# Patient Record
Sex: MALE | Race: WHITE | NOT HISPANIC OR LATINO
[De-identification: names, ages, dates, MRNs, and addresses within clinical notes are randomized per-mention and may not be internally consistent; named-entity substitution may affect disease eponyms.]

---

## 2017-05-08 ENCOUNTER — OTHER (OUTPATIENT)
Age: 67
End: 2017-05-08

## 2017-05-15 ENCOUNTER — APPOINTMENT (OUTPATIENT)
Dept: ORTHOPEDIC SURGERY | Facility: CLINIC | Age: 67
End: 2017-05-15

## 2017-05-15 VITALS
BODY MASS INDEX: 25.91 KG/M2 | SYSTOLIC BLOOD PRESSURE: 119 MMHG | HEIGHT: 68 IN | HEART RATE: 73 BPM | DIASTOLIC BLOOD PRESSURE: 71 MMHG | WEIGHT: 171 LBS

## 2017-06-08 ENCOUNTER — APPOINTMENT (OUTPATIENT)
Dept: PULMONOLOGY | Facility: CLINIC | Age: 67
End: 2017-06-08

## 2017-06-08 VITALS — WEIGHT: 168 LBS | BODY MASS INDEX: 25.54 KG/M2

## 2017-06-08 VITALS — DIASTOLIC BLOOD PRESSURE: 58 MMHG | OXYGEN SATURATION: 98 % | SYSTOLIC BLOOD PRESSURE: 110 MMHG | HEART RATE: 76 BPM

## 2017-06-08 RX ORDER — LOTEPREDNOL ETABONATE 5 MG/G
0.5 GEL OPHTHALMIC
Qty: 5 | Refills: 0 | Status: DISCONTINUED | COMMUNITY
Start: 2017-01-26

## 2017-06-08 RX ORDER — DOXYCYCLINE 100 MG/1
100 CAPSULE ORAL
Qty: 14 | Refills: 0 | Status: DISCONTINUED | COMMUNITY
Start: 2017-04-10

## 2017-06-08 RX ORDER — BESIFLOXACIN 6 MG/ML
0.6 SUSPENSION OPHTHALMIC
Qty: 5 | Refills: 0 | Status: DISCONTINUED | COMMUNITY
Start: 2017-01-26

## 2017-06-16 ENCOUNTER — OTHER (OUTPATIENT)
Age: 67
End: 2017-06-16

## 2017-12-15 ENCOUNTER — APPOINTMENT (OUTPATIENT)
Dept: PULMONOLOGY | Facility: CLINIC | Age: 67
End: 2017-12-15
Payer: MEDICARE

## 2017-12-15 VITALS
SYSTOLIC BLOOD PRESSURE: 126 MMHG | HEIGHT: 66 IN | OXYGEN SATURATION: 98 % | DIASTOLIC BLOOD PRESSURE: 66 MMHG | HEART RATE: 97 BPM

## 2017-12-15 PROCEDURE — 99215 OFFICE O/P EST HI 40 MIN: CPT | Mod: 25

## 2017-12-15 PROCEDURE — 94729 DIFFUSING CAPACITY: CPT

## 2017-12-15 PROCEDURE — 85018 HEMOGLOBIN: CPT | Mod: QW

## 2017-12-15 PROCEDURE — 94727 GAS DIL/WSHOT DETER LNG VOL: CPT

## 2017-12-15 PROCEDURE — 94010 BREATHING CAPACITY TEST: CPT

## 2017-12-15 RX ORDER — CLOBETASOL PROPIONATE 0.5 MG/G
0.05 OINTMENT TOPICAL
Qty: 45 | Refills: 0 | Status: DISCONTINUED | COMMUNITY
Start: 2017-11-09

## 2017-12-15 RX ORDER — SULFAMETHOXAZOLE AND TRIMETHOPRIM 400; 80 MG/1; MG/1
400-80 TABLET ORAL
Qty: 12 | Refills: 0 | Status: DISCONTINUED | COMMUNITY
Start: 2017-10-06

## 2018-06-29 ENCOUNTER — APPOINTMENT (OUTPATIENT)
Dept: PULMONOLOGY | Facility: CLINIC | Age: 68
End: 2018-06-29
Payer: MEDICARE

## 2018-06-29 VITALS — SYSTOLIC BLOOD PRESSURE: 126 MMHG | DIASTOLIC BLOOD PRESSURE: 64 MMHG

## 2018-06-29 VITALS — HEART RATE: 74 BPM | OXYGEN SATURATION: 99 %

## 2018-06-29 PROCEDURE — 94729 DIFFUSING CAPACITY: CPT

## 2018-06-29 PROCEDURE — 94727 GAS DIL/WSHOT DETER LNG VOL: CPT

## 2018-06-29 PROCEDURE — 85018 HEMOGLOBIN: CPT | Mod: QW

## 2018-06-29 PROCEDURE — 99214 OFFICE O/P EST MOD 30 MIN: CPT | Mod: 25

## 2018-06-29 PROCEDURE — 94010 BREATHING CAPACITY TEST: CPT

## 2018-11-30 ENCOUNTER — APPOINTMENT (OUTPATIENT)
Dept: PULMONOLOGY | Facility: CLINIC | Age: 68
End: 2018-11-30
Payer: MEDICARE

## 2018-11-30 VITALS — SYSTOLIC BLOOD PRESSURE: 126 MMHG | HEART RATE: 84 BPM | DIASTOLIC BLOOD PRESSURE: 60 MMHG | OXYGEN SATURATION: 99 %

## 2018-11-30 PROCEDURE — 99214 OFFICE O/P EST MOD 30 MIN: CPT | Mod: 25

## 2018-11-30 PROCEDURE — 94010 BREATHING CAPACITY TEST: CPT

## 2019-06-07 ENCOUNTER — APPOINTMENT (OUTPATIENT)
Dept: PULMONOLOGY | Facility: CLINIC | Age: 69
End: 2019-06-07
Payer: MEDICARE

## 2019-06-07 VITALS
DIASTOLIC BLOOD PRESSURE: 80 MMHG | HEART RATE: 88 BPM | OXYGEN SATURATION: 98 % | RESPIRATION RATE: 16 BRPM | SYSTOLIC BLOOD PRESSURE: 132 MMHG

## 2019-06-07 VITALS — HEIGHT: 66 IN | WEIGHT: 156 LBS | BODY MASS INDEX: 25.07 KG/M2

## 2019-06-07 PROCEDURE — 94010 BREATHING CAPACITY TEST: CPT

## 2019-06-07 PROCEDURE — 85018 HEMOGLOBIN: CPT | Mod: QW

## 2019-06-07 PROCEDURE — 94727 GAS DIL/WSHOT DETER LNG VOL: CPT

## 2019-06-07 PROCEDURE — 94729 DIFFUSING CAPACITY: CPT

## 2019-06-07 NOTE — PHYSICAL EXAM
[General Appearance - Well Developed] : well developed [Normal Appearance] : normal appearance [Well Groomed] : well groomed [General Appearance - Well Nourished] : well nourished [No Deformities] : no deformities [General Appearance - In No Acute Distress] : no acute distress [Normal Conjunctiva] : the conjunctiva exhibited no abnormalities [Eyelids - No Xanthelasma] : the eyelids demonstrated no xanthelasmas [Normal Oropharynx] : normal oropharynx [Neck Appearance] : the appearance of the neck was normal [Neck Cervical Mass (___cm)] : no neck mass was observed [Jugular Venous Distention Increased] : there was no jugular-venous distention [Thyroid Diffuse Enlargement] : the thyroid was not enlarged [Heart Sounds] : normal S1 and S2 [Thyroid Nodule] : there were no palpable thyroid nodules [Heart Rate And Rhythm] : heart rate and rhythm were normal [Murmurs] : no murmurs present [Abdomen Soft] : soft [Abdomen Tenderness] : non-tender [Abdomen Mass (___ Cm)] : no abdominal mass palpated [Abnormal Walk] : normal gait [Gait - Sufficient For Exercise Testing] : the gait was sufficient for exercise testing [Nail Clubbing] : no clubbing of the fingernails [Cyanosis, Localized] : no localized cyanosis [Petechial Hemorrhages (___cm)] : no petechial hemorrhages [Skin Color & Pigmentation] : normal skin color and pigmentation [Skin Lesions] : no skin lesions [] : no rash [No Venous Stasis] : no venous stasis [No Xanthoma] : no  xanthoma was observed [No Skin Ulcers] : no skin ulcer [Deep Tendon Reflexes (DTR)] : deep tendon reflexes were 2+ and symmetric [Sensation] : the sensory exam was normal to light touch and pinprick [No Focal Deficits] : no focal deficits [FreeTextEntry1] : clam shell type surgical scar along the lower rib cage line..

## 2019-06-07 NOTE — HISTORY OF PRESENT ILLNESS
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  No C/o cough, wheeze or sputum\par Pt had episode of entero viral in March-May. Completely resolved. Prograf reduced to 2mg per day 2months ago.\par \par

## 2019-06-07 NOTE — DISCUSSION/SUMMARY
[FreeTextEntry1] : 68-year-old white male seen today status post lung transplant Jan 2015. Doing well without of evidence rejection.

## 2019-06-07 NOTE — CONSULT LETTER
[Dear  ___] : Dear  [unfilled], [Consult Letter:] : I had the pleasure of evaluating your patient, [unfilled]. [Please see my note below.] : Please see my note below. [Sincerely,] : Sincerely, [J Carlos Cordova MD] : J Carlos Cordova MD [FreeTextEntry3] : J Carlos Cordova MD FCCP\par Pulmonary/Critical Care/Sleep Medicine\par Department of Internal Medicine\par \par [facility] [employer]\par

## 2019-06-18 ENCOUNTER — OTHER (OUTPATIENT)
Age: 69
End: 2019-06-18

## 2019-06-27 ENCOUNTER — APPOINTMENT (OUTPATIENT)
Dept: ORTHOPEDIC SURGERY | Facility: CLINIC | Age: 69
End: 2019-06-27
Payer: MEDICARE

## 2019-06-27 VITALS
DIASTOLIC BLOOD PRESSURE: 86 MMHG | SYSTOLIC BLOOD PRESSURE: 130 MMHG | HEIGHT: 66 IN | WEIGHT: 156 LBS | HEART RATE: 84 BPM | BODY MASS INDEX: 25.07 KG/M2

## 2019-06-27 PROCEDURE — 99213 OFFICE O/P EST LOW 20 MIN: CPT

## 2019-06-27 PROCEDURE — 73502 X-RAY EXAM HIP UNI 2-3 VIEWS: CPT

## 2019-06-27 NOTE — DISCUSSION/SUMMARY
[de-identified] : The patient is a 69 year old male 3 years s/p right anterior THR. He will continue home strengthening exercises. Overall, he is very happy with the results of the surgery. Dental prophylaxis was advised. Follow up in 5 years for radiographic surveillance.

## 2019-06-27 NOTE — HISTORY OF PRESENT ILLNESS
[de-identified] : This 69-year-old male presents for followup of his right THR. He is now 3 years and one month postop. He is noting no complaints in regard to the right hip. He does report some left hip pain after trimming shrubs several weeks ago. Overall this is improving with his home exercise program. He does walk for exercise. He tries to walk one to 2 miles throughout his day.

## 2019-06-27 NOTE — ADDENDUM
[FreeTextEntry1] : This note was authored by Yifan Avendano working as a medical scribe for Dr. Adama Kaba. The note was reviewed, edited, and revised by Dr. Adama Kaba whom is in agreement with the exam findings, imaging findings, and treatment plan. 06/27/2019.

## 2019-09-24 ENCOUNTER — APPOINTMENT (OUTPATIENT)
Dept: ORTHOPEDIC SURGERY | Facility: CLINIC | Age: 69
End: 2019-09-24
Payer: MEDICARE

## 2019-09-24 VITALS
HEIGHT: 66 IN | HEART RATE: 85 BPM | DIASTOLIC BLOOD PRESSURE: 68 MMHG | WEIGHT: 156 LBS | SYSTOLIC BLOOD PRESSURE: 147 MMHG | BODY MASS INDEX: 25.07 KG/M2

## 2019-09-24 DIAGNOSIS — M47.814 SPONDYLOSIS W/OUT MYELOPATHY OR RADICULOPATHY, THORACIC REGION: ICD-10-CM

## 2019-09-24 PROCEDURE — 72100 X-RAY EXAM L-S SPINE 2/3 VWS: CPT

## 2019-09-24 PROCEDURE — 99214 OFFICE O/P EST MOD 30 MIN: CPT

## 2019-09-24 NOTE — HISTORY OF PRESENT ILLNESS
[de-identified] : 69 year old male presents for lumbar spine pain and chronic compression fxs. Pt states he was remodeling his bathroom in August, when he aggravated his back after he dropped something and went to reach for it on the stairway. He reports his pain is now constant in the lower back. Sitting to standing and vice versa exacerbates his pain. Pt denies numbness and tingling. PMHx of osteopetrosis and osteopenia. He states he is a lung transplant pt. Pt takes calcium, Vitamin D and magnesium. He has taken Tramadol for his back pain but states it did not provide relief. Pt is currently taking hydrocortisone and is a chronic steroid patient which makes him a high risk of osteoporosis and compression fractures. [Ataxia] : no ataxia [Incontinence] : no incontinence [Loss of Dexterity] : good dexterity [Urinary Ret.] : no urinary retention

## 2019-09-24 NOTE — PHYSICAL EXAM
[de-identified] : CONSTITUTIONAL: The patient is a very pleasant individual who is well-nourished and who appears stated age.\par PSYCHIATRIC: The patient is alert and oriented X 3 and in no apparent distress, and participates with orthopedic evaluation well.\par HEAD: Atraumatic and is nonsyndromic in appearance.\par EENT: No visible thyromegaly, EOMI.\par RESPIRATORY: Respiratory rate is regular, not dyspneic on examination.\par LYMPHATICS: There is no inguinal lymphadenopathy\par INTEGUMENTARY: Skin is clean, dry, and intact about the bilateral lower extremities and lumbar spine.\par VASCULAR: There is brisk capillary refill about the bilateral lower extremities.\par NEUROLOGIC: There are no pathologic reflexes. There is no decrease in sensation of the bilateral lower extremities on Wartenberg pinwheel examination. Deep tendon reflexes are well maintained at 2+/4 of the bilateral lower extremities and are symmetric.\par MUSCULOSKELETAL: There is no visible muscular atrophy. Manual motor strength is well maintained in the bilateral lower extremities. Range of motion of lumbar spine is well maintained. The patient ambulates in a non-myelopathic manner. Negative tension sign and straight leg raise bilaterally. Quad extension, ankle dorsiflexion, EHL, plantar flexion, and ankle eversion are well preserved. Normal secondary orthopaedic exam of bilateral hips, greater trochanteric area, knees and ankles. \par Mechanically oriented low back pain.\par TTP at thoracolumbar junction. [de-identified] : Xray of the lumbar spine taken today shows lower thoracic and upper lumbar compression fxs.

## 2019-09-24 NOTE — DISCUSSION/SUMMARY
[de-identified] : Based upon his pain profile I believe OTC pain medications are reasonable for pain medication at this time. Based upon his high risk of compression fxs a thoracic and lumbar MRI has been ordered secondary to persistent pain and is medically necessary to determine a further treatment plan. The pt will follow up once MRI has been obtained.\par

## 2019-09-25 ENCOUNTER — FORM ENCOUNTER (OUTPATIENT)
Age: 69
End: 2019-09-25

## 2019-09-26 ENCOUNTER — APPOINTMENT (OUTPATIENT)
Dept: MRI IMAGING | Facility: CLINIC | Age: 69
End: 2019-09-26

## 2019-09-26 ENCOUNTER — APPOINTMENT (OUTPATIENT)
Dept: MRI IMAGING | Facility: CLINIC | Age: 69
End: 2019-09-26
Payer: MEDICARE

## 2019-09-26 ENCOUNTER — OUTPATIENT (OUTPATIENT)
Dept: OUTPATIENT SERVICES | Facility: HOSPITAL | Age: 69
LOS: 1 days | End: 2019-09-26

## 2019-09-26 DIAGNOSIS — Z94.2 LUNG TRANSPLANT STATUS: ICD-10-CM

## 2019-09-26 DIAGNOSIS — M47.814 SPONDYLOSIS WITHOUT MYELOPATHY OR RADICULOPATHY, THORACIC REGION: ICD-10-CM

## 2019-09-26 DIAGNOSIS — Z94.2 LUNG TRANSPLANT STATUS: Chronic | ICD-10-CM

## 2019-09-26 PROCEDURE — 72146 MRI CHEST SPINE W/O DYE: CPT | Mod: 26

## 2019-09-26 PROCEDURE — 72148 MRI LUMBAR SPINE W/O DYE: CPT | Mod: 26

## 2019-10-17 ENCOUNTER — APPOINTMENT (OUTPATIENT)
Dept: ORTHOPEDIC SURGERY | Facility: CLINIC | Age: 69
End: 2019-10-17
Payer: MEDICARE

## 2019-10-17 VITALS
BODY MASS INDEX: 25.07 KG/M2 | HEART RATE: 85 BPM | SYSTOLIC BLOOD PRESSURE: 160 MMHG | DIASTOLIC BLOOD PRESSURE: 86 MMHG | HEIGHT: 66 IN | WEIGHT: 156 LBS

## 2019-10-17 DIAGNOSIS — S22.000A WEDGE COMPRESSION FRACTURE OF UNSPECIFIED THORACIC VERTEBRA, INITIAL ENCOUNTER FOR CLOSED FRACTURE: ICD-10-CM

## 2019-10-17 DIAGNOSIS — M84.48XA PATHOLOGICAL FRACTURE, OTHER SITE, INITIAL ENCOUNTER FOR FRACTURE: ICD-10-CM

## 2019-10-17 DIAGNOSIS — Z80.7 FAMILY HISTORY OF OTHER MALIGNANT NEOPLASMS OF LYMPHOID, HEMATOPOIETIC AND RELATED TISSUES: ICD-10-CM

## 2019-10-17 DIAGNOSIS — Z96.649 PRESENCE OF UNSPECIFIED ARTIFICIAL HIP JOINT: ICD-10-CM

## 2019-10-17 DIAGNOSIS — Z87.09 PERSONAL HISTORY OF OTHER DISEASES OF THE RESPIRATORY SYSTEM: ICD-10-CM

## 2019-10-17 PROCEDURE — 22310 CLOSED TX VERT FX W/O MANJ: CPT

## 2019-10-17 PROCEDURE — 99215 OFFICE O/P EST HI 40 MIN: CPT | Mod: 57

## 2019-10-17 NOTE — ADDENDUM
[FreeTextEntry1] : Documented by Alyson Zayas acting as a scribe for Sudarshan Rodriguez NP on 10/17/2019.\par All medical record entries made by the Scribe were at my, Sudarshan Rodriguez NP, direction and personally dictated by me on 10/17/2019. I have reviewed the chart and agree that the record accurately reflects my personal performance of the history, physical exam, assessment and plan. I have also personally directed, reviewed, and agreed with the chart.

## 2019-10-17 NOTE — HISTORY OF PRESENT ILLNESS
[de-identified] : 69 year old male presents for MRI review lumbar spine pain and chronic compression fxs. Pt states he was remodeling his bathroom in August, when he aggravated his back after he dropped something and went to reach for it on the stairway. He reports his pain is now constant in the lower back. Sitting to standing and vice versa exacerbates his pain. Pt denies numbness and tingling. PMHx of osteopetrosis and osteopenia. He states he is a lung transplant pt. Pt takes calcium, Vitamin D and magnesium. Pt states he walks on the treadmill daily without pain.He has taken Tramadol for his back pain but states it did not provide relief. Pt is currently taking hydrocortisone and is a chronic steroid patient which makes him a high risk of osteoporosis and compression fractures. [Ataxia] : no ataxia [Incontinence] : no incontinence [Loss of Dexterity] : good dexterity [Urinary Ret.] : no urinary retention

## 2019-10-17 NOTE — PHYSICAL EXAM
[de-identified] : CONSTITUTIONAL: The patient is a very pleasant individual who is well-nourished and who appears stated age.\par PSYCHIATRIC: The patient is alert and oriented X 3 and in no apparent distress, and participates with orthopedic evaluation well.\par HEAD: Atraumatic and is nonsyndromic in appearance.\par EENT: No visible thyromegaly, EOMI.\par RESPIRATORY: Respiratory rate is regular, not dyspneic on examination.\par LYMPHATICS: There is no inguinal lymphadenopathy\par INTEGUMENTARY: Skin is clean, dry, and intact about the bilateral lower extremities and lumbar spine.\par VASCULAR: There is brisk capillary refill about the bilateral lower extremities.\par NEUROLOGIC: There are no pathologic reflexes. There is no decrease in sensation of the bilateral lower extremities on Wartenberg pinwheel examination. Deep tendon reflexes are well maintained at 2+/4 of the bilateral lower extremities and are symmetric.\par MUSCULOSKELETAL: There is no visible muscular atrophy. Manual motor strength is well maintained in the bilateral lower extremities. Range of motion of lumbar spine is well maintained. The patient ambulates in a non-myelopathic manner. Negative tension sign and straight leg raise bilaterally. Quad extension, ankle dorsiflexion, EHL, plantar flexion, and ankle eversion are well preserved. Normal secondary orthopaedic exam of bilateral hips, greater trochanteric area, knees and ankles. \par Mechanically oriented low back pain.\par TTP at thoracolumbar junction. [de-identified] : MRI of the thoracic and lumbar spine taken at 09/26/19 at [facility] shows multilevel acute or subacute benign compression fractures at the thoracic spine. Chronic benign compression fractures of the lumbar spine with vertebral body height loss most advanced and severe at L1 and L2. Superimposed degenerative disc disease at the thoracic spine results in severe bilateral foraminal narrowing at T10-T11. At the lumbar spine and degenerative disc disease results in severe foraminal narrowing on the right at L4-5 and bilaterally at L5-S1. Findings also suggestive of acute or subacute bilateral sacral insufficiency fractures.

## 2019-10-17 NOTE — DISCUSSION/SUMMARY
[de-identified] : 69 year old male with osteopetrosis, sacral insufficiency fx, thoracic and lumbar compression fxs. A pelvic MRI has been ordered secondary to persistent pain and is medically necessary to determine a further treatment plan and to evaluate for sacral insufficiency fxs. DEXA scans were recommended, which he is scheduled for. We also discussed bone health management. Calcitonin was offered to the pt as well as a LSO brace which he declined. The pt has been referred to pain management.

## 2019-12-05 ENCOUNTER — APPOINTMENT (OUTPATIENT)
Dept: ORTHOPEDIC SURGERY | Facility: CLINIC | Age: 69
End: 2019-12-05

## 2019-12-20 ENCOUNTER — APPOINTMENT (OUTPATIENT)
Dept: PULMONOLOGY | Facility: CLINIC | Age: 69
End: 2019-12-20
Payer: MEDICARE

## 2019-12-20 VITALS — OXYGEN SATURATION: 97 % | SYSTOLIC BLOOD PRESSURE: 110 MMHG | DIASTOLIC BLOOD PRESSURE: 70 MMHG | HEART RATE: 71 BPM

## 2019-12-20 VITALS — HEIGHT: 65 IN | BODY MASS INDEX: 22.82 KG/M2 | WEIGHT: 137 LBS

## 2019-12-20 PROCEDURE — 99214 OFFICE O/P EST MOD 30 MIN: CPT | Mod: 25

## 2019-12-20 PROCEDURE — 94010 BREATHING CAPACITY TEST: CPT

## 2019-12-20 RX ORDER — ROSUVASTATIN CALCIUM 40 MG/1
40 TABLET, FILM COATED ORAL
Refills: 0 | Status: ACTIVE | COMMUNITY

## 2019-12-20 NOTE — DISCUSSION/SUMMARY
[FreeTextEntry1] : 69-year-old male, seen today for his lung transplant in 2015. As for status is stable and the patient is status post drug eluding stent x3 for newly diagnosed coronary artery disease. No evidence of rejection

## 2019-12-20 NOTE — HISTORY OF PRESENT ILLNESS
[FreeTextEntry1] : 69-year-old male, status post double lung transplant in 2015 seen today in followup. His course of the recently complicated by discovery of coronary artery disease, and status post CAITLIN x3 [facility]. Patient continues to complain of some pain in and around his sternotomy site and has noted a slight decrease in lung function secondary to pain. He was reevaluated by pulmonary transplant in [place], without significant findings. This occurred on 11/26/19. History is otherwise negative for leg edema, paroxysmal nocturnal dyspnea, orthopnea, shortness of breath\par \par

## 2019-12-20 NOTE — CONSULT LETTER
[Dear  ___] : Dear  [unfilled], [Consult Letter:] : I had the pleasure of evaluating your patient, [unfilled]. [Please see my note below.] : Please see my note below. [Sincerely,] : Sincerely, [J Carlos Cordova MD] : J Carlos Cordova MD [FreeTextEntry3] : J Carlos Cordova MD FCCP\par Pulmonary/Critical Care/Sleep Medicine\par Department of Internal Medicine\par \par [facility] [employer]\par

## 2019-12-20 NOTE — PROCEDURE
[FreeTextEntry1] : \par Spirometry demonstrates a 15-20% decrease from baseline, which the patient attributes predominantly to skeletal discomfort\par \par

## 2019-12-20 NOTE — PHYSICAL EXAM
[General Appearance - Well Developed] : well developed [Normal Appearance] : normal appearance [Well Groomed] : well groomed [General Appearance - Well Nourished] : well nourished [No Deformities] : no deformities [General Appearance - In No Acute Distress] : no acute distress [Normal Conjunctiva] : the conjunctiva exhibited no abnormalities [Eyelids - No Xanthelasma] : the eyelids demonstrated no xanthelasmas [Normal Oropharynx] : normal oropharynx [Neck Appearance] : the appearance of the neck was normal [Neck Cervical Mass (___cm)] : no neck mass was observed [Jugular Venous Distention Increased] : there was no jugular-venous distention [Thyroid Diffuse Enlargement] : the thyroid was not enlarged [Thyroid Nodule] : there were no palpable thyroid nodules [Heart Rate And Rhythm] : heart rate and rhythm were normal [Heart Sounds] : normal S1 and S2 [Murmurs] : no murmurs present [Abdomen Tenderness] : non-tender [Abdomen Soft] : soft [Abdomen Mass (___ Cm)] : no abdominal mass palpated [Abnormal Walk] : normal gait [Gait - Sufficient For Exercise Testing] : the gait was sufficient for exercise testing [Nail Clubbing] : no clubbing of the fingernails [Cyanosis, Localized] : no localized cyanosis [Petechial Hemorrhages (___cm)] : no petechial hemorrhages [] : no rash [Skin Color & Pigmentation] : normal skin color and pigmentation [No Venous Stasis] : no venous stasis [Skin Lesions] : no skin lesions [No Skin Ulcers] : no skin ulcer [No Xanthoma] : no  xanthoma was observed [Sensation] : the sensory exam was normal to light touch and pinprick [No Focal Deficits] : no focal deficits [Deep Tendon Reflexes (DTR)] : deep tendon reflexes were 2+ and symmetric [FreeTextEntry1] : Sternotomy scar with some crepitations, which are chronic at the lower incision

## 2020-02-13 ENCOUNTER — APPOINTMENT (OUTPATIENT)
Dept: PULMONOLOGY | Facility: CLINIC | Age: 70
End: 2020-02-13
Payer: MEDICARE

## 2020-02-13 VITALS
SYSTOLIC BLOOD PRESSURE: 110 MMHG | RESPIRATION RATE: 16 BRPM | HEIGHT: 64 IN | HEART RATE: 82 BPM | DIASTOLIC BLOOD PRESSURE: 72 MMHG | WEIGHT: 136 LBS | BODY MASS INDEX: 23.22 KG/M2 | OXYGEN SATURATION: 100 %

## 2020-02-13 PROCEDURE — 94060 EVALUATION OF WHEEZING: CPT

## 2020-02-13 PROCEDURE — 99213 OFFICE O/P EST LOW 20 MIN: CPT | Mod: 25

## 2020-02-13 NOTE — HISTORY OF PRESENT ILLNESS
[FreeTextEntry1] : 69-year-old male, status post double lung transplant in 2015 seen today in followup. His course of the recently complicated by discovery of coronary artery disease, and status post CAITLIN x3 [facility]. Pt's renal function worsening with creatinine up to 7. Has noted generalized weakness. Noted diarrhea with azithromycin which was started by [facility]. No increased SOB, cough wheeze or sputum\par \par GRF reported at 14\par \par

## 2020-02-13 NOTE — PHYSICAL EXAM
[General Appearance - Well Developed] : well developed [Normal Appearance] : normal appearance [Well Groomed] : well groomed [General Appearance - Well Nourished] : well nourished [No Deformities] : no deformities [General Appearance - In No Acute Distress] : no acute distress [Normal Conjunctiva] : the conjunctiva exhibited no abnormalities [Eyelids - No Xanthelasma] : the eyelids demonstrated no xanthelasmas [Normal Oropharynx] : normal oropharynx [Neck Appearance] : the appearance of the neck was normal [Neck Cervical Mass (___cm)] : no neck mass was observed [Jugular Venous Distention Increased] : there was no jugular-venous distention [Thyroid Diffuse Enlargement] : the thyroid was not enlarged [Thyroid Nodule] : there were no palpable thyroid nodules [Heart Sounds] : normal S1 and S2 [Heart Rate And Rhythm] : heart rate and rhythm were normal [Murmurs] : no murmurs present [Abdomen Tenderness] : non-tender [Abdomen Soft] : soft [Abdomen Mass (___ Cm)] : no abdominal mass palpated [Abnormal Walk] : normal gait [Nail Clubbing] : no clubbing of the fingernails [Cyanosis, Localized] : no localized cyanosis [Gait - Sufficient For Exercise Testing] : the gait was sufficient for exercise testing [Petechial Hemorrhages (___cm)] : no petechial hemorrhages [] : no rash [No Venous Stasis] : no venous stasis [Skin Color & Pigmentation] : normal skin color and pigmentation [Skin Lesions] : no skin lesions [No Skin Ulcers] : no skin ulcer [No Xanthoma] : no  xanthoma was observed [Deep Tendon Reflexes (DTR)] : deep tendon reflexes were 2+ and symmetric [No Focal Deficits] : no focal deficits [Sensation] : the sensory exam was normal to light touch and pinprick [FreeTextEntry1] : Sternotomy scar with some crepitations, which are chronic at the lower incision

## 2020-02-13 NOTE — DISCUSSION/SUMMARY
[FreeTextEntry1] : 69-year-old male with a history of lung transplant in 2015 seen today for review of his medications and worsening renal failure. Worsening function most likely basis of antirejection drugs. Patient's meds were reviewed and he may take a trial off of Lirica. Patient will be undergoing monthly. Spirometry without physician visit for better surveillance of his lung function\par

## 2020-02-13 NOTE — CONSULT LETTER
[Consult Letter:] : I had the pleasure of evaluating your patient, [unfilled]. [Dear  ___] : Dear  [unfilled], [Please see my note below.] : Please see my note below. [J Carlos Cordova MD] : J Carlos Cordova MD [Sincerely,] : Sincerely, [FreeTextEntry3] : J Carlos Cordova MD FCCP\par Pulmonary/Critical Care/Sleep Medicine\par Department of Internal Medicine\par \par [facility] [employer]\par  [DrNelli  ___] : Dr. BETTS

## 2020-03-09 ENCOUNTER — APPOINTMENT (OUTPATIENT)
Dept: PULMONOLOGY | Facility: CLINIC | Age: 70
End: 2020-03-09
Payer: MEDICARE

## 2020-03-09 VITALS — HEIGHT: 64 IN | WEIGHT: 128 LBS | BODY MASS INDEX: 21.85 KG/M2

## 2020-03-09 PROCEDURE — 94010 BREATHING CAPACITY TEST: CPT

## 2020-03-25 ENCOUNTER — INPATIENT (INPATIENT)
Facility: HOSPITAL | Age: 70
LOS: 7 days | Discharge: HOME | DRG: 480 | End: 2020-04-02
Attending: INTERNAL MEDICINE | Admitting: INTERNAL MEDICINE
Payer: MEDICARE

## 2020-03-25 VITALS
TEMPERATURE: 98 F | OXYGEN SATURATION: 99 % | RESPIRATION RATE: 16 BRPM | DIASTOLIC BLOOD PRESSURE: 86 MMHG | HEART RATE: 82 BPM | HEIGHT: 64 IN | SYSTOLIC BLOOD PRESSURE: 126 MMHG | WEIGHT: 130.07 LBS

## 2020-03-25 DIAGNOSIS — S72.009A FRACTURE OF UNSPECIFIED PART OF NECK OF UNSPECIFIED FEMUR, INITIAL ENCOUNTER FOR CLOSED FRACTURE: ICD-10-CM

## 2020-03-25 DIAGNOSIS — Z94.2 LUNG TRANSPLANT STATUS: Chronic | ICD-10-CM

## 2020-03-25 LAB
ALBUMIN SERPL ELPH-MCNC: 3 G/DL — LOW (ref 3.3–5.2)
ALP SERPL-CCNC: 104 U/L — SIGNIFICANT CHANGE UP (ref 40–120)
ALT FLD-CCNC: 18 U/L — SIGNIFICANT CHANGE UP
ANION GAP SERPL CALC-SCNC: 20 MMOL/L — HIGH (ref 5–17)
ANISOCYTOSIS BLD QL: SLIGHT — SIGNIFICANT CHANGE UP
APTT BLD: 32.1 SEC — SIGNIFICANT CHANGE UP (ref 27.5–36.3)
AST SERPL-CCNC: 33 U/L — SIGNIFICANT CHANGE UP
BASOPHILS # BLD AUTO: 0.04 K/UL — SIGNIFICANT CHANGE UP (ref 0–0.2)
BASOPHILS NFR BLD AUTO: 0.9 % — SIGNIFICANT CHANGE UP (ref 0–2)
BILIRUB SERPL-MCNC: 0.4 MG/DL — SIGNIFICANT CHANGE UP (ref 0.4–2)
BLD GP AB SCN SERPL QL: SIGNIFICANT CHANGE UP
BUN SERPL-MCNC: 48 MG/DL — HIGH (ref 8–20)
CALCIUM SERPL-MCNC: 6.7 MG/DL — LOW (ref 8.6–10.2)
CHLORIDE SERPL-SCNC: 97 MMOL/L — LOW (ref 98–107)
CO2 SERPL-SCNC: 24 MMOL/L — SIGNIFICANT CHANGE UP (ref 22–29)
CREAT SERPL-MCNC: 8.04 MG/DL — HIGH (ref 0.5–1.3)
ELLIPTOCYTES BLD QL SMEAR: SLIGHT — SIGNIFICANT CHANGE UP
EOSINOPHIL # BLD AUTO: 0 K/UL — SIGNIFICANT CHANGE UP (ref 0–0.5)
EOSINOPHIL NFR BLD AUTO: 0 % — SIGNIFICANT CHANGE UP (ref 0–6)
FERRITIN SERPL-MCNC: 1316 NG/ML — HIGH (ref 30–400)
GLUCOSE SERPL-MCNC: 108 MG/DL — HIGH (ref 70–99)
HCT VFR BLD CALC: 25.3 % — LOW (ref 39–50)
HGB BLD-MCNC: 7.8 G/DL — LOW (ref 13–17)
HYPOCHROMIA BLD QL: SLIGHT — SIGNIFICANT CHANGE UP
INR BLD: 1.35 RATIO — HIGH (ref 0.88–1.16)
IRON SATN MFR SERPL: 20 % — SIGNIFICANT CHANGE UP (ref 16–55)
IRON SATN MFR SERPL: 37 UG/DL — LOW (ref 59–158)
LYMPHOCYTES # BLD AUTO: 0.13 K/UL — LOW (ref 1–3.3)
LYMPHOCYTES # BLD AUTO: 2.6 % — LOW (ref 13–44)
MACROCYTES BLD QL: SLIGHT — SIGNIFICANT CHANGE UP
MAGNESIUM SERPL-MCNC: 1.5 MG/DL — LOW (ref 1.6–2.6)
MANUAL SMEAR VERIFICATION: SIGNIFICANT CHANGE UP
MCHC RBC-ENTMCNC: 28.9 PG — SIGNIFICANT CHANGE UP (ref 27–34)
MCHC RBC-ENTMCNC: 30.8 GM/DL — LOW (ref 32–36)
MCV RBC AUTO: 93.7 FL — SIGNIFICANT CHANGE UP (ref 80–100)
MICROCYTES BLD QL: SLIGHT — SIGNIFICANT CHANGE UP
MONOCYTES # BLD AUTO: 0.42 K/UL — SIGNIFICANT CHANGE UP (ref 0–0.9)
MONOCYTES NFR BLD AUTO: 8.7 % — SIGNIFICANT CHANGE UP (ref 2–14)
NEUTROPHILS # BLD AUTO: 4.24 K/UL — SIGNIFICANT CHANGE UP (ref 1.8–7.4)
NEUTROPHILS NFR BLD AUTO: 87.8 % — HIGH (ref 43–77)
OVALOCYTES BLD QL SMEAR: SLIGHT — SIGNIFICANT CHANGE UP
PHOSPHATE SERPL-MCNC: 3 MG/DL — SIGNIFICANT CHANGE UP (ref 2.4–4.7)
PLAT MORPH BLD: NORMAL — SIGNIFICANT CHANGE UP
PLATELET # BLD AUTO: SIGNIFICANT CHANGE UP K/UL (ref 150–400)
POIKILOCYTOSIS BLD QL AUTO: SLIGHT — SIGNIFICANT CHANGE UP
POLYCHROMASIA BLD QL SMEAR: SLIGHT — SIGNIFICANT CHANGE UP
POTASSIUM SERPL-MCNC: 2.4 MMOL/L — CRITICAL LOW (ref 3.5–5.3)
POTASSIUM SERPL-SCNC: 2.4 MMOL/L — CRITICAL LOW (ref 3.5–5.3)
PROT SERPL-MCNC: 4.9 G/DL — LOW (ref 6.6–8.7)
PROTHROM AB SERPL-ACNC: 15.4 SEC — HIGH (ref 10–12.9)
RBC # BLD: 2.7 M/UL — LOW (ref 4.2–5.8)
RBC # FLD: 19.9 % — HIGH (ref 10.3–14.5)
RBC BLD AUTO: ABNORMAL
SCHISTOCYTES BLD QL AUTO: SLIGHT — SIGNIFICANT CHANGE UP
SODIUM SERPL-SCNC: 141 MMOL/L — SIGNIFICANT CHANGE UP (ref 135–145)
TARGETS BLD QL SMEAR: SLIGHT — SIGNIFICANT CHANGE UP
TIBC SERPL-MCNC: 182 UG/DL — LOW (ref 220–430)
TRANSFERRIN SERPL-MCNC: 127 MG/DL — LOW (ref 180–329)
WBC # BLD: 4.83 K/UL — SIGNIFICANT CHANGE UP (ref 3.8–10.5)
WBC # FLD AUTO: 4.83 K/UL — SIGNIFICANT CHANGE UP (ref 3.8–10.5)

## 2020-03-25 PROCEDURE — 99285 EMERGENCY DEPT VISIT HI MDM: CPT

## 2020-03-25 PROCEDURE — 73502 X-RAY EXAM HIP UNI 2-3 VIEWS: CPT | Mod: 26,LT

## 2020-03-25 PROCEDURE — 99223 1ST HOSP IP/OBS HIGH 75: CPT

## 2020-03-25 PROCEDURE — 93010 ELECTROCARDIOGRAM REPORT: CPT

## 2020-03-25 PROCEDURE — 99222 1ST HOSP IP/OBS MODERATE 55: CPT | Mod: 57

## 2020-03-25 PROCEDURE — 71045 X-RAY EXAM CHEST 1 VIEW: CPT | Mod: 26

## 2020-03-25 PROCEDURE — 99222 1ST HOSP IP/OBS MODERATE 55: CPT

## 2020-03-25 RX ORDER — CEFAZOLIN SODIUM 1 G
2000 VIAL (EA) INJECTION ONCE
Refills: 0 | Status: DISCONTINUED | OUTPATIENT
Start: 2020-03-27 | End: 2020-03-29

## 2020-03-25 RX ORDER — ONDANSETRON 8 MG/1
4 TABLET, FILM COATED ORAL EVERY 6 HOURS
Refills: 0 | Status: DISCONTINUED | OUTPATIENT
Start: 2020-03-25 | End: 2020-04-02

## 2020-03-25 RX ORDER — FENTANYL CITRATE 50 UG/ML
100 INJECTION INTRAVENOUS ONCE
Refills: 0 | Status: DISCONTINUED | OUTPATIENT
Start: 2020-03-25 | End: 2020-03-25

## 2020-03-25 RX ORDER — POTASSIUM CHLORIDE 20 MEQ
40 PACKET (EA) ORAL ONCE
Refills: 0 | Status: COMPLETED | OUTPATIENT
Start: 2020-03-25 | End: 2020-03-25

## 2020-03-25 RX ORDER — ATORVASTATIN CALCIUM 80 MG/1
40 TABLET, FILM COATED ORAL AT BEDTIME
Refills: 0 | Status: DISCONTINUED | OUTPATIENT
Start: 2020-03-25 | End: 2020-04-02

## 2020-03-25 RX ORDER — OXYCODONE HYDROCHLORIDE 5 MG/1
5 TABLET ORAL EVERY 6 HOURS
Refills: 0 | Status: DISCONTINUED | OUTPATIENT
Start: 2020-03-25 | End: 2020-03-27

## 2020-03-25 RX ORDER — HYDROCORTISONE 20 MG
20 TABLET ORAL DAILY
Refills: 0 | Status: DISCONTINUED | OUTPATIENT
Start: 2020-03-25 | End: 2020-04-02

## 2020-03-25 RX ORDER — MORPHINE SULFATE 50 MG/1
4 CAPSULE, EXTENDED RELEASE ORAL ONCE
Refills: 0 | Status: DISCONTINUED | OUTPATIENT
Start: 2020-03-25 | End: 2020-03-25

## 2020-03-25 RX ORDER — HYDROCORTISONE 20 MG
10 TABLET ORAL AT BEDTIME
Refills: 0 | Status: DISCONTINUED | OUTPATIENT
Start: 2020-03-25 | End: 2020-04-02

## 2020-03-25 RX ORDER — PANTOPRAZOLE SODIUM 20 MG/1
40 TABLET, DELAYED RELEASE ORAL
Refills: 0 | Status: DISCONTINUED | OUTPATIENT
Start: 2020-03-25 | End: 2020-04-02

## 2020-03-25 RX ORDER — METOPROLOL TARTRATE 50 MG
25 TABLET ORAL DAILY
Refills: 0 | Status: DISCONTINUED | OUTPATIENT
Start: 2020-03-25 | End: 2020-03-27

## 2020-03-25 RX ORDER — MAGNESIUM SULFATE 500 MG/ML
1 VIAL (ML) INJECTION ONCE
Refills: 0 | Status: COMPLETED | OUTPATIENT
Start: 2020-03-25 | End: 2020-03-25

## 2020-03-25 RX ORDER — MYCOPHENOLATE MOFETIL 250 MG/1
1000 CAPSULE ORAL
Refills: 0 | Status: DISCONTINUED | OUTPATIENT
Start: 2020-03-25 | End: 2020-04-02

## 2020-03-25 RX ORDER — SENNA PLUS 8.6 MG/1
2 TABLET ORAL AT BEDTIME
Refills: 0 | Status: DISCONTINUED | OUTPATIENT
Start: 2020-03-25 | End: 2020-03-27

## 2020-03-25 RX ORDER — MORPHINE SULFATE 50 MG/1
2 CAPSULE, EXTENDED RELEASE ORAL EVERY 4 HOURS
Refills: 0 | Status: DISCONTINUED | OUTPATIENT
Start: 2020-03-25 | End: 2020-03-27

## 2020-03-25 RX ORDER — DARBEPOETIN ALFA IN POLYSORBAT 200MCG/0.4
50 PEN INJECTOR (ML) SUBCUTANEOUS ONCE
Refills: 0 | Status: COMPLETED | OUTPATIENT
Start: 2020-03-25 | End: 2020-03-25

## 2020-03-25 RX ORDER — MAGNESIUM SULFATE 500 MG/ML
1 VIAL (ML) INJECTION ONCE
Refills: 0 | Status: COMPLETED | OUTPATIENT
Start: 2020-03-25 | End: 2020-03-26

## 2020-03-25 RX ORDER — ENOXAPARIN SODIUM 100 MG/ML
40 INJECTION SUBCUTANEOUS DAILY
Refills: 0 | Status: DISCONTINUED | OUTPATIENT
Start: 2020-03-25 | End: 2020-03-25

## 2020-03-25 RX ORDER — TACROLIMUS 5 MG/1
1 CAPSULE ORAL
Refills: 0 | Status: DISCONTINUED | OUTPATIENT
Start: 2020-03-25 | End: 2020-04-02

## 2020-03-25 RX ORDER — TAMSULOSIN HYDROCHLORIDE 0.4 MG/1
0.4 CAPSULE ORAL AT BEDTIME
Refills: 0 | Status: DISCONTINUED | OUTPATIENT
Start: 2020-03-25 | End: 2020-04-02

## 2020-03-25 RX ADMIN — FENTANYL CITRATE 100 MICROGRAM(S): 50 INJECTION INTRAVENOUS at 17:40

## 2020-03-25 RX ADMIN — Medication 40 MILLIEQUIVALENT(S): at 17:38

## 2020-03-25 RX ADMIN — Medication 100 GRAM(S): at 17:39

## 2020-03-25 RX ADMIN — Medication 1 GRAM(S): at 19:12

## 2020-03-25 RX ADMIN — Medication 50 MICROGRAM(S): at 23:22

## 2020-03-25 RX ADMIN — MORPHINE SULFATE 2 MILLIGRAM(S): 50 CAPSULE, EXTENDED RELEASE ORAL at 21:10

## 2020-03-25 RX ADMIN — FENTANYL CITRATE 100 MICROGRAM(S): 50 INJECTION INTRAVENOUS at 17:38

## 2020-03-25 RX ADMIN — MORPHINE SULFATE 2 MILLIGRAM(S): 50 CAPSULE, EXTENDED RELEASE ORAL at 21:40

## 2020-03-25 RX ADMIN — MORPHINE SULFATE 4 MILLIGRAM(S): 50 CAPSULE, EXTENDED RELEASE ORAL at 15:00

## 2020-03-25 RX ADMIN — MORPHINE SULFATE 4 MILLIGRAM(S): 50 CAPSULE, EXTENDED RELEASE ORAL at 14:57

## 2020-03-25 NOTE — PROGRESS NOTE ADULT - SUBJECTIVE AND OBJECTIVE BOX
Called for pre-operative cardiovascular risk assessment for planned ORIF of hip. The patient's cardiologist is Dr. Fam Lugo of [facility]. He was notified and will see the patient for consult.

## 2020-03-25 NOTE — ED PROVIDER NOTE - PHYSICAL EXAMINATION
Gen: Alert, NAD  Head: NC, AT, PERRL, EOMI, normal lids/conjunctiva  ENT: normal hearing, patent oropharynx without erythema/exudate, uvula midline  Neck: +supple, no tenderness/meningismus/JVD, +Trachea midline  Pulm: Bilateral BS, normal resp effort, no wheeze/stridor/retractions  CV: RRR, no M/R/G, +dist pulses  Abd: soft, NT/ND, +BS, no hepatosplenomegaly  Mskel: LLE with pain limited ROM and TTP left hip, no edema/erythema/cyanosis  Skin: no rash  Neuro: AAOx3, no gross sensory/motor deficits,

## 2020-03-25 NOTE — CONSULT NOTE ADULT - SUBJECTIVE AND OBJECTIVE BOX
Patient is a 69y old  Male who presents with a chief complaint of fall and found with L hip # (25 Mar 2020 18:22)    HPI:  70 y/o M ex smoker with PMH of 2015- Lung Transplant for COPD on anti rejection meds, s/p rt total hip arthroplasty, multi level compression #, BPH, HLD, CAD, BPH, OP, recently started HD for CHRISTINA- ESRD ? for past 1 month   Now presents to ED s/p fall. He states he slipped and fell down 3 steps, did not hit head/ no loc. Reports left hip pain. Patient able to move leg but difficulty bearing weight on left leg. Denies fever, CP, palpitations cough, URI, sick contact, travel, dizziness, LOC.    SH- Former smoker quit long time ago; denies other habits  FH- Denies lung dz, renal dz in family members (25 Mar 2020 17:40)      PAST MEDICAL & SURGICAL HISTORY:  Emphysema of lung  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 -  [facility]      FAMILY HISTORY:  Family history of emphysema      Social History:   -smoke   - Alcohol   -    Drugs        REVIEW OF SYSTEMS:  CONSTITUTIONAL: No fever, weight loss, or fatigue  EYES: No eye pain, No visual disturbances,   RESPIRATORY: No cough, hemoptysis; - SOB  CARDIOVASCULAR: No chest pain, No palpitations,   -leg swelling  GASTROINTESTINAL: No abdominal , NVD or GIB  GENITOURINARY: No UTI sx/hematuria  NEUROLOGICAL: No HA/wk/numbness  MUSCULOSKELETAL: + L hip joint pain, No swelling      Vital Signs Last 24 Hrs  T(C): 37 (25 Mar 2020 17:42), Max: 37 (25 Mar 2020 17:42)  T(F): 98.6 (25 Mar 2020 17:42), Max: 98.6 (25 Mar 2020 17:42)  HR: 77 (25 Mar 2020 17:42) (77 - 82)  BP: 124/70 (25 Mar 2020 17:42) (124/70 - 126/86)  BP(mean): --  RR: 18 (25 Mar 2020 17:42) (16 - 18)  SpO2: 98% (25 Mar 2020 17:42) (98% - 99%)    PHYSICAL EXAM:    GENERAL: NAD,   EYES:  conjunctiva and sclera clear  NECK: Supple, No JVD/Carotid Bruit -ve   NERVOUS SYSTEM:  A/O x3,   Lungs : No rales, No rhonchi,   HEART:  No murmur,  No rub, No gallops  ABDOMEN: Soft, NT/ND BS+  EXTREMITIES:  + Peripheral Pulses, - edema L hip movements painful  SKIN: No rashes or lesions      LABS:                        7.8    4.83  )-----------( CLUMPED    ( 25 Mar 2020 15:43 )             25.3     03-25    141  |  97<L>  |  48.0<H>  ----------------------------<  108<H>  2.4<LL>   |  24.0  |  8.04<H>    Ca    6.7<L>      25 Mar 2020 15:43  Phos  3.0     03-25  Mg     1.5     03-25    TPro  4.9<L>  /  Alb  3.0<L>  /  TBili  0.4  /  DBili  x   /  AST  33  /  ALT  18  /  AlkPhos  104  03-25    PT/INR - ( 25 Mar 2020 16:42 )   PT: 15.4 sec;   INR: 1.35 ratio         PTT - ( 25 Mar 2020 16:42 )  PTT:32.1 sec    Magnesium, Serum: 1.5 mg/dL (03-25 @ 15:43)  Phosphorus Level, Serum: 3.0 mg/dL (03-25 @ 15:43)      RADIOLOGY & ADDITIONAL TESTS:    MEDICATIONS  (STANDING):  atorvastatin  bisacodyl Suppository PRN  hydrocortisone  hydrocortisone  metoprolol succinate ER  morphine  - Injectable PRN  multivitamin  mycophenolate mofetil  ondansetron Injectable  oxyCODONE    IR PRN  pantoprazole    Tablet  pregabalin  senna PRN  tacrolimus  tamsulosin  trimethoprim  160 mG/sulfamethoxazole 800 mG

## 2020-03-25 NOTE — CONSULT NOTE ADULT - ATTENDING COMMENTS
Admitted with displaced left intertrochanteric hip fracture.  Left hip held externally rotated and ROM deferred secondary to fracture.  LLE NVID with intact DP/PT pulses.  Multiple medical comorbidities.  Received dialysis yesterday.  Recommend intramedullary operative fixation of the left hip once medically optimized.  Will need 2 units PRBC transfused preop in coordination with dialysis today.  Plan for surgery tomorrow morning.  Hold plavix until OR. Bedrest until OR.

## 2020-03-25 NOTE — ED ADULT NURSE NOTE - NSIMPLEMENTINTERV_GEN_ALL_ED
Implemented All Fall Risk Interventions:  [place] to call system. Call bell, personal items and telephone within reach. Instruct patient to call for assistance. Room bathroom lighting operational. Non-slip footwear when patient is off stretcher. Physically safe environment: no spills, clutter or unnecessary equipment. Stretcher in lowest position, wheels locked, appropriate side rails in place. Provide visual cue, wrist band, yellow gown, etc. Monitor gait and stability. Monitor for mental status changes and reorient to person, place, and time. Review medications for side effects contributing to fall risk. Reinforce activity limits and safety measures with patient and family.

## 2020-03-25 NOTE — ED ADULT NURSE NOTE - OBJECTIVE STATEMENT
What Type Of Note Output Would You Prefer (Optional)?: Standard Output How Severe Is Your Skin Lesion?: mild Has Your Skin Lesion Been Treated?: not been treated Is This A New Presentation, Or A Follow-Up?: Skin Lesion Assumed care at 1450 pt co slipping and falling down on his house pt on Plavix, denies hitting his head, positive LOC.

## 2020-03-25 NOTE — H&P ADULT - NSHPPHYSICALEXAM_GEN_ALL_CORE
Vital Signs Last 24 Hrs  T(C): 37 (03-25-20 @ 17:42), Max: 37 (03-25-20 @ 17:42)  T(F): 98.6 (03-25-20 @ 17:42), Max: 98.6 (03-25-20 @ 17:42)  HR: 77 (03-25-20 @ 17:42) (77 - 82)  BP: 124/70 (03-25-20 @ 17:42) (124/70 - 126/86)  BP(mean): --  RR: 18 (03-25-20 @ 17:42) (16 - 18)  SpO2: 98% (03-25-20 @ 17:42) (98% - 99%)  Gen: Alert, No distress, pain controlled  Head: NC, AT, PERRL, EOMI, normal lids/conjunctiva  ENT: No discharge, mucous membrane moist  Neck: +supple, no tenderness/meningismus/JVD, +Trachea midline, Rt HD access  Pulm: Bilateral BS, normal resp effort, no wheeze/stridor/retractions  CV: RRR, no M/R/G, +dist pulses  Abd: soft, NT/ ND, +BS, no hepatosplenomegaly  Mskl: LLE with pain limited ROM and TTP left hip, no edema/erythema/cyanosis  Skin: no rash  Neuro: AAOx3, no gross sensory/motor deficits

## 2020-03-25 NOTE — H&P ADULT - NSICDXFAMILYHX_GEN_ALL_CORE_FT
FAMILY HISTORY:  Father  Still living? No  Family history of emphysema, Age at diagnosis: Age Unknown

## 2020-03-25 NOTE — H&P ADULT - ASSESSMENT
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 for past 1 month presents to ED s/p fall    # Left Intertrochanteric # s/p Mechanical fall  seen by Ortho  planned surgery  Pain control  DVT- P  Pt on ASA/ Plavix at home  limb precautions    # s/p lung Tx for COPD on anti rejection meds  Pulm consult  cont current meds  Oxygen PRN  stable    # CAD, BPH, HLD, OP  Cont home meds  Stable    # HD dependent ESRD/ ? CHRISTINA  pt due for HD today but could go sec to fall  Will consult renal here to schedule HD    # Hypokalemia and Hypomagnesemia  Repleted gingerly by ED    # Normocytic anemia likely sec to chr dz  Can transfuse at HD PRN    Lovenox 70 y/o M ex smoker with PMH of 2015- Lung Transplant for COPD on anti rejection meds, s/p rt total hip arthroplasty, multi level compression #, BPH, HLD, CAD, BPH, OP, recently started HD for CHRISTINA- ESRD ? for past 1 month presents to ED s/p fall    # Left Intertrochanteric # s/p Mechanical fall  seen by Ortho  planned surgery  Pain control  DVT- P  Pt on ASA/ Plavix at home  limb precautions    # s/p lung Tx for COPD on anti rejection meds  Pulm consult  cont current meds  Oxygen PRN  stable    # CAD, BPH, HLD, OP  Cont home meds  Stable    # HD dependent ESRD/ ? CHRISTINA  pt due for HD today but could go sec to fall  Will consult renal here to schedule HD    # Hypokalemia and Hypomagnesemia  Repleted gingerly by ED    # Normocytic anemia likely sec to chr dz  Can transfuse at HD PRN    # clumped platelets  Will contact lab to run blue top for platelets    Lovenox

## 2020-03-25 NOTE — H&P ADULT - HISTORY OF PRESENT ILLNESS
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 for past 1 month presents to ED s/p fall. He states he slipped and fell down 3 steps, did not hit head/ no loc. Reports left hip pain. Patient able to move leg but difficulty bearing weight on left leg. Denies fever, CP, palpitations cough, URI, sick contact, travel, dizziness, LOC.    SH- Former smoker quit long time ago; denies other habits  FH- Denies lung dz, renal dz in family members

## 2020-03-25 NOTE — ED ADULT TRIAGE NOTE - CHIEF COMPLAINT QUOTE
slip and fall down 3 steps did not hit head no loc. reports left hip pain. patient able to move leg; difficulty bearing weight on left leg. + plavix.

## 2020-03-25 NOTE — ED PROVIDER NOTE - OBJECTIVE STATEMENT
Pertinent PMH/PSH/FHx/SHx and Review of Systems contained within:  Patient presents to the ED for left hip pain after fall.  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  Patient slipped down 3 steps.  landed on his rear end.  no back/head injury.  no LOC.  Patient now with left hip pain and slight edema.  Prior ortho on right hip was Send[facility].  Non toxic.  Well appearing. No aggravating or relieving factors. No other pertinent PMH.  No other pertinent PSH.  No other pertinent FHx.  Patient denies EtOH/tobacco/illicit substance use. No fever/chills, No photophobia/eye pain/changes in vision, No ear pain/sore throat/dysphagia, No chest pain/palpitations, no SOB/cough/wheeze/stridor, No abdominal pain, No N/V/D, no dysuria/frequency/discharge, No neck/back pain, no rash, no changes in neurological status/function.

## 2020-03-25 NOTE — ED ADULT NURSE REASSESSMENT NOTE - NS ED NURSE REASSESS COMMENT FT1
Received report from offgoing RN. Charting as noted. Patient A&Ox3 in no apparent distress. Patient states tripped over his stairs at home and fell onto left hip. Patient states pain with movement of left leg. Patient received diaylsis Monday, Wednesday, Friday. Access to right chest wall. Patient missed Wednesday diaylsis treatment. Patient awaiting transport to bed. Plan of care explained. Verbalized understanding.

## 2020-03-25 NOTE — CONSULT NOTE ADULT - ASSESSMENT
-COPD hx; s/p lung transplant in 2015; latest angélica was normal  -Respiratory status optimized  -hip fx s/p fall  -Renal failure on HD  -Hx CAD; s/p stents  -Anemia  -PLTs clumped on CBC    RECC:  No pulmonary contraindications to hip surgery. Albuterol can be used prn. Pain control. Follow O2 sat. PLT to be re-run. Anemia per PMT. HD per renal. -COPD hx; s/p lung transplant in 2015; latest angélica was normal  -Respiratory status optimized  -hip fx s/p fall  -Renal failure on HD; hypokalemia  -Hx CAD; s/p stents  -Anemia  -PLTs clumped on CBC    RECC:  No pulmonary contraindications to hip surgery. Albuterol can be used prn. Pain control. Follow O2 sat. PLT to be re-run. Anemia per PMT. HD and K level per renal.

## 2020-03-25 NOTE — CONSULT NOTE ADULT - ASSESSMENT
ESRD -Recently started on HD  1 m ago; May have an element of MATTHIEU  Runs low K and also has low Mg  Due for HD today, shall dialyze against 4 K bath  Supplement Mg  Add Aranesp  May need PRBC    s/p Lung Tx - on immujnosuppressives  s/p Coronary stents  Anemia  Now with Fx- plans per ortho

## 2020-03-25 NOTE — H&P ADULT - NSICDXPASTSURGICALHX_GEN_ALL_CORE_FT
PAST SURGICAL HISTORY:  H/O lung transplant bilateral - transplant - 1-2-2015 -  [facility]

## 2020-03-25 NOTE — CONSULT NOTE ADULT - SUBJECTIVE AND OBJECTIVE BOX
Pt Name: JU FERGUSON    MRN: [id number]    Patient is a 69y Male presenting to the emergency department with a chief complaint of left hip pain and inability to walk after fall today  Patient states he slipped an fell down 3 steps while at home  Denies any other injury    REVIEW OF SYSTEMS    General: Alert, responsive, in NAD    Skin/Breast: No rashes, no pruritis   	  Ophthalmologic: No visual changes. No redness.   	  ENMT:	No discharge. No swelling.    Respiratory and Thorax: No difficulty breathing. No cough.  	   Cardiovascular:	No chest pain. No palpitations.    Gastrointestinal:	 No abdominal pain. No diarrhea.     Genitourinary: No dysuria. No bleeding.    Musculoskeletal: SEE HPI.    Neurological: No sensory or motor changes.     Psychiatric: No anxiety or depression.    Hematology/Lymphatics: No swelling.    Endocrine: No Hx of diabetes.    ROS is otherwise negative.    PAST MEDICAL & SURGICAL HISTORY:  Emphysema of lung  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 -  [facility]    Allergies: No Known Allergies    Medications: fentaNYL    Injectable 100 MICROGram(s) IV Push Once  magnesium sulfate  IVPB 1 Gram(s) IV Intermittent Once  potassium chloride    Tablet ER 40 milliEquivalent(s) Oral once    FAMILY HISTORY:  Family history of emphysema (Father)  : non-contributory    Social History: Patient denies EtOH/tobacco/illicit substance use    Ambulation: Walking independently prior to fall                        7.8    4.83  )-----------( CLUMPED    ( 25 Mar 2020 15:43 )             25.3     03-25    141  |  97<L>  |  48.0<H>  ----------------------------<  108<H>  2.4<LL>   |  24.0  |  8.04<H>    Ca    6.7<L>      25 Mar 2020 15:43  Phos  3.0     03-25  Mg     1.5     03-25    TPro  4.9<L>  /  Alb  3.0<L>  /  TBili  0.4  /  DBili  x   /  AST  33  /  ALT  18  /  AlkPhos  104  03-25    Vital Signs Last 24 Hrs  T(C): 36.6 (25 Mar 2020 13:45), Max: 36.6 (25 Mar 2020 13:45)  T(F): 97.8 (25 Mar 2020 13:45), Max: 97.8 (25 Mar 2020 13:45)  HR: 82 (25 Mar 2020 13:45) (82 - 82)  BP: 126/86 (25 Mar 2020 13:45) (126/86 - 126/86)  BP(mean): --  RR: 16 (25 Mar 2020 13:45) (16 - 16)  SpO2: 99% (25 Mar 2020 13:45) (99% - 99%)    Daily Height in cm: 162.56 (25 Mar 2020 13:45)    Daily     PHYSICAL EXAM:    Appearance: Alert, responsive, in no acute distress.    Neurological: Sensation is grossly intact to light touch. 5/5 motor function of all extremities distally. No focal deficits or weaknesses found.    Skin: no rash on visible skin. Skin is clean, dry and intact. No bleeding. No abrasions. No ulcerations.    Vascular: 2+ distal pulses. Cap refill < 2 sec. No signs of venous insufficiency or stasis. No extremity ulcerations. No cyanosis.    Musculoskeletal:         Left Upper Extremity: NROM without pain or deformity       Right Upper Extremity: NROM without pain or deformity       Left Lower Extremity: left leg shortened and externally rotated, Calf soft/ compressible, + active plantar/ dorsiflexion intact, EHL/ FHL intact, sensation to light touch intact, 1+ DP pulse       Right Lower Extremity: NROM without pain or deformity    Imaging Studies:     EXAM:  XR HIP WITH PELV 2-3V LT                          PROCEDURE DATE:  03/25/2020      INTERPRETATION:  Radiographs of the LEFT hip         CLINICAL INFORMATION:    Pain.    TECHNIQUE:   AP and oblique views of the hip were obtained.    FINDINGS:   No prior exams are  available for comparison.    There is a comminuted intertrochanteric fracture of the LEFT hip with varus deformity.  RIGHT hip total prosthetic components intact. No other fracture seen.  No other fracture deformity seen.    No soft tissue abnormality is recognized.    IMPRESSION: LEFT Intertrochanteric fracture.       AGUSTIN NARAYANAN M.D., ATTENDING RADIOLOGIST    This document has been electronically signed. Mar 25 2020  4:00PM    A/P:  Pt is a 69y Male with found to have left intertrochanteric femur fracture s/p fall    PLAN:   Discussed with Dr. Kaba  * NPO after midnight for OR tomorrow  * IV fluids to start once NPO  * Pre-operative ABX ordered  * Routine daily anticoagulation held for OR  * Medical clearance requested for procedure  * Bed rest Pt Name: JU FERGUSON    MRN: [id number]    Patient is a 69y Male presenting to the emergency department with a chief complaint of left hip pain and inability to walk after fall today  Patient states he slipped an fell down 3 steps while at home. Pain is worse with movement and improved with rest.  Pain is sharp and stabbing at times.  Pain medication is helpful.    Denies any other injury    REVIEW OF SYSTEMS    General: Alert, responsive, in NAD    Skin/Breast: No rashes, no pruritis   	  Ophthalmologic: No visual changes. No redness.   	  ENMT:	No discharge. No swelling.    Respiratory and Thorax: No difficulty breathing. No cough.  	   Cardiovascular:	No chest pain. No palpitations.    Gastrointestinal:	 No abdominal pain. No diarrhea.     Genitourinary: No dysuria. No bleeding.    Musculoskeletal: SEE HPI.    Neurological: No sensory or motor changes.     Psychiatric: No anxiety or depression.    Hematology/Lymphatics: No swelling.    Endocrine: No Hx of diabetes.    ROS is otherwise negative.    PAST MEDICAL & SURGICAL HISTORY:  Emphysema of lung  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 -  [facility]    Allergies: No Known Allergies    Medications: fentaNYL    Injectable 100 MICROGram(s) IV Push Once  magnesium sulfate  IVPB 1 Gram(s) IV Intermittent Once  potassium chloride    Tablet ER 40 milliEquivalent(s) Oral once    FAMILY HISTORY:  Family history of emphysema (Father)  : non-contributory    Social History: Patient denies EtOH/tobacco/illicit substance use    Ambulation: Walking independently prior to fall                        7.8    4.83  )-----------( CLUMPED    ( 25 Mar 2020 15:43 )             25.3     03-25    141  |  97<L>  |  48.0<H>  ----------------------------<  108<H>  2.4<LL>   |  24.0  |  8.04<H>    Ca    6.7<L>      25 Mar 2020 15:43  Phos  3.0     03-25  Mg     1.5     03-25    TPro  4.9<L>  /  Alb  3.0<L>  /  TBili  0.4  /  DBili  x   /  AST  33  /  ALT  18  /  AlkPhos  104  03-25    Vital Signs Last 24 Hrs  T(C): 36.6 (25 Mar 2020 13:45), Max: 36.6 (25 Mar 2020 13:45)  T(F): 97.8 (25 Mar 2020 13:45), Max: 97.8 (25 Mar 2020 13:45)  HR: 82 (25 Mar 2020 13:45) (82 - 82)  BP: 126/86 (25 Mar 2020 13:45) (126/86 - 126/86)  BP(mean): --  RR: 16 (25 Mar 2020 13:45) (16 - 16)  SpO2: 99% (25 Mar 2020 13:45) (99% - 99%)    Daily Height in cm: 162.56 (25 Mar 2020 13:45)    Daily     PHYSICAL EXAM:    Appearance: Alert, responsive, in no acute distress.    Neurological: Sensation is grossly intact to light touch. 5/5 motor function of all extremities distally. No focal deficits or weaknesses found.    Skin: no rash on visible skin. Skin is clean, dry and intact. No bleeding. No abrasions. No ulcerations.    Vascular: 2+ distal pulses. Cap refill < 2 sec. No signs of venous insufficiency or stasis. No extremity ulcerations. No cyanosis.    Musculoskeletal:         Left Upper Extremity: NROM without pain or deformity       Right Upper Extremity: NROM without pain or deformity       Left Lower Extremity: left leg shortened and externally rotated, Calf soft/ compressible, + active plantar/ dorsiflexion intact, EHL/ FHL intact, sensation to light touch intact, 1+ DP/PT pulses       Right Lower Extremity: NROM without pain or deformity    Imaging Studies:     EXAM:  XR HIP WITH PELV 2-3V LT                          PROCEDURE DATE:  03/25/2020      INTERPRETATION:  Radiographs of the LEFT hip         CLINICAL INFORMATION:    Pain.    TECHNIQUE:   AP and oblique views of the hip were obtained.    FINDINGS:   No prior exams are  available for comparison.    There is a comminuted intertrochanteric fracture of the LEFT hip with varus deformity.  RIGHT hip total prosthetic components intact. No other fracture seen.  No other fracture deformity seen.    No soft tissue abnormality is recognized.    IMPRESSION: LEFT Intertrochanteric fracture.       AGUSTIN NARAYANAN M.D., ATTENDING RADIOLOGIST    This document has been electronically signed. Mar 25 2020  4:00PM    A/P:  Pt is a 69y Male with found to have left intertrochanteric femur fracture s/p fall    PLAN:   Discussed with Dr. Kaba  * NPO after midnight for OR tomorrow  * IV fluids to start once NPO  * Pre-operative ABX ordered  * Routine daily anticoagulation held for OR  * Medical clearance requested for procedure  * Bed rest

## 2020-03-25 NOTE — ED ADULT NURSE REASSESSMENT NOTE - NS ED NURSE REASSESS COMMENT FT1
Report given to Dialysis RN. Patient awaiting transport. Plan of care explained. Verbalized understanding.

## 2020-03-25 NOTE — ED ADULT NURSE REASSESSMENT NOTE - NS ED NURSE REASSESS COMMENT FT1
Report given to Eb RN on [facility]. Patient currently at diaylsis. Diaylsis RN, Transport, and Eb RN [facility]. Verbalized understanding. Chart given to transport.

## 2020-03-25 NOTE — CONSULT NOTE ADULT - SUBJECTIVE AND OBJECTIVE BOX
PULMONARY CONSULT NOTE      JU FERGUSONCULLEN-[id number]    Patient is a 69y old  Male who presents with a chief complaint of fall and hip # (25 Mar 2020 17:40)      HISTORY OF PRESENT ILLNESS: Hx double lung transplant in 2015 at [place] on antirejection meds, CAD s/p stent in Nov, renal failure felt to be possibly CHRISTINA now on ESRD for the past 4 weeks via port. Hx rt total hip arthroplasty. Presents to ED s/p fall. He states he slipped and fell down 3 steps, did not hit head/ no loc.  Reports left hip pain. Patient able to move leg but difficulty bearing weight on left leg. Denies SOB, fever, CP, palpitations cough, URI, sick contact, travel, dizziness, LOC. Not on home O2. On no inhalers. Latest spirometry this month in the office was normal.     MEDICATIONS  (STANDING):      MEDICATIONS  (PRN):      Allergies    budesonide (Unknown)  cefpodoxime (Unknown)  Pollen (Unknown)    Intolerances        PAST MEDICAL & SURGICAL HISTORY:  Emphysema of lung  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 -  [facility]      FAMILY HISTORY:  Family history of emphysema      SOCIAL HISTORY  Smoking History: former smoker    REVIEW OF SYSTEMS:    CONSTITUTIONAL:  No fevers, chills, sweats    HEENT:  Eyes:  No diplopia or blurred vision. ENT:  No earache, sore throat or runny nose.    CARDIOVASCULAR:  No pressure, squeezing, tightness, or heaviness about the chest; no palpitations.    RESPIRATORY: per HPI      GASTROINTESTINAL:  No abdominal pain, nausea, vomiting or diarrhea.    GENITOURINARY:  No dysuria, frequency or urgency.    NEUROLOGIC:  No paresthesias, fasciculations, seizures or weakness.    PSYCHIATRIC:  No disorder of thought or mood.    Vital Signs Last 24 Hrs  T(C): 37 (25 Mar 2020 17:42), Max: 37 (25 Mar 2020 17:42)  T(F): 98.6 (25 Mar 2020 17:42), Max: 98.6 (25 Mar 2020 17:42)  HR: 77 (25 Mar 2020 17:42) (77 - 82)  BP: 124/70 (25 Mar 2020 17:42) (124/70 - 126/86)  BP(mean): --  RR: 18 (25 Mar 2020 17:42) (16 - 18)  SpO2: 98% (25 Mar 2020 17:42) (98% - 99%)    PHYSICAL EXAMINATION:    GENERAL: The patient is  in no apparent distress.     HEENT: Head is normocephalic and atraumatic.  Mucous membranes are moist.     NECK: Supple.     LUNGS: Clear to auscultation without wheezing, rales, or rhonchi. Respirations unlabored; port on R side of chest    HEART: Regular rate and rhythm      ABDOMEN: Soft, nontender, and nondistended.       EXTREMITIES: Without any cyanosis, clubbing, rash, lesions or edema. difficulty moving L leg    NEUROLOGIC: Grossly intact.      LABS:                        7.8    4.83  )-----------( CLUMPED    ( 25 Mar 2020 15:43 )             25.3     03-25    141  |  97<L>  |  48.0<H>  ----------------------------<  108<H>  2.4<LL>   |  24.0  |  8.04<H>    Ca    6.7<L>      25 Mar 2020 15:43  Phos  3.0     03-25  Mg     1.5     03-25    TPro  4.9<L>  /  Alb  3.0<L>  /  TBili  0.4  /  DBili  x   /  AST  33  /  ALT  18  /  AlkPhos  104  03-25    PT/INR - ( 25 Mar 2020 16:42 )   PT: 15.4 sec;   INR: 1.35 ratio         PTT - ( 25 Mar 2020 16:42 )  PTT:32.1 sec           RADIOLOGY:  < from: Xray Chest 1 View- PORTABLE-Urgent (03.25.20 @ 15:58) >   EXAM:  XR CHEST PORTABLE URGENT 1V                          PROCEDURE DATE:  03/25/2020          INTERPRETATION:  TECHNIQUE: Single portable view of the chest.    COMPARISON: 5/19/2016    CLINICAL HISTORY: fall, left hip pain    FINDINGS:    Single frontal view of the chest demonstrates the lungs to be clear. The cardiomediastinal silhouette is normal. No acute osseous abnormalities. Right-sided dialysis catheter. Mediastinal metallic wires redemonstrated. Consider CT as clinically warranted.    IMPRESSION: No acute cardiopulmonary disease process.                ADÁN ARCINIEGA M.D., ATTENDING RADIOLOGIST    < end of copied text >       < from: Xray Hip w/ Pelvis 2 or 3 Views, Left (03.25.20 @ 15:59) >   EXAM:  XR HIP WITH PELV 2-3V LT                          PROCEDURE DATE:  03/25/2020          INTERPRETATION:  Radiographs of the LEFT hip         CLINICAL INFORMATION:    Pain.    TECHNIQUE:   AP and oblique views of the hip were obtained.    FINDINGS:   No prior exams are  available for comparison.    There is a comminuted intertrochanteric fracture of the LEFT hip with varus deformity.  RIGHT hip total prosthetic components intact. No other fracture seen.  No other fracture deformity seen.    No soft tissue abnormality is recognized.    IMPRESSION: LEFT Intertrochanteric fracture.                       AGUSTIN NARAYANAN M.D., ATTENDING RADIOLOGIST    < end of copied text >

## 2020-03-26 ENCOUNTER — TRANSCRIPTION ENCOUNTER (OUTPATIENT)
Age: 70
End: 2020-03-26

## 2020-03-26 LAB
ALBUMIN SERPL ELPH-MCNC: 3 G/DL — LOW (ref 3.3–5.2)
ALP SERPL-CCNC: 93 U/L — SIGNIFICANT CHANGE UP (ref 40–120)
ALT FLD-CCNC: 17 U/L — SIGNIFICANT CHANGE UP
ANION GAP SERPL CALC-SCNC: 14 MMOL/L — SIGNIFICANT CHANGE UP (ref 5–17)
AST SERPL-CCNC: 33 U/L — SIGNIFICANT CHANGE UP
BILIRUB SERPL-MCNC: 0.6 MG/DL — SIGNIFICANT CHANGE UP (ref 0.4–2)
BUN SERPL-MCNC: 27 MG/DL — HIGH (ref 8–20)
CALCIUM SERPL-MCNC: 7.4 MG/DL — LOW (ref 8.6–10.2)
CHLORIDE SERPL-SCNC: 97 MMOL/L — LOW (ref 98–107)
CLOSURE TME COLL+EPINEP BLD: 149 K/UL — LOW (ref 150–400)
CO2 SERPL-SCNC: 28 MMOL/L — SIGNIFICANT CHANGE UP (ref 22–29)
CREAT SERPL-MCNC: 5.1 MG/DL — HIGH (ref 0.5–1.3)
GLUCOSE SERPL-MCNC: 110 MG/DL — HIGH (ref 70–99)
HCT VFR BLD CALC: 24.2 % — LOW (ref 39–50)
HCV AB S/CO SERPL IA: 0.14 S/CO — SIGNIFICANT CHANGE UP (ref 0–0.99)
HCV AB SERPL-IMP: SIGNIFICANT CHANGE UP
HGB BLD-MCNC: 7.4 G/DL — LOW (ref 13–17)
MAGNESIUM SERPL-MCNC: 1.9 MG/DL — SIGNIFICANT CHANGE UP (ref 1.6–2.6)
MCHC RBC-ENTMCNC: 28.6 PG — SIGNIFICANT CHANGE UP (ref 27–34)
MCHC RBC-ENTMCNC: 30.6 GM/DL — LOW (ref 32–36)
MCV RBC AUTO: 93.4 FL — SIGNIFICANT CHANGE UP (ref 80–100)
PHOSPHATE SERPL-MCNC: 2.9 MG/DL — SIGNIFICANT CHANGE UP (ref 2.4–4.7)
PLATELET # BLD AUTO: 149 K/UL — LOW (ref 150–400)
POTASSIUM SERPL-MCNC: 3.6 MMOL/L — SIGNIFICANT CHANGE UP (ref 3.5–5.3)
POTASSIUM SERPL-SCNC: 3.6 MMOL/L — SIGNIFICANT CHANGE UP (ref 3.5–5.3)
PROT SERPL-MCNC: 4.9 G/DL — LOW (ref 6.6–8.7)
RBC # BLD: 2.59 M/UL — LOW (ref 4.2–5.8)
RBC # FLD: 20.1 % — HIGH (ref 10.3–14.5)
SODIUM SERPL-SCNC: 139 MMOL/L — SIGNIFICANT CHANGE UP (ref 135–145)
WBC # BLD: 4.54 K/UL — SIGNIFICANT CHANGE UP (ref 3.8–10.5)
WBC # FLD AUTO: 4.54 K/UL — SIGNIFICANT CHANGE UP (ref 3.8–10.5)

## 2020-03-26 PROCEDURE — 73552 X-RAY EXAM OF FEMUR 2/>: CPT | Mod: 26,LT

## 2020-03-26 PROCEDURE — 99233 SBSQ HOSP IP/OBS HIGH 50: CPT

## 2020-03-26 RX ORDER — ASPIRIN/CALCIUM CARB/MAGNESIUM 324 MG
81 TABLET ORAL DAILY
Refills: 0 | Status: DISCONTINUED | OUTPATIENT
Start: 2020-03-26 | End: 2020-03-26

## 2020-03-26 RX ORDER — LANOLIN ALCOHOL/MO/W.PET/CERES
3 CREAM (GRAM) TOPICAL ONCE
Refills: 0 | Status: COMPLETED | OUTPATIENT
Start: 2020-03-26 | End: 2020-03-26

## 2020-03-26 RX ORDER — LANOLIN ALCOHOL/MO/W.PET/CERES
3 CREAM (GRAM) TOPICAL AT BEDTIME
Refills: 0 | Status: DISCONTINUED | OUTPATIENT
Start: 2020-03-26 | End: 2020-04-02

## 2020-03-26 RX ORDER — ERYTHROPOIETIN 10000 [IU]/ML
10000 INJECTION, SOLUTION INTRAVENOUS; SUBCUTANEOUS ONCE
Refills: 0 | Status: COMPLETED | OUTPATIENT
Start: 2020-03-26 | End: 2020-03-26

## 2020-03-26 RX ADMIN — Medication 10 MILLIGRAM(S): at 00:42

## 2020-03-26 RX ADMIN — MORPHINE SULFATE 2 MILLIGRAM(S): 50 CAPSULE, EXTENDED RELEASE ORAL at 15:34

## 2020-03-26 RX ADMIN — MORPHINE SULFATE 2 MILLIGRAM(S): 50 CAPSULE, EXTENDED RELEASE ORAL at 04:17

## 2020-03-26 RX ADMIN — ONDANSETRON 4 MILLIGRAM(S): 8 TABLET, FILM COATED ORAL at 05:58

## 2020-03-26 RX ADMIN — Medication 25 MILLIGRAM(S): at 05:58

## 2020-03-26 RX ADMIN — TACROLIMUS 1 MILLIGRAM(S): 5 CAPSULE ORAL at 05:58

## 2020-03-26 RX ADMIN — MORPHINE SULFATE 2 MILLIGRAM(S): 50 CAPSULE, EXTENDED RELEASE ORAL at 00:48

## 2020-03-26 RX ADMIN — ATORVASTATIN CALCIUM 40 MILLIGRAM(S): 80 TABLET, FILM COATED ORAL at 21:37

## 2020-03-26 RX ADMIN — MORPHINE SULFATE 2 MILLIGRAM(S): 50 CAPSULE, EXTENDED RELEASE ORAL at 00:43

## 2020-03-26 RX ADMIN — MYCOPHENOLATE MOFETIL 1000 MILLIGRAM(S): 250 CAPSULE ORAL at 05:58

## 2020-03-26 RX ADMIN — Medication 10 MILLIGRAM(S): at 21:37

## 2020-03-26 RX ADMIN — Medication 50 MILLIGRAM(S): at 05:58

## 2020-03-26 RX ADMIN — Medication 100 GRAM(S): at 00:43

## 2020-03-26 RX ADMIN — ATORVASTATIN CALCIUM 40 MILLIGRAM(S): 80 TABLET, FILM COATED ORAL at 00:43

## 2020-03-26 RX ADMIN — TACROLIMUS 1 MILLIGRAM(S): 5 CAPSULE ORAL at 18:10

## 2020-03-26 RX ADMIN — ERYTHROPOIETIN 10000 UNIT(S): 10000 INJECTION, SOLUTION INTRAVENOUS; SUBCUTANEOUS at 14:53

## 2020-03-26 RX ADMIN — MYCOPHENOLATE MOFETIL 1000 MILLIGRAM(S): 250 CAPSULE ORAL at 18:10

## 2020-03-26 RX ADMIN — Medication 20 MILLIGRAM(S): at 05:58

## 2020-03-26 RX ADMIN — MORPHINE SULFATE 2 MILLIGRAM(S): 50 CAPSULE, EXTENDED RELEASE ORAL at 15:13

## 2020-03-26 RX ADMIN — Medication 81 MILLIGRAM(S): at 16:08

## 2020-03-26 RX ADMIN — Medication 1 TABLET(S): at 16:08

## 2020-03-26 RX ADMIN — Medication 3 MILLIGRAM(S): at 01:07

## 2020-03-26 RX ADMIN — TAMSULOSIN HYDROCHLORIDE 0.4 MILLIGRAM(S): 0.4 CAPSULE ORAL at 00:43

## 2020-03-26 RX ADMIN — OXYCODONE HYDROCHLORIDE 5 MILLIGRAM(S): 5 TABLET ORAL at 19:14

## 2020-03-26 RX ADMIN — OXYCODONE HYDROCHLORIDE 5 MILLIGRAM(S): 5 TABLET ORAL at 18:13

## 2020-03-26 RX ADMIN — TAMSULOSIN HYDROCHLORIDE 0.4 MILLIGRAM(S): 0.4 CAPSULE ORAL at 21:37

## 2020-03-26 RX ADMIN — ONDANSETRON 4 MILLIGRAM(S): 8 TABLET, FILM COATED ORAL at 18:10

## 2020-03-26 RX ADMIN — ONDANSETRON 4 MILLIGRAM(S): 8 TABLET, FILM COATED ORAL at 00:43

## 2020-03-26 NOTE — PROGRESS NOTE ADULT - SUBJECTIVE AND OBJECTIVE BOX
Patient seen and examined on HD unit , c/o L Hip pain , no other complaints .    CC : L hip pain       MEDICATIONS  (STANDING):  aspirin enteric coated 81 milliGRAM(s) Oral daily  atorvastatin 40 milliGRAM(s) Oral at bedtime  hydrocortisone 10 milliGRAM(s) Oral at bedtime  hydrocortisone 20 milliGRAM(s) Oral daily  metoprolol succinate ER 25 milliGRAM(s) Oral daily  multivitamin 1 Tablet(s) Oral daily  mycophenolate mofetil 1000 milliGRAM(s) Oral two times a day  ondansetron Injectable 4 milliGRAM(s) IV Push every 6 hours  pantoprazole    Tablet 40 milliGRAM(s) Oral before breakfast  pregabalin 50 milliGRAM(s) Oral two times a day  tacrolimus 1 milliGRAM(s) Oral two times a day  tamsulosin 0.4 milliGRAM(s) Oral at bedtime  trimethoprim  160 mG/sulfamethoxazole 800 mG 1 Tablet(s) Oral <User Schedule>    MEDICATIONS  (PRN):  bisacodyl Suppository 10 milliGRAM(s) Rectal daily PRN Constipation  morphine  - Injectable 2 milliGRAM(s) IV Push every 4 hours PRN Severe Pain (7 - 10)  oxyCODONE    IR 5 milliGRAM(s) Oral every 6 hours PRN Moderate Pain (4 - 6)  senna 2 Tablet(s) Oral at bedtime PRN Constipation      LABS:                          7.4    4.54  )-----------( 149      ( 26 Mar 2020 07:31 )             24.2     03-26    139  |  97<L>  |  27.0<H>  ----------------------------<  110<H>  3.6   |  28.0  |  5.10<H>    Ca    7.4<L>      26 Mar 2020 07:31  Phos  2.9     03-26  Mg     1.9     03-26    TPro  4.9<L>  /  Alb  3.0<L>  /  TBili  0.6  /  DBili  x   /  AST  33  /  ALT  17  /  AlkPhos  93  03-26    PT/INR - ( 25 Mar 2020 16:42 )   PT: 15.4 sec;   INR: 1.35 ratio         PTT - ( 25 Mar 2020 16:42 )  PTT:32.1 sec      RADIOLOGY & ADDITIONAL TESTS:  < from: Xray Femur 2 Views, Left (03.26.20 @ 01:08) >     EXAM:  XR FEMUR 2 VIEWS LT                          PROCEDURE DATE:  03/26/2020          INTERPRETATION:  Radiographs of the LEFT femur    CLINICAL INFORMATION: LEFT hip fracture.    TECHNIQUE:   AP and oblique views of the LEFT femur were obtained.    FINDINGS: 3/25/2020 LEFT hip radiograph available for comparison.    There is a comminuted intertrochanteric fracture of the hip with varus deformity.    No other fracture deformity seen. Distal LEFT femur intact.    No soft tissue abnormality is recognized.    IMPRESSION: LEFT hip Intertrochanteric fracture. Distal LEFT femur intact.      AGUSTIN NARAYANAN M.D., ATTENDING RADIOLOGIST    < end of copied text >    REVIEW OF SYSTEMS:  L hip pain , all other systems reviewed and are negative .     < from: 12 Lead ECG (03.25.20 @ 19:29) >    Ventricular Rate 81 BPM    Atrial Rate 81 BPM    P-R Interval 134 ms    QRS Duration 90 ms    Q-T Interval 408 ms    QTC Calculation(Bezet) 473 ms    P Axis -13 degrees    R Axis 36 degrees    T Axis 40 degrees    Diagnosis Line Sinus rhythm with Premature atrial complexes  Otherwise normal ECG    Confirmed by DENTON RIVAS ([id number]) on 3/26/2020 1:24:07 AM    < end of copied text >  < from: Xray Chest 1 View- PORTABLE-Urgent (03.25.20 @ 15:58) >     EXAM:  XR CHEST PORTABLE URGENT 1V                          PROCEDURE DATE:  03/25/2020          INTERPRETATION:  TECHNIQUE: Single portable view of the chest.    COMPARISON: 5/19/2016    CLINICAL HISTORY: fall, left hip pain    FINDINGS:    Single frontal view of the chest demonstrates the lungs to be clear. The cardiomediastinal silhouette is normal. No acute osseous abnormalities. Right-sided dialysis catheter. Mediastinal metallic wires redemonstrated. Consider CT as clinically warranted.    IMPRESSION: No acute cardiopulmonary disease process.        ADÁN ARCINIEGA M.D., ATTENDING RADIOLOGIST  This document has been electronically signed. Mar 25 2020  3:56PM        < end of copied text >          Vital Signs Last 24 Hrs  T(C): 36.7 (26 Mar 2020 15:20), Max: 37.4 (25 Mar 2020 20:35)  T(F): 98.1 (26 Mar 2020 15:20), Max: 99.3 (25 Mar 2020 20:35)  HR: 81 (26 Mar 2020 15:20) (75 - 103)  BP: 131/68 (26 Mar 2020 15:20) (96/51 - 144/80)  BP(mean): --  RR: 18 (26 Mar 2020 15:20) (18 - 18)  SpO2: 100% (26 Mar 2020 15:20) (95% - 100%)    PHYSICAL EXAM:    GENERAL: NAD, well-groomed, well-developed  HEAD:  Atraumatic, Normocephalic  EYES: EOMI, PERRLA, conjunctiva and sclera clear  NECK: Supple, No JVD, Normal thyroid  NERVOUS SYSTEM:  Alert & Oriented X3, no focal deficit  CHEST/LUNG: CTA b/l ,  no  rales, rhonchi, wheezing, or rubs  HEART: Regular rate and rhythm; No murmurs, rubs, or gallops  ABDOMEN: Soft, Nontender, Nondistended; Bowel sounds present  EXTREMITIES:  2+ Peripheral Pulses, No clubbing, cyanosis, or edema , LLE shortened , Lhip tenderness +   LYMPH: No lymphadenopathy noted  SKIN: No rashes or lesions

## 2020-03-26 NOTE — CONSULT NOTE ADULT - SUBJECTIVE AND OBJECTIVE BOX
[facility]                                                    [address] , [address], [place] [address]                                                         PHONE: [phone]    FAX: [phone] [address] [facility], [address], [place] [address]                                                 PHONE: [phone]    FAX: [phone]  *******************************************************************************  cc: pre operative evaluation    HPI: M with left intertrochanteric fracture for OR. Hx of COPD, s/p bilateral lung transplant on immunosuppressive therapy at [facility]. Pt is s/p fall without LOC.       Overnight events/Subjective Assessment:    INTERPRETATION OF TELEMETRY (personally reviewed):    PAST MEDICAL & SURGICAL HISTORY:  Emphysema of lung  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 -  [facility]      budesonide (Unknown)  cefpodoxime (Unknown)  Pollen (Unknown)      MEDICATIONS  (STANDING):  atorvastatin 40 milliGRAM(s) Oral at bedtime  hydrocortisone 10 milliGRAM(s) Oral at bedtime  hydrocortisone 20 milliGRAM(s) Oral daily  metoprolol succinate ER 25 milliGRAM(s) Oral daily  multivitamin 1 Tablet(s) Oral daily  mycophenolate mofetil 1000 milliGRAM(s) Oral two times a day  ondansetron Injectable 4 milliGRAM(s) IV Push every 6 hours  pantoprazole    Tablet 40 milliGRAM(s) Oral before breakfast  pregabalin 50 milliGRAM(s) Oral two times a day  tacrolimus 1 milliGRAM(s) Oral two times a day  tamsulosin 0.4 milliGRAM(s) Oral at bedtime  trimethoprim  160 mG/sulfamethoxazole 800 mG 1 Tablet(s) Oral <User Schedule>    MEDICATIONS  (PRN):  bisacodyl Suppository 10 milliGRAM(s) Rectal daily PRN Constipation  morphine  - Injectable 2 milliGRAM(s) IV Push every 4 hours PRN Severe Pain (7 - 10)  oxyCODONE    IR 5 milliGRAM(s) Oral every 6 hours PRN Moderate Pain (4 - 6)  senna 2 Tablet(s) Oral at bedtime PRN Constipation      Vital Signs Last 24 Hrs  T(C): 37.3 (26 Mar 2020 00:29), Max: 37.4 (25 Mar 2020 20:35)  T(F): 99.1 (26 Mar 2020 00:29), Max: 99.3 (25 Mar 2020 20:35)  HR: 84 (26 Mar 2020 05:57) (77 - 103)  BP: 104/60 (26 Mar 2020 05:57) (104/60 - 144/80)  BP(mean): --  RR: 18 (26 Mar 2020 00:29) (16 - 18)  SpO2: 100% (26 Mar 2020 00:29) (98% - 100%)    I&O's Detail    25 Mar 2020 07:01  -  26 Mar 2020 06:52  --------------------------------------------------------  IN:  Total IN: 0 mL    OUT:    Other: 1000 mL  Total OUT: 1000 mL    Total NET: -1000 mL        I&O's Summary    25 Mar 2020 07:01  -  26 Mar 2020 06:52  --------------------------------------------------------  IN: 0 mL / OUT: 1000 mL / NET: -1000 mL            PHYSICAL EXAM:  General: Appears well developed, well nourished, no acute distress. not in acute pain  HEAD: normal cephalic. Atraumatic  PUPILS: equal and reactive to light  EARS: normal hearing  NECK: supple. no JVD or HJR. no carotid bruits. no visible lymphadenopathy  NOSE: no gross abnormalities  CHEST: symmetric chest wall expansion  CARDIOVASCULAR: Normal rate. Regular rhythm. Normal S1 and S2, no S3/S4,  no murmur, rub, or gallop  LUNGS: Normal effort. Normal respiratory rate. Breath sounds are clear to auscultation bilaterally. No respiratory distress. No stridor.  no rales, rhonchi or wheeze. no decreased Breath sounds  ABDOMEN: Soft, nontender, non-distended, positive bowel sounds, no mass or bruit. no abdominal tenderness. No rebound. no ascites  EXTREMITIES: No clubbing, cyanosis or edema. normal range of motion  PULSES:  distal pulses WNL  SKIN: Warm and dry with normal turgor. no visible rash or cyanosis   NEURO: Alert & oriented x 3, grossly intact with no focal weakness  PSYCH: normal mood and affect. Grossly normal insight and judgement exhibited    FAMILY HISTORY:  Family history of emphysema      SOCIAL HISTORY:   active smoking. No ETOH/No IVDA    REVIEW OF SYSTEMS:  Constitutional: no fever, chills or malaise. No weight loss  Head: no trauma  Eyes: no visual deficit. No double vision  Ears: no hearing deficit or ringing in the ears  Nose: no nose bleeds or smell changes or congestion  Throat: no difficult swallowing or painful swallowing  Neck: supple. No lymphadenopathy or swelling  Respiratory: no SOB, wheeze, asthma, COPD. No cough. No blood in the sputum  Cardiovascular: no CP, palpitations, irregular heart beats. No edema. No PND. No orthopnea. No skin/temperature or color changes  Gastrointestinal: no abdominal pain. No constipation. No diarrhea. No melena. No nausea. No vomiting. No bloating  Genitourinary: no frequency or urgency. No hematuria  Lymphatics: no grossly swollen lymph nodes  Musculoskeletal: no limitation of range of motion. Normal strength. No pain  Integumentary: no visible rash. No itching  Neurologic: no HA. No TIA or stroke symptoms. No seizure. No hx of epilepsy. No tingling or numbness. No weakness. No dizziness  Psychiatric: denied. Reports appropriate mood.        LABS:                        7.8    4.83  )-----------( CLUMPED    ( 25 Mar 2020 15:43 )             25.3     03-25    141  |  97<L>  |  48.0<H>  ----------------------------<  108<H>  2.4<LL>   |  24.0  |  8.04<H>    Ca    6.7<L>      25 Mar 2020 15:43  Phos  3.0     03-25  Mg     1.5     03-25    TPro  4.9<L>  /  Alb  3.0<L>  /  TBili  0.4  /  DBili  x   /  AST  33  /  ALT  18  /  AlkPhos  104  03-25        PT/INR - ( 25 Mar 2020 16:42 )   PT: 15.4 sec;   INR: 1.35 ratio         PTT - ( 25 Mar 2020 16:42 )  PTT:32.1 sec  serum  Lipids:         RADIOLOGY & ADDITIONAL STUDIES:    ECG:    < from: Xray Hip w/ Pelvis 2 or 3 Views, Left (03.25.20 @ 15:59) >  IMPRESSION: LEFT Intertrochanteric fracture.      < end of copied text >    < from: Xray Chest 1 View- PORTABLE-Urgent (03.25.20 @ 15:58) >  IMPRESSION: No acute cardiopulmonary disease process.    < end of copied text >        ASSESSMENT AND PLAN:  In summary, JU FERGUSON is a 69y Male with past medical history significant for       Alma Patrick MD [facility]                                                    [address] , [address], [place] [address]                                                         PHONE: [phone]    FAX: [phone] [address] [facility], [address], [place] [address]                                                 PHONE: [phone]    FAX: [phone]  *******************************************************************************  cc: pre operative evaluation    HPI: M with left intertrochanteric fracture for OR. Hx of COPD, s/p bilateral lung transplant on immunosuppressive therapy at [facility]. Pt is s/p fall without LOC.       Overnight events/Subjective Assessment:    INTERPRETATION OF TELEMETRY (personally reviewed):    PAST MEDICAL & SURGICAL HISTORY:  Emphysema of lung  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 -  [facility]      budesonide (Unknown)  cefpodoxime (Unknown)  Pollen (Unknown)      MEDICATIONS  (STANDING):  atorvastatin 40 milliGRAM(s) Oral at bedtime  hydrocortisone 10 milliGRAM(s) Oral at bedtime  hydrocortisone 20 milliGRAM(s) Oral daily  metoprolol succinate ER 25 milliGRAM(s) Oral daily  multivitamin 1 Tablet(s) Oral daily  mycophenolate mofetil 1000 milliGRAM(s) Oral two times a day  ondansetron Injectable 4 milliGRAM(s) IV Push every 6 hours  pantoprazole    Tablet 40 milliGRAM(s) Oral before breakfast  pregabalin 50 milliGRAM(s) Oral two times a day  tacrolimus 1 milliGRAM(s) Oral two times a day  tamsulosin 0.4 milliGRAM(s) Oral at bedtime  trimethoprim  160 mG/sulfamethoxazole 800 mG 1 Tablet(s) Oral <User Schedule>    MEDICATIONS  (PRN):  bisacodyl Suppository 10 milliGRAM(s) Rectal daily PRN Constipation  morphine  - Injectable 2 milliGRAM(s) IV Push every 4 hours PRN Severe Pain (7 - 10)  oxyCODONE    IR 5 milliGRAM(s) Oral every 6 hours PRN Moderate Pain (4 - 6)  senna 2 Tablet(s) Oral at bedtime PRN Constipation      Vital Signs Last 24 Hrs  T(C): 37.3 (26 Mar 2020 00:29), Max: 37.4 (25 Mar 2020 20:35)  T(F): 99.1 (26 Mar 2020 00:29), Max: 99.3 (25 Mar 2020 20:35)  HR: 84 (26 Mar 2020 05:57) (77 - 103)  BP: 104/60 (26 Mar 2020 05:57) (104/60 - 144/80)  BP(mean): --  RR: 18 (26 Mar 2020 00:29) (16 - 18)  SpO2: 100% (26 Mar 2020 00:29) (98% - 100%)    I&O's Detail    25 Mar 2020 07:01  -  26 Mar 2020 06:52  --------------------------------------------------------  IN:  Total IN: 0 mL    OUT:    Other: 1000 mL  Total OUT: 1000 mL    Total NET: -1000 mL        I&O's Summary    25 Mar 2020 07:01  -  26 Mar 2020 06:52  --------------------------------------------------------  IN: 0 mL / OUT: 1000 mL / NET: -1000 mL            PHYSICAL EXAM:  General: Appears well developed, well nourished, no acute distress. not in acute pain  HEAD: normal cephalic. Atraumatic  PUPILS: equal and reactive to light  EARS: normal hearing  NECK: supple. no JVD or HJR. no carotid bruits. no visible lymphadenopathy  NOSE: no gross abnormalities  CHEST: symmetric chest wall expansion  CARDIOVASCULAR: Normal rate. Regular rhythm. Normal S1 and S2, no S3/S4,  no murmur, rub, or gallop  LUNGS: Normal effort. Normal respiratory rate. Breath sounds are clear to auscultation bilaterally. No respiratory distress. No stridor.  no rales, rhonchi or wheeze. no decreased Breath sounds  ABDOMEN: Soft, nontender, non-distended, positive bowel sounds, no mass or bruit. no abdominal tenderness. No rebound. no ascites  EXTREMITIES: No clubbing, cyanosis or edema. normal range of motion  PULSES:  distal pulses WNL  SKIN: Warm and dry with normal turgor. no visible rash or cyanosis   NEURO: Alert & oriented x 3, grossly intact with no focal weakness  PSYCH: normal mood and affect. Grossly normal insight and judgement exhibited    FAMILY HISTORY:  Family history of emphysema      SOCIAL HISTORY:   active smoking. No ETOH/No IVDA    REVIEW OF SYSTEMS:  Constitutional: no fever, chills or malaise. No weight loss  Head: no trauma  Eyes: no visual deficit. No double vision  Ears: no hearing deficit or ringing in the ears  Nose: no nose bleeds or smell changes or congestion  Throat: no difficult swallowing or painful swallowing  Neck: supple. No lymphadenopathy or swelling  Respiratory: no SOB, wheeze, asthma, COPD. No cough. No blood in the sputum  Cardiovascular: no CP, palpitations, irregular heart beats. No edema. No PND. No orthopnea. No skin/temperature or color changes  Gastrointestinal: no abdominal pain. No constipation. No diarrhea. No melena. No nausea. No vomiting. No bloating  Genitourinary: no frequency or urgency. No hematuria  Lymphatics: no grossly swollen lymph nodes  Musculoskeletal: no limitation of range of motion. Normal strength. No pain  Integumentary: no visible rash. No itching  Neurologic: no HA. No TIA or stroke symptoms. No seizure. No hx of epilepsy. No tingling or numbness. No weakness. No dizziness  Psychiatric: denied. Reports appropriate mood.        LABS:                        7.8    4.83  )-----------( CLUMPED    ( 25 Mar 2020 15:43 )             25.3     03-25    141  |  97<L>  |  48.0<H>  ----------------------------<  108<H>  2.4<LL>   |  24.0  |  8.04<H>    Ca    6.7<L>      25 Mar 2020 15:43  Phos  3.0     03-25  Mg     1.5     03-25    TPro  4.9<L>  /  Alb  3.0<L>  /  TBili  0.4  /  DBili  x   /  AST  33  /  ALT  18  /  AlkPhos  104  03-25        PT/INR - ( 25 Mar 2020 16:42 )   PT: 15.4 sec;   INR: 1.35 ratio         PTT - ( 25 Mar 2020 16:42 )  PTT:32.1 sec  serum  Lipids:         RADIOLOGY & ADDITIONAL STUDIES:    ECG: SR 80. normal axis and intervals. APC    < from: Xray Hip w/ Pelvis 2 or 3 Views, Left (03.25.20 @ 15:59) >  IMPRESSION: LEFT Intertrochanteric fracture.      < end of copied text >    < from: Xray Chest 1 View- PORTABLE-Urgent (03.25.20 @ 15:58) >  IMPRESSION: No acute cardiopulmonary disease process.    < end of copied text >        ASSESSMENT AND PLAN:  In summary, JU FERGUSON is a 69y Male with past medical history significant for       Alma Patrick MD [facility], [facility]                                                    [address], [address], [place] [address]                                                         PHONE: [phone]    FAX: [phone] [address] [facility], [address], [place] [address]                                                 PHONE: [phone]    FAX: [phone]  *******************************************************************************  cc: pre operative evaluation    HPI: 69 M with left intertrochanteric fracture for OR. Hx of COPD, s/p bilateral lung transplant on immunosuppressive therapy at [facility] 2015. Pt is s/p fall without LOC. Pt  reports missing a step and falling down 3 stairs. Denies head trauma or other bodily injury. Pt with coronary stenting x 3 2019 by Dr Gordon in [place], maintained on ASA and plavix.   Documented 3V CAD s/p PCI to the LAD and OM1 (november 26, 2019). CKD.  Former smoker, quit 15 years ago. Normotensive and non diabetic. Pt denies chest pain or SOB. No palpitations, PND or orthopnea. No dizziness or syncope.  Echo 10/14/19 EF 65-70%, diastolic dysfunction, LAE, trace MR  Carotid  4/18 16-49% right bulb and prox BALDEMAR, 66-79% left bulb and LICA   Carotid 10/19 unchanged from the previous study    Overnight events/Subjective Assessment: no new cardiac complaints    PAST MEDICAL & SURGICAL HISTORY:  Emphysema of lung  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 -  [facility]    Allergies:  budesonide (Unknown)  cefpodoxime (Unknown)  Pollen (Unknown)      MEDICATIONS  (STANDING):  atorvastatin 40 milliGRAM(s) Oral at bedtime  hydrocortisone 10 milliGRAM(s) Oral at bedtime  hydrocortisone 20 milliGRAM(s) Oral daily  metoprolol succinate ER 25 milliGRAM(s) Oral daily  multivitamin 1 Tablet(s) Oral daily  mycophenolate mofetil 1000 milliGRAM(s) Oral two times a day  ondansetron Injectable 4 milliGRAM(s) IV Push every 6 hours  pantoprazole    Tablet 40 milliGRAM(s) Oral before breakfast  pregabalin 50 milliGRAM(s) Oral two times a day  tacrolimus 1 milliGRAM(s) Oral two times a day  tamsulosin 0.4 milliGRAM(s) Oral at bedtime  trimethoprim  160 mG/sulfamethoxazole 800 mG 1 Tablet(s) Oral <User Schedule>    MEDICATIONS  (PRN):  bisacodyl Suppository 10 milliGRAM(s) Rectal daily PRN Constipation  morphine  - Injectable 2 milliGRAM(s) IV Push every 4 hours PRN Severe Pain (7 - 10)  oxyCODONE    IR 5 milliGRAM(s) Oral every 6 hours PRN Moderate Pain (4 - 6)  senna 2 Tablet(s) Oral at bedtime PRN Constipation      Vital Signs Last 24 Hrs  T(C): 37.3 (26 Mar 2020 00:29), Max: 37.4 (25 Mar 2020 20:35)  T(F): 99.1 (26 Mar 2020 00:29), Max: 99.3 (25 Mar 2020 20:35)  HR: 84 (26 Mar 2020 05:57) (77 - 103)  BP: 104/60 (26 Mar 2020 05:57) (104/60 - 144/80)  BP(mean): --  RR: 18 (26 Mar 2020 00:29) (16 - 18)  SpO2: 100% (26 Mar 2020 00:29) (98% - 100%)    I&O's Detail    25 Mar 2020 07:01  -  26 Mar 2020 06:52  --------------------------------------------------------  IN:  Total IN: 0 mL    OUT:    Other: 1000 mL  Total OUT: 1000 mL    Total NET: -1000 mL        I&O's Summary    25 Mar 2020 07:01  -  26 Mar 2020 06:52  --------------------------------------------------------  IN: 0 mL / OUT: 1000 mL / NET: -1000 mL            PHYSICAL EXAM:  General: Appears well developed, well nourished, no acute distress. not in acute pain  HEAD: normal cephalic. Atraumatic  PUPILS: equal and reactive to light  EARS: normal hearing  NECK: supple. no JVD or HJR.   + bilateral carotid bruits. no visible lymphadenopathy  NOSE: no gross abnormalities  CHEST: symmetric chest wall expansion  CARDIOVASCULAR: Normal rate. Regular rhythm. Normal S1 and S2, no S3/S4,  no murmur, rub, or gallop  LUNGS: Normal effort. Normal respiratory rate. Breath sounds are clear to auscultation bilaterally. No respiratory distress. No stridor.  no rales, rhonchi or wheeze. no decreased Breath sounds  ABDOMEN: Soft, nontender, non-distended, positive bowel sounds, no mass or bruit. no abdominal tenderness. No rebound. no ascites  EXTREMITIES: No clubbing, cyanosis or edema. normal range of motion  PULSES:  distal pulses WNL  SKIN: Warm and dry with normal turgor. no visible rash or cyanosis   NEURO: Alert & oriented x 3, grossly intact with no focal weakness  PSYCH: normal mood and affect. Grossly normal insight and judgement exhibited    FAMILY HISTORY:  Family history of emphysema  Father with 3V CABG in his 50's      SOCIAL HISTORY:  former smoking, quit 15 years ago. No ETOH/No IVDA    REVIEW OF SYSTEMS:  Constitutional: no fever, chills or malaise. No weight loss  Head: no trauma  Eyes: no visual deficit. No double vision  Ears: no hearing deficit or ringing in the ears  Nose: no nose bleeds or smell changes or congestion  Throat: no difficult swallowing or painful swallowing  Neck: supple. No lymphadenopathy or swelling  Respiratory: no SOB, wheeze, asthma, COPD. No cough. No blood in the sputum  Cardiovascular: no CP, palpitations, irregular heart beats. No edema. No PND. No orthopnea. No skin/temperature or color changes  Gastrointestinal: no abdominal pain. No constipation. No diarrhea. No melena. No nausea. No vomiting. No bloating  Genitourinary: no frequency or urgency. No hematuria  Lymphatics: no grossly swollen lymph nodes  Musculoskeletal: no limitation of range of motion. Normal strength. No pain  Integumentary: no visible rash. No itching  Neurologic: no HA. No TIA or stroke symptoms. No seizure. No hx of epilepsy. No tingling or numbness. No weakness. No dizziness  Psychiatric: denied. Reports appropriate mood.        LABS:                        7.8    4.83  )-----------( CLUMPED    ( 25 Mar 2020 15:43 )             25.3     03-25    141  |  97<L>  |  48.0<H>  ----------------------------<  108<H>  2.4<LL>   |  24.0  |  8.04<H>    Ca    6.7<L>      25 Mar 2020 15:43  Phos  3.0     03-25  Mg     1.5     03-25    TPro  4.9<L>  /  Alb  3.0<L>  /  TBili  0.4  /  DBili  x   /  AST  33  /  ALT  18  /  AlkPhos  104  03-25        PT/INR - ( 25 Mar 2020 16:42 )   PT: 15.4 sec;   INR: 1.35 ratio         PTT - ( 25 Mar 2020 16:42 )  PTT:32.1 sec  serum  Lipids:         RADIOLOGY & ADDITIONAL STUDIES:    ECG: SR 80. normal axis and intervals. APC    < from: Xray Hip w/ Pelvis 2 or 3 Views, Left (03.25.20 @ 15:59) >  IMPRESSION: LEFT Intertrochanteric fracture.      < end of copied text >    < from: Xray Chest 1 View- PORTABLE-Urgent (03.25.20 @ 15:58) >  IMPRESSION: No acute cardiopulmonary disease process.    < end of copied text >        ASSESSMENT AND PLAN:  In summary, JU FERGUSON is a 69y Male with past medical history significant for  left intertrochanteric fracture for OR. Hx of COPD, s/p bilateral lung transplant on immunosuppressive therapy at [facility] 2015. Pt is s/p fall without LOC. Pt  reports missing a step and falling down 3 stairs. Denies head trauma or other bodily injury. Pt with coronary stenting x 3 2019 by Dr Gordon in [place], maintained on ASA and plavix.   Documented 3V CAD s/p PCI to the LAD and OM1 (november 26, 2019). CKD.  Former smoker, quit 15 years ago. Normotensive and non diabetic. Pt denies chest pain or SOB. No palpitations, PND or orthopnea. No dizziness or syncope.  Echo 10/14/19 EF 65-70%, diastolic dysfunction, LAE, trace MR  Carotid  4/18 16-49% right bulb and prox BALDEMAR, 66-79% left bulb and LICA   Carotid 10/19 unchanged from the previous study    - CAD. Recent CAITLIN to LAD and OM1 November 2019. Will be high risk for holding DAPT due to recent CAITLIN. Pt understands he is at increased risk and the risks and benefits have been discussed with the patient. If able, DAPT should be continued with surgery performed on DAPT.  If ortho feels need to hold plavix due to bleeding risk, this should be resumed post procedure ASAP.    - ASA should be maintained without discontinuation.    - HTN. BP controlled on metoprolol 25mg daily    - HL. atorvastatin 40mg daily    - Carotid disease. Pt with bilateral carotid disease with bilateral carotid bruits. Pt is non focal without neurologic symptoms. Avoid hypotension as pt with 66-79% LICA stenosis and mild to moderate disease on the right    - CKD. management as per renal    - Lung transplant. Immunosuppressives as per pulmonary    - hip fx. Management as per ortho. DVT prophylaxis as per ortho    - ECG personally reviewed by me    - radiologic imaging reviewed    - Laboratory data reviewed.    - I spoke with Dr Jordan of interventional cardiology    - I have personally reviewed all obtainable prior records and data    - Pt cleared to proceed from the cardiac standpoint with the above recommendations.  Please reconsult PRN if any new CV issues should arise        Alma Patrick MD [facility], [facility]                                                    [address], [address], [place] [address]                                                         PHONE: [phone]    FAX: [phone] [address] [facility], [address], [place] [address]                                                 PHONE: [phone]    FAX: [phone]  *******************************************************************************  cc: pre operative evaluation    HPI: 69 M with left intertrochanteric fracture for OR. Hx of COPD, s/p bilateral lung transplant on immunosuppressive therapy at [facility] 2015. Pt is s/p fall without LOC. Pt  reports missing a step and falling down 3 stairs. Denies head trauma or other bodily injury. Pt with coronary stenting x 3 2019 by Dr Gordon in [place], maintained on ASA and plavix.   Documented 3V CAD s/p PCI to the LAD and OM1 (november 26, 2019). CKD.  Former smoker, quit 15 years ago. Normotensive and non diabetic. Pt denies chest pain or SOB. No palpitations, PND or orthopnea. No dizziness or syncope.  Echo 10/14/19 EF 65-70%, diastolic dysfunction, LAE, trace MR  Carotid  4/18 16-49% right bulb and prox BALDEMAR, 66-79% left bulb and LICA   Carotid 10/19 unchanged from the previous study    Overnight events/Subjective Assessment: no new cardiac complaints.    PAST MEDICAL & SURGICAL HISTORY:  Emphysema of lung  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 -  [facility]    Allergies:  budesonide (Unknown)  cefpodoxime (Unknown)  Pollen (Unknown)      MEDICATIONS  (STANDING):  atorvastatin 40 milliGRAM(s) Oral at bedtime  hydrocortisone 10 milliGRAM(s) Oral at bedtime  hydrocortisone 20 milliGRAM(s) Oral daily  metoprolol succinate ER 25 milliGRAM(s) Oral daily  multivitamin 1 Tablet(s) Oral daily  mycophenolate mofetil 1000 milliGRAM(s) Oral two times a day  ondansetron Injectable 4 milliGRAM(s) IV Push every 6 hours  pantoprazole    Tablet 40 milliGRAM(s) Oral before breakfast  pregabalin 50 milliGRAM(s) Oral two times a day  tacrolimus 1 milliGRAM(s) Oral two times a day  tamsulosin 0.4 milliGRAM(s) Oral at bedtime  trimethoprim  160 mG/sulfamethoxazole 800 mG 1 Tablet(s) Oral <User Schedule>    MEDICATIONS  (PRN):  bisacodyl Suppository 10 milliGRAM(s) Rectal daily PRN Constipation  morphine  - Injectable 2 milliGRAM(s) IV Push every 4 hours PRN Severe Pain (7 - 10)  oxyCODONE    IR 5 milliGRAM(s) Oral every 6 hours PRN Moderate Pain (4 - 6)  senna 2 Tablet(s) Oral at bedtime PRN Constipation      Vital Signs Last 24 Hrs  T(C): 37.3 (26 Mar 2020 00:29), Max: 37.4 (25 Mar 2020 20:35)  T(F): 99.1 (26 Mar 2020 00:29), Max: 99.3 (25 Mar 2020 20:35)  HR: 84 (26 Mar 2020 05:57) (77 - 103)  BP: 104/60 (26 Mar 2020 05:57) (104/60 - 144/80)  BP(mean): --  RR: 18 (26 Mar 2020 00:29) (16 - 18)  SpO2: 100% (26 Mar 2020 00:29) (98% - 100%)    I&O's Detail    25 Mar 2020 07:01  -  26 Mar 2020 06:52  --------------------------------------------------------  IN:  Total IN: 0 mL    OUT:    Other: 1000 mL  Total OUT: 1000 mL    Total NET: -1000 mL        I&O's Summary    25 Mar 2020 07:01  -  26 Mar 2020 06:52  --------------------------------------------------------  IN: 0 mL / OUT: 1000 mL / NET: -1000 mL            PHYSICAL EXAM:  General: Appears well developed, well nourished, no acute distress. not in acute pain  HEAD: normal cephalic. Atraumatic  PUPILS: equal and reactive to light  EARS: normal hearing  NECK: supple. no JVD or HJR.   + bilateral carotid bruits. no visible lymphadenopathy  NOSE: no gross abnormalities  CHEST: symmetric chest wall expansion  CARDIOVASCULAR: Normal rate. Regular rhythm. Normal S1 and S2, no S3/S4,  no murmur, rub, or gallop  LUNGS: Normal effort. Normal respiratory rate. Breath sounds are clear to auscultation bilaterally. No respiratory distress. No stridor.  no rales, rhonchi or wheeze. no decreased Breath sounds  ABDOMEN: Soft, nontender, non-distended, positive bowel sounds, no mass or bruit. no abdominal tenderness. No rebound. no ascites  EXTREMITIES: No clubbing, cyanosis or edema. normal range of motion  PULSES:  distal pulses WNL  SKIN: Warm and dry with normal turgor. no visible rash or cyanosis   NEURO: Alert & oriented x 3, grossly intact with no focal weakness  PSYCH: normal mood and affect. Grossly normal insight and judgement exhibited    FAMILY HISTORY:  Family history of emphysema  Father with 3V CABG in his 50's      SOCIAL HISTORY:  former smoking, quit 15 years ago. No ETOH/No IVDA    REVIEW OF SYSTEMS:  Constitutional: no fever, chills or malaise. No weight loss  Head: no trauma  Eyes: no visual deficit. No double vision  Ears: no hearing deficit or ringing in the ears  Nose: no nose bleeds or smell changes or congestion  Throat: no difficult swallowing or painful swallowing  Neck: supple. No lymphadenopathy or swelling  Respiratory: no SOB, wheeze, asthma, COPD. No cough. No blood in the sputum  Cardiovascular: no CP, palpitations, irregular heart beats. No edema. No PND. No orthopnea. No skin/temperature or color changes  Gastrointestinal: no abdominal pain. No constipation. No diarrhea. No melena. No nausea. No vomiting. No bloating  Genitourinary: no frequency or urgency. No hematuria  Lymphatics: no grossly swollen lymph nodes  Musculoskeletal: no limitation of range of motion. Normal strength. No pain  Integumentary: no visible rash. No itching  Neurologic: no HA. No TIA or stroke symptoms. No seizure. No hx of epilepsy. No tingling or numbness. No weakness. No dizziness  Psychiatric: denied. Reports appropriate mood.        LABS:                        7.8    4.83  )-----------( CLUMPED    ( 25 Mar 2020 15:43 )             25.3     03-25    141  |  97<L>  |  48.0<H>  ----------------------------<  108<H>  2.4<LL>   |  24.0  |  8.04<H>    Ca    6.7<L>      25 Mar 2020 15:43  Phos  3.0     03-25  Mg     1.5     03-25    TPro  4.9<L>  /  Alb  3.0<L>  /  TBili  0.4  /  DBili  x   /  AST  33  /  ALT  18  /  AlkPhos  104  03-25        PT/INR - ( 25 Mar 2020 16:42 )   PT: 15.4 sec;   INR: 1.35 ratio         PTT - ( 25 Mar 2020 16:42 )  PTT:32.1 sec  serum  Lipids:         RADIOLOGY & ADDITIONAL STUDIES:    ECG: SR 80. normal axis and intervals. APC    < from: Xray Hip w/ Pelvis 2 or 3 Views, Left (03.25.20 @ 15:59) >  IMPRESSION: LEFT Intertrochanteric fracture.      < end of copied text >    < from: Xray Chest 1 View- PORTABLE-Urgent (03.25.20 @ 15:58) >  IMPRESSION: No acute cardiopulmonary disease process.    < end of copied text >        ASSESSMENT AND PLAN:  In summary, JU FERGUSON is a 69y Male with past medical history significant for  left intertrochanteric fracture for OR. Hx of COPD, s/p bilateral lung transplant on immunosuppressive therapy at [facility] 2015. Pt is s/p fall without LOC. Pt  reports missing a step and falling down 3 stairs. Denies head trauma or other bodily injury. Pt with coronary stenting x 3 2019 by Dr Gordon in [place], maintained on ASA and plavix.   Documented 3V CAD s/p PCI to the LAD and OM1 (november 26, 2019). CKD.  Former smoker, quit 15 years ago. Normotensive and non diabetic. Pt denies chest pain or SOB. No palpitations, PND or orthopnea. No dizziness or syncope.  Echo 10/14/19 EF 65-70%, diastolic dysfunction, LAE, trace MR  Carotid  4/18 16-49% right bulb and prox BALDEMAR, 66-79% left bulb and LICA   Carotid 10/19 unchanged from the previous study    - CAD. Recent CAITLIN to LAD and OM1 November 2019. Will be high risk for holding DAPT due to recent CAITLIN. Pt understands he is at increased risk and the risks and benefits have been discussed with the patient. If able, DAPT should be continued with surgery performed on DAPT.  If ortho feels need to hold plavix due to bleeding risk, this should be resumed post procedure ASAP.    - ASA should be maintained without discontinuation.    - HTN. BP controlled on metoprolol 25mg daily    - HL. atorvastatin 40mg daily    - Carotid disease. Pt with bilateral carotid disease with bilateral carotid bruits. Pt is non focal without neurologic symptoms. Avoid hypotension as pt with 66-79% LICA stenosis and mild to moderate disease on the right    - CKD. management as per renal    - Lung transplant. Immunosuppressives as per pulmonary    - hip fx. Management as per ortho. DVT prophylaxis as per ortho    - ECG personally reviewed by me    - radiologic imaging reviewed    - Laboratory data reviewed.    - I spoke with Dr Jordan of interventional cardiology    - I have personally reviewed all obtainable prior records and data    - Pt cleared to proceed from the cardiac standpoint with the above recommendations.  Please reconsult PRN if any new CV issues should arise        Alma Patrick MD [facility], [facility]                                                    [address], [address], [place] [address]                                                         PHONE: [phone]    FAX: [phone] [address], [address], [place] [address]                                                 PHONE: [phone]    FAX: [phone]  *******************************************************************************  cc: pre operative evaluation    HPI: 69 M with left intertrochanteric fracture for OR. Hx of COPD, s/p bilateral lung transplant on immunosuppressive therapy at [facility] 2015. Pt is s/p fall without LOC. Pt  reports missing a step and falling down 3 stairs. Denies head trauma or other bodily injury. Pt with coronary stenting x 3 2019 by Dr Gordon in [place], maintained on ASA and plavix.   Documented 3V CAD s/p PCI to the LAD and OM1 (november 26, 2019). CKD.  Former smoker, quit 15 years ago. Normotensive and non diabetic. Pt denies chest pain or SOB. No palpitations, PND or orthopnea. No dizziness or syncope.  Echo 10/14/19 EF 65-70%, diastolic dysfunction, LAE, trace MR  Carotid  4/18 16-49% right bulb and prox BALDEMAR, 66-79% left bulb and LICA   Carotid 10/19 unchanged from the previous study    Overnight events/Subjective Assessment: no new cardiac complaints.    PAST MEDICAL & SURGICAL HISTORY:  Emphysema of lung  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 -  [facility]    Allergies:  budesonide (Unknown)  cefpodoxime (Unknown)  Pollen (Unknown)      MEDICATIONS  (STANDING):  atorvastatin 40 milliGRAM(s) Oral at bedtime  hydrocortisone 10 milliGRAM(s) Oral at bedtime  hydrocortisone 20 milliGRAM(s) Oral daily  metoprolol succinate ER 25 milliGRAM(s) Oral daily  multivitamin 1 Tablet(s) Oral daily  mycophenolate mofetil 1000 milliGRAM(s) Oral two times a day  ondansetron Injectable 4 milliGRAM(s) IV Push every 6 hours  pantoprazole    Tablet 40 milliGRAM(s) Oral before breakfast  pregabalin 50 milliGRAM(s) Oral two times a day  tacrolimus 1 milliGRAM(s) Oral two times a day  tamsulosin 0.4 milliGRAM(s) Oral at bedtime  trimethoprim  160 mG/sulfamethoxazole 800 mG 1 Tablet(s) Oral <User Schedule>    MEDICATIONS  (PRN):  bisacodyl Suppository 10 milliGRAM(s) Rectal daily PRN Constipation  morphine  - Injectable 2 milliGRAM(s) IV Push every 4 hours PRN Severe Pain (7 - 10)  oxyCODONE    IR 5 milliGRAM(s) Oral every 6 hours PRN Moderate Pain (4 - 6)  senna 2 Tablet(s) Oral at bedtime PRN Constipation      Vital Signs Last 24 Hrs  T(C): 37.3 (26 Mar 2020 00:29), Max: 37.4 (25 Mar 2020 20:35)  T(F): 99.1 (26 Mar 2020 00:29), Max: 99.3 (25 Mar 2020 20:35)  HR: 84 (26 Mar 2020 05:57) (77 - 103)  BP: 104/60 (26 Mar 2020 05:57) (104/60 - 144/80)  BP(mean): --  RR: 18 (26 Mar 2020 00:29) (16 - 18)  SpO2: 100% (26 Mar 2020 00:29) (98% - 100%)    I&O's Detail    25 Mar 2020 07:01  -  26 Mar 2020 06:52  --------------------------------------------------------  IN:  Total IN: 0 mL    OUT:    Other: 1000 mL  Total OUT: 1000 mL    Total NET: -1000 mL        I&O's Summary    25 Mar 2020 07:01  -  26 Mar 2020 06:52  --------------------------------------------------------  IN: 0 mL / OUT: 1000 mL / NET: -1000 mL            PHYSICAL EXAM:  General: Appears well developed, well nourished, no acute distress. not in acute pain  HEAD: normal cephalic. Atraumatic  PUPILS: equal and reactive to light  EARS: normal hearing  NECK: supple. no JVD or HJR.   + bilateral carotid bruits. no visible lymphadenopathy  NOSE: no gross abnormalities  CHEST: symmetric chest wall expansion  CARDIOVASCULAR: Normal rate. Regular rhythm. Normal S1 and S2, no S3/S4,  no murmur, rub, or gallop  LUNGS: Normal effort. Normal respiratory rate. Breath sounds are clear to auscultation bilaterally. No respiratory distress. No stridor.  no rales, rhonchi or wheeze. no decreased Breath sounds  ABDOMEN: Soft, nontender, non-distended, positive bowel sounds, no mass or bruit. no abdominal tenderness. No rebound. no ascites  EXTREMITIES: No clubbing, cyanosis or edema. normal range of motion  PULSES:  distal pulses WNL  SKIN: Warm and dry with normal turgor. no visible rash or cyanosis   NEURO: Alert & oriented x 3, grossly intact with no focal weakness  PSYCH: normal mood and affect. Grossly normal insight and judgement exhibited    FAMILY HISTORY:  Family history of emphysema  Father with 3V CABG in his 50's      SOCIAL HISTORY:  former smoking, quit 15 years ago. No ETOH/No IVDA    REVIEW OF SYSTEMS:  Constitutional: no fever, chills or malaise. No weight loss  Head: no trauma  Eyes: no visual deficit. No double vision  Ears: no hearing deficit or ringing in the ears  Nose: no nose bleeds or smell changes or congestion  Throat: no difficult swallowing or painful swallowing  Neck: supple. No lymphadenopathy or swelling  Respiratory: no SOB, wheeze, asthma, COPD. No cough. No blood in the sputum  Cardiovascular: no CP, palpitations, irregular heart beats. No edema. No PND. No orthopnea. No skin/temperature or color changes  Gastrointestinal: no abdominal pain. No constipation. No diarrhea. No melena. No nausea. No vomiting. No bloating  Genitourinary: no frequency or urgency. No hematuria  Lymphatics: no grossly swollen lymph nodes  Musculoskeletal: no limitation of range of motion. Normal strength. No pain  Integumentary: no visible rash. No itching  Neurologic: no HA. No TIA or stroke symptoms. No seizure. No hx of epilepsy. No tingling or numbness. No weakness. No dizziness  Psychiatric: denied. Reports appropriate mood.        LABS:                        7.8    4.83  )-----------( CLUMPED    ( 25 Mar 2020 15:43 )             25.3     03-25    141  |  97<L>  |  48.0<H>  ----------------------------<  108<H>  2.4<LL>   |  24.0  |  8.04<H>    Ca    6.7<L>      25 Mar 2020 15:43  Phos  3.0     03-25  Mg     1.5     03-25    TPro  4.9<L>  /  Alb  3.0<L>  /  TBili  0.4  /  DBili  x   /  AST  33  /  ALT  18  /  AlkPhos  104  03-25        PT/INR - ( 25 Mar 2020 16:42 )   PT: 15.4 sec;   INR: 1.35 ratio         PTT - ( 25 Mar 2020 16:42 )  PTT:32.1 sec  serum  Lipids:         RADIOLOGY & ADDITIONAL STUDIES:    ECG: SR 80. normal axis and intervals. APC    < from: Xray Hip w/ Pelvis 2 or 3 Views, Left (03.25.20 @ 15:59) >  IMPRESSION: LEFT Intertrochanteric fracture.      < end of copied text >    < from: Xray Chest 1 View- PORTABLE-Urgent (03.25.20 @ 15:58) >  IMPRESSION: No acute cardiopulmonary disease process.    < end of copied text >        ASSESSMENT AND PLAN:  In summary, JU FERGUSON is a 69y Male with past medical history significant for  left intertrochanteric fracture for OR. Hx of COPD, s/p bilateral lung transplant on immunosuppressive therapy at [facility] 2015. Pt is s/p fall without LOC. Pt  reports missing a step and falling down 3 stairs. Denies head trauma or other bodily injury. Pt with coronary stenting x 3 2019 by Dr Gordon in [place], maintained on ASA and plavix.   Documented 3V CAD s/p PCI to the LAD and OM1 (november 26, 2019). CKD.  Former smoker, quit 15 years ago. Normotensive and non diabetic. Pt denies chest pain or SOB. No palpitations, PND or orthopnea. No dizziness or syncope.  Echo 10/14/19 EF 65-70%, diastolic dysfunction, LAE, trace MR  Carotid  4/18 16-49% right bulb and prox BALDEMAR, 66-79% left bulb and LICA   Carotid 10/19 unchanged from the previous study    - CAD. Recent CAITLIN to LAD and OM1 November 2019. Will be high risk for holding DAPT due to recent CAITLIN. Pt understands he is at increased risk and the risks and benefits have been discussed with the patient. If able, DAPT should be continued with surgery performed on DAPT.  If ortho feels need to hold plavix due to bleeding risk, this should be resumed post procedure ASAP.    - ASA should be maintained without discontinuation.    - HTN. BP controlled on metoprolol 25mg daily    - HL. atorvastatin 40mg daily    - Carotid disease. Pt with bilateral carotid disease with bilateral carotid bruits. Pt is non focal without neurologic symptoms. Avoid hypotension as pt with 66-79% LICA stenosis and mild to moderate disease on the right    - CKD. management as per renal. Electrolyte management as per renal    - Anemia. Likely of chronic disease. Management as per medical    - Lung transplant. Immunosuppressives as per pulmonary    - hip fx. Management as per ortho. DVT prophylaxis as per ortho    - ECG personally reviewed by me    - radiologic imaging reviewed    - Laboratory data reviewed.    - I spoke with Dr Jordan of interventional cardiology    - I have personally reviewed all obtainable prior records and data    - Pt cleared to proceed from the cardiac standpoint with the above recommendations.  Please reconsult PRN if any new CV issues should arise        Alma Patrick MD

## 2020-03-26 NOTE — PROGRESS NOTE ADULT - ASSESSMENT
ESRD: L hip fracture  - will arrange for further HD today so that patient can be brought to OR early tomorrow AM    Anemia: +CKD  - will add MENDY at HD  - PRBCs today in preparation for surgery  - trend H/H    RO: serum phos ok  - monitor off binders for now

## 2020-03-26 NOTE — PROGRESS NOTE ADULT - SUBJECTIVE AND OBJECTIVE BOX
NEPHROLOGY INTERVAL HPI/OVERNIGHT EVENTS:  pt clinically stable  no acute distress noted  pain is manageable  no acute cp, sob, n/v/d    MEDICATIONS  (STANDING):  aspirin enteric coated 81 milliGRAM(s) Oral daily  atorvastatin 40 milliGRAM(s) Oral at bedtime  hydrocortisone 10 milliGRAM(s) Oral at bedtime  hydrocortisone 20 milliGRAM(s) Oral daily  metoprolol succinate ER 25 milliGRAM(s) Oral daily  multivitamin 1 Tablet(s) Oral daily  mycophenolate mofetil 1000 milliGRAM(s) Oral two times a day  ondansetron Injectable 4 milliGRAM(s) IV Push every 6 hours  pantoprazole    Tablet 40 milliGRAM(s) Oral before breakfast  pregabalin 50 milliGRAM(s) Oral two times a day  tacrolimus 1 milliGRAM(s) Oral two times a day  tamsulosin 0.4 milliGRAM(s) Oral at bedtime  trimethoprim  160 mG/sulfamethoxazole 800 mG 1 Tablet(s) Oral <User Schedule>    MEDICATIONS  (PRN):  bisacodyl Suppository 10 milliGRAM(s) Rectal daily PRN Constipation  morphine  - Injectable 2 milliGRAM(s) IV Push every 4 hours PRN Severe Pain (7 - 10)  oxyCODONE    IR 5 milliGRAM(s) Oral every 6 hours PRN Moderate Pain (4 - 6)  senna 2 Tablet(s) Oral at bedtime PRN Constipation      Allergies    budesonide (Unknown)  cefpodoxime (Unknown)  Pollen (Unknown)          Vital Signs Last 24 Hrs  T(C): 36.8 (26 Mar 2020 08:10), Max: 37.4 (25 Mar 2020 20:35)  T(F): 98.2 (26 Mar 2020 08:10), Max: 99.3 (25 Mar 2020 20:35)  HR: 82 (26 Mar 2020 08:10) (77 - 103)  BP: 96/58 (26 Mar 2020 08:10) (96/58 - 144/80)  BP(mean): --  RR: 18 (26 Mar 2020 08:10) (16 - 18)  SpO2: 95% (26 Mar 2020 08:10) (95% - 100%)    PHYSICAL EXAM:  GENERAL: Elderly, frail  HEENT:   NECK: Supple, No JVD  NERVOUS SYSTEM:  A/O x3, non focal  Lungs: Diminished BS at bases  HEART:  No rub  ABDOMEN: Soft, NT/ND BS+  EXTREMITIES:  tr LE edema; L hip pain to palpation  SKIN: No rashes nor lesions    LABS:                        7.4    4.54  )-----------( 149      ( 26 Mar 2020 07:31 )             24.2     03-26    139  |  97<L>  |  27.0<H>  ----------------------------<  110<H>  3.6   |  28.0  |  5.10<H>    Ca    7.4<L>      26 Mar 2020 07:31  Phos  2.9     03-26  Mg     1.9     03-26    TPro  4.9<L>  /  Alb  3.0<L>  /  TBili  0.6  /  DBili  x   /  AST  33  /  ALT  17  /  AlkPhos  93  03-26    PT/INR - ( 25 Mar 2020 16:42 )   PT: 15.4 sec;   INR: 1.35 ratio         PTT - ( 25 Mar 2020 16:42 )  PTT:32.1 sec    Magnesium, Serum: 1.9 mg/dL (03-26 @ 07:31)  Phosphorus Level, Serum: 2.9 mg/dL (03-26 @ 07:31)  Magnesium, Serum: 1.5 mg/dL (03-25 @ 15:43)  Phosphorus Level, Serum: 3.0 mg/dL (03-25 @ 15:43)      RADIOLOGY & ADDITIONAL TESTS:  < from: Xray Chest 1 View- PORTABLE-Urgent (03.25.20 @ 15:58) >   EXAM:  XR CHEST PORTABLE URGENT 1V                          PROCEDURE DATE:  03/25/2020          INTERPRETATION:  TECHNIQUE: Single portable view of the chest.    COMPARISON: 5/19/2016    CLINICAL HISTORY: fall, left hip pain    FINDINGS:    Single frontal view of the chest demonstrates the lungs to be clear. The cardiomediastinal silhouette is normal. No acute osseous abnormalities. Right-sided dialysis catheter. Mediastinal metallic wires redemonstrated. Consider CT as clinically warranted.    IMPRESSION: No acute cardiopulmonary disease process.    < end of copied text >

## 2020-03-26 NOTE — PROGRESS NOTE ADULT - SUBJECTIVE AND OBJECTIVE BOX
PULMONARY PROGRESS NOTE      [id number]    Patient is a 69y old  Male who presents with a chief complaint of fall and hip # (26 Mar 2020 09:30)      INTERVAL HPI/OVERNIGHT EVENTS: Seen in HD. No sob, wheeze, cough. Hip pain main issue.     MEDICATIONS  (STANDING):  aspirin enteric coated 81 milliGRAM(s) Oral daily  atorvastatin 40 milliGRAM(s) Oral at bedtime  epoetin-ben-epbx (RETACRIT) Injectable 90759 Unit(s) IV Push once  hydrocortisone 10 milliGRAM(s) Oral at bedtime  hydrocortisone 20 milliGRAM(s) Oral daily  metoprolol succinate ER 25 milliGRAM(s) Oral daily  multivitamin 1 Tablet(s) Oral daily  mycophenolate mofetil 1000 milliGRAM(s) Oral two times a day  ondansetron Injectable 4 milliGRAM(s) IV Push every 6 hours  pantoprazole    Tablet 40 milliGRAM(s) Oral before breakfast  pregabalin 50 milliGRAM(s) Oral two times a day  tacrolimus 1 milliGRAM(s) Oral two times a day  tamsulosin 0.4 milliGRAM(s) Oral at bedtime  trimethoprim  160 mG/sulfamethoxazole 800 mG 1 Tablet(s) Oral <User Schedule>      MEDICATIONS  (PRN):  bisacodyl Suppository 10 milliGRAM(s) Rectal daily PRN Constipation  morphine  - Injectable 2 milliGRAM(s) IV Push every 4 hours PRN Severe Pain (7 - 10)  oxyCODONE    IR 5 milliGRAM(s) Oral every 6 hours PRN Moderate Pain (4 - 6)  senna 2 Tablet(s) Oral at bedtime PRN Constipation      Allergies    budesonide (Unknown)  cefpodoxime (Unknown)  Pollen (Unknown)    Intolerances        PAST MEDICAL & SURGICAL HISTORY:  Emphysema of lung  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 -  [facility]      SOCIAL HISTORY  Smoking History: former      REVIEW OF SYSTEMS:    CONSTITUTIONAL:  No distress    HEENT:  Eyes:  No diplopia or blurred vision. ENT:  No earache, sore throat or runny nose.    CARDIOVASCULAR:  No pressure, squeezing, tightness, heaviness or aching about the chest; no palpitations.    RESPIRATORY:  per HPI     GASTROINTESTINAL:  No nausea, vomiting or diarrhea.    GENITOURINARY:  No dysuria, frequency or urgency.    NEUROLOGIC:  No paresthesias, fasciculations, seizures or weakness.    PSYCHIATRIC:  No disorder of thought or mood.    Vital Signs Last 24 Hrs  T(C): 37 (26 Mar 2020 12:11), Max: 37.4 (25 Mar 2020 20:35)  T(F): 98.6 (26 Mar 2020 12:11), Max: 99.3 (25 Mar 2020 20:35)  HR: 75 (26 Mar 2020 12:11) (75 - 103)  BP: 96/51 (26 Mar 2020 12:11) (96/51 - 144/80)  BP(mean): --  RR: 18 (26 Mar 2020 12:11) (16 - 18)  SpO2: 100% (26 Mar 2020 12:11) (95% - 100%)    PHYSICAL EXAMINATION:    GENERAL: The patient is awake and alert in no apparent distress.     HEENT: Head is normocephalic and atraumatic. Mucous membranes are moist.    NECK: Supple.    LUNGS: Clear to auscultation without wheezing, rales or rhonchi; respirations unlabored    HEART: Regular rate and rhythm     ABDOMEN: Soft, nontender, and nondistended.      EXTREMITIES: Without any cyanosis, clubbing, rash, lesions or edema.    NEUROLOGIC: Grossly intact.    LABS:                        7.4    4.54  )-----------( 149      ( 26 Mar 2020 07:31 )             24.2     03-26    139  |  97<L>  |  27.0<H>  ----------------------------<  110<H>  3.6   |  28.0  |  5.10<H>    Ca    7.4<L>      26 Mar 2020 07:31  Phos  2.9     03-26  Mg     1.9     03-26    TPro  4.9<L>  /  Alb  3.0<L>  /  TBili  0.6  /  DBili  x   /  AST  33  /  ALT  17  /  AlkPhos  93  03-26    PT/INR - ( 25 Mar 2020 16:42 )   PT: 15.4 sec;   INR: 1.35 ratio         PTT - ( 25 Mar 2020 16:42 )  PTT:32.1 sec

## 2020-03-26 NOTE — PROGRESS NOTE ADULT - ASSESSMENT
-COPD hx; s/p lung transplant in 2015; latest angélica was normal  -Respiratory status optimized  -hip fx s/p fall  -Renal failure on HD; hypokalemia  -Hx CAD; s/p stents  -Anemia       RECC:  No pulmonary contraindications to hip surgery. Albuterol can be used prn. Pain control. Follow O2 sat.  Anemia per PMT and renal. HD and K level per renal.

## 2020-03-26 NOTE — PROGRESS NOTE ADULT - ASSESSMENT
70 y/o M ex smoker with PMH of 2015- Lung Transplant for COPD on anti rejection meds, s/p rt total hip arthroplasty, multi level compression #, BPH, HLD, CAD, BPH, OP, recently started HD for CHRISTINA- ESRD ? for past 1 month presents to ED s/p fall    # Left Intertrochanteric # s/p Mechanical fall  seen by Ortho  planned surgery for am   Pain control  DVT- P  Pt on ASA/ Plavix at home  limb precautions    # s/p lung Tx for COPD on anti rejection meds  Pulm consult  cont current meds  Oxygen PRN  stable    # CAD, BPH, HLD, OP  Cont home meds  Stable    # HD dependent ESRD/ ? CHRISTINA ( likely contrast induced )   as per renal     # Hypokalemia and Hypomagnesemia  Repleted gingerly by ED    # Normocytic anemia likely sec to chr dz  Transfused during HD , follow CBC in am ,   2 PRBC ordered   transfuse PRN , keep HG > 8 due to CAD     # clumped platelets blue top for platelets - 149    # Long term steroids use - , On Protonix for GI prophylaxis will give stress dose 125 mg 1 hr prior procedure   # BPH - continue Flomax  , observe for post op urinary retention   # Hx of COPD , s/p b/l lung transplant - [id number] .   Labs noted , XR's , Pulmonology / Cardio cleared ,   2 units PRBC ordered during HD ,   there is no absolute contraindication to procede with procedure , pending H/H . 70 y/o M ex smoker with PMH of 2015- Lung Transplant for COPD on anti rejection meds, s/p rt total hip arthroplasty, multi level compression #, BPH, HLD, CAD, BPH, OP, recently started HD for CHRISTINA- ESRD ? for past 1 month presents to ED s/p fall    # Left Intertrochanteric # s/p Mechanical fall  seen by Ortho  planned surgery for am   Pain control  DVT- P  Pt on ASA/ Plavix at home  limb precautions    # s/p lung Tx for COPD on anti rejection meds  Pulm consult  cont current meds  Oxygen PRN  stable    # CAD, BPH, HLD, OP  Cont home meds  Stable    # HD dependent ESRD/ ? CHRSITINA ( likely contrast induced )   as per renal     # Hypokalemia and Hypomagnesemia  Repleted gingerly by ED    # Normocytic anemia likely sec to chr dz  Transfused during HD , follow CBC in am ,   2 PRBC ordered   transfuse PRN , keep HG > 8 due to CAD     # clumped platelets blue top for platelets - 149    # Long term steroids use - , On Protonix for GI prophylaxis , may continue Home dose   # BPH - continue Flomax  , observe for post op urinary retention   # Hx of COPD , s/p b/l lung transplant - [id number] .   Labs noted , XR's , Pulmonology / Cardio cleared ,   2 units PRBC ordered during HD ,   there is no absolute contraindication to proceed with procedure , pending H/H .

## 2020-03-26 NOTE — PROGRESS NOTE ADULT - SUBJECTIVE AND OBJECTIVE BOX
Pt being followed for left IT Fracture.  Had dialysis yesterday.  Awaiting clearances for surgery.  Pt has pain to the left hip with movement     Vital Signs Last 24 Hrs  T(C): 36.8 (26 Mar 2020 08:10), Max: 37.4 (25 Mar 2020 20:35)  T(F): 98.2 (26 Mar 2020 08:10), Max: 99.3 (25 Mar 2020 20:35)  HR: 82 (26 Mar 2020 08:10) (77 - 103)  BP: 96/58 (26 Mar 2020 08:10) (96/58 - 144/80)  BP(mean): --  RR: 18 (26 Mar 2020 08:10) (16 - 18)  SpO2: 95% (26 Mar 2020 08:10) (95% - 100%)    Pt lying in bed NAD  Left LE +shortened  ROM LLE not tested secondary to pain  +dorsiflexion, +sensation  pedal pulses palp                          7.4    4.54  )-----------( 149      ( 26 Mar 2020 07:31 )             24.2     03-26    139  |  97<L>  |  27.0<H>  ----------------------------<  110<H>  3.6   |  28.0  |  5.10<H>    Ca    7.4<L>      26 Mar 2020 07:31  Phos  2.9     03-26  Mg     1.9     03-26    TPro  4.9<L>  /  Alb  3.0<L>  /  TBili  0.6  /  DBili  x   /  AST  33  /  ALT  17  /  AlkPhos  93  03-26      Discussed with Nephrology:  Pt will be dialyzed later this afternoon and receive blood as well.  Plan for surgery tomorrow am with Dr. Kaba  A/P:  Plan for OR tomorrow   npo after MN  NWB  pain control  Cardiac/pulmonary cleared  medicine aware

## 2020-03-27 ENCOUNTER — TRANSCRIPTION ENCOUNTER (OUTPATIENT)
Age: 70
End: 2020-03-27

## 2020-03-27 LAB
HCT VFR BLD CALC: 31.1 % — LOW (ref 39–50)
HGB BLD-MCNC: 9.9 G/DL — LOW (ref 13–17)
MCHC RBC-ENTMCNC: 28.5 PG — SIGNIFICANT CHANGE UP (ref 27–34)
MCHC RBC-ENTMCNC: 31.8 GM/DL — LOW (ref 32–36)
MCV RBC AUTO: 89.6 FL — SIGNIFICANT CHANGE UP (ref 80–100)
PLATELET # BLD AUTO: SIGNIFICANT CHANGE UP K/UL (ref 150–400)
POTASSIUM SERPL-MCNC: 4.5 MMOL/L — SIGNIFICANT CHANGE UP (ref 3.5–5.3)
POTASSIUM SERPL-SCNC: 4.5 MMOL/L — SIGNIFICANT CHANGE UP (ref 3.5–5.3)
RBC # BLD: 3.47 M/UL — LOW (ref 4.2–5.8)
RBC # FLD: 20 % — HIGH (ref 10.3–14.5)
WBC # BLD: 5.32 K/UL — SIGNIFICANT CHANGE UP (ref 3.8–10.5)
WBC # FLD AUTO: 5.32 K/UL — SIGNIFICANT CHANGE UP (ref 3.8–10.5)

## 2020-03-27 PROCEDURE — 99233 SBSQ HOSP IP/OBS HIGH 50: CPT

## 2020-03-27 PROCEDURE — 73501 X-RAY EXAM HIP UNI 1 VIEW: CPT | Mod: 26,LT

## 2020-03-27 PROCEDURE — 27245 TREAT THIGH FRACTURE: CPT | Mod: LT

## 2020-03-27 RX ORDER — HYDROMORPHONE HYDROCHLORIDE 2 MG/ML
0.5 INJECTION INTRAMUSCULAR; INTRAVENOUS; SUBCUTANEOUS
Refills: 0 | Status: DISCONTINUED | OUTPATIENT
Start: 2020-03-27 | End: 2020-04-02

## 2020-03-27 RX ORDER — HYDROCORTISONE 20 MG
1 TABLET ORAL
Qty: 0 | Refills: 0 | DISCHARGE
Start: 2020-03-27

## 2020-03-27 RX ORDER — ERYTHROPOIETIN 10000 [IU]/ML
10000 INJECTION, SOLUTION INTRAVENOUS; SUBCUTANEOUS
Refills: 0 | Status: DISCONTINUED | OUTPATIENT
Start: 2020-03-27 | End: 2020-03-28

## 2020-03-27 RX ORDER — CEFAZOLIN SODIUM 1 G
2000 VIAL (EA) INJECTION
Refills: 0 | Status: COMPLETED | OUTPATIENT
Start: 2020-03-27 | End: 2020-03-28

## 2020-03-27 RX ORDER — ACETAMINOPHEN 500 MG
975 TABLET ORAL EVERY 8 HOURS
Refills: 0 | Status: DISCONTINUED | OUTPATIENT
Start: 2020-03-27 | End: 2020-04-02

## 2020-03-27 RX ORDER — METOPROLOL TARTRATE 50 MG
1 TABLET ORAL
Qty: 0 | Refills: 0 | DISCHARGE
Start: 2020-03-27

## 2020-03-27 RX ORDER — OXYCODONE HYDROCHLORIDE 5 MG/1
10 TABLET ORAL
Refills: 0 | Status: DISCONTINUED | OUTPATIENT
Start: 2020-03-27 | End: 2020-04-02

## 2020-03-27 RX ORDER — ASPIRIN/CALCIUM CARB/MAGNESIUM 324 MG
81 TABLET ORAL ONCE
Refills: 0 | Status: COMPLETED | OUTPATIENT
Start: 2020-03-27 | End: 2020-03-27

## 2020-03-27 RX ORDER — CLOPIDOGREL BISULFATE 75 MG/1
1 TABLET, FILM COATED ORAL
Qty: 0 | Refills: 0 | DISCHARGE
Start: 2020-03-27

## 2020-03-27 RX ORDER — ASPIRIN/CALCIUM CARB/MAGNESIUM 324 MG
81 TABLET ORAL DAILY
Refills: 0 | Status: DISCONTINUED | OUTPATIENT
Start: 2020-03-28 | End: 2020-03-28

## 2020-03-27 RX ORDER — OXYCODONE HYDROCHLORIDE 5 MG/1
5 TABLET ORAL
Refills: 0 | Status: DISCONTINUED | OUTPATIENT
Start: 2020-03-27 | End: 2020-04-02

## 2020-03-27 RX ORDER — MAGNESIUM HYDROXIDE 400 MG/1
30 TABLET, CHEWABLE ORAL AT BEDTIME
Refills: 0 | Status: DISCONTINUED | OUTPATIENT
Start: 2020-03-27 | End: 2020-04-02

## 2020-03-27 RX ORDER — CLOPIDOGREL BISULFATE 75 MG/1
75 TABLET, FILM COATED ORAL DAILY
Refills: 0 | Status: DISCONTINUED | OUTPATIENT
Start: 2020-03-28 | End: 2020-04-02

## 2020-03-27 RX ORDER — SENNA PLUS 8.6 MG/1
2 TABLET ORAL AT BEDTIME
Refills: 0 | Status: DISCONTINUED | OUTPATIENT
Start: 2020-03-27 | End: 2020-04-02

## 2020-03-27 RX ORDER — SODIUM CHLORIDE 9 MG/ML
1000 INJECTION, SOLUTION INTRAVENOUS
Refills: 0 | Status: DISCONTINUED | OUTPATIENT
Start: 2020-03-27 | End: 2020-03-28

## 2020-03-27 RX ORDER — ONDANSETRON 8 MG/1
4 TABLET, FILM COATED ORAL EVERY 6 HOURS
Refills: 0 | Status: DISCONTINUED | OUTPATIENT
Start: 2020-03-27 | End: 2020-04-02

## 2020-03-27 RX ORDER — METOPROLOL TARTRATE 50 MG
25 TABLET ORAL DAILY
Refills: 0 | Status: DISCONTINUED | OUTPATIENT
Start: 2020-03-28 | End: 2020-04-02

## 2020-03-27 RX ORDER — POLYETHYLENE GLYCOL 3350 17 G/17G
17 POWDER, FOR SOLUTION ORAL DAILY
Refills: 0 | Status: DISCONTINUED | OUTPATIENT
Start: 2020-03-27 | End: 2020-04-02

## 2020-03-27 RX ADMIN — Medication 975 MILLIGRAM(S): at 22:25

## 2020-03-27 RX ADMIN — ATORVASTATIN CALCIUM 40 MILLIGRAM(S): 80 TABLET, FILM COATED ORAL at 22:25

## 2020-03-27 RX ADMIN — Medication 3 MILLIGRAM(S): at 01:13

## 2020-03-27 RX ADMIN — MORPHINE SULFATE 2 MILLIGRAM(S): 50 CAPSULE, EXTENDED RELEASE ORAL at 09:00

## 2020-03-27 RX ADMIN — Medication 3 MILLIGRAM(S): at 22:26

## 2020-03-27 RX ADMIN — MYCOPHENOLATE MOFETIL 1000 MILLIGRAM(S): 250 CAPSULE ORAL at 19:10

## 2020-03-27 RX ADMIN — MYCOPHENOLATE MOFETIL 1000 MILLIGRAM(S): 250 CAPSULE ORAL at 06:04

## 2020-03-27 RX ADMIN — MORPHINE SULFATE 2 MILLIGRAM(S): 50 CAPSULE, EXTENDED RELEASE ORAL at 07:51

## 2020-03-27 RX ADMIN — ONDANSETRON 4 MILLIGRAM(S): 8 TABLET, FILM COATED ORAL at 12:00

## 2020-03-27 RX ADMIN — OXYCODONE HYDROCHLORIDE 10 MILLIGRAM(S): 5 TABLET ORAL at 22:10

## 2020-03-27 RX ADMIN — Medication 81 MILLIGRAM(S): at 07:56

## 2020-03-27 RX ADMIN — TAMSULOSIN HYDROCHLORIDE 0.4 MILLIGRAM(S): 0.4 CAPSULE ORAL at 22:26

## 2020-03-27 RX ADMIN — Medication 20 MILLIGRAM(S): at 06:04

## 2020-03-27 RX ADMIN — TACROLIMUS 1 MILLIGRAM(S): 5 CAPSULE ORAL at 06:03

## 2020-03-27 RX ADMIN — OXYCODONE HYDROCHLORIDE 5 MILLIGRAM(S): 5 TABLET ORAL at 02:00

## 2020-03-27 RX ADMIN — OXYCODONE HYDROCHLORIDE 10 MILLIGRAM(S): 5 TABLET ORAL at 20:51

## 2020-03-27 RX ADMIN — TACROLIMUS 1 MILLIGRAM(S): 5 CAPSULE ORAL at 19:09

## 2020-03-27 RX ADMIN — Medication 975 MILLIGRAM(S): at 22:19

## 2020-03-27 RX ADMIN — Medication 1 TABLET(S): at 22:24

## 2020-03-27 RX ADMIN — Medication 100 MILLIGRAM(S): at 19:10

## 2020-03-27 RX ADMIN — ONDANSETRON 4 MILLIGRAM(S): 8 TABLET, FILM COATED ORAL at 06:04

## 2020-03-27 RX ADMIN — Medication 50 MILLIGRAM(S): at 06:03

## 2020-03-27 RX ADMIN — OXYCODONE HYDROCHLORIDE 5 MILLIGRAM(S): 5 TABLET ORAL at 01:13

## 2020-03-27 RX ADMIN — Medication 10 MILLIGRAM(S): at 22:26

## 2020-03-27 NOTE — DISCHARGE NOTE PROVIDER - NSDCMRMEDTOKEN_GEN_ALL_CORE_FT
acyclovir 400 mg oral tablet: 1 tab(s) orally 2 times a day  aspirin 81 mg oral tablet: 1 tab(s) orally once a day  atorvastatin 10 mg oral tablet: 1 tab(s) orally once a day (at bedtime)  bisacodyl 10 mg rectal suppository: 1 suppository(ies) rectal once a day, As needed, If no bowel movement by POD#2  CellCept 500 mg oral tablet: 2 tab(s) orally 2 times a day  Flomax 0.4 mg oral capsule: 1 cap(s) orally once a day  hydrocortisone 10 mg oral tablet: 1 tab(s) orally once a day  hydrocortisone 20 mg oral tablet: 1 tab(s) orally   HYDROmorphone 2 mg oral tablet: 1 tab(s) orally every 3 hours, As needed, Severe Pain  Lyrica 50 mg oral capsule: 1 cap(s) orally 2 times a day  Multiple Vitamins oral tablet: 1 tab(s) orally once a day  oxyCODONE 10 mg oral tablet: 1 tab(s) orally every 3 hours, As needed, Moderate Pain  oxyCODONE 10 mg oral tablet, extended release: 1 tab(s) orally every 12 hours  oxyCODONE 5 mg oral tablet: 1 tab(s) orally every 3 hours, As needed, Mild Pain  Protonix 40 mg oral delayed release tablet: 1 tab(s) orally once a day  senna oral tablet: 2 tab(s) orally once a day (at bedtime), As needed, Constipation  sulfamethoxazole-trimethoprim 800 mg-160 mg oral tablet: 1 tab(s) orally   tacrolimus 0.5 mg oral capsule: 5 cap(s) orally once a day  tacrolimus 1 mg oral capsule: 3 cap(s) orally once a day aspirin 81 mg oral tablet: 1 tab(s) orally once a day  atorvastatin 10 mg oral tablet: 1 tab(s) orally once a day (at bedtime)  Bactrim  mg-160 mg oral tablet: 1 tab(s) orally once a dayevery Mpmday/ Wednesday / Friday  bisacodyl 10 mg rectal suppository: 1 suppository(ies) rectal once a day, As needed, If no bowel movement by POD#2  CellCept 500 mg oral tablet: 2 tab(s) orally 2 times a day  clopidogrel 75 mg oral tablet: 1 tab(s) orally once a day  Flomax 0.4 mg oral capsule: 1 cap(s) orally once a day  hydrocortisone 10 mg oral tablet: 1 tab(s) orally once a day (at bedtime)  hydrocortisone 20 mg oral tablet: 1 tab(s) orally once a day  HYDROmorphone 2 mg oral tablet: 1 tab(s) orally every 3 hours, As needed, Severe Pain  Lyrica 50 mg oral capsule: 1 cap(s) orally 2 times a day  metoprolol succinate 25 mg oral tablet, extended release: 1 tab(s) orally once a day  Multiple Vitamins oral tablet: 1 tab(s) orally once a day  oxyCODONE 10 mg oral tablet: 1 tab(s) orally every 3 hours, As needed, Moderate Pain  oxyCODONE 10 mg oral tablet, extended release: 1 tab(s) orally every 12 hours  oxyCODONE 5 mg oral tablet: 1 tab(s) orally every 3 hours, As needed, Mild Pain  Protonix 40 mg oral delayed release tablet: 1 tab(s) orally once a day  senna oral tablet: 2 tab(s) orally once a day (at bedtime), As needed, Constipation  tacrolimus 0.5 mg oral capsule: 5 cap(s) orally once a day  tacrolimus 1 mg oral capsule: 3 cap(s) orally once a day aspirin 325 mg oral delayed release tablet: 1 tab(s) orally 2 times a day end date May 9, 2020  aspirin 81 mg oral tablet: 1 tab(s) orally once a day starting May 10, 2020  atorvastatin 10 mg oral tablet: 1 tab(s) orally once a day (at bedtime)  Bactrim  mg-160 mg oral tablet: 1 tab(s) orally once a dayevery Mpmday/ Wednesday / Friday  bisacodyl 10 mg rectal suppository: 1 suppository(ies) rectal once a day, As needed, If no bowel movement by POD#2  CellCept 500 mg oral tablet: 2 tab(s) orally 2 times a day  clopidogrel 75 mg oral tablet: 1 tab(s) orally once a day  ferrous sulfate 325 mg (65 mg elemental iron) oral tablet: 1 tab(s) orally once a day   Flomax 0.4 mg oral capsule: 1 cap(s) orally once a day  hydrocortisone 10 mg oral tablet: 1 tab(s) orally once a day (at bedtime)  hydrocortisone 20 mg oral tablet: 1 tab(s) orally once a day  Lyrica 50 mg oral capsule: 1 cap(s) orally 2 times a day  metoprolol succinate 25 mg oral tablet, extended release: 1 tab(s) orally once a day  Multiple Vitamins oral tablet: 1 tab(s) orally once a day  oxyCODONE 10 mg oral tablet: 1 tab(s) orally every 3 hours, As needed, Moderate Pain  oxyCODONE 10 mg oral tablet, extended release: 1 tab(s) orally every 12 hours  oxyCODONE 5 mg oral tablet: 1 tab(s) orally every 3 hours, As needed, Mild Pain  Protonix 40 mg oral delayed release tablet: 1 tab(s) orally once a day  senna oral tablet: 2 tab(s) orally once a day (at bedtime), As needed, Constipation  tacrolimus 1 mg oral capsule: 1 cap(s) orally 2 times a day aspirin 325 mg oral delayed release tablet: 1 tab(s) orally 2 times a day end date May 9, 2020  aspirin 81 mg oral tablet: 1 tab(s) orally once a day starting May 10, 2020  atorvastatin 10 mg oral tablet: 1 tab(s) orally once a day (at bedtime)  Bactrim  mg-160 mg oral tablet: 1 tab(s) orally once a dayevery Mpmday/ Wednesday / Friday  bisacodyl 10 mg rectal suppository: 1 suppository(ies) rectal once a day, As needed, If no bowel movement by POD#2  CellCept 500 mg oral tablet: 2 tab(s) orally 2 times a day  clopidogrel 75 mg oral tablet: 1 tab(s) orally once a day  ferrous sulfate 325 mg (65 mg elemental iron) oral tablet: 1 tab(s) orally once a day   Flomax 0.4 mg oral capsule: 1 cap(s) orally once a day  hydrocortisone 10 mg oral tablet: 1 tab(s) orally once a day (at bedtime)  hydrocortisone 20 mg oral tablet: 1 tab(s) orally once a day  Lyrica 50 mg oral capsule: 1 cap(s) orally 2 times a day  metoprolol succinate 25 mg oral tablet, extended release: 1 tab(s) orally once a day  Multiple Vitamins oral tablet: 1 tab(s) orally once a day  oxyCODONE 10 mg oral tablet: 1 tab(s) orally every 3 hours, As needed, Moderate Pain  oxyCODONE 10 mg oral tablet, extended release: 1 tab(s) orally every 12 hours  oxyCODONE 5 mg oral tablet: 1 tab(s) orally every 3 hours, As needed, Mild Pain  Protonix 40 mg oral delayed release tablet: 1 tab(s) orally once a day  Rolling Walker : Rolling walker to assist with ambulation   senna oral tablet: 2 tab(s) orally once a day (at bedtime), As needed, Constipation  tacrolimus 1 mg oral capsule: 1 cap(s) orally 2 times a day

## 2020-03-27 NOTE — PROGRESS NOTE ADULT - SUBJECTIVE AND OBJECTIVE BOX
[facility]                                                    [address], [address], [place] [address]                                                         PHONE: [phone]    FAX: [phone] [address] [facility], [address], [place] [address]                                                 PHONE: [phone]    FAX: [phone]  *******************************************************************************  cc: hip fx    HPI: JU FERGUSON is a 69y Male with past medical history significant for  left intertrochanteric fracture for OR. Hx of COPD, s/p bilateral lung transplant on immunosuppressive therapy at [facility] 2015. Pt is s/p fall without LOC. Pt  reports missing a step and falling down 3 stairs. Denies head trauma or other bodily injury. Pt with coronary stenting x 3 2019 by Dr Gordon in [place], maintained on ASA and plavix.   Documented 3V CAD s/p PCI to the LAD and OM1 (november 26, 2019). CKD.  Former smoker, quit 15 years ago. Normotensive and non diabetic. Pt denies chest pain or SOB. No palpitations, PND or orthopnea. No dizziness or syncope.  Echo 10/14/19 EF 65-70%, diastolic dysfunction, LAE, trace MR  Carotid  4/18 16-49% right bulb and prox BALDEMAR, 66-79% left bulb and LICA   Carotid 10/19 unchanged from the previous study        Overnight events/Subjective Assessment: ASA had been discontinued      PAST MEDICAL & SURGICAL HISTORY:  Emphysema of lung  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 -  [facility]      budesonide (Unknown)  cefpodoxime (Unknown)  Pollen (Unknown)      MEDICATIONS  (STANDING):  atorvastatin 40 milliGRAM(s) Oral at bedtime  hydrocortisone 10 milliGRAM(s) Oral at bedtime  hydrocortisone 20 milliGRAM(s) Oral daily  melatonin 3 milliGRAM(s) Oral at bedtime  metoprolol succinate ER 25 milliGRAM(s) Oral daily  multivitamin 1 Tablet(s) Oral daily  mycophenolate mofetil 1000 milliGRAM(s) Oral two times a day  ondansetron Injectable 4 milliGRAM(s) IV Push every 6 hours  pantoprazole    Tablet 40 milliGRAM(s) Oral before breakfast  pregabalin 50 milliGRAM(s) Oral two times a day  tacrolimus 1 milliGRAM(s) Oral two times a day  tamsulosin 0.4 milliGRAM(s) Oral at bedtime  trimethoprim  160 mG/sulfamethoxazole 800 mG 1 Tablet(s) Oral <User Schedule>    MEDICATIONS  (PRN):  bisacodyl Suppository 10 milliGRAM(s) Rectal daily PRN Constipation  morphine  - Injectable 2 milliGRAM(s) IV Push every 4 hours PRN Severe Pain (7 - 10)  oxyCODONE    IR 5 milliGRAM(s) Oral every 6 hours PRN Moderate Pain (4 - 6)  senna 2 Tablet(s) Oral at bedtime PRN Constipation      Vital Signs Last 24 Hrs  T(C): 37.4 (26 Mar 2020 23:19), Max: 37.4 (26 Mar 2020 23:19)  T(F): 99.4 (26 Mar 2020 23:19), Max: 99.4 (26 Mar 2020 23:19)  HR: 80 (27 Mar 2020 05:59) (75 - 82)  BP: 110/68 (27 Mar 2020 05:59) (96/51 - 131/68)  BP(mean): --  RR: 18 (26 Mar 2020 23:19) (18 - 18)  SpO2: 98% (26 Mar 2020 23:19) (95% - 100%)    I&O's Detail    26 Mar 2020 07:01  -  27 Mar 2020 07:00  --------------------------------------------------------  IN:  Total IN: 0 mL    OUT:    Other: 900 mL  Total OUT: 900 mL    Total NET: -900 mL        I&O's Summary    26 Mar 2020 07:01  -  27 Mar 2020 07:00  --------------------------------------------------------  IN: 0 mL / OUT: 900 mL / NET: -900 mL                FAMILY HISTORY:  Family history of emphysema  Father with 3V CABG in his 50's      SOCIAL HISTORY:  former smoking, quit 15 years ago. No ETOH/No IVDA    REVIEW OF SYSTEMS:  all pertinent positive and negative ROS. All other ROS is negative        LABS:                        7.4    4.54  )-----------( 149      ( 26 Mar 2020 07:31 )             24.2     03-26    139  |  97<L>  |  27.0<H>  ----------------------------<  110<H>  3.6   |  28.0  |  5.10<H>    Ca    7.4<L>      26 Mar 2020 07:31  Phos  2.9     03-26  Mg     1.9     03-26    TPro  4.9<L>  /  Alb  3.0<L>  /  TBili  0.6  /  DBili  x   /  AST  33  /  ALT  17  /  AlkPhos  93  03-26        PT/INR - ( 25 Mar 2020 16:42 )   PT: 15.4 sec;   INR: 1.35 ratio         PTT - ( 25 Mar 2020 16:42 )  PTT:32.1 sec  serum  Lipids:         RADIOLOGY & ADDITIONAL STUDIES:        ASSESSMENT AND PLAN:  In summary, JU FERGUSON is a 69y Male with past medical history significant for  left intertrochanteric fracture for OR. Hx of COPD, s/p bilateral lung transplant on immunosuppressive therapy at [facility] 2015. Pt is s/p fall without LOC. Pt  reports missing a step and falling down 3 stairs. Denies head trauma or other bodily injury. Pt with coronary stenting x 3 2019 by Dr Gordon in [place], maintained on ASA and plavix.   Documented 3V CAD s/p PCI to the LAD and OM1 (november 26, 2019). CKD.  Former smoker, quit 15 years ago. Normotensive and non diabetic. Pt denies chest pain or SOB. No palpitations, PND or orthopnea. No dizziness or syncope.  Echo 10/14/19 EF 65-70%, diastolic dysfunction, LAE, trace MR  Carotid  4/18 16-49% right bulb and prox BALDEMAR, 66-79% left bulb and LICA   Carotid 10/19 unchanged from the previous study    - CAD. Recent CAITLIN to LAD and OM1 November 2019. Will be high risk for holding DAPT due to recent CAITLIN. Pt understands he is at increased risk and the risks and benefits have been discussed with the patient. If able, DAPT should be continued with surgery performed on DAPT.  If ortho feels need to hold plavix due to bleeding risk, this should be resumed post procedure ASAP. Pt with extensive CAD requiring transfer to [place] for intervention due to anatomy and medical co morbidities    - ASA should be maintained without discontinuation. ASA was discontinued yesterday.    - I personally dw Dr Martinez and discussed the case. She will resume the ASA. Plavix should be resumed post procedure    - I personally dw nursing Linda. Ortho PA not on the floor as of yet. She understands the need to resume ASA    - HTN. BP controlled on metoprolol 25mg daily    - HL. atorvastatin 40mg daily    - Carotid disease. Pt with bilateral carotid disease with bilateral carotid bruits. Pt is non focal without neurologic symptoms. Avoid hypotension as pt with 66-79% LICA stenosis and mild to moderate disease on the right    - CKD. management as per renal. Electrolyte management as per renal. Pt requires HD    - Anemia. Likely of chronic disease. Management as per medical    - Lung transplant. Immunosuppressives as per pulmonary    - hip fx. Management as per ortho. DVT prophylaxis as per ortho    - ECG personally reviewed by me    - radiologic imaging reviewed    - Laboratory data reviewed.    - I have personally reviewed all obtainable prior records and data    - Pt cleared to proceed from the cardiac standpoint with the above recommendations on ASA. Please resume plavix post procedure.  Please reconsult PRN if any new CV issues should arise. Will follow on a PRN bases over the weekend      Alma Patrick MD [facility]                                                    [address], [address], [place] [address]                                                         PHONE: [phone]    FAX: [phone] [address] [facility], [address], [place] [address]                                                 PHONE: [phone]    FAX: [phone]  *******************************************************************************  cc: hip fx    HPI: JU FERGUSON is a 69y Male with past medical history significant for  left intertrochanteric fracture for OR. Hx of COPD, s/p bilateral lung transplant on immunosuppressive therapy at [facility] 2015. Pt is s/p fall without LOC. Pt  reports missing a step and falling down 3 stairs. Denies head trauma or other bodily injury. Pt with coronary stenting x 3 2019 by Dr Gordon in [place], maintained on ASA and plavix.   Documented 3V CAD s/p PCI to the LAD and OM1 (november 26, 2019). CKD.  Former smoker, quit 15 years ago. Normotensive and non diabetic. Pt denies chest pain or SOB. No palpitations, PND or orthopnea. No dizziness or syncope.  Echo 10/14/19 EF 65-70%, diastolic dysfunction, LAE, trace MR  Carotid  4/18 16-49% right bulb and prox BALDEMAR, 66-79% left bulb and LICA   Carotid 10/19 unchanged from the previous study        Overnight events/Subjective Assessment: ASA had been discontinued      PAST MEDICAL & SURGICAL HISTORY:  Emphysema of lung  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 -  [facility]      budesonide (Unknown)  cefpodoxime (Unknown)  Pollen (Unknown)      MEDICATIONS  (STANDING):  atorvastatin 40 milliGRAM(s) Oral at bedtime  hydrocortisone 10 milliGRAM(s) Oral at bedtime  hydrocortisone 20 milliGRAM(s) Oral daily  melatonin 3 milliGRAM(s) Oral at bedtime  metoprolol succinate ER 25 milliGRAM(s) Oral daily  multivitamin 1 Tablet(s) Oral daily  mycophenolate mofetil 1000 milliGRAM(s) Oral two times a day  ondansetron Injectable 4 milliGRAM(s) IV Push every 6 hours  pantoprazole    Tablet 40 milliGRAM(s) Oral before breakfast  pregabalin 50 milliGRAM(s) Oral two times a day  tacrolimus 1 milliGRAM(s) Oral two times a day  tamsulosin 0.4 milliGRAM(s) Oral at bedtime  trimethoprim  160 mG/sulfamethoxazole 800 mG 1 Tablet(s) Oral <User Schedule>    MEDICATIONS  (PRN):  bisacodyl Suppository 10 milliGRAM(s) Rectal daily PRN Constipation  morphine  - Injectable 2 milliGRAM(s) IV Push every 4 hours PRN Severe Pain (7 - 10)  oxyCODONE    IR 5 milliGRAM(s) Oral every 6 hours PRN Moderate Pain (4 - 6)  senna 2 Tablet(s) Oral at bedtime PRN Constipation      Vital Signs Last 24 Hrs  T(C): 37.4 (26 Mar 2020 23:19), Max: 37.4 (26 Mar 2020 23:19)  T(F): 99.4 (26 Mar 2020 23:19), Max: 99.4 (26 Mar 2020 23:19)  HR: 80 (27 Mar 2020 05:59) (75 - 82)  BP: 110/68 (27 Mar 2020 05:59) (96/51 - 131/68)  BP(mean): --  RR: 18 (26 Mar 2020 23:19) (18 - 18)  SpO2: 98% (26 Mar 2020 23:19) (95% - 100%)    I&O's Detail    26 Mar 2020 07:01  -  27 Mar 2020 07:00  --------------------------------------------------------  IN:  Total IN: 0 mL    OUT:    Other: 900 mL  Total OUT: 900 mL    Total NET: -900 mL        I&O's Summary    26 Mar 2020 07:01  -  27 Mar 2020 07:00  --------------------------------------------------------  IN: 0 mL / OUT: 900 mL / NET: -900 mL                FAMILY HISTORY:  Family history of emphysema  Father with 3V CABG in his 50's      SOCIAL HISTORY:  former smoking, quit 15 years ago. No ETOH/No IVDA    REVIEW OF SYSTEMS:  all pertinent positive and negative ROS. All other ROS is negative        LABS:                        7.4    4.54  )-----------( 149      ( 26 Mar 2020 07:31 )             24.2     03-26    139  |  97<L>  |  27.0<H>  ----------------------------<  110<H>  3.6   |  28.0  |  5.10<H>    Ca    7.4<L>      26 Mar 2020 07:31  Phos  2.9     03-26  Mg     1.9     03-26    TPro  4.9<L>  /  Alb  3.0<L>  /  TBili  0.6  /  DBili  x   /  AST  33  /  ALT  17  /  AlkPhos  93  03-26        PT/INR - ( 25 Mar 2020 16:42 )   PT: 15.4 sec;   INR: 1.35 ratio         PTT - ( 25 Mar 2020 16:42 )  PTT:32.1 sec  serum  Lipids:         RADIOLOGY & ADDITIONAL STUDIES:        ASSESSMENT AND PLAN:  In summary, JU FERGUSON is a 69y Male with past medical history significant for  left intertrochanteric fracture for OR. Hx of COPD, s/p bilateral lung transplant on immunosuppressive therapy at [facility] 2015. Pt is s/p fall without LOC. Pt  reports missing a step and falling down 3 stairs. Denies head trauma or other bodily injury. Pt with coronary stenting x 3 2019 by Dr Gordon in [place], maintained on ASA and plavix.   Documented 3V CAD s/p PCI to the LAD and OM1 (november 26, 2019). CKD.  Former smoker, quit 15 years ago. Normotensive and non diabetic. Pt denies chest pain or SOB. No palpitations, PND or orthopnea. No dizziness or syncope.  Echo 10/14/19 EF 65-70%, diastolic dysfunction, LAE, trace MR  Carotid  4/18 16-49% right bulb and prox BALDEMAR, 66-79% left bulb and LICA   Carotid 10/19 unchanged from the previous study    - CAD. Recent CAITLIN to LAD and OM1 November 2019. Will be high risk for holding DAPT due to recent CAITLIN. Pt understands he is at increased risk and the risks and benefits have been discussed with the patient. If able, DAPT should be continued with surgery performed on DAPT.  If ortho feels need to hold plavix due to bleeding risk, this should be resumed post procedure ASAP. Pt with extensive CAD requiring transfer to [place] for intervention due to anatomy and medical co morbidities    - ASA should be maintained without discontinuation. ASA was discontinued yesterday.    - I personally dw Dr Jones and discussed the case. She will resume the ASA. Plavix should be resumed post procedure    - I personally dw nursing Linda. Ortho PA not on the floor as of yet. She understands the need to resume ASA    - HTN. BP controlled on metoprolol 25mg daily    - HL. atorvastatin 40mg daily    - Carotid disease. Pt with bilateral carotid disease with bilateral carotid bruits. Pt is non focal without neurologic symptoms. Avoid hypotension as pt with 66-79% LICA stenosis and mild to moderate disease on the right    - CKD. management as per renal. Electrolyte management as per renal. Pt requires HD    - Anemia. Likely of chronic disease. Management as per medical    - Lung transplant. Immunosuppressives as per pulmonary    - hip fx. Management as per ortho. DVT prophylaxis as per ortho    - ECG personally reviewed by me    - radiologic imaging reviewed    - Laboratory data reviewed.    - I have personally reviewed all obtainable prior records and data    - Pt cleared to proceed from the cardiac standpoint with the above recommendations on ASA. Please resume plavix post procedure.  Please reconsult PRN if any new CV issues should arise. Will follow on a PRN bases over the weekend      Alma Patrick MD

## 2020-03-27 NOTE — PROGRESS NOTE ADULT - SUBJECTIVE AND OBJECTIVE BOX
Patient seen and examined earlier today  . C/O L hIp pain , no other complaints . Planned for OR today     CC : L hip pain           MEDICATIONS  (STANDING):  atorvastatin 40 milliGRAM(s) Oral at bedtime  epoetin-ben-epbx (RETACRIT) Injectable 42787 Unit(s) IV Push <User Schedule>  hydrocortisone 10 milliGRAM(s) Oral at bedtime  hydrocortisone 20 milliGRAM(s) Oral daily  melatonin 3 milliGRAM(s) Oral at bedtime  multivitamin 1 Tablet(s) Oral daily  mycophenolate mofetil 1000 milliGRAM(s) Oral two times a day  ondansetron Injectable 4 milliGRAM(s) IV Push every 6 hours  pantoprazole    Tablet 40 milliGRAM(s) Oral before breakfast  pregabalin 50 milliGRAM(s) Oral two times a day  tacrolimus 1 milliGRAM(s) Oral two times a day  tamsulosin 0.4 milliGRAM(s) Oral at bedtime  trimethoprim  160 mG/sulfamethoxazole 800 mG 1 Tablet(s) Oral <User Schedule>    MEDICATIONS  (PRN):  bisacodyl Suppository 10 milliGRAM(s) Rectal daily PRN Constipation      LABS:                          9.9    5.32  )-----------( CLUMPED    ( 27 Mar 2020 07:59 )             31.1     03-27    x   |  x   |  x   ----------------------------<  x   4.5   |  x   |  x     Ca    7.4<L>      26 Mar 2020 07:31  Phos  2.9     03-26  Mg     1.9     03-26    TPro  4.9<L>  /  Alb  3.0<L>  /  TBili  0.6  /  DBili  x   /  AST  33  /  ALT  17  /  AlkPhos  93  03-26    PT/INR - ( 25 Mar 2020 16:42 )   PT: 15.4 sec;   INR: 1.35 ratio         PTT - ( 25 Mar 2020 16:42 )  PTT:32.1 sec      RADIOLOGY & ADDITIONAL TESTS:  < from: Xray Femur 2 Views, Left (03.26.20 @ 01:08) >   EXAM:  XR FEMUR 2 VIEWS LT                          PROCEDURE DATE:  03/26/2020          INTERPRETATION:  Radiographs of the LEFT femur    CLINICAL INFORMATION: LEFT hip fracture.    TECHNIQUE:   AP and oblique views of the LEFT femur were obtained.    FINDINGS: 3/25/2020 LEFT hip radiograph available for comparison.    There is a comminuted intertrochanteric fracture of the hip with varus deformity.    No other fracture deformity seen. Distal LEFT femur intact.    No soft tissue abnormality is recognized.    IMPRESSION: LEFT hip Intertrochanteric fracture. Distal LEFT femur intact.          AGUSTIN NARAYANAN M.D., ATTENDING RADIOLOGIST  This document has been electronically signed. Mar 26 2020  8:12AM       < end of copied text >    < from: Xray Chest 1 View- PORTABLE-Urgent (03.25.20 @ 15:58) >     EXAM:  XR CHEST PORTABLE URGENT 1V                          PROCEDURE DATE:  03/25/2020          INTERPRETATION:  TECHNIQUE: Single portable view of the chest.    COMPARISON: 5/19/2016    CLINICAL HISTORY: fall, left hip pain    FINDINGS:    Single frontal view of the chest demonstrates the lungs to be clear. The cardiomediastinal silhouette is normal. No acute osseous abnormalities. Right-sided dialysis catheter. Mediastinal metallic wires redemonstrated. Consider CT as clinically warranted.    IMPRESSION: No acute cardiopulmonary disease process.      ADÁN ARCINIEGA M.D., ATTENDING RADIOLOGIST  This document has been electronically signed. Mar 25 2020  3:56PM              < end of copied text >          REVIEW OF SYSTEMS:    L Hip pain , all other systems reviewed and are negative .     Vital Signs Last 24 Hrs  T(C): 37 (27 Mar 2020 09:23), Max: 37.4 (26 Mar 2020 23:19)  T(F): 98.6 (27 Mar 2020 09:23), Max: 99.4 (26 Mar 2020 23:19)  HR: 80 (27 Mar 2020 13:45) (73 - 84)  BP: 139/67 (27 Mar 2020 13:45) (106/61 - 139  BP(mean): --  RR: 22 (27 Mar 2020 13:45) (16 - 25)  SpO2: 100% (27 Mar 2020 13:45) (98% - 100%)    PHYSICAL EXAM:    GENERAL: NAD, well-groomed, well-developed  HEAD:  Atraumatic, Normocephalic  EYES: EOMI, PERRLA, conjunctiva and sclera clear  NECK: Supple, No JVD, Normal thyroid  NERVOUS SYSTEM:  Alert & Oriented X3, no focal deficit  CHEST/LUNG: CTA b/l ,  no  rales, rhonchi, wheezing, or rubs  HEART: Regular rate and rhythm; No murmurs, rubs, or gallops  ABDOMEN: Soft, Nontender, Nondistended; Bowel sounds present  EXTREMITIES:  2+ Peripheral Pulses, No clubbing, cyanosis, or edema , L LE shortened   LYMPH: No lymphadenopathy noted  SKIN: No rashes or lesions

## 2020-03-27 NOTE — PROGRESS NOTE ADULT - ASSESSMENT
-COPD hx; s/p lung transplant in 2015; latest angélica was normal  -Respiratory status optimized  -hip fx s/p fall; s/p surgery  -Renal failure on HD; hypokalemia  -Hx CAD; s/p stents  -Anemia       RECC:   Albuterol can be used prn. Pain control. Follow O2 sat.  Anemia per PMT and renal. HD and K level per renal.

## 2020-03-27 NOTE — DISCHARGE NOTE PROVIDER - PROVIDER TOKENS
PROVIDER:[TOKEN:[[id number]]] PROVIDER:[TOKEN:[[id number]],FOLLOWUP:[1 week]] PROVIDER:[TOKEN:[[id number]],FOLLOWUP:[1 week]],FREE:[LAST:[Dr. Kaufman, Heme/onc],PHONE:[(   )    -],FAX:[(   )    -],FOLLOWUP:[1-3 days]],PROVIDER:[TOKEN:[[id number]],FOLLOWUP:[1-3 days]]

## 2020-03-27 NOTE — DISCHARGE NOTE PROVIDER - NSDCFUADDINST_GEN_ALL_CORE_FT
ORTHOPEDIC DISCHARGE INSTRUCTIONS: The patient will be seen in the office between 2-3 weeks for wound check.  Patient may shower after post-op day #5 (4/1/20). The dressing is to be removed on postop day #7 (4/3/20) . IF THE DRESSING BECOMES SOILED BEFORE THE REMOVAL DATE, CHANGE WITH A SIMILAR DRESSING. IF THE DRESSING BECOMES STAINED WITH DISCHARGE, CONTACT THE OFFICE FOR FURTHER DIRECTIONS.  The patient will contact the office if the wound becomes red, has increasing pain, develops bleeding or discharge, an injury occurs, or has other concerns. The patient will continue PT for gait training. The patient will continue ASPIRIN/PLAVIX  for blood clot prevention. The patient will take OXYCODONE AND TYLENOL for pain control and titrate according to prescription and patient needs. The patient will take Colace while taking oxycodone to prevent narcotic associated constipation.  Additionally, increase water intake (drink at least 8 glasses of water daily) and try adding fiber to the diet by eating fruits, vegetables and foods that are rich in grains. If constipation is experienced, contact the medical/primary care provider to discuss further treatment options. The patient is FULL weight bearing. ORTHOPEDIC DISCHARGE INSTRUCTIONS: The patient will be seen in the office between 2-3 weeks for wound check.  Patient may shower after post-op day #5 (4/1/20). The dressing is to be removed on postop day #7 (4/3/20) . IF THE DRESSING BECOMES SOILED BEFORE THE REMOVAL DATE, CHANGE WITH A SIMILAR DRESSING. IF THE DRESSING BECOMES STAINED WITH DISCHARGE, CONTACT THE OFFICE FOR FURTHER DIRECTIONS.  The patient will contact the office if the wound becomes red, has increasing pain, develops bleeding or discharge, an injury occurs, or has other concerns. The patient will continue PT for gait training. The patient will continue ASPIRIN 325mg twice daily x 6 weeks/PLAVIX  for blood clot prevention, then resume home dose aspirin. The patient will take OXYCODONE AND TYLENOL for pain control and titrate according to prescription and patient needs. The patient will take Colace while taking oxycodone to prevent narcotic associated constipation.  Additionally, increase water intake (drink at least 8 glasses of water daily) and try adding fiber to the diet by eating fruits, vegetables and foods that are rich in grains. If constipation is experienced, contact the medical/primary care provider to discuss further treatment options. The patient is FULL weight bearing.

## 2020-03-27 NOTE — PROGRESS NOTE ADULT - SUBJECTIVE AND OBJECTIVE BOX
PULMONARY PROGRESS NOTE      [id number]    Patient is a 69y old  Male who presents with a chief complaint of fall and hip # (27 Mar 2020 14:27)      INTERVAL HPI/OVERNIGHT EVENTS: S/P hip surgery. On NCO2 but sat 100%. No sob, wheeze, cough.     MEDICATIONS  (STANDING):  atorvastatin 40 milliGRAM(s) Oral at bedtime  epoetin-ben-epbx (RETACRIT) Injectable 14636 Unit(s) IV Push <User Schedule>  hydrocortisone 10 milliGRAM(s) Oral at bedtime  hydrocortisone 20 milliGRAM(s) Oral daily  melatonin 3 milliGRAM(s) Oral at bedtime  multivitamin 1 Tablet(s) Oral daily  mycophenolate mofetil 1000 milliGRAM(s) Oral two times a day  ondansetron Injectable 4 milliGRAM(s) IV Push every 6 hours  pantoprazole    Tablet 40 milliGRAM(s) Oral before breakfast  pregabalin 50 milliGRAM(s) Oral two times a day  tacrolimus 1 milliGRAM(s) Oral two times a day  tamsulosin 0.4 milliGRAM(s) Oral at bedtime  trimethoprim  160 mG/sulfamethoxazole 800 mG 1 Tablet(s) Oral <User Schedule>      MEDICATIONS  (PRN):  bisacodyl Suppository 10 milliGRAM(s) Rectal daily PRN Constipation      Allergies    budesonide (Unknown)  cefpodoxime (Unknown)  Pollen (Unknown)    Intolerances        PAST MEDICAL & SURGICAL HISTORY:  Emphysema of lung  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 -  [facility]      SOCIAL HISTORY  Smoking History: former smoker      REVIEW OF SYSTEMS:    CONSTITUTIONAL:  No distress    HEENT:  Eyes:  No diplopia or blurred vision. ENT:  No earache, sore throat or runny nose.    CARDIOVASCULAR:  No pressure, squeezing, tightness, heaviness or aching about the chest; no palpitations.    RESPIRATORY:  per HPI     GASTROINTESTINAL:  No nausea, vomiting or diarrhea.    GENITOURINARY:  No dysuria, frequency or urgency.    NEUROLOGIC:  No paresthesias, fasciculations, seizures or weakness.    PSYCHIATRIC:  No disorder of thought or mood.    Vital Signs Last 24 Hrs  T(C): 37.1 (27 Mar 2020 14:47), Max: 37.4 (26 Mar 2020 23:19)  T(F): 98.7 (27 Mar 2020 14:47), Max: 99.4 (26 Mar 2020 23:19)  HR: 84 (27 Mar 2020 14:47) (73 - 86)  BP: 126/71 (27 Mar 2020 14:47) (106/61 - 139/67)  BP(mean): --  RR: 16 (27 Mar 2020 14:47) (16 - 25)  SpO2: 100% (27 Mar 2020 14:47) (98% - 100%)    PHYSICAL EXAMINATION:    GENERAL: The patient is awake and alert in no apparent distress.     HEENT: Head is normocephalic and atraumatic.  Mucous membranes are moist.    NECK: Supple.    LUNGS: Clear to auscultation without wheezing, rales or rhonchi; respirations unlabored    HEART: Regular rate and rhythm      ABDOMEN: Soft, nontender, and nondistended.      EXTREMITIES: Without any cyanosis, clubbing, rash, lesions or edema.    NEUROLOGIC: Grossly intact.    LABS:                        9.9    5.32  )-----------( CLUMPED    ( 27 Mar 2020 07:59 )             31.1     03-27    x   |  x   |  x   ----------------------------<  x   4.5   |  x   |  x     Ca    7.4<L>      26 Mar 2020 07:31  Phos  2.9     03-26  Mg     1.9     03-26    TPro  4.9<L>  /  Alb  3.0<L>  /  TBili  0.6  /  DBili  x   /  AST  33  /  ALT  17  /  AlkPhos  93  03-26    PT/INR - ( 25 Mar 2020 16:42 )   PT: 15.4 sec;   INR: 1.35 ratio         PTT - ( 25 Mar 2020 16:42 )  PTT:32.1 sec

## 2020-03-27 NOTE — DISCHARGE NOTE PROVIDER - CARE PROVIDER_API CALL
Adama Kaba)  Orthopaedic Surgery  [address]  [place] [address]  Phone: [phone]  Fax: [phone]  Follow Up Time: Adama Kaba (MD)  Orthopaedic Surgery  [address]  [place] [address]  Phone: [phone]  Fax: [phone]  Follow Up Time: 1 week Adama Kaba)  Orthopaedic Surgery  [address]  [place] [address]  Phone: [phone]  Fax: [phone]  Follow Up Time: 1 week    Dr. Kaufman, Heme/onc,   Phone: (   )    -  Fax: (   )    -  Follow Up Time: 1-3 days    Gerald Harmon)  [facility] Medicine  [address] [place] [address]  Phone: [phone]  Fax: [phone]  Follow Up Time: 1-3 days

## 2020-03-27 NOTE — DISCHARGE NOTE PROVIDER - HOSPITAL COURSE
68 y/o M ex smoker with PMH of 2015- Lung Transplant for COPD on anti rejection meds, s/p rt total hip arthroplasty, multi level compression #, BPH, HLD, CAD, s/p stents x 3 ( Nov 2019) BPH, OP, recently started HD for CHRISTINA- ESRD ? for past 1 month presents to ED s/p fall, found with L hip fracture         # Left Intertrochanteric Fracture  s/p Mechanical fall 70 y/o M ex smoker with PMH of 2015- Lung Transplant for COPD on anti rejection meds, s/p rt total hip arthroplasty, multi level compression #, BPH, HLD, CAD, s/p stents x 3 ( Nov 2019) BPH, OP, recently started HD for CHRISTINA- ESRD ? for past 1 month presents to ED s/p fall, found with L hip fracture. Seen by ortho, s/p IM nailing, now stable for DC home w/ outpatient ortho follow up. 69 year old male status post Left hip IM nail, POD#3. Ex smoker with PMH of Lung Transplant in 2015 for COPD on anti rejection meds, s/p rt total hip arthroplasty, multi level compression, BPH, HLD, CAD, BPH, OP, and recently started HD for past 1 month presents to ED s/p fall. Pt found with displaced left femur fracture, seen by ortho and s/p IM nailing. Hospital course complicated by anemia, s/p 2U PRB 69 year old male w/ PMH BPH, HLD, CAD s/p 3 PCI in 12/2019 and started on plavix/asa, Lung Transplant (Dr. Kaufman at [facility]) in 2015 for COPD on anti rejection meds, ESRD on HD, Hx of Right total hip arthroplasty who presented [facility] ED s/p fall. Pt found with displaced left femur fracture, seen by ortho and s/p IM nailing. Hospital course complicated by anemia, s/p 2U PRBC and thrombocytopenia.         1. Closed displaced intertrochanteric fracture of left femur with routine healing.    - Imaging reviewed    - S/p IM nailing with ortho    - Orthopedics is following     -  BID for 6 weeks as per ortho    - Pain controlled     - PT recommends home w/ RW when medically stable        2. End stage renal disease on dialysis.       - Stable    - C/w HD. Will go for short session today then on DC resume MWF schedule         3.  Coronary artery disease with other form of angina pectoris    - S/p recent PCI x 3 12/2019, started on ASA/plavix     - Continue asa, plavix and statin         4. Anemia due to chronic kidney disease, on chronic dialysis.    - S/p 2U PRBC yesterday for Hbg now 10.9    - Stable, monitor     - Continue with iron supplement          5. Hypocalcemia    - Calcium of 6.1. S/p IV repletion with improvement    - Will start PO repletion         6. Hx of lung transplant for COPD     - On anti rejection meds, steroids and prophylactic bactrim, to be continued    - Follows with Dr. Kaufman at [facility] - [phone]        7. Thrombocytopenia    - Plts of 33K today    - Will reach out to Dr. Kaufman who manages anti-rejection meds for baseline Plt count    - On  BID for recent hip surgery per ortho. Also on plavix 69 year old male w/ PMH BPH, HLD, CAD s/p 3 PCI in 12/2019 and started on plavix/asa, Lung Transplant (Dr. Kaufman at [facility]) in 2015 for COPD on anti rejection meds, ESRD on HD, Hx of Right total hip arthroplasty who presented to [facility] ED s/p fall. Pt found with displaced left femur fracture, seen by ortho and s/p IM nailing. Hospital course complicated by anemia, s/p 2U PRBC and thrombocytopenia.         1. Closed displaced intertrochanteric fracture of left femur with routine healing.    - Imaging reviewed    - S/p IM nailing with ortho    - Orthopedics is following     -  BID for 6 weeks as per ortho    - Pain controlled     - PT recommends home w/ RW when medically stable        2. End stage renal disease on dialysis.       - Stable    - C/w HD. Will go for short session today then on DC resume MWF schedule         3.  Coronary artery disease with other form of angina pectoris    - S/p recent PCI x 3 12/2019, started on ASA/plavix     - Continue asa, plavix and statin         4. Anemia due to chronic kidney disease, on chronic dialysis.    - S/p 2U PRBC yesterday for Hbg now 10.9    - Stable, monitor     - Continue with iron supplement          5. Hypocalcemia    - Calcium of 6.1. S/p IV repletion with improvement    - Will start PO repletion         6. Hx of lung transplant for COPD     - On anti rejection meds, steroids and prophylactic bactrim, to be continued    - Follows with Dr. Kaufman at [facility] - [phone]        7. Thrombocytopenia    - Plts of 33K today--repeat with 187 with different collections    - Will reach out to Dr. Kaufman who manages anti-rejection meds for baseline Plt count    - On  BID for recent hip surgery per ortho. Also on plavix 69 year old male w/ PMH BPH, HLD, CAD s/p 3 PCI in 12/2019 and started on plavix/asa, Lung Transplant (Dr. Kaufman at [facility]) in 2015 for COPD on anti rejection meds, ESRD on HD, Hx of Right total hip arthroplasty who presented to [facility] ED s/p fall. Pt found with displaced left femur fracture, seen by ortho and s/p IM nailing 3/27. Hospital course complicated by anemia, s/p 4U PRBC and thrombocytopenia. Has a walker and komode at home and is refusing home care or home PT.        1. Closed displaced intertrochanteric fracture of left femur with routine healing.    - Imaging reviewed    - S/p IM nailing with ortho    - Orthopedics is following     -  BID for 6 weeks as per ortho    - Pain controlled     - PT recommends home w/ RW when medically stable        2. End stage renal disease on dialysis.       - Stable    - C/w HD. To resume MWF schedule         3.  Coronary artery disease with other form of angina pectoris    - S/p recent PCI x 3 12/2019, started on ASA/plavix     - Continue asa, plavix and statin         4. Anemia due to chronic kidney disease, on chronic dialysis.    - S/p 4U PRBC total, Hbg now 10.8, stable     - Stable, monitor     - Continue with iron supplement          5. Hypocalcemia    - Calcium of 6.1. S/p IV repletion with improvement    - continue PO repletion         6. Hx of lung transplant for COPD     - On anti rejection meds, steroids and prophylactic bactrim, to be continued    - Follows with Dr. Kaufman at [facility] - [phone]        7. Thrombocytopenia    - Plts of 33K today--repeat with 187 with different collections    - F/u w Dr. Kaufman who manages anti-rejection meds for baseline Plt count    - On  BID for recent hip surgery per ortho. Also on plavix         Disposition: Stable for discharge. Outpatient followup discussed.    Discharge planning time 35 minutes.

## 2020-03-27 NOTE — BRIEF OPERATIVE NOTE - NSICDXBRIEFPOSTOP_GEN_ALL_CORE_FT
POST-OP DIAGNOSIS:  Closed intertrochanteric fracture of left femur 27-Mar-2020 12:05:53  Adama Kaba

## 2020-03-27 NOTE — DISCHARGE NOTE PROVIDER - NSDCACTIVITY_GEN_ALL_CORE
Do not make important decisions/Walking - Outdoors allowed/Walking - Indoors allowed/No heavy lifting/straining/Do not drive or operate machinery

## 2020-03-27 NOTE — PROGRESS NOTE ADULT - SUBJECTIVE AND OBJECTIVE BOX
ORTHO-POST-OP PROGRESS NOTE:      [id number]    JU FERGUSON      PROCEDURE: Left hip IMN  Surgeon: Dr. Kaba  DOS: 3/27/20       Patient seen and examined. Patient's pain is well controlled with prescribed pain medications. Patient denies acute motor or sensory changes. Patient has not ambulated postoperatively, PT evaluation pending. +bill in place.                           9.9    5.32  )-----------( CLUMPED    ( 27 Mar 2020 07:59 )             31.1               I&O's Detail    26 Mar 2020 07:01  -  27 Mar 2020 07:00  --------------------------------------------------------  IN:  Total IN: 0 mL    OUT:    Other: 900 mL  Total OUT: 900 mL    Total NET: -900 mL            acetaminophen   Tablet .. 975 milliGRAM(s) Oral every 8 hours  aluminum hydroxide/magnesium hydroxide/simethicone Suspension 30 milliLiter(s) Oral four times a day PRN  atorvastatin 40 milliGRAM(s) Oral at bedtime  bisacodyl Suppository 10 milliGRAM(s) Rectal daily PRN  ceFAZolin   IVPB 2000 milliGRAM(s) IV Intermittent <User Schedule>  ceFAZolin   IVPB 2000 milliGRAM(s) IV Intermittent once  epoetin-ben-epbx (RETACRIT) Injectable 63865 Unit(s) IV Push <User Schedule>  hydrocortisone 10 milliGRAM(s) Oral at bedtime  hydrocortisone 20 milliGRAM(s) Oral daily  HYDROmorphone  Injectable 0.5 milliGRAM(s) IV Push every 3 hours PRN  lactated ringers. 1000 milliLiter(s) IV Continuous <Continuous>  magnesium hydroxide Suspension 30 milliLiter(s) Oral at bedtime PRN  melatonin 3 milliGRAM(s) Oral at bedtime  multivitamin 1 Tablet(s) Oral daily  mycophenolate mofetil 1000 milliGRAM(s) Oral two times a day  ondansetron Injectable 4 milliGRAM(s) IV Push every 6 hours  ondansetron Injectable 4 milliGRAM(s) IV Push every 6 hours PRN  oxyCODONE    IR 5 milliGRAM(s) Oral every 3 hours PRN  oxyCODONE    IR 10 milliGRAM(s) Oral every 3 hours PRN  pantoprazole    Tablet 40 milliGRAM(s) Oral before breakfast  polyethylene glycol 3350 17 Gram(s) Oral daily  pregabalin 50 milliGRAM(s) Oral two times a day  senna 2 Tablet(s) Oral at bedtime PRN  tacrolimus 1 milliGRAM(s) Oral two times a day  tamsulosin 0.4 milliGRAM(s) Oral at bedtime  trimethoprim  160 mG/sulfamethoxazole 800 mG 1 Tablet(s) Oral <User Schedule>        T(C): 36.9 (03-27-20 @ 16:00), Max: 37.4 (03-26-20 @ 23:19)  HR: 81 (03-27-20 @ 16:00) (73 - 86)  BP: 110/66 (03-27-20 @ 16:00) (106/61 - 139/67)  RR: 18 (03-27-20 @ 16:00) (16 - 25)  SpO2: 94% (03-27-20 @ 16:00) (94% - 100%)  Wt(kg): --      PHYSICAL EXAM:     Constitutional: Alert, responsive, in no acute distress.     Left lower extremity: dressings clean, dry and intact. No bleeding or discharge noted. Sensation to light touch is grossly intact without deficit. Calf soft nontender and compressible. +GSC/TA/EHL/FHL. Dorsalis pedis pulse 2+. Capillary refill is less than 3 seconds.                                                     < from: Xray Hip w/ Pelvis 1 View, Left (03.27.20 @ 13:12) >     EXAM:  XR HIP WITH PELV 1V LT                          PROCEDURE DATE:  03/27/2020          INTERPRETATION:  Pelvis and left hip    HISTORY: Postop    Comparison: 3/25/2020    Frontal view of the pelvis shows no evidence of fracture nor destructive change of the intrinsic bones of the pelvis. New left femoral intramedullary krista with femoral neck nail are present with reduction of left intertrochanteric fracture.    Right total hip replacement is again noted.    IMPRESSION: Postoperative changes.    Thank you for this referral.    < end of copied text >    A/P :  69y Male S/P Left hip IMN   POD# 0    -  Pain control  -  DVT ppx: ASA/Plavix-to resume today  -  PT and out of bed today  -  Postop abx: Ancef/Bactrim  -  Weight bearing status: WBAT of LLE with assistance of a rolling walker  -  Dispo: discharge planning as per primary team

## 2020-03-27 NOTE — PROGRESS NOTE ADULT - ASSESSMENT
70 y/o M ex smoker with PMH of 2015- Lung Transplant for COPD on anti rejection meds, s/p rt total hip arthroplasty, multi level compression #, BPH, HLD, CAD, s/p stents x 3 ( Nov 2019) BPH, OP, recently started HD for CHRISTINA- ESRD ? for past 1 month presents to ED s/p fall, found with L hip fracture , planned for OR today .    # Left Intertrochanteric # s/p Mechanical fall  seen by Ortho  planned surgery today , already left   Pain control  DVT- P  Pt on ASA/ Plavix at home  limb precautions    # s/p lung Tx for COPD on anti rejection meds  Pulm consult  cont current meds  Oxygen PRN  stable    # CAD, stents x 3 - continue ASA/ Plavix , BB , statins . Spoke to Dr Patrick - ASA to be continued , Plavix once OK with ortho    #BPH - on Flomax , observe for post op urinary retention   #HLD - continue statins       # HD dependent ESRD/ ? CHRISTINA ( likely contrast induced )   as per renal     # Hypokalemia and Hypomagnesemia - supplemented     # Normocytic anemia likely sec to chr dz  Transfused during HD 2 units  , follow CBC in am ,   transfuse PRN , keep HG > 8 due to CAD     # clumped platelets blue top for platelets - 149    # Long term steroids use - , On Protonix for GI prophylaxis , may continue Home dose

## 2020-03-27 NOTE — DISCHARGE NOTE PROVIDER - NSDCFUSCHEDAPPT_GEN_ALL_CORE_FT
JU FERGUSON ; 04/06/2020 ; NPP [facility] [address] ; 05/18/2020 ; NPP [facility] [address] ; 06/18/2020 ; NPANDRES [facility] [address] JU FERGUSON ; 04/06/2020 ; N[facility] [address] ; 05/18/2020 ; N[facility] [address] ; 06/18/2020 ; NPANDRES [facility] [address] JU FERGUSON ; 04/06/2020 ; NPP [facility] [address] ; 05/18/2020 ; NPP [facility] [address] ; 06/18/2020 ; NPANDRES [facility] [address] JU FERGUSON ; 04/06/2020 ; N[facility] [address] ; 05/18/2020 ; NPP [facility] [address] ; 06/18/2020 ; NPANDRES [facility] [address] JU FERGUSON ; 04/06/2020 ; N[facility] [address] [place] [address] ; 05/18/2020 ; N[facility] [address] ; 06/18/2020 ; NPANDRES [facility] [address] JU FERGUSON ; 04/06/2020 ; NPP [facility] [address] [place] [address] ; 05/18/2020 ; NPP [facility] [address] ; 06/18/2020 ; NPANDRES [facility] [address] JU FERGUSON ; 04/06/2020 ; N[facility] [address] ; 05/18/2020 ; NPP [facility] [address] ; 06/18/2020 ; NPANDRES [facility] [address] JU FERGUSON ; 04/06/2020 ; N[facility] [address] ; 05/18/2020 ; NPP [facility] [address] ; 06/18/2020 ; NPANDRES [facility] [address] JU FERGUSON ; 04/06/2020 ; NPP [facility] [address] [place] [address] ; 05/18/2020 ; N[facility] [address] ; 06/18/2020 ; NPANDRES [facility] [address]

## 2020-03-27 NOTE — DISCHARGE NOTE PROVIDER - NSDCCPCAREPLAN_GEN_ALL_CORE_FT
PRINCIPAL DISCHARGE DIAGNOSIS  Diagnosis: Closed intertrochanteric fracture of left hip  Assessment and Plan of Treatment: PRINCIPAL DISCHARGE DIAGNOSIS  Diagnosis: Closed intertrochanteric fracture of left hip  Assessment and Plan of Treatment: S/p IM nailing with ortho. Weight bearing as tolerated. Follow up [facility] ortho on discharge. Aspirin 325mg two times a day until May 9.      SECONDARY DISCHARGE DIAGNOSES  Diagnosis: Transplanted, lung  Assessment and Plan of Treatment: Continue antirejection meds    Diagnosis: Anemia  Assessment and Plan of Treatment: Continue with iron supplementation    Diagnosis: CAD (coronary artery disease)  Assessment and Plan of Treatment: Contine with home meds, aspirin dose has been changed. Aspirin 325mg oral two times a day until May 9. After May 9 to resume baby aspirin 81mg oral once a day.    Diagnosis: ESRD on dialysis  Assessment and Plan of Treatment: Resume outpatient HD PRINCIPAL DISCHARGE DIAGNOSIS  Diagnosis: Closed intertrochanteric fracture of left hip  Assessment and Plan of Treatment: S/p IM nailing with ortho. Weight bearing as tolerated. Follow up [facility] ortho on discharge. Aspirin 325mg two times a day until May 9. After May 9th, to resume aspirin 81mg oral once a day.      SECONDARY DISCHARGE DIAGNOSES  Diagnosis: Transplanted, lung  Assessment and Plan of Treatment: Continue antirejection meds    Diagnosis: Anemia  Assessment and Plan of Treatment: Continue with iron supplementation    Diagnosis: CAD (coronary artery disease)  Assessment and Plan of Treatment: Contine with home meds, aspirin dose has been changed. Aspirin 325mg oral two times a day until May 9. After May 9 to resume baby aspirin 81mg oral once a day.    Diagnosis: ESRD on dialysis  Assessment and Plan of Treatment: Resume outpatient HD PRINCIPAL DISCHARGE DIAGNOSIS  Diagnosis: Closed intertrochanteric fracture of left hip  Assessment and Plan of Treatment: S/p IM nailing with ortho. Weight bearing as tolerated. Follow up [facility] ortho on discharge. Aspirin 325mg two times a day until May 9. After May 9th, to resume aspirin 81mg oral once a day.      SECONDARY DISCHARGE DIAGNOSES  Diagnosis: BPH (benign prostatic hyperplasia)  Assessment and Plan of Treatment: C/w flomax    Diagnosis: Transplanted, lung  Assessment and Plan of Treatment: Continue antirejection meds, steroids and bactrim    Diagnosis: Anemia  Assessment and Plan of Treatment: Continue with iron supplementation    Diagnosis: CAD (coronary artery disease)  Assessment and Plan of Treatment: Contine with home meds, aspirin dose has been changed. Aspirin 325mg oral two times a day until May 9. After May 9 to resume baby aspirin 81mg oral once a day.    Diagnosis: ESRD on dialysis  Assessment and Plan of Treatment: Resume outpatient HD on friday 4/3/2020. PRINCIPAL DISCHARGE DIAGNOSIS  Diagnosis: Closed intertrochanteric fracture of left hip  Assessment and Plan of Treatment: S/p IM nailing with ortho. Weight bearing as tolerated. Follow up [facility] ortho on discharge. Aspirin 325mg two times a day until May 9. After May 9th, to resume aspirin 81mg oral once a day.      SECONDARY DISCHARGE DIAGNOSES  Diagnosis: BPH (benign prostatic hyperplasia)  Assessment and Plan of Treatment: C/w flomax    Diagnosis: Transplanted, lung  Assessment and Plan of Treatment: Continue antirejection meds, steroids and bactrim. Follow up with outpatient physician Dr. Kaufman at [facility].    Diagnosis: Anemia  Assessment and Plan of Treatment: Continue with iron supplementation    Diagnosis: CAD (coronary artery disease)  Assessment and Plan of Treatment: s/p 3 PCI 12/2019. Contine with home meds, aspirin dose has been changed. Aspirin 325mg oral two times a day until May 9. After May 9 to resume baby aspirin 81mg oral once a day.    Diagnosis: ESRD on dialysis  Assessment and Plan of Treatment: Resume outpatient HD on friday 4/3/2020. PRINCIPAL DISCHARGE DIAGNOSIS  Diagnosis: Closed intertrochanteric fracture of left hip  Assessment and Plan of Treatment: S/p IM nailing with ortho. Weight bearing as tolerated. Follow up [facility] ortho on discharge. Aspirin 325mg two times a day until May 9. After May 9th, to resume aspirin 81mg oral once a day.      SECONDARY DISCHARGE DIAGNOSES  Diagnosis: Thrombocytopenia  Assessment and Plan of Treatment: resolved  please recheck levels in 2-3 days with Dr Kaufman    Diagnosis: BPH (benign prostatic hyperplasia)  Assessment and Plan of Treatment: C/w flomax    Diagnosis: Transplanted, lung  Assessment and Plan of Treatment: Continue antirejection meds, steroids and bactrim. Follow up with outpatient physician Dr. Kaufman at [facility].    Diagnosis: Anemia  Assessment and Plan of Treatment: Continue with iron supplementation    Diagnosis: CAD (coronary artery disease)  Assessment and Plan of Treatment: s/p 3 PCI 12/2019. Contine with home meds, aspirin dose has been changed. Aspirin 325mg oral two times a day until May 9. After May 9 to resume baby aspirin 81mg oral once a day.    Diagnosis: ESRD on dialysis  Assessment and Plan of Treatment: Resume outpatient HD on friday 4/3/2020.

## 2020-03-27 NOTE — DISCHARGE NOTE PROVIDER - NSDCCPTREATMENT_GEN_ALL_CORE_FT
PRINCIPAL PROCEDURE  Procedure: Fixation of intertrochanteric fracture with intramedullary nail  Findings and Treatment:

## 2020-03-28 DIAGNOSIS — S72.142D DISPLACED INTERTROCHANTERIC FRACTURE OF LEFT FEMUR, SUBSEQUENT ENCOUNTER FOR CLOSED FRACTURE WITH ROUTINE HEALING: ICD-10-CM

## 2020-03-28 DIAGNOSIS — N18.6 END STAGE RENAL DISEASE: ICD-10-CM

## 2020-03-28 DIAGNOSIS — I25.118 ATHEROSCLEROTIC HEART DISEASE OF NATIVE CORONARY ARTERY WITH OTHER FORMS OF ANGINA PECTORIS: ICD-10-CM

## 2020-03-28 LAB
ANION GAP SERPL CALC-SCNC: 20 MMOL/L — HIGH (ref 5–17)
BUN SERPL-MCNC: 68 MG/DL — HIGH (ref 8–20)
CALCIUM SERPL-MCNC: 6.8 MG/DL — LOW (ref 8.6–10.2)
CHLORIDE SERPL-SCNC: 96 MMOL/L — LOW (ref 98–107)
CO2 SERPL-SCNC: 20 MMOL/L — LOW (ref 22–29)
CREAT SERPL-MCNC: 6.18 MG/DL — HIGH (ref 0.5–1.3)
GLUCOSE SERPL-MCNC: 133 MG/DL — HIGH (ref 70–99)
HCT VFR BLD CALC: 24.6 % — LOW (ref 39–50)
HGB BLD-MCNC: 8 G/DL — LOW (ref 13–17)
MCHC RBC-ENTMCNC: 28.9 PG — SIGNIFICANT CHANGE UP (ref 27–34)
MCHC RBC-ENTMCNC: 32.5 GM/DL — SIGNIFICANT CHANGE UP (ref 32–36)
MCV RBC AUTO: 88.8 FL — SIGNIFICANT CHANGE UP (ref 80–100)
PLATELET # BLD AUTO: 48 K/UL — LOW (ref 150–400)
POTASSIUM SERPL-MCNC: 3.8 MMOL/L — SIGNIFICANT CHANGE UP (ref 3.5–5.3)
POTASSIUM SERPL-SCNC: 3.8 MMOL/L — SIGNIFICANT CHANGE UP (ref 3.5–5.3)
RBC # BLD: 2.77 M/UL — LOW (ref 4.2–5.8)
RBC # FLD: 19.2 % — HIGH (ref 10.3–14.5)
SODIUM SERPL-SCNC: 136 MMOL/L — SIGNIFICANT CHANGE UP (ref 135–145)
WBC # BLD: 5.26 K/UL — SIGNIFICANT CHANGE UP (ref 3.8–10.5)
WBC # FLD AUTO: 5.26 K/UL — SIGNIFICANT CHANGE UP (ref 3.8–10.5)

## 2020-03-28 PROCEDURE — 99233 SBSQ HOSP IP/OBS HIGH 50: CPT

## 2020-03-28 PROCEDURE — 99232 SBSQ HOSP IP/OBS MODERATE 35: CPT

## 2020-03-28 RX ORDER — ERYTHROPOIETIN 10000 [IU]/ML
10000 INJECTION, SOLUTION INTRAVENOUS; SUBCUTANEOUS
Refills: 0 | Status: DISCONTINUED | OUTPATIENT
Start: 2020-03-28 | End: 2020-04-02

## 2020-03-28 RX ORDER — ASPIRIN/CALCIUM CARB/MAGNESIUM 324 MG
325 TABLET ORAL
Refills: 0 | Status: DISCONTINUED | OUTPATIENT
Start: 2020-03-28 | End: 2020-04-02

## 2020-03-28 RX ORDER — MIDODRINE HYDROCHLORIDE 2.5 MG/1
5 TABLET ORAL THREE TIMES A DAY
Refills: 0 | Status: DISCONTINUED | OUTPATIENT
Start: 2020-03-28 | End: 2020-04-02

## 2020-03-28 RX ADMIN — OXYCODONE HYDROCHLORIDE 10 MILLIGRAM(S): 5 TABLET ORAL at 14:53

## 2020-03-28 RX ADMIN — ONDANSETRON 4 MILLIGRAM(S): 8 TABLET, FILM COATED ORAL at 01:27

## 2020-03-28 RX ADMIN — Medication 20 MILLIGRAM(S): at 04:32

## 2020-03-28 RX ADMIN — CLOPIDOGREL BISULFATE 75 MILLIGRAM(S): 75 TABLET, FILM COATED ORAL at 14:22

## 2020-03-28 RX ADMIN — Medication 975 MILLIGRAM(S): at 21:13

## 2020-03-28 RX ADMIN — Medication 975 MILLIGRAM(S): at 05:44

## 2020-03-28 RX ADMIN — MIDODRINE HYDROCHLORIDE 5 MILLIGRAM(S): 2.5 TABLET ORAL at 18:16

## 2020-03-28 RX ADMIN — Medication 1 TABLET(S): at 14:23

## 2020-03-28 RX ADMIN — Medication 975 MILLIGRAM(S): at 04:31

## 2020-03-28 RX ADMIN — Medication 975 MILLIGRAM(S): at 14:53

## 2020-03-28 RX ADMIN — ERYTHROPOIETIN 10000 UNIT(S): 10000 INJECTION, SOLUTION INTRAVENOUS; SUBCUTANEOUS at 12:37

## 2020-03-28 RX ADMIN — Medication 100 MILLIGRAM(S): at 01:27

## 2020-03-28 RX ADMIN — Medication 975 MILLIGRAM(S): at 14:23

## 2020-03-28 RX ADMIN — ATORVASTATIN CALCIUM 40 MILLIGRAM(S): 80 TABLET, FILM COATED ORAL at 21:13

## 2020-03-28 RX ADMIN — Medication 3 MILLIGRAM(S): at 21:13

## 2020-03-28 RX ADMIN — OXYCODONE HYDROCHLORIDE 10 MILLIGRAM(S): 5 TABLET ORAL at 14:23

## 2020-03-28 RX ADMIN — Medication 10 MILLIGRAM(S): at 21:12

## 2020-03-28 RX ADMIN — OXYCODONE HYDROCHLORIDE 10 MILLIGRAM(S): 5 TABLET ORAL at 05:44

## 2020-03-28 RX ADMIN — MIDODRINE HYDROCHLORIDE 5 MILLIGRAM(S): 2.5 TABLET ORAL at 14:23

## 2020-03-28 RX ADMIN — OXYCODONE HYDROCHLORIDE 10 MILLIGRAM(S): 5 TABLET ORAL at 21:35

## 2020-03-28 RX ADMIN — ONDANSETRON 4 MILLIGRAM(S): 8 TABLET, FILM COATED ORAL at 04:31

## 2020-03-28 RX ADMIN — TACROLIMUS 1 MILLIGRAM(S): 5 CAPSULE ORAL at 18:16

## 2020-03-28 RX ADMIN — OXYCODONE HYDROCHLORIDE 10 MILLIGRAM(S): 5 TABLET ORAL at 21:12

## 2020-03-28 RX ADMIN — MYCOPHENOLATE MOFETIL 1000 MILLIGRAM(S): 250 CAPSULE ORAL at 04:31

## 2020-03-28 RX ADMIN — TACROLIMUS 1 MILLIGRAM(S): 5 CAPSULE ORAL at 04:30

## 2020-03-28 RX ADMIN — TAMSULOSIN HYDROCHLORIDE 0.4 MILLIGRAM(S): 0.4 CAPSULE ORAL at 21:13

## 2020-03-28 RX ADMIN — OXYCODONE HYDROCHLORIDE 10 MILLIGRAM(S): 5 TABLET ORAL at 04:30

## 2020-03-28 RX ADMIN — Medication 975 MILLIGRAM(S): at 23:31

## 2020-03-28 RX ADMIN — MYCOPHENOLATE MOFETIL 1000 MILLIGRAM(S): 250 CAPSULE ORAL at 18:16

## 2020-03-28 NOTE — PROGRESS NOTE ADULT - SUBJECTIVE AND OBJECTIVE BOX
Patient repair left hip fracture.  H/o esrd on hd, ashd, s/p bilateral lung transplant for copd.  Pod day number 1.  He offers no new complaints and has moderate pain today Patient repair left hip fracture.  H/o esrd on hd, ashd, s/p bilateral lung transplant for copd.  Pod day number 1.  He offers no new complaints and has moderate pain today    budesonide (Unknown)  cefpodoxime (Unknown)  Pollen (Unknown)  Hemoglobin: 9.9 g/dL (03.27.20 @ 07:59)    Blood Urea Nitrogen, Serum: 25.0 mg/dL (05.23.16 @ 06:52)        acetaminophen   Tablet .. 975 milliGRAM(s) Oral every 8 hours  aluminum hydroxide/magnesium hydroxide/simethicone Suspension 30 milliLiter(s) Oral four times a day PRN  aspirin enteric coated 81 milliGRAM(s) Oral daily  atorvastatin 40 milliGRAM(s) Oral at bedtime  bisacodyl Suppository 10 milliGRAM(s) Rectal daily PRN  ceFAZolin   IVPB 2000 milliGRAM(s) IV Intermittent once  clopidogrel Tablet 75 milliGRAM(s) Oral daily  epoetin-ben-epbx (RETACRIT) Injectable 66342 Unit(s) IV Push <User Schedule>  hydrocortisone 10 milliGRAM(s) Oral at bedtime  hydrocortisone 20 milliGRAM(s) Oral daily  HYDROmorphone  Injectable 0.5 milliGRAM(s) IV Push every 3 hours PRN  lactated ringers. 1000 milliLiter(s) IV Continuous <Continuous>  magnesium hydroxide Suspension 30 milliLiter(s) Oral at bedtime PRN  melatonin 3 milliGRAM(s) Oral at bedtime  metoprolol succinate ER 25 milliGRAM(s) Oral daily  multivitamin 1 Tablet(s) Oral daily  mycophenolate mofetil 1000 milliGRAM(s) Oral two times a day  ondansetron Injectable 4 milliGRAM(s) IV Push every 6 hours  ondansetron Injectable 4 milliGRAM(s) IV Push every 6 hours PRN  oxyCODONE    IR 5 milliGRAM(s) Oral every 3 hours PRN  oxyCODONE    IR 10 milliGRAM(s) Oral every 3 hours PRN  pantoprazole    Tablet 40 milliGRAM(s) Oral before breakfast  polyethylene glycol 3350 17 Gram(s) Oral daily  pregabalin 50 milliGRAM(s) Oral two times a day  senna 2 Tablet(s) Oral at bedtime PRN  tacrolimus 1 milliGRAM(s) Oral two times a day  tamsulosin 0.4 milliGRAM(s) Oral at bedtime  trimethoprim  160 mG/sulfamethoxazole 800 mG 1 Tablet(s) Oral <User Schedule>    T(C): 36.4 (03-28-20 @ 08:23), Max: 37.1 (03-27-20 @ 14:47)  HR: 78 (03-28-20 @ 08:23) (73 - 86)  BP: 92/49 (03-28-20 @ 08:23) (92/49 - 139/67)  RR: 18 (03-28-20 @ 08:23) (16 - 25)  SpO2: 99% (03-28-20 @ 08:23) (93% - 100%)

## 2020-03-28 NOTE — PROGRESS NOTE ADULT - SUBJECTIVE AND OBJECTIVE BOX
JU FERGUSON    [id number]    History:  The patient is status post left intertrochanteric femur fracture TFN POD # 1. Patient is doing well. The patient's pain is controlled using the prescribed pain medications. The patient is participating in physical therapy. Denies nausea, vomiting, chest pain, shortness of breath, abdominal pain or fever. No new complaints. No acute motor or sensory changes are reported. Seen by renal - for HD today.  Seen by cards and signed off.    Vital Signs Last 24 Hrs  T(C): 36.4 (28 Mar 2020 08:23), Max: 37.1 (27 Mar 2020 14:47)  T(F): 97.6 (28 Mar 2020 08:23), Max: 98.7 (27 Mar 2020 14:47)  HR: 78 (28 Mar 2020 08:23) (73 - 86)  BP: 92/49 (28 Mar 2020 08:23) (92/49 - 139/67)  BP(mean): --  RR: 18 (28 Mar 2020 08:23) (16 - 25)  SpO2: 99% (28 Mar 2020 08:23) (93% - 100%)  I&O's Summary                            9.9    5.32  )-----------( CLUMPED    ( 27 Mar 2020 07:59 )             31.1     03-27    MEDICATIONS  (STANDING):  acetaminophen   Tablet .. 975 milliGRAM(s) Oral every 8 hours  aspirin enteric coated 81 milliGRAM(s) Oral daily  atorvastatin 40 milliGRAM(s) Oral at bedtime  ceFAZolin   IVPB 2000 milliGRAM(s) IV Intermittent once  clopidogrel Tablet 75 milliGRAM(s) Oral daily  epoetin-ben-epbx (RETACRIT) Injectable 30735 Unit(s) IV Push <User Schedule>  hydrocortisone 10 milliGRAM(s) Oral at bedtime  hydrocortisone 20 milliGRAM(s) Oral daily  melatonin 3 milliGRAM(s) Oral at bedtime  metoprolol succinate ER 25 milliGRAM(s) Oral daily  midodrine. 5 milliGRAM(s) Oral three times a day  multivitamin 1 Tablet(s) Oral daily  mycophenolate mofetil 1000 milliGRAM(s) Oral two times a day  ondansetron Injectable 4 milliGRAM(s) IV Push every 6 hours  pantoprazole    Tablet 40 milliGRAM(s) Oral before breakfast  polyethylene glycol 3350 17 Gram(s) Oral daily  pregabalin 50 milliGRAM(s) Oral two times a day  tacrolimus 1 milliGRAM(s) Oral two times a day  tamsulosin 0.4 milliGRAM(s) Oral at bedtime  trimethoprim  160 mG/sulfamethoxazole 800 mG 1 Tablet(s) Oral <User Schedule>    MEDICATIONS  (PRN):  aluminum hydroxide/magnesium hydroxide/simethicone Suspension 30 milliLiter(s) Oral four times a day PRN Indigestion  bisacodyl Suppository 10 milliGRAM(s) Rectal daily PRN Constipation  HYDROmorphone  Injectable 0.5 milliGRAM(s) IV Push every 3 hours PRN breakthrough  magnesium hydroxide Suspension 30 milliLiter(s) Oral at bedtime PRN Constipation  ondansetron Injectable 4 milliGRAM(s) IV Push every 6 hours PRN Nausea and/or Vomiting  oxyCODONE    IR 5 milliGRAM(s) Oral every 3 hours PRN Moderate Pain (4 - 6)  oxyCODONE    IR 10 milliGRAM(s) Oral every 3 hours PRN Severe Pain (7 - 10)  senna 2 Tablet(s) Oral at bedtime PRN Constipation      Physical exam: Left hip/thigh dressings are clean, dry and intact. No wound erythema, discharge, drainage is noted. Sensation to light touch is grossly intact without focal deficit and is symmetric bilaterally in the LE. No calf tenderness. Sensation to light touch is grossly intact distally. Motor function distally is 5/5. No foot drop. 2+ dorsalis pedis pulse. Capillary refill is less than 2 seconds. No cyanosis.    Primary Orthopedic Assessment:  •s/p left intertrochanteric femur fracture TFN POD#1    Plan:   • DVT prophylaxis as prescribed - ASA 325mg BID x 6 weeks and plavix home dose resumed.  Compression devices and ankle pumps  • Continue physical therapy - WBAT LLE.  • Elevation of the Left lower extremity  • Pain control as clinically indicated  • Incentive spirometry encouraged  • Discharge planning – anticipated discharge is Home when cleared by medicine and PT.  FU CBC today.

## 2020-03-28 NOTE — OCCUPATIONAL THERAPY INITIAL EVALUATION ADULT - GENERAL OBSERVATIONS, REHAB EVAL
chart reviewed and contents noted, pt received lying in bed and left in bedside chair with ortho PA present, spoke with HARLAN Nolan pre and post tx, c/o pain 10/10 left hip, tolerated session well, OT will continue to follow

## 2020-03-28 NOTE — PROGRESS NOTE ADULT - SUBJECTIVE AND OBJECTIVE BOX
PULMONARY PROGRESS NOTE      JU FERGUSONCULLEN-[id number]    Patient is a 69y old  Male who presents with a chief complaint of fall and hip # (28 Mar 2020 10:08)      INTERVAL HPI/OVERNIGHT EVENTS: Seen in HD. On RA. No sob, wheeze, cough. No chest pressure. Pain in hip controlled with meds.    MEDICATIONS  (STANDING):  acetaminophen   Tablet .. 975 milliGRAM(s) Oral every 8 hours  aspirin enteric coated 81 milliGRAM(s) Oral daily  atorvastatin 40 milliGRAM(s) Oral at bedtime  ceFAZolin   IVPB 2000 milliGRAM(s) IV Intermittent once  clopidogrel Tablet 75 milliGRAM(s) Oral daily  epoetin-ben-epbx (RETACRIT) Injectable 87417 Unit(s) IV Push <User Schedule>  hydrocortisone 10 milliGRAM(s) Oral at bedtime  hydrocortisone 20 milliGRAM(s) Oral daily  melatonin 3 milliGRAM(s) Oral at bedtime  metoprolol succinate ER 25 milliGRAM(s) Oral daily  midodrine. 5 milliGRAM(s) Oral three times a day  multivitamin 1 Tablet(s) Oral daily  mycophenolate mofetil 1000 milliGRAM(s) Oral two times a day  ondansetron Injectable 4 milliGRAM(s) IV Push every 6 hours  pantoprazole    Tablet 40 milliGRAM(s) Oral before breakfast  polyethylene glycol 3350 17 Gram(s) Oral daily  pregabalin 50 milliGRAM(s) Oral two times a day  tacrolimus 1 milliGRAM(s) Oral two times a day  tamsulosin 0.4 milliGRAM(s) Oral at bedtime  trimethoprim  160 mG/sulfamethoxazole 800 mG 1 Tablet(s) Oral <User Schedule>      MEDICATIONS  (PRN):  aluminum hydroxide/magnesium hydroxide/simethicone Suspension 30 milliLiter(s) Oral four times a day PRN Indigestion  bisacodyl Suppository 10 milliGRAM(s) Rectal daily PRN Constipation  HYDROmorphone  Injectable 0.5 milliGRAM(s) IV Push every 3 hours PRN breakthrough  magnesium hydroxide Suspension 30 milliLiter(s) Oral at bedtime PRN Constipation  ondansetron Injectable 4 milliGRAM(s) IV Push every 6 hours PRN Nausea and/or Vomiting  oxyCODONE    IR 5 milliGRAM(s) Oral every 3 hours PRN Moderate Pain (4 - 6)  oxyCODONE    IR 10 milliGRAM(s) Oral every 3 hours PRN Severe Pain (7 - 10)  senna 2 Tablet(s) Oral at bedtime PRN Constipation      Allergies    budesonide (Unknown)  cefpodoxime (Unknown)  Pollen (Unknown)    Intolerances        PAST MEDICAL & SURGICAL HISTORY:  Emphysema of lung  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 -  [facility]      SOCIAL HISTORY  Smoking History: former      REVIEW OF SYSTEMS:    CONSTITUTIONAL:  No distress    HEENT:  Eyes:  No diplopia or blurred vision. ENT:  No earache, sore throat or runny nose.    CARDIOVASCULAR:  No pressure, squeezing, tightness, heaviness or aching about the chest; no palpitations.    RESPIRATORY:  per HPI     GASTROINTESTINAL:  No nausea, vomiting or diarrhea.    GENITOURINARY:  No dysuria, frequency or urgency.    NEUROLOGIC:  No paresthesias, fasciculations, seizures or weakness.    PSYCHIATRIC:  No disorder of thought or mood.    Vital Signs Last 24 Hrs  T(C): 36.4 (28 Mar 2020 08:23), Max: 37.1 (27 Mar 2020 14:47)  T(F): 97.6 (28 Mar 2020 08:23), Max: 98.7 (27 Mar 2020 14:47)  HR: 78 (28 Mar 2020 08:23) (73 - 86)  BP: 92/49 (28 Mar 2020 08:23) (92/49 - 139/67)  BP(mean): --  RR: 18 (28 Mar 2020 08:23) (16 - 25)  SpO2: 99% (28 Mar 2020 08:23) (93% - 100%)    PHYSICAL EXAMINATION:    GENERAL: The patient is awake and alert in no apparent distress.     HEENT: Head is normocephalic and atraumatic.   Mucous membranes are moist.    NECK: Supple.    LUNGS: Clear to auscultation without wheezing, rales or rhonchi; respirations unlabored    HEART: Regular rate and rhythm     ABDOMEN: Soft, nontender, and nondistended.      EXTREMITIES: Without any cyanosis, clubbing, rash, lesions or edema.    NEUROLOGIC: Grossly intact.

## 2020-03-28 NOTE — OCCUPATIONAL THERAPY INITIAL EVALUATION ADULT - LEVEL OF INDEPENDENCE: DRESS LOWER BODY, OT EVAL
[facility] HOSP - [facility]  Procedure Note    Anabell Durtracy Patient Status:  Outpatient in a Bed    3/7/1959 MRN [id number]   Location [facility] Attending Medhat Méndez MD   Hosp Day # 0 PCP Elkin Prieto MD
maximum assist (25% patients effort)

## 2020-03-28 NOTE — PHYSICAL THERAPY INITIAL EVALUATION ADULT - GENERAL OBSERVATIONS, REHAB EVAL
Patient received lying in bed, NAD, breathing RA, +IV, +tele, +bill. Pt agreeable to Physical Therapy evaluation.

## 2020-03-28 NOTE — PHYSICAL THERAPY INITIAL EVALUATION ADULT - ADDITIONAL COMMENTS
Patient lives in a private house with 2 DRU (+) railing. He lives with his wife and daughter who are available to assist as needed. He was independent prior to admission without use of AD for ambulation. He has a cane at home.

## 2020-03-28 NOTE — PROGRESS NOTE ADULT - SUBJECTIVE AND OBJECTIVE BOX
NEPHROLOGY INTERVAL HPI/OVERNIGHT EVENTS:  No new events.    MEDICATIONS  (STANDING):  aspirin enteric coated 81 milliGRAM(s) Oral daily  atorvastatin 40 milliGRAM(s) Oral at bedtime  hydrocortisone 10 milliGRAM(s) Oral at bedtime  hydrocortisone 20 milliGRAM(s) Oral daily  metoprolol succinate ER 25 milliGRAM(s) Oral daily  multivitamin 1 Tablet(s) Oral daily  mycophenolate mofetil 1000 milliGRAM(s) Oral two times a day  ondansetron Injectable 4 milliGRAM(s) IV Push every 6 hours  pantoprazole    Tablet 40 milliGRAM(s) Oral before breakfast  pregabalin 50 milliGRAM(s) Oral two times a day  tacrolimus 1 milliGRAM(s) Oral two times a day  tamsulosin 0.4 milliGRAM(s) Oral at bedtime  trimethoprim  160 mG/sulfamethoxazole 800 mG 1 Tablet(s) Oral <User Schedule>    MEDICATIONS  (PRN):  bisacodyl Suppository 10 milliGRAM(s) Rectal daily PRN Constipation  morphine  - Injectable 2 milliGRAM(s) IV Push every 4 hours PRN Severe Pain (7 - 10)  oxyCODONE    IR 5 milliGRAM(s) Oral every 6 hours PRN Moderate Pain (4 - 6)  senna 2 Tablet(s) Oral at bedtime PRN Constipation      Allergies    budesonide (Unknown)  cefpodoxime (Unknown)  Pollen (Unknown)          Vital Signs Last 24 Hrs  T(C): 36.4 (28 Mar 2020 08:23), Max: 37.1 (27 Mar 2020 14:47)  T(F): 97.6 (28 Mar 2020 08:23), Max: 98.7 (27 Mar 2020 14:47)  HR: 78 (28 Mar 2020 08:23) (73 - 86)  BP: 92/49 (28 Mar 2020 08:23) (92/49 - 139/67)  BP(mean): --  RR: 18 (28 Mar 2020 08:23) (16 - 25)  SpO2: 99% (28 Mar 2020 08:23) (93% - 100%)  T(C): 36.8 (26 Mar 2020 08:10), Max: 37.4 (25 Mar 2020 20:35)  T(F): 98.2 (26 Mar 2020 08:10), Max: 99.3 (25 Mar 2020 20:35)  HR: 82 (26 Mar 2020 08:10) (77 - 103)  BP: 96/58 (26 Mar 2020 08:10) (96/58 - 144/80)  BP(mean): --  RR: 18 (26 Mar 2020 08:10) (16 - 18)  SpO2: 95% (26 Mar 2020 08:10) (95% - 100%)    PHYSICAL EXAM:  GENERAL: Comfortable in bed  HEENT:   NECK: Supple, No JVD  NERVOUS SYSTEM:  Alert, oriented  Lungs: Diminished BS at bases  HEART:  No rub  ABDOMEN: Soft, NT/ND BS+  EXTREMITIES:  tr LE edema; L hip with dressing  SKIN: No rashes nor lesions    LABS:    03-27    x   |  x   |  x   ----------------------------<  x   4.5   |  x   |  x                               7.4    4.54  )-----------( 149      ( 26 Mar 2020 07:31 )             24.2     03-26    139  |  97<L>  |  27.0<H>  ----------------------------<  110<H>  3.6   |  28.0  |  5.10<H>    Ca    7.4<L>      26 Mar 2020 07:31  Phos  2.9     03-26  Mg     1.9     03-26    TPro  4.9<L>  /  Alb  3.0<L>  /  TBili  0.6  /  DBili  x   /  AST  33  /  ALT  17  /  AlkPhos  93  03-26    PT/INR - ( 25 Mar 2020 16:42 )   PT: 15.4 sec;   INR: 1.35 ratio         PTT - ( 25 Mar 2020 16:42 )  PTT:32.1 sec    Magnesium, Serum: 1.9 mg/dL (03-26 @ 07:31)  Phosphorus Level, Serum: 2.9 mg/dL (03-26 @ 07:31)  Magnesium, Serum: 1.5 mg/dL (03-25 @ 15:43)  Phosphorus Level, Serum: 3.0 mg/dL (03-25 @ 15:43)      RADIOLOGY & ADDITIONAL TESTS:  < from: Xray Chest 1 View- PORTABLE-Urgent (03.25.20 @ 15:58) >   EXAM:  XR CHEST PORTABLE URGENT 1V                          PROCEDURE DATE:  03/25/2020          INTERPRETATION:  TECHNIQUE: Single portable view of the chest.    COMPARISON: 5/19/2016    CLINICAL HISTORY: fall, left hip pain    FINDINGS:    Single frontal view of the chest demonstrates the lungs to be clear. The cardiomediastinal silhouette is normal. No acute osseous abnormalities. Right-sided dialysis catheter. Mediastinal metallic wires redemonstrated. Consider CT as clinically warranted.    IMPRESSION: No acute cardiopulmonary disease process.    < end of copied text >

## 2020-03-28 NOTE — OCCUPATIONAL THERAPY INITIAL EVALUATION ADULT - ADDITIONAL COMMENTS
as per pt, was I and very active prior, lives with wife and daughter in private home, pt has upstairs bathroom with walk in stall and reports will stay on first floor and sponge bathe upon dc for a while, pt owns a cane only

## 2020-03-28 NOTE — PROGRESS NOTE ADULT - ASSESSMENT
-COPD hx; s/p lung transplant in 2015; latest angélica was normal  -Respiratory status optimized  -hip fx s/p fall; s/p surgery  -Renal failure on HD; hypokalemia  -Hx CAD; s/p stents  -Anemia       RECC:   Albuterol can be used prn. Pain control. Follow O2 sat.  Anemia per PMT and renal. HD and K level per renal.     Call prn in the hospital.

## 2020-03-28 NOTE — PROGRESS NOTE ADULT - SUBJECTIVE AND OBJECTIVE BOX
JU MARTY    [id number]    History:  The patient is status post left hip IMN, POD # 1. Patient is doing well. The patient's pain is controlled using the prescribed pain medications. The patient is participating in physical therapy. He ambulated to the door and is in the chair. Denies nausea, vomiting, chest pain, shortness of breath, abdominal pain or fever. No new complaints. No acute motor or sensory changes are reported.    Vital Signs Last 24 Hrs  T(C): 36.5 (28 Mar 2020 12:35), Max: 37.1 (27 Mar 2020 14:47)  T(F): 97.7 (28 Mar 2020 12:35), Max: 98.7 (27 Mar 2020 14:47)  HR: 83 (28 Mar 2020 12:35) (77 - 86)  BP: 111/64 (28 Mar 2020 12:35) (92/49 - 126/71)  BP(mean): --  RR: 18 (28 Mar 2020 12:35) (16 - 21)  SpO2: 100% (28 Mar 2020 12:35) (93% - 100%)  I&O's Summary    28 Mar 2020 07:01  -  28 Mar 2020 14:14  --------------------------------------------------------  IN: 0 mL / OUT: 1200 mL / NET: -1200 mL                              8.0    5.26  )-----------( Clumped    ( 28 Mar 2020 09:55 )             24.6     03-28    136  |  96<L>  |  68.0<H>  ----------------------------<  133<H>  3.8   |  20.0<L>  |  6.18<H>    Ca    6.8<L>      28 Mar 2020 09:55        MEDICATIONS  (STANDING):  acetaminophen   Tablet .. 975 milliGRAM(s) Oral every 8 hours  aspirin enteric coated 81 milliGRAM(s) Oral daily  atorvastatin 40 milliGRAM(s) Oral at bedtime  ceFAZolin   IVPB 2000 milliGRAM(s) IV Intermittent once  clopidogrel Tablet 75 milliGRAM(s) Oral daily  epoetin-ben-epbx (RETACRIT) Injectable 14532 Unit(s) IV Push <User Schedule>  hydrocortisone 10 milliGRAM(s) Oral at bedtime  hydrocortisone 20 milliGRAM(s) Oral daily  melatonin 3 milliGRAM(s) Oral at bedtime  metoprolol succinate ER 25 milliGRAM(s) Oral daily  midodrine. 5 milliGRAM(s) Oral three times a day  multivitamin 1 Tablet(s) Oral daily  mycophenolate mofetil 1000 milliGRAM(s) Oral two times a day  ondansetron Injectable 4 milliGRAM(s) IV Push every 6 hours  pantoprazole    Tablet 40 milliGRAM(s) Oral before breakfast  polyethylene glycol 3350 17 Gram(s) Oral daily  pregabalin 50 milliGRAM(s) Oral two times a day  tacrolimus 1 milliGRAM(s) Oral two times a day  tamsulosin 0.4 milliGRAM(s) Oral at bedtime  trimethoprim  160 mG/sulfamethoxazole 800 mG 1 Tablet(s) Oral <User Schedule>    MEDICATIONS  (PRN):  aluminum hydroxide/magnesium hydroxide/simethicone Suspension 30 milliLiter(s) Oral four times a day PRN Indigestion  bisacodyl Suppository 10 milliGRAM(s) Rectal daily PRN Constipation  HYDROmorphone  Injectable 0.5 milliGRAM(s) IV Push every 3 hours PRN breakthrough  magnesium hydroxide Suspension 30 milliLiter(s) Oral at bedtime PRN Constipation  ondansetron Injectable 4 milliGRAM(s) IV Push every 6 hours PRN Nausea and/or Vomiting  oxyCODONE    IR 5 milliGRAM(s) Oral every 3 hours PRN Moderate Pain (4 - 6)  oxyCODONE    IR 10 milliGRAM(s) Oral every 3 hours PRN Severe Pain (7 - 10)  senna 2 Tablet(s) Oral at bedtime PRN Constipation      Physical exam: Sitting in chair in NAD, awake and alert  Left lower extremity- The dressing is clean, dry and intact. No wound erythema, discharge, drainage is noted. Calf soft. No calf tenderness. Sensation to light touch is grossly intact distally. Motor function distally is 5/5. No foot drop. +EHL/FHL. +DF/PF. 2+ dorsalis pedis pulse. Capillary refill is less than 2 seconds. No cyanosis.    Primary Orthopedic Assessment:  • S/P IMN left hip, POD#1    Plan:   • DVT prophylaxis as prescribed- Plavix/ASA 325mg bid, including use of compression devices and ankle pumps  • Continue physical therapy  • WBAT  • Pain control as clinically indicated  • Incentive spirometry encouraged  • Discharge planning- home after cleared by PT and medicine

## 2020-03-28 NOTE — PROGRESS NOTE ADULT - SUBJECTIVE AND OBJECTIVE BOX
[facility], [facility]                                                    [address] , [address], [place] [address]                                                         PHONE: [phone]    FAX: [phone] [address], [address], [place] [address]                                                 PHONE: [phone]    FAX: [phone]  *******************************************************************************    Overnight events/Subjective Assessment:  No chest pain or palpitations.  Chronic SOB stable.  + Hip pain improving.    budesonide (Unknown)  cefpodoxime (Unknown)  Pollen (Unknown)    MEDICATIONS  (STANDING):  acetaminophen   Tablet .. 975 milliGRAM(s) Oral every 8 hours  aspirin enteric coated 81 milliGRAM(s) Oral daily  atorvastatin 40 milliGRAM(s) Oral at bedtime  ceFAZolin   IVPB 2000 milliGRAM(s) IV Intermittent once  clopidogrel Tablet 75 milliGRAM(s) Oral daily  epoetin-ben-epbx (RETACRIT) Injectable 16614 Unit(s) IV Push <User Schedule>  hydrocortisone 10 milliGRAM(s) Oral at bedtime  hydrocortisone 20 milliGRAM(s) Oral daily  lactated ringers. 1000 milliLiter(s) (85 mL/Hr) IV Continuous <Continuous>  melatonin 3 milliGRAM(s) Oral at bedtime  metoprolol succinate ER 25 milliGRAM(s) Oral daily  multivitamin 1 Tablet(s) Oral daily  mycophenolate mofetil 1000 milliGRAM(s) Oral two times a day  ondansetron Injectable 4 milliGRAM(s) IV Push every 6 hours  pantoprazole    Tablet 40 milliGRAM(s) Oral before breakfast  polyethylene glycol 3350 17 Gram(s) Oral daily  pregabalin 50 milliGRAM(s) Oral two times a day  tacrolimus 1 milliGRAM(s) Oral two times a day  tamsulosin 0.4 milliGRAM(s) Oral at bedtime  trimethoprim  160 mG/sulfamethoxazole 800 mG 1 Tablet(s) Oral <User Schedule>    MEDICATIONS  (PRN):  aluminum hydroxide/magnesium hydroxide/simethicone Suspension 30 milliLiter(s) Oral four times a day PRN Indigestion  bisacodyl Suppository 10 milliGRAM(s) Rectal daily PRN Constipation  HYDROmorphone  Injectable 0.5 milliGRAM(s) IV Push every 3 hours PRN breakthrough  magnesium hydroxide Suspension 30 milliLiter(s) Oral at bedtime PRN Constipation  ondansetron Injectable 4 milliGRAM(s) IV Push every 6 hours PRN Nausea and/or Vomiting  oxyCODONE    IR 5 milliGRAM(s) Oral every 3 hours PRN Moderate Pain (4 - 6)  oxyCODONE    IR 10 milliGRAM(s) Oral every 3 hours PRN Severe Pain (7 - 10)  senna 2 Tablet(s) Oral at bedtime PRN Constipation      Vital Signs Last 24 Hrs  T(C): 36.3 (28 Mar 2020 07:50), Max: 37.1 (27 Mar 2020 14:47)  T(F): 97.4 (28 Mar 2020 07:50), Max: 98.7 (27 Mar 2020 14:47)  HR: 83 (28 Mar 2020 07:50) (73 - 86)  BP: 95/51 (28 Mar 2020 07:50) (95/51 - 139/67)  BP(mean): --  RR: 18 (28 Mar 2020 07:50) (16 - 25)  SpO2: 93% (28 Mar 2020 07:50) (93% - 100%)    I&O's Detail    I&O's Summary          PHYSICAL EXAM:  General: Appears well developed, well nourished, no acute distress  HEENT: Head: normocephalic, atraumatic  Eyes: Pupils equal and reactive  Neck: Supple, no carotid bruit, no JVD, no HJR  CARDIOVASCULAR: Normal S1 and S2, no murmur, rub, or gallop  LUNGS: Decreased breath sounds otherwise clear to auscultation bilaterally, no rales, rhonchi or wheeze  ABDOMEN: Soft, nontender, non-distended, positive bowel sounds, no mass or bruit  EXTREMITIES: No edema, distal pulses WNL  SKIN: Warm and dry with normal turgor  NEURO: Alert & oriented x 3, grossly intact  PSYCH: normal mood and affect        LABS:                        9.9    5.32  )-----------( CLUMPED    ( 27 Mar 2020 07:59 )             31.1     03-27    x   |  x   |  x   ----------------------------<  x   4.5   |  x   |  x               serum  Lipids:         RADIOLOGY & ADDITIONAL STUDIES:    TELEMETRY: NSR 60-70s, no events        ASSESSMENT AND PLAN:  In summary, JU FERGUSON is a 69M a/w L femoral neck fracture s/p IMN 3/27/20, h/o CAD s/p PCI/CAITLIN LAD & OM1 11/26/19, normal LV fxn, ESRD/HD, carotid stenosis, HTN, HL, COPD, 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  Continue medical management of known severe CAD.  - Echocardiogram 10/14/19 EF 65-70%, diastolic dysfunction, LAE, trace MR  - Carotid  4/18 16-49% right bulb and prox BALDEMAR, 66-79% left bulb and LICA  - Carotid 10/19 unchanged from the previous study  - Rhythm/hemodynamics stable = continue current dose of Toprol XL 25 daily for now and titrate PRN  - ASA 81 daily in place.  Plavix 75 daily resumed.  Continue dual antiplatelet therapy given h/o known severe CAD/CAITLIN.  - Resume Lipitor 40 daily if okay with medicine  - Monitor Hb closely  - Orthopedic surgery follow-up  - Will sign off for now and follow PRN.  Outpatient cardiac follow-up at [facility].  Please call with any cardiovascular questions/issues.    Mike Beal MD

## 2020-03-28 NOTE — OCCUPATIONAL THERAPY INITIAL EVALUATION ADULT - BATHING TRAINING, ASSISTIVE DEVICE, OT EVAL
pt reports will sponge bathe upon dc, will need shower chair and long handled sponge for when bathing

## 2020-03-29 LAB
ANION GAP SERPL CALC-SCNC: 18 MMOL/L — HIGH (ref 5–17)
ANISOCYTOSIS BLD QL: SLIGHT — SIGNIFICANT CHANGE UP
BUN SERPL-MCNC: 44 MG/DL — HIGH (ref 8–20)
CALCIUM SERPL-MCNC: 7.2 MG/DL — LOW (ref 8.6–10.2)
CHLORIDE SERPL-SCNC: 97 MMOL/L — LOW (ref 98–107)
CO2 SERPL-SCNC: 23 MMOL/L — SIGNIFICANT CHANGE UP (ref 22–29)
CREAT SERPL-MCNC: 4.75 MG/DL — HIGH (ref 0.5–1.3)
ELLIPTOCYTES BLD QL SMEAR: SLIGHT — SIGNIFICANT CHANGE UP
GLUCOSE SERPL-MCNC: 115 MG/DL — HIGH (ref 70–99)
HCT VFR BLD CALC: 25.7 % — LOW (ref 39–50)
HGB BLD-MCNC: 8.2 G/DL — LOW (ref 13–17)
HYPOCHROMIA BLD QL: SLIGHT — SIGNIFICANT CHANGE UP
MACROCYTES BLD QL: SLIGHT — SIGNIFICANT CHANGE UP
MANUAL SMEAR VERIFICATION: SIGNIFICANT CHANGE UP
MCHC RBC-ENTMCNC: 28.6 PG — SIGNIFICANT CHANGE UP (ref 27–34)
MCHC RBC-ENTMCNC: 31.9 GM/DL — LOW (ref 32–36)
MCV RBC AUTO: 89.5 FL — SIGNIFICANT CHANGE UP (ref 80–100)
MICROCYTES BLD QL: SLIGHT — SIGNIFICANT CHANGE UP
OVALOCYTES BLD QL SMEAR: SLIGHT — SIGNIFICANT CHANGE UP
PHOSPHATE SERPL-MCNC: 4 MG/DL — SIGNIFICANT CHANGE UP (ref 2.4–4.7)
PLAT MORPH BLD: NORMAL — SIGNIFICANT CHANGE UP
PLATELET # BLD AUTO: 56 K/UL — LOW (ref 150–400)
PLATELET COUNT - ESTIMATE: ABNORMAL
POIKILOCYTOSIS BLD QL AUTO: SLIGHT — SIGNIFICANT CHANGE UP
POTASSIUM SERPL-MCNC: 3.7 MMOL/L — SIGNIFICANT CHANGE UP (ref 3.5–5.3)
POTASSIUM SERPL-SCNC: 3.7 MMOL/L — SIGNIFICANT CHANGE UP (ref 3.5–5.3)
RBC # BLD: 2.87 M/UL — LOW (ref 4.2–5.8)
RBC # FLD: 19.5 % — HIGH (ref 10.3–14.5)
RBC BLD AUTO: NORMAL — SIGNIFICANT CHANGE UP
SODIUM SERPL-SCNC: 138 MMOL/L — SIGNIFICANT CHANGE UP (ref 135–145)
TARGETS BLD QL SMEAR: SLIGHT — SIGNIFICANT CHANGE UP
WBC # BLD: 5.47 K/UL — SIGNIFICANT CHANGE UP (ref 3.8–10.5)
WBC # FLD AUTO: 5.47 K/UL — SIGNIFICANT CHANGE UP (ref 3.8–10.5)

## 2020-03-29 PROCEDURE — 99233 SBSQ HOSP IP/OBS HIGH 50: CPT

## 2020-03-29 RX ADMIN — Medication 3 MILLIGRAM(S): at 23:01

## 2020-03-29 RX ADMIN — MYCOPHENOLATE MOFETIL 1000 MILLIGRAM(S): 250 CAPSULE ORAL at 17:09

## 2020-03-29 RX ADMIN — MIDODRINE HYDROCHLORIDE 5 MILLIGRAM(S): 2.5 TABLET ORAL at 05:25

## 2020-03-29 RX ADMIN — Medication 20 MILLIGRAM(S): at 05:26

## 2020-03-29 RX ADMIN — Medication 975 MILLIGRAM(S): at 05:26

## 2020-03-29 RX ADMIN — Medication 975 MILLIGRAM(S): at 23:01

## 2020-03-29 RX ADMIN — Medication 325 MILLIGRAM(S): at 17:09

## 2020-03-29 RX ADMIN — OXYCODONE HYDROCHLORIDE 10 MILLIGRAM(S): 5 TABLET ORAL at 05:26

## 2020-03-29 RX ADMIN — OXYCODONE HYDROCHLORIDE 10 MILLIGRAM(S): 5 TABLET ORAL at 23:11

## 2020-03-29 RX ADMIN — ONDANSETRON 4 MILLIGRAM(S): 8 TABLET, FILM COATED ORAL at 13:08

## 2020-03-29 RX ADMIN — SENNA PLUS 2 TABLET(S): 8.6 TABLET ORAL at 23:01

## 2020-03-29 RX ADMIN — ATORVASTATIN CALCIUM 40 MILLIGRAM(S): 80 TABLET, FILM COATED ORAL at 23:01

## 2020-03-29 RX ADMIN — ONDANSETRON 4 MILLIGRAM(S): 8 TABLET, FILM COATED ORAL at 17:12

## 2020-03-29 RX ADMIN — POLYETHYLENE GLYCOL 3350 17 GRAM(S): 17 POWDER, FOR SOLUTION ORAL at 17:10

## 2020-03-29 RX ADMIN — Medication 325 MILLIGRAM(S): at 05:26

## 2020-03-29 RX ADMIN — Medication 10 MILLIGRAM(S): at 23:01

## 2020-03-29 RX ADMIN — TACROLIMUS 1 MILLIGRAM(S): 5 CAPSULE ORAL at 05:27

## 2020-03-29 RX ADMIN — MIDODRINE HYDROCHLORIDE 5 MILLIGRAM(S): 2.5 TABLET ORAL at 17:09

## 2020-03-29 RX ADMIN — MYCOPHENOLATE MOFETIL 1000 MILLIGRAM(S): 250 CAPSULE ORAL at 05:26

## 2020-03-29 RX ADMIN — OXYCODONE HYDROCHLORIDE 10 MILLIGRAM(S): 5 TABLET ORAL at 13:09

## 2020-03-29 RX ADMIN — CLOPIDOGREL BISULFATE 75 MILLIGRAM(S): 75 TABLET, FILM COATED ORAL at 13:08

## 2020-03-29 RX ADMIN — Medication 975 MILLIGRAM(S): at 14:37

## 2020-03-29 RX ADMIN — TAMSULOSIN HYDROCHLORIDE 0.4 MILLIGRAM(S): 0.4 CAPSULE ORAL at 23:01

## 2020-03-29 RX ADMIN — OXYCODONE HYDROCHLORIDE 10 MILLIGRAM(S): 5 TABLET ORAL at 14:05

## 2020-03-29 RX ADMIN — TACROLIMUS 1 MILLIGRAM(S): 5 CAPSULE ORAL at 17:09

## 2020-03-29 RX ADMIN — Medication 50 MILLIGRAM(S): at 17:09

## 2020-03-29 RX ADMIN — ONDANSETRON 4 MILLIGRAM(S): 8 TABLET, FILM COATED ORAL at 05:27

## 2020-03-29 RX ADMIN — Medication 1 TABLET(S): at 13:08

## 2020-03-29 NOTE — PROGRESS NOTE ADULT - SUBJECTIVE AND OBJECTIVE BOX
Patient seen and examined.  Was able to stand up yesterday.  Patient has no new complaints.  No fever no sob nvd was able to take some steps yesterday    budesonide (Unknown)  cefpodoxime (Unknown)  Pollen (Unknown)    03-28-20 @ 07:01  -  03-29-20 @ 07:00  --------------------------------------------------------  IN: 0 mL / OUT: 1200 mL / NET: -1200 mL        03-28-20 @ 07:01  -  03-29-20 @ 07:00  --------------------------------------------------------  IN: 0 mL / OUT: 1200 mL / NET: -1200 mL      acetaminophen   Tablet .. 975 milliGRAM(s) Oral every 8 hours  aluminum hydroxide/magnesium hydroxide/simethicone Suspension 30 milliLiter(s) Oral four times a day PRN  aspirin enteric coated 325 milliGRAM(s) Oral two times a day  atorvastatin 40 milliGRAM(s) Oral at bedtime  bisacodyl Suppository 10 milliGRAM(s) Rectal daily PRN  bisacodyl Suppository 10 milliGRAM(s) Rectal daily PRN  ceFAZolin   IVPB 2000 milliGRAM(s) IV Intermittent once  clopidogrel Tablet 75 milliGRAM(s) Oral daily  epoetin-ben-epbx (RETACRIT) Injectable 83749 Unit(s) IV Push <User Schedule>  hydrocortisone 10 milliGRAM(s) Oral at bedtime  hydrocortisone 20 milliGRAM(s) Oral daily  HYDROmorphone  Injectable 0.5 milliGRAM(s) IV Push every 3 hours PRN  magnesium hydroxide Suspension 30 milliLiter(s) Oral at bedtime PRN  melatonin 3 milliGRAM(s) Oral at bedtime  metoprolol succinate ER 25 milliGRAM(s) Oral daily  midodrine. 5 milliGRAM(s) Oral three times a day  multivitamin 1 Tablet(s) Oral daily  mycophenolate mofetil 1000 milliGRAM(s) Oral two times a day                          8.2    5.47  )-----------( x        ( 29 Mar 2020 09:37 )             25.7   03-29    138  |  97<L>  |  44.0<H>  ----------------------------<  115<H>  3.7   |  23.0  |  4.75<H>    Ca    7.2<L>      29 Mar 2020 09:37  Phos  4.0     03-29      ondansetron Injectable 4 milliGRAM(s) IV Push every 6 hours  ondansetron Injectable 4 milliGRAM(s) IV Push every 6 hours PRN  oxyCODONE    IR 5 milliGRAM(s) Oral every 3 hours PRN  oxyCODONE    IR 10 milliGRAM(s) Oral every 3 hours PRN  pantoprazole    Tablet 40 milliGRAM(s) Oral before breakfast  polyethylene glycol 3350 17 Gram(s) Oral daily  pregabalin 50 milliGRAM(s) Oral two times a day  senna 2 Tablet(s) Oral at bedtime PRN  tacrolimus 1 milliGRAM(s) Oral two times a day  tamsulosin 0.4 milliGRAM(s) Oral at bedtime  trimethoprim  160 mG/sulfamethoxazole 800 mG 1 Tablet(s) Oral <User Schedule>    T(C): 36.5 (03-29-20 @ 10:25), Max: 37.1 (03-28-20 @ 16:07)  HR: 90 (03-29-20 @ 10:25) (69 - 90)  BP: 122/55 (03-29-20 @ 10:25) (89/47 - 122/55)  RR: 17 (03-29-20 @ 10:25) (16 - 18)  SpO2: 95% (03-29-20 @ 10:25) (94% - 100%)

## 2020-03-29 NOTE — PROGRESS NOTE ADULT - PROBLEM SELECTOR PROBLEM 4
Adequate: hears normal conversation without difficulty
Anemia due to chronic kidney disease, on chronic dialysis
Anemia due to chronic kidney disease, on chronic dialysis

## 2020-03-29 NOTE — PROGRESS NOTE ADULT - PROBLEM SELECTOR PROBLEM 1
Closed displaced intertrochanteric fracture of left femur with routine healing, subsequent encounter
Closed displaced intertrochanteric fracture of left femur with routine healing, subsequent encounter

## 2020-03-29 NOTE — PROGRESS NOTE ADULT - SUBJECTIVE AND OBJECTIVE BOX
Patient seen and eval at bedside. Patient has no complaints, participating with PT, pain well controlled. Denies CP, SOB, dizziness, numbness/tingling.    Vital Signs Last 24 Hrs  T(C): 36.8 (29 Mar 2020 05:36), Max: 37.1 (28 Mar 2020 16:07)  T(F): 98.3 (29 Mar 2020 05:36), Max: 98.7 (28 Mar 2020 16:07)  HR: 74 (29 Mar 2020 05:36) (74 - 87)  BP: 102/53 (29 Mar 2020 05:36) (92/49 - 111/64)  BP(mean): --  RR: 18 (29 Mar 2020 05:36) (18 - 18)  SpO2: 94% (29 Mar 2020 05:36) (94% - 100%)    PE: NAD, alert awake  Left LE: Dressings C/D/I, no bleeding or drainage. New gauze dressings placed.  EHL/TA/GS/FHL intact, Gross SILT s/s/DP/SP/tib distrib  DP pulse 2+, calf soft, NT B/L    labs pending    A/P: s/p left hip IMN POD#2  ·	Pain control  ·	DVT propx: asa/plavix  ·	PT/OT - WBAT  ·	F/u labs  ·	Cont care as per primary team

## 2020-03-30 PROCEDURE — 99233 SBSQ HOSP IP/OBS HIGH 50: CPT

## 2020-03-30 RX ORDER — CALCIUM CARBONATE 500(1250)
1 TABLET ORAL THREE TIMES A DAY
Refills: 0 | Status: DISCONTINUED | OUTPATIENT
Start: 2020-03-30 | End: 2020-04-02

## 2020-03-30 RX ORDER — FERROUS SULFATE 325(65) MG
325 TABLET ORAL DAILY
Refills: 0 | Status: DISCONTINUED | OUTPATIENT
Start: 2020-03-30 | End: 2020-04-02

## 2020-03-30 RX ADMIN — Medication 1 TABLET(S): at 22:06

## 2020-03-30 RX ADMIN — OXYCODONE HYDROCHLORIDE 10 MILLIGRAM(S): 5 TABLET ORAL at 17:39

## 2020-03-30 RX ADMIN — Medication 3 MILLIGRAM(S): at 22:07

## 2020-03-30 RX ADMIN — Medication 1 TABLET(S): at 09:05

## 2020-03-30 RX ADMIN — TACROLIMUS 1 MILLIGRAM(S): 5 CAPSULE ORAL at 06:09

## 2020-03-30 RX ADMIN — MYCOPHENOLATE MOFETIL 1000 MILLIGRAM(S): 250 CAPSULE ORAL at 06:09

## 2020-03-30 RX ADMIN — Medication 325 MILLIGRAM(S): at 17:29

## 2020-03-30 RX ADMIN — Medication 20 MILLIGRAM(S): at 06:09

## 2020-03-30 RX ADMIN — Medication 10 MILLIGRAM(S): at 22:05

## 2020-03-30 RX ADMIN — TAMSULOSIN HYDROCHLORIDE 0.4 MILLIGRAM(S): 0.4 CAPSULE ORAL at 22:06

## 2020-03-30 RX ADMIN — TACROLIMUS 1 MILLIGRAM(S): 5 CAPSULE ORAL at 19:59

## 2020-03-30 RX ADMIN — Medication 50 MILLIGRAM(S): at 17:28

## 2020-03-30 RX ADMIN — OXYCODONE HYDROCHLORIDE 10 MILLIGRAM(S): 5 TABLET ORAL at 22:06

## 2020-03-30 RX ADMIN — CLOPIDOGREL BISULFATE 75 MILLIGRAM(S): 75 TABLET, FILM COATED ORAL at 11:20

## 2020-03-30 RX ADMIN — Medication 1 TABLET(S): at 11:20

## 2020-03-30 RX ADMIN — MYCOPHENOLATE MOFETIL 1000 MILLIGRAM(S): 250 CAPSULE ORAL at 19:59

## 2020-03-30 RX ADMIN — Medication 975 MILLIGRAM(S): at 01:30

## 2020-03-30 RX ADMIN — ATORVASTATIN CALCIUM 40 MILLIGRAM(S): 80 TABLET, FILM COATED ORAL at 22:06

## 2020-03-30 RX ADMIN — OXYCODONE HYDROCHLORIDE 10 MILLIGRAM(S): 5 TABLET ORAL at 09:05

## 2020-03-30 RX ADMIN — MIDODRINE HYDROCHLORIDE 5 MILLIGRAM(S): 2.5 TABLET ORAL at 06:09

## 2020-03-30 RX ADMIN — Medication 325 MILLIGRAM(S): at 06:09

## 2020-03-30 RX ADMIN — OXYCODONE HYDROCHLORIDE 10 MILLIGRAM(S): 5 TABLET ORAL at 01:29

## 2020-03-30 RX ADMIN — MIDODRINE HYDROCHLORIDE 5 MILLIGRAM(S): 2.5 TABLET ORAL at 11:20

## 2020-03-30 NOTE — PROGRESS NOTE ADULT - SUBJECTIVE AND OBJECTIVE BOX
CC: Left femoral neck fracture, status post IM nail fixation. ESRD on HD . COPD, CAD  HPI:  70 y/o M ex smoker with PMH of 2015- Lung Transplant for COPD on anti rejection meds, s/p rt total hip arthroplasty, multi level compression #, BPH, HLD, CAD, BPH, OP, recently started HD for CHRISTINA- ESRD ? for past 1 month presents to ED s/p fall. He states he slipped and fell down 3 steps, did not hit head/ no loc. Reports left hip pain. Patient able to move leg but difficulty bearing weight on left leg. Denies fever, CP, palpitations cough, URI, sick contact, travel, dizziness, LOC.    SH- Former smoker quit long time ago; denies other habits  FH- Denies lung dz, renal dz in family members (25 Mar 2020 17:40)    REVIEW OF SYSTEMS:    Patient denied fever, chills, abdominal pain, nausea, vomiting, cough, shortness of breath, chest pain or palpitations    Vital Signs Last 24 Hrs  T(C): 36.7 (30 Mar 2020 09:23), Max: 36.7 (29 Mar 2020 22:56)  T(F): 98 (30 Mar 2020 09:23), Max: 98 (29 Mar 2020 22:56)  HR: 74 (30 Mar 2020 09:23) (74 - 86)  BP: 105/51 (30 Mar 2020 09:23) (90/56 - 112/64)  BP(mean): --  RR: 18 (30 Mar 2020 09:23) (16 - 18)  SpO2: 98% (30 Mar 2020 09:23) (96% - 100%)I&O's Summary    29 Mar 2020 07:01  -  30 Mar 2020 07:00  --------------------------------------------------------  IN: 600 mL / OUT: 350 mL / NET: 250 mL    30 Mar 2020 07:01  -  30 Mar 2020 16:22  --------------------------------------------------------  IN: 600 mL / OUT: 300 mL / NET: 300 mL      PHYSICAL EXAM:  GENERAL: NAD,  HEENT: PERRL, +EOMI, anicteric, no [place]  NECK: Supple, No JVD   CHEST/LUNG: CTA bilaterally; Normal effort  HEART: S1S2 Normal intensity, no murmurs, gallops or rubs noted  ABDOMEN: Soft, BS Normoactive, NT, ND, no HSM noted  EXTREMITIES:  2+ radial and DP pulses noted, no clubbing, cyanosis, or edema noted, Limited mobility  SKIN: No rashes or lesions noted  NEURO: A&Ox3, no focal deficits noted, CN II-XII intact  PSYCH: normal mood and affect; insight/judgement appropriate  LABS:                        8.2    5.47  )-----------( 56       ( 29 Mar 2020 09:37 )             25.7     03-29    138  |  97<L>  |  44.0<H>  ----------------------------<  115<H>  3.7   |  23.0  |  4.75<H>    Ca    7.2<L>      29 Mar 2020 09:37  Phos  4.0     03-29          RADIOLOGY & ADDITIONAL TESTS:    MEDICATIONS:  MEDICATIONS  (STANDING):  acetaminophen   Tablet .. 975 milliGRAM(s) Oral every 8 hours  aspirin enteric coated 325 milliGRAM(s) Oral two times a day  atorvastatin 40 milliGRAM(s) Oral at bedtime  calcium carbonate    500 mG (Tums) Chewable 1 Tablet(s) Chew three times a day  clopidogrel Tablet 75 milliGRAM(s) Oral daily  epoetin-ben-epbx (RETACRIT) Injectable 77169 Unit(s) IV Push <User Schedule>  hydrocortisone 10 milliGRAM(s) Oral at bedtime  hydrocortisone 20 milliGRAM(s) Oral daily  melatonin 3 milliGRAM(s) Oral at bedtime  metoprolol succinate ER 25 milliGRAM(s) Oral daily  midodrine. 5 milliGRAM(s) Oral three times a day  multivitamin 1 Tablet(s) Oral daily  mycophenolate mofetil 1000 milliGRAM(s) Oral two times a day  ondansetron Injectable 4 milliGRAM(s) IV Push every 6 hours  pantoprazole    Tablet 40 milliGRAM(s) Oral before breakfast  polyethylene glycol 3350 17 Gram(s) Oral daily  pregabalin 50 milliGRAM(s) Oral two times a day  tacrolimus 1 milliGRAM(s) Oral two times a day  tamsulosin 0.4 milliGRAM(s) Oral at bedtime  trimethoprim  160 mG/sulfamethoxazole 800 mG 1 Tablet(s) Oral <User Schedule>    MEDICATIONS  (PRN):  aluminum hydroxide/magnesium hydroxide/simethicone Suspension 30 milliLiter(s) Oral four times a day PRN Indigestion  bisacodyl Suppository 10 milliGRAM(s) Rectal daily PRN Constipation  bisacodyl Suppository 10 milliGRAM(s) Rectal daily PRN If no bowel movement by POD#2  HYDROmorphone  Injectable 0.5 milliGRAM(s) IV Push every 3 hours PRN breakthrough  magnesium hydroxide Suspension 30 milliLiter(s) Oral at bedtime PRN Constipation  ondansetron Injectable 4 milliGRAM(s) IV Push every 6 hours PRN Nausea and/or Vomiting  oxyCODONE    IR 5 milliGRAM(s) Oral every 3 hours PRN Moderate Pain (4 - 6)  oxyCODONE    IR 10 milliGRAM(s) Oral every 3 hours PRN Severe Pain (7 - 10)  senna 2 Tablet(s) Oral at bedtime PRN Constipation

## 2020-03-30 NOTE — PROGRESS NOTE ADULT - SUBJECTIVE AND OBJECTIVE BOX
JU MARTY    [id number]    History:  The patient is status post Left hip IM nail, POD#3.   Patient is doing well. The patient's pain is controlled using the prescribed pain medications. The patient is participating in physical therapy, WBAT and stair training. . Denies nausea, vomiting, chest pain, shortness of breath, abdominal pain or fever. No new complaints. No acute motor or sensory changes are reported.    Vital Signs Last 24 Hrs  T(C): 36.4 (30 Mar 2020 06:02), Max: 36.8 (29 Mar 2020 15:33)  T(F): 97.6 (30 Mar 2020 06:02), Max: 98.2 (29 Mar 2020 15:33)  HR: 77 (30 Mar 2020 06:02) (77 - 94)  BP: 90/56 (30 Mar 2020 06:02) (90/56 - 122/55)  BP(mean): --  RR: 16 (30 Mar 2020 06:02) (16 - 17)  SpO2: 96% (30 Mar 2020 06:02) (95% - 100%)  I&O's Summary    29 Mar 2020 07:01  -  30 Mar 2020 07:00  --------------------------------------------------------  IN: 600 mL / OUT: 350 mL / NET: 250 mL                              8.2    5.47  )-----------( 56       ( 29 Mar 2020 09:37 )             25.7     03-29    138  |  97<L>  |  44.0<H>  ----------------------------<  115<H>  3.7   |  23.0  |  4.75<H>    Ca    7.2<L>      29 Mar 2020 09:37  Phos  4.0     03-29        MEDICATIONS  (STANDING):  acetaminophen   Tablet .. 975 milliGRAM(s) Oral every 8 hours  aspirin enteric coated 325 milliGRAM(s) Oral two times a day  atorvastatin 40 milliGRAM(s) Oral at bedtime  clopidogrel Tablet 75 milliGRAM(s) Oral daily  epoetin-ben-epbx (RETACRIT) Injectable 36104 Unit(s) IV Push <User Schedule>  hydrocortisone 10 milliGRAM(s) Oral at bedtime  hydrocortisone 20 milliGRAM(s) Oral daily  melatonin 3 milliGRAM(s) Oral at bedtime  metoprolol succinate ER 25 milliGRAM(s) Oral daily  midodrine. 5 milliGRAM(s) Oral three times a day  multivitamin 1 Tablet(s) Oral daily  mycophenolate mofetil 1000 milliGRAM(s) Oral two times a day  ondansetron Injectable 4 milliGRAM(s) IV Push every 6 hours  pantoprazole    Tablet 40 milliGRAM(s) Oral before breakfast  polyethylene glycol 3350 17 Gram(s) Oral daily  pregabalin 50 milliGRAM(s) Oral two times a day  tacrolimus 1 milliGRAM(s) Oral two times a day  tamsulosin 0.4 milliGRAM(s) Oral at bedtime  trimethoprim  160 mG/sulfamethoxazole 800 mG 1 Tablet(s) Oral <User Schedule>    MEDICATIONS  (PRN):  aluminum hydroxide/magnesium hydroxide/simethicone Suspension 30 milliLiter(s) Oral four times a day PRN Indigestion  bisacodyl Suppository 10 milliGRAM(s) Rectal daily PRN Constipation  bisacodyl Suppository 10 milliGRAM(s) Rectal daily PRN If no bowel movement by POD#2  HYDROmorphone  Injectable 0.5 milliGRAM(s) IV Push every 3 hours PRN breakthrough  magnesium hydroxide Suspension 30 milliLiter(s) Oral at bedtime PRN Constipation  ondansetron Injectable 4 milliGRAM(s) IV Push every 6 hours PRN Nausea and/or Vomiting  oxyCODONE    IR 5 milliGRAM(s) Oral every 3 hours PRN Moderate Pain (4 - 6)  oxyCODONE    IR 10 milliGRAM(s) Oral every 3 hours PRN Severe Pain (7 - 10)  senna 2 Tablet(s) Oral at bedtime PRN Constipation      Physical exam: The Left hip dressings remains clean dry and intact.  No wound erythema, discharge, drainage is noted. Sensation to light touch is grossly intact without focal deficit and is symmetric bilaterally. No calf tenderness. Sensation to light touch is grossly intact distally. Motor function distally is 5/5. No foot drop. 2+ dorsalis pedis pulse. Capillary refill is less than 2 seconds. No cyanosis. SCD's in place bilaterally.     Primary Orthopedic Assessment:  •   Secondary  Orthopedic Assessment(s):   •   Secondary  Medical Assessment(s):   •   Plan:   • DVT prophylaxis as prescribed, including use of compression devices and ankle pumps, Aspirin 325 BID for a duration of 6 weeks post operatively.   • Continue physical therapy, WBAT LLE and stair training.   • • Pain control as clinically indicated  • Incentive spirometry encouraged  • Discharge planning – anticipated discharge is Home once medically optimized.    Ortho follow up in the office after discharge.

## 2020-03-30 NOTE — PROGRESS NOTE ADULT - SUBJECTIVE AND OBJECTIVE BOX
NEPHROLOGY INTERVAL HPI/OVERNIGHT EVENTS:  No new events.    MEDICATIONS  (STANDING):  aspirin enteric coated 81 milliGRAM(s) Oral daily  atorvastatin 40 milliGRAM(s) Oral at bedtime  hydrocortisone 10 milliGRAM(s) Oral at bedtime  hydrocortisone 20 milliGRAM(s) Oral daily  metoprolol succinate ER 25 milliGRAM(s) Oral daily  multivitamin 1 Tablet(s) Oral daily  mycophenolate mofetil 1000 milliGRAM(s) Oral two times a day  ondansetron Injectable 4 milliGRAM(s) IV Push every 6 hours  pantoprazole    Tablet 40 milliGRAM(s) Oral before breakfast  pregabalin 50 milliGRAM(s) Oral two times a day  tacrolimus 1 milliGRAM(s) Oral two times a day  tamsulosin 0.4 milliGRAM(s) Oral at bedtime  trimethoprim  160 mG/sulfamethoxazole 800 mG 1 Tablet(s) Oral <User Schedule>    MEDICATIONS  (PRN):  bisacodyl Suppository 10 milliGRAM(s) Rectal daily PRN Constipation  morphine  - Injectable 2 milliGRAM(s) IV Push every 4 hours PRN Severe Pain (7 - 10)  oxyCODONE    IR 5 milliGRAM(s) Oral every 6 hours PRN Moderate Pain (4 - 6)  senna 2 Tablet(s) Oral at bedtime PRN Constipation      Allergies    budesonide (Unknown)  cefpodoxime (Unknown)  Pollen (Unknown)          Vital Signs Last 24 Hrs  T(C): 36.7 (30 Mar 2020 09:23), Max: 36.8 (29 Mar 2020 15:33)  T(F): 98 (30 Mar 2020 09:23), Max: 98.2 (29 Mar 2020 15:33)  HR: 74 (30 Mar 2020 09:23) (74 - 94)  BP: 105/51 (30 Mar 2020 09:23) (90/56 - 118/62)  BP(mean): --  RR: 18 (30 Mar 2020 09:23) (16 - 18)  SpO2: 98% (30 Mar 2020 09:23) (96% - 100%)  T(C): 36.4 (28 Mar 2020 08:23), Max: 37.1 (27 Mar 2020 14:47)  T(F): 97.6 (28 Mar 2020 08:23), Max: 98.7 (27 Mar 2020 14:47)  HR: 78 (28 Mar 2020 08:23) (73 - 86)  BP: 92/49 (28 Mar 2020 08:23) (92/49 - 139/67)    PHYSICAL EXAM:  GENERAL: Comfortable oob  HEENT:   NECK: Supple, No JVD  NERVOUS SYSTEM:  Alert, oriented  Lungs: Diminished BS at bases  HEART:  No rub  ABDOMEN: Soft, NT/ND BS+  EXTREMITIES: No edema; L hip same  SKIN: No rashes nor lesions    LABS:  03-29    138  |  97<L>  |  44.0<H>  ----------------------------<  115<H>  3.7   |  23.0  |  4.75<H>    Ca    7.2<L>      29 Mar 2020 09:37  Phos  4.0     03-29 03-27    x   |  x   |  x   ----------------------------<  x   4.5   |  x   |  x                               7.4    4.54  )-----------( 149      ( 26 Mar 2020 07:31 )             24.2     03-26    139  |  97<L>  |  27.0<H>  ----------------------------<  110<H>  3.6   |  28.0  |  5.10<H>    Ca    7.4<L>      26 Mar 2020 07:31  Phos  2.9     03-26  Mg     1.9     03-26    TPro  4.9<L>  /  Alb  3.0<L>  /  TBili  0.6  /  DBili  x   /  AST  33  /  ALT  17  /  AlkPhos  93  03-26    PT/INR - ( 25 Mar 2020 16:42 )   PT: 15.4 sec;   INR: 1.35 ratio         PTT - ( 25 Mar 2020 16:42 )  PTT:32.1 sec    Magnesium, Serum: 1.9 mg/dL (03-26 @ 07:31)  Phosphorus Level, Serum: 2.9 mg/dL (03-26 @ 07:31)  Magnesium, Serum: 1.5 mg/dL (03-25 @ 15:43)  Phosphorus Level, Serum: 3.0 mg/dL (03-25 @ 15:43)      RADIOLOGY & ADDITIONAL TESTS:  < from: Xray Chest 1 View- PORTABLE-Urgent (03.25.20 @ 15:58) >   EXAM:  XR CHEST PORTABLE URGENT 1V                          PROCEDURE DATE:  03/25/2020          INTERPRETATION:  TECHNIQUE: Single portable view of the chest.    COMPARISON: 5/19/2016    CLINICAL HISTORY: fall, left hip pain    FINDINGS:    Single frontal view of the chest demonstrates the lungs to be clear. The cardiomediastinal silhouette is normal. No acute osseous abnormalities. Right-sided dialysis catheter. Mediastinal metallic wires redemonstrated. Consider CT as clinically warranted.    IMPRESSION: No acute cardiopulmonary disease process.    < end of copied text >

## 2020-03-30 NOTE — PROGRESS NOTE ADULT - ASSESSMENT
Patient with fracture left femoral neck. POD #2.  Disposition is home with OT.         Problem/Plan - 1:  ·  Problem: Closed displaced intertrochanteric fracture of left femur with routine healing, subsequent encounter.  Plan: physical therapy  dvt prophylaxis as per ortho.      Problem/Plan - 2:  ·  Problem: End stage renal disease on dialysis.  Plan: HD  Renal following.      Problem/Plan - 3:  ·  Problem: Coronary artery disease with other form of angina pectoris.  Plan: currently stable.      Problem/Plan - 4:  ·  Problem: Anemia due to chronic kidney disease, on chronic dialysis.  Plan: s/p transfusion 2PRBC  cbc stable. Iron tablet    Disp: Patient is medically stable for Dc. Patient prefers to go home with PT. Patient is on HD for ESRD and will require extra service for HD status post hip arthroplasty.

## 2020-03-31 LAB
ALBUMIN SERPL ELPH-MCNC: 2.5 G/DL — LOW (ref 3.3–5.2)
ALP SERPL-CCNC: 149 U/L — HIGH (ref 40–120)
ALT FLD-CCNC: 6 U/L — SIGNIFICANT CHANGE UP
ANION GAP SERPL CALC-SCNC: 19 MMOL/L — HIGH (ref 5–17)
AST SERPL-CCNC: 27 U/L — SIGNIFICANT CHANGE UP
BILIRUB SERPL-MCNC: 0.4 MG/DL — SIGNIFICANT CHANGE UP (ref 0.4–2)
BLD GP AB SCN SERPL QL: SIGNIFICANT CHANGE UP
BUN SERPL-MCNC: 82 MG/DL — HIGH (ref 8–20)
CALCIUM SERPL-MCNC: 6.1 MG/DL — CRITICAL LOW (ref 8.6–10.2)
CHLORIDE SERPL-SCNC: 97 MMOL/L — LOW (ref 98–107)
CO2 SERPL-SCNC: 20 MMOL/L — LOW (ref 22–29)
CREAT SERPL-MCNC: 7.88 MG/DL — HIGH (ref 0.5–1.3)
GLUCOSE SERPL-MCNC: 94 MG/DL — SIGNIFICANT CHANGE UP (ref 70–99)
HCT VFR BLD CALC: 22.4 % — LOW (ref 39–50)
HGB BLD-MCNC: 7.1 G/DL — LOW (ref 13–17)
MCHC RBC-ENTMCNC: 28.5 PG — SIGNIFICANT CHANGE UP (ref 27–34)
MCHC RBC-ENTMCNC: 31.7 GM/DL — LOW (ref 32–36)
MCV RBC AUTO: 90 FL — SIGNIFICANT CHANGE UP (ref 80–100)
PLATELET # BLD AUTO: 70 K/UL — LOW (ref 150–400)
POTASSIUM SERPL-MCNC: 4 MMOL/L — SIGNIFICANT CHANGE UP (ref 3.5–5.3)
POTASSIUM SERPL-SCNC: 4 MMOL/L — SIGNIFICANT CHANGE UP (ref 3.5–5.3)
PROT SERPL-MCNC: 4.2 G/DL — LOW (ref 6.6–8.7)
RBC # BLD: 2.49 M/UL — LOW (ref 4.2–5.8)
RBC # FLD: 19.1 % — HIGH (ref 10.3–14.5)
SODIUM SERPL-SCNC: 136 MMOL/L — SIGNIFICANT CHANGE UP (ref 135–145)
WBC # BLD: 4.15 K/UL — SIGNIFICANT CHANGE UP (ref 3.8–10.5)
WBC # FLD AUTO: 4.15 K/UL — SIGNIFICANT CHANGE UP (ref 3.8–10.5)

## 2020-03-31 PROCEDURE — 99233 SBSQ HOSP IP/OBS HIGH 50: CPT

## 2020-03-31 RX ORDER — FERROUS SULFATE 325(65) MG
1 TABLET ORAL
Qty: 30 | Refills: 0
Start: 2020-03-31 | End: 2020-04-29

## 2020-03-31 RX ORDER — TACROLIMUS 5 MG/1
1 CAPSULE ORAL
Qty: 0 | Refills: 0 | DISCHARGE
Start: 2020-03-31

## 2020-03-31 RX ORDER — CALCIUM GLUCONATE 100 MG/ML
2 VIAL (ML) INTRAVENOUS EVERY 6 HOURS
Refills: 0 | Status: COMPLETED | OUTPATIENT
Start: 2020-03-31 | End: 2020-04-01

## 2020-03-31 RX ORDER — ASPIRIN/CALCIUM CARB/MAGNESIUM 324 MG
1 TABLET ORAL
Qty: 0 | Refills: 0 | DISCHARGE
Start: 2020-03-31

## 2020-03-31 RX ADMIN — Medication 325 MILLIGRAM(S): at 05:28

## 2020-03-31 RX ADMIN — Medication 10 MILLIGRAM(S): at 22:51

## 2020-03-31 RX ADMIN — Medication 3 MILLIGRAM(S): at 22:51

## 2020-03-31 RX ADMIN — Medication 1 TABLET(S): at 13:23

## 2020-03-31 RX ADMIN — TACROLIMUS 1 MILLIGRAM(S): 5 CAPSULE ORAL at 05:28

## 2020-03-31 RX ADMIN — Medication 975 MILLIGRAM(S): at 13:22

## 2020-03-31 RX ADMIN — MYCOPHENOLATE MOFETIL 1000 MILLIGRAM(S): 250 CAPSULE ORAL at 17:04

## 2020-03-31 RX ADMIN — Medication 325 MILLIGRAM(S): at 17:05

## 2020-03-31 RX ADMIN — Medication 1 TABLET(S): at 13:21

## 2020-03-31 RX ADMIN — Medication 975 MILLIGRAM(S): at 05:30

## 2020-03-31 RX ADMIN — PANTOPRAZOLE SODIUM 40 MILLIGRAM(S): 20 TABLET, DELAYED RELEASE ORAL at 05:29

## 2020-03-31 RX ADMIN — TAMSULOSIN HYDROCHLORIDE 0.4 MILLIGRAM(S): 0.4 CAPSULE ORAL at 22:51

## 2020-03-31 RX ADMIN — Medication 1 TABLET(S): at 22:50

## 2020-03-31 RX ADMIN — MIDODRINE HYDROCHLORIDE 5 MILLIGRAM(S): 2.5 TABLET ORAL at 17:04

## 2020-03-31 RX ADMIN — MIDODRINE HYDROCHLORIDE 5 MILLIGRAM(S): 2.5 TABLET ORAL at 05:29

## 2020-03-31 RX ADMIN — TACROLIMUS 1 MILLIGRAM(S): 5 CAPSULE ORAL at 17:04

## 2020-03-31 RX ADMIN — MYCOPHENOLATE MOFETIL 1000 MILLIGRAM(S): 250 CAPSULE ORAL at 05:28

## 2020-03-31 RX ADMIN — Medication 20 MILLIGRAM(S): at 05:28

## 2020-03-31 RX ADMIN — ERYTHROPOIETIN 10000 UNIT(S): 10000 INJECTION, SOLUTION INTRAVENOUS; SUBCUTANEOUS at 09:11

## 2020-03-31 RX ADMIN — Medication 975 MILLIGRAM(S): at 22:50

## 2020-03-31 RX ADMIN — Medication 325 MILLIGRAM(S): at 17:04

## 2020-03-31 RX ADMIN — POLYETHYLENE GLYCOL 3350 17 GRAM(S): 17 POWDER, FOR SOLUTION ORAL at 17:05

## 2020-03-31 RX ADMIN — ATORVASTATIN CALCIUM 40 MILLIGRAM(S): 80 TABLET, FILM COATED ORAL at 22:51

## 2020-03-31 RX ADMIN — OXYCODONE HYDROCHLORIDE 10 MILLIGRAM(S): 5 TABLET ORAL at 22:55

## 2020-03-31 RX ADMIN — Medication 50 MILLIGRAM(S): at 05:28

## 2020-03-31 RX ADMIN — Medication 200 GRAM(S): at 18:45

## 2020-03-31 RX ADMIN — OXYCODONE HYDROCHLORIDE 10 MILLIGRAM(S): 5 TABLET ORAL at 13:27

## 2020-03-31 RX ADMIN — Medication 1 TABLET(S): at 05:28

## 2020-03-31 RX ADMIN — CLOPIDOGREL BISULFATE 75 MILLIGRAM(S): 75 TABLET, FILM COATED ORAL at 13:21

## 2020-03-31 NOTE — PROGRESS NOTE ADULT - SUBJECTIVE AND OBJECTIVE BOX
NEPHROLOGY INTERVAL HPI/OVERNIGHT EVENTS:  Noted pale this am.    MEDICATIONS  (STANDING):  aspirin enteric coated 81 milliGRAM(s) Oral daily  atorvastatin 40 milliGRAM(s) Oral at bedtime  hydrocortisone 10 milliGRAM(s) Oral at bedtime  hydrocortisone 20 milliGRAM(s) Oral daily  metoprolol succinate ER 25 milliGRAM(s) Oral daily  multivitamin 1 Tablet(s) Oral daily  mycophenolate mofetil 1000 milliGRAM(s) Oral two times a day  ondansetron Injectable 4 milliGRAM(s) IV Push every 6 hours  pantoprazole    Tablet 40 milliGRAM(s) Oral before breakfast  pregabalin 50 milliGRAM(s) Oral two times a day  tacrolimus 1 milliGRAM(s) Oral two times a day  tamsulosin 0.4 milliGRAM(s) Oral at bedtime  trimethoprim  160 mG/sulfamethoxazole 800 mG 1 Tablet(s) Oral <User Schedule>    MEDICATIONS  (PRN):  bisacodyl Suppository 10 milliGRAM(s) Rectal daily PRN Constipation  morphine  - Injectable 2 milliGRAM(s) IV Push every 4 hours PRN Severe Pain (7 - 10)  oxyCODONE    IR 5 milliGRAM(s) Oral every 6 hours PRN Moderate Pain (4 - 6)  senna 2 Tablet(s) Oral at bedtime PRN Constipation      Allergies    budesonide (Unknown)  cefpodoxime (Unknown)  Pollen (Unknown)          Vital Signs Last 24 Hrs  T(C): 36.4 (31 Mar 2020 07:30), Max: 37 (30 Mar 2020 23:57)  T(F): 97.5 (31 Mar 2020 07:30), Max: 98.6 (30 Mar 2020 23:57)  HR: 86 (31 Mar 2020 07:30) (86 - 92)  BP: 99/56 (31 Mar 2020 07:30) (93/55 - 123/78)  BP(mean): --  RR: 18 (31 Mar 2020 07:30) (18 - 18)  SpO2: 100% (31 Mar 2020 07:30) (96% - 100%)  T(C): 36.7 (30 Mar 2020 09:23), Max: 36.8 (29 Mar 2020 15:33)  T(F): 98 (30 Mar 2020 09:23), Max: 98.2 (29 Mar 2020 15:33)  HR: 74 (30 Mar 2020 09:23) (74 - 94)  BP: 105/51 (30 Mar 2020 09:23) (90/56 - 118/62)      PHYSICAL EXAM:  GENERAL: Comfortable in bed in dialysis.  HEENT: same  NECK: Supple, No JVD  NERVOUS SYSTEM:  Alert, oriented  Lungs: Diminished BS at bases  HEART:  No rub  ABDOMEN: Soft, NT/ND BS+  EXTREMITIES: No edema; L hip same  SKIN: No rashes nor lesions    LABS:    03-31    136  |  97<L>  |  82.0<H>  ----------------------------<  94  4.0   |  20.0<L>  |  7.88<H>    Ca    6.1<LL>      31 Mar 2020 06:24    TPro  4.2<L>  /  Alb  2.5<L>  /  TBili  0.4  /  DBili  x   /  AST  27  /  ALT  6   /  AlkPhos  149<H>  03-31 03-29    138  |  97<L>  |  44.0<H>  ----------------------------<  115<H>  3.7   |  23.0  |  4.75<H>    Ca    7.2<L>      29 Mar 2020 09:37  Phos  4.0     03-29 03-27    x   |  x   |  x   ----------------------------<  x   4.5   |  x   |  x                               7.4    4.54  )-----------( 149      ( 26 Mar 2020 07:31 )             24.2     03-26    139  |  97<L>  |  27.0<H>  ----------------------------<  110<H>  3.6   |  28.0  |  5.10<H>    Ca    7.4<L>      26 Mar 2020 07:31  Phos  2.9     03-26  Mg     1.9     03-26    TPro  4.9<L>  /  Alb  3.0<L>  /  TBili  0.6  /  DBili  x   /  AST  33  /  ALT  17  /  AlkPhos  93  03-26    PT/INR - ( 25 Mar 2020 16:42 )   PT: 15.4 sec;   INR: 1.35 ratio         PTT - ( 25 Mar 2020 16:42 )  PTT:32.1 sec    Magnesium, Serum: 1.9 mg/dL (03-26 @ 07:31)  Phosphorus Level, Serum: 2.9 mg/dL (03-26 @ 07:31)  Magnesium, Serum: 1.5 mg/dL (03-25 @ 15:43)  Phosphorus Level, Serum: 3.0 mg/dL (03-25 @ 15:43)

## 2020-03-31 NOTE — PROGRESS NOTE ADULT - ASSESSMENT
69 year old male status post Left hip IM nail, POD#3. Ex smoker with PMH of Lung Transplant in 2015 for COPD on anti rejection meds, s/p rt total hip arthroplasty, multi level compression, BPH, HLD, CAD, BPH, OP, and recently started HD for past 1 month presents to ED s/p fall. Pt found with displaced left femur fracture, seen by ortho and s/p IM nailing. Hospital course complicated by anemia, s/p 1U PRBC.       1. Closed displaced intertrochanteric fracture of left femur with routine healing.  - Imaging reviewed  - S/p IM nailing with ortho  - Orthopedics is following   -  BID for 6 weeks as per ortho  - Pain currently managed, continue current pain meds  - PT recommends home w/ RW when medically stable    2. End stage renal disease on dialysis.     - Stable  - C/w HD    3.  Coronary artery disease with other form of angina pectoris  - Stable   - Continue asa and statin    4. Anemia due to chronic kidney disease, on chronic dialysis.  - Hb7.1. Will transfuse 1U PRBC  - Continue with iron supplement    - Follow up AM CBC     5. Hypocalcemia  - Calcium of 6.1   - IV calcium given  - Follow up AM Calcium level     Disp: Patient is pending discharge if H&H improves after transfusion and calcium improves after IV calcium. Final PT recs are home w/ RW.   Patient is on HD for ESRD and will require extra service for HD status post hip arthroplasty.  CCM aware and working on DC needs

## 2020-03-31 NOTE — PROGRESS NOTE ADULT - SUBJECTIVE AND OBJECTIVE BOX
Interval History: 69 year old male with CC of fall s/p IM nail fixation. POD #3.    Subjective: Patient seen and evaluated at bedside, patient was seen eating his lunch. Patient has no complaints. He states his pain is well managed. ROS negative. VSS.    PHYSICAL EXAM:    Vital Signs Last 24 Hrs  T(C): 36.7 (31 Mar 2020 13:18), Max: 37 (30 Mar 2020 23:57)  T(F): 98 (31 Mar 2020 13:18), Max: 98.6 (30 Mar 2020 23:57)  HR: 84 (31 Mar 2020 13:18) (78 - 92)  BP: 122/65 (31 Mar 2020 13:18) (93/55 - 136/77)  BP(mean): --  RR: 18 (31 Mar 2020 13:18) (18 - 18)  SpO2: 98% (31 Mar 2020 13:18) (96% - 100%)    GENERAL: Pt sitting up in bed comfortably, NAD.  ENMT: PERRL, +EOMI.  NECK: soft, Supple, No JVD,   CHEST/LUNG: Clear to auscultation bilaterally; No wheezing.  HEART: S1S2+, Regular rate and rhythm; No murmurs.  ABDOMEN: Soft, Nontender, Nondistended; Bowel sounds present.  MUSCULOSKELETAL: Normal range of motion in right extremities and left arm. ROM limited in Left leg.    SKIN: Warm and dry.   NEURO: AAOX3, no focal deficits.  PSYCH: pleasant, normal mood.    MEDICATIONS  (STANDING):  acetaminophen   Tablet .. 975 milliGRAM(s) Oral every 8 hours  aspirin enteric coated 325 milliGRAM(s) Oral two times a day  atorvastatin 40 milliGRAM(s) Oral at bedtime  calcium carbonate    500 mG (Tums) Chewable 1 Tablet(s) Chew three times a day  calcium gluconate IVPB 2 Gram(s) IV Intermittent every 6 hours  clopidogrel Tablet 75 milliGRAM(s) Oral daily  epoetin-ben-epbx (RETACRIT) Injectable 92353 Unit(s) IV Push <User Schedule>  ferrous    sulfate 325 milliGRAM(s) Oral daily  hydrocortisone 10 milliGRAM(s) Oral at bedtime  hydrocortisone 20 milliGRAM(s) Oral daily  melatonin 3 milliGRAM(s) Oral at bedtime  metoprolol succinate ER 25 milliGRAM(s) Oral daily  midodrine. 5 milliGRAM(s) Oral three times a day  multivitamin 1 Tablet(s) Oral daily  mycophenolate mofetil 1000 milliGRAM(s) Oral two times a day  ondansetron Injectable 4 milliGRAM(s) IV Push every 6 hours  pantoprazole    Tablet 40 milliGRAM(s) Oral before breakfast  polyethylene glycol 3350 17 Gram(s) Oral daily  pregabalin 50 milliGRAM(s) Oral two times a day  tacrolimus 1 milliGRAM(s) Oral two times a day  tamsulosin 0.4 milliGRAM(s) Oral at bedtime  trimethoprim  160 mG/sulfamethoxazole 800 mG 1 Tablet(s) Oral <User Schedule>    MEDICATIONS  (PRN):  aluminum hydroxide/magnesium hydroxide/simethicone Suspension 30 milliLiter(s) Oral four times a day PRN Indigestion  bisacodyl Suppository 10 milliGRAM(s) Rectal daily PRN Constipation  bisacodyl Suppository 10 milliGRAM(s) Rectal daily PRN If no bowel movement by POD#2  HYDROmorphone  Injectable 0.5 milliGRAM(s) IV Push every 3 hours PRN breakthrough  magnesium hydroxide Suspension 30 milliLiter(s) Oral at bedtime PRN Constipation  ondansetron Injectable 4 milliGRAM(s) IV Push every 6 hours PRN Nausea and/or Vomiting  oxyCODONE    IR 5 milliGRAM(s) Oral every 3 hours PRN Moderate Pain (4 - 6)  oxyCODONE    IR 10 milliGRAM(s) Oral every 3 hours PRN Severe Pain (7 - 10)  senna 2 Tablet(s) Oral at bedtime PRN Constipation    LABS:                        7.1    4.15  )-----------( 70       ( 31 Mar 2020 06:24 )             22.4     03-31    136  |  97<L>  |  82.0<H>  ----------------------------<  94  4.0   |  20.0<L>  |  7.88<H>    Ca    6.1<LL>      31 Mar 2020 06:24    TPro  4.2<L>  /  Alb  2.5<L>  /  TBili  0.4  /  DBili  x   /  AST  27  /  ALT  6   /  AlkPhos  149<H>  03-31        LIVER FUNCTIONS - ( 31 Mar 2020 06:24 )  Alb: 2.5 g/dL / Pro: 4.2 g/dL / ALK PHOS: 149 U/L / ALT: 6 U/L / AST: 27 U/L / GGT: x

## 2020-04-01 ENCOUNTER — TRANSCRIPTION ENCOUNTER (OUTPATIENT)
Age: 70
End: 2020-04-01

## 2020-04-01 DIAGNOSIS — D69.6 THROMBOCYTOPENIA, UNSPECIFIED: ICD-10-CM

## 2020-04-01 DIAGNOSIS — D64.9 ANEMIA, UNSPECIFIED: ICD-10-CM

## 2020-04-01 LAB
ALBUMIN SERPL ELPH-MCNC: 2.7 G/DL — LOW (ref 3.3–5.2)
ALBUMIN SERPL ELPH-MCNC: 2.7 G/DL — LOW (ref 3.3–5.2)
ALP SERPL-CCNC: 191 U/L — HIGH (ref 40–120)
ALP SERPL-CCNC: 205 U/L — HIGH (ref 40–120)
ALT FLD-CCNC: 8 U/L — SIGNIFICANT CHANGE UP
ALT FLD-CCNC: 9 U/L — SIGNIFICANT CHANGE UP
ANION GAP SERPL CALC-SCNC: 17 MMOL/L — SIGNIFICANT CHANGE UP (ref 5–17)
ANION GAP SERPL CALC-SCNC: 18 MMOL/L — HIGH (ref 5–17)
AST SERPL-CCNC: 32 U/L — SIGNIFICANT CHANGE UP
AST SERPL-CCNC: 34 U/L — SIGNIFICANT CHANGE UP
BILIRUB SERPL-MCNC: 0.6 MG/DL — SIGNIFICANT CHANGE UP (ref 0.4–2)
BILIRUB SERPL-MCNC: 0.6 MG/DL — SIGNIFICANT CHANGE UP (ref 0.4–2)
BUN SERPL-MCNC: 49 MG/DL — HIGH (ref 8–20)
BUN SERPL-MCNC: 54 MG/DL — HIGH (ref 8–20)
CALCIUM SERPL-MCNC: 7.4 MG/DL — LOW (ref 8.6–10.2)
CALCIUM SERPL-MCNC: 7.7 MG/DL — LOW (ref 8.6–10.2)
CALCIUM SERPL-MCNC: 7.7 MG/DL — LOW (ref 8.6–10.2)
CHLORIDE SERPL-SCNC: 92 MMOL/L — LOW (ref 98–107)
CHLORIDE SERPL-SCNC: 92 MMOL/L — LOW (ref 98–107)
CO2 SERPL-SCNC: 22 MMOL/L — SIGNIFICANT CHANGE UP (ref 22–29)
CO2 SERPL-SCNC: 24 MMOL/L — SIGNIFICANT CHANGE UP (ref 22–29)
CREAT SERPL-MCNC: 4.85 MG/DL — HIGH (ref 0.5–1.3)
CREAT SERPL-MCNC: 5.15 MG/DL — HIGH (ref 0.5–1.3)
GLUCOSE SERPL-MCNC: 105 MG/DL — HIGH (ref 70–99)
GLUCOSE SERPL-MCNC: 122 MG/DL — HIGH (ref 70–99)
HCT VFR BLD CALC: 33.6 % — LOW (ref 39–50)
HCT VFR BLD CALC: 33.7 % — LOW (ref 39–50)
HCT VFR BLD CALC: 34.1 % — LOW (ref 39–50)
HGB BLD-MCNC: 10.8 G/DL — LOW (ref 13–17)
HGB BLD-MCNC: 10.9 G/DL — LOW (ref 13–17)
HGB BLD-MCNC: 11.2 G/DL — LOW (ref 13–17)
MCHC RBC-ENTMCNC: 28.8 PG — SIGNIFICANT CHANGE UP (ref 27–34)
MCHC RBC-ENTMCNC: 29.2 PG — SIGNIFICANT CHANGE UP (ref 27–34)
MCHC RBC-ENTMCNC: 29.3 PG — SIGNIFICANT CHANGE UP (ref 27–34)
MCHC RBC-ENTMCNC: 32 GM/DL — SIGNIFICANT CHANGE UP (ref 32–36)
MCHC RBC-ENTMCNC: 32.4 GM/DL — SIGNIFICANT CHANGE UP (ref 32–36)
MCHC RBC-ENTMCNC: 32.8 GM/DL — SIGNIFICANT CHANGE UP (ref 32–36)
MCV RBC AUTO: 89.3 FL — SIGNIFICANT CHANGE UP (ref 80–100)
MCV RBC AUTO: 89.9 FL — SIGNIFICANT CHANGE UP (ref 80–100)
MCV RBC AUTO: 90.1 FL — SIGNIFICANT CHANGE UP (ref 80–100)
PLATELET # BLD AUTO: 33 K/UL — LOW (ref 150–400)
PLATELET # BLD AUTO: SIGNIFICANT CHANGE UP K/UL (ref 150–400)
PLATELET # BLD AUTO: SIGNIFICANT CHANGE UP K/UL (ref 150–400)
POTASSIUM SERPL-MCNC: 4.1 MMOL/L — SIGNIFICANT CHANGE UP (ref 3.5–5.3)
POTASSIUM SERPL-MCNC: 4.5 MMOL/L — SIGNIFICANT CHANGE UP (ref 3.5–5.3)
POTASSIUM SERPL-SCNC: 4.1 MMOL/L — SIGNIFICANT CHANGE UP (ref 3.5–5.3)
POTASSIUM SERPL-SCNC: 4.5 MMOL/L — SIGNIFICANT CHANGE UP (ref 3.5–5.3)
PROT SERPL-MCNC: 4.6 G/DL — LOW (ref 6.6–8.7)
PROT SERPL-MCNC: 4.8 G/DL — LOW (ref 6.6–8.7)
RBC # BLD: 3.73 M/UL — LOW (ref 4.2–5.8)
RBC # BLD: 3.75 M/UL — LOW (ref 4.2–5.8)
RBC # BLD: 3.82 M/UL — LOW (ref 4.2–5.8)
RBC # FLD: 18.6 % — HIGH (ref 10.3–14.5)
RBC # FLD: 18.7 % — HIGH (ref 10.3–14.5)
RBC # FLD: 18.7 % — HIGH (ref 10.3–14.5)
SODIUM SERPL-SCNC: 132 MMOL/L — LOW (ref 135–145)
SODIUM SERPL-SCNC: 133 MMOL/L — LOW (ref 135–145)
WBC # BLD: 5.5 K/UL — SIGNIFICANT CHANGE UP (ref 3.8–10.5)
WBC # BLD: 5.63 K/UL — SIGNIFICANT CHANGE UP (ref 3.8–10.5)
WBC # BLD: 5.67 K/UL — SIGNIFICANT CHANGE UP (ref 3.8–10.5)
WBC # FLD AUTO: 5.5 K/UL — SIGNIFICANT CHANGE UP (ref 3.8–10.5)
WBC # FLD AUTO: 5.63 K/UL — SIGNIFICANT CHANGE UP (ref 3.8–10.5)
WBC # FLD AUTO: 5.67 K/UL — SIGNIFICANT CHANGE UP (ref 3.8–10.5)

## 2020-04-01 PROCEDURE — 99223 1ST HOSP IP/OBS HIGH 75: CPT

## 2020-04-01 PROCEDURE — 99233 SBSQ HOSP IP/OBS HIGH 50: CPT

## 2020-04-01 RX ADMIN — Medication 975 MILLIGRAM(S): at 22:40

## 2020-04-01 RX ADMIN — TAMSULOSIN HYDROCHLORIDE 0.4 MILLIGRAM(S): 0.4 CAPSULE ORAL at 22:40

## 2020-04-01 RX ADMIN — MIDODRINE HYDROCHLORIDE 5 MILLIGRAM(S): 2.5 TABLET ORAL at 05:42

## 2020-04-01 RX ADMIN — Medication 10 MILLIGRAM(S): at 22:40

## 2020-04-01 RX ADMIN — Medication 1 TABLET(S): at 22:40

## 2020-04-01 RX ADMIN — Medication 1 TABLET(S): at 15:42

## 2020-04-01 RX ADMIN — OXYCODONE HYDROCHLORIDE 10 MILLIGRAM(S): 5 TABLET ORAL at 22:46

## 2020-04-01 RX ADMIN — Medication 325 MILLIGRAM(S): at 15:42

## 2020-04-01 RX ADMIN — Medication 20 MILLIGRAM(S): at 05:42

## 2020-04-01 RX ADMIN — Medication 1 TABLET(S): at 15:41

## 2020-04-01 RX ADMIN — CLOPIDOGREL BISULFATE 75 MILLIGRAM(S): 75 TABLET, FILM COATED ORAL at 15:41

## 2020-04-01 RX ADMIN — Medication 1 TABLET(S): at 15:45

## 2020-04-01 RX ADMIN — PANTOPRAZOLE SODIUM 40 MILLIGRAM(S): 20 TABLET, DELAYED RELEASE ORAL at 05:41

## 2020-04-01 RX ADMIN — Medication 325 MILLIGRAM(S): at 17:18

## 2020-04-01 RX ADMIN — MIDODRINE HYDROCHLORIDE 5 MILLIGRAM(S): 2.5 TABLET ORAL at 22:39

## 2020-04-01 RX ADMIN — Medication 325 MILLIGRAM(S): at 05:41

## 2020-04-01 RX ADMIN — MYCOPHENOLATE MOFETIL 1000 MILLIGRAM(S): 250 CAPSULE ORAL at 07:24

## 2020-04-01 RX ADMIN — Medication 50 MILLIGRAM(S): at 05:41

## 2020-04-01 RX ADMIN — Medication 975 MILLIGRAM(S): at 05:40

## 2020-04-01 RX ADMIN — ONDANSETRON 4 MILLIGRAM(S): 8 TABLET, FILM COATED ORAL at 22:40

## 2020-04-01 RX ADMIN — MYCOPHENOLATE MOFETIL 1000 MILLIGRAM(S): 250 CAPSULE ORAL at 17:18

## 2020-04-01 RX ADMIN — ATORVASTATIN CALCIUM 40 MILLIGRAM(S): 80 TABLET, FILM COATED ORAL at 22:40

## 2020-04-01 RX ADMIN — TACROLIMUS 1 MILLIGRAM(S): 5 CAPSULE ORAL at 05:43

## 2020-04-01 RX ADMIN — Medication 1 TABLET(S): at 05:43

## 2020-04-01 RX ADMIN — TACROLIMUS 1 MILLIGRAM(S): 5 CAPSULE ORAL at 17:18

## 2020-04-01 RX ADMIN — Medication 200 GRAM(S): at 00:32

## 2020-04-01 RX ADMIN — Medication 3 MILLIGRAM(S): at 22:40

## 2020-04-01 RX ADMIN — ONDANSETRON 4 MILLIGRAM(S): 8 TABLET, FILM COATED ORAL at 22:47

## 2020-04-01 RX ADMIN — OXYCODONE HYDROCHLORIDE 10 MILLIGRAM(S): 5 TABLET ORAL at 15:42

## 2020-04-01 RX ADMIN — Medication 975 MILLIGRAM(S): at 15:44

## 2020-04-01 RX ADMIN — ONDANSETRON 4 MILLIGRAM(S): 8 TABLET, FILM COATED ORAL at 05:45

## 2020-04-01 RX ADMIN — MIDODRINE HYDROCHLORIDE 5 MILLIGRAM(S): 2.5 TABLET ORAL at 15:41

## 2020-04-01 RX ADMIN — Medication 25 MILLIGRAM(S): at 05:40

## 2020-04-01 RX ADMIN — Medication 50 MILLIGRAM(S): at 17:18

## 2020-04-01 NOTE — CONSULT NOTE ADULT - SUBJECTIVE AND OBJECTIVE BOX
HPI:  68 y/o M ex smoker with PMH of 2015- Lung Transplant for COPD on anti rejection meds, s/p rt total hip arthroplasty, multi level compression #, BPH, HLD, CAD, BPH, OP, recently started HD for [facility]RISTINA- ESRD ? for past 1 month presents to ED s/p fall. He states he slipped and fell down 3 steps, did not hit head/ no loc. Reports left hip pain. Patient able to move leg but difficulty bearing weight on left leg. Denies fever, CP, palpitations cough, URI, sick contact, travel, dizziness, LOC.    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 975 milliGRAM(s) Oral every 8 hours  aspirin enteric coated 325 milliGRAM(s) Oral two times a day  atorvastatin 40 milliGRAM(s) Oral at bedtime  calcium carbonate    500 mG (Tums) Chewable 1 Tablet(s) Chew three times a day  clopidogrel Tablet 75 milliGRAM(s) Oral daily  epoetin-ben-epbx (RETACRIT) Injectable 68823 Unit(s) IV Push <User Schedule>  ferrous    sulfate 325 milliGRAM(s) Oral daily  hydrocortisone 10 milliGRAM(s) Oral at bedtime  hydrocortisone 20 milliGRAM(s) Oral daily  melatonin 3 milliGRAM(s) Oral at bedtime  metoprolol succinate ER 25 milliGRAM(s) Oral daily  midodrine. 5 milliGRAM(s) Oral three times a day  multivitamin 1 Tablet(s) Oral daily  mycophenolate mofetil 1000 milliGRAM(s) Oral two times a day  ondansetron Injectable 4 milliGRAM(s) IV Push every 6 hours  pantoprazole    Tablet 40 milliGRAM(s) Oral before breakfast  polyethylene glycol 3350 17 Gram(s) Oral daily  tacrolimus 1 milliGRAM(s) Oral two times a day  tamsulosin 0.4 milliGRAM(s) Oral at bedtime  trimethoprim  160 mG/sulfamethoxazole 800 mG 1 Tablet(s) Oral <User Schedule>    MEDICATIONS  (PRN):  aluminum hydroxide/magnesium hydroxide/simethicone Suspension 30 milliLiter(s) Oral four times a day PRN Indigestion  bisacodyl Suppository 10 milliGRAM(s) Rectal daily PRN Constipation  bisacodyl Suppository 10 milliGRAM(s) Rectal daily PRN If no bowel movement by POD#2  HYDROmorphone  Injectable 0.5 milliGRAM(s) IV Push every 3 hours PRN breakthrough  magnesium hydroxide Suspension 30 milliLiter(s) Oral at bedtime PRN Constipation  ondansetron Injectable 4 milliGRAM(s) IV Push every 6 hours PRN Nausea and/or Vomiting  oxyCODONE    IR 5 milliGRAM(s) Oral every 3 hours PRN Moderate Pain (4 - 6)  oxyCODONE    IR 10 milliGRAM(s) Oral every 3 hours PRN Severe Pain (7 - 10)  senna 2 Tablet(s) Oral at bedtime PRN Constipation      Allergies    budesonide (Unknown)  cefpodoxime (Unknown)  Pollen (Unknown)    Intolerances        FAMILY HISTORY:  Family history of emphysema      Review of Systems    Constitutional, Eyes, ENT, Cardiovascular, Respiratory, Gastrointestinal, Genitourinary, Musculoskeletal, Integumentary, Neurological, Psychiatric, Endocrine, Heme/Lymph and Allergic/Immunologic review of systems are otherwise negative except as noted in HPI.     Vital Signs Last 24 Hrs  T(C): 36.7 (01 Apr 2020 14:54), Max: 37.2 (31 Mar 2020 23:23)  T(F): 98 (01 Apr 2020 14:54), Max: 98.9 (31 Mar 2020 23:23)  HR: 76 (01 Apr 2020 14:54) (70 - 87)  BP: 132/71 (01 Apr 2020 14:54) (96/64 - 132/71)  BP(mean): --  RR: 18 (01 Apr 2020 14:54) (18 - 19)  SpO2: 100% (01 Apr 2020 14:54) (94% - 100%)    Physical Exam  Constitutional: well developed, well nourished, in no acute distress and thin.   Eyes: PERRL, EOMI, no conjunctival infection, anicteric.   ENT: pharynx is unremarkable, moist mucus membrane, no oral lesions.   Neck: supple without JVD, no thyromegaly or masses appreciated.   Pulmonary: clear to auscultation bilaterally, no dullness, no wheezing.   Cardiac: RRR, normal S1S2, no murmurs, rubs, gallops.   Vascular: no JVD, no calf tenderness, venous stasis changes, varices.   Abdomen: normoactive bowel sounds, soft and nontender, no hepatosplenomegaly or masses appreciated.   Lymphatic: no peripheral adenopathy appreciated.   Musculoskeletal: full range of motion and no deformities appreciated.   Skin: normal appearance, no rash, nodules, vesicles, ulcers, erythema.   Neurology: grossly intact.   Psychiatric: affect appropriate.       LABS:  CBC Full  -  ( 01 Apr 2020 15:55 )  WBC Count : 5.63 K/uL  RBC Count : 3.82 M/uL  Hemoglobin : 11.2 g/dL  Hematocrit : 34.1 %  Platelet Count - Automated : CLUMPED K/uL  Mean Cell Volume : 89.3 fl  Mean Cell Hemoglobin : 29.3 pg  Mean Cell Hemoglobin Concentration : 32.8 gm/dL  Auto Neutrophil # : x  Auto Lymphocyte # : x  Auto Monocyte # : x  Auto Eosinophil # : x  Auto Basophil # : x  Auto Neutrophil % : x  Auto Lymphocyte % : x  Auto Monocyte % : x  Auto Eosinophil % : x  Auto Basophil % : x    04-01    132<L>  |  92<L>  |  54.0<H>  ----------------------------<  122<H>  4.1   |  22.0  |  5.15<H>    Ca    7.4<L>      01 Apr 2020 12:51    TPro  4.6<L>  /  Alb  2.7<L>  /  TBili  0.6  /  DBili  x   /  AST  32  /  ALT  9   /  AlkPhos  191<H>  04-01          RADIOLOGY & ADDITIONAL STUDIES: HPI:  70 y/o M ex smoker with PMH of 2015- Lung Transplant for COPD on anti rejection meds, s/p rt total hip arthroplasty, multi level compression #, BPH, HLD, CAD, BPH, OP, recently started HD for [facility]RISTINA- ESRD ? for past 1 month presents to ED s/p fall. He states he slipped and fell down 3 steps, did not hit head/ no loc. Reports left hip pain. Patient able to move leg but difficulty bearing weight on left leg. Denies fever, CP, palpitations cough, URI, sick contact, travel, dizziness, LOC.    SH- Former smoker quit long time ago; denies other habits  FH- Denies lung dz, renal dz in family members (25 Mar 2020 17:40)      PAST MEDICAL & SURGICAL HISTORY:  Emphysema of lung  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 -  [facility]      MEDICATIONS  (STANDING):  acetaminophen   Tablet .. 975 milliGRAM(s) Oral every 8 hours  aspirin enteric coated 325 milliGRAM(s) Oral two times a day  atorvastatin 40 milliGRAM(s) Oral at bedtime  calcium carbonate    500 mG (Tums) Chewable 1 Tablet(s) Chew three times a day  clopidogrel Tablet 75 milliGRAM(s) Oral daily  epoetin-ben-epbx (RETACRIT) Injectable 89475 Unit(s) IV Push <User Schedule>  ferrous    sulfate 325 milliGRAM(s) Oral daily  hydrocortisone 10 milliGRAM(s) Oral at bedtime  hydrocortisone 20 milliGRAM(s) Oral daily  melatonin 3 milliGRAM(s) Oral at bedtime  metoprolol succinate ER 25 milliGRAM(s) Oral daily  midodrine. 5 milliGRAM(s) Oral three times a day  multivitamin 1 Tablet(s) Oral daily  mycophenolate mofetil 1000 milliGRAM(s) Oral two times a day  ondansetron Injectable 4 milliGRAM(s) IV Push every 6 hours  pantoprazole    Tablet 40 milliGRAM(s) Oral before breakfast  polyethylene glycol 3350 17 Gram(s) Oral daily  tacrolimus 1 milliGRAM(s) Oral two times a day  tamsulosin 0.4 milliGRAM(s) Oral at bedtime  trimethoprim  160 mG/sulfamethoxazole 800 mG 1 Tablet(s) Oral <User Schedule>    MEDICATIONS  (PRN):  aluminum hydroxide/magnesium hydroxide/simethicone Suspension 30 milliLiter(s) Oral four times a day PRN Indigestion  bisacodyl Suppository 10 milliGRAM(s) Rectal daily PRN Constipation  bisacodyl Suppository 10 milliGRAM(s) Rectal daily PRN If no bowel movement by POD#2  HYDROmorphone  Injectable 0.5 milliGRAM(s) IV Push every 3 hours PRN breakthrough  magnesium hydroxide Suspension 30 milliLiter(s) Oral at bedtime PRN Constipation  ondansetron Injectable 4 milliGRAM(s) IV Push every 6 hours PRN Nausea and/or Vomiting  oxyCODONE    IR 5 milliGRAM(s) Oral every 3 hours PRN Moderate Pain (4 - 6)  oxyCODONE    IR 10 milliGRAM(s) Oral every 3 hours PRN Severe Pain (7 - 10)  senna 2 Tablet(s) Oral at bedtime PRN Constipation      Allergies    budesonide (Unknown)  cefpodoxime (Unknown)  Pollen (Unknown)    Intolerances        FAMILY HISTORY:  Family history of emphysema      Review of Systems    Constitutional, Eyes, ENT, Cardiovascular, Respiratory, Gastrointestinal, Genitourinary, Musculoskeletal, Integumentary, Neurological, Psychiatric, Endocrine, Heme/Lymph and Allergic/Immunologic review of systems are otherwise negative except as noted in HPI.     Vital Signs Last 24 Hrs  T(C): 36.7 (01 Apr 2020 14:54), Max: 37.2 (31 Mar 2020 23:23)  T(F): 98 (01 Apr 2020 14:54), Max: 98.9 (31 Mar 2020 23:23)  HR: 76 (01 Apr 2020 14:54) (70 - 87)  BP: 132/71 (01 Apr 2020 14:54) (96/64 - 132/71)  BP(mean): --  RR: 18 (01 Apr 2020 14:54) (18 - 19)  SpO2: 100% (01 Apr 2020 14:54) (94% - 100%)      LABS:  CBC Full  -  ( 01 Apr 2020 15:55 )  WBC Count : 5.63 K/uL  RBC Count : 3.82 M/uL  Hemoglobin : 11.2 g/dL  Hematocrit : 34.1 %  Platelet Count - Automated : CLUMPED K/uL  Mean Cell Volume : 89.3 fl  Mean Cell Hemoglobin : 29.3 pg  Mean Cell Hemoglobin Concentration : 32.8 gm/dL  Auto Neutrophil # : x  Auto Lymphocyte # : x  Auto Monocyte # : x  Auto Eosinophil # : x  Auto Basophil # : x  Auto Neutrophil % : x  Auto Lymphocyte % : x  Auto Monocyte % : x  Auto Eosinophil % : x  Auto Basophil % : x    04-01    132<L>  |  92<L>  |  54.0<H>  ----------------------------<  122<H>  4.1   |  22.0  |  5.15<H>    Ca    7.4<L>      01 Apr 2020 12:51    TPro  4.6<L>  /  Alb  2.7<L>  /  TBili  0.6  /  DBili  x   /  AST  32  /  ALT  9   /  AlkPhos  191<H>  04-01          RADIOLOGY & ADDITIONAL STUDIES:

## 2020-04-01 NOTE — PROGRESS NOTE ADULT - ASSESSMENT
ESRD - Will do additional HD today to get back on MWF schedule   See orders   possible D/C post HD today     Anemia   Continue Epogen   Transfused   repeat CBC pending

## 2020-04-01 NOTE — PROGRESS NOTE ADULT - SUBJECTIVE AND OBJECTIVE BOX
NEPHROLOGY INTERVAL HPI/OVERNIGHT EVENTS:    feels better   Am labs pending   Normally HD MWF  S/P transfusion     MEDICATIONS  (STANDING):  acetaminophen   Tablet .. 975 milliGRAM(s) Oral every 8 hours  aspirin enteric coated 325 milliGRAM(s) Oral two times a day  atorvastatin 40 milliGRAM(s) Oral at bedtime  calcium carbonate    500 mG (Tums) Chewable 1 Tablet(s) Chew three times a day  clopidogrel Tablet 75 milliGRAM(s) Oral daily  epoetin-ben-epbx (RETACRIT) Injectable 06937 Unit(s) IV Push <User Schedule>  ferrous    sulfate 325 milliGRAM(s) Oral daily  hydrocortisone 10 milliGRAM(s) Oral at bedtime  hydrocortisone 20 milliGRAM(s) Oral daily  melatonin 3 milliGRAM(s) Oral at bedtime  metoprolol succinate ER 25 milliGRAM(s) Oral daily  midodrine. 5 milliGRAM(s) Oral three times a day  multivitamin 1 Tablet(s) Oral daily  mycophenolate mofetil 1000 milliGRAM(s) Oral two times a day  ondansetron Injectable 4 milliGRAM(s) IV Push every 6 hours  pantoprazole    Tablet 40 milliGRAM(s) Oral before breakfast  polyethylene glycol 3350 17 Gram(s) Oral daily  pregabalin 50 milliGRAM(s) Oral two times a day  tacrolimus 1 milliGRAM(s) Oral two times a day  tamsulosin 0.4 milliGRAM(s) Oral at bedtime  trimethoprim  160 mG/sulfamethoxazole 800 mG 1 Tablet(s) Oral <User Schedule>    MEDICATIONS  (PRN):  aluminum hydroxide/magnesium hydroxide/simethicone Suspension 30 milliLiter(s) Oral four times a day PRN Indigestion  bisacodyl Suppository 10 milliGRAM(s) Rectal daily PRN Constipation  bisacodyl Suppository 10 milliGRAM(s) Rectal daily PRN If no bowel movement by POD#2  HYDROmorphone  Injectable 0.5 milliGRAM(s) IV Push every 3 hours PRN breakthrough  magnesium hydroxide Suspension 30 milliLiter(s) Oral at bedtime PRN Constipation  ondansetron Injectable 4 milliGRAM(s) IV Push every 6 hours PRN Nausea and/or Vomiting  oxyCODONE    IR 5 milliGRAM(s) Oral every 3 hours PRN Moderate Pain (4 - 6)  oxyCODONE    IR 10 milliGRAM(s) Oral every 3 hours PRN Severe Pain (7 - 10)  senna 2 Tablet(s) Oral at bedtime PRN Constipation      Allergies    budesonide (Unknown)  cefpodoxime (Unknown)  Pollen (Unknown)    Intolerances          Vital Signs Last 24 Hrs  T(C): 36.6 (01 Apr 2020 07:52), Max: 37.2 (31 Mar 2020 23:23)  T(F): 97.9 (01 Apr 2020 07:52), Max: 98.9 (31 Mar 2020 23:23)  HR: 86 (01 Apr 2020 07:52) (78 - 96)  BP: 130/67 (01 Apr 2020 07:52) (90/50 - 136/77)  BP(mean): --  RR: 19 (01 Apr 2020 07:52) (18 - 19)  SpO2: 97% (01 Apr 2020 07:52) (97% - 98%)  Daily     Daily   I&O's Detail    31 Mar 2020 07:01  -  01 Apr 2020 07:00  --------------------------------------------------------  IN:    Packed Red Blood Cells: 341 mL  Total IN: 341 mL    OUT:    Other: 1400 mL  Total OUT: 1400 mL    Total NET: -1059 mL        I&O's Summary    31 Mar 2020 07:01  -  01 Apr 2020 07:00  --------------------------------------------------------  IN: 341 mL / OUT: 1400 mL / NET: -1059 mL        PHYSICAL EXAM:  GENERAL: Comfortable in bed  HEENT: same  NECK: Supple, No JVD  NERVOUS SYSTEM:  Alert, oriented  Lungs: improves aeration   HEART:  No rub  ABDOMEN: Soft, NT/ND BS+  EXTREMITIES: No edema; L hip same    LABS:                        7.1    4.15  )-----------( 70       ( 31 Mar 2020 06:24 )             22.4     03-31    136  |  97<L>  |  82.0<H>  ----------------------------<  94  4.0   |  20.0<L>  |  7.88<H>    Ca    6.1<LL>      31 Mar 2020 06:24    TPro  4.2<L>  /  Alb  2.5<L>  /  TBili  0.4  /  DBili  x   /  AST  27  /  ALT  6   /  AlkPhos  149<H>  03-31                RADIOLOGY & ADDITIONAL TESTS:

## 2020-04-01 NOTE — DIETITIAN INITIAL EVALUATION ADULT. - PERTINENT LABORATORY DATA
04-01 Na132 mmol/L<L> Glu 122 mg/dL<H> K+ 4.1 mmol/L Cr  5.15 mg/dL<H> BUN 54.0 mg/dL<H> Phos n/a   Alb 2.7 g/dL<L> PAB n/a

## 2020-04-01 NOTE — PROGRESS NOTE ADULT - SUBJECTIVE AND OBJECTIVE BOX
Interval History: 69 year old male with CC of fall s/p IM nail fixation. POD #3.    No overnight events  Patient seen and evaluated at bedside  Some hip pain, controlled with meds per pt  Wants to go home  Eating breakfast  ROS neg  VSS    Vital Signs Last 24 Hrs  T(C): 37.2 (31 Mar 2020 23:23), Max: 37.2 (31 Mar 2020 23:23)  T(F): 98.9 (31 Mar 2020 23:23), Max: 98.9 (31 Mar 2020 23:23)  HR: 87 (31 Mar 2020 23:23) (78 - 96)  BP: 113/69 (31 Mar 2020 23:23) (90/50 - 136/77)  BP(mean): --  RR: 18 (31 Mar 2020 23:23) (18 - 18)  SpO2: 97% (31 Mar 2020 23:23) (97% - 98%) on RA    PHYSICAL EXAM:  GENERAL: Pt sitting up in bed comfortably, NAD.  ENMT: PERRL, +EOMI.  NECK: soft, Supple, No JVD,   CHEST/LUNG: Clear to auscultation bilaterally; No wheezing.  HEART: S1S2+, Regular rate and rhythm; No murmurs.  ABDOMEN: Soft, Nontender, Nondistended; Bowel sounds present.  MUSCULOSKELETAL: Normal range of motion in right extremities and left arm. ROM limited in Left leg.    SKIN: Warm and dry.   NEURO: AAOX3, no focal deficits.  PSYCH: pleasant, normal mood.      LABS: Reviewed     Imaging: Reviewed

## 2020-04-01 NOTE — CONSULT NOTE ADULT - ASSESSMENT
69 year old male w/ PMH BPH, HLD, CAD s/p 3 PCI in 11/2019 and started on plavix/asa, Lung Transplant (Dr. Kaufman at [facility]) in 2015 for COPD on anti rejection meds, ESRD on HD, Hx of Right total hip arthroplasty who presented to [facility] ED s/p fall. Pt found with displaced left femur fracture, seen by ortho and s/p IM nailing. Hospital course complicated by anemia, s/p 4U PRBC during hospital stay and thrombocytopenia.

## 2020-04-01 NOTE — PROGRESS NOTE ADULT - ASSESSMENT
69 year old male status post Left hip IM nail, POD#3. Ex smoker with PMH of Lung Transplant in 2015 for COPD on anti rejection meds, s/p rt total hip arthroplasty, multi level compression, BPH, HLD, CAD, BPH, OP, and recently started HD for past 1 month presents to ED s/p fall. Pt found with displaced left femur fracture, seen by ortho and s/p IM nailing. Hospital course complicated by anemia, s/p 2U PRBC.       1. Closed displaced intertrochanteric fracture of left femur with routine healing.  - Imaging reviewed  - S/p IM nailing with ortho  - Orthopedics is following   -  BID for 6 weeks as per ortho  - Pain controlled   - PT recommends home w/ RW when medically stable    2. End stage renal disease on dialysis.     - Stable  - C/w HD    3.  Coronary artery disease with other form of angina pectoris  - Stable   - Continue asa and statin    4. Anemia due to chronic kidney disease, on chronic dialysis.  - S/p 2U PRBC yesterday for Hbg 7.1. Repeat labs pending   - Continue with iron supplement      5. Hypocalcemia  - Calcium of 6.1. S/p IV repletion   - am labs pending     Disp: DC today pending repeat H/H and Ca. Will be DCed home with RW. CCM following. 69 year old male status post Left hip IM nail, POD#3. Ex smoker with PMH of Lung Transplant in 2015 for COPD on anti rejection meds, s/p rt total hip arthroplasty, multi level compression, BPH, HLD, CAD, BPH, OP, and recently started HD for past 1 month presents to ED s/p fall. Pt found with displaced left femur fracture, seen by ortho and s/p IM nailing. Hospital course complicated by anemia, s/p 2U PRBC.       1. Closed displaced intertrochanteric fracture of left femur with routine healing.  - Imaging reviewed  - S/p IM nailing with ortho  - Orthopedics is following   -  BID for 6 weeks as per ortho  - Pain controlled   - PT recommends home w/ RW when medically stable    2. End stage renal disease on dialysis.     - Stable  - C/w HD    3.  Coronary artery disease with other form of angina pectoris  - Stable   - Continue asa and statin    4. Anemia due to chronic kidney disease, on chronic dialysis.  - S/p 2U PRBC yesterday for Hbg 7.1. Repeat labs pending   - Continue with iron supplement      5. Hypocalcemia  - Calcium of 6.1. S/p IV repletion   - am labs pending     6. Hx of lung transplant for COPD   - On anti rejection meds, steroids and prophylactic bactrim, to be continued    7. Thrombocytopenia  - Plts of 33K today  - Will reach out to pts primary MD to find out baseline Plts  - On  BID for recent hip surgery per ortho. Also on plavix     Disp: DC today pending repeat H/H and Ca. Will be DCed home with RW. CCM following. 69 year old male status post Left hip IM nail, POD#3. Ex smoker with PMH of Lung Transplant in 2015 for COPD on anti rejection meds, s/p rt total hip arthroplasty, multi level compression, BPH, HLD, CAD, BPH, OP, and recently started HD for past 1 month presents to ED s/p fall. Pt found with displaced left femur fracture, seen by ortho and s/p IM nailing. Hospital course complicated by anemia, s/p 2U PRBC.       1. Closed displaced intertrochanteric fracture of left femur with routine healing.  - Imaging reviewed  - S/p IM nailing with ortho  - Orthopedics is following   -  BID for 6 weeks as per ortho  - Pain controlled   - PT recommends home w/ RW when medically stable    2. End stage renal disease on dialysis.     - Stable  - C/w HD. Will go for short session today then on DC resume MWF schedule     3.  Coronary artery disease with other form of angina pectoris  - S/p recent PCI x 3 12/2019, started on ASA/plavix   - Continue asa and statin    4. Anemia due to chronic kidney disease, on chronic dialysis.  - S/p 2U PRBC yesterday for Hbg now 10.9  - Stable, monitor   - Continue with iron supplement      5. Hypocalcemia  - Calcium of 6.1. S/p IV repletion   - am labs pending     6. Hx of lung transplant for COPD   - On anti rejection meds, steroids and prophylactic bactrim, to be continued    7. Thrombocytopenia  - Plts of 33K today  - Will reach out to pts lung doc who manages anti-rejection meds for baseline Plt count  - On  BID for recent hip surgery per ortho. Also on plavix     Disp: DC today pending repeat H/H and Ca. Will be DCed home with RW. CCM following. 69 year old male status post Left hip IM nail, POD#3. Ex smoker with PMH of Lung Transplant in 2015 for COPD on anti rejection meds, s/p rt total hip arthroplasty, multi level compression, BPH, HLD, CAD, BPH, OP, and recently started HD for past 1 month presents to ED s/p fall. Pt found with displaced left femur fracture, seen by ortho and s/p IM nailing. Hospital course complicated by anemia, s/p 2U PRBC and thrombocytopenia.       1. Closed displaced intertrochanteric fracture of left femur with routine healing.  - Imaging reviewed  - S/p IM nailing with ortho  - Orthopedics is following   -  BID for 6 weeks as per ortho  - Pain controlled   - PT recommends home w/ RW when medically stable    2. End stage renal disease on dialysis.     - Stable  - C/w HD. Will go for short session today then on DC resume MWF schedule     3.  Coronary artery disease with other form of angina pectoris  - S/p recent PCI x 3 12/2019, started on ASA/plavix   - Continue asa and statin    4. Anemia due to chronic kidney disease, on chronic dialysis.  - S/p 2U PRBC yesterday for Hbg now 10.9  - Stable, monitor   - Continue with iron supplement      5. Hypocalcemia  - Calcium of 6.1. S/p IV repletion   - am labs pending     6. Hx of lung transplant for COPD   - On anti rejection meds, steroids and prophylactic bactrim, to be continued  - Follows with Dr. Kaufman at [facility] - [phone]    7. Thrombocytopenia  - Plts of 33K today  - Will reach out to pts lung doc who manages anti-rejection meds for baseline Plt count  - On  BID for recent hip surgery per ortho. Also on plavix     Disp: DC today pending repeat H/H and Ca. Will be DCed home with RW. CCM following. 69 year old male status post Left hip IM nail, POD#3. Ex smoker with PMH of Lung Transplant in 2015 for COPD on anti rejection meds, s/p rt total hip arthroplasty, multi level compression, BPH, HLD, CAD, BPH, OP, and recently started HD for past 1 month presents to ED s/p fall. Pt found with displaced left femur fracture, seen by ortho and s/p IM nailing. Hospital course complicated by anemia, s/p 2U PRBC and thrombocytopenia.       1. Closed displaced intertrochanteric fracture of left femur with routine healing.  - Imaging reviewed  - S/p IM nailing with ortho  - Orthopedics is following   -  BID for 6 weeks as per ortho  - Pain controlled   - PT recommends home w/ RW when medically stable    2. End stage renal disease on dialysis.     - Stable  - C/w HD. Will go for short session today then on DC resume MWF schedule     3.  Coronary artery disease with other form of angina pectoris  - S/p recent PCI x 3 12/2019, started on ASA/plavix   - Continue asa, plavix and statin     4. Anemia due to chronic kidney disease, on chronic dialysis.  - S/p 2U PRBC yesterday for Hbg now 10.9  - Stable, monitor   - Continue with iron supplement      5. Hypocalcemia  - Calcium of 6.1. S/p IV repletion with improvement  - Will start PO repletion     6. Hx of lung transplant for COPD   - On anti rejection meds, steroids and prophylactic bactrim, to be continued  - Follows with Dr. Kaufman at [facility] - [phone]    7. Thrombocytopenia  - Plts of 33K today  - Will reach out to pts lung doc who manages anti-rejection meds for baseline Plt count  - On  BID for recent hip surgery per ortho. Also on plavix     Disp: DC today pending repeat H/H and Ca. Will be DCed home with RW. CCM following. 69 year old male status post Left hip IM nail, POD#3. Ex smoker with PMH of Lung Transplant in 2015 for COPD on anti rejection meds, s/p rt total hip arthroplasty, multi level compression, BPH, HLD, CAD, BPH, OP, and recently started HD for past 1 month presents to ED s/p fall. Pt found with displaced left femur fracture, seen by ortho and s/p IM nailing. Hospital course complicated by anemia, s/p 2U PRBC and thrombocytopenia.       1. Closed displaced intertrochanteric fracture of left femur with routine healing.  - Imaging reviewed  - S/p IM nailing with ortho  - Orthopedics is following   -  BID for 6 weeks as per ortho  - Pain controlled   - PT recommends home w/ RW when medically stable    2. End stage renal disease on dialysis.     - Stable  - C/w HD. Will go for short session today then on DC resume MWF schedule     3.  Coronary artery disease with other form of angina pectoris  - S/p recent PCI x 3 12/2019, started on ASA/plavix   - Continue asa, plavix and statin     4. Anemia due to chronic kidney disease, on chronic dialysis.  - S/p 2U PRBC yesterday for Hbg now 10.9  - Stable, monitor   - Continue with iron supplement      5. Hypocalcemia  - Calcium of 6.1. S/p IV repletion with improvement  - Will start PO repletion     6. Hx of lung transplant for COPD   - On anti rejection meds, steroids and prophylactic bactrim, to be continued  - Follows with Dr. Kaufman at [facility] - [phone]    7. Thrombocytopenia  - Plts of 33K today  - Will reach out to pts lung doc who manages anti-rejection meds for baseline Plt count  - On  BID for recent hip surgery per ortho. Also on plavix     Disp: DC pending Plt work up, HD.  CCM following. 69 year old male status post Left hip IM nail, POD#3. Ex smoker with PMH of Lung Transplant in 2015 for COPD on anti rejection meds, s/p rt total hip arthroplasty, multi level compression, BPH, HLD, CAD, BPH, OP, and recently started HD for past 1 month presents to ED s/p fall. Pt found with displaced left femur fracture, seen by ortho and s/p IM nailing. Hospital course complicated by anemia, s/p 2U PRBC and thrombocytopenia.       1. Closed displaced intertrochanteric fracture of left femur with routine healing.  - Imaging reviewed  - S/p IM nailing with ortho  - Orthopedics is following   -  BID for 6 weeks as per ortho  - Pain controlled   - PT recommends home w/ RW when medically stable    2. End stage renal disease on dialysis.     - Stable  - C/w HD. Will go for short session today then on DC resume MWF schedule     3.  Coronary artery disease with other form of angina pectoris  - S/p recent PCI x 3 12/2019, started on ASA/plavix   - Continue asa, plavix and statin     4. Anemia due to chronic kidney disease, on chronic dialysis.  - S/p 2U PRBC yesterday for Hbg now 10.9  - Stable, monitor   - Continue with iron supplement      5. Hypocalcemia  - Calcium of 6.1. S/p IV repletion with improvement  - Will start PO repletion     6. Hx of lung transplant for COPD   - On anti rejection meds, steroids and prophylactic bactrim, to be continued  - Follows with Dr. Kaufman at [facility] - [phone]    7. Thrombocytopenia  - Plts of 33K today  - Will reach out to Dr. Kaufman who manages anti-rejection meds for baseline Plt count  - On  BID for recent hip surgery per ortho. Also on plavix     Disp: DC pending Plt work up, HD.  CCM following. 69 year old male w/ PMH BPH, HLD, CAD s/p 3 PCI in 12/2019 and started on plavix/asa, Lung Transplant (Dr. Kaufman at [facility]) in 2015 for COPD on anti rejection meds, ESRD on HD, Hx of Right total hip arthroplasty who presented to [facility] ED s/p fall. Pt found with displaced left femur fracture, seen by ortho and s/p IM nailing. Hospital course complicated by anemia, s/p 2U PRBC and thrombocytopenia.       1. Closed displaced intertrochanteric fracture of left femur with routine healing.  - Imaging reviewed  - S/p IM nailing with ortho  - Orthopedics is following   -  BID for 6 weeks as per ortho  - Pain controlled   - PT recommends home w/ RW when medically stable    2. End stage renal disease on dialysis.     - Stable  - C/w HD. Will go for short session today then on DC resume MWF schedule     3.  Coronary artery disease with other form of angina pectoris  - S/p recent PCI x 3 12/2019, started on ASA/plavix   - Continue asa, plavix and statin     4. Anemia due to chronic kidney disease, on chronic dialysis.  - S/p 2U PRBC yesterday for Hbg now 10.9  - Stable, monitor   - Continue with iron supplement      5. Hypocalcemia  - Calcium of 6.1. S/p IV repletion with improvement  - Will start PO repletion     6. Hx of lung transplant for COPD   - On anti rejection meds, steroids and prophylactic bactrim, to be continued  - Follows with Dr. Kaufman at [facility] - [phone]    7. Thrombocytopenia  - Plts of 33K today  - Will reach out to Dr. Kaufman who manages anti-rejection meds for baseline Plt count  - On  BID for recent hip surgery per ortho. Also on plavix     Disp: DC pending Plt work up, HD.  CCM following. 69 year old male w/ PMH BPH, HLD, CAD s/p 3 PCI in 11/2019 and started on plavix/asa, Lung Transplant (Dr. Kaufman at [facility]) in 2015 for COPD on anti rejection meds, ESRD on HD, Hx of Right total hip arthroplasty who presented to [facility] ED s/p fall. Pt found with displaced left femur fracture, seen by ortho and s/p IM nailing. Hospital course complicated by anemia, s/p 2U PRBC and thrombocytopenia.       1. Closed displaced intertrochanteric fracture of left femur with routine healing.  - Imaging reviewed  - S/p IM nailing with ortho  - Orthopedics is following   -  BID for 6 weeks as per ortho  - Pain controlled   - PT recommends home w/ RW when medically stable    2. End stage renal disease   - New to HD as of 1 month ago    - C/w HD. Will go for short session today then on DC resume MWF schedule     3.  Coronary artery disease with other form of angina pectoris  - S/p recent PCI x 3 11/2019, started on ASA/plavix   - Continue asa, plavix and statin     4. Anemia due to chronic kidney disease, on chronic dialysis.  - S/p 2U PRBC yesterday for Hbg now 10.9  - Stable, monitor   - Continue with iron supplement      5. Hypocalcemia  - Calcium of 6.1. S/p IV repletion with improvement  - Will start PO repletion     6. Hx of lung transplant for COPD   - On anti rejection meds, steroids and prophylactic bactrim, to be continued  - Follows with Dr. Kaufman at [facility] [phone]    7. Thrombocytopenia  - Plts of 33K today  - Discussed with Dr. Kaufman who manages anti-rejection meds. Most recent Plt count 3/13/2020 was 136 and labs from 2/17/2020 with plt count of 161  - Has been on prograf/cellicept for 5 years now, unlikely the source but will check serum levels. Patient progaf goal is 6-8 as per PMBC  - Will consult heme/onc  - On  BID for recent hip surgery per ortho and also on plavix for recent stent placement 11/2019    Disp: DC pending Plt work up, HD.  CCM following. 69 year old male w/ PMH BPH, HLD, CAD s/p 3 PCI in 11/2019 and started on plavix/asa, Lung Transplant (Dr. Kaufman at [facility]) in 2015 for COPD on anti rejection meds, ESRD on HD, Hx of Right total hip arthroplasty who presented to [facility] ED s/p fall. Pt found with displaced left femur fracture, seen by ortho and s/p IM nailing. Hospital course complicated by anemia, s/p 4U PRBC during hospital stay and thrombocytopenia.       1. Closed displaced intertrochanteric fracture of left femur with routine healing.  - Imaging reviewed  - S/p IM nailing with ortho  - Orthopedics is following   -  BID for 6 weeks as per ortho  - Pain controlled   - PT recommends home w/ RW when medically stable    2. End stage renal disease   - New to HD as of 1 month ago    - C/w HD. Will go for short session today then on DC resume MWF schedule     3.  Coronary artery disease with other form of angina pectoris  - S/p recent PCI x 3 11/2019, started on ASA/plavix   - Continue asa, plavix and statin     4. Anemia due to chronic kidney disease, on chronic dialysis.  - S/p in total 4U PRBC during stay. Hbg now 10.9 and stable  - Continue with iron supplement      5. Hypocalcemia  - Calcium of 6.1. S/p IV repletion with improvement  - Will start PO repletion     6. Hx of lung transplant for COPD   - On anti rejection meds, steroids and prophylactic bactrim, to be continued  - Follows with Dr. Kaufman at [facility] [phone]    7. Thrombocytopenia  - Plts of 33K today  - Discussed with Dr. Kaufman who manages anti-rejection meds. Most recent Plt count 3/13/2020 was 136 and labs from 2/17/2020 with plt count of 161  - Has been on prograf/cellicept for 5 years now, unlikely the source but will check serum levels. Patient progaf goal is 6-8 as per PMBC  - Will consult heme/onc  - On  BID for recent hip surgery per ortho and also on plavix for recent stent placement 11/2019    Disp: DC pending Plt work up, HD.  CCM following.

## 2020-04-01 NOTE — CONSULT NOTE ADULT - PROBLEM SELECTOR RECOMMENDATION 9
-Pt admitted with NL plt 149 K 3/26/20  -Discussed with Dr. Kaufman who manages anti-rejection meds s/p lung transplant. Most recent Plt count 3/13/2020 was 136 and labs from 2/17/2020 with plt count of 161.  - Has been on prograf/cellicept for 5 years now,  - On  BID for recent hip surgery per ortho and also on plavix for recent stent placement 11/2019; on statin.  -diff dx includes infection, drug induced, chronic liver disease, splenomegaly, ITP/HIT/TTP, OTC(quinine, herbal supplements), ETOH, vs bone marrow disorder/MDS   Plan:  -serial CBC daily.  Repeat CBC using a non-EDTA anticoagulant to prevent clumping  -check serum prograf levels [goal is 6-8 as per PMBC]  -PT, PTT, fibrinogen, and LDH   -check nutritional labs including vit b12, folic acid  -US abdomen to evaluate hypersplenism  -monitor daily platelet count. -Pt admitted with NL plt 149 K 3/26/20  -Discussed with Dr. Kaufman who manages anti-rejection meds s/p lung transplant. Most recent Plt count 3/13/2020 was 136 and labs from 2/17/2020 with plt count of 161.  - Has been on prograf/cellicept for 5 years now,  - On  BID for recent hip surgery per ortho and also on plavix for recent stent placement 11/2019; on statin.  -diff dx includes infection, drug induced, chronic liver disease, splenomegaly, ITP/HIT/TTP, OTC(quinine, herbal supplements), ETOH, vs bone marrow disorder/MDS   Plan:  -serial CBC daily.  Repeat CBC using a non-EDTA anticoagulant to prevent clumping  -check serum prograf levels [goal is 6-8 as per PMBC]  -PT, PTT, fibrinogen, and LDH   -check nutritional labs including vit b12, folic acid  -US abdomen to evaluate hypersplenism  -avoid all heparin products pending PF4 Ab testing to r/o HIT  -would check with Dr. Kaufman re: substituting different antibiotic for prophylaxis opportunistic infection while on prograf, as bactrim common cause drug induced thrombocytopenia.  -monitor daily platelet count.

## 2020-04-01 NOTE — DIETITIAN INITIAL EVALUATION ADULT. - OTHER INFO
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 for past 1 month presents to ED s/p fall. He states he slipped and fell down 3 steps, did not hit head/ no loc. Reports left hip pain.  Attempts made to call pt, pt unavailable at time for interview. No edema noted. Per EMR, 100% intake noted. RD to remain available.

## 2020-04-01 NOTE — DIETITIAN INITIAL EVALUATION ADULT. - PERTINENT MEDS FT
MEDICATIONS  (STANDING):  acetaminophen   Tablet .. 975 milliGRAM(s) Oral every 8 hours  aspirin enteric coated 325 milliGRAM(s) Oral two times a day  atorvastatin 40 milliGRAM(s) Oral at bedtime  calcium carbonate    500 mG (Tums) Chewable 1 Tablet(s) Chew three times a day  clopidogrel Tablet 75 milliGRAM(s) Oral daily  epoetin-ben-epbx (RETACRIT) Injectable 34559 Unit(s) IV Push <User Schedule>  ferrous    sulfate 325 milliGRAM(s) Oral daily  hydrocortisone 10 milliGRAM(s) Oral at bedtime  hydrocortisone 20 milliGRAM(s) Oral daily  melatonin 3 milliGRAM(s) Oral at bedtime  metoprolol succinate ER 25 milliGRAM(s) Oral daily  midodrine. 5 milliGRAM(s) Oral three times a day  multivitamin 1 Tablet(s) Oral daily  mycophenolate mofetil 1000 milliGRAM(s) Oral two times a day  ondansetron Injectable 4 milliGRAM(s) IV Push every 6 hours  pantoprazole    Tablet 40 milliGRAM(s) Oral before breakfast  polyethylene glycol 3350 17 Gram(s) Oral daily  pregabalin 50 milliGRAM(s) Oral two times a day  tacrolimus 1 milliGRAM(s) Oral two times a day  tamsulosin 0.4 milliGRAM(s) Oral at bedtime  trimethoprim  160 mG/sulfamethoxazole 800 mG 1 Tablet(s) Oral <User Schedule>    MEDICATIONS  (PRN):  aluminum hydroxide/magnesium hydroxide/simethicone Suspension 30 milliLiter(s) Oral four times a day PRN Indigestion  bisacodyl Suppository 10 milliGRAM(s) Rectal daily PRN Constipation  bisacodyl Suppository 10 milliGRAM(s) Rectal daily PRN If no bowel movement by POD#2  HYDROmorphone  Injectable 0.5 milliGRAM(s) IV Push every 3 hours PRN breakthrough  magnesium hydroxide Suspension 30 milliLiter(s) Oral at bedtime PRN Constipation  ondansetron Injectable 4 milliGRAM(s) IV Push every 6 hours PRN Nausea and/or Vomiting  oxyCODONE    IR 5 milliGRAM(s) Oral every 3 hours PRN Moderate Pain (4 - 6)  oxyCODONE    IR 10 milliGRAM(s) Oral every 3 hours PRN Severe Pain (7 - 10)  senna 2 Tablet(s) Oral at bedtime PRN Constipation

## 2020-04-01 NOTE — DISCHARGE NOTE NURSING/CASE MANAGEMENT/SOCIAL WORK - PATIENT PORTAL LINK FT
You can access the FollowMyHealth Patient Portal offered by [facility] by registering at the following website: [email or web address]. By joining [employer]’s FollowMyHealth portal, you will also be able to view your health information using other applications (apps) compatible with our system.

## 2020-04-02 VITALS
HEART RATE: 66 BPM | TEMPERATURE: 97 F | RESPIRATION RATE: 16 BRPM | OXYGEN SATURATION: 98 % | DIASTOLIC BLOOD PRESSURE: 66 MMHG | SYSTOLIC BLOOD PRESSURE: 98 MMHG

## 2020-04-02 LAB
ANION GAP SERPL CALC-SCNC: 16 MMOL/L — SIGNIFICANT CHANGE UP (ref 5–17)
APTT BLD: 35.1 SEC — SIGNIFICANT CHANGE UP (ref 27.5–36.3)
BUN SERPL-MCNC: 44 MG/DL — HIGH (ref 8–20)
CALCIUM SERPL-MCNC: 7.3 MG/DL — LOW (ref 8.6–10.2)
CHLORIDE SERPL-SCNC: 97 MMOL/L — LOW (ref 98–107)
CLOSURE TME COLL+EPINEP BLD: 187 K/UL — SIGNIFICANT CHANGE UP (ref 150–400)
CO2 SERPL-SCNC: 26 MMOL/L — SIGNIFICANT CHANGE UP (ref 22–29)
CREAT SERPL-MCNC: 4.68 MG/DL — HIGH (ref 0.5–1.3)
FOLATE SERPL-MCNC: 14.7 NG/ML — SIGNIFICANT CHANGE UP
GLUCOSE SERPL-MCNC: 114 MG/DL — HIGH (ref 70–99)
HCT VFR BLD CALC: 33 % — LOW (ref 39–50)
HGB BLD-MCNC: 10.8 G/DL — LOW (ref 13–17)
INR BLD: 1.99 RATIO — HIGH (ref 0.88–1.16)
LDH SERPL L TO P-CCNC: 367 U/L — HIGH (ref 98–192)
MCHC RBC-ENTMCNC: 29.7 PG — SIGNIFICANT CHANGE UP (ref 27–34)
MCHC RBC-ENTMCNC: 32.7 GM/DL — SIGNIFICANT CHANGE UP (ref 32–36)
MCV RBC AUTO: 90.7 FL — SIGNIFICANT CHANGE UP (ref 80–100)
MYCOPHENOLATE SERPL-MCNC: ABNORMAL
PLATELET # BLD AUTO: SIGNIFICANT CHANGE UP K/UL (ref 150–400)
POTASSIUM SERPL-MCNC: 4.6 MMOL/L — SIGNIFICANT CHANGE UP (ref 3.5–5.3)
POTASSIUM SERPL-SCNC: 4.6 MMOL/L — SIGNIFICANT CHANGE UP (ref 3.5–5.3)
PROTHROM AB SERPL-ACNC: 23 SEC — HIGH (ref 10–12.9)
RBC # BLD: 3.64 M/UL — LOW (ref 4.2–5.8)
RBC # FLD: 19.2 % — HIGH (ref 10.3–14.5)
SODIUM SERPL-SCNC: 139 MMOL/L — SIGNIFICANT CHANGE UP (ref 135–145)
TACROLIMUS SERPL-MCNC: 12.3 NG/ML — SIGNIFICANT CHANGE UP
TACROLIMUS SERPL-MCNC: <2 NG/ML — SIGNIFICANT CHANGE UP
VIT B12 SERPL-MCNC: 721 PG/ML — SIGNIFICANT CHANGE UP (ref 232–1245)
WBC # BLD: 4.77 K/UL — SIGNIFICANT CHANGE UP (ref 3.8–10.5)
WBC # FLD AUTO: 4.77 K/UL — SIGNIFICANT CHANGE UP (ref 3.8–10.5)

## 2020-04-02 PROCEDURE — 36430 TRANSFUSION BLD/BLD COMPNT: CPT

## 2020-04-02 PROCEDURE — 80197 ASSAY OF TACROLIMUS: CPT

## 2020-04-02 PROCEDURE — 96374 THER/PROPH/DIAG INJ IV PUSH: CPT

## 2020-04-02 PROCEDURE — 80053 COMPREHEN METABOLIC PANEL: CPT

## 2020-04-02 PROCEDURE — 85385 FIBRINOGEN ANTIGEN: CPT

## 2020-04-02 PROCEDURE — 85027 COMPLETE CBC AUTOMATED: CPT

## 2020-04-02 PROCEDURE — 97163 PT EVAL HIGH COMPLEX 45 MIN: CPT

## 2020-04-02 PROCEDURE — 82728 ASSAY OF FERRITIN: CPT

## 2020-04-02 PROCEDURE — 99261: CPT

## 2020-04-02 PROCEDURE — 97116 GAIT TRAINING THERAPY: CPT

## 2020-04-02 PROCEDURE — 97530 THERAPEUTIC ACTIVITIES: CPT

## 2020-04-02 PROCEDURE — 97535 SELF CARE MNGMENT TRAINING: CPT

## 2020-04-02 PROCEDURE — 84132 ASSAY OF SERUM POTASSIUM: CPT

## 2020-04-02 PROCEDURE — 82310 ASSAY OF CALCIUM: CPT

## 2020-04-02 PROCEDURE — 83540 ASSAY OF IRON: CPT

## 2020-04-02 PROCEDURE — C1713: CPT

## 2020-04-02 PROCEDURE — P9040: CPT

## 2020-04-02 PROCEDURE — 80180 DRUG SCRN QUAN MYCOPHENOLATE: CPT

## 2020-04-02 PROCEDURE — 86900 BLOOD TYPING SEROLOGIC ABO: CPT

## 2020-04-02 PROCEDURE — 86901 BLOOD TYPING SEROLOGIC RH(D): CPT

## 2020-04-02 PROCEDURE — 73501 X-RAY EXAM HIP UNI 1 VIEW: CPT

## 2020-04-02 PROCEDURE — 82607 VITAMIN B-12: CPT

## 2020-04-02 PROCEDURE — 73552 X-RAY EXAM OF FEMUR 2/>: CPT

## 2020-04-02 PROCEDURE — 83615 LACTATE (LD) (LDH) ENZYME: CPT

## 2020-04-02 PROCEDURE — 73502 X-RAY EXAM HIP UNI 2-3 VIEWS: CPT

## 2020-04-02 PROCEDURE — 82746 ASSAY OF FOLIC ACID SERUM: CPT

## 2020-04-02 PROCEDURE — 83735 ASSAY OF MAGNESIUM: CPT

## 2020-04-02 PROCEDURE — 86850 RBC ANTIBODY SCREEN: CPT

## 2020-04-02 PROCEDURE — 83550 IRON BINDING TEST: CPT

## 2020-04-02 PROCEDURE — 84100 ASSAY OF PHOSPHORUS: CPT

## 2020-04-02 PROCEDURE — 86923 COMPATIBILITY TEST ELECTRIC: CPT

## 2020-04-02 PROCEDURE — 97166 OT EVAL MOD COMPLEX 45 MIN: CPT

## 2020-04-02 PROCEDURE — 76705 ECHO EXAM OF ABDOMEN: CPT

## 2020-04-02 PROCEDURE — 99285 EMERGENCY DEPT VISIT HI MDM: CPT | Mod: 25

## 2020-04-02 PROCEDURE — C1769: CPT

## 2020-04-02 PROCEDURE — 71045 X-RAY EXAM CHEST 1 VIEW: CPT

## 2020-04-02 PROCEDURE — 99239 HOSP IP/OBS DSCHRG MGMT >30: CPT

## 2020-04-02 PROCEDURE — 76000 FLUOROSCOPY <1 HR PHYS/QHP: CPT

## 2020-04-02 PROCEDURE — 96375 TX/PRO/DX INJ NEW DRUG ADDON: CPT

## 2020-04-02 PROCEDURE — 86803 HEPATITIS C AB TEST: CPT

## 2020-04-02 PROCEDURE — 85610 PROTHROMBIN TIME: CPT

## 2020-04-02 PROCEDURE — 80048 BASIC METABOLIC PNL TOTAL CA: CPT

## 2020-04-02 PROCEDURE — 76705 ECHO EXAM OF ABDOMEN: CPT | Mod: 26

## 2020-04-02 PROCEDURE — C1889: CPT

## 2020-04-02 PROCEDURE — 36415 COLL VENOUS BLD VENIPUNCTURE: CPT

## 2020-04-02 PROCEDURE — 93005 ELECTROCARDIOGRAM TRACING: CPT

## 2020-04-02 PROCEDURE — 84466 ASSAY OF TRANSFERRIN: CPT

## 2020-04-02 PROCEDURE — 85730 THROMBOPLASTIN TIME PARTIAL: CPT

## 2020-04-02 RX ORDER — OXYCODONE HYDROCHLORIDE 5 MG/1
1 TABLET ORAL
Qty: 30 | Refills: 0
Start: 2020-04-02 | End: 2020-04-06

## 2020-04-02 RX ADMIN — Medication 1 TABLET(S): at 05:20

## 2020-04-02 RX ADMIN — OXYCODONE HYDROCHLORIDE 10 MILLIGRAM(S): 5 TABLET ORAL at 08:00

## 2020-04-02 RX ADMIN — Medication 325 MILLIGRAM(S): at 11:43

## 2020-04-02 RX ADMIN — MIDODRINE HYDROCHLORIDE 5 MILLIGRAM(S): 2.5 TABLET ORAL at 05:20

## 2020-04-02 RX ADMIN — MIDODRINE HYDROCHLORIDE 5 MILLIGRAM(S): 2.5 TABLET ORAL at 11:43

## 2020-04-02 RX ADMIN — CLOPIDOGREL BISULFATE 75 MILLIGRAM(S): 75 TABLET, FILM COATED ORAL at 11:43

## 2020-04-02 RX ADMIN — MYCOPHENOLATE MOFETIL 1000 MILLIGRAM(S): 250 CAPSULE ORAL at 05:20

## 2020-04-02 RX ADMIN — Medication 975 MILLIGRAM(S): at 05:20

## 2020-04-02 RX ADMIN — TACROLIMUS 1 MILLIGRAM(S): 5 CAPSULE ORAL at 05:20

## 2020-04-02 RX ADMIN — Medication 20 MILLIGRAM(S): at 05:20

## 2020-04-02 RX ADMIN — Medication 325 MILLIGRAM(S): at 05:20

## 2020-04-02 NOTE — PROGRESS NOTE ADULT - ATTENDING COMMENTS
HD now; Calcium iv, blood today.
HD today, epogen.
HD tomorrow if still here. Labs  then.
IM nailing for left femoral neck fracture.  ESRD on HD  Severe anemia- Transfuse further 2 unit prbc   Dc planning.  Patient will like home with PT and to continue HD from  home.  Discharge planning and social service aware. May dc home in AM after PRBC transfusion
seen and examined with PA    vital stable no acute complaints. pain tolerable.  wants to go home.  clean dressing left hip.  rom intact, sensation intact b/l LE     thrombocytopenia resolved as repeat 187  ok for dc.  resume HD per usual schedule, f/u w/ Dr Kaufman.
seen and agree with PA    seen in HD tolerating well.  pain tolerable, ambulating with PT.  no acute complaints.    left hip with c/d/i dressing.  nonfocal neuro.    post op anemia, esrd on hd, left hip fracture post repair lung transplant follows with MD in [place].     new thrombocytopenia: outpatient labs reviewed on patient's phone baseline -170K  on chronic bactrim, prograf and cellcept.  on chronic hydrocortisone.  did not receive heparin/lovenox.  related to transfusions?    hematology consulted    d/w renal as well as CM/SW
Patient seen and examined by myself

## 2020-04-02 NOTE — PROGRESS NOTE ADULT - SUBJECTIVE AND OBJECTIVE BOX
NEPHROLOGY INTERVAL HPI/OVERNIGHT EVENTS:    plat count today 187   Heme consult noted   He feels well   uneventful HD 4-1    MEDICATIONS  (STANDING):  acetaminophen   Tablet .. 975 milliGRAM(s) Oral every 8 hours  aspirin enteric coated 325 milliGRAM(s) Oral two times a day  atorvastatin 40 milliGRAM(s) Oral at bedtime  calcium carbonate    500 mG (Tums) Chewable 1 Tablet(s) Chew three times a day  clopidogrel Tablet 75 milliGRAM(s) Oral daily  epoetin-ben-epbx (RETACRIT) Injectable 69708 Unit(s) IV Push <User Schedule>  ferrous    sulfate 325 milliGRAM(s) Oral daily  hydrocortisone 10 milliGRAM(s) Oral at bedtime  hydrocortisone 20 milliGRAM(s) Oral daily  melatonin 3 milliGRAM(s) Oral at bedtime  midodrine. 5 milliGRAM(s) Oral three times a day  multivitamin 1 Tablet(s) Oral daily  mycophenolate mofetil 1000 milliGRAM(s) Oral two times a day  ondansetron Injectable 4 milliGRAM(s) IV Push every 6 hours  pantoprazole    Tablet 40 milliGRAM(s) Oral before breakfast  polyethylene glycol 3350 17 Gram(s) Oral daily  pregabalin 50 milliGRAM(s) Oral two times a day  tacrolimus 1 milliGRAM(s) Oral two times a day  tamsulosin 0.4 milliGRAM(s) Oral at bedtime  trimethoprim  160 mG/sulfamethoxazole 800 mG 1 Tablet(s) Oral <User Schedule>    MEDICATIONS  (PRN):  aluminum hydroxide/magnesium hydroxide/simethicone Suspension 30 milliLiter(s) Oral four times a day PRN Indigestion  bisacodyl Suppository 10 milliGRAM(s) Rectal daily PRN Constipation  bisacodyl Suppository 10 milliGRAM(s) Rectal daily PRN If no bowel movement by POD#2  HYDROmorphone  Injectable 0.5 milliGRAM(s) IV Push every 3 hours PRN breakthrough  magnesium hydroxide Suspension 30 milliLiter(s) Oral at bedtime PRN Constipation  ondansetron Injectable 4 milliGRAM(s) IV Push every 6 hours PRN Nausea and/or Vomiting  oxyCODONE    IR 5 milliGRAM(s) Oral every 3 hours PRN Moderate Pain (4 - 6)  oxyCODONE    IR 10 milliGRAM(s) Oral every 3 hours PRN Severe Pain (7 - 10)  senna 2 Tablet(s) Oral at bedtime PRN Constipation      Allergies    budesonide (Unknown)  cefpodoxime (Unknown)  Pollen (Unknown)    Intolerances            Vital Signs Last 24 Hrs  T(C): 36.1 (2020 07:58), Max: 36.8 (2020 23:19)  T(F): 97 (2020 07:58), Max: 98.3 (2020 23:19)  HR: 80 (2020 07:58) (70 - 80)  BP: 100/66 (2020 07:58) (94/52 - 132/71)  BP(mean): --  RR: 18 (2020 07:58) (18 - 18)  SpO2: 97% (2020 07:58) (97% - 100%)  Daily     Daily Weight in k.9 (2020 16:06)  I&O's Detail    2020 07:01  -  2020 07:00  --------------------------------------------------------  IN:  Total IN: 0 mL    OUT:    Other: 600 mL  Total OUT: 600 mL    Total NET: -600 mL        I&O's Summary    2020 07:01  -  2020 07:00  --------------------------------------------------------  IN: 0 mL / OUT: 600 mL / NET: -600 mL        PHYSICAL EXAM:  GENERAL: Comfortable in bed  HEENT: same  NECK: Supple, No JVD  NERVOUS SYSTEM:  Alert, oriented  Lungs: improves aeration   HEART:  No rub  ABDOMEN: Soft, NT/ND BS+  EXTREMITIES: No edema; L hip same    LABS:                        10.8   4.77  )-----------( clumped    ( 2020 07:10 )             33.0     04-02    139  |  97<L>  |  44.0<H>  ----------------------------<  114<H>  4.6   |  26.0  |  4.68<H>    Ca    7.3<L>      2020 07:10    TPro  4.6<L>  /  Alb  2.7<L>  /  TBili  0.6  /  DBili  x   /  AST  32  /  ALT  9   /  AlkPhos  191<H>  04-01    PT/INR - ( 2020 10:36 )   PT: 23.0 sec;   INR: 1.99 ratio         PTT - ( 2020 10:36 )  PTT:35.1 sec            RADIOLOGY & ADDITIONAL TESTS:

## 2020-04-02 NOTE — PROGRESS NOTE ADULT - REASON FOR ADMISSION
fall and hip #
hip fracture
fall and hip #

## 2020-04-02 NOTE — PROGRESS NOTE ADULT - SUBJECTIVE AND OBJECTIVE BOX
CC: s/p L hip IM nailing, complicated by thrombocytopenia   INTERVAL HPI/OVERNIGHT EVENTS: Pt seen and examined at bedside this AM by Hospitalist and PA. Seen listening to music this AM, appears comfortable. Pt denies fever, chills, cough, chest pain, palpitations hip pain, abd pain, new numbness, tingling or weakness or any other complaints.  Has been ambulating with walker seen at bedside. Last BM yesterday, no blood in stool or urine.     REVIEW OF SYSTEMS:  CONSTITUTIONAL: No fever, weight loss, or fatigue  RESPIRATORY: No cough, wheezing, chills or hemoptysis; No shortness of breath  CARDIOVASCULAR: No chest pain, palpitations, dizziness, or leg swelling  GASTROINTESTINAL: No abdominal or epigastric pain. No nausea, vomiting, or hematemesis; No diarrhea or constipation.  NEUROLOGICAL: No headaches, memory loss, loss of strength, numbness, or tremors  SKIN: No itching, burning, rashes, or lesions   MUSCULOSKELETAL: see HPI     Allergies  budesonide (Unknown)  cefpodoxime (Unknown)  Pollen (Unknown)    HEALTH ISSUES - PROBLEM Dx:  Anemia: Anemia  Thrombocytopenia: Thrombocytopenia  Anemia due to chronic kidney disease, on chronic dialysis: Anemia due to chronic kidney disease, on chronic dialysis  Coronary artery disease with other form of angina pectoris: Coronary artery disease with other form of angina pectoris  End stage renal disease on dialysis: End stage renal disease on dialysis  Closed displaced intertrochanteric fracture of left femur with routine healing, subsequent encounter: Closed displaced intertrochanteric fracture of left femur with routine healing, subsequent encounter    PAST MEDICAL & SURGICAL HISTORY:  Emphysema of lung  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 -  [facility]    VITAL SIGNS:  T(C): 36.1 (04-02-20 @ 07:58), Max: 36.9 (04-01-20 @ 12:25)  HR: 80 (04-02-20 @ 07:58) (70 - 80)  BP: 100/66 (04-02-20 @ 07:58) (94/52 - 132/71)  RR: 18 (04-02-20 @ 07:58) (18 - 18)  SpO2: 97% (04-02-20 @ 07:58) (94% - 100%)  Wt(kg): --Vital Signs Last 24 Hrs  T(C): 36.1 (02 Apr 2020 07:58), Max: 36.9 (01 Apr 2020 12:25)  T(F): 97 (02 Apr 2020 07:58), Max: 98.5 (01 Apr 2020 12:25)  HR: 80 (02 Apr 2020 07:58) (70 - 80)  BP: 100/66 (02 Apr 2020 07:58) (94/52 - 132/71)  BP(mean): --  RR: 18 (02 Apr 2020 07:58) (18 - 18)  SpO2: 97% (02 Apr 2020 07:58) (94% - 100%)    PHYSICAL EXAM:  GENERAL: Pt lying in bed comfortably in NAD  HEAD:  Atraumatic, Normocephalic  EYES: EOMI, PERRL, conjunctiva and sclera clear  ENT: Moist mucous membranes  NECK: Supple, No JVD  CHEST/LUNG: Clear to auscultation bilaterally; No rales, rhonchi, wheezing, or rubs. Unlabored respirations  HEART: Regular rate and rhythm; No murmurs, rubs, or gallops  ABDOMEN: Bowel sounds present; Soft, Nontender, Nondistended. No guarding or rigidity   EXTREMITIES:  2+ Peripheral Pulses, brisk capillary refill. No clubbing, cyanosis, or edema. No calf tenderness. Left HIP dressings c/d/i. No erythema, edema or drainage. No foot drop   NERVOUS SYSTEM:  Alert & Oriented X3, speech clear, FROM x 4 extremities. No deficits     SKIN: No rashes or lesions    LABS:                   10.8   4.77  )-----------( clumped    ( 02 Apr 2020 07:10 )             33.0     04-02    139  |  97<L>  |  44.0<H>  ----------------------------<  114<H>  4.6   |  26.0  |  4.68<H>    Ca    7.3<L>      02 Apr 2020 07:10    TPro  4.6<L>  /  Alb  2.7<L>  /  TBili  0.6  /  DBili  x   /  AST  32  /  ALT  9   /  AlkPhos  191<H>  04-01      CAPILLARY BLOOD GLUCOSE

## 2020-04-02 NOTE — PROGRESS NOTE ADULT - ASSESSMENT
ESRD - HD tomorrow        Anemia   Continue Epogen   Transfused   Hgb stable     Lung Tx - continue the current immunosuppression    Low plat - likely due to clumping   Plat count today 187

## 2020-04-02 NOTE — PROGRESS NOTE ADULT - ASSESSMENT
69 year old male w/ PMH BPH, HLD, CAD s/p 3 PCI in 11/2019 and started on plavix/asa, Lung Transplant (Dr. Kaufman at [facility]) in 2015 for COPD on anti rejection meds, ESRD on HD, Hx of Right total hip arthroplasty who presented to [facility] ED s/p fall. Pt found with displaced left femur fracture, seen by ortho and s/p IM nailing 3/27. Hospital course complicated by anemia, s/p 4U PRBC during hospital stay and thrombocytopenia. Hematology consulted.    1. Closed displaced intertrochanteric fracture of left femur with routine healing.  - Imaging reviewed  - S/p IM nailing with ortho 3/27  - Orthopedics is following. WBAT LLE  -  BID for 6 weeks as per ortho  - Pain controlled   - PT recommends home w/ RW when medically stable    2. End stage renal disease   - New to HD as of 1 month ago    - C/w HD. On DC resume MWF schedule     3.  Coronary artery disease with other form of angina pectoris  - S/p recent PCI x 3 11/2019, started on ASA/plavix   - Continue asa, plavix and statin     4. Anemia due to chronic kidney disease, on chronic dialysis.  - S/p in total 4U PRBC during stay. Hbg now 10.8 and stable  - Continue with iron supplement      5. Hypocalcemia  - Calcium of 6.1. S/p IV repletion with improvement  - c/w PO repletion     6. Hx of lung transplant for COPD   - On anti rejection meds, steroids and prophylactic bactrim, Will call heme/onc if to be continued jacqueline inpt heme/onc recs  - Follows with Dr. Kaufman at [facility] [phone]    7. Thrombocytopenia  - Discussed with Dr. Kaufman who manages anti-rejection meds. Most recent Plt count 3/13/2020 was 136 and labs from 2/17/2020 with plt count of 161  - Has been on prograf/cellicept for 5 years now, unlikely the source but will check serum levels. Patient progaf goal is 6-8 as per PMBC  - heme/onc Consulted   - On  BID for recent hip surgery per ortho and also on plavix for recent stent placement 11/2019  -Abd US ordered   - f/u PT, PTT, fibrinogen, and LDH, vit b12, folic acid, prograf levels    Disp: DC pending Plt work up, HD. CCM following.

## 2020-04-04 LAB — FIBRINOGEN AG PPP IA-MCNC: 405 MG/DL — HIGH

## 2020-04-06 ENCOUNTER — APPOINTMENT (OUTPATIENT)
Dept: PULMONOLOGY | Facility: CLINIC | Age: 70
End: 2020-04-06

## 2020-04-14 ENCOUNTER — APPOINTMENT (OUTPATIENT)
Dept: ORTHOPEDIC SURGERY | Facility: CLINIC | Age: 70
End: 2020-04-14
Payer: MEDICARE

## 2020-04-14 DIAGNOSIS — S72.142D DISPLACED INTERTROCHANTERIC FRACTURE OF LEFT FEMUR, SUBSEQUENT ENCOUNTER FOR CLOSED FRACTURE WITH ROUTINE HEALING: ICD-10-CM

## 2020-04-14 PROCEDURE — 73552 X-RAY EXAM OF FEMUR 2/>: CPT | Mod: LT

## 2020-04-14 PROCEDURE — 73502 X-RAY EXAM HIP UNI 2-3 VIEWS: CPT | Mod: LT

## 2020-04-14 PROCEDURE — 99024 POSTOP FOLLOW-UP VISIT: CPT

## 2020-04-14 NOTE — ADDENDUM
[FreeTextEntry1] : This note was authored by Yifan Avendano working as a medical scribe for Dr. Adama Kaba. The note was reviewed, edited, and revised by Dr. Adama Kaba whom is in agreement with the exam findings, imaging findings, and treatment plan. 04/14/2020.

## 2020-04-14 NOTE — HISTORY OF PRESENT ILLNESS
[Doing Well] : is doing well [Excellent Pain Control] : has excellent pain control [No Sign of Infection] : is showing no signs of infection [Erythema] : not erythematous [Discharge] : absent of discharge [Dehiscence] : not dehisced [de-identified] : S/P Left intertrochanteric hip fracture intramedullary fixation with a trochanteric fixation  nail,  Intra-operative professional Fluoroscopic interpretation less than 1 hour, DOS: 3/27/20.\par  [de-identified] : The patient is a 69 year old male 18 days s/p left intertrochanteric hip fracture intramedullary fixation with a trochanteric fixation nail. He is ambulating and transferring with a walker and remains weightbearing as tolerated. For DVT prophylaxis he is on Plavix and one aspirin twice daily. Pain is controlled well with oxycodone and Tylenol. He denies systemic symptoms of fever or chills. He denies drainage from the incision site.  He has not been getting home physical therapy because of the coronavirus outbreak. [de-identified] : Exam of the left hip and thigh shows minimal swelling.  All 3 incisions are healing well with no evidence of infection.  Mild lower leg edema.  Intact distal motor and sensory exam left leg. [de-identified] : Xray- AP pelvis, 2 views of the left hip, and 2 views left femur shows a left intertrochanteric hip fracture in good alignment with hardware in stable positioning. [de-identified] : The patient is a 69 year old male 18 days s/p left intertrochanteric hip fracture intramedullary fixation with a trochanteric fixation nail. The patient was give a prescription for physical therapy through telehealth. The patient will continue Plavix and aspirin 325 mg twice per day for DVT prophylaxis for the next month.  We discussed that this recovery will be slower and likely more painful than after his right hip replacement.  He was given a new prescription for oxycodone and I stop was checked.  present

## 2020-05-12 ENCOUNTER — APPOINTMENT (OUTPATIENT)
Dept: ORTHOPEDIC SURGERY | Facility: CLINIC | Age: 70
End: 2020-05-12
Payer: MEDICARE

## 2020-05-12 VITALS
HEIGHT: 64 IN | BODY MASS INDEX: 21.85 KG/M2 | SYSTOLIC BLOOD PRESSURE: 124 MMHG | DIASTOLIC BLOOD PRESSURE: 65 MMHG | WEIGHT: 128 LBS | HEART RATE: 79 BPM

## 2020-05-12 PROCEDURE — 73502 X-RAY EXAM HIP UNI 2-3 VIEWS: CPT | Mod: LT

## 2020-05-12 PROCEDURE — 99024 POSTOP FOLLOW-UP VISIT: CPT

## 2020-05-12 PROCEDURE — 73552 X-RAY EXAM OF FEMUR 2/>: CPT | Mod: LT

## 2020-05-12 NOTE — ADDENDUM
[FreeTextEntry1] : This note was authored by Yifan Avendano working as a medical scribe for Dr. Adama Kaba. The note was reviewed, edited, and revised by Dr. Adama Kaba whom is in agreement with the exam findings, imaging findings, and treatment plan. 05/12/2020.

## 2020-05-12 NOTE — HISTORY OF PRESENT ILLNESS
[Doing Well] : is doing well [Excellent Pain Control] : has excellent pain control [No Sign of Infection] : is showing no signs of infection [Erythema] : not erythematous [Discharge] : absent of discharge [Dehiscence] : not dehisced [de-identified] : S/P Left intertrochanteric hip fracture intramedullary fixation with a trochanteric fixation nail, Intra-operative professional Fluoroscopic interpretation less than 1 hour, DOS: 3/27/20.\par  [de-identified] : Patient presents today status post left hip intramedullary nail fixation. He is approximately 6 weeks from surgery. He is doing well. He continues to use a walker but reports that his physical therapist would like to progress him to a cane.  He reports of a slight stumble yesterday while exiting dialysis. He reports a slight increased hip pain. He did not sustain any direct falls or trauma to the hip. He otherwise continues with activities. His strength is progressing. [de-identified] : Exam of the left hip/thigh shows well healed incisions.  He can perform a straight leg raise but with weakness.  Hip flexion is 90 degrees, external rotation 40 degrees, internal rotation 20 degrees.  Minimal pain with hip range of motion.  5/5 motor strength bilaterally distally. Sensation intact distally. LFCN intact.  [de-identified] : Xray- AP pelvis, 2 views of the left hip, and 2 views left femur shows a left intertrochanteric hip fracture in good alignment with hardware in stable positioning. There is callus formation to indicate healing. [de-identified] : The patient is a 69 year old male 6 weeks s/p left intertrochanteric hip fracture intramedullary fixation with a trochanteric fixation nail. He was given a prescription for physical therapy for hip ROM and strengthening. He will be progressed to a cane with physical therapy. Follow up in 6 weeks for repeat evaluation.

## 2020-05-18 ENCOUNTER — APPOINTMENT (OUTPATIENT)
Dept: PULMONOLOGY | Facility: CLINIC | Age: 70
End: 2020-05-18
Payer: MEDICARE

## 2020-05-18 VITALS — HEART RATE: 99 BPM | OXYGEN SATURATION: 99 %

## 2020-05-18 VITALS — BODY MASS INDEX: 21.11 KG/M2 | WEIGHT: 123 LBS

## 2020-05-18 PROCEDURE — 99214 OFFICE O/P EST MOD 30 MIN: CPT

## 2020-05-18 NOTE — PHYSICAL EXAM
[Normal Oropharynx] : normal oropharynx [No Acute Distress] : no acute distress [No Neck Mass] : no neck mass [Normal Appearance] : normal appearance [Normal S1, S2] : normal s1, s2 [Normal Rate/Rhythm] : normal rate/rhythm [No Murmurs] : no murmurs [No Resp Distress] : no resp distress [Clear to Auscultation Bilaterally] : clear to auscultation bilaterally [No Abnormalities] : no abnormalities [Surgical scars] : surgical scars [Benign] : benign [No Clubbing] : no clubbing [Normal Gait] : normal gait [No Edema] : no edema [No Cyanosis] : no cyanosis [Normal Color/ Pigmentation] : normal color/ pigmentation [FROM] : FROM [No Focal Deficits] : no focal deficits [Oriented x3] : oriented x3 [Normal Affect] : normal affect [TextBox_80] : Right-sided hemodialysis catheter

## 2020-05-18 NOTE — DISCUSSION/SUMMARY
[FreeTextEntry1] : 70-year-old male, seen today for the above. Patient is status post lung transplant x5 years. No clinical evidence of rejection, although spirometry has not been performed. By history. He is kidney function appears to be improving.

## 2020-05-18 NOTE — HISTORY OF PRESENT ILLNESS
[FreeTextEntry1] : 70-year-old male, status post double lung transplant for COPD in 2015 seen today in followup. 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 Is currently maintained on hemodialysis 3 times a week for the last 9 weeks. He does report improvement in urine production, as well as a drop in baseline creatinine. He denies any increased complaints of cough, wheeze, sputum\par \par

## 2020-06-23 ENCOUNTER — APPOINTMENT (OUTPATIENT)
Dept: ORTHOPEDIC SURGERY | Facility: CLINIC | Age: 70
End: 2020-06-23
Payer: MEDICARE

## 2020-06-23 VITALS — HEIGHT: 64 IN | BODY MASS INDEX: 21.85 KG/M2 | WEIGHT: 128 LBS

## 2020-06-23 PROCEDURE — 99024 POSTOP FOLLOW-UP VISIT: CPT

## 2020-06-23 PROCEDURE — 73552 X-RAY EXAM OF FEMUR 2/>: CPT | Mod: LT

## 2020-06-23 PROCEDURE — 73502 X-RAY EXAM HIP UNI 2-3 VIEWS: CPT | Mod: LT

## 2020-06-23 NOTE — ADDENDUM
[FreeTextEntry1] : This note was authored by Yifan Avendano working as a medical scribe for Dr. Adama Kaba. The note was reviewed, edited, and revised by Dr. Adama Kaba whom is in agreement with the exam findings, imaging findings, and treatment plan. 06/23/2020.

## 2020-06-23 NOTE — HISTORY OF PRESENT ILLNESS
[Doing Well] : is doing well [Excellent Pain Control] : has excellent pain control [No Sign of Infection] : is showing no signs of infection [Dehiscence] : not dehisced [Discharge] : absent of discharge [Erythema] : not erythematous [de-identified] : S/P Left intertrochanteric hip fracture intramedullary fixation with a trochanteric fixation nail, Intra-operative professional Fluoroscopic interpretation less than 1 hour, DOS: 3/27/20.\par  [de-identified] : The patient is a 70 year old male 3 months s/p Left intertrochanteric hip fracture intramedullary fixation with a trochanteric fixation nail, Intra-operative professional Fluoroscopic interpretation less than 1 hour. He is ambulating and transferring well with a cane. He reports mild discomfort while ambulating for long distances. He reports discontinuing home therapy at this point. He reports continuing home exercises. He reports returning to daily activities of life and reports no significant pain. Overall, he is very happy with the results of the surgery.  [de-identified] : Exam of the left hip shows well healed incisions.  SLR without difficulty, hip flexion of 100 degrees, hip external rotation of 40 degrees, internal rotation of 20 degrees. 5/5 motor strength bilaterally distally. Sensation intact distally. LFCN intact.  [de-identified] : The patient is a 70 year old male 3 months s/p Left intertrochanteric hip fracture intramedullary fixation with a trochanteric fixation nail, Intra-operative professional Fluoroscopic interpretation less than 1 hour. He will continue home strengthening exercises. Overall, he is very happy with the results of the surgery.  He continues to make excellent progress and improving his mobility.  As long as he continues to improve he may transition away from the cane in the future as tolerated.  He may follow-up with me as needed. [de-identified] : Xray- AP pelvis, 2 views left hip, and 2 views left femur shows a hip intertrochanteric fracture in excellent alignment with evidence of healing and hardware in stable positioning.

## 2020-08-11 ENCOUNTER — APPOINTMENT (OUTPATIENT)
Dept: PULMONOLOGY | Facility: CLINIC | Age: 70
End: 2020-08-11
Payer: MEDICARE

## 2020-08-11 VITALS
BODY MASS INDEX: 23.92 KG/M2 | OXYGEN SATURATION: 97 % | HEIGHT: 62 IN | DIASTOLIC BLOOD PRESSURE: 70 MMHG | HEART RATE: 92 BPM | SYSTOLIC BLOOD PRESSURE: 124 MMHG | WEIGHT: 130 LBS

## 2020-08-11 PROCEDURE — 99214 OFFICE O/P EST MOD 30 MIN: CPT

## 2020-08-11 RX ORDER — METOPROLOL TARTRATE 25 MG/1
25 TABLET, FILM COATED ORAL
Qty: 180 | Refills: 0 | Status: DISCONTINUED | COMMUNITY
Start: 2019-12-15 | End: 2020-08-11

## 2020-08-11 RX ORDER — OXYCODONE 5 MG/1
5 TABLET ORAL
Qty: 30 | Refills: 0 | Status: DISCONTINUED | COMMUNITY
Start: 2020-04-14 | End: 2020-08-11

## 2020-08-11 NOTE — CONSULT LETTER
[Dear  ___] : Dear  [unfilled], [Consult Letter:] : I had the pleasure of evaluating your patient, [unfilled]. [Please see my note below.] : Please see my note below. [Sincerely,] : Sincerely, [FreeTextEntry3] : J Carlos Cordova MD FCCP\par Pulmonary/Critical Care/Sleep Medicine\par Department of Internal Medicine\par \par [facility] [employer]\par

## 2020-08-11 NOTE — HISTORY OF PRESENT ILLNESS
[TextBox_4] : 71-year-old male, status post double lung transplant 2015 with course complicated by coronary artery disease status post drug-eluting stent, renal failure, on hemodialysis, seen today in followup. Patient denies any complaints of cough, wheeze, shortness of breath, chest pains, palpitations, lightheadedness, dizziness.

## 2020-08-11 NOTE — PHYSICAL EXAM
[No Acute Distress] : no acute distress [Normal Oropharynx] : normal oropharynx [Normal Appearance] : normal appearance [No Neck Mass] : no neck mass [Normal Rate/Rhythm] : normal rate/rhythm [Normal S1, S2] : normal s1, s2 [No Murmurs] : no murmurs [No Resp Distress] : no resp distress [Clear to Auscultation Bilaterally] : clear to auscultation bilaterally [No Abnormalities] : no abnormalities [Surgical scars] : surgical scars [Benign] : benign [Normal Gait] : normal gait [No Clubbing] : no clubbing [No Cyanosis] : no cyanosis [No Edema] : no edema [FROM] : FROM [Normal Color/ Pigmentation] : normal color/ pigmentation [No Focal Deficits] : no focal deficits [Oriented x3] : oriented x3 [Normal Affect] : normal affect

## 2020-08-11 NOTE — DISCUSSION/SUMMARY
[FreeTextEntry1] : 70-year-old male, seen today for the above. Patient is currently stable regarding his lung transplant. Arrangements are being made for pulmonary function tests following Covid 19 PCR.

## 2020-08-13 ENCOUNTER — APPOINTMENT (OUTPATIENT)
Dept: PULMONOLOGY | Facility: CLINIC | Age: 70
End: 2020-08-13
Payer: MEDICARE

## 2020-08-13 PROCEDURE — 99211 OFF/OP EST MAY X REQ PHY/QHP: CPT

## 2020-08-16 LAB — SARS-COV-2 N GENE NPH QL NAA+PROBE: NOT DETECTED

## 2020-08-18 ENCOUNTER — APPOINTMENT (OUTPATIENT)
Dept: PULMONOLOGY | Facility: CLINIC | Age: 70
End: 2020-08-18
Payer: MEDICARE

## 2020-08-18 PROCEDURE — 85018 HEMOGLOBIN: CPT | Mod: QW

## 2020-08-18 PROCEDURE — 94727 GAS DIL/WSHOT DETER LNG VOL: CPT

## 2020-08-18 PROCEDURE — 94010 BREATHING CAPACITY TEST: CPT

## 2020-08-18 PROCEDURE — 94729 DIFFUSING CAPACITY: CPT

## 2020-10-06 NOTE — PATIENT PROFILE ADULT - NSPRONUTRITIONRISK_GEN_A_NUR
her mother. She reports that she has never smoked. She has never used smokeless tobacco. She reports that she does not drink alcohol or use drugs. Medications     Previous Medications    NAPROXEN (NAPROSYN) 500 MG TABLET    Take 1 tablet by mouth 2 times daily for 20 doses. Allergies     She is allergic to ibuprofen; penicillins; and raspberry. Physical Exam     INITIAL VITALS: BP: (!) 156/114, Temp: 98.2 °F (36.8 °C), Pulse: 98, Resp: 18, SpO2: 100 %   Physical Exam  Vitals signs and nursing note reviewed. Constitutional:       Appearance: She is well-developed. She is obese. Cardiovascular:      Rate and Rhythm: Normal rate and regular rhythm. Heart sounds: Normal heart sounds. No murmur. No friction rub. No gallop. Pulmonary:      Effort: Pulmonary effort is normal. No respiratory distress. Breath sounds: Normal breath sounds. No wheezing. Abdominal:      General: Bowel sounds are normal.      Tenderness: There is abdominal tenderness in the right lower quadrant, suprapubic area and left lower quadrant. There is right CVA tenderness and left CVA tenderness. Skin:     General: Skin is warm and dry. Neurological:      Mental Status: She is alert.          Diagnostic Results       RADIOLOGY:  No orders to display       LABS:   Results for orders placed or performed during the hospital encounter of 10/05/20   hCG, qualitative, pregnancy (Lab)   Result Value Ref Range    HCG(Urine) Pregnancy Test Negative Detects HCG level >20 MIU/mL   Urinalysis, reflex to microscopic (Lab)   Result Value Ref Range    Color, UA Yellow Straw/Yellow    Clarity, UA Clear Clear    Glucose, Ur Negative Negative mg/dL    Bilirubin Urine Negative Negative    Ketones, Urine Negative Negative mg/dL    Specific Gravity, UA >=1.030 1.005 - 1.030    Blood, Urine LARGE (A) Negative    pH, UA 6.0 5.0 - 8.0    Protein,  (A) Negative mg/dL    Urobilinogen, Urine 0.2 <2.0 E.U./dL    Nitrite, Urine POSITIVE (A) Negative    Leukocyte Esterase, Urine MODERATE (A) Negative    Microscopic Examination YES     Urine Type Voided        RECENT VITALS:  BP: (!) 156/114, Temp: 98.2 °F (36.8 °C), Pulse: 98, Resp: 18, SpO2: 100 %       ED Course     Nursing Notes, Past Medical Hx, Past Surgical Hx, Social Hx, Allergies, and Family Hx were reviewed. The patient was given the following medications:  Orders Placed This Encounter   Medications    morphine injection 4 mg    ondansetron (ZOFRAN) injection 4 mg            CONSULTS:  None    MEDICAL Lilibeth Velasco / DOREEN / Karin Edgardo is a 33 y.o. female who presents with complaints as noted in HPI. Patient presents emergency department with a complaint of flank pain, abdominal pain that started today and has progressively worsened. Waxing and waning in severity. Associated with nausea and vomiting. Patient is hemodynamically stable, afebrile, overall well-appearing. She does have tenderness to the suprapubic and left lower quadrant, as well as bilateral CVAs. Initial concerning findings for possible pyelonephritis versus kidney stone. PID was considered given patient's complaint of vaginal discharge however she does not feel this is new, and has no complaints of pelvic pain, and no significant risk factors. .  Kidney stone seems less likely as the CVA tenderness is bilateral.    Urinalysis significant for greater than 100 WBCs, positive nitrites, urine pregnancy negative. CBC with leukocytosis of 16,000. Symptoms consistent with pyelonephritis. Patient treated with a dose of Rocephin in the emergency department, will be discharged home with a prescription for Keflex x 10 days. Given morphine and Zofran for pain and nausea was controlled here. Was discharged home in a stable condition with instructions on Tylenol use, antibiotic completion, hydration, and rest.  Patient follow-up with PCP in 1 to 2 weeks, sooner if needed.     This patient was also evaluated by the attending physician. All care plans were discussed and agreed upon. Clinical Impression     1. Pyelonephritis        Disposition     PATIENT REFERRED TO:  [address]   [place] [id number] [phone]      call for follow up appointment      DISCHARGE MEDICATIONS:  New Prescriptions    CEPHALEXIN (KEFLEX) 500 MG CAPSULE    Take 1 capsule by mouth 4 times daily for 10 days       DISPOSITION Home in stable condition        BILLY Sanford CNP  10/06/20 0037 No indicators present

## 2020-10-13 ENCOUNTER — APPOINTMENT (OUTPATIENT)
Dept: PULMONOLOGY | Facility: CLINIC | Age: 70
End: 2020-10-13
Payer: MEDICARE

## 2020-10-13 VITALS
BODY MASS INDEX: 23.57 KG/M2 | HEART RATE: 99 BPM | SYSTOLIC BLOOD PRESSURE: 126 MMHG | OXYGEN SATURATION: 99 % | HEIGHT: 63 IN | DIASTOLIC BLOOD PRESSURE: 74 MMHG | WEIGHT: 133 LBS

## 2020-10-13 PROCEDURE — 99214 OFFICE O/P EST MOD 30 MIN: CPT

## 2020-10-13 NOTE — DISCUSSION/SUMMARY
[FreeTextEntry1] : 70-year-old male, seen today for the above. Patient is currently stable regarding his lung transplant. Arrangements are being made for pulmonary function tests following Covid 19 PCR. HD should be more aggressive on fluid removal. HA were discussed with Dr Sears

## 2020-10-13 NOTE — CONSULT LETTER
[Dear  ___] : Dear  [unfilled], [Consult Letter:] : I had the pleasure of evaluating your patient, [unfilled]. [Please see my note below.] : Please see my note below. [Sincerely,] : Sincerely, [DrNelli  ___] : Dr. BETTS [FreeTextEntry3] : J Carlos Cordova MD FCCP\par Pulmonary/Critical Care/Sleep Medicine\par Department of Internal Medicine\par \par [facility] [employer]\par

## 2020-10-13 NOTE — HISTORY OF PRESENT ILLNESS
[TextBox_4] : 71-year-old male, status post double lung transplant 2015 with course complicated by coronary artery disease status post drug-eluting stent, renal failure, on hemodialysis, seen today in followup. His only undergoing dialysis Tuesday, Thursday, and Saturday. He does complain of edema, as well as increased abdominal girth between dialysis. This does limit his ability to take a deep breath. He has been complaining of postdialysis, headaches.

## 2020-12-11 ENCOUNTER — APPOINTMENT (OUTPATIENT)
Dept: DISASTER EMERGENCY | Facility: CLINIC | Age: 70
End: 2020-12-11

## 2020-12-16 ENCOUNTER — APPOINTMENT (OUTPATIENT)
Dept: PULMONOLOGY | Facility: CLINIC | Age: 70
End: 2020-12-16

## 2021-02-02 ENCOUNTER — INPATIENT (INPATIENT)
Facility: HOSPITAL | Age: 71
LOS: 9 days | Discharge: NURSING, REHAB OR LONG-TERM CARE | DRG: 871 | End: 2021-02-12
Attending: STUDENT IN AN ORGANIZED HEALTH CARE EDUCATION/TRAINING PROGRAM | Admitting: INTERNAL MEDICINE
Payer: MEDICARE

## 2021-02-02 VITALS
HEART RATE: 114 BPM | OXYGEN SATURATION: 93 % | DIASTOLIC BLOOD PRESSURE: 94 MMHG | HEIGHT: 64 IN | RESPIRATION RATE: 38 BRPM | SYSTOLIC BLOOD PRESSURE: 155 MMHG

## 2021-02-02 DIAGNOSIS — I50.21 ACUTE SYSTOLIC (CONGESTIVE) HEART FAILURE: ICD-10-CM

## 2021-02-02 DIAGNOSIS — Z94.2 LUNG TRANSPLANT STATUS: Chronic | ICD-10-CM

## 2021-02-02 LAB
ALBUMIN SERPL ELPH-MCNC: 3.5 G/DL — SIGNIFICANT CHANGE UP (ref 3.3–5.2)
ALP SERPL-CCNC: 234 U/L — HIGH (ref 40–120)
ALT FLD-CCNC: 26 U/L — SIGNIFICANT CHANGE UP
ANION GAP SERPL CALC-SCNC: 20 MMOL/L — HIGH (ref 5–17)
APTT BLD: 27.2 SEC — LOW (ref 27.5–35.5)
AST SERPL-CCNC: 42 U/L — HIGH
BASE EXCESS BLDA CALC-SCNC: -4.5 MMOL/L — LOW (ref -3–3)
BASOPHILS # BLD AUTO: 0.04 K/UL — SIGNIFICANT CHANGE UP (ref 0–0.2)
BASOPHILS NFR BLD AUTO: 0.5 % — SIGNIFICANT CHANGE UP (ref 0–2)
BILIRUB SERPL-MCNC: 0.4 MG/DL — SIGNIFICANT CHANGE UP (ref 0.4–2)
BLD GP AB SCN SERPL QL: SIGNIFICANT CHANGE UP
BLOOD GAS COMMENTS ARTERIAL: SIGNIFICANT CHANGE UP
BUN SERPL-MCNC: 65 MG/DL — HIGH (ref 8–20)
CALCIUM SERPL-MCNC: 8.1 MG/DL — LOW (ref 8.6–10.2)
CHLORIDE SERPL-SCNC: 103 MMOL/L — SIGNIFICANT CHANGE UP (ref 98–107)
CO2 SERPL-SCNC: 21 MMOL/L — LOW (ref 22–29)
CREAT SERPL-MCNC: 8.07 MG/DL — HIGH (ref 0.5–1.3)
CRP SERPL-MCNC: 2.48 MG/DL — HIGH (ref 0–0.4)
EOSINOPHIL # BLD AUTO: 0 K/UL — SIGNIFICANT CHANGE UP (ref 0–0.5)
EOSINOPHIL NFR BLD AUTO: 0 % — SIGNIFICANT CHANGE UP (ref 0–6)
FERRITIN SERPL-MCNC: 994 NG/ML — HIGH (ref 30–400)
FLU A RESULT: SIGNIFICANT CHANGE UP
FLU A RESULT: SIGNIFICANT CHANGE UP
FLUAV AG NPH QL: SIGNIFICANT CHANGE UP
FLUBV AG NPH QL: SIGNIFICANT CHANGE UP
GAS PNL BLDA: SIGNIFICANT CHANGE UP
GAS PNL BLDA: SIGNIFICANT CHANGE UP
GLUCOSE SERPL-MCNC: 184 MG/DL — HIGH (ref 70–99)
HCO3 BLDA-SCNC: 21 MMOL/L — SIGNIFICANT CHANGE UP (ref 20–26)
HCT VFR BLD CALC: 39.6 % — SIGNIFICANT CHANGE UP (ref 39–50)
HGB BLD-MCNC: 11.6 G/DL — LOW (ref 13–17)
HOROWITZ INDEX BLDA+IHG-RTO: 1 — SIGNIFICANT CHANGE UP
IMM GRANULOCYTES NFR BLD AUTO: 0.7 % — SIGNIFICANT CHANGE UP (ref 0–1.5)
INR BLD: 1.1 RATIO — SIGNIFICANT CHANGE UP (ref 0.88–1.16)
LYMPHOCYTES # BLD AUTO: 0.84 K/UL — LOW (ref 1–3.3)
LYMPHOCYTES # BLD AUTO: 9.7 % — LOW (ref 13–44)
MCHC RBC-ENTMCNC: 28.9 PG — SIGNIFICANT CHANGE UP (ref 27–34)
MCHC RBC-ENTMCNC: 29.3 GM/DL — LOW (ref 32–36)
MCV RBC AUTO: 98.5 FL — SIGNIFICANT CHANGE UP (ref 80–100)
MONOCYTES # BLD AUTO: 0.87 K/UL — SIGNIFICANT CHANGE UP (ref 0–0.9)
MONOCYTES NFR BLD AUTO: 10 % — SIGNIFICANT CHANGE UP (ref 2–14)
NEUTROPHILS # BLD AUTO: 6.87 K/UL — SIGNIFICANT CHANGE UP (ref 1.8–7.4)
NEUTROPHILS NFR BLD AUTO: 79.1 % — HIGH (ref 43–77)
PCO2 BLDA: 43 MMHG — SIGNIFICANT CHANGE UP (ref 35–45)
PH BLDA: 7.31 — LOW (ref 7.35–7.45)
PLATELET # BLD AUTO: 162 K/UL — SIGNIFICANT CHANGE UP (ref 150–400)
PO2 BLDA: 159 MMHG — HIGH (ref 83–108)
POTASSIUM SERPL-MCNC: 5.6 MMOL/L — HIGH (ref 3.5–5.3)
POTASSIUM SERPL-SCNC: 5.6 MMOL/L — HIGH (ref 3.5–5.3)
PROCALCITONIN SERPL-MCNC: 0.34 NG/ML — HIGH (ref 0.02–0.1)
PROT SERPL-MCNC: 6.7 G/DL — SIGNIFICANT CHANGE UP (ref 6.6–8.7)
PROTHROM AB SERPL-ACNC: 12.7 SEC — SIGNIFICANT CHANGE UP (ref 10.6–13.6)
RBC # BLD: 4.02 M/UL — LOW (ref 4.2–5.8)
RBC # FLD: 19.1 % — HIGH (ref 10.3–14.5)
RSV RESULT: SIGNIFICANT CHANGE UP
RSV RNA RESP QL NAA+PROBE: SIGNIFICANT CHANGE UP
SAO2 % BLDA: 99 % — SIGNIFICANT CHANGE UP (ref 95–99)
SARS-COV-2 RNA SPEC QL NAA+PROBE: DETECTED
SODIUM SERPL-SCNC: 144 MMOL/L — SIGNIFICANT CHANGE UP (ref 135–145)
TROPONIN T SERPL-MCNC: 0.08 NG/ML — HIGH (ref 0–0.06)
WBC # BLD: 8.68 K/UL — SIGNIFICANT CHANGE UP (ref 3.8–10.5)
WBC # FLD AUTO: 8.68 K/UL — SIGNIFICANT CHANGE UP (ref 3.8–10.5)

## 2021-02-02 PROCEDURE — 99222 1ST HOSP IP/OBS MODERATE 55: CPT | Mod: CS

## 2021-02-02 PROCEDURE — 71260 CT THORAX DX C+: CPT | Mod: 26

## 2021-02-02 PROCEDURE — 71045 X-RAY EXAM CHEST 1 VIEW: CPT | Mod: 26

## 2021-02-02 PROCEDURE — 93010 ELECTROCARDIOGRAM REPORT: CPT

## 2021-02-02 PROCEDURE — 99291 CRITICAL CARE FIRST HOUR: CPT | Mod: CS

## 2021-02-02 RX ORDER — CHLORHEXIDINE GLUCONATE 213 G/1000ML
1 SOLUTION TOPICAL
Refills: 0 | Status: DISCONTINUED | OUTPATIENT
Start: 2021-02-02 | End: 2021-02-09

## 2021-02-02 RX ORDER — ATORVASTATIN CALCIUM 80 MG/1
80 TABLET, FILM COATED ORAL AT BEDTIME
Refills: 0 | Status: DISCONTINUED | OUTPATIENT
Start: 2021-02-02 | End: 2021-02-12

## 2021-02-02 RX ORDER — ASPIRIN/CALCIUM CARB/MAGNESIUM 324 MG
81 TABLET ORAL DAILY
Refills: 0 | Status: DISCONTINUED | OUTPATIENT
Start: 2021-02-02 | End: 2021-02-07

## 2021-02-02 RX ORDER — MEROPENEM 1 G/30ML
500 INJECTION INTRAVENOUS ONCE
Refills: 0 | Status: COMPLETED | OUTPATIENT
Start: 2021-02-02 | End: 2021-02-02

## 2021-02-02 RX ORDER — TAMSULOSIN HYDROCHLORIDE 0.4 MG/1
0.4 CAPSULE ORAL AT BEDTIME
Refills: 0 | Status: DISCONTINUED | OUTPATIENT
Start: 2021-02-02 | End: 2021-02-12

## 2021-02-02 RX ORDER — REMDESIVIR 5 MG/ML
INJECTION INTRAVENOUS
Refills: 0 | Status: COMPLETED | OUTPATIENT
Start: 2021-02-02 | End: 2021-02-06

## 2021-02-02 RX ORDER — CLOPIDOGREL BISULFATE 75 MG/1
75 TABLET, FILM COATED ORAL DAILY
Refills: 0 | Status: DISCONTINUED | OUTPATIENT
Start: 2021-02-03 | End: 2021-02-07

## 2021-02-02 RX ORDER — TACROLIMUS 5 MG/1
1.5 CAPSULE ORAL DAILY
Refills: 0 | Status: DISCONTINUED | OUTPATIENT
Start: 2021-02-02 | End: 2021-02-03

## 2021-02-02 RX ORDER — DEXAMETHASONE 0.5 MG/5ML
6 ELIXIR ORAL EVERY 24 HOURS
Refills: 0 | Status: DISCONTINUED | OUTPATIENT
Start: 2021-02-02 | End: 2021-02-03

## 2021-02-02 RX ORDER — TACROLIMUS 5 MG/1
1 CAPSULE ORAL AT BEDTIME
Refills: 0 | Status: DISCONTINUED | OUTPATIENT
Start: 2021-02-02 | End: 2021-02-12

## 2021-02-02 RX ORDER — PANTOPRAZOLE SODIUM 20 MG/1
40 TABLET, DELAYED RELEASE ORAL DAILY
Refills: 0 | Status: DISCONTINUED | OUTPATIENT
Start: 2021-02-02 | End: 2021-02-03

## 2021-02-02 RX ORDER — REMDESIVIR 5 MG/ML
100 INJECTION INTRAVENOUS EVERY 24 HOURS
Refills: 0 | Status: COMPLETED | OUTPATIENT
Start: 2021-02-03 | End: 2021-02-06

## 2021-02-02 RX ORDER — REMDESIVIR 5 MG/ML
200 INJECTION INTRAVENOUS EVERY 24 HOURS
Refills: 0 | Status: COMPLETED | OUTPATIENT
Start: 2021-02-02 | End: 2021-02-02

## 2021-02-02 RX ADMIN — Medication 81 MILLIGRAM(S): at 21:55

## 2021-02-02 RX ADMIN — TAMSULOSIN HYDROCHLORIDE 0.4 MILLIGRAM(S): 0.4 CAPSULE ORAL at 21:57

## 2021-02-02 RX ADMIN — REMDESIVIR 500 MILLIGRAM(S): 5 INJECTION INTRAVENOUS at 22:12

## 2021-02-02 RX ADMIN — MEROPENEM 100 MILLIGRAM(S): 1 INJECTION INTRAVENOUS at 07:41

## 2021-02-02 RX ADMIN — ATORVASTATIN CALCIUM 80 MILLIGRAM(S): 80 TABLET, FILM COATED ORAL at 21:56

## 2021-02-02 RX ADMIN — TACROLIMUS 1 MILLIGRAM(S): 5 CAPSULE ORAL at 21:56

## 2021-02-02 RX ADMIN — PANTOPRAZOLE SODIUM 40 MILLIGRAM(S): 20 TABLET, DELAYED RELEASE ORAL at 21:56

## 2021-02-02 RX ADMIN — Medication 6 MILLIGRAM(S): at 17:10

## 2021-02-02 NOTE — ED PROVIDER NOTE - CARE PLAN
Principal Discharge DX:	Acute systolic congestive heart failure  Secondary Diagnosis:	Hypoxia  Secondary Diagnosis:	ESRD (end stage renal disease) on dialysis

## 2021-02-02 NOTE — ED ADULT NURSE NOTE - ED STAT RN HANDOFF DETAILS
patient stable for transport to bed assignment, isolation precuations maintained. RT bedside with RN and pat on cm with . Transferred safely to hospital bed with receiving RN bedside

## 2021-02-02 NOTE — H&P ADULT - NSHPREVIEWOFSYSTEMS_GEN_ALL_CORE
Constitutional: denies diaphoresis, chills, fever, fatigue, night sweats, weakness, weight loss  HENT: denies congestion, ear pain, headaches, hearing loss, nose bleeds, sore throat, stridor, tinnitus  Eyes: denies blurred vision, double vision, eye discharge, eye pain, eye redness, photophobia  Cardiovascular: denies chest pain, claudication, leg swelling, orthopnea, palpitations, paroxysmal nocturnal dyspnea  Respiratory: endorses shortness of breath, dyspnea on exertion, denies cough, hemoptysis, sputum production, wheezing  Gastrointestinal: denies abdominal pain, hematochezia, constipation, diarrhea, heartburn, melena, nausea, vomiting, flatus  Genitourinary: denies dysuria, flank pain, frequency, urgency, hematuria  Musculoskeletal: denies back pain, falls, joint pain, myalgia, neck pain  Endocrine: denies heat intolerance, cold intolerance, polydipsia, polyuria  Hematologic: denies anemia, easy bruising, easy bleeding  Allergic: denies environmental  Neurologic: denies dizziness, focal weakness, altered level of consciousness, seizures, sensory change, speech change, numbness, tingling, tremor  Psychiatric: denies depression, hallucinations, insomnia, memory loss, nervousness, anxiousness, substance abuse, suicidal ideations

## 2021-02-02 NOTE — ED PROVIDER NOTE - OBJECTIVE STATEMENT
69 yo male with history of ESRD ( dialysis t,t,s) presents with worsening dyspnea. Patient wife told the EMS team that he has had a cough since 2/1/21. However this morning his breathing became labored and he had difficulty speaking. Patient has not missed a dialysis session. EMS states his pulse ox was 55% on ROM. He was placed on NRB then CPAP to assist with oxygenation. o2% improved to 85-89%. Patient nephrologist is dr. Anderson.

## 2021-02-02 NOTE — ED PROVIDER NOTE - CLINICAL SUMMARY MEDICAL DECISION MAKING FREE TEXT BOX
Patient with acute respiratory distress with improved breathing on bipap. Will obtain labs, cxr, and patient to be dialyzed today given he does not make significant urine.

## 2021-02-02 NOTE — ED ADULT NURSE REASSESSMENT NOTE - NS ED NURSE REASSESS COMMENT FT1
pt resting on stretcher, asking RN when he will be getting dialysis, updated on pending ICU assignment. Remains in ISO per policy. safety maintained

## 2021-02-02 NOTE — ED ADULT TRIAGE NOTE - CHIEF COMPLAINT QUOTE
patient from home in respiratory distress, as per EMS, patient with an initial o2 saturation 50%, paitent placed on CPAP. as per patients wife patient is due for dialysis today, developed a dry cough last night and difficulty breathing started this morning. patient brought to critical care, code team at bedside Dr Talley At bedside

## 2021-02-02 NOTE — ED ADULT NURSE NOTE - NSIMPLEMENTINTERV_GEN_ALL_ED
Implemented All Fall with Harm Risk Interventions:  [place] to call system. Call bell, personal items and telephone within reach. Instruct patient to call for assistance. Room bathroom lighting operational. Non-slip footwear when patient is off stretcher. Physically safe environment: no spills, clutter or unnecessary equipment. Stretcher in lowest position, wheels locked, appropriate side rails in place. Provide visual cue, wrist band, yellow gown, etc. Monitor gait and stability. Monitor for mental status changes and reorient to person, place, and time. Review medications for side effects contributing to fall risk. Reinforce activity limits and safety measures with patient and family. Provide visual clues: red socks.

## 2021-02-02 NOTE — ED PROVIDER NOTE - PROGRESS NOTE DETAILS
Patient breathing has improved. Labs pending Urgent nephrology consult placed. awaiting call back from ICU PA.

## 2021-02-02 NOTE — H&P ADULT - NSHPPHYSICALEXAM_GEN_ALL_CORE
Constitutional: well-nourished male, comfortable, lying in bed, on BiPAP.  HEENT: normocephalic, atraumatic, conjunctiva pink without scleral icterus, EOMI.  Neck supple, no masses, thyroid not palpable, trachea midline, no LAD  Cardiovascular: RRR, S1/S2 present, no MRG.  No JVD, peripheral pulses 2+, no edema, cap refill <2 sec  Respiratory: coarse breath sounds bilaterally  Abdomen: Bowel sounds present, soft, NT/ ND.  No rebound, guarding.  No hepatosplenomegaly appreciated.  No CVA tenderness  MSK: no edema, cyanosis, clubbing.  Skin: warm, dry, no lesions noted  Neuro: CN 2-12 grossly intact, no focal deficits.  Psych: Alert and oriented to self, place, time

## 2021-02-02 NOTE — ED ADULT NURSE REASSESSMENT NOTE - NS ED NURSE REASSESS COMMENT FT1
Per CT patient needs to be on dialysis list or CT changed to nonCon d/t HD status. MD Prasad made aware, pending change in order for CT. pt is stable and resting comfortably on stretcher. tolerating bipap well, saO2 99%. Safety maintained

## 2021-02-02 NOTE — ED ADULT NURSE NOTE - OBJECTIVE STATEMENT
PT presents to ED on CPAP for respiratory distress. PT dialysis pt.  Per EMS on RA at home 50% on CPAP 80's. PT placed on BiPAP here and sats improved to 96%. IV's placed labs drawn and sent to lab. Pending results. CTM

## 2021-02-02 NOTE — H&P ADULT - ASSESSMENT
Pt is a 69 y/o M ex smoker with PMH of 2015- Lung Transplant for COPD on anti rejection meds, s/p rt total hip arthroplasty, multi level compression #, BPH, HLD, CAD, BPH, OP, started HD for ESRD (2/2020) presents to ED c/o worsening dyspnea.    Admit to MICU    Acute respiratory distress  -s/p lung Tx for COPD on anti rejection meds  -Pulm consult  -cont current meds  -BiPap 12/6 100%, O2 sat 97%  -f/u CT chest    # CAD, BPH, HLD, OP  Cont home meds  Stable    # HD dependent ESRD/ ? [facility]  -pt due for HD today  -s/p ct chest w/IV, consult renal to schedule HD

## 2021-02-02 NOTE — H&P ADULT - HISTORY OF PRESENT ILLNESS
Pt is a 71 y/o M ex smoker with PMH of 2015- Lung Transplant for COPD on anti rejection meds, s/p rt total hip arthroplasty, multi level compression #, BPH, HLD, CAD, BPH, OP, started HD for ESRD (2/2020) presents to ED c/o worsening dyspnea. Per EMS pt has had a cough since 2/1/21. However this morning his breathing became labored and he had difficulty speaking. Patient has not missed a dialysis session, last session Saturday, due today. For EMS pulse ox was 55% on RA, placed on NRB then CPAP, improved to 85-89%. Patient nephrologist is Dr. Anderson, pulmonologist Dr. Cordova.    In ED chest x-ray suggestive of pneumonia, given 500 mg Meropenem. COVID+

## 2021-02-03 LAB
ALBUMIN SERPL ELPH-MCNC: 3.2 G/DL — LOW (ref 3.3–5.2)
ALP SERPL-CCNC: 241 U/L — HIGH (ref 40–120)
ALT FLD-CCNC: 21 U/L — SIGNIFICANT CHANGE UP
ANION GAP SERPL CALC-SCNC: 18 MMOL/L — HIGH (ref 5–17)
AST SERPL-CCNC: 47 U/L — HIGH
BASE EXCESS BLDA CALC-SCNC: 4.3 MMOL/L — HIGH (ref -2–2)
BASOPHILS # BLD AUTO: 0 K/UL — SIGNIFICANT CHANGE UP (ref 0–0.2)
BASOPHILS NFR BLD AUTO: 0 % — SIGNIFICANT CHANGE UP (ref 0–2)
BILIRUB SERPL-MCNC: 0.9 MG/DL — SIGNIFICANT CHANGE UP (ref 0.4–2)
BLOOD GAS COMMENTS ARTERIAL: SIGNIFICANT CHANGE UP
BUN SERPL-MCNC: 49 MG/DL — HIGH (ref 8–20)
CALCIUM SERPL-MCNC: 8.3 MG/DL — LOW (ref 8.6–10.2)
CHLORIDE SERPL-SCNC: 99 MMOL/L — SIGNIFICANT CHANGE UP (ref 98–107)
CO2 SERPL-SCNC: 24 MMOL/L — SIGNIFICANT CHANGE UP (ref 22–29)
CREAT SERPL-MCNC: 5.88 MG/DL — HIGH (ref 0.5–1.3)
EOSINOPHIL # BLD AUTO: 0 K/UL — SIGNIFICANT CHANGE UP (ref 0–0.5)
EOSINOPHIL NFR BLD AUTO: 0 % — SIGNIFICANT CHANGE UP (ref 0–6)
GAS PNL BLDA: SIGNIFICANT CHANGE UP
GLUCOSE BLDC GLUCOMTR-MCNC: 44 MG/DL — CRITICAL LOW (ref 70–99)
GLUCOSE BLDC GLUCOMTR-MCNC: 97 MG/DL — SIGNIFICANT CHANGE UP (ref 70–99)
GLUCOSE SERPL-MCNC: 31 MG/DL — CRITICAL LOW (ref 70–99)
HCO3 BLDA-SCNC: 28 MMOL/L — SIGNIFICANT CHANGE UP (ref 21–29)
HCT VFR BLD CALC: 38.9 % — LOW (ref 39–50)
HGB BLD-MCNC: 11.8 G/DL — LOW (ref 13–17)
HOROWITZ INDEX BLDA+IHG-RTO: 55 — SIGNIFICANT CHANGE UP
LYMPHOCYTES # BLD AUTO: 0.41 K/UL — LOW (ref 1–3.3)
LYMPHOCYTES # BLD AUTO: 6 % — LOW (ref 13–44)
MAGNESIUM SERPL-MCNC: 2.2 MG/DL — SIGNIFICANT CHANGE UP (ref 1.6–2.6)
MCHC RBC-ENTMCNC: 28.5 PG — SIGNIFICANT CHANGE UP (ref 27–34)
MCHC RBC-ENTMCNC: 30.3 GM/DL — LOW (ref 32–36)
MCV RBC AUTO: 94 FL — SIGNIFICANT CHANGE UP (ref 80–100)
MONOCYTES # BLD AUTO: 0.06 K/UL — SIGNIFICANT CHANGE UP (ref 0–0.9)
MONOCYTES NFR BLD AUTO: 0.9 % — LOW (ref 2–14)
NEUTROPHILS # BLD AUTO: 6.3 K/UL — SIGNIFICANT CHANGE UP (ref 1.8–7.4)
NEUTROPHILS NFR BLD AUTO: 80.2 % — HIGH (ref 43–77)
PCO2 BLDA: 42 MMHG — SIGNIFICANT CHANGE UP (ref 35–45)
PH BLDA: 7.44 — SIGNIFICANT CHANGE UP (ref 7.35–7.45)
PHOSPHATE SERPL-MCNC: 5.8 MG/DL — HIGH (ref 2.4–4.7)
PLATELET # BLD AUTO: SIGNIFICANT CHANGE UP K/UL (ref 150–400)
PO2 BLDA: 90 MMHG — SIGNIFICANT CHANGE UP (ref 83–108)
POTASSIUM SERPL-MCNC: 4.6 MMOL/L — SIGNIFICANT CHANGE UP (ref 3.5–5.3)
POTASSIUM SERPL-SCNC: 4.6 MMOL/L — SIGNIFICANT CHANGE UP (ref 3.5–5.3)
PROCALCITONIN SERPL-MCNC: 11.82 NG/ML — HIGH (ref 0.02–0.1)
PROT SERPL-MCNC: 6.4 G/DL — LOW (ref 6.6–8.7)
RBC # BLD: 4.14 M/UL — LOW (ref 4.2–5.8)
RBC # FLD: 18.8 % — HIGH (ref 10.3–14.5)
SAO2 % BLDA: 98 % — HIGH (ref 92–96)
SODIUM SERPL-SCNC: 141 MMOL/L — SIGNIFICANT CHANGE UP (ref 135–145)
WBC # BLD: 6.89 K/UL — SIGNIFICANT CHANGE UP (ref 3.8–10.5)
WBC # FLD AUTO: 6.89 K/UL — SIGNIFICANT CHANGE UP (ref 3.8–10.5)

## 2021-02-03 PROCEDURE — 99233 SBSQ HOSP IP/OBS HIGH 50: CPT | Mod: CS

## 2021-02-03 PROCEDURE — 93971 EXTREMITY STUDY: CPT | Mod: 26,RT

## 2021-02-03 PROCEDURE — 99232 SBSQ HOSP IP/OBS MODERATE 35: CPT | Mod: CS

## 2021-02-03 RX ORDER — DEXTROSE 50 % IN WATER 50 %
15 SYRINGE (ML) INTRAVENOUS ONCE
Refills: 0 | Status: DISCONTINUED | OUTPATIENT
Start: 2021-02-03 | End: 2021-02-12

## 2021-02-03 RX ORDER — DEXTROSE 50 % IN WATER 50 %
25 SYRINGE (ML) INTRAVENOUS ONCE
Refills: 0 | Status: DISCONTINUED | OUTPATIENT
Start: 2021-02-03 | End: 2021-02-12

## 2021-02-03 RX ORDER — MIDODRINE HYDROCHLORIDE 2.5 MG/1
10 TABLET ORAL EVERY 8 HOURS
Refills: 0 | Status: DISCONTINUED | OUTPATIENT
Start: 2021-02-03 | End: 2021-02-05

## 2021-02-03 RX ORDER — MEROPENEM 1 G/30ML
500 INJECTION INTRAVENOUS EVERY 24 HOURS
Refills: 0 | Status: COMPLETED | OUTPATIENT
Start: 2021-02-04 | End: 2021-02-10

## 2021-02-03 RX ORDER — FLUCONAZOLE 150 MG/1
400 TABLET ORAL
Refills: 0 | Status: DISCONTINUED | OUTPATIENT
Start: 2021-02-03 | End: 2021-02-12

## 2021-02-03 RX ORDER — LACTOBACILLUS ACIDOPHILUS 100MM CELL
1 CAPSULE ORAL
Refills: 0 | Status: DISCONTINUED | OUTPATIENT
Start: 2021-02-03 | End: 2021-02-12

## 2021-02-03 RX ORDER — ACETAMINOPHEN 500 MG
650 TABLET ORAL EVERY 6 HOURS
Refills: 0 | Status: DISCONTINUED | OUTPATIENT
Start: 2021-02-03 | End: 2021-02-12

## 2021-02-03 RX ORDER — DEXTROSE 50 % IN WATER 50 %
12.5 SYRINGE (ML) INTRAVENOUS ONCE
Refills: 0 | Status: DISCONTINUED | OUTPATIENT
Start: 2021-02-03 | End: 2021-02-12

## 2021-02-03 RX ORDER — NOREPINEPHRINE BITARTRATE/D5W 8 MG/250ML
0.05 PLASTIC BAG, INJECTION (ML) INTRAVENOUS
Qty: 8 | Refills: 0 | Status: DISCONTINUED | OUTPATIENT
Start: 2021-02-03 | End: 2021-02-04

## 2021-02-03 RX ORDER — TACROLIMUS 5 MG/1
1 CAPSULE ORAL DAILY
Refills: 0 | Status: DISCONTINUED | OUTPATIENT
Start: 2021-02-03 | End: 2021-02-12

## 2021-02-03 RX ORDER — MEROPENEM 1 G/30ML
INJECTION INTRAVENOUS
Refills: 0 | Status: COMPLETED | OUTPATIENT
Start: 2021-02-03 | End: 2021-02-11

## 2021-02-03 RX ORDER — PANTOPRAZOLE SODIUM 20 MG/1
40 TABLET, DELAYED RELEASE ORAL
Refills: 0 | Status: DISCONTINUED | OUTPATIENT
Start: 2021-02-03 | End: 2021-02-06

## 2021-02-03 RX ORDER — HEPARIN SODIUM 5000 [USP'U]/ML
5000 INJECTION INTRAVENOUS; SUBCUTANEOUS EVERY 8 HOURS
Refills: 0 | Status: DISCONTINUED | OUTPATIENT
Start: 2021-02-03 | End: 2021-02-06

## 2021-02-03 RX ORDER — MEROPENEM 1 G/30ML
500 INJECTION INTRAVENOUS ONCE
Refills: 0 | Status: COMPLETED | OUTPATIENT
Start: 2021-02-03 | End: 2021-02-03

## 2021-02-03 RX ORDER — HYDROCORTISONE 20 MG
100 TABLET ORAL EVERY 8 HOURS
Refills: 0 | Status: COMPLETED | OUTPATIENT
Start: 2021-02-03 | End: 2021-02-06

## 2021-02-03 RX ORDER — GLUCAGON INJECTION, SOLUTION 0.5 MG/.1ML
1 INJECTION, SOLUTION SUBCUTANEOUS ONCE
Refills: 0 | Status: DISCONTINUED | OUTPATIENT
Start: 2021-02-03 | End: 2021-02-12

## 2021-02-03 RX ADMIN — Medication 650 MILLIGRAM(S): at 08:23

## 2021-02-03 RX ADMIN — REMDESIVIR 500 MILLIGRAM(S): 5 INJECTION INTRAVENOUS at 22:05

## 2021-02-03 RX ADMIN — Medication 81 MILLIGRAM(S): at 12:17

## 2021-02-03 RX ADMIN — TACROLIMUS 1.5 MILLIGRAM(S): 5 CAPSULE ORAL at 12:16

## 2021-02-03 RX ADMIN — HEPARIN SODIUM 5000 UNIT(S): 5000 INJECTION INTRAVENOUS; SUBCUTANEOUS at 12:18

## 2021-02-03 RX ADMIN — MEROPENEM 100 MILLIGRAM(S): 1 INJECTION INTRAVENOUS at 17:07

## 2021-02-03 RX ADMIN — MIDODRINE HYDROCHLORIDE 10 MILLIGRAM(S): 2.5 TABLET ORAL at 22:07

## 2021-02-03 RX ADMIN — Medication 650 MILLIGRAM(S): at 22:07

## 2021-02-03 RX ADMIN — Medication 7.23 MICROGRAM(S)/KG/MIN: at 18:25

## 2021-02-03 RX ADMIN — MIDODRINE HYDROCHLORIDE 10 MILLIGRAM(S): 2.5 TABLET ORAL at 17:07

## 2021-02-03 RX ADMIN — TAMSULOSIN HYDROCHLORIDE 0.4 MILLIGRAM(S): 0.4 CAPSULE ORAL at 22:06

## 2021-02-03 RX ADMIN — Medication 1 TABLET(S): at 05:04

## 2021-02-03 RX ADMIN — Medication 100 MILLIGRAM(S): at 17:07

## 2021-02-03 RX ADMIN — PANTOPRAZOLE SODIUM 40 MILLIGRAM(S): 20 TABLET, DELAYED RELEASE ORAL at 12:16

## 2021-02-03 RX ADMIN — ATORVASTATIN CALCIUM 80 MILLIGRAM(S): 80 TABLET, FILM COATED ORAL at 22:04

## 2021-02-03 RX ADMIN — Medication 7.23 MICROGRAM(S)/KG/MIN: at 09:29

## 2021-02-03 RX ADMIN — Medication 100 MILLIGRAM(S): at 22:04

## 2021-02-03 RX ADMIN — CHLORHEXIDINE GLUCONATE 1 APPLICATION(S): 213 SOLUTION TOPICAL at 05:04

## 2021-02-03 RX ADMIN — CLOPIDOGREL BISULFATE 75 MILLIGRAM(S): 75 TABLET, FILM COATED ORAL at 12:17

## 2021-02-03 RX ADMIN — TACROLIMUS 1 MILLIGRAM(S): 5 CAPSULE ORAL at 22:06

## 2021-02-03 RX ADMIN — Medication 1 TABLET(S): at 18:13

## 2021-02-03 NOTE — PROGRESS NOTE ADULT - SUBJECTIVE AND OBJECTIVE BOX
NEPHROLOGY INTERVAL HPI/OVERNIGHT EVENTS:  pt still with sob  requiring supplemental O2    MEDICATIONS  (STANDING):  aspirin  chewable 81 milliGRAM(s) Oral daily  atorvastatin 80 milliGRAM(s) Oral at bedtime  chlorhexidine 4% Liquid 1 Application(s) Topical <User Schedule>  clopidogrel Tablet 75 milliGRAM(s) Oral daily  dexAMETHasone  Injectable 6 milliGRAM(s) IV Push every 24 hours  heparin   Injectable 5000 Unit(s) SubCutaneous every 8 hours  norepinephrine Infusion 0.05 MICROgram(s)/kG/Min (7.23 mL/Hr) IV Continuous <Continuous>  pantoprazole  Injectable 40 milliGRAM(s) IV Push daily  remdesivir  IVPB   IV Intermittent   remdesivir  IVPB 100 milliGRAM(s) IV Intermittent every 24 hours  tacrolimus 1.5 milliGRAM(s) Oral daily  tacrolimus 1 milliGRAM(s) Oral at bedtime  tamsulosin 0.4 milliGRAM(s) Oral at bedtime  trimethoprim  160 mG/sulfamethoxazole 800 mG 1 Tablet(s) Oral <User Schedule>    MEDICATIONS  (PRN):  acetaminophen   Tablet .. 650 milliGRAM(s) Oral every 6 hours PRN Temp greater or equal to 38C (100.4F), Mild Pain (1 - 3)      Allergies    budesonide (Unknown)  cefpodoxime (Unknown)  Pollen (Unknown)        Vital Signs Last 24 Hrs  T(C): 37.3 (03 Feb 2021 11:01), Max: 39.7 (03 Feb 2021 08:01)  T(F): 99.2 (03 Feb 2021 11:01), Max: 103.4 (03 Feb 2021 08:01)  HR: 130 (03 Feb 2021 10:00) (82 - 141)  BP: 93/53 (03 Feb 2021 10:00) (74/50 - 141/85)  BP(mean): 64 (03 Feb 2021 10:00) (53 - 85)  RR: 28 (03 Feb 2021 10:00) (16 - 40)  SpO2: 98% (03 Feb 2021 10:00) (95% - 100%)    PHYSICAL EXAM:  Appears acutely ill  HEENT: O2 mask  NERVOUS SYSTEM:  A/O x3, non focal  Lungs: Diminished BS at bases  EXTREMITIES:  tr LE edema  SKIN: No rashes nor lesions  Further exam deferred to limit COVID exposure      LABS:                        11.6   8.68  )-----------( 162      ( 02 Feb 2021 06:12 )             39.6     02-02    144  |  103  |  65.0<H>  ----------------------------<  184<H>  5.6<H>   |  21.0<L>  |  8.07<H>    Ca    8.1<L>      02 Feb 2021 06:12    TPro  6.7  /  Alb  3.5  /  TBili  0.4  /  DBili  x   /  AST  42<H>  /  ALT  26  /  AlkPhos  234<H>  02-02    PT/INR - ( 02 Feb 2021 06:12 )   PT: 12.7 sec;   INR: 1.10 ratio         PTT - ( 02 Feb 2021 06:12 )  PTT:27.2 sec        RADIOLOGY & ADDITIONAL TESTS:  < from: CT Chest w/ IV Cont (02.02.21 @ 11:59) >     EXAM:  CT CHEST IC                          PROCEDURE DATE:  02/02/2021          INTERPRETATION:  CLINICAL INFORMATION: Widened mediastinum on chest x-ray. COVID.    COMPARISON: Chest x-ray of the same day    PROCEDURE:  CT of the Chest was performed with intravenous contrast.  88 ml of Omnipaque 300 was injected intravenously. 12 ml were discarded.  Sagittal and coronal reformats were performed.    FINDINGS:    LUNGS AND AIRWAYS: Patent central airways.  Bilateral lung opacities including areas of dense consolidation, largest in the lower lobes with additional patchy groundglass opacities and areas of nodularity, greatest in the right upper lobe. There is also compressive atelectasis in both lower lobes secondary to the effusions. Interlobular septal thickening, likely pulmonary edema.  PLEURA: Small to moderate left and small right partially loculated pleural effusions. Fluid in the right and left paramediastinal region including superior to the azygos vein on the right and to the left of the aortic arch and descending thoracic aorta, likely in the pleural space. The fluid in the superior paramediastinal region likely accounts for the appearance of the mediastinum on the chest x-ray of the same day.  MEDIASTINUM AND JANNY: No pathologically enlarged lymph nodes. Metallic densities in the subcarinal region; correlation is recommended with the surgical/procedural history.  VESSELS: Thoracic aorta normal in caliber with atherosclerotic changes. Coronary artery calcifications.  HEART: Enlarged. No pericardial effusion.  CHEST WALL AND LOWER NECK: Right IJ central line with the tip at the cavoatrial junction. Small amount of pericatheter thrombus (series 3 image 43). Bilateral gynecomastia. Anasarca.  VISUALIZED UPPER ABDOMEN: Bilateral nonobstructing renal calculi. Atrophy of the medial segment of the left hepatic lobe with a subtle nodular surface contour of the liver which can be seen in the setting of cirrhosis. Small amount of perihepatic ascites. Peripheral region of low attenuation in the spleen measuring 1.9 cm (series 2 image 143), likely an infarct. Partially imaged cystic lesion in the region of the pancreatic body and junction measuring 1.4 cm (series 3 image 149). 1.2 cm cyst in the upper pole the right kidney.  BONES: Multilevel compression fractures in the thoracic spine, many which are new including T5-T8 and T10. Chronic compression fractures at T4, T9, T11, T12, L1, and L2. Old sternal fracture with sternal wires in the mid sternum. Old bilateral rib fractures.    IMPRESSION:  Small to moderate left and small right loculated pleural effusions with fluid in the right and left paramediastinal regions; paramediastinal fluid likely accounts for the appearance of the mediastinum on the chest x-ray of the same day.    Bilateral lung opacities as described above; differential includes multifocal pneumonia and/or pulmonary edema.    Interlobular septal thickening, likely pulmonary edema.    Right IJ central line with a small amount of pericatheter thrombus.    Multilevel compression fractures in the thoracic and lumbar spine, some which are new since 9/26/2019; a thoracic spine MRI could be obtained to assess for acuity.    Peripheral region of low attenuation in the spleen, likely an infarct.    Partially imaged 1.4 cm cystic lesion in the pancreatic body tail junction; a nonemergent MRI of the abdomen is recommended for further evaluation.    The findings were discussed with Dr. Prasad at 1:54 PM on 2/2/2021.    < end of copied text >

## 2021-02-03 NOTE — PROGRESS NOTE ADULT - ASSESSMENT
This 71 y/o M ex-smoker with PMH of 2015- Lung Transplant for COPD on anti rejection meds, s/p rt total hip arthroplasty, multi level compression #, BPH, HLD, CAD, BPH, OP, started HD for ESRD (2/2020) presents to ED c/o worsening dyspnea. Per EMS pt has had a cough since 2/1/21. However this morning his breathing became labored and he had difficulty speaking. Patient has not missed a dialysis session, last session Saturday, due today. For EMS pulse ox was 55% on RA, placed on NRB then CPAP, improved to 85-89%. Patient nephrologist is Dr. Maher;  pulmonologist Dr. Cordova.  Transplant team - [facility]    In ED chest x-ray suggestive of pneumonia, given 500 mg Meropenem. COVID+ (02 Feb 2021 12:28)    patient still has SOB. Reports that he was doing well about 1 week ago.   COVID testing is confirmed positive here.   CT of the lungs show: Small to moderate left and small right loculated pleural effusions with fluid in the right and left paramediastinal regions; paramediastinal fluid likely accounts for the appearance of the mediastinum on the chest x-ray of the same day.  Bilateral lung opacities as described above; differential includes multifocal pneumonia and/or pulmonary edema.    No clear sick contacts.       Impression:  COVID-19 infection   Viral pneumonia  shortness of breath  lung infiltrates  dependence on oxygen  Lung transplant  Immunosuppression for transplant    Plan:  - Continue Remdesivir IV daily.  will plan for a 5 day course.   - MAY stop earlier than 5 days, and send home, if patient is back on Ambient oxygen/ room air.   - continue dexamethasone 6mg daily, While on remdesivir  Inflammatory markers and LFTs WILL BE ORDERED with the REMDESIVIR order SET.  DO NOT ORDER this additionally.   - trend CBC with diff, CMP  daily    - Continue supportive care measures  - continue Oxygenation as needed;  CONTINUE to titrate down as tolerated  - self- proning  as tolerated  - ENCOURAGED OOB to chair  - encouraged incentive spirometry     HIGH risk for decompensation    - patient CONSENTED to Convalescent Plasma as part of EUA  - s/p 1 unit of CP. 2/2/2021       Will follow with you.

## 2021-02-03 NOTE — PROGRESS NOTE ADULT - SUBJECTIVE AND OBJECTIVE BOX
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

## 2021-02-03 NOTE — PROGRESS NOTE ADULT - SUBJECTIVE AND OBJECTIVE BOX
PULMONARY PROGRESS NOTE      [id number]    Patient is a 70y old  Male who presents with a chief complaint of Hypoxia (03 Feb 2021 11:59)      INTERVAL HPI/OVERNIGHT EVENTS: On BPAP. NAD. Says his breathing is improved.     MEDICATIONS  (STANDING):  aspirin  chewable 81 milliGRAM(s) Oral daily  atorvastatin 80 milliGRAM(s) Oral at bedtime  chlorhexidine 4% Liquid 1 Application(s) Topical <User Schedule>  clopidogrel Tablet 75 milliGRAM(s) Oral daily  dexAMETHasone  Injectable 6 milliGRAM(s) IV Push every 24 hours  heparin   Injectable 5000 Unit(s) SubCutaneous every 8 hours  norepinephrine Infusion 0.05 MICROgram(s)/kG/Min (7.23 mL/Hr) IV Continuous <Continuous>  pantoprazole  Injectable 40 milliGRAM(s) IV Push daily  remdesivir  IVPB   IV Intermittent   remdesivir  IVPB 100 milliGRAM(s) IV Intermittent every 24 hours  tacrolimus 1.5 milliGRAM(s) Oral daily  tacrolimus 1 milliGRAM(s) Oral at bedtime  tamsulosin 0.4 milliGRAM(s) Oral at bedtime  trimethoprim  160 mG/sulfamethoxazole 800 mG 1 Tablet(s) Oral <User Schedule>      MEDICATIONS  (PRN):  acetaminophen   Tablet .. 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 ENT:  No earache, sore throat or runny nose.    CARDIOVASCULAR:  No pressure, squeezing, tightness, heaviness or aching about the chest; no palpitations.    RESPIRATORY:  per HPI     GASTROINTESTINAL:  No nausea, vomiting or diarrhea.    GENITOURINARY:  No dysuria, frequency or urgency.    NEUROLOGIC:  No paresthesias, fasciculations, seizures or weakness.    PSYCHIATRIC:  No disorder of thought or mood.    Vital Signs Last 24 Hrs  T(C): 37.3 (03 Feb 2021 11:01), Max: 39.7 (03 Feb 2021 08:01)  T(F): 99.2 (03 Feb 2021 11:01), Max: 103.4 (03 Feb 2021 08:01)  HR: 130 (03 Feb 2021 10:00) (82 - 141)  BP: 93/53 (03 Feb 2021 10:00) (74/50 - 141/85)  BP(mean): 64 (03 Feb 2021 10:00) (53 - 85)  RR: 28 (03 Feb 2021 10:00) (16 - 40)  SpO2: 98% (03 Feb 2021 10:00) (95% - 100%)    PHYSICAL EXAMINATION:    GENERAL: The patient is awake and alert in no apparent distress. On BPAP.    HEENT: Head is normocephalic and atraumatic.  Mucous membranes are moist.    NECK: Supple.    LUNGS: scattered wheeze     HEART: Regular rate and rhythm      ABDOMEN: Soft, nontender, and nondistended.      EXTREMITIES: Without any cyanosis, clubbing, rash, lesions or edema.    NEUROLOGIC: Grossly intact.    LABS:                        11.6   8.68  )-----------( 162      ( 02 Feb 2021 06:12 )             39.6     02-02    144  |  103  |  65.0<H>  ----------------------------<  184<H>  5.6<H>   |  21.0<L>  |  8.07<H>    Ca    8.1<L>      02 Feb 2021 06:12    TPro  6.7  /  Alb  3.5  /  TBili  0.4  /  DBili  x   /  AST  42<H>  /  ALT  26  /  AlkPhos  234<H>  02-02    PT/INR - ( 02 Feb 2021 06:12 )   PT: 12.7 sec;   INR: 1.10 ratio         PTT - ( 02 Feb 2021 06:12 )  PTT:27.2 sec    ABG - ( 03 Feb 2021 04:49 )  pH, Arterial: 7.44  pH, Blood: x     /  pCO2: 42    /  pO2: 90    / HCO3: 28    / Base Excess: 4.3   /  SaO2: 98             CARDIAC MARKERS ( 02 Feb 2021 06:12 )  x     / 0.08 ng/mL / x     / x     / x               Procalcitonin, Serum: 0.34 ng/mL (02-02-21 @ 06:12)           RADIOLOGY & ADDITIONAL STUDIES:  < from: CT Chest w/ IV Cont (02.02.21 @ 11:59) >   EXAM:  CT CHEST IC                          PROCEDURE DATE:  02/02/2021          INTERPRETATION:  CLINICAL INFORMATION: Widened mediastinum on chest x-ray. COVID.    COMPARISON: Chest x-ray of the same day    PROCEDURE:  CT of the Chest was performed with intravenous contrast.  88 ml of Omnipaque 300 was injected intravenously. 12 ml were discarded.  Sagittal and coronal reformats were performed.    FINDINGS:    LUNGS AND AIRWAYS: Patent central airways.  Bilateral lung opacities including areas of dense consolidation, largest in the lower lobes with additional patchy groundglass opacities and areas of nodularity, greatest in the right upper lobe. There is also compressive atelectasis in both lower lobes secondary to the effusions. Interlobular septal thickening, likely pulmonary edema.  PLEURA: Small to moderate left and small right partially loculated pleural effusions. Fluid in the right and left paramediastinal region including superior to the azygos vein on the right and to the left of the aortic arch and descending thoracic aorta, likely in the pleural space. The fluid in the superior paramediastinal region likely accounts for the appearance of the mediastinum on the chest x-ray of the same day.  MEDIASTINUM AND JANNY: No pathologically enlarged lymph nodes. Metallic densities in the subcarinal region; correlation is recommended with the surgical/procedural history.  VESSELS: Thoracic aorta normal in caliber with atherosclerotic changes. Coronary artery calcifications.  HEART: Enlarged. No pericardial effusion.  CHEST WALL AND LOWER NECK: Right IJ central line with the tip at the cavoatrial junction. Small amount of pericatheter thrombus (series 3 image 43). Bilateral gynecomastia. Anasarca.  VISUALIZED UPPER ABDOMEN: Bilateral nonobstructing renal calculi. Atrophy of the medial segment of the left hepatic lobe with a subtle nodular surface contour of the liver which can be seen in the setting of cirrhosis. Small amount of perihepatic ascites. Peripheral region of low attenuation in the spleen measuring 1.9 cm (series 2 image 143), likely an infarct. Partially imaged cystic lesion in the region of the pancreatic body and junction measuring 1.4 cm (series 3 image 149). 1.2 cm cyst in the upper pole the right kidney.  BONES: Multilevel compression fractures in the thoracic spine, many which are new including T5-T8 and T10. Chronic compression fractures at T4, T9, T11, T12, L1, and L2. Old sternal fracture with sternal wires in the mid sternum. Old bilateral rib fractures.    IMPRESSION:  Small to moderate left and small right loculated pleural effusions with fluid in the right and left paramediastinal regions; paramediastinal fluid likely accounts for the appearance of the mediastinum on the chest x-ray of the same day.    Bilateral lung opacities as described above; differential includes multifocal pneumonia and/or pulmonary edema.    Interlobular septal thickening, likely pulmonary edema.    Right IJ central line with a small amount of pericatheter thrombus.    Multilevel compression fractures in the thoracic and lumbar spine, some which are new since 9/26/2019; a thoracic spine MRI could be obtained to assess for acuity.    Peripheral region of low attenuation in the spleen, likely an infarct.    Partially imaged 1.4 cm cystic lesion in the pancreatic body tail junction; a nonemergent MRI of the abdomen is recommended for further evaluation.    The findings were discussed with Dr. Prasad at 1:54 PM on 2/2/2021.            SHAHRZAD HARGROVE MD; Attending Radiologist    < end of copied text >

## 2021-02-03 NOTE — PROGRESS NOTE ADULT - ASSESSMENT
ESRD: lung transplant patient on chronic immunosuppression   +COVID PNA  - supportive care  - HD tomorrow    Anemia: +CKD  - no need for MENDY as yet  - follow H/H

## 2021-02-03 NOTE — PROGRESS NOTE ADULT - SUBJECTIVE AND OBJECTIVE BOX
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 Patient was also due for hemodialysis with hyperkalemia K 5.6.  He was found to be hypoxic with SpO2 55% on RA, started on BiPAP and admitted to MICU for acute hypoxemic respiratory failure with need for emergent dialysis. Patient underwent HD with 2L UF.     Patient nephrologist is Dr. Anderson, pulmonologist Dr. Cordova.         EVENTS:  Spiked new fever 103'F Tmax this morning  Developed acute hypotension SBP 60-70's, hypoglycemic to 30's, procalcitonin >11  Started on Norepinephrine. Has had escalating pressor requirements, given 1L LR bolus and changed to stress steroids   Started on broad-spectrum antibiotics  Weaned off BiPAP to HFNC and weaned down to 60% FiO2 50 LPM           T(C): 37.3 (02-03-21 @ 11:01), Max: 39.7 (02-03-21 @ 08:01)  HR: 95 (02-03-21 @ 13:00) (82 - 141)  BP: 102/85 (02-03-21 @ 13:00) (74/50 - 141/85)  RR: 25 (02-03-21 @ 13:00) (16 - 40)  SpO2: 98% (02-03-21 @ 13:00) (96% - 100%)          02-02-21 @ 07:01  -  02-03-21 @ 07:00  --------------------------------------------------------  IN: 0 mL / OUT: 2000 mL / NET: -2000 mL    02-03-21 @ 07:01  -  02-03-21 @ 16:22  --------------------------------------------------------  IN: 60 mL / OUT: 0 mL / NET: 60 mL                Vital signs reviewed and physical exam performed where pertinent and urgently required.    Lab/radiology studies/ABG/meds reviewed and interpreted into the assessment and treatment plan.        Assessment/Plan/Therapeutic interventions:    1. Acute Hypoxemic Respiratory Failure  2. Septic Shock  3. COVID-19 viral pneumonia  4. R/O Superimposed bacterial pneumonia in immunocompromised patient  5. ESRD   6. Lung Transplant      BiPAP weaned off to HFNC, titrating to keep SpO2 >90%  Started Norepinephrine, titrating to keep MAP >65. Added midodrine to offload IV vasopressor burden.  Had 2L UF yesterday, appears hypovolemic. Given 1L IVF bolus, if tolerated by respiratory standpoint and continues to have escalating pressor requirements, may give additional fluid resuscitation  On chronic steroids, had been started on dexamethasone 6mg IV daily, however given new shock state change to hydrocortisone 100mg TID  BCx from initial presentation pending, repeat BCx sent today and are pending. Per RN patient does not make urine. No sputum to culture at this time  Spoke with ID in regards to change in clinical status, started on Meropenem, renally adjusted. Not a candidate for Remdesivir Continue chronic Bactrim, resume home chronic Fluconazole.  Tacrolimus decreased and new goal level 4-6 per patient's transplant surgeon Dr. Hung Toney [phone] / c: [phone]  Diet advanced, hypoglycemia resolved. Start monitoring BG q4h         COVID 19 specific considerations and therapeutic options based on the available and rapidly changing literature.    60 minutes of critical care time spent in the management of this critically ill COVID-19 patient suffering from multisystem organ failure with continuous assessments and interventions based on the interpretation of multiple databases. Critical care time not inclusive of independent time spent on procedures performed.

## 2021-02-03 NOTE — PROGRESS NOTE ADULT - ASSESSMENT
- Hypercapneic and hypoxic respiratory failure; see below  - COVID-19 PNA  - Lung transplant (2015) for COPD - on Tacrolimus and MMF  - ESRD - on HD TuThSa  - Acute pulmonary edema  -Acute on chronic hypoxic resp failure    RECC:  Decadron. Remdesevir started. NIPPV qhs and prn; will try on HFO2 now. HD per nephrology. C/w maintenance immunosuppression; PCT was negative, no fevers. Monitor serum levels of immunosuppressants. ID f/u. Anticoagulation per [facility] COVID 19 protocol.     Overall prognosis poor.

## 2021-02-04 LAB
ALBUMIN SERPL ELPH-MCNC: 2.6 G/DL — LOW (ref 3.3–5.2)
ALBUMIN SERPL ELPH-MCNC: 2.6 G/DL — LOW (ref 3.3–5.2)
ALP SERPL-CCNC: 256 U/L — HIGH (ref 40–120)
ALP SERPL-CCNC: 256 U/L — HIGH (ref 40–120)
ALT FLD-CCNC: 18 U/L — SIGNIFICANT CHANGE UP
ALT FLD-CCNC: 18 U/L — SIGNIFICANT CHANGE UP
ANION GAP SERPL CALC-SCNC: 21 MMOL/L — HIGH (ref 5–17)
AST SERPL-CCNC: 48 U/L — HIGH
AST SERPL-CCNC: 48 U/L — HIGH
BASOPHILS # BLD AUTO: 0.01 K/UL — SIGNIFICANT CHANGE UP (ref 0–0.2)
BASOPHILS NFR BLD AUTO: 0.2 % — SIGNIFICANT CHANGE UP (ref 0–2)
BILIRUB DIRECT SERPL-MCNC: 0.3 MG/DL — SIGNIFICANT CHANGE UP (ref 0–0.3)
BILIRUB INDIRECT FLD-MCNC: 0.2 MG/DL — SIGNIFICANT CHANGE UP (ref 0.2–1)
BILIRUB SERPL-MCNC: 0.5 MG/DL — SIGNIFICANT CHANGE UP (ref 0.4–2)
BILIRUB SERPL-MCNC: 0.5 MG/DL — SIGNIFICANT CHANGE UP (ref 0.4–2)
BUN SERPL-MCNC: 61 MG/DL — HIGH (ref 8–20)
CALCIUM SERPL-MCNC: 7.4 MG/DL — LOW (ref 8.6–10.2)
CHLORIDE SERPL-SCNC: 100 MMOL/L — SIGNIFICANT CHANGE UP (ref 98–107)
CO2 SERPL-SCNC: 19 MMOL/L — LOW (ref 22–29)
CREAT SERPL-MCNC: 6.69 MG/DL — HIGH (ref 0.5–1.3)
EOSINOPHIL # BLD AUTO: 0 K/UL — SIGNIFICANT CHANGE UP (ref 0–0.5)
EOSINOPHIL NFR BLD AUTO: 0 % — SIGNIFICANT CHANGE UP (ref 0–6)
GLUCOSE BLDC GLUCOMTR-MCNC: 117 MG/DL — HIGH (ref 70–99)
GLUCOSE BLDC GLUCOMTR-MCNC: 129 MG/DL — HIGH (ref 70–99)
GLUCOSE BLDC GLUCOMTR-MCNC: 136 MG/DL — HIGH (ref 70–99)
GLUCOSE SERPL-MCNC: 125 MG/DL — HIGH (ref 70–99)
HCT VFR BLD CALC: 35.2 % — LOW (ref 39–50)
HGB BLD-MCNC: 10.9 G/DL — LOW (ref 13–17)
IMM GRANULOCYTES NFR BLD AUTO: 1 % — SIGNIFICANT CHANGE UP (ref 0–1.5)
LYMPHOCYTES # BLD AUTO: 0.14 K/UL — LOW (ref 1–3.3)
LYMPHOCYTES # BLD AUTO: 2.8 % — LOW (ref 13–44)
MAGNESIUM SERPL-MCNC: 2.3 MG/DL — SIGNIFICANT CHANGE UP (ref 1.6–2.6)
MCHC RBC-ENTMCNC: 28.7 PG — SIGNIFICANT CHANGE UP (ref 27–34)
MCHC RBC-ENTMCNC: 31 GM/DL — LOW (ref 32–36)
MCV RBC AUTO: 92.6 FL — SIGNIFICANT CHANGE UP (ref 80–100)
MONOCYTES # BLD AUTO: 0.25 K/UL — SIGNIFICANT CHANGE UP (ref 0–0.9)
MONOCYTES NFR BLD AUTO: 5 % — SIGNIFICANT CHANGE UP (ref 2–14)
NEUTROPHILS # BLD AUTO: 4.57 K/UL — SIGNIFICANT CHANGE UP (ref 1.8–7.4)
NEUTROPHILS NFR BLD AUTO: 91 % — HIGH (ref 43–77)
PHOSPHATE SERPL-MCNC: 8.9 MG/DL — HIGH (ref 2.4–4.7)
PLATELET # BLD AUTO: 74 K/UL — LOW (ref 150–400)
POTASSIUM SERPL-MCNC: 5.8 MMOL/L — HIGH (ref 3.5–5.3)
POTASSIUM SERPL-SCNC: 5.8 MMOL/L — HIGH (ref 3.5–5.3)
PROT SERPL-MCNC: 5.6 G/DL — LOW (ref 6.6–8.7)
PROT SERPL-MCNC: 5.6 G/DL — LOW (ref 6.6–8.7)
RBC # BLD: 3.8 M/UL — LOW (ref 4.2–5.8)
RBC # FLD: 18.8 % — HIGH (ref 10.3–14.5)
SODIUM SERPL-SCNC: 139 MMOL/L — SIGNIFICANT CHANGE UP (ref 135–145)
WBC # BLD: 5.02 K/UL — SIGNIFICANT CHANGE UP (ref 3.8–10.5)
WBC # FLD AUTO: 5.02 K/UL — SIGNIFICANT CHANGE UP (ref 3.8–10.5)

## 2021-02-04 PROCEDURE — 99233 SBSQ HOSP IP/OBS HIGH 50: CPT | Mod: CS

## 2021-02-04 PROCEDURE — 99232 SBSQ HOSP IP/OBS MODERATE 35: CPT | Mod: CS

## 2021-02-04 RX ORDER — SEVELAMER CARBONATE 2400 MG/1
800 POWDER, FOR SUSPENSION ORAL
Refills: 0 | Status: DISCONTINUED | OUTPATIENT
Start: 2021-02-04 | End: 2021-02-12

## 2021-02-04 RX ADMIN — Medication 81 MILLIGRAM(S): at 12:19

## 2021-02-04 RX ADMIN — PANTOPRAZOLE SODIUM 40 MILLIGRAM(S): 20 TABLET, DELAYED RELEASE ORAL at 09:08

## 2021-02-04 RX ADMIN — Medication 1 TABLET(S): at 22:40

## 2021-02-04 RX ADMIN — MIDODRINE HYDROCHLORIDE 10 MILLIGRAM(S): 2.5 TABLET ORAL at 09:07

## 2021-02-04 RX ADMIN — MEROPENEM 100 MILLIGRAM(S): 1 INJECTION INTRAVENOUS at 15:16

## 2021-02-04 RX ADMIN — Medication 100 MILLIGRAM(S): at 12:18

## 2021-02-04 RX ADMIN — MIDODRINE HYDROCHLORIDE 10 MILLIGRAM(S): 2.5 TABLET ORAL at 12:19

## 2021-02-04 RX ADMIN — REMDESIVIR 500 MILLIGRAM(S): 5 INJECTION INTRAVENOUS at 23:00

## 2021-02-04 RX ADMIN — TACROLIMUS 1 MILLIGRAM(S): 5 CAPSULE ORAL at 12:20

## 2021-02-04 RX ADMIN — ATORVASTATIN CALCIUM 80 MILLIGRAM(S): 80 TABLET, FILM COATED ORAL at 22:41

## 2021-02-04 RX ADMIN — MIDODRINE HYDROCHLORIDE 10 MILLIGRAM(S): 2.5 TABLET ORAL at 22:39

## 2021-02-04 RX ADMIN — Medication 1 TABLET(S): at 09:07

## 2021-02-04 RX ADMIN — TAMSULOSIN HYDROCHLORIDE 0.4 MILLIGRAM(S): 0.4 CAPSULE ORAL at 22:39

## 2021-02-04 RX ADMIN — HEPARIN SODIUM 5000 UNIT(S): 5000 INJECTION INTRAVENOUS; SUBCUTANEOUS at 22:39

## 2021-02-04 RX ADMIN — HEPARIN SODIUM 5000 UNIT(S): 5000 INJECTION INTRAVENOUS; SUBCUTANEOUS at 12:20

## 2021-02-04 RX ADMIN — SEVELAMER CARBONATE 800 MILLIGRAM(S): 2400 POWDER, FOR SUSPENSION ORAL at 17:38

## 2021-02-04 RX ADMIN — CHLORHEXIDINE GLUCONATE 1 APPLICATION(S): 213 SOLUTION TOPICAL at 15:15

## 2021-02-04 RX ADMIN — CLOPIDOGREL BISULFATE 75 MILLIGRAM(S): 75 TABLET, FILM COATED ORAL at 12:19

## 2021-02-04 RX ADMIN — FLUCONAZOLE 400 MILLIGRAM(S): 150 TABLET ORAL at 09:07

## 2021-02-04 RX ADMIN — TACROLIMUS 1 MILLIGRAM(S): 5 CAPSULE ORAL at 22:40

## 2021-02-04 RX ADMIN — Medication 100 MILLIGRAM(S): at 22:30

## 2021-02-04 RX ADMIN — HEPARIN SODIUM 5000 UNIT(S): 5000 INJECTION INTRAVENOUS; SUBCUTANEOUS at 09:28

## 2021-02-04 RX ADMIN — Medication 100 MILLIGRAM(S): at 09:06

## 2021-02-04 RX ADMIN — Medication 1 TABLET(S): at 17:38

## 2021-02-04 NOTE — PROGRESS NOTE ADULT - SUBJECTIVE AND OBJECTIVE BOX
[facility]  INFECTIOUS DISEASES AND INTERNAL MEDICINE at [place]  =======================================================  Scar Solares MD  Diplomates American Board of Internal Medicine and Infectious Diseases  Tel  [phone]  Fax [phone]  =======================================================    [id number]  JU FERGUSON   follow up: COVID-19  s/p Convalescent plasma 2/2/21      remains on bipap  lower requirements.     =======================================================    REVIEW OF SYSTEMS:  CONSTITUTIONAL:  as per HPI  HEENT:  No diplopia or blurred vision.  No earache, sore throat or runny nose.  CARDIOVASCULAR:  No pressure, squeezing, strangling, tightness, heaviness or aching about the chest, neck, axilla or epigastrium.  RESPIRATORY:  as per HPI  GASTROINTESTINAL:  No nausea, vomiting or diarrhea.  GENITOURINARY:  No dysuria, frequency or urgency. No Blood in urine  MUSCULOSKELETAL:  no joint aches, no muscle pain  SKIN:  No change in skin, hair or nails.  NEUROLOGIC:  No Headaches, seizures or weakness.  PSYCHIATRIC:  No disorder of thought or mood.  ENDOCRINE:  No heat or cold intolerance  HEMATOLOGICAL:  No easy bruising or bleeding.    =======================================================  Allergies  budesonide (Unknown)  cefpodoxime (Unknown)  Pollen (Unknown)       ======================================================  Physical Exam:  ============     General:  moderate distress; ON BIPAP  Eye: Pupils are equal, round and reactive to light, Extraocular movements are intact, Normal conjunctiva.  HENT: Normocephalic, Oral mucosa is moist, No pharyngeal erythema, No sinus tenderness.  ON SUPPLEMENTAL OXYGEN  Neck: Supple, No lymphadenopathy.  Respiratory: Lungs  with COARSE breath sounds. DIFFUSELY  Cardiovascular: Normal rate, Regular rhythm,   RIGHT neck tunneled catheter.   Gastrointestinal: Soft, Non-tender, Non-distended, Normal bowel sounds.  Genitourinary: No costovertebral angle tenderness.  Lymphatics: No lymphadenopathy neck,   Musculoskeletal: Normal range of motion, Normal strength.  Integumentary: No rash.  Neurologic: Alert, Oriented, No focal deficits, Cranial Nerves II-XII are grossly intact.  Psychiatric: Appropriate mood & affect.    =======================================================  Vitals:  ============  T(F): 97.6 (04 Feb 2021 07:42), Max: 99.2 (03 Feb 2021 11:01)  HR: 83 (04 Feb 2021 09:00)  BP: 134/90 (04 Feb 2021 09:00)  RR: 14 (04 Feb 2021 09:00)  SpO2: 95% (04 Feb 2021 09:00) (91% - 99%)  temp max in last 48H T(F): , Max: 103.4 (02-03-21 @ 08:01)    =======================================================  Current Antibiotics:  fluconAZOLE   Tablet 400 milliGRAM(s) Oral <User Schedule>  meropenem  IVPB      meropenem  IVPB 500 milliGRAM(s) IV Intermittent every 24 hours  remdesivir  IVPB   IV Intermittent   remdesivir  IVPB 100 milliGRAM(s) IV Intermittent every 24 hours  trimethoprim   80 mG/sulfamethoxazole 400 mG 1 Tablet(s) Oral <User Schedule>    Other medications:  aspirin  chewable 81 milliGRAM(s) Oral daily  atorvastatin 80 milliGRAM(s) Oral at bedtime  chlorhexidine 4% Liquid 1 Application(s) Topical <User Schedule>  clopidogrel Tablet 75 milliGRAM(s) Oral daily  dextrose 40% Gel 15 Gram(s) Oral once  dextrose 50% Injectable 25 Gram(s) IV Push once  dextrose 50% Injectable 12.5 Gram(s) IV Push once  dextrose 50% Injectable 25 Gram(s) IV Push once  glucagon  Injectable 1 milliGRAM(s) IntraMuscular once  heparin   Injectable 5000 Unit(s) SubCutaneous every 8 hours  hydrocortisone sodium succinate Injectable 100 milliGRAM(s) IV Push every 8 hours  lactobacillus acidophilus 1 Tablet(s) Oral two times a day  midodrine 10 milliGRAM(s) Oral every 8 hours  norepinephrine Infusion 0.05 MICROgram(s)/kG/Min IV Continuous <Continuous>  pantoprazole    Tablet 40 milliGRAM(s) Oral before breakfast  tacrolimus 1 milliGRAM(s) Oral at bedtime  tacrolimus 1 milliGRAM(s) Oral daily  tamsulosin 0.4 milliGRAM(s) Oral at bedtime      =======================================================  Labs:                        10.9   5.02  )-----------( 74       ( 04 Feb 2021 05:36 )             35.2     02-04    139  |  100  |  61.0<H>  ----------------------------<  125<H>  5.8<H>   |  19.0<L>  |  6.69<H>    Ca    7.4<L>      04 Feb 2021 05:36  Phos  8.9     02-04  Mg     2.3     02-04    TPro  5.6<L>  /  Alb  2.6<L>  /  TBili  0.5  /  DBili  0.3  /  AST  48<H>  /  ALT  18  /  AlkPhos  256<H>  02-04      Culture - Blood (collected 02-02-21 @ 06:16)  Source: .Blood Blood-Peripheral    Culture - Blood (collected 02-02-21 @ 06:15)  Source: .Blood Blood-Peripheral      Creatinine, Serum: 6.69 mg/dL (02-04-21 @ 05:36)  Creatinine, Serum: 5.88 mg/dL (02-03-21 @ 12:00)  Creatinine, Serum: 8.07 mg/dL (02-02-21 @ 06:12)    Procalcitonin, Serum: 11.82 ng/mL (02-03-21 @ 12:00)  Procalcitonin, Serum: 0.34 ng/mL (02-02-21 @ 06:12)      Ferritin, Serum: 994 ng/mL (02-02-21 @ 06:12)    C-Reactive Protein, Serum: 2.48 mg/dL (02-02-21 @ 06:12)    WBC Count: 5.02 K/uL (02-04-21 @ 05:36)  WBC Count: 6.89 K/uL (02-03-21 @ 12:00)  WBC Count: 8.68 K/uL (02-02-21 @ 06:12)      COVID-19 PCR: Detected (02-02-21 @ 06:10)      Alkaline Phosphatase, Serum: 256 U/L (02-04-21 @ 05:36)  Alkaline Phosphatase, Serum: 256 U/L (02-04-21 @ 05:36)  Alkaline Phosphatase, Serum: 241 U/L (02-03-21 @ 12:00)  Alkaline Phosphatase, Serum: 234 U/L (02-02-21 @ 06:12)  Alanine Aminotransferase (ALT/SGPT): 18 U/L (02-04-21 @ 05:36)  Alanine Aminotransferase (ALT/SGPT): 18 U/L (02-04-21 @ 05:36)  Alanine Aminotransferase (ALT/SGPT): 21 U/L (02-03-21 @ 12:00)  Alanine Aminotransferase (ALT/SGPT): 26 U/L (02-02-21 @ 06:12)  Aspartate Aminotransferase (AST/SGOT): 48 U/L (02-04-21 @ 05:36)  Aspartate Aminotransferase (AST/SGOT): 48 U/L (02-04-21 @ 05:36)  Aspartate Aminotransferase (AST/SGOT): 47 U/L (02-03-21 @ 12:00)  Aspartate Aminotransferase (AST/SGOT): 42 U/L (02-02-21 @ 06:12)  Bilirubin Total, Serum: 0.5 mg/dL (02-04-21 @ 05:36)  Bilirubin Total, Serum: 0.5 mg/dL (02-04-21 @ 05:36)  Bilirubin Total, Serum: 0.9 mg/dL (02-03-21 @ 12:00)  Bilirubin Total, Serum: 0.4 mg/dL (02-02-21 @ 06:12)  Bilirubin Direct, Serum: 0.3 mg/dL (02-04-21 @ 05:36)

## 2021-02-04 NOTE — DIETITIAN INITIAL EVALUATION ADULT. - PERTINENT MEDS FT
MEDICATIONS  (STANDING):  aspirin  chewable 81 milliGRAM(s) Oral daily  atorvastatin 80 milliGRAM(s) Oral at bedtime  chlorhexidine 4% Liquid 1 Application(s) Topical <User Schedule>  clopidogrel Tablet 75 milliGRAM(s) Oral daily  dextrose 40% Gel 15 Gram(s) Oral once  dextrose 50% Injectable 25 Gram(s) IV Push once  dextrose 50% Injectable 12.5 Gram(s) IV Push once  dextrose 50% Injectable 25 Gram(s) IV Push once  fluconAZOLE   Tablet 400 milliGRAM(s) Oral <User Schedule>  glucagon  Injectable 1 milliGRAM(s) IntraMuscular once  heparin   Injectable 5000 Unit(s) SubCutaneous every 8 hours  hydrocortisone sodium succinate Injectable 100 milliGRAM(s) IV Push every 8 hours  lactobacillus acidophilus 1 Tablet(s) Oral two times a day  meropenem  IVPB      meropenem  IVPB 500 milliGRAM(s) IV Intermittent every 24 hours  midodrine 10 milliGRAM(s) Oral every 8 hours  pantoprazole    Tablet 40 milliGRAM(s) Oral before breakfast  remdesivir  IVPB   IV Intermittent   remdesivir  IVPB 100 milliGRAM(s) IV Intermittent every 24 hours  sevelamer carbonate 800 milliGRAM(s) Oral three times a day with meals  tacrolimus 1 milliGRAM(s) Oral daily  tacrolimus 1 milliGRAM(s) Oral at bedtime  tamsulosin 0.4 milliGRAM(s) Oral at bedtime  trimethoprim   80 mG/sulfamethoxazole 400 mG 1 Tablet(s) Oral <User Schedule>    MEDICATIONS  (PRN):  acetaminophen   Tablet .. 650 milliGRAM(s) Oral every 6 hours PRN Temp greater or equal to 38C (100.4F), Mild Pain (1 - 3)

## 2021-02-04 NOTE — CHART NOTE - NSCHARTNOTEFT_GEN_A_CORE
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 Patient was also due for hemodialysis with hyperkalemia K 5.6.  He was found to be hypoxic with SpO2 55% on RA, started on BiPAP and admitted to MICU for acute hypoxemic respiratory failure with need for emergent dialysis. Patient underwent HD with 2L UF on 2/2. He was found to be COVID-19 positive, and was started on Remdesivir and IV Decadron by ID. He was continued on his home Bactrim and Fluconazole.     On 2/3, patient spiked fever 103'F with associated hypotension, meeting criteria for septic shock requiring Norepinephrine. He was given 1L IV fluid bolus, and started on Midodrine to offload IV vasopressor requirements. ID was consulted, and patient was started on empiric antibiotics with Meropenem given his immunocompromised state for possible superimposed bacterial pneumonia. Blood cultures were sent. Patient does not make urine and no sputum available to culture.    He was weaned off IV vasopressors and continues on Midodrine, with stable blood pressure. He is due for hemodialysis this afternoon. After HD, wean down Midodrine as hemodynamically tolerated. Continue stress steroids for a total of 3 days, and then resume home hydrocortisone dose (home med recc and phone numbers in paper chart as well). He remains on HFNC, 40% 35 LPM, with plans to wean to NC as tolerated. ID is following to guide antibiotic management. PT consult placed.     Case endorsed to Dr. JENNIFER Briggs, patient transferred to [facility]. Please reconsult as needed.

## 2021-02-04 NOTE — PROGRESS NOTE ADULT - ASSESSMENT
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 Patient was also due for hemodialysis with hyperkalemia K 5.6.  He was found to be hypoxic with SpO2 55% on RA, started on BiPAP and admitted to MICU for acute hypoxemic respiratory failure with need for emergent dialysis. Patient underwent HD with 2L UF on 2/2. He was found to be COVID-19 positive, and was started on Remdesivir and IV Decadron by ID. He was continued on his home Bactrim and Fluconazole. On 2/3, patient spiked fever 103'F with associated hypotension, meeting criteria for septic shock requiring Norepinephrine. He was given 1L IV fluid bolus, and started on Midodrine to offload IV vasopressor requirements. ID was consulted, and patient was started on empiric antibiotics with Meropenem given his immunocompromised state for possible superimposed bacterial pneumonia. Blood cultures were sent. Patient does not make urine and no sputum available to culture. He was weaned off IV vasopressors and continues on Midodrine, with stable blood pressure. He is due for hemodialysis this afternoon. After HD, wean down Midodrine as hemodynamically tolerated. Continue stress steroids for a total of 3 days, and then resume home hydrocortisone dose (home med recc and phone numbers in paper chart as well). He remains on HFNC, 40% 35 LPM, with plans to wean to NC as tolerated. ID is following to guide antibiotic management. PT consult placed. patient now stable and being downgraded to step down from icu.    #acute hypoxic respiratory failure  - 2/2 covid 19  - w/ possible gram negative bacteria superimposed pna   - s/p septic shock and pressors - now on midodrine  - stress steroids x 3 days then resume home dose (in paper chart)  - remdesivir  - decadron   - supportive care  - isolation precautions  - on high flow  - ID consult appreciated- abx and antifungal per ID  - cultures pending    #HLD  - statin     #CAD s/p pci 2019  - aspirin, statin, plavix    #ESRD on HD s/p transplant  - nephrology consult appreciated- HD per Nephro  - sevelamer  - tacrolimus    #BPH  - tamsulosin    #DVT prophylaxis  - heparin SC

## 2021-02-04 NOTE — PROGRESS NOTE ADULT - SUBJECTIVE AND OBJECTIVE BOX
NEPHROLOGY INTERVAL HPI/OVERNIGHT EVENTS:  pt still with dyspnea; on BiPAP    MEDICATIONS  (STANDING):  aspirin  chewable 81 milliGRAM(s) Oral daily  atorvastatin 80 milliGRAM(s) Oral at bedtime  chlorhexidine 4% Liquid 1 Application(s) Topical <User Schedule>  clopidogrel Tablet 75 milliGRAM(s) Oral daily  dextrose 40% Gel 15 Gram(s) Oral once  dextrose 50% Injectable 25 Gram(s) IV Push once  dextrose 50% Injectable 12.5 Gram(s) IV Push once  dextrose 50% Injectable 25 Gram(s) IV Push once  fluconAZOLE   Tablet 400 milliGRAM(s) Oral <User Schedule>  glucagon  Injectable 1 milliGRAM(s) IntraMuscular once  heparin   Injectable 5000 Unit(s) SubCutaneous every 8 hours  hydrocortisone sodium succinate Injectable 100 milliGRAM(s) IV Push every 8 hours  lactobacillus acidophilus 1 Tablet(s) Oral two times a day  meropenem  IVPB      meropenem  IVPB 500 milliGRAM(s) IV Intermittent every 24 hours  midodrine 10 milliGRAM(s) Oral every 8 hours  norepinephrine Infusion 0.05 MICROgram(s)/kG/Min (7.23 mL/Hr) IV Continuous <Continuous>  pantoprazole    Tablet 40 milliGRAM(s) Oral before breakfast  remdesivir  IVPB   IV Intermittent   remdesivir  IVPB 100 milliGRAM(s) IV Intermittent every 24 hours  tacrolimus 1 milliGRAM(s) Oral at bedtime  tacrolimus 1 milliGRAM(s) Oral daily  tamsulosin 0.4 milliGRAM(s) Oral at bedtime  trimethoprim   80 mG/sulfamethoxazole 400 mG 1 Tablet(s) Oral <User Schedule>    MEDICATIONS  (PRN):  acetaminophen   Tablet .. 650 milliGRAM(s) Oral every 6 hours PRN Temp greater or equal to 38C (100.4F), Mild Pain (1 - 3)      Allergies    budesonide (Unknown)  cefpodoxime (Unknown)  Pollen (Unknown)        Vital Signs Last 24 Hrs  T(C): 36.4 (04 Feb 2021 07:42), Max: 37.3 (03 Feb 2021 11:01)  T(F): 97.6 (04 Feb 2021 07:42), Max: 99.2 (03 Feb 2021 11:01)  HR: 83 (04 Feb 2021 09:00) (72 - 101)  BP: 134/90 (04 Feb 2021 09:00) (69/45 - 140/72)  BP(mean): 100 (04 Feb 2021 09:00) (48 - 100)  RR: 14 (04 Feb 2021 09:00) (14 - 28)  SpO2: 95% (04 Feb 2021 09:00) (91% - 99%)    PHYSICAL EXAM:  Frail, weak  HEENT: Dry mucous membranes  NERVOUS SYSTEM:  A/O x3, non focal  Lungs: Diminished BS at bases  EXTREMITIES:  tr LE edema  SKIN: No rashes nor lesions  Further exam deferred to limit COVID exposure    LABS:                        10.9   5.02  )-----------( 74       ( 04 Feb 2021 05:36 )             35.2     02-04    139  |  100  |  61.0<H>  ----------------------------<  125<H>  5.8<H>   |  19.0<L>  |  6.69<H>    Ca    7.4<L>      04 Feb 2021 05:36  Phos  8.9     02-04  Mg     2.3     02-04    TPro  5.6<L>  /  Alb  2.6<L>  /  TBili  0.5  /  DBili  0.3  /  AST  48<H>  /  ALT  18  /  AlkPhos  256<H>  02-04        Magnesium, Serum: 2.3 mg/dL (02-04 @ 05:36)  Phosphorus Level, Serum: 8.9 mg/dL (02-04 @ 05:36)  Magnesium, Serum: 2.2 mg/dL (02-03 @ 12:00)  Phosphorus Level, Serum: 5.8 mg/dL (02-03 @ 12:00)      RADIOLOGY & ADDITIONAL TESTS:  < from: CT Chest w/ IV Cont (02.02.21 @ 11:59) >     EXAM:  CT CHEST IC                          PROCEDURE DATE:  02/02/2021          INTERPRETATION:  CLINICAL INFORMATION: Widened mediastinum on chest x-ray. COVID.    COMPARISON: Chest x-ray of the same day    PROCEDURE:  CT of the Chest was performed with intravenous contrast.  88 ml of Omnipaque 300 was injected intravenously. 12 ml were discarded.  Sagittal and coronal reformats were performed.    FINDINGS:    LUNGS AND AIRWAYS: Patent central airways.  Bilateral lung opacities including areas of dense consolidation, largest in the lower lobes with additional patchy groundglass opacities and areas of nodularity, greatest in the right upper lobe. There is also compressive atelectasis in both lower lobes secondary to the effusions. Interlobular septal thickening, likely pulmonary edema.  PLEURA: Small to moderate left and small right partially loculated pleural effusions. Fluid in the right and left paramediastinal region including superior to the azygos vein on the right and to the left of the aortic arch and descending thoracic aorta, likely in the pleural space. The fluid in the superior paramediastinal region likely accounts for the appearance of the mediastinum on the chest x-ray of the same day.  MEDIASTINUM AND JANNY: No pathologically enlarged lymph nodes. Metallic densities in the subcarinal region; correlation is recommended with the surgical/procedural history.  VESSELS: Thoracic aorta normal in caliber with atherosclerotic changes. Coronary artery calcifications.  HEART: Enlarged. No pericardial effusion.  CHEST WALL AND LOWER NECK: Right IJ central line with the tip at the cavoatrial junction. Small amount of pericatheter thrombus (series 3 image 43). Bilateral gynecomastia. Anasarca.  VISUALIZED UPPER ABDOMEN: Bilateral nonobstructing renal calculi. Atrophy of the medial segment of the left hepatic lobe with a subtle nodular surface contour of the liver which can be seen in the setting of cirrhosis. Small amount of perihepatic ascites. Peripheral region of low attenuation in the spleen measuring 1.9 cm (series 2 image 143), likely an infarct. Partially imaged cystic lesion in the region of the pancreatic body and junction measuring 1.4 cm (series 3 image 149). 1.2 cm cyst in the upper pole the right kidney.  BONES: Multilevel compression fractures in the thoracic spine, many which are new including T5-T8 and T10. Chronic compression fractures at T4, T9, T11, T12, L1, and L2. Old sternal fracture with sternal wires in the mid sternum. Old bilateral rib fractures.    IMPRESSION:  Small to moderate left and small right loculated pleural effusions with fluid in the right and left paramediastinal regions; paramediastinal fluid likely accounts for the appearance of the mediastinum on the chest x-ray of the same day.    Bilateral lung opacities as described above; differential includes multifocal pneumonia and/or pulmonary edema.    Interlobular septal thickening, likely pulmonary edema.    Right IJ central line with a small amount of pericatheter thrombus.    Multilevel compression fractures in the thoracic and lumbar spine, some which are new since 9/26/2019; a thoracic spine MRI could be obtained to assess for acuity.    Peripheral region of low attenuation in the spleen, likely an infarct.    Partially imaged 1.4 cm cystic lesion in the pancreatic body tail junction; a nonemergent MRI of the abdomen is recommended for further evaluation.    < end of copied text >

## 2021-02-04 NOTE — PROGRESS NOTE ADULT - SUBJECTIVE AND OBJECTIVE BOX
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 Patient was also due for hemodialysis with hyperkalemia K 5.6.  He was found to be hypoxic with SpO2 55% on RA, started on BiPAP and admitted to MICU for acute hypoxemic respiratory failure with need for emergent dialysis. Patient underwent HD with 2L UF.     Patient nephrologist is Dr. Anderson, pulmonologist Dr. Cordova.        EVENTS:   Feeling well this morning  Weaned off vasopressors, hemodynamically stable  On HFNC weaned down to 40% 35 LPM        T(C): 36.4 (02-04-21 @ 07:42), Max: 37.1 (02-03-21 @ 16:18)  HR: 78 (02-04-21 @ 10:00) (72 - 95)  BP: 118/60 (02-04-21 @ 10:00) (69/45 - 140/72)  RR: 18 (02-04-21 @ 10:00) (14 - 25)  SpO2: 99% (02-04-21 @ 10:00) (91% - 99%)          02-03-21 @ 07:01  -  02-04-21 @ 07:00  --------------------------------------------------------  IN: 360 mL / OUT: 0 mL / NET: 360 mL                Vital signs reviewed and physical exam performed where pertinent and urgently required.    Lab/radiology studies/ABG/meds reviewed and interpreted into the assessment and treatment plan.        Assessment/Plan/Therapeutic interventions:    1. Acute Hypoxemic Respiratory Failure  2. Septic Shock, suspect component of Adrenal insufficiency given chronic hydrocortisone use  3. COVID-19 viral pneumonia  4. R/O Superimposed bacterial pneumonia in immunocompromised patient  5. Hypoglycemia  6. ESRD   7. Lung Transplant      On HFNC, titrating to keep SpO2 >90%. Will continue to wean down, with hopes of transitioning to nasal cannula.  Off IV vasopressors, on Midodrine. Wean off as able after hemodialysis today, keep goal MAP >65  Hydrocortisone 100mg TID for 3 days for adrenal insufficiency in the setting of septic shock secondary to COVID-19 viral pneumonia with possible superimposed bacterial pneumonia. Resume home dose of hydrocortisone after stress steroids have completed.  Continue broad-spectrum antibiotics with Meropenem, culture pending. Does not make urine and no sputum available. Continue chronic Bactrim and chronic Fluconazole.  Tacrolimus decreased and new goal level 4-6 per patient's transplant surgeon Dr. Hung Toney [phone] / c: [phone]  Due for hemodialysis today, no UF, spoke with Dr. Maher  Diet advanced, tolerating well, hypoglycemia resolved.   PT consult, goal OOB to chair today    Patient has been optimized for transfer out of ICU to Step Down.       COVID 19 specific considerations and therapeutic options based on the available and rapidly changing literature.    45 minutes of time spent in the management of this critically ill COVID-19 patient suffering from multisystem organ failure with continuous assessments and interventions based on the interpretation of multiple databases. Critical care time not inclusive of independent time spent on procedures performed.

## 2021-02-04 NOTE — DIETITIAN INITIAL EVALUATION ADULT. - OTHER INFO
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 Patient was also due for hemodialysis with hyperkalemia K 5.6.  He was found to be hypoxic with SpO2 55% on RA, started on BiPAP and admitted to MICU for acute hypoxemic respiratory failure with need for emergent dialysis. Patient underwent HD with 2L UF.   1. Acute Hypoxemic Respiratory Failure  2. Septic Shock, suspect component of Adrenal insufficiency given chronic hydrocortisone use  3. COVID-19 viral pneumonia  4. R/O Superimposed bacterial pneumonia in immunocompromised patient  5. Hypoglycemia  6. ESRD     current weight 128 lbs, pt was 130 lbs per previous admission weight in 4/2020  attempted to reach pt via telephone -  unsuccessful x 2      7. Lung Transplant    Diet, Consistent Carbohydrate Renal w/Evening Snack (02-03-21 @ 13:20)

## 2021-02-04 NOTE — PROGRESS NOTE ADULT - SUBJECTIVE AND OBJECTIVE BOX
PULMONARY PROGRESS NOTE      JU FERGUSONCULLEN-[id number]    Patient is a 70y old  Male who presents with a chief complaint of Hypoxia (04 Feb 2021 12:06)      INTERVAL HPI/OVERNIGHT EVENTS: O2 requirements improving; on HFO2 and titrating down. Was on levo but now off. Awake and alert.     MEDICATIONS  (STANDING):  aspirin  chewable 81 milliGRAM(s) Oral daily  atorvastatin 80 milliGRAM(s) Oral at bedtime  chlorhexidine 4% Liquid 1 Application(s) Topical <User Schedule>  clopidogrel Tablet 75 milliGRAM(s) Oral daily  dextrose 40% Gel 15 Gram(s) Oral once  dextrose 50% Injectable 25 Gram(s) IV Push once  dextrose 50% Injectable 12.5 Gram(s) IV Push once  dextrose 50% Injectable 25 Gram(s) IV Push once  fluconAZOLE   Tablet 400 milliGRAM(s) Oral <User Schedule>  glucagon  Injectable 1 milliGRAM(s) IntraMuscular once  heparin   Injectable 5000 Unit(s) SubCutaneous every 8 hours  hydrocortisone sodium succinate Injectable 100 milliGRAM(s) IV Push every 8 hours  lactobacillus acidophilus 1 Tablet(s) Oral two times a day  meropenem  IVPB      meropenem  IVPB 500 milliGRAM(s) IV Intermittent every 24 hours  midodrine 10 milliGRAM(s) Oral every 8 hours  norepinephrine Infusion 0.05 MICROgram(s)/kG/Min (7.23 mL/Hr) IV Continuous <Continuous>  pantoprazole    Tablet 40 milliGRAM(s) Oral before breakfast  remdesivir  IVPB   IV Intermittent   remdesivir  IVPB 100 milliGRAM(s) IV Intermittent every 24 hours  sevelamer carbonate 800 milliGRAM(s) Oral three times a day with meals  tacrolimus 1 milliGRAM(s) Oral daily  tacrolimus 1 milliGRAM(s) Oral at bedtime  tamsulosin 0.4 milliGRAM(s) Oral at bedtime  trimethoprim   80 mG/sulfamethoxazole 400 mG 1 Tablet(s) Oral <User Schedule>      MEDICATIONS  (PRN):  acetaminophen   Tablet .. 650 milliGRAM(s) Oral every 6 hours PRN Temp greater or equal to 38C (100.4F), Mild Pain (1 - 3)      Allergies    budesonide (Unknown)  cefpodoxime (Unknown)  Pollen (Unknown)    Intolerances        PAST MEDICAL & SURGICAL HISTORY:  ESRD (end stage renal disease) on dialysis    Emphysema of lung    H/O lung transplant  bilateral - transplant - 1-2-2015 -  [facility]           REVIEW OF SYSTEMS:    CONSTITUTIONAL:  No distress    HEENT:  Eyes:  No diplopia or blurred vision. ENT:  No earache, sore throat or runny nose.    CARDIOVASCULAR:  No pressure, squeezing, tightness, heaviness or aching about the chest; no palpitations.    RESPIRATORY:  per HPI     GASTROINTESTINAL:  No nausea, vomiting or diarrhea.    GENITOURINARY:  No dysuria, frequency or urgency.    NEUROLOGIC:  No paresthesias, fasciculations, seizures or weakness.    PSYCHIATRIC:  No disorder of thought or mood.    Vital Signs Last 24 Hrs  T(C): 36.4 (04 Feb 2021 07:42), Max: 37.1 (03 Feb 2021 16:18)  T(F): 97.6 (04 Feb 2021 07:42), Max: 98.8 (03 Feb 2021 16:18)  HR: 7 (04 Feb 2021 12:00) (7 - 95)  BP: 123/75 (04 Feb 2021 12:00) (69/45 - 140/72)  BP(mean): 86 (04 Feb 2021 12:00) (48 - 100)  RR: 16 (04 Feb 2021 12:00) (14 - 25)  SpO2: 100% (04 Feb 2021 12:00) (91% - 100%)    PHYSICAL EXAMINATION:    GENERAL: The patient is awake and alert in no apparent distress.     HEENT: Head is normocephalic and atraumatic. Mucous membranes are moist.    NECK: Supple.    LUNGS: rhonchi, no wheeze now,  respirations unlabored    HEART: Regular rate and rhythm      ABDOMEN: Soft, nontender, and nondistended.      EXTREMITIES: Without any cyanosis, clubbing, rash, lesions or edema.    NEUROLOGIC: Grossly intact.    LABS:                        10.9   5.02  )-----------( 74       ( 04 Feb 2021 05:36 )             35.2     02-04    139  |  100  |  61.0<H>  ----------------------------<  125<H>  5.8<H>   |  19.0<L>  |  6.69<H>    Ca    7.4<L>      04 Feb 2021 05:36  Phos  8.9     02-04  Mg     2.3     02-04    TPro  5.6<L>  /  Alb  2.6<L>  /  TBili  0.5  /  DBili  0.3  /  AST  48<H>  /  ALT  18  /  AlkPhos  256<H>  02-04        ABG - ( 03 Feb 2021 04:49 )  pH, Arterial: 7.44  pH, Blood: x     /  pCO2: 42    /  pO2: 90    / HCO3: 28    / Base Excess: 4.3   /  SaO2: 98                 Procalcitonin, Serum: 11.82 ng/mL (02-03-21 @ 12:00)  Procalcitonin, Serum: 0.34 ng/mL (02-02-21 @ 06:12)      MICROBIOLOGY:    RADIOLOGY & ADDITIONAL STUDIES:  < from: US Duplex Venous Upper Ext Ltd, Right (02.03.21 @ 18:08) >     EXAM:  US DPLX UPR EXT VEINS LTD RT                          PROCEDURE DATE:  02/03/2021          INTERPRETATION:  CLINICAL INFORMATION: COVID positive with hypoxia and shortness of breath    COMPARISON: None available.    TECHNIQUE: Duplex sonography of the RIGHT UPPER extremity veins with color and spectral Doppler, with and without compression.    FINDINGS:    The right internal jugular, subclavian, axillary and brachial veins are patent and compressible where applicable.  The basilic and cephalic veins (superficial veins) are patent and without thrombus.    Doppler examination shows normal spontaneous and phasic flow.    IMPRESSION:  No evidence of right upper extremity deep venous thrombosis.                    MARTINEZ SALAZAR MD; Attending Radiologist  This document has been electronically signed. Feb  3 2021  6:33PM    < end of copied text >

## 2021-02-04 NOTE — DIETITIAN INITIAL EVALUATION ADULT. - PERTINENT LABORATORY DATA
02-04 Na139 mmol/L Glu 125 mg/dL<H> K+ 5.8 mmol/L<H> Cr  6.69 mg/dL<H> BUN 61.0 mg/dL<H> Phos 8.9 mg/dL<H> Alb 2.6 g/dL<L> PAB n/a

## 2021-02-04 NOTE — PROGRESS NOTE ADULT - SUBJECTIVE AND OBJECTIVE BOX
Patient is a 70y old  Male who presents with a chief complaint of Hypoxia (04 Feb 2021 15:32)    Patient seen and examined at bedside.     ALLERGIES:  budesonide (Unknown)  cefpodoxime (Unknown)  Pollen (Unknown)    MEDICATIONS  (STANDING):  aspirin  chewable 81 milliGRAM(s) Oral daily  atorvastatin 80 milliGRAM(s) Oral at bedtime  chlorhexidine 4% Liquid 1 Application(s) Topical <User Schedule>  clopidogrel Tablet 75 milliGRAM(s) Oral daily  dextrose 40% Gel 15 Gram(s) Oral once  dextrose 50% Injectable 25 Gram(s) IV Push once  dextrose 50% Injectable 12.5 Gram(s) IV Push once  dextrose 50% Injectable 25 Gram(s) IV Push once  fluconAZOLE   Tablet 400 milliGRAM(s) Oral <User Schedule>  glucagon  Injectable 1 milliGRAM(s) IntraMuscular once  heparin   Injectable 5000 Unit(s) SubCutaneous every 8 hours  hydrocortisone sodium succinate Injectable 100 milliGRAM(s) IV Push every 8 hours  lactobacillus acidophilus 1 Tablet(s) Oral two times a day  meropenem  IVPB      meropenem  IVPB 500 milliGRAM(s) IV Intermittent every 24 hours  midodrine 10 milliGRAM(s) Oral every 8 hours  pantoprazole    Tablet 40 milliGRAM(s) Oral before breakfast  remdesivir  IVPB   IV Intermittent   remdesivir  IVPB 100 milliGRAM(s) IV Intermittent every 24 hours  sevelamer carbonate 800 milliGRAM(s) Oral three times a day with meals  tacrolimus 1 milliGRAM(s) Oral daily  tacrolimus 1 milliGRAM(s) Oral at bedtime  tamsulosin 0.4 milliGRAM(s) Oral at bedtime  trimethoprim   80 mG/sulfamethoxazole 400 mG 1 Tablet(s) Oral <User Schedule>    MEDICATIONS  (PRN):  acetaminophen   Tablet .. 650 milliGRAM(s) Oral every 6 hours PRN Temp greater or equal to 38C (100.4F), Mild Pain (1 - 3)    Vital Signs Last 24 Hrs  T(F): 97.6 (04 Feb 2021 07:42), Max: 98.6 (03 Feb 2021 21:20)  HR: 73 (04 Feb 2021 13:21) (70 - 88)  BP: 130/74 (04 Feb 2021 13:21) (112/68 - 140/72)  RR: 16 (04 Feb 2021 12:00) (14 - 25)  SpO2: 100% (04 Feb 2021 12:00) (91% - 100%)  I&O's Summary    03 Feb 2021 07:01  -  04 Feb 2021 07:00  --------------------------------------------------------  IN: 360 mL / OUT: 0 mL / NET: 360 mL    PHYSICAL EXAM:  General: NAD, Alert on high flow  ENT: MMM, no thrush  Neck: Supple, No JVD  Lungs: decreased breath sounds b/l bases  Cardio: +s1/s2  Abdomen: Soft, Nontender, Nondistended; Bowel sounds present  Extremities: No calf tendernes    LABS:                        10.9   5.02  )-----------( 74       ( 04 Feb 2021 05:36 )             35.2     02-04    139  |  100  |  61.0  ----------------------------<  125  5.8   |  19.0  |  6.69    Ca    7.4      04 Feb 2021 05:36  Phos  8.9     02-04  Mg     2.3     02-04    TPro  5.6  /  Alb  2.6  /  TBili  0.5  /  DBili  0.3  /  AST  48  /  ALT  18  /  AlkPhos  256  02-04    eGFR if Non African American: 8 mL/min/1.73M2 (02-04-21 @ 05:36)  eGFR if : 9 mL/min/1.73M2 (02-04-21 @ 05:36)    PT/INR - ( 02 Feb 2021 06:12 )   PT: 12.7 sec;   INR: 1.10 ratio    PTT - ( 02 Feb 2021 06:12 )  PTT:27.2 sec    Procalcitonin, Serum: 11.82 ng/mL (02-03-21 @ 12:00)  Procalcitonin, Serum: 0.34 ng/mL (02-02-21 @ 06:12)    CARDIAC MARKERS ( 02 Feb 2021 06:12 )  x     / 0.08 ng/mL / x     / x     / x        06:28 - VBG - pH: 7.14  | pCO2: 64    | pO2: 54    | Lactate: 3.9      ABG - ( 03 Feb 2021 04:49 )  pH, Arterial: 7.44  pH, Blood: x     /  pCO2: 42    /  pO2: 90    / HCO3: 28    / Base Excess: 4.3   /  SaO2: 98        Glucose  POCT Blood Glucose.: 136 mg/dL (04 Feb 2021 12:35)  POCT Blood Glucose.: 117 mg/dL (04 Feb 2021 06:10)  POCT Blood Glucose.: 129 mg/dL (04 Feb 2021 00:32)  POCT Blood Glucose.: 97 mg/dL (03 Feb 2021 17:05)    Cultures  Culture - Blood (collected 02 Feb 2021 06:16)  Source: .Blood Blood-Peripheral  Preliminary Report (04 Feb 2021 07:00):    No growth at 48 hours    Culture - Blood (collected 02 Feb 2021 06:15)  Source: .Blood Blood-Peripheral  Preliminary Report (04 Feb 2021 07:00):    No growth at 48 hours    RADIOLOGY & ADDITIONAL TESTS:  < from: US Duplex Venous Upper Ext Ltd, Right (02.03.21 @ 18:08) >  IMPRESSION:  No evidence of right upper extremity deep venous thrombosis.  < end of copied text >    < from: CT Chest w/ IV Cont (02.02.21 @ 11:59) >  IMPRESSION:  Small to moderate left and small right loculated pleural effusions with fluid in the right and left paramediastinal regions; paramediastinal fluid likely accounts for the appearance of the mediastinum on the chest x-ray of the same day.  Bilateral lung opacities as described above; differential includes multifocal pneumonia and/or pulmonary edema.  Interlobular septal thickening, likely pulmonary edema.  Right IJ central line with a small amount of pericatheter thrombus.  Multilevel compression fractures in the thoracic and lumbar spine, some which are new since 9/26/2019; a thoracic spine MRI could be obtained to assess for acuity.  Peripheral region of low attenuation in the spleen, likely an infarct.  Partially imaged 1.4 cm cystic lesion in the pancreatic body tail junction; a nonemergent MRI of the abdomen is recommended for further evaluation.  < end of copied text >    < from: Xray Chest 1 View-PORTABLE IMMEDIATE (02.02.21 @ 06:22) >  Impression:  Diffuse bilateral lung opacities; the differential includes multifocal infection or pulmonary edema.  Widening of the mediastinum;  < end of copied text >    Care Discussed with Consultants/Other Providers:   MICU

## 2021-02-04 NOTE — PROGRESS NOTE ADULT - ASSESSMENT
This 71 y/o M ex-smoker with PMH of 2015- Lung Transplant for COPD on anti rejection meds, s/p rt total hip arthroplasty, multi level compression #, BPH, HLD, CAD, BPH, OP, started HD for ESRD (2/2020) presents to ED c/o worsening dyspnea. Per EMS pt has had a cough since 2/1/21. However this morning his breathing became labored and he had difficulty speaking. Patient has not missed a dialysis session, last session Saturday, due today. For EMS pulse ox was 55% on RA, placed on NRB then CPAP, improved to 85-89%. Patient nephrologist is Dr. Maher;  pulmonologist Dr. Cordova.  Transplant team - [facility]    In ED chest x-ray suggestive of pneumonia, given 500 mg Meropenem. COVID+ (02 Feb 2021 12:28)    patient still has SOB. Reports that he was doing well about 1 week ago.   COVID testing is confirmed positive here.   CT of the lungs show: Small to moderate left and small right loculated pleural effusions with fluid in the right and left paramediastinal regions; paramediastinal fluid likely accounts for the appearance of the mediastinum on the chest x-ray of the same day.  Bilateral lung opacities as described above; differential includes multifocal pneumonia and/or pulmonary edema.    No clear sick contacts.       Impression:  COVID-19 infection   Viral pneumonia  shortness of breath  lung infiltrates  dependence on oxygen  Lung transplant  Immunosuppression for transplant    Plan:  - Continue Remdesivir IV daily.  will plan for a 5 day course.   - MAY stop earlier than 5 days, and send home, if patient is back on Ambient oxygen/ room air.     - continue dexamethasone 6mg daily, While on remdesivir  Inflammatory markers and LFTs WILL BE ORDERED with the REMDESIVIR order SET.  DO NOT ORDER this additionally.   - trend CBC with diff, CMP  daily    - Continue supportive care measures  - continue Oxygenation as needed;  CONTINUE to titrate down as tolerated  - self- proning  as tolerated  - ENCOURAGED OOB to chair  - encouraged incentive spirometry     HIGH risk for decompensation    - patient CONSENTED to Convalescent Plasma as part of EUA  - s/p 1 unit of CP. 2/2/2021       Will follow with you.

## 2021-02-04 NOTE — PROGRESS NOTE ADULT - ASSESSMENT
- Hypercapneic and hypoxic respiratory failure; see below  - COVID-19 PNA  - Lung transplant (2015) for COPD - on Tacrolimus and MMF  - ESRD - on HD TuThSa  - Acute pulmonary edema  -Acute on chronic hypoxic resp failure  -Shock; improved    RECC:  Decadron. Remdesevir started. NIPPV qhs and prn; HFO2 and titrate during the day. HD per nephrology. C/w maintenance immunosuppression; PCT was negative, no fevers. Monitor serum levels of immunosuppressants. ID f/u. Anticoagulation per [facility] COVID 19 protocol.

## 2021-02-05 LAB
ALBUMIN SERPL ELPH-MCNC: 2.7 G/DL — LOW (ref 3.3–5.2)
ALBUMIN SERPL ELPH-MCNC: 2.7 G/DL — LOW (ref 3.3–5.2)
ALP SERPL-CCNC: 216 U/L — HIGH (ref 40–120)
ALP SERPL-CCNC: 218 U/L — HIGH (ref 40–120)
ALT FLD-CCNC: 16 U/L — SIGNIFICANT CHANGE UP
ALT FLD-CCNC: 16 U/L — SIGNIFICANT CHANGE UP
ANION GAP SERPL CALC-SCNC: 17 MMOL/L — SIGNIFICANT CHANGE UP (ref 5–17)
AST SERPL-CCNC: 43 U/L — HIGH
AST SERPL-CCNC: 45 U/L — HIGH
BASE EXCESS BLDA CALC-SCNC: 4.9 MMOL/L — HIGH (ref -2–2)
BASOPHILS # BLD AUTO: 0.01 K/UL — SIGNIFICANT CHANGE UP (ref 0–0.2)
BASOPHILS NFR BLD AUTO: 0.2 % — SIGNIFICANT CHANGE UP (ref 0–2)
BILIRUB DIRECT SERPL-MCNC: 0.2 MG/DL — SIGNIFICANT CHANGE UP (ref 0–0.3)
BILIRUB INDIRECT FLD-MCNC: 0.3 MG/DL — SIGNIFICANT CHANGE UP (ref 0.2–1)
BILIRUB SERPL-MCNC: 0.5 MG/DL — SIGNIFICANT CHANGE UP (ref 0.4–2)
BILIRUB SERPL-MCNC: 0.5 MG/DL — SIGNIFICANT CHANGE UP (ref 0.4–2)
BLOOD GAS COMMENTS ARTERIAL: SIGNIFICANT CHANGE UP
BUN SERPL-MCNC: 48 MG/DL — HIGH (ref 8–20)
CALCIUM SERPL-MCNC: 7.8 MG/DL — LOW (ref 8.6–10.2)
CHLORIDE SERPL-SCNC: 98 MMOL/L — SIGNIFICANT CHANGE UP (ref 98–107)
CO2 SERPL-SCNC: 26 MMOL/L — SIGNIFICANT CHANGE UP (ref 22–29)
CREAT SERPL-MCNC: 4.63 MG/DL — HIGH (ref 0.5–1.3)
EOSINOPHIL # BLD AUTO: 0 K/UL — SIGNIFICANT CHANGE UP (ref 0–0.5)
EOSINOPHIL NFR BLD AUTO: 0 % — SIGNIFICANT CHANGE UP (ref 0–6)
GAS PNL BLDA: SIGNIFICANT CHANGE UP
GLUCOSE BLDC GLUCOMTR-MCNC: 131 MG/DL — HIGH (ref 70–99)
GLUCOSE BLDC GLUCOMTR-MCNC: 193 MG/DL — HIGH (ref 70–99)
GLUCOSE SERPL-MCNC: 107 MG/DL — HIGH (ref 70–99)
HCO3 BLDA-SCNC: 29 MMOL/L — SIGNIFICANT CHANGE UP (ref 21–29)
HCT VFR BLD CALC: 31.8 % — LOW (ref 39–50)
HGB BLD-MCNC: 10.2 G/DL — LOW (ref 13–17)
HOROWITZ INDEX BLDA+IHG-RTO: 40 — SIGNIFICANT CHANGE UP
IMM GRANULOCYTES NFR BLD AUTO: 0.5 % — SIGNIFICANT CHANGE UP (ref 0–1.5)
LYMPHOCYTES # BLD AUTO: 0.16 K/UL — LOW (ref 1–3.3)
LYMPHOCYTES # BLD AUTO: 2.5 % — LOW (ref 13–44)
MAGNESIUM SERPL-MCNC: 2.2 MG/DL — SIGNIFICANT CHANGE UP (ref 1.6–2.6)
MCHC RBC-ENTMCNC: 28.3 PG — SIGNIFICANT CHANGE UP (ref 27–34)
MCHC RBC-ENTMCNC: 32.1 GM/DL — SIGNIFICANT CHANGE UP (ref 32–36)
MCV RBC AUTO: 88.1 FL — SIGNIFICANT CHANGE UP (ref 80–100)
MONOCYTES # BLD AUTO: 0.3 K/UL — SIGNIFICANT CHANGE UP (ref 0–0.9)
MONOCYTES NFR BLD AUTO: 4.7 % — SIGNIFICANT CHANGE UP (ref 2–14)
NEUTROPHILS # BLD AUTO: 5.84 K/UL — SIGNIFICANT CHANGE UP (ref 1.8–7.4)
NEUTROPHILS NFR BLD AUTO: 92.1 % — HIGH (ref 43–77)
PCO2 BLDA: 39 MMHG — SIGNIFICANT CHANGE UP (ref 35–45)
PH BLDA: 7.48 — HIGH (ref 7.35–7.45)
PHOSPHATE SERPL-MCNC: 7.6 MG/DL — HIGH (ref 2.4–4.7)
PLATELET # BLD AUTO: 79 K/UL — LOW (ref 150–400)
PO2 BLDA: 90 MMHG — SIGNIFICANT CHANGE UP (ref 83–108)
POTASSIUM SERPL-MCNC: 4.8 MMOL/L — SIGNIFICANT CHANGE UP (ref 3.5–5.3)
POTASSIUM SERPL-SCNC: 4.8 MMOL/L — SIGNIFICANT CHANGE UP (ref 3.5–5.3)
PROT SERPL-MCNC: 5.4 G/DL — LOW (ref 6.6–8.7)
PROT SERPL-MCNC: 5.5 G/DL — LOW (ref 6.6–8.7)
RBC # BLD: 3.61 M/UL — LOW (ref 4.2–5.8)
RBC # FLD: 18.8 % — HIGH (ref 10.3–14.5)
SAO2 % BLDA: 96 % — SIGNIFICANT CHANGE UP (ref 92–96)
SODIUM SERPL-SCNC: 141 MMOL/L — SIGNIFICANT CHANGE UP (ref 135–145)
WBC # BLD: 6.34 K/UL — SIGNIFICANT CHANGE UP (ref 3.8–10.5)
WBC # FLD AUTO: 6.34 K/UL — SIGNIFICANT CHANGE UP (ref 3.8–10.5)

## 2021-02-05 PROCEDURE — 99232 SBSQ HOSP IP/OBS MODERATE 35: CPT | Mod: CS

## 2021-02-05 PROCEDURE — 99233 SBSQ HOSP IP/OBS HIGH 50: CPT | Mod: CS

## 2021-02-05 RX ORDER — REMDESIVIR 5 MG/ML
100 INJECTION INTRAVENOUS EVERY 24 HOURS
Refills: 0 | Status: COMPLETED | OUTPATIENT
Start: 2021-02-07 | End: 2021-02-11

## 2021-02-05 RX ORDER — MIDODRINE HYDROCHLORIDE 2.5 MG/1
10 TABLET ORAL
Refills: 0 | Status: DISCONTINUED | OUTPATIENT
Start: 2021-02-05 | End: 2021-02-12

## 2021-02-05 RX ADMIN — Medication 100 MILLIGRAM(S): at 21:10

## 2021-02-05 RX ADMIN — Medication 100 MILLIGRAM(S): at 14:39

## 2021-02-05 RX ADMIN — CLOPIDOGREL BISULFATE 75 MILLIGRAM(S): 75 TABLET, FILM COATED ORAL at 12:07

## 2021-02-05 RX ADMIN — MIDODRINE HYDROCHLORIDE 10 MILLIGRAM(S): 2.5 TABLET ORAL at 17:10

## 2021-02-05 RX ADMIN — Medication 1 TABLET(S): at 17:10

## 2021-02-05 RX ADMIN — HEPARIN SODIUM 5000 UNIT(S): 5000 INJECTION INTRAVENOUS; SUBCUTANEOUS at 21:11

## 2021-02-05 RX ADMIN — CHLORHEXIDINE GLUCONATE 1 APPLICATION(S): 213 SOLUTION TOPICAL at 06:07

## 2021-02-05 RX ADMIN — Medication 100 MILLIGRAM(S): at 06:06

## 2021-02-05 RX ADMIN — TAMSULOSIN HYDROCHLORIDE 0.4 MILLIGRAM(S): 0.4 CAPSULE ORAL at 21:11

## 2021-02-05 RX ADMIN — SEVELAMER CARBONATE 800 MILLIGRAM(S): 2400 POWDER, FOR SUSPENSION ORAL at 12:06

## 2021-02-05 RX ADMIN — Medication 1 TABLET(S): at 06:07

## 2021-02-05 RX ADMIN — SEVELAMER CARBONATE 800 MILLIGRAM(S): 2400 POWDER, FOR SUSPENSION ORAL at 17:10

## 2021-02-05 RX ADMIN — HEPARIN SODIUM 5000 UNIT(S): 5000 INJECTION INTRAVENOUS; SUBCUTANEOUS at 14:39

## 2021-02-05 RX ADMIN — REMDESIVIR 500 MILLIGRAM(S): 5 INJECTION INTRAVENOUS at 21:12

## 2021-02-05 RX ADMIN — TACROLIMUS 1 MILLIGRAM(S): 5 CAPSULE ORAL at 12:07

## 2021-02-05 RX ADMIN — PANTOPRAZOLE SODIUM 40 MILLIGRAM(S): 20 TABLET, DELAYED RELEASE ORAL at 06:07

## 2021-02-05 RX ADMIN — MIDODRINE HYDROCHLORIDE 10 MILLIGRAM(S): 2.5 TABLET ORAL at 06:07

## 2021-02-05 RX ADMIN — ATORVASTATIN CALCIUM 80 MILLIGRAM(S): 80 TABLET, FILM COATED ORAL at 21:11

## 2021-02-05 RX ADMIN — SEVELAMER CARBONATE 800 MILLIGRAM(S): 2400 POWDER, FOR SUSPENSION ORAL at 09:33

## 2021-02-05 RX ADMIN — HEPARIN SODIUM 5000 UNIT(S): 5000 INJECTION INTRAVENOUS; SUBCUTANEOUS at 06:06

## 2021-02-05 RX ADMIN — MEROPENEM 100 MILLIGRAM(S): 1 INJECTION INTRAVENOUS at 14:41

## 2021-02-05 RX ADMIN — TACROLIMUS 1 MILLIGRAM(S): 5 CAPSULE ORAL at 21:11

## 2021-02-05 RX ADMIN — Medication 81 MILLIGRAM(S): at 12:07

## 2021-02-05 NOTE — PROGRESS NOTE ADULT - SUBJECTIVE AND OBJECTIVE BOX
PULMONARY PROGRESS NOTE      JU FERGUSON  MRN-[id number]    Patient is a 70y old  Male who presents with a chief complaint of Hypoxia (05 Feb 2021 14:54)        INTERVAL HPI/OVERNIGHT EVENTS:  Pt seen and examined at the bedside.    MEDICATIONS  (STANDING):  aspirin  chewable 81 milliGRAM(s) Oral daily  atorvastatin 80 milliGRAM(s) Oral at bedtime  chlorhexidine 4% Liquid 1 Application(s) Topical <User Schedule>  clopidogrel Tablet 75 milliGRAM(s) Oral daily  dextrose 40% Gel 15 Gram(s) Oral once  dextrose 50% Injectable 25 Gram(s) IV Push once  dextrose 50% Injectable 12.5 Gram(s) IV Push once  dextrose 50% Injectable 25 Gram(s) IV Push once  fluconAZOLE   Tablet 400 milliGRAM(s) Oral <User Schedule>  glucagon  Injectable 1 milliGRAM(s) IntraMuscular once  heparin   Injectable 5000 Unit(s) SubCutaneous every 8 hours  hydrocortisone sodium succinate Injectable 100 milliGRAM(s) IV Push every 8 hours  lactobacillus acidophilus 1 Tablet(s) Oral two times a day  meropenem  IVPB      meropenem  IVPB 500 milliGRAM(s) IV Intermittent every 24 hours  midodrine 10 milliGRAM(s) Oral <User Schedule>  pantoprazole    Tablet 40 milliGRAM(s) Oral before breakfast  remdesivir  IVPB 100 milliGRAM(s) IV Intermittent every 24 hours  remdesivir  IVPB   IV Intermittent   sevelamer carbonate 800 milliGRAM(s) Oral three times a day with meals  tacrolimus 1 milliGRAM(s) Oral daily  tacrolimus 1 milliGRAM(s) Oral at bedtime  tamsulosin 0.4 milliGRAM(s) Oral at bedtime  trimethoprim   80 mG/sulfamethoxazole 400 mG 1 Tablet(s) Oral <User Schedule>    MEDICATIONS  (PRN):  acetaminophen   Tablet .. 650 milliGRAM(s) Oral every 6 hours PRN Temp greater or equal to 38C (100.4F), Mild Pain (1 - 3)    Allergies    budesonide (Unknown)  cefpodoxime (Unknown)  Pollen (Unknown)    Intolerances      PAST MEDICAL & SURGICAL HISTORY:  ESRD (end stage renal disease) on dialysis    Emphysema of lung    H/O lung transplant  bilateral - transplant - 1-2-2015 -  [facility]          REVIEW OF SYSTEMS:  Negative except as noted in HPI      Vital Signs Last 24 Hrs  T(C): 36.7 (05 Feb 2021 16:28), Max: 36.9 (05 Feb 2021 08:16)  T(F): 98.1 (05 Feb 2021 16:28), Max: 98.5 (05 Feb 2021 08:16)  HR: 81 (05 Feb 2021 16:28) (72 - 81)  BP: 117/74 (05 Feb 2021 16:28) (117/74 - 160/83)  BP(mean): --  RR: 20 (05 Feb 2021 16:28) (15 - 20)  SpO2: 98% (05 Feb 2021 16:28) (91% - 100%)      PHYSICAL EXAMINATION:  GEN: In no apparent distress  HEENT: NC/AT  NECK: Supple  CV: +S1, S2  RESPIRATORY: Diffuse rhonchi/crackles, good inspiratory effort, non-labored breathing  ABDOMEN: Soft, NT/ND, +BS  EXTREMITIES: No rashes, lesions, or pitting edema noted  NEURO: Grossly non-focal  PSYCH: Normal affect      LABS:                        10.2   6.34  )-----------( 79       ( 05 Feb 2021 08:56 )             31.8     02-05    141  |  98  |  48.0<H>  ----------------------------<  107<H>  4.8   |  26.0  |  4.63<H>    Ca    7.8<L>      05 Feb 2021 08:56  Phos  7.6     02-05  Mg     2.2     02-05    TPro  5.5<L>  /  Alb  2.7<L>  /  TBili  0.5  /  DBili  0.2  /  AST  45<H>  /  ALT  16  /  AlkPhos  216<H>  02-05        ABG - ( 05 Feb 2021 05:31 )  pH, Arterial: 7.48  pH, Blood: x     /  pCO2: 39    /  pO2: 90    / HCO3: 29    / Base Excess: 4.9   /  SaO2: 96                          Procalcitonin, Serum: 11.82 ng/mL (02-03-21 @ 12:00)      MICROBIOLOGY:  FLU A B RSV Detection by PCR (02.02.21 @ 06:26)    Flu A Result: NotDetec: The Flu A B RSV assay is a Real-Time PCR test for the qualitative  detection and differentiation of Influenza A, Influenza B, and  Respiratory Syncytial Virus on nasopharyngeal swabs. The results should  be interpreted in the context of all clinical and laboratory findings.    Flu B Result: [facility]    RSV Result: [facility]        Culture - Blood (02.03.21 @ 11:59)    Specimen Source: .Blood Blood    Culture Results:   No growth at 48 hours    RADIOLOGY & ADDITIONAL STUDIES:  < from: CT Chest w/ IV Cont (02.02.21 @ 11:59) >  EXAM:  CT CHEST IC                          PROCEDURE DATE:  02/02/2021          INTERPRETATION:  CLINICAL INFORMATION: Widened mediastinum on chest x-ray. COVID.    COMPARISON: Chest x-ray of the same day    PROCEDURE:  CT of the Chest was performed with intravenous contrast.  88 ml of Omnipaque 300 was injected intravenously. 12 ml were discarded.  Sagittal and coronal reformats were performed.    FINDINGS:    LUNGS AND AIRWAYS: Patent central airways.  Bilateral lung opacities including areas of dense consolidation, largest in the lower lobes with additional patchy groundglass opacities and areas of nodularity, greatest in the right upper lobe. There is also compressive atelectasis in both lower lobes secondary to the effusions. Interlobular septal thickening, likely pulmonary edema.  PLEURA: Small to moderate left and small right partially loculated pleural effusions. Fluid in the right and left paramediastinal region including superior to the azygos vein on the right and to the left of the aortic arch and descending thoracic aorta, likely in the pleural space. The fluid in the superior paramediastinal region likely accounts for the appearance of the mediastinum on the chest x-ray of the same day.  MEDIASTINUM AND JANNY: No pathologically enlarged lymph nodes. Metallic densities in the subcarinal region; correlation is recommended with the surgical/procedural history.  VESSELS: Thoracic aorta normal in caliber with atherosclerotic changes. Coronary artery calcifications.  HEART: Enlarged. No pericardial effusion.  CHEST WALL AND LOWER NECK: Right IJ central line with the tip at the cavoatrial junction. Small amount of pericatheter thrombus (series 3 image 43). Bilateral gynecomastia. Anasarca.  VISUALIZED UPPER ABDOMEN: Bilateral nonobstructing renal calculi. Atrophy of the medial segment of the left hepatic lobe with a subtle nodular surface contour of the liver which can be seen in the setting of cirrhosis. Small amount of perihepatic ascites. Peripheral region of low attenuation in the spleen measuring 1.9 cm (series 2 image 143), likely an infarct. Partially imaged cystic lesion in the region of the pancreatic body and junction measuring 1.4 cm (series 3 image 149). 1.2 cm cyst in the upper pole the right kidney.  BONES: Multilevel compression fractures in the thoracic spine, many which are new including T5-T8 and T10. Chronic compression fractures at T4, T9, T11, T12, L1, and L2. Old sternal fracture with sternal wires in the mid sternum. Old bilateral rib fractures.    IMPRESSION:  Small to moderate left and small right loculated pleural effusions with fluid in the right and left paramediastinal regions; paramediastinal fluid likely accounts for the appearance of the mediastinum on the chest x-ray of the same day.    Bilateral lung opacities as described above; differential includes multifocal pneumonia and/or pulmonary edema.    Interlobular septal thickening, likely pulmonary edema.    Right IJ central line with a small amount of pericatheter thrombus.    Multilevel compression fractures in the thoracic and lumbar spine, some which are new since 9/26/2019; a thoracic spine MRI could be obtained to assess for acuity.    Peripheral region of low attenuation in the spleen, likely an infarct.    Partially imaged 1.4 cm cystic lesion in the pancreatic body tail junction; a nonemergent MRI of the abdomen is recommended for further evaluation.    < end of copied text >      ECHO:

## 2021-02-05 NOTE — PROGRESS NOTE ADULT - ASSESSMENT
- Hypercapneic and hypoxic respiratory failure; see below  - COVID-19 PNA  - Lung transplant (2015) for COPD - on Tacrolimus and MMF  - ESRD - on HD TuThSa  - Acute pulmonary edema  - Acute on chronic hypoxic resp failure    RECC:  Remdesevir. titrated from HFNC to 3L NC. S/p convalescent plasma on 2/2/21. Meropenem, Bactrim, Fluconazole as per ID. Stress dose steroids. HD per nephrology. C/w maintenance immunosuppression. Monitor serum levels of immunosuppressants. ID f/u. DVT PPx.    Will see PRN and re-evaluate on Monday    Negrito Cardona M.D.  Pulmonary & Critical Care Medicine  [facility]  Pulmonary Medicine at [place]  [address]  [place] [address]  T: [phone]  F: [phone]

## 2021-02-05 NOTE — PROGRESS NOTE ADULT - ASSESSMENT
This 69 y/o M ex-smoker with PMH of 2015- Lung Transplant for COPD on anti rejection meds, s/p rt total hip arthroplasty, multi level compression #, BPH, HLD, CAD, BPH, OP, started HD for ESRD (2/2020) presents to ED c/o worsening dyspnea. Per EMS pt has had a cough since 2/1/21. However this morning his breathing became labored and he had difficulty speaking. Patient has not missed a dialysis session, last session Saturday, due today. For EMS pulse ox was 55% on RA, placed on NRB then CPAP, improved to 85-89%. Patient nephrologist is Dr. Maher;  pulmonologist Dr. Cordova.  Transplant team - [facility]    In ED chest x-ray suggestive of pneumonia, given 500 mg Meropenem. COVID+ (02 Feb 2021 12:28)    patient still has SOB. Reports that he was doing well about 1 week ago.   COVID testing is confirmed positive here.   CT of the lungs show: Small to moderate left and small right loculated pleural effusions with fluid in the right and left paramediastinal regions; paramediastinal fluid likely accounts for the appearance of the mediastinum on the chest x-ray of the same day.  Bilateral lung opacities as described above; differential includes multifocal pneumonia and/or pulmonary edema.    No clear sick contacts.       Impression:  COVID-19 infection   Viral pneumonia  shortness of breath  lung infiltrates  dependence on oxygen  Lung transplant  Immunosuppression for transplant    Plan:  - Continue Remdesivir IV daily.  will EXTEND to a 10 day course, through 2/11/2021     - continue dexamethasone 6mg daily, While on remdesivir  Inflammatory markers and LFTs WILL BE ORDERED with the REMDESIVIR order SET.  DO NOT ORDER this additionally.   - trend CBC with diff, CMP  daily    - Continue supportive care measures  - continue Oxygenation as needed;  CONTINUE to titrate down as tolerated  - self- proning  as tolerated  - ENCOURAGED OOB to chair  - encouraged incentive spirometry     HIGH risk for decompensation    - patient CONSENTED to Convalescent Plasma as part of EUA  - s/p 1 unit of CP. 2/2/2021       Will follow with you.

## 2021-02-05 NOTE — PROGRESS NOTE ADULT - ASSESSMENT
ESRD: lung transplant patient on chronic immunosuppression   +COVID PNA  - cont supportive care  - HD tomorrow on TTS schedule    Anemia: +CKD  - still no need for MENDY  - follow H/H     RO: hyperphosphatemia  - low phos diet   - add binders    Will follow

## 2021-02-05 NOTE — PROGRESS NOTE ADULT - ASSESSMENT
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 Patient was also due for hemodialysis with hyperkalemia K 5.6.  He was found to be hypoxic with SpO2 55% on RA, started on BiPAP and admitted to MICU for acute hypoxemic respiratory failure with need for emergent dialysis. Patient underwent HD with 2L UF on 2/2. He was found to be COVID-19 positive, and was started on Remdesivir and IV Decadron by ID. He was continued on his home Bactrim and Fluconazole. On 2/3, patient spiked fever 103'F with associated hypotension, meeting criteria for septic shock requiring Norepinephrine. He was given 1L IV fluid bolus, and started on Midodrine to offload IV vasopressor requirements. ID was consulted, and patient was started on empiric antibiotics with Meropenem given his immunocompromised state for possible superimposed bacterial pneumonia. Blood cultures were negative. Patient does not make urine and no sputum available to culture. He was weaned off IV vasopressors and continues on Midodrine, with stable blood pressure.    Assessment/Plan:    1. Acute hypoxic respiratory failure secondary to COVID pneumonia: Wean Hiflo NC as tolerated  On Iv Remdesivir     2. Septic Shock secondary to possible gram negative pneumonia: On Iv meropenem  Blood cultures negative x 2  Wean midodrine as tolerated  On Stress dose of IV steroids; change to PO in AM     3. Status post transplant: On tacrolimus  Bactrim and fluconazole prophylaxis    4. ESRD on HD     5. Hyperlipidemia on statin therapy    6.  CAD status post PCI in 2019 on aspirin, statin and plavix     7. BPH on flomax    8. Thrombocytopenia: Monitor platelet count on heparin subcut  Secondary to sepsis?    VTE- On Heparin subcut

## 2021-02-05 NOTE — PROGRESS NOTE ADULT - SUBJECTIVE AND OBJECTIVE BOX
[facility]  INFECTIOUS DISEASES AND INTERNAL MEDICINE at [place]  =======================================================  Scar Solares MD  Diplomates American Board of Internal Medicine and Infectious Diseases  Tel  [phone]  Fax [phone]  =======================================================    [id number]  JU FERGUSON   follow up: COVID-19  s/p Convalescent plasma 2/2/21             =======================================================    REVIEW OF SYSTEMS:  CONSTITUTIONAL:  as per HPI  HEENT:  No diplopia or blurred vision.  No earache, sore throat or runny nose.  CARDIOVASCULAR:  No pressure, squeezing, strangling, tightness, heaviness or aching about the chest, neck, axilla or epigastrium.  RESPIRATORY:  as per HPI  GASTROINTESTINAL:  No nausea, vomiting or diarrhea.  GENITOURINARY:  No dysuria, frequency or urgency. No Blood in urine  MUSCULOSKELETAL:  no joint aches, no muscle pain  SKIN:  No change in skin, hair or nails.  NEUROLOGIC:  No Headaches, seizures or weakness.  PSYCHIATRIC:  No disorder of thought or mood.  ENDOCRINE:  No heat or cold intolerance  HEMATOLOGICAL:  No easy bruising or bleeding.    =======================================================  Allergies  budesonide (Unknown)  cefpodoxime (Unknown)  Pollen (Unknown)       ======================================================  Physical Exam:  ============     General:  moderate distress; ON BIPAP  Eye: Pupils are equal, round and reactive to light, Extraocular movements are intact, Normal conjunctiva.  HENT: Normocephalic, Oral mucosa is moist, No pharyngeal erythema, No sinus tenderness.  ON SUPPLEMENTAL OXYGEN  Neck: Supple, No lymphadenopathy.  Respiratory: Lungs  with COARSE breath sounds. DIFFUSELY  Cardiovascular: Normal rate, Regular rhythm,   RIGHT neck tunneled catheter.   Gastrointestinal: Soft, Non-tender, Non-distended, Normal bowel sounds.  Genitourinary: No costovertebral angle tenderness.  Lymphatics: No lymphadenopathy neck,   Musculoskeletal: Normal range of motion, Normal strength.  Integumentary: No rash.  Neurologic: Alert, Oriented, No focal deficits, Cranial Nerves II-XII are grossly intact.  Psychiatric: Appropriate mood & affect.    =======================================================  Vitals:  ============  T(F): 98.5 (05 Feb 2021 08:16), Max: 98.5 (05 Feb 2021 08:16)  HR: 80 (05 Feb 2021 08:16)  BP: 132/75 (05 Feb 2021 08:16)  RR: 18 (05 Feb 2021 08:16)  SpO2: 100% (05 Feb 2021 08:16) (91% - 100%)  temp max in last 48H T(F): , Max: 98.8 (02-03-21 @ 16:18)    =======================================================  Current Antibiotics:  fluconAZOLE   Tablet 400 milliGRAM(s) Oral <User Schedule>  meropenem  IVPB      meropenem  IVPB 500 milliGRAM(s) IV Intermittent every 24 hours  remdesivir  IVPB 100 milliGRAM(s) IV Intermittent every 24 hours  remdesivir  IVPB   IV Intermittent   trimethoprim   80 mG/sulfamethoxazole 400 mG 1 Tablet(s) Oral <User Schedule>    Other medications:  aspirin  chewable 81 milliGRAM(s) Oral daily  atorvastatin 80 milliGRAM(s) Oral at bedtime  chlorhexidine 4% Liquid 1 Application(s) Topical <User Schedule>  clopidogrel Tablet 75 milliGRAM(s) Oral daily  dextrose 40% Gel 15 Gram(s) Oral once  dextrose 50% Injectable 25 Gram(s) IV Push once  dextrose 50% Injectable 12.5 Gram(s) IV Push once  dextrose 50% Injectable 25 Gram(s) IV Push once  glucagon  Injectable 1 milliGRAM(s) IntraMuscular once  heparin   Injectable 5000 Unit(s) SubCutaneous every 8 hours  hydrocortisone sodium succinate Injectable 100 milliGRAM(s) IV Push every 8 hours  lactobacillus acidophilus 1 Tablet(s) Oral two times a day  midodrine 10 milliGRAM(s) Oral every 8 hours  pantoprazole    Tablet 40 milliGRAM(s) Oral before breakfast  sevelamer carbonate 800 milliGRAM(s) Oral three times a day with meals  tacrolimus 1 milliGRAM(s) Oral daily  tacrolimus 1 milliGRAM(s) Oral at bedtime  tamsulosin 0.4 milliGRAM(s) Oral at bedtime      =======================================================  Labs:                        10.2   6.34  )-----------( 79       ( 05 Feb 2021 08:56 )             31.8     02-05    141  |  98  |  48.0<H>  ----------------------------<  107<H>  4.8   |  26.0  |  4.63<H>    Ca    7.8<L>      05 Feb 2021 08:56  Phos  7.6     02-05  Mg     2.2     02-05    TPro  5.5<L>  /  Alb  2.7<L>  /  TBili  0.5  /  DBili  0.2  /  AST  45<H>  /  ALT  16  /  AlkPhos  216<H>  02-05      Culture - Blood (collected 02-02-21 @ 06:16)  Source: .Blood Blood-Peripheral    Culture - Blood (collected 02-02-21 @ 06:15)  Source: .Blood Blood-Peripheral      Creatinine, Serum: 4.63 mg/dL (02-05-21 @ 08:56)  Creatinine, Serum: 6.69 mg/dL (02-04-21 @ 05:36)  Creatinine, Serum: 5.88 mg/dL (02-03-21 @ 12:00)  Creatinine, Serum: 8.07 mg/dL (02-02-21 @ 06:12)    Procalcitonin, Serum: 11.82 ng/mL (02-03-21 @ 12:00)  Procalcitonin, Serum: 0.34 ng/mL (02-02-21 @ 06:12)      Ferritin, Serum: 994 ng/mL (02-02-21 @ 06:12)    C-Reactive Protein, Serum: 2.48 mg/dL (02-02-21 @ 06:12)    WBC Count: 6.34 K/uL (02-05-21 @ 08:56)  WBC Count: 5.02 K/uL (02-04-21 @ 05:36)  WBC Count: 6.89 K/uL (02-03-21 @ 12:00)  WBC Count: 8.68 K/uL (02-02-21 @ 06:12)      COVID-19 PCR: Detected (02-02-21 @ 06:10)      Alkaline Phosphatase, Serum: 216 U/L (02-05-21 @ 08:56)  Alkaline Phosphatase, Serum: 218 U/L (02-05-21 @ 08:56)  Alkaline Phosphatase, Serum: 256 U/L (02-04-21 @ 05:36)  Alkaline Phosphatase, Serum: 256 U/L (02-04-21 @ 05:36)  Alkaline Phosphatase, Serum: 241 U/L (02-03-21 @ 12:00)  Alkaline Phosphatase, Serum: 234 U/L (02-02-21 @ 06:12)  Alanine Aminotransferase (ALT/SGPT): 16 U/L (02-05-21 @ 08:56)  Alanine Aminotransferase (ALT/SGPT): 16 U/L (02-05-21 @ 08:56)  Alanine Aminotransferase (ALT/SGPT): 18 U/L (02-04-21 @ 05:36)  Alanine Aminotransferase (ALT/SGPT): 18 U/L (02-04-21 @ 05:36)  Alanine Aminotransferase (ALT/SGPT): 21 U/L (02-03-21 @ 12:00)  Alanine Aminotransferase (ALT/SGPT): 26 U/L (02-02-21 @ 06:12)  Aspartate Aminotransferase (AST/SGOT): 45 U/L (02-05-21 @ 08:56)  Aspartate Aminotransferase (AST/SGOT): 43 U/L (02-05-21 @ 08:56)  Aspartate Aminotransferase (AST/SGOT): 48 U/L (02-04-21 @ 05:36)  Aspartate Aminotransferase (AST/SGOT): 48 U/L (02-04-21 @ 05:36)  Aspartate Aminotransferase (AST/SGOT): 47 U/L (02-03-21 @ 12:00)  Aspartate Aminotransferase (AST/SGOT): 42 U/L (02-02-21 @ 06:12)  Bilirubin Total, Serum: 0.5 mg/dL (02-05-21 @ 08:56)  Bilirubin Total, Serum: 0.5 mg/dL (02-05-21 @ 08:56)  Bilirubin Total, Serum: 0.5 mg/dL (02-04-21 @ 05:36)  Bilirubin Total, Serum: 0.5 mg/dL (02-04-21 @ 05:36)  Bilirubin Total, Serum: 0.9 mg/dL (02-03-21 @ 12:00)  Bilirubin Total, Serum: 0.4 mg/dL (02-02-21 @ 06:12)  Bilirubin Direct, Serum: 0.2 mg/dL (02-05-21 @ 08:56)  Bilirubin Direct, Serum: 0.3 mg/dL (02-04-21 @ 05:36)

## 2021-02-05 NOTE — PROGRESS NOTE ADULT - SUBJECTIVE AND OBJECTIVE BOX
CC: Follow up    INTERVAL HPI/OVERNIGHT EVENTS: Patient seen and examined, on 35% hiflo NC. Sitting comfortably in a chair. Denies any complaints.       Vital Signs Last 24 Hrs  T(C): 36.9 (05 Feb 2021 08:16), Max: 36.9 (05 Feb 2021 08:16)  T(F): 98.5 (05 Feb 2021 08:16), Max: 98.5 (05 Feb 2021 08:16)  HR: 80 (05 Feb 2021 08:16) (72 - 81)  BP: 132/75 (05 Feb 2021 08:16) (128/79 - 160/83)  BP(mean): --  RR: 18 (05 Feb 2021 08:16) (15 - 18)  SpO2: 100% (05 Feb 2021 08:16) (91% - 100%)    PHYSICAL EXAM:    GENERAL: NAD, well-groomed, well-developed  HEAD:  Atraumatic, Normocephalic  EYES: EOMI, PERRLA, conjunctiva and sclera clear  ENMT: Moist mucous membranes  NECK: Supple, No JVD  NERVOUS SYSTEM:  Alert & Oriented X3, Motor Strength 5/5 B/L upper and lower extremities; DTRs 2+ intact and symmetric  CHEST/LUNG: Clear to auscultation bilaterally; No rales, rhonchi, wheezing, or rubs  HEART: Regular rate and rhythm; No murmurs, rubs, or gallops  ABDOMEN: Soft, Nontender, Nondistended; Bowel sounds present  EXTREMITIES:  2+ Peripheral Pulses, No clubbing, cyanosis, or edema        MEDICATIONS  (STANDING):  aspirin  chewable 81 milliGRAM(s) Oral daily  atorvastatin 80 milliGRAM(s) Oral at bedtime  chlorhexidine 4% Liquid 1 Application(s) Topical <User Schedule>  clopidogrel Tablet 75 milliGRAM(s) Oral daily  dextrose 40% Gel 15 Gram(s) Oral once  dextrose 50% Injectable 25 Gram(s) IV Push once  dextrose 50% Injectable 12.5 Gram(s) IV Push once  dextrose 50% Injectable 25 Gram(s) IV Push once  fluconAZOLE   Tablet 400 milliGRAM(s) Oral <User Schedule>  glucagon  Injectable 1 milliGRAM(s) IntraMuscular once  heparin   Injectable 5000 Unit(s) SubCutaneous every 8 hours  hydrocortisone sodium succinate Injectable 100 milliGRAM(s) IV Push every 8 hours  lactobacillus acidophilus 1 Tablet(s) Oral two times a day  meropenem  IVPB      meropenem  IVPB 500 milliGRAM(s) IV Intermittent every 24 hours  midodrine 10 milliGRAM(s) Oral every 8 hours  pantoprazole    Tablet 40 milliGRAM(s) Oral before breakfast  remdesivir  IVPB 100 milliGRAM(s) IV Intermittent every 24 hours  remdesivir  IVPB   IV Intermittent   sevelamer carbonate 800 milliGRAM(s) Oral three times a day with meals  tacrolimus 1 milliGRAM(s) Oral daily  tacrolimus 1 milliGRAM(s) Oral at bedtime  tamsulosin 0.4 milliGRAM(s) Oral at bedtime  trimethoprim   80 mG/sulfamethoxazole 400 mG 1 Tablet(s) Oral <User Schedule>    MEDICATIONS  (PRN):  acetaminophen   Tablet .. 650 milliGRAM(s) Oral every 6 hours PRN Temp greater or equal to 38C (100.4F), Mild Pain (1 - 3)      Allergies    budesonide (Unknown)  cefpodoxime (Unknown)  Pollen (Unknown)    Intolerances          LABS:                          10.2   6.34  )-----------( 79       ( 05 Feb 2021 08:56 )             31.8     02-05    141  |  98  |  48.0<H>  ----------------------------<  107<H>  4.8   |  26.0  |  4.63<H>    Ca    7.8<L>      05 Feb 2021 08:56  Phos  7.6     02-05  Mg     2.2     02-05    TPro  5.5<L>  /  Alb  2.7<L>  /  TBili  0.5  /  DBili  0.2  /  AST  45<H>  /  ALT  16  /  AlkPhos  216<H>  02-05          RADIOLOGY & ADDITIONAL TESTS:

## 2021-02-06 LAB
ALBUMIN SERPL ELPH-MCNC: 2.7 G/DL — LOW (ref 3.3–5.2)
ALBUMIN SERPL ELPH-MCNC: 2.7 G/DL — LOW (ref 3.3–5.2)
ALP SERPL-CCNC: 207 U/L — HIGH (ref 40–120)
ALP SERPL-CCNC: 214 U/L — HIGH (ref 40–120)
ALT FLD-CCNC: 16 U/L — SIGNIFICANT CHANGE UP
ALT FLD-CCNC: 17 U/L — SIGNIFICANT CHANGE UP
ANION GAP SERPL CALC-SCNC: 19 MMOL/L — HIGH (ref 5–17)
ANION GAP SERPL CALC-SCNC: 20 MMOL/L — HIGH (ref 5–17)
AST SERPL-CCNC: 47 U/L — HIGH
AST SERPL-CCNC: 48 U/L — HIGH
BILIRUB DIRECT SERPL-MCNC: 0.2 MG/DL — SIGNIFICANT CHANGE UP (ref 0–0.3)
BILIRUB INDIRECT FLD-MCNC: 0.2 MG/DL — SIGNIFICANT CHANGE UP (ref 0.2–1)
BILIRUB SERPL-MCNC: 0.4 MG/DL — SIGNIFICANT CHANGE UP (ref 0.4–2)
BILIRUB SERPL-MCNC: 0.4 MG/DL — SIGNIFICANT CHANGE UP (ref 0.4–2)
BUN SERPL-MCNC: 76 MG/DL — HIGH (ref 8–20)
BUN SERPL-MCNC: 86 MG/DL — HIGH (ref 8–20)
CALCIUM SERPL-MCNC: 6.9 MG/DL — LOW (ref 8.6–10.2)
CALCIUM SERPL-MCNC: 7.1 MG/DL — LOW (ref 8.6–10.2)
CHLORIDE SERPL-SCNC: 97 MMOL/L — LOW (ref 98–107)
CHLORIDE SERPL-SCNC: 99 MMOL/L — SIGNIFICANT CHANGE UP (ref 98–107)
CO2 SERPL-SCNC: 21 MMOL/L — LOW (ref 22–29)
CO2 SERPL-SCNC: 21 MMOL/L — LOW (ref 22–29)
CREAT SERPL-MCNC: 6.57 MG/DL — HIGH (ref 0.5–1.3)
CREAT SERPL-MCNC: 7.07 MG/DL — HIGH (ref 0.5–1.3)
GLUCOSE BLDC GLUCOMTR-MCNC: 116 MG/DL — HIGH (ref 70–99)
GLUCOSE BLDC GLUCOMTR-MCNC: 143 MG/DL — HIGH (ref 70–99)
GLUCOSE BLDC GLUCOMTR-MCNC: 233 MG/DL — HIGH (ref 70–99)
GLUCOSE SERPL-MCNC: 125 MG/DL — HIGH (ref 70–99)
GLUCOSE SERPL-MCNC: 172 MG/DL — HIGH (ref 70–99)
HCT VFR BLD CALC: 30.1 % — LOW (ref 39–50)
HGB BLD-MCNC: 9.9 G/DL — LOW (ref 13–17)
LACTATE SERPL-SCNC: 0.7 MMOL/L — SIGNIFICANT CHANGE UP (ref 0.5–2)
MCHC RBC-ENTMCNC: 28.2 PG — SIGNIFICANT CHANGE UP (ref 27–34)
MCHC RBC-ENTMCNC: 32.9 GM/DL — SIGNIFICANT CHANGE UP (ref 32–36)
MCV RBC AUTO: 85.8 FL — SIGNIFICANT CHANGE UP (ref 80–100)
OB PNL STL: POSITIVE
PLATELET # BLD AUTO: 99 K/UL — LOW (ref 150–400)
POTASSIUM SERPL-MCNC: 4.9 MMOL/L — SIGNIFICANT CHANGE UP (ref 3.5–5.3)
POTASSIUM SERPL-MCNC: 4.9 MMOL/L — SIGNIFICANT CHANGE UP (ref 3.5–5.3)
POTASSIUM SERPL-SCNC: 4.9 MMOL/L — SIGNIFICANT CHANGE UP (ref 3.5–5.3)
POTASSIUM SERPL-SCNC: 4.9 MMOL/L — SIGNIFICANT CHANGE UP (ref 3.5–5.3)
PROT SERPL-MCNC: 5.5 G/DL — LOW (ref 6.6–8.7)
PROT SERPL-MCNC: 5.7 G/DL — LOW (ref 6.6–8.7)
RBC # BLD: 3.51 M/UL — LOW (ref 4.2–5.8)
RBC # FLD: 18.9 % — HIGH (ref 10.3–14.5)
SODIUM SERPL-SCNC: 138 MMOL/L — SIGNIFICANT CHANGE UP (ref 135–145)
SODIUM SERPL-SCNC: 139 MMOL/L — SIGNIFICANT CHANGE UP (ref 135–145)
WBC # BLD: 5.73 K/UL — SIGNIFICANT CHANGE UP (ref 3.8–10.5)
WBC # FLD AUTO: 5.73 K/UL — SIGNIFICANT CHANGE UP (ref 3.8–10.5)

## 2021-02-06 PROCEDURE — 99233 SBSQ HOSP IP/OBS HIGH 50: CPT

## 2021-02-06 RX ORDER — PANTOPRAZOLE SODIUM 20 MG/1
40 TABLET, DELAYED RELEASE ORAL EVERY 12 HOURS
Refills: 0 | Status: DISCONTINUED | OUTPATIENT
Start: 2021-02-06 | End: 2021-02-12

## 2021-02-06 RX ADMIN — PANTOPRAZOLE SODIUM 40 MILLIGRAM(S): 20 TABLET, DELAYED RELEASE ORAL at 05:59

## 2021-02-06 RX ADMIN — TACROLIMUS 1 MILLIGRAM(S): 5 CAPSULE ORAL at 12:41

## 2021-02-06 RX ADMIN — Medication 81 MILLIGRAM(S): at 12:40

## 2021-02-06 RX ADMIN — MIDODRINE HYDROCHLORIDE 10 MILLIGRAM(S): 2.5 TABLET ORAL at 20:35

## 2021-02-06 RX ADMIN — SEVELAMER CARBONATE 800 MILLIGRAM(S): 2400 POWDER, FOR SUSPENSION ORAL at 19:56

## 2021-02-06 RX ADMIN — REMDESIVIR 500 MILLIGRAM(S): 5 INJECTION INTRAVENOUS at 21:32

## 2021-02-06 RX ADMIN — Medication 1 TABLET(S): at 05:49

## 2021-02-06 RX ADMIN — Medication 100 MILLIGRAM(S): at 15:30

## 2021-02-06 RX ADMIN — SEVELAMER CARBONATE 800 MILLIGRAM(S): 2400 POWDER, FOR SUSPENSION ORAL at 09:45

## 2021-02-06 RX ADMIN — Medication 1 TABLET(S): at 20:35

## 2021-02-06 RX ADMIN — CLOPIDOGREL BISULFATE 75 MILLIGRAM(S): 75 TABLET, FILM COATED ORAL at 12:40

## 2021-02-06 RX ADMIN — FLUCONAZOLE 400 MILLIGRAM(S): 150 TABLET ORAL at 10:03

## 2021-02-06 RX ADMIN — TAMSULOSIN HYDROCHLORIDE 0.4 MILLIGRAM(S): 0.4 CAPSULE ORAL at 20:35

## 2021-02-06 RX ADMIN — Medication 100 MILLIGRAM(S): at 05:54

## 2021-02-06 RX ADMIN — MEROPENEM 100 MILLIGRAM(S): 1 INJECTION INTRAVENOUS at 15:30

## 2021-02-06 RX ADMIN — HEPARIN SODIUM 5000 UNIT(S): 5000 INJECTION INTRAVENOUS; SUBCUTANEOUS at 05:49

## 2021-02-06 RX ADMIN — ATORVASTATIN CALCIUM 80 MILLIGRAM(S): 80 TABLET, FILM COATED ORAL at 20:35

## 2021-02-06 RX ADMIN — CHLORHEXIDINE GLUCONATE 1 APPLICATION(S): 213 SOLUTION TOPICAL at 05:54

## 2021-02-06 RX ADMIN — MIDODRINE HYDROCHLORIDE 10 MILLIGRAM(S): 2.5 TABLET ORAL at 05:49

## 2021-02-06 RX ADMIN — HEPARIN SODIUM 5000 UNIT(S): 5000 INJECTION INTRAVENOUS; SUBCUTANEOUS at 20:35

## 2021-02-06 RX ADMIN — TACROLIMUS 1 MILLIGRAM(S): 5 CAPSULE ORAL at 20:35

## 2021-02-06 RX ADMIN — SEVELAMER CARBONATE 800 MILLIGRAM(S): 2400 POWDER, FOR SUSPENSION ORAL at 12:41

## 2021-02-06 RX ADMIN — HEPARIN SODIUM 5000 UNIT(S): 5000 INJECTION INTRAVENOUS; SUBCUTANEOUS at 15:30

## 2021-02-06 RX ADMIN — PANTOPRAZOLE SODIUM 40 MILLIGRAM(S): 20 TABLET, DELAYED RELEASE ORAL at 22:31

## 2021-02-06 NOTE — PROGRESS NOTE ADULT - ASSESSMENT
ESRD: HD today on TTS schedule  Lung transplant patient on chronic immunosuppression   +COVID PNA cont supportive care    Anemia: +CKD  - still no need for MENDY  - follow H/H     RO: hyperphosphatemia  - low phos diet   - add binders    Will follow

## 2021-02-06 NOTE — CHART NOTE - NSCHARTNOTEFT_GEN_A_CORE
Notified by RN of lab result: +stool FOBT. Per RN fecal specimen was sent down earlier this evening Notified by RN of lab result: +stool FOBT. Per RN fecal specimen was sent down earlier this evening for presence of "mucus" in stool, no melena, no BRBPR. CBC was performed H/H 9.9/30.1, PLT 99 (slowly improving). 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 Patient is awake, alert and oriented x3, talking on the phone with his spouse and daughter. Patient reports he is able to urinate "sometimes", he denies hematuria, denies melena, BRBPR, N/V, CP, SOB. Patient declining rectal exam at this time.     PHYSICAL EXAM-  Vital Signs:  T(F): 97.9 oral  HR: 80   BP: 134/74   RR: 19  SpO2: 99% on 1L via NC  GENERAL: calm ,NAD  CHEST/LUNG: CTA bilaterally. No wheezing, rhonchi, rales  HEART: Regular rate and rhythm; No murmurs, rubs, or gallops  ABDOMEN: Soft, Nontender, Nondistended. Bowel sounds present x4 quads  EXTREMITIES:  2+ Peripheral Pulses, No clubbing, cyanosis, or edema.  NERVOUS SYSTEM:  Alert & Oriented X3    A/P: Notified by RN of lab result: +stool FOBT. Per RN fecal specimen was sent down earlier this afternoon for presence of "mucus" in stool, no melena, no BRBPR. CBC was performed H/H 9.9/30.1, PLT 99 (slowly improving). 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 Patient is awake, alert and oriented x3, talking on the phone with his spouse and daughter. Patient reports he is able to urinate "sometimes", he denies hematuria, denies melena, BRBPR, N/V, CP, SOB. Patient declining rectal exam at this time.     PHYSICAL EXAM  Vital Signs:  T(F): 97.9 oral  HR: 80   BP: 134/74   RR: 19  SpO2: 99% on 1L via NC  GENERAL: calm ,NAD  CHEST/LUNG: CTA bilaterally. No wheezing, rhonchi, rales  HEART: Regular rate and rhythm; No murmurs, rubs, or gallops  ABDOMEN: Soft, Nontender, Nondistended. Bowel sounds present x4 quads  EXTREMITIES:  2+ Peripheral Pulses, No clubbing, cyanosis, or edema.  NERVOUS SYSTEM:  Alert & Oriented X3    A/P:  +FOBT  - D/C'd prophylactic heparin 500units SQ Q8H. Ordered Sequential compression devices B/L LE for DVT prophylaxis  - Pt on protonix 40mg PO daily, changed to protonix 40mg IVP Q12H.  -Called in GI consult for the AM.  -Trend CBC. AM labs  -Discussed with Dr. Ziegler, in agreement with plan. Recommends to hold afternoon dose plavix and ASA until re-eval for necessity by PMD.   -Pt hemodynamically stable and without s/s active bleeding   - PMD will be notified in AM  -RN instructed to continue to monitor pt and notify provider of any changes in pt status.

## 2021-02-06 NOTE — PROGRESS NOTE ADULT - ASSESSMENT
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 Patient was also due for hemodialysis with hyperkalemia K 5.6.  He was found to be hypoxic with SpO2 55% on RA, started on BiPAP and admitted to MICU for acute hypoxemic respiratory failure with need for emergent dialysis. Patient underwent HD with 2L UF on 2/2. He was found to be COVID-19 positive, and was started on Remdesivir and IV Decadron by ID. He was continued on his home Bactrim and Fluconazole. On 2/3, patient spiked fever 103'F with associated hypotension, meeting criteria for septic shock requiring Norepinephrine. He was given 1L IV fluid bolus, and started on Midodrine to offload IV vasopressor requirements. ID was consulted, and patient was started on empiric antibiotics with Meropenem given his immunocompromised state for possible superimposed bacterial pneumonia. Blood cultures were negative. Patient does not make urine and no sputum available to culture. He was weaned off IV vasopressors and continues on Midodrine, with stable blood pressure.      Acute hypoxic respiratory failure 2/2 COVID pneumonia  - Pt now weaned off high flow onto NC and saturating in low 90's  - continue supplemental O2 as needed   - Encourage self- proning  as tolerated and OOB to chair  - Encouraged incentive spirometry   - Pulmonology following      Septic Shock 2/2 possible gram negative pneumonia  - c/w IV meropenem  - Blood cultures x2 NTD  - IV Remdesivir extended to a 10 day course, through 2/11/21     - c/w dexamethasone 6mg daily   - Monitor Inflammatory markers q48h   - Monitor LFTs while on Remdesivir  - Trend CBC with diff and CMP daily       AGMA possible 2/2 sepsis   - AG 19 (22 corrected) and serum HCO3 21  - Will order lactic acid, UA and ABG to better assess acid base disturbance   - Monitor BMP closely and consider short course of HCO3 if <20      Asymptomatic cholestatic pattern on LFTs  - No abdominal pain reported and benign abd exam  - ALP elevated but improved since admission  - At risk for acalculous cholecystitis however no suspicion at this time  - Will monitor LFTs daily          Status post lung transplant  - On tacrolimus  - c/w Bactrim and fluconazole for ppx       ESRD on HD TTS  - c/w HD per nephrology  - Monitor lytes   - Renally dose medications       Normocytic anemia   - Likely of chronic dx in the setting of ESRD on HD  - H/H low but stable  - Monitor CBC daily        HLD  - c/w statin therapy      CAD status post PCI in 2019  - c/w ASA and plavix  - c/w statin therapy      BPH  - On flomax      Thrombocytopenia   - Likely 2/2 sepsis.  AM labs pending   - No signs of active bleeding   - On heparin SQ for DVT ppx given ESRD  - Monitor platelet count       VTE ppx:  On Heparin subcut    Dispo: Pt remains acute.  Possible d/c in 3-4 days; will monitor closely.

## 2021-02-06 NOTE — PROGRESS NOTE ADULT - SUBJECTIVE AND OBJECTIVE BOX
Chief Complaint:  hypoxia    SUBJECTIVE / OVERNIGHT EVENTS: No acute events overnight.  Pt tolerated overnight w/out supplemental O2 but this morning felt sob and O2 90 so placed on 1-2L NC with resolution of symptoms.  Pt is on home O2 of 1-2 L.   Patient denies chest pain, abd pain, N/V, fever, chills, dysuria or any other complaints. All remainder ROS negative.       I&O's Summary        PHYSICAL EXAM:  Vital Signs Last 24 Hrs  T(C): 36.4 (06 Feb 2021 08:47), Max: 36.8 (05 Feb 2021 21:00)  T(F): 97.5 (06 Feb 2021 08:47), Max: 98.2 (05 Feb 2021 21:00)  HR: 87 (06 Feb 2021 08:47) (81 - 117)  BP: 158/74 (06 Feb 2021 08:47) (117/74 - 158/74)  BP(mean): --  RR: 18 (06 Feb 2021 08:47) (18 - 20)  SpO2: 98% (06 Feb 2021 08:47) (94% - 98%)      GENERAL: frail elderly male, examined bedside, laying comfortably in bed in NAD  HEENT: NC/AT, moist oral mucosa, clear conjunctiva, sclera nonicteric  RESPIRATORY: scattered rales, no wheezing or rhonchi  CARDIOVASCULAR: RRR, normal S1 and S2, no murmur/rub/gallop  ABDOMEN: soft, NT/ND, normoactive bowel sounds, no rebound/guarding  EXTREMITIES: No cynaosis, no clubbing, no lower extremity edema; Peripheral pulses are 2+ bilaterally  PSYCH: affect appropriate and cooperative  NEUROLOGY: A+O to person, place, and time, no focal neurologic deficits appreciated   SKIN: No rashes or no palpable lesions        LABS:                        10.2   6.34  )-----------( 79       ( 05 Feb 2021 08:56 )             31.8     02-06    139  |  99  |  76.0<H>  ----------------------------<  125<H>  4.9   |  21.0<L>  |  6.57<H>    Ca    7.1<L>      06 Feb 2021 06:12  Phos  7.6     02-05  Mg     2.2     02-05    TPro  5.5<L>  /  Alb  2.7<L>  /  TBili  0.4  /  DBili  0.2  /  AST  47<H>  /  ALT  16  /  AlkPhos  214<H>  02-06              CAPILLARY BLOOD GLUCOSE      POCT Blood Glucose.: 233 mg/dL (06 Feb 2021 12:43)  POCT Blood Glucose.: 116 mg/dL (06 Feb 2021 08:37)  POCT Blood Glucose.: 193 mg/dL (05 Feb 2021 17:19)        RADIOLOGY & ADDITIONAL TESTS:          MEDICATIONS  (STANDING):  aspirin  chewable 81 milliGRAM(s) Oral daily  atorvastatin 80 milliGRAM(s) Oral at bedtime  chlorhexidine 4% Liquid 1 Application(s) Topical <User Schedule>  clopidogrel Tablet 75 milliGRAM(s) Oral daily  dextrose 40% Gel 15 Gram(s) Oral once  dextrose 50% Injectable 25 Gram(s) IV Push once  dextrose 50% Injectable 12.5 Gram(s) IV Push once  dextrose 50% Injectable 25 Gram(s) IV Push once  fluconAZOLE   Tablet 400 milliGRAM(s) Oral <User Schedule>  glucagon  Injectable 1 milliGRAM(s) IntraMuscular once  heparin   Injectable 5000 Unit(s) SubCutaneous every 8 hours  hydrocortisone sodium succinate Injectable 100 milliGRAM(s) IV Push every 8 hours  lactobacillus acidophilus 1 Tablet(s) Oral two times a day  meropenem  IVPB      meropenem  IVPB 500 milliGRAM(s) IV Intermittent every 24 hours  midodrine 10 milliGRAM(s) Oral <User Schedule>  pantoprazole    Tablet 40 milliGRAM(s) Oral before breakfast  remdesivir  IVPB   IV Intermittent   remdesivir  IVPB 100 milliGRAM(s) IV Intermittent every 24 hours  sevelamer carbonate 800 milliGRAM(s) Oral three times a day with meals  tacrolimus 1 milliGRAM(s) Oral daily  tacrolimus 1 milliGRAM(s) Oral at bedtime  tamsulosin 0.4 milliGRAM(s) Oral at bedtime  trimethoprim   80 mG/sulfamethoxazole 400 mG 1 Tablet(s) Oral <User Schedule>    MEDICATIONS  (PRN):  acetaminophen   Tablet .. 650 milliGRAM(s) Oral every 6 hours PRN Temp greater or equal to 38C (100.4F), Mild Pain (1 - 3)

## 2021-02-06 NOTE — PROGRESS NOTE ADULT - SUBJECTIVE AND OBJECTIVE BOX
NEPHROLOGY INTERVAL HPI/OVERNIGHT EVENTS:    Examined  No distress  Afebrile    MEDICATIONS  (STANDING):  aspirin  chewable 81 milliGRAM(s) Oral daily  atorvastatin 80 milliGRAM(s) Oral at bedtime  chlorhexidine 4% Liquid 1 Application(s) Topical <User Schedule>  clopidogrel Tablet 75 milliGRAM(s) Oral daily  dextrose 40% Gel 15 Gram(s) Oral once  dextrose 50% Injectable 25 Gram(s) IV Push once  dextrose 50% Injectable 12.5 Gram(s) IV Push once  dextrose 50% Injectable 25 Gram(s) IV Push once  fluconAZOLE   Tablet 400 milliGRAM(s) Oral <User Schedule>  glucagon  Injectable 1 milliGRAM(s) IntraMuscular once  heparin   Injectable 5000 Unit(s) SubCutaneous every 8 hours  hydrocortisone sodium succinate Injectable 100 milliGRAM(s) IV Push every 8 hours  lactobacillus acidophilus 1 Tablet(s) Oral two times a day  meropenem  IVPB      meropenem  IVPB 500 milliGRAM(s) IV Intermittent every 24 hours  midodrine 10 milliGRAM(s) Oral <User Schedule>  pantoprazole    Tablet 40 milliGRAM(s) Oral before breakfast  remdesivir  IVPB   IV Intermittent   remdesivir  IVPB 100 milliGRAM(s) IV Intermittent every 24 hours  sevelamer carbonate 800 milliGRAM(s) Oral three times a day with meals  tacrolimus 1 milliGRAM(s) Oral daily  tacrolimus 1 milliGRAM(s) Oral at bedtime  tamsulosin 0.4 milliGRAM(s) Oral at bedtime  trimethoprim   80 mG/sulfamethoxazole 400 mG 1 Tablet(s) Oral <User Schedule>    MEDICATIONS  (PRN):  acetaminophen   Tablet .. 650 milliGRAM(s) Oral every 6 hours PRN Temp greater or equal to 38C (100.4F), Mild Pain (1 - 3)      Allergies    budesonide (Unknown)  cefpodoxime (Unknown)  Pollen (Unknown)    Intolerances        Vital Signs Last 24 Hrs  T(C): 36.5 (06 Feb 2021 05:47), Max: 36.9 (05 Feb 2021 08:16)  T(F): 97.7 (06 Feb 2021 05:47), Max: 98.5 (05 Feb 2021 08:16)  HR: 86 (06 Feb 2021 05:47) (75 - 117)  BP: 158/72 (06 Feb 2021 05:47) (117/74 - 158/72)  BP(mean): --  RR: 18 (06 Feb 2021 05:47) (18 - 20)  SpO2: 95% (06 Feb 2021 05:47) (91% - 100%)  Daily     Daily     PHYSICAL EXAM:  Gen: NAD  HEENT: NCAT  NERVOUS SYSTEM:  A/O x3  EXTREMITIES:  tr LE edema  Further exam deferred to limit COVID exposure    LABS:                        10.2   6.34  )-----------( 79       ( 05 Feb 2021 08:56 )             31.8     02-06    139  |  99  |  76.0<H>  ----------------------------<  125<H>  4.9   |  21.0<L>  |  6.57<H>    Ca    7.1<L>      06 Feb 2021 06:12  Phos  7.6     02-05  Mg     2.2     02-05    TPro  5.5<L>  /  Alb  2.7<L>  /  TBili  0.4  /  DBili  x   /  AST  47<H>  /  ALT  16  /  AlkPhos  214<H>  02-06        Magnesium, Serum: 2.2 mg/dL (02-05 @ 08:56)  Phosphorus Level, Serum: 7.6 mg/dL (02-05 @ 08:56)    ABG - ( 05 Feb 2021 05:31 )  pH, Arterial: 7.48  pH, Blood: x     /  pCO2: 39    /  pO2: 90    / HCO3: 29    / Base Excess: 4.9   /  SaO2: 96                    RADIOLOGY & ADDITIONAL TESTS:

## 2021-02-07 LAB
ALBUMIN SERPL ELPH-MCNC: 2.6 G/DL — LOW (ref 3.3–5.2)
ALBUMIN SERPL ELPH-MCNC: 2.7 G/DL — LOW (ref 3.3–5.2)
ALP SERPL-CCNC: 188 U/L — HIGH (ref 40–120)
ALP SERPL-CCNC: 189 U/L — HIGH (ref 40–120)
ALT FLD-CCNC: 15 U/L — SIGNIFICANT CHANGE UP
ALT FLD-CCNC: 16 U/L — SIGNIFICANT CHANGE UP
ANION GAP SERPL CALC-SCNC: 17 MMOL/L — SIGNIFICANT CHANGE UP (ref 5–17)
AST SERPL-CCNC: 49 U/L — HIGH
AST SERPL-CCNC: 50 U/L — HIGH
BASOPHILS # BLD AUTO: 0 K/UL — SIGNIFICANT CHANGE UP (ref 0–0.2)
BASOPHILS NFR BLD AUTO: 0 % — SIGNIFICANT CHANGE UP (ref 0–2)
BILIRUB DIRECT SERPL-MCNC: 0.2 MG/DL — SIGNIFICANT CHANGE UP (ref 0–0.3)
BILIRUB INDIRECT FLD-MCNC: 0.2 MG/DL — SIGNIFICANT CHANGE UP (ref 0.2–1)
BILIRUB SERPL-MCNC: 0.4 MG/DL — SIGNIFICANT CHANGE UP (ref 0.4–2)
BILIRUB SERPL-MCNC: 0.4 MG/DL — SIGNIFICANT CHANGE UP (ref 0.4–2)
BUN SERPL-MCNC: 50 MG/DL — HIGH (ref 8–20)
CALCIUM SERPL-MCNC: 7.4 MG/DL — LOW (ref 8.6–10.2)
CHLORIDE SERPL-SCNC: 97 MMOL/L — LOW (ref 98–107)
CO2 SERPL-SCNC: 27 MMOL/L — SIGNIFICANT CHANGE UP (ref 22–29)
CREAT SERPL-MCNC: 4.28 MG/DL — HIGH (ref 0.5–1.3)
CULTURE RESULTS: SIGNIFICANT CHANGE UP
CULTURE RESULTS: SIGNIFICANT CHANGE UP
EOSINOPHIL # BLD AUTO: 0 K/UL — SIGNIFICANT CHANGE UP (ref 0–0.5)
EOSINOPHIL NFR BLD AUTO: 0 % — SIGNIFICANT CHANGE UP (ref 0–6)
GLUCOSE BLDC GLUCOMTR-MCNC: 111 MG/DL — HIGH (ref 70–99)
GLUCOSE SERPL-MCNC: 145 MG/DL — HIGH (ref 70–99)
HCT VFR BLD CALC: 28.6 % — LOW (ref 39–50)
HGB BLD-MCNC: 9.6 G/DL — LOW (ref 13–17)
IMM GRANULOCYTES NFR BLD AUTO: 1 % — SIGNIFICANT CHANGE UP (ref 0–1.5)
LYMPHOCYTES # BLD AUTO: 0.1 K/UL — LOW (ref 1–3.3)
LYMPHOCYTES # BLD AUTO: 2.4 % — LOW (ref 13–44)
MCHC RBC-ENTMCNC: 28.5 PG — SIGNIFICANT CHANGE UP (ref 27–34)
MCHC RBC-ENTMCNC: 33.6 GM/DL — SIGNIFICANT CHANGE UP (ref 32–36)
MCV RBC AUTO: 84.9 FL — SIGNIFICANT CHANGE UP (ref 80–100)
MONOCYTES # BLD AUTO: 0.29 K/UL — SIGNIFICANT CHANGE UP (ref 0–0.9)
MONOCYTES NFR BLD AUTO: 7.1 % — SIGNIFICANT CHANGE UP (ref 2–14)
NEUTROPHILS # BLD AUTO: 3.66 K/UL — SIGNIFICANT CHANGE UP (ref 1.8–7.4)
NEUTROPHILS NFR BLD AUTO: 89.5 % — HIGH (ref 43–77)
PLATELET # BLD AUTO: 91 K/UL — LOW (ref 150–400)
POTASSIUM SERPL-MCNC: 4.8 MMOL/L — SIGNIFICANT CHANGE UP (ref 3.5–5.3)
POTASSIUM SERPL-SCNC: 4.8 MMOL/L — SIGNIFICANT CHANGE UP (ref 3.5–5.3)
PROT SERPL-MCNC: 5.3 G/DL — LOW (ref 6.6–8.7)
PROT SERPL-MCNC: 5.4 G/DL — LOW (ref 6.6–8.7)
RBC # BLD: 3.37 M/UL — LOW (ref 4.2–5.8)
RBC # FLD: 18.9 % — HIGH (ref 10.3–14.5)
SODIUM SERPL-SCNC: 141 MMOL/L — SIGNIFICANT CHANGE UP (ref 135–145)
SPECIMEN SOURCE: SIGNIFICANT CHANGE UP
SPECIMEN SOURCE: SIGNIFICANT CHANGE UP
WBC # BLD: 4.09 K/UL — SIGNIFICANT CHANGE UP (ref 3.8–10.5)
WBC # FLD AUTO: 4.09 K/UL — SIGNIFICANT CHANGE UP (ref 3.8–10.5)

## 2021-02-07 PROCEDURE — 99233 SBSQ HOSP IP/OBS HIGH 50: CPT

## 2021-02-07 PROCEDURE — 99223 1ST HOSP IP/OBS HIGH 75: CPT

## 2021-02-07 RX ADMIN — MIDODRINE HYDROCHLORIDE 10 MILLIGRAM(S): 2.5 TABLET ORAL at 04:35

## 2021-02-07 RX ADMIN — TACROLIMUS 1 MILLIGRAM(S): 5 CAPSULE ORAL at 20:54

## 2021-02-07 RX ADMIN — Medication 1 TABLET(S): at 04:35

## 2021-02-07 RX ADMIN — SEVELAMER CARBONATE 800 MILLIGRAM(S): 2400 POWDER, FOR SUSPENSION ORAL at 13:26

## 2021-02-07 RX ADMIN — MEROPENEM 100 MILLIGRAM(S): 1 INJECTION INTRAVENOUS at 13:26

## 2021-02-07 RX ADMIN — SEVELAMER CARBONATE 800 MILLIGRAM(S): 2400 POWDER, FOR SUSPENSION ORAL at 17:23

## 2021-02-07 RX ADMIN — ATORVASTATIN CALCIUM 80 MILLIGRAM(S): 80 TABLET, FILM COATED ORAL at 20:54

## 2021-02-07 RX ADMIN — CHLORHEXIDINE GLUCONATE 1 APPLICATION(S): 213 SOLUTION TOPICAL at 06:40

## 2021-02-07 RX ADMIN — PANTOPRAZOLE SODIUM 40 MILLIGRAM(S): 20 TABLET, DELAYED RELEASE ORAL at 04:36

## 2021-02-07 RX ADMIN — SEVELAMER CARBONATE 800 MILLIGRAM(S): 2400 POWDER, FOR SUSPENSION ORAL at 09:23

## 2021-02-07 RX ADMIN — PANTOPRAZOLE SODIUM 40 MILLIGRAM(S): 20 TABLET, DELAYED RELEASE ORAL at 17:23

## 2021-02-07 RX ADMIN — TAMSULOSIN HYDROCHLORIDE 0.4 MILLIGRAM(S): 0.4 CAPSULE ORAL at 20:54

## 2021-02-07 RX ADMIN — Medication 1 TABLET(S): at 17:23

## 2021-02-07 RX ADMIN — TACROLIMUS 1 MILLIGRAM(S): 5 CAPSULE ORAL at 13:26

## 2021-02-07 RX ADMIN — REMDESIVIR 500 MILLIGRAM(S): 5 INJECTION INTRAVENOUS at 20:54

## 2021-02-07 RX ADMIN — MIDODRINE HYDROCHLORIDE 10 MILLIGRAM(S): 2.5 TABLET ORAL at 17:23

## 2021-02-07 NOTE — CONSULT NOTE ADULT - SUBJECTIVE AND OBJECTIVE BOX
[facility]  INFECTIOUS DISEASES AND INTERNAL MEDICINE at [place]  =======================================================  Scar Solares MD  Diplomates American Board of Internal Medicine and Infectious Diseases  Tel  [phone]  Fax [phone]  =======================================================    [id number]  JU FERGUSON   HPI:  This 71 y/o M ex-smoker with PMH of 2015- Lung Transplant for COPD on anti rejection meds, s/p rt total hip arthroplasty, multi level compression #, BPH, HLD, CAD, BPH, OP, started HD for ESRD (2/2020) presents to ED c/o worsening dyspnea. Per EMS pt has had a cough since 2/1/21. However this morning his breathing became labored and he had difficulty speaking. Patient has not missed a dialysis session, last session Saturday, due today. For EMS pulse ox was 55% on RA, placed on NRB then CPAP, improved to 85-89%. Patient nephrologist is Dr. Maher;  pulmonologist Dr. Cordova.  Transplant team - [facility]    In ED chest x-ray suggestive of pneumonia, given 500 mg Meropenem. COVID+ (02 Feb 2021 12:28)    patient still has SOB. Reports that he was doing well about 1 week ago.   COVID testing is confirmed positive here.   CT of the lungs show: Small to moderate left and small right loculated pleural effusions with fluid in the right and left paramediastinal regions; paramediastinal fluid likely accounts for the appearance of the mediastinum on the chest x-ray of the same day.  Bilateral lung opacities as described above; differential includes multifocal pneumonia and/or pulmonary edema.    No clear sick contacts.     I have personally reviewed the labs and data; pertinent labs and data are listed in this note; please see below.   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  No earache, sore throat or runny nose.  CARDIOVASCULAR:  No pressure, squeezing, strangling, tightness, heaviness or aching about the chest, neck, axilla or epigastrium.  RESPIRATORY:  as per HPI  GASTROINTESTINAL:  No nausea, vomiting or diarrhea.  GENITOURINARY:  No dysuria, frequency or urgency. No Blood in urine  MUSCULOSKELETAL:  no joint aches, no muscle pain  SKIN:  No change in skin, hair or nails.  NEUROLOGIC:  No Headaches, seizures or weakness.  PSYCHIATRIC:  No disorder of thought or mood.  ENDOCRINE:  No heat or cold intolerance  HEMATOLOGICAL:  No easy bruising or bleeding.    =======================================================  Allergies  budesonide (Unknown)  cefpodoxime (Unknown)  Pollen (Unknown)      Antibiotics:  remdesivir  IVPB   IV Intermittent     Other medications:  chlorhexidine 4% Liquid 1 Application(s) Topical <User Schedule>     meropenem  IVPB   100 mL/Hr IV Intermittent (02-02-21 @ 07:41)      ======================================================  Physical Exam:  ============  T(F): 98.5 (02 Feb 2021 14:25), Max: 98.5 (02 Feb 2021 14:25)  HR: 91 (02 Feb 2021 14:25)  BP: 125/73 (02 Feb 2021 14:25)  RR: 20 (02 Feb 2021 14:25)  SpO2: 95% (02 Feb 2021 14:25) (90% - 99%)  temp max in last 48H T(F): , Max: 98.5 (02-02-21 @ 14:25)Height (cm): 162.6 (02-02-21 @ 05:52)  Weight (kg): 77.111 (02-02-21 @ 06:32)  BMI (kg/m2): 29.2 (02-02-21 @ 06:32)  BSA (m2): 1.83 (02-02-21 @ 06:32)    General:  moderate distress; ON BIPAP  Eye: Pupils are equal, round and reactive to light, Extraocular movements are intact, Normal conjunctiva.  HENT: Normocephalic, Oral mucosa is moist, No pharyngeal erythema, No sinus tenderness.  ON SUPPLEMENTAL OXYGEN  Neck: Supple, No lymphadenopathy.  Respiratory: Lungs  with COARSE breath sounds. DIFFUSELY  Cardiovascular: Normal rate, Regular rhythm,   RIGHT neck tunneled catheter.   Gastrointestinal: Soft, Non-tender, Non-distended, Normal bowel sounds.  Genitourinary: No costovertebral angle tenderness.  Lymphatics: No lymphadenopathy neck,   Musculoskeletal: Normal range of motion, Normal strength.  Integumentary: No rash.  Neurologic: Alert, Oriented, No focal deficits, Cranial Nerves II-XII are grossly intact.  Psychiatric: Appropriate mood & affect.    =======================================================  Labs:                        11.6   8.68  )-----------( 162      ( 02 Feb 2021 06:12 )             39.6     02-02    144  |  103  |  65.0<H>  ----------------------------<  184<H>  5.6<H>   |  21.0<L>  |  8.07<H>    Ca    8.1<L>      02 Feb 2021 06:12    TPro  6.7  /  Alb  3.5  /  TBili  0.4  /  DBili  x   /  AST  42<H>  /  ALT  26  /  AlkPhos  234<H>  02-02      Creatinine, Serum: 8.07 mg/dL (02-02-21 @ 06:12)    C-Reactive Protein, Serum: 2.48 mg/dL (02-02-21 @ 06:12)      Procalcitonin, Serum: 0.34 ng/mL (02-02-21 @ 06:12)      COVID-19 PCR: Detected (02-02-21 @ 06:10)       < from: CT Chest w/ IV Cont (02.02.21 @ 11:59) >     EXAM:  CT CHEST IC                          PROCEDURE DATE:  02/02/2021          INTERPRETATION:  CLINICAL INFORMATION: Widened mediastinum on chest x-ray. COVID.    COMPARISON: Chest x-ray of the same day    PROCEDURE:  CT of the Chest was performed with intravenous contrast.  88 ml of Omnipaque 300 was injected intravenously. 12 ml were discarded.  Sagittal and coronal reformats were performed.    FINDINGS:    LUNGS AND AIRWAYS: Patent central airways.  Bilateral lung opacities including areas of dense consolidation, largest in the lower lobes with additional patchy groundglass opacities and areas of nodularity, greatest in the right upper lobe. There is also compressive atelectasis in both lower lobes secondary to the effusions. Interlobular septal thickening, likely pulmonary edema.  PLEURA: Small to moderate left and small right partially loculated pleural effusions. Fluid in the right and left paramediastinal region including superior to the azygos vein on the right and to the left of the aortic arch and descending thoracic aorta, likely in the pleural space. The fluid in the superior paramediastinal region likely accounts for the appearance of the mediastinum on the chest x-ray of the same day.  MEDIASTINUM AND JANNY: No pathologically enlarged lymph nodes. Metallic densities in the subcarinal region; correlation is recommended with the surgical/procedural history.  VESSELS: Thoracic aorta normal in caliber with atherosclerotic changes. Coronary artery calcifications.  HEART: Enlarged. No pericardial effusion.  CHEST WALL AND LOWER NECK: Right IJ central line with the tip at the cavoatrial junction. Small amount of pericatheter thrombus (series 3 image 43). Bilateral gynecomastia. Anasarca.  VISUALIZED UPPER ABDOMEN: Bilateral nonobstructing renal calculi. Atrophy of the medial segment of the left hepatic lobe with a subtle nodular surface contour of the liver which can be seen in the setting of cirrhosis. Small amount of perihepatic ascites. Peripheral region of low attenuation in the spleen measuring 1.9 cm (series 2 image 143), likely an infarct. Partially imaged cystic lesion in the region of the pancreatic body and junction measuring 1.4 cm (series 3 image 149). 1.2 cm cyst in the upper pole the right kidney.  BONES: Multilevel compression fractures in the thoracic spine, many which are new including T5-T8 and T10. Chronic compression fractures at T4, T9, T11, T12, L1, and L2. Old sternal fracture with sternal wires in the mid sternum. Old bilateral rib fractures.    IMPRESSION:  Small to moderate left and small right loculated pleural effusions with fluid in the right and left paramediastinal regions; paramediastinal fluid likely accounts for the appearance of the mediastinum on the chest x-ray of the same day.    Bilateral lung opacities as described above; differential includes multifocal pneumonia and/or pulmonary edema.    Interlobular septal thickening, likely pulmonary edema.    Right IJ central line with a small amount of pericatheter thrombus.    Multilevel compression fractures in the thoracic and lumbar spine, some which are new since 9/26/2019; a thoracic spine MRI could be obtained to assess for acuity.    Peripheral region of low attenuation in the spleen, likely an infarct.    Partially imaged 1.4 cm cystic lesion in the pancreatic body tail junction; a nonemergent MRI of the abdomen is recommended for further evaluation.    The findings were discussed with Dr. Prasad at 1:54 PM on 2/2/2021.       SHAHRZAD HARGROVE MD; Attending Radiologist  This document has been electronically signed. Feb 2 2021  2:09PM    < end of copied text >  
Patient is a 70y old  Male who presents with a chief complaint of     HPI:  · Chief Complaint: The patient is a 70y Male complaining of difficulty breathing.  · Unable to Obtain: Urgent need for Intervention  · Details: Respiratory distress.  · HPI Objective Statement: 69 yo male with history of ESRD ( dialysis t,t,s) presents with worsening dyspnea. Patient wife told the EMS team that he has had a cough since 2/1/21. However this morning his breathing became labored and he had difficulty speaking. Patient has not missed a dialysis session. EMS states his pulse ox was 55% on ROM. He was placed on NRB then CPAP to assist with oxygenation. o2% improved to 85-89%. Patient nephrologist is dr. Anderson.  Last HD was Saturday in [facility]   Sats up to 99% with biPAP  He is Afebrile   CXR with diffuse infitrates --> CHF vs infection   He does not report sig fluid gains since HD Sat  + Lung transplant , he is on Cellcept , oral hydrocortisone , and Prograf ( U [place] 2015 ) , + Bactrim also   Wide superior mediastinum on ER CXR , he is going to get CT with IV Contrast   Covid testing pending     PAST MEDICAL & SURGICAL HISTORY:  ESRD (end stage renal disease) on dialysis    Emphysema of lung    H/O lung transplant  bilateral - transplant - 1-2-2015 -  [facility]        FAMILY HISTORY:  Family history of emphysema        Social History:    MEDICATIONS  (STANDING):  chlorhexidine 4% Liquid 1 Application(s) Topical <User Schedule>    MEDICATIONS  (PRN):    Home Medications:   * Patient Currently Takes Medications as of 02-Apr-2020 12:43 documented in Structured Notes  · 	oxyCODONE 5 mg oral tablet: 1 tab(s) orally every 4 hours, As Needed MDD:6 tabs   · 	Rolling Walker : Rolling walker to assist with ambulation   · 	ferrous sulfate 325 mg (65 mg elemental iron) oral tablet: 1 tab(s) orally once a day   · 	aspirin 325 mg oral delayed release tablet: 1 tab(s) orally 2 times a day end date May 9, 2020  · 	tacrolimus 1 mg oral capsule: 1 cap(s) orally 2 times a day  · 	metoprolol succinate 25 mg oral tablet, extended release: 1 tab(s) orally once a day  · 	clopidogrel 75 mg oral tablet: 1 tab(s) orally once a day  · 	hydrocortisone 20 mg oral tablet: 1 tab(s) orally once a day  · 	hydrocortisone 10 mg oral tablet: 1 tab(s) orally once a day (at bedtime)  · 	atorvastatin 10 mg oral tablet: 1 tab(s) orally once a day (at bedtime)  · 	bisacodyl 10 mg rectal suppository: 1 suppository(ies) rectal once a day, As needed, If no bowel movement by POD#2  · 	senna oral tablet: 2 tab(s) orally once a day (at bedtime), As needed, Constipation  · 	Multiple Vitamins oral tablet: 1 tab(s) orally once a day  · 	oxyCODONE 5 mg oral tablet: 1 tab(s) orally every 3 hours, As needed, Mild Pain  · 	oxyCODONE 10 mg oral tablet: 1 tab(s) orally every 3 hours, As needed, Moderate Pain  · 	oxyCODONE 10 mg oral tablet, extended release: 1 tab(s) orally every 12 hours  · 	Bactrim  mg-160 mg oral tablet: 1 tab(s) orally once a dayevery Mpmday/ Wednesday / Friday  · 	aspirin 81 mg oral tablet: 1 tab(s) orally once a day starting May 10, 2020  · 	CellCept 500 mg oral tablet: 2 tab(s) orally 2 times a day  · 	Protonix 40 mg oral delayed release tablet: 1 tab(s) orally once a day  · 	Flomax 0.4 mg oral capsule: 1 cap(s) orally once a day  · 	Lyrica 50 mg oral capsule: 1 cap(s) orally 2 times a day    Allergies    budesonide (Unknown)  cefpodoxime (Unknown)  Pollen (Unknown)    Intolerances    Vital Signs Last 24 Hrs  T(C): 36.7 (02 Feb 2021 06:22), Max: 36.7 (02 Feb 2021 06:22)  T(F): 98 (02 Feb 2021 06:22), Max: 98 (02 Feb 2021 06:22)  HR: 94 (02 Feb 2021 10:52) (94 - 118)  BP: 109/74 (02 Feb 2021 09:00) (109/74 - 161/88)  BP(mean): --  RR: 22 (02 Feb 2021 10:52) (22 - 38)  SpO2: 99% (02 Feb 2021 10:52) (90% - 99%)  Daily Height in cm: 162.56 (02 Feb 2021 05:52)    Daily   I&O's Detail    I&O's Summary      PHYSICAL EXAM:    GENERAL: + Bipap , alert , interactive   HEAD:  Atraumatic, + Bipap  NECK: + JVP , + R permacath   CHEST/LUNG: EAE , B/L Rhonchi   HEART: Regular rate and rhythm;No rub   ABDOMEN: Soft, Nontender  EXTREMITIES:  No edema legs       LABS:                        11.6   8.68  )-----------( 162      ( 02 Feb 2021 06:12 )             39.6     02-02    144  |  103  |  65.0<H>  ----------------------------<  184<H>  5.6<H>   |  21.0<L>  |  8.07<H>    Ca    8.1<L>      02 Feb 2021 06:12    TPro  6.7  /  Alb  3.5  /  TBili  0.4  /  DBili  x   /  AST  42<H>  /  ALT  26  /  AlkPhos  234<H>  02-02    PT/INR - ( 02 Feb 2021 06:12 )   PT: 12.7 sec;   INR: 1.10 ratio         PTT - ( 02 Feb 2021 06:12 )  PTT:27.2 sec      ABG - ( 02 Feb 2021 07:35 )  pH, Arterial: 7.31  pH, Blood: x     /  pCO2: 43    /  pO2: 159   / HCO3: 21    / Base Excess: -4.5  /  SaO2: 99                  < from: Xray Chest 1 View-PORTABLE IMMEDIATE (02.02.21 @ 06:22) >     EXAM:  XR CHEST PORTABLE IMMED 1V                          PROCEDURE DATE:  02/02/2021          INTERPRETATION:  XR CHEST IMMEDIATE    History: Sepsis    Technique: Single AP view of the chest.    Comparison: Chest x-ray 5/19/2016    Findings:    Right-sided central line with the tip overlying the SVC.    Metallic wires again seen overlying the mediastinum.    Diffuse bilateral lung opacities, greater on the right.    Widening of the superior aspect of the mediastinum. Heart prominent in size.    Partially imaged distended stomach.    Impression:    Diffuse bilateral lung opacities; the differential includes multifocal infection or pulmonary edema.    Widening of the mediastinum; a noncontrast chest CT is recommended for further evaluation; differential includes a dilated aorta or lymphadenopathy.    The findings were discussed with Dr. Prasad on 2/2/2021 8:45 AM            SHAHRZAD HARGROVE MD; Attending Radiologist  This document has been electronically signed. Feb 2 2021  8:46AM    < end of copied text >    RADIOLOGY & ADDITIONAL TESTS:  
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 Patient was also due for hemodialysis with hyperkalemia K 5.6.  He was found to be hypoxic with SpO2 55% on RA, started on BiPAP and admitted to MICU for acute hypoxemic respiratory failure with need for emergent dialysis. Patient underwent HD with 2L UF on 2/2. He was found to be COVID-19 positive, and was started on Remdesivir and IV Decadron by ID. He was continued on his home Bactrim and Fluconazole. On 2/3, patient spiked fever 103'F with associated hypotension, meeting criteria for septic shock requiring Norepinephrine. He was given 1L IV fluid bolus, and started on Midodrine to offload IV vasopressor requirements. ID was consulted, and patient was started on empiric antibiotics with Meropenem given his immunocompromised state for possible superimposed bacterial pneumonia. Blood cultures were negative. Patient does not make urine and no sputum available to culture. He was weaned off IV vasopressors and continues on Midodrine, with stable blood pressure.    GI evaluation called for anemia and occult blood positive. As per patient and his RN, no evidence of any active bleeding. He is aspirin and plavix.           PAST MEDICAL & SURGICAL HISTORY:  ESRD (end stage renal disease) on dialysis    Emphysema of lung    H/O lung transplant  bilateral - transplant - 1-2-2015 -  [facility]        ROS:  No Heartburn, regurgitation, dysphagia, odynophagia.  No dyspepsia  No abdominal pain.    No Nausea, vomiting.  No Bleeding.  No hematemesis.   No diarrhea.    No hematochesia.  No weight loss, anorexia.  No edema.      MEDICATIONS  (STANDING):  atorvastatin 80 milliGRAM(s) Oral at bedtime  chlorhexidine 4% Liquid 1 Application(s) Topical <User Schedule>  dextrose 40% Gel 15 Gram(s) Oral once  dextrose 50% Injectable 25 Gram(s) IV Push once  dextrose 50% Injectable 12.5 Gram(s) IV Push once  dextrose 50% Injectable 25 Gram(s) IV Push once  fluconAZOLE   Tablet 400 milliGRAM(s) Oral <User Schedule>  glucagon  Injectable 1 milliGRAM(s) IntraMuscular once  lactobacillus acidophilus 1 Tablet(s) Oral two times a day  meropenem  IVPB      meropenem  IVPB 500 milliGRAM(s) IV Intermittent every 24 hours  midodrine 10 milliGRAM(s) Oral <User Schedule>  pantoprazole  Injectable 40 milliGRAM(s) IV Push every 12 hours  remdesivir  IVPB 100 milliGRAM(s) IV Intermittent every 24 hours  sevelamer carbonate 800 milliGRAM(s) Oral three times a day with meals  tacrolimus 1 milliGRAM(s) Oral daily  tacrolimus 1 milliGRAM(s) Oral at bedtime  tamsulosin 0.4 milliGRAM(s) Oral at bedtime  trimethoprim   80 mG/sulfamethoxazole 400 mG 1 Tablet(s) Oral <User Schedule>    MEDICATIONS  (PRN):  acetaminophen   Tablet .. 650 milliGRAM(s) Oral every 6 hours PRN Temp greater or equal to 38C (100.4F), Mild Pain (1 - 3)      Allergies    budesonide (Unknown)  cefpodoxime (Unknown)  Pollen (Unknown)    Intolerances        SOCIAL HISTORY: NC    ENDOSCOPIC/GI HISTORY:NC. Colonoscopy remotely.    FAMILY HISTORY:  Family history of emphysema        Vital Signs Last 24 Hrs  T(C): 37 (07 Feb 2021 12:18), Max: 37 (07 Feb 2021 12:18)  T(F): 98.6 (07 Feb 2021 12:18), Max: 98.6 (07 Feb 2021 12:18)  HR: 112 (07 Feb 2021 12:18) (77 - 114)  BP: 144/96 (07 Feb 2021 12:18) (132/78 - 161/96)  BP(mean): --  RR: 18 (07 Feb 2021 12:18) (18 - 20)  SpO2: 100% (07 Feb 2021 12:18) (99% - 100%)    PHYSICAL EXAM:    GENERAL: NAD, well-groomed, well-developed  HEAD:  Atraumatic, Normocephalic  EYES: EOMI, PERRLA, conjunctiva and sclera clear  ENMT: No tonsillar erythema, exudates, or enlargement; Moist mucous membranes, Good dentition, No lesions  NECK: Supple, No JVD, Normal thyroid  CHEST/LUNG: Coarse breath sounds  HEART: Regular rate and rhythm; No murmurs, rubs, or gallops  ABDOMEN: Soft, Nontender, Nondistended; Bowel sounds present  RECTAL: No mass. No blood  EXTREMITIES:  2+ Peripheral Pulses, No clubbing, cyanosis, or edema  LYMPH: No lymphadenopathy noted  SKIN: No rashes or lesions      LABS:                        9.6    4.09  )-----------( 91       ( 07 Feb 2021 06:09 )             28.6     02-07    141  |  97<L>  |  50.0<H>  ----------------------------<  145<H>  4.8   |  27.0  |  4.28<H>    Ca    7.4<L>      07 Feb 2021 06:09    TPro  5.3<L>  /  Alb  2.6<L>  /  TBili  0.4  /  DBili  0.2  /  AST  49<H>  /  ALT  15  /  AlkPhos  188<H>  02-07           LIVER FUNCTIONS - ( 07 Feb 2021 06:10 )  Alb: 2.6 g/dL / Pro: 5.3 g/dL / ALK PHOS: 188 U/L / ALT: 15 U/L / AST: 49 U/L / GGT: x             Ferritin, Serum (02.02.21 @ 06:12)    Ferritin, Serum: 994 ng/mL      RADIOLOGY & ADDITIONAL STUDIES:
PULMONARY CONSULT NOTE      JU FERGUSON  MRN-[id number]    Patient is a 70y old  Male who presents with a chief complaint of Hypoxia (02 Feb 2021 12:28)      HISTORY OF PRESENT ILLNESS:  70M PMH B/L lung transplant (2015) for COPD on Tacrolimus and MMF, ESRD, CAD, HLD who presents with worsening SOB associated with a cough, ZAPATA, difficulty speaking. Last HD session was 3 days PTA and he was due for HD the day of admission. In the ED noted with hypoxic respiratory failure, saturating 55% on room air, placed on 100% NRB and then to BPAP. Found with hyperkalemia, acute volume overload warranting emergent dialysis, as well as COVID-19 PNA. He reports a dry cough, no hemoptysis. Denies chest pain. Denies N/V/D. Denies body aches or recent travel.     MEDICATIONS  (STANDING):  chlorhexidine 4% Liquid 1 Application(s) Topical <User Schedule>    MEDICATIONS  (PRN):    Allergies    budesonide (Unknown)  cefpodoxime (Unknown)  Pollen (Unknown)    Intolerances      PAST MEDICAL & SURGICAL HISTORY:  ESRD (end stage renal disease) on dialysis    Emphysema of lung    H/O lung transplant  bilateral - transplant - 1-2-2015 -  [facility]      FAMILY HISTORY:  Family history of emphysema          SOCIAL HISTORY  Smoking History:   Former smoker      REVIEW OF SYSTEMS:  CONSTITUTIONAL:  No fevers, chills  HEENT:  No headache  CARDIOVASCULAR:  No chest pain  RESPIRATORY:  As per HPI  GASTROINTESTINAL:  No abdominal pain  GENITOURINARY:  No dysuria  NEUROLOGIC:  No seizures  PSYCHIATRIC:  No disorder of thought or mood      Vital Signs Last 24 Hrs  T(C): 36.7 (02 Feb 2021 06:22), Max: 36.7 (02 Feb 2021 06:22)  T(F): 98 (02 Feb 2021 06:22), Max: 98 (02 Feb 2021 06:22)  HR: 94 (02 Feb 2021 12:03) (94 - 118)  BP: 109/74 (02 Feb 2021 09:00) (109/74 - 161/88)  BP(mean): --  RR: 22 (02 Feb 2021 10:52) (22 - 38)  SpO2: 97% (02 Feb 2021 12:03) (90% - 99%)      PHYSICAL EXAMINATION:  GENERAL: In no apparent distress  HEENT: NC/AT  NECK: Supple, non-tender   LUNGS: CTA B/L, good inspiratory effort, non-labored breathing  CV: +S1, S2, RRR  ABDOMEN: Soft, NT/ND  EXTREMITIES: No rashes, lesions, edema  NEUROLOGIC: Grossly non-focal  PSYCH: Normal affect      LABS:                        11.6   8.68  )-----------( 162      ( 02 Feb 2021 06:12 )             39.6     02-02    144  |  103  |  65.0<H>  ----------------------------<  184<H>  5.6<H>   |  21.0<L>  |  8.07<H>    Ca    8.1<L>      02 Feb 2021 06:12    TPro  6.7  /  Alb  3.5  /  TBili  0.4  /  DBili  x   /  AST  42<H>  /  ALT  26  /  AlkPhos  234<H>  02-02    PT/INR - ( 02 Feb 2021 06:12 )   PT: 12.7 sec;   INR: 1.10 ratio         PTT - ( 02 Feb 2021 06:12 )  PTT:27.2 sec    ABG - ( 02 Feb 2021 07:35 )  pH, Arterial: 7.31  pH, Blood: x     /  pCO2: 43    /  pO2: 159   / HCO3: 21    / Base Excess: -4.5  /  SaO2: 99                CARDIAC MARKERS ( 02 Feb 2021 06:12 )  x     / 0.08 ng/mL / x     / x     / x                Procalcitonin, Serum: 0.34 ng/mL (02-02-21 @ 06:12)      MICROBIOLOGY:  COVID-19 PCR . (02.02.21 @ 06:10)    COVID-19 PCR: Detected: TYPE:(C=Critical, N=Notification, A=Abnormal) C  TESTS: _covid  DATE/TIME CALLED: _02/02/2021 11:38:06 EST  CALLED TO: _ed charge nurse richa de luna  READ BACK (2 Patient Identifiers)(Y/N): _y  READ BACK VALUES (Y/N): _y  CALLED BY: _kb  You can help in the fight against COVID-19. [facility] may contact  you to see if you are interested in voluntarily participating in one of  our clinical trials.  Testing is performed using polymerase chain reaction (PCR) or  transcription mediated amplification (TMA). This COVID-19 (SARS-CoV-2)  nucleic acid amplification test was validated by [employer] and is  in use under the FDA Emergency Use Authorization (EUA) for clinical labs  CLIA-certified to perform high complexity testing. Test results should be  correlated with clinical presentation, patient history, and epidemiology.          RADIOLOGY & ADDITIONAL STUDIES:  < from: Xray Chest 1 View-PORTABLE IMMEDIATE (02.02.21 @ 06:22) >     EXAM:  XR CHEST PORTABLE IMMED 1V                          PROCEDURE DATE:  02/02/2021          INTERPRETATION:  XR CHEST IMMEDIATE    History: Sepsis    Technique: Single AP view of the chest.    Comparison: Chest x-ray 5/19/2016    Findings:    Right-sided central line with the tip overlying the SVC.    Metallic wires again seen overlying the mediastinum.    Diffuse bilateral lung opacities, greater on the right.    Widening of the superior aspect of the mediastinum. Heart prominent in size.    Partially imaged distended stomach.    Impression:    Diffuse bilateral lung opacities; the differential includes multifocal infection or pulmonary edema.    Widening of the mediastinum; a noncontrast chest CT is recommended for further evaluation; differential includes a dilated aorta or lymphadenopathy.    The findings were discussed with Dr. Prasad on 2/2/2021 8:45 AM    < end of copied text >      ECHO:

## 2021-02-07 NOTE — PROGRESS NOTE ADULT - SUBJECTIVE AND OBJECTIVE BOX
Chief Complaint:  hypoxia    SUBJECTIVE / OVERNIGHT EVENTS: No acute events overnight.  Pt on 2L NC and saturating >90%.   Patient denies chest pain, abd pain, N/V, fever, chills, dysuria or any other complaints. All remainder ROS negative.       I&O's Summary        PHYSICAL EXAM:  Vital Signs Last 24 Hrs  T(C): 36.4 (06 Feb 2021 08:47), Max: 36.8 (05 Feb 2021 21:00)  T(F): 97.5 (06 Feb 2021 08:47), Max: 98.2 (05 Feb 2021 21:00)  HR: 87 (06 Feb 2021 08:47) (81 - 117)  BP: 158/74 (06 Feb 2021 08:47) (117/74 - 158/74)  BP(mean): --  RR: 18 (06 Feb 2021 08:47) (18 - 20)  SpO2: 98% (06 Feb 2021 08:47) (94% - 98%)      GENERAL: frail elderly male, examined bedside, laying comfortably in bed in NAD  HEENT: NC/AT, moist oral mucosa, clear conjunctiva, sclera nonicteric  RESPIRATORY: scattered rales, no wheezing or rhonchi  CARDIOVASCULAR: RRR, normal S1 and S2, no murmur/rub/gallop  ABDOMEN: soft, NT/ND, normoactive bowel sounds, no rebound/guarding  EXTREMITIES: No cynaosis, no clubbing, no lower extremity edema; Peripheral pulses are 2+ bilaterally  PSYCH: affect appropriate and cooperative  NEUROLOGY: A+O to person, place, and time, no focal neurologic deficits appreciated   SKIN: No rashes or no palpable lesions        LABS:                                          9.6    4.09  )-----------( 91       ( 07 Feb 2021 06:09 )             28.6       02-07    141  |  97<L>  |  50.0<H>  ----------------------------<  145<H>  4.8   |  27.0  |  4.28<H>    Ca    7.4<L>      07 Feb 2021 06:09    TPro  5.3<L>  /  Alb  2.6<L>  /  TBili  0.4  /  DBili  0.2  /  AST  49<H>  /  ALT  15  /  AlkPhos  188<H>  02-07            CAPILLARY BLOOD GLUCOSE      POCT Blood Glucose.: 233 mg/dL (06 Feb 2021 12:43)  POCT Blood Glucose.: 116 mg/dL (06 Feb 2021 08:37)  POCT Blood Glucose.: 193 mg/dL (05 Feb 2021 17:19)        RADIOLOGY & ADDITIONAL TESTS:          MEDICATIONS  (STANDING):  aspirin  chewable 81 milliGRAM(s) Oral daily  atorvastatin 80 milliGRAM(s) Oral at bedtime  chlorhexidine 4% Liquid 1 Application(s) Topical <User Schedule>  clopidogrel Tablet 75 milliGRAM(s) Oral daily  dextrose 40% Gel 15 Gram(s) Oral once  dextrose 50% Injectable 25 Gram(s) IV Push once  dextrose 50% Injectable 12.5 Gram(s) IV Push once  dextrose 50% Injectable 25 Gram(s) IV Push once  fluconAZOLE   Tablet 400 milliGRAM(s) Oral <User Schedule>  glucagon  Injectable 1 milliGRAM(s) IntraMuscular once  heparin   Injectable 5000 Unit(s) SubCutaneous every 8 hours  hydrocortisone sodium succinate Injectable 100 milliGRAM(s) IV Push every 8 hours  lactobacillus acidophilus 1 Tablet(s) Oral two times a day  meropenem  IVPB      meropenem  IVPB 500 milliGRAM(s) IV Intermittent every 24 hours  midodrine 10 milliGRAM(s) Oral <User Schedule>  pantoprazole    Tablet 40 milliGRAM(s) Oral before breakfast  remdesivir  IVPB   IV Intermittent   remdesivir  IVPB 100 milliGRAM(s) IV Intermittent every 24 hours  sevelamer carbonate 800 milliGRAM(s) Oral three times a day with meals  tacrolimus 1 milliGRAM(s) Oral daily  tacrolimus 1 milliGRAM(s) Oral at bedtime  tamsulosin 0.4 milliGRAM(s) Oral at bedtime  trimethoprim   80 mG/sulfamethoxazole 400 mG 1 Tablet(s) Oral <User Schedule>    MEDICATIONS  (PRN):  acetaminophen   Tablet .. 650 milliGRAM(s) Oral every 6 hours PRN Temp greater or equal to 38C (100.4F), Mild Pain (1 - 3)

## 2021-02-07 NOTE — PROGRESS NOTE ADULT - ASSESSMENT
ESRD: HD on TTS schedule  Lung transplant patient on chronic immunosuppression   +COVID PNA cont supportive care    Anemia: +CKD  - follow H/H     RO: hyperphosphatemia  - low phos diet   - cont binder    Will follow

## 2021-02-07 NOTE — PROGRESS NOTE ADULT - SUBJECTIVE AND OBJECTIVE BOX
NEPHROLOGY INTERVAL HPI/OVERNIGHT EVENTS:    Examined  No distress  Afebrile    MEDICATIONS  (STANDING):  aspirin  chewable 81 milliGRAM(s) Oral daily  atorvastatin 80 milliGRAM(s) Oral at bedtime  chlorhexidine 4% Liquid 1 Application(s) Topical <User Schedule>  clopidogrel Tablet 75 milliGRAM(s) Oral daily  dextrose 40% Gel 15 Gram(s) Oral once  dextrose 50% Injectable 25 Gram(s) IV Push once  dextrose 50% Injectable 12.5 Gram(s) IV Push once  dextrose 50% Injectable 25 Gram(s) IV Push once  fluconAZOLE   Tablet 400 milliGRAM(s) Oral <User Schedule>  glucagon  Injectable 1 milliGRAM(s) IntraMuscular once  lactobacillus acidophilus 1 Tablet(s) Oral two times a day  meropenem  IVPB      meropenem  IVPB 500 milliGRAM(s) IV Intermittent every 24 hours  midodrine 10 milliGRAM(s) Oral <User Schedule>  pantoprazole  Injectable 40 milliGRAM(s) IV Push every 12 hours  remdesivir  IVPB 100 milliGRAM(s) IV Intermittent every 24 hours  sevelamer carbonate 800 milliGRAM(s) Oral three times a day with meals  tacrolimus 1 milliGRAM(s) Oral daily  tacrolimus 1 milliGRAM(s) Oral at bedtime  tamsulosin 0.4 milliGRAM(s) Oral at bedtime  trimethoprim   80 mG/sulfamethoxazole 400 mG 1 Tablet(s) Oral <User Schedule>    MEDICATIONS  (PRN):  acetaminophen   Tablet .. 650 milliGRAM(s) Oral every 6 hours PRN Temp greater or equal to 38C (100.4F), Mild Pain (1 - 3)      Allergies    budesonide (Unknown)  cefpodoxime (Unknown)  Pollen (Unknown)    Intolerances        Vital Signs Last 24 Hrs  T(C): 36.6 (2021 04:30), Max: 36.6 (2021 20:15)  T(F): 97.9 (2021 04:30), Max: 97.9 (2021 20:15)  HR: 85 (2021 04:30) (77 - 114)  BP: 145/84 (2021 04:30) (134/74 - 161/96)  BP(mean): --  RR: 18 (2021 04:30) (18 - 20)  SpO2: 99% (2021 04:30) (98% - 100%)  Daily     Daily Weight in k.1 (2021 20:15)    PHYSICAL EXAM:  Gen: NAD  HEENT: NCAT  NERVOUS SYSTEM:  Awakw lethargic  EXTREMITIES:  tr LE edema  Further exam deferred to limit COVID exposure    LABS:                        9.6    4.09  )-----------( 91       ( 2021 06:09 )             28.6     02-07    141  |  97<L>  |  50.0<H>  ----------------------------<  145<H>  4.8   |  27.0  |  4.28<H>    Ca    7.4<L>      2021 06:09  Phos  7.6     02-05  Mg     2.2     02-05    TPro  5.3<L>  /  Alb  2.6<L>  /  TBili  0.4  /  DBili  0.2  /  AST  49<H>  /  ALT  15  /  AlkPhos  188<H>  02-07                RADIOLOGY & ADDITIONAL TESTS:

## 2021-02-07 NOTE — PROGRESS NOTE ADULT - ASSESSMENT
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 Patient was also due for hemodialysis with hyperkalemia K 5.6.  He was found to be hypoxic with SpO2 55% on RA, started on BiPAP and admitted to MICU for acute hypoxemic respiratory failure with need for emergent dialysis. Patient underwent HD with 2L UF on 2/2. He was found to be COVID-19 positive, and was started on Remdesivir and IV Decadron by ID. He was continued on his home Bactrim and Fluconazole. On 2/3, patient spiked fever 103'F with associated hypotension, meeting criteria for septic shock requiring Norepinephrine. He was given 1L IV fluid bolus, and started on Midodrine to offload IV vasopressor requirements. ID was consulted, and patient was started on empiric antibiotics with Meropenem given his immunocompromised state for possible superimposed bacterial pneumonia. Blood cultures were negative. Patient does not make urine and no sputum available to culture. He was weaned off IV vasopressors and continues on Midodrine, with stable blood pressure.      Acute hypoxic respiratory failure 2/2 COVID pneumonia  - Pt now weaned off high flow onto NC and saturating in low 90's  - continue supplemental O2 as needed   - Encourage self- proning  as tolerated and OOB to chair  - Encouraged incentive spirometry   - Pulmonology following      Septic Shock 2/2 possible gram negative pneumonia  - c/w IV meropenem  - Blood cultures x 2 NTD  - IV Remdesivir extended to a 10 day course, through 2/11/21     - c/w dexamethasone 6mg daily   - Monitor Inflammatory markers q48h   - Monitor LFTs while on Remdesivir  - Trend CBC with diff and CMP daily       AGMA possible 2/2 sepsis   - AG improving and serum HCO3 WNL   - Monitor CMP closely      Hypocalcemia likely pseudo given low albumin  - Corrected calcium 8.4      Asymptomatic cholestatic pattern on LFTs  - No abdominal pain reported and benign abd exam  - ALP elevated but improved since admission  - At risk for acalculous cholecystitis however no suspicion at this time  - Will monitor LFTs daily          Status post lung transplant  - On tacrolimus  - c/w Bactrim and fluconazole for ppx       ESRD on HD TTS  - c/w HD per nephrology  - Monitor lytes   - Renally dose medications       Normocytic anemia   - Likely of chronic dx in the setting of ESRD on HD  - H/H low but stable  - Blood in BM reported yesterday evening.  FOB(+).  ASA & plavix on hold and GI consulted.  - Pt hemodynamically stable with no signs of active bleeding at this time   - c/w IV PPI BID      - Place on clear liquid diet and advanced per GI    - Monitor CBC daily        HLD  - c/w statin therapy      CAD status post PCI in 2019  - Resume ASA and plavix once cleared by GI   - c/w statin therapy      BPH  - On flomax      Thrombocytopenia   - Likely 2/2 sepsis.     - No signs of active bleeding   - Monitor platelet count daily       VTE ppx:  SCDs given (+)FOB  GI ppx: IV protonix BID    Dispo: Pt remains acute.  Possible d/c in 3-4 days if improves and remains medically stable.

## 2021-02-07 NOTE — CONSULT NOTE ADULT - ASSESSMENT
Anemia: Possible multifactorial. Fecal occult blood is meaningless at this time, when there is no signs of any overt GI bleeding.  Will recommend to check iron level. Ferritin may be elevated due to COVID 19 infection. Check vitamin B12, folate. Can continue at least aspirin. Assess the need for plavix from cardiology perspective. No need for GI endoscopic evaluation. Call GI prn. 
70M PMH B/L lung transplant (2015) for COPD on Tacrolimus and MMF, ESRD, CAD, HLD who presents with worsening SOB, cough, found with COVID-19 PNA, acute pulmonary edema in setting of ESRD, hyperkalemia    Impression:  - Hypercapneic and hypoxic respiratory failure  - COVID-19 PNA  - Lung transplant (2015) for CODP - on Tacrolimus and MMF  - ESRD - on HD TuThSa  - Acute pulmonary edema    Recommendations:  - Recommend starting Decadron 6mg x 10d  - Given ESRD he is poor candidate for Remdesivir  - C/w NIPPV - monitor PIPs  - HD per nephrology  - C/w maintenance immunosuppression; PCT was negative, no fevers  - Monitor serum levels of immunosuppressants  - Agree with consulting ID  - Given his renal dysfunction, high O2 requirements, lung transplant patient, his overall prognosis is poor  - Consider early palliative input  - DVT PPx as per [facility] COVID-19 protocol    Negrito Cardona M.D.  Pulmonary & Critical Care Medicine  [facility]  Pulmonary Medicine at [place]  [address]  [place] [address]  T: [phone]  F: [phone]
ESRD - Diffuse infiltrates in a lung transplant patient on chronic immunosuppression   Covid test pend  Will arrange HD immediately after CT chest with IV contrast   feels better on Bipap  Recommend ID evaluation , given his + lung transplant status   Given the pattern of his CXR appearance , would favor more of infectious process , but formal CT being done     Anemia - Hgb stable     Lung transplant - Continue maintenance immuno-suppression   I have called ID consult   
This 69 y/o M ex-smoker with PMH of 2015- Lung Transplant for COPD on anti rejection meds, s/p rt total hip arthroplasty, multi level compression #, BPH, HLD, CAD, BPH, OP, started HD for ESRD (2/2020) presents to ED c/o worsening dyspnea. Per EMS pt has had a cough since 2/1/21. However this morning his breathing became labored and he had difficulty speaking. Patient has not missed a dialysis session, last session Saturday, due today. For EMS pulse ox was 55% on RA, placed on NRB then CPAP, improved to 85-89%. Patient nephrologist is Dr. Maher;  pulmonologist Dr. oCrdova.  Transplant team - [facility]    In ED chest x-ray suggestive of pneumonia, given 500 mg Meropenem. COVID+ (02 Feb 2021 12:28)    patient still has SOB. Reports that he was doing well about 1 week ago.   COVID testing is confirmed positive here.   CT of the lungs show: Small to moderate left and small right loculated pleural effusions with fluid in the right and left paramediastinal regions; paramediastinal fluid likely accounts for the appearance of the mediastinum on the chest x-ray of the same day.  Bilateral lung opacities as described above; differential includes multifocal pneumonia and/or pulmonary edema.    No clear sick contacts.       Impression:  COVID-19 infection   Viral pneumonia  shortness of breath  lung infiltrates  dependence on oxygen  Lung transplant  Immunosuppression for transplant    Plan:  - start Remdesivir IV daily.  will plan for a 5 day course.   - MAY stop earlier than 5 days, and send home, if patient is back on Ambient oxygen/ room air.     - continue dexamethasone 6mg daily, While on remdesivir  Inflammatory markers and LFTs WILL BE ORDERED with the REMDESIVIR order SET.  DO NOT ORDER this additionally.   - trend CBC with diff, CMP  daily    - Continue supportive care measures  - continue Oxygenation as needed;  CONTINUE to titrate down as tolerated  - self- proning  as tolerated  - ENCOURAGED OOB to chair  - encouraged incentive spirometry     HIGH risk for decompensation    - OFFERED, and patient CONSENTED to Convalescent Plasma as part of EUA  - will give 1 unit of CP.  type and screen and confirmation ordered.   - will monitor patient during and after transfusion of CP      Will follow with you.

## 2021-02-08 LAB
ALBUMIN SERPL ELPH-MCNC: 2.5 G/DL — LOW (ref 3.3–5.2)
ALBUMIN SERPL ELPH-MCNC: 2.6 G/DL — LOW (ref 3.3–5.2)
ALP SERPL-CCNC: 174 U/L — HIGH (ref 40–120)
ALP SERPL-CCNC: 175 U/L — HIGH (ref 40–120)
ALT FLD-CCNC: 15 U/L — SIGNIFICANT CHANGE UP
ALT FLD-CCNC: 15 U/L — SIGNIFICANT CHANGE UP
ANION GAP SERPL CALC-SCNC: 18 MMOL/L — HIGH (ref 5–17)
ANISOCYTOSIS BLD QL: SIGNIFICANT CHANGE UP
AST SERPL-CCNC: 47 U/L — HIGH
AST SERPL-CCNC: 47 U/L — HIGH
BASOPHILS # BLD AUTO: 0 K/UL — SIGNIFICANT CHANGE UP (ref 0–0.2)
BASOPHILS NFR BLD AUTO: 0 % — SIGNIFICANT CHANGE UP (ref 0–2)
BILIRUB DIRECT SERPL-MCNC: 0.2 MG/DL — SIGNIFICANT CHANGE UP (ref 0–0.3)
BILIRUB INDIRECT FLD-MCNC: 0.2 MG/DL — SIGNIFICANT CHANGE UP (ref 0.2–1)
BILIRUB SERPL-MCNC: 0.4 MG/DL — SIGNIFICANT CHANGE UP (ref 0.4–2)
BILIRUB SERPL-MCNC: 0.5 MG/DL — SIGNIFICANT CHANGE UP (ref 0.4–2)
BUN SERPL-MCNC: 70 MG/DL — HIGH (ref 8–20)
CALCIUM SERPL-MCNC: 6.9 MG/DL — LOW (ref 8.6–10.2)
CHLORIDE SERPL-SCNC: 96 MMOL/L — LOW (ref 98–107)
CO2 SERPL-SCNC: 25 MMOL/L — SIGNIFICANT CHANGE UP (ref 22–29)
CREAT SERPL-MCNC: 5.83 MG/DL — HIGH (ref 0.5–1.3)
CULTURE RESULTS: SIGNIFICANT CHANGE UP
ELLIPTOCYTES BLD QL SMEAR: SLIGHT — SIGNIFICANT CHANGE UP
EOSINOPHIL # BLD AUTO: 0.06 K/UL — SIGNIFICANT CHANGE UP (ref 0–0.5)
EOSINOPHIL NFR BLD AUTO: 2 % — SIGNIFICANT CHANGE UP (ref 0–6)
GLUCOSE SERPL-MCNC: 74 MG/DL — SIGNIFICANT CHANGE UP (ref 70–99)
HCT VFR BLD CALC: 31 % — LOW (ref 39–50)
HGB BLD-MCNC: 10.2 G/DL — LOW (ref 13–17)
LYMPHOCYTES # BLD AUTO: 0.16 K/UL — LOW (ref 1–3.3)
LYMPHOCYTES # BLD AUTO: 5.1 % — LOW (ref 13–44)
MACROCYTES BLD QL: SLIGHT — SIGNIFICANT CHANGE UP
MAGNESIUM SERPL-MCNC: 2.2 MG/DL — SIGNIFICANT CHANGE UP (ref 1.6–2.6)
MANUAL SMEAR VERIFICATION: SIGNIFICANT CHANGE UP
MCHC RBC-ENTMCNC: 28.3 PG — SIGNIFICANT CHANGE UP (ref 27–34)
MCHC RBC-ENTMCNC: 32.9 GM/DL — SIGNIFICANT CHANGE UP (ref 32–36)
MCV RBC AUTO: 85.9 FL — SIGNIFICANT CHANGE UP (ref 80–100)
MONOCYTES # BLD AUTO: 0.13 K/UL — SIGNIFICANT CHANGE UP (ref 0–0.9)
MONOCYTES NFR BLD AUTO: 4 % — SIGNIFICANT CHANGE UP (ref 2–14)
MYELOCYTES NFR BLD: 1 % — HIGH (ref 0–0)
NEUTROPHILS # BLD AUTO: 2.76 K/UL — SIGNIFICANT CHANGE UP (ref 1.8–7.4)
NEUTROPHILS NFR BLD AUTO: 87.9 % — HIGH (ref 43–77)
OVALOCYTES BLD QL SMEAR: SLIGHT — SIGNIFICANT CHANGE UP
PLAT MORPH BLD: NORMAL — SIGNIFICANT CHANGE UP
PLATELET # BLD AUTO: 95 K/UL — LOW (ref 150–400)
POIKILOCYTOSIS BLD QL AUTO: SIGNIFICANT CHANGE UP
POLYCHROMASIA BLD QL SMEAR: SLIGHT — SIGNIFICANT CHANGE UP
POTASSIUM SERPL-MCNC: 4.2 MMOL/L — SIGNIFICANT CHANGE UP (ref 3.5–5.3)
POTASSIUM SERPL-SCNC: 4.2 MMOL/L — SIGNIFICANT CHANGE UP (ref 3.5–5.3)
PROT SERPL-MCNC: 5.1 G/DL — LOW (ref 6.6–8.7)
PROT SERPL-MCNC: 5.2 G/DL — LOW (ref 6.6–8.7)
RBC # BLD: 3.61 M/UL — LOW (ref 4.2–5.8)
RBC # FLD: 19.4 % — HIGH (ref 10.3–14.5)
RBC BLD AUTO: ABNORMAL
SODIUM SERPL-SCNC: 139 MMOL/L — SIGNIFICANT CHANGE UP (ref 135–145)
SPECIMEN SOURCE: SIGNIFICANT CHANGE UP
TARGETS BLD QL SMEAR: SLIGHT — SIGNIFICANT CHANGE UP
WBC # BLD: 3.14 K/UL — LOW (ref 3.8–10.5)
WBC # FLD AUTO: 3.14 K/UL — LOW (ref 3.8–10.5)

## 2021-02-08 PROCEDURE — 99233 SBSQ HOSP IP/OBS HIGH 50: CPT

## 2021-02-08 PROCEDURE — 99232 SBSQ HOSP IP/OBS MODERATE 35: CPT | Mod: CS

## 2021-02-08 RX ORDER — ASPIRIN/CALCIUM CARB/MAGNESIUM 324 MG
81 TABLET ORAL DAILY
Refills: 0 | Status: DISCONTINUED | OUTPATIENT
Start: 2021-02-08 | End: 2021-02-12

## 2021-02-08 RX ORDER — CLOPIDOGREL BISULFATE 75 MG/1
75 TABLET, FILM COATED ORAL DAILY
Refills: 0 | Status: DISCONTINUED | OUTPATIENT
Start: 2021-02-08 | End: 2021-02-12

## 2021-02-08 RX ADMIN — TACROLIMUS 1 MILLIGRAM(S): 5 CAPSULE ORAL at 20:20

## 2021-02-08 RX ADMIN — CHLORHEXIDINE GLUCONATE 1 APPLICATION(S): 213 SOLUTION TOPICAL at 08:02

## 2021-02-08 RX ADMIN — TAMSULOSIN HYDROCHLORIDE 0.4 MILLIGRAM(S): 0.4 CAPSULE ORAL at 20:20

## 2021-02-08 RX ADMIN — SEVELAMER CARBONATE 800 MILLIGRAM(S): 2400 POWDER, FOR SUSPENSION ORAL at 08:59

## 2021-02-08 RX ADMIN — MEROPENEM 100 MILLIGRAM(S): 1 INJECTION INTRAVENOUS at 13:29

## 2021-02-08 RX ADMIN — Medication 600 MILLIGRAM(S): at 17:06

## 2021-02-08 RX ADMIN — REMDESIVIR 500 MILLIGRAM(S): 5 INJECTION INTRAVENOUS at 20:20

## 2021-02-08 RX ADMIN — Medication 81 MILLIGRAM(S): at 12:15

## 2021-02-08 RX ADMIN — TACROLIMUS 1 MILLIGRAM(S): 5 CAPSULE ORAL at 12:15

## 2021-02-08 RX ADMIN — Medication 1 TABLET(S): at 17:06

## 2021-02-08 RX ADMIN — SEVELAMER CARBONATE 800 MILLIGRAM(S): 2400 POWDER, FOR SUSPENSION ORAL at 17:05

## 2021-02-08 RX ADMIN — Medication 1 TABLET(S): at 04:59

## 2021-02-08 RX ADMIN — MIDODRINE HYDROCHLORIDE 10 MILLIGRAM(S): 2.5 TABLET ORAL at 04:59

## 2021-02-08 RX ADMIN — PANTOPRAZOLE SODIUM 40 MILLIGRAM(S): 20 TABLET, DELAYED RELEASE ORAL at 04:59

## 2021-02-08 RX ADMIN — PANTOPRAZOLE SODIUM 40 MILLIGRAM(S): 20 TABLET, DELAYED RELEASE ORAL at 17:06

## 2021-02-08 RX ADMIN — ATORVASTATIN CALCIUM 80 MILLIGRAM(S): 80 TABLET, FILM COATED ORAL at 20:20

## 2021-02-08 RX ADMIN — CLOPIDOGREL BISULFATE 75 MILLIGRAM(S): 75 TABLET, FILM COATED ORAL at 12:15

## 2021-02-08 RX ADMIN — MIDODRINE HYDROCHLORIDE 10 MILLIGRAM(S): 2.5 TABLET ORAL at 17:06

## 2021-02-08 RX ADMIN — SEVELAMER CARBONATE 800 MILLIGRAM(S): 2400 POWDER, FOR SUSPENSION ORAL at 12:15

## 2021-02-08 NOTE — PROGRESS NOTE ADULT - ASSESSMENT
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 Patient was also due for hemodialysis with hyperkalemia K 5.6.  He was found to be hypoxic with SpO2 55% on RA, started on BiPAP and admitted to MICU for acute hypoxemic respiratory failure with need for emergent dialysis. Patient underwent HD with 2L UF on 2/2. He was found to be COVID-19 positive, and was started on Remdesivir and IV Decadron by ID. He was continued on his home Bactrim and Fluconazole. On 2/3, patient spiked fever 103'F with associated hypotension, meeting criteria for septic shock requiring Norepinephrine. He was given 1L IV fluid bolus, and started on Midodrine to offload IV vasopressor requirements. ID was consulted, and patient was started on empiric antibiotics with Meropenem given his immunocompromised state for possible superimposed bacterial pneumonia. Blood cultures were negative. Patient does not make urine and no sputum available to culture. He was weaned off IV vasopressors and continues on Midodrine, with stable blood pressure.      Acute hypoxic respiratory failure 2/2 COVID pneumonia  - Pt now weaned off high flow onto NC and saturating in low 90's  - continue supplemental O2 as needed   - Encourage self- proning  as tolerated and OOB to chair  - Encouraged incentive spirometry   - Pulmonology following      Septic Shock 2/2 possible gram negative pneumonia  - c/w IV meropenem  - Blood cultures x 2 NTD  - IV Remdesivir extended to a 10 day course, through 2/11/21     - c/w dexamethasone 6mg daily   - Monitor Inflammatory markers q48h   - Monitor LFTs while on Remdesivir  - Trend CBC with diff and CMP daily       AGMA possible 2/2 sepsis   - AG improving and serum HCO3 WNL   - Monitor CMP closely      Hypocalcemia likely pseudo given low albumin  - Corrected calcium 8.4      Asymptomatic cholestatic pattern on LFTs  - No abdominal pain reported and benign abd exam  - ALP elevated but improved since admission  - At risk for acalculous cholecystitis however no suspicion at this time  - Will monitor LFTs daily          Status post lung transplant  - On tacrolimus  - c/w Bactrim and fluconazole for ppx       ESRD on HD TTS  - c/w HD per nephrology  - Monitor lytes   - Renally dose medications       Normocytic anemia   - Likely of chronic dx in the setting of ESRD on HD  - H/H low but stable  - Blood in BM reported yesterday evening.  FOB(+).  ASA & plavix on hold and GI consulted.  - Pt hemodynamically stable with no signs of active bleeding at this time   - c/w IV PPI BID      - Place on clear liquid diet and advanced per GI    - Monitor CBC daily        HLD  - c/w statin therapy      CAD status post PCI in 2019  - Resume ASA and plavix once cleared by GI   - c/w statin therapy      BPH  - On flomax      Thrombocytopenia   - Likely 2/2 sepsis.     - No signs of active bleeding   - Monitor platelet count daily       VTE ppx:  SCDs given (+)FOB  GI ppx: IV protonix BID    Dispo: Pt remains acute.  Possible d/c in 3-4 days if improves and remains medically stable.     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 Patient was also due for hemodialysis with hyperkalemia K 5.6.  He was found to be hypoxic with SpO2 55% on RA, started on BiPAP and admitted to MICU for acute hypoxemic respiratory failure with need for emergent dialysis. Patient underwent HD with 2L UF on 2/2. He was found to be COVID-19 positive, and was started on Remdesivir and IV Decadron by ID. He was continued on his home Bactrim and Fluconazole. On 2/3, patient spiked fever 103'F with associated hypotension, meeting criteria for septic shock requiring Norepinephrine. He was given 1L IV fluid bolus, and started on Midodrine to offload IV vasopressor requirements. ID was consulted, and patient was started on empiric antibiotics with Meropenem given his immunocompromised state for possible superimposed bacterial pneumonia. Blood cultures were negative. Patient does not make urine and no sputum available to culture. He was weaned off IV vasopressors and continues on Midodrine, with stable blood pressure.      Acute hypoxic respiratory failure 2/2 COVID pneumonia  - Pt weaned off high flow onto NC and saturating in low 90's  - continue supplemental O2 as needed   - Encourage self- proning  as tolerated and OOB to chair  - Encouraged incentive spirometry   - Pulmonology following      Septic Shock 2/2 possible gram negative pneumonia  - c/w IV meropenem  - Blood cultures x 2 NTD  - IV Remdesivir extended to a 10 day course, through 2/11/21     - c/w dexamethasone 6mg daily   - Monitor Inflammatory markers q48h   - Monitor LFTs while on Remdesivir  - Trend CBC with diff and CMP daily       AGMA possible 2/2 sepsis vs uremia in the setting of ESRD  - AG elevated but improved since admission, serum HCO3 now WNL   - Will order lactic acid   - If serum HCO3 trends down consider ABG  - Monitor CMP closely      Hypocalcemia in the setting of ESRD  - Pt asymptomatic   - Corrected calcium 7.9  - Will discuss supplementing w/ nephrology  - Monitor lytes closely       Asymptomatic cholestatic pattern on LFTs  - No abdominal pain reported and benign abd exam  - ALP elevated but improved since admission  - Unlikely medication induced  - At risk for acalculous cholecystitis however no suspicion at this time  - Will monitor LFTs daily          Status post lung transplant  - On tacrolimus  - c/w Bactrim and fluconazole for ppx       ESRD on HD TTS  - c/w HD per nephrology  - Monitor lytes   - Renally dose medications       Normocytic anemia   - Likely of chronic dx in the setting of ESRD on HD  - H/H low but stable and FOB(+).    - GI has low suspicion for GIB.  Will resume ASA & plavix but c/w SCDs for VTE ppx.  - Pt hemodynamically stable with no signs of active bleeding at this time   - c/w IV PPI BID      - Place on clear liquid diet and advanced per GI    - Monitor CBC daily        HLD  - c/w statin therapy      CAD status post PCI in 2019  - Resumed ASA and plavix  - c/w statin therapy      BPH  - On flomax      Thrombocytopenia   - Likely 2/2 sepsis vs less likely protonix   - Will c/w protonix given pt at high risk for stress ulcers   - Plts low but stable and no signs of active bleeding   - Monitor platelet count daily      VTE ppx:  SCDs given (+)FOB  GI ppx: IV protonix BID    Dispo: Pt remains acute.  Possible d/c in 3-4 days if improves and remains medically stable.

## 2021-02-08 NOTE — PROGRESS NOTE ADULT - ASSESSMENT
ESRD: HD on TTS schedule  Lung transplant patient on chronic immunosuppression   +COVID PNA all supportive care noted    Anemia: +CKD  - follow H/H     RO: hyperphosphatemia  - low phos diet   - cont binder    Will follow

## 2021-02-08 NOTE — PROGRESS NOTE ADULT - SUBJECTIVE AND OBJECTIVE BOX
Karla kc at bedside direct observation of patient.   [facility]  INFECTIOUS DISEASES AND INTERNAL MEDICINE at [place]  =======================================================  Scar Solares MD  Diplomates American Board of Internal Medicine and Infectious Diseases  Tel  [phone]  Fax [phone]  =======================================================    [id number]  JU FERGUSON   follow up: COVID-19  s/p Convalescent plasma 2/2/21    doing well   on nasal cannula       =======================================================    REVIEW OF SYSTEMS:  CONSTITUTIONAL:  as per HPI  HEENT:  No diplopia or blurred vision.  No earache, sore throat or runny nose.  CARDIOVASCULAR:  No pressure, squeezing, strangling, tightness, heaviness or aching about the chest, neck, axilla or epigastrium.  RESPIRATORY:  as per HPI  GASTROINTESTINAL:  No nausea, vomiting or diarrhea.  GENITOURINARY:  No dysuria, frequency or urgency. No Blood in urine  MUSCULOSKELETAL:  no joint aches, no muscle pain  SKIN:  No change in skin, hair or nails.  NEUROLOGIC:  No Headaches, seizures or weakness.  PSYCHIATRIC:  No disorder of thought or mood.  ENDOCRINE:  No heat or cold intolerance  HEMATOLOGICAL:  No easy bruising or bleeding.    =======================================================  Allergies  budesonide (Unknown)  cefpodoxime (Unknown)  Pollen (Unknown)       ======================================================  Physical Exam:  ============     General:  moderate distress; ON nasal cannula  Eye: Pupils are equal, round and reactive to light, Extraocular movements are intact, Normal conjunctiva.  HENT: Normocephalic, Oral mucosa is moist, No pharyngeal erythema, No sinus tenderness.  ON SUPPLEMENTAL OXYGEN  Neck: Supple, No lymphadenopathy.  Respiratory: Lungs  with COARSE breath sounds.    Cardiovascular: Normal rate, Regular rhythm,   RIGHT neck tunneled catheter.   Gastrointestinal: Soft, Non-tender, Non-distended, Normal bowel sounds.  Genitourinary: No costovertebral angle tenderness.  Lymphatics: No lymphadenopathy neck,   Musculoskeletal: Normal range of motion, Normal strength.  Integumentary: No rash.  Neurologic: Alert, Oriented, No focal deficits, Cranial Nerves II-XII are grossly intact.  Psychiatric: Appropriate mood & affect.    =======================================================   Vitals:  ============  T(F): 98.3 (08 Feb 2021 07:59), Max: 98.6 (07 Feb 2021 12:18)  HR: 102 (08 Feb 2021 08:11)  BP: 142/80 (08 Feb 2021 07:59)  RR: 18 (08 Feb 2021 07:59)  SpO2: 96% (08 Feb 2021 08:11) (96% - 100%)  temp max in last 48H T(F): , Max: 98.6 (02-07-21 @ 12:18)    =======================================================  Current Antibiotics:  fluconAZOLE   Tablet 400 milliGRAM(s) Oral <User Schedule>  meropenem  IVPB      meropenem  IVPB 500 milliGRAM(s) IV Intermittent every 24 hours  remdesivir  IVPB 100 milliGRAM(s) IV Intermittent every 24 hours  trimethoprim   80 mG/sulfamethoxazole 400 mG 1 Tablet(s) Oral <User Schedule>    Other medications:  aspirin  chewable 81 milliGRAM(s) Oral daily  atorvastatin 80 milliGRAM(s) Oral at bedtime  chlorhexidine 4% Liquid 1 Application(s) Topical <User Schedule>  clopidogrel Tablet 75 milliGRAM(s) Oral daily  dextrose 40% Gel 15 Gram(s) Oral once  dextrose 50% Injectable 25 Gram(s) IV Push once  dextrose 50% Injectable 12.5 Gram(s) IV Push once  dextrose 50% Injectable 25 Gram(s) IV Push once  glucagon  Injectable 1 milliGRAM(s) IntraMuscular once  lactobacillus acidophilus 1 Tablet(s) Oral two times a day  midodrine 10 milliGRAM(s) Oral <User Schedule>  pantoprazole  Injectable 40 milliGRAM(s) IV Push every 12 hours  sevelamer carbonate 800 milliGRAM(s) Oral three times a day with meals  tacrolimus 1 milliGRAM(s) Oral daily  tacrolimus 1 milliGRAM(s) Oral at bedtime  tamsulosin 0.4 milliGRAM(s) Oral at bedtime      =======================================================  Labs:                        10.2   3.14  )-----------( 95       ( 08 Feb 2021 05:53 )             31.0     02-08    139  |  96<L>  |  70.0<H>  ----------------------------<  74  4.2   |  25.0  |  5.83<H>    Ca    6.9<L>      08 Feb 2021 05:53  Mg     2.2     02-08    TPro  5.1<L>  /  Alb  2.5<L>  /  TBili  0.4  /  DBili  x   /  AST  47<H>  /  ALT  15  /  AlkPhos  174<H>  02-08      Culture - Blood (collected 02-03-21 @ 11:59)  Source: .Blood Blood    Culture - Blood (collected 02-02-21 @ 06:16)  Source: .Blood Blood-Peripheral  Final Report (02-07-21 @ 07:00):    No growth at 5 days.    Culture - Blood (collected 02-02-21 @ 06:15)  Source: .Blood Blood-Peripheral  Final Report (02-07-21 @ 07:00):    No growth at 5 days.      Creatinine, Serum: 5.83 mg/dL (02-08-21 @ 05:53)  Creatinine, Serum: 4.28 mg/dL (02-07-21 @ 06:09)  Creatinine, Serum: 7.07 mg/dL (02-06-21 @ 17:30)  Creatinine, Serum: 6.57 mg/dL (02-06-21 @ 06:12)  Creatinine, Serum: 4.63 mg/dL (02-05-21 @ 08:56)  Creatinine, Serum: 6.69 mg/dL (02-04-21 @ 05:36)  Creatinine, Serum: 5.88 mg/dL (02-03-21 @ 12:00)    Procalcitonin, Serum: 11.82 ng/mL (02-03-21 @ 12:00)  Procalcitonin, Serum: 0.34 ng/mL (02-02-21 @ 06:12)      Ferritin, Serum: 994 ng/mL (02-02-21 @ 06:12)    C-Reactive Protein, Serum: 2.48 mg/dL (02-02-21 @ 06:12)    WBC Count: 3.14 K/uL (02-08-21 @ 05:53)  WBC Count: 4.09 K/uL (02-07-21 @ 06:09)  WBC Count: 5.73 K/uL (02-06-21 @ 17:31)  WBC Count: 6.34 K/uL (02-05-21 @ 08:56)  WBC Count: 5.02 K/uL (02-04-21 @ 05:36)  WBC Count: 6.89 K/uL (02-03-21 @ 12:00)      COVID-19 PCR: Detected (02-02-21 @ 06:10)      Alkaline Phosphatase, Serum: 174 U/L (02-08-21 @ 05:53)  Alkaline Phosphatase, Serum: 175 U/L (02-08-21 @ 05:52)  Alkaline Phosphatase, Serum: 188 U/L (02-07-21 @ 06:10)  Alkaline Phosphatase, Serum: 189 U/L (02-07-21 @ 06:09)  Alkaline Phosphatase, Serum: 214 U/L (02-06-21 @ 06:12)  Alkaline Phosphatase, Serum: 207 U/L (02-06-21 @ 06:12)  Alkaline Phosphatase, Serum: 216 U/L (02-05-21 @ 08:56)  Alkaline Phosphatase, Serum: 218 U/L (02-05-21 @ 08:56)  Alanine Aminotransferase (ALT/SGPT): 15 U/L (02-08-21 @ 05:53)  Alanine Aminotransferase (ALT/SGPT): 15 U/L (02-08-21 @ 05:52)  Alanine Aminotransferase (ALT/SGPT): 15 U/L (02-07-21 @ 06:10)  Alanine Aminotransferase (ALT/SGPT): 16 U/L (02-07-21 @ 06:09)  Alanine Aminotransferase (ALT/SGPT): 16 U/L (02-06-21 @ 06:12)  Alanine Aminotransferase (ALT/SGPT): 17 U/L (02-06-21 @ 06:12)  Alanine Aminotransferase (ALT/SGPT): 16 U/L (02-05-21 @ 08:56)  Alanine Aminotransferase (ALT/SGPT): 16 U/L (02-05-21 @ 08:56)  Aspartate Aminotransferase (AST/SGOT): 47 U/L (02-08-21 @ 05:53)  Aspartate Aminotransferase (AST/SGOT): 47 U/L (02-08-21 @ 05:52)  Aspartate Aminotransferase (AST/SGOT): 49 U/L (02-07-21 @ 06:10)  Aspartate Aminotransferase (AST/SGOT): 50 U/L (02-07-21 @ 06:09)  Aspartate Aminotransferase (AST/SGOT): 47 U/L (02-06-21 @ 06:12)  Aspartate Aminotransferase (AST/SGOT): 48 U/L (02-06-21 @ 06:12)  Aspartate Aminotransferase (AST/SGOT): 45 U/L (02-05-21 @ 08:56)  Aspartate Aminotransferase (AST/SGOT): 43 U/L (02-05-21 @ 08:56)  Bilirubin Total, Serum: 0.4 mg/dL (02-08-21 @ 05:53)  Bilirubin Total, Serum: 0.5 mg/dL (02-08-21 @ 05:52)  Bilirubin Total, Serum: 0.4 mg/dL (02-07-21 @ 06:10)  Bilirubin Total, Serum: 0.4 mg/dL (02-07-21 @ 06:09)  Bilirubin Total, Serum: 0.4 mg/dL (02-06-21 @ 06:12)  Bilirubin Total, Serum: 0.4 mg/dL (02-06-21 @ 06:12)  Bilirubin Total, Serum: 0.5 mg/dL (02-05-21 @ 08:56)  Bilirubin Total, Serum: 0.5 mg/dL (02-05-21 @ 08:56)  Bilirubin Direct, Serum: 0.2 mg/dL (02-08-21 @ 05:52)  Bilirubin Direct, Serum: 0.2 mg/dL (02-07-21 @ 06:10)  Bilirubin Direct, Serum: 0.2 mg/dL (02-06-21 @ 06:12)  Bilirubin Direct, Serum: 0.2 mg/dL (02-05-21 @ 08:56)

## 2021-02-08 NOTE — PROGRESS NOTE ADULT - SUBJECTIVE AND OBJECTIVE BOX
NEPHROLOGY INTERVAL HPI/OVERNIGHT EVENTS:    Examined  Decreased O2 requirements    MEDICATIONS  (STANDING):  aspirin  chewable 81 milliGRAM(s) Oral daily  atorvastatin 80 milliGRAM(s) Oral at bedtime  chlorhexidine 4% Liquid 1 Application(s) Topical <User Schedule>  clopidogrel Tablet 75 milliGRAM(s) Oral daily  dextrose 40% Gel 15 Gram(s) Oral once  dextrose 50% Injectable 25 Gram(s) IV Push once  dextrose 50% Injectable 12.5 Gram(s) IV Push once  dextrose 50% Injectable 25 Gram(s) IV Push once  fluconAZOLE   Tablet 400 milliGRAM(s) Oral <User Schedule>  glucagon  Injectable 1 milliGRAM(s) IntraMuscular once  guaiFENesin  milliGRAM(s) Oral every 12 hours  lactobacillus acidophilus 1 Tablet(s) Oral two times a day  meropenem  IVPB      meropenem  IVPB 500 milliGRAM(s) IV Intermittent every 24 hours  midodrine 10 milliGRAM(s) Oral <User Schedule>  pantoprazole  Injectable 40 milliGRAM(s) IV Push every 12 hours  remdesivir  IVPB 100 milliGRAM(s) IV Intermittent every 24 hours  sevelamer carbonate 800 milliGRAM(s) Oral three times a day with meals  tacrolimus 1 milliGRAM(s) Oral daily  tacrolimus 1 milliGRAM(s) Oral at bedtime  tamsulosin 0.4 milliGRAM(s) Oral at bedtime  trimethoprim   80 mG/sulfamethoxazole 400 mG 1 Tablet(s) Oral <User Schedule>    MEDICATIONS  (PRN):  acetaminophen   Tablet .. 650 milliGRAM(s) Oral every 6 hours PRN Temp greater or equal to 38C (100.4F), Mild Pain (1 - 3)      Allergies    budesonide (Unknown)  cefpodoxime (Unknown)  Pollen (Unknown)    Intolerances        Vital Signs Last 24 Hrs  T(C): 36.8 (08 Feb 2021 07:59), Max: 37 (07 Feb 2021 12:18)  T(F): 98.3 (08 Feb 2021 07:59), Max: 98.6 (07 Feb 2021 12:18)  HR: 102 (08 Feb 2021 08:11) (88 - 112)  BP: 142/80 (08 Feb 2021 07:59) (135/80 - 144/96)  BP(mean): --  RR: 18 (08 Feb 2021 07:59) (18 - 18)  SpO2: 96% (08 Feb 2021 08:11) (96% - 100%)  Daily     Daily     PHYSICAL EXAM:  Gen: NAD  HEENT: NCAT  NERVOUS SYSTEM:  Awakw lethargic  EXTREMITIES:  tr LE edema  Further exam deferred to limit COVID exposure    LABS:                        10.2   3.14  )-----------( 95       ( 08 Feb 2021 05:53 )             31.0     02-08    139  |  96<L>  |  70.0<H>  ----------------------------<  74  4.2   |  25.0  |  5.83<H>    Ca    6.9<L>      08 Feb 2021 05:53  Mg     2.2     02-08    TPro  5.1<L>  /  Alb  2.5<L>  /  TBili  0.4  /  DBili  x   /  AST  47<H>  /  ALT  15  /  AlkPhos  174<H>  02-08        Magnesium, Serum: 2.2 mg/dL (02-08 @ 05:53)          RADIOLOGY & ADDITIONAL TESTS:

## 2021-02-08 NOTE — PROGRESS NOTE ADULT - SUBJECTIVE AND OBJECTIVE BOX
Chief Complaint:  hypoxia    SUBJECTIVE / OVERNIGHT EVENTS: No acute events overnight.  Pt on 2L NC and saturating >90%.   Patient denies chest pain, abd pain, N/V, fever, chills, dysuria or any other complaints. All remainder ROS negative.       I&O's Summary        PHYSICAL EXAM:  Vital Signs Last 24 Hrs  T(C): 37.1 (08 Feb 2021 12:11), Max: 37.1 (08 Feb 2021 12:11)  T(F): 98.8 (08 Feb 2021 12:11), Max: 98.8 (08 Feb 2021 12:11)  HR: 82 (08 Feb 2021 12:11) (82 - 112)  BP: 139/82 (08 Feb 2021 12:11) (135/80 - 142/80)  BP(mean): --  RR: 18 (08 Feb 2021 12:11) (18 - 18)  SpO2: 95% (08 Feb 2021 12:11) (95% - 100%)      GENERAL: frail elderly male, examined bedside, laying comfortably in bed in NAD  HEENT: NC/AT, moist oral mucosa, clear conjunctiva, sclera nonicteric  RESPIRATORY: scattered rales, no wheezing or rhonchi  CARDIOVASCULAR: RRR, normal S1 and S2, no murmur/rub/gallop  ABDOMEN: soft, NT/ND, normoactive bowel sounds, no rebound/guarding  EXTREMITIES: No cynaosis, no clubbing, no lower extremity edema; Peripheral pulses are 2+ bilaterally  PSYCH: affect appropriate and cooperative  NEUROLOGY: A+O to person, place, and time, no focal neurologic deficits appreciated   SKIN: No rashes or no palpable lesions        LABS:                                              10.2   3.14  )-----------( 95       ( 08 Feb 2021 05:53 )             31.0       02-08    139  |  96<L>  |  70.0<H>  ----------------------------<  74  4.2   |  25.0  |  5.83<H>    Ca    6.9<L>      08 Feb 2021 05:53  Mg     2.2     02-08    TPro  5.1<L>  /  Alb  2.5<L>  /  TBili  0.4  /  DBili  x   /  AST  47<H>  /  ALT  15  /  AlkPhos  174<H>  02-08          CAPILLARY BLOOD GLUCOSE      POCT Blood Glucose.: 233 mg/dL (06 Feb 2021 12:43)  POCT Blood Glucose.: 116 mg/dL (06 Feb 2021 08:37)  POCT Blood Glucose.: 193 mg/dL (05 Feb 2021 17:19)        RADIOLOGY & ADDITIONAL TESTS:          MEDICATIONS  (STANDING):  aspirin  chewable 81 milliGRAM(s) Oral daily  atorvastatin 80 milliGRAM(s) Oral at bedtime  chlorhexidine 4% Liquid 1 Application(s) Topical <User Schedule>  clopidogrel Tablet 75 milliGRAM(s) Oral daily  dextrose 40% Gel 15 Gram(s) Oral once  dextrose 50% Injectable 25 Gram(s) IV Push once  dextrose 50% Injectable 12.5 Gram(s) IV Push once  dextrose 50% Injectable 25 Gram(s) IV Push once  fluconAZOLE   Tablet 400 milliGRAM(s) Oral <User Schedule>  glucagon  Injectable 1 milliGRAM(s) IntraMuscular once  heparin   Injectable 5000 Unit(s) SubCutaneous every 8 hours  hydrocortisone sodium succinate Injectable 100 milliGRAM(s) IV Push every 8 hours  lactobacillus acidophilus 1 Tablet(s) Oral two times a day  meropenem  IVPB      meropenem  IVPB 500 milliGRAM(s) IV Intermittent every 24 hours  midodrine 10 milliGRAM(s) Oral <User Schedule>  pantoprazole    Tablet 40 milliGRAM(s) Oral before breakfast  remdesivir  IVPB   IV Intermittent   remdesivir  IVPB 100 milliGRAM(s) IV Intermittent every 24 hours  sevelamer carbonate 800 milliGRAM(s) Oral three times a day with meals  tacrolimus 1 milliGRAM(s) Oral daily  tacrolimus 1 milliGRAM(s) Oral at bedtime  tamsulosin 0.4 milliGRAM(s) Oral at bedtime  trimethoprim   80 mG/sulfamethoxazole 400 mG 1 Tablet(s) Oral <User Schedule>    MEDICATIONS  (PRN):  acetaminophen   Tablet .. 650 milliGRAM(s) Oral every 6 hours PRN Temp greater or equal to 38C (100.4F), Mild Pain (1 - 3)

## 2021-02-08 NOTE — PROGRESS NOTE ADULT - ASSESSMENT
This 71 y/o M ex-smoker with PMH of 2015- Lung Transplant for COPD on anti rejection meds, s/p rt total hip arthroplasty, multi level compression #, BPH, HLD, CAD, BPH, OP, started HD for ESRD (2/2020) presents to ED c/o worsening dyspnea. Per EMS pt has had a cough since 2/1/21. However this morning his breathing became labored and he had difficulty speaking. Patient has not missed a dialysis session, last session Saturday, due today. For EMS pulse ox was 55% on RA, placed on NRB then CPAP, improved to 85-89%. Patient nephrologist is Dr. Maher;  pulmonologist Dr. Cordova.  Transplant team - [facility]    In ED chest x-ray suggestive of pneumonia, given 500 mg Meropenem. COVID+ (02 Feb 2021 12:28)    patient still has SOB. Reports that he was doing well about 1 week ago.   COVID testing is confirmed positive here.   CT of the lungs show: Small to moderate left and small right loculated pleural effusions with fluid in the right and left paramediastinal regions; paramediastinal fluid likely accounts for the appearance of the mediastinum on the chest x-ray of the same day.  Bilateral lung opacities as described above; differential includes multifocal pneumonia and/or pulmonary edema.    No clear sick contacts.       Impression:  COVID-19 infection   Viral pneumonia  shortness of breath  lung infiltrates  dependence on oxygen  Lung transplant  Immunosuppression for transplant    Plan:  - Continue Remdesivir IV daily.  will EXTEND to a 10 day course, through 2/11/2021  if IMPROVES, can consider stopping remdesivir earlier       - continue dexamethasone 6mg daily, While on remdesivir  Inflammatory markers and LFTs WILL BE ORDERED with the REMDESIVIR order SET.  DO NOT ORDER this additionally.   - trend CBC with diff, CMP  daily    - Continue supportive care measures  - continue Oxygenation as needed;  CONTINUE to titrate down as tolerated  - self- proning  as tolerated  - ENCOURAGED OOB to chair  - encouraged incentive spirometry     HIGH risk for decompensation    - patient CONSENTED to Convalescent Plasma as part of EUA  - s/p 1 unit of CP. 2/2/2021       Will follow with you.

## 2021-02-08 NOTE — PROGRESS NOTE ADULT - SUBJECTIVE AND OBJECTIVE BOX
PULMONARY PROGRESS NOTE      [id number]    Patient is a 70y old  Male who presents with a chief complaint of Hypoxia (07 Feb 2021 14:36)      INTERVAL HPI/OVERNIGHT EVENTS:NAD.  On minimal O2--mostly for comfort reasons.  RA sat largely good.  Remdesivir extended due to immunosuppression.    MEDICATIONS  (STANDING):  aspirin  chewable 81 milliGRAM(s) Oral daily  atorvastatin 80 milliGRAM(s) Oral at bedtime  chlorhexidine 4% Liquid 1 Application(s) Topical <User Schedule>  clopidogrel Tablet 75 milliGRAM(s) Oral daily  dextrose 40% Gel 15 Gram(s) Oral once  dextrose 50% Injectable 25 Gram(s) IV Push once  dextrose 50% Injectable 12.5 Gram(s) IV Push once  dextrose 50% Injectable 25 Gram(s) IV Push once  fluconAZOLE   Tablet 400 milliGRAM(s) Oral <User Schedule>  glucagon  Injectable 1 milliGRAM(s) IntraMuscular once  guaiFENesin  milliGRAM(s) Oral every 12 hours  lactobacillus acidophilus 1 Tablet(s) Oral two times a day  meropenem  IVPB      meropenem  IVPB 500 milliGRAM(s) IV Intermittent every 24 hours  midodrine 10 milliGRAM(s) Oral <User Schedule>  pantoprazole  Injectable 40 milliGRAM(s) IV Push every 12 hours  remdesivir  IVPB 100 milliGRAM(s) IV Intermittent every 24 hours  sevelamer carbonate 800 milliGRAM(s) Oral three times a day with meals  tacrolimus 1 milliGRAM(s) Oral daily  tacrolimus 1 milliGRAM(s) Oral at bedtime  tamsulosin 0.4 milliGRAM(s) Oral at bedtime  trimethoprim   80 mG/sulfamethoxazole 400 mG 1 Tablet(s) Oral <User Schedule>      MEDICATIONS  (PRN):  acetaminophen   Tablet .. 650 milliGRAM(s) Oral every 6 hours PRN Temp greater or equal to 38C (100.4F), Mild Pain (1 - 3)      Allergies    budesonide (Unknown)  cefpodoxime (Unknown)  Pollen (Unknown)    Intolerances        PAST MEDICAL & SURGICAL HISTORY:  ESRD (end stage renal disease) on dialysis    Emphysema of lung    H/O lung transplant  bilateral - transplant - 1-2-2015 -  [facility] [place]        SOCIAL HISTORY  Smoking History:       REVIEW OF SYSTEMS:    CONSTITUTIONAL:  No distress    HEENT:  Eyes:  No diplopia or blurred vision. ENT:  No earache, sore throat or runny nose.    CARDIOVASCULAR:  No pressure, squeezing, tightness, heaviness or aching about the chest; no palpitations.    RESPIRATORY:  per hpi    GASTROINTESTINAL:  No nausea, vomiting or diarrhea.    GENITOURINARY:  No dysuria, frequency or urgency.    MUSCULOSKELETAL:  No joint pain    SKIN:  No new lesions.    NEUROLOGIC:  No paresthesias, fasciculations, seizures or weakness.    PSYCHIATRIC:  No disorder of thought or mood.    ENDOCRINE:  No heat or cold intolerance, polyuria or polydipsia.    HEMATOLOGICAL:  No easy bruising or bleeding.     Vital Signs Last 24 Hrs  T(C): 36.8 (08 Feb 2021 07:59), Max: 37 (07 Feb 2021 12:18)  T(F): 98.3 (08 Feb 2021 07:59), Max: 98.6 (07 Feb 2021 12:18)  HR: 102 (08 Feb 2021 08:11) (88 - 112)  BP: 142/80 (08 Feb 2021 07:59) (135/80 - 144/96)  BP(mean): --  RR: 18 (08 Feb 2021 07:59) (18 - 18)  SpO2: 96% (08 Feb 2021 08:11) (96% - 100%)    PHYSICAL EXAMINATION:    LABS:                        10.2   3.14  )-----------( 95       ( 08 Feb 2021 05:53 )             31.0     02-08    139  |  96<L>  |  70.0<H>  ----------------------------<  74  4.2   |  25.0  |  5.83<H>    Ca    6.9<L>      08 Feb 2021 05:53  Mg     2.2     02-08    TPro  5.1<L>  /  Alb  2.5<L>  /  TBili  0.4  /  DBili  x   /  AST  47<H>  /  ALT  15  /  AlkPhos  174<H>  02-08                        MICROBIOLOGY:    RADIOLOGY & ADDITIONAL STUDIES:

## 2021-02-09 LAB
ALBUMIN SERPL ELPH-MCNC: 2.5 G/DL — LOW (ref 3.3–5.2)
ALBUMIN SERPL ELPH-MCNC: 2.6 G/DL — LOW (ref 3.3–5.2)
ALP SERPL-CCNC: 169 U/L — HIGH (ref 40–120)
ALP SERPL-CCNC: 169 U/L — HIGH (ref 40–120)
ALT FLD-CCNC: 14 U/L — SIGNIFICANT CHANGE UP
ALT FLD-CCNC: 15 U/L — SIGNIFICANT CHANGE UP
ANION GAP SERPL CALC-SCNC: 20 MMOL/L — HIGH (ref 5–17)
ANISOCYTOSIS BLD QL: SIGNIFICANT CHANGE UP
AST SERPL-CCNC: 48 U/L — HIGH
AST SERPL-CCNC: 49 U/L — HIGH
BASOPHILS # BLD AUTO: 0 K/UL — SIGNIFICANT CHANGE UP (ref 0–0.2)
BASOPHILS NFR BLD AUTO: 0 % — SIGNIFICANT CHANGE UP (ref 0–2)
BILIRUB DIRECT SERPL-MCNC: 0.3 MG/DL — SIGNIFICANT CHANGE UP (ref 0–0.3)
BILIRUB INDIRECT FLD-MCNC: 0.3 MG/DL — SIGNIFICANT CHANGE UP (ref 0.2–1)
BILIRUB SERPL-MCNC: 0.6 MG/DL — SIGNIFICANT CHANGE UP (ref 0.4–2)
BILIRUB SERPL-MCNC: 0.6 MG/DL — SIGNIFICANT CHANGE UP (ref 0.4–2)
BUN SERPL-MCNC: 86 MG/DL — HIGH (ref 8–20)
BURR CELLS BLD QL SMEAR: PRESENT — SIGNIFICANT CHANGE UP
CALCIUM SERPL-MCNC: 7.2 MG/DL — LOW (ref 8.6–10.2)
CHLORIDE SERPL-SCNC: 95 MMOL/L — LOW (ref 98–107)
CO2 SERPL-SCNC: 23 MMOL/L — SIGNIFICANT CHANGE UP (ref 22–29)
CREAT SERPL-MCNC: 7.66 MG/DL — HIGH (ref 0.5–1.3)
CRP SERPL-MCNC: 8.31 MG/DL — HIGH (ref 0–0.4)
D DIMER BLD IA.RAPID-MCNC: 3282 NG/ML DDU — HIGH
ELLIPTOCYTES BLD QL SMEAR: SLIGHT — SIGNIFICANT CHANGE UP
EOSINOPHIL # BLD AUTO: 0 K/UL — SIGNIFICANT CHANGE UP (ref 0–0.5)
EOSINOPHIL NFR BLD AUTO: 0 % — SIGNIFICANT CHANGE UP (ref 0–6)
FERRITIN SERPL-MCNC: 3632 NG/ML — HIGH (ref 30–400)
FOLATE SERPL-MCNC: >20 NG/ML — SIGNIFICANT CHANGE UP
GIANT PLATELETS BLD QL SMEAR: PRESENT — SIGNIFICANT CHANGE UP
GLUCOSE SERPL-MCNC: 59 MG/DL — LOW (ref 70–99)
HCT VFR BLD CALC: 32.6 % — LOW (ref 39–50)
HGB BLD-MCNC: 11 G/DL — LOW (ref 13–17)
IRON SATN MFR SERPL: 17 % — SIGNIFICANT CHANGE UP (ref 16–55)
IRON SATN MFR SERPL: 27 UG/DL — LOW (ref 59–158)
LDH SERPL L TO P-CCNC: 303 U/L — HIGH (ref 98–192)
LYMPHOCYTES # BLD AUTO: 0.25 K/UL — LOW (ref 1–3.3)
LYMPHOCYTES # BLD AUTO: 6.1 % — LOW (ref 13–44)
MACROCYTES BLD QL: SIGNIFICANT CHANGE UP
MANUAL SMEAR VERIFICATION: SIGNIFICANT CHANGE UP
MCHC RBC-ENTMCNC: 28.1 PG — SIGNIFICANT CHANGE UP (ref 27–34)
MCHC RBC-ENTMCNC: 33.7 GM/DL — SIGNIFICANT CHANGE UP (ref 32–36)
MCV RBC AUTO: 83.4 FL — SIGNIFICANT CHANGE UP (ref 80–100)
MICROCYTES BLD QL: SLIGHT — SIGNIFICANT CHANGE UP
MONOCYTES # BLD AUTO: 0.21 K/UL — SIGNIFICANT CHANGE UP (ref 0–0.9)
MONOCYTES NFR BLD AUTO: 5.2 % — SIGNIFICANT CHANGE UP (ref 2–14)
MYELOCYTES NFR BLD: 0.9 % — HIGH (ref 0–0)
NEUTROPHILS # BLD AUTO: 3.53 K/UL — SIGNIFICANT CHANGE UP (ref 1.8–7.4)
NEUTROPHILS NFR BLD AUTO: 86.9 % — HIGH (ref 43–77)
OVALOCYTES BLD QL SMEAR: SLIGHT — SIGNIFICANT CHANGE UP
PLAT MORPH BLD: NORMAL — SIGNIFICANT CHANGE UP
PLATELET # BLD AUTO: 69 K/UL — LOW (ref 150–400)
POIKILOCYTOSIS BLD QL AUTO: SLIGHT — SIGNIFICANT CHANGE UP
POLYCHROMASIA BLD QL SMEAR: SLIGHT — SIGNIFICANT CHANGE UP
POTASSIUM SERPL-MCNC: 4.2 MMOL/L — SIGNIFICANT CHANGE UP (ref 3.5–5.3)
POTASSIUM SERPL-SCNC: 4.2 MMOL/L — SIGNIFICANT CHANGE UP (ref 3.5–5.3)
PROCALCITONIN SERPL-MCNC: 8.11 NG/ML — HIGH (ref 0.02–0.1)
PROT SERPL-MCNC: 5.1 G/DL — LOW (ref 6.6–8.7)
PROT SERPL-MCNC: 5.1 G/DL — LOW (ref 6.6–8.7)
RBC # BLD: 3.91 M/UL — LOW (ref 4.2–5.8)
RBC # FLD: 19.9 % — HIGH (ref 10.3–14.5)
RBC BLD AUTO: ABNORMAL
SODIUM SERPL-SCNC: 138 MMOL/L — SIGNIFICANT CHANGE UP (ref 135–145)
TARGETS BLD QL SMEAR: SLIGHT — SIGNIFICANT CHANGE UP
TIBC SERPL-MCNC: 163 UG/DL — LOW (ref 220–430)
TRANSFERRIN SERPL-MCNC: 114 MG/DL — LOW (ref 180–329)
VARIANT LYMPHS # BLD: 0.9 % — SIGNIFICANT CHANGE UP (ref 0–6)
VIT B12 SERPL-MCNC: 1279 PG/ML — HIGH (ref 232–1245)
WBC # BLD: 4.06 K/UL — SIGNIFICANT CHANGE UP (ref 3.8–10.5)
WBC # FLD AUTO: 4.06 K/UL — SIGNIFICANT CHANGE UP (ref 3.8–10.5)

## 2021-02-09 PROCEDURE — 99232 SBSQ HOSP IP/OBS MODERATE 35: CPT | Mod: CS

## 2021-02-09 PROCEDURE — 99233 SBSQ HOSP IP/OBS HIGH 50: CPT

## 2021-02-09 RX ORDER — CHLORHEXIDINE GLUCONATE 213 G/1000ML
1 SOLUTION TOPICAL DAILY
Refills: 0 | Status: DISCONTINUED | OUTPATIENT
Start: 2021-02-09 | End: 2021-02-12

## 2021-02-09 RX ADMIN — Medication 1 TABLET(S): at 15:36

## 2021-02-09 RX ADMIN — TAMSULOSIN HYDROCHLORIDE 0.4 MILLIGRAM(S): 0.4 CAPSULE ORAL at 21:59

## 2021-02-09 RX ADMIN — SEVELAMER CARBONATE 800 MILLIGRAM(S): 2400 POWDER, FOR SUSPENSION ORAL at 09:22

## 2021-02-09 RX ADMIN — TACROLIMUS 1 MILLIGRAM(S): 5 CAPSULE ORAL at 21:59

## 2021-02-09 RX ADMIN — REMDESIVIR 500 MILLIGRAM(S): 5 INJECTION INTRAVENOUS at 21:59

## 2021-02-09 RX ADMIN — SEVELAMER CARBONATE 800 MILLIGRAM(S): 2400 POWDER, FOR SUSPENSION ORAL at 19:50

## 2021-02-09 RX ADMIN — MIDODRINE HYDROCHLORIDE 10 MILLIGRAM(S): 2.5 TABLET ORAL at 17:54

## 2021-02-09 RX ADMIN — PANTOPRAZOLE SODIUM 40 MILLIGRAM(S): 20 TABLET, DELAYED RELEASE ORAL at 04:11

## 2021-02-09 RX ADMIN — MEROPENEM 100 MILLIGRAM(S): 1 INJECTION INTRAVENOUS at 15:35

## 2021-02-09 RX ADMIN — MIDODRINE HYDROCHLORIDE 10 MILLIGRAM(S): 2.5 TABLET ORAL at 04:11

## 2021-02-09 RX ADMIN — Medication 81 MILLIGRAM(S): at 13:18

## 2021-02-09 RX ADMIN — SEVELAMER CARBONATE 800 MILLIGRAM(S): 2400 POWDER, FOR SUSPENSION ORAL at 13:19

## 2021-02-09 RX ADMIN — TACROLIMUS 1 MILLIGRAM(S): 5 CAPSULE ORAL at 13:18

## 2021-02-09 RX ADMIN — FLUCONAZOLE 400 MILLIGRAM(S): 150 TABLET ORAL at 09:22

## 2021-02-09 RX ADMIN — CHLORHEXIDINE GLUCONATE 1 APPLICATION(S): 213 SOLUTION TOPICAL at 13:19

## 2021-02-09 RX ADMIN — ATORVASTATIN CALCIUM 80 MILLIGRAM(S): 80 TABLET, FILM COATED ORAL at 21:59

## 2021-02-09 RX ADMIN — CLOPIDOGREL BISULFATE 75 MILLIGRAM(S): 75 TABLET, FILM COATED ORAL at 13:18

## 2021-02-09 RX ADMIN — Medication 1 TABLET(S): at 04:11

## 2021-02-09 RX ADMIN — Medication 600 MILLIGRAM(S): at 04:11

## 2021-02-09 RX ADMIN — PANTOPRAZOLE SODIUM 40 MILLIGRAM(S): 20 TABLET, DELAYED RELEASE ORAL at 17:54

## 2021-02-09 RX ADMIN — Medication 600 MILLIGRAM(S): at 17:54

## 2021-02-09 RX ADMIN — Medication 1 TABLET(S): at 17:54

## 2021-02-09 NOTE — PROGRESS NOTE ADULT - SUBJECTIVE AND OBJECTIVE BOX
NEPHROLOGY INTERVAL HPI/OVERNIGHT EVENTS:    Examined on HD  Intermittent tachycardia if persists will stop HD    MEDICATIONS  (STANDING):  aspirin  chewable 81 milliGRAM(s) Oral daily  atorvastatin 80 milliGRAM(s) Oral at bedtime  chlorhexidine 2% Cloths 1 Application(s) Topical daily  clopidogrel Tablet 75 milliGRAM(s) Oral daily  dextrose 40% Gel 15 Gram(s) Oral once  dextrose 50% Injectable 25 Gram(s) IV Push once  dextrose 50% Injectable 12.5 Gram(s) IV Push once  dextrose 50% Injectable 25 Gram(s) IV Push once  fluconAZOLE   Tablet 400 milliGRAM(s) Oral <User Schedule>  glucagon  Injectable 1 milliGRAM(s) IntraMuscular once  guaiFENesin  milliGRAM(s) Oral every 12 hours  lactobacillus acidophilus 1 Tablet(s) Oral two times a day  meropenem  IVPB      meropenem  IVPB 500 milliGRAM(s) IV Intermittent every 24 hours  midodrine 10 milliGRAM(s) Oral <User Schedule>  pantoprazole  Injectable 40 milliGRAM(s) IV Push every 12 hours  remdesivir  IVPB 100 milliGRAM(s) IV Intermittent every 24 hours  sevelamer carbonate 800 milliGRAM(s) Oral three times a day with meals  tacrolimus 1 milliGRAM(s) Oral daily  tacrolimus 1 milliGRAM(s) Oral at bedtime  tamsulosin 0.4 milliGRAM(s) Oral at bedtime  trimethoprim   80 mG/sulfamethoxazole 400 mG 1 Tablet(s) Oral <User Schedule>    MEDICATIONS  (PRN):  acetaminophen   Tablet .. 650 milliGRAM(s) Oral every 6 hours PRN Temp greater or equal to 38C (100.4F), Mild Pain (1 - 3)      Allergies    budesonide (Unknown)  cefpodoxime (Unknown)  Pollen (Unknown)    Intolerances        Vital Signs Last 24 Hrs  T(C): 37.1 (2021 13:05), Max: 37.2 (2021 08:32)  T(F): 98.7 (2021 13:05), Max: 99 (2021 08:32)  HR: 111 (2021 13:05) (65 - 111)  BP: 129/78 (2021 13:05) (104/65 - 129/78)  BP(mean): --  RR: 18 (2021 13:05) (18 - 19)  SpO2: 98% (2021 13:05) (95% - 98%)  Daily     Daily Weight in k (2021 13:05)    PHYSICAL EXAM:  Gen: NAD  HEENT: NCAT  NERVOUS SYSTEM:  Awakw lethargic  EXTREMITIES:  tr LE edema  Further exam deferred to limit COVID exposure      LABS:                        11.0   4.06  )-----------( 69       ( 2021 06:38 )             32.6     02-09    138  |  95<L>  |  86.0<H>  ----------------------------<  59<L>  4.2   |  23.0  |  7.66<H>    Ca    7.2<L>      2021 06:38  Mg     2.2     02-08    TPro  5.1<L>  /  Alb  2.6<L>  /  TBili  0.6  /  DBili  0.3  /  AST  49<H>  /  ALT  14  /  AlkPhos  169<H>                  RADIOLOGY & ADDITIONAL TESTS:

## 2021-02-09 NOTE — PROGRESS NOTE ADULT - ASSESSMENT
ESRD: HD today on TTS schedule  Lung transplant patient on chronic immunosuppression   +COVID PNA all supportive care noted    Anemia: +CKD  - follow H/H     RO: hyperphosphatemia  - low phos diet   - cont binder    Will follow

## 2021-02-09 NOTE — PROGRESS NOTE ADULT - ASSESSMENT
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 Patient was also due for hemodialysis with hyperkalemia K 5.6.  He was found to be hypoxic with SpO2 55% on RA, started on BiPAP and admitted to MICU for acute hypoxemic respiratory failure with need for emergent dialysis. Patient underwent HD with 2L UF on 2/2. He was found to be COVID-19 positive, and was started on Remdesivir and IV Decadron by ID. He was continued on his home Bactrim and Fluconazole. On 2/3, patient spiked fever 103'F with associated hypotension, meeting criteria for septic shock requiring Norepinephrine. He was given 1L IV fluid bolus, and started on Midodrine to offload IV vasopressor requirements. ID was consulted, and patient was started on empiric antibiotics with Meropenem given his immunocompromised state for possible superimposed bacterial pneumonia. Blood cultures were negative. Patient does not make urine and no sputum available to culture. He was weaned off IV vasopressors and continues on Midodrine, with stable blood pressure.      Acute hypoxic respiratory failure 2/2 COVID pneumonia  - Pt weaned off high flow onto NC and saturating in low 90's  - Continue supplemental O2 as needed   - Encourage self- proning  as tolerated and OOB to chair  - Encouraged incentive spirometry   - Pulmonology following      Septic Shock 2/2 possible gram negative pneumonia  - c/w IV meropenem  - Blood cultures x 2 NTD  - IV Remdesivir extended to a 10 day course, through 2/11/21     - c/w dexamethasone 6mg daily   - Monitor Inflammatory markers q48h   - Monitor LFTs while on Remdesivir  - Trend CBC with diff and CMP daily       AGMA possible 2/2 sepsis vs uremia in the setting of ESRD  - AG trending up, serum HCO3 now WNL   - ABG w/ LA ordered   - Monitor CMP closely      Hypocalcemia in the setting of ESRD  - Pt asymptomatic   - Corrected calcium 8.4  - Monitor lytes closely       Asymptomatic cholestatic pattern on LFTs  - No abdominal pain reported and benign abd exam  - ALP elevated but trending down   - Will monitor LFTs daily          Status post lung transplant  - On tacrolimus  - c/w Bactrim and fluconazole for ppx       ESRD on HD TTS  - c/w HD per nephrology  - Monitor lytes   - Renally dose medications       Normocytic anemia   - Likely of chronic dx in the setting of ESRD on HD  - H/H low but stable and FOB(+).    - GI has low suspicion for GIB.  Will resume ASA & plavix but c/w SCDs for VTE ppx.  - Pt hemodynamically stable with no signs of active bleeding at this time   - c/w IV PPI BID      - Place on clear liquid diet and advanced per GI    - Monitor CBC daily        HLD  - c/w statin therapy      CAD status post PCI in 2019  - Resumed ASA and plavix  - c/w statin therapy      BPH  - On flomax      Thrombocytopenia   - Likely 2/2 sepsis vs less likely protonix   - Will c/w protonix given pt at high risk for stress ulcers   - Plts low but stable and no signs of active bleeding   - Monitor platelet count daily      VTE ppx:  SCDs given (+)FOB  GI ppx: IV protonix BID    Dispo: Pt remains acute.  Possible d/c in 2-3 days if improves and remains medically stable.

## 2021-02-09 NOTE — PROGRESS NOTE ADULT - SUBJECTIVE AND OBJECTIVE BOX
Chief Complaint:  hypoxia    SUBJECTIVE / OVERNIGHT EVENTS: No acute events overnight.  Pt on 2L NC and saturating >90%.  Receiving HD.  Patient denies chest pain, abd pain, N/V, fever, chills, dysuria or any other complaints. All remainder ROS negative.       I&O's Summary        PHYSICAL EXAM:  Vital Signs Last 24 Hrs  T(C): 37.1 (09 Feb 2021 10:19), Max: 37.2 (09 Feb 2021 08:32)  T(F): 98.8 (09 Feb 2021 10:19), Max: 99 (09 Feb 2021 08:32)  HR: 99 (09 Feb 2021 10:19) (65 - 99)  BP: 119/79 (09 Feb 2021 10:19) (104/65 - 139/82)  BP(mean): --  RR: 19 (09 Feb 2021 10:19) (18 - 19)  SpO2: 98% (09 Feb 2021 10:19) (95% - 98%)      GENERAL: frail elderly male, examined bedside, laying comfortably in bed in NAD  HEENT: NC/AT, moist oral mucosa, clear conjunctiva, sclera nonicteric  RESPIRATORY: scattered rales, no wheezing or rhonchi  CARDIOVASCULAR: RRR, normal S1 and S2, no murmur/rub/gallop  ABDOMEN: soft, NT/ND, normoactive bowel sounds, no rebound/guarding  EXTREMITIES: No cynaosis, no clubbing, no lower extremity edema; Peripheral pulses are 2+ bilaterally  PSYCH: affect appropriate and cooperative  NEUROLOGY: A+O to person, place, and time, no focal neurologic deficits appreciated   SKIN: No rashes or no palpable lesions        LABS:                                                              11.0   4.06  )-----------( 69       ( 09 Feb 2021 06:38 )             32.6       02-09    138  |  95<L>  |  86.0<H>  ----------------------------<  59<L>  4.2   |  23.0  |  7.66<H>    Ca    7.2<L>      09 Feb 2021 06:38  Mg     2.2     02-08    TPro  5.1<L>  /  Alb  2.6<L>  /  TBili  0.6  /  DBili  0.3  /  AST  49<H>  /  ALT  14  /  AlkPhos  169<H>  02-09        CAPILLARY BLOOD GLUCOSE      POCT Blood Glucose.: 233 mg/dL (06 Feb 2021 12:43)  POCT Blood Glucose.: 116 mg/dL (06 Feb 2021 08:37)  POCT Blood Glucose.: 193 mg/dL (05 Feb 2021 17:19)        RADIOLOGY & ADDITIONAL TESTS:          MEDICATIONS  (STANDING):  aspirin  chewable 81 milliGRAM(s) Oral daily  atorvastatin 80 milliGRAM(s) Oral at bedtime  chlorhexidine 4% Liquid 1 Application(s) Topical <User Schedule>  clopidogrel Tablet 75 milliGRAM(s) Oral daily  dextrose 40% Gel 15 Gram(s) Oral once  dextrose 50% Injectable 25 Gram(s) IV Push once  dextrose 50% Injectable 12.5 Gram(s) IV Push once  dextrose 50% Injectable 25 Gram(s) IV Push once  fluconAZOLE   Tablet 400 milliGRAM(s) Oral <User Schedule>  glucagon  Injectable 1 milliGRAM(s) IntraMuscular once  heparin   Injectable 5000 Unit(s) SubCutaneous every 8 hours  hydrocortisone sodium succinate Injectable 100 milliGRAM(s) IV Push every 8 hours  lactobacillus acidophilus 1 Tablet(s) Oral two times a day  meropenem  IVPB      meropenem  IVPB 500 milliGRAM(s) IV Intermittent every 24 hours  midodrine 10 milliGRAM(s) Oral <User Schedule>  pantoprazole    Tablet 40 milliGRAM(s) Oral before breakfast  remdesivir  IVPB   IV Intermittent   remdesivir  IVPB 100 milliGRAM(s) IV Intermittent every 24 hours  sevelamer carbonate 800 milliGRAM(s) Oral three times a day with meals  tacrolimus 1 milliGRAM(s) Oral daily  tacrolimus 1 milliGRAM(s) Oral at bedtime  tamsulosin 0.4 milliGRAM(s) Oral at bedtime  trimethoprim   80 mG/sulfamethoxazole 400 mG 1 Tablet(s) Oral <User Schedule>    MEDICATIONS  (PRN):  acetaminophen   Tablet .. 650 milliGRAM(s) Oral every 6 hours PRN Temp greater or equal to 38C (100.4F), Mild Pain (1 - 3)

## 2021-02-09 NOTE — PROGRESS NOTE ADULT - SUBJECTIVE AND OBJECTIVE BOX
[facility]  INFECTIOUS DISEASES AND INTERNAL MEDICINE at [place]  =======================================================  Scar Solares MD  Diplomates American Board of Internal Medicine and Infectious Diseases  Tel  [phone]  Fax [phone]  =======================================================    [id number]  JU FERGUSON   follow up: COVID-19  s/p Convalescent plasma 2/2/21    doing well   remains on room air   saturating well      =======================================================    REVIEW OF SYSTEMS:  CONSTITUTIONAL:  as per HPI  HEENT:  No diplopia or blurred vision.  No earache, sore throat or runny nose.  CARDIOVASCULAR:  No pressure, squeezing, strangling, tightness, heaviness or aching about the chest, neck, axilla or epigastrium.  RESPIRATORY:  as per HPI  GASTROINTESTINAL:  No nausea, vomiting or diarrhea.  GENITOURINARY:  No dysuria, frequency or urgency. No Blood in urine  MUSCULOSKELETAL:  no joint aches, no muscle pain  SKIN:  No change in skin, hair or nails.  NEUROLOGIC:  No Headaches, seizures or weakness.  PSYCHIATRIC:  No disorder of thought or mood.  ENDOCRINE:  No heat or cold intolerance  HEMATOLOGICAL:  No easy bruising or bleeding.    =======================================================  Allergies  budesonide (Unknown)  cefpodoxime (Unknown)  Pollen (Unknown)       ======================================================  Physical Exam:  ============     General:  moderate distress; ON nasal cannula  Eye: Pupils are equal, round and reactive to light, Extraocular movements are intact, Normal conjunctiva.  HENT: Normocephalic, Oral mucosa is moist, No pharyngeal erythema, No sinus tenderness.  ON  ROOM AIR     Neck: Supple, No lymphadenopathy.  Respiratory: Lungs with improved aeration  Cardiovascular: Normal rate, Regular rhythm,   RIGHT neck tunneled catheter.   Gastrointestinal: Soft, Non-tender, Non-distended, Normal bowel sounds.  Genitourinary: No costovertebral angle tenderness.  Lymphatics: No lymphadenopathy neck,   Musculoskeletal: Normal range of motion, Normal strength.  Integumentary: No rash.  Neurologic: Alert, Oriented, No focal deficits, Cranial Nerves II-XII are grossly intact.  Psychiatric: Appropriate mood & affect.    =======================================================     Vitals:  ============  T(F): 98.7 (09 Feb 2021 13:05), Max: 99 (09 Feb 2021 08:32)  HR: 111 (09 Feb 2021 13:05)  BP: 129/78 (09 Feb 2021 13:05)  RR: 18 (09 Feb 2021 13:05)  SpO2: 98% (09 Feb 2021 13:05) (95% - 98%)  temp max in last 48H T(F): , Max: 99 (02-09-21 @ 08:32)    =======================================================  Current Antibiotics:  fluconAZOLE   Tablet 400 milliGRAM(s) Oral <User Schedule>  meropenem  IVPB 500 milliGRAM(s) IV Intermittent every 24 hours  meropenem  IVPB      remdesivir  IVPB 100 milliGRAM(s) IV Intermittent every 24 hours  trimethoprim   80 mG/sulfamethoxazole 400 mG 1 Tablet(s) Oral <User Schedule>    Other medications:  aspirin  chewable 81 milliGRAM(s) Oral daily  atorvastatin 80 milliGRAM(s) Oral at bedtime  chlorhexidine 2% Cloths 1 Application(s) Topical daily  clopidogrel Tablet 75 milliGRAM(s) Oral daily  dextrose 40% Gel 15 Gram(s) Oral once  dextrose 50% Injectable 25 Gram(s) IV Push once  dextrose 50% Injectable 12.5 Gram(s) IV Push once  dextrose 50% Injectable 25 Gram(s) IV Push once  glucagon  Injectable 1 milliGRAM(s) IntraMuscular once  guaiFENesin  milliGRAM(s) Oral every 12 hours  lactobacillus acidophilus 1 Tablet(s) Oral two times a day  midodrine 10 milliGRAM(s) Oral <User Schedule>  pantoprazole  Injectable 40 milliGRAM(s) IV Push every 12 hours  sevelamer carbonate 800 milliGRAM(s) Oral three times a day with meals  tacrolimus 1 milliGRAM(s) Oral daily  tacrolimus 1 milliGRAM(s) Oral at bedtime  tamsulosin 0.4 milliGRAM(s) Oral at bedtime      =======================================================  Labs:                        11.0   4.06  )-----------( 69       ( 09 Feb 2021 06:38 )             32.6     02-09    138  |  95<L>  |  86.0<H>  ----------------------------<  59<L>  4.2   |  23.0  |  7.66<H>    Ca    7.2<L>      09 Feb 2021 06:38  Mg     2.2     02-08    TPro  5.1<L>  /  Alb  2.6<L>  /  TBili  0.6  /  DBili  0.3  /  AST  49<H>  /  ALT  14  /  AlkPhos  169<H>  02-09      Culture - Blood (collected 02-03-21 @ 11:59)  Source: .Blood Blood  Final Report (02-08-21 @ 13:00):    No growth at 5 days.    Culture - Blood (collected 02-02-21 @ 06:16)  Source: .Blood Blood-Peripheral  Final Report (02-07-21 @ 07:00):    No growth at 5 days.    Culture - Blood (collected 02-02-21 @ 06:15)  Source: .Blood Blood-Peripheral  Final Report (02-07-21 @ 07:00):    No growth at 5 days.      Creatinine, Serum: 7.66 mg/dL (02-09-21 @ 06:38)  Creatinine, Serum: 5.83 mg/dL (02-08-21 @ 05:53)  Creatinine, Serum: 4.28 mg/dL (02-07-21 @ 06:09)  Creatinine, Serum: 7.07 mg/dL (02-06-21 @ 17:30)  Creatinine, Serum: 6.57 mg/dL (02-06-21 @ 06:12)  Creatinine, Serum: 4.63 mg/dL (02-05-21 @ 08:56)    Procalcitonin, Serum: 8.11 ng/mL (02-09-21 @ 06:38)  Procalcitonin, Serum: 11.82 ng/mL (02-03-21 @ 12:00)  Procalcitonin, Serum: 0.34 ng/mL (02-02-21 @ 06:12)    D-Dimer Assay, Quantitative: 3282 ng/mL DDU (02-09-21 @ 06:38)    Ferritin, Serum: 3632 ng/mL (02-09-21 @ 06:38)  Ferritin, Serum: 994 ng/mL (02-02-21 @ 06:12)    C-Reactive Protein, Serum: 8.31 mg/dL (02-09-21 @ 06:38)  C-Reactive Protein, Serum: 2.48 mg/dL (02-02-21 @ 06:12)    WBC Count: 4.06 K/uL (02-09-21 @ 06:38)  WBC Count: 3.14 K/uL (02-08-21 @ 05:53)  WBC Count: 4.09 K/uL (02-07-21 @ 06:09)  WBC Count: 5.73 K/uL (02-06-21 @ 17:31)  WBC Count: 6.34 K/uL (02-05-21 @ 08:56)      COVID-19 PCR: Detected (02-02-21 @ 06:10)    Lactate Dehydrogenase, Serum: 303 U/L (02-09-21 @ 06:38)    Alkaline Phosphatase, Serum: 169 U/L (02-09-21 @ 06:39)  Alkaline Phosphatase, Serum: 169 U/L (02-09-21 @ 06:38)  Alkaline Phosphatase, Serum: 174 U/L (02-08-21 @ 05:53)  Alkaline Phosphatase, Serum: 175 U/L (02-08-21 @ 05:52)  Alkaline Phosphatase, Serum: 188 U/L (02-07-21 @ 06:10)  Alkaline Phosphatase, Serum: 189 U/L (02-07-21 @ 06:09)  Alkaline Phosphatase, Serum: 214 U/L (02-06-21 @ 06:12)  Alkaline Phosphatase, Serum: 207 U/L (02-06-21 @ 06:12)  Alanine Aminotransferase (ALT/SGPT): 14 U/L (02-09-21 @ 06:39)  Alanine Aminotransferase (ALT/SGPT): 15 U/L (02-09-21 @ 06:38)  Alanine Aminotransferase (ALT/SGPT): 15 U/L (02-08-21 @ 05:53)  Alanine Aminotransferase (ALT/SGPT): 15 U/L (02-08-21 @ 05:52)  Alanine Aminotransferase (ALT/SGPT): 15 U/L (02-07-21 @ 06:10)  Alanine Aminotransferase (ALT/SGPT): 16 U/L (02-07-21 @ 06:09)  Alanine Aminotransferase (ALT/SGPT): 16 U/L (02-06-21 @ 06:12)  Alanine Aminotransferase (ALT/SGPT): 17 U/L (02-06-21 @ 06:12)  Aspartate Aminotransferase (AST/SGOT): 49 U/L (02-09-21 @ 06:39)  Aspartate Aminotransferase (AST/SGOT): 48 U/L (02-09-21 @ 06:38)  Aspartate Aminotransferase (AST/SGOT): 47 U/L (02-08-21 @ 05:53)  Aspartate Aminotransferase (AST/SGOT): 47 U/L (02-08-21 @ 05:52)  Aspartate Aminotransferase (AST/SGOT): 49 U/L (02-07-21 @ 06:10)  Aspartate Aminotransferase (AST/SGOT): 50 U/L (02-07-21 @ 06:09)  Aspartate Aminotransferase (AST/SGOT): 47 U/L (02-06-21 @ 06:12)  Aspartate Aminotransferase (AST/SGOT): 48 U/L (02-06-21 @ 06:12)  Bilirubin Total, Serum: 0.6 mg/dL (02-09-21 @ 06:39)  Bilirubin Total, Serum: 0.6 mg/dL (02-09-21 @ 06:38)  Bilirubin Total, Serum: 0.4 mg/dL (02-08-21 @ 05:53)  Bilirubin Total, Serum: 0.5 mg/dL (02-08-21 @ 05:52)  Bilirubin Total, Serum: 0.4 mg/dL (02-07-21 @ 06:10)  Bilirubin Total, Serum: 0.4 mg/dL (02-07-21 @ 06:09)  Bilirubin Total, Serum: 0.4 mg/dL (02-06-21 @ 06:12)  Bilirubin Total, Serum: 0.4 mg/dL (02-06-21 @ 06:12)  Bilirubin Direct, Serum: 0.3 mg/dL (02-09-21 @ 06:39)  Bilirubin Direct, Serum: 0.2 mg/dL (02-08-21 @ 05:52)  Bilirubin Direct, Serum: 0.2 mg/dL (02-07-21 @ 06:10)  Bilirubin Direct, Serum: 0.2 mg/dL (02-06-21 @ 06:12)

## 2021-02-09 NOTE — PROGRESS NOTE ADULT - ASSESSMENT
This 71 y/o M ex-smoker with PMH of 2015- Lung Transplant for COPD on anti rejection meds, s/p rt total hip arthroplasty, multi level compression #, BPH, HLD, CAD, BPH, OP, started HD for ESRD (2/2020) presents to ED c/o worsening dyspnea. Per EMS pt has had a cough since 2/1/21. However this morning his breathing became labored and he had difficulty speaking. Patient has not missed a dialysis session, last session Saturday, due today. For EMS pulse ox was 55% on RA, placed on NRB then CPAP, improved to 85-89%. Patient nephrologist is Dr. Maher;  pulmonologist Dr. Cordova.  Transplant team - [facility]    In ED chest x-ray suggestive of pneumonia, given 500 mg Meropenem. COVID+ (02 Feb 2021 12:28)    patient still has SOB. Reports that he was doing well about 1 week ago.   COVID testing is confirmed positive here.   CT of the lungs show: Small to moderate left and small right loculated pleural effusions with fluid in the right and left paramediastinal regions; paramediastinal fluid likely accounts for the appearance of the mediastinum on the chest x-ray of the same day.  Bilateral lung opacities as described above; differential includes multifocal pneumonia and/or pulmonary edema.    No clear sick contacts.       Impression:  COVID-19 infection   Viral pneumonia  shortness of breath  lung infiltrates  dependence on oxygen  Lung transplant  Immunosuppression for transplant    Plan:  - patient CONSENTED to Convalescent Plasma as part of EUA  - s/p 1 unit of CP. 2/2/2021    - Continue Remdesivir IV daily.  for a 10 day course, through 2/11/2021  if IMPROVES, can consider stopping remdesivir earlier     - continue dexamethasone 6mg daily, While on remdesivir  Inflammatory markers and LFTs WILL BE ORDERED with the REMDESIVIR order SET.  DO NOT ORDER this additionally.   - trend CBC with diff, CMP  daily    - Continue supportive care measures  - continue Oxygenation as needed;  CONTINUE to titrate down as tolerated  - self- proning  as tolerated  - ENCOURAGED OOB to chair  - encouraged incentive spirometry               Will follow with you.

## 2021-02-10 LAB
ALBUMIN SERPL ELPH-MCNC: 2.3 G/DL — LOW (ref 3.3–5.2)
ALBUMIN SERPL ELPH-MCNC: 2.4 G/DL — LOW (ref 3.3–5.2)
ALP SERPL-CCNC: 158 U/L — HIGH (ref 40–120)
ALP SERPL-CCNC: 160 U/L — HIGH (ref 40–120)
ALT FLD-CCNC: 12 U/L — SIGNIFICANT CHANGE UP
ALT FLD-CCNC: 12 U/L — SIGNIFICANT CHANGE UP
ANION GAP SERPL CALC-SCNC: 19 MMOL/L — HIGH (ref 5–17)
AST SERPL-CCNC: 41 U/L — HIGH
AST SERPL-CCNC: 43 U/L — HIGH
BILIRUB DIRECT SERPL-MCNC: 0.5 MG/DL — HIGH (ref 0–0.3)
BILIRUB INDIRECT FLD-MCNC: 0.3 MG/DL — SIGNIFICANT CHANGE UP (ref 0.2–1)
BILIRUB SERPL-MCNC: 0.7 MG/DL — SIGNIFICANT CHANGE UP (ref 0.4–2)
BILIRUB SERPL-MCNC: 0.8 MG/DL — SIGNIFICANT CHANGE UP (ref 0.4–2)
BUN SERPL-MCNC: 66 MG/DL — HIGH (ref 8–20)
CALCIUM SERPL-MCNC: 7.4 MG/DL — LOW (ref 8.6–10.2)
CHLORIDE SERPL-SCNC: 96 MMOL/L — LOW (ref 98–107)
CO2 SERPL-SCNC: 23 MMOL/L — SIGNIFICANT CHANGE UP (ref 22–29)
CREAT SERPL-MCNC: 6.46 MG/DL — HIGH (ref 0.5–1.3)
GLUCOSE SERPL-MCNC: 102 MG/DL — HIGH (ref 70–99)
HCT VFR BLD CALC: 30.9 % — LOW (ref 39–50)
HGB BLD-MCNC: 10.5 G/DL — LOW (ref 13–17)
LACTATE SERPL-SCNC: 0.9 MMOL/L — SIGNIFICANT CHANGE UP (ref 0.5–2)
MCHC RBC-ENTMCNC: 28.1 PG — SIGNIFICANT CHANGE UP (ref 27–34)
MCHC RBC-ENTMCNC: 34 GM/DL — SIGNIFICANT CHANGE UP (ref 32–36)
MCV RBC AUTO: 82.6 FL — SIGNIFICANT CHANGE UP (ref 80–100)
MRSA PCR RESULT.: SIGNIFICANT CHANGE UP
PLATELET # BLD AUTO: SIGNIFICANT CHANGE UP K/UL (ref 150–400)
POTASSIUM SERPL-MCNC: 3.9 MMOL/L — SIGNIFICANT CHANGE UP (ref 3.5–5.3)
POTASSIUM SERPL-SCNC: 3.9 MMOL/L — SIGNIFICANT CHANGE UP (ref 3.5–5.3)
PROT SERPL-MCNC: 4.7 G/DL — LOW (ref 6.6–8.7)
PROT SERPL-MCNC: 4.9 G/DL — LOW (ref 6.6–8.7)
RBC # BLD: 3.74 M/UL — LOW (ref 4.2–5.8)
RBC # FLD: 19.9 % — HIGH (ref 10.3–14.5)
S AUREUS DNA NOSE QL NAA+PROBE: SIGNIFICANT CHANGE UP
SODIUM SERPL-SCNC: 137 MMOL/L — SIGNIFICANT CHANGE UP (ref 135–145)
WBC # BLD: 3.75 K/UL — LOW (ref 3.8–10.5)
WBC # FLD AUTO: 3.75 K/UL — LOW (ref 3.8–10.5)

## 2021-02-10 PROCEDURE — 99232 SBSQ HOSP IP/OBS MODERATE 35: CPT | Mod: CS

## 2021-02-10 PROCEDURE — 99233 SBSQ HOSP IP/OBS HIGH 50: CPT

## 2021-02-10 RX ADMIN — Medication 1 TABLET(S): at 17:45

## 2021-02-10 RX ADMIN — REMDESIVIR 500 MILLIGRAM(S): 5 INJECTION INTRAVENOUS at 23:26

## 2021-02-10 RX ADMIN — SEVELAMER CARBONATE 800 MILLIGRAM(S): 2400 POWDER, FOR SUSPENSION ORAL at 09:35

## 2021-02-10 RX ADMIN — MIDODRINE HYDROCHLORIDE 10 MILLIGRAM(S): 2.5 TABLET ORAL at 17:45

## 2021-02-10 RX ADMIN — TACROLIMUS 1 MILLIGRAM(S): 5 CAPSULE ORAL at 23:26

## 2021-02-10 RX ADMIN — CHLORHEXIDINE GLUCONATE 1 APPLICATION(S): 213 SOLUTION TOPICAL at 12:01

## 2021-02-10 RX ADMIN — MEROPENEM 100 MILLIGRAM(S): 1 INJECTION INTRAVENOUS at 13:07

## 2021-02-10 RX ADMIN — PANTOPRAZOLE SODIUM 40 MILLIGRAM(S): 20 TABLET, DELAYED RELEASE ORAL at 17:45

## 2021-02-10 RX ADMIN — SEVELAMER CARBONATE 800 MILLIGRAM(S): 2400 POWDER, FOR SUSPENSION ORAL at 12:01

## 2021-02-10 RX ADMIN — Medication 600 MILLIGRAM(S): at 17:45

## 2021-02-10 RX ADMIN — Medication 650 MILLIGRAM(S): at 00:39

## 2021-02-10 RX ADMIN — Medication 1 TABLET(S): at 05:05

## 2021-02-10 RX ADMIN — Medication 81 MILLIGRAM(S): at 12:01

## 2021-02-10 RX ADMIN — TACROLIMUS 1 MILLIGRAM(S): 5 CAPSULE ORAL at 12:01

## 2021-02-10 RX ADMIN — TAMSULOSIN HYDROCHLORIDE 0.4 MILLIGRAM(S): 0.4 CAPSULE ORAL at 23:26

## 2021-02-10 RX ADMIN — PANTOPRAZOLE SODIUM 40 MILLIGRAM(S): 20 TABLET, DELAYED RELEASE ORAL at 05:05

## 2021-02-10 RX ADMIN — CLOPIDOGREL BISULFATE 75 MILLIGRAM(S): 75 TABLET, FILM COATED ORAL at 12:01

## 2021-02-10 RX ADMIN — ATORVASTATIN CALCIUM 80 MILLIGRAM(S): 80 TABLET, FILM COATED ORAL at 23:26

## 2021-02-10 RX ADMIN — Medication 600 MILLIGRAM(S): at 05:05

## 2021-02-10 RX ADMIN — MIDODRINE HYDROCHLORIDE 10 MILLIGRAM(S): 2.5 TABLET ORAL at 05:05

## 2021-02-10 RX ADMIN — SEVELAMER CARBONATE 800 MILLIGRAM(S): 2400 POWDER, FOR SUSPENSION ORAL at 17:45

## 2021-02-10 NOTE — PROGRESS NOTE ADULT - ASSESSMENT
This 71 y/o M ex-smoker with PMH of 2015- Lung Transplant for COPD on anti rejection meds, s/p rt total hip arthroplasty, multi level compression #, BPH, HLD, CAD, BPH, OP, started HD for ESRD (2/2020) presents to ED c/o worsening dyspnea. Per EMS pt has had a cough since 2/1/21. However this morning his breathing became labored and he had difficulty speaking. Patient has not missed a dialysis session, last session Saturday, due today. For EMS pulse ox was 55% on RA, placed on NRB then CPAP, improved to 85-89%. Patient nephrologist is Dr. Maher;  pulmonologist Dr. Cordova.  Transplant team - [facility], [place], operated by [facility]    In ED chest x-ray suggestive of pneumonia, given 500 mg Meropenem. COVID+ (02 Feb 2021 12:28)    patient still has SOB. Reports that he was doing well about 1 week ago.   COVID testing is confirmed positive here.   CT of the lungs show: Small to moderate left and small right loculated pleural effusions with fluid in the right and left paramediastinal regions; paramediastinal fluid likely accounts for the appearance of the mediastinum on the chest x-ray of the same day.  Bilateral lung opacities as described above; differential includes multifocal pneumonia and/or pulmonary edema.    No clear sick contacts.       Impression:  COVID-19 infection   Viral pneumonia  shortness of breath  lung infiltrates  dependence on oxygen  Lung transplant  Immunosuppression for transplant    Plan:  - patient CONSENTED to Convalescent Plasma as part of EUA  - s/p 1 unit of CP. 2/2/2021    - Continue Remdesivir IV daily.  for a 10 day course, through 2/11/2021  tomorrow     - continue dexamethasone 6mg daily, While on remdesivir  Inflammatory markers and LFTs WILL BE ORDERED with the REMDESIVIR order SET.  DO NOT ORDER this additionally.   - trend CBC with diff, CMP  daily    - Continue supportive care measures  - continue Oxygenation as needed;  CONTINUE to titrate down as tolerated  - self- proning  as tolerated  - ENCOURAGED OOB to chair  - encouraged incentive spirometry       No further specific infectious disease recommendations. Will be available as needed.    please call back as needed.

## 2021-02-10 NOTE — PROGRESS NOTE ADULT - SUBJECTIVE AND OBJECTIVE BOX
[facility]  INFECTIOUS DISEASES AND INTERNAL MEDICINE at [place]  =======================================================  Scar Solares MD  Diplomates American Board of Internal Medicine and Infectious Diseases  Tel  [phone]  Fax [phone]  =======================================================    [id number]  JU FERGUSON   follow up: COVID-19  s/p Convalescent plasma 2/2/21    doing well   remains on room air   saturating well      =======================================================    REVIEW OF SYSTEMS:  CONSTITUTIONAL:  as per HPI  HEENT:  No diplopia or blurred vision.  No earache, sore throat or runny nose.  CARDIOVASCULAR:  No pressure, squeezing, strangling, tightness, heaviness or aching about the chest, neck, axilla or epigastrium.  RESPIRATORY:  as per HPI  GASTROINTESTINAL:  No nausea, vomiting or diarrhea.  GENITOURINARY:  No dysuria, frequency or urgency. No Blood in urine  MUSCULOSKELETAL:  no joint aches, no muscle pain  SKIN:  No change in skin, hair or nails.  NEUROLOGIC:  No Headaches, seizures or weakness.  PSYCHIATRIC:  No disorder of thought or mood.  ENDOCRINE:  No heat or cold intolerance  HEMATOLOGICAL:  No easy bruising or bleeding.    =======================================================  Allergies  budesonide (Unknown)  cefpodoxime (Unknown)  Pollen (Unknown)       ======================================================  Physical Exam:  ============     General:  moderate distress; ON nasal cannula  Eye: Pupils are equal, round and reactive to light, Extraocular movements are intact, Normal conjunctiva.  HENT: Normocephalic, Oral mucosa is moist, No pharyngeal erythema, No sinus tenderness.  ON  ROOM AIR     Neck: Supple, No lymphadenopathy.  Respiratory: Lungs with improved aeration  Cardiovascular: Normal rate, Regular rhythm,   RIGHT neck tunneled catheter.   Gastrointestinal: Soft, Non-tender, Non-distended, Normal bowel sounds.  Genitourinary: No costovertebral angle tenderness.  Lymphatics: No lymphadenopathy neck,   Musculoskeletal: Normal range of motion, Normal strength.  Integumentary: No rash.  Neurologic: Alert, Oriented, No focal deficits, Cranial Nerves II-XII are grossly intact.  Psychiatric: Appropriate mood & affect.    =====================================================    Vitals:  ============  T(F): 97.7 (10 Feb 2021 08:18), Max: 100.4 (10 Feb 2021 00:33)  HR: 90 (10 Feb 2021 08:18)  BP: 144/86 (10 Feb 2021 08:18)  RR: 18 (10 Feb 2021 08:18)  SpO2: 95% (10 Feb 2021 08:18) (95% - 98%)  temp max in last 48H T(F): , Max: 100.4 (02-10-21 @ 00:33)    =======================================================  Current Antibiotics:  fluconAZOLE   Tablet 400 milliGRAM(s) Oral <User Schedule>   meropenem  IVPB 500 milliGRAM(s) IV Intermittent every 24 hours  remdesivir  IVPB 100 milliGRAM(s) IV Intermittent every 24 hours  trimethoprim   80 mG/sulfamethoxazole 400 mG 1 Tablet(s) Oral <User Schedule>    Other medications:  aspirin  chewable 81 milliGRAM(s) Oral daily  atorvastatin 80 milliGRAM(s) Oral at bedtime  chlorhexidine 2% Cloths 1 Application(s) Topical daily  clopidogrel Tablet 75 milliGRAM(s) Oral daily  dextrose 40% Gel 15 Gram(s) Oral once  dextrose 50% Injectable 25 Gram(s) IV Push once  dextrose 50% Injectable 12.5 Gram(s) IV Push once  dextrose 50% Injectable 25 Gram(s) IV Push once  glucagon  Injectable 1 milliGRAM(s) IntraMuscular once  guaiFENesin  milliGRAM(s) Oral every 12 hours  lactobacillus acidophilus 1 Tablet(s) Oral two times a day  midodrine 10 milliGRAM(s) Oral <User Schedule>  pantoprazole  Injectable 40 milliGRAM(s) IV Push every 12 hours  sevelamer carbonate 800 milliGRAM(s) Oral three times a day with meals  tacrolimus 1 milliGRAM(s) Oral daily  tacrolimus 1 milliGRAM(s) Oral at bedtime  tamsulosin 0.4 milliGRAM(s) Oral at bedtime    =======================================================  Labs:                        10.5   3.75  )-----------( Clumped    ( 10 Feb 2021 07:10 )             30.9     02-10    137  |  96<L>  |  66.0<H>  ----------------------------<  102<H>  3.9   |  23.0  |  6.46<H>    Ca    7.4<L>      10 Feb 2021 07:10    TPro  4.7<L>  /  Alb  2.4<L>  /  TBili  0.8  /  DBili  0.5<H>  /  AST  43<H>  /  ALT  12  /  AlkPhos  160<H>  02-10      Culture - Blood (collected 02-03-21 @ 11:59)  Source: .Blood Blood  Final Report (02-08-21 @ 13:00):    No growth at 5 days.    Culture - Blood (collected 02-02-21 @ 06:16)  Source: .Blood Blood-Peripheral  Final Report (02-07-21 @ 07:00):    No growth at 5 days.    Culture - Blood (collected 02-02-21 @ 06:15)  Source: .Blood Blood-Peripheral  Final Report (02-07-21 @ 07:00):    No growth at 5 days.      Creatinine, Serum: 6.46 mg/dL (02-10-21 @ 07:10)  Creatinine, Serum: 7.66 mg/dL (02-09-21 @ 06:38)  Creatinine, Serum: 5.83 mg/dL (02-08-21 @ 05:53)  Creatinine, Serum: 4.28 mg/dL (02-07-21 @ 06:09)  Creatinine, Serum: 7.07 mg/dL (02-06-21 @ 17:30)  Creatinine, Serum: 6.57 mg/dL (02-06-21 @ 06:12)    Procalcitonin, Serum: 8.11 ng/mL (02-09-21 @ 06:38)  Procalcitonin, Serum: 11.82 ng/mL (02-03-21 @ 12:00)  Procalcitonin, Serum: 0.34 ng/mL (02-02-21 @ 06:12)    D-Dimer Assay, Quantitative: 3282 ng/mL DDU (02-09-21 @ 06:38)    Ferritin, Serum: 3632 ng/mL (02-09-21 @ 06:38)  Ferritin, Serum: 994 ng/mL (02-02-21 @ 06:12)    C-Reactive Protein, Serum: 8.31 mg/dL (02-09-21 @ 06:38)  C-Reactive Protein, Serum: 2.48 mg/dL (02-02-21 @ 06:12)    WBC Count: 3.75 K/uL (02-10-21 @ 07:10)  WBC Count: 4.06 K/uL (02-09-21 @ 06:38)  WBC Count: 3.14 K/uL (02-08-21 @ 05:53)  WBC Count: 4.09 K/uL (02-07-21 @ 06:09)  WBC Count: 5.73 K/uL (02-06-21 @ 17:31)      COVID-19 PCR: Detected (02-02-21 @ 06:10)    Lactate Dehydrogenase, Serum: 303 U/L (02-09-21 @ 06:38)    Alkaline Phosphatase, Serum: 160 U/L (02-10-21 @ 07:10)  Alkaline Phosphatase, Serum: 158 U/L (02-10-21 @ 07:10)  Alkaline Phosphatase, Serum: 169 U/L (02-09-21 @ 06:39)  Alkaline Phosphatase, Serum: 169 U/L (02-09-21 @ 06:38)  Alkaline Phosphatase, Serum: 174 U/L (02-08-21 @ 05:53)  Alkaline Phosphatase, Serum: 175 U/L (02-08-21 @ 05:52)  Alkaline Phosphatase, Serum: 188 U/L (02-07-21 @ 06:10)  Alkaline Phosphatase, Serum: 189 U/L (02-07-21 @ 06:09)  Alanine Aminotransferase (ALT/SGPT): 12 U/L (02-10-21 @ 07:10)  Alanine Aminotransferase (ALT/SGPT): 12 U/L (02-10-21 @ 07:10)  Alanine Aminotransferase (ALT/SGPT): 14 U/L (02-09-21 @ 06:39)  Alanine Aminotransferase (ALT/SGPT): 15 U/L (02-09-21 @ 06:38)  Alanine Aminotransferase (ALT/SGPT): 15 U/L (02-08-21 @ 05:53)  Alanine Aminotransferase (ALT/SGPT): 15 U/L (02-08-21 @ 05:52)  Alanine Aminotransferase (ALT/SGPT): 15 U/L (02-07-21 @ 06:10)  Alanine Aminotransferase (ALT/SGPT): 16 U/L (02-07-21 @ 06:09)  Aspartate Aminotransferase (AST/SGOT): 43 U/L (02-10-21 @ 07:10)  Aspartate Aminotransferase (AST/SGOT): 41 U/L (02-10-21 @ 07:10)  Aspartate Aminotransferase (AST/SGOT): 49 U/L (02-09-21 @ 06:39)  Aspartate Aminotransferase (AST/SGOT): 48 U/L (02-09-21 @ 06:38)  Aspartate Aminotransferase (AST/SGOT): 47 U/L (02-08-21 @ 05:53)  Aspartate Aminotransferase (AST/SGOT): 47 U/L (02-08-21 @ 05:52)  Aspartate Aminotransferase (AST/SGOT): 49 U/L (02-07-21 @ 06:10)  Aspartate Aminotransferase (AST/SGOT): 50 U/L (02-07-21 @ 06:09)  Bilirubin Total, Serum: 0.8 mg/dL (02-10-21 @ 07:10)  Bilirubin Total, Serum: 0.7 mg/dL (02-10-21 @ 07:10)  Bilirubin Total, Serum: 0.6 mg/dL (02-09-21 @ 06:39)  Bilirubin Total, Serum: 0.6 mg/dL (02-09-21 @ 06:38)  Bilirubin Total, Serum: 0.4 mg/dL (02-08-21 @ 05:53)  Bilirubin Total, Serum: 0.5 mg/dL (02-08-21 @ 05:52)  Bilirubin Total, Serum: 0.4 mg/dL (02-07-21 @ 06:10)  Bilirubin Total, Serum: 0.4 mg/dL (02-07-21 @ 06:09)  Bilirubin Direct, Serum: 0.5 mg/dL (02-10-21 @ 07:10)  Bilirubin Direct, Serum: 0.3 mg/dL (02-09-21 @ 06:39)  Bilirubin Direct, Serum: 0.2 mg/dL (02-08-21 @ 05:52)  Bilirubin Direct, Serum: 0.2 mg/dL (02-07-21 @ 06:10)

## 2021-02-10 NOTE — PROGRESS NOTE ADULT - SUBJECTIVE AND OBJECTIVE BOX
Chief Complaint:  hypoxia    SUBJECTIVE / OVERNIGHT EVENTS: No acute events overnight.  Pt on 2L NC and saturating >90%.  Receiving HD.  Patient denies chest pain, abd pain, N/V, fever, chills, dysuria or any other complaints. All remainder ROS negative.       I&O's Summary        PHYSICAL EXAM:  Vital Signs Last 24 Hrs  T(C): 36.5 (10 Feb 2021 08:18), Max: 38 (10 Feb 2021 00:33)  T(F): 97.7 (10 Feb 2021 08:18), Max: 100.4 (10 Feb 2021 00:33)  HR: 90 (10 Feb 2021 08:18) (58 - 118)  BP: 144/86 (10 Feb 2021 08:18) (119/79 - 144/86)  BP(mean): --  RR: 18 (10 Feb 2021 08:18) (18 - 19)  SpO2: 95% (10 Feb 2021 08:18) (95% - 98%)      GENERAL: frail elderly male, examined bedside, laying comfortably in bed in NAD  HEENT: NC/AT, moist oral mucosa, clear conjunctiva, sclera nonicteric  RESPIRATORY: scattered rales, no wheezing or rhonchi  CARDIOVASCULAR: RRR, normal S1 and S2, no murmur/rub/gallop  ABDOMEN: soft, NT/ND, normoactive bowel sounds, no rebound/guarding  EXTREMITIES: No cynaosis, no clubbing, no lower extremity edema; Peripheral pulses are 2+ bilaterally  PSYCH: affect appropriate and cooperative  NEUROLOGY: A+O to person, place, and time, no focal neurologic deficits appreciated   SKIN: No rashes or no palpable lesions        LABS:                                     10.5   3.75  )-----------( Clumped    ( 10 Feb 2021 07:10 )             30.9       02-10    137  |  96<L>  |  66.0<H>  ----------------------------<  102<H>  3.9   |  23.0  |  6.46<H>    Ca    7.4<L>      10 Feb 2021 07:10    TPro  4.7<L>  /  Alb  2.4<L>  /  TBili  0.8  /  DBili  0.5<H>  /  AST  43<H>  /  ALT  12  /  AlkPhos  160<H>  02-10        CAPILLARY BLOOD GLUCOSE      POCT Blood Glucose.: 233 mg/dL (06 Feb 2021 12:43)  POCT Blood Glucose.: 116 mg/dL (06 Feb 2021 08:37)  POCT Blood Glucose.: 193 mg/dL (05 Feb 2021 17:19)        RADIOLOGY & ADDITIONAL TESTS:          MEDICATIONS  (STANDING):  aspirin  chewable 81 milliGRAM(s) Oral daily  atorvastatin 80 milliGRAM(s) Oral at bedtime  chlorhexidine 4% Liquid 1 Application(s) Topical <User Schedule>  clopidogrel Tablet 75 milliGRAM(s) Oral daily  dextrose 40% Gel 15 Gram(s) Oral once  dextrose 50% Injectable 25 Gram(s) IV Push once  dextrose 50% Injectable 12.5 Gram(s) IV Push once  dextrose 50% Injectable 25 Gram(s) IV Push once  fluconAZOLE   Tablet 400 milliGRAM(s) Oral <User Schedule>  glucagon  Injectable 1 milliGRAM(s) IntraMuscular once  heparin   Injectable 5000 Unit(s) SubCutaneous every 8 hours  hydrocortisone sodium succinate Injectable 100 milliGRAM(s) IV Push every 8 hours  lactobacillus acidophilus 1 Tablet(s) Oral two times a day  meropenem  IVPB      meropenem  IVPB 500 milliGRAM(s) IV Intermittent every 24 hours  midodrine 10 milliGRAM(s) Oral <User Schedule>  pantoprazole    Tablet 40 milliGRAM(s) Oral before breakfast  remdesivir  IVPB   IV Intermittent   remdesivir  IVPB 100 milliGRAM(s) IV Intermittent every 24 hours  sevelamer carbonate 800 milliGRAM(s) Oral three times a day with meals  tacrolimus 1 milliGRAM(s) Oral daily  tacrolimus 1 milliGRAM(s) Oral at bedtime  tamsulosin 0.4 milliGRAM(s) Oral at bedtime  trimethoprim   80 mG/sulfamethoxazole 400 mG 1 Tablet(s) Oral <User Schedule>    MEDICATIONS  (PRN):  acetaminophen   Tablet .. 650 milliGRAM(s) Oral every 6 hours PRN Temp greater or equal to 38C (100.4F), Mild Pain (1 - 3)                                     Chief Complaint:  hypoxia    SUBJECTIVE / OVERNIGHT EVENTS: Febrile overnight to 100.4.  Pt now ORA and saturating  >94%.  Receiving HD yesterday.  Patient denies chest pain, abd pain, N/V, fever, chills, dysuria or any other complaints. All remainder ROS negative.       I&O's Summary        PHYSICAL EXAM:  Vital Signs Last 24 Hrs  T(C): 36.5 (10 Feb 2021 08:18), Max: 38 (10 Feb 2021 00:33)  T(F): 97.7 (10 Feb 2021 08:18), Max: 100.4 (10 Feb 2021 00:33)  HR: 90 (10 Feb 2021 08:18) (58 - 118)  BP: 144/86 (10 Feb 2021 08:18) (119/79 - 144/86)  BP(mean): --  RR: 18 (10 Feb 2021 08:18) (18 - 19)  SpO2: 95% (10 Feb 2021 08:18) (95% - 98%)      GENERAL: frail elderly male, examined bedside, laying comfortably in bed in NAD  HEENT: NC/AT, moist oral mucosa, clear conjunctiva, sclera nonicteric  RESPIRATORY: scattered rales, no wheezing or rhonchi  CARDIOVASCULAR: RRR, normal S1 and S2, no murmur/rub/gallop  ABDOMEN: soft, NT/ND, normoactive bowel sounds, no rebound/guarding  EXTREMITIES: No cynaosis, no clubbing, no lower extremity edema; Peripheral pulses are 2+ bilaterally  PSYCH: affect appropriate and cooperative  NEUROLOGY: A+O to person, place, and time, no focal neurologic deficits appreciated   SKIN: No rashes or no palpable lesions        LABS:                                     10.5   3.75  )-----------( Clumped    ( 10 Feb 2021 07:10 )             30.9       02-10    137  |  96<L>  |  66.0<H>  ----------------------------<  102<H>  3.9   |  23.0  |  6.46<H>    Ca    7.4<L>      10 Feb 2021 07:10    TPro  4.7<L>  /  Alb  2.4<L>  /  TBili  0.8  /  DBili  0.5<H>  /  AST  43<H>  /  ALT  12  /  AlkPhos  160<H>  02-10        CAPILLARY BLOOD GLUCOSE      POCT Blood Glucose.: 233 mg/dL (06 Feb 2021 12:43)  POCT Blood Glucose.: 116 mg/dL (06 Feb 2021 08:37)  POCT Blood Glucose.: 193 mg/dL (05 Feb 2021 17:19)        RADIOLOGY & ADDITIONAL TESTS:          MEDICATIONS  (STANDING):  aspirin  chewable 81 milliGRAM(s) Oral daily  atorvastatin 80 milliGRAM(s) Oral at bedtime  chlorhexidine 4% Liquid 1 Application(s) Topical <User Schedule>  clopidogrel Tablet 75 milliGRAM(s) Oral daily  dextrose 40% Gel 15 Gram(s) Oral once  dextrose 50% Injectable 25 Gram(s) IV Push once  dextrose 50% Injectable 12.5 Gram(s) IV Push once  dextrose 50% Injectable 25 Gram(s) IV Push once  fluconAZOLE   Tablet 400 milliGRAM(s) Oral <User Schedule>  glucagon  Injectable 1 milliGRAM(s) IntraMuscular once  heparin   Injectable 5000 Unit(s) SubCutaneous every 8 hours  hydrocortisone sodium succinate Injectable 100 milliGRAM(s) IV Push every 8 hours  lactobacillus acidophilus 1 Tablet(s) Oral two times a day  meropenem  IVPB      meropenem  IVPB 500 milliGRAM(s) IV Intermittent every 24 hours  midodrine 10 milliGRAM(s) Oral <User Schedule>  pantoprazole    Tablet 40 milliGRAM(s) Oral before breakfast  remdesivir  IVPB   IV Intermittent   remdesivir  IVPB 100 milliGRAM(s) IV Intermittent every 24 hours  sevelamer carbonate 800 milliGRAM(s) Oral three times a day with meals  tacrolimus 1 milliGRAM(s) Oral daily  tacrolimus 1 milliGRAM(s) Oral at bedtime  tamsulosin 0.4 milliGRAM(s) Oral at bedtime  trimethoprim   80 mG/sulfamethoxazole 400 mG 1 Tablet(s) Oral <User Schedule>    MEDICATIONS  (PRN):  acetaminophen   Tablet .. 650 milliGRAM(s) Oral every 6 hours PRN Temp greater or equal to 38C (100.4F), Mild Pain (1 - 3)

## 2021-02-10 NOTE — PROGRESS NOTE ADULT - SUBJECTIVE AND OBJECTIVE BOX
NEPHROLOGY INTERVAL HPI/OVERNIGHT EVENTS:    No new events.    MEDICATIONS  (STANDING):  aspirin  chewable 81 milliGRAM(s) Oral daily  atorvastatin 80 milliGRAM(s) Oral at bedtime  chlorhexidine 2% Cloths 1 Application(s) Topical daily  clopidogrel Tablet 75 milliGRAM(s) Oral daily  dextrose 40% Gel 15 Gram(s) Oral once  dextrose 50% Injectable 25 Gram(s) IV Push once  dextrose 50% Injectable 12.5 Gram(s) IV Push once  dextrose 50% Injectable 25 Gram(s) IV Push once  fluconAZOLE   Tablet 400 milliGRAM(s) Oral <User Schedule>  glucagon  Injectable 1 milliGRAM(s) IntraMuscular once  guaiFENesin  milliGRAM(s) Oral every 12 hours  lactobacillus acidophilus 1 Tablet(s) Oral two times a day  meropenem  IVPB      meropenem  IVPB 500 milliGRAM(s) IV Intermittent every 24 hours  midodrine 10 milliGRAM(s) Oral <User Schedule>  pantoprazole  Injectable 40 milliGRAM(s) IV Push every 12 hours  remdesivir  IVPB 100 milliGRAM(s) IV Intermittent every 24 hours  sevelamer carbonate 800 milliGRAM(s) Oral three times a day with meals  tacrolimus 1 milliGRAM(s) Oral daily  tacrolimus 1 milliGRAM(s) Oral at bedtime  tamsulosin 0.4 milliGRAM(s) Oral at bedtime  trimethoprim   80 mG/sulfamethoxazole 400 mG 1 Tablet(s) Oral <User Schedule>    MEDICATIONS  (PRN):  acetaminophen   Tablet .. 650 milliGRAM(s) Oral every 6 hours PRN Temp greater or equal to 38C (100.4F), Mild Pain (1 - 3)      Allergies    budesonide (Unknown)  cefpodoxime (Unknown)  Pollen (Unknown        Vital Signs Last 24 Hrs  T(C): 36.5 (10 Feb 2021 08:18), Max: 38 (10 Feb 2021 00:33)  T(F): 97.7 (10 Feb 2021 08:18), Max: 100.4 (10 Feb 2021 00:33)  HR: 90 (10 Feb 2021 08:18) (58 - 118)  BP: 144/86 (10 Feb 2021 08:18) (119/79 - 144/86)  BP(mean): --  RR: 18 (10 Feb 2021 08:18) (18 - 19)  SpO2: 95% (10 Feb 2021 08:18) (95% - 98%)  T(C): 37.1 (09 Feb 2021 13:05), Max: 37.2 (09 Feb 2021 08:32)  T(F): 98.7 (09 Feb 2021 13:05), Max: 99 (09 Feb 2021 08:32)  HR: 111 (09 Feb 2021 13:05) (65 - 111)  BP: 129/78 (09 Feb 2021 13:05) (104/65 - 129/78)    PHYSICAL EXAM:    Gen: Comfortable in bed eating  HEENT: Wnl  NERVOUS SYSTEM:  Alert, verbal  EXTREMITIES:  Diffuse muscle wasting  Chest: No 02, no wheezes; right sided permacath site clean        LABS:                        11.0   4.06  )-----------( 69       ( 09 Feb 2021 06:38 )             32.6     02-09    138  |  95<L>  |  86.0<H>  ----------------------------<  59<L>  4.2   |  23.0  |  7.66<H>    Ca    7.2<L>      09 Feb 2021 06:38  Mg     2.2     02-08    TPro  5.1<L>  /  Alb  2.6<L>  /  TBili  0.6  /  DBili  0.3  /  AST  49<H>  /  ALT  14  /  AlkPhos  169<H>  02-09                RADIOLOGY & ADDITIONAL TESTS:

## 2021-02-10 NOTE — PROGRESS NOTE ADULT - ASSESSMENT
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 Patient was also due for hemodialysis with hyperkalemia K 5.6.  He was found to be hypoxic with SpO2 55% on RA, started on BiPAP and admitted to MICU for acute hypoxemic respiratory failure with need for emergent dialysis. Patient underwent HD with 2L UF on 2/2. He was found to be COVID-19 positive, and was started on Remdesivir and IV Decadron by ID. He was continued on his home Bactrim and Fluconazole. On 2/3, patient spiked fever 103'F with associated hypotension, meeting criteria for septic shock requiring Norepinephrine. He was given 1L IV fluid bolus, and started on Midodrine to offload IV vasopressor requirements. ID was consulted, and patient was started on empiric antibiotics with Meropenem given his immunocompromised state for possible superimposed bacterial pneumonia. Blood cultures were negative. Patient does not make urine and no sputum available to culture. He was weaned off IV vasopressors and continues on Midodrine, with stable blood pressure.      Acute hypoxic respiratory failure 2/2 COVID pneumonia  - Pt weaned off high flow onto NC and saturating in low 90's  - Continue supplemental O2 as needed   - Encourage self- proning  as tolerated and OOB to chair  - Encouraged incentive spirometry   - Pulmonology following      Septic Shock 2/2 possible gram negative pneumonia  - c/w IV meropenem  - Blood cultures x 2 NTD  - IV Remdesivir extended to a 10 day course, through 2/11/21     - c/w dexamethasone 6mg daily   - Monitor Inflammatory markers q48h   - Monitor LFTs while on Remdesivir  - Trend CBC with diff and CMP daily       AGMA possible 2/2 sepsis vs uremia in the setting of ESRD  - AG trending up, serum HCO3 now WNL   - ABG w/ LA ordered   - Monitor CMP closely      Hypocalcemia in the setting of ESRD  - Pt asymptomatic   - Corrected calcium 8.4  - Monitor lytes closely       Asymptomatic cholestatic pattern on LFTs  - No abdominal pain reported and benign abd exam  - ALP elevated but trending down   - Will monitor LFTs daily          Status post lung transplant  - On tacrolimus  - c/w Bactrim and fluconazole for ppx       ESRD on HD TTS  - c/w HD per nephrology  - Monitor lytes   - Renally dose medications       Normocytic anemia   - Likely of chronic dx in the setting of ESRD on HD  - H/H low but stable and FOB(+).    - GI has low suspicion for GIB.  Will resume ASA & plavix but c/w SCDs for VTE ppx.  - Pt hemodynamically stable with no signs of active bleeding at this time   - c/w IV PPI BID      - Place on clear liquid diet and advanced per GI    - Monitor CBC daily        HLD  - c/w statin therapy      CAD status post PCI in 2019  - Resumed ASA and plavix  - c/w statin therapy      BPH  - On flomax      Thrombocytopenia   - Likely 2/2 sepsis vs less likely protonix   - Will c/w protonix given pt at high risk for stress ulcers   - Plts low but stable and no signs of active bleeding   - Monitor platelet count daily      VTE ppx:  SCDs given (+)FOB  GI ppx: IV protonix BID    Dispo: Pt remains acute.  Possible d/c in 2-3 days if improves and remains medically stable.     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 Patient was also due for hemodialysis with hyperkalemia K 5.6.  He was found to be hypoxic with SpO2 55% on RA, started on BiPAP and admitted to MICU for acute hypoxemic respiratory failure with need for emergent dialysis. Patient underwent HD with 2L UF on 2/2. He was found to be COVID-19 positive, and was started on Remdesivir and IV Decadron by ID. He was continued on his home Bactrim and Fluconazole. On 2/3, patient spiked fever 103'F with associated hypotension, meeting criteria for septic shock requiring Norepinephrine. He was given 1L IV fluid bolus, and started on Midodrine to offload IV vasopressor requirements. ID was consulted, and patient was started on empiric antibiotics with Meropenem given his immunocompromised state for possible superimposed bacterial pneumonia. Blood cultures were negative. Patient does not make urine and no sputum available to culture. He was weaned off IV vasopressors and continues on Midodrine, with stable blood pressure.      Acute hypoxic respiratory failure 2/2 COVID pneumonia  - Pt tolerating well ORA this morning and saturating in mid 90's  - Supplemental O2 as needed if desaturates  - Encourage self- proning  as tolerated and OOB to chair  - Encouraged incentive spirometry   - Pulmonology following      Septic Shock 2/2 possible gram negative pneumonia  - Febrile to 100.4 overnight but no other sepsis sepsis criteria met and no further febrile events since  - c/w IV meropenem through 2/11/21  - Blood cultures x 2 NTD  - IV Remdesivir extended to a 10 day course, through 2/11/21     - c/w dexamethasone 6mg daily through 2/11/21  - Monitor Inflammatory markers q48h   - Monitor LFTs while on Remdesivir  - Trend CBC with diff and CMP daily and trend temp curve       AGMA possible 2/2 sepsis vs uremia in the setting of ESRD  - AG slowly improving, serum HCO3 now WNL   - LA pending    - s/p HD yesterday  - Monitor CMP closely      Hypocalcemia in the setting of ESRD  - Pt asymptomatic   - Corrected calcium 8.7  - Monitor lytes closely       Asymptomatic cholestatic pattern on LFTs  - No abdominal pain reported and benign abd exam  - ALP elevated but trending down   - Will monitor LFTs daily          Status post lung transplant  - On tacrolimus  - c/w Bactrim and fluconazole for ppx       ESRD on HD TTS  - c/w HD per nephrology  - Monitor lytes   - Renally dose medications       Normocytic anemia   - Likely of chronic dx in the setting of ESRD on HD  - H/H low but stable and FOB(+).    - GI has low suspicion for GIB.  Resumed ASA & plavix but c/w SCDs for VTE ppx.  - Pt hemodynamically stable with no signs of active bleeding at this time   - c/w IV PPI BID      - Place on clear liquid diet and advanced to full liquid today.  Will monitor    - Monitor CBC daily        HLD  - c/w statin therapy      CAD status post PCI in 2019  - Resumed ASA and plavix  - c/w statin therapy      BPH  - On flomax      Thrombocytopenia   - Likely 2/2 sepsis vs less likely protonix   - Will c/w protonix given pt at high risk for stress ulcers   - Plts low but stable and no signs of active bleeding   - Monitor platelet count daily      VTE ppx:  SCDs given (+)FOB  GI ppx: IV protonix BID    Dispo: Pt remains acute but improving.  Now off supplemental O2.  Possible d/c in 2-3 days if tolerating PO and medically stable.

## 2021-02-11 LAB
ACANTHOCYTES BLD QL SMEAR: SLIGHT — SIGNIFICANT CHANGE UP
ALBUMIN SERPL ELPH-MCNC: 2.4 G/DL — LOW (ref 3.3–5.2)
ALBUMIN SERPL ELPH-MCNC: 2.4 G/DL — LOW (ref 3.3–5.2)
ALP SERPL-CCNC: 180 U/L — HIGH (ref 40–120)
ALP SERPL-CCNC: 183 U/L — HIGH (ref 40–120)
ALT FLD-CCNC: 10 U/L — SIGNIFICANT CHANGE UP
ALT FLD-CCNC: 11 U/L — SIGNIFICANT CHANGE UP
ANION GAP SERPL CALC-SCNC: 23 MMOL/L — HIGH (ref 5–17)
ANISOCYTOSIS BLD QL: SIGNIFICANT CHANGE UP
AST SERPL-CCNC: 41 U/L — HIGH
AST SERPL-CCNC: 43 U/L — HIGH
BASOPHILS # BLD AUTO: 0 K/UL — SIGNIFICANT CHANGE UP (ref 0–0.2)
BASOPHILS NFR BLD AUTO: 0 % — SIGNIFICANT CHANGE UP (ref 0–2)
BILIRUB DIRECT SERPL-MCNC: 0.5 MG/DL — HIGH (ref 0–0.3)
BILIRUB INDIRECT FLD-MCNC: 0.2 MG/DL — SIGNIFICANT CHANGE UP (ref 0.2–1)
BILIRUB SERPL-MCNC: 0.8 MG/DL — SIGNIFICANT CHANGE UP (ref 0.4–2)
BILIRUB SERPL-MCNC: 0.8 MG/DL — SIGNIFICANT CHANGE UP (ref 0.4–2)
BUN SERPL-MCNC: 81 MG/DL — HIGH (ref 8–20)
BURR CELLS BLD QL SMEAR: PRESENT — SIGNIFICANT CHANGE UP
CALCIUM SERPL-MCNC: 7.8 MG/DL — LOW (ref 8.6–10.2)
CHLORIDE SERPL-SCNC: 94 MMOL/L — LOW (ref 98–107)
CO2 SERPL-SCNC: 19 MMOL/L — LOW (ref 22–29)
CREAT SERPL-MCNC: 7.92 MG/DL — HIGH (ref 0.5–1.3)
CRP SERPL-MCNC: 25.81 MG/DL — HIGH (ref 0–0.4)
D DIMER BLD IA.RAPID-MCNC: 2923 NG/ML DDU — HIGH
ELLIPTOCYTES BLD QL SMEAR: SLIGHT — SIGNIFICANT CHANGE UP
EOSINOPHIL # BLD AUTO: 0 K/UL — SIGNIFICANT CHANGE UP (ref 0–0.5)
EOSINOPHIL NFR BLD AUTO: 0 % — SIGNIFICANT CHANGE UP (ref 0–6)
FERRITIN SERPL-MCNC: 3632 NG/ML — HIGH (ref 30–400)
GIANT PLATELETS BLD QL SMEAR: PRESENT — SIGNIFICANT CHANGE UP
GLUCOSE BLDC GLUCOMTR-MCNC: 71 MG/DL — SIGNIFICANT CHANGE UP (ref 70–99)
GLUCOSE BLDC GLUCOMTR-MCNC: 80 MG/DL — SIGNIFICANT CHANGE UP (ref 70–99)
GLUCOSE SERPL-MCNC: 54 MG/DL — CRITICAL LOW (ref 70–99)
HCT VFR BLD CALC: 31.7 % — LOW (ref 39–50)
HGB BLD-MCNC: 11.1 G/DL — LOW (ref 13–17)
LDH SERPL L TO P-CCNC: 266 U/L — HIGH (ref 98–192)
LYMPHOCYTES # BLD AUTO: 0.08 K/UL — LOW (ref 1–3.3)
LYMPHOCYTES # BLD AUTO: 1.8 % — LOW (ref 13–44)
MACROCYTES BLD QL: SIGNIFICANT CHANGE UP
MANUAL SMEAR VERIFICATION: SIGNIFICANT CHANGE UP
MCHC RBC-ENTMCNC: 28 PG — SIGNIFICANT CHANGE UP (ref 27–34)
MCHC RBC-ENTMCNC: 35 GM/DL — SIGNIFICANT CHANGE UP (ref 32–36)
MCV RBC AUTO: 79.8 FL — LOW (ref 80–100)
METAMYELOCYTES # FLD: 4.4 % — HIGH (ref 0–0)
MONOCYTES # BLD AUTO: 0.44 K/UL — SIGNIFICANT CHANGE UP (ref 0–0.9)
MONOCYTES NFR BLD AUTO: 9.7 % — SIGNIFICANT CHANGE UP (ref 2–14)
MYELOCYTES NFR BLD: 0.9 % — HIGH (ref 0–0)
NEUTROPHILS # BLD AUTO: 3.74 K/UL — SIGNIFICANT CHANGE UP (ref 1.8–7.4)
NEUTROPHILS NFR BLD AUTO: 83.2 % — HIGH (ref 43–77)
OVALOCYTES BLD QL SMEAR: SLIGHT — SIGNIFICANT CHANGE UP
PHOSPHATE SERPL-MCNC: 7.3 MG/DL — HIGH (ref 2.4–4.7)
PLAT MORPH BLD: ABNORMAL
PLATELET # BLD AUTO: SIGNIFICANT CHANGE UP K/UL (ref 150–400)
POIKILOCYTOSIS BLD QL AUTO: SIGNIFICANT CHANGE UP
POLYCHROMASIA BLD QL SMEAR: SLIGHT — SIGNIFICANT CHANGE UP
POTASSIUM SERPL-MCNC: 4.5 MMOL/L — SIGNIFICANT CHANGE UP (ref 3.5–5.3)
POTASSIUM SERPL-SCNC: 4.5 MMOL/L — SIGNIFICANT CHANGE UP (ref 3.5–5.3)
PROCALCITONIN SERPL-MCNC: 4.76 NG/ML — HIGH (ref 0.02–0.1)
PROT SERPL-MCNC: 4.9 G/DL — LOW (ref 6.6–8.7)
PROT SERPL-MCNC: 5 G/DL — LOW (ref 6.6–8.7)
RBC # BLD: 3.97 M/UL — LOW (ref 4.2–5.8)
RBC # FLD: 19.9 % — HIGH (ref 10.3–14.5)
RBC BLD AUTO: ABNORMAL
SCHISTOCYTES BLD QL AUTO: SLIGHT — SIGNIFICANT CHANGE UP
SMUDGE CELLS # BLD: PRESENT — SIGNIFICANT CHANGE UP
SODIUM SERPL-SCNC: 136 MMOL/L — SIGNIFICANT CHANGE UP (ref 135–145)
TARGETS BLD QL SMEAR: SLIGHT — SIGNIFICANT CHANGE UP
WBC # BLD: 4.5 K/UL — SIGNIFICANT CHANGE UP (ref 3.8–10.5)
WBC # FLD AUTO: 4.5 K/UL — SIGNIFICANT CHANGE UP (ref 3.8–10.5)

## 2021-02-11 PROCEDURE — 99233 SBSQ HOSP IP/OBS HIGH 50: CPT

## 2021-02-11 RX ADMIN — SEVELAMER CARBONATE 800 MILLIGRAM(S): 2400 POWDER, FOR SUSPENSION ORAL at 14:15

## 2021-02-11 RX ADMIN — Medication 600 MILLIGRAM(S): at 05:47

## 2021-02-11 RX ADMIN — Medication 81 MILLIGRAM(S): at 08:07

## 2021-02-11 RX ADMIN — MIDODRINE HYDROCHLORIDE 10 MILLIGRAM(S): 2.5 TABLET ORAL at 05:47

## 2021-02-11 RX ADMIN — SEVELAMER CARBONATE 800 MILLIGRAM(S): 2400 POWDER, FOR SUSPENSION ORAL at 17:46

## 2021-02-11 RX ADMIN — MIDODRINE HYDROCHLORIDE 10 MILLIGRAM(S): 2.5 TABLET ORAL at 17:46

## 2021-02-11 RX ADMIN — TACROLIMUS 1 MILLIGRAM(S): 5 CAPSULE ORAL at 08:07

## 2021-02-11 RX ADMIN — Medication 1 TABLET(S): at 05:47

## 2021-02-11 RX ADMIN — FLUCONAZOLE 400 MILLIGRAM(S): 150 TABLET ORAL at 08:07

## 2021-02-11 RX ADMIN — REMDESIVIR 500 MILLIGRAM(S): 5 INJECTION INTRAVENOUS at 21:35

## 2021-02-11 RX ADMIN — TAMSULOSIN HYDROCHLORIDE 0.4 MILLIGRAM(S): 0.4 CAPSULE ORAL at 21:35

## 2021-02-11 RX ADMIN — CLOPIDOGREL BISULFATE 75 MILLIGRAM(S): 75 TABLET, FILM COATED ORAL at 08:07

## 2021-02-11 RX ADMIN — SEVELAMER CARBONATE 800 MILLIGRAM(S): 2400 POWDER, FOR SUSPENSION ORAL at 08:09

## 2021-02-11 RX ADMIN — Medication 1 TABLET(S): at 14:15

## 2021-02-11 RX ADMIN — ATORVASTATIN CALCIUM 80 MILLIGRAM(S): 80 TABLET, FILM COATED ORAL at 21:35

## 2021-02-11 RX ADMIN — PANTOPRAZOLE SODIUM 40 MILLIGRAM(S): 20 TABLET, DELAYED RELEASE ORAL at 17:46

## 2021-02-11 RX ADMIN — CHLORHEXIDINE GLUCONATE 1 APPLICATION(S): 213 SOLUTION TOPICAL at 18:42

## 2021-02-11 RX ADMIN — TACROLIMUS 1 MILLIGRAM(S): 5 CAPSULE ORAL at 21:35

## 2021-02-11 RX ADMIN — Medication 1 TABLET(S): at 17:46

## 2021-02-11 RX ADMIN — PANTOPRAZOLE SODIUM 40 MILLIGRAM(S): 20 TABLET, DELAYED RELEASE ORAL at 05:47

## 2021-02-11 NOTE — PROGRESS NOTE ADULT - ASSESSMENT
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 Patient was also due for hemodialysis with hyperkalemia K 5.6.  He was found to be hypoxic with SpO2 55% on RA, started on BiPAP and admitted to MICU for acute hypoxemic respiratory failure with need for emergent dialysis. Patient underwent HD with 2L UF on 2/2. He was found to be COVID-19 positive, and was started on Remdesivir and IV Decadron by ID. He was continued on his home Bactrim and Fluconazole. On 2/3, patient spiked fever 103'F with associated hypotension, meeting criteria for septic shock requiring Norepinephrine. He was given 1L IV fluid bolus, and started on Midodrine to offload IV vasopressor requirements. ID was consulted, and patient was started on empiric antibiotics with Meropenem given his immunocompromised state for possible superimposed bacterial pneumonia. Blood cultures were negative. Patient does not make urine and no sputum available to culture. He was weaned off IV vasopressors and continues on Midodrine, with stable blood pressure.  Drop in H/H and (+) FOBT w/ concern for GIB.  GI cleared and pt placed on clears; diet being advanced as tolerated.       Acute hypoxic respiratory failure 2/2 COVID pneumonia  - Pt tolerating well ORA this morning and saturating 100%  - Supplemental O2 as needed if desaturates  - Encourage self- proning  as tolerated and OOB to chair  - Encouraged incentive spirometry   - Pulmonology following      Septic Shock 2/2 possible gram negative pneumonia  - Afebrile and nontoxic appearing  - IV meropenem through 2/11/21  - Blood cultures x 2 NTD  - IV Remdesivir extended to a 10 day course, through 2/11/21     - c/w dexamethasone 6mg daily through 2/11/21  - Monitor Inflammatory markers q48h   - Monitor LFTs while on Remdesivir  - Trend CBC with diff and CMP daily and trend temp curve       AGMA possible 2/2 sepsis vs uremia in the setting of ESRD  - AG elevated and low HCO3 likely due to ESRD/uremia    - s/p HD today  - Monitor CMP closely      Hypocalcemia in the setting of ESRD  - Pt asymptomatic   - Monitor lytes closely       Asymptomatic cholestatic pattern on LFTs  - No abdominal pain reported and benign abd exam  - ALP elevated but improved since admission   - Will monitor LFTs daily          Status post lung transplant  - On tacrolimus  - c/w Bactrim and fluconazole for ppx       ESRD on HD TTS  - c/w HD per nephrology  - Monitor lytes   - Renally dose medications       Normocytic anemia   - Likely of chronic dx in the setting of ESRD on HD  - H/H low but stable and FOB(+).    - GI has low suspicion for GIB.  Resumed ASA & plavix but c/w SCDs for VTE ppx.  - Pt hemodynamically stable with no signs of active bleeding and tolerating advancing diet   - c/w IV PPI BID       - Monitor CBC daily        HLD  - c/w statin therapy      CAD status post PCI in 2019  - Resumed ASA and plavix  - c/w statin therapy      BPH  - On flomax      Thrombocytopenia   - Likely 2/2 sepsis vs less likely protonix   - Will c/w protonix given pt at high risk for stress ulcers   - Plts low but stable and no signs of active bleeding   - Monitor platelet count daily      VTE ppx:  SCDs given (+)FOB  GI ppx: IV protonix BID    Dispo: Pt remains acute but improving.  Now off supplemental O2.  Possible d/c in 1-2 days home, as per family request, if tolerating PO and medically stable.

## 2021-02-11 NOTE — PROGRESS NOTE ADULT - SUBJECTIVE AND OBJECTIVE BOX
Chief Complaint:  hypoxia    SUBJECTIVE / OVERNIGHT EVENTS: Afebrile overnight.  No acute events reported. Pt saturating 100% ORA.  Receiving HD.  Patient denies chest pain, abd pain, N/V, fever, chills, dysuria or any other complaints. All remainder ROS negative.       I&O's Summary        PHYSICAL EXAM:  Vital Signs Last 24 Hrs  T(C): 36.9 (11 Feb 2021 15:05), Max: 37.2 (10 Feb 2021 19:44)  T(F): 98.4 (11 Feb 2021 15:05), Max: 99 (10 Feb 2021 19:44)  HR: 117 (11 Feb 2021 15:05) (92 - 117)  BP: 121/79 (11 Feb 2021 15:05) (121/79 - 149/70)  BP(mean): --  RR: 18 (11 Feb 2021 15:05) (16 - 20)  SpO2: 97% (11 Feb 2021 15:05) (95% - 100%)      GENERAL: frail elderly male, examined bedside, laying comfortably in bed in NAD, receiving HD  HEENT: NC/AT, moist oral mucosa, clear conjunctiva, sclera nonicteric  RESPIRATORY: scattered rales, no wheezing or rhonchi  CARDIOVASCULAR: RRR, normal S1 and S2, no murmur/rub/gallop  ABDOMEN: soft, NT/ND, normoactive bowel sounds, no rebound/guarding  EXTREMITIES: No cynaosis, no clubbing, no lower extremity edema; Peripheral pulses are 2+ bilaterally  PSYCH: affect appropriate and cooperative  NEUROLOGY: A+O to person, place, and time, no focal neurologic deficits appreciated   SKIN: No rashes or no palpable lesions        LABS:                                          11.1   4.50  )-----------( Clumped    ( 11 Feb 2021 08:24 )             31.7     02-11    136  |  94<L>  |  81.0<H>  ----------------------------<  54<LL>  4.5   |  19.0<L>  |  7.92<H>    Ca    7.8<L>      11 Feb 2021 08:24  Phos  7.3     02-11    TPro  5.0<L>  /  Alb  2.4<L>  /  TBili  0.8  /  DBili  0.5<H>  /  AST  43<H>  /  ALT  10  /  AlkPhos  180<H>  02-11        CAPILLARY BLOOD GLUCOSE      POCT Blood Glucose.: 233 mg/dL (06 Feb 2021 12:43)  POCT Blood Glucose.: 116 mg/dL (06 Feb 2021 08:37)  POCT Blood Glucose.: 193 mg/dL (05 Feb 2021 17:19)        RADIOLOGY & ADDITIONAL TESTS:          MEDICATIONS  (STANDING):  aspirin  chewable 81 milliGRAM(s) Oral daily  atorvastatin 80 milliGRAM(s) Oral at bedtime  chlorhexidine 4% Liquid 1 Application(s) Topical <User Schedule>  clopidogrel Tablet 75 milliGRAM(s) Oral daily  dextrose 40% Gel 15 Gram(s) Oral once  dextrose 50% Injectable 25 Gram(s) IV Push once  dextrose 50% Injectable 12.5 Gram(s) IV Push once  dextrose 50% Injectable 25 Gram(s) IV Push once  fluconAZOLE   Tablet 400 milliGRAM(s) Oral <User Schedule>  glucagon  Injectable 1 milliGRAM(s) IntraMuscular once  heparin   Injectable 5000 Unit(s) SubCutaneous every 8 hours  hydrocortisone sodium succinate Injectable 100 milliGRAM(s) IV Push every 8 hours  lactobacillus acidophilus 1 Tablet(s) Oral two times a day  meropenem  IVPB      meropenem  IVPB 500 milliGRAM(s) IV Intermittent every 24 hours  midodrine 10 milliGRAM(s) Oral <User Schedule>  pantoprazole    Tablet 40 milliGRAM(s) Oral before breakfast  remdesivir  IVPB   IV Intermittent   remdesivir  IVPB 100 milliGRAM(s) IV Intermittent every 24 hours  sevelamer carbonate 800 milliGRAM(s) Oral three times a day with meals  tacrolimus 1 milliGRAM(s) Oral daily  tacrolimus 1 milliGRAM(s) Oral at bedtime  tamsulosin 0.4 milliGRAM(s) Oral at bedtime  trimethoprim   80 mG/sulfamethoxazole 400 mG 1 Tablet(s) Oral <User Schedule>    MEDICATIONS  (PRN):  acetaminophen   Tablet .. 650 milliGRAM(s) Oral every 6 hours PRN Temp greater or equal to 38C (100.4F), Mild Pain (1 - 3)

## 2021-02-11 NOTE — PROGRESS NOTE ADULT - SUBJECTIVE AND OBJECTIVE BOX
NEPHROLOGY INTERVAL HPI/OVERNIGHT EVENTS:    No new events.    MEDICATIONS  (STANDING):  aspirin  chewable 81 milliGRAM(s) Oral daily  atorvastatin 80 milliGRAM(s) Oral at bedtime  chlorhexidine 2% Cloths 1 Application(s) Topical daily  clopidogrel Tablet 75 milliGRAM(s) Oral daily  dextrose 40% Gel 15 Gram(s) Oral once  dextrose 50% Injectable 25 Gram(s) IV Push once  dextrose 50% Injectable 12.5 Gram(s) IV Push once  dextrose 50% Injectable 25 Gram(s) IV Push once  fluconAZOLE   Tablet 400 milliGRAM(s) Oral <User Schedule>  glucagon  Injectable 1 milliGRAM(s) IntraMuscular once  guaiFENesin  milliGRAM(s) Oral every 12 hours  lactobacillus acidophilus 1 Tablet(s) Oral two times a day  meropenem  IVPB      meropenem  IVPB 500 milliGRAM(s) IV Intermittent every 24 hours  midodrine 10 milliGRAM(s) Oral <User Schedule>  pantoprazole  Injectable 40 milliGRAM(s) IV Push every 12 hours  remdesivir  IVPB 100 milliGRAM(s) IV Intermittent every 24 hours  sevelamer carbonate 800 milliGRAM(s) Oral three times a day with meals  tacrolimus 1 milliGRAM(s) Oral daily  tacrolimus 1 milliGRAM(s) Oral at bedtime  tamsulosin 0.4 milliGRAM(s) Oral at bedtime  trimethoprim   80 mG/sulfamethoxazole 400 mG 1 Tablet(s) Oral <User Schedule>    MEDICATIONS  (PRN):  acetaminophen   Tablet .. 650 milliGRAM(s) Oral every 6 hours PRN Temp greater or equal to 38C (100.4F), Mild Pain (1 - 3)      Allergies    budesonide (Unknown)  cefpodoxime (Unknown)  Pollen (Unknown        Vital Signs Last 24 Hrs  T(C): 36.7 (11 Feb 2021 04:26), Max: 37.2 (10 Feb 2021 19:44)  T(F): 98 (11 Feb 2021 04:26), Max: 99 (10 Feb 2021 19:44)  HR: 95 (11 Feb 2021 04:26) (93 - 102)  BP: 149/70 (11 Feb 2021 04:26) (136/78 - 149/70)  BP(mean): --  RR: 18 (11 Feb 2021 04:26) (16 - 19)  SpO2: 96% (11 Feb 2021 04:26) (95% - 100%)  T(C): 36.5 (10 Feb 2021 08:18), Max: 38 (10 Feb 2021 00:33)  T(F): 97.7 (10 Feb 2021 08:18), Max: 100.4 (10 Feb 2021 00:33)  HR: 90 (10 Feb 2021 08:18) (58 - 118)  BP: 144/86 (10 Feb 2021 08:18) (119/79 - 144/86)      PHYSICAL EXAM:    Gen: Comfortable in bed   HEENT: Wnl  NERVOUS SYSTEM:  Alert, verbal  EXTREMITIES:  Diffuse muscle wasting  Chest: Nasal 02, no wheezes; right sided permacath site clean  EXT: Muscle wasting noted      LABS:  02-10    137  |  96<L>  |  66.0<H>  ----------------------------<  102<H>  3.9   |  23.0  |  6.46<H>    Ca    7.4<L>      10 Feb 2021 07:10    TPro  4.9<L>  /  Alb  2.4<L>  /  TBili  0.8  /  DBili  0.5<H>  /  AST  41<H>  /  ALT  11  /  AlkPhos  183<H>  02-11                          11.0   4.06  )-----------( 69       ( 09 Feb 2021 06:38 )             32.6     02-09    138  |  95<L>  |  86.0<H>  ----------------------------<  59<L>  4.2   |  23.0  |  7.66<H>    Ca    7.2<L>      09 Feb 2021 06:38  Mg     2.2     02-08    TPro  5.1<L>  /  Alb  2.6<L>  /  TBili  0.6  /  DBili  0.3  /  AST  49<H>  /  ALT  14  /  AlkPhos  169<H>  02-09                RADIOLOGY & ADDITIONAL TESTS:

## 2021-02-11 NOTE — PROVIDER CONTACT NOTE (CRITICAL VALUE NOTIFICATION) - SITUATION
Blood glucose reported 54, patient accu check 71, patient given 4oz apple juice. Recheck [id number] as per MD. patient asymptomatic.

## 2021-02-11 NOTE — PROGRESS NOTE ADULT - ATTENDING COMMENTS
SARA tomorrow.
HD today.
A total of 25 minutes were spent on the entire encounter. Greater than 50% of the time was spent reviewing labs, notes, orders, radiographic studies, as well as counseling and coordinating care with the relevant multidisciplinary team, including with the primary and consulting providers.

## 2021-02-12 ENCOUNTER — TRANSCRIPTION ENCOUNTER (OUTPATIENT)
Age: 71
End: 2021-02-12

## 2021-02-12 VITALS
HEART RATE: 100 BPM | OXYGEN SATURATION: 93 % | RESPIRATION RATE: 19 BRPM | DIASTOLIC BLOOD PRESSURE: 71 MMHG | TEMPERATURE: 98 F | SYSTOLIC BLOOD PRESSURE: 108 MMHG

## 2021-02-12 LAB
ACANTHOCYTES BLD QL SMEAR: SLIGHT — SIGNIFICANT CHANGE UP
ALBUMIN SERPL ELPH-MCNC: 2.3 G/DL — LOW (ref 3.3–5.2)
ALP SERPL-CCNC: 194 U/L — HIGH (ref 40–120)
ALT FLD-CCNC: 8 U/L — SIGNIFICANT CHANGE UP
ANION GAP SERPL CALC-SCNC: 15 MMOL/L — SIGNIFICANT CHANGE UP (ref 5–17)
ANISOCYTOSIS BLD QL: SIGNIFICANT CHANGE UP
AST SERPL-CCNC: 43 U/L — HIGH
BASOPHILS # BLD AUTO: 0.03 K/UL — SIGNIFICANT CHANGE UP (ref 0–0.2)
BASOPHILS NFR BLD AUTO: 0.9 % — SIGNIFICANT CHANGE UP (ref 0–2)
BILIRUB SERPL-MCNC: 0.8 MG/DL — SIGNIFICANT CHANGE UP (ref 0.4–2)
BUN SERPL-MCNC: 44 MG/DL — HIGH (ref 8–20)
BURR CELLS BLD QL SMEAR: PRESENT — SIGNIFICANT CHANGE UP
CALCIUM SERPL-MCNC: 7.6 MG/DL — LOW (ref 8.6–10.2)
CHLORIDE SERPL-SCNC: 95 MMOL/L — LOW (ref 98–107)
CO2 SERPL-SCNC: 24 MMOL/L — SIGNIFICANT CHANGE UP (ref 22–29)
CREAT SERPL-MCNC: 5.68 MG/DL — HIGH (ref 0.5–1.3)
CRP SERPL-MCNC: 26.78 MG/DL — HIGH (ref 0–0.4)
D DIMER BLD IA.RAPID-MCNC: 2527 NG/ML DDU — HIGH
DACRYOCYTES BLD QL SMEAR: SLIGHT — SIGNIFICANT CHANGE UP
EOSINOPHIL # BLD AUTO: 0 K/UL — SIGNIFICANT CHANGE UP (ref 0–0.5)
EOSINOPHIL NFR BLD AUTO: 0 % — SIGNIFICANT CHANGE UP (ref 0–6)
FERRITIN SERPL-MCNC: 3985 NG/ML — HIGH (ref 30–400)
GIANT PLATELETS BLD QL SMEAR: PRESENT — SIGNIFICANT CHANGE UP
GLUCOSE SERPL-MCNC: 64 MG/DL — LOW (ref 70–99)
HCT VFR BLD CALC: 30.2 % — LOW (ref 39–50)
HGB BLD-MCNC: 10.7 G/DL — LOW (ref 13–17)
LDH SERPL L TO P-CCNC: 262 U/L — HIGH (ref 98–192)
LYMPHOCYTES # BLD AUTO: 0.13 K/UL — LOW (ref 1–3.3)
LYMPHOCYTES # BLD AUTO: 3.5 % — LOW (ref 13–44)
MACROCYTES BLD QL: SIGNIFICANT CHANGE UP
MANUAL SMEAR VERIFICATION: SIGNIFICANT CHANGE UP
MCHC RBC-ENTMCNC: 27.6 PG — SIGNIFICANT CHANGE UP (ref 27–34)
MCHC RBC-ENTMCNC: 35.4 GM/DL — SIGNIFICANT CHANGE UP (ref 32–36)
MCV RBC AUTO: 77.8 FL — LOW (ref 80–100)
MONOCYTES # BLD AUTO: 0.37 K/UL — SIGNIFICANT CHANGE UP (ref 0–0.9)
MONOCYTES NFR BLD AUTO: 9.7 % — SIGNIFICANT CHANGE UP (ref 2–14)
MYELOCYTES NFR BLD: 0.9 % — HIGH (ref 0–0)
NEUTROPHILS # BLD AUTO: 3 K/UL — SIGNIFICANT CHANGE UP (ref 1.8–7.4)
NEUTROPHILS NFR BLD AUTO: 79.7 % — HIGH (ref 43–77)
OVALOCYTES BLD QL SMEAR: SLIGHT — SIGNIFICANT CHANGE UP
PLAT MORPH BLD: NORMAL — SIGNIFICANT CHANGE UP
PLATELET # BLD AUTO: 42 K/UL — LOW (ref 150–400)
PLATELET COUNT - ESTIMATE: ABNORMAL
POIKILOCYTOSIS BLD QL AUTO: SIGNIFICANT CHANGE UP
POLYCHROMASIA BLD QL SMEAR: SIGNIFICANT CHANGE UP
POTASSIUM SERPL-MCNC: 4.2 MMOL/L — SIGNIFICANT CHANGE UP (ref 3.5–5.3)
POTASSIUM SERPL-SCNC: 4.2 MMOL/L — SIGNIFICANT CHANGE UP (ref 3.5–5.3)
PROCALCITONIN SERPL-MCNC: 4.34 NG/ML — HIGH (ref 0.02–0.1)
PROMYELOCYTES # FLD: 0.9 % — HIGH (ref 0–0)
PROT SERPL-MCNC: 4.7 G/DL — LOW (ref 6.6–8.7)
RBC # BLD: 3.88 M/UL — LOW (ref 4.2–5.8)
RBC # FLD: 19.8 % — HIGH (ref 10.3–14.5)
RBC BLD AUTO: ABNORMAL
SCHISTOCYTES BLD QL AUTO: SLIGHT — SIGNIFICANT CHANGE UP
SMUDGE CELLS # BLD: PRESENT — SIGNIFICANT CHANGE UP
SODIUM SERPL-SCNC: 134 MMOL/L — LOW (ref 135–145)
TARGETS BLD QL SMEAR: SIGNIFICANT CHANGE UP
VARIANT LYMPHS # BLD: 4.4 % — SIGNIFICANT CHANGE UP (ref 0–6)
WBC # BLD: 3.77 K/UL — LOW (ref 3.8–10.5)
WBC # FLD AUTO: 3.77 K/UL — LOW (ref 3.8–10.5)

## 2021-02-12 PROCEDURE — 99239 HOSP IP/OBS DSCHRG MGMT >30: CPT

## 2021-02-12 RX ORDER — SEVELAMER CARBONATE 2400 MG/1
1 POWDER, FOR SUSPENSION ORAL
Qty: 0 | Refills: 0 | DISCHARGE
Start: 2021-02-12

## 2021-02-12 RX ADMIN — SEVELAMER CARBONATE 800 MILLIGRAM(S): 2400 POWDER, FOR SUSPENSION ORAL at 07:50

## 2021-02-12 RX ADMIN — CLOPIDOGREL BISULFATE 75 MILLIGRAM(S): 75 TABLET, FILM COATED ORAL at 13:31

## 2021-02-12 RX ADMIN — PANTOPRAZOLE SODIUM 40 MILLIGRAM(S): 20 TABLET, DELAYED RELEASE ORAL at 16:47

## 2021-02-12 RX ADMIN — TACROLIMUS 1 MILLIGRAM(S): 5 CAPSULE ORAL at 13:31

## 2021-02-12 RX ADMIN — Medication 81 MILLIGRAM(S): at 13:31

## 2021-02-12 RX ADMIN — MIDODRINE HYDROCHLORIDE 10 MILLIGRAM(S): 2.5 TABLET ORAL at 16:47

## 2021-02-12 RX ADMIN — SEVELAMER CARBONATE 800 MILLIGRAM(S): 2400 POWDER, FOR SUSPENSION ORAL at 13:30

## 2021-02-12 RX ADMIN — Medication 1 TABLET(S): at 05:08

## 2021-02-12 RX ADMIN — MIDODRINE HYDROCHLORIDE 10 MILLIGRAM(S): 2.5 TABLET ORAL at 05:08

## 2021-02-12 RX ADMIN — SEVELAMER CARBONATE 800 MILLIGRAM(S): 2400 POWDER, FOR SUSPENSION ORAL at 16:47

## 2021-02-12 RX ADMIN — CHLORHEXIDINE GLUCONATE 1 APPLICATION(S): 213 SOLUTION TOPICAL at 12:30

## 2021-02-12 RX ADMIN — Medication 1 TABLET(S): at 16:47

## 2021-02-12 RX ADMIN — PANTOPRAZOLE SODIUM 40 MILLIGRAM(S): 20 TABLET, DELAYED RELEASE ORAL at 05:08

## 2021-02-12 NOTE — PROGRESS NOTE ADULT - PROVIDER SPECIALTY LIST ADULT
Infectious Disease
Nephrology
Critical Care
Critical Care
Hospitalist
Infectious Disease
Nephrology
Pulmonology
Hospitalist
MICU
Nephrology
Pulmonology
Hospitalist
Hospitalist
Infectious Disease
Infectious Disease
Nephrology
Pulmonology
Nephrology
Nephrology
Pulmonology

## 2021-02-12 NOTE — DISCHARGE NOTE PROVIDER - NSDCCPCAREPLAN_GEN_ALL_CORE_FT
PRINCIPAL DISCHARGE DIAGNOSIS  Diagnosis: Sepsis with acute hypoxic respiratory failure and septic shock  Assessment and Plan of Treatment: - Resolved  - due to covid pna  - completed course of decadron, remdesivir and emperic antibiotics  - Off supplemental O2   - f/u w/ PMD within 1 wk         SECONDARY DISCHARGE DIAGNOSES  Diagnosis: Anemia of chronic renal failure  Assessment and Plan of Treatment: - Anemia stable    Diagnosis: ESRD (end stage renal disease) on dialysis  Assessment and Plan of Treatment: - c/w HD as per nephrology    Diagnosis: Hypoxia  Assessment and Plan of Treatment: - resolved

## 2021-02-12 NOTE — DISCHARGE NOTE NURSING/CASE MANAGEMENT/SOCIAL WORK - NSDCFUADDAPPT_GEN_ALL_CORE_FT
STAR DOCUMENTATION:  PATIENT'S SPOUSE WANTS TO SET UP CARDIO F/U APPT W/ DR. BRICENO IN [facility] HAS NO ISSUES GETTING HIS MEDS FROM [facility] IN [place]   STAR DOCUMENTATION:  PATIENT'S SPOUSE WANTS TO SET UP CARDIO F/U APPT W/ DR. BRICENO IN [facility] HAS NO ISSUES GETTING HIS MEDS [facility] [place]    ******Dialysis-  Temporary HD seat will be Monday Wednesday Friday at 10:05 am at [place] [facility], [address] [place] [address]. P: [phone].   Transportation has been arranged trough [employer] by [employer].  is at 9:20 am on Monday 2/15. Return at 2:15. ******

## 2021-02-12 NOTE — DISCHARGE NOTE NURSING/CASE MANAGEMENT/SOCIAL WORK - PATIENT PORTAL LINK FT
You can access the FollowMyHealth Patient Portal offered by [facility] by registering at the following website: [email or web address]. By joining [facility]’s FollowMyHealth portal, you will also be able to view your health information using other applications (apps) compatible with our system.

## 2021-02-12 NOTE — PROGRESS NOTE ADULT - SUBJECTIVE AND OBJECTIVE BOX
Chief Complaint:  hypoxia    SUBJECTIVE / OVERNIGHT EVENTS: Afebrile overnight.  No acute events reported. Pt saturating 100% ORA.    Patient denies chest pain, abd pain, N/V, fever, chills, dysuria or any other complaints. All remainder ROS negative.       I&O's Summary        PHYSICAL EXAM:  Vital Signs Last 24 Hrs  T(C): 36.7 (12 Feb 2021 08:24), Max: 37.2 (11 Feb 2021 12:31)  T(F): 98 (12 Feb 2021 08:24), Max: 98.9 (11 Feb 2021 12:31)  HR: 91 (12 Feb 2021 08:24) (76 - 117)  BP: 122/60 (12 Feb 2021 08:24) (100/75 - 132/82)  BP(mean): --  RR: 18 (12 Feb 2021 08:24) (18 - 20)  SpO2: 90% (12 Feb 2021 08:24) (90% - 100%)      GENERAL: frail elderly male, examined bedside, laying comfortably in bed in NAD, receiving HD  HEENT: NC/AT, moist oral mucosa, clear conjunctiva, sclera nonicteric  RESPIRATORY: scattered rales, no wheezing or rhonchi  CARDIOVASCULAR: RRR, normal S1 and S2, no murmur/rub/gallop  ABDOMEN: soft, NT/ND, normoactive bowel sounds, no rebound/guarding  EXTREMITIES: No cynaosis, no clubbing, no lower extremity edema; Peripheral pulses are 2+ bilaterally  PSYCH: affect appropriate and cooperative  NEUROLOGY: A+O to person, place, and time, no focal neurologic deficits appreciated   SKIN: No rashes or no palpable lesions        LABS:                                        10.7   3.77  )-----------( 42       ( 12 Feb 2021 07:25 )             30.2       02-12    134<L>  |  95<L>  |  44.0<H>  ----------------------------<  64<L>  4.2   |  24.0  |  5.68<H>    Ca    7.6<L>      12 Feb 2021 07:25  Phos  7.3     02-11    TPro  4.7<L>  /  Alb  2.3<L>  /  TBili  0.8  /  DBili  x   /  AST  43<H>  /  ALT  8   /  AlkPhos  194<H>  02-12      CAPILLARY BLOOD GLUCOSE      POCT Blood Glucose.: 233 mg/dL (06 Feb 2021 12:43)  POCT Blood Glucose.: 116 mg/dL (06 Feb 2021 08:37)  POCT Blood Glucose.: 193 mg/dL (05 Feb 2021 17:19)        RADIOLOGY & ADDITIONAL TESTS:          MEDICATIONS  (STANDING):  aspirin  chewable 81 milliGRAM(s) Oral daily  atorvastatin 80 milliGRAM(s) Oral at bedtime  chlorhexidine 4% Liquid 1 Application(s) Topical <User Schedule>  clopidogrel Tablet 75 milliGRAM(s) Oral daily  dextrose 40% Gel 15 Gram(s) Oral once  dextrose 50% Injectable 25 Gram(s) IV Push once  dextrose 50% Injectable 12.5 Gram(s) IV Push once  dextrose 50% Injectable 25 Gram(s) IV Push once  fluconAZOLE   Tablet 400 milliGRAM(s) Oral <User Schedule>  glucagon  Injectable 1 milliGRAM(s) IntraMuscular once  heparin   Injectable 5000 Unit(s) SubCutaneous every 8 hours  hydrocortisone sodium succinate Injectable 100 milliGRAM(s) IV Push every 8 hours  lactobacillus acidophilus 1 Tablet(s) Oral two times a day  meropenem  IVPB      meropenem  IVPB 500 milliGRAM(s) IV Intermittent every 24 hours  midodrine 10 milliGRAM(s) Oral <User Schedule>  pantoprazole    Tablet 40 milliGRAM(s) Oral before breakfast  remdesivir  IVPB   IV Intermittent   remdesivir  IVPB 100 milliGRAM(s) IV Intermittent every 24 hours  sevelamer carbonate 800 milliGRAM(s) Oral three times a day with meals  tacrolimus 1 milliGRAM(s) Oral daily  tacrolimus 1 milliGRAM(s) Oral at bedtime  tamsulosin 0.4 milliGRAM(s) Oral at bedtime  trimethoprim   80 mG/sulfamethoxazole 400 mG 1 Tablet(s) Oral <User Schedule>    MEDICATIONS  (PRN):  acetaminophen   Tablet .. 650 milliGRAM(s) Oral every 6 hours PRN Temp greater or equal to 38C (100.4F), Mild Pain (1 - 3)

## 2021-02-12 NOTE — PROGRESS NOTE ADULT - REASON FOR ADMISSION
Hypoxia

## 2021-02-12 NOTE — PROGRESS NOTE ADULT - SUBJECTIVE AND OBJECTIVE BOX
NEPHROLOGY INTERVAL HPI/OVERNIGHT EVENTS:  pt essentially unchanged  no acute distress when seen earlier  tolerated HD yesterday    MEDICATIONS  (STANDING):  aspirin  chewable 81 milliGRAM(s) Oral daily  atorvastatin 80 milliGRAM(s) Oral at bedtime  chlorhexidine 2% Cloths 1 Application(s) Topical daily  clopidogrel Tablet 75 milliGRAM(s) Oral daily  dextrose 40% Gel 15 Gram(s) Oral once  dextrose 50% Injectable 25 Gram(s) IV Push once  dextrose 50% Injectable 12.5 Gram(s) IV Push once  dextrose 50% Injectable 25 Gram(s) IV Push once  fluconAZOLE   Tablet 400 milliGRAM(s) Oral <User Schedule>  glucagon  Injectable 1 milliGRAM(s) IntraMuscular once  lactobacillus acidophilus 1 Tablet(s) Oral two times a day  midodrine 10 milliGRAM(s) Oral <User Schedule>  pantoprazole  Injectable 40 milliGRAM(s) IV Push every 12 hours  sevelamer carbonate 800 milliGRAM(s) Oral three times a day with meals  tacrolimus 1 milliGRAM(s) Oral daily  tacrolimus 1 milliGRAM(s) Oral at bedtime  tamsulosin 0.4 milliGRAM(s) Oral at bedtime  trimethoprim   80 mG/sulfamethoxazole 400 mG 1 Tablet(s) Oral <User Schedule>    MEDICATIONS  (PRN):  acetaminophen   Tablet .. 650 milliGRAM(s) Oral every 6 hours PRN Temp greater or equal to 38C (100.4F), Mild Pain (1 - 3)      Allergies    budesonide (Unknown)  cefpodoxime (Unknown)  Pollen (Unknown)          Vital Signs Last 24 Hrs  T(C): 36.7 (12 Feb 2021 08:24), Max: 37.2 (11 Feb 2021 12:31)  T(F): 98 (12 Feb 2021 08:24), Max: 98.9 (11 Feb 2021 12:31)  HR: 91 (12 Feb 2021 08:24) (76 - 117)  BP: 122/60 (12 Feb 2021 08:24) (100/75 - 132/82)  BP(mean): --  RR: 18 (12 Feb 2021 08:24) (18 - 20)  SpO2: 90% (12 Feb 2021 08:24) (90% - 100%)    PHYSICAL EXAM:  Debilitated  HEENT: Dry mucous membranes  NERVOUS SYSTEM:  Awake  Lungs: Diminished BS at bases  EXTREMITIES:  tr LE edema  Further exam deferred to limit COVID exposure    LABS:                        10.7   3.77  )-----------( 42       ( 12 Feb 2021 07:25 )             30.2     02-12    134<L>  |  95<L>  |  44.0<H>  ----------------------------<  64<L>  4.2   |  24.0  |  5.68<H>    Ca    7.6<L>      12 Feb 2021 07:25  Phos  7.3     02-11    TPro  4.7<L>  /  Alb  2.3<L>  /  TBili  0.8  /  DBili  x   /  AST  43<H>  /  ALT  8   /  AlkPhos  194<H>  02-12            RADIOLOGY & ADDITIONAL TESTS:  < from: CT Chest w/ IV Cont (02.02.21 @ 11:59) >     EXAM:  CT CHEST IC                          PROCEDURE DATE:  02/02/2021          INTERPRETATION:  CLINICAL INFORMATION: Widened mediastinum on chest x-ray. COVID.    COMPARISON: Chest x-ray of the same day    PROCEDURE:  CT of the Chest was performed with intravenous contrast.  88 ml of Omnipaque 300 was injected intravenously. 12 ml were discarded.  Sagittal and coronal reformats were performed.    FINDINGS:    LUNGS AND AIRWAYS: Patent central airways.  Bilateral lung opacities including areas of dense consolidation, largest in the lower lobes with additional patchy groundglass opacities and areas of nodularity, greatest in the right upper lobe. There is also compressive atelectasis in both lower lobes secondary to the effusions. Interlobular septal thickening, likely pulmonary edema.  PLEURA: Small to moderate left and small right partially loculated pleural effusions. Fluid in the right and left paramediastinal region including superior to the azygos vein on the right and to the left of the aortic arch and descending thoracic aorta, likely in the pleural space. The fluid in the superior paramediastinal region likely accounts for the appearance of the mediastinum on the chest x-ray of the same day.  MEDIASTINUM AND JANNY: No pathologically enlarged lymph nodes. Metallic densities in the subcarinal region; correlation is recommended with the surgical/procedural history.  VESSELS: Thoracic aorta normal in caliber with atherosclerotic changes. Coronary artery calcifications.  HEART: Enlarged. No pericardial effusion.  CHEST WALL AND LOWER NECK: Right IJ central line with the tip at the cavoatrial junction. Small amount of pericatheter thrombus (series 3 image 43). Bilateral gynecomastia. Anasarca.  VISUALIZED UPPER ABDOMEN: Bilateral nonobstructing renal calculi. Atrophy of the medial segment of the left hepatic lobe with a subtle nodular surface contour of the liver which can be seen in the setting of cirrhosis. Small amount of perihepatic ascites. Peripheral region of low attenuation in the spleen measuring 1.9 cm (series 2 image 143), likely an infarct. Partially imaged cystic lesion in the region of the pancreatic body and junction measuring 1.4 cm (series 3 image 149). 1.2 cm cyst in the upper pole the right kidney.  BONES: Multilevel compression fractures in the thoracic spine, many which are new including T5-T8 and T10. Chronic compression fractures at T4, T9, T11, T12, L1, and L2. Old sternal fracture with sternal wires in the mid sternum. Old bilateral rib fractures.    IMPRESSION:  Small to moderate left and small right loculated pleural effusions with fluid in the right and left paramediastinal regions; paramediastinal fluid likely accounts for the appearance of the mediastinum on the chest x-ray of the same day.    Bilateral lung opacities as described above; differential includes multifocal pneumonia and/or pulmonary edema.    Interlobular septal thickening, likely pulmonary edema.    Right IJ central line with a small amount of pericatheter thrombus.    Multilevel compression fractures in the thoracic and lumbar spine, some which are new since 9/26/2019; a thoracic spine MRI could be obtained to assess for acuity.    Peripheral region of low attenuation in the spleen, likely an infarct.    Partially imaged 1.4 cm cystic lesion in the pancreatic body tail junction; a nonemergent MRI of the abdomen is recommended for further evaluation.    < end of copied text >

## 2021-02-12 NOTE — DISCHARGE NOTE PROVIDER - HOSPITAL COURSE
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  On arrival pt also found to have hyperkalemia w/ K 5.6 and was due for HD.  Pt was hypoxic with SpO2 55% ORA on admission and found to be COVID (+).  He was placed on BiPAP and admitted to MICU for acute hypoxemic respiratory failure w/ need for emergent dialysis.  Patient underwent HD with 2L UF on 2/2.   Pt was started on Remdesivir and IV Decadron by ID.  He was continued on his home Bactrim and Fluconazole.  On 2/3, patient spiked fever 103'F with associated hypotension, meeting criteria for septic shock requiring Norepinephrine and started on Midodrine to offload IV vasopressor requirements.  Empiric antibiotics in the form of Meropenem was started by ID given his immunocompromised state for possible superimposed bacterial pneumonia.  Blood cultures were negative. Patient does not make urine and no sputum available to culture. He was weaned off IV vasopressors and continued on Midodrine, with stable blood pressure.  Pt also had a drop in H/H and (+) FOBT w/ concern for GIB.  GI cleared and diet resumed and tolerating w/out further decline in H/H.  Pt titrated off supplemental O2 and saturating 100% ORA.  Sepsis resolved and pt hemodynamically stable and medically cleared for discharge home w/ home care.             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  On arrival pt also found to have hyperkalemia w/ K 5.6 and was due for HD.  Pt was hypoxic with SpO2 55% ORA on admission and found to be COVID (+).  He was placed on BiPAP and admitted to MICU for acute hypoxemic respiratory failure w/ need for emergent dialysis.  Patient underwent HD with 2L UF on 2/2.   Pt was started on Remdesivir and IV Decadron by ID.  He was continued on his home Bactrim and Fluconazole.  On 2/3, patient spiked fever 103'F with associated hypotension, meeting criteria for septic shock requiring Norepinephrine and started on Midodrine to offload IV vasopressor requirements.  Empiric antibiotics in the form of Meropenem was started by ID given his immunocompromised state for possible superimposed bacterial pneumonia.  Blood cultures were negative. Patient does not make urine and no sputum available to culture. He was weaned off IV vasopressors and continued on Midodrine, with stable blood pressure.  Pt also had a drop in H/H and (+) FOBT w/ concern for GIB.  GI cleared and diet resumed and tolerating w/out further decline in H/H.  Pt titrated off supplemental O2 and saturating 100% ORA.  Sepsis resolved and pt hemodynamically stable and medically cleared for discharge home w/ home care.            Time spent on discharge 47 minutes.

## 2021-02-12 NOTE — DISCHARGE NOTE PROVIDER - PROVIDER TOKENS
PROVIDER:[TOKEN:[[id number]],FOLLOWUP:[2 weeks]],PROVIDER:[TOKEN:[[id number]],FOLLOWUP:[1-3 days]]

## 2021-02-12 NOTE — PROGRESS NOTE ADULT - ASSESSMENT
ESRD: lung transplant patient on chronic immunosuppression   +COVID PNA  - cont supportive care  - pt to be discharged today==> will be changed to MWF schedule; will arrange for additional HD today to establish new outpatient schedue    Anemia: +CKD  - still no need for MENDY  - follow H/H     RO: hyperphosphatemia  - low phos diet   - cont binders

## 2021-02-12 NOTE — DISCHARGE NOTE PROVIDER - NSDCFUADDAPPT_GEN_ALL_CORE_FT
STAR DOCUMENTATION:  PATIENT'S SPOUSE WANTS TO SET UP CARDIO F/U APPT W/ DR. BRICENO IN [facility] HAS NO ISSUES GETTING HIS MEDS FROM [facility] IN [place]

## 2021-02-12 NOTE — DISCHARGE NOTE PROVIDER - NSDCMRMEDTOKEN_GEN_ALL_CORE_FT
aspirin 81 mg oral tablet: 1 tab(s) orally once a day starting May 10, 2020  Bactrim 400 mg-80 mg oral tablet: 1 tablet M/W/F  Bactrim  mg-160 mg oral tablet: 1 tab(s) orally once a dayevery Mpmday/ Wednesday / Friday  calcium carbonate: 1 tablet in mid morning and 1 tablet in evening.   clopidogrel 75 mg oral tablet: 1 tab(s) orally once a day  ferrous gluconate 324 mg (38 mg elemental iron) oral tablet: orally once a day (in the evening)  Flomax 0.4 mg oral capsule: 1 cap(s) orally once a day  fluconazole: 2 tablets T/TH/Sat (on dialysis days)  hydrocortisone 10 mg oral tablet: 1 tab(s) orally once a day (at bedtime)  hydrocortisone 20 mg oral tablet: 1 tab(s) orally once a day  Lasix 40 mg oral tablet: 1 tab(s) orally once a day  metoprolol succinate 25 mg oral tablet, extended release: 1 tab(s) orally once a day  Multiple Vitamins oral tablet: 1 tab(s) orally once a day  oxyCODONE 10 mg oral tablet: 1 tab(s) orally every 3 hours, As needed, Moderate Pain  oxyCODONE 10 mg oral tablet, extended release: 1 tab(s) orally every 12 hours  oxyCODONE 5 mg oral tablet: 1 tab(s) orally every 3 hours, As needed, Mild Pain  oxyCODONE 5 mg oral tablet: 1 tab(s) orally every 4 hours, As Needed MDD:6 tabs   Prograf: take 1.5mg mid-morning and 1mg @bedtime.   Protonix 40 mg oral delayed release tablet: 1 tab(s) orally once a day  rosuvastatin 40 mg oral tablet: 1 tab(s) orally once a day   aspirin 81 mg oral tablet: 1 tab(s) orally once a day starting May 10, 2020  Bactrim 400 mg-80 mg oral tablet: 1 tablet M/W/F  Bactrim  mg-160 mg oral tablet: 1 tab(s) orally once a dayevery Mpmday/ Wednesday / Friday  calcium carbonate: 1 tablet in mid morning and 1 tablet in evening.   clopidogrel 75 mg oral tablet: 1 tab(s) orally once a day  ferrous gluconate 324 mg (38 mg elemental iron) oral tablet: orally once a day (in the evening)  Flomax 0.4 mg oral capsule: 1 cap(s) orally once a day  fluconazole: 2 tablets T/TH/Sat (on dialysis days)  hydrocortisone 20 mg oral tablet: 1 tab(s) orally once a day  Lasix 40 mg oral tablet: 1 tab(s) orally once a day  metoprolol succinate 25 mg oral tablet, extended release: 1 tab(s) orally once a day  Multiple Vitamins oral tablet: 1 tab(s) orally once a day  oxyCODONE 10 mg oral tablet: 1 tab(s) orally every 3 hours, As needed, Moderate Pain  oxyCODONE 5 mg oral tablet: 1 tab(s) orally every 3 hours, As needed, Mild Pain  Prograf: take 1.5mg mid-morning and 1mg @bedtime.   Protonix 40 mg oral delayed release tablet: 1 tab(s) orally once a day  rosuvastatin 40 mg oral tablet: 1 tab(s) orally once a day  sevelamer carbonate 800 mg oral tablet: 1 tab(s) orally 3 times a day (with meals)

## 2021-02-12 NOTE — DISCHARGE NOTE PROVIDER - CARE PROVIDER_API CALL
Fam Lugo)  Cardiovascular Disease; Internal Medicine  [address], [address]  [place] [address]  Phone: [phone]  Fax: [phone]  Follow Up Time: 2 weeks    Fam Maher)  Nephrology  [address], [place] [address]  Phone: [phone]  Fax: [phone]  Follow Up Time: 1-3 days

## 2021-02-12 NOTE — PROGRESS NOTE ADULT - ASSESSMENT
69 y/o M ex smoker with PMHx of Lung Transplant for severe emphysema 2015 at [facility] on anti-rejection meds, CAD s/p PCI 2019, ESRD on HD as of 2/2020 (T/R/Sa), HLD, BPH, recent hospitalization at [facility] 12/2020 for cryptococcal meningitis, who presented to ED with complaints of cough that onset 1 day prior to arrival with progressively worsening dyspnea. Patient was also due for hemodialysis with hyperkalemia K 5.6.  He was found to be hypoxic with SpO2 55% on RA, started on BiPAP and admitted to MICU for acute hypoxemic respiratory failure with need for emergent dialysis. Patient underwent HD with 2L UF on 2/2. He was found to be COVID-19 positive, and was started on Remdesivir and IV Decadron by ID. He was continued on his home Bactrim and Fluconazole. On 2/3, patient spiked fever 103'F with associated hypotension, meeting criteria for septic shock requiring Norepinephrine. He was given 1L IV fluid bolus, and started on Midodrine to offload IV vasopressor requirements. ID was consulted, and patient was started on empiric antibiotics with Meropenem given his immunocompromised state for possible superimposed bacterial pneumonia. Blood cultures were negative. Patient does not make urine and no sputum available to culture. He was weaned off IV vasopressors and continues on Midodrine, with stable blood pressure.  Drop in H/H and (+) FOBT w/ concern for GIB.  GI cleared and pt placed on clears; diet being advanced as tolerated.       Acute hypoxic respiratory failure 2/2 COVID pneumonia  - Pt tolerating well ORA this morning and saturating 100%  - Supplemental O2 as needed if desaturates  - Encourage self- proning  as tolerated and OOB to chair  - Encouraged incentive spirometry   - Pulmonology following      Septic Shock 2/2 possible gram negative pneumonia  - Afebrile and nontoxic appearing  - IV meropenem completed 2/11/21  - Blood cultures x 2 NTD  - Remdesivir extended to a 10 day course, completed 2/11/21     - Dexamethasone 6mg daily and completed 2/11/21  - Monitor Inflammatory markers q48h   - Monitor LFTs while on Remdesivir  - Trend CBC with diff and CMP daily and trend temp curve       AGMA possible 2/2 sepsis vs uremia in the setting of ESRD  - AG elevated and low HCO3 likely due to ESRD/uremia    - s/p HD yesterday  - Monitor CMP closely      Hypocalcemia in the setting of ESRD  - Pt asymptomatic   - Monitor lytes closely       Asymptomatic cholestatic pattern on LFTs  - No abdominal pain reported and benign abd exam  - ALP elevated but improved since admission   - Will monitor LFTs daily          Status post lung transplant  - On tacrolimus  - c/w Bactrim and fluconazole for ppx       ESRD on HD TTS  - c/w HD per nephrology  - Monitor lytes   - Renally dose medications       Normocytic anemia   - Likely of chronic dx in the setting of ESRD on HD  - H/H low but stable and FOB(+).    - GI has low suspicion for GIB.  Resumed ASA & plavix but c/w SCDs for VTE ppx.  - Pt hemodynamically stable with no signs of active bleeding and tolerating advancing diet   - c/w IV PPI BID       - Monitor CBC daily        HLD  - c/w statin therapy      CAD status post PCI in 2019  - Resumed ASA and plavix  - c/w statin therapy      BPH  - On flomax      Thrombocytopenia   - Likely 2/2 sepsis vs less likely protonix   - Will c/w protonix given pt at high risk for stress ulcers   - Plts low but stable and no signs of active bleeding   - Monitor platelet count daily      VTE ppx:  SCDs given (+)FOB  GI ppx: protonix BID    Dispo: Now off supplemental O2.  Possible d/c in 1-2 days home, as per family request, given pt now tolerating PO and medically stable.

## 2021-02-13 ENCOUNTER — TRANSCRIPTION ENCOUNTER (OUTPATIENT)
Age: 71
End: 2021-02-13

## 2021-02-15 NOTE — CDI QUERY NOTE - NSCDIOTHERTXTBX_GEN_ALL_CORE_HH
Can the present on admission (POA) Status of sepsis be clarified?    A.	Sepsis present on admission  B.	Other, please specify  C.	Not clinically significant      Supporting Documentations:    2/2/21 @ 0552 ED ADULT Flow Sheet  Heart Rate (beats/min): 114 /min  Respiration Rate (breaths/min): 38 /min  SpO2 (%): 93 %  Patient On (Patient Delivery Method): BiPAP/CPAP  Oxygen Flow (L/min): 15 L/min    @ 0632:  Heart Rate (beats/min): 111 /min  Respiration Rate (breaths/min): 32 /min    @0900:  Heart Rate (beats/min): 100 /min  Respiration Rate (breaths/min): 26 /min    @ 1052:  Heart Rate (beats/min): 94 /min  Respiration Rate (breaths/min): 22 /min    @ 2200:  Respiration Rate (breaths/min): 26 /min    @ 2311:  Heart Rate (beats/min): 118 /min    2/3/21 @ 0801 CPOE: Vital Signs Adult  Temp (F): 103.4 Degrees F  Temp (C): 39.7 Degrees C  Heart Rate (beats/min): 135 /min  Respiration Rate (breaths/min): 36 /min    @0900:  Temp (F): 100.2 Degrees F  Temp (C): 37.9 Degrees C  Heart Rate (beats/min): 141 /min  BP Systolic: 74 mm Hg  BP Diastolic (mm Hg): 50 mm Hg  Respiration Rate (breaths/min): 40 /min    @ 0905:  BP Systolic: 79 mm Hg  BP Diastolic (mm Hg): 53 mm Hg    @ 0915:  Heart Rate (beats/min): 140 /min  BP Systolic: 75 mm Hg  BP Diastolic (mm Hg): 50 mm Hg      ED provider note:  SEPSIS – was this patient treated for sepsis? No.      H&P Adult:   Admit to MICU  Acute respiratory distress  -s/p lung Tx for COPD on anti rejection meds  -Pulm consult  -cont current meds  -BiPap 12/6 100%, O2 sat 97%  -f/u CT chest      2/3/21 Progress Note Critical Care:  Assessment/Plan/Therapeutic interventions:  1. Acute Hypoxemic Respiratory Failure  2. Septic Shock  3. COVID-19 viral pneumonia  4. R/O Superimposed bacterial pneumonia in immunocompromised patient  5. ESRD   6. Lung Transplant    BiPAP weaned off to HFNC, titrating to keep SpO2 >90%  Started Norepinephrine, titrating to keep MAP >65. Added midodrine to offload IV vasopressor burden.      2/12/21 Progress Note Hospitalist:  Septic Shock 2/2 possible gram negative pneumonia  AGMA possible 2/2 sepsis vs uremia in the setting of ESRD      Discharge Note Provider:  On 2/3, patient spiked fever 103'F with associated hypotension, meeting criteria for septic shock requiring Norepinephrine and started on Midodrine to offload IV vasopressor requirements.  Empiric antibiotics in the form of Meropenem was started by ID given his immunocompromised state for possible superimposed bacterial pneumonia.

## 2021-02-16 ENCOUNTER — TRANSCRIPTION ENCOUNTER (OUTPATIENT)
Age: 71
End: 2021-02-16

## 2021-03-05 ENCOUNTER — INPATIENT (INPATIENT)
Facility: HOSPITAL | Age: 71
LOS: 2 days | Discharge: HOME | DRG: 683 | End: 2021-03-08
Attending: FAMILY MEDICINE | Admitting: FAMILY MEDICINE
Payer: MEDICARE

## 2021-03-05 VITALS
HEIGHT: 64 IN | OXYGEN SATURATION: 98 % | RESPIRATION RATE: 18 BRPM | HEART RATE: 74 BPM | SYSTOLIC BLOOD PRESSURE: 122 MMHG | TEMPERATURE: 99 F | WEIGHT: 136.03 LBS | DIASTOLIC BLOOD PRESSURE: 64 MMHG

## 2021-03-05 DIAGNOSIS — D64.9 ANEMIA, UNSPECIFIED: ICD-10-CM

## 2021-03-05 DIAGNOSIS — Z94.2 LUNG TRANSPLANT STATUS: Chronic | ICD-10-CM

## 2021-03-05 LAB
ALBUMIN SERPL ELPH-MCNC: 2.7 G/DL — LOW (ref 3.3–5.2)
ALP SERPL-CCNC: 335 U/L — HIGH (ref 40–120)
ALT FLD-CCNC: 32 U/L — SIGNIFICANT CHANGE UP
ANION GAP SERPL CALC-SCNC: 11 MMOL/L — SIGNIFICANT CHANGE UP (ref 5–17)
ANISOCYTOSIS BLD QL: SLIGHT — SIGNIFICANT CHANGE UP
APTT BLD: 26.3 SEC — LOW (ref 27.5–35.5)
AST SERPL-CCNC: 38 U/L — SIGNIFICANT CHANGE UP
BASOPHILS # BLD AUTO: 0.04 K/UL — SIGNIFICANT CHANGE UP (ref 0–0.2)
BASOPHILS NFR BLD AUTO: 0.8 % — SIGNIFICANT CHANGE UP (ref 0–2)
BILIRUB SERPL-MCNC: 0.7 MG/DL — SIGNIFICANT CHANGE UP (ref 0.4–2)
BLD GP AB SCN SERPL QL: SIGNIFICANT CHANGE UP
BUN SERPL-MCNC: 19 MG/DL — SIGNIFICANT CHANGE UP (ref 8–20)
CALCIUM SERPL-MCNC: 8.7 MG/DL — SIGNIFICANT CHANGE UP (ref 8.6–10.2)
CHLORIDE SERPL-SCNC: 97 MMOL/L — LOW (ref 98–107)
CO2 SERPL-SCNC: 27 MMOL/L — SIGNIFICANT CHANGE UP (ref 22–29)
CREAT SERPL-MCNC: 3.85 MG/DL — HIGH (ref 0.5–1.3)
ELLIPTOCYTES BLD QL SMEAR: SLIGHT — SIGNIFICANT CHANGE UP
EOSINOPHIL # BLD AUTO: 0 K/UL — SIGNIFICANT CHANGE UP (ref 0–0.5)
EOSINOPHIL NFR BLD AUTO: 0 % — SIGNIFICANT CHANGE UP (ref 0–6)
FERRITIN SERPL-MCNC: 1828 NG/ML — HIGH (ref 30–400)
GIANT PLATELETS BLD QL SMEAR: PRESENT — SIGNIFICANT CHANGE UP
GLUCOSE SERPL-MCNC: 119 MG/DL — HIGH (ref 70–99)
HCT VFR BLD CALC: 23.8 % — LOW (ref 39–50)
HGB BLD-MCNC: 7.1 G/DL — LOW (ref 13–17)
INR BLD: 1.13 RATIO — SIGNIFICANT CHANGE UP (ref 0.88–1.16)
IRON SATN MFR SERPL: 17 % — SIGNIFICANT CHANGE UP (ref 16–55)
IRON SATN MFR SERPL: 33 UG/DL — LOW (ref 59–158)
LYMPHOCYTES # BLD AUTO: 0.15 K/UL — LOW (ref 1–3.3)
LYMPHOCYTES # BLD AUTO: 2.6 % — LOW (ref 13–44)
MACROCYTES BLD QL: SLIGHT — SIGNIFICANT CHANGE UP
MANUAL SMEAR VERIFICATION: SIGNIFICANT CHANGE UP
MCHC RBC-ENTMCNC: 27.2 PG — SIGNIFICANT CHANGE UP (ref 27–34)
MCHC RBC-ENTMCNC: 29.8 GM/DL — LOW (ref 32–36)
MCV RBC AUTO: 91.2 FL — SIGNIFICANT CHANGE UP (ref 80–100)
MICROCYTES BLD QL: SLIGHT — SIGNIFICANT CHANGE UP
MONOCYTES # BLD AUTO: 0.48 K/UL — SIGNIFICANT CHANGE UP (ref 0–0.9)
MONOCYTES NFR BLD AUTO: 8.6 % — SIGNIFICANT CHANGE UP (ref 2–14)
MYELOCYTES NFR BLD: 1.7 % — HIGH (ref 0–0)
NEUTROPHILS # BLD AUTO: 4.77 K/UL — SIGNIFICANT CHANGE UP (ref 1.8–7.4)
NEUTROPHILS NFR BLD AUTO: 84.5 % — HIGH (ref 43–77)
NEUTS BAND # BLD: 0.9 % — SIGNIFICANT CHANGE UP (ref 0–8)
OB PNL STL: NEGATIVE — SIGNIFICANT CHANGE UP
PLAT MORPH BLD: NORMAL — SIGNIFICANT CHANGE UP
PLATELET # BLD AUTO: SIGNIFICANT CHANGE UP K/UL (ref 150–400)
POIKILOCYTOSIS BLD QL AUTO: SLIGHT — SIGNIFICANT CHANGE UP
POLYCHROMASIA BLD QL SMEAR: SLIGHT — SIGNIFICANT CHANGE UP
POTASSIUM SERPL-MCNC: 4.7 MMOL/L — SIGNIFICANT CHANGE UP (ref 3.5–5.3)
POTASSIUM SERPL-SCNC: 4.7 MMOL/L — SIGNIFICANT CHANGE UP (ref 3.5–5.3)
PROT SERPL-MCNC: 5.9 G/DL — LOW (ref 6.6–8.7)
PROTHROM AB SERPL-ACNC: 13 SEC — SIGNIFICANT CHANGE UP (ref 10.6–13.6)
RBC # BLD: 2.61 M/UL — LOW (ref 4.2–5.8)
RBC # FLD: 21.1 % — HIGH (ref 10.3–14.5)
RBC BLD AUTO: ABNORMAL
SODIUM SERPL-SCNC: 135 MMOL/L — SIGNIFICANT CHANGE UP (ref 135–145)
TIBC SERPL-MCNC: 192 UG/DL — LOW (ref 220–430)
TRANSFERRIN SERPL-MCNC: 134 MG/DL — LOW (ref 180–329)
VARIANT LYMPHS # BLD: 0.9 % — SIGNIFICANT CHANGE UP (ref 0–6)
VIT B12 SERPL-MCNC: 908 PG/ML — SIGNIFICANT CHANGE UP (ref 232–1245)
WBC # BLD: 5.58 K/UL — SIGNIFICANT CHANGE UP (ref 3.8–10.5)
WBC # FLD AUTO: 5.58 K/UL — SIGNIFICANT CHANGE UP (ref 3.8–10.5)

## 2021-03-05 PROCEDURE — 99223 1ST HOSP IP/OBS HIGH 75: CPT

## 2021-03-05 PROCEDURE — 99285 EMERGENCY DEPT VISIT HI MDM: CPT | Mod: CS

## 2021-03-05 RX ORDER — TACROLIMUS 5 MG/1
1 CAPSULE ORAL ONCE
Refills: 0 | Status: COMPLETED | OUTPATIENT
Start: 2021-03-05 | End: 2021-03-05

## 2021-03-05 RX ORDER — HYDRALAZINE HCL 50 MG
10 TABLET ORAL ONCE
Refills: 0 | Status: COMPLETED | OUTPATIENT
Start: 2021-03-05 | End: 2021-03-05

## 2021-03-05 NOTE — ED PROVIDER NOTE - NS ED MD DISPO DIVISION
Chief Complaint   Patient presents with   • Routine Prenatal Visit     pt states no c/o       HPI: More is a  currently at 29w0d who today reports the following:  Contractions - No; Leaking - No; Vaginal bleeding -  No; Heartburn - No.  Denies any abdominal pain    ROS:  GI: Nausea - No ; Constipation - No; Diarrhea - No    Neuro: Headache - No ; Visual change - No      EXAM:  Vitals: See prenatal flowsheet   Abdomen: See prenatal flowsheet   Urine glucose/protein: See prenatal flowsheet   Pelvic: See prenatal flowsheet     Lab Results   Component Value Date    ABO O 2018    RH Positive 2018    ABSCRN Negative 2018       MDM:  Impression: 1. Supervision of low risk pregnancy  2. History of biliary colic/GB sludge- symptomaticaly improved after inpatietnt stay   3. History of UTI during recent admission only sensitive to rocephin   4. Fetal VSD   Tests done today: 1. none   Topics discussed: 1. Continue with PNV's  2. Prenatal labs reviewed  3. TDAP vaccination  4. Recommended getting glucola done prior to next visi t   5. Forgot to have patient get rocephin today. Will have her come when she comes for GCT.  6. Reviewed importance of follow up PDC scan in a few weeks    Tests scheduled today for her next visit:   none        [facility]

## 2021-03-05 NOTE — H&P ADULT - NSICDXPASTMEDICALHX_GEN_ALL_CORE_FT
unknown PAST MEDICAL HISTORY:  Emphysema of lung     ESRD (end stage renal disease) on dialysis

## 2021-03-05 NOTE — ED PROVIDER NOTE - CPE EDP SKIN NORM
Verified Results  TSH 09Jun2017 09:58AM NANETTE SABIHA   [Jun 14, 2017 1:06PM [facility]]  thyroid tests are normal. Continue current thyroid medication     Test Name Result Flag Reference   TSH 3.87  0.30-4.82     T4, FREE 09Jun2017 09:57AM SABIHA FITZPATRICK   [Jun 14, 2017 1:06PM [facility]]  thyroid tests are normal. Continue current thyroid medication     Test Name Result Flag Reference   FREE T4 1.38  0.78-2.19        normal...

## 2021-03-05 NOTE — ED ADULT NURSE NOTE - OBJECTIVE STATEMENT
70 year old male, PMHx of ESRD on HD, present to the ED with c/o low hemoglobin. Pt states that he was called by his MD and told to come to the ED for a blood transfusion. Pt reports some SOB and increased weakness over the last few days. Patient denies any active bleeding or black stools. Patient A/Ox4, respirations are even and non labored, NSR on the monitor, abdomen is soft and non tender, NAD noted.

## 2021-03-05 NOTE — H&P ADULT - NSICDXPASTSURGICALHX_GEN_ALL_CORE_FT
PAST SURGICAL HISTORY:  H/O lung transplant bilateral - transplant - 1-2-2015 -  [facility]

## 2021-03-05 NOTE — CHART NOTE - NSCHARTNOTEFT_GEN_A_CORE
RN called to report patients' BP elevated asymptomatic , stated he did not get his schedule medication today.  RX 1 time dose of Hydralazine 10 mg IVP.     Vital Signs Last 24 Hrs  T(C): 37.1 (05 Mar 2021 22:35), Max: 37.1 (05 Mar 2021 22:35)  T(F): 98.7 (05 Mar 2021 22:35), Max: 98.7 (05 Mar 2021 22:35)  HR: 72 (05 Mar 2021 22:35) (69 - 74)  BP: 171/81 (05 Mar 2021 22:35) (122/64 - 175/86)  BP(mean): --  RR: 18 (05 Mar 2021 22:35) (18 - 19)  SpO2: 99% (05 Mar 2021 22:35) (98% - 99%)

## 2021-03-05 NOTE — H&P ADULT - HISTORY OF PRESENT ILLNESS
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 Patient reported generalized weakness x 1 week otherwise no shortness of breath, chest pain, melena, nausea, vomiting, abdominal pain, dizziness.

## 2021-03-05 NOTE — ED PROVIDER NOTE - OBJECTIVE STATEMENT
70 year old male with extensive PMH including COPD s/p lung transplant in 2015 on anti-rejection meds, CAD, ESRD on dialysis T,TH,S, cryptococcal meninigits 12/2020 and covid 2/2021 presents with anemia. Pt reports 1 week of generalized weakness and fatigue. He had blood work drawn at dialysis yesterday and was told that his hgb was 6.8 and to come to the ED. He denies Sx while at rest 70 year old male with extensive PMH including COPD s/p lung transplant in 2015 on anti-rejection meds, CAD, ESRD on dialysis T,TH,S, cryptococcal meninigitis 12/2020 and covid 2/2021 presents with anemia. Pt reports 1 week of generalized weakness and fatigue. He had blood work drawn at dialysis yesterday and was told that his hgb was 6.8 and to come to the ED. He denies Sx while at rest and denies chets pain, SOB, fevers, abd pain, bloody stools.

## 2021-03-05 NOTE — ED ADULT TRIAGE NOTE - CHIEF COMPLAINT QUOTE
patient sent here from Dialysis for Low Hemoglobin and was sent here for Blood transfusion. MD Burns is nephrologist. patient c/o increased weakness within last couple days. HD Desiree Nelson, Sat. through right CW Dialysis port.

## 2021-03-05 NOTE — ED PROVIDER NOTE - CPE EDP RESP NORM
normal... Odomzo Counseling- I discussed with the patient the risks of Odomzo including but not limited to nausea, vomiting, diarrhea, constipation, weight loss, changes in the sense of taste, decreased appetite, muscle spasms, and hair loss.  The patient verbalized understanding of the proper use and possible adverse effects of Odomzo.  All of the patient's questions and concerns were addressed.

## 2021-03-05 NOTE — ED ADULT NURSE NOTE - CAS TRG GEN SKIN CONDITION
Ilia Thomson had an appointment with us today 11/27/2018. Please excuse Darin Borja from work today as they had to accompany the patient to their appointment.        Thank you,         [email or web address].  Electronically Signed   Warm/Dry 170.18

## 2021-03-05 NOTE — ED PROVIDER NOTE - CLINICAL SUMMARY MEDICAL DECISION MAKING FREE TEXT BOX
Pt with symptomatic anemia. Discused case with renal, they advise 1 unit here in E and will give another with dialysis tomorrow

## 2021-03-05 NOTE — ED ADULT NURSE REASSESSMENT NOTE - NS ED NURSE REASSESS COMMENT FT1
assumed care of pt, Pt in no apparent distress at this time. Airway patent, breathing spontaneous and nonlabored. Pt A&Ox3 resting in stretcher. Pt with no complaints at this time. blood currently infusing. pt with no complaints at this time

## 2021-03-05 NOTE — ED PROVIDER NOTE - SKIN NEGATIVE STATEMENT, MLM
I reviewed the patient's CT scan findings. There is no new changes.    Again since the patient is not showing any signs of infection, and says no fever and her WBC is normal, and her abdomen is soft and non tender, and the fistula is well controlled with the drain, and the fact that the fistula output is low , we will continue with the medical management and avoid surgery at any cost.   We will wait for patient placement, and the patient and the family knows that this may take a months for it to possibly heal. no abrasions, no jaundice, no lesions, no pruritis, and no rashes.

## 2021-03-05 NOTE — ED ADULT NURSE NOTE - NSIMPLEMENTINTERV_GEN_ALL_ED
Implemented All Universal Safety Interventions:  [place] to call system. Call bell, personal items and telephone within reach. Instruct patient to call for assistance. Room bathroom lighting operational. Non-slip footwear when patient is off stretcher. Physically safe environment: no spills, clutter or unnecessary equipment. Stretcher in lowest position, wheels locked, appropriate side rails in place.

## 2021-03-05 NOTE — H&P ADULT - ASSESSMENT
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 Patient reported generalized weakness x 1 week otherwise no shortness of breath, chest pain, melena, nausea, vomiting, abdominal pain, dizziness.     Anemia 2/2 chronic renal dx   Admit to medical floor   Hb: 7.1   S/p 1 unit of PRBC   Post transfusion CBC in AM; can receive another unit in AM with HD     ESRD on HD   HD T/Th/Sa  Nephrology consulted  Sevelamer tid     CAD   Asprin 81mg   Plavix 75mg   Statin, BB     Hx of double Lung transplant   Tacrolimus 2mg   Bactrim M/W/F  Fluconazole 2 tabs T/Th/Sa    BPH   Tamsulosin 0.4mg     Supportive   DVT prophylaxis: CSD for now given low Hb   Diet: renal

## 2021-03-06 LAB
CLOSURE TME COLL+EPINEP BLD: 216 K/UL — SIGNIFICANT CHANGE UP (ref 150–400)
HCT VFR BLD CALC: 24.1 % — LOW (ref 39–50)
HGB BLD-MCNC: 7.5 G/DL — LOW (ref 13–17)
MCHC RBC-ENTMCNC: 28 PG — SIGNIFICANT CHANGE UP (ref 27–34)
MCHC RBC-ENTMCNC: 31.1 GM/DL — LOW (ref 32–36)
MCV RBC AUTO: 89.9 FL — SIGNIFICANT CHANGE UP (ref 80–100)
PLATELET # BLD AUTO: SIGNIFICANT CHANGE UP K/UL (ref 150–400)
RBC # BLD: 2.68 M/UL — LOW (ref 4.2–5.8)
RBC # FLD: 20.2 % — HIGH (ref 10.3–14.5)
SARS-COV-2 RNA SPEC QL NAA+PROBE: SIGNIFICANT CHANGE UP
WBC # BLD: 9.31 K/UL — SIGNIFICANT CHANGE UP (ref 3.8–10.5)
WBC # FLD AUTO: 9.31 K/UL — SIGNIFICANT CHANGE UP (ref 3.8–10.5)

## 2021-03-06 PROCEDURE — 71045 X-RAY EXAM CHEST 1 VIEW: CPT | Mod: 26

## 2021-03-06 RX ORDER — OXYCODONE HYDROCHLORIDE 5 MG/1
5 TABLET ORAL
Refills: 0 | Status: DISCONTINUED | OUTPATIENT
Start: 2021-03-06 | End: 2021-03-08

## 2021-03-06 RX ORDER — FLUCONAZOLE 150 MG/1
200 TABLET ORAL
Refills: 0 | Status: DISCONTINUED | OUTPATIENT
Start: 2021-03-06 | End: 2021-03-08

## 2021-03-06 RX ORDER — PANTOPRAZOLE SODIUM 20 MG/1
40 TABLET, DELAYED RELEASE ORAL
Refills: 0 | Status: DISCONTINUED | OUTPATIENT
Start: 2021-03-06 | End: 2021-03-08

## 2021-03-06 RX ORDER — TAMSULOSIN HYDROCHLORIDE 0.4 MG/1
0.4 CAPSULE ORAL AT BEDTIME
Refills: 0 | Status: DISCONTINUED | OUTPATIENT
Start: 2021-03-06 | End: 2021-03-08

## 2021-03-06 RX ORDER — TACROLIMUS 5 MG/1
2 CAPSULE ORAL DAILY
Refills: 0 | Status: DISCONTINUED | OUTPATIENT
Start: 2021-03-06 | End: 2021-03-08

## 2021-03-06 RX ORDER — SEVELAMER CARBONATE 2400 MG/1
800 POWDER, FOR SUSPENSION ORAL
Refills: 0 | Status: DISCONTINUED | OUTPATIENT
Start: 2021-03-06 | End: 2021-03-08

## 2021-03-06 RX ORDER — CALCIUM CARBONATE 500(1250)
1 TABLET ORAL DAILY
Refills: 0 | Status: DISCONTINUED | OUTPATIENT
Start: 2021-03-06 | End: 2021-03-08

## 2021-03-06 RX ORDER — FUROSEMIDE 40 MG
40 TABLET ORAL DAILY
Refills: 0 | Status: DISCONTINUED | OUTPATIENT
Start: 2021-03-06 | End: 2021-03-08

## 2021-03-06 RX ORDER — ATORVASTATIN CALCIUM 80 MG/1
80 TABLET, FILM COATED ORAL AT BEDTIME
Refills: 0 | Status: DISCONTINUED | OUTPATIENT
Start: 2021-03-06 | End: 2021-03-08

## 2021-03-06 RX ORDER — METOPROLOL TARTRATE 50 MG
25 TABLET ORAL DAILY
Refills: 0 | Status: DISCONTINUED | OUTPATIENT
Start: 2021-03-06 | End: 2021-03-08

## 2021-03-06 RX ORDER — ERYTHROPOIETIN 10000 [IU]/ML
10000 INJECTION, SOLUTION INTRAVENOUS; SUBCUTANEOUS
Refills: 0 | Status: DISCONTINUED | OUTPATIENT
Start: 2021-03-06 | End: 2021-03-08

## 2021-03-06 RX ORDER — HYDROCORTISONE 20 MG
20 TABLET ORAL DAILY
Refills: 0 | Status: DISCONTINUED | OUTPATIENT
Start: 2021-03-06 | End: 2021-03-08

## 2021-03-06 RX ORDER — CLOPIDOGREL BISULFATE 75 MG/1
75 TABLET, FILM COATED ORAL DAILY
Refills: 0 | Status: DISCONTINUED | OUTPATIENT
Start: 2021-03-06 | End: 2021-03-08

## 2021-03-06 RX ORDER — ASPIRIN/CALCIUM CARB/MAGNESIUM 324 MG
81 TABLET ORAL DAILY
Refills: 0 | Status: DISCONTINUED | OUTPATIENT
Start: 2021-03-06 | End: 2021-03-08

## 2021-03-06 RX ADMIN — OXYCODONE HYDROCHLORIDE 5 MILLIGRAM(S): 5 TABLET ORAL at 14:43

## 2021-03-06 RX ADMIN — Medication 40 MILLIGRAM(S): at 05:46

## 2021-03-06 RX ADMIN — Medication 81 MILLIGRAM(S): at 18:01

## 2021-03-06 RX ADMIN — Medication 25 MILLIGRAM(S): at 05:46

## 2021-03-06 RX ADMIN — Medication 1 TABLET(S): at 18:01

## 2021-03-06 RX ADMIN — SEVELAMER CARBONATE 800 MILLIGRAM(S): 2400 POWDER, FOR SUSPENSION ORAL at 09:17

## 2021-03-06 RX ADMIN — Medication 10 MILLIGRAM(S): at 00:05

## 2021-03-06 RX ADMIN — Medication 20 MILLIGRAM(S): at 05:45

## 2021-03-06 RX ADMIN — CLOPIDOGREL BISULFATE 75 MILLIGRAM(S): 75 TABLET, FILM COATED ORAL at 18:01

## 2021-03-06 RX ADMIN — TACROLIMUS 1 MILLIGRAM(S): 5 CAPSULE ORAL at 00:06

## 2021-03-06 RX ADMIN — FLUCONAZOLE 200 MILLIGRAM(S): 150 TABLET ORAL at 09:17

## 2021-03-06 RX ADMIN — OXYCODONE HYDROCHLORIDE 5 MILLIGRAM(S): 5 TABLET ORAL at 22:04

## 2021-03-06 RX ADMIN — PANTOPRAZOLE SODIUM 40 MILLIGRAM(S): 20 TABLET, DELAYED RELEASE ORAL at 05:46

## 2021-03-06 RX ADMIN — TAMSULOSIN HYDROCHLORIDE 0.4 MILLIGRAM(S): 0.4 CAPSULE ORAL at 22:02

## 2021-03-06 RX ADMIN — ERYTHROPOIETIN 10000 UNIT(S): 10000 INJECTION, SOLUTION INTRAVENOUS; SUBCUTANEOUS at 12:39

## 2021-03-06 RX ADMIN — ATORVASTATIN CALCIUM 80 MILLIGRAM(S): 80 TABLET, FILM COATED ORAL at 22:02

## 2021-03-06 RX ADMIN — OXYCODONE HYDROCHLORIDE 5 MILLIGRAM(S): 5 TABLET ORAL at 20:15

## 2021-03-06 RX ADMIN — TACROLIMUS 2 MILLIGRAM(S): 5 CAPSULE ORAL at 18:00

## 2021-03-06 RX ADMIN — SEVELAMER CARBONATE 800 MILLIGRAM(S): 2400 POWDER, FOR SUSPENSION ORAL at 18:01

## 2021-03-06 NOTE — CONSULT NOTE ADULT - SUBJECTIVE AND OBJECTIVE BOX
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 Patient reported generalized weakness x 1 week otherwise no shortness of breath, chest pain, melena, nausea, vomiting, abdominal pain, dizziness. Hx Covid pneumonia last month. pt denies any bleeding or dark stools. No recent med  change.     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 -  [facility]    Right permacath    FAMILY HISTORY:  Family history of emphysema    [place]    Social History: Not active smoker    MEDICATIONS  (STANDING):  aspirin  chewable 81 milliGRAM(s) Oral daily  atorvastatin 80 milliGRAM(s) Oral at bedtime  calcium carbonate   1250 mG (OsCal) 1 Tablet(s) Oral daily  clopidogrel Tablet 75 milliGRAM(s) Oral daily  fluconAZOLE   Tablet 200 milliGRAM(s) Oral <User Schedule>  furosemide    Tablet 40 milliGRAM(s) Oral daily  hydrocortisone 20 milliGRAM(s) Oral daily  metoprolol succinate ER 25 milliGRAM(s) Oral daily  multivitamin 1 Tablet(s) Oral daily  pantoprazole    Tablet 40 milliGRAM(s) Oral before breakfast  sevelamer carbonate 800 milliGRAM(s) Oral three times a day with meals  tacrolimus 2 milliGRAM(s) Oral daily  tamsulosin 0.4 milliGRAM(s) Oral at bedtime  trimethoprim   80 mG/sulfamethoxazole 400 mG 1 Tablet(s) Oral <User Schedule>    MEDICATIONS  (PRN):  oxyCODONE    IR 5 milliGRAM(s) Oral every 3 hours PRN Moderate Pain (4 - 6)   Meds reviewed    Allergies    budesonide (Unknown)  cefpodoxime (Unknown)  Pollen (Unknown)        REVIEW OF SYSTEMS:    CONSTITUTIONAL:  sob,  feels weak  EYES: No eye pain, visual disturbances, or discharge  ENMT:  No difficulty hearing, tinnitus, vertigo; No sinus or throat pain  NECK: No pain or stiffness  BREASTS: No pain, masses, or nipple discharge  RESPIRATORY: sobe  CARDIOVASCULAR: No chest pain, palpitations, dizziness,   GASTROINTESTINAL: No abdominal or epigastric pain. No nausea, vomiting, or hematemesis; No diarrhea or constipation. No melena or hematochezia.Trunckal obesity  GENITOURINARY: No dysuria, frequency, hematuria, or incontinence  NEUROLOGICAL: No headaches, memory loss, loss of strength, numbness, or tremors  SKIN: neg  LYMPH NODES: No enlarged glands  ENDOCRINE: No heat or cold intolerance; No hair loss  MUSCULOSKELETAL: neg  PSYCHIATRIC: No depression, anxiety, mood swings, or difficulty sleeping  HEME/LYMPH: No easy bruising, or bleeding gums  ALLERY AND IMMUNOLOGIC: No hives or eczema      Vital Signs Last 24 Hrs  T(C): 37.2 (06 Mar 2021 05:00), Max: 37.2 (06 Mar 2021 05:00)  T(F): 99 (06 Mar 2021 05:00), Max: 99 (06 Mar 2021 05:00)  HR: 80 (06 Mar 2021 05:00) (69 - 84)  BP: 159/79 (06 Mar 2021 05:00) (122/64 - 175/86)  BP(mean): --  RR: 18 (06 Mar 2021 05:00) (18 - 19)  SpO2: 97% (06 Mar 2021 05:00) (97% - 100%)  Daily Height in cm: 162.56 (05 Mar 2021 14:09)      PHYSICAL EXAM:    GENERAL: appears chronically ill, oriented.  HEAD:  Atraumatic, Normocephalic  EYES: EOMI, PERRLA, conjunctiva and sclera clear  ENMT: No tonsillar erythema, exudates, or enlargement; Moist mucous membranes, Good dentition, No lesions  NECK: Supple, neck  with right permacath  NERVOUS SYSTEM:  Alert & Oriented X3, Good concentration; Motor Strength wnl upper and lower extremities  ;CHEST/LUNG: Clear to percussion bilaterally; No rales, rhonchi, wheezing, or rubs  HEART: Regular rate and rhythm; No rub, gallops  ABDOMEN: Soft, Nontender, Nondistended; Bowel sounds present, body wall and flank edema  EXTREMITIES:  Muscle wasting  LYMPH: No lymphadenopathy noted  SKIN: No rashes or lesions, pale      LABS:                        7.1    5.58  )-----------( See note    ( 05 Mar 2021 15:35 )             23.8     03-05    135  |  97<L>  |  19.0  ----------------------------<  119<H>  4.7   |  27.0  |  3.85<H>    Ca    8.7      05 Mar 2021 15:35    TPro  5.9<L>  /  Alb  2.7<L>  /  TBili  0.7  /  DBili  x   /  AST  38  /  ALT  32  /  AlkPhos  335<H>  03-05    PT/INR - ( 05 Mar 2021 15:35 )   PT: 13.0 sec;   INR: 1.13 ratio         PTT - ( 05 Mar 2021 15:35 )  PTT:26.3 sec            RADIOLOGY & ADDITIONAL TESTS:

## 2021-03-07 LAB
ALBUMIN SERPL ELPH-MCNC: 2.5 G/DL — LOW (ref 3.3–5.2)
ALP SERPL-CCNC: 293 U/L — HIGH (ref 40–120)
ALT FLD-CCNC: 21 U/L — SIGNIFICANT CHANGE UP
ANION GAP SERPL CALC-SCNC: 9 MMOL/L — SIGNIFICANT CHANGE UP (ref 5–17)
AST SERPL-CCNC: 24 U/L — SIGNIFICANT CHANGE UP
BILIRUB SERPL-MCNC: 0.9 MG/DL — SIGNIFICANT CHANGE UP (ref 0.4–2)
BUN SERPL-MCNC: 20 MG/DL — SIGNIFICANT CHANGE UP (ref 8–20)
CALCIUM SERPL-MCNC: 8.4 MG/DL — LOW (ref 8.6–10.2)
CHLORIDE SERPL-SCNC: 97 MMOL/L — LOW (ref 98–107)
CO2 SERPL-SCNC: 29 MMOL/L — SIGNIFICANT CHANGE UP (ref 22–29)
CREAT SERPL-MCNC: 3.92 MG/DL — HIGH (ref 0.5–1.3)
GLUCOSE SERPL-MCNC: 107 MG/DL — HIGH (ref 70–99)
HBV SURFACE AB SER-ACNC: <3 MIU/ML — LOW
HBV SURFACE AG SER-ACNC: SIGNIFICANT CHANGE UP
HCT VFR BLD CALC: 28.2 % — LOW (ref 39–50)
HCV AB S/CO SERPL IA: 0.17 S/CO — SIGNIFICANT CHANGE UP (ref 0–0.99)
HCV AB SERPL-IMP: SIGNIFICANT CHANGE UP
HGB BLD-MCNC: 9 G/DL — LOW (ref 13–17)
MCHC RBC-ENTMCNC: 28.8 PG — SIGNIFICANT CHANGE UP (ref 27–34)
MCHC RBC-ENTMCNC: 31.9 GM/DL — LOW (ref 32–36)
MCV RBC AUTO: 90.4 FL — SIGNIFICANT CHANGE UP (ref 80–100)
PLATELET # BLD AUTO: SIGNIFICANT CHANGE UP K/UL (ref 150–400)
POTASSIUM SERPL-MCNC: 4.1 MMOL/L — SIGNIFICANT CHANGE UP (ref 3.5–5.3)
POTASSIUM SERPL-SCNC: 4.1 MMOL/L — SIGNIFICANT CHANGE UP (ref 3.5–5.3)
PROT SERPL-MCNC: 5.5 G/DL — LOW (ref 6.6–8.7)
RBC # BLD: 3.12 M/UL — LOW (ref 4.2–5.8)
RBC # FLD: 19 % — HIGH (ref 10.3–14.5)
SARS-COV-2 IGG SERPL QL IA: NEGATIVE — SIGNIFICANT CHANGE UP
SARS-COV-2 IGM SERPL IA-ACNC: 0.64 INDEX — SIGNIFICANT CHANGE UP
SODIUM SERPL-SCNC: 135 MMOL/L — SIGNIFICANT CHANGE UP (ref 135–145)
WBC # BLD: 7.22 K/UL — SIGNIFICANT CHANGE UP (ref 3.8–10.5)
WBC # FLD AUTO: 7.22 K/UL — SIGNIFICANT CHANGE UP (ref 3.8–10.5)

## 2021-03-07 RX ADMIN — OXYCODONE HYDROCHLORIDE 5 MILLIGRAM(S): 5 TABLET ORAL at 14:11

## 2021-03-07 RX ADMIN — CLOPIDOGREL BISULFATE 75 MILLIGRAM(S): 75 TABLET, FILM COATED ORAL at 12:13

## 2021-03-07 RX ADMIN — OXYCODONE HYDROCHLORIDE 5 MILLIGRAM(S): 5 TABLET ORAL at 13:11

## 2021-03-07 RX ADMIN — SEVELAMER CARBONATE 800 MILLIGRAM(S): 2400 POWDER, FOR SUSPENSION ORAL at 17:18

## 2021-03-07 RX ADMIN — TAMSULOSIN HYDROCHLORIDE 0.4 MILLIGRAM(S): 0.4 CAPSULE ORAL at 22:32

## 2021-03-07 RX ADMIN — OXYCODONE HYDROCHLORIDE 5 MILLIGRAM(S): 5 TABLET ORAL at 22:47

## 2021-03-07 RX ADMIN — Medication 20 MILLIGRAM(S): at 05:51

## 2021-03-07 RX ADMIN — PANTOPRAZOLE SODIUM 40 MILLIGRAM(S): 20 TABLET, DELAYED RELEASE ORAL at 05:51

## 2021-03-07 RX ADMIN — SEVELAMER CARBONATE 800 MILLIGRAM(S): 2400 POWDER, FOR SUSPENSION ORAL at 08:16

## 2021-03-07 RX ADMIN — Medication 1 TABLET(S): at 13:08

## 2021-03-07 RX ADMIN — Medication 81 MILLIGRAM(S): at 12:13

## 2021-03-07 RX ADMIN — TACROLIMUS 2 MILLIGRAM(S): 5 CAPSULE ORAL at 12:12

## 2021-03-07 RX ADMIN — Medication 40 MILLIGRAM(S): at 05:51

## 2021-03-07 RX ADMIN — Medication 1 TABLET(S): at 12:13

## 2021-03-07 RX ADMIN — ATORVASTATIN CALCIUM 80 MILLIGRAM(S): 80 TABLET, FILM COATED ORAL at 22:32

## 2021-03-07 RX ADMIN — Medication 25 MILLIGRAM(S): at 05:51

## 2021-03-07 RX ADMIN — SEVELAMER CARBONATE 800 MILLIGRAM(S): 2400 POWDER, FOR SUSPENSION ORAL at 12:12

## 2021-03-08 ENCOUNTER — TRANSCRIPTION ENCOUNTER (OUTPATIENT)
Age: 71
End: 2021-03-08

## 2021-03-08 VITALS
SYSTOLIC BLOOD PRESSURE: 114 MMHG | TEMPERATURE: 98 F | RESPIRATION RATE: 20 BRPM | OXYGEN SATURATION: 95 % | DIASTOLIC BLOOD PRESSURE: 65 MMHG | HEART RATE: 81 BPM

## 2021-03-08 LAB
ALBUMIN SERPL ELPH-MCNC: 2.7 G/DL — LOW (ref 3.3–5.2)
ALP SERPL-CCNC: 277 U/L — HIGH (ref 40–120)
ALT FLD-CCNC: 18 U/L — SIGNIFICANT CHANGE UP
ANION GAP SERPL CALC-SCNC: 10 MMOL/L — SIGNIFICANT CHANGE UP (ref 5–17)
AST SERPL-CCNC: 20 U/L — SIGNIFICANT CHANGE UP
BILIRUB SERPL-MCNC: 0.7 MG/DL — SIGNIFICANT CHANGE UP (ref 0.4–2)
BUN SERPL-MCNC: 33 MG/DL — HIGH (ref 8–20)
CALCIUM SERPL-MCNC: 8.7 MG/DL — SIGNIFICANT CHANGE UP (ref 8.6–10.2)
CHLORIDE SERPL-SCNC: 96 MMOL/L — LOW (ref 98–107)
CO2 SERPL-SCNC: 30 MMOL/L — HIGH (ref 22–29)
CREAT SERPL-MCNC: 5.89 MG/DL — HIGH (ref 0.5–1.3)
GLUCOSE SERPL-MCNC: 91 MG/DL — SIGNIFICANT CHANGE UP (ref 70–99)
HCT VFR BLD CALC: 29.2 % — LOW (ref 39–50)
HGB BLD-MCNC: 9.1 G/DL — LOW (ref 13–17)
MCHC RBC-ENTMCNC: 28.5 PG — SIGNIFICANT CHANGE UP (ref 27–34)
MCHC RBC-ENTMCNC: 31.2 GM/DL — LOW (ref 32–36)
MCV RBC AUTO: 91.5 FL — SIGNIFICANT CHANGE UP (ref 80–100)
PLATELET # BLD AUTO: SIGNIFICANT CHANGE UP K/UL (ref 150–400)
POTASSIUM SERPL-MCNC: 4.5 MMOL/L — SIGNIFICANT CHANGE UP (ref 3.5–5.3)
POTASSIUM SERPL-SCNC: 4.5 MMOL/L — SIGNIFICANT CHANGE UP (ref 3.5–5.3)
PROT SERPL-MCNC: 5.5 G/DL — LOW (ref 6.6–8.7)
RBC # BLD: 3.19 M/UL — LOW (ref 4.2–5.8)
RBC # FLD: 19.2 % — HIGH (ref 10.3–14.5)
SODIUM SERPL-SCNC: 136 MMOL/L — SIGNIFICANT CHANGE UP (ref 135–145)
WBC # BLD: 4.64 K/UL — SIGNIFICANT CHANGE UP (ref 3.8–10.5)
WBC # FLD AUTO: 4.64 K/UL — SIGNIFICANT CHANGE UP (ref 3.8–10.5)

## 2021-03-08 PROCEDURE — 99239 HOSP IP/OBS DSCHRG MGMT >30: CPT

## 2021-03-08 RX ORDER — TACROLIMUS 5 MG/1
1 CAPSULE ORAL
Qty: 0 | Refills: 0 | DISCHARGE
Start: 2021-03-08

## 2021-03-08 RX ORDER — TACROLIMUS 5 MG/1
0 CAPSULE ORAL
Qty: 0 | Refills: 0 | DISCHARGE

## 2021-03-08 RX ORDER — TACROLIMUS 5 MG/1
1.5 CAPSULE ORAL
Qty: 0 | Refills: 0 | DISCHARGE
Start: 2021-03-08

## 2021-03-08 RX ORDER — TACROLIMUS 5 MG/1
2 CAPSULE ORAL
Qty: 0 | Refills: 0 | DISCHARGE
Start: 2021-03-08

## 2021-03-08 RX ADMIN — Medication 1 TABLET(S): at 08:05

## 2021-03-08 RX ADMIN — Medication 25 MILLIGRAM(S): at 06:11

## 2021-03-08 RX ADMIN — Medication 1 TABLET(S): at 12:45

## 2021-03-08 RX ADMIN — SEVELAMER CARBONATE 800 MILLIGRAM(S): 2400 POWDER, FOR SUSPENSION ORAL at 12:45

## 2021-03-08 RX ADMIN — SEVELAMER CARBONATE 800 MILLIGRAM(S): 2400 POWDER, FOR SUSPENSION ORAL at 08:05

## 2021-03-08 RX ADMIN — PANTOPRAZOLE SODIUM 40 MILLIGRAM(S): 20 TABLET, DELAYED RELEASE ORAL at 06:11

## 2021-03-08 RX ADMIN — Medication 40 MILLIGRAM(S): at 06:11

## 2021-03-08 RX ADMIN — TACROLIMUS 2 MILLIGRAM(S): 5 CAPSULE ORAL at 12:45

## 2021-03-08 RX ADMIN — Medication 81 MILLIGRAM(S): at 12:45

## 2021-03-08 RX ADMIN — CLOPIDOGREL BISULFATE 75 MILLIGRAM(S): 75 TABLET, FILM COATED ORAL at 12:46

## 2021-03-08 RX ADMIN — Medication 20 MILLIGRAM(S): at 06:11

## 2021-03-08 NOTE — DISCHARGE NOTE NURSING/CASE MANAGEMENT/SOCIAL WORK - PATIENT PORTAL LINK FT
You can access the FollowMyHealth Patient Portal offered by Health system by registering at the following website: [email or web address]. By joining [employer]’s FollowMyHealth portal, you will also be able to view your health information using other applications (apps) compatible with our system.

## 2021-03-08 NOTE — PHYSICAL THERAPY INITIAL EVALUATION ADULT - ADDITIONAL COMMENTS
Pt reports living in a private home with his wife & daughter who are able to assist as needed. Pt reports have 2 steps to enter with 1 handrail, and all needs are met on the first floor but does have a flight of stairs inside. Pt has been using RW for mobility, also owns a cane, shower chair and commode. Pt independent prior to admission, owns no DME. Pt hasn't been driving lately, and is retired.

## 2021-03-08 NOTE — PROGRESS NOTE ADULT - ASSESSMENT
CRF 5, s/p lung transplant, Anemia.
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 Patient reported generalized weakness x 1 week otherwise no shortness of breath, chest pain, melena, nausea, vomiting, abdominal pain, dizziness.     Anemia 2/2 chronic renal dx   Hb: 9.0  S/p 2 unit of PRBC   monitor h/h    ESRD on HD   HD T/Th/Sa  Nephrology on board  Sevelamer tid     CAD   Asprin 81mg   Plavix 75mg   Statin, BB     Hx of double Lung transplant   Tacrolimus 2mg   Bactrim M/W/F  Fluconazole 2 tabs T/Th/Sa    BPH   Tamsulosin 0.4mg     Supportive   DVT prophylaxis: CSD for now given low Hb   Diet: renal     DISPO: pending PT addisonal
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 Patient reported generalized weakness x 1 week otherwise no shortness of breath, chest pain, melena, nausea, vomiting, abdominal pain, dizziness.     Anemia 2/2 chronic renal dx   Hb: 7.1   S/p 1 unit of PRBC   2nd dose PRBC during HD today  monitor h/h    ESRD on HD   HD T/Th/Sa  Nephrology on board  Sevelamer tid     CAD   Asprin 81mg   Plavix 75mg   Statin, BB     Hx of double Lung transplant   Tacrolimus 2mg   Bactrim M/W/F  Fluconazole 2 tabs T/Th/Sa    BPH   Tamsulosin 0.4mg     Supportive   DVT prophylaxis: CSD for now given low Hb   Diet: renal

## 2021-03-08 NOTE — PHYSICAL THERAPY INITIAL EVALUATION ADULT - PHYSICAL ASSIST/NONPHYSICAL ASSIST: STAND/SIT, REHAB EVAL
verbal cues for proper hand placement as pt would sometimes remember to reach for arm rests to assist in eccentric lowering, and sometimes would forget./supervision/verbal cues

## 2021-03-08 NOTE — DISCHARGE NOTE PROVIDER - HOSPITAL COURSE
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 Patient reported generalized weakness x 1 week otherwise no shortness of breath, chest pain, melena, nausea, vomiting, abdominal pain, dizziness. Patient received 2units PRBC with hgb improved.  Nephrology following for HD.  PT evaluated and recommended home PT.  Patient stable for discharge to home with home care.    Vital Signs Last 24 Hrs  T(C): 36.8 (08 Mar 2021 10:47), Max: 37 (08 Mar 2021 06:15)  T(F): 98.2 (08 Mar 2021 10:47), Max: 98.6 (08 Mar 2021 06:15)  HR: 86 (08 Mar 2021 10:47) (75 - 90)  BP: 107/60 (08 Mar 2021 10:47) (107/60 - 139/82)  BP(mean): --  RR: 20 (08 Mar 2021 10:47) (17 - 20)  SpO2: 93% (08 Mar 2021 10:47) (93% - 98%)    PHYSICAL EXAM:  GENERAL: NAD  HEAD:  Atraumatic, Normocephalic  NECK: Supple, No JVD, Normal thyroid  NERVOUS SYSTEM:  Alert & Oriented X3, Good concentration; Motor Strength 5/5 B/L upper and lower extremities  CHEST/LUNG: Clear to auscultation bilaterally; No rales, rhonchi, wheezing, or rubs  HEART: Regular rate and rhythm; No murmurs, rubs, or gallops  ABDOMEN: Soft, Nontender, Nondistended; Bowel sounds present  EXTREMITIES:  2+ Peripheral Pulses, No clubbing, cyanosis, or edema

## 2021-03-08 NOTE — DISCHARGE NOTE PROVIDER - NSDCMRMEDTOKEN_GEN_ALL_CORE_FT
aspirin 81 mg oral tablet: 1 tab(s) orally once a day starting May 10, 2020  Bactrim  mg-160 mg oral tablet: 1 tab(s) orally once a dayevery Mpmday/ Wednesday / Friday  calcium carbonate: 1 tablet in mid morning and 1 tablet in evening.   clopidogrel 75 mg oral tablet: 1 tab(s) orally once a day  ferrous gluconate 324 mg (38 mg elemental iron) oral tablet: orally once a day (in the evening)  Flomax 0.4 mg oral capsule: 1 cap(s) orally once a day  fluconazole: 2 tablets T/TH/Sat (on dialysis days)  hydrocortisone 20 mg oral tablet: 1 tab(s) orally once a day  Lasix 40 mg oral tablet: 1 tab(s) orally once a day  metoprolol succinate 25 mg oral tablet, extended release: 1 tab(s) orally once a day  Multiple Vitamins oral tablet: 1 tab(s) orally once a day  oxyCODONE 5 mg oral tablet: 1 tab(s) orally every 3 hours, As needed, Mild Pain  Protonix 40 mg oral delayed release tablet: 1 tab(s) orally once a day  rosuvastatin 40 mg oral tablet: 1 tab(s) orally once a day  sevelamer carbonate 800 mg oral tablet: 1 tab(s) orally 3 times a day (with meals)  tacrolimus 1 mg oral capsule: 2 cap(s) orally once a day

## 2021-03-08 NOTE — DISCHARGE NOTE PROVIDER - CARE PROVIDER_API CALL
Gerald Harmon)  Family Medicine  [address] [place] [address]  Phone: [phone]  Fax: [phone]  Follow Up Time:     Zaki Burns)  Internal Medicine; Nephrology  [address], [place] [id number]  Phone: [phone]  Fax: [phone]  Follow Up Time:

## 2021-03-08 NOTE — PHYSICAL THERAPY INITIAL EVALUATION ADULT - ASR EQUIP NEEDS DISCH PT EVAL
pt has all recommended medical equipment at home already, recommending pt continues to use RW at home.

## 2021-03-08 NOTE — DISCHARGE NOTE NURSING/CASE MANAGEMENT/SOCIAL WORK - NSDCFUADDAPPT_GEN_ALL_CORE_FT
Follow up with primary doctor and nephrology.  STAR Appt -pts spouse Veronica will make the O/P Cardio Appt with Dr Velez.   Both pt and spouse have no issues with medication administration.

## 2021-03-08 NOTE — PHYSICAL THERAPY INITIAL EVALUATION ADULT - PERTINENT HX OF CURRENT PROBLEM, REHAB EVAL
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 Patient reported generalized weakness x 1 week. Pt is no s/p 2 unit of PRBC.

## 2021-03-08 NOTE — PROGRESS NOTE ADULT - SUBJECTIVE AND OBJECTIVE BOX
INTERVAL HPI/OVERNIGHT EVENTS:    Patient seen and examined at bedside. Chart reviewed. s/p 1 unit PRBC. Reports to have generalized weakness, anxiety  Denies CP, SOB melena, nausea, vomiting, abdominal pain, dizziness. All other ROS unremarkable.     T(C): 37.2 (03-06-21 @ 05:00), Max: 37.2 (03-06-21 @ 05:00)  HR: 80 (03-06-21 @ 05:00) (69 - 84)  BP: 159/79 (03-06-21 @ 05:00) (122/64 - 175/86)  RR: 18 (03-06-21 @ 05:00) (18 - 19)  SpO2: 97% (03-06-21 @ 05:00) (97% - 100%)  Wt(kg): --Vital Signs Last 24 Hrs  T(C): 37.2 (06 Mar 2021 05:00), Max: 37.2 (06 Mar 2021 05:00)  T(F): 99 (06 Mar 2021 05:00), Max: 99 (06 Mar 2021 05:00)  HR: 80 (06 Mar 2021 05:00) (69 - 84)  BP: 159/79 (06 Mar 2021 05:00) (122/64 - 175/86)  BP(mean): --  RR: 18 (06 Mar 2021 05:00) (18 - 19)  SpO2: 97% (06 Mar 2021 05:00) (97% - 100%)    GENERAL: NAD  HEAD:  Atraumatic, Normocephalic  EYES: Conjunctiva and sclera clear  NECK: Supple, No JVD  NERVOUS SYSTEM:  Alert & Oriented X3  CHEST/LUNG: +BL BS, Clear to percussion bilaterally  HEART: Regular rate and rhythm; No murmurs, rubs, or gallops  ABDOMEN: Soft, Nontender, Nondistended; Bowel sounds present  LYMPH: No lymphadenopathy noted    MEDICATIONS  (STANDING):  aspirin  chewable 81 milliGRAM(s) Oral daily  atorvastatin 80 milliGRAM(s) Oral at bedtime  calcium carbonate   1250 mG (OsCal) 1 Tablet(s) Oral daily  clopidogrel Tablet 75 milliGRAM(s) Oral daily  epoetin ben-epbx (RETACRIT) Injectable 59719 Unit(s) IV Push <User Schedule>  fluconAZOLE   Tablet 200 milliGRAM(s) Oral <User Schedule>  furosemide    Tablet 40 milliGRAM(s) Oral daily  hydrocortisone 20 milliGRAM(s) Oral daily  metoprolol succinate ER 25 milliGRAM(s) Oral daily  multivitamin 1 Tablet(s) Oral daily  pantoprazole    Tablet 40 milliGRAM(s) Oral before breakfast  sevelamer carbonate 800 milliGRAM(s) Oral three times a day with meals  tacrolimus 2 milliGRAM(s) Oral daily  tamsulosin 0.4 milliGRAM(s) Oral at bedtime  trimethoprim   80 mG/sulfamethoxazole 400 mG 1 Tablet(s) Oral <User Schedule>    MEDICATIONS  (PRN):  oxyCODONE    IR 5 milliGRAM(s) Oral every 3 hours PRN Moderate Pain (4 - 6)
INTERVAL HPI/OVERNIGHT EVENTS:    Patient seen and examined at bedside. Chart reviewed. No acute overnight events. s/p 2 unit PRBC. Reports to have generalized weakness, anxiety  Denies CP, SOB melena, nausea, vomiting, abdominal pain, dizziness. All other ROS unremarkable.     Vital Signs Last 24 Hrs  T(C): 37.3 (07 Mar 2021 11:07), Max: 37.6 (07 Mar 2021 04:23)  T(F): 99.2 (07 Mar 2021 11:07), Max: 99.7 (07 Mar 2021 04:23)  HR: 73 (07 Mar 2021 11:07) (73 - 89)  BP: 128/73 (07 Mar 2021 11:07) (128/73 - 140/78)  BP(mean): --  RR: 18 (07 Mar 2021 11:07) (18 - 20)  SpO2: 95% (07 Mar 2021 11:07) (93% - 97%)    GENERAL: NAD  HEAD:  Atraumatic, Normocephalic  EYES: Conjunctiva and sclera clear  NECK: Supple, No JVD  NERVOUS SYSTEM:  Alert & Oriented X3  CHEST/LUNG: +BL BS, Clear to percussion bilaterally  HEART: Regular rate and rhythm; No murmurs, rubs, or gallops  ABDOMEN: Soft, Nontender, Nondistended; Bowel sounds present  LYMPH: No lymphadenopathy noted    MEDICATIONS  (STANDING):  aspirin  chewable 81 milliGRAM(s) Oral daily  atorvastatin 80 milliGRAM(s) Oral at bedtime  calcium carbonate   1250 mG (OsCal) 1 Tablet(s) Oral daily  clopidogrel Tablet 75 milliGRAM(s) Oral daily  epoetin ben-epbx (RETACRIT) Injectable 11818 Unit(s) IV Push <User Schedule>  fluconAZOLE   Tablet 200 milliGRAM(s) Oral <User Schedule>  furosemide    Tablet 40 milliGRAM(s) Oral daily  hydrocortisone 20 milliGRAM(s) Oral daily  metoprolol succinate ER 25 milliGRAM(s) Oral daily  multivitamin 1 Tablet(s) Oral daily  pantoprazole    Tablet 40 milliGRAM(s) Oral before breakfast  sevelamer carbonate 800 milliGRAM(s) Oral three times a day with meals  tacrolimus 2 milliGRAM(s) Oral daily  tamsulosin 0.4 milliGRAM(s) Oral at bedtime  trimethoprim   80 mG/sulfamethoxazole 400 mG 1 Tablet(s) Oral <User Schedule>    MEDICATIONS  (PRN):  oxyCODONE    IR 5 milliGRAM(s) Oral every 3 hours PRN Moderate Pain (4 - 6)  
NEPHROLOGY INTERVAL HPI/OVERNIGHT EVENTS:    MEDICATIONS  (STANDING):  aspirin  chewable 81 milliGRAM(s) Oral daily  atorvastatin 80 milliGRAM(s) Oral at bedtime  calcium carbonate   1250 mG (OsCal) 1 Tablet(s) Oral daily  clopidogrel Tablet 75 milliGRAM(s) Oral daily  epoetin ben-epbx (RETACRIT) Injectable 63130 Unit(s) IV Push <User Schedule>  fluconAZOLE   Tablet 200 milliGRAM(s) Oral <User Schedule>  furosemide    Tablet 40 milliGRAM(s) Oral daily  hydrocortisone 20 milliGRAM(s) Oral daily  metoprolol succinate ER 25 milliGRAM(s) Oral daily  multivitamin 1 Tablet(s) Oral daily  pantoprazole    Tablet 40 milliGRAM(s) Oral before breakfast  sevelamer carbonate 800 milliGRAM(s) Oral three times a day with meals  tacrolimus 2 milliGRAM(s) Oral daily  tamsulosin 0.4 milliGRAM(s) Oral at bedtime  trimethoprim   80 mG/sulfamethoxazole 400 mG 1 Tablet(s) Oral <User Schedule>    MEDICATIONS  (PRN):  oxyCODONE    IR 5 milliGRAM(s) Oral every 3 hours PRN Moderate Pain (4 - 6)      Allergies    budesonide (Unknown)  cefpodoxime (Unknown)  Pollen (Unknown)    Intolerances        Vital Signs Last 24 Hrs  T(C): 37 (08 Mar 2021 06:15), Max: 37.3 (07 Mar 2021 11:07)  T(F): 98.6 (08 Mar 2021 06:15), Max: 99.2 (07 Mar 2021 11:07)  HR: 75 (08 Mar 2021 06:15) (73 - 90)  BP: 131/71 (08 Mar 2021 06:15) (128/73 - 139/82)  BP(mean): --  RR: 18 (08 Mar 2021 06:15) (17 - 18)  SpO2: 97% (08 Mar 2021 06:15) (95% - 98%)      PHYSICAL EXAM:    GENERAL: comfortable in bed  HEAD:  wnl  EYES:   ENMT:   NECK: right permacath site clean  NERVOUS SYSTEM: alert, oriented   CHEST/LUNG: nasal 02, clear  HEART: no gallop  ABDOMEN: NT  EXTREMITIES: Muscle wasting  same, no edema   LYMPH:   SKIN: pale    LABS:                        9.0    7.22  )-----------( Clumped    ( 07 Mar 2021 10:40 )             28.2     03-08    136  |  96<L>  |  33.0<H>  ----------------------------<  91  4.5   |  30.0<H>  |  5.89<H>    Ca    8.7      08 Mar 2021 08:19    TPro  5.5<L>  /  Alb  2.7<L>  /  TBili  0.7  /  DBili  x   /  AST  20  /  ALT  18  /  AlkPhos  277<H>  03-08                RADIOLOGY & ADDITIONAL TESTS:

## 2021-03-08 NOTE — DISCHARGE NOTE PROVIDER - NSDCCPCAREPLAN_GEN_ALL_CORE_FT
PRINCIPAL DISCHARGE DIAGNOSIS  Diagnosis: Symptomatic anemia  Assessment and Plan of Treatment: Follow up with primary doctor and nephrology.      SECONDARY DISCHARGE DIAGNOSES  Diagnosis: ESRD on dialysis  Assessment and Plan of Treatment: HD as scheduled.  Follow up with nephrology.    Diagnosis: H/O lung transplant  Assessment and Plan of Treatment: Continue current medications.

## 2021-03-16 PROCEDURE — 36415 COLL VENOUS BLD VENIPUNCTURE: CPT

## 2021-03-16 PROCEDURE — 36430 TRANSFUSION BLD/BLD COMPNT: CPT

## 2021-03-16 PROCEDURE — U0003: CPT

## 2021-03-16 PROCEDURE — 85730 THROMBOPLASTIN TIME PARTIAL: CPT

## 2021-03-16 PROCEDURE — 99285 EMERGENCY DEPT VISIT HI MDM: CPT

## 2021-03-16 PROCEDURE — 86901 BLOOD TYPING SEROLOGIC RH(D): CPT

## 2021-03-16 PROCEDURE — 86803 HEPATITIS C AB TEST: CPT

## 2021-03-16 PROCEDURE — 71045 X-RAY EXAM CHEST 1 VIEW: CPT

## 2021-03-16 PROCEDURE — 87340 HEPATITIS B SURFACE AG IA: CPT

## 2021-03-16 PROCEDURE — 82607 VITAMIN B-12: CPT

## 2021-03-16 PROCEDURE — 85025 COMPLETE CBC W/AUTO DIFF WBC: CPT

## 2021-03-16 PROCEDURE — 82728 ASSAY OF FERRITIN: CPT

## 2021-03-16 PROCEDURE — 86923 COMPATIBILITY TEST ELECTRIC: CPT

## 2021-03-16 PROCEDURE — 85610 PROTHROMBIN TIME: CPT

## 2021-03-16 PROCEDURE — 86900 BLOOD TYPING SEROLOGIC ABO: CPT

## 2021-03-16 PROCEDURE — P9040: CPT

## 2021-03-16 PROCEDURE — 36000 PLACE NEEDLE IN VEIN: CPT

## 2021-03-16 PROCEDURE — 97163 PT EVAL HIGH COMPLEX 45 MIN: CPT

## 2021-03-16 PROCEDURE — 99261: CPT

## 2021-03-16 PROCEDURE — 86850 RBC ANTIBODY SCREEN: CPT

## 2021-03-16 PROCEDURE — 85049 AUTOMATED PLATELET COUNT: CPT

## 2021-03-16 PROCEDURE — 80053 COMPREHEN METABOLIC PANEL: CPT

## 2021-03-16 PROCEDURE — U0005: CPT

## 2021-03-16 PROCEDURE — 84466 ASSAY OF TRANSFERRIN: CPT

## 2021-03-16 PROCEDURE — 85027 COMPLETE CBC AUTOMATED: CPT

## 2021-03-16 PROCEDURE — 86769 SARS-COV-2 COVID-19 ANTIBODY: CPT

## 2021-03-16 PROCEDURE — 83550 IRON BINDING TEST: CPT

## 2021-03-16 PROCEDURE — 86706 HEP B SURFACE ANTIBODY: CPT

## 2021-03-16 PROCEDURE — 82272 OCCULT BLD FECES 1-3 TESTS: CPT

## 2021-03-16 PROCEDURE — 83540 ASSAY OF IRON: CPT

## 2021-04-13 ENCOUNTER — INPATIENT (INPATIENT)
Facility: HOSPITAL | Age: 71
LOS: 1 days | Discharge: HOME | DRG: 871 | End: 2021-04-15
Attending: INTERNAL MEDICINE | Admitting: STUDENT IN AN ORGANIZED HEALTH CARE EDUCATION/TRAINING PROGRAM
Payer: MEDICARE

## 2021-04-13 VITALS
TEMPERATURE: 98 F | HEART RATE: 87 BPM | SYSTOLIC BLOOD PRESSURE: 199 MMHG | WEIGHT: 132.06 LBS | DIASTOLIC BLOOD PRESSURE: 108 MMHG | RESPIRATION RATE: 20 BRPM | HEIGHT: 64 IN | OXYGEN SATURATION: 95 %

## 2021-04-13 DIAGNOSIS — Z94.2 LUNG TRANSPLANT STATUS: Chronic | ICD-10-CM

## 2021-04-13 DIAGNOSIS — N18.6 END STAGE RENAL DISEASE: ICD-10-CM

## 2021-04-13 DIAGNOSIS — Z95.5 PRESENCE OF CORONARY ANGIOPLASTY IMPLANT AND GRAFT: Chronic | ICD-10-CM

## 2021-04-13 LAB
ACANTHOCYTES BLD QL SMEAR: SLIGHT — SIGNIFICANT CHANGE UP
ALBUMIN SERPL ELPH-MCNC: 3.5 G/DL — SIGNIFICANT CHANGE UP (ref 3.3–5.2)
ALP SERPL-CCNC: 184 U/L — HIGH (ref 40–120)
ALT FLD-CCNC: 12 U/L — SIGNIFICANT CHANGE UP
ANION GAP SERPL CALC-SCNC: 16 MMOL/L — SIGNIFICANT CHANGE UP (ref 5–17)
ANISOCYTOSIS BLD QL: SIGNIFICANT CHANGE UP
APTT BLD: 31.2 SEC — SIGNIFICANT CHANGE UP (ref 27.5–35.5)
AST SERPL-CCNC: 27 U/L — SIGNIFICANT CHANGE UP
BASOPHILS # BLD AUTO: 0.23 K/UL — HIGH (ref 0–0.2)
BASOPHILS NFR BLD AUTO: 1.7 % — SIGNIFICANT CHANGE UP (ref 0–2)
BILIRUB SERPL-MCNC: 0.5 MG/DL — SIGNIFICANT CHANGE UP (ref 0.4–2)
BUN SERPL-MCNC: 45 MG/DL — HIGH (ref 8–20)
CALCIUM SERPL-MCNC: 9.3 MG/DL — SIGNIFICANT CHANGE UP (ref 8.6–10.2)
CHLORIDE SERPL-SCNC: 101 MMOL/L — SIGNIFICANT CHANGE UP (ref 98–107)
CO2 SERPL-SCNC: 28 MMOL/L — SIGNIFICANT CHANGE UP (ref 22–29)
CREAT SERPL-MCNC: 7.1 MG/DL — HIGH (ref 0.5–1.3)
DACRYOCYTES BLD QL SMEAR: SLIGHT — SIGNIFICANT CHANGE UP
EOSINOPHIL # BLD AUTO: 0 K/UL — SIGNIFICANT CHANGE UP (ref 0–0.5)
EOSINOPHIL NFR BLD AUTO: 0 % — SIGNIFICANT CHANGE UP (ref 0–6)
GAS PNL BLDA: SIGNIFICANT CHANGE UP
GLUCOSE SERPL-MCNC: 131 MG/DL — HIGH (ref 70–99)
HCT VFR BLD CALC: 39.9 % — SIGNIFICANT CHANGE UP (ref 39–50)
HGB BLD-MCNC: 12.2 G/DL — LOW (ref 13–17)
INR BLD: 1.1 RATIO — SIGNIFICANT CHANGE UP (ref 0.88–1.16)
LACTATE BLDV-MCNC: 0.8 MMOL/L — SIGNIFICANT CHANGE UP (ref 0.5–2)
LYMPHOCYTES # BLD AUTO: 0.35 K/UL — LOW (ref 1–3.3)
LYMPHOCYTES # BLD AUTO: 2.6 % — LOW (ref 13–44)
MACROCYTES BLD QL: SIGNIFICANT CHANGE UP
MAGNESIUM SERPL-MCNC: 2.8 MG/DL — HIGH (ref 1.6–2.6)
MANUAL SMEAR VERIFICATION: SIGNIFICANT CHANGE UP
MCHC RBC-ENTMCNC: 28.8 PG — SIGNIFICANT CHANGE UP (ref 27–34)
MCHC RBC-ENTMCNC: 30.6 GM/DL — LOW (ref 32–36)
MCV RBC AUTO: 94.3 FL — SIGNIFICANT CHANGE UP (ref 80–100)
MONOCYTES # BLD AUTO: 0.47 K/UL — SIGNIFICANT CHANGE UP (ref 0–0.9)
MONOCYTES NFR BLD AUTO: 3.5 % — SIGNIFICANT CHANGE UP (ref 2–14)
MYELOCYTES NFR BLD: 0.9 % — HIGH (ref 0–0)
NEUTROPHILS # BLD AUTO: 12.37 K/UL — HIGH (ref 1.8–7.4)
NEUTROPHILS NFR BLD AUTO: 91.3 % — HIGH (ref 43–77)
NRBC # BLD: 1 /100 — HIGH (ref 0–0)
NT-PROBNP SERPL-SCNC: HIGH PG/ML (ref 0–300)
OVALOCYTES BLD QL SMEAR: SIGNIFICANT CHANGE UP
PLAT MORPH BLD: NORMAL — SIGNIFICANT CHANGE UP
PLATELET # BLD AUTO: 126 K/UL — LOW (ref 150–400)
POIKILOCYTOSIS BLD QL AUTO: SIGNIFICANT CHANGE UP
POLYCHROMASIA BLD QL SMEAR: SIGNIFICANT CHANGE UP
POTASSIUM SERPL-MCNC: 5 MMOL/L — SIGNIFICANT CHANGE UP (ref 3.5–5.3)
POTASSIUM SERPL-SCNC: 5 MMOL/L — SIGNIFICANT CHANGE UP (ref 3.5–5.3)
PROT SERPL-MCNC: 6.7 G/DL — SIGNIFICANT CHANGE UP (ref 6.6–8.7)
PROTHROM AB SERPL-ACNC: 12.7 SEC — SIGNIFICANT CHANGE UP (ref 10.6–13.6)
RAPID RVP RESULT: SIGNIFICANT CHANGE UP
RBC # BLD: 4.23 M/UL — SIGNIFICANT CHANGE UP (ref 4.2–5.8)
RBC # FLD: 18.6 % — HIGH (ref 10.3–14.5)
RBC BLD AUTO: ABNORMAL
SARS-COV-2 RNA SPEC QL NAA+PROBE: SIGNIFICANT CHANGE UP
SODIUM SERPL-SCNC: 144 MMOL/L — SIGNIFICANT CHANGE UP (ref 135–145)
TROPONIN T SERPL-MCNC: 0.07 NG/ML — HIGH (ref 0–0.06)
WBC # BLD: 13.55 K/UL — HIGH (ref 3.8–10.5)
WBC # FLD AUTO: 13.55 K/UL — HIGH (ref 3.8–10.5)

## 2021-04-13 PROCEDURE — 71275 CT ANGIOGRAPHY CHEST: CPT | Mod: 26

## 2021-04-13 PROCEDURE — 99223 1ST HOSP IP/OBS HIGH 75: CPT

## 2021-04-13 PROCEDURE — 71045 X-RAY EXAM CHEST 1 VIEW: CPT | Mod: 26,76

## 2021-04-13 PROCEDURE — 99291 CRITICAL CARE FIRST HOUR: CPT | Mod: CS

## 2021-04-13 PROCEDURE — 71045 X-RAY EXAM CHEST 1 VIEW: CPT | Mod: 26

## 2021-04-13 PROCEDURE — 93010 ELECTROCARDIOGRAM REPORT: CPT

## 2021-04-13 PROCEDURE — 99291 CRITICAL CARE FIRST HOUR: CPT | Mod: 25

## 2021-04-13 PROCEDURE — 99285 EMERGENCY DEPT VISIT HI MDM: CPT | Mod: CS,GC

## 2021-04-13 RX ORDER — NITROGLYCERIN 6.5 MG
0.4 CAPSULE, EXTENDED RELEASE ORAL ONCE
Refills: 0 | Status: COMPLETED | OUTPATIENT
Start: 2021-04-13 | End: 2021-04-13

## 2021-04-13 RX ORDER — MIDAZOLAM HYDROCHLORIDE 1 MG/ML
2 INJECTION, SOLUTION INTRAMUSCULAR; INTRAVENOUS
Refills: 0 | Status: DISCONTINUED | OUTPATIENT
Start: 2021-04-13 | End: 2021-04-14

## 2021-04-13 RX ORDER — PIPERACILLIN AND TAZOBACTAM 4; .5 G/20ML; G/20ML
3.38 INJECTION, POWDER, LYOPHILIZED, FOR SOLUTION INTRAVENOUS ONCE
Refills: 0 | Status: DISCONTINUED | OUTPATIENT
Start: 2021-04-13 | End: 2021-04-13

## 2021-04-13 RX ORDER — CHLORHEXIDINE GLUCONATE 213 G/1000ML
1 SOLUTION TOPICAL
Refills: 0 | Status: DISCONTINUED | OUTPATIENT
Start: 2021-04-13 | End: 2021-04-15

## 2021-04-13 RX ORDER — NOREPINEPHRINE BITARTRATE/D5W 8 MG/250ML
0.05 PLASTIC BAG, INJECTION (ML) INTRAVENOUS
Qty: 8 | Refills: 0 | Status: DISCONTINUED | OUTPATIENT
Start: 2021-04-13 | End: 2021-04-14

## 2021-04-13 RX ORDER — PANTOPRAZOLE SODIUM 20 MG/1
40 TABLET, DELAYED RELEASE ORAL DAILY
Refills: 0 | Status: DISCONTINUED | OUTPATIENT
Start: 2021-04-13 | End: 2021-04-14

## 2021-04-13 RX ORDER — FLUCONAZOLE 150 MG/1
0 TABLET ORAL
Qty: 0 | Refills: 0 | DISCHARGE

## 2021-04-13 RX ORDER — ROCURONIUM BROMIDE 10 MG/ML
100 VIAL (ML) INTRAVENOUS ONCE
Refills: 0 | Status: COMPLETED | OUTPATIENT
Start: 2021-04-13 | End: 2021-04-13

## 2021-04-13 RX ORDER — HYDRALAZINE HCL 50 MG
10 TABLET ORAL EVERY 6 HOURS
Refills: 0 | Status: DISCONTINUED | OUTPATIENT
Start: 2021-04-13 | End: 2021-04-13

## 2021-04-13 RX ORDER — ETOMIDATE 2 MG/ML
20 INJECTION INTRAVENOUS ONCE
Refills: 0 | Status: COMPLETED | OUTPATIENT
Start: 2021-04-13 | End: 2021-04-13

## 2021-04-13 RX ORDER — HYDROCORTISONE 20 MG
20 TABLET ORAL DAILY
Refills: 0 | Status: DISCONTINUED | OUTPATIENT
Start: 2021-04-13 | End: 2021-04-15

## 2021-04-13 RX ORDER — FUROSEMIDE 40 MG
40 TABLET ORAL ONCE
Refills: 0 | Status: COMPLETED | OUTPATIENT
Start: 2021-04-13 | End: 2021-04-13

## 2021-04-13 RX ORDER — CHLORHEXIDINE GLUCONATE 213 G/1000ML
15 SOLUTION TOPICAL EVERY 12 HOURS
Refills: 0 | Status: DISCONTINUED | OUTPATIENT
Start: 2021-04-13 | End: 2021-04-14

## 2021-04-13 RX ORDER — FLUCONAZOLE 150 MG/1
400 TABLET ORAL
Refills: 0 | Status: DISCONTINUED | OUTPATIENT
Start: 2021-04-13 | End: 2021-04-15

## 2021-04-13 RX ORDER — TACROLIMUS 5 MG/1
2 CAPSULE ORAL DAILY
Refills: 0 | Status: DISCONTINUED | OUTPATIENT
Start: 2021-04-13 | End: 2021-04-14

## 2021-04-13 RX ORDER — CLOPIDOGREL BISULFATE 75 MG/1
75 TABLET, FILM COATED ORAL DAILY
Refills: 0 | Status: DISCONTINUED | OUTPATIENT
Start: 2021-04-13 | End: 2021-04-15

## 2021-04-13 RX ORDER — ASPIRIN/CALCIUM CARB/MAGNESIUM 324 MG
81 TABLET ORAL DAILY
Refills: 0 | Status: DISCONTINUED | OUTPATIENT
Start: 2021-04-13 | End: 2021-04-15

## 2021-04-13 RX ORDER — VANCOMYCIN HCL 1 G
1000 VIAL (EA) INTRAVENOUS ONCE
Refills: 0 | Status: COMPLETED | OUTPATIENT
Start: 2021-04-13 | End: 2021-04-13

## 2021-04-13 RX ORDER — IPRATROPIUM/ALBUTEROL SULFATE 18-103MCG
3 AEROSOL WITH ADAPTER (GRAM) INHALATION ONCE
Refills: 0 | Status: COMPLETED | OUTPATIENT
Start: 2021-04-13 | End: 2021-04-13

## 2021-04-13 RX ORDER — HYDROCORTISONE 20 MG
10 TABLET ORAL AT BEDTIME
Refills: 0 | Status: DISCONTINUED | OUTPATIENT
Start: 2021-04-13 | End: 2021-04-15

## 2021-04-13 RX ORDER — FENTANYL CITRATE 50 UG/ML
0.5 INJECTION INTRAVENOUS
Qty: 2500 | Refills: 0 | Status: DISCONTINUED | OUTPATIENT
Start: 2021-04-13 | End: 2021-04-14

## 2021-04-13 RX ORDER — HEPARIN SODIUM 5000 [USP'U]/ML
5000 INJECTION INTRAVENOUS; SUBCUTANEOUS EVERY 8 HOURS
Refills: 0 | Status: DISCONTINUED | OUTPATIENT
Start: 2021-04-13 | End: 2021-04-15

## 2021-04-13 RX ORDER — TAMSULOSIN HYDROCHLORIDE 0.4 MG/1
0.4 CAPSULE ORAL AT BEDTIME
Refills: 0 | Status: DISCONTINUED | OUTPATIENT
Start: 2021-04-13 | End: 2021-04-15

## 2021-04-13 RX ORDER — PIPERACILLIN AND TAZOBACTAM 4; .5 G/20ML; G/20ML
3.38 INJECTION, POWDER, LYOPHILIZED, FOR SOLUTION INTRAVENOUS EVERY 12 HOURS
Refills: 0 | Status: DISCONTINUED | OUTPATIENT
Start: 2021-04-13 | End: 2021-04-15

## 2021-04-13 RX ORDER — SEVELAMER CARBONATE 2400 MG/1
800 POWDER, FOR SUSPENSION ORAL
Refills: 0 | Status: DISCONTINUED | OUTPATIENT
Start: 2021-04-13 | End: 2021-04-15

## 2021-04-13 RX ORDER — MIDAZOLAM HYDROCHLORIDE 1 MG/ML
2 INJECTION, SOLUTION INTRAMUSCULAR; INTRAVENOUS ONCE
Refills: 0 | Status: DISCONTINUED | OUTPATIENT
Start: 2021-04-13 | End: 2021-04-13

## 2021-04-13 RX ORDER — METOPROLOL TARTRATE 50 MG
25 TABLET ORAL DAILY
Refills: 0 | Status: DISCONTINUED | OUTPATIENT
Start: 2021-04-13 | End: 2021-04-13

## 2021-04-13 RX ORDER — PIPERACILLIN AND TAZOBACTAM 4; .5 G/20ML; G/20ML
2.25 INJECTION, POWDER, LYOPHILIZED, FOR SOLUTION INTRAVENOUS ONCE
Refills: 0 | Status: DISCONTINUED | OUTPATIENT
Start: 2021-04-13 | End: 2021-04-13

## 2021-04-13 RX ORDER — ATORVASTATIN CALCIUM 80 MG/1
40 TABLET, FILM COATED ORAL AT BEDTIME
Refills: 0 | Status: DISCONTINUED | OUTPATIENT
Start: 2021-04-13 | End: 2021-04-14

## 2021-04-13 RX ORDER — PIPERACILLIN AND TAZOBACTAM 4; .5 G/20ML; G/20ML
3.38 INJECTION, POWDER, LYOPHILIZED, FOR SOLUTION INTRAVENOUS ONCE
Refills: 0 | Status: COMPLETED | OUTPATIENT
Start: 2021-04-13 | End: 2021-04-13

## 2021-04-13 RX ORDER — PROPOFOL 10 MG/ML
5 INJECTION, EMULSION INTRAVENOUS
Qty: 1000 | Refills: 0 | Status: DISCONTINUED | OUTPATIENT
Start: 2021-04-13 | End: 2021-04-14

## 2021-04-13 RX ADMIN — CHLORHEXIDINE GLUCONATE 15 MILLILITER(S): 213 SOLUTION TOPICAL at 17:09

## 2021-04-13 RX ADMIN — Medication 100 MILLIGRAM(S): at 13:10

## 2021-04-13 RX ADMIN — PIPERACILLIN AND TAZOBACTAM 25 GRAM(S): 4; .5 INJECTION, POWDER, LYOPHILIZED, FOR SOLUTION INTRAVENOUS at 22:00

## 2021-04-13 RX ADMIN — HEPARIN SODIUM 5000 UNIT(S): 5000 INJECTION INTRAVENOUS; SUBCUTANEOUS at 22:17

## 2021-04-13 RX ADMIN — MIDAZOLAM HYDROCHLORIDE 2 MILLIGRAM(S): 1 INJECTION, SOLUTION INTRAMUSCULAR; INTRAVENOUS at 23:02

## 2021-04-13 RX ADMIN — PIPERACILLIN AND TAZOBACTAM 3.38 GRAM(S): 4; .5 INJECTION, POWDER, LYOPHILIZED, FOR SOLUTION INTRAVENOUS at 12:15

## 2021-04-13 RX ADMIN — ATORVASTATIN CALCIUM 40 MILLIGRAM(S): 80 TABLET, FILM COATED ORAL at 21:58

## 2021-04-13 RX ADMIN — FENTANYL CITRATE 3.05 MICROGRAM(S)/KG/HR: 50 INJECTION INTRAVENOUS at 16:55

## 2021-04-13 RX ADMIN — MIDAZOLAM HYDROCHLORIDE 2 MILLIGRAM(S): 1 INJECTION, SOLUTION INTRAMUSCULAR; INTRAVENOUS at 17:51

## 2021-04-13 RX ADMIN — FLUCONAZOLE 400 MILLIGRAM(S): 150 TABLET ORAL at 22:00

## 2021-04-13 RX ADMIN — Medication 20 MILLIGRAM(S): at 21:59

## 2021-04-13 RX ADMIN — Medication 3 MILLILITER(S): at 10:23

## 2021-04-13 RX ADMIN — Medication 1 TABLET(S): at 16:56

## 2021-04-13 RX ADMIN — Medication 250 MILLIGRAM(S): at 13:56

## 2021-04-13 RX ADMIN — TACROLIMUS 2 MILLIGRAM(S): 5 CAPSULE ORAL at 21:59

## 2021-04-13 RX ADMIN — Medication 40 MILLIGRAM(S): at 10:23

## 2021-04-13 RX ADMIN — ETOMIDATE 20 MILLIGRAM(S): 2 INJECTION INTRAVENOUS at 13:10

## 2021-04-13 RX ADMIN — MIDAZOLAM HYDROCHLORIDE 2 MILLIGRAM(S): 1 INJECTION, SOLUTION INTRAMUSCULAR; INTRAVENOUS at 22:26

## 2021-04-13 RX ADMIN — Medication 10 MILLIGRAM(S): at 14:45

## 2021-04-13 RX ADMIN — PROPOFOL 1.8 MICROGRAM(S)/KG/MIN: 10 INJECTION, EMULSION INTRAVENOUS at 13:56

## 2021-04-13 RX ADMIN — Medication 5.72 MICROGRAM(S)/KG/MIN: at 18:32

## 2021-04-13 RX ADMIN — Medication 0.4 MILLIGRAM(S): at 10:23

## 2021-04-13 RX ADMIN — PIPERACILLIN AND TAZOBACTAM 200 GRAM(S): 4; .5 INJECTION, POWDER, LYOPHILIZED, FOR SOLUTION INTRAVENOUS at 11:45

## 2021-04-13 NOTE — ED ADULT NURSE REASSESSMENT NOTE - NS ED NURSE REASSESS COMMENT FT1
pt RR in [facility] for resp distress , pt taken to critical care with team and intubated by MD Resident Bonner 7.5ETT 19@lip

## 2021-04-13 NOTE — ED PROVIDER NOTE - PHYSICAL EXAMINATION
General: elderly male, alert, in bed with NRB on @10L  Head: [place], [place], AT  EENT: EOMI, no scleral icterus  Cardiac: RRR, no apparent murmurs, 1+ LE edema (R>L)  Respiratory: crackles (R>L), no respiratory distress   Abdomen: soft, ND, NT, nonperitonitic  MSK/Vascular: full ROM, soft compartments, warm extremities  Neuro: AAOx3, sensation to light touch intact  Psych: calm, cooperative General: elderly male, alert, in bed with NRB on @10L  Head: [place], [place], AT  EENT: EOMI, no scleral icterus  Cardiac: RRR, no apparent murmurs, 1+ LE edema (R>L)  Respiratory: crackles (R>L), no respiratory distress, R chest wall CVC   Abdomen: soft, ND, NT, nonperitonitic  MSK/Vascular: full ROM, soft compartments, warm extremities  Neuro: AAOx3, sensation to light touch intact  Psych: calm, cooperative

## 2021-04-13 NOTE — H&P ADULT - ASSESSMENT
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 Patient reports that he has been short of breath since yesterday when he was exercising. He reports that since then, he has been unable to catch his breath and therefore came in to be evaluated today. He does endorse a cough with a productive sputum. He denies any fevers or chills. Of note, patient was recently diagnosed with COVID and treated.   After doing admission, patients respiratory status further worsened and a rapid response was called and patient was urgently intubated.     #Acute hypoxic respiratory failure   Likely multifactorial - Secondary to fluid overload and hypertensive urgency and possible Pneumonia  Renal Recs appreciated   Will get urgent dialysis  Was given Lasix, Duoneb and nitro in the ED  Pulmonary was consulted   Continue with Non-rebreather - titrate down after dialysis  CTA of chest    #Multifocal Pneumonia  Blood cx sent   Sputum cx  RVP is negative   Will continue with Zosyn  ID is consulted     #Troponin elevation  Mild - Appears to be around the previous Troponins  Will repeat and trend troponins  Can be secondary to ?Demand in an HD patient  If repeat Trops are elevated - Will consider Cardio consulted       #Status post lung transplant  On tacrolimus, Hydrocortisone  c/w Bactrim and fluconazole for ppx     #CAD with stents  C/w ASA, Plavix   EKG reviewed - Non-ischemic  C/w statin    DVT: Lovenox prophylaxis     After admission, patient had a rapid response for acute hypoxic respiratory and was emergently intubated. ICU was consulted and patient is being transferred to MICU for additional care. Patients daughter Jyoti was updated and informed of the course by ME and the PA.

## 2021-04-13 NOTE — ED ADULT NURSE NOTE - CADM POA PRESS ULCER
Airway patent, Nasal mucosa clear. Mouth with normal mucosa. Throat has no vesicles, no oropharyngeal exudates and uvula is midline. TTP over nasal bridge with mild swelling. No

## 2021-04-13 NOTE — PATIENT PROFILE ADULT - STATED REASON FOR ADMISSION
Pt presented to ER with c/o SOB for 1 day. Pt presents to ICU orally intubated on ventilator with BNP of >70,000 requiring emergent HD

## 2021-04-13 NOTE — ED ADULT NURSE NOTE - OBJECTIVE STATEMENT
A&O x3- skin warm and dry.  Pt with tachypnea noted on NRB- increased when taken off Oxygen with decreased pulse ox as well; BBS- rales noted bilaterally.  Swelling noted to right leg 2+greater than left 1+. CM with NSR 80's noted. MD at bedside for evaluation.

## 2021-04-13 NOTE — ED PROVIDER NOTE - OBJECTIVE STATEMENT
70 year old male with history of double lung transplant ([facility]), ESRD (HD on MWF), who presents with shortness of breath. At baseline, patient is NOT on home oxygen and has o2 sat ranging from . Last night however he noticed his o2 sat was low - ranging from 83-95. Presented to ED via EMS for further evaluation. At bedside states that it feels difficult to catch his breath. Has noticed LE swelling (typically his RLE is more swollen than his left). No fevers, chills, cough, chest pain. On immunosuppression with Fluconazole, Bactrim, tacrolimus, and hydrocortisone. Has not received COVID vaccine (states his nephrologist said to hold off on this for now). No history of PE. 70 year old male with history of double lung transplant ([facility], 1/2015), ESRD (HD on MWF, started 2020), CAD s/p stents 11/2019, "broken leg surgery" (2020, Dr. 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 At baseline, patient is NOT on home oxygen and has o2 sat ranging from . Last night however he noticed his o2 sat was low - ranging from 83-95. Presented to ED via EMS for further evaluation. At bedside states that it feels difficult to catch his breath. Has noticed LE swelling (typically his RLE is more swollen than his left). No fevers, chills, cough, chest pain. On immunosuppression with Fluconazole, Bactrim, tacrolimus, and hydrocortisone. Has not received COVID vaccine (states his nephrologist said to hold off on this for now). No history of PE. Wife Veronica: [phone]. Received a transplant 2/2 lung damage from 30 years of tobacco abuse. [facility] transplant coordinator Laurel Lujan [phone]. 70 year old male with history of double lung transplant ([facility], 1/2015), ESRD (HD on MWF, started 2020), CAD s/p stents 11/2019, "broken leg surgery" (2020, Dr. 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 At baseline, patient is NOT on home oxygen and has o2 sat ranging from . Last night however he noticed his o2 sat was low - ranging from 83-95. Presented to ED via EMS for further evaluation. At bedside states that it feels difficult to catch his breath. Has noticed LE swelling (typically his RLE is more swollen than his left). No fevers, chills, cough, chest pain. Still makes urine. On immunosuppression with Fluconazole, Bactrim, tacrolimus, and hydrocortisone. Has not received COVID vaccine (states his nephrologist said to hold off on this for now). No history of PE. Wife Veronica: [phone]. Received a transplant 2/2 lung damage from 30 years of tobacco abuse. [facility] transplant coordinator Laurel Lujan [phone]. 70 year old male with history of double lung transplant ([facility], 1/2015), ESRD (HD on MWF, started 2020, Dr. Rg), CAD s/p stents 11/2019, "broken leg surgery" (2020, Dr. 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 At baseline, patient is NOT on home oxygen and has o2 sat ranging from . Last night however he noticed his o2 sat was low - ranging from 83-95. Presented to ED via EMS for further evaluation. At bedside states that it feels difficult to catch his breath. Has noticed LE swelling (typically his RLE is more swollen than his left). No fevers, chills, cough, chest pain. Still makes urine. On immunosuppression with Fluconazole, Bactrim, tacrolimus, and hydrocortisone. Has not received COVID vaccine (states his nephrologist said to hold off on this for now). No history of PE. Wife Veronica: [phone]. Received a transplant 2/2 lung damage from 30 years of tobacco abuse. [facility] transplant coordinator Laurel Lujan [phone].

## 2021-04-13 NOTE — H&P ADULT - CONVERSATION DETAILS
GOC was discussed with patient around 12:30 PM. Patient reported that he wanted to do whatever to keep him a live. He states that he wants to be full code and wants all measures. Then I discussed this information with patients daughter who understood and agreed to that sentiment as well.

## 2021-04-13 NOTE — ED PROVIDER NOTE - NS ED ROS FT
Constitutional: no fever, no chills  Head: NC, AT   Eyes: no redness   ENMT: no nasal congestion/drainage, no sore throat   CV: no chest pain, no edema  Resp: no cough, +dyspnea  GI: no abdominal pain, no nausea, no vomiting, no diarrhea  : no dysuria, no hematuria   Skin: no lesions, no rashes   Neuro: no LOC, no headache, no sensory deficits, no weakness

## 2021-04-13 NOTE — H&P ADULT - NSICDXPASTSURGICALHX_GEN_ALL_CORE_FT
PAST SURGICAL HISTORY:  H/O heart artery stent     H/O lung transplant bilateral - transplant - 1-2-2015 -  [facility]

## 2021-04-13 NOTE — ED ADULT TRIAGE NOTE - CHIEF COMPLAINT QUOTE
pt c/o SOB that began while working out. pt hx lung transplant and kidney failure. pt due for dialysis today.

## 2021-04-13 NOTE — PROCEDURE NOTE - ADDITIONAL PROCEDURE DETAILS
Bedside bronchoscopy performed given immunocompromised state  Mild erythema seen left lower lobe and right lower lobe  Mild bleeding at anastomosis site on left  [place] 1:20,000 iced saline and epi injected. No further bleeding noted  Cultures sent obtained from RLL, lingula   No immediate complications, remained 95%-100% throughout, BP stable  4mg IV versed given on top of fentanyl gtt    Procedure performed independent of critical care time  Dx: Acute on chronic hypoxic respiratory failure, septic shock, multifocal b/l pneumonia

## 2021-04-13 NOTE — ED ADULT NURSE REASSESSMENT NOTE - NS ED NURSE REASSESS COMMENT FT1
IV placed with labs drawn; Nebulizer started- pt began to desaturate and became tachypneic unable to tolerate nebulizer. Dr Bonner in attendance and made aware.  Nebulizer discontinued at this time and patient placed back on NRB. CM with NSR 80's noted; B/P improved to 191/102. Awaiting results and further orders.

## 2021-04-13 NOTE — RAPID RESPONSE TEAM SUMMARY - NSADDTLFINDINGSRRT_GEN_ALL_CORE
RRT called at 13:05 for pt presenting in respiratory distress. VS: /107  RR 26 SpO2 80%. Pt just came back from CT Angio - revealing worsening extensive consolidative opacities in the bilateral lower lobes and peribronchovascular opacities throughout the lungs. As well as bilateral pleural effusions, left greater than right. MICU consulted and coming to bedside. Pt requiring intubation. GOC conversation had earlier in day with Dr. Briggs, pt is full code. CXR ordered STAT. Pt was intubated by ED team. Spoke to Jyoti, daughter, updated on pt's status.  RRT called at 13:05 for pt presenting in respiratory distress. VS: /107  RR 26 SpO2 80%. Pt just came back from CT Angio - revealing worsening extensive consolidative opacities in the bilateral lower lobes and peribronchovascular opacities throughout the lungs. As well as bilateral pleural effusions, left greater than right. MICU consulted and coming to bedside. Pt requiring intubation. GOC conversation had earlier in day with Dr. Briggs, pt is full code. CXR ordered STAT. Pt was intubated by ED team. Spoke to Jyoti, daughter, updated on pt's status.     >40 minutes of critical care time was spent with patient and addressing patients care

## 2021-04-13 NOTE — ED ADULT NURSE NOTE - NSIMPLEMENTINTERV_GEN_ALL_ED
Implemented All Fall Risk Interventions:  [place] to call system. Call bell, personal items and telephone within reach. Instruct patient to call for assistance. Room bathroom lighting operational. Non-slip footwear when patient is off stretcher. Physically safe environment: no spills, clutter or unnecessary equipment. Stretcher in lowest position, wheels locked, appropriate side rails in place. Provide visual cue, wrist band, yellow gown, etc. Monitor gait and stability. Monitor for mental status changes and reorient to person, place, and time. Review medications for side effects contributing to fall risk. Reinforce activity limits and safety measures with patient and family.

## 2021-04-13 NOTE — ED PROVIDER NOTE - CLINICAL SUMMARY MEDICAL DECISION MAKING FREE TEXT BOX
70 year old male with history of double lung transplant ([facility], 1/2015), ESRD (HD on MWF, started 2020, Dr. Rg), CAD s/p stents 11/2019, "broken leg surgery" (2020, Dr. 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 Patient admitted to not following fluid restriction guidelines. Labs notable for wbc 13, hgb of 12 (improved from baseline of 9), creatinine 7.1, BNP >70,000, trop 0.07 (trop 0.08 in 2.1). EKG without signs of ischemia. CXR c/w fluid overload +/- r base infiltrate. Zosyn given empirically. Nephrology (Dr. Anderson) consulted and recommend admission for dialysis. Patient given lasix 40 mg, nitro 0.4, and duoneb prior to admission.

## 2021-04-13 NOTE — H&P ADULT - HISTORY OF PRESENT ILLNESS
Late entry: Patient was seen around 12:30PM.     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 Patient reports that he has been short of breath since yesterday when he was exercising. He reports that since then, he has been unable to catch his breath and therefore came in to be evaluated today. He does endorse a cough with a productive sputum. He denies any fevers or chills. Of note, patient was recently diagnosed with COVID and treated.     After doing admission, patients respiratory status further worsened and a rapid response was called and patient was urgently intubated.

## 2021-04-13 NOTE — H&P ADULT - NSHPPHYSICALEXAM_GEN_ALL_CORE
PHYSICAL EXAM: - Exam from 12:30PM  Vital Signs Last 24 Hrs  T(F): 97.7 (13 Apr 2021 09:27), Max: 97.7 (13 Apr 2021 09:27)  HR: 94 (13 Apr 2021 13:33) (83 - 104)  BP: 170/98 (13 Apr 2021 13:33) (146/85 - 211/110)  RR: 12 (13 Apr 2021 13:33) (12 - 38)  SpO2: 99% (13 Apr 2021 13:33) (80% - 99%)    GENERAL: Mild respiratory distress on Non-rebreather, cathectic   Eyes: EOMI, PERRLA  ENMT: Conjunctiva and sclera clear; supple neck, No JVD  Cardiovascular: S1,S2, RRR, No murmur  Respiratory: Decreased breath sounds on right side but crackles otherwise   GI: Bowel sounds present; Soft, Nontender, Nondistended. No hepatomegaly  Genitourinary: Deferred  Skin:  no breakdowns, ulcers or discharge, No rashes or lesions  Neurology: Alert & Oriented X3, non-focal and spontaneous movements of all extremities, CN 2 to 12 grossly intact   Psych: Appropriate mood and affect, calm, pleasant, Responds appropriately to questions

## 2021-04-13 NOTE — ED PROVIDER NOTE - ATTENDING CONTRIBUTION TO CARE
71 yo male with hx ESRD, b/l lung transplant c/o shortness of breath  patient reports increased fluid intake  denies chest pain  patient states he checked pulse ox over weekend and it would drop to 83%  awake and alert  tachypneic, speaking in full sentences, comfortable  rales and right base  abd soft, non tender  no pedal edema    sob, possible fluid load  labs, ekg, needs HD

## 2021-04-13 NOTE — H&P ADULT - NSHPREVIEWOFSYSTEMS_GEN_ALL_CORE
Review of Systems:  CONSTITUTIONAL: No weakness, fevers or chills  EYES/ENT: No visual changes;  No vertigo or throat pain   NECK: No pain or stiffness  RESPIRATORY: +SOB  CARDIOVASCULAR: No chest pain or palpitations  GASTROINTESTINAL: No abdominal or epigastric pain. No nausea, vomiting, or hematemesis; No diarrhea or constipation.   GENITOURINARY: No dysuria, frequency or hematuria  NEUROLOGICAL: No numbness or weakness  SKIN: No itching, burning, rashes, or lesions   All other review of systems is negative unless indicated above

## 2021-04-13 NOTE — ED PROVIDER NOTE - PROGRESS NOTE DETAILS
Ha: I rechecked the patient. O2 sat 89 on duoneb, so will hold off on completing duoneb at this time. Labs being drawn. Ha: I rechecked the patient. on NRB. Reviewed home med list and returned it to patient. Ha: I rechecked the patient and updated him on plan for admission. He is agreeable and is aware of plan for HD. Ha: Rapid Response called. Dr. Keita and I called to patient's bedside (following admission) in CDU. Patient had o2 in 80s on NRB. Patient brought to Critical and underwent pre-oxygenation with BVM+NC prior to intubation. Intubated, CXR performed, and ETT advanced to 22 at lip.

## 2021-04-13 NOTE — ED PROVIDER NOTE - CARE PLAN
Principal Discharge DX:	ESRD (end stage renal disease) on dialysis  Secondary Diagnosis:	Fluid overload

## 2021-04-13 NOTE — ED PROVIDER NOTE - PSH
H/O heart artery stent    H/O lung transplant  bilateral - transplant - 1-2-2015 -  [facility]

## 2021-04-14 DIAGNOSIS — J96.00 ACUTE RESPIRATORY FAILURE, UNSPECIFIED WHETHER WITH HYPOXIA OR HYPERCAPNIA: ICD-10-CM

## 2021-04-14 DIAGNOSIS — J18.9 PNEUMONIA, UNSPECIFIED ORGANISM: ICD-10-CM

## 2021-04-14 DIAGNOSIS — Z51.5 ENCOUNTER FOR PALLIATIVE CARE: ICD-10-CM

## 2021-04-14 DIAGNOSIS — N18.6 END STAGE RENAL DISEASE: ICD-10-CM

## 2021-04-14 LAB
A1C WITH ESTIMATED AVERAGE GLUCOSE RESULT: 4.4 % — SIGNIFICANT CHANGE UP (ref 4–5.6)
ALBUMIN SERPL ELPH-MCNC: 2.8 G/DL — LOW (ref 3.3–5.2)
ALP SERPL-CCNC: 136 U/L — HIGH (ref 40–120)
ALT FLD-CCNC: 9 U/L — SIGNIFICANT CHANGE UP
ANION GAP SERPL CALC-SCNC: 14 MMOL/L — SIGNIFICANT CHANGE UP (ref 5–17)
AST SERPL-CCNC: 25 U/L — SIGNIFICANT CHANGE UP
BILIRUB SERPL-MCNC: 0.8 MG/DL — SIGNIFICANT CHANGE UP (ref 0.4–2)
BUN SERPL-MCNC: 28 MG/DL — HIGH (ref 8–20)
CALCIUM SERPL-MCNC: 8.6 MG/DL — SIGNIFICANT CHANGE UP (ref 8.6–10.2)
CHLORIDE SERPL-SCNC: 95 MMOL/L — LOW (ref 98–107)
CHOLEST SERPL-MCNC: 119 MG/DL — SIGNIFICANT CHANGE UP
CO2 SERPL-SCNC: 28 MMOL/L — SIGNIFICANT CHANGE UP (ref 22–29)
COVID-19 SPIKE DOMAIN AB INTERP: POSITIVE
COVID-19 SPIKE DOMAIN ANTIBODY RESULT: 10.2 U/ML — HIGH
CREAT SERPL-MCNC: 5.31 MG/DL — HIGH (ref 0.5–1.3)
ESTIMATED AVERAGE GLUCOSE: 80 MG/DL — SIGNIFICANT CHANGE UP (ref 68–114)
GLUCOSE BLDC GLUCOMTR-MCNC: 156 MG/DL — HIGH (ref 70–99)
GLUCOSE SERPL-MCNC: 88 MG/DL — SIGNIFICANT CHANGE UP (ref 70–99)
GRAM STN FLD: SIGNIFICANT CHANGE UP
GRAM STN FLD: SIGNIFICANT CHANGE UP
HCT VFR BLD CALC: 36.5 % — LOW (ref 39–50)
HDLC SERPL-MCNC: 59 MG/DL — SIGNIFICANT CHANGE UP
HGB BLD-MCNC: 11.3 G/DL — LOW (ref 13–17)
LACTATE SERPL-SCNC: 1.8 MMOL/L — SIGNIFICANT CHANGE UP (ref 0.5–2)
LIPID PNL WITH DIRECT LDL SERPL: 47 MG/DL — SIGNIFICANT CHANGE UP
MAGNESIUM SERPL-MCNC: 2.2 MG/DL — SIGNIFICANT CHANGE UP (ref 1.6–2.6)
MCHC RBC-ENTMCNC: 28.8 PG — SIGNIFICANT CHANGE UP (ref 27–34)
MCHC RBC-ENTMCNC: 31 GM/DL — LOW (ref 32–36)
MCV RBC AUTO: 92.9 FL — SIGNIFICANT CHANGE UP (ref 80–100)
NON HDL CHOLESTEROL: 60 MG/DL — SIGNIFICANT CHANGE UP
PHOSPHATE SERPL-MCNC: 4.7 MG/DL — SIGNIFICANT CHANGE UP (ref 2.4–4.7)
PLATELET # BLD AUTO: SIGNIFICANT CHANGE UP K/UL (ref 150–400)
POTASSIUM SERPL-MCNC: 5.3 MMOL/L — SIGNIFICANT CHANGE UP (ref 3.5–5.3)
POTASSIUM SERPL-SCNC: 5.3 MMOL/L — SIGNIFICANT CHANGE UP (ref 3.5–5.3)
PROT SERPL-MCNC: 5.9 G/DL — LOW (ref 6.6–8.7)
RBC # BLD: 3.93 M/UL — LOW (ref 4.2–5.8)
RBC # FLD: 18.3 % — HIGH (ref 10.3–14.5)
SARS-COV-2 IGG+IGM SERPL QL IA: 10.2 U/ML — HIGH
SARS-COV-2 IGG+IGM SERPL QL IA: POSITIVE
SODIUM SERPL-SCNC: 137 MMOL/L — SIGNIFICANT CHANGE UP (ref 135–145)
SPECIMEN SOURCE: SIGNIFICANT CHANGE UP
SPECIMEN SOURCE: SIGNIFICANT CHANGE UP
TACROLIMUS SERPL-MCNC: 20.7 NG/ML — SIGNIFICANT CHANGE UP
TRIGL SERPL-MCNC: 65 MG/DL — SIGNIFICANT CHANGE UP
TROPONIN T SERPL-MCNC: 0.06 NG/ML — SIGNIFICANT CHANGE UP (ref 0–0.06)
TSH SERPL-MCNC: 2.53 UIU/ML — SIGNIFICANT CHANGE UP (ref 0.27–4.2)
WBC # BLD: 8.15 K/UL — SIGNIFICANT CHANGE UP (ref 3.8–10.5)
WBC # FLD AUTO: 8.15 K/UL — SIGNIFICANT CHANGE UP (ref 3.8–10.5)

## 2021-04-14 PROCEDURE — 99232 SBSQ HOSP IP/OBS MODERATE 35: CPT

## 2021-04-14 PROCEDURE — 99223 1ST HOSP IP/OBS HIGH 75: CPT

## 2021-04-14 PROCEDURE — 93306 TTE W/DOPPLER COMPLETE: CPT | Mod: 26

## 2021-04-14 RX ORDER — TACROLIMUS 5 MG/1
1 CAPSULE ORAL
Qty: 0 | Refills: 0 | DISCHARGE

## 2021-04-14 RX ORDER — AZTREONAM 2 G
1 VIAL (EA) INJECTION
Qty: 0 | Refills: 0 | DISCHARGE

## 2021-04-14 RX ORDER — FUROSEMIDE 40 MG
1 TABLET ORAL
Qty: 0 | Refills: 0 | DISCHARGE

## 2021-04-14 RX ORDER — TACROLIMUS 5 MG/1
1 CAPSULE ORAL
Refills: 0 | Status: DISCONTINUED | OUTPATIENT
Start: 2021-04-14 | End: 2021-04-15

## 2021-04-14 RX ORDER — ONDANSETRON 8 MG/1
4 TABLET, FILM COATED ORAL ONCE
Refills: 0 | Status: COMPLETED | OUTPATIENT
Start: 2021-04-14 | End: 2021-04-14

## 2021-04-14 RX ORDER — METOPROLOL TARTRATE 50 MG
25 TABLET ORAL
Refills: 0 | Status: DISCONTINUED | OUTPATIENT
Start: 2021-04-14 | End: 2021-04-15

## 2021-04-14 RX ORDER — ALBUTEROL 90 UG/1
2.5 AEROSOL, METERED ORAL EVERY 6 HOURS
Refills: 0 | Status: DISCONTINUED | OUTPATIENT
Start: 2021-04-14 | End: 2021-04-15

## 2021-04-14 RX ORDER — METOPROLOL TARTRATE 50 MG
5 TABLET ORAL ONCE
Refills: 0 | Status: COMPLETED | OUTPATIENT
Start: 2021-04-14 | End: 2021-04-14

## 2021-04-14 RX ORDER — TACROLIMUS 5 MG/1
0.5 CAPSULE ORAL
Refills: 0 | Status: DISCONTINUED | OUTPATIENT
Start: 2021-04-15 | End: 2021-04-15

## 2021-04-14 RX ORDER — PANTOPRAZOLE SODIUM 20 MG/1
40 TABLET, DELAYED RELEASE ORAL
Refills: 0 | Status: DISCONTINUED | OUTPATIENT
Start: 2021-04-14 | End: 2021-04-15

## 2021-04-14 RX ORDER — ROSUVASTATIN CALCIUM 5 MG/1
40 TABLET ORAL AT BEDTIME
Refills: 0 | Status: DISCONTINUED | OUTPATIENT
Start: 2021-04-14 | End: 2021-04-15

## 2021-04-14 RX ORDER — LEVALBUTEROL 1.25 MG/.5ML
1.25 SOLUTION, CONCENTRATE RESPIRATORY (INHALATION) ONCE
Refills: 0 | Status: COMPLETED | OUTPATIENT
Start: 2021-04-14 | End: 2021-04-15

## 2021-04-14 RX ORDER — CALCIUM CARBONATE 500(1250)
0 TABLET ORAL
Qty: 0 | Refills: 0 | DISCHARGE

## 2021-04-14 RX ORDER — TACROLIMUS 5 MG/1
0.5 CAPSULE ORAL AT BEDTIME
Refills: 0 | Status: DISCONTINUED | OUTPATIENT
Start: 2021-04-14 | End: 2021-04-14

## 2021-04-14 RX ORDER — CALCIUM CARBONATE 500(1250)
1 TABLET ORAL
Qty: 0 | Refills: 0 | DISCHARGE

## 2021-04-14 RX ORDER — ASPIRIN/CALCIUM CARB/MAGNESIUM 324 MG
1 TABLET ORAL
Qty: 0 | Refills: 0 | DISCHARGE

## 2021-04-14 RX ADMIN — CHLORHEXIDINE GLUCONATE 1 APPLICATION(S): 213 SOLUTION TOPICAL at 06:28

## 2021-04-14 RX ADMIN — HEPARIN SODIUM 5000 UNIT(S): 5000 INJECTION INTRAVENOUS; SUBCUTANEOUS at 21:17

## 2021-04-14 RX ADMIN — Medication 5 MILLIGRAM(S): at 23:54

## 2021-04-14 RX ADMIN — TACROLIMUS 1 MILLIGRAM(S): 5 CAPSULE ORAL at 11:58

## 2021-04-14 RX ADMIN — ALBUTEROL 2.5 MILLIGRAM(S): 90 AEROSOL, METERED ORAL at 23:05

## 2021-04-14 RX ADMIN — Medication 81 MILLIGRAM(S): at 11:17

## 2021-04-14 RX ADMIN — TACROLIMUS 1 MILLIGRAM(S): 5 CAPSULE ORAL at 20:14

## 2021-04-14 RX ADMIN — ONDANSETRON 4 MILLIGRAM(S): 8 TABLET, FILM COATED ORAL at 07:26

## 2021-04-14 RX ADMIN — HEPARIN SODIUM 5000 UNIT(S): 5000 INJECTION INTRAVENOUS; SUBCUTANEOUS at 06:28

## 2021-04-14 RX ADMIN — Medication 25 MILLIGRAM(S): at 11:25

## 2021-04-14 RX ADMIN — Medication 20 MILLIGRAM(S): at 06:48

## 2021-04-14 RX ADMIN — CLOPIDOGREL BISULFATE 75 MILLIGRAM(S): 75 TABLET, FILM COATED ORAL at 11:18

## 2021-04-14 RX ADMIN — PIPERACILLIN AND TAZOBACTAM 25 GRAM(S): 4; .5 INJECTION, POWDER, LYOPHILIZED, FOR SOLUTION INTRAVENOUS at 06:29

## 2021-04-14 RX ADMIN — SEVELAMER CARBONATE 800 MILLIGRAM(S): 2400 POWDER, FOR SUSPENSION ORAL at 17:25

## 2021-04-14 RX ADMIN — HEPARIN SODIUM 5000 UNIT(S): 5000 INJECTION INTRAVENOUS; SUBCUTANEOUS at 14:23

## 2021-04-14 RX ADMIN — TAMSULOSIN HYDROCHLORIDE 0.4 MILLIGRAM(S): 0.4 CAPSULE ORAL at 21:20

## 2021-04-14 RX ADMIN — SEVELAMER CARBONATE 800 MILLIGRAM(S): 2400 POWDER, FOR SUSPENSION ORAL at 11:22

## 2021-04-14 RX ADMIN — Medication 25 MILLIGRAM(S): at 21:17

## 2021-04-14 RX ADMIN — ROSUVASTATIN CALCIUM 40 MILLIGRAM(S): 5 TABLET ORAL at 21:17

## 2021-04-14 RX ADMIN — Medication 10 MILLIGRAM(S): at 21:18

## 2021-04-14 RX ADMIN — Medication 1 TABLET(S): at 11:22

## 2021-04-14 RX ADMIN — Medication 1 TABLET(S): at 11:18

## 2021-04-14 RX ADMIN — PIPERACILLIN AND TAZOBACTAM 25 GRAM(S): 4; .5 INJECTION, POWDER, LYOPHILIZED, FOR SOLUTION INTRAVENOUS at 17:25

## 2021-04-14 RX ADMIN — PANTOPRAZOLE SODIUM 40 MILLIGRAM(S): 20 TABLET, DELAYED RELEASE ORAL at 11:17

## 2021-04-14 NOTE — CONSULT NOTE ADULT - PROBLEM SELECTOR RECOMMENDATION 4
Palliative Care consulted to assist with GOC.    Patient with extensive medical history, requiring intubation for respiratory failure.   Met with patient - he was just extubated.   He is the recipient of a double lung transplant in 2015 for COPD and has been on dialysis for about a year. He is looking to get a Kidney transplant.   He does not recall the exact details of this hospital admission but reports that he was getting more SOB during exercise.  He states he is able to generally perform most ADLs independently, but does feel that since his COVID admission last month he's declined.  He did not need O2 on discharge, but is interested in obtaining a "compressor" just in case he needs it.    He states he has paperwork regarding his wishes and  would like " everything done to keep me going".  However, he feels that with his recent hospital admissions, he could die at any time. Tried to discuss advance directives/CPR.    He was not specific but deferred to his family to make further decisions on his behalf.   Plan for further GOC discussions.

## 2021-04-14 NOTE — CONSULT NOTE ADULT - REASON FOR ADMISSION
Acute hypoxic respiratory failure

## 2021-04-14 NOTE — PROGRESS NOTE ADULT - SUBJECTIVE AND OBJECTIVE BOX
pt. is S/p bronch, off pressors, Afebrile. S/p HD w/ 2L .   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 CTA no PE but revealing significant bibasilar pneumonia, effusions/LLL loculated effusion. RRT during CT for acute respiratory distress, subsequently intubated in ED. Transferred to MICU. On pressors, antibiotics.     P/E   ICU Vital Signs Last 24 Hrs  T(C): 36.5 (14 Apr 2021 15:46), Max: 37.3 (14 Apr 2021 04:00)  T(F): 97.7 (14 Apr 2021 15:46), Max: 99.1 (14 Apr 2021 04:00)  HR: 70 (14 Apr 2021 19:00) (65 - 82)  BP: 172/86 (14 Apr 2021 19:00) (112/64 - 183/97)  BP(mean): 110 (14 Apr 2021 19:00) (75 - 120)  ABP: --  ABP(mean): --  RR: 21 (14 Apr 2021 19:00) (11 - 23)  SpO2: 98% (14 Apr 2021 19:00) (90% - 100%)    General: Well developed. well nourished. not in distress  HEENT: AT, NC. PERRL. intact EOM. no throat erythema or exudate.   Neck: supple. no JVD. no palpable lymph nodes  Chest: CTA bilaterally  Heart: normal S1,S2. RRR. no heart murmur.  Abdomen: soft. non-tender. non-distended. + BS. no hernia or palpable masses.  Genital: not examined  Ext: no C/C/E. no calf tenderness.  Neuro: AAO x3. no focal weakness. no speech disorder  Skin: no rash    MEDICATIONS  (STANDING):  aspirin  chewable 81 milliGRAM(s) Oral daily  chlorhexidine 4% Liquid 1 Application(s) Topical <User Schedule>  clopidogrel Tablet 75 milliGRAM(s) Oral daily  fluconAZOLE   Tablet 400 milliGRAM(s) Oral <User Schedule>  heparin   Injectable 5000 Unit(s) SubCutaneous every 8 hours  hydrocortisone 10 milliGRAM(s) Oral at bedtime  hydrocortisone 20 milliGRAM(s) Oral daily  metoprolol tartrate 25 milliGRAM(s) Oral <User Schedule>  multivitamin 1 Tablet(s) Oral daily  pantoprazole    Tablet 40 milliGRAM(s) Oral before breakfast  piperacillin/tazobactam IVPB.. 3.375 Gram(s) IV Intermittent every 12 hours  rosuvastatin 40 milliGRAM(s) Oral at bedtime  sevelamer carbonate 800 milliGRAM(s) Oral three times a day with meals  tacrolimus 1 milliGRAM(s) Oral <User Schedule>  tamsulosin 0.4 milliGRAM(s) Oral at bedtime    MEDICATIONS  (PRN):    LABS :                            11.3   8.15  )-----------( Clumped    ( 14 Apr 2021 04:53 )             36.5     04-14    137  |  95<L>  |  28.0<H>  ----------------------------<  88  5.3   |  28.0  |  5.31<H>    Ca    8.6      14 Apr 2021 04:53  Phos  4.7     04-14  Mg     2.2     04-14    TPro  5.9<L>  /  Alb  2.8<L>  /  TBili  0.8  /  DBili  x   /  AST  25  /  ALT  9   /  AlkPhos  136<H>  04-14    CARDIAC MARKERS ( 14 Apr 2021 04:53 )  x     / 0.06 ng/mL / x     / x     / x      CARDIAC MARKERS ( 13 Apr 2021 10:27 )  x     / 0.07 ng/mL / x     / x     / x              PT/INR - ( 13 Apr 2021 10:27 )   PT: 12.7 sec;   INR: 1.10 ratio         PTT - ( 13 Apr 2021 10:27 )  PTT:31.2 sec         pt. seen and examined, pt. was admitted for acute hypoxic respiratory failure , was intubated , now extubated and on o2 via NC and o2 sat 98 5, pt. is S/p bronch, off pressors, Afebrile. S/p HD w/ 2L . pt. stated that his breathing is much better, no cp, no abd. pain. no n/v/d.   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 CTA no PE but revealing significant bibasilar pneumonia, effusions/LLL loculated effusion. RRT during CT for acute respiratory distress, subsequently intubated in ED. Transferred to MICU. On pressors, antibiotics.     P/E   ICU Vital Signs Last 24 Hrs  T(C): 36.5 (14 Apr 2021 15:46), Max: 37.3 (14 Apr 2021 04:00)  T(F): 97.7 (14 Apr 2021 15:46), Max: 99.1 (14 Apr 2021 04:00)  HR: 70 (14 Apr 2021 19:00) (65 - 82)  BP: 172/86 (14 Apr 2021 19:00) (112/64 - 183/97)  BP(mean): 110 (14 Apr 2021 19:00) (75 - 120)  ABP: --  ABP(mean): --  RR: 21 (14 Apr 2021 19:00) (11 - 23)  SpO2: 98% (14 Apr 2021 19:00) (90% - 100%)    General: pt. lying in bed not in distress.  HEENT: AT, NC. PERRL. intact EOM. no throat erythema or exudate.   Neck: supple. no JVD.   Chest: scattered rhonchi bilaterally, no sig. rales, no inter costal retractions. rt. upper chest wall dialysis catheter noted.   Heart: S1,S2. RRR. no heart murmur. no edema.   Abdomen: soft. non-tender. non-distended. + BS.   Ext: no calf tenderness. distal pulses 2 +, ROM of all ext. intact.   Neuro: AAO x3. no focal weakness. no speech disorder  Skin: warm and dry, no obvious rash noted. no pallor.   Psych : pleasant, co-operative, no si/hi.     MEDICATIONS  (STANDING):  aspirin  chewable 81 milliGRAM(s) Oral daily  chlorhexidine 4% Liquid 1 Application(s) Topical <User Schedule>  clopidogrel Tablet 75 milliGRAM(s) Oral daily  fluconAZOLE   Tablet 400 milliGRAM(s) Oral <User Schedule>  heparin   Injectable 5000 Unit(s) SubCutaneous every 8 hours  hydrocortisone 10 milliGRAM(s) Oral at bedtime  hydrocortisone 20 milliGRAM(s) Oral daily  metoprolol tartrate 25 milliGRAM(s) Oral <User Schedule>  multivitamin 1 Tablet(s) Oral daily  pantoprazole    Tablet 40 milliGRAM(s) Oral before breakfast  piperacillin/tazobactam IVPB.. 3.375 Gram(s) IV Intermittent every 12 hours  rosuvastatin 40 milliGRAM(s) Oral at bedtime  sevelamer carbonate 800 milliGRAM(s) Oral three times a day with meals  tacrolimus 1 milliGRAM(s) Oral <User Schedule>  tamsulosin 0.4 milliGRAM(s) Oral at bedtime    MEDICATIONS  (PRN):    LABS :                            11.3   8.15  )-----------( Clumped    ( 14 Apr 2021 04:53 )             36.5     04-14    137  |  95<L>  |  28.0<H>  ----------------------------<  88  5.3   |  28.0  |  5.31<H>    Ca    8.6      14 Apr 2021 04:53  Phos  4.7     04-14  Mg     2.2     04-14    TPro  5.9<L>  /  Alb  2.8<L>  /  TBili  0.8  /  DBili  x   /  AST  25  /  ALT  9   /  AlkPhos  136<H>  04-14    CARDIAC MARKERS ( 14 Apr 2021 04:53 )  x     / 0.06 ng/mL / x     / x     / x      CARDIAC MARKERS ( 13 Apr 2021 10:27 )  x     / 0.07 ng/mL / x     / x     / x              PT/INR - ( 13 Apr 2021 10:27 )   PT: 12.7 sec;   INR: 1.10 ratio         PTT - ( 13 Apr 2021 10:27 )  PTT:31.2 sec         pt. seen and examined, pt. was admitted for acute hypoxic respiratory failure , was intubated , now extubated and on o2 via NC and o2 sat 98 5, pt. is S/p bronch, off pressors, Afebrile. S/p HD w/ 2L . pt. stated that his breathing is much better, no cp, no abd. pain. no n/v/d.   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 CTA no PE but revealing significant bibasilar pneumonia, effusions/LLL loculated effusion. RRT during CT for acute respiratory distress, subsequently intubated in ED. Transferred to MICU. On pressors, antibiotics.     P/E   ICU Vital Signs Last 24 Hrs  T(C): 36.5 (14 Apr 2021 15:46), Max: 37.3 (14 Apr 2021 04:00)  T(F): 97.7 (14 Apr 2021 15:46), Max: 99.1 (14 Apr 2021 04:00)  HR: 70 (14 Apr 2021 19:00) (65 - 82)  BP: 172/86 (14 Apr 2021 19:00) (112/64 - 183/97)  BP(mean): 110 (14 Apr 2021 19:00) (75 - 120)  ABP: --  ABP(mean): --  RR: 21 (14 Apr 2021 19:00) (11 - 23)  SpO2: 98% (14 Apr 2021 19:00) (90% - 100%)    General: pt. lying in bed not in distress.  HEENT: AT, NC. PERRL. intact EOM. no throat erythema or exudate.   Neck: supple. no JVD.   Chest: scattered rhonchi bilaterally, no sig. rales, no inter costal retractions. rt. upper chest wall dialysis catheter noted.   Heart: S1,S2. RRR. no heart murmur. no edema.   Abdomen: soft. non-tender. non-distended. + BS.   Ext: no calf tenderness. distal pulses 2 +, ROM of all ext. intact.   Neuro: AAO x3. no focal weakness. no speech disorder  Skin: warm and dry, no obvious rash noted. no pallor.   Psych : pleasant, co-operative, no si/hi.     MEDICATIONS  (STANDING):  aspirin  chewable 81 milliGRAM(s) Oral daily  chlorhexidine 4% Liquid 1 Application(s) Topical <User Schedule>  clopidogrel Tablet 75 milliGRAM(s) Oral daily  fluconAZOLE   Tablet 400 milliGRAM(s) Oral <User Schedule>  heparin   Injectable 5000 Unit(s) SubCutaneous every 8 hours  hydrocortisone 10 milliGRAM(s) Oral at bedtime  hydrocortisone 20 milliGRAM(s) Oral daily  metoprolol tartrate 25 milliGRAM(s) Oral <User Schedule>  multivitamin 1 Tablet(s) Oral daily  pantoprazole    Tablet 40 milliGRAM(s) Oral before breakfast  piperacillin/tazobactam IVPB.. 3.375 Gram(s) IV Intermittent every 12 hours  rosuvastatin 40 milliGRAM(s) Oral at bedtime  sevelamer carbonate 800 milliGRAM(s) Oral three times a day with meals  tacrolimus 1 milliGRAM(s) Oral <User Schedule>  tamsulosin 0.4 milliGRAM(s) Oral at bedtime    MEDICATIONS  (PRN):    LABS :                            11.3   8.15  )-----------( Clumped    ( 14 Apr 2021 04:53 )             36.5     04-14    137  |  95<L>  |  28.0<H>  ----------------------------<  88  5.3   |  28.0  |  5.31<H>    Ca    8.6      14 Apr 2021 04:53  Phos  4.7     04-14  Mg     2.2     04-14    TPro  5.9<L>  /  Alb  2.8<L>  /  TBili  0.8  /  DBili  x   /  AST  25  /  ALT  9   /  AlkPhos  136<H>  04-14    CARDIAC MARKERS ( 14 Apr 2021 04:53 )  x     / 0.06 ng/mL / x     / x     / x      CARDIAC MARKERS ( 13 Apr 2021 10:27 )  x     / 0.07 ng/mL / x     / x     / x              PT/INR - ( 13 Apr 2021 10:27 )   PT: 12.7 sec;   INR: 1.10 ratio         PTT - ( 13 Apr 2021 10:27 )  PTT:31.2 sec    A/P   pt. was admitted for acute hypoxic respiratory failure , was intubated , now extubated and on o2 via NC and o2 sat 98 5, pt. is S/p bronch, off pressors, Afebrile. S/p HD w/ 2L . pt. stated that his breathing is much better, no cp, no abd. pain. no n/v/d.     - Acute hypoxic respiratory failure, s/p intubation now extubated, o2 sat 98 % on NC, clinically stable, will keep on albuetrol neb.    - B/L pneumonia, very likely extensive pneumonia in immune compromised pt. continue zosyn, ct chest no evidence of PE, pt. recently had covid-19 infection,  ID following,     -ESRD on dialysis, nephrology team following for dialysis.    - Double lung transplant, continue tacrolimus and fluconazole prophylaxis. ID team following.     - CAD s/p PCI, asa, plavix , b.blk on borad.     - hyperlipidemia, continue statin.     - Follow am labs    -  Heparin subcut for dvt prophylaxis.     - hospitalist team will follow.          pt. seen and examined, pt. was admitted for acute hypoxic respiratory failure , was intubated , now extubated and on o2 via NC and o2 sat 98 5, pt. is S/p bronch, off pressors, Afebrile. S/p HD w/ 2L . pt. stated that his breathing is much better, no cp, no abd. pain. no n/v/d.   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 CTA no PE but revealing significant bibasilar pneumonia, effusions/LLL loculated effusion. RRT during CT for acute respiratory distress, subsequently intubated in ED. Transferred to MICU. On pressors, antibiotics.     P/E   ICU Vital Signs Last 24 Hrs  T(C): 36.5 (14 Apr 2021 15:46), Max: 37.3 (14 Apr 2021 04:00)  T(F): 97.7 (14 Apr 2021 15:46), Max: 99.1 (14 Apr 2021 04:00)  HR: 70 (14 Apr 2021 19:00) (65 - 82)  BP: 172/86 (14 Apr 2021 19:00) (112/64 - 183/97)  BP(mean): 110 (14 Apr 2021 19:00) (75 - 120)  ABP: --  ABP(mean): --  RR: 21 (14 Apr 2021 19:00) (11 - 23)  SpO2: 98% (14 Apr 2021 19:00) (90% - 100%)    General: pt. lying in bed not in distress.  HEENT: AT, NC. PERRL. intact EOM. no throat erythema or exudate.   Neck: supple. no JVD.   Chest: scattered rhonchi bilaterally, no sig. rales, no inter costal retractions. rt. upper chest wall dialysis catheter noted.   Heart: S1,S2. RRR. no heart murmur. no edema.   Abdomen: soft. non-tender. non-distended. + BS.   Ext: no calf tenderness. distal pulses 2 +, ROM of all ext. intact.   Neuro: AAO x3. no focal weakness. no speech disorder  Skin: warm and dry, no obvious rash noted. no pallor.   Psych : pleasant, co-operative, no si/hi.     MEDICATIONS  (STANDING):  aspirin  chewable 81 milliGRAM(s) Oral daily  chlorhexidine 4% Liquid 1 Application(s) Topical <User Schedule>  clopidogrel Tablet 75 milliGRAM(s) Oral daily  fluconAZOLE   Tablet 400 milliGRAM(s) Oral <User Schedule>  heparin   Injectable 5000 Unit(s) SubCutaneous every 8 hours  hydrocortisone 10 milliGRAM(s) Oral at bedtime  hydrocortisone 20 milliGRAM(s) Oral daily  metoprolol tartrate 25 milliGRAM(s) Oral <User Schedule>  multivitamin 1 Tablet(s) Oral daily  pantoprazole    Tablet 40 milliGRAM(s) Oral before breakfast  piperacillin/tazobactam IVPB.. 3.375 Gram(s) IV Intermittent every 12 hours  rosuvastatin 40 milliGRAM(s) Oral at bedtime  sevelamer carbonate 800 milliGRAM(s) Oral three times a day with meals  tacrolimus 1 milliGRAM(s) Oral <User Schedule>  tamsulosin 0.4 milliGRAM(s) Oral at bedtime    MEDICATIONS  (PRN):    LABS :                            11.3   8.15  )-----------( Clumped    ( 14 Apr 2021 04:53 )             36.5     04-14    137  |  95<L>  |  28.0<H>  ----------------------------<  88  5.3   |  28.0  |  5.31<H>    Ca    8.6      14 Apr 2021 04:53  Phos  4.7     04-14  Mg     2.2     04-14    TPro  5.9<L>  /  Alb  2.8<L>  /  TBili  0.8  /  DBili  x   /  AST  25  /  ALT  9   /  AlkPhos  136<H>  04-14    CARDIAC MARKERS ( 14 Apr 2021 04:53 )  x     / 0.06 ng/mL / x     / x     / x      CARDIAC MARKERS ( 13 Apr 2021 10:27 )  x     / 0.07 ng/mL / x     / x     / x              PT/INR - ( 13 Apr 2021 10:27 )   PT: 12.7 sec;   INR: 1.10 ratio         PTT - ( 13 Apr 2021 10:27 )  PTT:31.2 sec    A/P   pt. was admitted for acute hypoxic respiratory failure , was intubated , now extubated and on o2 via NC and o2 sat 98 5, pt. is S/p bronch, off pressors, Afebrile. S/p HD w/ 2L . pt. stated that his breathing is much better, no cp, no abd. pain. no n/v/d.     - Acute hypoxic respiratory failure, s/p intubation now extubated, o2 sat 98 % on NC, clinically stable, will keep on albuetrol neb.    - B/L pneumonia, unspecified organism, unspecified part of the lung,  very likely extensive pneumonia in immune compromised pt. continue zosyn, ct chest no evidence of PE, pt. recently had covid-19 infection,  ID following,     -ESRD on dialysis, nephrology team following for dialysis.    - Double lung transplant, continue tacrolimus and fluconazole prophylaxis. ID team following.     - CAD s/p PCI, asa, plavix , b.blk on borad.     - hyperlipidemia, continue statin.     - Follow am labs    -  Heparin subcut for dvt prophylaxis.     - hospitalist team will follow.

## 2021-04-14 NOTE — PROGRESS NOTE ADULT - ASSESSMENT
ESRD   Clinical fluid overload   had HD yest , after CT angiogram   CTA revealed CTA no PE but bibasilar PNA w loculated effusion.    HTN - Reassess post fluid removal with HD     Anemia - Hgb stable     H/O Lung transplant - Continue the current immunosuppression     HD tomorrow    Will follow

## 2021-04-14 NOTE — PROGRESS NOTE ADULT - ASSESSMENT
71 y/o male with PMH of COPD s/p double lung transplant in 2015, CAD with stents, ESRD on dialysis T/TH/S, 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 Patient reporTED  that he has been short of breath since yesterday when he was exercising. HE HAD     been unable to catch his breath and therefore came in to be evaluated today. He does endorse a cough with a productive sputum. He denies any fevers or chills. Of note, patient was recently diagnosed with COVID and treated.    PT AS ABOVE HAD INCREASED RESP SX AND WAS INTUBATED  NOW EXTUBATED AND AWAKE AND ALERT  BLOOD CX WERE DRAWN PT PLACED ON EMPRIC IV ABX  PT ALREADY HAD COVID AND WAS TREATED HERE IN THE PAST  PT HAD REPORTED CRYPT MENINGITIS   IN 12/20 AT [facility]  AND IS ON CHRONIC SUPPRESSIVE DIFLUCAN      WILL CONTINUE EMPRIC IV ABX  AND FOLLOW UP CULTURES  OVERALL IMPROVED  WILL FOLLOW UP WITH FURHTER RECOMMENDATIONS

## 2021-04-14 NOTE — CONSULT NOTE ADULT - ASSESSMENT
70M w/ PMHx CAD s/p PCI, COPD s/p double lung transplant (2015) on IS, cryptococcal meningitis, ESRD on HD via [facility] recent COVID and BPH presented on 04/13 w/ SOB     Acute on chronic hypoxic respiratory failure   Bibasilar pneumonia     Neuro: Started on propofol. Maintain RAAS b/w 0 to -2  Cardiovascular: Aim for MAP target 65. Hydralazine PRN to keep SBP < 160. Check TTE, BNP 70K. EKG w. normal sinus. Trend enzymes  Resp/Chest: Maintain SpO2 > 88%. On Volume AC . Vent bundle. EtCO2 monitoring. Continue Tacrolimus and Hydrocortisone. Check levels   GI/Nutrition: Diet: Keep NPO for now. IV PPI. Bowel regimen as needed  ID: Started on empiric Abx, Zosyn and Vancomycin, f/u BCx, UCx. Trend LA. ID to be consulted. Restarted on Bactrim and fluconazole    Renal: HD as per renal   Endocrinology: Maintain Blood sugar < 180  Haem/Oncology:  DVT ppx w/ HSQ  Code status: Full. Palliative care consulted   Line/tubes/ drains: RERE lopez present on admission ETT/OGT: 04/13    Critical Care time: 35 minutes assessing presenting problems of acute illness that poses high probability of life threatening deterioration or end organ damage/dysfunction.  Medical decision making including Initiating plan of care, reviewing data, reviewing radiology, discussing with multidisciplinary team, non inclusive of procedures    
ESRD   Clinical fluid overload   Will arrange emergent HD , after CT angiogram     HTN - Reassess post fluid removal with HD     Anemia - Hgb stable     H/O Lung transplant - Continue the current immunosuppression     Further recommendations will be based on response to emergent HD and imaging 
70 yr man with PMH of COPD s/p double lung transplant in 2015, CAD with stents, ESRD on dialysis T/TH/S, cryptococcal meninigitis 12/2020, COVID 3/2021 admitted with acute hypoxic respiratory failure 2/2 PNA

## 2021-04-14 NOTE — PROGRESS NOTE ADULT - SUBJECTIVE AND OBJECTIVE BOX
INFECTIOUS DISEASES AND INTERNAL MEDICINE at [place]  =======================================================  Scar Solares MD  Diplomates American Board of Internal Medicine and Infectious Diseases  Telephone [phone]  Fax            [phone]  =======================================================    JU FERGUSON [id number]    Follow up: resp failure     Allergies:  budesonide (Unknown)  cefpodoxime (Unknown)  Pollen (Unknown)      Medications:  aspirin  chewable 81 milliGRAM(s) Oral daily  atorvastatin 40 milliGRAM(s) Oral at bedtime  chlorhexidine 4% Liquid 1 Application(s) Topical <User Schedule>  clopidogrel Tablet 75 milliGRAM(s) Oral daily  fentaNYL   Infusion. 0.5 MICROgram(s)/kG/Hr IV Continuous <Continuous>  fluconAZOLE   Tablet 400 milliGRAM(s) Oral <User Schedule>  heparin   Injectable 5000 Unit(s) SubCutaneous every 8 hours  hydrocortisone 10 milliGRAM(s) Oral at bedtime  hydrocortisone 20 milliGRAM(s) Oral daily  midazolam Injectable 2 milliGRAM(s) IV Push every 1 hour PRN  multivitamin 1 Tablet(s) Oral daily  norepinephrine Infusion 0.05 MICROgram(s)/kG/Min IV Continuous <Continuous>  ondansetron Injectable 4 milliGRAM(s) IV Push once  pantoprazole  Injectable 40 milliGRAM(s) IV Push daily  piperacillin/tazobactam IVPB.. 3.375 Gram(s) IV Intermittent every 12 hours  propofol Infusion 5 MICROgram(s)/kG/Min IV Continuous <Continuous>  sevelamer carbonate 800 milliGRAM(s) Oral three times a day with meals  tacrolimus 2 milliGRAM(s) Oral daily  tamsulosin 0.4 milliGRAM(s) Oral at bedtime  trimethoprim  160 mG/sulfamethoxazole 800 mG 1 Tablet(s) Oral <User Schedule>    SOCIAL       FAMILY   FAMILY HISTORY:  Family history of emphysema      REVIEW OF SYSTEMS:  CONSTITUTIONAL:  No Fever or chills  HEENT:   No diplopia or blurred vision.  No earache, sore throat or runny nose.  CARDIOVASCULAR:  No pressure, squeezing, strangling, tightness, heaviness or aching about the chest, neck, axilla or epigastrium.  RESPIRATORY:  No cough, shortness of breath, PND or orthopnea.  GASTROINTESTINAL:  No nausea, vomiting or diarrhea.  GENITOURINARY:  No dysuria, frequency or urgency. No Blood in urine  MUSCULOSKELETAL:   moves all joints  SKIN:  No change in skin, hair or nails.  NEUROLOGIC:  No paresthesias, fasciculations, seizures or weakness.  PSYCHIATRIC:  No disorder of thought or mood.  ENDOCRINE:  No heat or cold intolerance, polyuria or polydipsia.  HEMATOLOGICAL:  No easy bruising or bleeding.            Physical Exam:  ICU Vital Signs Last 24 Hrs  T(C): 36.6 (14 Apr 2021 07:41), Max: 37.3 (14 Apr 2021 04:00)  T(F): 97.9 (14 Apr 2021 07:41), Max: 99.1 (14 Apr 2021 04:00)  HR: 82 (14 Apr 2021 07:00) (58 - 104)  BP: 176/85 (14 Apr 2021 07:00) (53/40 - 211/110)  BP(mean): 111 (14 Apr 2021 07:00) (46 - 141)  ABP: --  ABP(mean): --  RR: 18 (14 Apr 2021 07:00) (0 - 38)  SpO2: 98% (14 Apr 2021 07:00) (80% - 100%)    GEN: NAD,  awake extubated  HEENT: normocephalic and atraumatic. EOMI. URSULA.    NECK: Supple. No carotid bruits.  No lymphadenopathy or thyromegaly.  LUNGS: Clear to auscultation.  HEART: Regular rate and rhythm without murmur.  ABDOMEN: Soft, nontender, and nondistended.  Positive bowel sounds.    : No CVA tenderness  EXTREMITIES: Without any cyanosis, clubbing, rash, lesions or edema.  MSK: no joint swelling  NEUROLOGIC: Cranial nerves II through XII are grossly intact.  PSYCHIATRIC: Appropriate affect .  SKIN: No ulceration or induration present.        Labs:  Vitals:  ============  T(F): 97.9 (14 Apr 2021 07:41), Max: 99.1 (14 Apr 2021 04:00)  HR: 82 (14 Apr 2021 07:00)  BP: 176/85 (14 Apr 2021 07:00)  RR: 18 (14 Apr 2021 07:00)  SpO2: 98% (14 Apr 2021 07:00) (80% - 100%)  temp max in last 48H T(F): , Max: 99.1 (04-14-21 @ 04:00)    =======================================================  Current Antibiotics:  fluconAZOLE   Tablet 400 milliGRAM(s) Oral <User Schedule>  piperacillin/tazobactam IVPB.. 3.375 Gram(s) IV Intermittent every 12 hours  trimethoprim  160 mG/sulfamethoxazole 800 mG 1 Tablet(s) Oral <User Schedule>    Other medications:  aspirin  chewable 81 milliGRAM(s) Oral daily  atorvastatin 40 milliGRAM(s) Oral at bedtime  chlorhexidine 4% Liquid 1 Application(s) Topical <User Schedule>  clopidogrel Tablet 75 milliGRAM(s) Oral daily  fentaNYL   Infusion. 0.5 MICROgram(s)/kG/Hr IV Continuous <Continuous>  heparin   Injectable 5000 Unit(s) SubCutaneous every 8 hours  hydrocortisone 10 milliGRAM(s) Oral at bedtime  hydrocortisone 20 milliGRAM(s) Oral daily  multivitamin 1 Tablet(s) Oral daily  norepinephrine Infusion 0.05 MICROgram(s)/kG/Min IV Continuous <Continuous>  ondansetron Injectable 4 milliGRAM(s) IV Push once  pantoprazole  Injectable 40 milliGRAM(s) IV Push daily  propofol Infusion 5 MICROgram(s)/kG/Min IV Continuous <Continuous>  sevelamer carbonate 800 milliGRAM(s) Oral three times a day with meals  tacrolimus 2 milliGRAM(s) Oral daily  tamsulosin 0.4 milliGRAM(s) Oral at bedtime      =======================================================  Labs:                        11.3   8.15  )-----------( Clumped    ( 14 Apr 2021 04:53 )             36.5     04-14    137  |  95<L>  |  28.0<H>  ----------------------------<  88  5.3   |  28.0  |  5.31<H>    Ca    8.6      14 Apr 2021 04:53  Phos  4.7     04-14  Mg     2.2     04-14    TPro  5.9<L>  /  Alb  2.8<L>  /  TBili  0.8  /  DBili  x   /  AST  25  /  ALT  9   /  AlkPhos  136<H>  04-14      Creatinine, Serum: 5.31 mg/dL (04-14-21 @ 04:53)  Creatinine, Serum: 7.10 mg/dL (04-13-21 @ 10:27)            WBC Count: 8.15 K/uL (04-14-21 @ 04:53)  WBC Count: 13.55 K/uL (04-13-21 @ 10:27)    SARS-CoV-2: NotDetec (04-13-21 @ 10:28)  Rapid RVP Result: NotDetec (04-13-21 @ 10:28)        Alkaline Phosphatase, Serum: 136 U/L (04-14-21 @ 04:53)  Alkaline Phosphatase, Serum: 184 U/L (04-13-21 @ 10:27)  Alanine Aminotransferase (ALT/SGPT): 9 U/L (04-14-21 @ 04:53)  Alanine Aminotransferase (ALT/SGPT): 12 U/L (04-13-21 @ 10:27)  Aspartate Aminotransferase (AST/SGOT): 25 U/L (04-14-21 @ 04:53)  Aspartate Aminotransferase (AST/SGOT): 27 U/L (04-13-21 @ 10:27)  Bilirubin Total, Serum: 0.8 mg/dL (04-14-21 @ 04:53)  Bilirubin Total, Serum: 0.5 mg/dL (04-13-21 @ 10:27)

## 2021-04-14 NOTE — PROGRESS NOTE ADULT - ATTENDING COMMENTS
70M w/ PMHx CAD s/p PCI, COPD s/p double lung transplant (2015 @ [facility]) on IS, cryptococcal meningitis, ESRD on HD via DC recent COVID and BPH presented on 04/13 w/ SOB. RRT called for worsening hypoxia and electively intubated. He was dialyzed and s/p bronch over night and cultures sent. Extubated this AM.   Currently asymptomatic on RA. CT chest w/ dense bibasilar pneumonia  Continue empiric Abx along w/ IS and bactrim/ fluconazole. ID on case. f/u tacrolimus levels

## 2021-04-14 NOTE — CHART NOTE - NSCHARTNOTEFT_GEN_A_CORE
Called by RN for pt's BP of 180/99 P 100.  Pt was downgraded from ICU tonight.  Pt w/ PMHx of b/l lung transplant, intubated for hypoxic respiratory failure, extubated and downgraded.  Pt with b/l pneumonia on antibiotics.  Asked RN to obtain rectal temp.    Pt is asymptomatic.  ICU Vital Signs Last 24 Hrs  T(C): 36.7 (14 Apr 2021 23:39), Max: 37.3 (14 Apr 2021 04:00)  T(F): 98.1 (14 Apr 2021 23:39), Max: 99.1 (14 Apr 2021 04:00)  HR: 101 (14 Apr 2021 23:02) (65 - 101)  BP: 180/99 (14 Apr 2021 23:02) (113/70 - 183/97)  BP(mean): 110 (14 Apr 2021 21:00) (84 - 120)  ABP: --  ABP(mean): --  RR: 16 (14 Apr 2021 23:02) (11 - 23)  SpO2: 96% (14 Apr 2021 23:02) (90% - 100%)  Rectal temp 98.1  Pt in no distress  Lopressor 5mg IVP given pt is tachy and hypertensive   RN to monitor and escalate for change in pt's status Called by RN for pt's BP of 180/99 P 100.  Pt was downgraded from ICU tonight.  Pt w/ PMHx of b/l lung transplant, ESRD on HD, intubated for hypoxic respiratory failure, extubated and downgraded.  Pt with b/l pneumonia on antibiotics.  Asked RN to obtain rectal temp.    Pt is asymptomatic.  ICU Vital Signs Last 24 Hrs  T(C): 36.7 (14 Apr 2021 23:39), Max: 37.3 (14 Apr 2021 04:00)  T(F): 98.1 (14 Apr 2021 23:39), Max: 99.1 (14 Apr 2021 04:00)  HR: 101 (14 Apr 2021 23:02) (65 - 101)  BP: 180/99 (14 Apr 2021 23:02) (113/70 - 183/97)  BP(mean): 110 (14 Apr 2021 21:00) (84 - 120)  ABP: --  ABP(mean): --  RR: 16 (14 Apr 2021 23:02) (11 - 23)  SpO2: 96% (14 Apr 2021 23:02) (90% - 100%)  Rectal temp 98.1  Pt in no distress  Lopressor 5mg IVP given pt is tachy and hypertensive   Made Dr. Sheppard aware  RN to monitor and escalate for change in pt's status Called by RN for pt's BP of 180/99 P 100.  Pt was downgraded from ICU tonight.  Pt w/ PMHx of b/l lung transplant, ESRD on HD, intubated for hypoxic respiratory failure, extubated and downgraded.  Pt with b/l pneumonia on antibiotics.  Asked RN to obtain rectal temp.    Pt is asymptomatic.  ICU Vital Signs Last 24 Hrs  T(C): 36.7 (14 Apr 2021 23:39), Max: 37.3 (14 Apr 2021 04:00)  T(F): 98.1 (14 Apr 2021 23:39), Max: 99.1 (14 Apr 2021 04:00)  HR: 101 (14 Apr 2021 23:02) (65 - 101)  BP: 180/99 (14 Apr 2021 23:02) (113/70 - 183/97)  BP(mean): 110 (14 Apr 2021 21:00) (84 - 120)  ABP: --  ABP(mean): --  RR: 16 (14 Apr 2021 23:02) (11 - 23)  SpO2: 96% (14 Apr 2021 23:02) (90% - 100%)  Rectal temp 98.1  Pt in no distress  Lopressor 5mg IVP ordered as per Dr. Sheppard given pt is tachy and hypertensive  RN to monitor and escalate for change in pt's status

## 2021-04-14 NOTE — PROGRESS NOTE ADULT - SUBJECTIVE AND OBJECTIVE BOX
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 CTA no PE but revealing significant bibasilar pneumonia, effusions/LLL loculated effusion. RRT during CT for acute respiratory distress, subsequently intubated in ED. Transferred to MICU. On pressors, antibiotics.     Events last 24 hours: S/p bronch, off pressors, on 30%/+5, on fentanyl gtt, being held for SAT. Afebrile. S/p HD w/ 2L removed      PAST MEDICAL & SURGICAL HISTORY:  Emphysema of lung    ESRD (end stage renal disease) on dialysis    H/O lung transplant  bilateral - transplant - 1-2-2015 -  [facility]    H/O heart artery stent        Review of Systems:  Unable to perform intubated, sedated    Medications:  fluconAZOLE   Tablet 400 milliGRAM(s) Oral <User Schedule>  piperacillin/tazobactam IVPB.. 3.375 Gram(s) IV Intermittent every 12 hours  trimethoprim  160 mG/sulfamethoxazole 800 mG 1 Tablet(s) Oral <User Schedule>    norepinephrine Infusion 0.05 MICROgram(s)/kG/Min IV Continuous <Continuous>  tamsulosin 0.4 milliGRAM(s) Oral at bedtime      fentaNYL   Infusion. 0.5 MICROgram(s)/kG/Hr IV Continuous <Continuous>  midazolam Injectable 2 milliGRAM(s) IV Push every 1 hour PRN  propofol Infusion 5 MICROgram(s)/kG/Min IV Continuous <Continuous>      aspirin  chewable 81 milliGRAM(s) Oral daily  clopidogrel Tablet 75 milliGRAM(s) Oral daily  heparin   Injectable 5000 Unit(s) SubCutaneous every 8 hours    pantoprazole  Injectable 40 milliGRAM(s) IV Push daily      atorvastatin 40 milliGRAM(s) Oral at bedtime  hydrocortisone 10 milliGRAM(s) Oral at bedtime  hydrocortisone 20 milliGRAM(s) Oral daily    multivitamin 1 Tablet(s) Oral daily    tacrolimus 2 milliGRAM(s) Oral daily    chlorhexidine 0.12% Liquid 15 milliLiter(s) Oral Mucosa every 12 hours  chlorhexidine 4% Liquid 1 Application(s) Topical <User Schedule>    sevelamer carbonate 800 milliGRAM(s) Oral three times a day with meals      Mode: AC/ CMV (Assist Control/ Continuous Mandatory Ventilation)  RR (machine): 18  TV (machine): 440  FiO2: 30  PEEP: 8  MAP: 14  PIP: 27      ICU Vital Signs Last 24 Hrs  T(C): 36.9 (14 Apr 2021 00:00), Max: 36.9 (14 Apr 2021 00:00)  T(F): 98.4 (14 Apr 2021 00:00), Max: 98.4 (14 Apr 2021 00:00)  HR: 68 (14 Apr 2021 03:00) (58 - 104)  BP: 148/72 (14 Apr 2021 03:00) (53/40 - 211/110)  BP(mean): 94 (14 Apr 2021 03:00) (46 - 141)  ABP: --  ABP(mean): --  RR: 18 (14 Apr 2021 03:00) (0 - 38)  SpO2: 100% (14 Apr 2021 03:00) (80% - 100%)      ABG - ( 13 Apr 2021 15:49 )  pH, Arterial: 7.39  pH, Blood: x     /  pCO2: 44    /  pO2: 273   / HCO3: 26    / Base Excess: 1.3   /  SaO2: 100                 I&O's Detail    13 Apr 2021 07:01  -  14 Apr 2021 04:09  --------------------------------------------------------  IN:    FentaNYL: 156 mL    IV PiggyBack: 250 mL    Norepinephrine: 9 mL    Propofol: 28.8 mL  Total IN: 443.8 mL    OUT:    Other (mL): 2000 mL  Total OUT: 2000 mL    Total NET: -1556.2 mL          LABS:                        12.2   13.55 )-----------( 126      ( 13 Apr 2021 10:27 )             39.9     04-13    144  |  101  |  45.0<H>  ----------------------------<  131<H>  5.0   |  28.0  |  7.10<H>    Ca    9.3      13 Apr 2021 10:27  Mg     2.8     04-13    TPro  6.7  /  Alb  3.5  /  TBili  0.5  /  DBili  x   /  AST  27  /  ALT  12  /  AlkPhos  184<H>  04-13      CARDIAC MARKERS ( 13 Apr 2021 10:27 )  x     / 0.07 ng/mL / x     / x     / x          CAPILLARY BLOOD GLUCOSE        PT/INR - ( 13 Apr 2021 10:27 )   PT: 12.7 sec;   INR: 1.10 ratio         PTT - ( 13 Apr 2021 10:27 )  PTT:31.2 sec    CULTURES:  Rapid RVP Result: NotDetec (04-13 @ 10:28)      Physical Examination:    General: Intubated, sedated, does arouse and follow intermittent commands    HEENT: Pupils equal, reactive to light.  Symmetric. No scleral icterus or injection    PULM: Scattered rhonchi/crackles, no wheezes, rales, moderate secretions from in line    NECK: Supple, no lymphadenopathy, trachea midline    CVS: Regular rate and rhythm, no murmurs, +s1/s2    ABD: Soft, nondistended, normoactive bowel sounds, RU chest wall/subclavian permacath    EXT: No edema    SKIN: Warm and well perfused    NEURO: Sedated, arousable, non focal    RADIOLOGY: IMPRESSION:  1. No evidence of pulmonary embolism.  2. Worsening extensive consolidative opacities in the bilateral lower lobes and peribronchovascular opacities throughout the lungs.  3. Bilateral pleural effusions, left greater than right.    CRITICAL CARE TIME SPENT: 38 mins assessing presenting problems of acute illness that poses high probability of life threatening deterioration or end organ damage/dysfunction.  Medical decision making including initiating plan of care, reviewing data, reviewing radiology, direct patient bedside evaluation and interpretation of vital signs, any necessary ventilator management, discussion with multidisciplinary team, discussing goals of care with patient/family, all non inclusive of procedures

## 2021-04-14 NOTE — CONSULT NOTE ADULT - SUBJECTIVE AND OBJECTIVE BOX
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 Patient reports that he has been short of breath since yesterday when he was exercising. He reports that since then, he has been unable to catch his breath and therefore came in to be evaluated today. He does endorse a cough with a productive sputum. He denies any fevers or chills. Of note, patient was recently diagnosed with COVID and treated    Patient extubated,  reports he cannot remember details leading to this hospitalization.  He does recollect being more SOB during exercise prior to admission.      PERTINENT PMH REVIEWED:  [ x] YES [ ] NO         Emphysema of lung     ESRD (end stage renal disease) on dialysis.     PAST SURGICAL HISTORY:  H/O heart artery stent     H/O lung transplant bilateral - transplant - 1-2-2015 -  [facility].     FAMILY HISTORY:  Family history of emphysema.      SOCIAL HISTORY:  EtOH [ ] Yes  [x ] No                                    Drugs [ ] Yes [x ] No                                   [ x]former smoker [ ] nonsmoker                                    Admitted from: [x ] home [ ] SNF _________ [ ] [facility] ________    Surrogate/HCP/Guardian: wife and daughter Jyoti  FAMILY HISTORY:  Family history of emphysema    Baseline ADLs (prior to admission):  Independent [x ] moderately [ ] fully   Dependent   [ ] moderately [ ]fully    MEDICATIONS  (STANDING):  aspirin  chewable 81 milliGRAM(s) Oral daily  chlorhexidine 4% Liquid 1 Application(s) Topical <User Schedule>  clopidogrel Tablet 75 milliGRAM(s) Oral daily  fluconAZOLE   Tablet 400 milliGRAM(s) Oral <User Schedule>  heparin   Injectable 5000 Unit(s) SubCutaneous every 8 hours  hydrocortisone 10 milliGRAM(s) Oral at bedtime  hydrocortisone 20 milliGRAM(s) Oral daily  metoprolol tartrate 25 milliGRAM(s) Oral <User Schedule>  multivitamin 1 Tablet(s) Oral daily  pantoprazole    Tablet 40 milliGRAM(s) Oral before breakfast  piperacillin/tazobactam IVPB.. 3.375 Gram(s) IV Intermittent every 12 hours  rosuvastatin 40 milliGRAM(s) Oral at bedtime  sevelamer carbonate 800 milliGRAM(s) Oral three times a day with meals  tacrolimus 1 milliGRAM(s) Oral <User Schedule>  tamsulosin 0.4 milliGRAM(s) Oral at bedtime    MEDICATIONS  (PRN):      Allergies    budesonide (Unknown)  cefpodoxime (Unknown)  Pollen (Unknown)    Intolerances        REVIEW OF SYSTEMS     see HPI -patent poor historian  [ ] Unable to obtain due to poor mentation     General: see HPI    Skin: no rashes, skin changes  	  Ophthalmologic: no eye pain or blurred vision  	  ENMT:	no sore throat, no ear pain    Respiratory and Thorax: see HPI    Cardiovascular:	see HPI    Gastrointestinal:	no  n/v/d,c    Genitourinary:	no FUD    Musculoskeletal: no muscle pain	    Neurological: no seizures, no dizziness    Hematology/Lymphatics: no petechia or purpura	    Endocrine: no polyuria, no polydipsia	      Karnofsky Performance Score/Palliative Performance Status Version 2:  40   %    Vital Signs Last 24 Hrs  T(C): 36.5 (14 Apr 2021 15:46), Max: 37.3 (14 Apr 2021 04:00)  T(F): 97.7 (14 Apr 2021 15:46), Max: 99.1 (14 Apr 2021 04:00)  HR: 75 (14 Apr 2021 16:00) (58 - 82)  BP: 175/80 (14 Apr 2021 16:00) (53/40 - 183/97)  BP(mean): 107 (14 Apr 2021 16:00) (46 - 120)  RR: 22 (14 Apr 2021 16:00) (0 - 23)  SpO2: 92% (14 Apr 2021 16:00) (90% - 100%)    PHYSICAL EXAM:    General: Elderly man AOX4 NAD  HEENT: [x ] normal  [ ] dry mouth  [ ] ET tube/trach    Lungs: x[ ] comfortable [ ] tachypnea/labored breathing  [ ] excessive secretions    CV: [ x] normal  [ ] tachycardia    GI: [ x] normal  [ ] distended  [ ] tender  [ ] no BS               [ ] PEG/NG/OG tube    : [ ] normal  [ ] incontinent  [ x] oliguria/anuria  [ ] bill    MSK: [ ] normal  [ x] weakness  [ ] edema             [ ] ambulatory  [ x] bedbound/wheelchair bound    Skin: [ ] normal  [ ] pressure ulcers- Stage_____  [ x] no rash    LABS:                        11.3   8.15  )-----------( Clumped    ( 14 Apr 2021 04:53 )             36.5     04-14    137  |  95<L>  |  28.0<H>  ----------------------------<  88  5.3   |  28.0  |  5.31<H>    Ca    8.6      14 Apr 2021 04:53  Phos  4.7     04-14  Mg     2.2     04-14    TPro  5.9<L>  /  Alb  2.8<L>  /  TBili  0.8  /  DBili  x   /  AST  25  /  ALT  9   /  AlkPhos  136<H>  04-14    PT/INR - ( 13 Apr 2021 10:27 )   PT: 12.7 sec;   INR: 1.10 ratio         PTT - ( 13 Apr 2021 10:27 )  PTT:31.2 sec    I&O's Summary    13 Apr 2021 07:01  -  14 Apr 2021 07:00  --------------------------------------------------------  IN: 458.8 mL / OUT: 2000 mL / NET: -1541.2 mL        RADIOLOGY & ADDITIONAL STUDIES:  < from: CT Angio Chest w/ IV Cont (04.13.21 @ 12:45) >  BONES: Multiple mild to moderate compression deformities of the mid and lower thoracic spine and visualized lumbar spine.    IMPRESSION:  1. No evidence of pulmonary embolism.  2. Worsening extensive consolidative opacities in the bilateral lower lobes and peribronchovascular opacities throughout the lungs.  3. Bilateral pleural effusions, left greater than right.    < end of copied text >              ADVANCE DIRECTIVES:  [ ] YES [x ] NO   DNR [ ] YES [ x] NO  Completed on:                     MOLST  [ ] YES [x ] NO   Completed on:  Living Will  [ ] YES [x ] NO   Completed on:    
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 Patient reporTED  that he has been short of breath since yesterday when he was exercising. HE HAD     been unable to catch his breath and therefore came in to be evaluated today. He does endorse a cough with a productive sputum. He denies any fevers or chills. Of note, patient was recently diagnosed with COVID and treated.    PT AS ABOVE HAD INCREASED RESP SX AND WAS INTUBATED  ASKED TO EVALUATE FROM ID STANDPOINT       PAST MEDICAL & SURGICAL HISTORY:  Emphysema of lung    ESRD (end stage renal disease) on dialysis    H/O lung transplant  bilateral - transplant - 1-2-2015 -  [facility]    H/O heart artery stent        ANTIBIOTICS  fluconAZOLE   Tablet 400 milliGRAM(s) Oral <User Schedule>  piperacillin/tazobactam IVPB.. 3.375 Gram(s) IV Intermittent every 12 hours  trimethoprim  160 mG/sulfamethoxazole 800 mG 1 Tablet(s) Oral <User Schedule>      Allergies    budesonide (Unknown)  cefpodoxime (Unknown)  Pollen (Unknown)    Intolerances        SOCIAL HISTORY:     FAMILY HX   FAMILY HISTORY:  Family history of emphysema        Vital Signs Last 24 Hrs  T(C): 36.3 (13 Apr 2021 16:09), Max: 36.5 (13 Apr 2021 09:27)  T(F): 97.3 (13 Apr 2021 16:09), Max: 97.7 (13 Apr 2021 09:27)  HR: 61 (13 Apr 2021 16:01) (61 - 104)  BP: 108/65 (13 Apr 2021 16:00) (108/65 - 211/110)  BP(mean): 79 (13 Apr 2021 16:00) (79 - 141)  RR: 18 (13 Apr 2021 16:00) (12 - 38)  SpO2: 100% (13 Apr 2021 16:01) (80% - 100%)  Drug Dosing Weight  Height (cm): 162.6 (13 Apr 2021 14:15)  Weight (kg): 61 (13 Apr 2021 14:15)  BMI (kg/m2): 23.1 (13 Apr 2021 14:15)  BSA (m2): 1.65 (13 Apr 2021 14:15)      REVIEW OF SYSTEMS:  UNABLE TO OBTAIN FROM PT                  PHYSICAL EXAMINATION:    GENERAL: The patient is INTUBATED UNRESPONISVE    VITAL SIGNS: T(C): 36.3 (04-13-21 @ 16:09), Max: 36.5 (04-13-21 @ 09:27)  HR: 61 (04-13-21 @ 16:01) (61 - 104)  BP: 108/65 (04-13-21 @ 16:00) (108/65 - 211/110)  RR: 18 (04-13-21 @ 16:00) (12 - 38)  SpO2: 100% (04-13-21 @ 16:01) (80% - 100%)  Wt(kg): --    HEENT: Head is normocephalic and atraumatic.  ANICTERIC  NECK: Supple. No carotid bruits.  No lymphadenopathy or thyromegaly.    LUNGS:COARSE BREATH SOUNDS    HEART: Regular rate and rhythm without murmur.    ABDOMEN: Soft, nontender, and nondistended.  Positive bowel sounds.  No hepatosplenomegaly was noted. NO REBOUND NO GUARDING    EXTREMITIES: NO EDEMA NO ERYTHEMA    NEUROLOGIC: INTUBATED UNRESPONSIVE      SKIN: No ulceration or induration present. NO RASH        BLOOD CULTURES       URINE CX          LABS:                        12.2   13.55 )-----------( 126      ( 13 Apr 2021 10:27 )             39.9     04-13    144  |  101  |  45.0<H>  ----------------------------<  131<H>  5.0   |  28.0  |  7.10<H>    Ca    9.3      13 Apr 2021 10:27  Mg     2.8     04-13    TPro  6.7  /  Alb  3.5  /  TBili  0.5  /  DBili  x   /  AST  27  /  ALT  12  /  AlkPhos  184<H>  04-13    PT/INR - ( 13 Apr 2021 10:27 )   PT: 12.7 sec;   INR: 1.10 ratio         PTT - ( 13 Apr 2021 10:27 )  PTT:31.2 sec      RADIOLOGY & ADDITIONAL STUDIES:      ASSESSMENT/PLAN  71 y/o male with PMH of COPD s/p double lung transplant in 2015, CAD with stents, ESRD on dialysis T/TH/S, 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 Patient reporTED  that he has been short of breath since yesterday when he was exercising. HE HAD     been unable to catch his breath and therefore came in to be evaluated today. He does endorse a cough with a productive sputum. He denies any fevers or chills. Of note, patient was recently diagnosed with COVID and treated.    PT AS ABOVE HAD INCREASED RESP SX AND WAS INTUBATED  BLOOD CX WERE DRAWN PT PLACED ON EMPRIC IV ABX  PT ALRADY HAD COVID ADN WAS TREATED HERE IN THE PAST  PT HAD REPORTED CRYPT MENINGITIS   IN 12/20 AT [place]  AND IS ON CHRONIC SUPPRESSIVE DIFLUCAN   WOULD RECHECK SERUM CRYPT ANTGEN  WILL CONTINUE EMPRIC IV ABX  WILL FOLLOW UP WITH [facility] RECOMMENDATIONS                       SOLANGE HERRERA MD
Patient is a 70y old  Male who presents with a chief complaint of     HPI:  · Chief Complaint: The patient is a 70y Male complaining of shortness of breath.  · HPI Objective Statement: 70 year old male with history of double lung transplant ([facility], 1/2015), ESRD (HD on MWF, started 2020, Dr. Rg), CAD s/p stents 11/2019, "broken leg surgery" (2020, Dr. 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 At baseline, patient is NOT on home oxygen and has o2 sat ranging from . Last night however he noticed his o2 sat was low - ranging from 83-95. Presented to ED via EMS for further evaluation. At bedside states that it feels difficult to catch his breath. Has noticed LE swelling (typically his RLE is more swollen than his left). No fevers, chills, cough, chest pain. Still makes urine. On immunosuppression with Fluconazole, Bactrim, tacrolimus, and hydrocortisone. Has not received COVID vaccine (states his nephrologist said to hold off on this for now). No history of PE. Wife Veronica: [phone]. Received a transplant 2/2 lung damage from 30 years of tobacco abuse. [facility] transplant coordinator Laurel Lujan [phone].    + compliant  with HD sessions   Admits to extra fluid over the weekend , as well as increasing edema   Gets HD in [facility]   No fever    CXR noted ---> Team wants to get CT chest w/ IV Contrast  Hypertensive w/ BNP 70 K  Covid negative     PAST MEDICAL & SURGICAL HISTORY:  Emphysema of lung    ESRD (end stage renal disease) on dialysis    H/O lung transplant  bilateral - transplant - 1-2-2015 -  [facility]    H/O heart artery stent        FAMILY HISTORY:  Family history of emphysema        Social History:    MEDICATIONS  (STANDING):    MEDICATIONS  (PRN):      Allergies    budesonide (Unknown)  cefpodoxime (Unknown)  Pollen (Unknown)    Intolerances        Vital Signs Last 24 Hrs  T(C): 36.5 (13 Apr 2021 09:27), Max: 36.5 (13 Apr 2021 09:27)  T(F): 97.7 (13 Apr 2021 09:27), Max: 97.7 (13 Apr 2021 09:27)  HR: 85 (13 Apr 2021 10:28) (83 - 87)  BP: 191/102 (13 Apr 2021 10:28) (191/102 - 211/110)  BP(mean): --  RR: 23 (13 Apr 2021 10:28) (20 - 38)  SpO2: 99% (13 Apr 2021 10:28) (90% - 99%)  Daily Height in cm: 162.56 (13 Apr 2021 09:27)    Daily   I&O's Detail    I&O's Summary      PHYSICAL EXAM:    GENERAL: NAD,+ NRB --> feels better   HEAD:  Atraumatic, No facial edema   NECK: Supple, + JVP , R permacath clean   CHEST/LUNG: EAE , Diffuse rhonchi   HEART: Regular rate and rhythm; + S4 , No rub   ABDOMEN: Soft, Nontender, Nondistended;  EXTREMITIES: ++ edema legs           LABS:                        12.2   13.55 )-----------( 126      ( 13 Apr 2021 10:27 )             39.9     04-13    144  |  101  |  45.0<H>  ----------------------------<  131<H>  5.0   |  28.0  |  7.10<H>    Ca    9.3      13 Apr 2021 10:27  Mg     2.8     04-13    TPro  6.7  /  Alb  3.5  /  TBili  0.5  /  DBili  x   /  AST  27  /  ALT  12  /  AlkPhos  184<H>  04-13    PT/INR - ( 13 Apr 2021 10:27 )   PT: 12.7 sec;   INR: 1.10 ratio         PTT - ( 13 Apr 2021 10:27 )  PTT:31.2 sec    Magnesium, Serum: 2.8 mg/dL (04-13 @ 10:27)      < from: Xray Chest 1 View- PORTABLE-Urgent (04.13.21 @ 10:53) >     EXAM:  XR CHEST PORTABLE URGENT 1V                          PROCEDURE DATE:  04/13/2021          INTERPRETATION:  TECHNIQUE: Single portable view of the chest.    COMPARISON: 3/6/2021    CLINICAL HISTORY: Chest Pain    FINDINGS:    Single frontal view of the chest demonstrates diffuse bilateral airspace disease, worse. Small bilateral pleural effusions. The cardiomediastinal silhouette is enlarged. Right-sided dialysis catheter. No acute osseous abnormalities. Overlying EKG leads and wires arenoted    IMPRESSION: Severe bilateral airspace disease. Cardiomegaly. Small bilateral pleural effusions. Differential diagnosis includes multilobar pneumonia versus CHF. Correlate clinically.            ADÁN ARCINIEGA MD; Attending Radiologist  This document has been electronically signed. Apr 13 2021 11:53AM    < end of copied text >      RADIOLOGY & ADDITIONAL TESTS:  
Patient is a 70y old  Male who presents with a chief complaint of Acute hypoxic respiratory failure (13 Apr 2021 13:49)    BRIEF HOSPITAL COURSE:   70M w/ PMHx CAD s/p PCI, COPD s/p double lung transplant (2015) on [facility], cryptococcal meningitis, ESRD on HD via [facility] recent COVID and BPH presented on 04/13 w/ SOB associated w/ a pdt cough. He had a CTA which did not show a PE but was significant for extensive B/L LL consolidation and B/L effusions. RRT was called a CT after pt went into acute respiratory distress and was intubated in ED    PAST MEDICAL & SURGICAL HISTORY:  Emphysema of lung    ESRD (end stage renal disease) on dialysis    H/O lung transplant  bilateral - transplant - 1-2-2015 -  [facility]    H/O heart artery stent    Review of Systems:  Unable to assess given sedation     Physical Examination:    ICU Vital Signs Last 24 Hrs  T(C): 36.5 (13 Apr 2021 09:27), Max: 36.5 (13 Apr 2021 09:27)  T(F): 97.7 (13 Apr 2021 09:27), Max: 97.7 (13 Apr 2021 09:27)  HR: 101 (13 Apr 2021 13:58) (83 - 104)  BP: 201/121 (13 Apr 2021 13:58) (146/85 - 211/110)  BP(mean): --  ABP: --  ABP(mean): --  RR: 12 (13 Apr 2021 13:58) (12 - 38)  SpO2: 98% (13 Apr 2021 13:58) (80% - 99%)       Neuro: Sedated and paralyzed   HEENT: Pupils equal, sluggish to light, Moist oral mucosa  PULM: Decreased air entry bilaterally, Occasional ronchi   CVS: Regular rhythm and controlled rate  ABD: Soft, nondistended  EXT: No b/l LE edema  SKIN: Warm and well perfused, no acute rashes       Medications:  piperacillin/tazobactam IVPB.. 3.375 Gram(s) IV Intermittent every 12 hours  trimethoprim  160 mG/sulfamethoxazole 800 mG 1 Tablet(s) Oral <User Schedule>    tamsulosin 0.4 milliGRAM(s) Oral at bedtime      propofol Infusion 5 MICROgram(s)/kG/Min IV Continuous <Continuous>      aspirin  chewable 81 milliGRAM(s) Oral daily  clopidogrel Tablet 75 milliGRAM(s) Oral daily        atorvastatin 40 milliGRAM(s) Oral at bedtime  hydrocortisone 10 milliGRAM(s) Oral at bedtime  hydrocortisone 20 milliGRAM(s) Oral daily    multivitamin 1 Tablet(s) Oral daily    tacrolimus 2 milliGRAM(s) Oral daily    chlorhexidine 4% Liquid 1 Application(s) Topical <User Schedule>    sevelamer carbonate 800 milliGRAM(s) Oral three times a day with meals      Mode: AC/ CMV (Assist Control/ Continuous Mandatory Ventilation)  RR (machine): 12  TV (machine): 430  FiO2: 100  PEEP: 5  MAP: 9  PIP: 30      I&O's Detail        RADIOLOGY/ Microbiology/ Labs: reviewed

## 2021-04-14 NOTE — PROGRESS NOTE ADULT - SUBJECTIVE AND OBJECTIVE BOX
NEPHROLOGY INTERVAL HPI/OVERNIGHT EVENTS:    Examined  No distress on O2 via NC  Extubated this AM  Awake alert    MEDICATIONS  (STANDING):  aspirin  chewable 81 milliGRAM(s) Oral daily  chlorhexidine 4% Liquid 1 Application(s) Topical <User Schedule>  clopidogrel Tablet 75 milliGRAM(s) Oral daily  fluconAZOLE   Tablet 400 milliGRAM(s) Oral <User Schedule>  heparin   Injectable 5000 Unit(s) SubCutaneous every 8 hours  hydrocortisone 10 milliGRAM(s) Oral at bedtime  hydrocortisone 20 milliGRAM(s) Oral daily  metoprolol tartrate 25 milliGRAM(s) Oral <User Schedule>  multivitamin 1 Tablet(s) Oral daily  pantoprazole    Tablet 40 milliGRAM(s) Oral before breakfast  piperacillin/tazobactam IVPB.. 3.375 Gram(s) IV Intermittent every 12 hours  rosuvastatin 40 milliGRAM(s) Oral at bedtime  sevelamer carbonate 800 milliGRAM(s) Oral three times a day with meals  tacrolimus 1 milliGRAM(s) Oral <User Schedule>  tamsulosin 0.4 milliGRAM(s) Oral at bedtime    MEDICATIONS  (PRN):      Allergies    budesonide (Unknown)  cefpodoxime (Unknown)  Pollen (Unknown)    Intolerances        Vital Signs Last 24 Hrs  T(C): 36.5 (14 Apr 2021 11:36), Max: 37.3 (14 Apr 2021 04:00)  T(F): 97.7 (14 Apr 2021 11:36), Max: 99.1 (14 Apr 2021 04:00)  HR: 68 (14 Apr 2021 13:00) (58 - 93)  BP: 175/90 (14 Apr 2021 13:00) (53/40 - 183/97)  BP(mean): 112 (14 Apr 2021 13:00) (46 - 120)  RR: 13 (14 Apr 2021 13:00) (0 - 23)  SpO2: 100% (14 Apr 2021 13:00) (90% - 100%)  Daily     Daily     PHYSICAL EXAM:  GENERAL: NAD,  feels better   HEAD:  Atraumatic, No facial edema   NECK: Supple, + JVP , R permacath clean   CHEST/LUNG: EAE , Diffuse rhonchi   HEART: Regular rate and rhythm; + S4 , No rub   ABDOMEN: Soft, Nontender, Nondistended;  EXTREMITIES: ++ edema legs       LABS:                        11.3   8.15  )-----------( Clumped    ( 14 Apr 2021 04:53 )             36.5     04-14    137  |  95<L>  |  28.0<H>  ----------------------------<  88  5.3   |  28.0  |  5.31<H>    Ca    8.6      14 Apr 2021 04:53  Phos  4.7     04-14  Mg     2.2     04-14    TPro  5.9<L>  /  Alb  2.8<L>  /  TBili  0.8  /  DBili  x   /  AST  25  /  ALT  9   /  AlkPhos  136<H>  04-14    PT/INR - ( 13 Apr 2021 10:27 )   PT: 12.7 sec;   INR: 1.10 ratio         PTT - ( 13 Apr 2021 10:27 )  PTT:31.2 sec    Magnesium, Serum: 2.2 mg/dL (04-14 @ 04:53)  Phosphorus Level, Serum: 4.7 mg/dL (04-14 @ 04:53)    ABG - ( 13 Apr 2021 15:49 )  pH, Arterial: 7.39  pH, Blood: x     /  pCO2: 44    /  pO2: 273   / HCO3: 26    / Base Excess: 1.3   /  SaO2: 100                   RADIOLOGY & ADDITIONAL TESTS:

## 2021-04-14 NOTE — PROGRESS NOTE ADULT - ASSESSMENT
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 Acute hypoxic respiratory failure  2. Multifocal pneumonia  3. B/l Pleural effusions, loculated left  4. Pulmonary edema  5. Shock- resolved  6. ESRD on HD  7. Cryptococcal meningitis- chronic suppressive therapy    Plan:  - VAC 30%/+5, titrating to maintain spo2 >90%. Vent bundle in place  - Working towards SAT/SBT currently, goal for extubation shortly if able  - S/p bronch, f/u cultures, see bronch note for details  - On tacrolimus for suppressive therapy  - Off levo, MAP >65-70  - F/u TTE  - Off prop, holding fentanyl right now for SAT  - Unable to find adequate/safe pocket for drainage of loculated effusion, hold off for now  - NPO, protonix  - ESRD on HD, nephro consulted, s/p HD w/ 2L removed  - On empiric zosyn, f/u cultures, sputum sent, f/u bronch cultures as well, as above, unable to drain loculated effusion. PJP PCR sent. Continue suppressive diflucan ID following.   - Started heparin sub q for dvt ppx    Dispo: Full code. Critically ill.

## 2021-04-15 LAB
ALBUMIN SERPL ELPH-MCNC: 3.1 G/DL — LOW (ref 3.3–5.2)
ALP SERPL-CCNC: 146 U/L — HIGH (ref 40–120)
ALT FLD-CCNC: 9 U/L — SIGNIFICANT CHANGE UP
ANION GAP SERPL CALC-SCNC: 18 MMOL/L — HIGH (ref 5–17)
AST SERPL-CCNC: 21 U/L — SIGNIFICANT CHANGE UP
BASOPHILS # BLD AUTO: 0.02 K/UL — SIGNIFICANT CHANGE UP (ref 0–0.2)
BASOPHILS NFR BLD AUTO: 0.2 % — SIGNIFICANT CHANGE UP (ref 0–2)
BILIRUB SERPL-MCNC: 0.7 MG/DL — SIGNIFICANT CHANGE UP (ref 0.4–2)
BUN SERPL-MCNC: 47 MG/DL — HIGH (ref 8–20)
CALCIUM SERPL-MCNC: 8.6 MG/DL — SIGNIFICANT CHANGE UP (ref 8.6–10.2)
CHLORIDE SERPL-SCNC: 94 MMOL/L — LOW (ref 98–107)
CO2 SERPL-SCNC: 25 MMOL/L — SIGNIFICANT CHANGE UP (ref 22–29)
CREAT SERPL-MCNC: 6.82 MG/DL — HIGH (ref 0.5–1.3)
EOSINOPHIL # BLD AUTO: 0 K/UL — SIGNIFICANT CHANGE UP (ref 0–0.5)
EOSINOPHIL NFR BLD AUTO: 0 % — SIGNIFICANT CHANGE UP (ref 0–6)
GLUCOSE SERPL-MCNC: 90 MG/DL — SIGNIFICANT CHANGE UP (ref 70–99)
HCT VFR BLD CALC: 35 % — LOW (ref 39–50)
HGB BLD-MCNC: 10.7 G/DL — LOW (ref 13–17)
IMM GRANULOCYTES NFR BLD AUTO: 0.7 % — SIGNIFICANT CHANGE UP (ref 0–1.5)
LYMPHOCYTES # BLD AUTO: 0.28 K/UL — LOW (ref 1–3.3)
LYMPHOCYTES # BLD AUTO: 3.4 % — LOW (ref 13–44)
MAGNESIUM SERPL-MCNC: 2.6 MG/DL — SIGNIFICANT CHANGE UP (ref 1.6–2.6)
MCHC RBC-ENTMCNC: 28.4 PG — SIGNIFICANT CHANGE UP (ref 27–34)
MCHC RBC-ENTMCNC: 30.6 GM/DL — LOW (ref 32–36)
MCV RBC AUTO: 92.8 FL — SIGNIFICANT CHANGE UP (ref 80–100)
MONOCYTES # BLD AUTO: 0.7 K/UL — SIGNIFICANT CHANGE UP (ref 0–0.9)
MONOCYTES NFR BLD AUTO: 8.5 % — SIGNIFICANT CHANGE UP (ref 2–14)
NEUTROPHILS # BLD AUTO: 7.22 K/UL — SIGNIFICANT CHANGE UP (ref 1.8–7.4)
NEUTROPHILS NFR BLD AUTO: 87.2 % — HIGH (ref 43–77)
PHOSPHATE SERPL-MCNC: 7 MG/DL — HIGH (ref 2.4–4.7)
PLATELET # BLD AUTO: 137 K/UL — LOW (ref 150–400)
POTASSIUM SERPL-MCNC: 5.8 MMOL/L — HIGH (ref 3.5–5.3)
POTASSIUM SERPL-SCNC: 5.8 MMOL/L — HIGH (ref 3.5–5.3)
PROT SERPL-MCNC: 5.9 G/DL — LOW (ref 6.6–8.7)
RBC # BLD: 3.77 M/UL — LOW (ref 4.2–5.8)
RBC # FLD: 18.4 % — HIGH (ref 10.3–14.5)
SODIUM SERPL-SCNC: 137 MMOL/L — SIGNIFICANT CHANGE UP (ref 135–145)
WBC # BLD: 8.28 K/UL — SIGNIFICANT CHANGE UP (ref 3.8–10.5)
WBC # FLD AUTO: 8.28 K/UL — SIGNIFICANT CHANGE UP (ref 3.8–10.5)

## 2021-04-15 PROCEDURE — 99232 SBSQ HOSP IP/OBS MODERATE 35: CPT

## 2021-04-15 PROCEDURE — 99233 SBSQ HOSP IP/OBS HIGH 50: CPT

## 2021-04-15 RX ORDER — ACETAMINOPHEN 500 MG
650 TABLET ORAL EVERY 6 HOURS
Refills: 0 | Status: DISCONTINUED | OUTPATIENT
Start: 2021-04-15 | End: 2021-04-15

## 2021-04-15 RX ORDER — ACETAMINOPHEN 500 MG
650 TABLET ORAL ONCE
Refills: 0 | Status: COMPLETED | OUTPATIENT
Start: 2021-04-15 | End: 2021-04-15

## 2021-04-15 RX ADMIN — PIPERACILLIN AND TAZOBACTAM 25 GRAM(S): 4; .5 INJECTION, POWDER, LYOPHILIZED, FOR SOLUTION INTRAVENOUS at 06:17

## 2021-04-15 RX ADMIN — HEPARIN SODIUM 5000 UNIT(S): 5000 INJECTION INTRAVENOUS; SUBCUTANEOUS at 21:02

## 2021-04-15 RX ADMIN — SEVELAMER CARBONATE 800 MILLIGRAM(S): 2400 POWDER, FOR SUSPENSION ORAL at 17:17

## 2021-04-15 RX ADMIN — FLUCONAZOLE 400 MILLIGRAM(S): 150 TABLET ORAL at 17:28

## 2021-04-15 RX ADMIN — PIPERACILLIN AND TAZOBACTAM 25 GRAM(S): 4; .5 INJECTION, POWDER, LYOPHILIZED, FOR SOLUTION INTRAVENOUS at 17:16

## 2021-04-15 RX ADMIN — Medication 25 MILLIGRAM(S): at 08:45

## 2021-04-15 RX ADMIN — CLOPIDOGREL BISULFATE 75 MILLIGRAM(S): 75 TABLET, FILM COATED ORAL at 08:45

## 2021-04-15 RX ADMIN — TACROLIMUS 1 MILLIGRAM(S): 5 CAPSULE ORAL at 21:01

## 2021-04-15 RX ADMIN — Medication 25 MILLIGRAM(S): at 21:02

## 2021-04-15 RX ADMIN — ALBUTEROL 2.5 MILLIGRAM(S): 90 AEROSOL, METERED ORAL at 03:45

## 2021-04-15 RX ADMIN — TACROLIMUS 0.5 MILLIGRAM(S): 5 CAPSULE ORAL at 08:48

## 2021-04-15 RX ADMIN — ALBUTEROL 2.5 MILLIGRAM(S): 90 AEROSOL, METERED ORAL at 08:23

## 2021-04-15 RX ADMIN — Medication 1 TABLET(S): at 08:45

## 2021-04-15 RX ADMIN — Medication 20 MILLIGRAM(S): at 06:18

## 2021-04-15 RX ADMIN — TACROLIMUS 1 MILLIGRAM(S): 5 CAPSULE ORAL at 08:48

## 2021-04-15 RX ADMIN — TAMSULOSIN HYDROCHLORIDE 0.4 MILLIGRAM(S): 0.4 CAPSULE ORAL at 21:02

## 2021-04-15 RX ADMIN — CHLORHEXIDINE GLUCONATE 1 APPLICATION(S): 213 SOLUTION TOPICAL at 06:16

## 2021-04-15 RX ADMIN — Medication 650 MILLIGRAM(S): at 14:50

## 2021-04-15 RX ADMIN — Medication 81 MILLIGRAM(S): at 08:45

## 2021-04-15 RX ADMIN — ROSUVASTATIN CALCIUM 40 MILLIGRAM(S): 5 TABLET ORAL at 21:01

## 2021-04-15 RX ADMIN — HEPARIN SODIUM 5000 UNIT(S): 5000 INJECTION INTRAVENOUS; SUBCUTANEOUS at 06:17

## 2021-04-15 RX ADMIN — PANTOPRAZOLE SODIUM 40 MILLIGRAM(S): 20 TABLET, DELAYED RELEASE ORAL at 06:17

## 2021-04-15 RX ADMIN — Medication 10 MILLIGRAM(S): at 21:02

## 2021-04-15 RX ADMIN — SEVELAMER CARBONATE 800 MILLIGRAM(S): 2400 POWDER, FOR SUSPENSION ORAL at 08:48

## 2021-04-15 RX ADMIN — LEVALBUTEROL 1.25 MILLIGRAM(S): 1.25 SOLUTION, CONCENTRATE RESPIRATORY (INHALATION) at 02:05

## 2021-04-15 NOTE — PROGRESS NOTE ADULT - ASSESSMENT
ESRD   + dyspnea will arrange HD ASAP w additional UF removal  Clinical fluid overload    CTA revealed no PE but bibasilar PNA w loculated effusion.    HTN - Reassess post fluid removal with HD     Anemia - Hgb stable     H/O Lung transplant - Continue the current immunosuppression     HD today    Will follow

## 2021-04-15 NOTE — PROGRESS NOTE ADULT - ASSESSMENT
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 Patient reporTED  that he has been short of breath since yesterday when he was exercising. HE HAD     been unable to catch his breath and therefore came in to be evaluated today. He does endorse a cough with a productive sputum. He denies any fevers or chills. Of note, patient was recently diagnosed with COVID and treated.    PT AS ABOVE HAD INCREASED RESP SX AND WAS INTUBATED  NOW EXTUBATED AND AWAKE AND ALERT  BLOOD CX WERE DRAWN PT PLACED ON EMPRIC IV ABX  PT ALREADY HAD COVID AND WAS TREATED HERE IN THE PAST  PT HAD REPORTED CRYPT MENINGITIS   IN 12/20 AT [place]  AND IS ON CHRONIC SUPPRESSIVE DIFLUCAN      WILL CONTINUE EMPRIC IV ABX for now    OVERALL IMPROVED  WILL FOLLOW UP

## 2021-04-15 NOTE — PROGRESS NOTE ADULT - ASSESSMENT
was admitted for acute hypoxic respiratory failure , was intubated , now extubated and on o2 via NC and o2 sat 98 5, pt. is S/p bronch, off pressors, Afebrile. S/p HD w/ 2L . pt. stated that his breathing is much better, no cp, no abd. pain. no n/v/d.   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 CTA no PE but revealing significant bibasilar pneumonia, effusions/LLL loculated effusion. RRT during CT for acute respiratory distress, subsequently intubated in ED. Transferred to MICU. On pressors, antibiotics.     A/P   pt. was admitted for acute hypoxic respiratory failure , was intubated , now extubated and on o2 via NC and o2 sat 98 5, pt. is S/p bronch, off pressors, Afebrile. S/p HD w/ 2L . pt. stated that his breathing is much better, no cp, no abd. pain. no n/v/d.     - Acute hypoxic respiratory failure, s/p intubation now extubated, o2 sat 98 % on NC, clinically stable, will keep on albuetrol neb.    - B/L pneumonia, unspecified organism, unspecified part of the lung,  very likely extensive pneumonia in immune compromised pt. continue zosyn, ct chest no evidence of PE, pt. recently had covid-19 infection,  ID following,     -ESRD on dialysis, nephrology team following for dialysis.    - Double lung transplant, continue tacrolimus and fluconazole prophylaxis. ID team following.     - CAD s/p PCI, asa, plavix , b.blk on borad.     - hyperlipidemia, continue statin.     - Follow am labs    -  Heparin subcut for dvt prophylaxis.     - hospitalist team will follow.        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 CTA no PE but revealing significant bibasilar pneumonia, effusions/LLL loculated effusion. RRT during CT for acute respiratory distress, subsequently intubated in ED. Transferred to MICU. On pressors, antibiotics.   was admitted for acute hypoxic respiratory failure , was intubated , now extubated and on o2 via NC and o2 sat 98 5, pt. is S/p bronch, off pressors, Afebrile. S/p HD w/ 2L . pt. stated that his breathing is much better, no cp, no abd. pain. no n/v/d.     >  Acute hypoxic respiratory failure likely due to volume overload and pneumonia / loculated pleural effusion   - s/p intubation now extubated, o2 sat 98 % on NC, clinically stable, will keep on albuterol neb.    > B/L pneumonia, / complicated/ complex pneumonia / pna in immunosuppressed   -  very likely extensive pneumonia in immune compromised pt  -continue zosyn,   -ct chest no evidence of PE, pt.   -recently had covid-19 infection,    - ID following,     >ESRD on dialysis, nephrology team following for dialysis.    >hx of Double lung transplant,   - continue tacrolimus and fluconazole prophylaxis.   - ID team following.     >CAD s/p PCI, asa, plavix , b.blk on borad.     > hyperlipidemia, continue statin.     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 CTA no PE but revealing significant bibasilar pneumonia, effusions/LLL loculated effusion. RRT during CT for acute respiratory distress, subsequently intubated in ED. Transferred to MICU. On pressors, antibiotics.   was admitted for acute hypoxic respiratory failure , was intubated , now extubated and on o2 via NC and o2 sat 98 5, pt. is S/p bronch, off pressors, Afebrile. S/p HD w/ 2L . pt. stated that his breathing is much better, no cp, no abd. pain. no n/v/d.     >  Acute hypoxic respiratory failure likely due to volume overload and pneumonia / loculated pleural effusion   - s/p intubation now extubated, o2 sat 98 % on NC, clinically stable, will keep on albuterol neb.    > B/L pneumonia, / complicated/ complex pneumonia / pna in immunosuppressed / possible gram negative pneumonia   -  very likely extensive pneumonia in immune compromised pt  -continue zosyn,   -ct chest no evidence of PE, pt.   -recently had covid-19 infection,    - ID following,     >ESRD on dialysis, nephrology team following for dialysis.    >hx of Double lung transplant,   - continue tacrolimus and fluconazole prophylaxis.   - ID team following.     >CAD s/p PCI, asa, plavix , b.blk on borad.     > hyperlipidemia, continue statin.     > dvt ppx :   - sq  Heparin       > dispo: remains acute , unclear dc date for now

## 2021-04-15 NOTE — PROGRESS NOTE ADULT - SUBJECTIVE AND OBJECTIVE BOX
INFECTIOUS DISEASES AND INTERNAL MEDICINE at [place]  =======================================================  Scar Solares MD  Diplomates American Board of Internal Medicine and Infectious Diseases  Telephone [phone]  Fax            [phone]  =======================================================    [place] [id number]    Follow up: resp failure     Allergies:  budesonide (Unknown)  cefpodoxime (Unknown)  Pollen (Unknown)      Medications:  aspirin  chewable 81 milliGRAM(s) Oral daily  atorvastatin 40 milliGRAM(s) Oral at bedtime  chlorhexidine 4% Liquid 1 Application(s) Topical <User Schedule>  clopidogrel Tablet 75 milliGRAM(s) Oral daily  fentaNYL   Infusion. 0.5 MICROgram(s)/kG/Hr IV Continuous <Continuous>  fluconAZOLE   Tablet 400 milliGRAM(s) Oral <User Schedule>  heparin   Injectable 5000 Unit(s) SubCutaneous every 8 hours  hydrocortisone 10 milliGRAM(s) Oral at bedtime  hydrocortisone 20 milliGRAM(s) Oral daily  midazolam Injectable 2 milliGRAM(s) IV Push every 1 hour PRN  multivitamin 1 Tablet(s) Oral daily  norepinephrine Infusion 0.05 MICROgram(s)/kG/Min IV Continuous <Continuous>  ondansetron Injectable 4 milliGRAM(s) IV Push once  pantoprazole  Injectable 40 milliGRAM(s) IV Push daily  piperacillin/tazobactam IVPB.. 3.375 Gram(s) IV Intermittent every 12 hours  propofol Infusion 5 MICROgram(s)/kG/Min IV Continuous <Continuous>  sevelamer carbonate 800 milliGRAM(s) Oral three times a day with meals  tacrolimus 2 milliGRAM(s) Oral daily  tamsulosin 0.4 milliGRAM(s) Oral at bedtime  trimethoprim  160 mG/sulfamethoxazole 800 mG 1 Tablet(s) Oral <User Schedule>    SOCIAL       FAMILY   FAMILY HISTORY:  Family history of emphysema      REVIEW OF SYSTEMS:  CONSTITUTIONAL:  No Fever or chills  HEENT:   No diplopia or blurred vision.  No earache, sore throat or runny nose.  CARDIOVASCULAR:  No pressure, squeezing, strangling, tightness, heaviness or aching about the chest, neck, axilla or epigastrium.  RESPIRATORY:  No cough, shortness of breath, PND or orthopnea.  GASTROINTESTINAL:  No nausea, vomiting or diarrhea.  GENITOURINARY:  No dysuria, frequency or urgency. No Blood in urine  MUSCULOSKELETAL:   moves all joints  SKIN:  No change in skin, hair or nails.  NEUROLOGIC:  No paresthesias, fasciculations, seizures or weakness.  PSYCHIATRIC:  No disorder of thought or mood.  ENDOCRINE:  No heat or cold intolerance, polyuria or polydipsia.  HEMATOLOGICAL:  No easy bruising or bleeding.            Physical Exam:   Vital Signs Last 24 Hrs  T(C): 36.8 (15 Apr 2021 15:54), Max: 36.9 (15 Apr 2021 14:52)  T(F): 98.2 (15 Apr 2021 15:54), Max: 98.4 (15 Apr 2021 14:52)  HR: 77 (15 Apr 2021 15:54) (67 - 102)  BP: 158/80 (15 Apr 2021 15:54) (136/53 - 180/99)  BP(mean): 110 (14 Apr 2021 21:00) (101 - 110)  RR: 20 (15 Apr 2021 15:54) (12 - 24)  SpO2: 100% (15 Apr 2021 15:54) (95% - 100%)    GEN: NAD,  awake extubated  HEENT: normocephalic and atraumatic. EOMI. URSULA.    NECK: Supple. No carotid bruits.  No lymphadenopathy or thyromegaly.  LUNGS: Clear to auscultation.  HEART: Regular rate and rhythm without murmur.  ABDOMEN: Soft, nontender, and nondistended.  Positive bowel sounds.    : No CVA tenderness  EXTREMITIES: Without any cyanosis, clubbing, rash, lesions or edema.  MSK: no joint swelling  NEUROLOGIC: Cranial nerves II through XII are grossly intact.  PSYCHIATRIC: Appropriate affect .  SKIN: No ulceration or induration present.        Labs:  Vitals:  ============  T(F): 97.9 (14 Apr 2021 07:41), Max: 99.1 (14 Apr 2021 04:00)  HR: 82 (14 Apr 2021 07:00)  BP: 176/85 (14 Apr 2021 07:00)  RR: 18 (14 Apr 2021 07:00)  SpO2: 98% (14 Apr 2021 07:00) (80% - 100%)  temp max in last 48H T(F): , Max: 99.1 (04-14-21 @ 04:00)    =======================================================  Current Antibiotics:  fluconAZOLE   Tablet 400 milliGRAM(s) Oral <User Schedule>  piperacillin/tazobactam IVPB.. 3.375 Gram(s) IV Intermittent every 12 hours  trimethoprim  160 mG/sulfamethoxazole 800 mG 1 Tablet(s) Oral <User Schedule>    Other medications:  aspirin  chewable 81 milliGRAM(s) Oral daily  atorvastatin 40 milliGRAM(s) Oral at bedtime  chlorhexidine 4% Liquid 1 Application(s) Topical <User Schedule>  clopidogrel Tablet 75 milliGRAM(s) Oral daily  fentaNYL   Infusion. 0.5 MICROgram(s)/kG/Hr IV Continuous <Continuous>  heparin   Injectable 5000 Unit(s) SubCutaneous every 8 hours  hydrocortisone 10 milliGRAM(s) Oral at bedtime  hydrocortisone 20 milliGRAM(s) Oral daily  multivitamin 1 Tablet(s) Oral daily  norepinephrine Infusion 0.05 MICROgram(s)/kG/Min IV Continuous <Continuous>  ondansetron Injectable 4 milliGRAM(s) IV Push once  pantoprazole  Injectable 40 milliGRAM(s) IV Push daily  propofol Infusion 5 MICROgram(s)/kG/Min IV Continuous <Continuous>  sevelamer carbonate 800 milliGRAM(s) Oral three times a day with meals  tacrolimus 2 milliGRAM(s) Oral daily  tamsulosin 0.4 milliGRAM(s) Oral at bedtime      =======================================================  Labs:                                           10.7   8.28  )-----------( 137      ( 15 Apr 2021 06:06 )             35.0      04-15    137  |  94<L>  |  47.0<H>  ----------------------------<  90  5.8<H>   |  25.0  |  6.82<H>    Ca    8.6      15 Apr 2021 06:06  Phos  7.0     04-15  Mg     2.6     04-15    TPro  5.9<L>  /  Alb  3.1<L>  /  TBili  0.7  /  DBili  x   /  AST  21  /  ALT  9   /  AlkPhos  146<H>  04-15        Alkaline Phosphatase, Serum: 136 U/L (04-14-21 @ 04:53)  Alkaline Phosphatase, Serum: 184 U/L (04-13-21 @ 10:27)  Alanine Aminotransferase (ALT/SGPT): 9 U/L (04-14-21 @ 04:53)  Alanine Aminotransferase (ALT/SGPT): 12 U/L (04-13-21 @ 10:27)  Aspartate Aminotransferase (AST/SGOT): 25 U/L (04-14-21 @ 04:53)  Aspartate Aminotransferase (AST/SGOT): 27 U/L (04-13-21 @ 10:27)  Bilirubin Total, Serum: 0.8 mg/dL (04-14-21 @ 04:53)  Bilirubin Total, Serum: 0.5 mg/dL (04-13-21 @ 10:27)

## 2021-04-15 NOTE — PROGRESS NOTE ADULT - SUBJECTIVE AND OBJECTIVE BOX
NEPHROLOGY INTERVAL HPI/OVERNIGHT EVENTS:    Examined earlier  Mild dyspnea noted  HD this AM    MEDICATIONS  (STANDING):  ALBUTerol    0.083% 2.5 milliGRAM(s) Nebulizer every 6 hours  aspirin  chewable 81 milliGRAM(s) Oral daily  chlorhexidine 4% Liquid 1 Application(s) Topical <User Schedule>  clopidogrel Tablet 75 milliGRAM(s) Oral daily  fluconAZOLE   Tablet 400 milliGRAM(s) Oral <User Schedule>  heparin   Injectable 5000 Unit(s) SubCutaneous every 8 hours  hydrocortisone 10 milliGRAM(s) Oral at bedtime  hydrocortisone 20 milliGRAM(s) Oral daily  metoprolol tartrate 25 milliGRAM(s) Oral <User Schedule>  multivitamin 1 Tablet(s) Oral daily  pantoprazole    Tablet 40 milliGRAM(s) Oral before breakfast  piperacillin/tazobactam IVPB.. 3.375 Gram(s) IV Intermittent every 12 hours  rosuvastatin 40 milliGRAM(s) Oral at bedtime  sevelamer carbonate 800 milliGRAM(s) Oral three times a day with meals  tacrolimus 1 milliGRAM(s) Oral <User Schedule>  tacrolimus 0.5 milliGRAM(s) Oral <User Schedule>  tamsulosin 0.4 milliGRAM(s) Oral at bedtime    MEDICATIONS  (PRN):  ALBUTerol    0.083% 2.5 milliGRAM(s) Nebulizer every 6 hours PRN Shortness of Breath and/or Wheezing      Allergies    budesonide (Unknown)  cefpodoxime (Unknown)  Pollen (Unknown)    Intolerances        Vital Signs Last 24 Hrs  T(C): 36.4 (15 Apr 2021 11:50), Max: 36.7 (2021 19:44)  T(F): 97.6 (15 Apr 2021 11:50), Max: 98.1 (2021 19:44)  HR: 102 (15 Apr 2021 11:50) (67 - 102)  BP: 150/88 (15 Apr 2021 11:50) (145/83 - 180/99)  BP(mean): 110 (2021 21:00) (101 - 114)  RR: 18 (15 Apr 2021 11:50) (11 - 24)  SpO2: 99% (15 Apr 2021 11:50) (92% - 100%)  Daily     Daily Weight in k (15 Apr 2021 11:50)    PHYSICAL EXAM:  GENERAL: Mild dyspnea  HEAD:  Atraumatic, No facial edema   NECK: Supple, + JVP , R permacath clean   CHEST/LUNG: EAE , Diffuse rhonchi   HEART: Regular rate and rhythm; + S4 , No rub   ABDOMEN: Soft, Nontender, Nondistended;  EXTREMITIES: ++ edema legs       LABS:                        10.7   8.28  )-----------( 137      ( 15 Apr 2021 06:06 )             35.0     04-15    137  |  94<L>  |  47.0<H>  ----------------------------<  90  5.8<H>   |  25.0  |  6.82<H>    Ca    8.6      15 Apr 2021 06:06  Phos  7.0     04-15  Mg     2.6     04-15    TPro  5.9<L>  /  Alb  3.1<L>  /  TBili  0.7  /  DBili  x   /  AST  21  /  ALT  9   /  AlkPhos  146<H>  04-15        Magnesium, Serum: 2.6 mg/dL (04-15 @ 06:06)  Phosphorus Level, Serum: 7.0 mg/dL (04-15 @ 06:06)    ABG - ( 2021 15:49 )  pH, Arterial: 7.39  pH, Blood: x     /  pCO2: 44    /  pO2: 273   / HCO3: 26    / Base Excess: 1.3   /  SaO2: 100                   RADIOLOGY & ADDITIONAL TESTS:

## 2021-04-15 NOTE — CHART NOTE - NSCHARTNOTEFT_GEN_A_CORE
Palliative care social work note.    SW contacted patients spouse  Veronica to provide support in coping with patients hospitalization. Spouse actively engaged with SW and addressed family preparing themselves with each hospitalization on possibility for patient dying but acknowledge that patient is a fighter. Spouse reviewed patients medica course with lung transplant in 2015, stents which later led to dialysis need after dye 1 year ago and [place] hospitalization with meningitis and recently COVID and now this hospitalization. Spouse reports that he just finished home care services and was doing well over last week at home. Patient attends dialysis t-th-sat. Palliative care following and continues to attempt to engage patient and family in goals of care discussions.

## 2021-04-16 ENCOUNTER — TRANSCRIPTION ENCOUNTER (OUTPATIENT)
Age: 71
End: 2021-04-16

## 2021-04-16 VITALS
OXYGEN SATURATION: 100 % | HEART RATE: 70 BPM | TEMPERATURE: 98 F | RESPIRATION RATE: 18 BRPM | DIASTOLIC BLOOD PRESSURE: 80 MMHG | SYSTOLIC BLOOD PRESSURE: 152 MMHG

## 2021-04-16 LAB
ALBUMIN SERPL ELPH-MCNC: 2.9 G/DL — LOW (ref 3.3–5.2)
ALP SERPL-CCNC: 151 U/L — HIGH (ref 40–120)
ALT FLD-CCNC: 9 U/L — SIGNIFICANT CHANGE UP
ANION GAP SERPL CALC-SCNC: 16 MMOL/L — SIGNIFICANT CHANGE UP (ref 5–17)
AST SERPL-CCNC: 25 U/L — SIGNIFICANT CHANGE UP
BILIRUB SERPL-MCNC: 0.6 MG/DL — SIGNIFICANT CHANGE UP (ref 0.4–2)
BUN SERPL-MCNC: 30 MG/DL — HIGH (ref 8–20)
CALCIUM SERPL-MCNC: 8.6 MG/DL — SIGNIFICANT CHANGE UP (ref 8.6–10.2)
CHLORIDE SERPL-SCNC: 94 MMOL/L — LOW (ref 98–107)
CO2 SERPL-SCNC: 26 MMOL/L — SIGNIFICANT CHANGE UP (ref 22–29)
CREAT SERPL-MCNC: 5.02 MG/DL — HIGH (ref 0.5–1.3)
CULTURE RESULTS: NO GROWTH — SIGNIFICANT CHANGE UP
CULTURE RESULTS: SIGNIFICANT CHANGE UP
GLUCOSE SERPL-MCNC: 72 MG/DL — SIGNIFICANT CHANGE UP (ref 70–99)
HBV CORE AB SER-ACNC: SIGNIFICANT CHANGE UP
HBV CORE IGM SER-ACNC: SIGNIFICANT CHANGE UP
HBV SURFACE AB SER-ACNC: <3 MIU/ML — LOW
HBV SURFACE AG SER-ACNC: SIGNIFICANT CHANGE UP
HCT VFR BLD CALC: 33.1 % — LOW (ref 39–50)
HCV AB S/CO SERPL IA: 0.17 S/CO — SIGNIFICANT CHANGE UP (ref 0–0.99)
HCV AB SERPL-IMP: SIGNIFICANT CHANGE UP
HGB BLD-MCNC: 10.5 G/DL — LOW (ref 13–17)
MAGNESIUM SERPL-MCNC: 2.3 MG/DL — SIGNIFICANT CHANGE UP (ref 1.8–2.6)
MCHC RBC-ENTMCNC: 29 PG — SIGNIFICANT CHANGE UP (ref 27–34)
MCHC RBC-ENTMCNC: 31.7 GM/DL — LOW (ref 32–36)
MCV RBC AUTO: 91.4 FL — SIGNIFICANT CHANGE UP (ref 80–100)
PLATELET # BLD AUTO: 107 K/UL — LOW (ref 150–400)
POTASSIUM SERPL-MCNC: 5.2 MMOL/L — SIGNIFICANT CHANGE UP (ref 3.5–5.3)
POTASSIUM SERPL-SCNC: 5.2 MMOL/L — SIGNIFICANT CHANGE UP (ref 3.5–5.3)
PROT SERPL-MCNC: 5.8 G/DL — LOW (ref 6.6–8.7)
RBC # BLD: 3.62 M/UL — LOW (ref 4.2–5.8)
RBC # FLD: 18.1 % — HIGH (ref 10.3–14.5)
SODIUM SERPL-SCNC: 135 MMOL/L — SIGNIFICANT CHANGE UP (ref 135–145)
SPECIMEN SOURCE: SIGNIFICANT CHANGE UP
SPECIMEN SOURCE: SIGNIFICANT CHANGE UP
WBC # BLD: 5.85 K/UL — SIGNIFICANT CHANGE UP (ref 3.8–10.5)
WBC # FLD AUTO: 5.85 K/UL — SIGNIFICANT CHANGE UP (ref 3.8–10.5)

## 2021-04-16 PROCEDURE — 85610 PROTHROMBIN TIME: CPT

## 2021-04-16 PROCEDURE — 80197 ASSAY OF TACROLIMUS: CPT

## 2021-04-16 PROCEDURE — 87594 PNEUMCYSTS JIROVECII AMP PRB: CPT

## 2021-04-16 PROCEDURE — 87040 BLOOD CULTURE FOR BACTERIA: CPT

## 2021-04-16 PROCEDURE — 96375 TX/PRO/DX INJ NEW DRUG ADDON: CPT

## 2021-04-16 PROCEDURE — 94760 N-INVAS EAR/PLS OXIMETRY 1: CPT

## 2021-04-16 PROCEDURE — 94640 AIRWAY INHALATION TREATMENT: CPT

## 2021-04-16 PROCEDURE — 99261: CPT

## 2021-04-16 PROCEDURE — 85025 COMPLETE CBC W/AUTO DIFF WBC: CPT

## 2021-04-16 PROCEDURE — 84132 ASSAY OF SERUM POTASSIUM: CPT

## 2021-04-16 PROCEDURE — 99233 SBSQ HOSP IP/OBS HIGH 50: CPT

## 2021-04-16 PROCEDURE — 85730 THROMBOPLASTIN TIME PARTIAL: CPT

## 2021-04-16 PROCEDURE — 82962 GLUCOSE BLOOD TEST: CPT

## 2021-04-16 PROCEDURE — 87340 HEPATITIS B SURFACE AG IA: CPT

## 2021-04-16 PROCEDURE — 84295 ASSAY OF SERUM SODIUM: CPT

## 2021-04-16 PROCEDURE — 85014 HEMATOCRIT: CPT

## 2021-04-16 PROCEDURE — 86803 HEPATITIS C AB TEST: CPT

## 2021-04-16 PROCEDURE — 84443 ASSAY THYROID STIM HORMONE: CPT

## 2021-04-16 PROCEDURE — 86705 HEP B CORE ANTIBODY IGM: CPT

## 2021-04-16 PROCEDURE — 0225U NFCT DS DNA&RNA 21 SARSCOV2: CPT

## 2021-04-16 PROCEDURE — 99232 SBSQ HOSP IP/OBS MODERATE 35: CPT

## 2021-04-16 PROCEDURE — 83880 ASSAY OF NATRIURETIC PEPTIDE: CPT

## 2021-04-16 PROCEDURE — 99239 HOSP IP/OBS DSCHRG MGMT >30: CPT

## 2021-04-16 PROCEDURE — 82803 BLOOD GASES ANY COMBINATION: CPT

## 2021-04-16 PROCEDURE — 71275 CT ANGIOGRAPHY CHEST: CPT

## 2021-04-16 PROCEDURE — 82947 ASSAY GLUCOSE BLOOD QUANT: CPT

## 2021-04-16 PROCEDURE — 84484 ASSAY OF TROPONIN QUANT: CPT

## 2021-04-16 PROCEDURE — 83036 HEMOGLOBIN GLYCOSYLATED A1C: CPT

## 2021-04-16 PROCEDURE — 87070 CULTURE OTHR SPECIMN AEROBIC: CPT

## 2021-04-16 PROCEDURE — 80053 COMPREHEN METABOLIC PANEL: CPT

## 2021-04-16 PROCEDURE — 94003 VENT MGMT INPAT SUBQ DAY: CPT

## 2021-04-16 PROCEDURE — 86706 HEP B SURFACE ANTIBODY: CPT

## 2021-04-16 PROCEDURE — 83735 ASSAY OF MAGNESIUM: CPT

## 2021-04-16 PROCEDURE — 80061 LIPID PANEL: CPT

## 2021-04-16 PROCEDURE — 94002 VENT MGMT INPAT INIT DAY: CPT

## 2021-04-16 PROCEDURE — C8929: CPT

## 2021-04-16 PROCEDURE — 87102 FUNGUS ISOLATION CULTURE: CPT

## 2021-04-16 PROCEDURE — 83605 ASSAY OF LACTIC ACID: CPT

## 2021-04-16 PROCEDURE — 82330 ASSAY OF CALCIUM: CPT

## 2021-04-16 PROCEDURE — 93005 ELECTROCARDIOGRAM TRACING: CPT

## 2021-04-16 PROCEDURE — 99285 EMERGENCY DEPT VISIT HI MDM: CPT | Mod: 25

## 2021-04-16 PROCEDURE — 71045 X-RAY EXAM CHEST 1 VIEW: CPT

## 2021-04-16 PROCEDURE — 96365 THER/PROPH/DIAG IV INF INIT: CPT

## 2021-04-16 PROCEDURE — 84100 ASSAY OF PHOSPHORUS: CPT

## 2021-04-16 PROCEDURE — 86704 HEP B CORE ANTIBODY TOTAL: CPT

## 2021-04-16 PROCEDURE — 85027 COMPLETE CBC AUTOMATED: CPT

## 2021-04-16 PROCEDURE — 97163 PT EVAL HIGH COMPLEX 45 MIN: CPT

## 2021-04-16 PROCEDURE — 85018 HEMOGLOBIN: CPT

## 2021-04-16 PROCEDURE — 86769 SARS-COV-2 COVID-19 ANTIBODY: CPT

## 2021-04-16 PROCEDURE — 36415 COLL VENOUS BLD VENIPUNCTURE: CPT

## 2021-04-16 PROCEDURE — 82435 ASSAY OF BLOOD CHLORIDE: CPT

## 2021-04-16 RX ORDER — FUROSEMIDE 40 MG
1 TABLET ORAL
Qty: 0 | Refills: 0 | DISCHARGE

## 2021-04-16 RX ORDER — SEVELAMER CARBONATE 2400 MG/1
1 POWDER, FOR SUSPENSION ORAL
Qty: 21 | Refills: 0
Start: 2021-04-16 | End: 2021-04-22

## 2021-04-16 RX ORDER — SEVELAMER CARBONATE 2400 MG/1
2 POWDER, FOR SUSPENSION ORAL
Qty: 0 | Refills: 0 | DISCHARGE
Start: 2021-04-16 | End: 2021-04-22

## 2021-04-16 RX ORDER — CEFPODOXIME PROXETIL 100 MG
1 TABLET ORAL
Qty: 0 | Refills: 0 | DISCHARGE

## 2021-04-16 RX ADMIN — CLOPIDOGREL BISULFATE 75 MILLIGRAM(S): 75 TABLET, FILM COATED ORAL at 11:58

## 2021-04-16 RX ADMIN — Medication 20 MILLIGRAM(S): at 05:57

## 2021-04-16 RX ADMIN — Medication 650 MILLIGRAM(S): at 17:34

## 2021-04-16 RX ADMIN — SEVELAMER CARBONATE 800 MILLIGRAM(S): 2400 POWDER, FOR SUSPENSION ORAL at 08:44

## 2021-04-16 RX ADMIN — CHLORHEXIDINE GLUCONATE 1 APPLICATION(S): 213 SOLUTION TOPICAL at 05:58

## 2021-04-16 RX ADMIN — PIPERACILLIN AND TAZOBACTAM 25 GRAM(S): 4; .5 INJECTION, POWDER, LYOPHILIZED, FOR SOLUTION INTRAVENOUS at 05:57

## 2021-04-16 RX ADMIN — TACROLIMUS 1 MILLIGRAM(S): 5 CAPSULE ORAL at 08:44

## 2021-04-16 RX ADMIN — TACROLIMUS 0.5 MILLIGRAM(S): 5 CAPSULE ORAL at 10:57

## 2021-04-16 RX ADMIN — PANTOPRAZOLE SODIUM 40 MILLIGRAM(S): 20 TABLET, DELAYED RELEASE ORAL at 05:57

## 2021-04-16 RX ADMIN — Medication 81 MILLIGRAM(S): at 11:59

## 2021-04-16 RX ADMIN — Medication 25 MILLIGRAM(S): at 08:44

## 2021-04-16 RX ADMIN — Medication 1 TABLET(S): at 11:42

## 2021-04-16 RX ADMIN — Medication 1 TABLET(S): at 11:58

## 2021-04-16 RX ADMIN — SEVELAMER CARBONATE 800 MILLIGRAM(S): 2400 POWDER, FOR SUSPENSION ORAL at 17:12

## 2021-04-16 RX ADMIN — HEPARIN SODIUM 5000 UNIT(S): 5000 INJECTION INTRAVENOUS; SUBCUTANEOUS at 05:57

## 2021-04-16 RX ADMIN — SEVELAMER CARBONATE 800 MILLIGRAM(S): 2400 POWDER, FOR SUSPENSION ORAL at 12:33

## 2021-04-16 NOTE — PHYSICAL THERAPY INITIAL EVALUATION ADULT - PERTINENT HX OF CURRENT PROBLEM, REHAB EVAL
pt presents to [facility] due to acute hypoxic respiratory failure, intubated then extubated, [facility] PNA

## 2021-04-16 NOTE — DISCHARGE NOTE PROVIDER - HOSPITAL COURSE
70M w/ PMHx CAD s/p PCI, COPD s/p double lung transplant (2015) on IS, Bactrim; cryptococcal meningitis on diflucan,  ESRD on HD via [facility] recent COVID and BPH presented on 04/13 w/ SOB associated w/  productive cough. He had a CTA which did not show a PE but was significant for extensive B/L LL consolidation and B/L effusions.  Pt went into acute respiratory distress and was intubated in ED. Pt was empirically started on zosyn. Bronchoscopy performed and cultures obtained. He was extubated on 4/14. Oxygen requirement improved and weaned off O2, SpO2 >90% at rest and with ambulation on day of d/c. Patient clinically much improved and stable for d/c. Antibiotics switched to PO Vantin to complete a total 7d abx therapy. Pt will follow up with pulmonary and nephrology. Last dialysis 4/15/21.    PHYSICAL EXAM:    Vital Signs Last 24 Hrs  T(C): 36.4 (16 Apr 2021 08:37), Max: 37.3 (16 Apr 2021 05:50)  T(F): 97.5 (16 Apr 2021 08:37), Max: 99.2 (16 Apr 2021 05:50)  HR: 69 (16 Apr 2021 08:37) (64 - 77)  BP: 162/75 (16 Apr 2021 08:37) (136/53 - 178/84)  BP(mean): --  RR: 19 (16 Apr 2021 08:37) (18 - 20)  GENERAL: Pt sitting in chair, NAD.  ENMT: PERRL, +EOMI.  NECK: soft, Supple, No JVD,   CHEST/LUNG: Coarse crackles bilat bases, improve w cough; No rhonchi or wheezing. Good aeration.   HEART: S1S2+, Regular rate and rhythm  ABDOMEN: Soft, Nontender, Nondistended; Bowel sounds present.  MUSCULOSKELETAL: Normal range of motion.  SKIN: Warm, dry  NEURO: AAOX3, no focal deficits  PSYCH: normal mood.    All electrolyte abnormalities were monitored carefully and repleted as necessary during this hospitalization. At the time of discharge patient was hemodynamically stable and amenable to all terms of discharge. The patient has received verbal instructions from myself regarding discharge plans.     Length of Discharge: 45MIN     70M w/ PMHx CAD s/p PCI, COPD s/p double lung transplant (2015) on IS, Bactrim; cryptococcal meningitis on diflucan,  ESRD on HD via [facility] recent COVID and BPH presented on 04/13 w/ SOB associated w/  productive cough. He had a CTA which did not show a PE but was significant for extensive B/L LL consolidation and B/L effusions.  Pt went into acute respiratory distress and was intubated in ED. Pt was empirically started on zosyn. Bronchoscopy performed and cultures obtained. He was extubated on 4/14. Oxygen requirement improved and weaned off O2, SpO2 >90% at rest and with ambulation on day of d/c. Patient clinically much improved and stable for d/c. Antibiotics switched to PO Vantin to complete a total 7d abx therapy. Pt will follow up with pulmonary and nephrology. Last dialysis Thurs 4/15/21, Outpt schedule is T,Th,Sat . D/w Dr Maher and pt may be d/c with OP HD tomorrow as scheduled.    PHYSICAL EXAM:    Vital Signs Last 24 Hrs  T(C): 36.4 (16 Apr 2021 08:37), Max: 37.3 (16 Apr 2021 05:50)  T(F): 97.5 (16 Apr 2021 08:37), Max: 99.2 (16 Apr 2021 05:50)  HR: 69 (16 Apr 2021 08:37) (64 - 77)  BP: 162/75 (16 Apr 2021 08:37) (136/53 - 178/84)  BP(mean): --  RR: 19 (16 Apr 2021 08:37) (18 - 20)  GENERAL: Pt sitting in chair, NAD.  ENMT: PERRL, +EOMI.  NECK: soft, Supple, No JVD,   CHEST/LUNG: Coarse crackles bilat bases, improve w cough; No rhonchi or wheezing. Good aeration.   HEART: S1S2+, Regular rate and rhythm  ABDOMEN: Soft, Nontender, Nondistended; Bowel sounds present.  MUSCULOSKELETAL: Normal range of motion.  SKIN: Warm, dry  NEURO: AAOX3, no focal deficits  PSYCH: normal mood.    All electrolyte abnormalities were monitored carefully and repleted as necessary during this hospitalization. At the time of discharge patient was hemodynamically stable and amenable to all terms of discharge. The patient has received verbal instructions from myself regarding discharge plans.     Length of Discharge: 45MIN     70M w/ PMHx CAD s/p PCI, COPD s/p double lung transplant (2015) on IS, Bactrim; cryptococcal meningitis on diflucan,  ESRD on HD via [facility] recent COVID and BPH presented on 04/13 w/ SOB associated w/  productive cough. He had a CTA which did not show a PE but was significant for extensive B/L LL consolidation and B/L effusions.  Pt went into acute respiratory distress and was intubated in ED. Pt was empirically started on zosyn. Bronchoscopy performed and cultures obtained. He was extubated on 4/14. Oxygen requirement improved and weaned off O2, SpO2 >90% at rest and with ambulation on day of d/c. Patient clinically much improved and stable for d/c. Antibiotics switched to PO Vantin to complete a total 7d abx therapy. Pt will follow up with pulmonary and nephrology. Last dialysis Thurs 4/15/21, Outpt schedule is T,Th,Sat . D/w Dr Maher and pt may be d/c with OP HD tomorrow as scheduled.    PHYSICAL EXAM:    Vital Signs Last 24 Hrs  T(C): 36.4 (16 Apr 2021 08:37), Max: 37.3 (16 Apr 2021 05:50)  T(F): 97.5 (16 Apr 2021 08:37), Max: 99.2 (16 Apr 2021 05:50)  HR: 69 (16 Apr 2021 08:37) (64 - 77)  BP: 162/75 (16 Apr 2021 08:37) (136/53 - 178/84)  BP(mean): --  RR: 19 (16 Apr 2021 08:37) (18 - 20)    GENERAL: Pt sitting in chair, NAD.  ENMT: PERRL, +EOMI.  NECK: soft, Supple, No JVD,   CHEST/LUNG: Coarse crackles bilat bases, improve w cough; No rhonchi or wheezing. Good aeration.   HEART: S1S2+, Regular rate and rhythm  ABDOMEN: Soft, Nontender, Nondistended; Bowel sounds present.  MUSCULOSKELETAL: Normal range of motion.  SKIN: Warm, dry  NEURO: AAOX3, no focal deficits  PSYCH: normal mood.    All electrolyte abnormalities were monitored carefully and repleted as necessary during this hospitalization. At the time of discharge patient was hemodynamically stable and amenable to all terms of discharge. The patient has received verbal instructions from myself regarding discharge plans.     Length of Discharge: 45min

## 2021-04-16 NOTE — DISCHARGE NOTE PROVIDER - PROVIDER TOKENS
PROVIDER:[TOKEN:[[id number]],FOLLOWUP:[1 week]],FREE:[LAST:[Nephrology],PHONE:[(   )    -],FAX:[(   )    -],FOLLOWUP:[1 week]]

## 2021-04-16 NOTE — PROGRESS NOTE ADULT - SUBJECTIVE AND OBJECTIVE BOX
NEPHROLOGY INTERVAL HPI/OVERNIGHT EVENTS:  pt resting comfortably  no acute distress  tolerated HD yesterday    MEDICATIONS  (STANDING):  ALBUTerol    0.083% 2.5 milliGRAM(s) Nebulizer every 6 hours  aspirin  chewable 81 milliGRAM(s) Oral daily  chlorhexidine 4% Liquid 1 Application(s) Topical <User Schedule>  clopidogrel Tablet 75 milliGRAM(s) Oral daily  fluconAZOLE   Tablet 400 milliGRAM(s) Oral <User Schedule>  heparin   Injectable 5000 Unit(s) SubCutaneous every 8 hours  hydrocortisone 10 milliGRAM(s) Oral at bedtime  hydrocortisone 20 milliGRAM(s) Oral daily  metoprolol tartrate 25 milliGRAM(s) Oral <User Schedule>  multivitamin 1 Tablet(s) Oral daily  pantoprazole    Tablet 40 milliGRAM(s) Oral before breakfast  piperacillin/tazobactam IVPB.. 3.375 Gram(s) IV Intermittent every 12 hours  rosuvastatin 40 milliGRAM(s) Oral at bedtime  sevelamer carbonate 800 milliGRAM(s) Oral three times a day with meals  tacrolimus 1 milliGRAM(s) Oral <User Schedule>  tacrolimus 0.5 milliGRAM(s) Oral <User Schedule>  tamsulosin 0.4 milliGRAM(s) Oral at bedtime  trimethoprim   80 mG/sulfamethoxazole 400 mG 1 Tablet(s) Oral <User Schedule>    MEDICATIONS  (PRN):  acetaminophen   Tablet .. 650 milliGRAM(s) Oral every 6 hours PRN Temp greater or equal to 38C (100.4F), Mild Pain (1 - 3)  ALBUTerol    0.083% 2.5 milliGRAM(s) Nebulizer every 6 hours PRN Shortness of Breath and/or Wheezing      Allergies    budesonide (Unknown)  cefpodoxime (Unknown)  Pollen (Unknown)      Vital Signs Last 24 Hrs  T(C): 36.4 (16 Apr 2021 08:37), Max: 37.3 (16 Apr 2021 05:50)  T(F): 97.5 (16 Apr 2021 08:37), Max: 99.2 (16 Apr 2021 05:50)  HR: 69 (16 Apr 2021 08:37) (64 - 102)  BP: 162/75 (16 Apr 2021 08:37) (136/53 - 178/84)  BP(mean): --  RR: 19 (16 Apr 2021 08:37) (18 - 20)  SpO2: 99% (16 Apr 2021 08:37) (99% - 100%)    PHYSICAL EXAM:  Appears chronically ill  HEENT: Dry mucous membranes  NERVOUS SYSTEM:  Awake  Lungs: Diminished BS at bases  EXTREMITIES:  tr LE edema  COR: RR, no rub      LABS:                        10.5   5.85  )-----------( 107      ( 16 Apr 2021 07:13 )             33.1     04-16    135  |  94<L>  |  30.0<H>  ----------------------------<  72  5.2   |  26.0  |  5.02<H>    Ca    8.6      16 Apr 2021 07:13  Phos  7.0     04-15  Mg     2.3     04-16    TPro  5.8<L>  /  Alb  2.9<L>  /  TBili  0.6  /  DBili  x   /  AST  25  /  ALT  9   /  AlkPhos  151<H>  04-16        Magnesium, Serum: 2.3 mg/dL (04-16 @ 07:13)      RADIOLOGY & ADDITIONAL TESTS:  < from: Xray Chest 1 View- PORTABLE-Urgent (Xray Chest 1 View- PORTABLE-Urgent .) (04.13.21 @ 13:35) >     EXAM:  XR CHEST PORTABLE URGENT 1V                          PROCEDURE DATE:  04/13/2021          INTERPRETATION:  Clinical history: 70-year-old male, intubation.    Two portable/expiratory views are compared to 2 minutes prior and are correlated with the CT 1 hour prior.    FINDINGS: ET tube with the tip 4.3 cm above the konrad and NG tube with the tip in the stomach. Right-sided dialysis catheters with tips in the right atrium and no pneumothorax evident.    Moderate bilateral alveolar/interstitial infiltrates, unchanged.    Large bilateral pleural effusions and dense consolidation in the right lower lobe, better characterized on concurrent CT.    IMPRESSION:  Lines and tubes in satisfactory position with no pneumothorax.    Extensive bilateral perihilar areas of consolidation.    Large bilateral pleural effusions and dense consolidation in the right lower lobe.    Findings better characterized on concurrent CT    < end of copied text >

## 2021-04-16 NOTE — DISCHARGE NOTE PROVIDER - NSDCCPCAREPLAN_GEN_ALL_CORE_FT
PRINCIPAL DISCHARGE DIAGNOSIS  Diagnosis: Acute respiratory failure with hypoxia  Assessment and Plan of Treatment: Resolved, no indication for home O2      SECONDARY DISCHARGE DIAGNOSES  Diagnosis: Community acquired bilateral lower lobe pneumonia  Assessment and Plan of Treatment: Continue antibiotics, augmentin once a day for 6 days. Take antibiotics AFTER DIALYSIS . Follow up with pulmonary    Diagnosis: Fluid overload  Assessment and Plan of Treatment: Plan for HD tomorrow. Stop Lasix    Diagnosis: ESRD (end stage renal disease) on dialysis  Assessment and Plan of Treatment: Follow up with nephrololgy. Dialysis as scheduled tomorrow. Continue sevelamer for now, follow up in dialysis how long to continue.

## 2021-04-16 NOTE — PROGRESS NOTE ADULT - SUBJECTIVE AND OBJECTIVE BOX
CC:   follow up GOC  INTERVAL HPI/OVERNIGHT EVENTS:  feeling well  did well with PT with no desaturation in O2  PRESENT SYMPTOMS: SOURCE:  Patient/Family/Team    PAIN SCALE:  0 = none  1 = mild   2 = moderate  3 = severe    Pain: denies    Dyspnea:  [ ] YES [x ] NO  Anxiety:  [ ] YES [ x] NO  Fatigue: [x ] YES [ ] NO  Nausea: [ ] YES [x ] NO  Loss of Appetite: [ ] YES [x ] NO  Other symptoms: __________    MEDICATIONS  (STANDING):  ALBUTerol    0.083% 2.5 milliGRAM(s) Nebulizer every 6 hours  aspirin  chewable 81 milliGRAM(s) Oral daily  chlorhexidine 4% Liquid 1 Application(s) Topical <User Schedule>  clopidogrel Tablet 75 milliGRAM(s) Oral daily  fluconAZOLE   Tablet 400 milliGRAM(s) Oral <User Schedule>  heparin   Injectable 5000 Unit(s) SubCutaneous every 8 hours  hydrocortisone 10 milliGRAM(s) Oral at bedtime  hydrocortisone 20 milliGRAM(s) Oral daily  metoprolol tartrate 25 milliGRAM(s) Oral <User Schedule>  multivitamin 1 Tablet(s) Oral daily  pantoprazole    Tablet 40 milliGRAM(s) Oral before breakfast  piperacillin/tazobactam IVPB.. 3.375 Gram(s) IV Intermittent every 12 hours  rosuvastatin 40 milliGRAM(s) Oral at bedtime  sevelamer carbonate 800 milliGRAM(s) Oral three times a day with meals  tacrolimus 1 milliGRAM(s) Oral <User Schedule>  tacrolimus 0.5 milliGRAM(s) Oral <User Schedule>  tamsulosin 0.4 milliGRAM(s) Oral at bedtime  trimethoprim   80 mG/sulfamethoxazole 400 mG 1 Tablet(s) Oral <User Schedule>    MEDICATIONS  (PRN):  acetaminophen   Tablet .. 650 milliGRAM(s) Oral every 6 hours PRN Temp greater or equal to 38C (100.4F), Mild Pain (1 - 3)  ALBUTerol    0.083% 2.5 milliGRAM(s) Nebulizer every 6 hours PRN Shortness of Breath and/or Wheezing      Allergies    budesonide (Unknown)  cefpodoxime (Unknown)  Pollen (Unknown)    Intolerances      Karnofsky Performance Score/Palliative Performance Status Version 2: 50  %    Vital Signs Last 24 Hrs  T(C): 36.4 (16 Apr 2021 08:37), Max: 37.3 (16 Apr 2021 05:50)  T(F): 97.5 (16 Apr 2021 08:37), Max: 99.2 (16 Apr 2021 05:50)  HR: 69 (16 Apr 2021 08:37) (64 - 77)  BP: 162/75 (16 Apr 2021 08:37) (136/53 - 178/84)  BP(mean): --  RR: 19 (16 Apr 2021 08:37) (18 - 20)  SpO2: 99% (16 Apr 2021 08:37) (99% - 100%)    PHYSICAL EXAM:    General: awake alert NAD - sitting in chair  HEENT: [ x] normal  [ ] dry mouth  [ ] ET tube/trach    Lungs: [ x] comfortable [ ] tachypnea/labored breathing  [ ] excessive secretions    CV: [ x] normal  [ ] tachycardia    GI: [ x] normal  [ ] distended  [ ] tender  [ ] no BS               [ ] PEG/NG/OG tube    : [ ] normal  [ ] incontinent  [x ] oliguria/anuria  [ ] bill    MSK: [ ] normal  [x ] weakness  [ ] edema             [ ] ambulatory  [ ] bedbound/wheelchair bound    Skin: [ ] normal  [ ] pressure ulcers- Stage_____  [x ] no rash    LABS:                        10.5   5.85  )-----------( 107      ( 16 Apr 2021 07:13 )             33.1     04-16    135  |  94<L>  |  30.0<H>  ----------------------------<  72  5.2   |  26.0  |  5.02<H>    Ca    8.6      16 Apr 2021 07:13  Phos  7.0     04-15  Mg     2.3     04-16    TPro  5.8<L>  /  Alb  2.9<L>  /  TBili  0.6  /  DBili  x   /  AST  25  /  ALT  9   /  AlkPhos  151<H>  04-16        I&O's Summary    15 Apr 2021 07:01  -  16 Apr 2021 07:00  --------------------------------------------------------  IN: 425 mL / OUT: 3100 mL / NET: -2675 mL        RADIOLOGY & ADDITIONAL STUDIES:

## 2021-04-16 NOTE — DISCHARGE NOTE PROVIDER - CARE PROVIDERS DIRECT ADDRESSES
[email or web address]

## 2021-04-16 NOTE — PROGRESS NOTE ADULT - ASSESSMENT
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 Recommendation: s/p Extubation - cont O2 support via NC.     Problem/Recommendation - 2:  ·  Problem: Pneumonia.  Recommendation: cont IV abx.      Problem/Recommendation - 3:  ·  Problem: ESRD (end stage renal disease) on dialysis.  Recommendation: On HD  Nephro consulted.      Problem/Recommendation - 4:  ·  Problem: Encounter for palliative care.  Patient feeling good, reportedly did well with PT with no desaturation. He still would like to see if he can have a O2 concentrator or tank at home, should he every over exert himself  and in need of supplemental O2.    See GOC note for details. In summary:  1. He hopes to have a kidney transplant  2.  Full Code.    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 Recommendation: s/p Extubation - cont O2 support via NC.     Problem/Recommendation - 2:  ·  Problem: Pneumonia.  Recommendation: cont IV abx.      Problem/Recommendation - 3:  ·  Problem: ESRD (end stage renal disease) on dialysis.  Recommendation: On HD  Nephro consulted.      Problem/Recommendation - 4:  ·  Problem: Encounter for palliative care.  Patient feeling good, reportedly did well with PT with no desaturation.     See GOC note for details. In summary:  1. He hopes to have a kidney transplant  2.  Full Code.    Palliative Care to sign off

## 2021-04-16 NOTE — PROGRESS NOTE ADULT - REASON FOR ADMISSION
Acute hypoxic respiratory failure

## 2021-04-16 NOTE — DISCHARGE NOTE PROVIDER - NSDCMRMEDTOKEN_GEN_ALL_CORE_FT
acetaminophen 500 mg oral tablet: 2 tab(s) orally every 8 hours, As Needed  Bactrim 400 mg-80 mg oral tablet: 1 tab(s) orally Monday, Wednesday, and Friday  clopidogrel 75 mg oral tablet: 1 tab(s) orally once a day  Ecotrin Adult Low Strength 81 mg oral delayed release tablet: 1 tab(s) orally once a day  ferrous gluconate 324 mg (38 mg elemental iron) oral tablet: orally once a day (in the evening)  Flomax 0.4 mg oral capsule: 1 cap(s) orally once a day  fluconazole 200 mg oral tablet: 2 tab(s) orally Tuesday, Thursday, Saturday  furosemide 40 mg oral tablet: 1 tab(s) orally once a day  per patient was advised to stop taking it  hydrocortisone 10 mg oral tablet: 1 tab(s) orally once a day (at bedtime)  hydrocortisone 20 mg oral tablet: 1 tab(s) orally once a day  lactobacillus acidophilus oral capsule: 1 cap(s) orally 2 times a day  magnesium oxide 400 mg (241.3 mg elemental magnesium) oral tablet: 1 tab(s) orally once a day  Melatonin 3 mg oral tablet: 2 tab(s) orally once a day (at bedtime), As Needed  Metoprolol Tartrate 25 mg oral tablet: 1 tab(s) orally once a day AND   take an additional 1 tab orally in the evening on Sun, M,W,F  DON&#x27;T take in the evening on dialysis days  Multiple Vitamins oral tablet: 1 tab(s) orally once a day  oxyCODONE 5 mg oral tablet: 0.5 tab(s) orally Tuesday, Thursday, Saturday prior to HD  Protonix 40 mg oral delayed release tablet: 1 tab(s) orally once a day  rosuvastatin 40 mg oral tablet: 1 tab(s) orally once a day  tacrolimus 0.5 mg oral capsule: 1 cap(s) orally once a day in the morning with 1 mg capsule; total dose 1.5 mg in the morning  tacrolimus 1 mg oral capsule: 1 cap(s) orally 2 times a day    Tums Ultra 1000 mg oral tablet, chewable: 1 tab(s) orally once a day  Vantin 200 mg oral tablet: 1 tab(s) orally every 12 hours   acetaminophen 500 mg oral tablet: 2 tab(s) orally every 8 hours, As Needed  amoxicillin-clavulanate 500 mg-125 mg oral tablet: 1 tab(s) orally once a day MUST TAKE AFTER DIALYSIS on HD days  Bactrim 400 mg-80 mg oral tablet: 1 tab(s) orally Monday, Wednesday, and Friday  clopidogrel 75 mg oral tablet: 1 tab(s) orally once a day  Ecotrin Adult Low Strength 81 mg oral delayed release tablet: 1 tab(s) orally once a day  ferrous gluconate 324 mg (38 mg elemental iron) oral tablet: orally once a day (in the evening)  Flomax 0.4 mg oral capsule: 1 cap(s) orally once a day  fluconazole 200 mg oral tablet: 2 tab(s) orally Tuesday, Thursday, Saturday  hydrocortisone 10 mg oral tablet: 1 tab(s) orally once a day (at bedtime)  hydrocortisone 20 mg oral tablet: 1 tab(s) orally once a day  lactobacillus acidophilus oral capsule: 1 cap(s) orally 2 times a day  magnesium oxide 400 mg (241.3 mg elemental magnesium) oral tablet: 1 tab(s) orally once a day  Melatonin 3 mg oral tablet: 2 tab(s) orally once a day (at bedtime), As Needed  Metoprolol Tartrate 25 mg oral tablet: 1 tab(s) orally once a day AND   take an additional 1 tab orally in the evening on Sun, M,W,F  DON&#x27;T take in the evening on dialysis days  Multiple Vitamins oral tablet: 1 tab(s) orally once a day  oxyCODONE 5 mg oral tablet: 0.5 tab(s) orally Tuesday, Thursday, Saturday prior to HD  Protonix 40 mg oral delayed release tablet: 1 tab(s) orally once a day  rosuvastatin 40 mg oral tablet: 1 tab(s) orally once a day  sevelamer carbonate 800 mg oral tablet: 1 tab(s) orally 3 times a day (with meals)  tacrolimus 0.5 mg oral capsule: 1 cap(s) orally once a day in the morning with 1 mg capsule; total dose 1.5 mg in the morning  tacrolimus 1 mg oral capsule: 1 cap(s) orally 2 times a day    Tums Ultra 1000 mg oral tablet, chewable: 1 tab(s) orally once a day

## 2021-04-16 NOTE — DISCHARGE NOTE PROVIDER - CARE PROVIDER_API CALL
J Carlos Cordova)  Critical Care Medicine; Internal Medicine; Pulmonary Disease  [address] [facility], [place] [address]  Phone: [phone]  Fax: [phone]  Follow Up Time: 1 week    Nephrology,   Phone: (   )    -  Fax: (   )    -  Follow Up Time: 1 week

## 2021-04-16 NOTE — GOALS OF CARE CONVERSATION - ADVANCED CARE PLANNING - CONVERSATION DETAILS
Patient feeling well.  Although he has had hospitalizations in the last several months. he is happy that he has overcome them.  He is on HD and hopes he can get a kidney transplant. He states he has already contacted [facility]  Discussed advance directives CPR/I - he states he would want resuscitation. Only if he is declared brain dead then he would not want to continue any LST.

## 2021-04-16 NOTE — DISCHARGE NOTE NURSING/CASE MANAGEMENT/SOCIAL WORK - PATIENT PORTAL LINK FT
You can access the FollowMyHealth Patient Portal offered by [facility] by registering at the following website: [email or web address]. By joining [facility]’s FollowMyHealth portal, you will also be able to view your health information using other applications (apps) compatible with our system.

## 2021-04-16 NOTE — PROGRESS NOTE ADULT - ASSESSMENT
ESRD: +fluid overload/PVC clinically improved  - HD tomorrow with UF as tolerates    Anemia:  - add MENDY as needed to maintain Hgb > 10.0    RO:   - low phos diet  - cont Sevelamer for now

## 2021-04-16 NOTE — PROGRESS NOTE ADULT - SUBJECTIVE AND OBJECTIVE BOX
INFECTIOUS DISEASES AND INTERNAL MEDICINE at [facility]  =======================================================  Scar Solares MD  Diplomates American Board of Internal Medicine and Infectious Diseases  Telephone [phone]  Fax            [phone]  =======================================================    JU FERGUSON [id number]    Follow up: resp failure     Allergies:  budesonide (Unknown)  cefpodoxime (Unknown)  Pollen (Unknown)      Medications:  aspirin  chewable 81 milliGRAM(s) Oral daily  atorvastatin 40 milliGRAM(s) Oral at bedtime  chlorhexidine 4% Liquid 1 Application(s) Topical <User Schedule>  clopidogrel Tablet 75 milliGRAM(s) Oral daily  fentaNYL   Infusion. 0.5 MICROgram(s)/kG/Hr IV Continuous <Continuous>  fluconAZOLE   Tablet 400 milliGRAM(s) Oral <User Schedule>  heparin   Injectable 5000 Unit(s) SubCutaneous every 8 hours  hydrocortisone 10 milliGRAM(s) Oral at bedtime  hydrocortisone 20 milliGRAM(s) Oral daily  midazolam Injectable 2 milliGRAM(s) IV Push every 1 hour PRN  multivitamin 1 Tablet(s) Oral daily  norepinephrine Infusion 0.05 MICROgram(s)/kG/Min IV Continuous <Continuous>  ondansetron Injectable 4 milliGRAM(s) IV Push once  pantoprazole  Injectable 40 milliGRAM(s) IV Push daily  piperacillin/tazobactam IVPB.. 3.375 Gram(s) IV Intermittent every 12 hours  propofol Infusion 5 MICROgram(s)/kG/Min IV Continuous <Continuous>  sevelamer carbonate 800 milliGRAM(s) Oral three times a day with meals  tacrolimus 2 milliGRAM(s) Oral daily  tamsulosin 0.4 milliGRAM(s) Oral at bedtime  trimethoprim  160 mG/sulfamethoxazole 800 mG 1 Tablet(s) Oral <User Schedule>    SOCIAL       FAMILY   FAMILY HISTORY:  Family history of emphysema      REVIEW OF SYSTEMS:  CONSTITUTIONAL:  No Fever or chills  HEENT:   No diplopia or blurred vision.  No earache, sore throat or runny nose.  CARDIOVASCULAR:  No pressure, squeezing, strangling, tightness, heaviness or aching about the chest, neck, axilla or epigastrium.  RESPIRATORY:  No cough, shortness of breath, PND or orthopnea.  GASTROINTESTINAL:  No nausea, vomiting or diarrhea.  GENITOURINARY:  No dysuria, frequency or urgency. No Blood in urine  MUSCULOSKELETAL:   moves all joints  SKIN:  No change in skin, hair or nails.  NEUROLOGIC:  No paresthesias, fasciculations, seizures or weakness.  PSYCHIATRIC:  No disorder of thought or mood.  ENDOCRINE:  No heat or cold intolerance, polyuria or polydipsia.  HEMATOLOGICAL:  No easy bruising or bleeding.            Physical Exam:   Vital Signs Last 24 Hrs  T(C): 36.4 (16 Apr 2021 08:37), Max: 37.3 (16 Apr 2021 05:50)  T(F): 97.5 (16 Apr 2021 08:37), Max: 99.2 (16 Apr 2021 05:50)  HR: 69 (16 Apr 2021 08:37) (64 - 77)  BP: 162/75 (16 Apr 2021 08:37) (136/53 - 178/84)  BP(mean): --  RR: 19 (16 Apr 2021 08:37) (18 - 20)  SpO2: 99% (16 Apr 2021 08:37) (99% - 100%)    GEN: NAD,  awake extubated  HEENT: normocephalic and atraumatic. EOMI. URSULA.    NECK: Supple. No carotid bruits.  No lymphadenopathy or thyromegaly.  LUNGS: Clear to auscultation.  HEART: Regular rate and rhythm without murmur.  ABDOMEN: Soft, nontender, and nondistended.  Positive bowel sounds.    : No CVA tenderness  EXTREMITIES: Without any cyanosis, clubbing, rash, lesions or edema.  MSK: no joint swelling  NEUROLOGIC: Cranial nerves II through XII are grossly intact.  PSYCHIATRIC: Appropriate affect .  SKIN: No ulceration or induration present.        Labs:  Vitals:  ==     =======================================================  Current Antibiotics:  fluconAZOLE   Tablet 400 milliGRAM(s) Oral <User Schedule>  piperacillin/tazobactam IVPB.. 3.375 Gram(s) IV Intermittent every 12 hours  trimethoprim  160 mG/sulfamethoxazole 800 mG 1 Tablet(s) Oral <User Schedule>    Other medications:  aspirin  chewable 81 milliGRAM(s) Oral daily  atorvastatin 40 milliGRAM(s) Oral at bedtime  chlorhexidine 4% Liquid 1 Application(s) Topical <User Schedule>  clopidogrel Tablet 75 milliGRAM(s) Oral daily  fentaNYL   Infusion. 0.5 MICROgram(s)/kG/Hr IV Continuous <Continuous>  heparin   Injectable 5000 Unit(s) SubCutaneous every 8 hours  hydrocortisone 10 milliGRAM(s) Oral at bedtime  hydrocortisone 20 milliGRAM(s) Oral daily  multivitamin 1 Tablet(s) Oral daily  norepinephrine Infusion 0.05 MICROgram(s)/kG/Min IV Continuous <Continuous>  ondansetron Injectable 4 milliGRAM(s) IV Push once  pantoprazole  Injectable 40 milliGRAM(s) IV Push daily  propofol Infusion 5 MICROgram(s)/kG/Min IV Continuous <Continuous>  sevelamer carbonate 800 milliGRAM(s) Oral three times a day with meals  tacrolimus 2 milliGRAM(s) Oral daily  tamsulosin 0.4 milliGRAM(s) Oral at bedtime      =======================================================  Labs:                                                10.5   5.85  )-----------( 107      ( 16 Apr 2021 07:13 )             33.1   04-16    135  |  94<L>  |  30.0<H>  ----------------------------<  72  5.2   |  26.0  |  5.02<H>    Ca    8.6      16 Apr 2021 07:13  Phos  7.0     04-15  Mg     2.3     04-16    TPro  5.8<L>  /  Alb  2.9<L>  /  TBili  0.6  /  DBili  x   /  AST  25  /  ALT  9   /  AlkPhos  151<H>  04-16

## 2021-04-16 NOTE — PROGRESS NOTE ADULT - ASSESSMENT
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 Patient reporTED  that he has been short of breath since yesterday when he was exercising. HE HAD     been unable to catch his breath and therefore came in to be evaluated today. He does endorse a cough with a productive sputum. He denies any fevers or chills. Of note, patient was recently diagnosed with COVID and treated.    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 Patient reporTED  that he has been short of breath since yesterday when he was exercising. HE HAD     been unable to catch his breath and therefore came in to be evaluated today. He does endorse a cough with a productive sputum. He denies any fevers or chills. Of note, patient was recently diagnosed with COVID and treated.    PT AS ABOVE HAD INCREASED RESP SX AND WAS INTUBATED  NOW EXTUBATED AND AWAKE AND ALERT  BLOOD CX WERE DRAWN PT PLACED ON EMPRIC IV ABX  PT ALREADY HAD COVID AND WAS TREATED HERE IN THE PAST  PT HAD REPORTED CRYPT MENINGITIS   IN 12/20 AT [place]  AND IS ON CHRONIC SUPPRESSIVE DIFLUCAN       OVERALL IMPROVED   ALL CX NEGATIVE  HAS RECEIVED 4 DAYS IV ABX  CAN CHANGE TO ORAL VANTIN TO COMPLETE 7 DAYS  CONTINUE SUPPRESSIVE DIFLUCAN BACTRIM,  WILL FOLLOW UP AS NEEDED PLEASE CALL IF QUESTIONS

## 2021-04-18 LAB
CULTURE RESULTS: SIGNIFICANT CHANGE UP
CULTURE RESULTS: SIGNIFICANT CHANGE UP
SPECIMEN SOURCE: SIGNIFICANT CHANGE UP
SPECIMEN SOURCE: SIGNIFICANT CHANGE UP

## 2021-04-19 LAB
P JIROVECII DNA L RESP QL NAA+NON-PROBE: NEGATIVE — SIGNIFICANT CHANGE UP
SPECIMEN SOURCE: SIGNIFICANT CHANGE UP

## 2021-04-27 NOTE — CDI QUERY NOTE - NSCDIOTHERTXTBX_GEN_ALL_CORE_HH
Documentation noted Pneumonia. Can the type of pneumonia be clarified.    Supporting Documentation:   4/14 Hospitalist note:  - B/L pneumonia, unspecified organism, unspecified part of the lung,  very likely extensive pneumonia in immune compromised pt. continue zosyn, ct chest no evidence of PE, pt. recently had covid-19 infection,  ID following,     Antibiotics given- 3.375 mg IV q12    Can you please clarify the type of pneumonia treated that necessitates IV zosyn  A) Gram negative krista Pneumonia  B) Other, pls specify  C) Not clinically significant
Documentation noted a diagnosis of septic shock on 4/13 procedure note and again on 4/14 critical care note. Clarification is needed if septic shock was present/evolving on admission?    Supporting Documentation:  4/14 Progress note critical care note:  5. Shock- resolved  6. ESRD on HD  7. Cryptococcal meningitis- chronic suppressive therapy    4/13 Procedure note:  Procedure performed independent of critical care time  Dx: Acute on chronic hypoxic respiratory failure, septic shock, multifocal b/l pneumonia    Patient treated with levophed on 4/13 and off 4/14     V/S on admission  Temp-95.2  HR -93  RR-23  WBC-13.55    Please clarify if bases on the above clinical indicators/documentation if septic shock was present/evolving on admission?  A) Septic shock present on admission  B) Septic shock evolving on admission  C) Septic shock evolved after admission  D) Other, pls specify  E) Not clinically significant

## 2021-05-12 LAB
CULTURE RESULTS: SIGNIFICANT CHANGE UP
SPECIMEN SOURCE: SIGNIFICANT CHANGE UP

## 2021-05-21 NOTE — ED ADULT NURSE NOTE - NS ED NURSE REPORT GIVEN DT
Pt states he feels much improved and wants to go home.  Dr. Jacinda Palma notified     Santiago Bueno RN  05/21/21 1950 13-Apr-2021 13:28

## 2021-06-03 PROCEDURE — 80053 COMPREHEN METABOLIC PANEL: CPT

## 2021-06-03 PROCEDURE — 86140 C-REACTIVE PROTEIN: CPT

## 2021-06-03 PROCEDURE — 85014 HEMATOCRIT: CPT

## 2021-06-03 PROCEDURE — 94760 N-INVAS EAR/PLS OXIMETRY 1: CPT

## 2021-06-03 PROCEDURE — 71045 X-RAY EXAM CHEST 1 VIEW: CPT

## 2021-06-03 PROCEDURE — 83605 ASSAY OF LACTIC ACID: CPT

## 2021-06-03 PROCEDURE — 82728 ASSAY OF FERRITIN: CPT

## 2021-06-03 PROCEDURE — 84145 PROCALCITONIN (PCT): CPT

## 2021-06-03 PROCEDURE — 84100 ASSAY OF PHOSPHORUS: CPT

## 2021-06-03 PROCEDURE — 99261: CPT

## 2021-06-03 PROCEDURE — 85610 PROTHROMBIN TIME: CPT

## 2021-06-03 PROCEDURE — 93005 ELECTROCARDIOGRAM TRACING: CPT

## 2021-06-03 PROCEDURE — U0003: CPT

## 2021-06-03 PROCEDURE — 97110 THERAPEUTIC EXERCISES: CPT

## 2021-06-03 PROCEDURE — 82435 ASSAY OF BLOOD CHLORIDE: CPT

## 2021-06-03 PROCEDURE — 99291 CRITICAL CARE FIRST HOUR: CPT | Mod: 25

## 2021-06-03 PROCEDURE — 84466 ASSAY OF TRANSFERRIN: CPT

## 2021-06-03 PROCEDURE — 84484 ASSAY OF TROPONIN QUANT: CPT

## 2021-06-03 PROCEDURE — 85027 COMPLETE CBC AUTOMATED: CPT

## 2021-06-03 PROCEDURE — 82607 VITAMIN B-12: CPT

## 2021-06-03 PROCEDURE — 84295 ASSAY OF SERUM SODIUM: CPT

## 2021-06-03 PROCEDURE — 83615 LACTATE (LD) (LDH) ENZYME: CPT

## 2021-06-03 PROCEDURE — 85025 COMPLETE CBC W/AUTO DIFF WBC: CPT

## 2021-06-03 PROCEDURE — 83735 ASSAY OF MAGNESIUM: CPT

## 2021-06-03 PROCEDURE — 87640 STAPH A DNA AMP PROBE: CPT

## 2021-06-03 PROCEDURE — 85379 FIBRIN DEGRADATION QUANT: CPT

## 2021-06-03 PROCEDURE — 86901 BLOOD TYPING SEROLOGIC RH(D): CPT

## 2021-06-03 PROCEDURE — 87631 RESP VIRUS 3-5 TARGETS: CPT

## 2021-06-03 PROCEDURE — U0005: CPT

## 2021-06-03 PROCEDURE — 36415 COLL VENOUS BLD VENIPUNCTURE: CPT

## 2021-06-03 PROCEDURE — 93971 EXTREMITY STUDY: CPT

## 2021-06-03 PROCEDURE — 97163 PT EVAL HIGH COMPLEX 45 MIN: CPT

## 2021-06-03 PROCEDURE — 82746 ASSAY OF FOLIC ACID SERUM: CPT

## 2021-06-03 PROCEDURE — 94660 CPAP INITIATION&MGMT: CPT

## 2021-06-03 PROCEDURE — 87641 MR-STAPH DNA AMP PROBE: CPT

## 2021-06-03 PROCEDURE — 86850 RBC ANTIBODY SCREEN: CPT

## 2021-06-03 PROCEDURE — 83540 ASSAY OF IRON: CPT

## 2021-06-03 PROCEDURE — 87040 BLOOD CULTURE FOR BACTERIA: CPT

## 2021-06-03 PROCEDURE — 96374 THER/PROPH/DIAG INJ IV PUSH: CPT

## 2021-06-03 PROCEDURE — 86900 BLOOD TYPING SEROLOGIC ABO: CPT

## 2021-06-03 PROCEDURE — 85730 THROMBOPLASTIN TIME PARTIAL: CPT

## 2021-06-03 PROCEDURE — 82962 GLUCOSE BLOOD TEST: CPT

## 2021-06-03 PROCEDURE — 85018 HEMOGLOBIN: CPT

## 2021-06-03 PROCEDURE — 84132 ASSAY OF SERUM POTASSIUM: CPT

## 2021-06-03 PROCEDURE — 97116 GAIT TRAINING THERAPY: CPT

## 2021-06-03 PROCEDURE — 83550 IRON BINDING TEST: CPT

## 2021-06-03 PROCEDURE — 71260 CT THORAX DX C+: CPT

## 2021-06-03 PROCEDURE — 80076 HEPATIC FUNCTION PANEL: CPT

## 2021-06-03 PROCEDURE — 36430 TRANSFUSION BLD/BLD COMPNT: CPT

## 2021-06-03 PROCEDURE — 82330 ASSAY OF CALCIUM: CPT

## 2021-06-03 PROCEDURE — 80048 BASIC METABOLIC PNL TOTAL CA: CPT

## 2021-06-03 PROCEDURE — 82947 ASSAY GLUCOSE BLOOD QUANT: CPT

## 2021-06-03 PROCEDURE — 82272 OCCULT BLD FECES 1-3 TESTS: CPT

## 2021-06-03 PROCEDURE — 82803 BLOOD GASES ANY COMBINATION: CPT

## 2021-08-16 ENCOUNTER — APPOINTMENT (OUTPATIENT)
Dept: PULMONOLOGY | Facility: CLINIC | Age: 71
End: 2021-08-16
Payer: MEDICARE

## 2021-08-16 VITALS — SYSTOLIC BLOOD PRESSURE: 136 MMHG | HEART RATE: 104 BPM | DIASTOLIC BLOOD PRESSURE: 64 MMHG | OXYGEN SATURATION: 96 %

## 2021-08-16 VITALS — HEIGHT: 63 IN | WEIGHT: 138 LBS | BODY MASS INDEX: 24.45 KG/M2

## 2021-08-16 DIAGNOSIS — J18.9 PNEUMONIA, UNSPECIFIED ORGANISM: ICD-10-CM

## 2021-08-16 PROCEDURE — 94727 GAS DIL/WSHOT DETER LNG VOL: CPT

## 2021-08-16 PROCEDURE — 94010 BREATHING CAPACITY TEST: CPT

## 2021-08-16 PROCEDURE — 85018 HEMOGLOBIN: CPT | Mod: QW

## 2021-08-16 PROCEDURE — 94729 DIFFUSING CAPACITY: CPT

## 2021-08-16 PROCEDURE — 99215 OFFICE O/P EST HI 40 MIN: CPT | Mod: 25

## 2021-08-16 RX ORDER — AMLODIPINE BESYLATE 5 MG/1
5 TABLET ORAL
Refills: 0 | Status: ACTIVE | COMMUNITY

## 2021-08-16 RX ORDER — TRAMADOL HYDROCHLORIDE 50 MG/1
50 TABLET, COATED ORAL
Qty: 21 | Refills: 0 | Status: ACTIVE | COMMUNITY
Start: 2020-06-17

## 2021-08-16 NOTE — HISTORY OF PRESENT ILLNESS
[TextBox_4] : 71-year-old male, status post double lung transplant 2015 with course complicated by coronary artery disease status post drug-eluting stent, renal failure, on hemodialysis, seen today in followup. 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 At that time. He presented with worsening shortness of breath and found to have a CAT scan to assist with bilateral consolidations as well as pleural effusions. He was intubated underwent bronchoscopy, which was unremarkable. He was subsequently discharged with the preliminary diagnosis of volume overload.\par \par Patient currently complains of generalized weakness with postdialysis headache, which has been chronic, as well as easy fatigability. He denies any complaints of cough, wheeze, shortness of breath. He is maintained on his antirejection drugs.

## 2021-08-16 NOTE — DISCUSSION/SUMMARY
[FreeTextEntry1] : 71-year-old male, seen today for the above. Patient has had a reduction in lung function was likely on the basis of chronic pleural effusions versus increased abdominal girth secondary to volume overload. His recent hospitalization. He is to be on the basis of volume overload.

## 2021-08-16 NOTE — PROCEDURE
[FreeTextEntry1] : Pulmonary function test performed today demonstrates a 25% reduction in spirometry without airway obstruction. There is a mild decrease in lung volumes with a normal diffusion capacity

## 2021-08-16 NOTE — CONSULT LETTER
[Dear  ___] : Dear  [unfilled], [Consult Letter:] : I had the pleasure of evaluating your patient, [unfilled]. [Please see my note below.] : Please see my note below. [Sincerely,] : Sincerely, [DrNelli  ___] : Dr. BETTS [FreeTextEntry3] : J Carlos Cordova MD FCCP\par Pulmonary/Critical Care/Sleep Medicine\par Department of Internal Medicine\par \par [employer]\par

## 2021-08-16 NOTE — END OF VISIT
[>50% of the face to face encounter time was spent on counseling and/or coordination of care for ___] : Greater than 50% of the face to face encounter time was spent on counseling and/or coordination of care for [unfilled] [FreeTextEntry3] : hosp reviewed with pacs [Time Spent: ___ minutes] : I have spent [unfilled] minutes of time on the encounter.

## 2021-08-20 ENCOUNTER — APPOINTMENT (OUTPATIENT)
Dept: CT IMAGING | Facility: CLINIC | Age: 71
End: 2021-08-20
Payer: MEDICARE

## 2021-08-20 ENCOUNTER — OUTPATIENT (OUTPATIENT)
Dept: OUTPATIENT SERVICES | Facility: HOSPITAL | Age: 71
LOS: 1 days | End: 2021-08-20
Payer: MEDICARE

## 2021-08-20 DIAGNOSIS — Z95.5 PRESENCE OF CORONARY ANGIOPLASTY IMPLANT AND GRAFT: Chronic | ICD-10-CM

## 2021-08-20 DIAGNOSIS — Z94.2 LUNG TRANSPLANT STATUS: Chronic | ICD-10-CM

## 2021-08-20 DIAGNOSIS — J18.9 PNEUMONIA, UNSPECIFIED ORGANISM: ICD-10-CM

## 2021-08-20 PROCEDURE — 71250 CT THORAX DX C-: CPT

## 2021-08-20 PROCEDURE — 71250 CT THORAX DX C-: CPT | Mod: 26,MH

## 2021-10-14 NOTE — PHYSICAL THERAPY INITIAL EVALUATION ADULT - LEVEL OF INDEPENDENCE: SUPINE/SIT, REHAB EVAL
pt OOB to chair already
PAST MEDICAL HISTORY:  Asthma     Bipolar Disorder     Borderline Personality Disorder     Cholelithiasis     Depression     Fibroids     GERD (Gastroesophageal Reflux Disease)     Obesity

## 2021-10-22 ENCOUNTER — APPOINTMENT (OUTPATIENT)
Dept: VASCULAR SURGERY | Facility: CLINIC | Age: 71
End: 2021-10-22
Payer: MEDICARE

## 2021-10-22 VITALS
WEIGHT: 143 LBS | BODY MASS INDEX: 25.66 KG/M2 | RESPIRATION RATE: 16 BRPM | DIASTOLIC BLOOD PRESSURE: 81 MMHG | HEART RATE: 120 BPM | TEMPERATURE: 97.2 F | OXYGEN SATURATION: 96 % | SYSTOLIC BLOOD PRESSURE: 150 MMHG | HEIGHT: 62.5 IN

## 2021-10-22 VITALS — SYSTOLIC BLOOD PRESSURE: 167 MMHG | DIASTOLIC BLOOD PRESSURE: 69 MMHG

## 2021-10-22 PROCEDURE — 93986 DUP-SCAN HEMO COMPL UNI STD: CPT

## 2021-10-22 PROCEDURE — 99213 OFFICE O/P EST LOW 20 MIN: CPT

## 2021-10-22 PROCEDURE — 99203 OFFICE O/P NEW LOW 30 MIN: CPT

## 2021-10-29 NOTE — HISTORY OF PRESENT ILLNESS
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 Pt is right hand dominant. Denies complaints of chest pain, SOB, ZAPATA, claudication, leg wounds or leg edema at this time.  Patient has been undergoing HD for 1.5 years with catheter, due to pandemic, he was unable to obtain surgery for AVF/ AVG creation.\par

## 2021-10-29 NOTE — PHYSICAL EXAM
[Normal Rate and Rhythm] : normal rate and rhythm [2+] : left 2+ [Ankle Swelling (On Exam)] : present [Ankle Swelling Bilaterally] : bilaterally  [Ankle Swelling On The Right] : mild [Varicose Veins Of Lower Extremities] : not present [] : not present [Abdomen Tenderness] : ~T ~M No abdominal tenderness [No Rash or Lesion] : No rash or lesion [Alert] : alert [Oriented to Person] : oriented to person [Oriented to Place] : oriented to place [Oriented to Time] : oriented to time [Calm] : calm [de-identified] : NAD [de-identified] : supple, no masses [de-identified] : unlabored breathing [de-identified] : FROM of all 4 extremities\par

## 2021-10-29 NOTE — ASSESSMENT
[FreeTextEntry1] : 70 yo M with history of lung transplant on immunosuppressive medications, HTN, lumbar spondylosis, hypercholesterolemia, COPD, BPH, ESRD on HD via tunneled dialysis catheter presenting for evaluation and creation of long term HD access.\par \par Vein mapping performed and demonstrates suitable caliber cephalic and basilic vein from LUE forearm to shoulder

## 2021-10-31 ENCOUNTER — APPOINTMENT (OUTPATIENT)
Dept: DISASTER EMERGENCY | Facility: CLINIC | Age: 71
End: 2021-10-31

## 2021-10-31 LAB — SARS-COV-2 N GENE NPH QL NAA+PROBE: NOT DETECTED

## 2021-11-02 ENCOUNTER — NON-APPOINTMENT (OUTPATIENT)
Age: 71
End: 2021-11-02

## 2021-11-03 ENCOUNTER — NON-APPOINTMENT (OUTPATIENT)
Age: 71
End: 2021-11-03

## 2021-11-03 ENCOUNTER — OUTPATIENT (OUTPATIENT)
Dept: OUTPATIENT SERVICES | Facility: HOSPITAL | Age: 71
LOS: 1 days | End: 2021-11-03
Payer: MEDICARE

## 2021-11-03 ENCOUNTER — APPOINTMENT (OUTPATIENT)
Dept: PULMONOLOGY | Facility: CLINIC | Age: 71
End: 2021-11-03
Payer: MEDICARE

## 2021-11-03 VITALS
OXYGEN SATURATION: 97 % | WEIGHT: 143 LBS | HEART RATE: 114 BPM | DIASTOLIC BLOOD PRESSURE: 90 MMHG | TEMPERATURE: 97.9 F | HEIGHT: 62.5 IN | BODY MASS INDEX: 25.66 KG/M2 | SYSTOLIC BLOOD PRESSURE: 137 MMHG

## 2021-11-03 VITALS
HEART RATE: 92 BPM | RESPIRATION RATE: 20 BRPM | SYSTOLIC BLOOD PRESSURE: 126 MMHG | OXYGEN SATURATION: 94 % | TEMPERATURE: 99 F | HEIGHT: 63 IN | DIASTOLIC BLOOD PRESSURE: 86 MMHG | WEIGHT: 148.15 LBS

## 2021-11-03 DIAGNOSIS — Z94.2 LUNG TRANSPLANT STATUS: Chronic | ICD-10-CM

## 2021-11-03 DIAGNOSIS — Z96.649 PRESENCE OF UNSPECIFIED ARTIFICIAL HIP JOINT: Chronic | ICD-10-CM

## 2021-11-03 DIAGNOSIS — Z29.9 ENCOUNTER FOR PROPHYLACTIC MEASURES, UNSPECIFIED: ICD-10-CM

## 2021-11-03 DIAGNOSIS — Z01.818 ENCOUNTER FOR OTHER PREPROCEDURAL EXAMINATION: ICD-10-CM

## 2021-11-03 DIAGNOSIS — I10 ESSENTIAL (PRIMARY) HYPERTENSION: ICD-10-CM

## 2021-11-03 DIAGNOSIS — Z95.5 PRESENCE OF CORONARY ANGIOPLASTY IMPLANT AND GRAFT: Chronic | ICD-10-CM

## 2021-11-03 DIAGNOSIS — N18.6 END STAGE RENAL DISEASE: ICD-10-CM

## 2021-11-03 DIAGNOSIS — Z98.890 OTHER SPECIFIED POSTPROCEDURAL STATES: Chronic | ICD-10-CM

## 2021-11-03 DIAGNOSIS — Z94.2 LUNG TRANSPLANT STATUS: ICD-10-CM

## 2021-11-03 DIAGNOSIS — Z95.5 PRESENCE OF CORONARY ANGIOPLASTY IMPLANT AND GRAFT: ICD-10-CM

## 2021-11-03 LAB
A1C WITH ESTIMATED AVERAGE GLUCOSE RESULT: 4.6 % — SIGNIFICANT CHANGE UP (ref 4–5.6)
ALBUMIN SERPL ELPH-MCNC: 3.9 G/DL — SIGNIFICANT CHANGE UP (ref 3.3–5.2)
ALP SERPL-CCNC: 164 U/L — HIGH (ref 40–120)
ALT FLD-CCNC: 14 U/L — SIGNIFICANT CHANGE UP
ANION GAP SERPL CALC-SCNC: 14 MMOL/L — SIGNIFICANT CHANGE UP (ref 5–17)
ANISOCYTOSIS BLD QL: SLIGHT — SIGNIFICANT CHANGE UP
APTT BLD: 33.1 SEC — SIGNIFICANT CHANGE UP (ref 27.5–35.5)
AST SERPL-CCNC: 29 U/L — SIGNIFICANT CHANGE UP
BASOPHILS # BLD AUTO: 0 K/UL — SIGNIFICANT CHANGE UP (ref 0–0.2)
BASOPHILS NFR BLD AUTO: 0 % — SIGNIFICANT CHANGE UP (ref 0–2)
BILIRUB SERPL-MCNC: 0.3 MG/DL — LOW (ref 0.4–2)
BUN SERPL-MCNC: 42.9 MG/DL — HIGH (ref 8–20)
CALCIUM SERPL-MCNC: 8.5 MG/DL — LOW (ref 8.6–10.2)
CHLORIDE SERPL-SCNC: 98 MMOL/L — SIGNIFICANT CHANGE UP (ref 98–107)
CO2 SERPL-SCNC: 28 MMOL/L — SIGNIFICANT CHANGE UP (ref 22–29)
CREAT SERPL-MCNC: 5.89 MG/DL — HIGH (ref 0.5–1.3)
EOSINOPHIL # BLD AUTO: 0 K/UL — SIGNIFICANT CHANGE UP (ref 0–0.5)
EOSINOPHIL NFR BLD AUTO: 0 % — SIGNIFICANT CHANGE UP (ref 0–6)
ESTIMATED AVERAGE GLUCOSE: 85 MG/DL — SIGNIFICANT CHANGE UP (ref 68–114)
GIANT PLATELETS BLD QL SMEAR: PRESENT — SIGNIFICANT CHANGE UP
GLUCOSE SERPL-MCNC: 122 MG/DL — HIGH (ref 70–99)
HCT VFR BLD CALC: 33.8 % — LOW (ref 39–50)
HGB BLD-MCNC: 9.7 G/DL — LOW (ref 13–17)
INR BLD: 1.12 RATIO — SIGNIFICANT CHANGE UP (ref 0.88–1.16)
LYMPHOCYTES # BLD AUTO: 0.37 K/UL — LOW (ref 1–3.3)
LYMPHOCYTES # BLD AUTO: 2.6 % — LOW (ref 13–44)
MACROCYTES BLD QL: SLIGHT — SIGNIFICANT CHANGE UP
MANUAL SMEAR VERIFICATION: SIGNIFICANT CHANGE UP
MCHC RBC-ENTMCNC: 27.6 PG — SIGNIFICANT CHANGE UP (ref 27–34)
MCHC RBC-ENTMCNC: 28.7 GM/DL — LOW (ref 32–36)
MCV RBC AUTO: 96 FL — SIGNIFICANT CHANGE UP (ref 80–100)
MONOCYTES # BLD AUTO: 1.15 K/UL — HIGH (ref 0–0.9)
MONOCYTES NFR BLD AUTO: 8 % — SIGNIFICANT CHANGE UP (ref 2–14)
NEUTROPHILS # BLD AUTO: 12.68 K/UL — HIGH (ref 1.8–7.4)
NEUTROPHILS NFR BLD AUTO: 88.5 % — HIGH (ref 43–77)
OVALOCYTES BLD QL SMEAR: SLIGHT — SIGNIFICANT CHANGE UP
PLAT MORPH BLD: NORMAL — SIGNIFICANT CHANGE UP
PLATELET # BLD AUTO: 209 K/UL — SIGNIFICANT CHANGE UP (ref 150–400)
POIKILOCYTOSIS BLD QL AUTO: SLIGHT — SIGNIFICANT CHANGE UP
POTASSIUM SERPL-MCNC: 4.5 MMOL/L — SIGNIFICANT CHANGE UP (ref 3.5–5.3)
POTASSIUM SERPL-SCNC: 4.5 MMOL/L — SIGNIFICANT CHANGE UP (ref 3.5–5.3)
PROMYELOCYTES # FLD: 0.9 % — HIGH (ref 0–0)
PROT SERPL-MCNC: 7 G/DL — SIGNIFICANT CHANGE UP (ref 6.6–8.7)
PROTHROM AB SERPL-ACNC: 12.9 SEC — SIGNIFICANT CHANGE UP (ref 10.6–13.6)
RBC # BLD: 3.52 M/UL — LOW (ref 4.2–5.8)
RBC # FLD: 19.6 % — HIGH (ref 10.3–14.5)
RBC BLD AUTO: ABNORMAL
SMUDGE CELLS # BLD: PRESENT — SIGNIFICANT CHANGE UP
SODIUM SERPL-SCNC: 140 MMOL/L — SIGNIFICANT CHANGE UP (ref 135–145)
WBC # BLD: 14.33 K/UL — HIGH (ref 3.8–10.5)
WBC # FLD AUTO: 14.33 K/UL — HIGH (ref 3.8–10.5)

## 2021-11-03 PROCEDURE — 99214 OFFICE O/P EST MOD 30 MIN: CPT | Mod: 25

## 2021-11-03 PROCEDURE — 93010 ELECTROCARDIOGRAM REPORT: CPT

## 2021-11-03 PROCEDURE — 93005 ELECTROCARDIOGRAM TRACING: CPT

## 2021-11-03 PROCEDURE — G0463: CPT

## 2021-11-03 RX ORDER — LANOLIN ALCOHOL/MO/W.PET/CERES
2 CREAM (GRAM) TOPICAL
Qty: 0 | Refills: 0 | DISCHARGE

## 2021-11-03 RX ORDER — OXYCODONE HYDROCHLORIDE 5 MG/1
0.5 TABLET ORAL
Qty: 0 | Refills: 0 | DISCHARGE

## 2021-11-03 RX ORDER — FERROUS GLUCONATE 100 %
0 POWDER (GRAM) MISCELLANEOUS
Qty: 0 | Refills: 0 | DISCHARGE

## 2021-11-03 RX ORDER — ROSUVASTATIN CALCIUM 5 MG/1
1 TABLET ORAL
Qty: 0 | Refills: 0 | DISCHARGE

## 2021-11-03 RX ORDER — CLOPIDOGREL 75 MG/1
75 TABLET, FILM COATED ORAL
Refills: 0 | Status: DISCONTINUED | COMMUNITY
End: 2021-11-03

## 2021-11-03 RX ORDER — ACETAMINOPHEN 500 MG
2 TABLET ORAL
Qty: 0 | Refills: 0 | DISCHARGE

## 2021-11-03 NOTE — H&P PST ADULT - PROBLEM SELECTOR PLAN 1
Creation of left upper extremity arteriovenous fistula ,possible graft on 11/10 with Reinaldo Preston (nephrologist )[phone]  Located within [facility] [phone] Creation of left upper extremity arteriovenous fistula ,possible graft on 11/10 with Reinaldo Preston (nephrologist )[phone]  [facility] dialysis center [phone]    per Reinaldo York office okay to continue ASA

## 2021-11-03 NOTE — H&P PST ADULT - HISTORY OF COVID-19 VACCINATION
Good news!    Patient wanted to let you know that Pulmonology let him decrease his oxygen use to nighttime. He is now going to have O2 bottles that he can have with him to use if he needs to, or if his sats drop below 89. He was released to be able to go back to work under these conditions.    He stated that his GI upset has greatly improved since the taper of duloxetine. I updated his allergy list to reflect his intolerance.     He stated that he is doing well so far on the Lexapro with no side effects currently.         
Yes

## 2021-11-03 NOTE — ASU PATIENT PROFILE, ADULT - ABILITY TO HEAR (WITH HEARING AID OR HEARING APPLIANCE IF NORMALLY USED):
has hearing aids but does not use them/Mildly to Moderately Impaired: difficulty hearing in some environments or speaker may need to increase volume or speak distinctly

## 2021-11-03 NOTE — HISTORY OF PRESENT ILLNESS
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  Patient seen today for preop evaluation for proposed AV fistula. [TextBox_11] : 1 [YearQuit] : 2006

## 2021-11-03 NOTE — H&P PST ADULT - NSICDXPASTMEDICALHX_GEN_ALL_CORE_FT
PAST MEDICAL HISTORY:  Emphysema of lung     ESRD (end stage renal disease) on dialysis      PAST MEDICAL HISTORY:  2019 novel coronavirus disease (COVID-19)     BPH without urinary obstruction     Emphysema of lung     ESRD (end stage renal disease) on dialysis     Fungal meningitis     H/O peripheral neuropathy     History of COPD     [place] (hard of hearing)     HTN (hypertension)     Hypercholesterolemia     Lumbar spondylosis     Oliguria and anuria     Pneumonia

## 2021-11-03 NOTE — H&P PST ADULT - NSANTHOSAYNRD_GEN_A_CORE
No. MONTSERRAT screening performed.  STOP BANG Legend: 0-2 = LOW Risk; 3-4 = INTERMEDIATE Risk; 5-8 = HIGH Risk

## 2021-11-03 NOTE — REASON FOR VISIT
[Follow-Up] : a follow-up visit [TextBox_44] : double lung transplant. Pt has upcoming Fistula procedure upcoming being performed by Dr. York. Pt states that he sometimes feels as if he breathing is labored. NO other pulmonary complaints.

## 2021-11-03 NOTE — H&P PST ADULT - NSICDXPASTSURGICALHX_GEN_ALL_CORE_FT
PAST SURGICAL HISTORY:  H/O heart artery stent     H/O lung transplant bilateral - transplant - 1-2-2015 -  [facility]     PAST SURGICAL HISTORY:  H/O heart artery stent     H/O lung transplant bilateral - transplant - 1-2-2015 -  [facility]    History of hip replacement     Status post open reduction and internal fixation (ORIF) of fracture left femur

## 2021-11-03 NOTE — DISCUSSION/SUMMARY
[FreeTextEntry1] : 71-year-old male with a history of double lung transplant, prior COVID-19, coronary artery disease, drug-induced renal failure seen today for preop evaluation for proposed AV fistula.  Presently patient's lung function has decreased most likely on the basis of physical changes with weight gain as well as height loss.  Possible pleural effusions may have occurred secondary to volume overload from renal failure.  No absolute pulmonary contraindication to his proposed procedure.  Patient will be followed up in 2 weeks time for full pulmonary function tests to reevaluate his transplants.

## 2021-11-03 NOTE — H&P PST ADULT - ASSESSMENT
70 yo M with history of lung transplant on immunosuppressive medications, HTN, lumbar spondylosis, hypercholesterolemia, COPD, BPH, ESRD on HD via tunneled dialysis catheter now is schedule for Creation of left upper extremity arteriovenous fistula ,possible graft on 11/10 with Reinaldo York   Patient educated on written and verbal preop instructions.    Patient verbalized understanding after "teach back" method of instruction were given   Medications reviewed, instructions given on what medications to take and what not to take.  Pt instructed to stop Herbals or anti-inflammatory meds one week prior to surgery and discuss with PMD.  patient nephrologist (Mohan  )[phone] [facility] dialysis center [phone] CAPRINI SCORE [CLOT]    AGE RELATED RISK FACTORS                                                       MOBILITY RELATED FACTORS  [ ] Age 41-60 years                                            (1 Point)                  [ ] Bed rest                                                        (1 Point)  x ] Age: 61-74 years                                           (2 Points)                 [ ] Plaster cast                                                   (2 Points)  [ ] Age= 75 years                                              (3 Points)                 [ ] Bed bound for more than 72 hours                 (2 Points)    DISEASE RELATED RISK FACTORS                                               GENDER SPECIFIC FACTORS  [ x] Edema in the lower extremities                       (1 Point)                  [ ] Pregnancy                                                     (1 Point)  [ x] Varicose veins                                               (1 Point)                  [ ] Post-partum < 6 weeks                                   (1 Point)             x[ ] BMI > 25 Kg/m2                                            (1 Point)                  [ ] Hormonal therapy  or oral contraception          (1 Point)                 [ ] Sepsis (in the previous month)                        (1 Point)                  [ ] History of pregnancy complications                 (1 point)  [x ] Pneumonia or serious lung disease                                               [ ] Unexplained or recurrent                     (1 Point)           (in the previous month)                               (1 Point)  [ ] Abnormal pulmonary function test                     (1 Point)                 SURGERY RELATED RISK FACTORS  [ ] Acute myocardial infarction                              (1 Point)                 [ ]  Section                                             (1 Point)  [ ] Congestive heart failure (in the previous month)  (1 Point)               [ ] Minor surgery                                                  (1 Point)   [ ] Inflammatory bowel disease                             (1 Point)                 [ ] Arthroscopic surgery                                        (2 Points)  [ x Central venous access                                      (2 Points)                [ x] General surgery lasting more than 45 minutes   (2 Points)       [ ] Stroke (in the previous month)                          (5 Points)               [ ] Elective arthroplasty                                         (5 Points)                                                                                                                                               HEMATOLOGY RELATED FACTORS                                                 TRAUMA RELATED RISK FACTORS  [ ] Prior episodes of VTE                                     (3 Points)                [ ] Fracture of the hip, pelvis, or leg                       (5 Points)  [ ] Positive family history for VTE                         (3 Points)                 [ ] Acute spinal cord injury (in the previous month)  (5 Points)  [ ] Prothrombin 42388 A                                     (3 Points)                 [ ] Paralysis  (less than 1 month)                             (5 Points)  [ ] Factor V Leiden                                             (3 Points)                  [ ] Multiple Trauma within 1 month                        (5 Points)  [ ] Lupus anticoagulants                                     (3 Points)                                                           [ ] Anticardiolipin antibodies                               (3 Points)                                                       [ ] High homocysteine in the blood                      (3 Points)                                             [ ] Other congenital or acquired thrombophilia      (3 Points)                                                [ ] Heparin induced thrombocytopenia                  (3 Points)                                          Total Score [   10       ]    Caprini Score 0 - 2:  Low Risk, No VTE Prophylaxis required for most patients, encourage ambulation  Caprini Score 3 - 6:  At Risk, pharmacologic VTE prophylaxis is indicated for most patients (in the absence of a contraindication)  Caprini Score Greater than or = 7:  High Risk, pharmacologic VTE prophylaxis is indicated for most patients (in the absence of a contraindication)    72 yo M with history of lung transplant on immunosuppressive medications, HTN, lumbar spondylosis, hypercholesterolemia, COPD, BPH, ESRD on HD via tunneled dialysis catheter now is schedule for Creation of left upper extremity arteriovenous fistula ,possible graft on 11/10 with Reinaldo York   Patient educated on written and verbal preop instructions.    Patient verbalized understanding after "teach back" method of instruction were given   Medications reviewed, instructions given on what medications to take and what not to take.  Pt instructed to stop Herbals or anti-inflammatory meds one week prior to surgery and discuss with PMD.  patient nephrologist (Mohan  )[phone] [facility] center [phone]

## 2021-11-03 NOTE — H&P PST ADULT - PROBLEM SELECTOR PLAN 4
cardiac clearance with Dr. uLgo [phone]  Asked the patient to consult with PCP/cardiologist about holding ASA/Plavix and the pt  agreed.  Asked the patient to take the Blood pressure medication/ heart medication on DOP.

## 2021-11-03 NOTE — H&P PST ADULT - LAB RESULTS AND INTERPRETATION
BUN 42.9  cREAT 5.89  ALK PHOS 164   egfr 9  wbc 14.33  NEUT 12 .68  9.7 33.8   faxed to DR. Baptiste and Dr. Raghav vizcarra . Pt on dialysis .  K DAmico NP 11/5/21   7:30 am

## 2021-11-03 NOTE — H&P PST ADULT - OTHER CARE PROVIDERS
pulmonary clearance with DR. Cordova [phone] cardiac- Dr. Lugo [phone], nephrologist (Mohan  )[phone] [facility] [phone]

## 2021-11-03 NOTE — PROCEDURE
[FreeTextEntry1] : Spirometry performed today demonstrated a forced vital capacity of 1.65 L and an FEV1 of 1.19 L.  This demonstrated a 15% reduction from previous values on 8/16/2021 with a forced vital capacity of 1.99 L and FEV1 of 1.50 L

## 2021-11-03 NOTE — H&P PST ADULT - HISTORY OF PRESENT ILLNESS
72 yo M with history of lung transplant on immunosuppressive medications, HTN, lumbar spondylosis, hypercholesterolemia, COPD, BPH, ESRD on HD  presenting for PSt.  Patient report due to ESRD he needs  long term HD access.  Patient has been undergoing HD for 1.5 years with  tunneled dialysis catheter. HE reports due to pandemic, he was unable to obtain surgery for AVF/ AVG creation. Pt is right hand dominant. Denies complaints of chest pain, SOB, ZAPATA, claudication, leg wounds or leg edema at this time. Patient now is schedule for Creation of left upper extremity arteriovenous fistula ,possible graft on 11/10 with Reinaldo York

## 2021-11-05 RX ORDER — CEFAZOLIN SODIUM 1 G
2000 VIAL (EA) INJECTION ONCE
Refills: 0 | Status: DISCONTINUED | OUTPATIENT
Start: 2021-11-10 | End: 2021-11-24

## 2021-11-07 ENCOUNTER — APPOINTMENT (OUTPATIENT)
Dept: DISASTER EMERGENCY | Facility: CLINIC | Age: 71
End: 2021-11-07

## 2021-11-07 LAB — SARS-COV-2 N GENE NPH QL NAA+PROBE: NOT DETECTED

## 2021-11-09 ENCOUNTER — TRANSCRIPTION ENCOUNTER (OUTPATIENT)
Age: 71
End: 2021-11-09

## 2021-11-09 ENCOUNTER — NON-APPOINTMENT (OUTPATIENT)
Age: 71
End: 2021-11-09

## 2021-11-10 ENCOUNTER — OUTPATIENT (OUTPATIENT)
Dept: OUTPATIENT SERVICES | Facility: HOSPITAL | Age: 71
LOS: 1 days | End: 2021-11-10
Payer: MEDICARE

## 2021-11-10 ENCOUNTER — APPOINTMENT (OUTPATIENT)
Dept: VASCULAR SURGERY | Facility: HOSPITAL | Age: 71
End: 2021-11-10

## 2021-11-10 VITALS
TEMPERATURE: 98 F | RESPIRATION RATE: 16 BRPM | HEART RATE: 100 BPM | OXYGEN SATURATION: 99 % | SYSTOLIC BLOOD PRESSURE: 160 MMHG | WEIGHT: 148.15 LBS | HEIGHT: 63 IN | DIASTOLIC BLOOD PRESSURE: 91 MMHG

## 2021-11-10 VITALS
SYSTOLIC BLOOD PRESSURE: 101 MMHG | RESPIRATION RATE: 15 BRPM | DIASTOLIC BLOOD PRESSURE: 58 MMHG | OXYGEN SATURATION: 98 % | TEMPERATURE: 97 F | HEART RATE: 75 BPM

## 2021-11-10 DIAGNOSIS — N18.6 END STAGE RENAL DISEASE: ICD-10-CM

## 2021-11-10 DIAGNOSIS — Z95.5 PRESENCE OF CORONARY ANGIOPLASTY IMPLANT AND GRAFT: Chronic | ICD-10-CM

## 2021-11-10 DIAGNOSIS — Z98.890 OTHER SPECIFIED POSTPROCEDURAL STATES: Chronic | ICD-10-CM

## 2021-11-10 DIAGNOSIS — Z94.2 LUNG TRANSPLANT STATUS: Chronic | ICD-10-CM

## 2021-11-10 DIAGNOSIS — Z96.649 PRESENCE OF UNSPECIFIED ARTIFICIAL HIP JOINT: Chronic | ICD-10-CM

## 2021-11-10 LAB
ANION GAP SERPL CALC-SCNC: 16 MMOL/L — SIGNIFICANT CHANGE UP (ref 5–17)
BLD GP AB SCN SERPL QL: SIGNIFICANT CHANGE UP
BUN SERPL-MCNC: 36.5 MG/DL — HIGH (ref 8–20)
CALCIUM SERPL-MCNC: 8.8 MG/DL — SIGNIFICANT CHANGE UP (ref 8.6–10.2)
CHLORIDE SERPL-SCNC: 95 MMOL/L — LOW (ref 98–107)
CO2 SERPL-SCNC: 29 MMOL/L — SIGNIFICANT CHANGE UP (ref 22–29)
CREAT SERPL-MCNC: 5.4 MG/DL — HIGH (ref 0.5–1.3)
GLUCOSE SERPL-MCNC: 85 MG/DL — SIGNIFICANT CHANGE UP (ref 70–99)
POTASSIUM SERPL-MCNC: 3.9 MMOL/L — SIGNIFICANT CHANGE UP (ref 3.5–5.3)
POTASSIUM SERPL-SCNC: 3.9 MMOL/L — SIGNIFICANT CHANGE UP (ref 3.5–5.3)
SODIUM SERPL-SCNC: 140 MMOL/L — SIGNIFICANT CHANGE UP (ref 135–145)

## 2021-11-10 PROCEDURE — 86900 BLOOD TYPING SEROLOGIC ABO: CPT

## 2021-11-10 PROCEDURE — 36818 AV FUSE UPPR ARM CEPHALIC: CPT | Mod: LT

## 2021-11-10 PROCEDURE — C1889: CPT

## 2021-11-10 PROCEDURE — 80048 BASIC METABOLIC PNL TOTAL CA: CPT

## 2021-11-10 PROCEDURE — 36821 AV FUSION DIRECT ANY SITE: CPT | Mod: GC

## 2021-11-10 PROCEDURE — 86901 BLOOD TYPING SEROLOGIC RH(D): CPT

## 2021-11-10 PROCEDURE — 36415 COLL VENOUS BLD VENIPUNCTURE: CPT

## 2021-11-10 PROCEDURE — 86850 RBC ANTIBODY SCREEN: CPT

## 2021-11-10 RX ORDER — HYDROCORTISONE 20 MG
1 TABLET ORAL
Qty: 0 | Refills: 0 | DISCHARGE

## 2021-11-10 RX ORDER — HYDROMORPHONE HYDROCHLORIDE 2 MG/ML
0.5 INJECTION INTRAMUSCULAR; INTRAVENOUS; SUBCUTANEOUS
Refills: 0 | Status: DISCONTINUED | OUTPATIENT
Start: 2021-11-10 | End: 2021-11-10

## 2021-11-10 RX ORDER — ASPIRIN/CALCIUM CARB/MAGNESIUM 324 MG
1 TABLET ORAL
Qty: 0 | Refills: 0 | DISCHARGE

## 2021-11-10 RX ORDER — SODIUM CHLORIDE 9 MG/ML
3 INJECTION INTRAMUSCULAR; INTRAVENOUS; SUBCUTANEOUS ONCE
Refills: 0 | Status: DISCONTINUED | OUTPATIENT
Start: 2021-11-10 | End: 2021-11-10

## 2021-11-10 RX ORDER — LACTOBACILLUS ACIDOPHILUS 100MM CELL
1 CAPSULE ORAL
Qty: 0 | Refills: 0 | DISCHARGE

## 2021-11-10 RX ORDER — SODIUM CHLORIDE 9 MG/ML
1000 INJECTION, SOLUTION INTRAVENOUS
Refills: 0 | Status: DISCONTINUED | OUTPATIENT
Start: 2021-11-10 | End: 2021-11-10

## 2021-11-10 RX ORDER — ACETAMINOPHEN 500 MG
1 TABLET ORAL
Qty: 24 | Refills: 0
Start: 2021-11-10 | End: 2021-11-15

## 2021-11-10 NOTE — ASU DISCHARGE PLAN (ADULT/PEDIATRIC) - CARE PROVIDER_API CALL
ManvarSingh, Pallavi B (MD)  Vascular Surgery  [address], [address] [place] [address]  Phone: [phone]  Fax: [phone]  Follow Up Time: 2 weeks

## 2021-11-11 PROBLEM — H91.90 UNSPECIFIED HEARING LOSS, UNSPECIFIED EAR: Chronic | Status: ACTIVE | Noted: 2021-11-03

## 2021-11-11 PROBLEM — R34 ANURIA AND OLIGURIA: Chronic | Status: ACTIVE | Noted: 2021-11-03

## 2021-11-11 PROBLEM — M47.816 SPONDYLOSIS WITHOUT MYELOPATHY OR RADICULOPATHY, LUMBAR REGION: Chronic | Status: ACTIVE | Noted: 2021-11-03

## 2021-11-11 PROBLEM — E78.00 PURE HYPERCHOLESTEROLEMIA, UNSPECIFIED: Chronic | Status: ACTIVE | Noted: 2021-11-03

## 2021-11-11 PROBLEM — N40.0 BENIGN PROSTATIC HYPERPLASIA WITHOUT LOWER URINARY TRACT SYMPTOMS: Chronic | Status: ACTIVE | Noted: 2021-11-03

## 2021-11-11 PROBLEM — I10 ESSENTIAL (PRIMARY) HYPERTENSION: Chronic | Status: ACTIVE | Noted: 2021-11-03

## 2021-11-11 PROBLEM — Z86.69 PERSONAL HISTORY OF OTHER DISEASES OF THE NERVOUS SYSTEM AND SENSE ORGANS: Chronic | Status: ACTIVE | Noted: 2021-11-03

## 2021-11-11 PROBLEM — Z87.09 PERSONAL HISTORY OF OTHER DISEASES OF THE RESPIRATORY SYSTEM: Chronic | Status: ACTIVE | Noted: 2021-11-03

## 2021-11-12 ENCOUNTER — APPOINTMENT (OUTPATIENT)
Dept: DISASTER EMERGENCY | Facility: CLINIC | Age: 71
End: 2021-11-12

## 2021-11-13 LAB — SARS-COV-2 N GENE NPH QL NAA+PROBE: NOT DETECTED

## 2021-11-15 ENCOUNTER — APPOINTMENT (OUTPATIENT)
Dept: PULMONOLOGY | Facility: CLINIC | Age: 71
End: 2021-11-15
Payer: MEDICARE

## 2021-11-15 VITALS
HEART RATE: 102 BPM | DIASTOLIC BLOOD PRESSURE: 80 MMHG | OXYGEN SATURATION: 95 % | SYSTOLIC BLOOD PRESSURE: 118 MMHG | RESPIRATION RATE: 16 BRPM

## 2021-11-15 PROCEDURE — 94010 BREATHING CAPACITY TEST: CPT

## 2021-11-15 PROCEDURE — 94729 DIFFUSING CAPACITY: CPT

## 2021-11-15 PROCEDURE — 99214 OFFICE O/P EST MOD 30 MIN: CPT | Mod: 25

## 2021-11-15 PROCEDURE — 94727 GAS DIL/WSHOT DETER LNG VOL: CPT

## 2021-11-15 PROCEDURE — 85018 HEMOGLOBIN: CPT | Mod: QW

## 2021-11-15 NOTE — CONSULT LETTER
[Dear  ___] : Dear  [unfilled], [Consult Letter:] : I had the pleasure of evaluating your patient, [unfilled]. [Please see my note below.] : Please see my note below. [Sincerely,] : Sincerely, [DrNelli  ___] : Dr. BETTS [FreeTextEntry3] : J Carlos Cordova MD FCCP\par Pulmonary/Critical Care/Sleep Medicine\par Department of Internal Medicine\par \par [facility] [employer]\par

## 2021-11-15 NOTE — HISTORY OF PRESENT ILLNESS
[TextBox_4] : 71-year-old male, status post double lung transplant 2015 with course complicated by coronary artery disease status post drug-eluting stent, renal failure, on hemodialysis, seen today in followup. His only undergoing dialysis Tuesday, Thursday, and Saturday.\par \par Patient status post left AV fistula.  Has noted improvement in exercise tolerance.  Course was complicated by cryptococcal meningitis with respiratory failure in April of this past year.

## 2021-11-15 NOTE — PROCEDURE
[FreeTextEntry1] : \par \par 11/15/2021: Pulmonary function test demonstrate a nearly 30% decrease in forced Vital capacity and FEV1 when compared to previous studies done 1 year earlier.  Lung volumes also demonstrated 25% reduction with a preserved  diffusion capacity

## 2021-11-15 NOTE — DISCUSSION/SUMMARY
[FreeTextEntry1] : 71-year-old male seen today for the above.  Decreased lung volumes most likely on the basis of chest wall restriction.  No evidence of worsening or new onset rejection on recent CAT scan

## 2021-11-18 ENCOUNTER — APPOINTMENT (OUTPATIENT)
Dept: NEPHROLOGY | Facility: CLINIC | Age: 71
End: 2021-11-18

## 2021-11-20 RX ADMIN — Medication 650 MILLIGRAM(S): at 22:40

## 2021-11-29 ENCOUNTER — APPOINTMENT (OUTPATIENT)
Dept: VASCULAR SURGERY | Facility: CLINIC | Age: 71
End: 2021-11-29
Payer: MEDICARE

## 2021-11-29 VITALS
RESPIRATION RATE: 16 BRPM | SYSTOLIC BLOOD PRESSURE: 158 MMHG | DIASTOLIC BLOOD PRESSURE: 87 MMHG | WEIGHT: 144 LBS | BODY MASS INDEX: 25.84 KG/M2 | HEART RATE: 115 BPM | TEMPERATURE: 97.4 F | OXYGEN SATURATION: 96 % | HEIGHT: 62.5 IN

## 2021-11-29 VITALS — SYSTOLIC BLOOD PRESSURE: 157 MMHG | DIASTOLIC BLOOD PRESSURE: 91 MMHG

## 2021-11-29 PROCEDURE — 99024 POSTOP FOLLOW-UP VISIT: CPT

## 2021-11-29 NOTE — PHYSICAL EXAM
[Normal Rate and Rhythm] : normal rate and rhythm [Right Carotid Bruit] : right carotid bruit heard [2+] : left 2+ [Ankle Swelling (On Exam)] : present [Ankle Swelling Bilaterally] : bilaterally  [Ankle Swelling On The Right] : mild [No Rash or Lesion] : No rash or lesion [Alert] : alert [Oriented to Person] : oriented to person [Oriented to Place] : oriented to place [Oriented to Time] : oriented to time [Calm] : calm [Varicose Veins Of Lower Extremities] : not present [] : not present [Abdomen Tenderness] : ~T ~M No abdominal tenderness [de-identified] : NAD [de-identified] : R IJ permacath in place, dressing [de-identified] : supple, no masses [de-identified] : unlabored breathing [FreeTextEntry1] : LUE AVF, palpable thrill \par incision c/i/d/ with nylon sutures [de-identified] : Not evaluated  [de-identified] : Not evaluated  [de-identified] : FROM of all 4 extremities, sensation intact \par

## 2021-11-29 NOTE — REVIEW OF SYSTEMS
Birth Control Pills Counseling: Birth Control Pill Counseling: I discussed with the patient the potential side effects of OCPs including but not limited to increased risk of stroke, heart attack, thrombophlebitis, deep venous thrombosis, hepatic adenomas, breast changes, GI upset, headaches, and depression.  The patient verbalized understanding of the proper use and possible adverse effects of OCPs. All of the patient's questions and concerns were addressed. [Negative] : Heme/Lymph

## 2021-11-29 NOTE — ASSESSMENT
[FreeTextEntry1] : 70 yo M with history of lung transplant on immunosuppressive medications, HTN, lumbar spondylosis, hypercholesterolemia, COPD, BPH, ESRD on HD via tunneled dialysis catheter presenting for evaluation and creation of long term HD access.Now s/p LUE AVF fistula creation (11/10).

## 2021-11-29 NOTE — HISTORY OF PRESENT ILLNESS
[FreeTextEntry1] : 72 yo M with history of lung transplant on immunosuppressive medications, HTN, lumbar spondylosis, hypercholesterolemia, COPD, BPH, ESRD on HD via tunneled dialysis catheter presenting for evaluation and creation of long term HD access. Pt is right hand dominant. Denies complaints of chest pain, SOB, ZAPATA, claudication, leg wounds or leg edema at this time.  Patient has been undergoing HD for 1.5 years with catheter, due to pandemic, he was unable to obtain surgery for AVF/ AVG creation, recently rescheduled. Now s/p LUE AVF creation (11/10). \par  [de-identified] : 70 yo M with a hx of ESRD (HD via R IJ TDC), now s/p LUE AVF creation. Patient denies any pain, numbness and tingling in left extremity. Today he endorses some SOB with ambulation ( hx of lung transplant), seen by pulmonologist on last week. Of note, patient has a known hx of L carotid artery stenosis (90% ).  He denies any chest pain, fever/chills, dizziness, HA, and/or changes in vision.

## 2021-12-20 ENCOUNTER — APPOINTMENT (OUTPATIENT)
Dept: VASCULAR SURGERY | Facility: CLINIC | Age: 71
End: 2021-12-20

## 2021-12-20 ENCOUNTER — APPOINTMENT (OUTPATIENT)
Dept: VASCULAR SURGERY | Facility: CLINIC | Age: 71
End: 2021-12-20
Payer: MEDICARE

## 2021-12-20 VITALS
BODY MASS INDEX: 25.48 KG/M2 | TEMPERATURE: 97.3 F | SYSTOLIC BLOOD PRESSURE: 148 MMHG | OXYGEN SATURATION: 98 % | HEIGHT: 62.5 IN | DIASTOLIC BLOOD PRESSURE: 78 MMHG | HEART RATE: 99 BPM | RESPIRATION RATE: 16 BRPM | WEIGHT: 142 LBS

## 2021-12-20 PROCEDURE — 93990 DOPPLER FLOW TESTING: CPT

## 2021-12-20 RX ORDER — CLOPIDOGREL BISULFATE 75 MG/1
75 TABLET, FILM COATED ORAL
Qty: 90 | Refills: 3 | Status: ACTIVE | COMMUNITY
Start: 2021-12-20 | End: 1900-01-01

## 2021-12-22 ENCOUNTER — APPOINTMENT (OUTPATIENT)
Dept: FAMILY MEDICINE | Facility: CLINIC | Age: 71
End: 2021-12-22
Payer: MEDICARE

## 2021-12-22 ENCOUNTER — LABORATORY RESULT (OUTPATIENT)
Age: 71
End: 2021-12-22

## 2021-12-22 VITALS
WEIGHT: 142 LBS | HEIGHT: 62 IN | RESPIRATION RATE: 16 BRPM | HEART RATE: 107 BPM | OXYGEN SATURATION: 97 % | DIASTOLIC BLOOD PRESSURE: 80 MMHG | TEMPERATURE: 97.6 F | SYSTOLIC BLOOD PRESSURE: 140 MMHG | BODY MASS INDEX: 26.13 KG/M2

## 2021-12-22 DIAGNOSIS — Z09 ENCOUNTER FOR FOLLOW-UP EXAMINATION AFTER COMPLETED TREATMENT FOR CONDITIONS OTHER THAN MALIGNANT NEOPLASM: ICD-10-CM

## 2021-12-22 DIAGNOSIS — Z00.00 ENCOUNTER FOR GENERAL ADULT MEDICAL EXAMINATION W/OUT ABNORMAL FINDINGS: ICD-10-CM

## 2021-12-22 DIAGNOSIS — Z47.1 AFTERCARE FOLLOWING JOINT REPLACEMENT SURGERY: ICD-10-CM

## 2021-12-22 DIAGNOSIS — M19.90 UNSPECIFIED OSTEOARTHRITIS, UNSPECIFIED SITE: ICD-10-CM

## 2021-12-22 DIAGNOSIS — M47.816 SPONDYLOSIS W/OUT MYELOPATHY OR RADICULOPATHY, LUMBAR REGION: ICD-10-CM

## 2021-12-22 DIAGNOSIS — S32.000A WEDGE COMPRESSION FRACTURE OF UNSPECIFIED LUMBAR VERTEBRA, INITIAL ENCOUNTER FOR CLOSED FRACTURE: ICD-10-CM

## 2021-12-22 DIAGNOSIS — Z96.649 AFTERCARE FOLLOWING JOINT REPLACEMENT SURGERY: ICD-10-CM

## 2021-12-22 DIAGNOSIS — Z01.818 ENCOUNTER FOR OTHER PREPROCEDURAL EXAMINATION: ICD-10-CM

## 2021-12-22 PROCEDURE — 36415 COLL VENOUS BLD VENIPUNCTURE: CPT

## 2021-12-22 PROCEDURE — G0439: CPT

## 2021-12-22 RX ORDER — SULFAMETHOXAZOLE AND TRIMETHOPRIM 800; 160 MG/1; MG/1
TABLET ORAL
Refills: 0 | Status: ACTIVE | COMMUNITY

## 2021-12-22 RX ORDER — MYCOPHENOLATE MOFETIL 250 MG/1
250 CAPSULE ORAL
Refills: 0 | Status: ACTIVE | COMMUNITY

## 2021-12-22 NOTE — HISTORY OF PRESENT ILLNESS
[Spouse] : spouse [FreeTextEntry1] : New patient follow up multiple issues PMH CAD PVD DJD Multi Fx HLD IGT had double lung transplant 2015  followed BY Pulmonary Back fx followed by ortho also started with nephrology for CRF also followed by cardio going to dialysis 3 times a week had treatment yesterday exercise 3 times a week

## 2021-12-22 NOTE — HEALTH RISK ASSESSMENT
[Former] : Former [20 or more] : 20 or more [No] : In the past 12 months have you used drugs other than those required for medical reasons? No [No falls in past year] : Patient reported no falls in the past year [0] : 2) Feeling down, depressed, or hopeless: Not at all (0) [PHQ-2 Negative - No further assessment needed] : PHQ-2 Negative - No further assessment needed [YearQuit] : 2006 [[id number]] : 0 [With Patient/Caregiver] : , with patient/caregiver [AdvancecareDate] : 12/21

## 2021-12-22 NOTE — PLAN
[FreeTextEntry1] : New patient follow up multiple issues PMH CAD PVD DJD Multi Fx HLD IGT had double lung transplant 2015  followed BY Pulmonary Back fx followed by ortho also started with nephrology for CRF also followed by cardio going to dialysis 3 times a week had treatment yesterday exercise 3 times a week \par \par Labs and meds \par old pcp dr Collins \par RTC 3

## 2021-12-22 NOTE — COUNSELING
[Fall prevention counseling provided] : Fall prevention counseling provided [No throw rugs] : No throw rugs

## 2021-12-23 LAB
ALBUMIN SERPL ELPH-MCNC: 4.3 G/DL
ALP BLD-CCNC: 165 U/L
ALT SERPL-CCNC: 16 U/L
ANION GAP SERPL CALC-SCNC: 18 MMOL/L
AST SERPL-CCNC: 35 U/L
BASOPHILS # BLD AUTO: 0.05 K/UL
BASOPHILS NFR BLD AUTO: 0.4 %
BILIRUB SERPL-MCNC: 0.4 MG/DL
BUN SERPL-MCNC: 34 MG/DL
CALCIUM SERPL-MCNC: 9.2 MG/DL
CHLORIDE SERPL-SCNC: 97 MMOL/L
CHOLEST SERPL-MCNC: 146 MG/DL
CO2 SERPL-SCNC: 26 MMOL/L
CREAT SERPL-MCNC: 5.01 MG/DL
EOSINOPHIL # BLD AUTO: 0.04 K/UL
EOSINOPHIL NFR BLD AUTO: 0.3 %
ERYTHROCYTE [SEDIMENTATION RATE] IN BLOOD BY WESTERGREN METHOD: 76 MM/HR
ESTIMATED AVERAGE GLUCOSE: 105 MG/DL
GLUCOSE SERPL-MCNC: 89 MG/DL
HBA1C MFR BLD HPLC: 5.3 %
HCT VFR BLD CALC: 38.7 %
HDLC SERPL-MCNC: 79 MG/DL
HGB BLD-MCNC: 11 G/DL
IMM GRANULOCYTES NFR BLD AUTO: 2.4 %
LDLC SERPL CALC-MCNC: 53 MG/DL
LYMPHOCYTES # BLD AUTO: 0.22 K/UL
LYMPHOCYTES NFR BLD AUTO: 1.6 %
MAN DIFF?: NORMAL
MCHC RBC-ENTMCNC: 27 PG
MCHC RBC-ENTMCNC: 28.4 GM/DL
MCV RBC AUTO: 95.1 FL
MONOCYTES # BLD AUTO: 1.25 K/UL
MONOCYTES NFR BLD AUTO: 8.9 %
NEUTROPHILS # BLD AUTO: 12.15 K/UL
NEUTROPHILS NFR BLD AUTO: 86.4 %
NONHDLC SERPL-MCNC: 67 MG/DL
PLATELET # BLD AUTO: 188 K/UL
POTASSIUM SERPL-SCNC: 4.2 MMOL/L
PROT SERPL-MCNC: 6.6 G/DL
RBC # BLD: 4.07 M/UL
RBC # FLD: 19 %
SODIUM SERPL-SCNC: 141 MMOL/L
TESTOST SERPL-MCNC: 149 NG/DL
TRIGL SERPL-MCNC: 72 MG/DL
TSH SERPL-ACNC: 1.67 UIU/ML
WBC # FLD AUTO: 14.04 K/UL

## 2021-12-28 RX ORDER — LIDOCAINE AND PRILOCAINE 25; 25 MG/G; MG/G
2.5-2.5 CREAM TOPICAL
Qty: 1 | Refills: 11 | Status: ACTIVE | COMMUNITY
Start: 2021-12-28 | End: 1900-01-01

## 2022-01-01 ENCOUNTER — TRANSCRIPTION ENCOUNTER (OUTPATIENT)
Age: 72
End: 2022-01-01

## 2022-01-01 ENCOUNTER — APPOINTMENT (OUTPATIENT)
Dept: PULMONOLOGY | Facility: CLINIC | Age: 72
End: 2022-01-01

## 2022-01-01 ENCOUNTER — NON-APPOINTMENT (OUTPATIENT)
Age: 72
End: 2022-01-01

## 2022-01-01 ENCOUNTER — INPATIENT (INPATIENT)
Facility: HOSPITAL | Age: 72
LOS: 10 days | Discharge: NURSING, REHAB OR LONG-TERM CARE | DRG: 177 | End: 2022-05-07
Attending: STUDENT IN AN ORGANIZED HEALTH CARE EDUCATION/TRAINING PROGRAM | Admitting: FAMILY MEDICINE
Payer: MEDICARE

## 2022-01-01 ENCOUNTER — OUTPATIENT (OUTPATIENT)
Dept: OUTPATIENT SERVICES | Facility: HOSPITAL | Age: 72
LOS: 1 days | End: 2022-01-01
Payer: MEDICARE

## 2022-01-01 ENCOUNTER — APPOINTMENT (OUTPATIENT)
Dept: RADIOLOGY | Facility: CLINIC | Age: 72
End: 2022-01-01
Payer: MEDICARE

## 2022-01-01 ENCOUNTER — APPOINTMENT (OUTPATIENT)
Dept: VASCULAR SURGERY | Facility: CLINIC | Age: 72
End: 2022-01-01

## 2022-01-01 ENCOUNTER — APPOINTMENT (OUTPATIENT)
Dept: VASCULAR SURGERY | Facility: CLINIC | Age: 72
End: 2022-01-01
Payer: MEDICARE

## 2022-01-01 ENCOUNTER — INPATIENT (INPATIENT)
Facility: HOSPITAL | Age: 72
LOS: 0 days | Discharge: HOME | DRG: 34 | End: 2022-03-09
Attending: SURGERY | Admitting: SURGERY
Payer: MEDICARE

## 2022-01-01 ENCOUNTER — INPATIENT (INPATIENT)
Facility: HOSPITAL | Age: 72
LOS: 1 days | Discharge: HOME | DRG: 314 | End: 2022-03-21
Attending: INTERNAL MEDICINE | Admitting: HOSPITALIST
Payer: MEDICARE

## 2022-01-01 ENCOUNTER — INPATIENT (INPATIENT)
Facility: HOSPITAL | Age: 72
LOS: 11 days | Discharge: HOME | DRG: 314 | End: 2022-11-23
Payer: MEDICARE

## 2022-01-01 ENCOUNTER — INPATIENT (INPATIENT)
Facility: HOSPITAL | Age: 72
LOS: 1 days | Discharge: NURSING, REHAB OR LONG-TERM CARE | DRG: 535 | End: 2022-04-22
Attending: STUDENT IN AN ORGANIZED HEALTH CARE EDUCATION/TRAINING PROGRAM | Admitting: STUDENT IN AN ORGANIZED HEALTH CARE EDUCATION/TRAINING PROGRAM
Payer: MEDICARE

## 2022-01-01 ENCOUNTER — RESULT REVIEW (OUTPATIENT)
Age: 72
End: 2022-01-01

## 2022-01-01 ENCOUNTER — APPOINTMENT (OUTPATIENT)
Dept: FAMILY MEDICINE | Facility: CLINIC | Age: 72
End: 2022-01-01
Payer: MEDICARE

## 2022-01-01 ENCOUNTER — INPATIENT (INPATIENT)
Facility: HOSPITAL | Age: 72
LOS: 28 days | DRG: 870 | End: 2023-01-11
Attending: HOSPITALIST | Admitting: HOSPITALIST
Payer: MEDICARE

## 2022-01-01 ENCOUNTER — APPOINTMENT (OUTPATIENT)
Dept: ORTHOPEDIC SURGERY | Facility: CLINIC | Age: 72
End: 2022-01-01
Payer: MEDICARE

## 2022-01-01 ENCOUNTER — APPOINTMENT (OUTPATIENT)
Dept: VASCULAR SURGERY | Facility: HOSPITAL | Age: 72
End: 2022-01-01

## 2022-01-01 ENCOUNTER — EMERGENCY (EMERGENCY)
Facility: HOSPITAL | Age: 72
LOS: 1 days | Discharge: HOME | End: 2022-01-01
Attending: EMERGENCY MEDICINE
Payer: MEDICARE

## 2022-01-01 ENCOUNTER — APPOINTMENT (OUTPATIENT)
Dept: THORACIC SURGERY | Facility: HOSPITAL | Age: 72
End: 2022-01-01

## 2022-01-01 VITALS
RESPIRATION RATE: 16 BRPM | SYSTOLIC BLOOD PRESSURE: 122 MMHG | TEMPERATURE: 98 F | HEART RATE: 95 BPM | DIASTOLIC BLOOD PRESSURE: 70 MMHG | HEIGHT: 62 IN | BODY MASS INDEX: 24.66 KG/M2 | WEIGHT: 134 LBS | OXYGEN SATURATION: 97 %

## 2022-01-01 VITALS
DIASTOLIC BLOOD PRESSURE: 156 MMHG | TEMPERATURE: 98 F | SYSTOLIC BLOOD PRESSURE: 180 MMHG | OXYGEN SATURATION: 98 % | HEIGHT: 66 IN | HEART RATE: 90 BPM | WEIGHT: 134.48 LBS | RESPIRATION RATE: 15 BRPM

## 2022-01-01 VITALS
BODY MASS INDEX: 25.21 KG/M2 | SYSTOLIC BLOOD PRESSURE: 147 MMHG | OXYGEN SATURATION: 97 % | HEIGHT: 62 IN | HEART RATE: 116 BPM | DIASTOLIC BLOOD PRESSURE: 78 MMHG | TEMPERATURE: 97.3 F | WEIGHT: 137 LBS | RESPIRATION RATE: 16 BRPM

## 2022-01-01 VITALS
SYSTOLIC BLOOD PRESSURE: 140 MMHG | TEMPERATURE: 98 F | DIASTOLIC BLOOD PRESSURE: 60 MMHG | HEIGHT: 64.5 IN | RESPIRATION RATE: 20 BRPM | WEIGHT: 136.03 LBS | OXYGEN SATURATION: 96 % | HEART RATE: 97 BPM

## 2022-01-01 VITALS
OXYGEN SATURATION: 93 % | DIASTOLIC BLOOD PRESSURE: 73 MMHG | SYSTOLIC BLOOD PRESSURE: 149 MMHG | TEMPERATURE: 98 F | RESPIRATION RATE: 15 BRPM | HEART RATE: 82 BPM

## 2022-01-01 VITALS
TEMPERATURE: 98 F | DIASTOLIC BLOOD PRESSURE: 66 MMHG | HEART RATE: 94 BPM | OXYGEN SATURATION: 100 % | SYSTOLIC BLOOD PRESSURE: 113 MMHG | RESPIRATION RATE: 19 BRPM

## 2022-01-01 VITALS
HEIGHT: 66 IN | HEART RATE: 82 BPM | SYSTOLIC BLOOD PRESSURE: 152 MMHG | DIASTOLIC BLOOD PRESSURE: 76 MMHG | RESPIRATION RATE: 16 BRPM | TEMPERATURE: 98 F | OXYGEN SATURATION: 100 %

## 2022-01-01 VITALS
RESPIRATION RATE: 16 BRPM | OXYGEN SATURATION: 99 % | HEART RATE: 100 BPM | SYSTOLIC BLOOD PRESSURE: 120 MMHG | WEIGHT: 139.99 LBS | TEMPERATURE: 98 F | DIASTOLIC BLOOD PRESSURE: 62 MMHG | HEIGHT: 66 IN

## 2022-01-01 VITALS
TEMPERATURE: 98 F | HEART RATE: 104 BPM | DIASTOLIC BLOOD PRESSURE: 93 MMHG | SYSTOLIC BLOOD PRESSURE: 134 MMHG | HEIGHT: 63 IN | WEIGHT: 149.91 LBS | RESPIRATION RATE: 16 BRPM | OXYGEN SATURATION: 99 %

## 2022-01-01 VITALS
OXYGEN SATURATION: 99 % | HEART RATE: 94 BPM | SYSTOLIC BLOOD PRESSURE: 139 MMHG | DIASTOLIC BLOOD PRESSURE: 66 MMHG | TEMPERATURE: 98 F

## 2022-01-01 VITALS
DIASTOLIC BLOOD PRESSURE: 59 MMHG | RESPIRATION RATE: 20 BRPM | OXYGEN SATURATION: 88 % | HEART RATE: 116 BPM | TEMPERATURE: 98 F | SYSTOLIC BLOOD PRESSURE: 106 MMHG | WEIGHT: 125 LBS

## 2022-01-01 VITALS
OXYGEN SATURATION: 95 % | HEART RATE: 114 BPM | RESPIRATION RATE: 18 BRPM | DIASTOLIC BLOOD PRESSURE: 65 MMHG | TEMPERATURE: 98.2 F | SYSTOLIC BLOOD PRESSURE: 120 MMHG

## 2022-01-01 VITALS
DIASTOLIC BLOOD PRESSURE: 74 MMHG | OXYGEN SATURATION: 99 % | RESPIRATION RATE: 27 BRPM | WEIGHT: 136.69 LBS | SYSTOLIC BLOOD PRESSURE: 156 MMHG | HEART RATE: 102 BPM | TEMPERATURE: 98 F

## 2022-01-01 VITALS
TEMPERATURE: 97.6 F | HEART RATE: 100 BPM | DIASTOLIC BLOOD PRESSURE: 69 MMHG | SYSTOLIC BLOOD PRESSURE: 144 MMHG | OXYGEN SATURATION: 96 % | RESPIRATION RATE: 17 BRPM

## 2022-01-01 VITALS
RESPIRATION RATE: 17 BRPM | SYSTOLIC BLOOD PRESSURE: 83 MMHG | TEMPERATURE: 98 F | HEART RATE: 63 BPM | OXYGEN SATURATION: 99 % | DIASTOLIC BLOOD PRESSURE: 44 MMHG | HEIGHT: 66 IN

## 2022-01-01 VITALS
OXYGEN SATURATION: 98 % | HEART RATE: 89 BPM | WEIGHT: 143.96 LBS | HEIGHT: 66 IN | DIASTOLIC BLOOD PRESSURE: 71 MMHG | TEMPERATURE: 98 F | SYSTOLIC BLOOD PRESSURE: 131 MMHG | RESPIRATION RATE: 20 BRPM

## 2022-01-01 VITALS — SYSTOLIC BLOOD PRESSURE: 101 MMHG | DIASTOLIC BLOOD PRESSURE: 77 MMHG | HEART RATE: 77 BPM

## 2022-01-01 VITALS
OXYGEN SATURATION: 97 % | DIASTOLIC BLOOD PRESSURE: 65 MMHG | RESPIRATION RATE: 19 BRPM | SYSTOLIC BLOOD PRESSURE: 128 MMHG | TEMPERATURE: 97 F | HEART RATE: 83 BPM

## 2022-01-01 VITALS
RESPIRATION RATE: 95 BRPM | HEART RATE: 17 BPM | OXYGEN SATURATION: 97 % | TEMPERATURE: 98.1 F | DIASTOLIC BLOOD PRESSURE: 74 MMHG | SYSTOLIC BLOOD PRESSURE: 149 MMHG

## 2022-01-01 VITALS
BODY MASS INDEX: 21.69 KG/M2 | HEIGHT: 66 IN | DIASTOLIC BLOOD PRESSURE: 70 MMHG | TEMPERATURE: 97.9 F | WEIGHT: 135 LBS | SYSTOLIC BLOOD PRESSURE: 125 MMHG | HEART RATE: 108 BPM

## 2022-01-01 VITALS
DIASTOLIC BLOOD PRESSURE: 78 MMHG | HEIGHT: 66 IN | RESPIRATION RATE: 16 BRPM | OXYGEN SATURATION: 97 % | SYSTOLIC BLOOD PRESSURE: 126 MMHG | HEART RATE: 110 BPM | TEMPERATURE: 97.7 F

## 2022-01-01 DIAGNOSIS — K74.60 UNSPECIFIED CIRRHOSIS OF LIVER: ICD-10-CM

## 2022-01-01 DIAGNOSIS — Z01.818 ENCOUNTER FOR OTHER PREPROCEDURAL EXAMINATION: ICD-10-CM

## 2022-01-01 DIAGNOSIS — Z98.890 OTHER SPECIFIED POSTPROCEDURAL STATES: Chronic | ICD-10-CM

## 2022-01-01 DIAGNOSIS — Z94.2 LUNG TRANSPLANT STATUS: Chronic | ICD-10-CM

## 2022-01-01 DIAGNOSIS — N18.6 END STAGE RENAL DISEASE: ICD-10-CM

## 2022-01-01 DIAGNOSIS — D84.821 IMMUNODEFICIENCY DUE TO DRUGS: ICD-10-CM

## 2022-01-01 DIAGNOSIS — Z02.9 ENCOUNTER FOR ADMINISTRATIVE EXAMINATIONS, UNSPECIFIED: ICD-10-CM

## 2022-01-01 DIAGNOSIS — Z96.649 PRESENCE OF UNSPECIFIED ARTIFICIAL HIP JOINT: Chronic | ICD-10-CM

## 2022-01-01 DIAGNOSIS — S32.9XXA FRACTURE OF UNSPECIFIED PARTS OF LUMBOSACRAL SPINE AND PELVIS, INITIAL ENCOUNTER FOR CLOSED FRACTURE: ICD-10-CM

## 2022-01-01 DIAGNOSIS — Z95.5 PRESENCE OF CORONARY ANGIOPLASTY IMPLANT AND GRAFT: Chronic | ICD-10-CM

## 2022-01-01 DIAGNOSIS — Z94.2 LUNG TRANSPLANT STATUS: ICD-10-CM

## 2022-01-01 DIAGNOSIS — Z82.5 FAMILY HISTORY OF ASTHMA AND OTHER CHRONIC LOWER RESPIRATORY DISEASES: ICD-10-CM

## 2022-01-01 DIAGNOSIS — J43.9 EMPHYSEMA, UNSPECIFIED: ICD-10-CM

## 2022-01-01 DIAGNOSIS — U07.1 COVID-19: ICD-10-CM

## 2022-01-01 DIAGNOSIS — J44.1 CHRONIC OBSTRUCTIVE PULMONARY DISEASE WITH (ACUTE) EXACERBATION: ICD-10-CM

## 2022-01-01 DIAGNOSIS — S72.001A FRACTURE OF UNSPECIFIED PART OF NECK OF RIGHT FEMUR, INITIAL ENCOUNTER FOR CLOSED FRACTURE: ICD-10-CM

## 2022-01-01 DIAGNOSIS — T82.898A OTHER SPECIFIED COMPLICATION OF VASCULAR PROSTHETIC DEVICES, IMPLANTS AND GRAFTS, INITIAL ENCOUNTER: ICD-10-CM

## 2022-01-01 DIAGNOSIS — I65.29 OCCLUSION AND STENOSIS OF UNSPECIFIED CAROTID ARTERY: ICD-10-CM

## 2022-01-01 DIAGNOSIS — R53.81 OTHER MALAISE: ICD-10-CM

## 2022-01-01 DIAGNOSIS — Z86.79 PERSONAL HISTORY OF OTHER DISEASES OF THE CIRCULATORY SYSTEM: ICD-10-CM

## 2022-01-01 DIAGNOSIS — J96.01 ACUTE RESPIRATORY FAILURE WITH HYPOXIA: ICD-10-CM

## 2022-01-01 DIAGNOSIS — I95.9 HYPOTENSION, UNSPECIFIED: ICD-10-CM

## 2022-01-01 DIAGNOSIS — G92.8 OTHER TOXIC ENCEPHALOPATHY: ICD-10-CM

## 2022-01-01 DIAGNOSIS — J69.0 PNEUMONITIS DUE TO INHALATION OF FOOD AND VOMIT: ICD-10-CM

## 2022-01-01 DIAGNOSIS — R78.81 BACTEREMIA: ICD-10-CM

## 2022-01-01 DIAGNOSIS — I25.10 ATHEROSCLEROTIC HEART DISEASE OF NATIVE CORONARY ARTERY WITHOUT ANGINA PECTORIS: ICD-10-CM

## 2022-01-01 DIAGNOSIS — E78.2 MIXED HYPERLIPIDEMIA: ICD-10-CM

## 2022-01-01 DIAGNOSIS — R71.0 PRECIPITOUS DROP IN HEMATOCRIT: ICD-10-CM

## 2022-01-01 DIAGNOSIS — I82.C19 ACUTE EMBOLISM AND THROMBOSIS OF UNSPECIFIED INTERNAL JUGULAR VEIN: ICD-10-CM

## 2022-01-01 DIAGNOSIS — J18.9 PNEUMONIA, UNSPECIFIED ORGANISM: ICD-10-CM

## 2022-01-01 DIAGNOSIS — S32.435A NONDISPLACED FRACTURE OF ANTERIOR COLUMN [ILIOPUBIC] OF LEFT ACETABULUM, INITIAL ENCOUNTER FOR CLOSED FRACTURE: ICD-10-CM

## 2022-01-01 DIAGNOSIS — R91.8 OTHER NONSPECIFIC ABNORMAL FINDING OF LUNG FIELD: ICD-10-CM

## 2022-01-01 DIAGNOSIS — N18.9 CHRONIC KIDNEY DISEASE, UNSPECIFIED: ICD-10-CM

## 2022-01-01 DIAGNOSIS — A41.9 SEPSIS, UNSPECIFIED ORGANISM: ICD-10-CM

## 2022-01-01 DIAGNOSIS — K83.8 OTHER SPECIFIED DISEASES OF BILIARY TRACT: ICD-10-CM

## 2022-01-01 DIAGNOSIS — S32.435A: ICD-10-CM

## 2022-01-01 DIAGNOSIS — Z92.29 PERSONAL HISTORY OF OTHER DRUG THERAPY: ICD-10-CM

## 2022-01-01 DIAGNOSIS — I48.20 CHRONIC ATRIAL FIBRILLATION, UNSPECIFIED: ICD-10-CM

## 2022-01-01 DIAGNOSIS — D72.829 ELEVATED WHITE BLOOD CELL COUNT, UNSPECIFIED: ICD-10-CM

## 2022-01-01 DIAGNOSIS — J98.11 ATELECTASIS: ICD-10-CM

## 2022-01-01 DIAGNOSIS — K29.70 GASTRITIS, UNSPECIFIED, WITHOUT BLEEDING: ICD-10-CM

## 2022-01-01 DIAGNOSIS — R09.89 OTHER SPECIFIED SYMPTOMS AND SIGNS INVOLVING THE CIRCULATORY AND RESPIRATORY SYSTEMS: ICD-10-CM

## 2022-01-01 DIAGNOSIS — E83.39 OTHER DISORDERS OF PHOSPHORUS METABOLISM: ICD-10-CM

## 2022-01-01 DIAGNOSIS — Z95.828 PRESENCE OF OTHER VASCULAR IMPLANTS AND GRAFTS: ICD-10-CM

## 2022-01-01 DIAGNOSIS — M81.0 AGE-RELATED OSTEOPOROSIS W/OUT CURRENT PATHOLOGICAL FRACTURE: ICD-10-CM

## 2022-01-01 DIAGNOSIS — Z99.2 END STAGE RENAL DISEASE: ICD-10-CM

## 2022-01-01 DIAGNOSIS — I10 ESSENTIAL (PRIMARY) HYPERTENSION: ICD-10-CM

## 2022-01-01 DIAGNOSIS — I25.10 ATHEROSCLEROTIC HEART DISEASE OF NATIVE CORONARY ARTERY W/OUT ANGINA PECTORIS: ICD-10-CM

## 2022-01-01 DIAGNOSIS — Z29.9 ENCOUNTER FOR PROPHYLACTIC MEASURES, UNSPECIFIED: ICD-10-CM

## 2022-01-01 DIAGNOSIS — N17.9 ACUTE KIDNEY FAILURE, UNSPECIFIED: ICD-10-CM

## 2022-01-01 DIAGNOSIS — Z13.89 ENCOUNTER FOR SCREENING FOR OTHER DISORDER: ICD-10-CM

## 2022-01-01 DIAGNOSIS — R74.8 ABNORMAL LEVELS OF OTHER SERUM ENZYMES: ICD-10-CM

## 2022-01-01 DIAGNOSIS — R41.82 ALTERED MENTAL STATUS, UNSPECIFIED: ICD-10-CM

## 2022-01-01 DIAGNOSIS — I50.22 CHRONIC SYSTOLIC (CONGESTIVE) HEART FAILURE: ICD-10-CM

## 2022-01-01 LAB
-  AMPICILLIN: SIGNIFICANT CHANGE UP
-  AMPICILLIN: SIGNIFICANT CHANGE UP
-  GENTAMICIN SYNERGY: SIGNIFICANT CHANGE UP
-  STREPTOMYCIN SYNERGY: SIGNIFICANT CHANGE UP
-  TETRACYCLINE: SIGNIFICANT CHANGE UP
-  VANCOMYCIN: SIGNIFICANT CHANGE UP
-  VANCOMYCIN: SIGNIFICANT CHANGE UP
24R-OH-CALCIDIOL SERPL-MCNC: 29.1 NG/ML — LOW (ref 30–80)
A1C WITH ESTIMATED AVERAGE GLUCOSE RESULT: 5.5 % — SIGNIFICANT CHANGE UP (ref 4–5.6)
ACANTHOCYTES BLD QL SMEAR: SIGNIFICANT CHANGE UP
ACANTHOCYTES BLD QL SMEAR: SLIGHT — SIGNIFICANT CHANGE UP
ALBUMIN FLD-MCNC: 1.7 G/DL — SIGNIFICANT CHANGE UP
ALBUMIN FLD-MCNC: 2.1 G/DL — SIGNIFICANT CHANGE UP
ALBUMIN SERPL ELPH-MCNC: 2.4 G/DL — LOW (ref 3.3–5.2)
ALBUMIN SERPL ELPH-MCNC: 2.4 G/DL — LOW (ref 3.3–5.2)
ALBUMIN SERPL ELPH-MCNC: 2.6 G/DL — LOW (ref 3.3–5.2)
ALBUMIN SERPL ELPH-MCNC: 2.7 G/DL — LOW (ref 3.3–5.2)
ALBUMIN SERPL ELPH-MCNC: 2.8 G/DL — LOW (ref 3.3–5.2)
ALBUMIN SERPL ELPH-MCNC: 2.8 G/DL — LOW (ref 3.3–5.2)
ALBUMIN SERPL ELPH-MCNC: 2.9 G/DL — LOW (ref 3.3–5.2)
ALBUMIN SERPL ELPH-MCNC: 3 G/DL — LOW (ref 3.3–5.2)
ALBUMIN SERPL ELPH-MCNC: 3 G/DL — LOW (ref 3.3–5.2)
ALBUMIN SERPL ELPH-MCNC: 3.1 G/DL — LOW (ref 3.3–5.2)
ALBUMIN SERPL ELPH-MCNC: 3.2 G/DL — LOW (ref 3.3–5.2)
ALBUMIN SERPL ELPH-MCNC: 3.2 G/DL — LOW (ref 3.3–5.2)
ALBUMIN SERPL ELPH-MCNC: 3.3 G/DL — SIGNIFICANT CHANGE UP (ref 3.3–5.2)
ALBUMIN SERPL ELPH-MCNC: 3.4 G/DL — SIGNIFICANT CHANGE UP (ref 3.3–5.2)
ALBUMIN SERPL ELPH-MCNC: 3.4 G/DL — SIGNIFICANT CHANGE UP (ref 3.3–5.2)
ALBUMIN SERPL ELPH-MCNC: 3.6 G/DL — SIGNIFICANT CHANGE UP (ref 3.3–5.2)
ALBUMIN SERPL ELPH-MCNC: 3.6 G/DL — SIGNIFICANT CHANGE UP (ref 3.3–5.2)
ALBUMIN SERPL ELPH-MCNC: 3.7 G/DL — SIGNIFICANT CHANGE UP (ref 3.3–5.2)
ALBUMIN SERPL ELPH-MCNC: 3.7 G/DL — SIGNIFICANT CHANGE UP (ref 3.3–5.2)
ALBUMIN SERPL ELPH-MCNC: 3.8 G/DL — SIGNIFICANT CHANGE UP (ref 3.3–5.2)
ALBUMIN SERPL ELPH-MCNC: 3.9 G/DL — SIGNIFICANT CHANGE UP (ref 3.3–5.2)
ALBUMIN SERPL ELPH-MCNC: 4 G/DL — SIGNIFICANT CHANGE UP (ref 3.3–5.2)
ALBUMIN SERPL ELPH-MCNC: 4.1 G/DL — SIGNIFICANT CHANGE UP (ref 3.3–5.2)
ALP BONE SERPL-MCNC: 45 % — SIGNIFICANT CHANGE UP (ref 12–68)
ALP FLD-CCNC: 249 IU/L — HIGH (ref 44–121)
ALP INTEST CFR SERPL: 5 % — SIGNIFICANT CHANGE UP (ref 0–18)
ALP LIVER SERPL-CCNC: 50 % — SIGNIFICANT CHANGE UP (ref 13–88)
ALP SERPL-CCNC: 138 U/L — HIGH (ref 40–120)
ALP SERPL-CCNC: 158 U/L — HIGH (ref 40–120)
ALP SERPL-CCNC: 169 U/L — HIGH (ref 40–120)
ALP SERPL-CCNC: 187 U/L — HIGH (ref 40–120)
ALP SERPL-CCNC: 190 U/L — HIGH (ref 40–120)
ALP SERPL-CCNC: 191 U/L — HIGH (ref 40–120)
ALP SERPL-CCNC: 200 U/L — HIGH (ref 40–120)
ALP SERPL-CCNC: 208 U/L — HIGH (ref 40–120)
ALP SERPL-CCNC: 218 U/L — HIGH (ref 40–120)
ALP SERPL-CCNC: 221 U/L — HIGH (ref 40–120)
ALP SERPL-CCNC: 239 U/L — HIGH (ref 40–120)
ALP SERPL-CCNC: 240 U/L — HIGH (ref 40–120)
ALP SERPL-CCNC: 247 U/L — HIGH (ref 40–120)
ALP SERPL-CCNC: 258 U/L — HIGH (ref 40–120)
ALP SERPL-CCNC: 259 U/L — HIGH (ref 40–120)
ALP SERPL-CCNC: 274 U/L — HIGH (ref 40–120)
ALP SERPL-CCNC: 292 U/L — HIGH (ref 40–120)
ALP SERPL-CCNC: 320 U/L — HIGH (ref 40–120)
ALP SERPL-CCNC: 328 U/L — HIGH (ref 40–120)
ALP SERPL-CCNC: 335 U/L — HIGH (ref 40–120)
ALP SERPL-CCNC: 335 U/L — HIGH (ref 40–120)
ALP SERPL-CCNC: 340 U/L — HIGH (ref 40–120)
ALP SERPL-CCNC: 362 U/L — HIGH (ref 40–120)
ALP SERPL-CCNC: 379 U/L — HIGH (ref 40–120)
ALP SERPL-CCNC: 384 U/L — HIGH (ref 40–120)
ALP SERPL-CCNC: 433 U/L — HIGH (ref 40–120)
ALP SERPL-CCNC: 433 U/L — HIGH (ref 40–120)
ALP SERPL-CCNC: 514 U/L — HIGH (ref 40–120)
ALT FLD-CCNC: 11 U/L — SIGNIFICANT CHANGE UP
ALT FLD-CCNC: 12 U/L — SIGNIFICANT CHANGE UP
ALT FLD-CCNC: 12 U/L — SIGNIFICANT CHANGE UP
ALT FLD-CCNC: 13 U/L — SIGNIFICANT CHANGE UP
ALT FLD-CCNC: 15 U/L — SIGNIFICANT CHANGE UP
ALT FLD-CCNC: 16 U/L — SIGNIFICANT CHANGE UP
ALT FLD-CCNC: 17 U/L — SIGNIFICANT CHANGE UP
ALT FLD-CCNC: 19 U/L — SIGNIFICANT CHANGE UP
ALT FLD-CCNC: 19 U/L — SIGNIFICANT CHANGE UP
ALT FLD-CCNC: 20 U/L — SIGNIFICANT CHANGE UP
ALT FLD-CCNC: 21 U/L — SIGNIFICANT CHANGE UP
ALT FLD-CCNC: 22 U/L — SIGNIFICANT CHANGE UP
ALT FLD-CCNC: 23 U/L — SIGNIFICANT CHANGE UP
ALT FLD-CCNC: 5 U/L — SIGNIFICANT CHANGE UP
ALT FLD-CCNC: 6 U/L — SIGNIFICANT CHANGE UP
ALT FLD-CCNC: 6 U/L — SIGNIFICANT CHANGE UP
ALT FLD-CCNC: 7 U/L — SIGNIFICANT CHANGE UP
ALT FLD-CCNC: 7 U/L — SIGNIFICANT CHANGE UP
ALT FLD-CCNC: 8 U/L — SIGNIFICANT CHANGE UP
ALT FLD-CCNC: 9 U/L — SIGNIFICANT CHANGE UP
ALT FLD-CCNC: 9 U/L — SIGNIFICANT CHANGE UP
ALT FLD-CCNC: <5 U/L — SIGNIFICANT CHANGE UP
AMMONIA BLD-MCNC: 12 UMOL/L — SIGNIFICANT CHANGE UP (ref 11–55)
AMMONIA BLD-MCNC: 12 UMOL/L — SIGNIFICANT CHANGE UP (ref 11–55)
AMMONIA BLD-MCNC: 16 UMOL/L — SIGNIFICANT CHANGE UP (ref 11–55)
AMMONIA BLD-MCNC: 26 UMOL/L — SIGNIFICANT CHANGE UP (ref 11–55)
ANION GAP SERPL CALC-SCNC: 11 MMOL/L — SIGNIFICANT CHANGE UP (ref 5–17)
ANION GAP SERPL CALC-SCNC: 11 MMOL/L — SIGNIFICANT CHANGE UP (ref 5–17)
ANION GAP SERPL CALC-SCNC: 12 MMOL/L — SIGNIFICANT CHANGE UP (ref 5–17)
ANION GAP SERPL CALC-SCNC: 12 MMOL/L — SIGNIFICANT CHANGE UP (ref 5–17)
ANION GAP SERPL CALC-SCNC: 13 MMOL/L — SIGNIFICANT CHANGE UP (ref 5–17)
ANION GAP SERPL CALC-SCNC: 14 MMOL/L — SIGNIFICANT CHANGE UP (ref 5–17)
ANION GAP SERPL CALC-SCNC: 15 MMOL/L — SIGNIFICANT CHANGE UP (ref 5–17)
ANION GAP SERPL CALC-SCNC: 16 MMOL/L — SIGNIFICANT CHANGE UP (ref 5–17)
ANION GAP SERPL CALC-SCNC: 17 MMOL/L — SIGNIFICANT CHANGE UP (ref 5–17)
ANION GAP SERPL CALC-SCNC: 18 MMOL/L — HIGH (ref 5–17)
ANION GAP SERPL CALC-SCNC: 19 MMOL/L — HIGH (ref 5–17)
ANION GAP SERPL CALC-SCNC: 19 MMOL/L — HIGH (ref 5–17)
ANION GAP SERPL CALC-SCNC: 20 MMOL/L — HIGH (ref 5–17)
ANION GAP SERPL CALC-SCNC: 21 MMOL/L — HIGH (ref 5–17)
ANION GAP SERPL CALC-SCNC: 22 MMOL/L — HIGH (ref 5–17)
ANION GAP SERPL CALC-SCNC: 22 MMOL/L — HIGH (ref 5–17)
ANION GAP SERPL CALC-SCNC: 23 MMOL/L — HIGH (ref 5–17)
ANION GAP SERPL CALC-SCNC: 23 MMOL/L — HIGH (ref 5–17)
ANION GAP SERPL CALC-SCNC: 24 MMOL/L — HIGH (ref 5–17)
ANION GAP SERPL CALC-SCNC: 25 MMOL/L — HIGH (ref 5–17)
ANION GAP SERPL CALC-SCNC: 25 MMOL/L — HIGH (ref 5–17)
ANION GAP SERPL CALC-SCNC: 26 MMOL/L — HIGH (ref 5–17)
ANISOCYTOSIS BLD QL: SIGNIFICANT CHANGE UP
ANISOCYTOSIS BLD QL: SLIGHT — SIGNIFICANT CHANGE UP
APPEARANCE CSF: CLEAR — SIGNIFICANT CHANGE UP
APPEARANCE CSF: CLEAR — SIGNIFICANT CHANGE UP
APPEARANCE SPUN FLD: COLORLESS — SIGNIFICANT CHANGE UP
APPEARANCE SPUN FLD: COLORLESS — SIGNIFICANT CHANGE UP
APPEARANCE UR: ABNORMAL
APTT BLD: 107.8 SEC — HIGH (ref 27.5–35.5)
APTT BLD: 136 SEC — CRITICAL HIGH (ref 27.5–35.5)
APTT BLD: 144 SEC — CRITICAL HIGH (ref 27.5–35.5)
APTT BLD: 152.6 SEC — CRITICAL HIGH (ref 27.5–35.5)
APTT BLD: 158.2 SEC — CRITICAL HIGH (ref 27.5–35.5)
APTT BLD: 30 SEC — SIGNIFICANT CHANGE UP (ref 27.5–35.5)
APTT BLD: 31.8 SEC — SIGNIFICANT CHANGE UP (ref 27.5–35.5)
APTT BLD: 32 SEC — SIGNIFICANT CHANGE UP (ref 27.5–35.5)
APTT BLD: 32.3 SEC — SIGNIFICANT CHANGE UP (ref 27.5–35.5)
APTT BLD: 33 SEC — SIGNIFICANT CHANGE UP (ref 27.5–35.5)
APTT BLD: 33.9 SEC — SIGNIFICANT CHANGE UP (ref 27.5–35.5)
APTT BLD: 34.5 SEC — SIGNIFICANT CHANGE UP (ref 27.5–35.5)
APTT BLD: 35.6 SEC — HIGH (ref 27.5–35.5)
APTT BLD: 36.3 SEC — HIGH (ref 27.5–35.5)
APTT BLD: 41.2 SEC — HIGH (ref 27.5–35.5)
APTT BLD: 42.3 SEC — HIGH (ref 27.5–35.5)
APTT BLD: 48.2 SEC — HIGH (ref 27.5–35.5)
APTT BLD: 51.8 SEC — HIGH (ref 27.5–35.5)
APTT BLD: 52.1 SEC — HIGH (ref 27.5–35.5)
APTT BLD: 60.1 SEC — HIGH (ref 27.5–35.5)
APTT BLD: 71.7 SEC — HIGH (ref 27.5–35.5)
APTT BLD: 75 SEC — HIGH (ref 27.5–35.5)
APTT BLD: 78.3 SEC — HIGH (ref 27.5–35.5)
APTT BLD: 80.9 SEC — HIGH (ref 27.5–35.5)
APTT BLD: >200 SEC — CRITICAL HIGH (ref 27.5–35.5)
APTT BLD: >200 SEC — CRITICAL HIGH (ref 27.5–35.5)
AST SERPL-CCNC: 103 U/L — HIGH
AST SERPL-CCNC: 25 U/L — SIGNIFICANT CHANGE UP
AST SERPL-CCNC: 25 U/L — SIGNIFICANT CHANGE UP
AST SERPL-CCNC: 26 U/L — SIGNIFICANT CHANGE UP
AST SERPL-CCNC: 26 U/L — SIGNIFICANT CHANGE UP
AST SERPL-CCNC: 27 U/L — SIGNIFICANT CHANGE UP
AST SERPL-CCNC: 27 U/L — SIGNIFICANT CHANGE UP
AST SERPL-CCNC: 29 U/L — SIGNIFICANT CHANGE UP
AST SERPL-CCNC: 30 U/L — SIGNIFICANT CHANGE UP
AST SERPL-CCNC: 31 U/L — SIGNIFICANT CHANGE UP
AST SERPL-CCNC: 31 U/L — SIGNIFICANT CHANGE UP
AST SERPL-CCNC: 32 U/L — SIGNIFICANT CHANGE UP
AST SERPL-CCNC: 33 U/L — SIGNIFICANT CHANGE UP
AST SERPL-CCNC: 34 U/L — SIGNIFICANT CHANGE UP
AST SERPL-CCNC: 34 U/L — SIGNIFICANT CHANGE UP
AST SERPL-CCNC: 44 U/L — HIGH
AST SERPL-CCNC: 54 U/L — HIGH
AST SERPL-CCNC: 60 U/L — HIGH
AST SERPL-CCNC: 62 U/L — HIGH
AST SERPL-CCNC: 70 U/L — HIGH
AST SERPL-CCNC: 70 U/L — HIGH
AST SERPL-CCNC: 71 U/L — HIGH
AST SERPL-CCNC: 74 U/L — HIGH
AST SERPL-CCNC: 86 U/L — HIGH
AST SERPL-CCNC: 97 U/L — HIGH
AST SERPL-CCNC: 97 U/L — HIGH
B PERT DNA SPEC QL NAA+PROBE: SIGNIFICANT CHANGE UP
B PERT IGG+IGM PNL SER: ABNORMAL
BACTERIA # UR AUTO: ABNORMAL
BASE EXCESS BLDA CALC-SCNC: 5.7 MMOL/L — HIGH (ref -2–3)
BASE EXCESS BLDV CALC-SCNC: 2 MMOL/L — SIGNIFICANT CHANGE UP (ref -2–3)
BASOPHILS # BLD AUTO: 0 K/UL — SIGNIFICANT CHANGE UP (ref 0–0.2)
BASOPHILS # BLD AUTO: 0.02 K/UL — SIGNIFICANT CHANGE UP (ref 0–0.2)
BASOPHILS # BLD AUTO: 0.03 K/UL — SIGNIFICANT CHANGE UP (ref 0–0.2)
BASOPHILS # BLD AUTO: 0.04 K/UL — SIGNIFICANT CHANGE UP (ref 0–0.2)
BASOPHILS # BLD AUTO: 0.05 K/UL — SIGNIFICANT CHANGE UP (ref 0–0.2)
BASOPHILS # BLD AUTO: 0.05 K/UL — SIGNIFICANT CHANGE UP (ref 0–0.2)
BASOPHILS # BLD AUTO: 0.07 K/UL — SIGNIFICANT CHANGE UP (ref 0–0.2)
BASOPHILS # BLD AUTO: 0.09 K/UL — SIGNIFICANT CHANGE UP (ref 0–0.2)
BASOPHILS # BLD AUTO: 0.09 K/UL — SIGNIFICANT CHANGE UP (ref 0–0.2)
BASOPHILS # BLD AUTO: 0.1 K/UL — SIGNIFICANT CHANGE UP (ref 0–0.2)
BASOPHILS # BLD AUTO: 0.11 K/UL — SIGNIFICANT CHANGE UP (ref 0–0.2)
BASOPHILS # BLD AUTO: 0.12 K/UL — SIGNIFICANT CHANGE UP (ref 0–0.2)
BASOPHILS # BLD AUTO: 0.13 K/UL — SIGNIFICANT CHANGE UP (ref 0–0.2)
BASOPHILS # BLD AUTO: 0.14 K/UL — SIGNIFICANT CHANGE UP (ref 0–0.2)
BASOPHILS # BLD AUTO: 0.14 K/UL — SIGNIFICANT CHANGE UP (ref 0–0.2)
BASOPHILS # BLD AUTO: 0.16 K/UL — SIGNIFICANT CHANGE UP (ref 0–0.2)
BASOPHILS # BLD AUTO: 0.32 K/UL — HIGH (ref 0–0.2)
BASOPHILS # BLD AUTO: 0.38 K/UL — HIGH (ref 0–0.2)
BASOPHILS NFR BLD AUTO: 0 % — SIGNIFICANT CHANGE UP (ref 0–2)
BASOPHILS NFR BLD AUTO: 0.2 % — SIGNIFICANT CHANGE UP (ref 0–2)
BASOPHILS NFR BLD AUTO: 0.3 % — SIGNIFICANT CHANGE UP (ref 0–2)
BASOPHILS NFR BLD AUTO: 0.3 % — SIGNIFICANT CHANGE UP (ref 0–2)
BASOPHILS NFR BLD AUTO: 0.4 % — SIGNIFICANT CHANGE UP (ref 0–2)
BASOPHILS NFR BLD AUTO: 0.6 % — SIGNIFICANT CHANGE UP (ref 0–2)
BASOPHILS NFR BLD AUTO: 0.8 % — SIGNIFICANT CHANGE UP (ref 0–2)
BASOPHILS NFR BLD AUTO: 0.9 % — SIGNIFICANT CHANGE UP (ref 0–2)
BASOPHILS NFR BLD AUTO: 1 % — SIGNIFICANT CHANGE UP (ref 0–2)
BASOPHILS NFR BLD AUTO: 1.7 % — SIGNIFICANT CHANGE UP (ref 0–2)
BASOPHILS NFR BLD AUTO: 1.8 % — SIGNIFICANT CHANGE UP (ref 0–2)
BASOPHILS NFR BLD AUTO: 2.6 % — HIGH (ref 0–2)
BASOPHILS NFR BLD AUTO: 3.5 % — HIGH (ref 0–2)
BILIRUB DIRECT SERPL-MCNC: 0.2 MG/DL — SIGNIFICANT CHANGE UP (ref 0–0.3)
BILIRUB DIRECT SERPL-MCNC: 0.2 MG/DL — SIGNIFICANT CHANGE UP (ref 0–0.3)
BILIRUB DIRECT SERPL-MCNC: 0.4 MG/DL — HIGH (ref 0–0.3)
BILIRUB DIRECT SERPL-MCNC: 0.4 MG/DL — HIGH (ref 0–0.3)
BILIRUB DIRECT SERPL-MCNC: 0.5 MG/DL — HIGH (ref 0–0.3)
BILIRUB INDIRECT FLD-MCNC: 0.2 MG/DL — SIGNIFICANT CHANGE UP (ref 0.2–1)
BILIRUB INDIRECT FLD-MCNC: 0.2 MG/DL — SIGNIFICANT CHANGE UP (ref 0.2–1)
BILIRUB INDIRECT FLD-MCNC: 0.3 MG/DL — SIGNIFICANT CHANGE UP (ref 0.2–1)
BILIRUB INDIRECT FLD-MCNC: 0.4 MG/DL — SIGNIFICANT CHANGE UP (ref 0.2–1)
BILIRUB INDIRECT FLD-MCNC: 0.5 MG/DL — SIGNIFICANT CHANGE UP (ref 0.2–1)
BILIRUB SERPL-MCNC: 0.3 MG/DL — LOW (ref 0.4–2)
BILIRUB SERPL-MCNC: 0.3 MG/DL — LOW (ref 0.4–2)
BILIRUB SERPL-MCNC: 0.4 MG/DL — SIGNIFICANT CHANGE UP (ref 0.4–2)
BILIRUB SERPL-MCNC: 0.5 MG/DL — SIGNIFICANT CHANGE UP (ref 0.4–2)
BILIRUB SERPL-MCNC: 0.6 MG/DL — SIGNIFICANT CHANGE UP (ref 0.4–2)
BILIRUB SERPL-MCNC: 0.7 MG/DL — SIGNIFICANT CHANGE UP (ref 0.4–2)
BILIRUB SERPL-MCNC: 0.8 MG/DL — SIGNIFICANT CHANGE UP (ref 0.4–2)
BILIRUB SERPL-MCNC: 0.9 MG/DL — SIGNIFICANT CHANGE UP (ref 0.4–2)
BILIRUB SERPL-MCNC: 1.1 MG/DL — SIGNIFICANT CHANGE UP (ref 0.4–2)
BILIRUB SERPL-MCNC: 1.2 MG/DL — SIGNIFICANT CHANGE UP (ref 0.4–2)
BILIRUB SERPL-MCNC: 1.2 MG/DL — SIGNIFICANT CHANGE UP (ref 0.4–2)
BILIRUB UR-MCNC: NEGATIVE — SIGNIFICANT CHANGE UP
BLD GP AB SCN SERPL QL: SIGNIFICANT CHANGE UP
BLOOD GAS COMMENTS ARTERIAL: SIGNIFICANT CHANGE UP
BUN SERPL-MCNC: 104.3 MG/DL — HIGH (ref 8–20)
BUN SERPL-MCNC: 11.4 MG/DL — SIGNIFICANT CHANGE UP (ref 8–20)
BUN SERPL-MCNC: 12.7 MG/DL — SIGNIFICANT CHANGE UP (ref 8–20)
BUN SERPL-MCNC: 16.4 MG/DL — SIGNIFICANT CHANGE UP (ref 8–20)
BUN SERPL-MCNC: 18.4 MG/DL — SIGNIFICANT CHANGE UP (ref 8–20)
BUN SERPL-MCNC: 20.6 MG/DL — HIGH (ref 8–20)
BUN SERPL-MCNC: 20.8 MG/DL — HIGH (ref 8–20)
BUN SERPL-MCNC: 22.8 MG/DL — HIGH (ref 8–20)
BUN SERPL-MCNC: 25.3 MG/DL — HIGH (ref 8–20)
BUN SERPL-MCNC: 26.8 MG/DL — HIGH (ref 8–20)
BUN SERPL-MCNC: 27.5 MG/DL — HIGH (ref 8–20)
BUN SERPL-MCNC: 27.9 MG/DL — HIGH (ref 8–20)
BUN SERPL-MCNC: 30.7 MG/DL — HIGH (ref 8–20)
BUN SERPL-MCNC: 31.3 MG/DL — HIGH (ref 8–20)
BUN SERPL-MCNC: 31.6 MG/DL — HIGH (ref 8–20)
BUN SERPL-MCNC: 31.6 MG/DL — HIGH (ref 8–20)
BUN SERPL-MCNC: 32.6 MG/DL — HIGH (ref 8–20)
BUN SERPL-MCNC: 32.7 MG/DL — HIGH (ref 8–20)
BUN SERPL-MCNC: 34.9 MG/DL — HIGH (ref 8–20)
BUN SERPL-MCNC: 35.6 MG/DL — HIGH (ref 8–20)
BUN SERPL-MCNC: 36.3 MG/DL — HIGH (ref 8–20)
BUN SERPL-MCNC: 37.7 MG/DL — HIGH (ref 8–20)
BUN SERPL-MCNC: 39.4 MG/DL — HIGH (ref 8–20)
BUN SERPL-MCNC: 40.1 MG/DL — HIGH (ref 8–20)
BUN SERPL-MCNC: 40.9 MG/DL — HIGH (ref 8–20)
BUN SERPL-MCNC: 41.6 MG/DL — HIGH (ref 8–20)
BUN SERPL-MCNC: 42.2 MG/DL — HIGH (ref 8–20)
BUN SERPL-MCNC: 42.9 MG/DL — HIGH (ref 8–20)
BUN SERPL-MCNC: 43.7 MG/DL — HIGH (ref 8–20)
BUN SERPL-MCNC: 44.6 MG/DL — HIGH (ref 8–20)
BUN SERPL-MCNC: 45.6 MG/DL — HIGH (ref 8–20)
BUN SERPL-MCNC: 46.4 MG/DL — HIGH (ref 8–20)
BUN SERPL-MCNC: 47.1 MG/DL — HIGH (ref 8–20)
BUN SERPL-MCNC: 47.1 MG/DL — HIGH (ref 8–20)
BUN SERPL-MCNC: 47.9 MG/DL — HIGH (ref 8–20)
BUN SERPL-MCNC: 48.3 MG/DL — HIGH (ref 8–20)
BUN SERPL-MCNC: 51.4 MG/DL — HIGH (ref 8–20)
BUN SERPL-MCNC: 53.1 MG/DL — HIGH (ref 8–20)
BUN SERPL-MCNC: 53.9 MG/DL — HIGH (ref 8–20)
BUN SERPL-MCNC: 54.9 MG/DL — HIGH (ref 8–20)
BUN SERPL-MCNC: 57.4 MG/DL — HIGH (ref 8–20)
BUN SERPL-MCNC: 58.6 MG/DL — HIGH (ref 8–20)
BUN SERPL-MCNC: 67.6 MG/DL — HIGH (ref 8–20)
BUN SERPL-MCNC: 71.9 MG/DL — HIGH (ref 8–20)
BUN SERPL-MCNC: 75.1 MG/DL — HIGH (ref 8–20)
BUN SERPL-MCNC: 75.7 MG/DL — HIGH (ref 8–20)
BUN SERPL-MCNC: 76 MG/DL — HIGH (ref 8–20)
BUN SERPL-MCNC: 78.5 MG/DL — HIGH (ref 8–20)
BUN SERPL-MCNC: 81 MG/DL — HIGH (ref 8–20)
BUN SERPL-MCNC: 81.4 MG/DL — HIGH (ref 8–20)
BUN SERPL-MCNC: 81.6 MG/DL — HIGH (ref 8–20)
BURR CELLS BLD QL SMEAR: PRESENT — SIGNIFICANT CHANGE UP
C PNEUM DNA SPEC QL NAA+PROBE: SIGNIFICANT CHANGE UP
CA-I SERPL-SCNC: 1.09 MMOL/L — LOW (ref 1.15–1.33)
CALCIUM SERPL-MCNC: 7.3 MG/DL — LOW (ref 8.6–10.2)
CALCIUM SERPL-MCNC: 7.6 MG/DL — LOW (ref 8.6–10.2)
CALCIUM SERPL-MCNC: 7.8 MG/DL — LOW (ref 8.6–10.2)
CALCIUM SERPL-MCNC: 7.9 MG/DL — LOW (ref 8.4–10.5)
CALCIUM SERPL-MCNC: 7.9 MG/DL — LOW (ref 8.4–10.5)
CALCIUM SERPL-MCNC: 7.9 MG/DL — LOW (ref 8.6–10.2)
CALCIUM SERPL-MCNC: 8 MG/DL — LOW (ref 8.4–10.5)
CALCIUM SERPL-MCNC: 8 MG/DL — LOW (ref 8.6–10.2)
CALCIUM SERPL-MCNC: 8.1 MG/DL — LOW (ref 8.4–10.5)
CALCIUM SERPL-MCNC: 8.1 MG/DL — LOW (ref 8.4–10.5)
CALCIUM SERPL-MCNC: 8.1 MG/DL — LOW (ref 8.6–10.2)
CALCIUM SERPL-MCNC: 8.2 MG/DL — LOW (ref 8.4–10.5)
CALCIUM SERPL-MCNC: 8.2 MG/DL — LOW (ref 8.6–10.2)
CALCIUM SERPL-MCNC: 8.2 MG/DL — LOW (ref 8.6–10.2)
CALCIUM SERPL-MCNC: 8.3 MG/DL — LOW (ref 8.4–10.5)
CALCIUM SERPL-MCNC: 8.3 MG/DL — LOW (ref 8.4–10.5)
CALCIUM SERPL-MCNC: 8.3 MG/DL — LOW (ref 8.6–10.2)
CALCIUM SERPL-MCNC: 8.4 MG/DL — SIGNIFICANT CHANGE UP (ref 8.4–10.5)
CALCIUM SERPL-MCNC: 8.5 MG/DL — LOW (ref 8.6–10.2)
CALCIUM SERPL-MCNC: 8.5 MG/DL — LOW (ref 8.6–10.2)
CALCIUM SERPL-MCNC: 8.5 MG/DL — SIGNIFICANT CHANGE UP (ref 8.4–10.5)
CALCIUM SERPL-MCNC: 8.6 MG/DL — SIGNIFICANT CHANGE UP (ref 8.4–10.5)
CALCIUM SERPL-MCNC: 8.6 MG/DL — SIGNIFICANT CHANGE UP (ref 8.6–10.2)
CALCIUM SERPL-MCNC: 8.6 MG/DL — SIGNIFICANT CHANGE UP (ref 8.6–10.2)
CALCIUM SERPL-MCNC: 8.7 MG/DL — SIGNIFICANT CHANGE UP (ref 8.4–10.5)
CALCIUM SERPL-MCNC: 8.7 MG/DL — SIGNIFICANT CHANGE UP (ref 8.4–10.5)
CALCIUM SERPL-MCNC: 8.7 MG/DL — SIGNIFICANT CHANGE UP (ref 8.6–10.2)
CALCIUM SERPL-MCNC: 8.8 MG/DL — SIGNIFICANT CHANGE UP (ref 8.4–10.5)
CALCIUM SERPL-MCNC: 8.9 MG/DL — SIGNIFICANT CHANGE UP (ref 8.4–10.5)
CALCIUM SERPL-MCNC: 9 MG/DL — SIGNIFICANT CHANGE UP (ref 8.4–10.5)
CALCIUM SERPL-MCNC: 9 MG/DL — SIGNIFICANT CHANGE UP (ref 8.4–10.5)
CALCIUM SERPL-MCNC: 9.2 MG/DL — SIGNIFICANT CHANGE UP (ref 8.4–10.5)
CALCIUM SERPL-MCNC: 9.6 MG/DL — SIGNIFICANT CHANGE UP (ref 8.6–10.2)
CHLORIDE BLDV-SCNC: 101 MMOL/L — SIGNIFICANT CHANGE UP (ref 96–108)
CHLORIDE SERPL-SCNC: 100 MMOL/L — SIGNIFICANT CHANGE UP (ref 96–108)
CHLORIDE SERPL-SCNC: 101 MMOL/L — SIGNIFICANT CHANGE UP (ref 96–108)
CHLORIDE SERPL-SCNC: 102 MMOL/L — SIGNIFICANT CHANGE UP (ref 96–108)
CHLORIDE SERPL-SCNC: 102 MMOL/L — SIGNIFICANT CHANGE UP (ref 98–107)
CHLORIDE SERPL-SCNC: 103 MMOL/L — SIGNIFICANT CHANGE UP (ref 96–108)
CHLORIDE SERPL-SCNC: 103 MMOL/L — SIGNIFICANT CHANGE UP (ref 96–108)
CHLORIDE SERPL-SCNC: 91 MMOL/L — LOW (ref 96–108)
CHLORIDE SERPL-SCNC: 91 MMOL/L — LOW (ref 98–107)
CHLORIDE SERPL-SCNC: 91 MMOL/L — LOW (ref 98–107)
CHLORIDE SERPL-SCNC: 92 MMOL/L — LOW (ref 98–107)
CHLORIDE SERPL-SCNC: 93 MMOL/L — LOW (ref 98–107)
CHLORIDE SERPL-SCNC: 94 MMOL/L — LOW (ref 96–108)
CHLORIDE SERPL-SCNC: 94 MMOL/L — LOW (ref 98–107)
CHLORIDE SERPL-SCNC: 95 MMOL/L — LOW (ref 96–108)
CHLORIDE SERPL-SCNC: 95 MMOL/L — LOW (ref 98–107)
CHLORIDE SERPL-SCNC: 96 MMOL/L — LOW (ref 98–107)
CHLORIDE SERPL-SCNC: 96 MMOL/L — LOW (ref 98–107)
CHLORIDE SERPL-SCNC: 96 MMOL/L — SIGNIFICANT CHANGE UP (ref 96–108)
CHLORIDE SERPL-SCNC: 96 MMOL/L — SIGNIFICANT CHANGE UP (ref 96–108)
CHLORIDE SERPL-SCNC: 97 MMOL/L — LOW (ref 98–107)
CHLORIDE SERPL-SCNC: 97 MMOL/L — LOW (ref 98–107)
CHLORIDE SERPL-SCNC: 97 MMOL/L — SIGNIFICANT CHANGE UP (ref 96–108)
CHLORIDE SERPL-SCNC: 98 MMOL/L — SIGNIFICANT CHANGE UP (ref 96–108)
CHLORIDE SERPL-SCNC: 98 MMOL/L — SIGNIFICANT CHANGE UP (ref 98–107)
CHLORIDE SERPL-SCNC: 99 MMOL/L — SIGNIFICANT CHANGE UP (ref 96–108)
CHLORIDE SERPL-SCNC: 99 MMOL/L — SIGNIFICANT CHANGE UP (ref 98–107)
CHOLEST SERPL-MCNC: 84 MG/DL — SIGNIFICANT CHANGE UP
CO2 SERPL-SCNC: 18 MMOL/L — LOW (ref 22–29)
CO2 SERPL-SCNC: 19 MMOL/L — LOW (ref 22–29)
CO2 SERPL-SCNC: 20 MMOL/L — LOW (ref 22–29)
CO2 SERPL-SCNC: 21 MMOL/L — LOW (ref 22–29)
CO2 SERPL-SCNC: 21 MMOL/L — LOW (ref 22–29)
CO2 SERPL-SCNC: 22 MMOL/L — SIGNIFICANT CHANGE UP (ref 22–29)
CO2 SERPL-SCNC: 23 MMOL/L — SIGNIFICANT CHANGE UP (ref 22–29)
CO2 SERPL-SCNC: 24 MMOL/L — SIGNIFICANT CHANGE UP (ref 22–29)
CO2 SERPL-SCNC: 25 MMOL/L — SIGNIFICANT CHANGE UP (ref 22–29)
CO2 SERPL-SCNC: 26 MMOL/L — SIGNIFICANT CHANGE UP (ref 22–29)
CO2 SERPL-SCNC: 27 MMOL/L — SIGNIFICANT CHANGE UP (ref 22–29)
CO2 SERPL-SCNC: 28 MMOL/L — SIGNIFICANT CHANGE UP (ref 22–29)
CO2 SERPL-SCNC: 29 MMOL/L — SIGNIFICANT CHANGE UP (ref 22–29)
CO2 SERPL-SCNC: 30 MMOL/L — HIGH (ref 22–29)
CO2 SERPL-SCNC: 30 MMOL/L — HIGH (ref 22–29)
COLOR CSF: SIGNIFICANT CHANGE UP
COLOR CSF: SIGNIFICANT CHANGE UP
COLOR FLD: ABNORMAL
COLOR FLD: SIGNIFICANT CHANGE UP
COLOR FLD: YELLOW
COLOR SPEC: ABNORMAL
CREAT SERPL-MCNC: 10.2 MG/DL — HIGH (ref 0.5–1.3)
CREAT SERPL-MCNC: 10.8 MG/DL — HIGH (ref 0.5–1.3)
CREAT SERPL-MCNC: 10.97 MG/DL — HIGH (ref 0.5–1.3)
CREAT SERPL-MCNC: 11.06 MG/DL — HIGH (ref 0.5–1.3)
CREAT SERPL-MCNC: 2.79 MG/DL — HIGH (ref 0.5–1.3)
CREAT SERPL-MCNC: 3.25 MG/DL — HIGH (ref 0.5–1.3)
CREAT SERPL-MCNC: 3.83 MG/DL — HIGH (ref 0.5–1.3)
CREAT SERPL-MCNC: 3.86 MG/DL — HIGH (ref 0.5–1.3)
CREAT SERPL-MCNC: 3.9 MG/DL — HIGH (ref 0.5–1.3)
CREAT SERPL-MCNC: 3.93 MG/DL — HIGH (ref 0.5–1.3)
CREAT SERPL-MCNC: 3.97 MG/DL — HIGH (ref 0.5–1.3)
CREAT SERPL-MCNC: 4.04 MG/DL — HIGH (ref 0.5–1.3)
CREAT SERPL-MCNC: 4.31 MG/DL — HIGH (ref 0.5–1.3)
CREAT SERPL-MCNC: 4.32 MG/DL — HIGH (ref 0.5–1.3)
CREAT SERPL-MCNC: 4.45 MG/DL — HIGH (ref 0.5–1.3)
CREAT SERPL-MCNC: 4.67 MG/DL — HIGH (ref 0.5–1.3)
CREAT SERPL-MCNC: 4.75 MG/DL — HIGH (ref 0.5–1.3)
CREAT SERPL-MCNC: 4.82 MG/DL — HIGH (ref 0.5–1.3)
CREAT SERPL-MCNC: 4.86 MG/DL — HIGH (ref 0.5–1.3)
CREAT SERPL-MCNC: 4.95 MG/DL — HIGH (ref 0.5–1.3)
CREAT SERPL-MCNC: 5.05 MG/DL — HIGH (ref 0.5–1.3)
CREAT SERPL-MCNC: 5.19 MG/DL — HIGH (ref 0.5–1.3)
CREAT SERPL-MCNC: 5.21 MG/DL — HIGH (ref 0.5–1.3)
CREAT SERPL-MCNC: 5.25 MG/DL — HIGH (ref 0.5–1.3)
CREAT SERPL-MCNC: 5.35 MG/DL — HIGH (ref 0.5–1.3)
CREAT SERPL-MCNC: 5.35 MG/DL — HIGH (ref 0.5–1.3)
CREAT SERPL-MCNC: 5.5 MG/DL — HIGH (ref 0.5–1.3)
CREAT SERPL-MCNC: 5.74 MG/DL — HIGH (ref 0.5–1.3)
CREAT SERPL-MCNC: 5.91 MG/DL — HIGH (ref 0.5–1.3)
CREAT SERPL-MCNC: 5.94 MG/DL — HIGH (ref 0.5–1.3)
CREAT SERPL-MCNC: 5.95 MG/DL — HIGH (ref 0.5–1.3)
CREAT SERPL-MCNC: 6.06 MG/DL — HIGH (ref 0.5–1.3)
CREAT SERPL-MCNC: 6.12 MG/DL — HIGH (ref 0.5–1.3)
CREAT SERPL-MCNC: 6.15 MG/DL — HIGH (ref 0.5–1.3)
CREAT SERPL-MCNC: 6.27 MG/DL — HIGH (ref 0.5–1.3)
CREAT SERPL-MCNC: 6.27 MG/DL — HIGH (ref 0.5–1.3)
CREAT SERPL-MCNC: 6.39 MG/DL — HIGH (ref 0.5–1.3)
CREAT SERPL-MCNC: 6.47 MG/DL — HIGH (ref 0.5–1.3)
CREAT SERPL-MCNC: 6.52 MG/DL — HIGH (ref 0.5–1.3)
CREAT SERPL-MCNC: 6.54 MG/DL — HIGH (ref 0.5–1.3)
CREAT SERPL-MCNC: 6.57 MG/DL — HIGH (ref 0.5–1.3)
CREAT SERPL-MCNC: 6.77 MG/DL — HIGH (ref 0.5–1.3)
CREAT SERPL-MCNC: 6.81 MG/DL — HIGH (ref 0.5–1.3)
CREAT SERPL-MCNC: 6.86 MG/DL — HIGH (ref 0.5–1.3)
CREAT SERPL-MCNC: 6.87 MG/DL — HIGH (ref 0.5–1.3)
CREAT SERPL-MCNC: 7.11 MG/DL — HIGH (ref 0.5–1.3)
CREAT SERPL-MCNC: 7.14 MG/DL — HIGH (ref 0.5–1.3)
CREAT SERPL-MCNC: 7.22 MG/DL — HIGH (ref 0.5–1.3)
CREAT SERPL-MCNC: 7.78 MG/DL — HIGH (ref 0.5–1.3)
CREAT SERPL-MCNC: 8.13 MG/DL — HIGH (ref 0.5–1.3)
CREAT SERPL-MCNC: 9.01 MG/DL — HIGH (ref 0.5–1.3)
CRP SERPL-MCNC: 118 MG/L — HIGH
CRYPTOC AG CSF-ACNC: (no result)
CRYPTOC AG CSF-ACNC: NEGATIVE — SIGNIFICANT CHANGE UP
CRYPTOC AG CSF-ACNC: POSITIVE
CRYPTOC AG FLD QL: (no result)
CRYPTOC AG FLD QL: NEGATIVE — SIGNIFICANT CHANGE UP
CSF PCR RESULT: SIGNIFICANT CHANGE UP
CULTURE RESULTS: SIGNIFICANT CHANGE UP
DACRYOCYTES BLD QL SMEAR: SLIGHT — SIGNIFICANT CHANGE UP
DIFF PNL FLD: ABNORMAL
DIGOXIN SERPL-MCNC: 1.2 NG/ML — SIGNIFICANT CHANGE UP (ref 0.8–2)
DIGOXIN SERPL-MCNC: 3 NG/ML — CRITICAL HIGH (ref 0.8–2)
E FAECALIS DNA BLD POS QL NAA+NON-PROBE: SIGNIFICANT CHANGE UP
EBV DNA SERPL NAA+PROBE-ACNC: 159 IU/ML — HIGH
EBV EA AB SER IA-ACNC: 13.1 U/ML — HIGH
EBV EA AB TITR SER IF: POSITIVE
EBV EA IGG SER-ACNC: POSITIVE
EBV NA IGG SER IA-ACNC: 468 U/ML — HIGH
EBV PATRN SPEC IB-IMP: SIGNIFICANT CHANGE UP
EBV PCR: 128 IU/ML — HIGH
EBV VCA IGG AVIDITY SER QL IA: POSITIVE
EBV VCA IGM SER IA-ACNC: <10 U/ML — SIGNIFICANT CHANGE UP
EBV VCA IGM SER IA-ACNC: >750 U/ML — HIGH
EBV VCA IGM TITR FLD: NEGATIVE — SIGNIFICANT CHANGE UP
EBVPCR LOG: 2.2 LOG10IU/ML — HIGH
EGFR: 10 ML/MIN/1.73M2 — LOW
EGFR: 10 ML/MIN/1.73M2 — LOW
EGFR: 11 ML/MIN/1.73M2 — LOW
EGFR: 12 ML/MIN/1.73M2 — LOW
EGFR: 13 ML/MIN/1.73M2 — LOW
EGFR: 14 ML/MIN/1.73M2 — LOW
EGFR: 14 ML/MIN/1.73M2 — LOW
EGFR: 15 ML/MIN/1.73M2 — LOW
EGFR: 16 ML/MIN/1.73M2 — LOW
EGFR: 16 ML/MIN/1.73M2 — LOW
EGFR: 19 ML/MIN/1.73M2 — LOW
EGFR: 23 ML/MIN/1.73M2 — LOW
EGFR: 4 ML/MIN/1.73M2 — LOW
EGFR: 4 ML/MIN/1.73M2 — LOW
EGFR: 5 ML/MIN/1.73M2 — LOW
EGFR: 5 ML/MIN/1.73M2 — LOW
EGFR: 6 ML/MIN/1.73M2 — LOW
EGFR: 6 ML/MIN/1.73M2 — LOW
EGFR: 7 ML/MIN/1.73M2 — LOW
EGFR: 8 ML/MIN/1.73M2 — LOW
EGFR: 9 ML/MIN/1.73M2 — LOW
ELLIPTOCYTES BLD QL SMEAR: SLIGHT — SIGNIFICANT CHANGE UP
EOSINOPHIL # BLD AUTO: 0 K/UL — SIGNIFICANT CHANGE UP (ref 0–0.5)
EOSINOPHIL # BLD AUTO: 0.01 K/UL — SIGNIFICANT CHANGE UP (ref 0–0.5)
EOSINOPHIL # BLD AUTO: 0.04 K/UL — SIGNIFICANT CHANGE UP (ref 0–0.5)
EOSINOPHIL # BLD AUTO: 0.05 K/UL — SIGNIFICANT CHANGE UP (ref 0–0.5)
EOSINOPHIL # BLD AUTO: 0.07 K/UL — SIGNIFICANT CHANGE UP (ref 0–0.5)
EOSINOPHIL # BLD AUTO: 0.07 K/UL — SIGNIFICANT CHANGE UP (ref 0–0.5)
EOSINOPHIL # BLD AUTO: 0.08 K/UL — SIGNIFICANT CHANGE UP (ref 0–0.5)
EOSINOPHIL # BLD AUTO: 0.09 K/UL — SIGNIFICANT CHANGE UP (ref 0–0.5)
EOSINOPHIL # BLD AUTO: 0.1 K/UL — SIGNIFICANT CHANGE UP (ref 0–0.5)
EOSINOPHIL # BLD AUTO: 0.14 K/UL — SIGNIFICANT CHANGE UP (ref 0–0.5)
EOSINOPHIL # BLD AUTO: 0.17 K/UL — SIGNIFICANT CHANGE UP (ref 0–0.5)
EOSINOPHIL # BLD AUTO: 0.22 K/UL — SIGNIFICANT CHANGE UP (ref 0–0.5)
EOSINOPHIL NFR BLD AUTO: 0 % — SIGNIFICANT CHANGE UP (ref 0–6)
EOSINOPHIL NFR BLD AUTO: 0.1 % — SIGNIFICANT CHANGE UP (ref 0–6)
EOSINOPHIL NFR BLD AUTO: 0.1 % — SIGNIFICANT CHANGE UP (ref 0–6)
EOSINOPHIL NFR BLD AUTO: 0.4 % — SIGNIFICANT CHANGE UP (ref 0–6)
EOSINOPHIL NFR BLD AUTO: 0.4 % — SIGNIFICANT CHANGE UP (ref 0–6)
EOSINOPHIL NFR BLD AUTO: 0.6 % — SIGNIFICANT CHANGE UP (ref 0–6)
EOSINOPHIL NFR BLD AUTO: 0.9 % — SIGNIFICANT CHANGE UP (ref 0–6)
EOSINOPHIL NFR BLD AUTO: 1.2 % — SIGNIFICANT CHANGE UP (ref 0–6)
EOSINOPHIL NFR BLD AUTO: 1.7 % — SIGNIFICANT CHANGE UP (ref 0–6)
EOSINOPHIL NFR BLD AUTO: 2.5 % — SIGNIFICANT CHANGE UP (ref 0–6)
EOSINOPHIL NFR BLD AUTO: 2.6 % — SIGNIFICANT CHANGE UP (ref 0–6)
EPI CELLS # UR: SIGNIFICANT CHANGE UP
ESTIMATED AVERAGE GLUCOSE: 111 MG/DL — SIGNIFICANT CHANGE UP (ref 68–114)
FERRITIN SERPL-MCNC: 1149 NG/ML — HIGH (ref 30–400)
FERRITIN SERPL-MCNC: 2720 NG/ML — HIGH (ref 30–400)
FERRITIN SERPL-MCNC: 4169 NG/ML — HIGH (ref 30–400)
FLUAV AG NPH QL: SIGNIFICANT CHANGE UP
FLUAV AG NPH QL: SIGNIFICANT CHANGE UP
FLUAV H1 2009 PAND RNA SPEC QL NAA+PROBE: SIGNIFICANT CHANGE UP
FLUAV H1 RNA SPEC QL NAA+PROBE: SIGNIFICANT CHANGE UP
FLUAV H3 RNA SPEC QL NAA+PROBE: SIGNIFICANT CHANGE UP
FLUAV SUBTYP SPEC NAA+PROBE: SIGNIFICANT CHANGE UP
FLUBV AG NPH QL: SIGNIFICANT CHANGE UP
FLUBV AG NPH QL: SIGNIFICANT CHANGE UP
FLUBV RNA SPEC QL NAA+PROBE: SIGNIFICANT CHANGE UP
FLUID INTAKE SUBSTANCE CLASS: SIGNIFICANT CHANGE UP
FUNGITELL: <31 PG/ML — SIGNIFICANT CHANGE UP
GALACTOMANNAN AG SERPL-ACNC: 0.05 INDEX — SIGNIFICANT CHANGE UP (ref 0–0.49)
GAS PNL BLDA: SIGNIFICANT CHANGE UP
GAS PNL BLDV: 139 MMOL/L — SIGNIFICANT CHANGE UP (ref 136–145)
GAS PNL BLDV: SIGNIFICANT CHANGE UP
GIANT PLATELETS BLD QL SMEAR: PRESENT — SIGNIFICANT CHANGE UP
GLUCOSE BLDC GLUCOMTR-MCNC: 104 MG/DL — HIGH (ref 70–99)
GLUCOSE BLDC GLUCOMTR-MCNC: 105 MG/DL — HIGH (ref 70–99)
GLUCOSE BLDC GLUCOMTR-MCNC: 105 MG/DL — HIGH (ref 70–99)
GLUCOSE BLDC GLUCOMTR-MCNC: 106 MG/DL — HIGH (ref 70–99)
GLUCOSE BLDC GLUCOMTR-MCNC: 107 MG/DL — HIGH (ref 70–99)
GLUCOSE BLDC GLUCOMTR-MCNC: 109 MG/DL — HIGH (ref 70–99)
GLUCOSE BLDC GLUCOMTR-MCNC: 113 MG/DL — HIGH (ref 70–99)
GLUCOSE BLDC GLUCOMTR-MCNC: 113 MG/DL — HIGH (ref 70–99)
GLUCOSE BLDC GLUCOMTR-MCNC: 115 MG/DL — HIGH (ref 70–99)
GLUCOSE BLDC GLUCOMTR-MCNC: 115 MG/DL — HIGH (ref 70–99)
GLUCOSE BLDC GLUCOMTR-MCNC: 125 MG/DL — HIGH (ref 70–99)
GLUCOSE BLDC GLUCOMTR-MCNC: 125 MG/DL — HIGH (ref 70–99)
GLUCOSE BLDC GLUCOMTR-MCNC: 133 MG/DL — HIGH (ref 70–99)
GLUCOSE BLDC GLUCOMTR-MCNC: 135 MG/DL — HIGH (ref 70–99)
GLUCOSE BLDC GLUCOMTR-MCNC: 135 MG/DL — HIGH (ref 70–99)
GLUCOSE BLDC GLUCOMTR-MCNC: 136 MG/DL — HIGH (ref 70–99)
GLUCOSE BLDC GLUCOMTR-MCNC: 138 MG/DL — HIGH (ref 70–99)
GLUCOSE BLDC GLUCOMTR-MCNC: 139 MG/DL — HIGH (ref 70–99)
GLUCOSE BLDC GLUCOMTR-MCNC: 140 MG/DL — HIGH (ref 70–99)
GLUCOSE BLDC GLUCOMTR-MCNC: 167 MG/DL — HIGH (ref 70–99)
GLUCOSE BLDC GLUCOMTR-MCNC: 189 MG/DL — HIGH (ref 70–99)
GLUCOSE BLDC GLUCOMTR-MCNC: 214 MG/DL — HIGH (ref 70–99)
GLUCOSE BLDC GLUCOMTR-MCNC: 38 MG/DL — CRITICAL LOW (ref 70–99)
GLUCOSE BLDC GLUCOMTR-MCNC: 40 MG/DL — CRITICAL LOW (ref 70–99)
GLUCOSE BLDC GLUCOMTR-MCNC: 44 MG/DL — CRITICAL LOW (ref 70–99)
GLUCOSE BLDC GLUCOMTR-MCNC: 46 MG/DL — CRITICAL LOW (ref 70–99)
GLUCOSE BLDC GLUCOMTR-MCNC: 48 MG/DL — CRITICAL LOW (ref 70–99)
GLUCOSE BLDC GLUCOMTR-MCNC: 55 MG/DL — LOW (ref 70–99)
GLUCOSE BLDC GLUCOMTR-MCNC: 62 MG/DL — LOW (ref 70–99)
GLUCOSE BLDC GLUCOMTR-MCNC: 62 MG/DL — LOW (ref 70–99)
GLUCOSE BLDC GLUCOMTR-MCNC: 69 MG/DL — LOW (ref 70–99)
GLUCOSE BLDC GLUCOMTR-MCNC: 70 MG/DL — SIGNIFICANT CHANGE UP (ref 70–99)
GLUCOSE BLDC GLUCOMTR-MCNC: 71 MG/DL — SIGNIFICANT CHANGE UP (ref 70–99)
GLUCOSE BLDC GLUCOMTR-MCNC: 73 MG/DL — SIGNIFICANT CHANGE UP (ref 70–99)
GLUCOSE BLDC GLUCOMTR-MCNC: 74 MG/DL — SIGNIFICANT CHANGE UP (ref 70–99)
GLUCOSE BLDC GLUCOMTR-MCNC: 76 MG/DL — SIGNIFICANT CHANGE UP (ref 70–99)
GLUCOSE BLDC GLUCOMTR-MCNC: 76 MG/DL — SIGNIFICANT CHANGE UP (ref 70–99)
GLUCOSE BLDC GLUCOMTR-MCNC: 78 MG/DL — SIGNIFICANT CHANGE UP (ref 70–99)
GLUCOSE BLDC GLUCOMTR-MCNC: 79 MG/DL — SIGNIFICANT CHANGE UP (ref 70–99)
GLUCOSE BLDC GLUCOMTR-MCNC: 79 MG/DL — SIGNIFICANT CHANGE UP (ref 70–99)
GLUCOSE BLDC GLUCOMTR-MCNC: 82 MG/DL — SIGNIFICANT CHANGE UP (ref 70–99)
GLUCOSE BLDC GLUCOMTR-MCNC: 83 MG/DL — SIGNIFICANT CHANGE UP (ref 70–99)
GLUCOSE BLDC GLUCOMTR-MCNC: 83 MG/DL — SIGNIFICANT CHANGE UP (ref 70–99)
GLUCOSE BLDC GLUCOMTR-MCNC: 86 MG/DL — SIGNIFICANT CHANGE UP (ref 70–99)
GLUCOSE BLDC GLUCOMTR-MCNC: 86 MG/DL — SIGNIFICANT CHANGE UP (ref 70–99)
GLUCOSE BLDC GLUCOMTR-MCNC: 91 MG/DL — SIGNIFICANT CHANGE UP (ref 70–99)
GLUCOSE BLDC GLUCOMTR-MCNC: 92 MG/DL — SIGNIFICANT CHANGE UP (ref 70–99)
GLUCOSE BLDC GLUCOMTR-MCNC: 94 MG/DL — SIGNIFICANT CHANGE UP (ref 70–99)
GLUCOSE BLDC GLUCOMTR-MCNC: 95 MG/DL — SIGNIFICANT CHANGE UP (ref 70–99)
GLUCOSE BLDV-MCNC: 75 MG/DL — SIGNIFICANT CHANGE UP (ref 70–99)
GLUCOSE CSF-MCNC: 43 MG/DLG/24H — SIGNIFICANT CHANGE UP (ref 40–70)
GLUCOSE CSF-MCNC: 48 MG/DLG/24H — SIGNIFICANT CHANGE UP (ref 40–70)
GLUCOSE FLD-MCNC: 92 MG/DL — SIGNIFICANT CHANGE UP
GLUCOSE FLD-MCNC: 96 MG/DL — SIGNIFICANT CHANGE UP
GLUCOSE SERPL-MCNC: 104 MG/DL — HIGH (ref 70–99)
GLUCOSE SERPL-MCNC: 107 MG/DL — HIGH (ref 70–99)
GLUCOSE SERPL-MCNC: 108 MG/DL — HIGH (ref 70–99)
GLUCOSE SERPL-MCNC: 109 MG/DL — HIGH (ref 70–99)
GLUCOSE SERPL-MCNC: 110 MG/DL — HIGH (ref 70–99)
GLUCOSE SERPL-MCNC: 112 MG/DL — HIGH (ref 70–99)
GLUCOSE SERPL-MCNC: 112 MG/DL — HIGH (ref 70–99)
GLUCOSE SERPL-MCNC: 114 MG/DL — HIGH (ref 70–99)
GLUCOSE SERPL-MCNC: 114 MG/DL — HIGH (ref 70–99)
GLUCOSE SERPL-MCNC: 115 MG/DL — HIGH (ref 70–99)
GLUCOSE SERPL-MCNC: 118 MG/DL — HIGH (ref 70–99)
GLUCOSE SERPL-MCNC: 121 MG/DL — HIGH (ref 70–99)
GLUCOSE SERPL-MCNC: 122 MG/DL — HIGH (ref 70–99)
GLUCOSE SERPL-MCNC: 122 MG/DL — HIGH (ref 70–99)
GLUCOSE SERPL-MCNC: 123 MG/DL — HIGH (ref 70–99)
GLUCOSE SERPL-MCNC: 131 MG/DL — HIGH (ref 70–99)
GLUCOSE SERPL-MCNC: 132 MG/DL — HIGH (ref 70–99)
GLUCOSE SERPL-MCNC: 132 MG/DL — HIGH (ref 70–99)
GLUCOSE SERPL-MCNC: 135 MG/DL — HIGH (ref 70–99)
GLUCOSE SERPL-MCNC: 138 MG/DL — HIGH (ref 70–99)
GLUCOSE SERPL-MCNC: 140 MG/DL — HIGH (ref 70–99)
GLUCOSE SERPL-MCNC: 143 MG/DL — HIGH (ref 70–99)
GLUCOSE SERPL-MCNC: 150 MG/DL — HIGH (ref 70–99)
GLUCOSE SERPL-MCNC: 175 MG/DL — HIGH (ref 70–99)
GLUCOSE SERPL-MCNC: 179 MG/DL — HIGH (ref 70–99)
GLUCOSE SERPL-MCNC: 207 MG/DL — HIGH (ref 70–99)
GLUCOSE SERPL-MCNC: 32 MG/DL — CRITICAL LOW (ref 70–99)
GLUCOSE SERPL-MCNC: 38 MG/DL — CRITICAL LOW (ref 70–99)
GLUCOSE SERPL-MCNC: 57 MG/DL — LOW (ref 70–99)
GLUCOSE SERPL-MCNC: 68 MG/DL — LOW (ref 70–99)
GLUCOSE SERPL-MCNC: 76 MG/DL — SIGNIFICANT CHANGE UP (ref 70–99)
GLUCOSE SERPL-MCNC: 76 MG/DL — SIGNIFICANT CHANGE UP (ref 70–99)
GLUCOSE SERPL-MCNC: 79 MG/DL — SIGNIFICANT CHANGE UP (ref 70–99)
GLUCOSE SERPL-MCNC: 81 MG/DL — SIGNIFICANT CHANGE UP (ref 70–99)
GLUCOSE SERPL-MCNC: 82 MG/DL — SIGNIFICANT CHANGE UP (ref 70–99)
GLUCOSE SERPL-MCNC: 83 MG/DL — SIGNIFICANT CHANGE UP (ref 70–99)
GLUCOSE SERPL-MCNC: 84 MG/DL — SIGNIFICANT CHANGE UP (ref 70–99)
GLUCOSE SERPL-MCNC: 85 MG/DL — SIGNIFICANT CHANGE UP (ref 70–99)
GLUCOSE SERPL-MCNC: 85 MG/DL — SIGNIFICANT CHANGE UP (ref 70–99)
GLUCOSE SERPL-MCNC: 89 MG/DL — SIGNIFICANT CHANGE UP (ref 70–99)
GLUCOSE SERPL-MCNC: 89 MG/DL — SIGNIFICANT CHANGE UP (ref 70–99)
GLUCOSE SERPL-MCNC: 93 MG/DL — SIGNIFICANT CHANGE UP (ref 70–99)
GLUCOSE SERPL-MCNC: 94 MG/DL — SIGNIFICANT CHANGE UP (ref 70–99)
GLUCOSE SERPL-MCNC: 95 MG/DL — SIGNIFICANT CHANGE UP (ref 70–99)
GLUCOSE SERPL-MCNC: 95 MG/DL — SIGNIFICANT CHANGE UP (ref 70–99)
GLUCOSE SERPL-MCNC: 99 MG/DL — SIGNIFICANT CHANGE UP (ref 70–99)
GLUCOSE UR QL: NEGATIVE MG/DL — SIGNIFICANT CHANGE UP
GRAM STN FLD: SIGNIFICANT CHANGE UP
H CAPSUL AG CSF IA-MCNC: SIGNIFICANT CHANGE UP
HADV DNA SPEC QL NAA+PROBE: SIGNIFICANT CHANGE UP
HBV CORE AB SER-ACNC: SIGNIFICANT CHANGE UP
HBV CORE AB SER-ACNC: SIGNIFICANT CHANGE UP
HBV SURFACE AB SER-ACNC: <3 MIU/ML — LOW
HBV SURFACE AB SER-ACNC: <3 MIU/ML — LOW
HBV SURFACE AB SER-ACNC: SIGNIFICANT CHANGE UP
HBV SURFACE AG SER-ACNC: SIGNIFICANT CHANGE UP
HCO3 BLDA-SCNC: 31 MMOL/L — HIGH (ref 21–28)
HCO3 BLDV-SCNC: 28 MMOL/L — SIGNIFICANT CHANGE UP (ref 22–29)
HCOV PNL SPEC NAA+PROBE: SIGNIFICANT CHANGE UP
HCT VFR BLD CALC: 21.8 % — LOW (ref 39–50)
HCT VFR BLD CALC: 21.9 % — LOW (ref 39–50)
HCT VFR BLD CALC: 23.6 % — LOW (ref 39–50)
HCT VFR BLD CALC: 24.1 % — LOW (ref 39–50)
HCT VFR BLD CALC: 24.2 % — LOW (ref 39–50)
HCT VFR BLD CALC: 25 % — LOW (ref 39–50)
HCT VFR BLD CALC: 25.2 % — LOW (ref 39–50)
HCT VFR BLD CALC: 25.4 % — LOW (ref 39–50)
HCT VFR BLD CALC: 25.9 % — LOW (ref 39–50)
HCT VFR BLD CALC: 26.2 % — LOW (ref 39–50)
HCT VFR BLD CALC: 26.4 % — LOW (ref 39–50)
HCT VFR BLD CALC: 26.6 % — LOW (ref 39–50)
HCT VFR BLD CALC: 26.8 % — LOW (ref 39–50)
HCT VFR BLD CALC: 26.9 % — LOW (ref 39–50)
HCT VFR BLD CALC: 27.1 % — LOW (ref 39–50)
HCT VFR BLD CALC: 27.3 % — LOW (ref 39–50)
HCT VFR BLD CALC: 27.5 % — LOW (ref 39–50)
HCT VFR BLD CALC: 28 % — LOW (ref 39–50)
HCT VFR BLD CALC: 28.1 % — LOW (ref 39–50)
HCT VFR BLD CALC: 28.5 % — LOW (ref 39–50)
HCT VFR BLD CALC: 28.6 % — LOW (ref 39–50)
HCT VFR BLD CALC: 29.9 % — LOW (ref 39–50)
HCT VFR BLD CALC: 29.9 % — LOW (ref 39–50)
HCT VFR BLD CALC: 30.2 % — LOW (ref 39–50)
HCT VFR BLD CALC: 30.6 % — LOW (ref 39–50)
HCT VFR BLD CALC: 30.8 % — LOW (ref 39–50)
HCT VFR BLD CALC: 30.8 % — LOW (ref 39–50)
HCT VFR BLD CALC: 30.9 % — LOW (ref 39–50)
HCT VFR BLD CALC: 31 % — LOW (ref 39–50)
HCT VFR BLD CALC: 31.2 % — LOW (ref 39–50)
HCT VFR BLD CALC: 31.3 % — LOW (ref 39–50)
HCT VFR BLD CALC: 31.5 % — LOW (ref 39–50)
HCT VFR BLD CALC: 31.6 % — LOW (ref 39–50)
HCT VFR BLD CALC: 32 % — LOW (ref 39–50)
HCT VFR BLD CALC: 32 % — LOW (ref 39–50)
HCT VFR BLD CALC: 32.1 % — LOW (ref 39–50)
HCT VFR BLD CALC: 32.2 % — LOW (ref 39–50)
HCT VFR BLD CALC: 32.2 % — LOW (ref 39–50)
HCT VFR BLD CALC: 32.3 % — LOW (ref 39–50)
HCT VFR BLD CALC: 32.7 % — LOW (ref 39–50)
HCT VFR BLD CALC: 33.3 % — LOW (ref 39–50)
HCT VFR BLD CALC: 33.4 % — LOW (ref 39–50)
HCT VFR BLD CALC: 33.6 % — LOW (ref 39–50)
HCT VFR BLD CALC: 33.7 % — LOW (ref 39–50)
HCT VFR BLD CALC: 33.8 % — LOW (ref 39–50)
HCT VFR BLD CALC: 33.9 % — LOW (ref 39–50)
HCT VFR BLD CALC: 34.2 % — LOW (ref 39–50)
HCT VFR BLD CALC: 34.3 % — LOW (ref 39–50)
HCT VFR BLD CALC: 34.6 % — LOW (ref 39–50)
HCT VFR BLD CALC: 36.2 % — LOW (ref 39–50)
HCT VFR BLD CALC: 36.5 % — LOW (ref 39–50)
HCT VFR BLD CALC: 37 % — LOW (ref 39–50)
HCT VFR BLD CALC: 37.9 % — LOW (ref 39–50)
HCT VFR BLD CALC: 38.3 % — LOW (ref 39–50)
HCT VFR BLD CALC: 40.2 % — SIGNIFICANT CHANGE UP (ref 39–50)
HCT VFR BLDA CALC: 36 % — SIGNIFICANT CHANGE UP
HCV AB S/CO SERPL IA: 0.09 S/CO — SIGNIFICANT CHANGE UP (ref 0–0.99)
HCV AB S/CO SERPL IA: 0.12 S/CO — SIGNIFICANT CHANGE UP (ref 0–0.99)
HCV AB SERPL-IMP: SIGNIFICANT CHANGE UP
HCV AB SERPL-IMP: SIGNIFICANT CHANGE UP
HDLC SERPL-MCNC: 26 MG/DL — LOW
HGB BLD CALC-MCNC: 11.9 G/DL — LOW (ref 12.6–17.4)
HGB BLD-MCNC: 10 G/DL — LOW (ref 13–17)
HGB BLD-MCNC: 10.1 G/DL — LOW (ref 13–17)
HGB BLD-MCNC: 10.2 G/DL — LOW (ref 13–17)
HGB BLD-MCNC: 10.3 G/DL — LOW (ref 13–17)
HGB BLD-MCNC: 10.3 G/DL — LOW (ref 13–17)
HGB BLD-MCNC: 10.4 G/DL — LOW (ref 13–17)
HGB BLD-MCNC: 10.4 G/DL — LOW (ref 13–17)
HGB BLD-MCNC: 10.6 G/DL — LOW (ref 13–17)
HGB BLD-MCNC: 10.7 G/DL — LOW (ref 13–17)
HGB BLD-MCNC: 10.8 G/DL — LOW (ref 13–17)
HGB BLD-MCNC: 11.1 G/DL — LOW (ref 13–17)
HGB BLD-MCNC: 11.3 G/DL — LOW (ref 13–17)
HGB BLD-MCNC: 11.4 G/DL — LOW (ref 13–17)
HGB BLD-MCNC: 11.6 G/DL — LOW (ref 13–17)
HGB BLD-MCNC: 11.7 G/DL — LOW (ref 13–17)
HGB BLD-MCNC: 12.8 G/DL — LOW (ref 13–17)
HGB BLD-MCNC: 6.6 G/DL — CRITICAL LOW (ref 13–17)
HGB BLD-MCNC: 6.7 G/DL — CRITICAL LOW (ref 13–17)
HGB BLD-MCNC: 7 G/DL — CRITICAL LOW (ref 13–17)
HGB BLD-MCNC: 7.1 G/DL — LOW (ref 13–17)
HGB BLD-MCNC: 7.7 G/DL — LOW (ref 13–17)
HGB BLD-MCNC: 7.7 G/DL — LOW (ref 13–17)
HGB BLD-MCNC: 7.8 G/DL — LOW (ref 13–17)
HGB BLD-MCNC: 7.9 G/DL — LOW (ref 13–17)
HGB BLD-MCNC: 8 G/DL — LOW (ref 13–17)
HGB BLD-MCNC: 8 G/DL — LOW (ref 13–17)
HGB BLD-MCNC: 8.2 G/DL — LOW (ref 13–17)
HGB BLD-MCNC: 8.4 G/DL — LOW (ref 13–17)
HGB BLD-MCNC: 8.4 G/DL — LOW (ref 13–17)
HGB BLD-MCNC: 8.5 G/DL — LOW (ref 13–17)
HGB BLD-MCNC: 8.5 G/DL — LOW (ref 13–17)
HGB BLD-MCNC: 8.6 G/DL — LOW (ref 13–17)
HGB BLD-MCNC: 8.7 G/DL — LOW (ref 13–17)
HGB BLD-MCNC: 8.7 G/DL — LOW (ref 13–17)
HGB BLD-MCNC: 8.9 G/DL — LOW (ref 13–17)
HGB BLD-MCNC: 9 G/DL — LOW (ref 13–17)
HGB BLD-MCNC: 9.1 G/DL — LOW (ref 13–17)
HGB BLD-MCNC: 9.1 G/DL — LOW (ref 13–17)
HGB BLD-MCNC: 9.2 G/DL — LOW (ref 13–17)
HGB BLD-MCNC: 9.2 G/DL — LOW (ref 13–17)
HGB BLD-MCNC: 9.3 G/DL — LOW (ref 13–17)
HGB BLD-MCNC: 9.4 G/DL — LOW (ref 13–17)
HGB BLD-MCNC: 9.5 G/DL — LOW (ref 13–17)
HGB BLD-MCNC: 9.5 G/DL — LOW (ref 13–17)
HGB BLD-MCNC: 9.6 G/DL — LOW (ref 13–17)
HGB BLD-MCNC: 9.8 G/DL — LOW (ref 13–17)
HGB BLD-MCNC: 9.9 G/DL — LOW (ref 13–17)
HMPV RNA SPEC QL NAA+PROBE: SIGNIFICANT CHANGE UP
HOROWITZ INDEX BLDA+IHG-RTO: 40 — SIGNIFICANT CHANGE UP
HPIV1 RNA SPEC QL NAA+PROBE: SIGNIFICANT CHANGE UP
HPIV2 RNA SPEC QL NAA+PROBE: SIGNIFICANT CHANGE UP
HPIV3 RNA SPEC QL NAA+PROBE: SIGNIFICANT CHANGE UP
HPIV4 RNA SPEC QL NAA+PROBE: SIGNIFICANT CHANGE UP
HYPOCHROMIA BLD QL: SIGNIFICANT CHANGE UP
HYPOCHROMIA BLD QL: SIGNIFICANT CHANGE UP
HYPOCHROMIA BLD QL: SLIGHT — SIGNIFICANT CHANGE UP
IMM GRANULOCYTES NFR BLD AUTO: 0.9 % — SIGNIFICANT CHANGE UP (ref 0–0.9)
IMM GRANULOCYTES NFR BLD AUTO: 0.9 % — SIGNIFICANT CHANGE UP (ref 0–0.9)
IMM GRANULOCYTES NFR BLD AUTO: 1 % — HIGH (ref 0–0.9)
IMM GRANULOCYTES NFR BLD AUTO: 1.1 % — HIGH (ref 0–0.9)
IMM GRANULOCYTES NFR BLD AUTO: 1.3 % — HIGH (ref 0–0.9)
IMM GRANULOCYTES NFR BLD AUTO: 1.6 % — HIGH (ref 0–0.9)
IMM GRANULOCYTES NFR BLD AUTO: 1.6 % — HIGH (ref 0–0.9)
IMM GRANULOCYTES NFR BLD AUTO: 2.5 % — HIGH (ref 0–0.9)
IMM GRANULOCYTES NFR BLD AUTO: 2.7 % — HIGH (ref 0–0.9)
IMM GRANULOCYTES NFR BLD AUTO: 8.9 % — HIGH (ref 0–0.9)
INR BLD: 1.08 RATIO — SIGNIFICANT CHANGE UP (ref 0.88–1.16)
INR BLD: 1.13 RATIO — SIGNIFICANT CHANGE UP (ref 0.88–1.16)
INR BLD: 1.13 RATIO — SIGNIFICANT CHANGE UP (ref 0.88–1.16)
INR BLD: 1.14 RATIO — SIGNIFICANT CHANGE UP (ref 0.88–1.16)
INR BLD: 1.14 RATIO — SIGNIFICANT CHANGE UP (ref 0.88–1.16)
INR BLD: 1.15 RATIO — SIGNIFICANT CHANGE UP (ref 0.88–1.16)
INR BLD: 1.21 RATIO — HIGH (ref 0.88–1.16)
INR BLD: 1.39 RATIO — HIGH (ref 0.88–1.16)
INR BLD: 1.39 RATIO — HIGH (ref 0.88–1.16)
INR BLD: 1.43 RATIO — HIGH (ref 0.88–1.16)
INR BLD: 1.43 RATIO — HIGH (ref 0.88–1.16)
INR BLD: 1.73 RATIO — HIGH (ref 0.88–1.16)
INR BLD: 1.75 RATIO — HIGH (ref 0.88–1.16)
INR BLD: 1.79 RATIO — HIGH (ref 0.88–1.16)
INR BLD: 1.86 RATIO — HIGH (ref 0.88–1.16)
INR BLD: 1.94 RATIO — HIGH (ref 0.88–1.16)
IRON SATN MFR SERPL: 11 % — LOW (ref 16–55)
IRON SATN MFR SERPL: 27 UG/DL — LOW (ref 59–158)
IRON SATN MFR SERPL: 36 % — SIGNIFICANT CHANGE UP (ref 16–55)
IRON SATN MFR SERPL: 58 UG/DL — LOW (ref 59–158)
JCPYV DNA # CSF NAA+PROBE: SIGNIFICANT CHANGE UP COPIES/ML
KETONES UR-MCNC: NEGATIVE — SIGNIFICANT CHANGE UP
LABORATORY COMMENT REPORT: SIGNIFICANT CHANGE UP
LACTATE BLDV-MCNC: 0.7 MMOL/L — SIGNIFICANT CHANGE UP (ref 0.5–2)
LACTATE BLDV-MCNC: 0.8 MMOL/L — SIGNIFICANT CHANGE UP (ref 0.5–2)
LACTATE BLDV-MCNC: 2.3 MMOL/L — HIGH (ref 0.5–2)
LACTATE SERPL-SCNC: 1.3 MMOL/L — SIGNIFICANT CHANGE UP (ref 0.5–2)
LACTATE SERPL-SCNC: 1.3 MMOL/L — SIGNIFICANT CHANGE UP (ref 0.5–2)
LDH CSF L TO P-CCNC: 100 U/L — SIGNIFICANT CHANGE UP
LDH FLD-CCNC: 100 U/L — SIGNIFICANT CHANGE UP
LDH SERPL L TO P-CCNC: 115 U/L — SIGNIFICANT CHANGE UP
LDH SERPL L TO P-CCNC: 95 U/L — SIGNIFICANT CHANGE UP
LEGIONELLA AG UR QL: NEGATIVE — SIGNIFICANT CHANGE UP
LEUKOCYTE ESTERASE UR-ACNC: ABNORMAL
LIDOCAIN IGE QN: 12 U/L — LOW (ref 22–51)
LIPID PNL WITH DIRECT LDL SERPL: 36 MG/DL — SIGNIFICANT CHANGE UP
LYMPHOCYTES # BLD AUTO: 0 % — LOW (ref 13–44)
LYMPHOCYTES # BLD AUTO: 0 K/UL — LOW (ref 1–3.3)
LYMPHOCYTES # BLD AUTO: 0.05 K/UL — LOW (ref 1–3.3)
LYMPHOCYTES # BLD AUTO: 0.09 K/UL — LOW (ref 1–3.3)
LYMPHOCYTES # BLD AUTO: 0.13 K/UL — LOW (ref 1–3.3)
LYMPHOCYTES # BLD AUTO: 0.14 K/UL — LOW (ref 1–3.3)
LYMPHOCYTES # BLD AUTO: 0.14 K/UL — LOW (ref 1–3.3)
LYMPHOCYTES # BLD AUTO: 0.15 K/UL — LOW (ref 1–3.3)
LYMPHOCYTES # BLD AUTO: 0.15 K/UL — LOW (ref 1–3.3)
LYMPHOCYTES # BLD AUTO: 0.18 K/UL — LOW (ref 1–3.3)
LYMPHOCYTES # BLD AUTO: 0.2 K/UL — LOW (ref 1–3.3)
LYMPHOCYTES # BLD AUTO: 0.22 K/UL — LOW (ref 1–3.3)
LYMPHOCYTES # BLD AUTO: 0.23 K/UL — LOW (ref 1–3.3)
LYMPHOCYTES # BLD AUTO: 0.24 K/UL — LOW (ref 1–3.3)
LYMPHOCYTES # BLD AUTO: 0.25 K/UL — LOW (ref 1–3.3)
LYMPHOCYTES # BLD AUTO: 0.26 K/UL — LOW (ref 1–3.3)
LYMPHOCYTES # BLD AUTO: 0.26 K/UL — LOW (ref 1–3.3)
LYMPHOCYTES # BLD AUTO: 0.32 K/UL — LOW (ref 1–3.3)
LYMPHOCYTES # BLD AUTO: 0.33 K/UL — LOW (ref 1–3.3)
LYMPHOCYTES # BLD AUTO: 0.44 K/UL — LOW (ref 1–3.3)
LYMPHOCYTES # BLD AUTO: 0.47 K/UL — LOW (ref 1–3.3)
LYMPHOCYTES # BLD AUTO: 0.51 K/UL — LOW (ref 1–3.3)
LYMPHOCYTES # BLD AUTO: 0.53 K/UL — LOW (ref 1–3.3)
LYMPHOCYTES # BLD AUTO: 0.58 K/UL — LOW (ref 1–3.3)
LYMPHOCYTES # BLD AUTO: 0.66 K/UL — LOW (ref 1–3.3)
LYMPHOCYTES # BLD AUTO: 0.9 % — LOW (ref 13–44)
LYMPHOCYTES # BLD AUTO: 1.4 % — LOW (ref 13–44)
LYMPHOCYTES # BLD AUTO: 1.7 % — LOW (ref 13–44)
LYMPHOCYTES # BLD AUTO: 1.8 % — LOW (ref 13–44)
LYMPHOCYTES # BLD AUTO: 2 % — LOW (ref 13–44)
LYMPHOCYTES # BLD AUTO: 2.2 % — LOW (ref 13–44)
LYMPHOCYTES # BLD AUTO: 2.5 % — LOW (ref 13–44)
LYMPHOCYTES # BLD AUTO: 2.6 % — LOW (ref 13–44)
LYMPHOCYTES # BLD AUTO: 3.5 % — LOW (ref 13–44)
LYMPHOCYTES # BLD AUTO: 4 % — LOW (ref 13–44)
LYMPHOCYTES # BLD AUTO: 4.3 % — LOW (ref 13–44)
LYMPHOCYTES # BLD AUTO: 4.3 % — LOW (ref 13–44)
LYMPHOCYTES # BLD AUTO: 4.4 % — LOW (ref 13–44)
LYMPHOCYTES # BLD AUTO: 4.9 % — LOW (ref 13–44)
LYMPHOCYTES # BLD AUTO: 5.1 % — LOW (ref 13–44)
LYMPHOCYTES # BLD AUTO: 5.2 % — LOW (ref 13–44)
LYMPHOCYTES # BLD AUTO: 5.3 % — LOW (ref 13–44)
LYMPHOCYTES # BLD AUTO: 7.5 % — LOW (ref 13–44)
LYMPHOCYTES # FLD: 23 % — SIGNIFICANT CHANGE UP
LYMPHOCYTES # FLD: 32 % — SIGNIFICANT CHANGE UP
LYMPHOCYTES # FLD: 34 % — SIGNIFICANT CHANGE UP
Lab: 188 UG/ML — SIGNIFICANT CHANGE UP (ref 176–323)
MACROCYTES BLD QL: SIGNIFICANT CHANGE UP
MACROCYTES BLD QL: SLIGHT — SIGNIFICANT CHANGE UP
MAGNESIUM SERPL-MCNC: 1.7 MG/DL — SIGNIFICANT CHANGE UP (ref 1.6–2.6)
MAGNESIUM SERPL-MCNC: 1.8 MG/DL — SIGNIFICANT CHANGE UP (ref 1.6–2.6)
MAGNESIUM SERPL-MCNC: 1.9 MG/DL — SIGNIFICANT CHANGE UP (ref 1.6–2.6)
MAGNESIUM SERPL-MCNC: 1.9 MG/DL — SIGNIFICANT CHANGE UP (ref 1.6–2.6)
MAGNESIUM SERPL-MCNC: 2 MG/DL — SIGNIFICANT CHANGE UP (ref 1.6–2.6)
MAGNESIUM SERPL-MCNC: 2 MG/DL — SIGNIFICANT CHANGE UP (ref 1.8–2.6)
MAGNESIUM SERPL-MCNC: 2.1 MG/DL — SIGNIFICANT CHANGE UP (ref 1.6–2.6)
MAGNESIUM SERPL-MCNC: 2.1 MG/DL — SIGNIFICANT CHANGE UP (ref 1.8–2.6)
MAGNESIUM SERPL-MCNC: 2.2 MG/DL — SIGNIFICANT CHANGE UP (ref 1.6–2.6)
MAGNESIUM SERPL-MCNC: 2.2 MG/DL — SIGNIFICANT CHANGE UP (ref 1.6–2.6)
MAGNESIUM SERPL-MCNC: 2.3 MG/DL — SIGNIFICANT CHANGE UP (ref 1.6–2.6)
MAGNESIUM SERPL-MCNC: 2.4 MG/DL — SIGNIFICANT CHANGE UP (ref 1.6–2.6)
MAGNESIUM SERPL-MCNC: 2.4 MG/DL — SIGNIFICANT CHANGE UP (ref 1.6–2.6)
MANUAL SMEAR VERIFICATION: SIGNIFICANT CHANGE UP
MCHC RBC-ENTMCNC: 26.6 PG — LOW (ref 27–34)
MCHC RBC-ENTMCNC: 27.2 PG — SIGNIFICANT CHANGE UP (ref 27–34)
MCHC RBC-ENTMCNC: 27.2 PG — SIGNIFICANT CHANGE UP (ref 27–34)
MCHC RBC-ENTMCNC: 27.3 PG — SIGNIFICANT CHANGE UP (ref 27–34)
MCHC RBC-ENTMCNC: 27.4 PG — SIGNIFICANT CHANGE UP (ref 27–34)
MCHC RBC-ENTMCNC: 27.5 PG — SIGNIFICANT CHANGE UP (ref 27–34)
MCHC RBC-ENTMCNC: 27.5 PG — SIGNIFICANT CHANGE UP (ref 27–34)
MCHC RBC-ENTMCNC: 27.6 PG — SIGNIFICANT CHANGE UP (ref 27–34)
MCHC RBC-ENTMCNC: 27.8 PG — SIGNIFICANT CHANGE UP (ref 27–34)
MCHC RBC-ENTMCNC: 27.8 PG — SIGNIFICANT CHANGE UP (ref 27–34)
MCHC RBC-ENTMCNC: 27.9 PG — SIGNIFICANT CHANGE UP (ref 27–34)
MCHC RBC-ENTMCNC: 27.9 PG — SIGNIFICANT CHANGE UP (ref 27–34)
MCHC RBC-ENTMCNC: 28 PG — SIGNIFICANT CHANGE UP (ref 27–34)
MCHC RBC-ENTMCNC: 28.6 PG — SIGNIFICANT CHANGE UP (ref 27–34)
MCHC RBC-ENTMCNC: 28.7 PG — SIGNIFICANT CHANGE UP (ref 27–34)
MCHC RBC-ENTMCNC: 29 GM/DL — LOW (ref 32–36)
MCHC RBC-ENTMCNC: 29 GM/DL — LOW (ref 32–36)
MCHC RBC-ENTMCNC: 29 PG — SIGNIFICANT CHANGE UP (ref 27–34)
MCHC RBC-ENTMCNC: 29.1 GM/DL — LOW (ref 32–36)
MCHC RBC-ENTMCNC: 29.1 PG — SIGNIFICANT CHANGE UP (ref 27–34)
MCHC RBC-ENTMCNC: 29.1 PG — SIGNIFICANT CHANGE UP (ref 27–34)
MCHC RBC-ENTMCNC: 29.2 GM/DL — LOW (ref 32–36)
MCHC RBC-ENTMCNC: 29.2 PG — SIGNIFICANT CHANGE UP (ref 27–34)
MCHC RBC-ENTMCNC: 29.3 PG — SIGNIFICANT CHANGE UP (ref 27–34)
MCHC RBC-ENTMCNC: 29.4 GM/DL — LOW (ref 32–36)
MCHC RBC-ENTMCNC: 29.4 GM/DL — LOW (ref 32–36)
MCHC RBC-ENTMCNC: 29.4 PG — SIGNIFICANT CHANGE UP (ref 27–34)
MCHC RBC-ENTMCNC: 29.5 PG — SIGNIFICANT CHANGE UP (ref 27–34)
MCHC RBC-ENTMCNC: 29.5 PG — SIGNIFICANT CHANGE UP (ref 27–34)
MCHC RBC-ENTMCNC: 29.6 GM/DL — LOW (ref 32–36)
MCHC RBC-ENTMCNC: 29.6 PG — SIGNIFICANT CHANGE UP (ref 27–34)
MCHC RBC-ENTMCNC: 29.7 GM/DL — LOW (ref 32–36)
MCHC RBC-ENTMCNC: 29.7 PG — SIGNIFICANT CHANGE UP (ref 27–34)
MCHC RBC-ENTMCNC: 29.8 GM/DL — LOW (ref 32–36)
MCHC RBC-ENTMCNC: 29.8 GM/DL — LOW (ref 32–36)
MCHC RBC-ENTMCNC: 29.8 PG — SIGNIFICANT CHANGE UP (ref 27–34)
MCHC RBC-ENTMCNC: 29.8 PG — SIGNIFICANT CHANGE UP (ref 27–34)
MCHC RBC-ENTMCNC: 29.9 PG — SIGNIFICANT CHANGE UP (ref 27–34)
MCHC RBC-ENTMCNC: 30 PG — SIGNIFICANT CHANGE UP (ref 27–34)
MCHC RBC-ENTMCNC: 30 PG — SIGNIFICANT CHANGE UP (ref 27–34)
MCHC RBC-ENTMCNC: 30.1 GM/DL — LOW (ref 32–36)
MCHC RBC-ENTMCNC: 30.1 PG — SIGNIFICANT CHANGE UP (ref 27–34)
MCHC RBC-ENTMCNC: 30.2 GM/DL — LOW (ref 32–36)
MCHC RBC-ENTMCNC: 30.2 PG — SIGNIFICANT CHANGE UP (ref 27–34)
MCHC RBC-ENTMCNC: 30.4 PG — SIGNIFICANT CHANGE UP (ref 27–34)
MCHC RBC-ENTMCNC: 30.5 GM/DL — LOW (ref 32–36)
MCHC RBC-ENTMCNC: 30.5 PG — SIGNIFICANT CHANGE UP (ref 27–34)
MCHC RBC-ENTMCNC: 30.7 GM/DL — LOW (ref 32–36)
MCHC RBC-ENTMCNC: 30.7 GM/DL — LOW (ref 32–36)
MCHC RBC-ENTMCNC: 30.7 PG — SIGNIFICANT CHANGE UP (ref 27–34)
MCHC RBC-ENTMCNC: 30.8 GM/DL — LOW (ref 32–36)
MCHC RBC-ENTMCNC: 30.8 GM/DL — LOW (ref 32–36)
MCHC RBC-ENTMCNC: 30.8 PG — SIGNIFICANT CHANGE UP (ref 27–34)
MCHC RBC-ENTMCNC: 30.9 GM/DL — LOW (ref 32–36)
MCHC RBC-ENTMCNC: 30.9 PG — SIGNIFICANT CHANGE UP (ref 27–34)
MCHC RBC-ENTMCNC: 30.9 PG — SIGNIFICANT CHANGE UP (ref 27–34)
MCHC RBC-ENTMCNC: 31 GM/DL — LOW (ref 32–36)
MCHC RBC-ENTMCNC: 31.1 GM/DL — LOW (ref 32–36)
MCHC RBC-ENTMCNC: 31.1 GM/DL — LOW (ref 32–36)
MCHC RBC-ENTMCNC: 31.1 PG — SIGNIFICANT CHANGE UP (ref 27–34)
MCHC RBC-ENTMCNC: 31.2 GM/DL — LOW (ref 32–36)
MCHC RBC-ENTMCNC: 31.2 GM/DL — LOW (ref 32–36)
MCHC RBC-ENTMCNC: 31.3 GM/DL — LOW (ref 32–36)
MCHC RBC-ENTMCNC: 31.3 PG — SIGNIFICANT CHANGE UP (ref 27–34)
MCHC RBC-ENTMCNC: 31.4 PG — SIGNIFICANT CHANGE UP (ref 27–34)
MCHC RBC-ENTMCNC: 31.5 GM/DL — LOW (ref 32–36)
MCHC RBC-ENTMCNC: 31.6 GM/DL — LOW (ref 32–36)
MCHC RBC-ENTMCNC: 31.6 GM/DL — LOW (ref 32–36)
MCHC RBC-ENTMCNC: 31.8 GM/DL — LOW (ref 32–36)
MCHC RBC-ENTMCNC: 31.9 GM/DL — LOW (ref 32–36)
MCHC RBC-ENTMCNC: 32 GM/DL — SIGNIFICANT CHANGE UP (ref 32–36)
MCHC RBC-ENTMCNC: 32.1 GM/DL — SIGNIFICANT CHANGE UP (ref 32–36)
MCHC RBC-ENTMCNC: 32.2 GM/DL — SIGNIFICANT CHANGE UP (ref 32–36)
MCHC RBC-ENTMCNC: 32.3 GM/DL — SIGNIFICANT CHANGE UP (ref 32–36)
MCHC RBC-ENTMCNC: 32.3 GM/DL — SIGNIFICANT CHANGE UP (ref 32–36)
MCHC RBC-ENTMCNC: 32.4 GM/DL — SIGNIFICANT CHANGE UP (ref 32–36)
MCHC RBC-ENTMCNC: 32.4 GM/DL — SIGNIFICANT CHANGE UP (ref 32–36)
MCHC RBC-ENTMCNC: 32.5 GM/DL — SIGNIFICANT CHANGE UP (ref 32–36)
MCHC RBC-ENTMCNC: 32.5 GM/DL — SIGNIFICANT CHANGE UP (ref 32–36)
MCHC RBC-ENTMCNC: 32.6 GM/DL — SIGNIFICANT CHANGE UP (ref 32–36)
MCHC RBC-ENTMCNC: 32.7 GM/DL — SIGNIFICANT CHANGE UP (ref 32–36)
MCHC RBC-ENTMCNC: 32.9 GM/DL — SIGNIFICANT CHANGE UP (ref 32–36)
MCHC RBC-ENTMCNC: 33 GM/DL — SIGNIFICANT CHANGE UP (ref 32–36)
MCHC RBC-ENTMCNC: 33.1 GM/DL — SIGNIFICANT CHANGE UP (ref 32–36)
MCHC RBC-ENTMCNC: 33.2 GM/DL — SIGNIFICANT CHANGE UP (ref 32–36)
MCHC RBC-ENTMCNC: 33.5 GM/DL — SIGNIFICANT CHANGE UP (ref 32–36)
MCV RBC AUTO: 82.3 FL — SIGNIFICANT CHANGE UP (ref 80–100)
MCV RBC AUTO: 82.9 FL — SIGNIFICANT CHANGE UP (ref 80–100)
MCV RBC AUTO: 83.9 FL — SIGNIFICANT CHANGE UP (ref 80–100)
MCV RBC AUTO: 84.5 FL — SIGNIFICANT CHANGE UP (ref 80–100)
MCV RBC AUTO: 86.1 FL — SIGNIFICANT CHANGE UP (ref 80–100)
MCV RBC AUTO: 86.3 FL — SIGNIFICANT CHANGE UP (ref 80–100)
MCV RBC AUTO: 86.4 FL — SIGNIFICANT CHANGE UP (ref 80–100)
MCV RBC AUTO: 86.4 FL — SIGNIFICANT CHANGE UP (ref 80–100)
MCV RBC AUTO: 86.9 FL — SIGNIFICANT CHANGE UP (ref 80–100)
MCV RBC AUTO: 89.9 FL — SIGNIFICANT CHANGE UP (ref 80–100)
MCV RBC AUTO: 91.6 FL — SIGNIFICANT CHANGE UP (ref 80–100)
MCV RBC AUTO: 92.2 FL — SIGNIFICANT CHANGE UP (ref 80–100)
MCV RBC AUTO: 92.3 FL — SIGNIFICANT CHANGE UP (ref 80–100)
MCV RBC AUTO: 92.5 FL — SIGNIFICANT CHANGE UP (ref 80–100)
MCV RBC AUTO: 92.8 FL — SIGNIFICANT CHANGE UP (ref 80–100)
MCV RBC AUTO: 92.9 FL — SIGNIFICANT CHANGE UP (ref 80–100)
MCV RBC AUTO: 92.9 FL — SIGNIFICANT CHANGE UP (ref 80–100)
MCV RBC AUTO: 93.2 FL — SIGNIFICANT CHANGE UP (ref 80–100)
MCV RBC AUTO: 93.4 FL — SIGNIFICANT CHANGE UP (ref 80–100)
MCV RBC AUTO: 93.5 FL — SIGNIFICANT CHANGE UP (ref 80–100)
MCV RBC AUTO: 94.1 FL — SIGNIFICANT CHANGE UP (ref 80–100)
MCV RBC AUTO: 94.2 FL — SIGNIFICANT CHANGE UP (ref 80–100)
MCV RBC AUTO: 94.3 FL — SIGNIFICANT CHANGE UP (ref 80–100)
MCV RBC AUTO: 94.3 FL — SIGNIFICANT CHANGE UP (ref 80–100)
MCV RBC AUTO: 94.5 FL — SIGNIFICANT CHANGE UP (ref 80–100)
MCV RBC AUTO: 94.6 FL — SIGNIFICANT CHANGE UP (ref 80–100)
MCV RBC AUTO: 94.7 FL — SIGNIFICANT CHANGE UP (ref 80–100)
MCV RBC AUTO: 94.7 FL — SIGNIFICANT CHANGE UP (ref 80–100)
MCV RBC AUTO: 94.9 FL — SIGNIFICANT CHANGE UP (ref 80–100)
MCV RBC AUTO: 95 FL — SIGNIFICANT CHANGE UP (ref 80–100)
MCV RBC AUTO: 95 FL — SIGNIFICANT CHANGE UP (ref 80–100)
MCV RBC AUTO: 95.2 FL — SIGNIFICANT CHANGE UP (ref 80–100)
MCV RBC AUTO: 95.3 FL — SIGNIFICANT CHANGE UP (ref 80–100)
MCV RBC AUTO: 95.4 FL — SIGNIFICANT CHANGE UP (ref 80–100)
MCV RBC AUTO: 95.5 FL — SIGNIFICANT CHANGE UP (ref 80–100)
MCV RBC AUTO: 95.5 FL — SIGNIFICANT CHANGE UP (ref 80–100)
MCV RBC AUTO: 95.7 FL — SIGNIFICANT CHANGE UP (ref 80–100)
MCV RBC AUTO: 95.8 FL — SIGNIFICANT CHANGE UP (ref 80–100)
MCV RBC AUTO: 96 FL — SIGNIFICANT CHANGE UP (ref 80–100)
MCV RBC AUTO: 96.6 FL — SIGNIFICANT CHANGE UP (ref 80–100)
MCV RBC AUTO: 97.1 FL — SIGNIFICANT CHANGE UP (ref 80–100)
MCV RBC AUTO: 97.4 FL — SIGNIFICANT CHANGE UP (ref 80–100)
MCV RBC AUTO: 97.6 FL — SIGNIFICANT CHANGE UP (ref 80–100)
MCV RBC AUTO: 97.6 FL — SIGNIFICANT CHANGE UP (ref 80–100)
MCV RBC AUTO: 97.7 FL — SIGNIFICANT CHANGE UP (ref 80–100)
MCV RBC AUTO: 97.7 FL — SIGNIFICANT CHANGE UP (ref 80–100)
MCV RBC AUTO: 98.1 FL — SIGNIFICANT CHANGE UP (ref 80–100)
MCV RBC AUTO: 98.2 FL — SIGNIFICANT CHANGE UP (ref 80–100)
MCV RBC AUTO: 98.4 FL — SIGNIFICANT CHANGE UP (ref 80–100)
MCV RBC AUTO: 98.5 FL — SIGNIFICANT CHANGE UP (ref 80–100)
MCV RBC AUTO: 98.5 FL — SIGNIFICANT CHANGE UP (ref 80–100)
MCV RBC AUTO: 98.8 FL — SIGNIFICANT CHANGE UP (ref 80–100)
MCV RBC AUTO: 98.9 FL — SIGNIFICANT CHANGE UP (ref 80–100)
MCV RBC AUTO: 99.1 FL — SIGNIFICANT CHANGE UP (ref 80–100)
MELD SCORE WITH DIALYSIS: 22 POINTS — SIGNIFICANT CHANGE UP
MELD SCORE WITHOUT DIALYSIS: 22 POINTS — SIGNIFICANT CHANGE UP
MESOTHL CELL # FLD: 1 % — SIGNIFICANT CHANGE UP
METAMYELOCYTES # FLD: 0.8 % — HIGH (ref 0–0)
METAMYELOCYTES # FLD: 0.9 % — HIGH (ref 0–0)
METAMYELOCYTES # FLD: 2.7 % — HIGH (ref 0–0)
METHOD TYPE: SIGNIFICANT CHANGE UP
MICROCYTES BLD QL: SLIGHT — SIGNIFICANT CHANGE UP
MONOCYTES # BLD AUTO: 0 K/UL — SIGNIFICANT CHANGE UP (ref 0–0.9)
MONOCYTES # BLD AUTO: 0 K/UL — SIGNIFICANT CHANGE UP (ref 0–0.9)
MONOCYTES # BLD AUTO: 0.1 K/UL — SIGNIFICANT CHANGE UP (ref 0–0.9)
MONOCYTES # BLD AUTO: 0.27 K/UL — SIGNIFICANT CHANGE UP (ref 0–0.9)
MONOCYTES # BLD AUTO: 0.32 K/UL — SIGNIFICANT CHANGE UP (ref 0–0.9)
MONOCYTES # BLD AUTO: 0.32 K/UL — SIGNIFICANT CHANGE UP (ref 0–0.9)
MONOCYTES # BLD AUTO: 0.35 K/UL — SIGNIFICANT CHANGE UP (ref 0–0.9)
MONOCYTES # BLD AUTO: 0.39 K/UL — SIGNIFICANT CHANGE UP (ref 0–0.9)
MONOCYTES # BLD AUTO: 0.45 K/UL — SIGNIFICANT CHANGE UP (ref 0–0.9)
MONOCYTES # BLD AUTO: 0.45 K/UL — SIGNIFICANT CHANGE UP (ref 0–0.9)
MONOCYTES # BLD AUTO: 0.47 K/UL — SIGNIFICANT CHANGE UP (ref 0–0.9)
MONOCYTES # BLD AUTO: 0.51 K/UL — SIGNIFICANT CHANGE UP (ref 0–0.9)
MONOCYTES # BLD AUTO: 0.54 K/UL — SIGNIFICANT CHANGE UP (ref 0–0.9)
MONOCYTES # BLD AUTO: 0.54 K/UL — SIGNIFICANT CHANGE UP (ref 0–0.9)
MONOCYTES # BLD AUTO: 0.59 K/UL — SIGNIFICANT CHANGE UP (ref 0–0.9)
MONOCYTES # BLD AUTO: 0.62 K/UL — SIGNIFICANT CHANGE UP (ref 0–0.9)
MONOCYTES # BLD AUTO: 0.65 K/UL — SIGNIFICANT CHANGE UP (ref 0–0.9)
MONOCYTES # BLD AUTO: 0.75 K/UL — SIGNIFICANT CHANGE UP (ref 0–0.9)
MONOCYTES # BLD AUTO: 0.77 K/UL — SIGNIFICANT CHANGE UP (ref 0–0.9)
MONOCYTES # BLD AUTO: 0.78 K/UL — SIGNIFICANT CHANGE UP (ref 0–0.9)
MONOCYTES # BLD AUTO: 0.79 K/UL — SIGNIFICANT CHANGE UP (ref 0–0.9)
MONOCYTES # BLD AUTO: 0.8 K/UL — SIGNIFICANT CHANGE UP (ref 0–0.9)
MONOCYTES # BLD AUTO: 0.8 K/UL — SIGNIFICANT CHANGE UP (ref 0–0.9)
MONOCYTES # BLD AUTO: 0.81 K/UL — SIGNIFICANT CHANGE UP (ref 0–0.9)
MONOCYTES # BLD AUTO: 0.82 K/UL — SIGNIFICANT CHANGE UP (ref 0–0.9)
MONOCYTES # BLD AUTO: 0.85 K/UL — SIGNIFICANT CHANGE UP (ref 0–0.9)
MONOCYTES # BLD AUTO: 0.93 K/UL — HIGH (ref 0–0.9)
MONOCYTES # BLD AUTO: 1 K/UL — HIGH (ref 0–0.9)
MONOCYTES # BLD AUTO: 1.46 K/UL — HIGH (ref 0–0.9)
MONOCYTES # BLD AUTO: 1.5 K/UL — HIGH (ref 0–0.9)
MONOCYTES NFR BLD AUTO: 0 % — LOW (ref 2–14)
MONOCYTES NFR BLD AUTO: 0 % — LOW (ref 2–14)
MONOCYTES NFR BLD AUTO: 1.7 % — LOW (ref 2–14)
MONOCYTES NFR BLD AUTO: 10.2 % — SIGNIFICANT CHANGE UP (ref 2–14)
MONOCYTES NFR BLD AUTO: 10.5 % — SIGNIFICANT CHANGE UP (ref 2–14)
MONOCYTES NFR BLD AUTO: 14 % — SIGNIFICANT CHANGE UP (ref 2–14)
MONOCYTES NFR BLD AUTO: 14.9 % — HIGH (ref 2–14)
MONOCYTES NFR BLD AUTO: 15.7 % — HIGH (ref 2–14)
MONOCYTES NFR BLD AUTO: 2.7 % — SIGNIFICANT CHANGE UP (ref 2–14)
MONOCYTES NFR BLD AUTO: 2.8 % — SIGNIFICANT CHANGE UP (ref 2–14)
MONOCYTES NFR BLD AUTO: 3.3 % — SIGNIFICANT CHANGE UP (ref 2–14)
MONOCYTES NFR BLD AUTO: 3.4 % — SIGNIFICANT CHANGE UP (ref 2–14)
MONOCYTES NFR BLD AUTO: 4.4 % — SIGNIFICANT CHANGE UP (ref 2–14)
MONOCYTES NFR BLD AUTO: 4.9 % — SIGNIFICANT CHANGE UP (ref 2–14)
MONOCYTES NFR BLD AUTO: 5 % — SIGNIFICANT CHANGE UP (ref 2–14)
MONOCYTES NFR BLD AUTO: 5.2 % — SIGNIFICANT CHANGE UP (ref 2–14)
MONOCYTES NFR BLD AUTO: 5.2 % — SIGNIFICANT CHANGE UP (ref 2–14)
MONOCYTES NFR BLD AUTO: 5.3 % — SIGNIFICANT CHANGE UP (ref 2–14)
MONOCYTES NFR BLD AUTO: 5.4 % — SIGNIFICANT CHANGE UP (ref 2–14)
MONOCYTES NFR BLD AUTO: 6 % — SIGNIFICANT CHANGE UP (ref 2–14)
MONOCYTES NFR BLD AUTO: 7 % — SIGNIFICANT CHANGE UP (ref 2–14)
MONOCYTES NFR BLD AUTO: 7.4 % — SIGNIFICANT CHANGE UP (ref 2–14)
MONOCYTES NFR BLD AUTO: 7.8 % — SIGNIFICANT CHANGE UP (ref 2–14)
MONOCYTES NFR BLD AUTO: 8.1 % — SIGNIFICANT CHANGE UP (ref 2–14)
MONOCYTES NFR BLD AUTO: 8.8 % — SIGNIFICANT CHANGE UP (ref 2–14)
MONOCYTES NFR BLD AUTO: 9.1 % — SIGNIFICANT CHANGE UP (ref 2–14)
MONOCYTES NFR BLD AUTO: 9.4 % — SIGNIFICANT CHANGE UP (ref 2–14)
MONOCYTES NFR BLD AUTO: 9.5 % — SIGNIFICANT CHANGE UP (ref 2–14)
MONOS+MACROS # FLD: 16 % — SIGNIFICANT CHANGE UP
MONOS+MACROS # FLD: 2 % — SIGNIFICANT CHANGE UP
MONOS+MACROS # FLD: 8 % — SIGNIFICANT CHANGE UP
MRSA PCR RESULT.: SIGNIFICANT CHANGE UP
MYELOCYTES NFR BLD: 0.9 % — HIGH (ref 0–0)
MYELOCYTES NFR BLD: 1.7 % — HIGH (ref 0–0)
MYELOCYTES NFR BLD: 1.7 % — HIGH (ref 0–0)
MYELOCYTES NFR BLD: 1.8 % — HIGH (ref 0–0)
MYELOCYTES NFR BLD: 1.8 % — HIGH (ref 0–0)
MYELOCYTES NFR BLD: 2.5 % — HIGH (ref 0–0)
MYELOCYTES NFR BLD: 3.5 % — HIGH (ref 0–0)
MYELOCYTES NFR BLD: 3.5 % — HIGH (ref 0–0)
MYELOCYTES NFR BLD: 5.1 % — HIGH (ref 0–0)
NEUTROPHILS # BLD AUTO: 10.62 K/UL — HIGH (ref 1.8–7.4)
NEUTROPHILS # BLD AUTO: 10.66 K/UL — HIGH (ref 1.8–7.4)
NEUTROPHILS # BLD AUTO: 10.75 K/UL — HIGH (ref 1.8–7.4)
NEUTROPHILS # BLD AUTO: 11.26 K/UL — HIGH (ref 1.8–7.4)
NEUTROPHILS # BLD AUTO: 11.69 K/UL — HIGH (ref 1.8–7.4)
NEUTROPHILS # BLD AUTO: 12.14 K/UL — HIGH (ref 1.8–7.4)
NEUTROPHILS # BLD AUTO: 14.3 K/UL — HIGH (ref 1.8–7.4)
NEUTROPHILS # BLD AUTO: 14.62 K/UL — HIGH (ref 1.8–7.4)
NEUTROPHILS # BLD AUTO: 14.91 K/UL — HIGH (ref 1.8–7.4)
NEUTROPHILS # BLD AUTO: 26.49 K/UL — HIGH (ref 1.8–7.4)
NEUTROPHILS # BLD AUTO: 3.92 K/UL — SIGNIFICANT CHANGE UP (ref 1.8–7.4)
NEUTROPHILS # BLD AUTO: 4.33 K/UL — SIGNIFICANT CHANGE UP (ref 1.8–7.4)
NEUTROPHILS # BLD AUTO: 4.95 K/UL — SIGNIFICANT CHANGE UP (ref 1.8–7.4)
NEUTROPHILS # BLD AUTO: 5.02 K/UL — SIGNIFICANT CHANGE UP (ref 1.8–7.4)
NEUTROPHILS # BLD AUTO: 5.18 K/UL — SIGNIFICANT CHANGE UP (ref 1.8–7.4)
NEUTROPHILS # BLD AUTO: 5.29 K/UL — SIGNIFICANT CHANGE UP (ref 1.8–7.4)
NEUTROPHILS # BLD AUTO: 5.49 K/UL — SIGNIFICANT CHANGE UP (ref 1.8–7.4)
NEUTROPHILS # BLD AUTO: 5.7 K/UL — SIGNIFICANT CHANGE UP (ref 1.8–7.4)
NEUTROPHILS # BLD AUTO: 6.61 K/UL — SIGNIFICANT CHANGE UP (ref 1.8–7.4)
NEUTROPHILS # BLD AUTO: 6.7 K/UL — SIGNIFICANT CHANGE UP (ref 1.8–7.4)
NEUTROPHILS # BLD AUTO: 6.99 K/UL — SIGNIFICANT CHANGE UP (ref 1.8–7.4)
NEUTROPHILS # BLD AUTO: 7.67 K/UL — HIGH (ref 1.8–7.4)
NEUTROPHILS # BLD AUTO: 7.86 K/UL — HIGH (ref 1.8–7.4)
NEUTROPHILS # BLD AUTO: 8.01 K/UL — HIGH (ref 1.8–7.4)
NEUTROPHILS # BLD AUTO: 8.21 K/UL — HIGH (ref 1.8–7.4)
NEUTROPHILS # BLD AUTO: 8.62 K/UL — HIGH (ref 1.8–7.4)
NEUTROPHILS # BLD AUTO: 8.83 K/UL — HIGH (ref 1.8–7.4)
NEUTROPHILS # BLD AUTO: 8.85 K/UL — HIGH (ref 1.8–7.4)
NEUTROPHILS # BLD AUTO: 8.96 K/UL — HIGH (ref 1.8–7.4)
NEUTROPHILS # BLD AUTO: 9.25 K/UL — HIGH (ref 1.8–7.4)
NEUTROPHILS # CSF: SIGNIFICANT CHANGE UP % (ref 0–6)
NEUTROPHILS # CSF: SIGNIFICANT CHANGE UP % (ref 0–6)
NEUTROPHILS NFR BLD AUTO: 76.1 % — SIGNIFICANT CHANGE UP (ref 43–77)
NEUTROPHILS NFR BLD AUTO: 76.4 % — SIGNIFICANT CHANGE UP (ref 43–77)
NEUTROPHILS NFR BLD AUTO: 77.3 % — HIGH (ref 43–77)
NEUTROPHILS NFR BLD AUTO: 79.5 % — HIGH (ref 43–77)
NEUTROPHILS NFR BLD AUTO: 81.4 % — HIGH (ref 43–77)
NEUTROPHILS NFR BLD AUTO: 81.6 % — HIGH (ref 43–77)
NEUTROPHILS NFR BLD AUTO: 81.6 % — HIGH (ref 43–77)
NEUTROPHILS NFR BLD AUTO: 84 % — HIGH (ref 43–77)
NEUTROPHILS NFR BLD AUTO: 85.1 % — HIGH (ref 43–77)
NEUTROPHILS NFR BLD AUTO: 85.6 % — HIGH (ref 43–77)
NEUTROPHILS NFR BLD AUTO: 85.7 % — HIGH (ref 43–77)
NEUTROPHILS NFR BLD AUTO: 86.8 % — HIGH (ref 43–77)
NEUTROPHILS NFR BLD AUTO: 86.8 % — HIGH (ref 43–77)
NEUTROPHILS NFR BLD AUTO: 87 % — HIGH (ref 43–77)
NEUTROPHILS NFR BLD AUTO: 87.8 % — HIGH (ref 43–77)
NEUTROPHILS NFR BLD AUTO: 87.9 % — HIGH (ref 43–77)
NEUTROPHILS NFR BLD AUTO: 89.3 % — HIGH (ref 43–77)
NEUTROPHILS NFR BLD AUTO: 90.1 % — HIGH (ref 43–77)
NEUTROPHILS NFR BLD AUTO: 90.4 % — HIGH (ref 43–77)
NEUTROPHILS NFR BLD AUTO: 91.2 % — HIGH (ref 43–77)
NEUTROPHILS NFR BLD AUTO: 91.5 % — HIGH (ref 43–77)
NEUTROPHILS NFR BLD AUTO: 92.6 % — HIGH (ref 43–77)
NEUTROPHILS NFR BLD AUTO: 92.8 % — HIGH (ref 43–77)
NEUTROPHILS NFR BLD AUTO: 93 % — HIGH (ref 43–77)
NEUTROPHILS NFR BLD AUTO: 93 % — HIGH (ref 43–77)
NEUTROPHILS NFR BLD AUTO: 93.6 % — HIGH (ref 43–77)
NEUTROPHILS NFR BLD AUTO: 93.9 % — HIGH (ref 43–77)
NEUTROPHILS NFR BLD AUTO: 95.6 % — HIGH (ref 43–77)
NEUTROPHILS-BODY FLUID: 60 % — SIGNIFICANT CHANGE UP
NEUTROPHILS-BODY FLUID: 60 % — SIGNIFICANT CHANGE UP
NEUTROPHILS-BODY FLUID: 64 % — SIGNIFICANT CHANGE UP
NEUTS BAND # BLD: 0.8 % — SIGNIFICANT CHANGE UP (ref 0–8)
NEUTS BAND # BLD: 0.9 % — SIGNIFICANT CHANGE UP (ref 0–8)
NEUTS BAND # BLD: 7 % — SIGNIFICANT CHANGE UP (ref 0–8)
NIGHT BLUE STAIN TISS: SIGNIFICANT CHANGE UP
NITRITE UR-MCNC: NEGATIVE — SIGNIFICANT CHANGE UP
NON HDL CHOLESTEROL: 58 MG/DL — SIGNIFICANT CHANGE UP
NON-GYNECOLOGICAL CYTOLOGY STUDY: SIGNIFICANT CHANGE UP
NON-GYNECOLOGICAL CYTOLOGY STUDY: SIGNIFICANT CHANGE UP
NRBC # BLD: 1 /100 — HIGH (ref 0–0)
NRBC # BLD: 1 /100 — HIGH (ref 0–0)
NRBC # FLD: 9 % — SIGNIFICANT CHANGE UP (ref 0–0)
NRBC NFR CSF: 2 /UL — SIGNIFICANT CHANGE UP (ref 0–5)
NRBC NFR CSF: 4 /UL — SIGNIFICANT CHANGE UP (ref 0–5)
NT-PROBNP SERPL-SCNC: HIGH PG/ML (ref 0–300)
OB PNL STL: POSITIVE
ORGANISM # SPEC MICROSCOPIC CNT: SIGNIFICANT CHANGE UP
OVALOCYTES BLD QL SMEAR: SLIGHT — SIGNIFICANT CHANGE UP
PA ADP PRP-ACNC: 259 PRU — SIGNIFICANT CHANGE UP (ref 180–376)
PCO2 BLDA: 52 MMHG — HIGH (ref 35–48)
PCO2 BLDV: 53 MMHG — SIGNIFICANT CHANGE UP (ref 42–55)
PH BLDA: 7.38 — SIGNIFICANT CHANGE UP (ref 7.35–7.45)
PH BLDV: 7.33 — SIGNIFICANT CHANGE UP (ref 7.32–7.43)
PH UR: 8 — SIGNIFICANT CHANGE UP (ref 5–8)
PHOSPHATE SERPL-MCNC: 2.7 MG/DL — SIGNIFICANT CHANGE UP (ref 2.4–4.7)
PHOSPHATE SERPL-MCNC: 3.7 MG/DL — SIGNIFICANT CHANGE UP (ref 2.4–4.7)
PHOSPHATE SERPL-MCNC: 3.8 MG/DL — SIGNIFICANT CHANGE UP (ref 2.4–4.7)
PHOSPHATE SERPL-MCNC: 3.9 MG/DL — SIGNIFICANT CHANGE UP (ref 2.4–4.7)
PHOSPHATE SERPL-MCNC: 4 MG/DL — SIGNIFICANT CHANGE UP (ref 2.4–4.7)
PHOSPHATE SERPL-MCNC: 4.1 MG/DL — SIGNIFICANT CHANGE UP (ref 2.4–4.7)
PHOSPHATE SERPL-MCNC: 4.3 MG/DL — SIGNIFICANT CHANGE UP (ref 2.4–4.7)
PHOSPHATE SERPL-MCNC: 4.3 MG/DL — SIGNIFICANT CHANGE UP (ref 2.4–4.7)
PHOSPHATE SERPL-MCNC: 4.4 MG/DL — SIGNIFICANT CHANGE UP (ref 2.4–4.7)
PHOSPHATE SERPL-MCNC: 4.5 MG/DL — SIGNIFICANT CHANGE UP (ref 2.4–4.7)
PHOSPHATE SERPL-MCNC: 4.6 MG/DL — SIGNIFICANT CHANGE UP (ref 2.4–4.7)
PHOSPHATE SERPL-MCNC: 4.8 MG/DL — HIGH (ref 2.4–4.7)
PHOSPHATE SERPL-MCNC: 5.2 MG/DL — HIGH (ref 2.4–4.7)
PHOSPHATE SERPL-MCNC: 5.8 MG/DL — HIGH (ref 2.4–4.7)
PHOSPHATE SERPL-MCNC: 6.1 MG/DL — HIGH (ref 2.4–4.7)
PHOSPHATE SERPL-MCNC: 6.2 MG/DL — HIGH (ref 2.4–4.7)
PHOSPHATE SERPL-MCNC: 6.3 MG/DL — HIGH (ref 2.4–4.7)
PHOSPHATE SERPL-MCNC: 6.9 MG/DL — HIGH (ref 2.4–4.7)
PLAT MORPH BLD: ABNORMAL
PLAT MORPH BLD: NORMAL — SIGNIFICANT CHANGE UP
PLATELET # BLD AUTO: 102 K/UL — LOW (ref 150–400)
PLATELET # BLD AUTO: 102 K/UL — LOW (ref 150–400)
PLATELET # BLD AUTO: 106 K/UL — LOW (ref 150–400)
PLATELET # BLD AUTO: 110 K/UL — LOW (ref 150–400)
PLATELET # BLD AUTO: 112 K/UL — LOW (ref 150–400)
PLATELET # BLD AUTO: 113 K/UL — LOW (ref 150–400)
PLATELET # BLD AUTO: 114 K/UL — LOW (ref 150–400)
PLATELET # BLD AUTO: 118 K/UL — LOW (ref 150–400)
PLATELET # BLD AUTO: 124 K/UL — LOW (ref 150–400)
PLATELET # BLD AUTO: 126 K/UL — LOW (ref 150–400)
PLATELET # BLD AUTO: 126 K/UL — LOW (ref 150–400)
PLATELET # BLD AUTO: 127 K/UL — LOW (ref 150–400)
PLATELET # BLD AUTO: 133 K/UL — LOW (ref 150–400)
PLATELET # BLD AUTO: 135 K/UL — LOW (ref 150–400)
PLATELET # BLD AUTO: 137 K/UL — LOW (ref 150–400)
PLATELET # BLD AUTO: 137 K/UL — LOW (ref 150–400)
PLATELET # BLD AUTO: 138 K/UL — LOW (ref 150–400)
PLATELET # BLD AUTO: 139 K/UL — LOW (ref 150–400)
PLATELET # BLD AUTO: 140 K/UL — LOW (ref 150–400)
PLATELET # BLD AUTO: 144 K/UL — LOW (ref 150–400)
PLATELET # BLD AUTO: 144 K/UL — LOW (ref 150–400)
PLATELET # BLD AUTO: 146 K/UL — LOW (ref 150–400)
PLATELET # BLD AUTO: 147 K/UL — LOW (ref 150–400)
PLATELET # BLD AUTO: 154 K/UL — SIGNIFICANT CHANGE UP (ref 150–400)
PLATELET # BLD AUTO: 156 K/UL — SIGNIFICANT CHANGE UP (ref 150–400)
PLATELET # BLD AUTO: 157 K/UL — SIGNIFICANT CHANGE UP (ref 150–400)
PLATELET # BLD AUTO: 160 K/UL — SIGNIFICANT CHANGE UP (ref 150–400)
PLATELET # BLD AUTO: 165 K/UL — SIGNIFICANT CHANGE UP (ref 150–400)
PLATELET # BLD AUTO: 167 K/UL — SIGNIFICANT CHANGE UP (ref 150–400)
PLATELET # BLD AUTO: 168 K/UL — SIGNIFICANT CHANGE UP (ref 150–400)
PLATELET # BLD AUTO: 168 K/UL — SIGNIFICANT CHANGE UP (ref 150–400)
PLATELET # BLD AUTO: 187 K/UL — SIGNIFICANT CHANGE UP (ref 150–400)
PLATELET # BLD AUTO: 189 K/UL — SIGNIFICANT CHANGE UP (ref 150–400)
PLATELET # BLD AUTO: 193 K/UL — SIGNIFICANT CHANGE UP (ref 150–400)
PLATELET # BLD AUTO: 196 K/UL — SIGNIFICANT CHANGE UP (ref 150–400)
PLATELET # BLD AUTO: 202 K/UL — SIGNIFICANT CHANGE UP (ref 150–400)
PLATELET # BLD AUTO: 203 K/UL — SIGNIFICANT CHANGE UP (ref 150–400)
PLATELET # BLD AUTO: 207 K/UL — SIGNIFICANT CHANGE UP (ref 150–400)
PLATELET # BLD AUTO: 208 K/UL — SIGNIFICANT CHANGE UP (ref 150–400)
PLATELET # BLD AUTO: 212 K/UL — SIGNIFICANT CHANGE UP (ref 150–400)
PLATELET # BLD AUTO: 222 K/UL — SIGNIFICANT CHANGE UP (ref 150–400)
PLATELET # BLD AUTO: 224 K/UL — SIGNIFICANT CHANGE UP (ref 150–400)
PLATELET # BLD AUTO: 240 K/UL — SIGNIFICANT CHANGE UP (ref 150–400)
PLATELET # BLD AUTO: 240 K/UL — SIGNIFICANT CHANGE UP (ref 150–400)
PLATELET # BLD AUTO: 260 K/UL — SIGNIFICANT CHANGE UP (ref 150–400)
PLATELET # BLD AUTO: 262 K/UL — SIGNIFICANT CHANGE UP (ref 150–400)
PLATELET # BLD AUTO: 280 K/UL — SIGNIFICANT CHANGE UP (ref 150–400)
PLATELET # BLD AUTO: 281 K/UL — SIGNIFICANT CHANGE UP (ref 150–400)
PLATELET # BLD AUTO: 83 K/UL — LOW (ref 150–400)
PLATELET # BLD AUTO: 89 K/UL — LOW (ref 150–400)
PLATELET # BLD AUTO: 97 K/UL — LOW (ref 150–400)
PLATELET # BLD AUTO: 99 K/UL — LOW (ref 150–400)
PLATELET # BLD AUTO: SIGNIFICANT CHANGE UP K/UL (ref 150–400)
PO2 BLDA: 101 MMHG — SIGNIFICANT CHANGE UP (ref 83–108)
PO2 BLDV: 75 MMHG — HIGH (ref 25–45)
POIKILOCYTOSIS BLD QL AUTO: SIGNIFICANT CHANGE UP
POIKILOCYTOSIS BLD QL AUTO: SLIGHT — SIGNIFICANT CHANGE UP
POLYCHROMASIA BLD QL SMEAR: SIGNIFICANT CHANGE UP
POLYCHROMASIA BLD QL SMEAR: SLIGHT — SIGNIFICANT CHANGE UP
POTASSIUM BLDV-SCNC: 3.6 MMOL/L — SIGNIFICANT CHANGE UP (ref 3.5–5.1)
POTASSIUM SERPL-MCNC: 3.5 MMOL/L — SIGNIFICANT CHANGE UP (ref 3.5–5.3)
POTASSIUM SERPL-MCNC: 3.6 MMOL/L — SIGNIFICANT CHANGE UP (ref 3.5–5.3)
POTASSIUM SERPL-MCNC: 3.6 MMOL/L — SIGNIFICANT CHANGE UP (ref 3.5–5.3)
POTASSIUM SERPL-MCNC: 3.7 MMOL/L — SIGNIFICANT CHANGE UP (ref 3.5–5.3)
POTASSIUM SERPL-MCNC: 3.8 MMOL/L — SIGNIFICANT CHANGE UP (ref 3.5–5.3)
POTASSIUM SERPL-MCNC: 3.9 MMOL/L — SIGNIFICANT CHANGE UP (ref 3.5–5.3)
POTASSIUM SERPL-MCNC: 4 MMOL/L — SIGNIFICANT CHANGE UP (ref 3.5–5.3)
POTASSIUM SERPL-MCNC: 4.1 MMOL/L — SIGNIFICANT CHANGE UP (ref 3.5–5.3)
POTASSIUM SERPL-MCNC: 4.2 MMOL/L — SIGNIFICANT CHANGE UP (ref 3.5–5.3)
POTASSIUM SERPL-MCNC: 4.2 MMOL/L — SIGNIFICANT CHANGE UP (ref 3.5–5.3)
POTASSIUM SERPL-MCNC: 4.3 MMOL/L — SIGNIFICANT CHANGE UP (ref 3.5–5.3)
POTASSIUM SERPL-MCNC: 4.3 MMOL/L — SIGNIFICANT CHANGE UP (ref 3.5–5.3)
POTASSIUM SERPL-MCNC: 4.4 MMOL/L — SIGNIFICANT CHANGE UP (ref 3.5–5.3)
POTASSIUM SERPL-MCNC: 4.4 MMOL/L — SIGNIFICANT CHANGE UP (ref 3.5–5.3)
POTASSIUM SERPL-MCNC: 4.5 MMOL/L — SIGNIFICANT CHANGE UP (ref 3.5–5.3)
POTASSIUM SERPL-MCNC: 4.7 MMOL/L — SIGNIFICANT CHANGE UP (ref 3.5–5.3)
POTASSIUM SERPL-MCNC: 4.8 MMOL/L — SIGNIFICANT CHANGE UP (ref 3.5–5.3)
POTASSIUM SERPL-MCNC: 4.8 MMOL/L — SIGNIFICANT CHANGE UP (ref 3.5–5.3)
POTASSIUM SERPL-MCNC: 5 MMOL/L — SIGNIFICANT CHANGE UP (ref 3.5–5.3)
POTASSIUM SERPL-MCNC: 5 MMOL/L — SIGNIFICANT CHANGE UP (ref 3.5–5.3)
POTASSIUM SERPL-MCNC: 5.1 MMOL/L — SIGNIFICANT CHANGE UP (ref 3.5–5.3)
POTASSIUM SERPL-MCNC: 5.3 MMOL/L — SIGNIFICANT CHANGE UP (ref 3.5–5.3)
POTASSIUM SERPL-MCNC: 5.4 MMOL/L — HIGH (ref 3.5–5.3)
POTASSIUM SERPL-MCNC: 5.5 MMOL/L — HIGH (ref 3.5–5.3)
POTASSIUM SERPL-MCNC: 5.6 MMOL/L — HIGH (ref 3.5–5.3)
POTASSIUM SERPL-MCNC: 5.7 MMOL/L — HIGH (ref 3.5–5.3)
POTASSIUM SERPL-MCNC: 5.8 MMOL/L — HIGH (ref 3.5–5.3)
POTASSIUM SERPL-MCNC: 6 MMOL/L — HIGH (ref 3.5–5.3)
POTASSIUM SERPL-MCNC: 6.1 MMOL/L — CRITICAL HIGH (ref 3.5–5.3)
POTASSIUM SERPL-MCNC: 6.2 MMOL/L — CRITICAL HIGH (ref 3.5–5.3)
POTASSIUM SERPL-SCNC: 3.5 MMOL/L — SIGNIFICANT CHANGE UP (ref 3.5–5.3)
POTASSIUM SERPL-SCNC: 3.6 MMOL/L — SIGNIFICANT CHANGE UP (ref 3.5–5.3)
POTASSIUM SERPL-SCNC: 3.6 MMOL/L — SIGNIFICANT CHANGE UP (ref 3.5–5.3)
POTASSIUM SERPL-SCNC: 3.7 MMOL/L — SIGNIFICANT CHANGE UP (ref 3.5–5.3)
POTASSIUM SERPL-SCNC: 3.8 MMOL/L — SIGNIFICANT CHANGE UP (ref 3.5–5.3)
POTASSIUM SERPL-SCNC: 3.9 MMOL/L — SIGNIFICANT CHANGE UP (ref 3.5–5.3)
POTASSIUM SERPL-SCNC: 4 MMOL/L — SIGNIFICANT CHANGE UP (ref 3.5–5.3)
POTASSIUM SERPL-SCNC: 4.1 MMOL/L — SIGNIFICANT CHANGE UP (ref 3.5–5.3)
POTASSIUM SERPL-SCNC: 4.2 MMOL/L — SIGNIFICANT CHANGE UP (ref 3.5–5.3)
POTASSIUM SERPL-SCNC: 4.2 MMOL/L — SIGNIFICANT CHANGE UP (ref 3.5–5.3)
POTASSIUM SERPL-SCNC: 4.3 MMOL/L — SIGNIFICANT CHANGE UP (ref 3.5–5.3)
POTASSIUM SERPL-SCNC: 4.3 MMOL/L — SIGNIFICANT CHANGE UP (ref 3.5–5.3)
POTASSIUM SERPL-SCNC: 4.4 MMOL/L — SIGNIFICANT CHANGE UP (ref 3.5–5.3)
POTASSIUM SERPL-SCNC: 4.4 MMOL/L — SIGNIFICANT CHANGE UP (ref 3.5–5.3)
POTASSIUM SERPL-SCNC: 4.5 MMOL/L — SIGNIFICANT CHANGE UP (ref 3.5–5.3)
POTASSIUM SERPL-SCNC: 4.7 MMOL/L — SIGNIFICANT CHANGE UP (ref 3.5–5.3)
POTASSIUM SERPL-SCNC: 4.8 MMOL/L — SIGNIFICANT CHANGE UP (ref 3.5–5.3)
POTASSIUM SERPL-SCNC: 4.8 MMOL/L — SIGNIFICANT CHANGE UP (ref 3.5–5.3)
POTASSIUM SERPL-SCNC: 5 MMOL/L — SIGNIFICANT CHANGE UP (ref 3.5–5.3)
POTASSIUM SERPL-SCNC: 5 MMOL/L — SIGNIFICANT CHANGE UP (ref 3.5–5.3)
POTASSIUM SERPL-SCNC: 5.1 MMOL/L — SIGNIFICANT CHANGE UP (ref 3.5–5.3)
POTASSIUM SERPL-SCNC: 5.3 MMOL/L — SIGNIFICANT CHANGE UP (ref 3.5–5.3)
POTASSIUM SERPL-SCNC: 5.4 MMOL/L — HIGH (ref 3.5–5.3)
POTASSIUM SERPL-SCNC: 5.5 MMOL/L — HIGH (ref 3.5–5.3)
POTASSIUM SERPL-SCNC: 5.6 MMOL/L — HIGH (ref 3.5–5.3)
POTASSIUM SERPL-SCNC: 5.7 MMOL/L — HIGH (ref 3.5–5.3)
POTASSIUM SERPL-SCNC: 5.8 MMOL/L — HIGH (ref 3.5–5.3)
POTASSIUM SERPL-SCNC: 6 MMOL/L — HIGH (ref 3.5–5.3)
POTASSIUM SERPL-SCNC: 6.1 MMOL/L — CRITICAL HIGH (ref 3.5–5.3)
POTASSIUM SERPL-SCNC: 6.2 MMOL/L — CRITICAL HIGH (ref 3.5–5.3)
PROCALCITONIN SERPL-MCNC: 0.58 NG/ML — HIGH (ref 0.02–0.1)
PROCALCITONIN SERPL-MCNC: 0.81 NG/ML — HIGH (ref 0.02–0.1)
PROCALCITONIN SERPL-MCNC: 13.09 NG/ML — HIGH (ref 0.02–0.1)
PROMYELOCYTES # FLD: 0.8 % — HIGH (ref 0–0)
PROMYELOCYTES # FLD: 0.9 % — HIGH (ref 0–0)
PROMYELOCYTES # FLD: 1.7 % — HIGH (ref 0–0)
PROT CSF-MCNC: 51 MG/DL — HIGH (ref 15–45)
PROT CSF-MCNC: 60 MG/DL — HIGH (ref 15–45)
PROT FLD-MCNC: 2.7 G/DL — SIGNIFICANT CHANGE UP
PROT FLD-MCNC: 3 G/DL — SIGNIFICANT CHANGE UP
PROT SERPL-MCNC: 4.8 G/DL — LOW (ref 6.6–8.7)
PROT SERPL-MCNC: 5 G/DL — LOW (ref 6.6–8.7)
PROT SERPL-MCNC: 5 G/DL — LOW (ref 6.6–8.7)
PROT SERPL-MCNC: 5.2 G/DL — LOW (ref 6.6–8.7)
PROT SERPL-MCNC: 5.2 G/DL — LOW (ref 6.6–8.7)
PROT SERPL-MCNC: 5.3 G/DL — LOW (ref 6.6–8.7)
PROT SERPL-MCNC: 5.4 G/DL — LOW (ref 6.6–8.7)
PROT SERPL-MCNC: 5.5 G/DL — LOW (ref 6.6–8.7)
PROT SERPL-MCNC: 5.5 G/DL — LOW (ref 6.6–8.7)
PROT SERPL-MCNC: 5.6 G/DL — LOW (ref 6.6–8.7)
PROT SERPL-MCNC: 5.6 G/DL — LOW (ref 6.6–8.7)
PROT SERPL-MCNC: 5.7 G/DL — LOW (ref 6.6–8.7)
PROT SERPL-MCNC: 5.7 G/DL — LOW (ref 6.6–8.7)
PROT SERPL-MCNC: 5.8 G/DL — LOW (ref 6.6–8.7)
PROT SERPL-MCNC: 5.9 G/DL — LOW (ref 6.6–8.7)
PROT SERPL-MCNC: 6.1 G/DL — LOW (ref 6.6–8.7)
PROT SERPL-MCNC: 6.2 G/DL — LOW (ref 6.6–8.7)
PROT SERPL-MCNC: 6.2 G/DL — LOW (ref 6.6–8.7)
PROT SERPL-MCNC: 6.3 G/DL — LOW (ref 6.6–8.7)
PROT SERPL-MCNC: 6.3 G/DL — LOW (ref 6.6–8.7)
PROT SERPL-MCNC: 6.6 G/DL — SIGNIFICANT CHANGE UP (ref 6.6–8.7)
PROT SERPL-MCNC: 7 G/DL — SIGNIFICANT CHANGE UP (ref 6.6–8.7)
PROT UR-MCNC: 500 MG/DL
PROTHROM AB SERPL-ACNC: 12.5 SEC — SIGNIFICANT CHANGE UP (ref 10.6–13.6)
PROTHROM AB SERPL-ACNC: 13.1 SEC — SIGNIFICANT CHANGE UP (ref 10.5–13.4)
PROTHROM AB SERPL-ACNC: 13.1 SEC — SIGNIFICANT CHANGE UP (ref 10.5–13.4)
PROTHROM AB SERPL-ACNC: 13.2 SEC — SIGNIFICANT CHANGE UP (ref 10.5–13.4)
PROTHROM AB SERPL-ACNC: 13.2 SEC — SIGNIFICANT CHANGE UP (ref 10.5–13.4)
PROTHROM AB SERPL-ACNC: 13.4 SEC — SIGNIFICANT CHANGE UP (ref 10.5–13.4)
PROTHROM AB SERPL-ACNC: 14.1 SEC — HIGH (ref 10.5–13.4)
PROTHROM AB SERPL-ACNC: 16.2 SEC — HIGH (ref 10.5–13.4)
PROTHROM AB SERPL-ACNC: 16.2 SEC — HIGH (ref 10.5–13.4)
PROTHROM AB SERPL-ACNC: 16.6 SEC — HIGH (ref 10.5–13.4)
PROTHROM AB SERPL-ACNC: 16.6 SEC — HIGH (ref 10.5–13.4)
PROTHROM AB SERPL-ACNC: 20.2 SEC — HIGH (ref 10.5–13.4)
PROTHROM AB SERPL-ACNC: 20.4 SEC — HIGH (ref 10.5–13.4)
PROTHROM AB SERPL-ACNC: 20.9 SEC — HIGH (ref 10.5–13.4)
PROTHROM AB SERPL-ACNC: 21.7 SEC — HIGH (ref 10.5–13.4)
PROTHROM AB SERPL-ACNC: 22.6 SEC — HIGH (ref 10.5–13.4)
RAPID RVP RESULT: DETECTED
RBC # BLD: 2.23 M/UL — LOW (ref 4.2–5.8)
RBC # BLD: 2.27 M/UL — LOW (ref 4.2–5.8)
RBC # BLD: 2.45 M/UL — LOW (ref 4.2–5.8)
RBC # BLD: 2.47 M/UL — LOW (ref 4.2–5.8)
RBC # BLD: 2.63 M/UL — LOW (ref 4.2–5.8)
RBC # BLD: 2.65 M/UL — LOW (ref 4.2–5.8)
RBC # BLD: 2.69 M/UL — LOW (ref 4.2–5.8)
RBC # BLD: 2.73 M/UL — LOW (ref 4.2–5.8)
RBC # BLD: 2.8 M/UL — LOW (ref 4.2–5.8)
RBC # BLD: 2.83 M/UL — LOW (ref 4.2–5.8)
RBC # BLD: 2.9 M/UL — LOW (ref 4.2–5.8)
RBC # BLD: 2.94 M/UL — LOW (ref 4.2–5.8)
RBC # BLD: 2.95 M/UL — LOW (ref 4.2–5.8)
RBC # BLD: 2.96 M/UL — LOW (ref 4.2–5.8)
RBC # BLD: 3 M/UL — LOW (ref 4.2–5.8)
RBC # BLD: 3.07 M/UL — LOW (ref 4.2–5.8)
RBC # BLD: 3.1 M/UL — LOW (ref 4.2–5.8)
RBC # BLD: 3.18 M/UL — LOW (ref 4.2–5.8)
RBC # BLD: 3.22 M/UL — LOW (ref 4.2–5.8)
RBC # BLD: 3.22 M/UL — LOW (ref 4.2–5.8)
RBC # BLD: 3.23 M/UL — LOW (ref 4.2–5.8)
RBC # BLD: 3.23 M/UL — LOW (ref 4.2–5.8)
RBC # BLD: 3.25 M/UL — LOW (ref 4.2–5.8)
RBC # BLD: 3.28 M/UL — LOW (ref 4.2–5.8)
RBC # BLD: 3.28 M/UL — LOW (ref 4.2–5.8)
RBC # BLD: 3.29 M/UL — LOW (ref 4.2–5.8)
RBC # BLD: 3.29 M/UL — LOW (ref 4.2–5.8)
RBC # BLD: 3.3 M/UL — LOW (ref 4.2–5.8)
RBC # BLD: 3.34 M/UL — LOW (ref 4.2–5.8)
RBC # BLD: 3.36 M/UL — LOW (ref 4.2–5.8)
RBC # BLD: 3.36 M/UL — LOW (ref 4.2–5.8)
RBC # BLD: 3.37 M/UL — LOW (ref 4.2–5.8)
RBC # BLD: 3.37 M/UL — LOW (ref 4.2–5.8)
RBC # BLD: 3.39 M/UL — LOW (ref 4.2–5.8)
RBC # BLD: 3.42 M/UL — LOW (ref 4.2–5.8)
RBC # BLD: 3.45 M/UL — LOW (ref 4.2–5.8)
RBC # BLD: 3.46 M/UL — LOW (ref 4.2–5.8)
RBC # BLD: 3.46 M/UL — LOW (ref 4.2–5.8)
RBC # BLD: 3.47 M/UL — LOW (ref 4.2–5.8)
RBC # BLD: 3.47 M/UL — LOW (ref 4.2–5.8)
RBC # BLD: 3.51 M/UL — LOW (ref 4.2–5.8)
RBC # BLD: 3.53 M/UL — LOW (ref 4.2–5.8)
RBC # BLD: 3.54 M/UL — LOW (ref 4.2–5.8)
RBC # BLD: 3.54 M/UL — LOW (ref 4.2–5.8)
RBC # BLD: 3.55 M/UL — LOW (ref 4.2–5.8)
RBC # BLD: 3.62 M/UL — LOW (ref 4.2–5.8)
RBC # BLD: 3.69 M/UL — LOW (ref 4.2–5.8)
RBC # BLD: 3.71 M/UL — LOW (ref 4.2–5.8)
RBC # BLD: 3.71 M/UL — LOW (ref 4.2–5.8)
RBC # BLD: 3.75 M/UL — LOW (ref 4.2–5.8)
RBC # BLD: 3.79 M/UL — LOW (ref 4.2–5.8)
RBC # BLD: 3.85 M/UL — LOW (ref 4.2–5.8)
RBC # BLD: 4.08 M/UL — LOW (ref 4.2–5.8)
RBC # BLD: 4.18 M/UL — LOW (ref 4.2–5.8)
RBC # CSF: 1 /CMM — SIGNIFICANT CHANGE UP (ref 0–1)
RBC # CSF: 2 /CMM — HIGH (ref 0–1)
RBC # FLD: 17.6 % — HIGH (ref 10.3–14.5)
RBC # FLD: 17.7 % — HIGH (ref 10.3–14.5)
RBC # FLD: 18 % — HIGH (ref 10.3–14.5)
RBC # FLD: 18.1 % — HIGH (ref 10.3–14.5)
RBC # FLD: 18.1 % — HIGH (ref 10.3–14.5)
RBC # FLD: 18.2 % — HIGH (ref 10.3–14.5)
RBC # FLD: 18.2 % — HIGH (ref 10.3–14.5)
RBC # FLD: 18.3 % — HIGH (ref 10.3–14.5)
RBC # FLD: 18.6 % — HIGH (ref 10.3–14.5)
RBC # FLD: 18.7 % — HIGH (ref 10.3–14.5)
RBC # FLD: 18.8 % — HIGH (ref 10.3–14.5)
RBC # FLD: 18.9 % — HIGH (ref 10.3–14.5)
RBC # FLD: 19 % — HIGH (ref 10.3–14.5)
RBC # FLD: 19 % — HIGH (ref 10.3–14.5)
RBC # FLD: 19.1 % — HIGH (ref 10.3–14.5)
RBC # FLD: 19.1 % — HIGH (ref 10.3–14.5)
RBC # FLD: 19.2 % — HIGH (ref 10.3–14.5)
RBC # FLD: 19.3 % — HIGH (ref 10.3–14.5)
RBC # FLD: 19.4 % — HIGH (ref 10.3–14.5)
RBC # FLD: 19.5 % — HIGH (ref 10.3–14.5)
RBC # FLD: 19.6 % — HIGH (ref 10.3–14.5)
RBC # FLD: 19.6 % — HIGH (ref 10.3–14.5)
RBC # FLD: 19.8 % — HIGH (ref 10.3–14.5)
RBC # FLD: 19.9 % — HIGH (ref 10.3–14.5)
RBC # FLD: 20 % — HIGH (ref 10.3–14.5)
RBC # FLD: 20.1 % — HIGH (ref 10.3–14.5)
RBC # FLD: 20.1 % — HIGH (ref 10.3–14.5)
RBC # FLD: 20.2 % — HIGH (ref 10.3–14.5)
RBC # FLD: 20.2 % — HIGH (ref 10.3–14.5)
RBC # FLD: 20.3 % — HIGH (ref 10.3–14.5)
RBC # FLD: 20.8 % — HIGH (ref 10.3–14.5)
RBC # FLD: 21.2 % — HIGH (ref 10.3–14.5)
RBC # FLD: 21.5 % — HIGH (ref 10.3–14.5)
RBC BLD AUTO: ABNORMAL
RBC BLD AUTO: NORMAL — SIGNIFICANT CHANGE UP
RBC BLD AUTO: SIGNIFICANT CHANGE UP
RBC CASTS # UR COMP ASSIST: SIGNIFICANT CHANGE UP /HPF (ref 0–4)
RCV VOL RI: 80 /UL — HIGH (ref 0–0)
RCV VOL RI: < 2000 /UL — SIGNIFICANT CHANGE UP (ref 0–0)
RCV VOL RI: HIGH /UL (ref 0–0)
RSV RNA NPH QL NAA+NON-PROBE: SIGNIFICANT CHANGE UP
RSV RNA NPH QL NAA+NON-PROBE: SIGNIFICANT CHANGE UP
RV+EV RNA SPEC QL NAA+PROBE: SIGNIFICANT CHANGE UP
S AUREUS DNA NOSE QL NAA+PROBE: SIGNIFICANT CHANGE UP
SAO2 % BLDA: 99.5 % — HIGH (ref 94–98)
SAO2 % BLDV: 95.1 % — SIGNIFICANT CHANGE UP
SARS-COV-2 RNA SPEC QL NAA+PROBE: DETECTED
SARS-COV-2 RNA SPEC QL NAA+PROBE: DETECTED
SARS-COV-2 RNA SPEC QL NAA+PROBE: SIGNIFICANT CHANGE UP
SCHISTOCYTES BLD QL AUTO: SLIGHT — SIGNIFICANT CHANGE UP
SMUDGE CELLS # BLD: PRESENT — SIGNIFICANT CHANGE UP
SODIUM SERPL-SCNC: 130 MMOL/L — LOW (ref 135–145)
SODIUM SERPL-SCNC: 133 MMOL/L — LOW (ref 135–145)
SODIUM SERPL-SCNC: 134 MMOL/L — LOW (ref 135–145)
SODIUM SERPL-SCNC: 135 MMOL/L — SIGNIFICANT CHANGE UP (ref 135–145)
SODIUM SERPL-SCNC: 135 MMOL/L — SIGNIFICANT CHANGE UP (ref 135–145)
SODIUM SERPL-SCNC: 136 MMOL/L — SIGNIFICANT CHANGE UP (ref 135–145)
SODIUM SERPL-SCNC: 137 MMOL/L — SIGNIFICANT CHANGE UP (ref 135–145)
SODIUM SERPL-SCNC: 138 MMOL/L — SIGNIFICANT CHANGE UP (ref 135–145)
SODIUM SERPL-SCNC: 139 MMOL/L — SIGNIFICANT CHANGE UP (ref 135–145)
SODIUM SERPL-SCNC: 140 MMOL/L — SIGNIFICANT CHANGE UP (ref 135–145)
SODIUM SERPL-SCNC: 141 MMOL/L — SIGNIFICANT CHANGE UP (ref 135–145)
SODIUM SERPL-SCNC: 142 MMOL/L — SIGNIFICANT CHANGE UP (ref 135–145)
SODIUM SERPL-SCNC: 142 MMOL/L — SIGNIFICANT CHANGE UP (ref 135–145)
SODIUM SERPL-SCNC: 143 MMOL/L — SIGNIFICANT CHANGE UP (ref 135–145)
SODIUM SERPL-SCNC: 144 MMOL/L — SIGNIFICANT CHANGE UP (ref 135–145)
SODIUM SERPL-SCNC: 144 MMOL/L — SIGNIFICANT CHANGE UP (ref 135–145)
SODIUM SERPL-SCNC: 146 MMOL/L — HIGH (ref 135–145)
SOURCE HSV 1/2: SIGNIFICANT CHANGE UP
SP GR SPEC: 1.01 — SIGNIFICANT CHANGE UP (ref 1.01–1.02)
SPECIMEN SOURCE FLD: SIGNIFICANT CHANGE UP
SPECIMEN SOURCE: SIGNIFICANT CHANGE UP
STOMATOCYTES BLD QL SMEAR: SLIGHT — SIGNIFICANT CHANGE UP
T PALLIDUM AB TITR SER: NEGATIVE — SIGNIFICANT CHANGE UP
TACROLIMUS SERPL-MCNC: 19.5 NG/ML — SIGNIFICANT CHANGE UP
TACROLIMUS SERPL-MCNC: 3.7 NG/ML — SIGNIFICANT CHANGE UP
TACROLIMUS SERPL-MCNC: 6.2 NG/ML — SIGNIFICANT CHANGE UP
TACROLIMUS SERPL-MCNC: 6.2 NG/ML — SIGNIFICANT CHANGE UP
TACROLIMUS SERPL-MCNC: 6.4 NG/ML — SIGNIFICANT CHANGE UP
TACROLIMUS SERPL-MCNC: 8.8 NG/ML — SIGNIFICANT CHANGE UP
TACROLIMUS SERPL-MCNC: 9 NG/ML — SIGNIFICANT CHANGE UP
TACROLIMUS SERPL-MCNC: 9.4 NG/ML — SIGNIFICANT CHANGE UP
TARGETS BLD QL SMEAR: SIGNIFICANT CHANGE UP
TARGETS BLD QL SMEAR: SLIGHT — SIGNIFICANT CHANGE UP
TIBC SERPL-MCNC: 163 UG/DL — LOW (ref 220–430)
TIBC SERPL-MCNC: 247 UG/DL — SIGNIFICANT CHANGE UP (ref 220–430)
TOTAL NUCLEATED CELL COUNT, BODY FLUID: 183 /UL — SIGNIFICANT CHANGE UP
TOTAL NUCLEATED CELL COUNT, BODY FLUID: 198 /UL — SIGNIFICANT CHANGE UP
TOTAL NUCLEATED CELL COUNT, BODY FLUID: 295 /UL — SIGNIFICANT CHANGE UP
TRANSFERRIN SERPL-MCNC: 114 MG/DL — LOW (ref 180–329)
TRANSFERRIN SERPL-MCNC: 173 MG/DL — LOW (ref 180–329)
TRIGL SERPL-MCNC: 108 MG/DL — SIGNIFICANT CHANGE UP
TROPONIN T SERPL-MCNC: 0.11 NG/ML — HIGH (ref 0–0.06)
TROPONIN T SERPL-MCNC: 0.18 NG/ML — HIGH (ref 0–0.06)
TROPONIN T SERPL-MCNC: 0.2 NG/ML — HIGH (ref 0–0.06)
TROPONIN T SERPL-MCNC: 0.2 NG/ML — HIGH (ref 0–0.06)
TROPONIN T SERPL-MCNC: 0.24 NG/ML — HIGH (ref 0–0.06)
TSH SERPL-MCNC: 4.01 UIU/ML — SIGNIFICANT CHANGE UP (ref 0.27–4.2)
TUBE TYPE: SIGNIFICANT CHANGE UP
UROBILINOGEN FLD QL: NEGATIVE MG/DL — SIGNIFICANT CHANGE UP
VALPROATE SERPL-MCNC: 14.3 UG/ML — LOW (ref 50–100)
VANCOMYCIN FLD-MCNC: 14.4 UG/ML — SIGNIFICANT CHANGE UP
VANCOMYCIN FLD-MCNC: 19.3 UG/ML — SIGNIFICANT CHANGE UP
VANCOMYCIN FLD-MCNC: 19.9 UG/ML — SIGNIFICANT CHANGE UP
VANCOMYCIN TROUGH SERPL-MCNC: 16.9 UG/ML — SIGNIFICANT CHANGE UP (ref 10–20)
VANCOMYCIN TROUGH SERPL-MCNC: 18.2 UG/ML — SIGNIFICANT CHANGE UP (ref 10–20)
VANCOMYCIN TROUGH SERPL-MCNC: 19.9 UG/ML — SIGNIFICANT CHANGE UP (ref 10–20)
VANCOMYCIN TROUGH SERPL-MCNC: 20.7 UG/ML — HIGH (ref 10–20)
VANCOMYCIN TROUGH SERPL-MCNC: 23.5 UG/ML — HIGH (ref 10–20)
VARIANT LYMPHS # BLD: 1.7 % — SIGNIFICANT CHANGE UP (ref 0–6)
VARIANT LYMPHS # BLD: 1.7 % — SIGNIFICANT CHANGE UP (ref 0–6)
VARIANT LYMPHS # BLD: 1.8 % — SIGNIFICANT CHANGE UP (ref 0–6)
VARIANT LYMPHS # BLD: 2.5 % — SIGNIFICANT CHANGE UP (ref 0–6)
VARIANT LYMPHS # BLD: 2.6 % — SIGNIFICANT CHANGE UP (ref 0–6)
VARIANT LYMPHS # BLD: 2.6 % — SIGNIFICANT CHANGE UP (ref 0–6)
VDRL CSF-TITR: SIGNIFICANT CHANGE UP
VIT B12 SERPL-MCNC: 1822 PG/ML — HIGH (ref 232–1245)
WBC # BLD: 10.13 K/UL — SIGNIFICANT CHANGE UP (ref 3.8–10.5)
WBC # BLD: 10.17 K/UL — SIGNIFICANT CHANGE UP (ref 3.8–10.5)
WBC # BLD: 10.24 K/UL — SIGNIFICANT CHANGE UP (ref 3.8–10.5)
WBC # BLD: 10.45 K/UL — SIGNIFICANT CHANGE UP (ref 3.8–10.5)
WBC # BLD: 10.75 K/UL — HIGH (ref 3.8–10.5)
WBC # BLD: 11.04 K/UL — HIGH (ref 3.8–10.5)
WBC # BLD: 11.14 K/UL — HIGH (ref 3.8–10.5)
WBC # BLD: 11.25 K/UL — HIGH (ref 3.8–10.5)
WBC # BLD: 11.27 K/UL — HIGH (ref 3.8–10.5)
WBC # BLD: 11.38 K/UL — HIGH (ref 3.8–10.5)
WBC # BLD: 11.47 K/UL — HIGH (ref 3.8–10.5)
WBC # BLD: 11.54 K/UL — HIGH (ref 3.8–10.5)
WBC # BLD: 11.6 K/UL — HIGH (ref 3.8–10.5)
WBC # BLD: 11.75 K/UL — HIGH (ref 3.8–10.5)
WBC # BLD: 11.77 K/UL — HIGH (ref 3.8–10.5)
WBC # BLD: 11.86 K/UL — HIGH (ref 3.8–10.5)
WBC # BLD: 12.2 K/UL — HIGH (ref 3.8–10.5)
WBC # BLD: 12.21 K/UL — HIGH (ref 3.8–10.5)
WBC # BLD: 12.45 K/UL — HIGH (ref 3.8–10.5)
WBC # BLD: 12.82 K/UL — HIGH (ref 3.8–10.5)
WBC # BLD: 12.84 K/UL — HIGH (ref 3.8–10.5)
WBC # BLD: 13.33 K/UL — HIGH (ref 3.8–10.5)
WBC # BLD: 14.26 K/UL — HIGH (ref 3.8–10.5)
WBC # BLD: 15.29 K/UL — HIGH (ref 3.8–10.5)
WBC # BLD: 15.38 K/UL — HIGH (ref 3.8–10.5)
WBC # BLD: 16.1 K/UL — HIGH (ref 3.8–10.5)
WBC # BLD: 25.14 K/UL — HIGH (ref 3.8–10.5)
WBC # BLD: 28.57 K/UL — HIGH (ref 3.8–10.5)
WBC # BLD: 4.36 K/UL — SIGNIFICANT CHANGE UP (ref 3.8–10.5)
WBC # BLD: 4.62 K/UL — SIGNIFICANT CHANGE UP (ref 3.8–10.5)
WBC # BLD: 5.15 K/UL — SIGNIFICANT CHANGE UP (ref 3.8–10.5)
WBC # BLD: 5.17 K/UL — SIGNIFICANT CHANGE UP (ref 3.8–10.5)
WBC # BLD: 5.32 K/UL — SIGNIFICANT CHANGE UP (ref 3.8–10.5)
WBC # BLD: 5.4 K/UL — SIGNIFICANT CHANGE UP (ref 3.8–10.5)
WBC # BLD: 5.78 K/UL — SIGNIFICANT CHANGE UP (ref 3.8–10.5)
WBC # BLD: 5.85 K/UL — SIGNIFICANT CHANGE UP (ref 3.8–10.5)
WBC # BLD: 5.87 K/UL — SIGNIFICANT CHANGE UP (ref 3.8–10.5)
WBC # BLD: 6.02 K/UL — SIGNIFICANT CHANGE UP (ref 3.8–10.5)
WBC # BLD: 6.04 K/UL — SIGNIFICANT CHANGE UP (ref 3.8–10.5)
WBC # BLD: 6.07 K/UL — SIGNIFICANT CHANGE UP (ref 3.8–10.5)
WBC # BLD: 6.51 K/UL — SIGNIFICANT CHANGE UP (ref 3.8–10.5)
WBC # BLD: 6.86 K/UL — SIGNIFICANT CHANGE UP (ref 3.8–10.5)
WBC # BLD: 7.4 K/UL — SIGNIFICANT CHANGE UP (ref 3.8–10.5)
WBC # BLD: 7.5 K/UL — SIGNIFICANT CHANGE UP (ref 3.8–10.5)
WBC # BLD: 7.52 K/UL — SIGNIFICANT CHANGE UP (ref 3.8–10.5)
WBC # BLD: 7.58 K/UL — SIGNIFICANT CHANGE UP (ref 3.8–10.5)
WBC # BLD: 8.03 K/UL — SIGNIFICANT CHANGE UP (ref 3.8–10.5)
WBC # BLD: 8.19 K/UL — SIGNIFICANT CHANGE UP (ref 3.8–10.5)
WBC # BLD: 8.31 K/UL — SIGNIFICANT CHANGE UP (ref 3.8–10.5)
WBC # BLD: 8.78 K/UL — SIGNIFICANT CHANGE UP (ref 3.8–10.5)
WBC # BLD: 8.83 K/UL — SIGNIFICANT CHANGE UP (ref 3.8–10.5)
WBC # BLD: 8.84 K/UL — SIGNIFICANT CHANGE UP (ref 3.8–10.5)
WBC # BLD: 9.61 K/UL — SIGNIFICANT CHANGE UP (ref 3.8–10.5)
WBC # BLD: 9.79 K/UL — SIGNIFICANT CHANGE UP (ref 3.8–10.5)
WBC # BLD: 9.82 K/UL — SIGNIFICANT CHANGE UP (ref 3.8–10.5)
WBC # BLD: 9.89 K/UL — SIGNIFICANT CHANGE UP (ref 3.8–10.5)
WBC # FLD AUTO: 10.13 K/UL — SIGNIFICANT CHANGE UP (ref 3.8–10.5)
WBC # FLD AUTO: 10.17 K/UL — SIGNIFICANT CHANGE UP (ref 3.8–10.5)
WBC # FLD AUTO: 10.24 K/UL — SIGNIFICANT CHANGE UP (ref 3.8–10.5)
WBC # FLD AUTO: 10.45 K/UL — SIGNIFICANT CHANGE UP (ref 3.8–10.5)
WBC # FLD AUTO: 10.75 K/UL — HIGH (ref 3.8–10.5)
WBC # FLD AUTO: 11.04 K/UL — HIGH (ref 3.8–10.5)
WBC # FLD AUTO: 11.14 K/UL — HIGH (ref 3.8–10.5)
WBC # FLD AUTO: 11.25 K/UL — HIGH (ref 3.8–10.5)
WBC # FLD AUTO: 11.27 K/UL — HIGH (ref 3.8–10.5)
WBC # FLD AUTO: 11.38 K/UL — HIGH (ref 3.8–10.5)
WBC # FLD AUTO: 11.47 K/UL — HIGH (ref 3.8–10.5)
WBC # FLD AUTO: 11.54 K/UL — HIGH (ref 3.8–10.5)
WBC # FLD AUTO: 11.6 K/UL — HIGH (ref 3.8–10.5)
WBC # FLD AUTO: 11.75 K/UL — HIGH (ref 3.8–10.5)
WBC # FLD AUTO: 11.77 K/UL — HIGH (ref 3.8–10.5)
WBC # FLD AUTO: 11.86 K/UL — HIGH (ref 3.8–10.5)
WBC # FLD AUTO: 12.2 K/UL — HIGH (ref 3.8–10.5)
WBC # FLD AUTO: 12.21 K/UL — HIGH (ref 3.8–10.5)
WBC # FLD AUTO: 12.45 K/UL — HIGH (ref 3.8–10.5)
WBC # FLD AUTO: 12.82 K/UL — HIGH (ref 3.8–10.5)
WBC # FLD AUTO: 12.84 K/UL — HIGH (ref 3.8–10.5)
WBC # FLD AUTO: 13.33 K/UL — HIGH (ref 3.8–10.5)
WBC # FLD AUTO: 14.26 K/UL — HIGH (ref 3.8–10.5)
WBC # FLD AUTO: 15.29 K/UL — HIGH (ref 3.8–10.5)
WBC # FLD AUTO: 15.38 K/UL — HIGH (ref 3.8–10.5)
WBC # FLD AUTO: 16.1 K/UL — HIGH (ref 3.8–10.5)
WBC # FLD AUTO: 25.14 K/UL — HIGH (ref 3.8–10.5)
WBC # FLD AUTO: 28.57 K/UL — HIGH (ref 3.8–10.5)
WBC # FLD AUTO: 4.36 K/UL — SIGNIFICANT CHANGE UP (ref 3.8–10.5)
WBC # FLD AUTO: 4.62 K/UL — SIGNIFICANT CHANGE UP (ref 3.8–10.5)
WBC # FLD AUTO: 5.15 K/UL — SIGNIFICANT CHANGE UP (ref 3.8–10.5)
WBC # FLD AUTO: 5.17 K/UL — SIGNIFICANT CHANGE UP (ref 3.8–10.5)
WBC # FLD AUTO: 5.32 K/UL — SIGNIFICANT CHANGE UP (ref 3.8–10.5)
WBC # FLD AUTO: 5.4 K/UL — SIGNIFICANT CHANGE UP (ref 3.8–10.5)
WBC # FLD AUTO: 5.78 K/UL — SIGNIFICANT CHANGE UP (ref 3.8–10.5)
WBC # FLD AUTO: 5.85 K/UL — SIGNIFICANT CHANGE UP (ref 3.8–10.5)
WBC # FLD AUTO: 5.87 K/UL — SIGNIFICANT CHANGE UP (ref 3.8–10.5)
WBC # FLD AUTO: 6.02 K/UL — SIGNIFICANT CHANGE UP (ref 3.8–10.5)
WBC # FLD AUTO: 6.04 K/UL — SIGNIFICANT CHANGE UP (ref 3.8–10.5)
WBC # FLD AUTO: 6.07 K/UL — SIGNIFICANT CHANGE UP (ref 3.8–10.5)
WBC # FLD AUTO: 6.51 K/UL — SIGNIFICANT CHANGE UP (ref 3.8–10.5)
WBC # FLD AUTO: 6.86 K/UL — SIGNIFICANT CHANGE UP (ref 3.8–10.5)
WBC # FLD AUTO: 7.4 K/UL — SIGNIFICANT CHANGE UP (ref 3.8–10.5)
WBC # FLD AUTO: 7.5 K/UL — SIGNIFICANT CHANGE UP (ref 3.8–10.5)
WBC # FLD AUTO: 7.52 K/UL — SIGNIFICANT CHANGE UP (ref 3.8–10.5)
WBC # FLD AUTO: 7.58 K/UL — SIGNIFICANT CHANGE UP (ref 3.8–10.5)
WBC # FLD AUTO: 8.03 K/UL — SIGNIFICANT CHANGE UP (ref 3.8–10.5)
WBC # FLD AUTO: 8.19 K/UL — SIGNIFICANT CHANGE UP (ref 3.8–10.5)
WBC # FLD AUTO: 8.31 K/UL — SIGNIFICANT CHANGE UP (ref 3.8–10.5)
WBC # FLD AUTO: 8.78 K/UL — SIGNIFICANT CHANGE UP (ref 3.8–10.5)
WBC # FLD AUTO: 8.83 K/UL — SIGNIFICANT CHANGE UP (ref 3.8–10.5)
WBC # FLD AUTO: 8.84 K/UL — SIGNIFICANT CHANGE UP (ref 3.8–10.5)
WBC # FLD AUTO: 9.61 K/UL — SIGNIFICANT CHANGE UP (ref 3.8–10.5)
WBC # FLD AUTO: 9.79 K/UL — SIGNIFICANT CHANGE UP (ref 3.8–10.5)
WBC # FLD AUTO: 9.82 K/UL — SIGNIFICANT CHANGE UP (ref 3.8–10.5)
WBC # FLD AUTO: 9.89 K/UL — SIGNIFICANT CHANGE UP (ref 3.8–10.5)
WBC UR QL: ABNORMAL /HPF (ref 0–5)
WNV IGG CSF IA-ACNC: NEGATIVE — SIGNIFICANT CHANGE UP
WNV IGM CSF IA-ACNC: NEGATIVE — SIGNIFICANT CHANGE UP

## 2022-01-01 PROCEDURE — 99497 ADVNCD CARE PLAN 30 MIN: CPT | Mod: 25

## 2022-01-01 PROCEDURE — 71046 X-RAY EXAM CHEST 2 VIEWS: CPT | Mod: 26

## 2022-01-01 PROCEDURE — 99231 SBSQ HOSP IP/OBS SF/LOW 25: CPT

## 2022-01-01 PROCEDURE — 84484 ASSAY OF TROPONIN QUANT: CPT

## 2022-01-01 PROCEDURE — 99285 EMERGENCY DEPT VISIT HI MDM: CPT

## 2022-01-01 PROCEDURE — 86901 BLOOD TYPING SEROLOGIC RH(D): CPT

## 2022-01-01 PROCEDURE — 87075 CULTR BACTERIA EXCEPT BLOOD: CPT

## 2022-01-01 PROCEDURE — 86850 RBC ANTIBODY SCREEN: CPT

## 2022-01-01 PROCEDURE — 85730 THROMBOPLASTIN TIME PARTIAL: CPT

## 2022-01-01 PROCEDURE — 87340 HEPATITIS B SURFACE AG IA: CPT

## 2022-01-01 PROCEDURE — 95720 EEG PHY/QHP EA INCR W/VEEG: CPT

## 2022-01-01 PROCEDURE — 99233 SBSQ HOSP IP/OBS HIGH 50: CPT

## 2022-01-01 PROCEDURE — 71045 X-RAY EXAM CHEST 1 VIEW: CPT | Mod: 26

## 2022-01-01 PROCEDURE — 99232 SBSQ HOSP IP/OBS MODERATE 35: CPT

## 2022-01-01 PROCEDURE — C1729: CPT

## 2022-01-01 PROCEDURE — 37215 TRANSCATH STENT CCA W/EPS: CPT | Mod: GC

## 2022-01-01 PROCEDURE — 97163 PT EVAL HIGH COMPLEX 45 MIN: CPT

## 2022-01-01 PROCEDURE — 87641 MR-STAPH DNA AMP PROBE: CPT

## 2022-01-01 PROCEDURE — C1769: CPT

## 2022-01-01 PROCEDURE — 80053 COMPREHEN METABOLIC PANEL: CPT

## 2022-01-01 PROCEDURE — 93880 EXTRACRANIAL BILAT STUDY: CPT

## 2022-01-01 PROCEDURE — 93990 DOPPLER FLOW TESTING: CPT

## 2022-01-01 PROCEDURE — C1884: CPT

## 2022-01-01 PROCEDURE — 96374 THER/PROPH/DIAG INJ IV PUSH: CPT

## 2022-01-01 PROCEDURE — 87186 SC STD MICRODIL/AGAR DIL: CPT

## 2022-01-01 PROCEDURE — 72190 X-RAY EXAM OF PELVIS: CPT

## 2022-01-01 PROCEDURE — 90937 HEMODIALYSIS REPEATED EVAL: CPT

## 2022-01-01 PROCEDURE — 85027 COMPLETE CBC AUTOMATED: CPT

## 2022-01-01 PROCEDURE — 72192 CT PELVIS W/O DYE: CPT | Mod: MG

## 2022-01-01 PROCEDURE — 94640 AIRWAY INHALATION TREATMENT: CPT

## 2022-01-01 PROCEDURE — 99261: CPT

## 2022-01-01 PROCEDURE — 99285 EMERGENCY DEPT VISIT HI MDM: CPT | Mod: GC

## 2022-01-01 PROCEDURE — 77085 DXA BONE DENSITY AXL VRT FX: CPT | Mod: 26

## 2022-01-01 PROCEDURE — 99221 1ST HOSP IP/OBS SF/LOW 40: CPT

## 2022-01-01 PROCEDURE — 93010 ELECTROCARDIOGRAM REPORT: CPT

## 2022-01-01 PROCEDURE — 77001 FLUOROGUIDE FOR VEIN DEVICE: CPT

## 2022-01-01 PROCEDURE — 36415 COLL VENOUS BLD VENIPUNCTURE: CPT

## 2022-01-01 PROCEDURE — 76700 US EXAM ABDOM COMPLETE: CPT | Mod: 26

## 2022-01-01 PROCEDURE — 87449 NOS EACH ORGANISM AG IA: CPT

## 2022-01-01 PROCEDURE — 73552 X-RAY EXAM OF FEMUR 2/>: CPT | Mod: 26,LT

## 2022-01-01 PROCEDURE — 99239 HOSP IP/OBS DSCHRG MGMT >30: CPT

## 2022-01-01 PROCEDURE — 71250 CT THORAX DX C-: CPT | Mod: 26

## 2022-01-01 PROCEDURE — 99223 1ST HOSP IP/OBS HIGH 75: CPT

## 2022-01-01 PROCEDURE — 87640 STAPH A DNA AMP PROBE: CPT

## 2022-01-01 PROCEDURE — 84100 ASSAY OF PHOSPHORUS: CPT

## 2022-01-01 PROCEDURE — 85610 PROTHROMBIN TIME: CPT

## 2022-01-01 PROCEDURE — 87206 SMEAR FLUORESCENT/ACID STAI: CPT

## 2022-01-01 PROCEDURE — 0225U NFCT DS DNA&RNA 21 SARSCOV2: CPT

## 2022-01-01 PROCEDURE — 93005 ELECTROCARDIOGRAM TRACING: CPT

## 2022-01-01 PROCEDURE — 12345: CPT | Mod: NC

## 2022-01-01 PROCEDURE — 99024 POSTOP FOLLOW-UP VISIT: CPT

## 2022-01-01 PROCEDURE — 82803 BLOOD GASES ANY COMBINATION: CPT

## 2022-01-01 PROCEDURE — 31624 DX BRONCHOSCOPE/LAVAGE: CPT

## 2022-01-01 PROCEDURE — 71045 X-RAY EXAM CHEST 1 VIEW: CPT | Mod: 26,77

## 2022-01-01 PROCEDURE — 94760 N-INVAS EAR/PLS OXIMETRY 1: CPT

## 2022-01-01 PROCEDURE — 71250 CT THORAX DX C-: CPT | Mod: MA

## 2022-01-01 PROCEDURE — 97530 THERAPEUTIC ACTIVITIES: CPT

## 2022-01-01 PROCEDURE — 43235 EGD DIAGNOSTIC BRUSH WASH: CPT

## 2022-01-01 PROCEDURE — ZZZZZ: CPT

## 2022-01-01 PROCEDURE — 99222 1ST HOSP IP/OBS MODERATE 55: CPT

## 2022-01-01 PROCEDURE — 99497 ADVNCD CARE PLAN 30 MIN: CPT

## 2022-01-01 PROCEDURE — U0005: CPT

## 2022-01-01 PROCEDURE — 86140 C-REACTIVE PROTEIN: CPT

## 2022-01-01 PROCEDURE — 82435 ASSAY OF BLOOD CHLORIDE: CPT

## 2022-01-01 PROCEDURE — 73502 X-RAY EXAM HIP UNI 2-3 VIEWS: CPT

## 2022-01-01 PROCEDURE — 87040 BLOOD CULTURE FOR BACTERIA: CPT

## 2022-01-01 PROCEDURE — G0463: CPT

## 2022-01-01 PROCEDURE — 77085 DXA BONE DENSITY AXL VRT FX: CPT

## 2022-01-01 PROCEDURE — 83605 ASSAY OF LACTIC ACID: CPT

## 2022-01-01 PROCEDURE — 99284 EMERGENCY DEPT VISIT MOD MDM: CPT | Mod: 57

## 2022-01-01 PROCEDURE — 90935 HEMODIALYSIS ONE EVALUATION: CPT

## 2022-01-01 PROCEDURE — 82042 OTHER SOURCE ALBUMIN QUAN EA: CPT

## 2022-01-01 PROCEDURE — 89051 BODY FLUID CELL COUNT: CPT

## 2022-01-01 PROCEDURE — 80048 BASIC METABOLIC PNL TOTAL CA: CPT

## 2022-01-01 PROCEDURE — 82947 ASSAY GLUCOSE BLOOD QUANT: CPT

## 2022-01-01 PROCEDURE — 37215 TRANSCATH STENT CCA W/EPS: CPT | Mod: AS,GC

## 2022-01-01 PROCEDURE — 86900 BLOOD TYPING SEROLOGIC ABO: CPT

## 2022-01-01 PROCEDURE — 70450 CT HEAD/BRAIN W/O DYE: CPT | Mod: 26,MA

## 2022-01-01 PROCEDURE — 71045 X-RAY EXAM CHEST 1 VIEW: CPT

## 2022-01-01 PROCEDURE — 82945 GLUCOSE OTHER FLUID: CPT

## 2022-01-01 PROCEDURE — P9040: CPT

## 2022-01-01 PROCEDURE — 74177 CT ABD & PELVIS W/CONTRAST: CPT | Mod: 26

## 2022-01-01 PROCEDURE — 77001 FLUOROGUIDE FOR VEIN DEVICE: CPT | Mod: 26

## 2022-01-01 PROCEDURE — 83550 IRON BINDING TEST: CPT

## 2022-01-01 PROCEDURE — 85025 COMPLETE CBC W/AUTO DIFF WBC: CPT

## 2022-01-01 PROCEDURE — 88112 CYTOPATH CELL ENHANCE TECH: CPT | Mod: 26

## 2022-01-01 PROCEDURE — 80197 ASSAY OF TACROLIMUS: CPT

## 2022-01-01 PROCEDURE — 93306 TTE W/DOPPLER COMPLETE: CPT

## 2022-01-01 PROCEDURE — 83880 ASSAY OF NATRIURETIC PEPTIDE: CPT

## 2022-01-01 PROCEDURE — 93306 TTE W/DOPPLER COMPLETE: CPT | Mod: 26

## 2022-01-01 PROCEDURE — 96375 TX/PRO/DX INJ NEW DRUG ADDON: CPT

## 2022-01-01 PROCEDURE — C9399: CPT

## 2022-01-01 PROCEDURE — U0003: CPT

## 2022-01-01 PROCEDURE — 87150 DNA/RNA AMPLIFIED PROBE: CPT

## 2022-01-01 PROCEDURE — C1894: CPT

## 2022-01-01 PROCEDURE — 93971 EXTREMITY STUDY: CPT

## 2022-01-01 PROCEDURE — 94660 CPAP INITIATION&MGMT: CPT

## 2022-01-01 PROCEDURE — 82962 GLUCOSE BLOOD TEST: CPT

## 2022-01-01 PROCEDURE — P9047: CPT

## 2022-01-01 PROCEDURE — 74176 CT ABD & PELVIS W/O CONTRAST: CPT | Mod: 26

## 2022-01-01 PROCEDURE — 49082 ABD PARACENTESIS: CPT

## 2022-01-01 PROCEDURE — 71260 CT THORAX DX C+: CPT | Mod: 26

## 2022-01-01 PROCEDURE — 73552 X-RAY EXAM OF FEMUR 2/>: CPT

## 2022-01-01 PROCEDURE — 99223 1ST HOSP IP/OBS HIGH 75: CPT | Mod: GC

## 2022-01-01 PROCEDURE — 86923 COMPATIBILITY TEST ELECTRIC: CPT

## 2022-01-01 PROCEDURE — 88305 TISSUE EXAM BY PATHOLOGIST: CPT

## 2022-01-01 PROCEDURE — 85014 HEMATOCRIT: CPT

## 2022-01-01 PROCEDURE — 84132 ASSAY OF SERUM POTASSIUM: CPT

## 2022-01-01 PROCEDURE — 87015 SPECIMEN INFECT AGNT CONCNTJ: CPT

## 2022-01-01 PROCEDURE — 49083 ABD PARACENTESIS W/IMAGING: CPT

## 2022-01-01 PROCEDURE — 99214 OFFICE O/P EST MOD 30 MIN: CPT

## 2022-01-01 PROCEDURE — 74176 CT ABD & PELVIS W/O CONTRAST: CPT

## 2022-01-01 PROCEDURE — 76942 ECHO GUIDE FOR BIOPSY: CPT

## 2022-01-01 PROCEDURE — 36430 TRANSFUSION BLD/BLD COMPNT: CPT

## 2022-01-01 PROCEDURE — 87070 CULTURE OTHR SPECIMN AEROBIC: CPT

## 2022-01-01 PROCEDURE — 70450 CT HEAD/BRAIN W/O DYE: CPT | Mod: 26

## 2022-01-01 PROCEDURE — G1004: CPT

## 2022-01-01 PROCEDURE — 99222 1ST HOSP IP/OBS MODERATE 55: CPT | Mod: GC

## 2022-01-01 PROCEDURE — 71250 CT THORAX DX C-: CPT | Mod: 26,MA

## 2022-01-01 PROCEDURE — 80061 LIPID PANEL: CPT

## 2022-01-01 PROCEDURE — 84295 ASSAY OF SERUM SODIUM: CPT

## 2022-01-01 PROCEDURE — 74019 RADEX ABDOMEN 2 VIEWS: CPT | Mod: 26

## 2022-01-01 PROCEDURE — 84157 ASSAY OF PROTEIN OTHER: CPT

## 2022-01-01 PROCEDURE — 87102 FUNGUS ISOLATION CULTURE: CPT

## 2022-01-01 PROCEDURE — 87305 ASPERGILLUS AG IA: CPT

## 2022-01-01 PROCEDURE — 81001 URINALYSIS AUTO W/SCOPE: CPT

## 2022-01-01 PROCEDURE — 82272 OCCULT BLD FECES 1-3 TESTS: CPT

## 2022-01-01 PROCEDURE — 83540 ASSAY OF IRON: CPT

## 2022-01-01 PROCEDURE — 70551 MRI BRAIN STEM W/O DYE: CPT | Mod: 26

## 2022-01-01 PROCEDURE — 62270 DX LMBR SPI PNXR: CPT

## 2022-01-01 PROCEDURE — 87637 SARSCOV2&INF A&B&RSV AMP PRB: CPT

## 2022-01-01 PROCEDURE — 86920 COMPATIBILITY TEST SPIN: CPT

## 2022-01-01 PROCEDURE — C1889: CPT

## 2022-01-01 PROCEDURE — 88112 CYTOPATH CELL ENHANCE TECH: CPT

## 2022-01-01 PROCEDURE — 88312 SPECIAL STAINS GROUP 1: CPT | Mod: 26

## 2022-01-01 PROCEDURE — 99285 EMERGENCY DEPT VISIT HI MDM: CPT | Mod: 25

## 2022-01-01 PROCEDURE — 73502 X-RAY EXAM HIP UNI 2-3 VIEWS: CPT | Mod: 26,LT

## 2022-01-01 PROCEDURE — 88312 SPECIAL STAINS GROUP 1: CPT

## 2022-01-01 PROCEDURE — 82306 VITAMIN D 25 HYDROXY: CPT

## 2022-01-01 PROCEDURE — 80069 RENAL FUNCTION PANEL: CPT

## 2022-01-01 PROCEDURE — 80076 HEPATIC FUNCTION PANEL: CPT

## 2022-01-01 PROCEDURE — 95813 EEG EXTND MNTR 61-119 MIN: CPT | Mod: 26

## 2022-01-01 PROCEDURE — 36558 INSERT TUNNELED CV CATH: CPT

## 2022-01-01 PROCEDURE — C1725: CPT

## 2022-01-01 PROCEDURE — 99291 CRITICAL CARE FIRST HOUR: CPT

## 2022-01-01 PROCEDURE — 82728 ASSAY OF FERRITIN: CPT

## 2022-01-01 PROCEDURE — 87077 CULTURE AEROBIC IDENTIFY: CPT

## 2022-01-01 PROCEDURE — 76000 FLUOROSCOPY <1 HR PHYS/QHP: CPT

## 2022-01-01 PROCEDURE — 83735 ASSAY OF MAGNESIUM: CPT

## 2022-01-01 PROCEDURE — 27220 TREAT HIP SOCKET FRACTURE: CPT | Mod: LT

## 2022-01-01 PROCEDURE — 99215 OFFICE O/P EST HI 40 MIN: CPT

## 2022-01-01 PROCEDURE — 99498 ADVNCD CARE PLAN ADDL 30 MIN: CPT

## 2022-01-01 PROCEDURE — 72192 CT PELVIS W/O DYE: CPT | Mod: 26,MG

## 2022-01-01 PROCEDURE — 84145 PROCALCITONIN (PCT): CPT

## 2022-01-01 PROCEDURE — 84466 ASSAY OF TRANSFERRIN: CPT

## 2022-01-01 PROCEDURE — 85576 BLOOD PLATELET AGGREGATION: CPT

## 2022-01-01 PROCEDURE — 86803 HEPATITIS C AB TEST: CPT

## 2022-01-01 PROCEDURE — 97116 GAIT TRAINING THERAPY: CPT

## 2022-01-01 PROCEDURE — C1876: CPT

## 2022-01-01 PROCEDURE — 76937 US GUIDE VASCULAR ACCESS: CPT | Mod: 26,AS,GC

## 2022-01-01 PROCEDURE — 76937 US GUIDE VASCULAR ACCESS: CPT | Mod: 26

## 2022-01-01 PROCEDURE — 99291 CRITICAL CARE FIRST HOUR: CPT | Mod: CS,GC

## 2022-01-01 PROCEDURE — 85018 HEMOGLOBIN: CPT

## 2022-01-01 PROCEDURE — 99223 1ST HOSP IP/OBS HIGH 75: CPT | Mod: 25

## 2022-01-01 PROCEDURE — 88305 TISSUE EXAM BY PATHOLOGIST: CPT | Mod: 26

## 2022-01-01 PROCEDURE — 87116 MYCOBACTERIA CULTURE: CPT

## 2022-01-01 PROCEDURE — 99284 EMERGENCY DEPT VISIT MOD MDM: CPT

## 2022-01-01 PROCEDURE — 93971 EXTREMITY STUDY: CPT | Mod: 26,LT

## 2022-01-01 PROCEDURE — 76937 US GUIDE VASCULAR ACCESS: CPT | Mod: 26,GC

## 2022-01-01 PROCEDURE — 83036 HEMOGLOBIN GLYCOSYLATED A1C: CPT

## 2022-01-01 PROCEDURE — 71046 X-RAY EXAM CHEST 2 VIEWS: CPT

## 2022-01-01 PROCEDURE — 74181 MRI ABDOMEN W/O CONTRAST: CPT | Mod: 26

## 2022-01-01 PROCEDURE — 86706 HEP B SURFACE ANTIBODY: CPT

## 2022-01-01 PROCEDURE — 83615 LACTATE (LD) (LDH) ENZYME: CPT

## 2022-01-01 PROCEDURE — 93010 ELECTROCARDIOGRAM REPORT: CPT | Mod: 76

## 2022-01-01 PROCEDURE — C1750: CPT

## 2022-01-01 PROCEDURE — 99291 CRITICAL CARE FIRST HOUR: CPT | Mod: 25

## 2022-01-01 PROCEDURE — 87205 SMEAR GRAM STAIN: CPT

## 2022-01-01 PROCEDURE — 82330 ASSAY OF CALCIUM: CPT

## 2022-01-01 PROCEDURE — 86704 HEP B CORE ANTIBODY TOTAL: CPT

## 2022-01-01 DEVICE — SHUNT EXTERNAL 4X5MM: Type: IMPLANTABLE DEVICE | Status: FUNCTIONAL

## 2022-01-01 DEVICE — GWIRE VASC ENTRY MINISTICK MAX 5FR 10CM: Type: IMPLANTABLE DEVICE | Status: FUNCTIONAL

## 2022-01-01 DEVICE — SYS TRANSCAROTID NEUROPROTECTION: Type: IMPLANTABLE DEVICE | Status: FUNCTIONAL

## 2022-01-01 DEVICE — KIT INTRODUCER MICRO 21GAX7CM 7FR: Type: IMPLANTABLE DEVICE | Status: FUNCTIONAL

## 2022-01-01 DEVICE — PATCH  BOVINE PERICARDIAL 0.8CM X 8CM: Type: IMPLANTABLE DEVICE | Status: FUNCTIONAL

## 2022-01-01 DEVICE — CLIP APPLIER COVIDIEN SURGICLIP III 9" SM: Type: IMPLANTABLE DEVICE | Status: FUNCTIONAL

## 2022-01-01 DEVICE — IMPLANTABLE DEVICE: Type: IMPLANTABLE DEVICE | Status: FUNCTIONAL

## 2022-01-01 DEVICE — GWIRE ENROUTE 0.014X95CM: Type: IMPLANTABLE DEVICE | Status: FUNCTIONAL

## 2022-01-01 DEVICE — SPONGE GELFOAM SZ 100 UNCOMPRESSED: Type: IMPLANTABLE DEVICE | Status: FUNCTIONAL

## 2022-01-01 DEVICE — STENT TRANSCAROTID ENROUTE 9X40: Type: IMPLANTABLE DEVICE | Status: FUNCTIONAL

## 2022-01-01 RX ORDER — VANCOMYCIN HCL 1 G
1000 VIAL (EA) INTRAVENOUS ONCE
Refills: 0 | Status: COMPLETED | OUTPATIENT
Start: 2022-01-01 | End: 2022-01-01

## 2022-01-01 RX ORDER — LACTULOSE 10 G/15ML
200 SOLUTION ORAL EVERY 6 HOURS
Refills: 0 | Status: DISCONTINUED | OUTPATIENT
Start: 2022-01-01 | End: 2022-01-01

## 2022-01-01 RX ORDER — ONDANSETRON 8 MG/1
4 TABLET, FILM COATED ORAL EVERY 6 HOURS
Refills: 0 | Status: DISCONTINUED | OUTPATIENT
Start: 2022-01-01 | End: 2022-01-01

## 2022-01-01 RX ORDER — TACROLIMUS 5 MG/1
1 CAPSULE ORAL
Qty: 0 | Refills: 0 | DISCHARGE

## 2022-01-01 RX ORDER — VANCOMYCIN HCL 1 G
750 VIAL (EA) INTRAVENOUS ONCE
Refills: 0 | Status: COMPLETED | OUTPATIENT
Start: 2022-01-01 | End: 2022-01-01

## 2022-01-01 RX ORDER — ASPIRIN/CALCIUM CARB/MAGNESIUM 324 MG
81 TABLET ORAL DAILY
Refills: 0 | Status: DISCONTINUED | OUTPATIENT
Start: 2022-01-01 | End: 2022-01-01

## 2022-01-01 RX ORDER — MUPIROCIN 20 MG/G
1 OINTMENT TOPICAL
Refills: 0 | Status: DISCONTINUED | OUTPATIENT
Start: 2022-01-01 | End: 2022-01-01

## 2022-01-01 RX ORDER — ACETYLCYSTEINE 200 MG/ML
10 VIAL (ML) MISCELLANEOUS ONCE
Refills: 0 | Status: COMPLETED | OUTPATIENT
Start: 2022-01-01 | End: 2022-01-01

## 2022-01-01 RX ORDER — FLUCONAZOLE 150 MG/1
200 TABLET ORAL
Refills: 0 | Status: DISCONTINUED | OUTPATIENT
Start: 2022-01-01 | End: 2022-01-01

## 2022-01-01 RX ORDER — HEPARIN SODIUM 5000 [USP'U]/ML
INJECTION INTRAVENOUS; SUBCUTANEOUS
Qty: 25000 | Refills: 0 | Status: DISCONTINUED | OUTPATIENT
Start: 2022-01-01 | End: 2023-01-01

## 2022-01-01 RX ORDER — FLUCYTOSINE 500 MG/1
1500 CAPSULE ORAL DAILY
Refills: 0 | Status: DISCONTINUED | OUTPATIENT
Start: 2022-01-01 | End: 2022-01-01

## 2022-01-01 RX ORDER — DEXTROSE MONOHYDRATE, SODIUM CHLORIDE, AND POTASSIUM CHLORIDE 50; .745; 4.5 G/1000ML; G/1000ML; G/1000ML
1000 INJECTION, SOLUTION INTRAVENOUS
Refills: 0 | Status: DISCONTINUED | OUTPATIENT
Start: 2022-01-01 | End: 2022-01-01

## 2022-01-01 RX ORDER — AZITHROMYCIN 500 MG/1
TABLET, FILM COATED ORAL
Refills: 0 | Status: DISCONTINUED | OUTPATIENT
Start: 2022-01-01 | End: 2022-01-01

## 2022-01-01 RX ORDER — METOPROLOL TARTRATE 50 MG
25 TABLET ORAL
Refills: 0 | Status: DISCONTINUED | OUTPATIENT
Start: 2022-01-01 | End: 2022-01-01

## 2022-01-01 RX ORDER — MEROPENEM 1 G/30ML
500 INJECTION INTRAVENOUS EVERY 24 HOURS
Refills: 0 | Status: COMPLETED | OUTPATIENT
Start: 2022-01-01 | End: 2022-01-01

## 2022-01-01 RX ORDER — CEFTRIAXONE 500 MG/1
2 INJECTION, POWDER, FOR SOLUTION INTRAMUSCULAR; INTRAVENOUS
Qty: 152 | Refills: 0
Start: 2022-01-01 | End: 2022-01-01

## 2022-01-01 RX ORDER — ACETAMINOPHEN 500 MG
1000 TABLET ORAL ONCE
Refills: 0 | Status: COMPLETED | OUTPATIENT
Start: 2022-01-01 | End: 2022-01-01

## 2022-01-01 RX ORDER — DIGOXIN 250 MCG
250 TABLET ORAL EVERY 8 HOURS
Refills: 0 | Status: DISCONTINUED | OUTPATIENT
Start: 2022-01-01 | End: 2022-01-01

## 2022-01-01 RX ORDER — FUROSEMIDE 40 MG
40 TABLET ORAL ONCE
Refills: 0 | Status: COMPLETED | OUTPATIENT
Start: 2022-01-01 | End: 2022-01-01

## 2022-01-01 RX ORDER — FLUCONAZOLE 200 MG/1
200 TABLET ORAL
Qty: 78 | Refills: 0 | Status: ACTIVE | COMMUNITY
Start: 2021-07-12

## 2022-01-01 RX ORDER — FUROSEMIDE 40 MG
80 TABLET ORAL ONCE
Refills: 0 | Status: COMPLETED | OUTPATIENT
Start: 2022-01-01 | End: 2022-01-01

## 2022-01-01 RX ORDER — CALCIUM ACETATE 667 MG/1
667 TABLET ORAL
Qty: 90 | Refills: 0 | Status: ACTIVE | COMMUNITY
Start: 2022-01-01

## 2022-01-01 RX ORDER — ROSUVASTATIN CALCIUM 20 MG/1
20 TABLET, FILM COATED ORAL
Qty: 180 | Refills: 0 | Status: ACTIVE | COMMUNITY
Start: 2021-12-06

## 2022-01-01 RX ORDER — CHLORHEXIDINE GLUCONATE 213 G/1000ML
1 SOLUTION TOPICAL DAILY
Refills: 0 | Status: DISCONTINUED | OUTPATIENT
Start: 2022-01-01 | End: 2022-01-01

## 2022-01-01 RX ORDER — HYDROMORPHONE HYDROCHLORIDE 2 MG/ML
0.5 INJECTION INTRAMUSCULAR; INTRAVENOUS; SUBCUTANEOUS EVERY 4 HOURS
Refills: 0 | Status: DISCONTINUED | OUTPATIENT
Start: 2022-01-01 | End: 2022-01-01

## 2022-01-01 RX ORDER — LACTULOSE 10 G/15ML
200 SOLUTION ORAL ONCE
Refills: 0 | Status: COMPLETED | OUTPATIENT
Start: 2022-01-01 | End: 2022-01-01

## 2022-01-01 RX ORDER — HEPARIN SODIUM 5000 [USP'U]/ML
600 INJECTION INTRAVENOUS; SUBCUTANEOUS
Qty: 25000 | Refills: 0 | Status: DISCONTINUED | OUTPATIENT
Start: 2022-01-01 | End: 2022-01-01

## 2022-01-01 RX ORDER — VALPROIC ACID (AS SODIUM SALT) 250 MG/5ML
1000 SOLUTION, ORAL ORAL ONCE
Refills: 0 | Status: COMPLETED | OUTPATIENT
Start: 2022-01-01 | End: 2022-01-01

## 2022-01-01 RX ORDER — FENTANYL CITRATE 50 UG/ML
25 INJECTION INTRAVENOUS ONCE
Refills: 0 | Status: DISCONTINUED | OUTPATIENT
Start: 2022-01-01 | End: 2022-01-01

## 2022-01-01 RX ORDER — TACROLIMUS 5 MG/1
1 CAPSULE ORAL
Refills: 0 | Status: DISCONTINUED | OUTPATIENT
Start: 2022-01-01 | End: 2022-01-01

## 2022-01-01 RX ORDER — FLUCYTOSINE 500 MG/1
1500 CAPSULE ORAL
Refills: 0 | Status: DISCONTINUED | OUTPATIENT
Start: 2022-01-01 | End: 2022-01-01

## 2022-01-01 RX ORDER — APIXABAN 2.5 MG/1
2.5 TABLET, FILM COATED ORAL EVERY 12 HOURS
Refills: 0 | Status: DISCONTINUED | OUTPATIENT
Start: 2022-01-01 | End: 2022-01-01

## 2022-01-01 RX ORDER — CEFTRIAXONE 500 MG/1
2000 INJECTION, POWDER, FOR SOLUTION INTRAMUSCULAR; INTRAVENOUS EVERY 12 HOURS
Refills: 0 | Status: DISCONTINUED | OUTPATIENT
Start: 2022-01-01 | End: 2022-01-01

## 2022-01-01 RX ORDER — CALCIUM GLUCONATE 100 MG/ML
2 VIAL (ML) INTRAVENOUS ONCE
Refills: 0 | Status: COMPLETED | OUTPATIENT
Start: 2022-01-01 | End: 2022-01-01

## 2022-01-01 RX ORDER — MAGNESIUM OXIDE 400 MG ORAL TABLET 241.3 MG
1 TABLET ORAL
Qty: 0 | Refills: 0 | DISCHARGE

## 2022-01-01 RX ORDER — FLUCONAZOLE 150 MG/1
400 TABLET ORAL EVERY 24 HOURS
Refills: 0 | Status: DISCONTINUED | OUTPATIENT
Start: 2022-01-01 | End: 2022-01-01

## 2022-01-01 RX ORDER — SEVELAMER CARBONATE 2400 MG/1
800 POWDER, FOR SUSPENSION ORAL
Refills: 0 | Status: DISCONTINUED | OUTPATIENT
Start: 2022-01-01 | End: 2022-01-01

## 2022-01-01 RX ORDER — AMPICILLIN TRIHYDRATE 250 MG
2 CAPSULE ORAL EVERY 12 HOURS
Refills: 0 | Status: DISCONTINUED | OUTPATIENT
Start: 2022-01-01 | End: 2022-01-01

## 2022-01-01 RX ORDER — FLUCONAZOLE 150 MG/1
TABLET ORAL
Refills: 0 | Status: DISCONTINUED | OUTPATIENT
Start: 2022-01-01 | End: 2022-01-01

## 2022-01-01 RX ORDER — ATORVASTATIN CALCIUM 80 MG/1
80 TABLET, FILM COATED ORAL AT BEDTIME
Refills: 0 | Status: DISCONTINUED | OUTPATIENT
Start: 2022-01-01 | End: 2022-01-01

## 2022-01-01 RX ORDER — MYCOPHENOLATE MOFETIL 250 MG/1
2 CAPSULE ORAL
Qty: 0 | Refills: 0 | DISCHARGE

## 2022-01-01 RX ORDER — MEROPENEM 1 G/30ML
1000 INJECTION INTRAVENOUS EVERY 12 HOURS
Refills: 0 | Status: DISCONTINUED | OUTPATIENT
Start: 2022-01-01 | End: 2022-01-01

## 2022-01-01 RX ORDER — METOPROLOL TARTRATE 50 MG
5 TABLET ORAL EVERY 6 HOURS
Refills: 0 | Status: DISCONTINUED | OUTPATIENT
Start: 2022-01-01 | End: 2022-01-01

## 2022-01-01 RX ORDER — CLOPIDOGREL BISULFATE 75 MG/1
75 TABLET, FILM COATED ORAL DAILY
Refills: 0 | Status: DISCONTINUED | OUTPATIENT
Start: 2022-01-01 | End: 2022-01-01

## 2022-01-01 RX ORDER — HYDROCORTISONE 20 MG
1 TABLET ORAL
Qty: 0 | Refills: 0 | DISCHARGE

## 2022-01-01 RX ORDER — TAMSULOSIN HYDROCHLORIDE 0.4 MG/1
0.4 CAPSULE ORAL AT BEDTIME
Refills: 0 | Status: DISCONTINUED | OUTPATIENT
Start: 2022-01-01 | End: 2022-01-01

## 2022-01-01 RX ORDER — METOPROLOL TARTRATE 50 MG
25 TABLET ORAL DAILY
Refills: 0 | Status: DISCONTINUED | OUTPATIENT
Start: 2022-01-01 | End: 2022-01-01

## 2022-01-01 RX ORDER — LACTULOSE 10 G/15ML
10 SOLUTION ORAL EVERY 6 HOURS
Refills: 0 | Status: DISCONTINUED | OUTPATIENT
Start: 2022-01-01 | End: 2022-01-01

## 2022-01-01 RX ORDER — TRAMADOL HYDROCHLORIDE 50 MG/1
25 TABLET ORAL EVERY 6 HOURS
Refills: 0 | Status: DISCONTINUED | OUTPATIENT
Start: 2022-01-01 | End: 2022-01-01

## 2022-01-01 RX ORDER — TRAMADOL HYDROCHLORIDE 50 MG/1
25 TABLET ORAL ONCE
Refills: 0 | Status: DISCONTINUED | OUTPATIENT
Start: 2022-01-01 | End: 2022-01-01

## 2022-01-01 RX ORDER — HEPARIN SODIUM 5000 [USP'U]/ML
5000 INJECTION INTRAVENOUS; SUBCUTANEOUS EVERY 6 HOURS
Refills: 0 | Status: DISCONTINUED | OUTPATIENT
Start: 2022-01-01 | End: 2023-01-01

## 2022-01-01 RX ORDER — ACETAMINOPHEN 500 MG
650 TABLET ORAL EVERY 6 HOURS
Refills: 0 | Status: DISCONTINUED | OUTPATIENT
Start: 2022-01-01 | End: 2022-01-01

## 2022-01-01 RX ORDER — AMPICILLIN TRIHYDRATE 250 MG
2 CAPSULE ORAL
Qty: 152 | Refills: 0
Start: 2022-01-01 | End: 2022-01-01

## 2022-01-01 RX ORDER — ROSUVASTATIN CALCIUM 5 MG/1
1 TABLET ORAL
Qty: 0 | Refills: 0 | DISCHARGE

## 2022-01-01 RX ORDER — VALPROIC ACID (AS SODIUM SALT) 250 MG/5ML
250 SOLUTION, ORAL ORAL EVERY 8 HOURS
Refills: 0 | Status: DISCONTINUED | OUTPATIENT
Start: 2022-01-01 | End: 2023-01-01

## 2022-01-01 RX ORDER — LACTULOSE 10 G/15ML
10 SOLUTION ORAL THREE TIMES A DAY
Refills: 0 | Status: DISCONTINUED | OUTPATIENT
Start: 2022-01-01 | End: 2022-01-01

## 2022-01-01 RX ORDER — CLOPIDOGREL BISULFATE 75 MG/1
300 TABLET, FILM COATED ORAL ONCE
Refills: 0 | Status: COMPLETED | OUTPATIENT
Start: 2022-01-01 | End: 2022-01-01

## 2022-01-01 RX ORDER — PANTOPRAZOLE SODIUM 20 MG/1
40 TABLET, DELAYED RELEASE ORAL
Refills: 0 | Status: DISCONTINUED | OUTPATIENT
Start: 2022-01-01 | End: 2022-01-01

## 2022-01-01 RX ORDER — PANTOPRAZOLE SODIUM 20 MG/1
40 TABLET, DELAYED RELEASE ORAL DAILY
Refills: 0 | Status: DISCONTINUED | OUTPATIENT
Start: 2022-01-01 | End: 2022-01-01

## 2022-01-01 RX ORDER — HEPARIN SODIUM 5000 [USP'U]/ML
400 INJECTION INTRAVENOUS; SUBCUTANEOUS
Qty: 25000 | Refills: 0 | Status: DISCONTINUED | OUTPATIENT
Start: 2022-01-01 | End: 2022-01-01

## 2022-01-01 RX ORDER — MEROPENEM 1 G/30ML
1000 INJECTION INTRAVENOUS EVERY 24 HOURS
Refills: 0 | Status: COMPLETED | OUTPATIENT
Start: 2022-01-01 | End: 2022-01-01

## 2022-01-01 RX ORDER — CLOPIDOGREL BISULFATE 75 MG/1
75 TABLET, FILM COATED ORAL DAILY
Refills: 0 | Status: CANCELLED | OUTPATIENT
Start: 2022-01-01 | End: 2022-01-01

## 2022-01-01 RX ORDER — HYDROMORPHONE HYDROCHLORIDE 2 MG/ML
0.25 INJECTION INTRAMUSCULAR; INTRAVENOUS; SUBCUTANEOUS ONCE
Refills: 0 | Status: DISCONTINUED | OUTPATIENT
Start: 2022-01-01 | End: 2022-01-01

## 2022-01-01 RX ORDER — METOPROLOL TARTRATE 50 MG
25 TABLET ORAL THREE TIMES A DAY
Refills: 0 | Status: DISCONTINUED | OUTPATIENT
Start: 2022-01-01 | End: 2022-01-01

## 2022-01-01 RX ORDER — MAGNESIUM OXIDE 400 MG ORAL TABLET 241.3 MG
400 TABLET ORAL DAILY
Refills: 0 | Status: DISCONTINUED | OUTPATIENT
Start: 2022-01-01 | End: 2022-01-01

## 2022-01-01 RX ORDER — FENTANYL CITRATE 50 UG/ML
50 INJECTION INTRAVENOUS ONCE
Refills: 0 | Status: DISCONTINUED | OUTPATIENT
Start: 2022-01-01 | End: 2022-01-01

## 2022-01-01 RX ORDER — HYDROCORTISONE 20 MG
10 TABLET ORAL
Refills: 0 | Status: DISCONTINUED | OUTPATIENT
Start: 2022-01-01 | End: 2022-01-01

## 2022-01-01 RX ORDER — ALBUTEROL 90 UG/1
2.5 AEROSOL, METERED ORAL ONCE
Refills: 0 | Status: COMPLETED | OUTPATIENT
Start: 2022-01-01 | End: 2022-01-01

## 2022-01-01 RX ORDER — SODIUM CHLORIDE 9 MG/ML
1000 INJECTION, SOLUTION INTRAVENOUS
Refills: 0 | Status: DISCONTINUED | OUTPATIENT
Start: 2022-01-01 | End: 2022-01-01

## 2022-01-01 RX ORDER — SODIUM BICARBONATE 1 MEQ/ML
0.14 SYRINGE (ML) INTRAVENOUS
Qty: 150 | Refills: 0 | Status: DISCONTINUED | OUTPATIENT
Start: 2022-01-01 | End: 2022-01-01

## 2022-01-01 RX ORDER — AMLODIPINE BESYLATE 2.5 MG/1
5 TABLET ORAL DAILY
Refills: 0 | Status: DISCONTINUED | OUTPATIENT
Start: 2022-01-01 | End: 2022-01-01

## 2022-01-01 RX ORDER — CHLORHEXIDINE GLUCONATE 213 G/1000ML
1 SOLUTION TOPICAL
Refills: 0 | Status: DISCONTINUED | OUTPATIENT
Start: 2022-01-01 | End: 2022-01-01

## 2022-01-01 RX ORDER — HYDROCORTISONE 20 MG
10 TABLET ORAL EVERY 12 HOURS
Refills: 0 | Status: DISCONTINUED | OUTPATIENT
Start: 2022-01-01 | End: 2023-01-01

## 2022-01-01 RX ORDER — ERYTHROPOIETIN 10000 [IU]/ML
10000 INJECTION, SOLUTION INTRAVENOUS; SUBCUTANEOUS
Refills: 0 | Status: DISCONTINUED | OUTPATIENT
Start: 2022-01-01 | End: 2022-01-01

## 2022-01-01 RX ORDER — HYDROMORPHONE HYDROCHLORIDE 2 MG/ML
1 INJECTION INTRAMUSCULAR; INTRAVENOUS; SUBCUTANEOUS EVERY 6 HOURS
Refills: 0 | Status: DISCONTINUED | OUTPATIENT
Start: 2022-01-01 | End: 2022-01-01

## 2022-01-01 RX ORDER — HYDROMORPHONE HYDROCHLORIDE 2 MG/ML
0.5 INJECTION INTRAMUSCULAR; INTRAVENOUS; SUBCUTANEOUS EVERY 6 HOURS
Refills: 0 | Status: DISCONTINUED | OUTPATIENT
Start: 2022-01-01 | End: 2022-01-01

## 2022-01-01 RX ORDER — SEVELAMER CARBONATE 2400 MG/1
800 POWDER, FOR SUSPENSION ORAL
Refills: 0 | Status: DISCONTINUED | OUTPATIENT
Start: 2022-01-01 | End: 2023-01-01

## 2022-01-01 RX ORDER — FUROSEMIDE 40 MG
40 TABLET ORAL ONCE
Refills: 0 | Status: DISCONTINUED | OUTPATIENT
Start: 2022-01-01 | End: 2022-01-01

## 2022-01-01 RX ORDER — METOPROLOL TARTRATE 50 MG
50 TABLET ORAL
Refills: 0 | Status: DISCONTINUED | OUTPATIENT
Start: 2022-01-01 | End: 2022-01-01

## 2022-01-01 RX ORDER — METOPROLOL TARTRATE 50 MG
12.5 TABLET ORAL EVERY 12 HOURS
Refills: 0 | Status: DISCONTINUED | OUTPATIENT
Start: 2022-01-01 | End: 2022-01-01

## 2022-01-01 RX ORDER — DEXTROSE 50 % IN WATER 50 %
25 SYRINGE (ML) INTRAVENOUS ONCE
Refills: 0 | Status: DISCONTINUED | OUTPATIENT
Start: 2022-01-01 | End: 2022-01-01

## 2022-01-01 RX ORDER — FLUCYTOSINE 500 MG/1
1500 CAPSULE ORAL
Refills: 0 | Status: DISCONTINUED | OUTPATIENT
Start: 2022-01-01 | End: 2023-01-01

## 2022-01-01 RX ORDER — ACETAMINOPHEN 500 MG
1000 TABLET ORAL ONCE
Refills: 0 | Status: DISCONTINUED | OUTPATIENT
Start: 2022-01-01 | End: 2022-01-01

## 2022-01-01 RX ORDER — ATORVASTATIN CALCIUM 80 MG/1
40 TABLET, FILM COATED ORAL AT BEDTIME
Refills: 0 | Status: DISCONTINUED | OUTPATIENT
Start: 2022-01-01 | End: 2022-01-01

## 2022-01-01 RX ORDER — HEPARIN SODIUM 5000 [USP'U]/ML
2500 INJECTION INTRAVENOUS; SUBCUTANEOUS EVERY 6 HOURS
Refills: 0 | Status: DISCONTINUED | OUTPATIENT
Start: 2022-01-01 | End: 2023-01-01

## 2022-01-01 RX ORDER — SODIUM ZIRCONIUM CYCLOSILICATE 10 G/10G
10 POWDER, FOR SUSPENSION ORAL DAILY
Refills: 0 | Status: DISCONTINUED | OUTPATIENT
Start: 2022-01-01 | End: 2022-01-01

## 2022-01-01 RX ORDER — AZITHROMYCIN 500 MG/1
500 TABLET, FILM COATED ORAL EVERY 24 HOURS
Refills: 0 | Status: DISCONTINUED | OUTPATIENT
Start: 2022-01-01 | End: 2022-01-01

## 2022-01-01 RX ORDER — PANTOPRAZOLE SODIUM 20 MG/1
40 TABLET, DELAYED RELEASE ORAL EVERY 24 HOURS
Refills: 0 | Status: DISCONTINUED | OUTPATIENT
Start: 2022-01-01 | End: 2022-01-01

## 2022-01-01 RX ORDER — AMPHOTERICIN B 50 MG/12.5ML
320 INJECTION, POWDER, LYOPHILIZED, FOR SOLUTION INTRAVENOUS EVERY 24 HOURS
Refills: 0 | Status: DISCONTINUED | OUTPATIENT
Start: 2022-01-01 | End: 2023-01-01

## 2022-01-01 RX ORDER — MEROPENEM 1 G/30ML
1000 INJECTION INTRAVENOUS
Refills: 0 | Status: DISCONTINUED | OUTPATIENT
Start: 2022-01-01 | End: 2022-01-01

## 2022-01-01 RX ORDER — HYDROCORTISONE 20 MG
10 TABLET ORAL AT BEDTIME
Refills: 0 | Status: DISCONTINUED | OUTPATIENT
Start: 2022-01-01 | End: 2022-01-01

## 2022-01-01 RX ORDER — DEXTROSE 50 % IN WATER 50 %
25 SYRINGE (ML) INTRAVENOUS ONCE
Refills: 0 | Status: COMPLETED | OUTPATIENT
Start: 2022-01-01 | End: 2022-01-01

## 2022-01-01 RX ORDER — AZTREONAM 2 G
2000 VIAL (EA) INJECTION ONCE
Refills: 0 | Status: COMPLETED | OUTPATIENT
Start: 2022-01-01 | End: 2022-01-01

## 2022-01-01 RX ORDER — SEVELAMER CARBONATE 800 MG/1
800 TABLET, FILM COATED ORAL
Qty: 90 | Refills: 5 | Status: ACTIVE | COMMUNITY
Start: 2021-05-24 | End: 1900-01-01

## 2022-01-01 RX ORDER — CALCIUM ACETATE 667 MG
667 TABLET ORAL
Refills: 0 | Status: DISCONTINUED | OUTPATIENT
Start: 2022-01-01 | End: 2022-01-01

## 2022-01-01 RX ORDER — ROSUVASTATIN CALCIUM 5 MG/1
1 TABLET ORAL
Qty: 0 | Refills: 0 | DISCHARGE
Start: 2022-01-01

## 2022-01-01 RX ORDER — HEPARIN SODIUM 5000 [USP'U]/ML
INJECTION INTRAVENOUS; SUBCUTANEOUS
Qty: 25000 | Refills: 0 | Status: DISCONTINUED | OUTPATIENT
Start: 2022-01-01 | End: 2022-01-01

## 2022-01-01 RX ORDER — IPRATROPIUM/ALBUTEROL SULFATE 18-103MCG
3 AEROSOL WITH ADAPTER (GRAM) INHALATION EVERY 6 HOURS
Refills: 0 | Status: DISCONTINUED | OUTPATIENT
Start: 2022-01-01 | End: 2022-01-01

## 2022-01-01 RX ORDER — TAMSULOSIN HYDROCHLORIDE 0.4 MG/1
0.4 CAPSULE ORAL AT BEDTIME
Refills: 0 | Status: DISCONTINUED | OUTPATIENT
Start: 2022-01-01 | End: 2023-01-01

## 2022-01-01 RX ORDER — HYDROMORPHONE HYDROCHLORIDE 2 MG/ML
0.25 INJECTION INTRAMUSCULAR; INTRAVENOUS; SUBCUTANEOUS EVERY 6 HOURS
Refills: 0 | Status: DISCONTINUED | OUTPATIENT
Start: 2022-01-01 | End: 2022-01-01

## 2022-01-01 RX ORDER — PANTOPRAZOLE SODIUM 20 MG/1
40 TABLET, DELAYED RELEASE ORAL DAILY
Refills: 0 | Status: DISCONTINUED | OUTPATIENT
Start: 2022-01-01 | End: 2023-01-01

## 2022-01-01 RX ORDER — CALCIUM CARBONATE 500(1250)
1 TABLET ORAL DAILY
Refills: 0 | Status: DISCONTINUED | OUTPATIENT
Start: 2022-01-01 | End: 2022-01-01

## 2022-01-01 RX ORDER — ACYCLOVIR SODIUM 500 MG
320 VIAL (EA) INTRAVENOUS ONCE
Refills: 0 | Status: COMPLETED | OUTPATIENT
Start: 2022-01-01 | End: 2022-01-01

## 2022-01-01 RX ORDER — AZITHROMYCIN 500 MG/1
500 TABLET, FILM COATED ORAL ONCE
Refills: 0 | Status: COMPLETED | OUTPATIENT
Start: 2022-01-01 | End: 2022-01-01

## 2022-01-01 RX ORDER — AMLODIPINE BESYLATE 2.5 MG/1
1 TABLET ORAL
Qty: 0 | Refills: 0 | DISCHARGE

## 2022-01-01 RX ORDER — DEXTROSE 50 % IN WATER 50 %
50 SYRINGE (ML) INTRAVENOUS ONCE
Refills: 0 | Status: COMPLETED | OUTPATIENT
Start: 2022-01-01 | End: 2022-01-01

## 2022-01-01 RX ORDER — SACUBITRIL AND VALSARTAN 24; 26 MG/1; MG/1
1 TABLET, FILM COATED ORAL
Refills: 0 | Status: DISCONTINUED | OUTPATIENT
Start: 2022-01-01 | End: 2022-01-01

## 2022-01-01 RX ORDER — ALPRAZOLAM 0.25 MG
1 TABLET ORAL
Qty: 10 | Refills: 0
Start: 2022-01-01 | End: 2022-01-01

## 2022-01-01 RX ORDER — DEXTROSE 10 % IN WATER 10 %
250 INTRAVENOUS SOLUTION INTRAVENOUS ONCE
Refills: 0 | Status: DISCONTINUED | OUTPATIENT
Start: 2022-01-01 | End: 2022-01-01

## 2022-01-01 RX ORDER — MIDAZOLAM HYDROCHLORIDE 1 MG/ML
2 INJECTION, SOLUTION INTRAMUSCULAR; INTRAVENOUS ONCE
Refills: 0 | Status: DISCONTINUED | OUTPATIENT
Start: 2022-01-01 | End: 2022-01-01

## 2022-01-01 RX ORDER — CHLORHEXIDINE GLUCONATE 213 G/1000ML
1 SOLUTION TOPICAL
Refills: 0 | Status: DISCONTINUED | OUTPATIENT
Start: 2022-01-01 | End: 2023-01-01

## 2022-01-01 RX ORDER — ALBUTEROL 90 UG/1
2.5 AEROSOL, METERED ORAL EVERY 4 HOURS
Refills: 0 | Status: DISCONTINUED | OUTPATIENT
Start: 2022-01-01 | End: 2022-01-01

## 2022-01-01 RX ORDER — MEROPENEM 1 G/30ML
1000 INJECTION INTRAVENOUS ONCE
Refills: 0 | Status: DISCONTINUED | OUTPATIENT
Start: 2022-01-01 | End: 2022-01-01

## 2022-01-01 RX ORDER — VANCOMYCIN HCL 1 G
1000 VIAL (EA) INTRAVENOUS EVERY 24 HOURS
Refills: 0 | Status: DISCONTINUED | OUTPATIENT
Start: 2022-01-01 | End: 2022-01-01

## 2022-01-01 RX ORDER — ACYCLOVIR SODIUM 500 MG
VIAL (EA) INTRAVENOUS
Refills: 0 | Status: DISCONTINUED | OUTPATIENT
Start: 2022-01-01 | End: 2023-01-01

## 2022-01-01 RX ORDER — POVIDONE-IODINE 5 %
1 AEROSOL (ML) TOPICAL ONCE
Refills: 0 | Status: DISCONTINUED | OUTPATIENT
Start: 2022-01-01 | End: 2022-01-01

## 2022-01-01 RX ORDER — HEPARIN SODIUM 5000 [USP'U]/ML
5000 INJECTION INTRAVENOUS; SUBCUTANEOUS EVERY 6 HOURS
Refills: 0 | Status: DISCONTINUED | OUTPATIENT
Start: 2022-01-01 | End: 2022-01-01

## 2022-01-01 RX ORDER — ERYTHROPOIETIN 10000 [IU]/ML
10000 INJECTION, SOLUTION INTRAVENOUS; SUBCUTANEOUS ONCE
Refills: 0 | Status: COMPLETED | OUTPATIENT
Start: 2022-01-01 | End: 2022-01-01

## 2022-01-01 RX ORDER — METOPROLOL TARTRATE 50 MG
12.5 TABLET ORAL
Refills: 0 | Status: DISCONTINUED | OUTPATIENT
Start: 2022-01-01 | End: 2022-01-01

## 2022-01-01 RX ORDER — PIPERACILLIN AND TAZOBACTAM 4; .5 G/20ML; G/20ML
3.38 INJECTION, POWDER, LYOPHILIZED, FOR SOLUTION INTRAVENOUS ONCE
Refills: 0 | Status: COMPLETED | OUTPATIENT
Start: 2022-01-01 | End: 2022-01-01

## 2022-01-01 RX ORDER — HEPARIN SODIUM 5000 [USP'U]/ML
5000 INJECTION INTRAVENOUS; SUBCUTANEOUS ONCE
Refills: 0 | Status: COMPLETED | OUTPATIENT
Start: 2022-01-01 | End: 2022-01-01

## 2022-01-01 RX ORDER — APIXABAN 2.5 MG/1
2.5 TABLET, FILM COATED ORAL
Qty: 180 | Refills: 0 | Status: COMPLETED | OUTPATIENT
Start: 2022-01-01

## 2022-01-01 RX ORDER — ONDANSETRON 8 MG/1
4 TABLET, FILM COATED ORAL ONCE
Refills: 0 | Status: COMPLETED | OUTPATIENT
Start: 2022-01-01 | End: 2022-01-01

## 2022-01-01 RX ORDER — SODIUM CHLORIDE 9 MG/ML
10 INJECTION INTRAMUSCULAR; INTRAVENOUS; SUBCUTANEOUS
Refills: 0 | Status: DISCONTINUED | OUTPATIENT
Start: 2022-01-01 | End: 2022-01-01

## 2022-01-01 RX ORDER — MEROPENEM 1 G/30ML
INJECTION INTRAVENOUS
Refills: 0 | Status: COMPLETED | OUTPATIENT
Start: 2022-01-01 | End: 2023-01-01

## 2022-01-01 RX ORDER — TACROLIMUS 5 MG/1
1.5 CAPSULE ORAL DAILY
Refills: 0 | Status: DISCONTINUED | OUTPATIENT
Start: 2022-01-01 | End: 2023-01-01

## 2022-01-01 RX ORDER — TACROLIMUS 5 MG/1
0.5 CAPSULE ORAL DAILY
Refills: 0 | Status: DISCONTINUED | OUTPATIENT
Start: 2022-01-01 | End: 2022-01-01

## 2022-01-01 RX ORDER — REMDESIVIR 5 MG/ML
200 INJECTION INTRAVENOUS EVERY 24 HOURS
Refills: 0 | Status: COMPLETED | OUTPATIENT
Start: 2022-01-01 | End: 2022-01-01

## 2022-01-01 RX ORDER — FLUCONAZOLE 150 MG/1
400 TABLET ORAL
Refills: 0 | Status: DISCONTINUED | OUTPATIENT
Start: 2022-01-01 | End: 2022-01-01

## 2022-01-01 RX ORDER — ALBUTEROL 90 UG/1
4 AEROSOL, METERED ORAL ONCE
Refills: 0 | Status: COMPLETED | OUTPATIENT
Start: 2022-01-01 | End: 2022-01-01

## 2022-01-01 RX ORDER — TACROLIMUS 5 MG/1
0.38 CAPSULE ORAL EVERY 24 HOURS
Refills: 0 | Status: DISCONTINUED | OUTPATIENT
Start: 2022-01-01 | End: 2022-01-01

## 2022-01-01 RX ORDER — HYDROCORTISONE 20 MG
20 TABLET ORAL
Refills: 0 | Status: DISCONTINUED | OUTPATIENT
Start: 2022-01-01 | End: 2022-01-01

## 2022-01-01 RX ORDER — HEPARIN SODIUM 5000 [USP'U]/ML
2500 INJECTION INTRAVENOUS; SUBCUTANEOUS EVERY 6 HOURS
Refills: 0 | Status: DISCONTINUED | OUTPATIENT
Start: 2022-01-01 | End: 2022-01-01

## 2022-01-01 RX ORDER — HYDROCORTISONE 20 MG
20 TABLET ORAL AT BEDTIME
Refills: 0 | Status: DISCONTINUED | OUTPATIENT
Start: 2022-01-01 | End: 2022-01-01

## 2022-01-01 RX ORDER — ONDANSETRON 8 MG/1
4 TABLET, FILM COATED ORAL EVERY 4 HOURS
Refills: 0 | Status: DISCONTINUED | OUTPATIENT
Start: 2022-01-01 | End: 2022-01-01

## 2022-01-01 RX ORDER — HYDROCORTISONE 20 MG
10 TABLET ORAL EVERY 8 HOURS
Refills: 0 | Status: DISCONTINUED | OUTPATIENT
Start: 2022-01-01 | End: 2022-01-01

## 2022-01-01 RX ORDER — APIXABAN 2.5 MG/1
2.5 TABLET, FILM COATED ORAL
Refills: 0 | Status: DISCONTINUED | OUTPATIENT
Start: 2022-01-01 | End: 2022-01-01

## 2022-01-01 RX ORDER — ACYCLOVIR SODIUM 500 MG
VIAL (EA) INTRAVENOUS
Refills: 0 | Status: DISCONTINUED | OUTPATIENT
Start: 2022-01-01 | End: 2022-01-01

## 2022-01-01 RX ORDER — ALBUMIN HUMAN 25 %
100 VIAL (ML) INTRAVENOUS ONCE
Refills: 0 | Status: COMPLETED | OUTPATIENT
Start: 2022-01-01 | End: 2022-01-01

## 2022-01-01 RX ORDER — ERYTHROPOIETIN 10000 [IU]/ML
4000 INJECTION, SOLUTION INTRAVENOUS; SUBCUTANEOUS
Refills: 0 | Status: DISCONTINUED | OUTPATIENT
Start: 2022-01-01 | End: 2022-01-01

## 2022-01-01 RX ORDER — FLUCONAZOLE 150 MG/1
400 TABLET ORAL DAILY
Refills: 0 | Status: DISCONTINUED | OUTPATIENT
Start: 2022-01-01 | End: 2022-01-01

## 2022-01-01 RX ORDER — TACROLIMUS 5 MG/1
1.5 CAPSULE ORAL DAILY
Refills: 0 | Status: DISCONTINUED | OUTPATIENT
Start: 2022-01-01 | End: 2022-01-01

## 2022-01-01 RX ORDER — REMDESIVIR 5 MG/ML
INJECTION INTRAVENOUS
Refills: 0 | Status: COMPLETED | OUTPATIENT
Start: 2022-01-01 | End: 2022-01-01

## 2022-01-01 RX ORDER — TIOTROPIUM BROMIDE 18 UG/1
1 CAPSULE ORAL; RESPIRATORY (INHALATION)
Qty: 0 | Refills: 0 | DISCHARGE
Start: 2022-01-01

## 2022-01-01 RX ORDER — TACROLIMUS 5 MG/1
1.5 CAPSULE ORAL
Refills: 0 | Status: DISCONTINUED | OUTPATIENT
Start: 2022-01-01 | End: 2022-01-01

## 2022-01-01 RX ORDER — ONDANSETRON 8 MG/1
4 TABLET, FILM COATED ORAL EVERY 8 HOURS
Refills: 0 | Status: DISCONTINUED | OUTPATIENT
Start: 2022-01-01 | End: 2022-01-01

## 2022-01-01 RX ORDER — HYDROMORPHONE HYDROCHLORIDE 2 MG/ML
0.5 INJECTION INTRAMUSCULAR; INTRAVENOUS; SUBCUTANEOUS ONCE
Refills: 0 | Status: DISCONTINUED | OUTPATIENT
Start: 2022-01-01 | End: 2022-01-01

## 2022-01-01 RX ORDER — MYCOPHENOLATE MOFETIL 250 MG/1
250 CAPSULE ORAL
Refills: 0 | Status: DISCONTINUED | OUTPATIENT
Start: 2022-01-01 | End: 2022-01-01

## 2022-01-01 RX ORDER — HEPARIN SODIUM 5000 [USP'U]/ML
300 INJECTION INTRAVENOUS; SUBCUTANEOUS
Qty: 25000 | Refills: 0 | Status: DISCONTINUED | OUTPATIENT
Start: 2022-01-01 | End: 2022-01-01

## 2022-01-01 RX ORDER — HYDROCORTISONE 20 MG
10 TABLET ORAL DAILY
Refills: 0 | Status: DISCONTINUED | OUTPATIENT
Start: 2022-01-01 | End: 2022-01-01

## 2022-01-01 RX ORDER — MEROPENEM 1 G/30ML
INJECTION INTRAVENOUS
Refills: 0 | Status: DISCONTINUED | OUTPATIENT
Start: 2022-01-01 | End: 2022-01-01

## 2022-01-01 RX ORDER — ASPIRIN/CALCIUM CARB/MAGNESIUM 324 MG
1 TABLET ORAL
Qty: 0 | Refills: 0 | DISCHARGE

## 2022-01-01 RX ORDER — FOLIC ACID/VIT B COMPLEX AND C 0.8 MG
0.8 TABLET ORAL
Qty: 30 | Refills: 0 | Status: ACTIVE | COMMUNITY
Start: 2022-01-01

## 2022-01-01 RX ORDER — MIDODRINE HYDROCHLORIDE 2.5 MG/1
5 TABLET ORAL ONCE
Refills: 0 | Status: COMPLETED | OUTPATIENT
Start: 2022-01-01 | End: 2022-01-01

## 2022-01-01 RX ORDER — DEXTROSE 50 % IN WATER 50 %
25 SYRINGE (ML) INTRAVENOUS ONCE
Refills: 0 | Status: DISCONTINUED | OUTPATIENT
Start: 2022-01-01 | End: 2023-01-01

## 2022-01-01 RX ORDER — LIDOCAINE AND PRILOCAINE 25; 25 MG/G; MG/G
2.5-2.5 CREAM TOPICAL
Qty: 2 | Refills: 2 | Status: ACTIVE | COMMUNITY
Start: 2022-01-01 | End: 1900-01-01

## 2022-01-01 RX ORDER — FLUCONAZOLE 150 MG/1
2 TABLET ORAL
Qty: 0 | Refills: 0 | DISCHARGE

## 2022-01-01 RX ORDER — MEROPENEM 1 G/30ML
500 INJECTION INTRAVENOUS ONCE
Refills: 0 | Status: COMPLETED | OUTPATIENT
Start: 2022-01-01 | End: 2022-01-01

## 2022-01-01 RX ORDER — PANTOPRAZOLE SODIUM 20 MG/1
1 TABLET, DELAYED RELEASE ORAL
Qty: 0 | Refills: 0 | DISCHARGE
Start: 2022-01-01

## 2022-01-01 RX ORDER — ALBUTEROL 90 UG/1
3 AEROSOL, METERED ORAL
Qty: 0 | Refills: 0 | DISCHARGE
Start: 2022-01-01

## 2022-01-01 RX ORDER — ASPIRIN/CALCIUM CARB/MAGNESIUM 324 MG
81 TABLET ORAL DAILY
Refills: 0 | Status: CANCELLED | OUTPATIENT
Start: 2022-01-01 | End: 2022-01-01

## 2022-01-01 RX ORDER — DEXTROSE 50 % IN WATER 50 %
15 SYRINGE (ML) INTRAVENOUS ONCE
Refills: 0 | Status: DISCONTINUED | OUTPATIENT
Start: 2022-01-01 | End: 2023-01-01

## 2022-01-01 RX ORDER — MEROPENEM 1 G/30ML
1000 INJECTION INTRAVENOUS EVERY 24 HOURS
Refills: 0 | Status: DISCONTINUED | OUTPATIENT
Start: 2022-01-01 | End: 2022-01-01

## 2022-01-01 RX ORDER — OLANZAPINE 15 MG/1
5 TABLET, FILM COATED ORAL ONCE
Refills: 0 | Status: COMPLETED | OUTPATIENT
Start: 2022-01-01 | End: 2022-01-01

## 2022-01-01 RX ORDER — LANOLIN ALCOHOL/MO/W.PET/CERES
3 CREAM (GRAM) TOPICAL AT BEDTIME
Refills: 0 | Status: DISCONTINUED | OUTPATIENT
Start: 2022-01-01 | End: 2022-01-01

## 2022-01-01 RX ORDER — LANOLIN ALCOHOL/MO/W.PET/CERES
3 CREAM (GRAM) TOPICAL ONCE
Refills: 0 | Status: COMPLETED | OUTPATIENT
Start: 2022-01-01 | End: 2022-01-01

## 2022-01-01 RX ORDER — ACYCLOVIR SODIUM 500 MG
320 VIAL (EA) INTRAVENOUS EVERY 24 HOURS
Refills: 0 | Status: DISCONTINUED | OUTPATIENT
Start: 2022-01-01 | End: 2023-01-01

## 2022-01-01 RX ORDER — CHLORHEXIDINE GLUCONATE 213 G/1000ML
15 SOLUTION TOPICAL EVERY 12 HOURS
Refills: 0 | Status: DISCONTINUED | OUTPATIENT
Start: 2022-01-01 | End: 2022-01-01

## 2022-01-01 RX ORDER — DEXTROSE 50 % IN WATER 50 %
12.5 SYRINGE (ML) INTRAVENOUS ONCE
Refills: 0 | Status: DISCONTINUED | OUTPATIENT
Start: 2022-01-01 | End: 2023-01-01

## 2022-01-01 RX ORDER — CALCIUM GLUCONATE 100 MG/ML
2 VIAL (ML) INTRAVENOUS ONCE
Refills: 0 | Status: DISCONTINUED | OUTPATIENT
Start: 2022-01-01 | End: 2022-01-01

## 2022-01-01 RX ORDER — HYDROCORTISONE 20 MG
40 TABLET ORAL DAILY
Refills: 0 | Status: DISCONTINUED | OUTPATIENT
Start: 2022-01-01 | End: 2022-01-01

## 2022-01-01 RX ORDER — MYCOPHENOLATE MOFETIL 250 MG/1
500 CAPSULE ORAL
Refills: 0 | Status: DISCONTINUED | OUTPATIENT
Start: 2022-01-01 | End: 2022-01-01

## 2022-01-01 RX ORDER — PROPOFOL 10 MG/ML
10 INJECTION, EMULSION INTRAVENOUS
Qty: 1000 | Refills: 0 | Status: DISCONTINUED | OUTPATIENT
Start: 2022-01-01 | End: 2022-01-01

## 2022-01-01 RX ORDER — POTASSIUM CHLORIDE 20 MEQ
10 PACKET (EA) ORAL ONCE
Refills: 0 | Status: COMPLETED | OUTPATIENT
Start: 2022-01-01 | End: 2022-01-01

## 2022-01-01 RX ORDER — INSULIN HUMAN 100 [IU]/ML
10 INJECTION, SOLUTION SUBCUTANEOUS ONCE
Refills: 0 | Status: COMPLETED | OUTPATIENT
Start: 2022-01-01 | End: 2022-01-01

## 2022-01-01 RX ORDER — MEROPENEM 1 G/30ML
500 INJECTION INTRAVENOUS EVERY 24 HOURS
Refills: 0 | Status: COMPLETED | OUTPATIENT
Start: 2022-01-01 | End: 2023-01-01

## 2022-01-01 RX ORDER — TAMSULOSIN HYDROCHLORIDE 0.4 MG/1
1 CAPSULE ORAL
Qty: 0 | Refills: 0 | DISCHARGE

## 2022-01-01 RX ORDER — HEPARIN SODIUM 5000 [USP'U]/ML
5000 INJECTION INTRAVENOUS; SUBCUTANEOUS EVERY 12 HOURS
Refills: 0 | Status: CANCELLED | OUTPATIENT
Start: 2022-01-01 | End: 2022-01-01

## 2022-01-01 RX ORDER — FENTANYL CITRATE 50 UG/ML
50 INJECTION INTRAVENOUS
Refills: 0 | Status: DISCONTINUED | OUTPATIENT
Start: 2022-01-01 | End: 2022-01-01

## 2022-01-01 RX ORDER — ACETYLCYSTEINE 200 MG/ML
6 VIAL (ML) MISCELLANEOUS ONCE
Refills: 0 | Status: COMPLETED | OUTPATIENT
Start: 2022-01-01 | End: 2022-01-01

## 2022-01-01 RX ORDER — ACYCLOVIR SODIUM 500 MG
350 VIAL (EA) INTRAVENOUS EVERY 24 HOURS
Refills: 0 | Status: DISCONTINUED | OUTPATIENT
Start: 2022-01-01 | End: 2022-01-01

## 2022-01-01 RX ORDER — DEXTROSE 50 % IN WATER 50 %
15 SYRINGE (ML) INTRAVENOUS ONCE
Refills: 0 | Status: DISCONTINUED | OUTPATIENT
Start: 2022-01-01 | End: 2022-01-01

## 2022-01-01 RX ORDER — ACETYLCYSTEINE 200 MG/ML
3.2 VIAL (ML) MISCELLANEOUS ONCE
Refills: 0 | Status: COMPLETED | OUTPATIENT
Start: 2022-01-01 | End: 2022-01-01

## 2022-01-01 RX ORDER — OLANZAPINE 15 MG/1
5 TABLET, FILM COATED ORAL EVERY 6 HOURS
Refills: 0 | Status: DISCONTINUED | OUTPATIENT
Start: 2022-01-01 | End: 2022-01-01

## 2022-01-01 RX ORDER — APIXABAN 2.5 MG/1
2.5 TABLET, FILM COATED ORAL
Qty: 60 | Refills: 3 | Status: ACTIVE | COMMUNITY
Start: 2022-01-01 | End: 1900-01-01

## 2022-01-01 RX ORDER — HYDROCORTISONE 20 MG
50 TABLET ORAL EVERY 6 HOURS
Refills: 0 | Status: DISCONTINUED | OUTPATIENT
Start: 2022-01-01 | End: 2022-01-01

## 2022-01-01 RX ORDER — METOPROLOL TARTRATE 50 MG
25 TABLET ORAL
Refills: 0 | Status: DISCONTINUED | OUTPATIENT
Start: 2022-01-01 | End: 2023-01-01

## 2022-01-01 RX ORDER — HYDROMORPHONE HYDROCHLORIDE 2 MG/ML
1 INJECTION INTRAMUSCULAR; INTRAVENOUS; SUBCUTANEOUS EVERY 4 HOURS
Refills: 0 | Status: DISCONTINUED | OUTPATIENT
Start: 2022-01-01 | End: 2022-01-01

## 2022-01-01 RX ORDER — OXYCODONE AND ACETAMINOPHEN 5; 325 MG/1; MG/1
1 TABLET ORAL ONCE
Refills: 0 | Status: DISCONTINUED | OUTPATIENT
Start: 2022-01-01 | End: 2022-01-01

## 2022-01-01 RX ORDER — SODIUM CHLORIDE 9 MG/ML
250 INJECTION INTRAMUSCULAR; INTRAVENOUS; SUBCUTANEOUS ONCE
Refills: 0 | Status: COMPLETED | OUTPATIENT
Start: 2022-01-01 | End: 2022-01-01

## 2022-01-01 RX ORDER — HEPARIN SODIUM 5000 [USP'U]/ML
800 INJECTION INTRAVENOUS; SUBCUTANEOUS
Qty: 25000 | Refills: 0 | Status: DISCONTINUED | OUTPATIENT
Start: 2022-01-01 | End: 2022-01-01

## 2022-01-01 RX ORDER — PIPERACILLIN AND TAZOBACTAM 4; .5 G/20ML; G/20ML
3.38 INJECTION, POWDER, LYOPHILIZED, FOR SOLUTION INTRAVENOUS EVERY 12 HOURS
Refills: 0 | Status: DISCONTINUED | OUTPATIENT
Start: 2022-01-01 | End: 2022-01-01

## 2022-01-01 RX ORDER — GLUCAGON INJECTION, SOLUTION 0.5 MG/.1ML
1 INJECTION, SOLUTION SUBCUTANEOUS ONCE
Refills: 0 | Status: DISCONTINUED | OUTPATIENT
Start: 2022-01-01 | End: 2022-01-01

## 2022-01-01 RX ORDER — FLUCONAZOLE 150 MG/1
400 TABLET ORAL ONCE
Refills: 0 | Status: COMPLETED | OUTPATIENT
Start: 2022-01-01 | End: 2022-01-01

## 2022-01-01 RX ORDER — HYDROCORTISONE 20 MG
20 TABLET ORAL DAILY
Refills: 0 | Status: DISCONTINUED | OUTPATIENT
Start: 2022-01-01 | End: 2022-01-01

## 2022-01-01 RX ORDER — HEPARIN SODIUM 5000 [USP'U]/ML
5000 INJECTION INTRAVENOUS; SUBCUTANEOUS EVERY 12 HOURS
Refills: 0 | Status: DISCONTINUED | OUTPATIENT
Start: 2022-01-01 | End: 2022-01-01

## 2022-01-01 RX ORDER — HEPARIN SODIUM 5000 [USP'U]/ML
5000 INJECTION INTRAVENOUS; SUBCUTANEOUS EVERY 8 HOURS
Refills: 0 | Status: DISCONTINUED | OUTPATIENT
Start: 2022-01-01 | End: 2022-01-01

## 2022-01-01 RX ORDER — SODIUM ZIRCONIUM CYCLOSILICATE 10 G/10G
10 POWDER, FOR SUSPENSION ORAL EVERY 8 HOURS
Refills: 0 | Status: COMPLETED | OUTPATIENT
Start: 2022-01-01 | End: 2022-01-01

## 2022-01-01 RX ORDER — VANCOMYCIN HCL 1 G
500 VIAL (EA) INTRAVENOUS ONCE
Refills: 0 | Status: COMPLETED | OUTPATIENT
Start: 2022-01-01 | End: 2022-01-01

## 2022-01-01 RX ORDER — NOREPINEPHRINE BITARTRATE/D5W 8 MG/250ML
0.05 PLASTIC BAG, INJECTION (ML) INTRAVENOUS
Qty: 8 | Refills: 0 | Status: DISCONTINUED | OUTPATIENT
Start: 2022-01-01 | End: 2022-01-01

## 2022-01-01 RX ORDER — METOPROLOL TARTRATE 50 MG
5 TABLET ORAL ONCE
Refills: 0 | Status: DISCONTINUED | OUTPATIENT
Start: 2022-01-01 | End: 2022-01-01

## 2022-01-01 RX ORDER — FLUCONAZOLE 150 MG/1
1 TABLET ORAL
Qty: 0 | Refills: 0 | DISCHARGE

## 2022-01-01 RX ORDER — METOPROLOL TARTRATE 50 MG
1 TABLET ORAL
Qty: 30 | Refills: 0
Start: 2022-01-01 | End: 2022-01-01

## 2022-01-01 RX ORDER — TIOTROPIUM BROMIDE 18 UG/1
1 CAPSULE ORAL; RESPIRATORY (INHALATION) DAILY
Refills: 0 | Status: DISCONTINUED | OUTPATIENT
Start: 2022-01-01 | End: 2022-01-01

## 2022-01-01 RX ORDER — REMDESIVIR 5 MG/ML
100 INJECTION INTRAVENOUS EVERY 24 HOURS
Refills: 0 | Status: COMPLETED | OUTPATIENT
Start: 2022-01-01 | End: 2022-01-01

## 2022-01-01 RX ORDER — AMPHOTERICIN B 50 MG/12.5ML
320 INJECTION, POWDER, LYOPHILIZED, FOR SOLUTION INTRAVENOUS EVERY 24 HOURS
Refills: 0 | Status: DISCONTINUED | OUTPATIENT
Start: 2022-01-01 | End: 2022-01-01

## 2022-01-01 RX ORDER — SEVELAMER CARBONATE 2400 MG/1
1600 POWDER, FOR SUSPENSION ORAL
Refills: 0 | Status: DISCONTINUED | OUTPATIENT
Start: 2022-01-01 | End: 2022-01-01

## 2022-01-01 RX ORDER — ATORVASTATIN CALCIUM 80 MG/1
1 TABLET, FILM COATED ORAL
Qty: 0 | Refills: 0 | DISCHARGE

## 2022-01-01 RX ORDER — TRAMADOL HYDROCHLORIDE 50 MG/1
0.5 TABLET ORAL
Qty: 10 | Refills: 0
Start: 2022-01-01

## 2022-01-01 RX ORDER — OXYCODONE HYDROCHLORIDE 5 MG/1
1 TABLET ORAL
Qty: 0 | Refills: 0 | DISCHARGE

## 2022-01-01 RX ORDER — ALPRAZOLAM 0.25 MG
0.5 TABLET ORAL
Refills: 0 | Status: DISCONTINUED | OUTPATIENT
Start: 2022-01-01 | End: 2022-01-01

## 2022-01-01 RX ORDER — ACYCLOVIR SODIUM 500 MG
350 VIAL (EA) INTRAVENOUS ONCE
Refills: 0 | Status: COMPLETED | OUTPATIENT
Start: 2022-01-01 | End: 2022-01-01

## 2022-01-01 RX ORDER — CLOPIDOGREL BISULFATE 75 MG/1
1 TABLET, FILM COATED ORAL
Qty: 30 | Refills: 3
Start: 2022-01-01 | End: 2022-01-01

## 2022-01-01 RX ORDER — VANCOMYCIN HCL 1 G
VIAL (EA) INTRAVENOUS
Refills: 0 | Status: DISCONTINUED | OUTPATIENT
Start: 2022-01-01 | End: 2022-01-01

## 2022-01-01 RX ORDER — DEXTROSE 10 % IN WATER 10 %
250 INTRAVENOUS SOLUTION INTRAVENOUS
Refills: 0 | Status: COMPLETED | OUTPATIENT
Start: 2022-01-01 | End: 2022-01-01

## 2022-01-01 RX ORDER — AZTREONAM 2 G
500 VIAL (EA) INJECTION EVERY 8 HOURS
Refills: 0 | Status: DISCONTINUED | OUTPATIENT
Start: 2022-01-01 | End: 2022-01-01

## 2022-01-01 RX ORDER — INSULIN HUMAN 100 [IU]/ML
5 INJECTION, SOLUTION SUBCUTANEOUS ONCE
Refills: 0 | Status: COMPLETED | OUTPATIENT
Start: 2022-01-01 | End: 2022-01-01

## 2022-01-01 RX ORDER — ACETAMINOPHEN 500 MG
975 TABLET ORAL ONCE
Refills: 0 | Status: COMPLETED | OUTPATIENT
Start: 2022-01-01 | End: 2022-01-01

## 2022-01-01 RX ORDER — METOPROLOL TARTRATE 50 MG
5 TABLET ORAL ONCE
Refills: 0 | Status: COMPLETED | OUTPATIENT
Start: 2022-01-01 | End: 2022-01-01

## 2022-01-01 RX ORDER — CALCIUM CARBONATE 500(1250)
1 TABLET ORAL
Qty: 0 | Refills: 0 | DISCHARGE

## 2022-01-01 RX ORDER — SODIUM CHLORIDE 9 MG/ML
3 INJECTION INTRAMUSCULAR; INTRAVENOUS; SUBCUTANEOUS EVERY 8 HOURS
Refills: 0 | Status: DISCONTINUED | OUTPATIENT
Start: 2022-01-01 | End: 2022-01-01

## 2022-01-01 RX ADMIN — Medication 1 TABLET(S): at 10:10

## 2022-01-01 RX ADMIN — HYDROMORPHONE HYDROCHLORIDE 1 MILLIGRAM(S): 2 INJECTION INTRAMUSCULAR; INTRAVENOUS; SUBCUTANEOUS at 22:05

## 2022-01-01 RX ADMIN — MYCOPHENOLATE MOFETIL 500 MILLIGRAM(S): 250 CAPSULE ORAL at 05:51

## 2022-01-01 RX ADMIN — MEROPENEM 500 MILLIGRAM(S): 1 INJECTION INTRAVENOUS at 11:40

## 2022-01-01 RX ADMIN — HYDROMORPHONE HYDROCHLORIDE 0.25 MILLIGRAM(S): 2 INJECTION INTRAMUSCULAR; INTRAVENOUS; SUBCUTANEOUS at 23:54

## 2022-01-01 RX ADMIN — Medication 10 MILLIGRAM(S): at 18:39

## 2022-01-01 RX ADMIN — CEFTRIAXONE 2000 MILLIGRAM(S): 500 INJECTION, POWDER, FOR SOLUTION INTRAMUSCULAR; INTRAVENOUS at 06:55

## 2022-01-01 RX ADMIN — LACTULOSE 10 GRAM(S): 10 SOLUTION ORAL at 14:22

## 2022-01-01 RX ADMIN — SEVELAMER CARBONATE 1600 MILLIGRAM(S): 2400 POWDER, FOR SUSPENSION ORAL at 17:20

## 2022-01-01 RX ADMIN — TACROLIMUS 1.5 MILLIGRAM(S): 5 CAPSULE ORAL at 12:33

## 2022-01-01 RX ADMIN — Medication 64.38 GRAM(S): at 01:19

## 2022-01-01 RX ADMIN — TACROLIMUS 1 MILLIGRAM(S): 5 CAPSULE ORAL at 21:12

## 2022-01-01 RX ADMIN — Medication 106.4 MILLIGRAM(S): at 18:55

## 2022-01-01 RX ADMIN — HYDROMORPHONE HYDROCHLORIDE 0.25 MILLIGRAM(S): 2 INJECTION INTRAMUSCULAR; INTRAVENOUS; SUBCUTANEOUS at 17:19

## 2022-01-01 RX ADMIN — PANTOPRAZOLE SODIUM 40 MILLIGRAM(S): 20 TABLET, DELAYED RELEASE ORAL at 06:21

## 2022-01-01 RX ADMIN — PANTOPRAZOLE SODIUM 40 MILLIGRAM(S): 20 TABLET, DELAYED RELEASE ORAL at 05:03

## 2022-01-01 RX ADMIN — MYCOPHENOLATE MOFETIL 500 MILLIGRAM(S): 250 CAPSULE ORAL at 18:14

## 2022-01-01 RX ADMIN — MEROPENEM 100 MILLIGRAM(S): 1 INJECTION INTRAVENOUS at 13:24

## 2022-01-01 RX ADMIN — Medication 10 MILLIGRAM(S): at 05:02

## 2022-01-01 RX ADMIN — Medication 10 MILLIGRAM(S): at 17:56

## 2022-01-01 RX ADMIN — HEPARIN SODIUM 5000 UNIT(S): 5000 INJECTION INTRAVENOUS; SUBCUTANEOUS at 21:37

## 2022-01-01 RX ADMIN — APIXABAN 2.5 MILLIGRAM(S): 2.5 TABLET, FILM COATED ORAL at 06:11

## 2022-01-01 RX ADMIN — TIOTROPIUM BROMIDE 1 CAPSULE(S): 18 CAPSULE ORAL; RESPIRATORY (INHALATION) at 08:40

## 2022-01-01 RX ADMIN — TIOTROPIUM BROMIDE 1 CAPSULE(S): 18 CAPSULE ORAL; RESPIRATORY (INHALATION) at 15:17

## 2022-01-01 RX ADMIN — APIXABAN 2.5 MILLIGRAM(S): 2.5 TABLET, FILM COATED ORAL at 18:15

## 2022-01-01 RX ADMIN — Medication 200 GRAM(S): at 19:00

## 2022-01-01 RX ADMIN — CHLORHEXIDINE GLUCONATE 1 APPLICATION(S): 213 SOLUTION TOPICAL at 05:40

## 2022-01-01 RX ADMIN — FLUCONAZOLE 200 MILLIGRAM(S): 150 TABLET ORAL at 21:52

## 2022-01-01 RX ADMIN — MYCOPHENOLATE MOFETIL 500 MILLIGRAM(S): 250 CAPSULE ORAL at 21:12

## 2022-01-01 RX ADMIN — CLOPIDOGREL BISULFATE 75 MILLIGRAM(S): 75 TABLET, FILM COATED ORAL at 11:02

## 2022-01-01 RX ADMIN — SEVELAMER CARBONATE 1600 MILLIGRAM(S): 2400 POWDER, FOR SUSPENSION ORAL at 10:08

## 2022-01-01 RX ADMIN — Medication 0.5 MILLIGRAM(S): at 21:52

## 2022-01-01 RX ADMIN — CHLORHEXIDINE GLUCONATE 1 APPLICATION(S): 213 SOLUTION TOPICAL at 07:49

## 2022-01-01 RX ADMIN — Medication 40 MILLIGRAM(S): at 17:06

## 2022-01-01 RX ADMIN — Medication 40 MILLIGRAM(S): at 05:51

## 2022-01-01 RX ADMIN — AMLODIPINE BESYLATE 5 MILLIGRAM(S): 2.5 TABLET ORAL at 05:31

## 2022-01-01 RX ADMIN — Medication 1 TABLET(S): at 13:22

## 2022-01-01 RX ADMIN — Medication 10 MILLIGRAM(S): at 17:35

## 2022-01-01 RX ADMIN — HEPARIN SODIUM 5000 UNIT(S): 5000 INJECTION INTRAVENOUS; SUBCUTANEOUS at 22:09

## 2022-01-01 RX ADMIN — LACTULOSE 200 GRAM(S): 10 SOLUTION ORAL at 18:01

## 2022-01-01 RX ADMIN — TIOTROPIUM BROMIDE 1 CAPSULE(S): 18 CAPSULE ORAL; RESPIRATORY (INHALATION) at 09:01

## 2022-01-01 RX ADMIN — HYDROMORPHONE HYDROCHLORIDE 0.5 MILLIGRAM(S): 2 INJECTION INTRAMUSCULAR; INTRAVENOUS; SUBCUTANEOUS at 18:02

## 2022-01-01 RX ADMIN — Medication 200 GRAM(S): at 17:09

## 2022-01-01 RX ADMIN — HYDROMORPHONE HYDROCHLORIDE 0.5 MILLIGRAM(S): 2 INJECTION INTRAMUSCULAR; INTRAVENOUS; SUBCUTANEOUS at 10:53

## 2022-01-01 RX ADMIN — MYCOPHENOLATE MOFETIL 250 MILLIGRAM(S): 250 CAPSULE ORAL at 17:32

## 2022-01-01 RX ADMIN — Medication 25 MILLIGRAM(S): at 11:31

## 2022-01-01 RX ADMIN — APIXABAN 2.5 MILLIGRAM(S): 2.5 TABLET, FILM COATED ORAL at 05:17

## 2022-01-01 RX ADMIN — Medication 10 MILLIGRAM(S): at 18:14

## 2022-01-01 RX ADMIN — LACTULOSE 10 GRAM(S): 10 SOLUTION ORAL at 05:40

## 2022-01-01 RX ADMIN — HYDROMORPHONE HYDROCHLORIDE 0.5 MILLIGRAM(S): 2 INJECTION INTRAMUSCULAR; INTRAVENOUS; SUBCUTANEOUS at 17:23

## 2022-01-01 RX ADMIN — TACROLIMUS 3.91 MILLIGRAM(S): 5 CAPSULE ORAL at 17:37

## 2022-01-01 RX ADMIN — HYDROMORPHONE HYDROCHLORIDE 0.25 MILLIGRAM(S): 2 INJECTION INTRAMUSCULAR; INTRAVENOUS; SUBCUTANEOUS at 14:00

## 2022-01-01 RX ADMIN — SEVELAMER CARBONATE 1600 MILLIGRAM(S): 2400 POWDER, FOR SUSPENSION ORAL at 17:35

## 2022-01-01 RX ADMIN — HYDROMORPHONE HYDROCHLORIDE 1 MILLIGRAM(S): 2 INJECTION INTRAMUSCULAR; INTRAVENOUS; SUBCUTANEOUS at 22:03

## 2022-01-01 RX ADMIN — PANTOPRAZOLE SODIUM 40 MILLIGRAM(S): 20 TABLET, DELAYED RELEASE ORAL at 11:10

## 2022-01-01 RX ADMIN — CHLORHEXIDINE GLUCONATE 1 APPLICATION(S): 213 SOLUTION TOPICAL at 05:22

## 2022-01-01 RX ADMIN — Medication 20 MILLIGRAM(S): at 06:08

## 2022-01-01 RX ADMIN — Medication 10 MILLIGRAM(S): at 05:35

## 2022-01-01 RX ADMIN — HYDROMORPHONE HYDROCHLORIDE 1 MILLIGRAM(S): 2 INJECTION INTRAMUSCULAR; INTRAVENOUS; SUBCUTANEOUS at 17:51

## 2022-01-01 RX ADMIN — SEVELAMER CARBONATE 1600 MILLIGRAM(S): 2400 POWDER, FOR SUSPENSION ORAL at 07:08

## 2022-01-01 RX ADMIN — Medication 0.5 MILLIGRAM(S): at 15:14

## 2022-01-01 RX ADMIN — PIPERACILLIN AND TAZOBACTAM 25 GRAM(S): 4; .5 INJECTION, POWDER, LYOPHILIZED, FOR SOLUTION INTRAVENOUS at 18:10

## 2022-01-01 RX ADMIN — LACTULOSE 10 GRAM(S): 10 SOLUTION ORAL at 00:24

## 2022-01-01 RX ADMIN — Medication 250 MILLIGRAM(S): at 11:29

## 2022-01-01 RX ADMIN — CHLORHEXIDINE GLUCONATE 1 APPLICATION(S): 213 SOLUTION TOPICAL at 13:57

## 2022-01-01 RX ADMIN — Medication 1 TABLET(S): at 13:25

## 2022-01-01 RX ADMIN — SACUBITRIL AND VALSARTAN 1 TABLET(S): 24; 26 TABLET, FILM COATED ORAL at 06:57

## 2022-01-01 RX ADMIN — CEFTRIAXONE 2000 MILLIGRAM(S): 500 INJECTION, POWDER, FOR SOLUTION INTRAMUSCULAR; INTRAVENOUS at 05:21

## 2022-01-01 RX ADMIN — TACROLIMUS 1.5 MILLIGRAM(S): 5 CAPSULE ORAL at 09:09

## 2022-01-01 RX ADMIN — TAMSULOSIN HYDROCHLORIDE 0.4 MILLIGRAM(S): 0.4 CAPSULE ORAL at 21:47

## 2022-01-01 RX ADMIN — Medication 25 MILLIGRAM(S): at 05:42

## 2022-01-01 RX ADMIN — SEVELAMER CARBONATE 1600 MILLIGRAM(S): 2400 POWDER, FOR SUSPENSION ORAL at 17:26

## 2022-01-01 RX ADMIN — ATORVASTATIN CALCIUM 80 MILLIGRAM(S): 80 TABLET, FILM COATED ORAL at 21:53

## 2022-01-01 RX ADMIN — FLUCONAZOLE 200 MILLIGRAM(S): 150 TABLET ORAL at 06:06

## 2022-01-01 RX ADMIN — LACTULOSE 10 GRAM(S): 10 SOLUTION ORAL at 05:03

## 2022-01-01 RX ADMIN — Medication 1 TABLET(S): at 10:12

## 2022-01-01 RX ADMIN — CEFTRIAXONE 2000 MILLIGRAM(S): 500 INJECTION, POWDER, FOR SOLUTION INTRAMUSCULAR; INTRAVENOUS at 17:08

## 2022-01-01 RX ADMIN — FENTANYL CITRATE 50 MICROGRAM(S): 50 INJECTION INTRAVENOUS at 11:15

## 2022-01-01 RX ADMIN — Medication 1000 MILLIGRAM(S): at 06:00

## 2022-01-01 RX ADMIN — Medication 650 MILLIGRAM(S): at 21:55

## 2022-01-01 RX ADMIN — PANTOPRAZOLE SODIUM 40 MILLIGRAM(S): 20 TABLET, DELAYED RELEASE ORAL at 12:25

## 2022-01-01 RX ADMIN — PROPOFOL 3.8 MICROGRAM(S)/KG/MIN: 10 INJECTION, EMULSION INTRAVENOUS at 17:29

## 2022-01-01 RX ADMIN — LACTULOSE 200 GRAM(S): 10 SOLUTION ORAL at 06:00

## 2022-01-01 RX ADMIN — CLOPIDOGREL BISULFATE 75 MILLIGRAM(S): 75 TABLET, FILM COATED ORAL at 11:10

## 2022-01-01 RX ADMIN — AZITHROMYCIN 500 MILLIGRAM(S): 500 TABLET, FILM COATED ORAL at 17:12

## 2022-01-01 RX ADMIN — Medication 200 GRAM(S): at 21:27

## 2022-01-01 RX ADMIN — Medication 10 MILLIGRAM(S): at 05:10

## 2022-01-01 RX ADMIN — SEVELAMER CARBONATE 1600 MILLIGRAM(S): 2400 POWDER, FOR SUSPENSION ORAL at 13:35

## 2022-01-01 RX ADMIN — MYCOPHENOLATE MOFETIL 500 MILLIGRAM(S): 250 CAPSULE ORAL at 05:31

## 2022-01-01 RX ADMIN — PANTOPRAZOLE SODIUM 40 MILLIGRAM(S): 20 TABLET, DELAYED RELEASE ORAL at 05:51

## 2022-01-01 RX ADMIN — Medication 12.5 MILLIGRAM(S): at 17:26

## 2022-01-01 RX ADMIN — Medication 25 MILLIGRAM(S): at 06:14

## 2022-01-01 RX ADMIN — TAMSULOSIN HYDROCHLORIDE 0.4 MILLIGRAM(S): 0.4 CAPSULE ORAL at 22:28

## 2022-01-01 RX ADMIN — CEFTRIAXONE 2000 MILLIGRAM(S): 500 INJECTION, POWDER, FOR SOLUTION INTRAMUSCULAR; INTRAVENOUS at 18:34

## 2022-01-01 RX ADMIN — SEVELAMER CARBONATE 800 MILLIGRAM(S): 2400 POWDER, FOR SUSPENSION ORAL at 18:34

## 2022-01-01 RX ADMIN — Medication 1 TABLET(S): at 15:41

## 2022-01-01 RX ADMIN — HYDROMORPHONE HYDROCHLORIDE 0.5 MILLIGRAM(S): 2 INJECTION INTRAMUSCULAR; INTRAVENOUS; SUBCUTANEOUS at 21:44

## 2022-01-01 RX ADMIN — Medication 40 MILLIGRAM(S): at 21:45

## 2022-01-01 RX ADMIN — Medication 200 GRAM(S): at 21:07

## 2022-01-01 RX ADMIN — Medication 20 MILLIGRAM(S): at 05:42

## 2022-01-01 RX ADMIN — FLUCYTOSINE 1500 MILLIGRAM(S): 500 CAPSULE ORAL at 22:56

## 2022-01-01 RX ADMIN — FLUCONAZOLE 200 MILLIGRAM(S): 150 TABLET ORAL at 17:42

## 2022-01-01 RX ADMIN — Medication 10 MILLIGRAM(S): at 17:27

## 2022-01-01 RX ADMIN — TACROLIMUS 1 MILLIGRAM(S): 5 CAPSULE ORAL at 17:42

## 2022-01-01 RX ADMIN — TACROLIMUS 1 MILLIGRAM(S): 5 CAPSULE ORAL at 05:32

## 2022-01-01 RX ADMIN — CEFTRIAXONE 2000 MILLIGRAM(S): 500 INJECTION, POWDER, FOR SOLUTION INTRAMUSCULAR; INTRAVENOUS at 08:58

## 2022-01-01 RX ADMIN — TAMSULOSIN HYDROCHLORIDE 0.4 MILLIGRAM(S): 0.4 CAPSULE ORAL at 21:15

## 2022-01-01 RX ADMIN — Medication 1 TABLET(S): at 10:11

## 2022-01-01 RX ADMIN — Medication 106.4 MILLIGRAM(S): at 21:58

## 2022-01-01 RX ADMIN — TAMSULOSIN HYDROCHLORIDE 0.4 MILLIGRAM(S): 0.4 CAPSULE ORAL at 22:08

## 2022-01-01 RX ADMIN — ATORVASTATIN CALCIUM 80 MILLIGRAM(S): 80 TABLET, FILM COATED ORAL at 22:07

## 2022-01-01 RX ADMIN — TACROLIMUS 1 MILLIGRAM(S): 5 CAPSULE ORAL at 05:49

## 2022-01-01 RX ADMIN — Medication 10 MILLIGRAM(S): at 05:29

## 2022-01-01 RX ADMIN — HYDROMORPHONE HYDROCHLORIDE 1 MILLIGRAM(S): 2 INJECTION INTRAMUSCULAR; INTRAVENOUS; SUBCUTANEOUS at 14:05

## 2022-01-01 RX ADMIN — HYDROMORPHONE HYDROCHLORIDE 0.5 MILLIGRAM(S): 2 INJECTION INTRAMUSCULAR; INTRAVENOUS; SUBCUTANEOUS at 15:45

## 2022-01-01 RX ADMIN — Medication 200 GRAM(S): at 05:56

## 2022-01-01 RX ADMIN — MYCOPHENOLATE MOFETIL 500 MILLIGRAM(S): 250 CAPSULE ORAL at 17:59

## 2022-01-01 RX ADMIN — AMPHOTERICIN B 125 MILLIGRAM(S): 50 INJECTION, POWDER, LYOPHILIZED, FOR SOLUTION INTRAVENOUS at 02:54

## 2022-01-01 RX ADMIN — Medication 1 TABLET(S): at 12:15

## 2022-01-01 RX ADMIN — Medication 1 TABLET(S): at 17:36

## 2022-01-01 RX ADMIN — TACROLIMUS 1.5 MILLIGRAM(S): 5 CAPSULE ORAL at 12:24

## 2022-01-01 RX ADMIN — HYDROMORPHONE HYDROCHLORIDE 0.5 MILLIGRAM(S): 2 INJECTION INTRAMUSCULAR; INTRAVENOUS; SUBCUTANEOUS at 00:10

## 2022-01-01 RX ADMIN — Medication 40 MILLIGRAM(S): at 01:52

## 2022-01-01 RX ADMIN — TAMSULOSIN HYDROCHLORIDE 0.4 MILLIGRAM(S): 0.4 CAPSULE ORAL at 21:40

## 2022-01-01 RX ADMIN — Medication 10 MILLIGRAM(S): at 18:35

## 2022-01-01 RX ADMIN — Medication 12.5 MILLIGRAM(S): at 17:12

## 2022-01-01 RX ADMIN — MYCOPHENOLATE MOFETIL 500 MILLIGRAM(S): 250 CAPSULE ORAL at 18:11

## 2022-01-01 RX ADMIN — MEROPENEM 500 MILLIGRAM(S): 1 INJECTION INTRAVENOUS at 16:59

## 2022-01-01 RX ADMIN — CEFTRIAXONE 2000 MILLIGRAM(S): 500 INJECTION, POWDER, FOR SOLUTION INTRAMUSCULAR; INTRAVENOUS at 21:27

## 2022-01-01 RX ADMIN — CHLORHEXIDINE GLUCONATE 1 APPLICATION(S): 213 SOLUTION TOPICAL at 05:29

## 2022-01-01 RX ADMIN — CEFTRIAXONE 2000 MILLIGRAM(S): 500 INJECTION, POWDER, FOR SOLUTION INTRAMUSCULAR; INTRAVENOUS at 18:46

## 2022-01-01 RX ADMIN — PANTOPRAZOLE SODIUM 40 MILLIGRAM(S): 20 TABLET, DELAYED RELEASE ORAL at 05:28

## 2022-01-01 RX ADMIN — Medication 10 MILLIGRAM(S): at 05:11

## 2022-01-01 RX ADMIN — Medication 10 MILLIGRAM(S): at 18:50

## 2022-01-01 RX ADMIN — LACTULOSE 200 GRAM(S): 10 SOLUTION ORAL at 06:24

## 2022-01-01 RX ADMIN — Medication 650 MILLIGRAM(S): at 10:00

## 2022-01-01 RX ADMIN — ERYTHROPOIETIN 10000 UNIT(S): 10000 INJECTION, SOLUTION INTRAVENOUS; SUBCUTANEOUS at 11:39

## 2022-01-01 RX ADMIN — APIXABAN 2.5 MILLIGRAM(S): 2.5 TABLET, FILM COATED ORAL at 17:57

## 2022-01-01 RX ADMIN — Medication 650 MILLIGRAM(S): at 02:45

## 2022-01-01 RX ADMIN — Medication 25 MILLIGRAM(S): at 06:55

## 2022-01-01 RX ADMIN — Medication 10 MILLIGRAM(S): at 17:12

## 2022-01-01 RX ADMIN — SEVELAMER CARBONATE 800 MILLIGRAM(S): 2400 POWDER, FOR SUSPENSION ORAL at 13:11

## 2022-01-01 RX ADMIN — LACTULOSE 10 GRAM(S): 10 SOLUTION ORAL at 00:02

## 2022-01-01 RX ADMIN — SEVELAMER CARBONATE 1600 MILLIGRAM(S): 2400 POWDER, FOR SUSPENSION ORAL at 08:09

## 2022-01-01 RX ADMIN — Medication 200 GRAM(S): at 17:00

## 2022-01-01 RX ADMIN — TAMSULOSIN HYDROCHLORIDE 0.4 MILLIGRAM(S): 0.4 CAPSULE ORAL at 21:21

## 2022-01-01 RX ADMIN — MEROPENEM 100 MILLIGRAM(S): 1 INJECTION INTRAVENOUS at 13:10

## 2022-01-01 RX ADMIN — CHLORHEXIDINE GLUCONATE 1 APPLICATION(S): 213 SOLUTION TOPICAL at 05:11

## 2022-01-01 RX ADMIN — CHLORHEXIDINE GLUCONATE 1 APPLICATION(S): 213 SOLUTION TOPICAL at 05:38

## 2022-01-01 RX ADMIN — Medication 300 GRAM(S): at 18:33

## 2022-01-01 RX ADMIN — HYDROMORPHONE HYDROCHLORIDE 1 MILLIGRAM(S): 2 INJECTION INTRAMUSCULAR; INTRAVENOUS; SUBCUTANEOUS at 06:08

## 2022-01-01 RX ADMIN — Medication 200 GRAM(S): at 22:09

## 2022-01-01 RX ADMIN — ATORVASTATIN CALCIUM 80 MILLIGRAM(S): 80 TABLET, FILM COATED ORAL at 21:21

## 2022-01-01 RX ADMIN — Medication 1 TABLET(S): at 12:31

## 2022-01-01 RX ADMIN — Medication 200 GRAM(S): at 05:05

## 2022-01-01 RX ADMIN — APIXABAN 2.5 MILLIGRAM(S): 2.5 TABLET, FILM COATED ORAL at 05:03

## 2022-01-01 RX ADMIN — APIXABAN 2.5 MILLIGRAM(S): 2.5 TABLET, FILM COATED ORAL at 17:26

## 2022-01-01 RX ADMIN — TACROLIMUS 1.5 MILLIGRAM(S): 5 CAPSULE ORAL at 13:27

## 2022-01-01 RX ADMIN — SEVELAMER CARBONATE 1600 MILLIGRAM(S): 2400 POWDER, FOR SUSPENSION ORAL at 18:07

## 2022-01-01 RX ADMIN — TACROLIMUS 1.5 MILLIGRAM(S): 5 CAPSULE ORAL at 10:09

## 2022-01-01 RX ADMIN — HYDROMORPHONE HYDROCHLORIDE 0.5 MILLIGRAM(S): 2 INJECTION INTRAMUSCULAR; INTRAVENOUS; SUBCUTANEOUS at 18:17

## 2022-01-01 RX ADMIN — HEPARIN SODIUM 900 UNIT(S)/HR: 5000 INJECTION INTRAVENOUS; SUBCUTANEOUS at 08:26

## 2022-01-01 RX ADMIN — Medication 25 MILLIGRAM(S): at 05:37

## 2022-01-01 RX ADMIN — APIXABAN 2.5 MILLIGRAM(S): 2.5 TABLET, FILM COATED ORAL at 07:09

## 2022-01-01 RX ADMIN — MYCOPHENOLATE MOFETIL 500 MILLIGRAM(S): 250 CAPSULE ORAL at 05:03

## 2022-01-01 RX ADMIN — Medication 60 MEQ/KG/HR: at 08:51

## 2022-01-01 RX ADMIN — SODIUM CHLORIDE 50 MILLILITER(S): 9 INJECTION, SOLUTION INTRAVENOUS at 18:31

## 2022-01-01 RX ADMIN — PANTOPRAZOLE SODIUM 40 MILLIGRAM(S): 20 TABLET, DELAYED RELEASE ORAL at 05:35

## 2022-01-01 RX ADMIN — TACROLIMUS 1 MILLIGRAM(S): 5 CAPSULE ORAL at 22:26

## 2022-01-01 RX ADMIN — PIPERACILLIN AND TAZOBACTAM 25 GRAM(S): 4; .5 INJECTION, POWDER, LYOPHILIZED, FOR SOLUTION INTRAVENOUS at 05:53

## 2022-01-01 RX ADMIN — Medication 1 TABLET(S): at 11:25

## 2022-01-01 RX ADMIN — OXYCODONE AND ACETAMINOPHEN 1 TABLET(S): 5; 325 TABLET ORAL at 02:35

## 2022-01-01 RX ADMIN — HEPARIN SODIUM 5000 UNIT(S): 5000 INJECTION INTRAVENOUS; SUBCUTANEOUS at 11:25

## 2022-01-01 RX ADMIN — Medication 10 MILLIGRAM(S): at 17:47

## 2022-01-01 RX ADMIN — SEVELAMER CARBONATE 1600 MILLIGRAM(S): 2400 POWDER, FOR SUSPENSION ORAL at 17:56

## 2022-01-01 RX ADMIN — Medication 1 TABLET(S): at 07:28

## 2022-01-01 RX ADMIN — Medication 25 GRAM(S): at 07:01

## 2022-01-01 RX ADMIN — SEVELAMER CARBONATE 1600 MILLIGRAM(S): 2400 POWDER, FOR SUSPENSION ORAL at 16:13

## 2022-01-01 RX ADMIN — Medication 1 TABLET(S): at 09:19

## 2022-01-01 RX ADMIN — MYCOPHENOLATE MOFETIL 500 MILLIGRAM(S): 250 CAPSULE ORAL at 05:41

## 2022-01-01 RX ADMIN — Medication 1000 MILLIGRAM(S): at 00:15

## 2022-01-01 RX ADMIN — SEVELAMER CARBONATE 800 MILLIGRAM(S): 2400 POWDER, FOR SUSPENSION ORAL at 10:21

## 2022-01-01 RX ADMIN — LACTULOSE 10 GRAM(S): 10 SOLUTION ORAL at 17:06

## 2022-01-01 RX ADMIN — Medication 0.5 MILLIGRAM(S): at 22:08

## 2022-01-01 RX ADMIN — Medication 106.4 MILLIGRAM(S): at 18:34

## 2022-01-01 RX ADMIN — TAMSULOSIN HYDROCHLORIDE 0.4 MILLIGRAM(S): 0.4 CAPSULE ORAL at 21:51

## 2022-01-01 RX ADMIN — ATORVASTATIN CALCIUM 40 MILLIGRAM(S): 80 TABLET, FILM COATED ORAL at 21:47

## 2022-01-01 RX ADMIN — Medication 5 MILLIGRAM(S): at 14:46

## 2022-01-01 RX ADMIN — Medication 10 MILLIGRAM(S): at 17:13

## 2022-01-01 RX ADMIN — APIXABAN 2.5 MILLIGRAM(S): 2.5 TABLET, FILM COATED ORAL at 05:26

## 2022-01-01 RX ADMIN — MYCOPHENOLATE MOFETIL 500 MILLIGRAM(S): 250 CAPSULE ORAL at 05:49

## 2022-01-01 RX ADMIN — PANTOPRAZOLE SODIUM 40 MILLIGRAM(S): 20 TABLET, DELAYED RELEASE ORAL at 07:09

## 2022-01-01 RX ADMIN — CEFTRIAXONE 2000 MILLIGRAM(S): 500 INJECTION, POWDER, FOR SOLUTION INTRAMUSCULAR; INTRAVENOUS at 10:14

## 2022-01-01 RX ADMIN — HEPARIN SODIUM 0 UNIT(S)/HR: 5000 INJECTION INTRAVENOUS; SUBCUTANEOUS at 06:39

## 2022-01-01 RX ADMIN — SEVELAMER CARBONATE 800 MILLIGRAM(S): 2400 POWDER, FOR SUSPENSION ORAL at 17:32

## 2022-01-01 RX ADMIN — Medication 250 MILLIGRAM(S): at 13:34

## 2022-01-01 RX ADMIN — HYDROMORPHONE HYDROCHLORIDE 1 MILLIGRAM(S): 2 INJECTION INTRAMUSCULAR; INTRAVENOUS; SUBCUTANEOUS at 18:30

## 2022-01-01 RX ADMIN — PIPERACILLIN AND TAZOBACTAM 25 GRAM(S): 4; .5 INJECTION, POWDER, LYOPHILIZED, FOR SOLUTION INTRAVENOUS at 17:25

## 2022-01-01 RX ADMIN — Medication 25 MILLIGRAM(S): at 06:06

## 2022-01-01 RX ADMIN — TACROLIMUS 1.5 MILLIGRAM(S): 5 CAPSULE ORAL at 08:22

## 2022-01-01 RX ADMIN — TACROLIMUS 1.5 MILLIGRAM(S): 5 CAPSULE ORAL at 10:55

## 2022-01-01 RX ADMIN — Medication 1 TABLET(S): at 12:49

## 2022-01-01 RX ADMIN — Medication 10 MILLIGRAM(S): at 05:34

## 2022-01-01 RX ADMIN — SACUBITRIL AND VALSARTAN 1 TABLET(S): 24; 26 TABLET, FILM COATED ORAL at 18:49

## 2022-01-01 RX ADMIN — Medication 1 TABLET(S): at 11:15

## 2022-01-01 RX ADMIN — PANTOPRAZOLE SODIUM 40 MILLIGRAM(S): 20 TABLET, DELAYED RELEASE ORAL at 06:57

## 2022-01-01 RX ADMIN — FLUCONAZOLE 200 MILLIGRAM(S): 150 TABLET ORAL at 09:35

## 2022-01-01 RX ADMIN — SEVELAMER CARBONATE 1600 MILLIGRAM(S): 2400 POWDER, FOR SUSPENSION ORAL at 17:36

## 2022-01-01 RX ADMIN — PANTOPRAZOLE SODIUM 40 MILLIGRAM(S): 20 TABLET, DELAYED RELEASE ORAL at 11:34

## 2022-01-01 RX ADMIN — ATORVASTATIN CALCIUM 40 MILLIGRAM(S): 80 TABLET, FILM COATED ORAL at 21:39

## 2022-01-01 RX ADMIN — TAMSULOSIN HYDROCHLORIDE 0.4 MILLIGRAM(S): 0.4 CAPSULE ORAL at 22:05

## 2022-01-01 RX ADMIN — SEVELAMER CARBONATE 800 MILLIGRAM(S): 2400 POWDER, FOR SUSPENSION ORAL at 13:55

## 2022-01-01 RX ADMIN — APIXABAN 2.5 MILLIGRAM(S): 2.5 TABLET, FILM COATED ORAL at 17:36

## 2022-01-01 RX ADMIN — PANTOPRAZOLE SODIUM 40 MILLIGRAM(S): 20 TABLET, DELAYED RELEASE ORAL at 05:37

## 2022-01-01 RX ADMIN — TACROLIMUS 0.5 MILLIGRAM(S): 5 CAPSULE ORAL at 17:32

## 2022-01-01 RX ADMIN — Medication 40 MILLIGRAM(S): at 13:32

## 2022-01-01 RX ADMIN — SEVELAMER CARBONATE 1600 MILLIGRAM(S): 2400 POWDER, FOR SUSPENSION ORAL at 09:19

## 2022-01-01 RX ADMIN — SEVELAMER CARBONATE 1600 MILLIGRAM(S): 2400 POWDER, FOR SUSPENSION ORAL at 18:03

## 2022-01-01 RX ADMIN — HYDROMORPHONE HYDROCHLORIDE 0.5 MILLIGRAM(S): 2 INJECTION INTRAMUSCULAR; INTRAVENOUS; SUBCUTANEOUS at 06:20

## 2022-01-01 RX ADMIN — Medication 25 MILLIGRAM(S): at 17:15

## 2022-01-01 RX ADMIN — SEVELAMER CARBONATE 1600 MILLIGRAM(S): 2400 POWDER, FOR SUSPENSION ORAL at 19:00

## 2022-01-01 RX ADMIN — PANTOPRAZOLE SODIUM 40 MILLIGRAM(S): 20 TABLET, DELAYED RELEASE ORAL at 11:33

## 2022-01-01 RX ADMIN — TACROLIMUS 1.5 MILLIGRAM(S): 5 CAPSULE ORAL at 13:41

## 2022-01-01 RX ADMIN — HEPARIN SODIUM 0 UNIT(S)/HR: 5000 INJECTION INTRAVENOUS; SUBCUTANEOUS at 01:23

## 2022-01-01 RX ADMIN — Medication 200 GRAM(S): at 18:33

## 2022-01-01 RX ADMIN — CHLORHEXIDINE GLUCONATE 1 APPLICATION(S): 213 SOLUTION TOPICAL at 05:21

## 2022-01-01 RX ADMIN — HEPARIN SODIUM 600 UNIT(S)/HR: 5000 INJECTION INTRAVENOUS; SUBCUTANEOUS at 06:14

## 2022-01-01 RX ADMIN — Medication 0.5 MILLIGRAM(S): at 22:06

## 2022-01-01 RX ADMIN — TAMSULOSIN HYDROCHLORIDE 0.4 MILLIGRAM(S): 0.4 CAPSULE ORAL at 22:22

## 2022-01-01 RX ADMIN — CLOPIDOGREL BISULFATE 75 MILLIGRAM(S): 75 TABLET, FILM COATED ORAL at 13:24

## 2022-01-01 RX ADMIN — Medication 25 MILLIGRAM(S): at 17:54

## 2022-01-01 RX ADMIN — Medication 25 MILLIGRAM(S): at 05:03

## 2022-01-01 RX ADMIN — SEVELAMER CARBONATE 1600 MILLIGRAM(S): 2400 POWDER, FOR SUSPENSION ORAL at 11:12

## 2022-01-01 RX ADMIN — CEFTRIAXONE 2000 MILLIGRAM(S): 500 INJECTION, POWDER, FOR SOLUTION INTRAMUSCULAR; INTRAVENOUS at 11:35

## 2022-01-01 RX ADMIN — HEPARIN SODIUM 5000 UNIT(S): 5000 INJECTION INTRAVENOUS; SUBCUTANEOUS at 05:36

## 2022-01-01 RX ADMIN — CHLORHEXIDINE GLUCONATE 1 APPLICATION(S): 213 SOLUTION TOPICAL at 06:08

## 2022-01-01 RX ADMIN — HYDROMORPHONE HYDROCHLORIDE 0.5 MILLIGRAM(S): 2 INJECTION INTRAMUSCULAR; INTRAVENOUS; SUBCUTANEOUS at 22:25

## 2022-01-01 RX ADMIN — HYDROMORPHONE HYDROCHLORIDE 1 MILLIGRAM(S): 2 INJECTION INTRAMUSCULAR; INTRAVENOUS; SUBCUTANEOUS at 10:19

## 2022-01-01 RX ADMIN — INSULIN HUMAN 10 UNIT(S): 100 INJECTION, SOLUTION SUBCUTANEOUS at 14:35

## 2022-01-01 RX ADMIN — LACTULOSE 10 GRAM(S): 10 SOLUTION ORAL at 19:14

## 2022-01-01 RX ADMIN — Medication 100 MILLIGRAM(S): at 13:36

## 2022-01-01 RX ADMIN — ERYTHROPOIETIN 10000 UNIT(S): 10000 INJECTION, SOLUTION INTRAVENOUS; SUBCUTANEOUS at 13:32

## 2022-01-01 RX ADMIN — HEPARIN SODIUM 700 UNIT(S)/HR: 5000 INJECTION INTRAVENOUS; SUBCUTANEOUS at 19:48

## 2022-01-01 RX ADMIN — FENTANYL CITRATE 50 MICROGRAM(S): 50 INJECTION INTRAVENOUS at 13:50

## 2022-01-01 RX ADMIN — CLOPIDOGREL BISULFATE 300 MILLIGRAM(S): 75 TABLET, FILM COATED ORAL at 08:26

## 2022-01-01 RX ADMIN — SEVELAMER CARBONATE 1600 MILLIGRAM(S): 2400 POWDER, FOR SUSPENSION ORAL at 12:14

## 2022-01-01 RX ADMIN — HEPARIN SODIUM 5000 UNIT(S): 5000 INJECTION INTRAVENOUS; SUBCUTANEOUS at 23:06

## 2022-01-01 RX ADMIN — FENTANYL CITRATE 50 MICROGRAM(S): 50 INJECTION INTRAVENOUS at 12:13

## 2022-01-01 RX ADMIN — Medication 3 MILLIGRAM(S): at 21:51

## 2022-01-01 RX ADMIN — CEFTRIAXONE 2000 MILLIGRAM(S): 500 INJECTION, POWDER, FOR SOLUTION INTRAMUSCULAR; INTRAVENOUS at 17:42

## 2022-01-01 RX ADMIN — Medication 25 MILLIGRAM(S): at 06:11

## 2022-01-01 RX ADMIN — CHLORHEXIDINE GLUCONATE 1 APPLICATION(S): 213 SOLUTION TOPICAL at 05:33

## 2022-01-01 RX ADMIN — ATORVASTATIN CALCIUM 80 MILLIGRAM(S): 80 TABLET, FILM COATED ORAL at 22:10

## 2022-01-01 RX ADMIN — Medication 100 MILLIGRAM(S): at 21:13

## 2022-01-01 RX ADMIN — CLOPIDOGREL BISULFATE 75 MILLIGRAM(S): 75 TABLET, FILM COATED ORAL at 13:31

## 2022-01-01 RX ADMIN — SEVELAMER CARBONATE 1600 MILLIGRAM(S): 2400 POWDER, FOR SUSPENSION ORAL at 17:41

## 2022-01-01 RX ADMIN — MEROPENEM 100 MILLIGRAM(S): 1 INJECTION INTRAVENOUS at 17:11

## 2022-01-01 RX ADMIN — Medication 10 MILLIGRAM(S): at 05:22

## 2022-01-01 RX ADMIN — Medication 200 GRAM(S): at 18:55

## 2022-01-01 RX ADMIN — LACTULOSE 10 GRAM(S): 10 SOLUTION ORAL at 11:10

## 2022-01-01 RX ADMIN — HEPARIN SODIUM 5000 UNIT(S): 5000 INJECTION INTRAVENOUS; SUBCUTANEOUS at 15:00

## 2022-01-01 RX ADMIN — Medication 20 MILLIGRAM(S): at 06:31

## 2022-01-01 RX ADMIN — PANTOPRAZOLE SODIUM 40 MILLIGRAM(S): 20 TABLET, DELAYED RELEASE ORAL at 06:22

## 2022-01-01 RX ADMIN — HYDROMORPHONE HYDROCHLORIDE 1 MILLIGRAM(S): 2 INJECTION INTRAMUSCULAR; INTRAVENOUS; SUBCUTANEOUS at 05:59

## 2022-01-01 RX ADMIN — MYCOPHENOLATE MOFETIL 500 MILLIGRAM(S): 250 CAPSULE ORAL at 05:17

## 2022-01-01 RX ADMIN — APIXABAN 2.5 MILLIGRAM(S): 2.5 TABLET, FILM COATED ORAL at 18:34

## 2022-01-01 RX ADMIN — MAGNESIUM OXIDE 400 MG ORAL TABLET 400 MILLIGRAM(S): 241.3 TABLET ORAL at 11:25

## 2022-01-01 RX ADMIN — PANTOPRAZOLE SODIUM 40 MILLIGRAM(S): 20 TABLET, DELAYED RELEASE ORAL at 05:26

## 2022-01-01 RX ADMIN — CHLORHEXIDINE GLUCONATE 1 APPLICATION(S): 213 SOLUTION TOPICAL at 05:20

## 2022-01-01 RX ADMIN — Medication 10 MILLIGRAM(S): at 11:26

## 2022-01-01 RX ADMIN — ATORVASTATIN CALCIUM 40 MILLIGRAM(S): 80 TABLET, FILM COATED ORAL at 21:28

## 2022-01-01 RX ADMIN — SEVELAMER CARBONATE 800 MILLIGRAM(S): 2400 POWDER, FOR SUSPENSION ORAL at 17:13

## 2022-01-01 RX ADMIN — CHLORHEXIDINE GLUCONATE 1 APPLICATION(S): 213 SOLUTION TOPICAL at 05:53

## 2022-01-01 RX ADMIN — Medication 25 MILLIGRAM(S): at 21:34

## 2022-01-01 RX ADMIN — HYDROMORPHONE HYDROCHLORIDE 1 MILLIGRAM(S): 2 INJECTION INTRAMUSCULAR; INTRAVENOUS; SUBCUTANEOUS at 17:41

## 2022-01-01 RX ADMIN — HEPARIN SODIUM 1100 UNIT(S)/HR: 5000 INJECTION INTRAVENOUS; SUBCUTANEOUS at 23:07

## 2022-01-01 RX ADMIN — Medication 10 MILLIGRAM(S): at 06:12

## 2022-01-01 RX ADMIN — Medication 10 MILLIGRAM(S): at 06:05

## 2022-01-01 RX ADMIN — LACTULOSE 10 GRAM(S): 10 SOLUTION ORAL at 00:26

## 2022-01-01 RX ADMIN — HYDROMORPHONE HYDROCHLORIDE 0.5 MILLIGRAM(S): 2 INJECTION INTRAMUSCULAR; INTRAVENOUS; SUBCUTANEOUS at 21:41

## 2022-01-01 RX ADMIN — CEFTRIAXONE 2000 MILLIGRAM(S): 500 INJECTION, POWDER, FOR SOLUTION INTRAMUSCULAR; INTRAVENOUS at 05:42

## 2022-01-01 RX ADMIN — AMLODIPINE BESYLATE 5 MILLIGRAM(S): 2.5 TABLET ORAL at 15:56

## 2022-01-01 RX ADMIN — APIXABAN 2.5 MILLIGRAM(S): 2.5 TABLET, FILM COATED ORAL at 05:28

## 2022-01-01 RX ADMIN — SACUBITRIL AND VALSARTAN 1 TABLET(S): 24; 26 TABLET, FILM COATED ORAL at 05:55

## 2022-01-01 RX ADMIN — CLOPIDOGREL BISULFATE 75 MILLIGRAM(S): 75 TABLET, FILM COATED ORAL at 12:40

## 2022-01-01 RX ADMIN — AZITHROMYCIN 255 MILLIGRAM(S): 500 TABLET, FILM COATED ORAL at 16:11

## 2022-01-01 RX ADMIN — TRAMADOL HYDROCHLORIDE 25 MILLIGRAM(S): 50 TABLET ORAL at 15:24

## 2022-01-01 RX ADMIN — Medication 10 MILLIGRAM(S): at 20:40

## 2022-01-01 RX ADMIN — CHLORHEXIDINE GLUCONATE 1 APPLICATION(S): 213 SOLUTION TOPICAL at 11:27

## 2022-01-01 RX ADMIN — HYDROMORPHONE HYDROCHLORIDE 0.5 MILLIGRAM(S): 2 INJECTION INTRAMUSCULAR; INTRAVENOUS; SUBCUTANEOUS at 03:41

## 2022-01-01 RX ADMIN — Medication 10 MILLIGRAM(S): at 18:47

## 2022-01-01 RX ADMIN — Medication 100 MILLIGRAM(S): at 20:31

## 2022-01-01 RX ADMIN — Medication 25 MILLIGRAM(S): at 23:13

## 2022-01-01 RX ADMIN — MEROPENEM 1000 MILLIGRAM(S): 1 INJECTION INTRAVENOUS at 12:33

## 2022-01-01 RX ADMIN — MEROPENEM 100 MILLIGRAM(S): 1 INJECTION INTRAVENOUS at 16:12

## 2022-01-01 RX ADMIN — SEVELAMER CARBONATE 1600 MILLIGRAM(S): 2400 POWDER, FOR SUSPENSION ORAL at 09:01

## 2022-01-01 RX ADMIN — HEPARIN SODIUM 700 UNIT(S)/HR: 5000 INJECTION INTRAVENOUS; SUBCUTANEOUS at 11:27

## 2022-01-01 RX ADMIN — ATORVASTATIN CALCIUM 40 MILLIGRAM(S): 80 TABLET, FILM COATED ORAL at 21:12

## 2022-01-01 RX ADMIN — PANTOPRAZOLE SODIUM 40 MILLIGRAM(S): 20 TABLET, DELAYED RELEASE ORAL at 06:15

## 2022-01-01 RX ADMIN — Medication 1 TABLET(S): at 11:08

## 2022-01-01 RX ADMIN — Medication 320 MILLIGRAM(S): at 18:20

## 2022-01-01 RX ADMIN — SEVELAMER CARBONATE 800 MILLIGRAM(S): 2400 POWDER, FOR SUSPENSION ORAL at 13:21

## 2022-01-01 RX ADMIN — TACROLIMUS 1 MILLIGRAM(S): 5 CAPSULE ORAL at 22:30

## 2022-01-01 RX ADMIN — ATORVASTATIN CALCIUM 80 MILLIGRAM(S): 80 TABLET, FILM COATED ORAL at 21:08

## 2022-01-01 RX ADMIN — SEVELAMER CARBONATE 1600 MILLIGRAM(S): 2400 POWDER, FOR SUSPENSION ORAL at 18:11

## 2022-01-01 RX ADMIN — CLOPIDOGREL BISULFATE 75 MILLIGRAM(S): 75 TABLET, FILM COATED ORAL at 12:15

## 2022-01-01 RX ADMIN — PANTOPRAZOLE SODIUM 40 MILLIGRAM(S): 20 TABLET, DELAYED RELEASE ORAL at 05:44

## 2022-01-01 RX ADMIN — SEVELAMER CARBONATE 1600 MILLIGRAM(S): 2400 POWDER, FOR SUSPENSION ORAL at 17:54

## 2022-01-01 RX ADMIN — OXYCODONE AND ACETAMINOPHEN 1 TABLET(S): 5; 325 TABLET ORAL at 01:35

## 2022-01-01 RX ADMIN — HYDROMORPHONE HYDROCHLORIDE 0.5 MILLIGRAM(S): 2 INJECTION INTRAMUSCULAR; INTRAVENOUS; SUBCUTANEOUS at 01:15

## 2022-01-01 RX ADMIN — HEPARIN SODIUM 500 UNIT(S)/HR: 5000 INJECTION INTRAVENOUS; SUBCUTANEOUS at 04:23

## 2022-01-01 RX ADMIN — Medication 5.93 MICROGRAM(S)/KG/MIN: at 19:00

## 2022-01-01 RX ADMIN — HYDROMORPHONE HYDROCHLORIDE 0.25 MILLIGRAM(S): 2 INJECTION INTRAMUSCULAR; INTRAVENOUS; SUBCUTANEOUS at 09:19

## 2022-01-01 RX ADMIN — PANTOPRAZOLE SODIUM 40 MILLIGRAM(S): 20 TABLET, DELAYED RELEASE ORAL at 05:32

## 2022-01-01 RX ADMIN — Medication 12.5 MILLIGRAM(S): at 05:29

## 2022-01-01 RX ADMIN — SEVELAMER CARBONATE 1600 MILLIGRAM(S): 2400 POWDER, FOR SUSPENSION ORAL at 11:40

## 2022-01-01 RX ADMIN — CHLORHEXIDINE GLUCONATE 1 APPLICATION(S): 213 SOLUTION TOPICAL at 05:14

## 2022-01-01 RX ADMIN — Medication 1 TABLET(S): at 12:05

## 2022-01-01 RX ADMIN — CEFTRIAXONE 2000 MILLIGRAM(S): 500 INJECTION, POWDER, FOR SOLUTION INTRAMUSCULAR; INTRAVENOUS at 22:16

## 2022-01-01 RX ADMIN — Medication 100 GRAM(S): at 12:58

## 2022-01-01 RX ADMIN — FLUCYTOSINE 1500 MILLIGRAM(S): 500 CAPSULE ORAL at 20:00

## 2022-01-01 RX ADMIN — SEVELAMER CARBONATE 800 MILLIGRAM(S): 2400 POWDER, FOR SUSPENSION ORAL at 13:40

## 2022-01-01 RX ADMIN — SEVELAMER CARBONATE 1600 MILLIGRAM(S): 2400 POWDER, FOR SUSPENSION ORAL at 20:40

## 2022-01-01 RX ADMIN — TIOTROPIUM BROMIDE 1 CAPSULE(S): 18 CAPSULE ORAL; RESPIRATORY (INHALATION) at 10:53

## 2022-01-01 RX ADMIN — HEPARIN SODIUM 500 UNIT(S)/HR: 5000 INJECTION INTRAVENOUS; SUBCUTANEOUS at 07:42

## 2022-01-01 RX ADMIN — Medication 10 MILLIGRAM(S): at 06:57

## 2022-01-01 RX ADMIN — Medication 40 MILLIGRAM(S): at 09:31

## 2022-01-01 RX ADMIN — LACTULOSE 10 GRAM(S): 10 SOLUTION ORAL at 23:13

## 2022-01-01 RX ADMIN — TACROLIMUS 1.5 MILLIGRAM(S): 5 CAPSULE ORAL at 19:12

## 2022-01-01 RX ADMIN — Medication 100 MILLIGRAM(S): at 05:51

## 2022-01-01 RX ADMIN — ATORVASTATIN CALCIUM 80 MILLIGRAM(S): 80 TABLET, FILM COATED ORAL at 21:30

## 2022-01-01 RX ADMIN — MIDAZOLAM HYDROCHLORIDE 2 MILLIGRAM(S): 1 INJECTION, SOLUTION INTRAMUSCULAR; INTRAVENOUS at 05:28

## 2022-01-01 RX ADMIN — Medication 0.5 MILLIGRAM(S): at 18:47

## 2022-01-01 RX ADMIN — CHLORHEXIDINE GLUCONATE 1 APPLICATION(S): 213 SOLUTION TOPICAL at 06:04

## 2022-01-01 RX ADMIN — APIXABAN 2.5 MILLIGRAM(S): 2.5 TABLET, FILM COATED ORAL at 17:42

## 2022-01-01 RX ADMIN — HEPARIN SODIUM 5000 UNIT(S): 5000 INJECTION INTRAVENOUS; SUBCUTANEOUS at 05:20

## 2022-01-01 RX ADMIN — CHLORHEXIDINE GLUCONATE 1 APPLICATION(S): 213 SOLUTION TOPICAL at 05:51

## 2022-01-01 RX ADMIN — TRAMADOL HYDROCHLORIDE 25 MILLIGRAM(S): 50 TABLET ORAL at 23:44

## 2022-01-01 RX ADMIN — HYDROMORPHONE HYDROCHLORIDE 0.5 MILLIGRAM(S): 2 INJECTION INTRAMUSCULAR; INTRAVENOUS; SUBCUTANEOUS at 21:46

## 2022-01-01 RX ADMIN — Medication 650 MILLIGRAM(S): at 01:00

## 2022-01-01 RX ADMIN — TAMSULOSIN HYDROCHLORIDE 0.4 MILLIGRAM(S): 0.4 CAPSULE ORAL at 22:06

## 2022-01-01 RX ADMIN — ATORVASTATIN CALCIUM 80 MILLIGRAM(S): 80 TABLET, FILM COATED ORAL at 21:16

## 2022-01-01 RX ADMIN — LACTULOSE 10 GRAM(S): 10 SOLUTION ORAL at 23:14

## 2022-01-01 RX ADMIN — ATORVASTATIN CALCIUM 80 MILLIGRAM(S): 80 TABLET, FILM COATED ORAL at 21:56

## 2022-01-01 RX ADMIN — APIXABAN 2.5 MILLIGRAM(S): 2.5 TABLET, FILM COATED ORAL at 05:41

## 2022-01-01 RX ADMIN — HYDROMORPHONE HYDROCHLORIDE 1 MILLIGRAM(S): 2 INJECTION INTRAMUSCULAR; INTRAVENOUS; SUBCUTANEOUS at 06:11

## 2022-01-01 RX ADMIN — PANTOPRAZOLE SODIUM 40 MILLIGRAM(S): 20 TABLET, DELAYED RELEASE ORAL at 06:09

## 2022-01-01 RX ADMIN — APIXABAN 2.5 MILLIGRAM(S): 2.5 TABLET, FILM COATED ORAL at 17:10

## 2022-01-01 RX ADMIN — INSULIN HUMAN 5 UNIT(S): 100 INJECTION, SOLUTION SUBCUTANEOUS at 11:30

## 2022-01-01 RX ADMIN — Medication 650 MILLIGRAM(S): at 21:28

## 2022-01-01 RX ADMIN — Medication 650 MILLIGRAM(S): at 00:14

## 2022-01-01 RX ADMIN — TRAMADOL HYDROCHLORIDE 25 MILLIGRAM(S): 50 TABLET ORAL at 23:30

## 2022-01-01 RX ADMIN — Medication 667 MILLIGRAM(S): at 09:01

## 2022-01-01 RX ADMIN — Medication 667 MILLIGRAM(S): at 18:18

## 2022-01-01 RX ADMIN — APIXABAN 2.5 MILLIGRAM(S): 2.5 TABLET, FILM COATED ORAL at 18:18

## 2022-01-01 RX ADMIN — HYDROMORPHONE HYDROCHLORIDE 0.5 MILLIGRAM(S): 2 INJECTION INTRAMUSCULAR; INTRAVENOUS; SUBCUTANEOUS at 05:47

## 2022-01-01 RX ADMIN — TACROLIMUS 1.5 MILLIGRAM(S): 5 CAPSULE ORAL at 09:01

## 2022-01-01 RX ADMIN — Medication 12.5 MILLIGRAM(S): at 17:05

## 2022-01-01 RX ADMIN — PANTOPRAZOLE SODIUM 40 MILLIGRAM(S): 20 TABLET, DELAYED RELEASE ORAL at 14:08

## 2022-01-01 RX ADMIN — APIXABAN 2.5 MILLIGRAM(S): 2.5 TABLET, FILM COATED ORAL at 08:53

## 2022-01-01 RX ADMIN — SEVELAMER CARBONATE 1600 MILLIGRAM(S): 2400 POWDER, FOR SUSPENSION ORAL at 08:59

## 2022-01-01 RX ADMIN — Medication 20 MILLIGRAM(S): at 05:03

## 2022-01-01 RX ADMIN — SODIUM ZIRCONIUM CYCLOSILICATE 10 GRAM(S): 10 POWDER, FOR SUSPENSION ORAL at 22:44

## 2022-01-01 RX ADMIN — HYDROMORPHONE HYDROCHLORIDE 0.25 MILLIGRAM(S): 2 INJECTION INTRAMUSCULAR; INTRAVENOUS; SUBCUTANEOUS at 09:04

## 2022-01-01 RX ADMIN — Medication 750 MILLILITER(S): at 11:30

## 2022-01-01 RX ADMIN — Medication 40 MILLIGRAM(S): at 15:43

## 2022-01-01 RX ADMIN — HYDROMORPHONE HYDROCHLORIDE 1 MILLIGRAM(S): 2 INJECTION INTRAMUSCULAR; INTRAVENOUS; SUBCUTANEOUS at 16:51

## 2022-01-01 RX ADMIN — Medication 40 MILLIGRAM(S): at 21:36

## 2022-01-01 RX ADMIN — HYDROMORPHONE HYDROCHLORIDE 1 MILLIGRAM(S): 2 INJECTION INTRAMUSCULAR; INTRAVENOUS; SUBCUTANEOUS at 18:15

## 2022-01-01 RX ADMIN — PIPERACILLIN AND TAZOBACTAM 25 GRAM(S): 4; .5 INJECTION, POWDER, LYOPHILIZED, FOR SOLUTION INTRAVENOUS at 17:30

## 2022-01-01 RX ADMIN — Medication 107 MILLIGRAM(S): at 15:00

## 2022-01-01 RX ADMIN — Medication 25 MILLIGRAM(S): at 13:40

## 2022-01-01 RX ADMIN — SEVELAMER CARBONATE 800 MILLIGRAM(S): 2400 POWDER, FOR SUSPENSION ORAL at 17:06

## 2022-01-01 RX ADMIN — TAMSULOSIN HYDROCHLORIDE 0.4 MILLIGRAM(S): 0.4 CAPSULE ORAL at 22:30

## 2022-01-01 RX ADMIN — HEPARIN SODIUM 5000 UNIT(S): 5000 INJECTION INTRAVENOUS; SUBCUTANEOUS at 22:33

## 2022-01-01 RX ADMIN — Medication 400 MILLIGRAM(S): at 05:40

## 2022-01-01 RX ADMIN — TAMSULOSIN HYDROCHLORIDE 0.4 MILLIGRAM(S): 0.4 CAPSULE ORAL at 21:34

## 2022-01-01 RX ADMIN — TAMSULOSIN HYDROCHLORIDE 0.4 MILLIGRAM(S): 0.4 CAPSULE ORAL at 21:12

## 2022-01-01 RX ADMIN — Medication 25 MILLIGRAM(S): at 00:27

## 2022-01-01 RX ADMIN — TACROLIMUS 1.5 MILLIGRAM(S): 5 CAPSULE ORAL at 11:10

## 2022-01-01 RX ADMIN — ATORVASTATIN CALCIUM 80 MILLIGRAM(S): 80 TABLET, FILM COATED ORAL at 22:33

## 2022-01-01 RX ADMIN — HEPARIN SODIUM 400 UNIT(S)/HR: 5000 INJECTION INTRAVENOUS; SUBCUTANEOUS at 01:50

## 2022-01-01 RX ADMIN — Medication 106.4 MILLIGRAM(S): at 16:59

## 2022-01-01 RX ADMIN — PIPERACILLIN AND TAZOBACTAM 200 GRAM(S): 4; .5 INJECTION, POWDER, LYOPHILIZED, FOR SOLUTION INTRAVENOUS at 13:05

## 2022-01-01 RX ADMIN — CHLORHEXIDINE GLUCONATE 1 APPLICATION(S): 213 SOLUTION TOPICAL at 05:42

## 2022-01-01 RX ADMIN — Medication 667 MILLIGRAM(S): at 13:13

## 2022-01-01 RX ADMIN — Medication 25 MILLIGRAM(S): at 18:40

## 2022-01-01 RX ADMIN — APIXABAN 2.5 MILLIGRAM(S): 2.5 TABLET, FILM COATED ORAL at 05:37

## 2022-01-01 RX ADMIN — CEFTRIAXONE 2000 MILLIGRAM(S): 500 INJECTION, POWDER, FOR SOLUTION INTRAMUSCULAR; INTRAVENOUS at 05:32

## 2022-01-01 RX ADMIN — ATORVASTATIN CALCIUM 40 MILLIGRAM(S): 80 TABLET, FILM COATED ORAL at 21:13

## 2022-01-01 RX ADMIN — Medication 100 MILLIGRAM(S): at 05:41

## 2022-01-01 RX ADMIN — HYDROMORPHONE HYDROCHLORIDE 1 MILLIGRAM(S): 2 INJECTION INTRAMUSCULAR; INTRAVENOUS; SUBCUTANEOUS at 10:40

## 2022-01-01 RX ADMIN — Medication 667 MILLIGRAM(S): at 08:22

## 2022-01-01 RX ADMIN — Medication 200 GRAM(S): at 11:39

## 2022-01-01 RX ADMIN — CLOPIDOGREL BISULFATE 75 MILLIGRAM(S): 75 TABLET, FILM COATED ORAL at 14:01

## 2022-01-01 RX ADMIN — ERYTHROPOIETIN 10000 UNIT(S): 10000 INJECTION, SOLUTION INTRAVENOUS; SUBCUTANEOUS at 11:54

## 2022-01-01 RX ADMIN — PANTOPRAZOLE SODIUM 40 MILLIGRAM(S): 20 TABLET, DELAYED RELEASE ORAL at 06:00

## 2022-01-01 RX ADMIN — Medication 1 TABLET(S): at 21:52

## 2022-01-01 RX ADMIN — Medication 100 MILLIGRAM(S): at 17:57

## 2022-01-01 RX ADMIN — LACTULOSE 10 GRAM(S): 10 SOLUTION ORAL at 17:12

## 2022-01-01 RX ADMIN — PANTOPRAZOLE SODIUM 40 MILLIGRAM(S): 20 TABLET, DELAYED RELEASE ORAL at 12:23

## 2022-01-01 RX ADMIN — Medication 10 MILLIGRAM(S): at 22:36

## 2022-01-01 RX ADMIN — ATORVASTATIN CALCIUM 80 MILLIGRAM(S): 80 TABLET, FILM COATED ORAL at 21:23

## 2022-01-01 RX ADMIN — APIXABAN 2.5 MILLIGRAM(S): 2.5 TABLET, FILM COATED ORAL at 05:50

## 2022-01-01 RX ADMIN — AMLODIPINE BESYLATE 5 MILLIGRAM(S): 2.5 TABLET ORAL at 05:48

## 2022-01-01 RX ADMIN — Medication 100 MILLIGRAM(S): at 18:11

## 2022-01-01 RX ADMIN — HYDROMORPHONE HYDROCHLORIDE 1 MILLIGRAM(S): 2 INJECTION INTRAMUSCULAR; INTRAVENOUS; SUBCUTANEOUS at 18:16

## 2022-01-01 RX ADMIN — Medication 10 MILLIGRAM(S): at 18:49

## 2022-01-01 RX ADMIN — CHLORHEXIDINE GLUCONATE 1 APPLICATION(S): 213 SOLUTION TOPICAL at 05:03

## 2022-01-01 RX ADMIN — CEFTRIAXONE 2000 MILLIGRAM(S): 500 INJECTION, POWDER, FOR SOLUTION INTRAMUSCULAR; INTRAVENOUS at 11:33

## 2022-01-01 RX ADMIN — Medication 25 MILLIGRAM(S): at 05:24

## 2022-01-01 RX ADMIN — Medication 200 GRAM(S): at 08:58

## 2022-01-01 RX ADMIN — Medication 1 TABLET(S): at 13:31

## 2022-01-01 RX ADMIN — Medication 100 MILLIGRAM(S): at 05:17

## 2022-01-01 RX ADMIN — Medication 10 MILLIGRAM(S): at 05:54

## 2022-01-01 RX ADMIN — TACROLIMUS 1.5 MILLIGRAM(S): 5 CAPSULE ORAL at 09:23

## 2022-01-01 RX ADMIN — Medication 60 MILLIGRAM(S): at 14:30

## 2022-01-01 RX ADMIN — HYDROMORPHONE HYDROCHLORIDE 0.25 MILLIGRAM(S): 2 INJECTION INTRAMUSCULAR; INTRAVENOUS; SUBCUTANEOUS at 14:15

## 2022-01-01 RX ADMIN — LACTULOSE 10 GRAM(S): 10 SOLUTION ORAL at 16:18

## 2022-01-01 RX ADMIN — ATORVASTATIN CALCIUM 80 MILLIGRAM(S): 80 TABLET, FILM COATED ORAL at 22:22

## 2022-01-01 RX ADMIN — HYDROMORPHONE HYDROCHLORIDE 1 MILLIGRAM(S): 2 INJECTION INTRAMUSCULAR; INTRAVENOUS; SUBCUTANEOUS at 05:41

## 2022-01-01 RX ADMIN — HEPARIN SODIUM 5000 UNIT(S): 5000 INJECTION INTRAVENOUS; SUBCUTANEOUS at 13:08

## 2022-01-01 RX ADMIN — TACROLIMUS 1.5 MILLIGRAM(S): 5 CAPSULE ORAL at 09:34

## 2022-01-01 RX ADMIN — SACUBITRIL AND VALSARTAN 1 TABLET(S): 24; 26 TABLET, FILM COATED ORAL at 17:29

## 2022-01-01 RX ADMIN — Medication 25 MILLIGRAM(S): at 05:34

## 2022-01-01 RX ADMIN — MYCOPHENOLATE MOFETIL 500 MILLIGRAM(S): 250 CAPSULE ORAL at 05:43

## 2022-01-01 RX ADMIN — FLUCONAZOLE 200 MILLIGRAM(S): 150 TABLET ORAL at 09:56

## 2022-01-01 RX ADMIN — CHLORHEXIDINE GLUCONATE 15 MILLILITER(S): 213 SOLUTION TOPICAL at 06:23

## 2022-01-01 RX ADMIN — CHLORHEXIDINE GLUCONATE 15 MILLILITER(S): 213 SOLUTION TOPICAL at 17:26

## 2022-01-01 RX ADMIN — TAMSULOSIN HYDROCHLORIDE 0.4 MILLIGRAM(S): 0.4 CAPSULE ORAL at 22:34

## 2022-01-01 RX ADMIN — HYDROMORPHONE HYDROCHLORIDE 1 MILLIGRAM(S): 2 INJECTION INTRAMUSCULAR; INTRAVENOUS; SUBCUTANEOUS at 21:51

## 2022-01-01 RX ADMIN — AMPHOTERICIN B 125 MILLIGRAM(S): 50 INJECTION, POWDER, LYOPHILIZED, FOR SOLUTION INTRAVENOUS at 14:59

## 2022-01-01 RX ADMIN — HYDROMORPHONE HYDROCHLORIDE 0.5 MILLIGRAM(S): 2 INJECTION INTRAMUSCULAR; INTRAVENOUS; SUBCUTANEOUS at 22:20

## 2022-01-01 RX ADMIN — APIXABAN 2.5 MILLIGRAM(S): 2.5 TABLET, FILM COATED ORAL at 06:07

## 2022-01-01 RX ADMIN — REMDESIVIR 500 MILLIGRAM(S): 5 INJECTION INTRAVENOUS at 18:16

## 2022-01-01 RX ADMIN — HYDROMORPHONE HYDROCHLORIDE 1 MILLIGRAM(S): 2 INJECTION INTRAMUSCULAR; INTRAVENOUS; SUBCUTANEOUS at 08:02

## 2022-01-01 RX ADMIN — MAGNESIUM OXIDE 400 MG ORAL TABLET 400 MILLIGRAM(S): 241.3 TABLET ORAL at 11:10

## 2022-01-01 RX ADMIN — SEVELAMER CARBONATE 800 MILLIGRAM(S): 2400 POWDER, FOR SUSPENSION ORAL at 17:58

## 2022-01-01 RX ADMIN — Medication 10 MILLIGRAM(S): at 07:09

## 2022-01-01 RX ADMIN — SEVELAMER CARBONATE 800 MILLIGRAM(S): 2400 POWDER, FOR SUSPENSION ORAL at 15:14

## 2022-01-01 RX ADMIN — TACROLIMUS 1.5 MILLIGRAM(S): 5 CAPSULE ORAL at 09:20

## 2022-01-01 RX ADMIN — Medication 25 MILLIGRAM(S): at 17:26

## 2022-01-01 RX ADMIN — Medication 12.5 MILLIGRAM(S): at 17:56

## 2022-01-01 RX ADMIN — FENTANYL CITRATE 50 MICROGRAM(S): 50 INJECTION INTRAVENOUS at 17:27

## 2022-01-01 RX ADMIN — TRAMADOL HYDROCHLORIDE 25 MILLIGRAM(S): 50 TABLET ORAL at 19:42

## 2022-01-01 RX ADMIN — Medication 1000 MILLIGRAM(S): at 18:02

## 2022-01-01 RX ADMIN — SEVELAMER CARBONATE 800 MILLIGRAM(S): 2400 POWDER, FOR SUSPENSION ORAL at 12:33

## 2022-01-01 RX ADMIN — HYDROMORPHONE HYDROCHLORIDE 1 MILLIGRAM(S): 2 INJECTION INTRAMUSCULAR; INTRAVENOUS; SUBCUTANEOUS at 06:23

## 2022-01-01 RX ADMIN — TACROLIMUS 1.5 MILLIGRAM(S): 5 CAPSULE ORAL at 09:31

## 2022-01-01 RX ADMIN — PIPERACILLIN AND TAZOBACTAM 25 GRAM(S): 4; .5 INJECTION, POWDER, LYOPHILIZED, FOR SOLUTION INTRAVENOUS at 06:56

## 2022-01-01 RX ADMIN — PANTOPRAZOLE SODIUM 40 MILLIGRAM(S): 20 TABLET, DELAYED RELEASE ORAL at 15:42

## 2022-01-01 RX ADMIN — Medication 5 MILLIGRAM(S): at 11:08

## 2022-01-01 RX ADMIN — TAMSULOSIN HYDROCHLORIDE 0.4 MILLIGRAM(S): 0.4 CAPSULE ORAL at 21:39

## 2022-01-01 RX ADMIN — ATORVASTATIN CALCIUM 40 MILLIGRAM(S): 80 TABLET, FILM COATED ORAL at 22:26

## 2022-01-01 RX ADMIN — HYDROMORPHONE HYDROCHLORIDE 1 MILLIGRAM(S): 2 INJECTION INTRAMUSCULAR; INTRAVENOUS; SUBCUTANEOUS at 21:33

## 2022-01-01 RX ADMIN — AMPHOTERICIN B 125 MILLIGRAM(S): 50 INJECTION, POWDER, LYOPHILIZED, FOR SOLUTION INTRAVENOUS at 02:13

## 2022-01-01 RX ADMIN — TACROLIMUS 1.5 MILLIGRAM(S): 5 CAPSULE ORAL at 13:30

## 2022-01-01 RX ADMIN — REMDESIVIR 500 MILLIGRAM(S): 5 INJECTION INTRAVENOUS at 17:59

## 2022-01-01 RX ADMIN — SEVELAMER CARBONATE 1600 MILLIGRAM(S): 2400 POWDER, FOR SUSPENSION ORAL at 09:29

## 2022-01-01 RX ADMIN — CEFTRIAXONE 2000 MILLIGRAM(S): 500 INJECTION, POWDER, FOR SOLUTION INTRAMUSCULAR; INTRAVENOUS at 22:06

## 2022-01-01 RX ADMIN — Medication 1 TABLET(S): at 11:14

## 2022-01-01 RX ADMIN — FLUCONAZOLE 200 MILLIGRAM(S): 150 TABLET ORAL at 09:01

## 2022-01-01 RX ADMIN — CHLORHEXIDINE GLUCONATE 1 APPLICATION(S): 213 SOLUTION TOPICAL at 06:57

## 2022-01-01 RX ADMIN — Medication 25 MILLIGRAM(S): at 19:00

## 2022-01-01 RX ADMIN — Medication 25 MILLIGRAM(S): at 13:12

## 2022-01-01 RX ADMIN — AMLODIPINE BESYLATE 5 MILLIGRAM(S): 2.5 TABLET ORAL at 11:08

## 2022-01-01 RX ADMIN — ATORVASTATIN CALCIUM 80 MILLIGRAM(S): 80 TABLET, FILM COATED ORAL at 21:14

## 2022-01-01 RX ADMIN — Medication 25 MILLIGRAM(S): at 17:45

## 2022-01-01 RX ADMIN — SEVELAMER CARBONATE 800 MILLIGRAM(S): 2400 POWDER, FOR SUSPENSION ORAL at 17:10

## 2022-01-01 RX ADMIN — TRAMADOL HYDROCHLORIDE 25 MILLIGRAM(S): 50 TABLET ORAL at 22:34

## 2022-01-01 RX ADMIN — HYDROMORPHONE HYDROCHLORIDE 1 MILLIGRAM(S): 2 INJECTION INTRAMUSCULAR; INTRAVENOUS; SUBCUTANEOUS at 08:40

## 2022-01-01 RX ADMIN — Medication 40 MILLIGRAM(S): at 05:42

## 2022-01-01 RX ADMIN — Medication 10 MILLIGRAM(S): at 17:42

## 2022-01-01 RX ADMIN — Medication 400 MILLIGRAM(S): at 23:41

## 2022-01-01 RX ADMIN — TACROLIMUS 1.5 MILLIGRAM(S): 5 CAPSULE ORAL at 13:55

## 2022-01-01 RX ADMIN — TAMSULOSIN HYDROCHLORIDE 0.4 MILLIGRAM(S): 0.4 CAPSULE ORAL at 00:14

## 2022-01-01 RX ADMIN — Medication 25 GRAM(S): at 14:00

## 2022-01-01 RX ADMIN — Medication 25 MILLIGRAM(S): at 16:58

## 2022-01-01 RX ADMIN — MIDODRINE HYDROCHLORIDE 5 MILLIGRAM(S): 2.5 TABLET ORAL at 06:02

## 2022-01-01 RX ADMIN — MYCOPHENOLATE MOFETIL 500 MILLIGRAM(S): 250 CAPSULE ORAL at 06:31

## 2022-01-01 RX ADMIN — Medication 81 MILLIGRAM(S): at 11:10

## 2022-01-01 RX ADMIN — Medication 80 MILLIGRAM(S): at 15:42

## 2022-01-01 RX ADMIN — FENTANYL CITRATE 25 MICROGRAM(S): 50 INJECTION INTRAVENOUS at 22:16

## 2022-01-01 RX ADMIN — SEVELAMER CARBONATE 1600 MILLIGRAM(S): 2400 POWDER, FOR SUSPENSION ORAL at 18:16

## 2022-01-01 RX ADMIN — Medication 50 MILLIGRAM(S): at 11:05

## 2022-01-01 RX ADMIN — APIXABAN 2.5 MILLIGRAM(S): 2.5 TABLET, FILM COATED ORAL at 05:22

## 2022-01-01 RX ADMIN — TAMSULOSIN HYDROCHLORIDE 0.4 MILLIGRAM(S): 0.4 CAPSULE ORAL at 21:28

## 2022-01-01 RX ADMIN — PANTOPRAZOLE SODIUM 40 MILLIGRAM(S): 20 TABLET, DELAYED RELEASE ORAL at 12:33

## 2022-01-01 RX ADMIN — FLUCONAZOLE 100 MILLIGRAM(S): 150 TABLET ORAL at 21:10

## 2022-01-01 RX ADMIN — Medication 10 MILLIGRAM(S): at 21:51

## 2022-01-01 RX ADMIN — SEVELAMER CARBONATE 1600 MILLIGRAM(S): 2400 POWDER, FOR SUSPENSION ORAL at 09:49

## 2022-01-01 RX ADMIN — PANTOPRAZOLE SODIUM 40 MILLIGRAM(S): 20 TABLET, DELAYED RELEASE ORAL at 11:15

## 2022-01-01 RX ADMIN — APIXABAN 2.5 MILLIGRAM(S): 2.5 TABLET, FILM COATED ORAL at 17:14

## 2022-01-01 RX ADMIN — Medication 975 MILLIGRAM(S): at 06:44

## 2022-01-01 RX ADMIN — TAMSULOSIN HYDROCHLORIDE 0.4 MILLIGRAM(S): 0.4 CAPSULE ORAL at 21:53

## 2022-01-01 RX ADMIN — TACROLIMUS 1.5 MILLIGRAM(S): 5 CAPSULE ORAL at 10:11

## 2022-01-01 RX ADMIN — Medication 50 MILLIGRAM(S): at 21:30

## 2022-01-01 RX ADMIN — Medication 10 MILLIGRAM(S): at 21:11

## 2022-01-01 RX ADMIN — LACTULOSE 10 GRAM(S): 10 SOLUTION ORAL at 13:40

## 2022-01-01 RX ADMIN — Medication 1 TABLET(S): at 11:32

## 2022-01-01 RX ADMIN — APIXABAN 2.5 MILLIGRAM(S): 2.5 TABLET, FILM COATED ORAL at 08:09

## 2022-01-01 RX ADMIN — TACROLIMUS 1 MILLIGRAM(S): 5 CAPSULE ORAL at 21:13

## 2022-01-01 RX ADMIN — Medication 10 MILLIGRAM(S): at 06:14

## 2022-01-01 RX ADMIN — MEROPENEM 500 MILLIGRAM(S): 1 INJECTION INTRAVENOUS at 12:26

## 2022-01-01 RX ADMIN — Medication 10 MILLIGRAM(S): at 19:47

## 2022-01-01 RX ADMIN — Medication 1 TABLET(S): at 13:13

## 2022-01-01 RX ADMIN — FLUCONAZOLE 200 MILLIGRAM(S): 150 TABLET ORAL at 13:55

## 2022-01-01 RX ADMIN — MYCOPHENOLATE MOFETIL 500 MILLIGRAM(S): 250 CAPSULE ORAL at 05:42

## 2022-01-01 RX ADMIN — CEFTRIAXONE 2000 MILLIGRAM(S): 500 INJECTION, POWDER, FOR SOLUTION INTRAMUSCULAR; INTRAVENOUS at 05:37

## 2022-01-01 RX ADMIN — LACTULOSE 10 GRAM(S): 10 SOLUTION ORAL at 13:57

## 2022-01-01 RX ADMIN — FLUCONAZOLE 200 MILLIGRAM(S): 150 TABLET ORAL at 10:50

## 2022-01-01 RX ADMIN — HEPARIN SODIUM 5000 UNIT(S): 5000 INJECTION INTRAVENOUS; SUBCUTANEOUS at 22:24

## 2022-01-01 RX ADMIN — MEROPENEM 1000 MILLIGRAM(S): 1 INJECTION INTRAVENOUS at 12:22

## 2022-01-01 RX ADMIN — Medication 10 MILLIGRAM(S): at 05:41

## 2022-01-01 RX ADMIN — CLOPIDOGREL BISULFATE 75 MILLIGRAM(S): 75 TABLET, FILM COATED ORAL at 09:40

## 2022-01-01 RX ADMIN — Medication 80 MILLIGRAM(S): at 10:20

## 2022-01-01 RX ADMIN — Medication 81 MILLIGRAM(S): at 11:14

## 2022-01-01 RX ADMIN — SODIUM ZIRCONIUM CYCLOSILICATE 10 GRAM(S): 10 POWDER, FOR SUSPENSION ORAL at 13:47

## 2022-01-01 RX ADMIN — ATORVASTATIN CALCIUM 40 MILLIGRAM(S): 80 TABLET, FILM COATED ORAL at 21:38

## 2022-01-01 RX ADMIN — ALBUTEROL 4 PUFF(S): 90 AEROSOL, METERED ORAL at 11:27

## 2022-01-01 RX ADMIN — TAMSULOSIN HYDROCHLORIDE 0.4 MILLIGRAM(S): 0.4 CAPSULE ORAL at 22:14

## 2022-01-01 RX ADMIN — SEVELAMER CARBONATE 1600 MILLIGRAM(S): 2400 POWDER, FOR SUSPENSION ORAL at 11:57

## 2022-01-01 RX ADMIN — HYDROMORPHONE HYDROCHLORIDE 0.5 MILLIGRAM(S): 2 INJECTION INTRAMUSCULAR; INTRAVENOUS; SUBCUTANEOUS at 21:31

## 2022-01-01 RX ADMIN — APIXABAN 2.5 MILLIGRAM(S): 2.5 TABLET, FILM COATED ORAL at 17:45

## 2022-01-01 RX ADMIN — Medication 100 MILLIGRAM(S): at 18:03

## 2022-01-01 RX ADMIN — PANTOPRAZOLE SODIUM 40 MILLIGRAM(S): 20 TABLET, DELAYED RELEASE ORAL at 05:18

## 2022-01-01 RX ADMIN — SEVELAMER CARBONATE 1600 MILLIGRAM(S): 2400 POWDER, FOR SUSPENSION ORAL at 09:39

## 2022-01-01 RX ADMIN — APIXABAN 2.5 MILLIGRAM(S): 2.5 TABLET, FILM COATED ORAL at 19:08

## 2022-01-01 RX ADMIN — CHLORHEXIDINE GLUCONATE 1 APPLICATION(S): 213 SOLUTION TOPICAL at 05:50

## 2022-01-01 RX ADMIN — HEPARIN SODIUM 1100 UNIT(S)/HR: 5000 INJECTION INTRAVENOUS; SUBCUTANEOUS at 19:14

## 2022-01-01 RX ADMIN — ERYTHROPOIETIN 10000 UNIT(S): 10000 INJECTION, SOLUTION INTRAVENOUS; SUBCUTANEOUS at 10:35

## 2022-01-01 RX ADMIN — SEVELAMER CARBONATE 1600 MILLIGRAM(S): 2400 POWDER, FOR SUSPENSION ORAL at 12:30

## 2022-01-01 RX ADMIN — Medication 81 MILLIGRAM(S): at 11:24

## 2022-01-01 RX ADMIN — SEVELAMER CARBONATE 800 MILLIGRAM(S): 2400 POWDER, FOR SUSPENSION ORAL at 10:11

## 2022-01-01 RX ADMIN — MEROPENEM 1000 MILLIGRAM(S): 1 INJECTION INTRAVENOUS at 14:22

## 2022-01-01 RX ADMIN — Medication 25 MILLIGRAM(S): at 05:35

## 2022-01-01 RX ADMIN — MYCOPHENOLATE MOFETIL 500 MILLIGRAM(S): 250 CAPSULE ORAL at 06:25

## 2022-01-01 RX ADMIN — Medication 1 TABLET(S): at 08:00

## 2022-01-01 RX ADMIN — MYCOPHENOLATE MOFETIL 500 MILLIGRAM(S): 250 CAPSULE ORAL at 17:41

## 2022-01-01 RX ADMIN — ATORVASTATIN CALCIUM 80 MILLIGRAM(S): 80 TABLET, FILM COATED ORAL at 21:33

## 2022-01-01 RX ADMIN — Medication 100 MILLIGRAM(S): at 17:47

## 2022-01-01 RX ADMIN — HYDROMORPHONE HYDROCHLORIDE 1 MILLIGRAM(S): 2 INJECTION INTRAMUSCULAR; INTRAVENOUS; SUBCUTANEOUS at 18:51

## 2022-01-01 RX ADMIN — Medication 10 MILLIGRAM(S): at 17:29

## 2022-01-01 RX ADMIN — PANTOPRAZOLE SODIUM 40 MILLIGRAM(S): 20 TABLET, DELAYED RELEASE ORAL at 13:10

## 2022-01-01 RX ADMIN — LACTULOSE 10 GRAM(S): 10 SOLUTION ORAL at 17:13

## 2022-01-01 RX ADMIN — SEVELAMER CARBONATE 1600 MILLIGRAM(S): 2400 POWDER, FOR SUSPENSION ORAL at 17:49

## 2022-01-01 RX ADMIN — REMDESIVIR 500 MILLIGRAM(S): 5 INJECTION INTRAVENOUS at 21:14

## 2022-01-01 RX ADMIN — MYCOPHENOLATE MOFETIL 500 MILLIGRAM(S): 250 CAPSULE ORAL at 21:47

## 2022-01-01 RX ADMIN — HEPARIN SODIUM 800 UNIT(S)/HR: 5000 INJECTION INTRAVENOUS; SUBCUTANEOUS at 12:03

## 2022-01-01 RX ADMIN — Medication 10 MILLIGRAM(S): at 08:09

## 2022-01-01 RX ADMIN — SEVELAMER CARBONATE 1600 MILLIGRAM(S): 2400 POWDER, FOR SUSPENSION ORAL at 08:51

## 2022-01-01 RX ADMIN — APIXABAN 2.5 MILLIGRAM(S): 2.5 TABLET, FILM COATED ORAL at 17:47

## 2022-01-01 RX ADMIN — MYCOPHENOLATE MOFETIL 500 MILLIGRAM(S): 250 CAPSULE ORAL at 18:09

## 2022-01-01 RX ADMIN — LACTULOSE 10 GRAM(S): 10 SOLUTION ORAL at 05:27

## 2022-01-01 RX ADMIN — SEVELAMER CARBONATE 800 MILLIGRAM(S): 2400 POWDER, FOR SUSPENSION ORAL at 11:14

## 2022-01-01 RX ADMIN — HYDROMORPHONE HYDROCHLORIDE 1 MILLIGRAM(S): 2 INJECTION INTRAMUSCULAR; INTRAVENOUS; SUBCUTANEOUS at 21:10

## 2022-01-01 RX ADMIN — ATORVASTATIN CALCIUM 80 MILLIGRAM(S): 80 TABLET, FILM COATED ORAL at 22:08

## 2022-01-01 RX ADMIN — Medication 10 MILLIGRAM(S): at 17:14

## 2022-01-01 RX ADMIN — Medication 80 MILLIGRAM(S): at 12:25

## 2022-01-01 RX ADMIN — CEFTRIAXONE 2000 MILLIGRAM(S): 500 INJECTION, POWDER, FOR SOLUTION INTRAMUSCULAR; INTRAVENOUS at 17:51

## 2022-01-01 RX ADMIN — SEVELAMER CARBONATE 1600 MILLIGRAM(S): 2400 POWDER, FOR SUSPENSION ORAL at 21:13

## 2022-01-01 RX ADMIN — SACUBITRIL AND VALSARTAN 1 TABLET(S): 24; 26 TABLET, FILM COATED ORAL at 17:34

## 2022-01-01 RX ADMIN — Medication 40 MILLIGRAM(S): at 13:05

## 2022-01-01 RX ADMIN — HYDROMORPHONE HYDROCHLORIDE 1 MILLIGRAM(S): 2 INJECTION INTRAMUSCULAR; INTRAVENOUS; SUBCUTANEOUS at 16:05

## 2022-01-01 RX ADMIN — FLUCYTOSINE 1500 MILLIGRAM(S): 500 CAPSULE ORAL at 22:30

## 2022-01-01 RX ADMIN — Medication 10 MILLIGRAM(S): at 19:00

## 2022-01-01 RX ADMIN — LACTULOSE 10 GRAM(S): 10 SOLUTION ORAL at 17:25

## 2022-01-01 RX ADMIN — LACTULOSE 10 GRAM(S): 10 SOLUTION ORAL at 11:33

## 2022-01-01 RX ADMIN — ONDANSETRON 4 MILLIGRAM(S): 8 TABLET, FILM COATED ORAL at 05:28

## 2022-01-01 RX ADMIN — Medication 10 MILLIGRAM(S): at 05:13

## 2022-01-01 RX ADMIN — TACROLIMUS 1 MILLIGRAM(S): 5 CAPSULE ORAL at 21:29

## 2022-01-01 RX ADMIN — LACTULOSE 200 GRAM(S): 10 SOLUTION ORAL at 00:58

## 2022-01-01 RX ADMIN — PIPERACILLIN AND TAZOBACTAM 25 GRAM(S): 4; .5 INJECTION, POWDER, LYOPHILIZED, FOR SOLUTION INTRAVENOUS at 07:00

## 2022-01-01 RX ADMIN — ATORVASTATIN CALCIUM 80 MILLIGRAM(S): 80 TABLET, FILM COATED ORAL at 22:34

## 2022-01-01 RX ADMIN — Medication 200 GRAM(S): at 21:29

## 2022-01-01 RX ADMIN — Medication 10 MILLIGRAM(S): at 17:28

## 2022-01-01 RX ADMIN — Medication 1 TABLET(S): at 17:57

## 2022-01-01 RX ADMIN — SEVELAMER CARBONATE 1600 MILLIGRAM(S): 2400 POWDER, FOR SUSPENSION ORAL at 12:49

## 2022-01-01 RX ADMIN — CHLORHEXIDINE GLUCONATE 1 APPLICATION(S): 213 SOLUTION TOPICAL at 06:16

## 2022-01-01 RX ADMIN — Medication 1 TABLET(S): at 12:50

## 2022-01-01 RX ADMIN — APIXABAN 2.5 MILLIGRAM(S): 2.5 TABLET, FILM COATED ORAL at 18:49

## 2022-01-01 RX ADMIN — MEROPENEM 1000 MILLIGRAM(S): 1 INJECTION INTRAVENOUS at 15:42

## 2022-01-01 RX ADMIN — Medication 107 MILLIGRAM(S): at 13:09

## 2022-01-01 RX ADMIN — Medication 52.5 MILLIGRAM(S): at 21:07

## 2022-01-01 RX ADMIN — LACTULOSE 10 GRAM(S): 10 SOLUTION ORAL at 16:58

## 2022-01-01 RX ADMIN — MYCOPHENOLATE MOFETIL 500 MILLIGRAM(S): 250 CAPSULE ORAL at 21:13

## 2022-01-01 RX ADMIN — SODIUM ZIRCONIUM CYCLOSILICATE 10 GRAM(S): 10 POWDER, FOR SUSPENSION ORAL at 11:10

## 2022-01-01 RX ADMIN — CLOPIDOGREL BISULFATE 75 MILLIGRAM(S): 75 TABLET, FILM COATED ORAL at 12:50

## 2022-01-01 RX ADMIN — APIXABAN 2.5 MILLIGRAM(S): 2.5 TABLET, FILM COATED ORAL at 05:54

## 2022-01-01 RX ADMIN — HYDROMORPHONE HYDROCHLORIDE 1 MILLIGRAM(S): 2 INJECTION INTRAMUSCULAR; INTRAVENOUS; SUBCUTANEOUS at 06:15

## 2022-01-01 RX ADMIN — HYDROMORPHONE HYDROCHLORIDE 0.5 MILLIGRAM(S): 2 INJECTION INTRAMUSCULAR; INTRAVENOUS; SUBCUTANEOUS at 08:48

## 2022-01-01 RX ADMIN — HEPARIN SODIUM 5000 UNIT(S): 5000 INJECTION INTRAVENOUS; SUBCUTANEOUS at 14:29

## 2022-01-01 RX ADMIN — TACROLIMUS 1.5 MILLIGRAM(S): 5 CAPSULE ORAL at 17:45

## 2022-01-01 RX ADMIN — SEVELAMER CARBONATE 1600 MILLIGRAM(S): 2400 POWDER, FOR SUSPENSION ORAL at 09:42

## 2022-01-01 RX ADMIN — Medication 10 MILLIGRAM(S): at 05:55

## 2022-01-01 RX ADMIN — Medication 10 MILLIGRAM(S): at 05:27

## 2022-01-01 RX ADMIN — REMDESIVIR 500 MILLIGRAM(S): 5 INJECTION INTRAVENOUS at 18:51

## 2022-01-01 RX ADMIN — HYDROMORPHONE HYDROCHLORIDE 0.5 MILLIGRAM(S): 2 INJECTION INTRAMUSCULAR; INTRAVENOUS; SUBCUTANEOUS at 17:08

## 2022-01-01 RX ADMIN — ATORVASTATIN CALCIUM 40 MILLIGRAM(S): 80 TABLET, FILM COATED ORAL at 21:45

## 2022-01-01 RX ADMIN — LACTULOSE 10 GRAM(S): 10 SOLUTION ORAL at 05:21

## 2022-01-01 RX ADMIN — TACROLIMUS 1.5 MILLIGRAM(S): 5 CAPSULE ORAL at 09:03

## 2022-01-01 RX ADMIN — TACROLIMUS 1.5 MILLIGRAM(S): 5 CAPSULE ORAL at 12:25

## 2022-01-01 RX ADMIN — Medication 100 MILLIGRAM(S): at 20:40

## 2022-01-01 RX ADMIN — Medication 10 MILLIGRAM(S): at 17:50

## 2022-01-01 RX ADMIN — LACTULOSE 10 GRAM(S): 10 SOLUTION ORAL at 05:30

## 2022-01-01 RX ADMIN — APIXABAN 2.5 MILLIGRAM(S): 2.5 TABLET, FILM COATED ORAL at 18:10

## 2022-01-01 RX ADMIN — MYCOPHENOLATE MOFETIL 500 MILLIGRAM(S): 250 CAPSULE ORAL at 23:15

## 2022-01-01 RX ADMIN — Medication 1 TABLET(S): at 16:05

## 2022-01-01 RX ADMIN — Medication 25 MILLIGRAM(S): at 05:40

## 2022-01-01 RX ADMIN — SEVELAMER CARBONATE 1600 MILLIGRAM(S): 2400 POWDER, FOR SUSPENSION ORAL at 12:40

## 2022-01-01 RX ADMIN — TAMSULOSIN HYDROCHLORIDE 0.4 MILLIGRAM(S): 0.4 CAPSULE ORAL at 21:16

## 2022-01-01 RX ADMIN — APIXABAN 2.5 MILLIGRAM(S): 2.5 TABLET, FILM COATED ORAL at 17:43

## 2022-01-01 RX ADMIN — HEPARIN SODIUM 5000 UNIT(S): 5000 INJECTION INTRAVENOUS; SUBCUTANEOUS at 21:56

## 2022-01-01 RX ADMIN — CEFTRIAXONE 2000 MILLIGRAM(S): 500 INJECTION, POWDER, FOR SOLUTION INTRAMUSCULAR; INTRAVENOUS at 17:00

## 2022-01-01 RX ADMIN — Medication 200 GRAM(S): at 05:36

## 2022-01-01 RX ADMIN — ATORVASTATIN CALCIUM 40 MILLIGRAM(S): 80 TABLET, FILM COATED ORAL at 22:14

## 2022-01-01 RX ADMIN — Medication 200 GRAM(S): at 21:51

## 2022-01-01 RX ADMIN — Medication 200 GRAM(S): at 09:00

## 2022-01-01 RX ADMIN — Medication 1 TABLET(S): at 17:32

## 2022-01-01 RX ADMIN — Medication 50 MILLILITER(S): at 15:40

## 2022-01-01 RX ADMIN — FLUCONAZOLE 200 MILLIGRAM(S): 150 TABLET ORAL at 10:12

## 2022-01-01 RX ADMIN — TAMSULOSIN HYDROCHLORIDE 0.4 MILLIGRAM(S): 0.4 CAPSULE ORAL at 21:23

## 2022-01-01 RX ADMIN — TAMSULOSIN HYDROCHLORIDE 0.4 MILLIGRAM(S): 0.4 CAPSULE ORAL at 21:07

## 2022-01-01 RX ADMIN — TACROLIMUS 0.5 MILLIGRAM(S): 5 CAPSULE ORAL at 05:49

## 2022-01-01 RX ADMIN — Medication 107 MILLIGRAM(S): at 15:43

## 2022-01-01 RX ADMIN — Medication 250 MILLIGRAM(S): at 14:56

## 2022-01-01 RX ADMIN — SEVELAMER CARBONATE 800 MILLIGRAM(S): 2400 POWDER, FOR SUSPENSION ORAL at 17:43

## 2022-01-01 RX ADMIN — LACTULOSE 10 GRAM(S): 10 SOLUTION ORAL at 12:22

## 2022-01-01 RX ADMIN — SEVELAMER CARBONATE 1600 MILLIGRAM(S): 2400 POWDER, FOR SUSPENSION ORAL at 12:58

## 2022-01-01 RX ADMIN — PANTOPRAZOLE SODIUM 40 MILLIGRAM(S): 20 TABLET, DELAYED RELEASE ORAL at 06:03

## 2022-01-01 RX ADMIN — SACUBITRIL AND VALSARTAN 1 TABLET(S): 24; 26 TABLET, FILM COATED ORAL at 06:07

## 2022-01-01 RX ADMIN — CHLORHEXIDINE GLUCONATE 1 APPLICATION(S): 213 SOLUTION TOPICAL at 05:34

## 2022-01-01 RX ADMIN — Medication 10 MILLIGRAM(S): at 05:37

## 2022-01-01 RX ADMIN — LACTULOSE 200 GRAM(S): 10 SOLUTION ORAL at 12:37

## 2022-01-01 RX ADMIN — HYDROMORPHONE HYDROCHLORIDE 0.5 MILLIGRAM(S): 2 INJECTION INTRAMUSCULAR; INTRAVENOUS; SUBCUTANEOUS at 03:11

## 2022-01-01 RX ADMIN — SEVELAMER CARBONATE 1600 MILLIGRAM(S): 2400 POWDER, FOR SUSPENSION ORAL at 14:01

## 2022-01-01 RX ADMIN — MYCOPHENOLATE MOFETIL 500 MILLIGRAM(S): 250 CAPSULE ORAL at 22:11

## 2022-01-01 RX ADMIN — CEFTRIAXONE 2000 MILLIGRAM(S): 500 INJECTION, POWDER, FOR SOLUTION INTRAMUSCULAR; INTRAVENOUS at 05:58

## 2022-01-01 RX ADMIN — HEPARIN SODIUM 5000 UNIT(S): 5000 INJECTION INTRAVENOUS; SUBCUTANEOUS at 13:55

## 2022-01-01 RX ADMIN — Medication 25 MILLIGRAM(S): at 14:29

## 2022-01-01 RX ADMIN — PANTOPRAZOLE SODIUM 40 MILLIGRAM(S): 20 TABLET, DELAYED RELEASE ORAL at 07:12

## 2022-01-01 RX ADMIN — CEFTRIAXONE 2000 MILLIGRAM(S): 500 INJECTION, POWDER, FOR SOLUTION INTRAMUSCULAR; INTRAVENOUS at 00:57

## 2022-01-01 RX ADMIN — Medication 200 GRAM(S): at 17:25

## 2022-01-01 RX ADMIN — APIXABAN 2.5 MILLIGRAM(S): 2.5 TABLET, FILM COATED ORAL at 06:13

## 2022-01-01 RX ADMIN — TACROLIMUS 1.5 MILLIGRAM(S): 5 CAPSULE ORAL at 12:50

## 2022-01-01 RX ADMIN — TACROLIMUS 1.5 MILLIGRAM(S): 5 CAPSULE ORAL at 15:42

## 2022-01-01 RX ADMIN — MYCOPHENOLATE MOFETIL 500 MILLIGRAM(S): 250 CAPSULE ORAL at 06:08

## 2022-01-01 RX ADMIN — HEPARIN SODIUM 5000 UNIT(S): 5000 INJECTION INTRAVENOUS; SUBCUTANEOUS at 21:55

## 2022-01-01 RX ADMIN — HYDROMORPHONE HYDROCHLORIDE 1 MILLIGRAM(S): 2 INJECTION INTRAMUSCULAR; INTRAVENOUS; SUBCUTANEOUS at 16:31

## 2022-01-01 RX ADMIN — Medication 250 MILLIGRAM(S): at 16:57

## 2022-01-01 RX ADMIN — PANTOPRAZOLE SODIUM 40 MILLIGRAM(S): 20 TABLET, DELAYED RELEASE ORAL at 05:43

## 2022-01-01 RX ADMIN — SACUBITRIL AND VALSARTAN 1 TABLET(S): 24; 26 TABLET, FILM COATED ORAL at 17:20

## 2022-01-01 RX ADMIN — Medication 667 MILLIGRAM(S): at 09:35

## 2022-01-01 RX ADMIN — Medication 12.5 MILLIGRAM(S): at 06:14

## 2022-01-01 RX ADMIN — TAMSULOSIN HYDROCHLORIDE 0.4 MILLIGRAM(S): 0.4 CAPSULE ORAL at 21:13

## 2022-01-01 RX ADMIN — Medication 100 MILLIGRAM(S): at 05:50

## 2022-01-01 RX ADMIN — MYCOPHENOLATE MOFETIL 500 MILLIGRAM(S): 250 CAPSULE ORAL at 12:50

## 2022-01-01 RX ADMIN — Medication 40 MILLIGRAM(S): at 21:32

## 2022-01-01 RX ADMIN — HEPARIN SODIUM 5000 UNIT(S): 5000 INJECTION INTRAVENOUS; SUBCUTANEOUS at 05:11

## 2022-01-01 RX ADMIN — TACROLIMUS 1.5 MILLIGRAM(S): 5 CAPSULE ORAL at 07:11

## 2022-01-01 RX ADMIN — MYCOPHENOLATE MOFETIL 500 MILLIGRAM(S): 250 CAPSULE ORAL at 07:10

## 2022-01-01 RX ADMIN — PIPERACILLIN AND TAZOBACTAM 25 GRAM(S): 4; .5 INJECTION, POWDER, LYOPHILIZED, FOR SOLUTION INTRAVENOUS at 17:35

## 2022-01-01 RX ADMIN — TACROLIMUS 1 MILLIGRAM(S): 5 CAPSULE ORAL at 17:35

## 2022-01-01 RX ADMIN — SEVELAMER CARBONATE 1600 MILLIGRAM(S): 2400 POWDER, FOR SUSPENSION ORAL at 08:22

## 2022-01-01 RX ADMIN — TACROLIMUS 1.5 MILLIGRAM(S): 5 CAPSULE ORAL at 10:21

## 2022-01-01 RX ADMIN — Medication 200 GRAM(S): at 05:42

## 2022-01-01 RX ADMIN — MEROPENEM 1000 MILLIGRAM(S): 1 INJECTION INTRAVENOUS at 20:40

## 2022-01-01 RX ADMIN — Medication 1 TABLET(S): at 14:47

## 2022-01-01 RX ADMIN — MYCOPHENOLATE MOFETIL 500 MILLIGRAM(S): 250 CAPSULE ORAL at 22:22

## 2022-01-01 RX ADMIN — HEPARIN SODIUM 2500 UNIT(S): 5000 INJECTION INTRAVENOUS; SUBCUTANEOUS at 09:24

## 2022-01-01 RX ADMIN — PROPOFOL 3.8 MICROGRAM(S)/KG/MIN: 10 INJECTION, EMULSION INTRAVENOUS at 19:00

## 2022-01-01 RX ADMIN — CHLORHEXIDINE GLUCONATE 1 APPLICATION(S): 213 SOLUTION TOPICAL at 08:00

## 2022-01-01 RX ADMIN — FLUCYTOSINE 1500 MILLIGRAM(S): 500 CAPSULE ORAL at 18:26

## 2022-01-01 RX ADMIN — SEVELAMER CARBONATE 1600 MILLIGRAM(S): 2400 POWDER, FOR SUSPENSION ORAL at 09:34

## 2022-01-01 RX ADMIN — HYDROMORPHONE HYDROCHLORIDE 0.5 MILLIGRAM(S): 2 INJECTION INTRAMUSCULAR; INTRAVENOUS; SUBCUTANEOUS at 18:00

## 2022-01-01 RX ADMIN — Medication 1 TABLET(S): at 13:27

## 2022-01-01 RX ADMIN — MYCOPHENOLATE MOFETIL 500 MILLIGRAM(S): 250 CAPSULE ORAL at 13:26

## 2022-01-01 RX ADMIN — LACTULOSE 200 GRAM(S): 10 SOLUTION ORAL at 19:13

## 2022-01-01 RX ADMIN — Medication 0.5 MILLIGRAM(S): at 11:40

## 2022-01-01 RX ADMIN — PANTOPRAZOLE SODIUM 40 MILLIGRAM(S): 20 TABLET, DELAYED RELEASE ORAL at 05:14

## 2022-01-01 RX ADMIN — SEVELAMER CARBONATE 1600 MILLIGRAM(S): 2400 POWDER, FOR SUSPENSION ORAL at 17:47

## 2022-01-01 RX ADMIN — CLOPIDOGREL BISULFATE 75 MILLIGRAM(S): 75 TABLET, FILM COATED ORAL at 13:13

## 2022-01-01 RX ADMIN — Medication 10 MILLIGRAM(S): at 05:28

## 2022-01-01 RX ADMIN — MEROPENEM 100 MILLIGRAM(S): 1 INJECTION INTRAVENOUS at 14:36

## 2022-01-01 RX ADMIN — SEVELAMER CARBONATE 1600 MILLIGRAM(S): 2400 POWDER, FOR SUSPENSION ORAL at 13:24

## 2022-01-01 RX ADMIN — Medication 650 MILLIGRAM(S): at 00:43

## 2022-01-01 RX ADMIN — Medication 40 MILLIGRAM(S): at 22:14

## 2022-01-01 RX ADMIN — HEPARIN SODIUM 500 UNIT(S)/HR: 5000 INJECTION INTRAVENOUS; SUBCUTANEOUS at 09:25

## 2022-01-01 RX ADMIN — HYDROMORPHONE HYDROCHLORIDE 0.5 MILLIGRAM(S): 2 INJECTION INTRAMUSCULAR; INTRAVENOUS; SUBCUTANEOUS at 22:40

## 2022-01-01 RX ADMIN — Medication 10 MILLIGRAM(S): at 18:10

## 2022-01-01 RX ADMIN — CHLORHEXIDINE GLUCONATE 1 APPLICATION(S): 213 SOLUTION TOPICAL at 06:12

## 2022-01-01 RX ADMIN — HYDROMORPHONE HYDROCHLORIDE 1 MILLIGRAM(S): 2 INJECTION INTRAMUSCULAR; INTRAVENOUS; SUBCUTANEOUS at 16:30

## 2022-01-01 RX ADMIN — PANTOPRAZOLE SODIUM 40 MILLIGRAM(S): 20 TABLET, DELAYED RELEASE ORAL at 05:49

## 2022-01-01 RX ADMIN — Medication 12.5 MILLIGRAM(S): at 06:02

## 2022-01-01 RX ADMIN — Medication 10 MILLIGRAM(S): at 05:43

## 2022-01-01 RX ADMIN — HYDROMORPHONE HYDROCHLORIDE 1 MILLIGRAM(S): 2 INJECTION INTRAMUSCULAR; INTRAVENOUS; SUBCUTANEOUS at 13:37

## 2022-01-01 RX ADMIN — MYCOPHENOLATE MOFETIL 250 MILLIGRAM(S): 250 CAPSULE ORAL at 16:57

## 2022-01-01 RX ADMIN — APIXABAN 2.5 MILLIGRAM(S): 2.5 TABLET, FILM COATED ORAL at 05:34

## 2022-01-01 RX ADMIN — SEVELAMER CARBONATE 1600 MILLIGRAM(S): 2400 POWDER, FOR SUSPENSION ORAL at 11:35

## 2022-01-01 RX ADMIN — APIXABAN 2.5 MILLIGRAM(S): 2.5 TABLET, FILM COATED ORAL at 16:01

## 2022-01-01 RX ADMIN — CHLORHEXIDINE GLUCONATE 1 APPLICATION(S): 213 SOLUTION TOPICAL at 05:26

## 2022-01-01 RX ADMIN — Medication 25 MILLIGRAM(S): at 18:50

## 2022-01-01 RX ADMIN — TACROLIMUS 1 MILLIGRAM(S): 5 CAPSULE ORAL at 05:41

## 2022-01-01 RX ADMIN — APIXABAN 2.5 MILLIGRAM(S): 2.5 TABLET, FILM COATED ORAL at 05:12

## 2022-01-01 RX ADMIN — MEROPENEM 100 MILLIGRAM(S): 1 INJECTION INTRAVENOUS at 13:47

## 2022-01-01 RX ADMIN — TACROLIMUS 1.5 MILLIGRAM(S): 5 CAPSULE ORAL at 08:05

## 2022-01-01 RX ADMIN — Medication 1 TABLET(S): at 11:16

## 2022-01-01 RX ADMIN — OLANZAPINE 5 MILLIGRAM(S): 15 TABLET, FILM COATED ORAL at 14:59

## 2022-01-01 RX ADMIN — Medication 10 MILLIGRAM(S): at 05:39

## 2022-01-01 RX ADMIN — ATORVASTATIN CALCIUM 40 MILLIGRAM(S): 80 TABLET, FILM COATED ORAL at 21:32

## 2022-01-01 RX ADMIN — TACROLIMUS 1 MILLIGRAM(S): 5 CAPSULE ORAL at 05:29

## 2022-01-01 RX ADMIN — Medication 250 MICROGRAM(S): at 19:52

## 2022-01-01 RX ADMIN — FENTANYL CITRATE 50 MICROGRAM(S): 50 INJECTION INTRAVENOUS at 03:33

## 2022-01-01 RX ADMIN — Medication 650 MILLIGRAM(S): at 00:33

## 2022-01-01 RX ADMIN — PROPOFOL 3.8 MICROGRAM(S)/KG/MIN: 10 INJECTION, EMULSION INTRAVENOUS at 03:00

## 2022-01-01 RX ADMIN — SEVELAMER CARBONATE 1600 MILLIGRAM(S): 2400 POWDER, FOR SUSPENSION ORAL at 13:13

## 2022-01-01 RX ADMIN — Medication 1 TABLET(S): at 11:34

## 2022-01-01 RX ADMIN — Medication 10 MILLIGRAM(S): at 18:17

## 2022-01-01 RX ADMIN — CHLORHEXIDINE GLUCONATE 1 APPLICATION(S): 213 SOLUTION TOPICAL at 05:12

## 2022-01-01 RX ADMIN — Medication 50 MILLIGRAM(S): at 22:09

## 2022-01-01 RX ADMIN — Medication 650 MILLIGRAM(S): at 01:45

## 2022-01-01 RX ADMIN — Medication 650 MILLIGRAM(S): at 17:26

## 2022-01-01 RX ADMIN — Medication 1 TABLET(S): at 10:08

## 2022-01-01 RX ADMIN — HYDROMORPHONE HYDROCHLORIDE 0.5 MILLIGRAM(S): 2 INJECTION INTRAMUSCULAR; INTRAVENOUS; SUBCUTANEOUS at 15:02

## 2022-01-01 RX ADMIN — Medication 40 MILLIGRAM(S): at 21:12

## 2022-01-01 RX ADMIN — SACUBITRIL AND VALSARTAN 1 TABLET(S): 24; 26 TABLET, FILM COATED ORAL at 05:12

## 2022-01-01 RX ADMIN — CHLORHEXIDINE GLUCONATE 1 APPLICATION(S): 213 SOLUTION TOPICAL at 06:21

## 2022-01-01 RX ADMIN — FLUCONAZOLE 200 MILLIGRAM(S): 150 TABLET ORAL at 11:11

## 2022-01-01 RX ADMIN — HEPARIN SODIUM 5000 UNIT(S): 5000 INJECTION INTRAVENOUS; SUBCUTANEOUS at 22:34

## 2022-01-01 RX ADMIN — HYDROMORPHONE HYDROCHLORIDE 1 MILLIGRAM(S): 2 INJECTION INTRAMUSCULAR; INTRAVENOUS; SUBCUTANEOUS at 16:20

## 2022-01-01 RX ADMIN — PANTOPRAZOLE SODIUM 40 MILLIGRAM(S): 20 TABLET, DELAYED RELEASE ORAL at 06:31

## 2022-01-01 RX ADMIN — Medication 40 MILLIGRAM(S): at 14:38

## 2022-01-01 RX ADMIN — Medication 100 MILLIGRAM(S): at 15:45

## 2022-01-01 RX ADMIN — TAMSULOSIN HYDROCHLORIDE 0.4 MILLIGRAM(S): 0.4 CAPSULE ORAL at 22:11

## 2022-01-01 RX ADMIN — TACROLIMUS 1.5 MILLIGRAM(S): 5 CAPSULE ORAL at 09:02

## 2022-01-01 RX ADMIN — TAMSULOSIN HYDROCHLORIDE 0.4 MILLIGRAM(S): 0.4 CAPSULE ORAL at 21:27

## 2022-01-01 RX ADMIN — Medication 667 MILLIGRAM(S): at 17:42

## 2022-01-01 RX ADMIN — FENTANYL CITRATE 50 MICROGRAM(S): 50 INJECTION INTRAVENOUS at 04:05

## 2022-01-01 RX ADMIN — MYCOPHENOLATE MOFETIL 500 MILLIGRAM(S): 250 CAPSULE ORAL at 21:29

## 2022-01-01 RX ADMIN — MYCOPHENOLATE MOFETIL 500 MILLIGRAM(S): 250 CAPSULE ORAL at 08:57

## 2022-01-01 RX ADMIN — SEVELAMER CARBONATE 800 MILLIGRAM(S): 2400 POWDER, FOR SUSPENSION ORAL at 18:46

## 2022-01-01 RX ADMIN — SEVELAMER CARBONATE 800 MILLIGRAM(S): 2400 POWDER, FOR SUSPENSION ORAL at 09:04

## 2022-01-01 RX ADMIN — HEPARIN SODIUM 600 UNIT(S)/HR: 5000 INJECTION INTRAVENOUS; SUBCUTANEOUS at 19:24

## 2022-01-01 RX ADMIN — PIPERACILLIN AND TAZOBACTAM 25 GRAM(S): 4; .5 INJECTION, POWDER, LYOPHILIZED, FOR SOLUTION INTRAVENOUS at 22:11

## 2022-01-01 RX ADMIN — TACROLIMUS 1 MILLIGRAM(S): 5 CAPSULE ORAL at 22:36

## 2022-01-01 RX ADMIN — SEVELAMER CARBONATE 1600 MILLIGRAM(S): 2400 POWDER, FOR SUSPENSION ORAL at 13:05

## 2022-01-01 RX ADMIN — Medication 1 TABLET(S): at 09:03

## 2022-01-01 RX ADMIN — HYDROMORPHONE HYDROCHLORIDE 0.5 MILLIGRAM(S): 2 INJECTION INTRAMUSCULAR; INTRAVENOUS; SUBCUTANEOUS at 15:50

## 2022-01-01 RX ADMIN — Medication 106.4 MILLIGRAM(S): at 17:37

## 2022-01-01 RX ADMIN — SEVELAMER CARBONATE 1600 MILLIGRAM(S): 2400 POWDER, FOR SUSPENSION ORAL at 18:14

## 2022-01-01 RX ADMIN — HEPARIN SODIUM 5000 UNIT(S): 5000 INJECTION INTRAVENOUS; SUBCUTANEOUS at 06:22

## 2022-01-01 RX ADMIN — Medication 50 MILLILITER(S): at 14:42

## 2022-01-01 RX ADMIN — Medication 667 MILLIGRAM(S): at 18:15

## 2022-01-01 RX ADMIN — Medication 20 MILLIGRAM(S): at 05:26

## 2022-01-01 RX ADMIN — CHLORHEXIDINE GLUCONATE 1 APPLICATION(S): 213 SOLUTION TOPICAL at 06:32

## 2022-01-01 RX ADMIN — PIPERACILLIN AND TAZOBACTAM 200 GRAM(S): 4; .5 INJECTION, POWDER, LYOPHILIZED, FOR SOLUTION INTRAVENOUS at 14:46

## 2022-01-01 RX ADMIN — Medication 40 MILLIGRAM(S): at 22:30

## 2022-01-01 RX ADMIN — Medication 1 TABLET(S): at 12:40

## 2022-01-01 RX ADMIN — APIXABAN 2.5 MILLIGRAM(S): 2.5 TABLET, FILM COATED ORAL at 05:43

## 2022-01-01 RX ADMIN — Medication 106.4 MILLIGRAM(S): at 17:09

## 2022-01-01 RX ADMIN — Medication 40 MILLIGRAM(S): at 14:18

## 2022-01-01 RX ADMIN — CLOPIDOGREL BISULFATE 75 MILLIGRAM(S): 75 TABLET, FILM COATED ORAL at 11:16

## 2022-01-01 RX ADMIN — Medication 1 TABLET(S): at 11:10

## 2022-01-01 RX ADMIN — SACUBITRIL AND VALSARTAN 1 TABLET(S): 24; 26 TABLET, FILM COATED ORAL at 18:55

## 2022-01-01 RX ADMIN — Medication 650 MILLIGRAM(S): at 15:58

## 2022-01-01 RX ADMIN — PANTOPRAZOLE SODIUM 40 MILLIGRAM(S): 20 TABLET, DELAYED RELEASE ORAL at 05:54

## 2022-01-01 RX ADMIN — MEROPENEM 100 MILLIGRAM(S): 1 INJECTION INTRAVENOUS at 15:43

## 2022-01-01 RX ADMIN — Medication 200 GRAM(S): at 05:09

## 2022-01-01 RX ADMIN — MEROPENEM 1000 MILLIGRAM(S): 1 INJECTION INTRAVENOUS at 13:54

## 2022-01-01 RX ADMIN — Medication 40 MILLIGRAM(S): at 13:08

## 2022-01-01 RX ADMIN — MYCOPHENOLATE MOFETIL 500 MILLIGRAM(S): 250 CAPSULE ORAL at 17:56

## 2022-01-01 RX ADMIN — MYCOPHENOLATE MOFETIL 500 MILLIGRAM(S): 250 CAPSULE ORAL at 11:07

## 2022-01-01 RX ADMIN — Medication 0.5 MILLIGRAM(S): at 21:27

## 2022-01-01 RX ADMIN — HYDROMORPHONE HYDROCHLORIDE 0.25 MILLIGRAM(S): 2 INJECTION INTRAMUSCULAR; INTRAVENOUS; SUBCUTANEOUS at 16:49

## 2022-01-01 RX ADMIN — SEVELAMER CARBONATE 1600 MILLIGRAM(S): 2400 POWDER, FOR SUSPENSION ORAL at 08:00

## 2022-01-01 RX ADMIN — LACTULOSE 10 GRAM(S): 10 SOLUTION ORAL at 06:13

## 2022-01-01 RX ADMIN — Medication 10 MILLIGRAM(S): at 17:44

## 2022-01-01 RX ADMIN — SACUBITRIL AND VALSARTAN 1 TABLET(S): 24; 26 TABLET, FILM COATED ORAL at 07:19

## 2022-01-01 RX ADMIN — TIOTROPIUM BROMIDE 1 CAPSULE(S): 18 CAPSULE ORAL; RESPIRATORY (INHALATION) at 08:22

## 2022-01-01 RX ADMIN — APIXABAN 2.5 MILLIGRAM(S): 2.5 TABLET, FILM COATED ORAL at 18:16

## 2022-01-01 RX ADMIN — Medication 650 MILLIGRAM(S): at 08:31

## 2022-01-01 RX ADMIN — HYDROMORPHONE HYDROCHLORIDE 0.5 MILLIGRAM(S): 2 INJECTION INTRAMUSCULAR; INTRAVENOUS; SUBCUTANEOUS at 05:58

## 2022-01-01 RX ADMIN — Medication 10 MILLIGRAM(S): at 17:41

## 2022-01-01 RX ADMIN — Medication 1 TABLET(S): at 11:30

## 2022-01-01 RX ADMIN — PANTOPRAZOLE SODIUM 40 MILLIGRAM(S): 20 TABLET, DELAYED RELEASE ORAL at 16:55

## 2022-01-01 RX ADMIN — CLOPIDOGREL BISULFATE 75 MILLIGRAM(S): 75 TABLET, FILM COATED ORAL at 11:06

## 2022-01-01 RX ADMIN — ATORVASTATIN CALCIUM 80 MILLIGRAM(S): 80 TABLET, FILM COATED ORAL at 21:47

## 2022-01-01 RX ADMIN — Medication 81 MILLIGRAM(S): at 17:32

## 2022-01-01 RX ADMIN — Medication 1 TABLET(S): at 14:02

## 2022-01-01 RX ADMIN — ATORVASTATIN CALCIUM 80 MILLIGRAM(S): 80 TABLET, FILM COATED ORAL at 23:15

## 2022-01-01 RX ADMIN — Medication 25 MILLIGRAM(S): at 17:20

## 2022-01-01 RX ADMIN — APIXABAN 2.5 MILLIGRAM(S): 2.5 TABLET, FILM COATED ORAL at 18:09

## 2022-01-01 RX ADMIN — Medication 25 MILLIGRAM(S): at 05:54

## 2022-01-01 RX ADMIN — TACROLIMUS 1.5 MILLIGRAM(S): 5 CAPSULE ORAL at 11:40

## 2022-01-01 RX ADMIN — Medication 200 GRAM(S): at 05:21

## 2022-01-01 RX ADMIN — SEVELAMER CARBONATE 1600 MILLIGRAM(S): 2400 POWDER, FOR SUSPENSION ORAL at 13:30

## 2022-01-01 RX ADMIN — HEPARIN SODIUM 5000 UNIT(S): 5000 INJECTION INTRAVENOUS; SUBCUTANEOUS at 06:15

## 2022-01-01 RX ADMIN — AMPHOTERICIN B 125 MILLIGRAM(S): 50 INJECTION, POWDER, LYOPHILIZED, FOR SOLUTION INTRAVENOUS at 02:00

## 2022-01-01 RX ADMIN — TACROLIMUS 1.5 MILLIGRAM(S): 5 CAPSULE ORAL at 18:34

## 2022-01-01 RX ADMIN — MEROPENEM 1000 MILLIGRAM(S): 1 INJECTION INTRAVENOUS at 14:07

## 2022-01-01 RX ADMIN — Medication 650 MILLIGRAM(S): at 17:35

## 2022-01-01 RX ADMIN — Medication 250 MICROGRAM(S): at 05:22

## 2022-01-01 RX ADMIN — TACROLIMUS 1.5 MILLIGRAM(S): 5 CAPSULE ORAL at 10:50

## 2022-01-01 RX ADMIN — Medication 100 MILLIGRAM(S): at 01:54

## 2022-01-01 RX ADMIN — CEFTRIAXONE 2000 MILLIGRAM(S): 500 INJECTION, POWDER, FOR SOLUTION INTRAMUSCULAR; INTRAVENOUS at 21:52

## 2022-01-01 RX ADMIN — Medication 52.5 MILLIGRAM(S): at 04:45

## 2022-01-01 RX ADMIN — PIPERACILLIN AND TAZOBACTAM 3.38 GRAM(S): 4; .5 INJECTION, POWDER, LYOPHILIZED, FOR SOLUTION INTRAVENOUS at 18:00

## 2022-01-01 RX ADMIN — Medication 650 MILLIGRAM(S): at 23:43

## 2022-01-01 RX ADMIN — FENTANYL CITRATE 25 MICROGRAM(S): 50 INJECTION INTRAVENOUS at 23:42

## 2022-01-01 RX ADMIN — MYCOPHENOLATE MOFETIL 500 MILLIGRAM(S): 250 CAPSULE ORAL at 18:03

## 2022-01-01 RX ADMIN — FLUCONAZOLE 200 MILLIGRAM(S): 150 TABLET ORAL at 09:20

## 2022-01-01 RX ADMIN — MEROPENEM 100 MILLIGRAM(S): 1 INJECTION INTRAVENOUS at 13:32

## 2022-01-01 RX ADMIN — Medication 200 GRAM(S): at 07:30

## 2022-01-01 RX ADMIN — TACROLIMUS 1 MILLIGRAM(S): 5 CAPSULE ORAL at 17:59

## 2022-01-01 RX ADMIN — Medication 52.5 MILLIGRAM(S): at 20:31

## 2022-01-01 RX ADMIN — ATORVASTATIN CALCIUM 40 MILLIGRAM(S): 80 TABLET, FILM COATED ORAL at 22:30

## 2022-01-01 RX ADMIN — TAMSULOSIN HYDROCHLORIDE 0.4 MILLIGRAM(S): 0.4 CAPSULE ORAL at 21:44

## 2022-01-01 RX ADMIN — PANTOPRAZOLE SODIUM 40 MILLIGRAM(S): 20 TABLET, DELAYED RELEASE ORAL at 06:11

## 2022-01-01 RX ADMIN — TAMSULOSIN HYDROCHLORIDE 0.4 MILLIGRAM(S): 0.4 CAPSULE ORAL at 21:08

## 2022-01-01 RX ADMIN — TACROLIMUS 1.5 MILLIGRAM(S): 5 CAPSULE ORAL at 13:22

## 2022-01-01 RX ADMIN — CLOPIDOGREL BISULFATE 75 MILLIGRAM(S): 75 TABLET, FILM COATED ORAL at 11:14

## 2022-01-01 RX ADMIN — APIXABAN 2.5 MILLIGRAM(S): 2.5 TABLET, FILM COATED ORAL at 18:11

## 2022-01-01 RX ADMIN — TAMSULOSIN HYDROCHLORIDE 0.4 MILLIGRAM(S): 0.4 CAPSULE ORAL at 21:38

## 2022-01-01 RX ADMIN — Medication 100 MILLIGRAM(S): at 05:43

## 2022-01-01 RX ADMIN — SODIUM ZIRCONIUM CYCLOSILICATE 10 GRAM(S): 10 POWDER, FOR SUSPENSION ORAL at 11:26

## 2022-01-01 RX ADMIN — AMPHOTERICIN B 125 MILLIGRAM(S): 50 INJECTION, POWDER, LYOPHILIZED, FOR SOLUTION INTRAVENOUS at 15:46

## 2022-01-01 RX ADMIN — MEROPENEM 1000 MILLIGRAM(S): 1 INJECTION INTRAVENOUS at 11:44

## 2022-01-01 RX ADMIN — Medication 25 MILLIGRAM(S): at 05:19

## 2022-01-01 RX ADMIN — Medication 106.4 MILLIGRAM(S): at 16:58

## 2022-01-01 RX ADMIN — Medication 25 MILLIGRAM(S): at 17:50

## 2022-01-01 RX ADMIN — SEVELAMER CARBONATE 1600 MILLIGRAM(S): 2400 POWDER, FOR SUSPENSION ORAL at 11:34

## 2022-01-01 RX ADMIN — Medication 1 TABLET(S): at 11:57

## 2022-01-01 RX ADMIN — LACTULOSE 10 GRAM(S): 10 SOLUTION ORAL at 13:12

## 2022-01-01 RX ADMIN — Medication 107 MILLIGRAM(S): at 14:01

## 2022-01-01 RX ADMIN — ALBUTEROL 2.5 MILLIGRAM(S): 90 AEROSOL, METERED ORAL at 05:42

## 2022-01-01 RX ADMIN — HYDROMORPHONE HYDROCHLORIDE 0.5 MILLIGRAM(S): 2 INJECTION INTRAMUSCULAR; INTRAVENOUS; SUBCUTANEOUS at 23:56

## 2022-01-01 RX ADMIN — Medication 10 MILLIGRAM(S): at 17:36

## 2022-01-01 RX ADMIN — TACROLIMUS 1 MILLIGRAM(S): 5 CAPSULE ORAL at 16:57

## 2022-01-01 RX ADMIN — Medication 80 MILLIGRAM(S): at 18:00

## 2022-01-01 RX ADMIN — MYCOPHENOLATE MOFETIL 500 MILLIGRAM(S): 250 CAPSULE ORAL at 19:08

## 2022-01-01 RX ADMIN — MYCOPHENOLATE MOFETIL 500 MILLIGRAM(S): 250 CAPSULE ORAL at 10:50

## 2022-01-01 RX ADMIN — Medication 50 MILLILITER(S): at 10:10

## 2022-01-01 RX ADMIN — Medication 1 TABLET(S): at 08:58

## 2022-01-01 RX ADMIN — MYCOPHENOLATE MOFETIL 500 MILLIGRAM(S): 250 CAPSULE ORAL at 18:16

## 2022-01-01 RX ADMIN — ATORVASTATIN CALCIUM 80 MILLIGRAM(S): 80 TABLET, FILM COATED ORAL at 21:55

## 2022-01-01 RX ADMIN — TACROLIMUS 0.5 MILLIGRAM(S): 5 CAPSULE ORAL at 12:07

## 2022-01-01 RX ADMIN — HEPARIN SODIUM 900 UNIT(S)/HR: 5000 INJECTION INTRAVENOUS; SUBCUTANEOUS at 13:01

## 2022-01-01 RX ADMIN — Medication 250 MILLIGRAM(S): at 17:05

## 2022-01-01 RX ADMIN — Medication 10 MILLIGRAM(S): at 06:24

## 2022-01-01 RX ADMIN — Medication 650 MILLIGRAM(S): at 22:08

## 2022-01-01 RX ADMIN — CHLORHEXIDINE GLUCONATE 1 APPLICATION(S): 213 SOLUTION TOPICAL at 07:08

## 2022-01-01 RX ADMIN — Medication 25 MILLIGRAM(S): at 18:10

## 2022-01-01 RX ADMIN — SEVELAMER CARBONATE 800 MILLIGRAM(S): 2400 POWDER, FOR SUSPENSION ORAL at 17:12

## 2022-01-01 RX ADMIN — TIOTROPIUM BROMIDE 1 CAPSULE(S): 18 CAPSULE ORAL; RESPIRATORY (INHALATION) at 09:36

## 2022-01-01 RX ADMIN — HEPARIN SODIUM 1100 UNIT(S)/HR: 5000 INJECTION INTRAVENOUS; SUBCUTANEOUS at 17:51

## 2022-01-01 RX ADMIN — TACROLIMUS 1 MILLIGRAM(S): 5 CAPSULE ORAL at 21:38

## 2022-01-01 RX ADMIN — Medication 106.4 MILLIGRAM(S): at 17:42

## 2022-01-01 RX ADMIN — MYCOPHENOLATE MOFETIL 500 MILLIGRAM(S): 250 CAPSULE ORAL at 05:26

## 2022-01-01 RX ADMIN — Medication 200 GRAM(S): at 09:02

## 2022-01-01 RX ADMIN — TACROLIMUS 0.5 MILLIGRAM(S): 5 CAPSULE ORAL at 05:32

## 2022-01-01 RX ADMIN — Medication 10 MILLIGRAM(S): at 17:20

## 2022-01-01 RX ADMIN — Medication 100 MILLIGRAM(S): at 18:09

## 2022-01-01 RX ADMIN — HEPARIN SODIUM 5000 UNIT(S): 5000 INJECTION INTRAVENOUS; SUBCUTANEOUS at 14:02

## 2022-01-01 RX ADMIN — SEVELAMER CARBONATE 1600 MILLIGRAM(S): 2400 POWDER, FOR SUSPENSION ORAL at 17:58

## 2022-01-01 RX ADMIN — TACROLIMUS 1.5 MILLIGRAM(S): 5 CAPSULE ORAL at 18:39

## 2022-01-01 RX ADMIN — Medication 25 MILLIGRAM(S): at 05:22

## 2022-01-01 RX ADMIN — APIXABAN 2.5 MILLIGRAM(S): 2.5 TABLET, FILM COATED ORAL at 20:40

## 2022-01-01 RX ADMIN — CLOPIDOGREL BISULFATE 75 MILLIGRAM(S): 75 TABLET, FILM COATED ORAL at 11:30

## 2022-01-01 RX ADMIN — Medication 25 MILLIGRAM(S): at 05:56

## 2022-01-01 RX ADMIN — PANTOPRAZOLE SODIUM 40 MILLIGRAM(S): 20 TABLET, DELAYED RELEASE ORAL at 11:43

## 2022-01-01 RX ADMIN — Medication 25 MILLIGRAM(S): at 06:56

## 2022-01-01 RX ADMIN — PANTOPRAZOLE SODIUM 40 MILLIGRAM(S): 20 TABLET, DELAYED RELEASE ORAL at 13:54

## 2022-01-01 RX ADMIN — HEPARIN SODIUM 600 UNIT(S)/HR: 5000 INJECTION INTRAVENOUS; SUBCUTANEOUS at 23:35

## 2022-01-01 RX ADMIN — ERYTHROPOIETIN 4000 UNIT(S): 10000 INJECTION, SOLUTION INTRAVENOUS; SUBCUTANEOUS at 11:18

## 2022-01-01 RX ADMIN — LACTULOSE 10 GRAM(S): 10 SOLUTION ORAL at 18:34

## 2022-01-01 RX ADMIN — Medication 25 MILLIGRAM(S): at 00:24

## 2022-01-01 RX ADMIN — Medication 100 MILLIEQUIVALENT(S): at 12:22

## 2022-01-01 RX ADMIN — APIXABAN 2.5 MILLIGRAM(S): 2.5 TABLET, FILM COATED ORAL at 18:46

## 2022-01-01 RX ADMIN — Medication 650 MILLIGRAM(S): at 00:25

## 2022-01-01 RX ADMIN — TACROLIMUS 1.5 MILLIGRAM(S): 5 CAPSULE ORAL at 08:57

## 2022-01-01 RX ADMIN — HEPARIN SODIUM 5000 UNIT(S): 5000 INJECTION INTRAVENOUS; SUBCUTANEOUS at 06:28

## 2022-01-01 RX ADMIN — CHLORHEXIDINE GLUCONATE 1 APPLICATION(S): 213 SOLUTION TOPICAL at 06:00

## 2022-01-01 RX ADMIN — HYDROMORPHONE HYDROCHLORIDE 0.5 MILLIGRAM(S): 2 INJECTION INTRAMUSCULAR; INTRAVENOUS; SUBCUTANEOUS at 21:56

## 2022-01-01 RX ADMIN — Medication 129 GRAM(S): at 20:03

## 2022-01-01 RX ADMIN — SEVELAMER CARBONATE 800 MILLIGRAM(S): 2400 POWDER, FOR SUSPENSION ORAL at 09:03

## 2022-01-01 RX ADMIN — Medication 40 MILLIGRAM(S): at 06:26

## 2022-01-01 RX ADMIN — CEFTRIAXONE 2000 MILLIGRAM(S): 500 INJECTION, POWDER, FOR SOLUTION INTRAMUSCULAR; INTRAVENOUS at 22:11

## 2022-01-01 RX ADMIN — CEFTRIAXONE 2000 MILLIGRAM(S): 500 INJECTION, POWDER, FOR SOLUTION INTRAMUSCULAR; INTRAVENOUS at 18:20

## 2022-01-01 RX ADMIN — TACROLIMUS 0.5 MILLIGRAM(S): 5 CAPSULE ORAL at 11:02

## 2022-01-01 RX ADMIN — LACTULOSE 10 GRAM(S): 10 SOLUTION ORAL at 00:00

## 2022-01-01 RX ADMIN — LACTULOSE 10 GRAM(S): 10 SOLUTION ORAL at 05:11

## 2022-01-01 RX ADMIN — CLOPIDOGREL BISULFATE 75 MILLIGRAM(S): 75 TABLET, FILM COATED ORAL at 13:27

## 2022-01-01 RX ADMIN — CHLORHEXIDINE GLUCONATE 1 APPLICATION(S): 213 SOLUTION TOPICAL at 05:55

## 2022-01-01 RX ADMIN — SEVELAMER CARBONATE 1600 MILLIGRAM(S): 2400 POWDER, FOR SUSPENSION ORAL at 08:05

## 2022-01-01 RX ADMIN — HYDROMORPHONE HYDROCHLORIDE 0.25 MILLIGRAM(S): 2 INJECTION INTRAMUSCULAR; INTRAVENOUS; SUBCUTANEOUS at 23:24

## 2022-01-01 RX ADMIN — Medication 250 MILLIGRAM(S): at 11:34

## 2022-01-01 RX ADMIN — APIXABAN 2.5 MILLIGRAM(S): 2.5 TABLET, FILM COATED ORAL at 06:31

## 2022-01-01 RX ADMIN — PANTOPRAZOLE SODIUM 40 MILLIGRAM(S): 20 TABLET, DELAYED RELEASE ORAL at 13:41

## 2022-01-01 RX ADMIN — Medication 250 MILLIGRAM(S): at 14:22

## 2022-01-01 RX ADMIN — TRAMADOL HYDROCHLORIDE 25 MILLIGRAM(S): 50 TABLET ORAL at 01:54

## 2022-01-01 RX ADMIN — Medication 10 MILLIGRAM(S): at 17:54

## 2022-01-01 RX ADMIN — Medication 107 MILLIGRAM(S): at 14:59

## 2022-01-01 RX ADMIN — Medication 1 MILLIGRAM(S): at 00:34

## 2022-01-01 RX ADMIN — Medication 100 MILLIGRAM(S): at 05:27

## 2022-01-01 RX ADMIN — TACROLIMUS 1.5 MILLIGRAM(S): 5 CAPSULE ORAL at 09:28

## 2022-01-01 RX ADMIN — Medication 50 MILLIGRAM(S): at 16:04

## 2022-01-01 RX ADMIN — ATORVASTATIN CALCIUM 80 MILLIGRAM(S): 80 TABLET, FILM COATED ORAL at 00:13

## 2022-01-01 RX ADMIN — Medication 10 MILLIGRAM(S): at 05:32

## 2022-01-01 RX ADMIN — ATORVASTATIN CALCIUM 80 MILLIGRAM(S): 80 TABLET, FILM COATED ORAL at 22:11

## 2022-01-01 RX ADMIN — MYCOPHENOLATE MOFETIL 500 MILLIGRAM(S): 250 CAPSULE ORAL at 22:35

## 2022-01-01 RX ADMIN — Medication 1 TABLET(S): at 09:40

## 2022-01-01 RX ADMIN — MYCOPHENOLATE MOFETIL 500 MILLIGRAM(S): 250 CAPSULE ORAL at 17:42

## 2022-01-01 RX ADMIN — Medication 25 MILLIGRAM(S): at 07:09

## 2022-01-01 RX ADMIN — SACUBITRIL AND VALSARTAN 1 TABLET(S): 24; 26 TABLET, FILM COATED ORAL at 06:14

## 2022-01-01 RX ADMIN — TACROLIMUS 1 MILLIGRAM(S): 5 CAPSULE ORAL at 22:13

## 2022-01-01 RX ADMIN — MYCOPHENOLATE MOFETIL 500 MILLIGRAM(S): 250 CAPSULE ORAL at 18:19

## 2022-01-01 RX ADMIN — LACTULOSE 10 GRAM(S): 10 SOLUTION ORAL at 23:11

## 2022-01-01 RX ADMIN — APIXABAN 2.5 MILLIGRAM(S): 2.5 TABLET, FILM COATED ORAL at 05:10

## 2022-01-01 RX ADMIN — TACROLIMUS 1.5 MILLIGRAM(S): 5 CAPSULE ORAL at 11:35

## 2022-01-01 RX ADMIN — HEPARIN SODIUM 0 UNIT(S)/HR: 5000 INJECTION INTRAVENOUS; SUBCUTANEOUS at 03:14

## 2022-01-01 RX ADMIN — HYDROMORPHONE HYDROCHLORIDE 1 MILLIGRAM(S): 2 INJECTION INTRAMUSCULAR; INTRAVENOUS; SUBCUTANEOUS at 08:58

## 2022-01-01 RX ADMIN — CHLORHEXIDINE GLUCONATE 1 APPLICATION(S): 213 SOLUTION TOPICAL at 06:23

## 2022-01-01 RX ADMIN — Medication 107 MILLIGRAM(S): at 16:26

## 2022-01-01 RX ADMIN — CHLORHEXIDINE GLUCONATE 1 APPLICATION(S): 213 SOLUTION TOPICAL at 07:09

## 2022-01-01 RX ADMIN — Medication 12.5 MILLIGRAM(S): at 05:10

## 2022-01-01 RX ADMIN — MEROPENEM 1000 MILLIGRAM(S): 1 INJECTION INTRAVENOUS at 11:34

## 2022-01-01 RX ADMIN — SODIUM CHLORIDE 250 MILLILITER(S): 9 INJECTION INTRAMUSCULAR; INTRAVENOUS; SUBCUTANEOUS at 18:33

## 2022-01-01 RX ADMIN — Medication 3 MILLIGRAM(S): at 23:45

## 2022-01-01 RX ADMIN — TACROLIMUS 1 MILLIGRAM(S): 5 CAPSULE ORAL at 21:46

## 2022-01-01 RX ADMIN — HYDROMORPHONE HYDROCHLORIDE 1 MILLIGRAM(S): 2 INJECTION INTRAMUSCULAR; INTRAVENOUS; SUBCUTANEOUS at 17:56

## 2022-01-01 RX ADMIN — HYDROMORPHONE HYDROCHLORIDE 1 MILLIGRAM(S): 2 INJECTION INTRAMUSCULAR; INTRAVENOUS; SUBCUTANEOUS at 18:08

## 2022-01-01 RX ADMIN — CLOPIDOGREL BISULFATE 75 MILLIGRAM(S): 75 TABLET, FILM COATED ORAL at 10:08

## 2022-01-01 RX ADMIN — HYDROMORPHONE HYDROCHLORIDE 0.5 MILLIGRAM(S): 2 INJECTION INTRAMUSCULAR; INTRAVENOUS; SUBCUTANEOUS at 05:17

## 2022-01-01 RX ADMIN — PANTOPRAZOLE SODIUM 40 MILLIGRAM(S): 20 TABLET, DELAYED RELEASE ORAL at 10:21

## 2022-01-01 RX ADMIN — SODIUM CHLORIDE 40 MILLILITER(S): 9 INJECTION, SOLUTION INTRAVENOUS at 17:25

## 2022-01-01 RX ADMIN — TACROLIMUS 1.5 MILLIGRAM(S): 5 CAPSULE ORAL at 09:00

## 2022-01-01 RX ADMIN — Medication 12.5 MILLIGRAM(S): at 06:25

## 2022-01-01 RX ADMIN — Medication 10 MILLIGRAM(S): at 06:15

## 2022-01-01 RX ADMIN — FLUCONAZOLE 400 MILLIGRAM(S): 150 TABLET ORAL at 16:57

## 2022-01-01 RX ADMIN — TAMSULOSIN HYDROCHLORIDE 0.4 MILLIGRAM(S): 0.4 CAPSULE ORAL at 23:15

## 2022-01-01 RX ADMIN — CHLORHEXIDINE GLUCONATE 15 MILLILITER(S): 213 SOLUTION TOPICAL at 05:22

## 2022-01-01 RX ADMIN — PIPERACILLIN AND TAZOBACTAM 25 GRAM(S): 4; .5 INJECTION, POWDER, LYOPHILIZED, FOR SOLUTION INTRAVENOUS at 07:09

## 2022-01-01 RX ADMIN — MYCOPHENOLATE MOFETIL 500 MILLIGRAM(S): 250 CAPSULE ORAL at 00:13

## 2022-01-01 RX ADMIN — Medication 200 GRAM(S): at 17:43

## 2022-01-01 RX ADMIN — MYCOPHENOLATE MOFETIL 500 MILLIGRAM(S): 250 CAPSULE ORAL at 10:55

## 2022-01-01 RX ADMIN — Medication 667 MILLIGRAM(S): at 12:14

## 2022-01-01 RX ADMIN — HEPARIN SODIUM 900 UNIT(S)/HR: 5000 INJECTION INTRAVENOUS; SUBCUTANEOUS at 02:27

## 2022-01-01 RX ADMIN — LACTULOSE 10 GRAM(S): 10 SOLUTION ORAL at 06:14

## 2022-01-01 RX ADMIN — MEROPENEM 1000 MILLIGRAM(S): 1 INJECTION INTRAVENOUS at 11:10

## 2022-01-01 RX ADMIN — CLOPIDOGREL BISULFATE 75 MILLIGRAM(S): 75 TABLET, FILM COATED ORAL at 11:25

## 2022-01-01 RX ADMIN — SEVELAMER CARBONATE 1600 MILLIGRAM(S): 2400 POWDER, FOR SUSPENSION ORAL at 09:31

## 2022-01-01 RX ADMIN — Medication 10 MILLIGRAM(S): at 06:23

## 2022-01-01 RX ADMIN — HEPARIN SODIUM 0 UNIT(S)/HR: 5000 INJECTION INTRAVENOUS; SUBCUTANEOUS at 10:09

## 2022-01-01 RX ADMIN — Medication 200 GRAM(S): at 21:40

## 2022-01-01 RX ADMIN — Medication 0.5 MILLIGRAM(S): at 11:14

## 2022-01-01 RX ADMIN — Medication 12.5 MILLIGRAM(S): at 06:15

## 2022-01-01 RX ADMIN — HEPARIN SODIUM 0 UNIT(S)/HR: 5000 INJECTION INTRAVENOUS; SUBCUTANEOUS at 18:32

## 2022-01-01 RX ADMIN — FENTANYL CITRATE 50 MICROGRAM(S): 50 INJECTION INTRAVENOUS at 17:26

## 2022-01-01 RX ADMIN — PANTOPRAZOLE SODIUM 40 MILLIGRAM(S): 20 TABLET, DELAYED RELEASE ORAL at 14:38

## 2022-01-01 RX ADMIN — TACROLIMUS 0.5 MILLIGRAM(S): 5 CAPSULE ORAL at 11:14

## 2022-01-01 RX ADMIN — CHLORHEXIDINE GLUCONATE 1 APPLICATION(S): 213 SOLUTION TOPICAL at 05:41

## 2022-01-01 RX ADMIN — CLOPIDOGREL BISULFATE 75 MILLIGRAM(S): 75 TABLET, FILM COATED ORAL at 11:57

## 2022-01-01 RX ADMIN — CHLORHEXIDINE GLUCONATE 1 APPLICATION(S): 213 SOLUTION TOPICAL at 05:57

## 2022-01-01 RX ADMIN — HYDROMORPHONE HYDROCHLORIDE 1 MILLIGRAM(S): 2 INJECTION INTRAMUSCULAR; INTRAVENOUS; SUBCUTANEOUS at 21:25

## 2022-01-01 RX ADMIN — PANTOPRAZOLE SODIUM 40 MILLIGRAM(S): 20 TABLET, DELAYED RELEASE ORAL at 06:25

## 2022-01-01 RX ADMIN — TACROLIMUS 1 MILLIGRAM(S): 5 CAPSULE ORAL at 21:44

## 2022-01-01 RX ADMIN — TAMSULOSIN HYDROCHLORIDE 0.4 MILLIGRAM(S): 0.4 CAPSULE ORAL at 21:32

## 2022-01-01 RX ADMIN — Medication 52.5 MILLIGRAM(S): at 12:25

## 2022-01-01 RX ADMIN — PANTOPRAZOLE SODIUM 40 MILLIGRAM(S): 20 TABLET, DELAYED RELEASE ORAL at 05:42

## 2022-01-01 RX ADMIN — Medication 200 GRAM(S): at 09:24

## 2022-01-01 RX ADMIN — REMDESIVIR 500 MILLIGRAM(S): 5 INJECTION INTRAVENOUS at 18:08

## 2022-01-01 RX ADMIN — Medication 106.4 MILLIGRAM(S): at 16:53

## 2022-01-01 RX ADMIN — CHLORHEXIDINE GLUCONATE 1 APPLICATION(S): 213 SOLUTION TOPICAL at 05:37

## 2022-01-01 RX ADMIN — MYCOPHENOLATE MOFETIL 500 MILLIGRAM(S): 250 CAPSULE ORAL at 21:08

## 2022-01-01 RX ADMIN — Medication 100 MILLIGRAM(S): at 05:26

## 2022-01-01 RX ADMIN — TACROLIMUS 1.5 MILLIGRAM(S): 5 CAPSULE ORAL at 09:40

## 2022-01-01 RX ADMIN — TACROLIMUS 1 MILLIGRAM(S): 5 CAPSULE ORAL at 21:39

## 2022-01-01 RX ADMIN — Medication 1 TABLET(S): at 11:40

## 2022-01-01 RX ADMIN — SEVELAMER CARBONATE 800 MILLIGRAM(S): 2400 POWDER, FOR SUSPENSION ORAL at 12:25

## 2022-01-01 RX ADMIN — SEVELAMER CARBONATE 1600 MILLIGRAM(S): 2400 POWDER, FOR SUSPENSION ORAL at 13:08

## 2022-01-01 RX ADMIN — SODIUM ZIRCONIUM CYCLOSILICATE 10 GRAM(S): 10 POWDER, FOR SUSPENSION ORAL at 05:50

## 2022-01-01 RX ADMIN — Medication 25 MILLIGRAM(S): at 12:33

## 2022-01-01 RX ADMIN — APIXABAN 2.5 MILLIGRAM(S): 2.5 TABLET, FILM COATED ORAL at 06:26

## 2022-01-01 RX ADMIN — MEROPENEM 1000 MILLIGRAM(S): 1 INJECTION INTRAVENOUS at 05:27

## 2022-01-01 RX ADMIN — Medication 100 MILLIGRAM(S): at 17:31

## 2022-01-01 RX ADMIN — MYCOPHENOLATE MOFETIL 250 MILLIGRAM(S): 250 CAPSULE ORAL at 05:29

## 2022-01-01 RX ADMIN — Medication 10 MILLIGRAM(S): at 17:10

## 2022-01-01 RX ADMIN — SACUBITRIL AND VALSARTAN 1 TABLET(S): 24; 26 TABLET, FILM COATED ORAL at 18:11

## 2022-01-01 RX ADMIN — PIPERACILLIN AND TAZOBACTAM 25 GRAM(S): 4; .5 INJECTION, POWDER, LYOPHILIZED, FOR SOLUTION INTRAVENOUS at 06:15

## 2022-01-01 RX ADMIN — HEPARIN SODIUM 5000 UNIT(S): 5000 INJECTION INTRAVENOUS; SUBCUTANEOUS at 15:43

## 2022-01-01 RX ADMIN — Medication 50 MILLIGRAM(S): at 04:10

## 2022-01-01 RX ADMIN — APIXABAN 2.5 MILLIGRAM(S): 2.5 TABLET, FILM COATED ORAL at 17:29

## 2022-01-01 RX ADMIN — PANTOPRAZOLE SODIUM 40 MILLIGRAM(S): 20 TABLET, DELAYED RELEASE ORAL at 06:08

## 2022-01-01 RX ADMIN — Medication 0.5 MILLIGRAM(S): at 21:28

## 2022-01-01 RX ADMIN — Medication 10 MILLIGRAM(S): at 17:06

## 2022-01-01 RX ADMIN — FENTANYL CITRATE 25 MICROGRAM(S): 50 INJECTION INTRAVENOUS at 03:00

## 2022-01-01 RX ADMIN — LACTULOSE 10 GRAM(S): 10 SOLUTION ORAL at 05:12

## 2022-01-01 RX ADMIN — ATORVASTATIN CALCIUM 80 MILLIGRAM(S): 80 TABLET, FILM COATED ORAL at 21:28

## 2022-01-01 RX ADMIN — TACROLIMUS 1 MILLIGRAM(S): 5 CAPSULE ORAL at 21:47

## 2022-01-01 RX ADMIN — HYDROMORPHONE HYDROCHLORIDE 1 MILLIGRAM(S): 2 INJECTION INTRAMUSCULAR; INTRAVENOUS; SUBCUTANEOUS at 08:19

## 2022-01-01 RX ADMIN — Medication 25 MILLIGRAM(S): at 23:14

## 2022-01-01 RX ADMIN — Medication 100 MILLIGRAM(S): at 18:16

## 2022-01-01 RX ADMIN — CHLORHEXIDINE GLUCONATE 1 APPLICATION(S): 213 SOLUTION TOPICAL at 05:43

## 2022-01-01 RX ADMIN — Medication 40 MILLIGRAM(S): at 14:35

## 2022-01-01 RX ADMIN — Medication 25 MILLIGRAM(S): at 17:35

## 2022-01-01 RX ADMIN — HYDROMORPHONE HYDROCHLORIDE 1 MILLIGRAM(S): 2 INJECTION INTRAMUSCULAR; INTRAVENOUS; SUBCUTANEOUS at 15:49

## 2022-01-01 RX ADMIN — TAMSULOSIN HYDROCHLORIDE 0.4 MILLIGRAM(S): 0.4 CAPSULE ORAL at 21:30

## 2022-01-01 RX ADMIN — CEFTRIAXONE 2000 MILLIGRAM(S): 500 INJECTION, POWDER, FOR SOLUTION INTRAMUSCULAR; INTRAVENOUS at 05:10

## 2022-01-01 RX ADMIN — CEFTRIAXONE 2000 MILLIGRAM(S): 500 INJECTION, POWDER, FOR SOLUTION INTRAMUSCULAR; INTRAVENOUS at 09:08

## 2022-01-01 RX ADMIN — HYDROMORPHONE HYDROCHLORIDE 1 MILLIGRAM(S): 2 INJECTION INTRAMUSCULAR; INTRAVENOUS; SUBCUTANEOUS at 17:47

## 2022-01-01 RX ADMIN — HYDROMORPHONE HYDROCHLORIDE 0.5 MILLIGRAM(S): 2 INJECTION INTRAMUSCULAR; INTRAVENOUS; SUBCUTANEOUS at 01:30

## 2022-01-01 RX ADMIN — Medication 5 MILLIGRAM(S): at 10:43

## 2022-01-01 RX ADMIN — Medication 10 MILLIGRAM(S): at 19:12

## 2022-01-05 LAB — PSA SERPL-MCNC: 1.39 NG/ML

## 2022-01-07 ENCOUNTER — APPOINTMENT (OUTPATIENT)
Dept: PULMONOLOGY | Facility: CLINIC | Age: 72
End: 2022-01-07
Payer: MEDICARE

## 2022-01-07 VITALS
RESPIRATION RATE: 16 BRPM | SYSTOLIC BLOOD PRESSURE: 120 MMHG | DIASTOLIC BLOOD PRESSURE: 70 MMHG | OXYGEN SATURATION: 95 % | HEART RATE: 93 BPM

## 2022-01-07 VITALS — WEIGHT: 140 LBS | BODY MASS INDEX: 25.76 KG/M2 | HEIGHT: 62 IN

## 2022-01-07 DIAGNOSIS — R09.89 OTHER SPECIFIED SYMPTOMS AND SIGNS INVOLVING THE CIRCULATORY AND RESPIRATORY SYSTEMS: ICD-10-CM

## 2022-01-07 PROCEDURE — 99215 OFFICE O/P EST HI 40 MIN: CPT

## 2022-02-07 NOTE — PHYSICAL EXAM
[Normal Rate and Rhythm] : normal rate and rhythm [Right Carotid Bruit] : right carotid bruit heard [2+] : left 2+ [Ankle Swelling (On Exam)] : present [Ankle Swelling Bilaterally] : bilaterally  [Ankle Swelling On The Right] : mild [Varicose Veins Of Lower Extremities] : not present [] : not present [Abdomen Tenderness] : ~T ~M No abdominal tenderness [No Rash or Lesion] : No rash or lesion [Alert] : alert [Oriented to Person] : oriented to person [Oriented to Place] : oriented to place [Oriented to Time] : oriented to time [Calm] : calm [de-identified] : NAD [de-identified] : R IJ permacath in place, dressing [de-identified] : supple, no masses [de-identified] : unlabored breathing [FreeTextEntry1] : LUE AVF, palpable thrill and thickened vein wall\par +ecchymosis adjacent to AVF [de-identified] : Not evaluated  [de-identified] : Not evaluated  [de-identified] : FROM of all 4 extremities, sensation intact \par

## 2022-02-07 NOTE — ASSESSMENT
[FreeTextEntry1] : 72 yo M with history of lung transplant on immunosuppressive medications, HTN, lumbar spondylosis, hypercholesterolemia, COPD, BPH, ESRD on HD via tunneled dialysis catheter presenting for evaluation and creation of long term HD access.Now s/p LUE AVF fistula creation (11/10) being accessed successfully.\par Pt also has >90% stenosis of L ICA, which is asymptomatic.  [Medication Management] : medication management [Carotid Endarterectomy] : carotid endarterectomy

## 2022-02-07 NOTE — HISTORY OF PRESENT ILLNESS
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 Pt is right hand dominant. Denies complaints of chest pain, SOB, ZAPATA, claudication, leg wounds or leg edema at this time.  Patient has been undergoing HD for 1.5 years with catheter, due to pandemic, he was unable to obtain surgery for AVF/ AVG creation, recently rescheduled. Now s/p LUE AVF creation (11/10). \par  [de-identified] : 70 yo M with a hx of ESRD (HD via R IJ TDC), now s/p LUE AVF creation.\par He underwent 4 successful consecutive cannulations of LUE AVF this past week and presents for tunneled catheter removal today.\par He is scheduled for L TCAR for carotid stenosis at the end of the month.\par He stopped his Plavix due to excessive bruising during HD cannulation.

## 2022-02-07 NOTE — PROCEDURE
[FreeTextEntry1] : After obtaining informed consent, I utilized a sterile prep, and 1% lido anesthesia. The cuff was bluntly freed from SQ space and the catheter was removed intact without complication. Pressure to surgical site for 5 minutes, dry occlusive dressing was placed. Pt was discharged without complication. Remove dressing in 48 hours, then ok to shower. \par

## 2022-02-08 PROBLEM — E83.39 HYPERPHOSPHATEMIA: Status: ACTIVE | Noted: 2022-01-01

## 2022-02-09 NOTE — H&P PST ADULT - OTHER CARE PROVIDERS
SARA Hernandez [phone] PCP Mary [phone], cardiology Dr. Lugo [phone], pulmonology Dr. Camilo [phone]

## 2022-02-09 NOTE — H&P PST ADULT - ASSESSMENT
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  Patient with known history of carotid stenosis.  CTA neck performed at [facility] on 21- Impression: 90-95% stenosis with ulceration involving the left carotid bifurcation in the neck. Patient c/o shortness of breath with ambulation at times. Denies chest pain, fevers, chills, dizziness or lightheadedness. Denies numbness or tingling to extremities.21  Now scheduled for left transcarotid  endarterectomy and possible carotid endarterectomy with PAS monitoring on 2022 with [place] pending medical, cardiac and pulmonary clearance.     CAPRINI SCORE    AGE RELATED RISK FACTORS                                                             [ ] Age 41-60 years                                            (1 Point)  [X ] Age: 61-74 years                                           (2 Points)                 [ ] Age= 75 years                                                (3 Points)             DISEASE RELATED RISK FACTORS                                                       [ ] Edema in the lower extremities                 (1 Point)                     [ ] Varicose veins                                               (1 Point)                                 [ ] BMI > 25 Kg/m2                                            (1 Point)                                  [ ] Serious infection (ie PNA)                            (1 Point)                     [ ] Lung disease ( COPD, Emphysema)            (1 Point)                                                                          [ ] Acute myocardial infarction                         (1 Point)                  [ ] Congestive heart failure (in the previous month)  (1 Point)         [ ] Inflammatory bowel disease                            (1 Point)                  [ X] Central venous access, PICC or Port               (2 points)       (within the last month)                                                                [ ] Stroke (in the previous month)                        (5 Points)    [ ] Previous or present malignancy                       (2 points)                                                                                                                                                         HEMATOLOGY RELATED FACTORS                                                         [ ] Prior episodes of VTE                                     (3 Points)                     [ ] Positive family history for VTE                      (3 Points)                  [ ] Prothrombin 02271 A                                     (3 Points)                     [ ] Factor V Leiden                                                (3 Points)                        [ ] Lupus anticoagulants                                      (3 Points)                                                           [ ] Anticardiolipin antibodies                              (3 Points)                                                       [ ] High homocysteine in the blood                   (3 Points)                                             [ ] Other congenital or acquired thrombophilia      (3 Points)                                                [ ] Heparin induced thrombocytopenia                  (3 Points)                                        MOBILITY RELATED FACTORS  [ ] Bed rest                                                         (1 Point)  [ ] Plaster cast                                                    (2 points)  [ ] Bed bound for more than 72 hours           (2 Points)    GENDER SPECIFIC FACTORS  [ ] Pregnancy or had a baby within the last month   (1 Point)  [ ] Post-partum < 6 weeks                                   (1 Point)  [ ] Hormonal therapy  or oral contraception   (1 Point)  [ ] History of pregnancy complications              (1 point)  [ ] Unexplained or recurrent              (1 Point)    OTHER RISK FACTORS                                           (1 Point)  [ ] BMI >40, smoking, diabetes requiring insulin, chemotherapy  blood transfusions and length of surgery over 2 hours    SURGERY RELATED RISK FACTORS  [ ]  Section within the last month     (1 Point)  [ ] Minor surgery                                                  (1 Point)  [ ] Arthroscopic surgery                                       (2 Points)  [X ] Planned major surgery lasting more            (2 Points)      than 45 minutes     [ ] Elective hip or knee joint replacement       (5 points)       surgery                                                TRAUMA RELATED RISK FACTORS  [ ] Fracture of the hip, pelvis, or leg                       (5 Points)  [ ] Spinal cord injury resulting in paralysis             (5 points)       (in the previous month)    [ ] Paralysis  (less than 1 month)                             (5 Points)  [ ] Multiple Trauma within 1 month                        (5 Points)    Total Score [   7     ]    Caprini Score 0-2: Low Risk, NO VTE prophylaxis required for most patients, encourage ambulation  Caprini Score 3-6: Moderate Risk , pharmacologic VTE prophylaxis is indicated for most patients (in the absence of contraindications)  Caprini Score Greater than or =7: High risk, pharmocologic VTE prophylaxis indicated for most patients (in the absence of contraindications)    OPIOID RISK TOOL    MAYANK EACH BOX THAT APPLIES AND ADD TOTALS AT THE END    FAMILY HISTORY OF SUBSTANCE ABUSE                 FEMALE         MALE                                                Alcohol                             [  ]1 pt          [  ]3pts                                               Illegal Durgs                     [  ]2 pts        [  ]3pts                                               Rx Drugs                           [  ]4 pts        [  ]4 pts    PERSONAL HISTORY OF SUBSTANCE ABUSE                                                                                          Alcohol                             [  ]3 pts       [  ]3 pts                                               Illegal Drugs                     [  ]4 pts        [  ]4 pts                                               Rx Drugs                           [  ]5 pts        [  ]5 pts    AGE BETWEEN 16-45 YEARS                                      [  ]1 pt         [  ]1 pt    HISTORY OF PREADOLESCENT   SEXUAL ABUSE                                                             [  ]3 pts        [  ]0pts    PSYCHOLOGICAL DISEASE                     ADD, OCD, Bipolar, Schizophrenia        [  ]2 pts         [  ]2 pts                      Depression                                               [  ]1 pt           [  ]1 pt           SCORING TOTAL   (add numbers and type here)              (*0**)                                     A score of 3 or lower indicated LOW risk for future opioid abuse  A score of 4 to 7 indicated moderate risk for future opioid abuse  A score of 8 or higher indicates a high risk for opioid abuse                                   This is a pleasant 71 year old male in NAD with 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  Patient with known history of carotid stenosis.  [facility]A neck performed at [facility] on 21- Impression: 90-95% stenosis with ulceration involving the left carotid bifurcation in the neck. Patient c/o shortness of breath with ambulation at times. Denies chest pain, fevers, chills, dizziness or lightheadedness. Denies numbness or tingling to extremities.  Now scheduled for left transcarotid  endarterectomy and possible carotid endarterectomy with PAS monitoring on 2022 with [place] pending medical, cardiac and pulmonary clearance.     CAPRINI SCORE    AGE RELATED RISK FACTORS                                                             [ ] Age 41-60 years                                            (1 Point)  [X ] Age: 61-74 years                                           (2 Points)                 [ ] Age= 75 years                                                (3 Points)             DISEASE RELATED RISK FACTORS                                                       [ ] Edema in the lower extremities                 (1 Point)                     [ ] Varicose veins                                               (1 Point)                                 [ ] BMI > 25 Kg/m2                                            (1 Point)                                  [ ] Serious infection (ie PNA)                            (1 Point)                     [ ] Lung disease ( COPD, Emphysema)            (1 Point)                                                                          [ ] Acute myocardial infarction                         (1 Point)                  [ ] Congestive heart failure (in the previous month)  (1 Point)         [ ] Inflammatory bowel disease                            (1 Point)                  [ X] Central venous access, PICC or Port               (2 points)       (within the last month)                                                                [ ] Stroke (in the previous month)                        (5 Points)    [ ] Previous or present malignancy                       (2 points)                                                                                                                                                         HEMATOLOGY RELATED FACTORS                                                         [ ] Prior episodes of VTE                                     (3 Points)                     [ ] Positive family history for VTE                      (3 Points)                  [ ] Prothrombin 35935 A                                     (3 Points)                     [ ] Factor V Leiden                                                (3 Points)                        [ ] Lupus anticoagulants                                      (3 Points)                                                           [ ] Anticardiolipin antibodies                              (3 Points)                                                       [ ] High homocysteine in the blood                   (3 Points)                                             [ ] Other congenital or acquired thrombophilia      (3 Points)                                                [ ] Heparin induced thrombocytopenia                  (3 Points)                                        MOBILITY RELATED FACTORS  [ ] Bed rest                                                         (1 Point)  [ ] Plaster cast                                                    (2 points)  [ ] Bed bound for more than 72 hours           (2 Points)    GENDER SPECIFIC FACTORS  [ ] Pregnancy or had a baby within the last month   (1 Point)  [ ] Post-partum < 6 weeks                                   (1 Point)  [ ] Hormonal therapy  or oral contraception   (1 Point)  [ ] History of pregnancy complications              (1 point)  [ ] Unexplained or recurrent              (1 Point)    OTHER RISK FACTORS                                           (1 Point)  [ ] BMI >40, smoking, diabetes requiring insulin, chemotherapy  blood transfusions and length of surgery over 2 hours    SURGERY RELATED RISK FACTORS  [ ]  Section within the last month     (1 Point)  [ ] Minor surgery                                                  (1 Point)  [ ] Arthroscopic surgery                                       (2 Points)  [X ] Planned major surgery lasting more            (2 Points)      than 45 minutes     [ ] Elective hip or knee joint replacement       (5 points)       surgery                                                TRAUMA RELATED RISK FACTORS  [ ] Fracture of the hip, pelvis, or leg                       (5 Points)  [ ] Spinal cord injury resulting in paralysis             (5 points)       (in the previous month)    [ ] Paralysis  (less than 1 month)                             (5 Points)  [ ] Multiple Trauma within 1 month                        (5 Points)    Total Score [   7     ]    Caprini Score 0-2: Low Risk, NO VTE prophylaxis required for most patients, encourage ambulation  Caprini Score 3-6: Moderate Risk , pharmacologic VTE prophylaxis is indicated for most patients (in the absence of contraindications)  Caprini Score Greater than or =7: High risk, pharmocologic VTE prophylaxis indicated for most patients (in the absence of contraindications)    OPIOID RISK TOOL    MAYANK EACH BOX THAT APPLIES AND ADD TOTALS AT THE END    FAMILY HISTORY OF SUBSTANCE ABUSE                 FEMALE         MALE                                                Alcohol                             [  ]1 pt          [  ]3pts                                               Illegal Durgs                     [  ]2 pts        [  ]3pts                                               Rx Drugs                           [  ]4 pts        [  ]4 pts    PERSONAL HISTORY OF SUBSTANCE ABUSE                                                                                          Alcohol                             [  ]3 pts       [  ]3 pts                                               Illegal Drugs                     [  ]4 pts        [  ]4 pts                                               Rx Drugs                           [  ]5 pts        [  ]5 pts    AGE BETWEEN 16-45 YEARS                                      [  ]1 pt         [  ]1 pt    HISTORY OF PREADOLESCENT   SEXUAL ABUSE                                                             [  ]3 pts        [  ]0pts    PSYCHOLOGICAL DISEASE                     ADD, OCD, Bipolar, Schizophrenia        [  ]2 pts         [  ]2 pts                      Depression                                               [  ]1 pt           [  ]1 pt           SCORING TOTAL   (add numbers and type here)              (*0**)                                     A score of 3 or lower indicated LOW risk for future opioid abuse  A score of 4 to 7 indicated moderate risk for future opioid abuse  A score of 8 or higher indicates a high risk for opioid abuse

## 2022-02-09 NOTE — H&P PST ADULT - PROBLEM SELECTOR PLAN 1
BP today 140/60. Continue medications as instructed.  EKG performed.  Patient instructed to take blood pressure medications with a sip of water day of procedure.  Cardiac clearance pending.

## 2022-02-09 NOTE — H&P PST ADULT - HISTORY OF PRESENT ILLNESS
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  Patient with known history of carotid stenosis.  CTA neck performed at [facility] on 12/1/21- Impression: 90-95% stenosis with ulceration involving the left carotid bifurcation in the neck. Patient c/o shortness of breath with ambulation at times. Denies chest pain, fevers, chills, dizziness or lightheadedness. Denies numbness or tingling to extremities.21  Now scheduled for left transcarotid  endarterectomy and possible carotid endarterectomy with PAS monitoring on 2/22/2022 with [place] pending medical, cardiac and pulmonary clearance.

## 2022-02-09 NOTE — H&P PST ADULT - NSICDXPASTSURGICALHX_GEN_ALL_CORE_FT
PAST SURGICAL HISTORY:  H/O heart artery stent     H/O lung transplant bilateral - transplant - 1-2-2015 -  [facility]    History of hip replacement     Status post open reduction and internal fixation (ORIF) of fracture left femur     PAST SURGICAL HISTORY:  H/O heart artery stent x3    H/O lung transplant bilateral - transplant - 1-2-2015 -  [facility]    History of hip replacement right    Status post open reduction and internal fixation (ORIF) of fracture left femur

## 2022-02-09 NOTE — PATIENT PROFILE ADULT - FALL HARM RISK - HARM RISK INTERVENTIONS

## 2022-02-09 NOTE — H&P PST ADULT - PROBLEM SELECTOR PLAN 3
Labs and EKG performed  Written and verbal instructions provided.  Now scheduled for left transcarotid  endarterectomy and possible carotid endarterectomy with PAS monitoring on 2/22/2022 with [place] pending medical, cardiac and pulmonary clearance.   Patient to have medical clearance with Dr. Hernandez  Patient to have cardiac clearance with Dr. Lugo  Patient  to have pulmonary clearance with Dr. Rouse  Patient educated on surgical scrub, COVID testing 2/19, preadmission instructions, medical, cardiac and pulmonary clearance and day of procedure medications, verbalizes understanding, teach back method utilized.  Patient instructed to stop Herbals or anti-inflammatory meds one week prior to surgery and discuss with PCP  Patient instructed to continue ASA as per Dr. Ku's instructions.

## 2022-02-09 NOTE — H&P PST ADULT - LAB RESULTS AND INTERPRETATION
results pending Labs reviewed  [facility] email sent to Dr. Ku to notify of abnormal labs  Lab results to be faxed to PCP for review and to be addressed, medical, cardiac and pulmonary clearance pending.

## 2022-02-09 NOTE — H&P PST ADULT - NSICDXPASTMEDICALHX_GEN_ALL_CORE_FT
PAST MEDICAL HISTORY:  2019 novel coronavirus disease (COVID-19)     BPH without urinary obstruction     Emphysema of lung     ESRD (end stage renal disease) on dialysis     Fungal meningitis     H/O peripheral neuropathy     History of COPD     [place] (hard of hearing)     HTN (hypertension)     Hypercholesterolemia     Lumbar spondylosis     Oliguria and anuria     Pneumonia

## 2022-02-09 NOTE — PATIENT PROFILE ADULT - ABILITY TO HEAR (WITH HEARING AID OR HEARING APPLIANCE IF NORMALLY USED):
Patient should follow up with his primary care physician, Dr. Ortiz, in 1 week. Mildly to Moderately Impaired: difficulty hearing in some environments or speaker may need to increase volume or speak distinctly

## 2022-02-10 NOTE — PROCEDURE
[FreeTextEntry1] : 1/7/2022: Pulmonary function test performed and compared to previous studies of 11/15/2021-the diffusion capacity is within normal limits And unchanged when compared to previous values.  Lung volumes show slight improvement in vital capacity and within normal limits as well as a normal total lung capacity also improved by nearly 800 cc.  The vital capacity is also slightly improved as well as FEV1.  There is a mild degree of restriction.  When compared to his post transplant levels there is a mild degree of restriction present

## 2022-02-10 NOTE — HISTORY OF PRESENT ILLNESS
[Former] : former [TextBox_4] : 71-year-old male status post double lung transplant 2015 with a course complicated by coronary artery disease, drug-eluting stent, renal failure on hemodialysis and status post AV fistula.  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  Patient is doing well with increased activity.  He is undergoing dialysis on Tuesday Thursday Saturday.  History is negative for cough wheeze or sputum production.

## 2022-02-10 NOTE — DISCUSSION/SUMMARY
[FreeTextEntry1] : 71-year-old male with a history of the above seen today in follow-up.  Patient's decrease in lung function is most likely on the basis of persistent pulmonary vascular congestion with pleural effusions.  I would strongly recommend decreasing patient's dry weight with dialysis.  Presently his lung function remains stable without significant evidence of rejection.

## 2022-02-14 NOTE — PROGRESS NOTE ADULT - GASTROINTESTINAL
Apply Duoderm Extra Thin to the area daily. This can be applied for up to 4 days at a time, and may help reduce friction on the toe, and may help aide the area in healing. Duoderm Extra Thin is available on [employer], where they sell a box of 10 sheets. This is around 25-30 dollars and should last you roughly 3-6 months. These are meant to go on dry skin, so make sure the area is dry    There is also a foam version of duoderm which we can give you today. If you like this, you can purchase this on [employer] as well.    Cryotherapy    What is it?    Use of a very cold liquid, such as liquid nitrogen, to freeze and destroy abnormal skin cells that need to be removed    What should I expect?    Tenderness and redness    A small blister that might grow and fill with dark purple blood. There may be crusting.    More than one treatment may be needed if the lesions do not go away.    How do I care for the treated area?    Gently wash the area with your hands when bathing.    Use a thin layer of Vaseline to help with healing. You may use a Band-Aid.     The area should heal within 7-10 days and may leave behind a pink or lighter color.     Do not use an antibiotic or Neosporin ointment.     You may take acetaminophen (Tylenol) for pain.     Call your doctor if you have:    Severe pain    Signs of infection (warmth, redness, cloudy yellow drainage, and or a bad smell)    Questions or concerns    Who should I call with questions?       [facility]: [phone]       [facility]: [phone]       For urgent needs outside of business hours call the [facility] at [phone] and ask for the dermatology resident on call   negative Soft, non-tender, no hepatosplenomegaly, normal bowel sounds

## 2022-02-17 PROBLEM — E78.2 MIXED HYPERLIPIDEMIA: Status: ACTIVE | Noted: 2021-12-22

## 2022-02-17 PROBLEM — I25.10 CORONARY ARTERY DISEASE: Status: ACTIVE | Noted: 2019-12-20

## 2022-02-17 PROBLEM — M81.0 OSTEOPOROSIS: Status: ACTIVE | Noted: 2019-09-24

## 2022-02-17 NOTE — ASSESSMENT
[Patient Optimized for Surgery] : Patient optimized for surgery [No Further Testing Recommended] : no further testing recommended [Continue anticoagulant treatment as is] : Continue current anticoagulant treatment [Continue anti-platelet treatment as is] : Continue current anti-platelet treatment [Continue medications as is] : Continue current medications [FreeTextEntry4] : pt cleared for procedure, cardiac under separate letter

## 2022-02-17 NOTE — HISTORY OF PRESENT ILLNESS
[Coronary Artery Disease] : coronary artery disease [COPD] : COPD [Chronic Kidney Disease] : chronic kidney disease [Spouse] : spouse [Aortic Stenosis] : no aortic stenosis [Atrial Fibrillation] : no atrial fibrillation [Recent Myocardial Infarction] : no recent myocardial infarction [Implantable Device/Pacemaker] : no implantable device/pacemaker [Asthma] : no asthma [Smoker] : not a smoker [Family Member] : no family member with adverse anesthesia reaction/sudden death [Self] : no previous adverse anesthesia reaction [Chronic Anticoagulation] : no chronic anticoagulation [Diabetes] : no diabetes [FreeTextEntry1] : carotid endarectomy [FreeTextEntry2] : 2/22/22 [FreeTextEntry7] : cardio clearance under separate letter -   [FreeTextEntry4] : pt will have CEA at [facility], no complaints

## 2022-02-19 NOTE — RAPID RESPONSE TEAM SUMMARY - NSSITUATIONBACKGROUNDRRT_GEN_ALL_CORE
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 Rg), CAD s/p stents 11/2019, "broken leg surgery" (2020, Dr. 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 Pt seen by Nephro in ED, pt to have emergent HD after CTA. You can access the FollowMyHealth Patient Portal offered by [facility] by registering at the following website: [email or web address]. By joining [employer]’s FollowMyHealth portal, you will also be able to view your health information using other applications (apps) compatible with our system.

## 2022-03-08 NOTE — CONSULT NOTE ADULT - ASSESSMENT
ESRD on HD  HTN  Carotid stenosis s/p left ICA stenting  Anemia of CKD     ESRD on HD  HTN  Carotid stenosis s/p left ICA stenting  Anemia of CKD    Pt on HD TTS schedule  Lytes grossly wnl  Bp soft- may become hypotensive with HD  Will hold off on treatment today and schedule for tomorrow.  Consent obtained from pt

## 2022-03-08 NOTE — BRIEF OPERATIVE NOTE - OPERATION/FINDINGS
Left ICA stenting with use of flow reversal device.  No intraoperative complications. No acute neurological deficits during or post procedure.

## 2022-03-08 NOTE — BRIEF OPERATIVE NOTE - NSICDXBRIEFPROCEDURE_GEN_ALL_CORE_FT
PROCEDURES:  TCAR, with intraoperative flow reversal 08-Mar-2022 11:17:32 left ICA Yonatan Mcclendon

## 2022-03-08 NOTE — CONSULT NOTE ADULT - SUBJECTIVE AND OBJECTIVE BOX
[facility] DIVISION OF KIDNEY DISEASES AND HYPERTENSION -- INITIAL CONSULT NOTE  --------------------------------------------------------------------------------  HPI:        PAST HISTORY  --------------------------------------------------------------------------------  PAST MEDICAL & SURGICAL HISTORY:  Emphysema of lung    ESRD (end stage renal disease) on dialysis    Fungal meningitis    2019 novel coronavirus disease (COVID-19)    Pneumonia    [place] (hard of hearing)    HTN (hypertension)    Lumbar spondylosis    History of COPD    BPH without urinary obstruction    Hypercholesterolemia    Oliguria and anuria    H/O peripheral neuropathy    H/O lung transplant  bilateral - transplant - 1-2-2015 -  [facility]    H/O heart artery stent  x3    History of hip replacement  right    Status post open reduction and internal fixation (ORIF) of fracture  left femur      FAMILY HISTORY:  Family history of emphysema      PAST SOCIAL HISTORY: non smoker    ALLERGIES & MEDICATIONS  --------------------------------------------------------------------------------  Allergies    budesonide (Unknown)  cefpodoxime (Unknown)  Pollen (Unknown)    Intolerances      Standing Inpatient Medications  aspirin enteric coated 81 milliGRAM(s) Oral daily  atorvastatin 40 milliGRAM(s) Oral at bedtime  calcium carbonate    500 mG (Tums) Chewable 1 Tablet(s) Chew daily  clindamycin IVPB 900 milliGRAM(s) IV Intermittent every 8 hours  clindamycin IVPB 900 milliGRAM(s) IV Intermittent once  dextrose 5% + sodium chloride 0.45% with potassium chloride 20 mEq/L 1000 milliLiter(s) IV Continuous <Continuous>  fluconAZOLE   Tablet 400 milliGRAM(s) Oral daily  hydrocortisone 10 milliGRAM(s) Oral daily  lactated ringers. 1000 milliLiter(s) IV Continuous <Continuous>  magnesium oxide 400 milliGRAM(s) Oral daily  multivitamin 1 Tablet(s) Oral daily  mycophenolate mofetil 250 milliGRAM(s) Oral two times a day  pantoprazole    Tablet 40 milliGRAM(s) Oral before breakfast  sevelamer carbonate 800 milliGRAM(s) Oral three times a day with meals  tacrolimus 0.5 milliGRAM(s) Oral daily  tacrolimus 1 milliGRAM(s) Oral two times a day  tamsulosin 0.4 milliGRAM(s) Oral at bedtime    PRN Inpatient Medications  acetaminophen     Tablet .. 650 milliGRAM(s) Oral every 6 hours PRN  fentaNYL    Injectable 50 MICROGram(s) IV Push every 5 minutes PRN  ondansetron Injectable 4 milliGRAM(s) IV Push every 4 hours PRN  ondansetron Injectable 4 milliGRAM(s) IV Push every 6 hours PRN      REVIEW OF SYSTEMS  --------------------------------------------------------------------------------  Gen: No weight changes, fatigue, fevers/chills, weakness  Skin: No rashes  Head/Eyes/Ears/Mouth: No headache; Normal hearing; Normal vision w/o blurriness; No sinus pain/discomfort, sore throat  Respiratory: No dyspnea, cough, wheezing, hemoptysis  CV: No chest pain, PND, orthopnea  GI: No abdominal pain, diarrhea, constipation, nausea, vomiting, melena, hematochezia  : No increased frequency, dysuria, hematuria, nocturia  MSK: No joint pain/swelling; no back pain; no edema  Neuro: No dizziness/lightheadedness, weakness, seizures, numbness, tingling  Heme: No easy bruising or bleeding  Endo: No heat/cold intolerance  Psych: No significant nervousness, anxiety, stress, depression    All other systems were reviewed and are negative, except as noted.    VITALS/PHYSICAL EXAM  --------------------------------------------------------------------------------  T(C): 36.8 (03-08-22 @ 11:10), Max: 36.8 (03-08-22 @ 11:10)  HR: 75 (03-08-22 @ 12:15) (75 - 90)  BP: 121/56 (03-08-22 @ 12:15) (121/56 - 180/156)  RR: 14 (03-08-22 @ 12:15) (14 - 19)  SpO2: 98% (03-08-22 @ 12:15) (96% - 98%)  Wt(kg): --  Height (cm): 167.6 (03-08-22 @ 06:18)  Weight (kg): 61 (03-08-22 @ 06:18)  BMI (kg/m2): 21.7 (03-08-22 @ 06:18)  BSA (m2): 1.69 (03-08-22 @ 06:18)      Physical Exam:  	Gen: NAD  	HEENT:  supple neck  	Pulm: CTA B/L  	CV: RRR, S1S2; no rub  	Back: No spinal or CVA tenderness; no sacral edema  	Abd: +BS, soft, nontender/nondistended  	: No suprapubic tenderness  	UE: Warm, no edema  	LE: Warm, ; no edema  	Neuro: No focal deficit  	Psych: Normal affect and mood  	Skin: Warm, without rashes  	Vascular access: ROGE FRANCO    LABS/STUDIES  --------------------------------------------------------------------------------    x   |  x   |  x   ----------------------------<  x       [03-08-22 @ 07:33]  5.3   |  x   |  x               Creatinine Trend:  SCr 6.52 [02-09 @ 14:35]        TSH 2.53      [04-14-21 @ 04:53]  Lipid: chol 119, TG 65, HDL 59, LDL --      [04-14-21 @ 04:53]    HBsAb <3.0      [04-15-21 @ 23:45]  HBsAg Nonreact      [04-15-21 @ 23:45]  HBcAb Nonreact      [04-15-21 @ 23:45]  HCV 0.17, Nonreact      [04-15-21 @ 23:45]     [facility] DIVISION OF KIDNEY DISEASES AND HYPERTENSION -- INITIAL CONSULT NOTE  --------------------------------------------------------------------------------  HPI:  71 year old male with history of lung transplant on immunosuppressive medications, HTN, carotid stenosis (,90-95% stenosis with ulceration involving the left carotid bifurcation in the neck), lumbar spondylosis, hypercholesterolemia, COPD, BPH, ESRD on HD via LUE AVF, now s/p  left transcarotid  endarterectomy and stenting with Dr. Raghav York.  Nephrology consulted for ESRD history.  Pt seen in PACU- feels well  Denies any acute complaint.  Reports he has been on HD for 2 years; gets HD TTS schedule [facility]  His nephrologist is Dr. Preston,.  Last HD was on Saturday and was uneventful.      PAST HISTORY  --------------------------------------------------------------------------------  PAST MEDICAL & SURGICAL HISTORY:  Emphysema of lung    ESRD (end stage renal disease) on dialysis    Fungal meningitis    2019 novel coronavirus disease (COVID-19)    Pneumonia    [place] (hard of hearing)    HTN (hypertension)    Lumbar spondylosis    History of COPD    BPH without urinary obstruction    Hypercholesterolemia    Oliguria and anuria    H/O peripheral neuropathy    H/O lung transplant  bilateral - transplant - 1-2-2015 -  [facility]    H/O heart artery stent  x3    History of hip replacement  right    Status post open reduction and internal fixation (ORIF) of fracture  left femur      FAMILY HISTORY:  Family history of emphysema      PAST SOCIAL HISTORY: non smoker    ALLERGIES & MEDICATIONS  --------------------------------------------------------------------------------  Allergies    budesonide (Unknown)  cefpodoxime (Unknown)  Pollen (Unknown)    Intolerances      Standing Inpatient Medications  aspirin enteric coated 81 milliGRAM(s) Oral daily  atorvastatin 40 milliGRAM(s) Oral at bedtime  calcium carbonate    500 mG (Tums) Chewable 1 Tablet(s) Chew daily  clindamycin IVPB 900 milliGRAM(s) IV Intermittent every 8 hours  clindamycin IVPB 900 milliGRAM(s) IV Intermittent once  dextrose 5% + sodium chloride 0.45% with potassium chloride 20 mEq/L 1000 milliLiter(s) IV Continuous <Continuous>  fluconAZOLE   Tablet 400 milliGRAM(s) Oral daily  hydrocortisone 10 milliGRAM(s) Oral daily  lactated ringers. 1000 milliLiter(s) IV Continuous <Continuous>  magnesium oxide 400 milliGRAM(s) Oral daily  multivitamin 1 Tablet(s) Oral daily  mycophenolate mofetil 250 milliGRAM(s) Oral two times a day  pantoprazole    Tablet 40 milliGRAM(s) Oral before breakfast  sevelamer carbonate 800 milliGRAM(s) Oral three times a day with meals  tacrolimus 0.5 milliGRAM(s) Oral daily  tacrolimus 1 milliGRAM(s) Oral two times a day  tamsulosin 0.4 milliGRAM(s) Oral at bedtime    PRN Inpatient Medications  acetaminophen     Tablet .. 650 milliGRAM(s) Oral every 6 hours PRN  fentaNYL    Injectable 50 MICROGram(s) IV Push every 5 minutes PRN  ondansetron Injectable 4 milliGRAM(s) IV Push every 4 hours PRN  ondansetron Injectable 4 milliGRAM(s) IV Push every 6 hours PRN      REVIEW OF SYSTEMS  --------------------------------------------------------------------------------  Gen: No weight changes, fatigue, fevers/chills, weakness  Skin: No rashes  Head/Eyes/Ears/Mouth: No headache; Normal hearing; Normal vision w/o blurriness; No sinus pain/discomfort, sore throat  Respiratory: No dyspnea, cough, wheezing, hemoptysis  CV: No chest pain, PND, orthopnea  GI: No abdominal pain, diarrhea, constipation, nausea, vomiting, melena, hematochezia  : No increased frequency, dysuria, hematuria, nocturia  MSK: No joint pain/swelling; no back pain; no edema  Neuro: No dizziness/lightheadedness, weakness, seizures, numbness, tingling  Heme: No easy bruising or bleeding  Endo: No heat/cold intolerance  Psych: No significant nervousness, anxiety, stress, depression    All other systems were reviewed and are negative, except as noted.    VITALS/PHYSICAL EXAM  --------------------------------------------------------------------------------  T(C): 36.8 (03-08-22 @ 11:10), Max: 36.8 (03-08-22 @ 11:10)  HR: 75 (03-08-22 @ 12:15) (75 - 90)  BP: 121/56 (03-08-22 @ 12:15) (121/56 - 180/156)  RR: 14 (03-08-22 @ 12:15) (14 - 19)  SpO2: 98% (03-08-22 @ 12:15) (96% - 98%)  Wt(kg): --  Height (cm): 167.6 (03-08-22 @ 06:18)  Weight (kg): 61 (03-08-22 @ 06:18)  BMI (kg/m2): 21.7 (03-08-22 @ 06:18)  BSA (m2): 1.69 (03-08-22 @ 06:18)      Physical Exam:  	Gen: NAD  	HEENT:  supple neck  	Pulm: CTA B/L  	CV: RRR, S1S2; no rub  	Back: No spinal or CVA tenderness; no sacral edema  	Abd: +BS, soft, nontender/nondistended  	: No suprapubic tenderness  	UE: Warm, no edema  	LE: Warm, ; no edema  	Neuro: No focal deficit  	Psych: Normal affect and mood  	Skin: Warm, without rashes  	Vascular access: ROGE FRANCO    LABS/STUDIES  --------------------------------------------------------------------------------    x   |  x   |  x   ----------------------------<  x       [03-08-22 @ 07:33]  5.3   |  x   |  x               Creatinine Trend:  SCr 6.52 [02-09 @ 14:35]        TSH 2.53      [04-14-21 @ 04:53]  Lipid: chol 119, TG 65, HDL 59, LDL --      [04-14-21 @ 04:53]    HBsAb <3.0      [04-15-21 @ 23:45]  HBsAg Nonreact      [04-15-21 @ 23:45]  HBcAb Nonreact      [04-15-21 @ 23:45]  HCV 0.17, Nonreact      [04-15-21 @ 23:45]

## 2022-03-09 NOTE — DISCHARGE NOTE PROVIDER - NSDCFUSCHEDAPPT_GEN_ALL_CORE_FT
JU FERGUSON ; 05/13/2022 ; [address] [place] JU Herman ; 05/13/2022 ; [address] airway patent/clear to auscultation bilaterally/breath sounds equal/respirations non-labored

## 2022-03-09 NOTE — DISCHARGE NOTE NURSING/CASE MANAGEMENT/SOCIAL WORK - PATIENT PORTAL LINK FT
You can access the FollowMyHealth Patient Portal offered by [facility] by registering at the following website: [email or web address]. By joining [employer]’s FollowMyHealth portal, you will also be able to view your health information using other applications (apps) compatible with our system.

## 2022-03-09 NOTE — DISCHARGE NOTE NURSING/CASE MANAGEMENT/SOCIAL WORK - NSDCPEFALRISK_GEN_ALL_CORE
For information on Fall & Injury Prevention, visit: [email or web address] OR  [email or web address] OR  https://www.cdc.gov/steadi/patient.html

## 2022-03-09 NOTE — DISCHARGE NOTE PROVIDER - CARE PROVIDERS DIRECT ADDRESSES
[email or web address]

## 2022-03-09 NOTE — DISCHARGE NOTE PROVIDER - NSDCCPTREATMENT_GEN_ALL_CORE_FT
PRINCIPAL PROCEDURE  Procedure: TCAR, with intraoperative flow reversal  Findings and Treatment: left ICA

## 2022-03-09 NOTE — DISCHARGE NOTE PROVIDER - NSDCCPCAREPLAN_GEN_ALL_CORE_FT
PRINCIPAL DISCHARGE DIAGNOSIS  Diagnosis: Carotid stenosis  Assessment and Plan of Treatment:

## 2022-03-09 NOTE — DISCHARGE NOTE PROVIDER - CARE PROVIDER_API CALL
ManvarSingh, Pallavi B (MD)  Vascular Surgery  [address] [facility] [address] [place] [address]  Phone: [phone]  Fax: [phone]  Follow Up Time: 2 weeks

## 2022-03-09 NOTE — DISCHARGE NOTE PROVIDER - HOSPITAL COURSE
Patient presented for elective left Internal carotid artery stenting on 3/8/2022.  Procedure was performed as planned (TCAR), with no intraoperative or postoperative complications.  Patient received hemodialysis on 3/9 and is currently deemed stable for discharge to home.

## 2022-03-09 NOTE — DISCHARGE NOTE PROVIDER - NSDCMRMEDTOKEN_GEN_ALL_CORE_FT
acetaminophen 500 mg oral capsule: 1 cap(s) orally every 6 hours   amLODIPine 5 mg oral tablet: 1 tab(s) orally once a day  Aspirin Low Dose 81 mg oral delayed release tablet: 1 tab(s) orally once a day  Bactrim 400 mg-80 mg oral tablet: 1 tab(s) orally Monday, Wednesday, and Friday  CellCept 250 mg oral capsule: 2 cap(s) orally 2 times a day  clopidogrel 75 mg oral tablet: 1 tab(s) orally once a day  Flomax 0.4 mg oral capsule: 1 cap(s) orally once a day  fluconazole 200 mg oral tablet: 2 tab(s) orally Tuesday, Thursday, Saturday  hydrocortisone 10 mg oral tablet: 1 tab(s) orally once a day  magnesium oxide 400 mg (241.3 mg elemental magnesium) oral tablet: 1 tab(s) orally once a day  Multiple Vitamins oral tablet: 1 tab(s) orally once a day  Protonix 40 mg oral delayed release tablet: 1 tab(s) orally once a day  rosuvastatin 20 mg oral tablet: 1 tab(s) orally once a day  sevelamer carbonate 800 mg oral tablet: 1 tab(s) orally 3 times a day (with meals)  tacrolimus 0.5 mg oral capsule: 1 cap(s) orally once a day in the morning with 1 mg capsule; total dose 1.5 mg in the morning  tacrolimus 1 mg oral capsule: 1 cap(s) orally 2 times a day    Tums Ultra 1000 mg oral tablet, chewable: 1 tab(s) orally once a day

## 2022-03-19 NOTE — ED ADULT TRIAGE NOTE - CHIEF COMPLAINT QUOTE
Pt was sent by DR. Hall for  Non working L HD fistula . Pt  was unable to get HD today . - needs angioplasty this week

## 2022-03-19 NOTE — H&P ADULT - NSICDXPASTMEDICALHX_GEN_ALL_CORE_FT
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    H/O peripheral neuropathy     History of COPD     [place] (hard of hearing)     HTN (hypertension)     Hypercholesterolemia     Lumbar spondylosis     Oliguria and anuria     Pneumonia April 2021

## 2022-03-19 NOTE — ED ADULT NURSE NOTE - NSICDXPASTMEDICALHX_GEN_ALL_CORE_FT
PAST MEDICAL HISTORY:  2019 novel coronavirus disease (COVID-19)     BPH without urinary obstruction     Emphysema of lung     ESRD (end stage renal disease) on dialysis     Fungal meningitis     H/O peripheral neuropathy     History of COPD     [place] (hard of hearing)     HTN (hypertension)     Hypercholesterolemia     Lumbar spondylosis     Oliguria and anuria     Pneumonia

## 2022-03-19 NOTE — H&P ADULT - NSHPPHYSICALEXAM_GEN_ALL_CORE
OBJECTIVE:  Vital Signs Last 24 Hrs  T(C): 36.4 (19 Mar 2022 16:34), Max: 36.7 (19 Mar 2022 12:02)  T(F): 97.5 (19 Mar 2022 16:34), Max: 98 (19 Mar 2022 12:02)  HR: 85 (19 Mar 2022 16:34) (85 - 89)  BP: 141/77 (19 Mar 2022 16:34) (131/71 - 141/77)   RR: 20 (19 Mar 2022 16:34) (20 - 20)  SpO2: 96% (19 Mar 2022 16:34) (96% - 98%)    PHYSICAL EXAMINATION  General: Elderly male sitting up in bed, no respiratory distress  HEENT:  Right esotropia  NECK:  supple  CVS: regular rate and rhythm S1 S2  RESP:  CTAB  GI:  Soft nondistended nontender BS+  : No suprapubic tenderness  MSK:  FROM. No edema  CNS:  No gross focal or global deficit appreciated  INTEG: LUE AVF   PSYCH:  Fair mood OBJECTIVE:  Vital Signs Last 24 Hrs  T(C): 36.4 (19 Mar 2022 16:34), Max: 36.7 (19 Mar 2022 12:02)  T(F): 97.5 (19 Mar 2022 16:34), Max: 98 (19 Mar 2022 12:02)  HR: 85 (19 Mar 2022 16:34) (85 - 89)  BP: 141/77 (19 Mar 2022 16:34) (131/71 - 141/77)   RR: 20 (19 Mar 2022 16:34) (20 - 20)  SpO2: 96% (19 Mar 2022 16:34) (96% - 98%)    PHYSICAL EXAMINATION  General: Elderly male sitting up in bed, no respiratory distress  HEENT:  Right esotropia  NECK:  supple  CVS: regular rate and rhythm S1 S2  RESP:  CTAB  GI:  Soft nondistended nontender BS+  : No suprapubic tenderness  MSK:  FROM. No edema  CNS:  No gross focal or global deficit appreciated  INTEG: LUE AVF with bruit , thrill and significant ecchymoses around site  PSYCH:  Fair mood

## 2022-03-19 NOTE — H&P ADULT - NSICDXPASTSURGICALHX_GEN_ALL_CORE_FT
PAST SURGICAL HISTORY:  H/O heart artery stent x3 @[facility]    H/O lung transplant bilateral - transplant - 1-2-2015 -  [facility]    History of hip replacement right    Status post open reduction and internal fixation (ORIF) of fracture left femur

## 2022-03-19 NOTE — CONSULT NOTE ADULT - SUBJECTIVE AND OBJECTIVE BOX
Patient is a 71y old  Male who presents with a chief complaint of They sent me in (19 Mar 2022 18:13)    HPI:         "71 year old male with PMH HTN, Dyslipidemia, CAD s/p PCI, ESRD on HD T/Th/Sat, s/p double lung transplant and recent L CEA was sent in from HD center due to nonfunctioning LUE AVF. Patient last has HD on Tuesday and went for HD on Thursday and was noted to have a nonfunctioning fistula; sent to [facility] where he had a fistulogram and stent placed. He went for HD this morning and was sent here.  Denies any dyspnea, palpitations, dizziness, fever, chills, nausea, vomiting. Complains of pain in LUE AVF with cannulation not relieved with Tramadol. 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 -  [facility]    H/O heart artery stent  x3 @[facility]    History of hip replacement  right    Status post open reduction and internal fixation (ORIF) of fracture  left femur    FAMILY HISTORY:  Family history of emphysema    Social History: Non Smoker,     MEDICATIONS  (STANDING):    amLODIPine   Tablet 5 milliGRAM(s) Oral daily  aspirin enteric coated 81 milliGRAM(s) Oral daily  atorvastatin 80 milliGRAM(s) Oral at bedtime  clopidogrel Tablet 75 milliGRAM(s) Oral daily  fluconAZOLE   Tablet 200 milliGRAM(s) Oral <User Schedule>  heparin   Injectable 5000 Unit(s) SubCutaneous every 12 hours  hydrocortisone 10 milliGRAM(s) Oral two times a day  magnesium oxide 400 milliGRAM(s) Oral daily  multivitamin 1 Tablet(s) Oral daily  mycophenolate mofetil  Oral Tab/Cap - Peds 500 milliGRAM(s) Oral two times a day  pantoprazole    Tablet 40 milliGRAM(s) Oral before breakfast  sevelamer carbonate 1600 milliGRAM(s) Oral three times a day with meals  sodium zirconium cyclosilicate 10 Gram(s) Oral daily  tacrolimus 1 milliGRAM(s) Oral two times a day  tacrolimus 0.5 milliGRAM(s) Oral daily  tamsulosin 0.4 milliGRAM(s) Oral at bedtime  trimethoprim  160 mG/sulfamethoxazole 800 mG 1 Tablet(s) Oral <User Schedule>    MEDICATIONS  (PRN):  acetaminophen     Tablet .. 650 milliGRAM(s) Oral every 6 hours PRN Mild Pain (1 - 3)  traMADol 25 milliGRAM(s) Oral every 6 hours PRN Moderate-Severe pain (4-10)    Allergies    budesonide (Unknown)  cefpodoxime (Unknown)  Pollen (Unknown)    Intolerances    REVIEW OF SYSTEMS:    CONSTITUTIONAL: No fever, weight loss, + fatigue  EYES: No eye pain, visual disturbances, or discharge  ENMT:  No difficulty hearing, tinnitus, vertigo; No sinus or throat pain  NECK: No pain or stiffness  RESPIRATORY: No cough, wheezing, chills or hemoptysis; No shortness of breath  CARDIOVASCULAR: No chest pain, palpitations, dizziness, or leg swelling  GASTROINTESTINAL: No abdominal or epigastric pain. No nausea, vomiting, or hematemesis; No diarrhea or constipation. No melena or hematochezia.  GENITOURINARY: No dysuria, frequency, hematuria, or incontinence  NEUROLOGICAL: No headaches, memory loss, loss of strength, numbness, or tremors  SKIN: No itching, burning, rashes, or lesions   LYMPH NODES: No enlarged glands  ENDOCRINE: No heat or cold intolerance; No hair loss  MUSCULOSKELETAL: No joint pain or swelling; No muscle, back, or extremity pain  PSYCHIATRIC: No depression, anxiety, mood swings, or difficulty sleeping  HEME/LYMPH: No easy bruising, or bleeding gums  ALLERGY AND IMMUNOLOGIC: No hives or eczema    Vital Signs Last 24 Hrs  T(C): 36.7 (20 Mar 2022 04:41), Max: 36.7 (19 Mar 2022 12:02)  T(F): 98 (20 Mar 2022 04:41), Max: 98 (19 Mar 2022 12:02)  HR: 92 (20 Mar 2022 04:41) (85 - 92)  BP: 128/65 (20 Mar 2022 04:41) (123/62 - 141/77)  BP(mean): --  RR: 18 (20 Mar 2022 04:41) (18 - 20)  SpO2: 91% (20 Mar 2022 04:41) (91% - 98%)    PHYSICAL EXAM:    GENERAL: NAD, well-groomed, well-developed, Frail, Pale,   HEAD:  Atraumatic, Normocephalic  EYES: EOMI, PERRLA, conjunctiva and sclera clear  ENMT: Moist mucous membranes, Good dentition, No lesions  NECK: Supple, No JVD,   NERVOUS SYSTEM:  Alert & Oriented X3, Good concentration; Anxious,   CHEST/LUNG: Clear to percussion bilaterally; No rales, rhonchi, wheezing, or rubs  HEART: Regular rate and rhythm; No murmurs, rubs, or gallops  ABDOMEN: Soft, Nontender, Nondistended; Bowel sounds present  EXTREMITIES:  2+ Peripheral Pulses, No clubbing, cyanosis, or edema  LYMPH: No lymphadenopathy noted  SKIN: No rashes or lesions    LUE Swollen , + B & T,      LABS:                        9.1    11.25 )-----------( 222      ( 20 Mar 2022 03:23 )             30.6   03-20    139  |  95<L>  |  75.1<H>  ----------------------------<  93  6.2<HH>   |  20.0<L>  |  10.97<H>    Ca    8.2<L>      20 Mar 2022 03:23    TPro  6.6  /  Alb  3.8  /  TBili  0.4  /  DBili  x   /  AST  25  /  ALT  <5  /  AlkPhos  158<H>  03-20    PT/INR - ( 19 Mar 2022 13:27 )   PT: 13.1 sec;   INR: 1.13 ratio      PTT - ( 19 Mar 2022 13:27 )  PTT:31.8 sec    RADIOLOGY & ADDITIONAL TESTS:    US Duplex Venous Upper Ext Ltd, Left (03.19.22 @ 14:09)     ACC: [id number] EXAM:  US DPLX UPR EXT VEINS LTD LT                          PROCEDURE DATE:  03/19/2022      INTERPRETATION:  CLINICAL INFORMATION: End-stage renal disease on   hemodialysis. Left upper extremity brachiocephalic AV fistula. Patient   reportedly had stent placed in the cephalic outflow vein one week ago.   Now with difficulty accessing fistula for dialysis.    COMPARISON: None available.    TECHNIQUE: Duplex study hemodialysis access left upper extremity.    FINDINGS:    The study is incomplete as the arterial side of the fistula is not   evaluated. Metallic stent is visualized within the cephalic outflow vein.   Air between the stent and the vessel wall attenuates sound such that flow   within the stent cannot be evaluated. However, high velocity pulsatile   venous flow is demonstrated within the cephalic vein both proximal and   distal to the stent with peak velocities of approximately 150-250 cm/s.   The brachial, basilic, axillary, subclavian and internal jugular veins   are patent and demonstrate normal phasic venous flow.    IMPRESSION:  Incomplete study with nonvisualization of the inflow brachial artery and   AV fistula.    Apparent patency of stent in the outflow cephalic vein.    MIRNA CRUZ MD; Attending Radiologist  This document has been electronically signed. Mar 19 2022  2:49PM  Potassium, Serum: 6.2: Critical value:   TYPE:(C=Critical, N=Notification, A=Abnormal) C   TESTS: POTASSIUM   DATE/TIME CALLED: 03/20/2022 04:19:39 EDT   CALLED TO: NORA FIELDS   READ BACK (2 Patient Identifiers)(Y/N): Y   READ BACK VALUES (Y/N): Y   CALLED BY: LD mmol/L (03.20.22 @ 03:23)   Historical Values  Potassium, Serum: 6.2: Critical value:   TYPE:(C=Critical, N=Notification, A=Abnormal) C   TESTS: POTASSIUM   DATE/TIME CALLED: 03/20/2022 04:19:39 EDT   CALLED TO: NORA FIELDS   READ BACK (2 Patient Identifiers)(Y/N): Y   READ BACK VALUES (Y/N): Y   CALLED BY: LD mmol/L (03.20.22 @ 03:23)   Potassium, Serum: 5.7 mmol/L (03.19.22 @ 13:27)   Potassium, Serum: 5.6 mmol/L (03.09.22 @ 04:19)   Potassium, Serum: 5.3 mmol/L (03.08.22 @ 07:33)   Potassium, Serum: 4.8 mmol/L (02.09.22 @ 14:35)   Potassium, Serum: 3.9 mmol/L (11.10.21 @ 09:06)   Potassium, Serum: 4.5 mmol/L (11.03.21 @ 16:56)   Potassium, Serum: 5.2 mmol/L (04.16.21 @ 07:13)   Potassium, Serum: 5.8 mmol/L (04.15.21 @ 06:06)   Potassium, Serum: 5.3 mmol/L (04.14.21 @ 04:53)   Potassium, Serum: 5.0 mmol/L (04.13.21 @ 10:27)   Potassium, Serum: 4.5 mmol/L (03.08.21 @ 08:19)   Potassium, Serum: 4.1 mmol/L (03.07.21 @ 10:40)   Potassium, Serum: 4.7 mmol/L (03.05.21 @ 15:35)   Potassium, Serum: 4.2 mmol/L (02.12.21 @ 07:25)   Potassium, Serum: 4.5 mmol/L (02.11.21 @ 08:24)   Potassium, Serum: 3.9 mmol/L (02.10.21 @ 07:10)   Potassium, Serum: 4.2 mmol/L (02.09.21 @ 06:38)   Potassium, Serum: 4.2 mmol/L (02.08.21 @ 05:53)   Potassium, Serum: 4.8 mmol/L (02.07.21 @ 06:09)   Potassium, Serum: 4.9 mmol/L (02.06.21 @ 17:30)   Potassium, Serum: 4.9 mmol/L (02.06.21 @ 06:12)   Potassium, Serum: 4.8 mmol/L (02.05.21 @ 08:56)   Potassium, Serum: 5.8 mmol/L (02.04.21 @ 05:36)   Potassium, Serum: 4.6 mmol/L (02.03.21 @ 12:00)   Potassium, Serum: 5.6 mmol/L (02.02.21 @ 06:12)   Potassium, Serum: 4.6 mmol/L (04.02.20 @ 07:10)   Potassium, Serum: 4.1 mmol/L (04.01.20 @ 12:51)   Potassium, Serum: 4.5 mmol/L (04.01.20 @ 10:34)   Potassium, Serum: 4.0 mmol/L (03.31.20 @ 06:24)   Potassium, Serum: 3.7 mmol/L (03.29.20 @ 09:37)   Potassium, Serum: 3.8 mmol/L (03.28.20 @ 09:55)   Potassium, Serum: 4.5: Moderate hemolysis. Results may be falsely elevated. mmol/L (03.27.20 @ 09:56)   Potassium, Serum: 3.6 mmol/L (03.26.20 @ 07:31)   Potassium, Serum: 2.4: TYPE:(C=Critical, N=Notification, A=Abnormal) C   TESTS: POT   DATE/TIME CALLED: 03/25/20 16:21   CALLED TO: MILY VARELA RN   READ BACK (2 Patient Identifiers)(Y/N): Y   READ BACK VALUES (Y/N): Y   CALLED BY: JV mmol/L (03.25.20 @ 15:43)   Potassium, Serum: 5.1 mmol/L (05.23.16 @ 06:52)   Potassium, Serum: 5.5 mmol/L (05.22.16 @ 22:32)   Potassium, Serum: 5.5 mmol/L (05.22.16 @ 07:03)   Potassium, Serum: 5.0 mmol/L (05.21.16 @ 05:14)   Potassium, Serum: 4.9 mmol/L (05.20.16 @ 08:34)   Potassium, Serum: 4.5 mmol/L (05.19.16 @ 21:44) < from: 12 Lead ECG (02.09.22 @ 11:49)    Ventricular Rate 93 BPM    Atrial Rate 93 BPM    P-R Interval 146 ms    QRS Duration 76 ms    Q-T Interval 358 ms    QTC Calculation(Bazett) 445 ms    P Axis 63 degrees    R Axis 9 degrees    T Axis 145 degrees    Diagnosis Line Sinus rhythm with Premature atrial complexes  ST & T wave abnormality, consider lateral ischemia  Abnormal ECG    Confirmed by MELECIO CONNER ([id number]) on 2/10/2022 6:02:46 AM    72 y/o M h/o ESRD on hemodialysis via L brachiocephalic fistula presenting with nonfunctional AVF s/p L cephalic vein stenting 2 days ago. Hemodynamically stable, afebrile. Per nephrology, not in need or emergent dialysis at the moment.    Plan:  Recommend IR placement of TDC,  Dialysis once TDC is  placed  Will perform AV fistulagram during inpatient stay    Patient seen and plan discussed with vascular surgery, [facility] & Nicole,     Wife aware,     Not interested in PD,

## 2022-03-19 NOTE — ED PROVIDER NOTE - OBJECTIVE STATEMENT
Patient is a 71 year old male with history of double lung transplant, ESRD on HD presenting with difficult fistula access. Pt's last HD wasn't Tuesday. Went for HD on Thursday but fistula wasn't working. Had outpatient angioplasty. Today HD center unable to access fistula again. Pt notes worsening pain over fistula. No bleeding. No weakness or numbness. No chest pain or sob. No LE pain or swelling. No changes in meds.

## 2022-03-19 NOTE — ED ADULT NURSE NOTE - OBJECTIVE STATEMENT
Patient coming in with non working L AV Fistula. Patient states that the fistula sometimes works and sometimes doesn't. Ecchymosis noted above the L arm. Patient states that the fistula was stented on 3/17 due to decreased blood flow. Patient currently denying any medical symptoms at this time.

## 2022-03-19 NOTE — ED PROVIDER NOTE - NSICDXPASTMEDICALHX_GEN_ALL_CORE_FT
PAST MEDICAL HISTORY:  2019 novel coronavirus disease (COVID-19)     BPH without urinary obstruction     Emphysema of lung     ESRD (end stage renal disease) on dialysis     Fungal meningitis     H/O peripheral neuropathy     History of COPD     [place] (hard of hearing)     HTN (hypertension)     Hypercholesterolemia     Lumbar spondylosis     Oliguria and anuria     Pneumonia

## 2022-03-19 NOTE — CONSULT NOTE ADULT - SUBJECTIVE AND OBJECTIVE BOX
VASCULAR SURGERY CONSULT    Consulting surgical team: Vascular Surgery  Consulting attending: Dr. Hernandez  Patient seen and examined: 03-19-22 @ 16:53    HPI: 72 y/o M h/o ESRD on T/Th/S hemodialysis presents to the ED after being unable to have left brachiocephalic fistula accessed for hemodialysis today. Patient last had HD this past Tuesday, 3/15/22. On Thursday, AVF was unable to be access and patient went to [facility] for AV fistulagram and cephalic vein stent placement.     PAST MEDICAL HISTORY:  Emphysema of lung  ESRD (end stage renal disease) on dialysis  Fungal meningitis  2019 novel coronavirus disease (COVID-19)  [place] (hard of hearing)  HTN (hypertension)  Lumbar spondylosis  History of COPD  BPH without urinary obstruction  Hypercholesterolemia  Oliguria and anuria  H/O peripheral neuropathy    PAST SURGICAL HISTORY:  H/O lung transplant  H/O heart artery stent  History of hip replacement  Status post open reduction and internal fixation (ORIF) of fracture    ALLERGIES:  budesonide (Unknown)  cefpodoxime (Unknown)  Pollen (Unknown)    MEDICATIONS  (STANDING):  sodium zirconium cyclosilicate 10 Gram(s) Oral daily    VITALS & I/Os:  Vital Signs Last 24 Hrs  T(C): 36.4 (19 Mar 2022 16:34), Max: 36.7 (19 Mar 2022 12:02)  T(F): 97.5 (19 Mar 2022 16:34), Max: 98 (19 Mar 2022 12:02)  HR: 85 (19 Mar 2022 16:34) (85 - 89)  BP: 141/77 (19 Mar 2022 16:34) (131/71 - 141/77)  BP(mean): --  RR: 20 (19 Mar 2022 16:34) (20 - 20)  SpO2: 96% (19 Mar 2022 16:34) (96% - 98%)  CAPILLARY BLOOD GLUCOSE    General: NAD, AOx3, comfortable on examination  HEENT: PERRLA, EOMI, moist mucous membranes  Neck: supple, nontender  Cardiovascular: heart RRR, no murmurs  Respiratory: no respiratory distress, CTAB  Abdomen: soft, nontender, nondistended. No guarding or rebound. Normal bowel sounds.  Extremities: no peripheral edema. Normal ROM. Left upper arm AVF with faintly palpable thrill; L radial pulse 2+  Integumentary: warm, no rash  Neuro: no motor or sensory deficits      LABS:                        9.9    12.82 )-----------( 240      ( 19 Mar 2022 13:27 )             33.4     03-19    134<L>  |  91<L>  |  71.9<H>  ----------------------------<  122<H>  5.7<H>   |  21.0<L>  |  10.20<H>    Ca    8.5<L>      19 Mar 2022 13:27    TPro  7.0  /  Alb  4.1  /  TBili  0.4  /  DBili  x   /  AST  26  /  ALT  6   /  AlkPhos  169<H>  03-19      PT/INR - ( 19 Mar 2022 13:27 )   PT: 13.1 sec;   INR: 1.13 ratio         PTT - ( 19 Mar 2022 13:27 )  PTT:31.8 sec    IMAGING:  < from: US Duplex Venous Upper Ext Ltd, Left (03.19.22 @ 14:09) >    ACC: [id number] EXAM:  US DPLX UPR EXT VEINS LTD LT                          PROCEDURE DATE:  03/19/2022          INTERPRETATION:  CLINICAL INFORMATION: End-stage renal disease on   hemodialysis. Left upper extremity brachiocephalic AV fistula. Patient   reportedly had stent placed in the cephalic outflow vein one week ago.   Now with difficulty accessing fistula for dialysis.    COMPARISON: None available.    TECHNIQUE: Duplex study hemodialysis access left upper extremity.    FINDINGS:    The study is incomplete as the arterial side of the fistula is not   evaluated. Metallic stent is visualized within the cephalic outflow vein.   Air between the stent and the vessel wall attenuates sound such that flow   within the stent cannot be evaluated. However, high velocity pulsatile   venous flow is demonstrated within the cephalic vein both proximal and   distal to the stent with peak velocities of approximately 150-250 cm/s.   The brachial, basilic, axillary, subclavian and internal jugular veins   are patent and demonstrate normal phasic venous flow.    IMPRESSION:  Incomplete study with nonvisualization of the inflow brachial artery and   AV fistula.    Apparent patency of stent in the outflow cephalic vein.    --- End of Report ---    < end of copied text >  < from: US Duplex Venous Upper Ext Ltd, Left (03.19.22 @ 14:09) >  ACC: [id number] EXAM:  US DPLX UPR EXT VEINS LTD LT                          PROCEDURE DATE:  03/19/2022          INTERPRETATION:  CLINICAL INFORMATION: End-stage renal disease on   hemodialysis. Left upper extremity brachiocephalic AV fistula. Patient   reportedly had stent placed in the cephalic outflow vein one week ago.   Now with difficulty accessing fistula for dialysis.    COMPARISON: None available.    TECHNIQUE: Duplex study hemodialysis access left upper extremity.    FINDINGS:    The study is incomplete as the arterial side of the fistula is not   evaluated. Metallic stent is visualized within the cephalic outflow vein.   Air between the stent and the vessel wall attenuates sound such that flow   within the stent cannot be evaluated. However, high velocity pulsatile   venous flow is demonstrated within the cephalic vein both proximal and   distal to the stent with peak velocities of approximately 150-250 cm/s.   The brachial, basilic, axillary, subclavian and internal jugular veins   are patent and demonstrate normal phasic venous flow.    IMPRESSION:  Incomplete study with nonvisualization of the inflow brachial artery and   AV fistula.    Apparent patency of stent in the outflow cephalic vein.    --- End of Report ---    MIRNA CRUZ MD; Attending Radiologist  This document has been electronically signed. Mar 19 2022  2:49PM    < end of copied text >

## 2022-03-19 NOTE — ED ADULT NURSE NOTE - NSICDXPASTSURGICALHX_GEN_ALL_CORE_FT
PAST SURGICAL HISTORY:  H/O heart artery stent x3    H/O lung transplant bilateral - transplant - 1-2-2015 -  [facility]    History of hip replacement right    Status post open reduction and internal fixation (ORIF) of fracture left femur     Protopic Counseling: Patient may experience a mild burning sensation during topical application. Protopic is not approved in children less than 2 years of age. There have been case reports of hematologic and skin malignancies in patients using topical calcineurin inhibitors although causality is questionable.

## 2022-03-19 NOTE — CONSULT NOTE ADULT - ASSESSMENT
71 year old male with PMH HTN, Dyslipidemia, CAD s/p PCI, ESRD on HD T/Th/Sat, s/p double lung transplant and recent L CEA was sent in from HD center due to nonfunctioning LUE AVF.      ESRD on HD; Nonfunctioning HD catheter. No evidence of uremic toxicity,  fluid overload or cardiorespiratory compromise; however starting to develop pedal edema and has Hyperkalemia.   - Low K diet    Anemia, chronic disease  - Iron studies, monitor Hgb. MENDY with HD    Lung transplant  - Continue Tacrolimus, MMF and Hydrocortisone    Hypertension and chronic kidney disease (CKD)CAD, PAD    Rec : VS Evaln., Reviewed,    TDC as an interim access , Rest AVF,     HD after above,     D.W wife @ Bedside & Dr. Rivera, 
70 y/o M h/o ESRD on hemodialysis via L brachiocephalic fistula presenting with nonfunctional AVF s/p L cephalic vein stenting 2 days ago. Hemodynamically stable, afebrile. Per nephrology, not in need or emergent dialysis at the moment.    Plan:  Recommend IR placement of permacath  Dialysis once permacath placed  Will perform AV fistulagram during inpatient stay  Vascular Surgery will continue to follow    Patient seen and plan discussed with vascular surgeon Dr. Hernandez.

## 2022-03-19 NOTE — ED ADULT TRIAGE NOTE - HEIGHT IN INCHES
I spoke with Mary the care manager at [facility] ER. We discussed the frequent visits the emergency room. Patient mentioned in yesterday's ER visit that our office tells him to go to the ER and not come into the office.     I explained to Mary that Johnnie is more than welcome to have office visits or video visits at our office. We can schedule weekly visits if needed. He does require longer visits but on days we dont have open visits we can fit him in when possible.     Mary to sit with her team and get back to us.    6

## 2022-03-19 NOTE — ED ADULT NURSE REASSESSMENT NOTE - NS ED NURSE REASSESS COMMENT FT1
report given to dhaval chen. Patient alert and oriented at time of transfer to CDU, with no medical complaints. Patient pending permacath placement and AV Fistulagram. SX to follow

## 2022-03-19 NOTE — H&P ADULT - HISTORY OF PRESENT ILLNESS
71 year old male with PMH HTN, Dyslipidemia, CAD s/p PCI, ESRD on HD T/Th/Sat, s/p double lung transplant and recent L CEA was sent in from HD center due to nonfunctioning LUE AVF. Patient last has HD on Tuesday and went for HD on Thursday and was noted to have a nonfunctioning fistula; sent to [facility] where he had a fistulogram and stent placed. He went for HD this morning and was sent here.  Denies any dyspnea, palpitations, dizziness, fever, chills, nausea, vomiting. Complains of pain in LUE AVF with cannulation not relieved with Tramadol. Makes little urine.    Evaluated by Nephrology in ER - not deemed to require urgent HD

## 2022-03-19 NOTE — ED PROVIDER NOTE - NSICDXPASTSURGICALHX_GEN_ALL_CORE_FT
PAST SURGICAL HISTORY:  H/O heart artery stent x3    H/O lung transplant bilateral - transplant - 1-2-2015 -  [facility]    History of hip replacement right    Status post open reduction and internal fixation (ORIF) of fracture left femur

## 2022-03-20 NOTE — PATIENT PROFILE ADULT - FALL HARM RISK - RISK INTERVENTIONS

## 2022-03-20 NOTE — PATIENT PROFILE ADULT - INTERNATIONAL TRAVEL
No

## 2022-03-20 NOTE — PROCEDURE NOTE - NSICDXPROCEDURE_GEN_ALL_CORE_FT
PROCEDURES:  Addendum to end stage renal disease dialysis plan of care 20-Mar-2022 19:02:44 Elevated SCr and K Leatha Doll

## 2022-03-20 NOTE — CONSULT NOTE ADULT - SUBJECTIVE AND OBJECTIVE BOX
Patient is a 71y old  Male who presents with a chief complaint of They sent me in (20 Mar 2022 13:28)    BRIEF HOSPITAL COURSE:   71 year old male with PMH HTN, Dyslipidemia, CAD s/p PCI, ESRD on HD T/Th/Sat, s/p double lung transplant and recent L CEA was sent in from HD center due to nonfunctioning LUE AVF. Patient last has HD on Tuesday and went for HD on Thursday and was noted to have a nonfunctioning fistula; sent to [facility] where he had a fistulogram and stent placed. He went for HD this morning and was sent here  Pt was admitted to CDU for observation w/ medical management of renal function and hyperkalemia w/ scheduled permacath placement w/ vascular for Monday 3/21, however now w/ worsening SCr/K, Nephrology would like temporary shiley placed via vascular and to be urgently dialyzed today. MICU consulted for further management.  Pt seen and examined at bedside, AAO. Endorses mild SOB, denies HA/dizziness, CP, abdominal pain, N/V/D. HD stable.     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  2019 novel coronavirus disease (COVID-19)  Feb 2021 - hospitalized for 1 week at [facility]  Pneumonia  April 2021  [place] (hard of hearing)  HTN (hypertension)  Lumbar spondylosis  History of COPD  BPH without urinary obstruction  Hypercholesterolemia  Oliguria and anuria  H/O peripheral neuropathy  H/O lung transplant  bilateral - transplant - 1-2-2015 -  [facility]  H/O heart artery stent  x3 @[facility]  History of hip replacement  right  Status post open reduction and internal fixation (ORIF) of fracture  left femur    Review of Systems:  CONSTITUTIONAL: No fever, chills, or fatigue  EYES: No eye pain, visual disturbances, or discharge  ENMT:  No difficulty hearing, tinnitus, vertigo; No sinus or throat pain  NECK: No pain or stiffness  RESPIRATORY: No cough, wheezing, chills or hemoptysis; (+) shortness of breath  CARDIOVASCULAR: No chest pain, palpitations, dizziness, or leg swelling  GASTROINTESTINAL: No abdominal or epigastric pain. No nausea, vomiting, or hematemesis; No diarrhea or constipation. No melena or hematochezia.  GENITOURINARY: No dysuria, frequency, hematuria, or incontinence  NEUROLOGICAL: No headaches, memory loss, loss of strength, numbness, or tremors  SKIN: No itching, burning, rashes, or lesions   MUSCULOSKELETAL: No joint pain or swelling; No muscle, back, or extremity pain  PSYCHIATRIC: No depression, anxiety, mood swings, or difficulty sleeping      Medications:  fluconAZOLE   Tablet 200 milliGRAM(s) Oral <User Schedule>  trimethoprim  160 mG/sulfamethoxazole 800 mG 1 Tablet(s) Oral <User Schedule>    amLODIPine   Tablet 5 milliGRAM(s) Oral daily  furosemide   Injectable 40 milliGRAM(s) IV Push once  tamsulosin 0.4 milliGRAM(s) Oral at bedtime      acetaminophen     Tablet .. 650 milliGRAM(s) Oral every 6 hours PRN  traMADol 25 milliGRAM(s) Oral every 6 hours PRN      aspirin enteric coated 81 milliGRAM(s) Oral daily  clopidogrel Tablet 75 milliGRAM(s) Oral daily  heparin   Injectable 5000 Unit(s) SubCutaneous every 12 hours    pantoprazole    Tablet 40 milliGRAM(s) Oral before breakfast      atorvastatin 80 milliGRAM(s) Oral at bedtime  hydrocortisone 10 milliGRAM(s) Oral two times a day    magnesium oxide 400 milliGRAM(s) Oral daily  multivitamin 1 Tablet(s) Oral daily    mycophenolate mofetil  Oral Tab/Cap - Peds 500 milliGRAM(s) Oral two times a day  tacrolimus 1 milliGRAM(s) Oral two times a day  tacrolimus 0.5 milliGRAM(s) Oral daily    chlorhexidine 4% Liquid 1 Application(s) Topical <User Schedule>    sevelamer carbonate 1600 milliGRAM(s) Oral three times a day with meals  sodium zirconium cyclosilicate 10 Gram(s) Oral daily          ICU Vital Signs Last 24 Hrs  T(C): 36.4 (20 Mar 2022 11:47), Max: 36.7 (20 Mar 2022 04:41)  T(F): 97.6 (20 Mar 2022 11:47), Max: 98 (20 Mar 2022 04:41)  HR: 89 (20 Mar 2022 11:47) (85 - 92)  BP: 120/65 (20 Mar 2022 11:47) (112/53 - 141/77)  BP(mean): --  ABP: --  ABP(mean): --  RR: 18 (20 Mar 2022 11:47) (18 - 20)  SpO2: 88% (20 Mar 2022 11:47) (88% - 96%)          I&O's Detail        LABS:                        9.1    11.25 )-----------( 222      ( 20 Mar 2022 03:23 )             30.6     03-20    136  |  92<L>  |  76.0<H>  ----------------------------<  99  6.0<H>   |  18.0<L>  |  11.06<H>    Ca    8.3<L>      20 Mar 2022 11:54  Phos  6.9     03-20    TPro  x   /  Alb  3.9  /  TBili  x   /  DBili  x   /  AST  x   /  ALT  x   /  AlkPhos  x   03-20          CAPILLARY BLOOD GLUCOSE        PT/INR - ( 19 Mar 2022 13:27 )   PT: 13.1 sec;   INR: 1.13 ratio         PTT - ( 19 Mar 2022 13:27 )  PTT:31.8 sec    CULTURES:      Physical Examination:    General: No acute distress.  Alert, oriented, interactive, nonfocal    HEENT: Pupils equal, reactive to light.  Symmetric.    PULM: Clear to auscultation bilaterally, no significant sputum production    CVS: Regular rate and rhythm, no murmurs, rubs, or gallops    ABD: Soft, nondistended, nontender, normoactive bowel sounds, no masses    EXT: No edema, nontender    SKIN: Warm and well perfused, no rashes noted.    NEURO: A&Ox3, strength 5/5 all extremities, cranial nerves grossly intact, no focal deficits      RADIOLOGY: ***      CRITICAL CARE TIME SPENT: ***  Evaluating/treating patient, reviewing data/labs/imaging, discussing case with multidisciplinary team, discussing plan/goals of care with patient/family. Non-inclusive of procedure time.   Patient is a 71y old  Male who presents with a chief complaint of They sent me in (20 Mar 2022 13:28)    BRIEF HOSPITAL COURSE:   71 year old male with PMH HTN, Dyslipidemia, CAD s/p PCI, ESRD on HD T/Th/Sat, s/p double lung transplant and recent L CEA was sent in from HD center due to nonfunctioning LUE AVF. Patient last has HD on Tuesday and went for HD on Thursday and was noted to have a nonfunctioning fistula; sent to [facility] where he had a fistulogram and stent placed. He went for HD this morning and was sent here  Pt was admitted to CDU for observation w/ medical management of renal function and hyperkalemia w/ scheduled permacath placement w/ vascular for Monday 3/21, however now w/ worsening SCr/K, Nephrology would like temporary shiley placed via vascular and to be urgently dialyzed today. MICU consulted for further management.  Pt seen and examined at bedside, AAO. Denies HA/dizziness, CP, SOB, abdominal pain, N/V/D. HD stable, satting well on NC.     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  2019 novel coronavirus disease (COVID-19)  Feb 2021 - hospitalized for 1 week at [facility]  Pneumonia  April 2021  [place] (hard of hearing)  HTN (hypertension)  Lumbar spondylosis  History of COPD  BPH without urinary obstruction  Hypercholesterolemia  Oliguria and anuria  H/O peripheral neuropathy  H/O lung transplant  bilateral - transplant - 1-2-2015 -  [facility]  H/O heart artery stent  x3 @[facility]  History of hip replacement  right  Status post open reduction and internal fixation (ORIF) of fracture  left femur    Review of Systems:  CONSTITUTIONAL: No fever, chills, or fatigue  EYES: No eye pain, visual disturbances, or discharge  ENMT:  No difficulty hearing, tinnitus, vertigo; No sinus or throat pain  NECK: No pain or stiffness  RESPIRATORY: No cough, wheezing, chills or hemoptysis; (+) shortness of breath  CARDIOVASCULAR: No chest pain, palpitations, dizziness, or leg swelling  GASTROINTESTINAL: No abdominal or epigastric pain. No nausea, vomiting, or hematemesis; No diarrhea or constipation. No melena or hematochezia.  GENITOURINARY: No dysuria, frequency, hematuria, or incontinence  NEUROLOGICAL: No headaches, memory loss, loss of strength, numbness, or tremors  SKIN: No itching, burning, rashes, or lesions   MUSCULOSKELETAL: No joint pain or swelling; No muscle, back, or extremity pain  PSYCHIATRIC: No depression, anxiety, mood swings, or difficulty sleeping    Medications:  fluconAZOLE   Tablet 200 milliGRAM(s) Oral <User Schedule>  trimethoprim  160 mG/sulfamethoxazole 800 mG 1 Tablet(s) Oral <User Schedule>  amLODIPine   Tablet 5 milliGRAM(s) Oral daily  furosemide   Injectable 40 milliGRAM(s) IV Push once  tamsulosin 0.4 milliGRAM(s) Oral at bedtime  acetaminophen     Tablet .. 650 milliGRAM(s) Oral every 6 hours PRN  traMADol 25 milliGRAM(s) Oral every 6 hours PRN  aspirin enteric coated 81 milliGRAM(s) Oral daily  clopidogrel Tablet 75 milliGRAM(s) Oral daily  heparin   Injectable 5000 Unit(s) SubCutaneous every 12 hours  pantoprazole    Tablet 40 milliGRAM(s) Oral before breakfast  atorvastatin 80 milliGRAM(s) Oral at bedtime  hydrocortisone 10 milliGRAM(s) Oral two times a day  magnesium oxide 400 milliGRAM(s) Oral daily  multivitamin 1 Tablet(s) Oral daily  mycophenolate mofetil  Oral Tab/Cap - Peds 500 milliGRAM(s) Oral two times a day  tacrolimus 1 milliGRAM(s) Oral two times a day  tacrolimus 0.5 milliGRAM(s) Oral daily  chlorhexidine 4% Liquid 1 Application(s) Topical <User Schedule>  sevelamer carbonate 1600 milliGRAM(s) Oral three times a day with meals  sodium zirconium cyclosilicate 10 Gram(s) Oral daily    ICU Vital Signs Last 24 Hrs  T(C): 36.4 (20 Mar 2022 11:47), Max: 36.7 (20 Mar 2022 04:41)  T(F): 97.6 (20 Mar 2022 11:47), Max: 98 (20 Mar 2022 04:41)  HR: 89 (20 Mar 2022 11:47) (85 - 92)  BP: 120/65 (20 Mar 2022 11:47) (112/53 - 141/77)  BP(mean): --  ABP: --  ABP(mean): --  RR: 18 (20 Mar 2022 11:47) (18 - 20)  SpO2: 88% (20 Mar 2022 11:47) (88% - 96%)    I&O's Detail    LABS:                      9.1    11.25 )-----------( 222      ( 20 Mar 2022 03:23 )             30.6     03-20  136  |  92<L>  |  76.0<H>  ----------------------------<  99  6.0<H>   |  18.0<L>  |  11.06<H>  Ca    8.3<L>      20 Mar 2022 11:54  Phos  6.9     03-20  TPro  x   /  Alb  3.9  /  TBili  x   /  DBili  x   /  AST  x   /  ALT  x   /  AlkPhos  x   03-2    CAPILLARY BLOOD GLUCOSE    PT/INR - ( 19 Mar 2022 13:27 )   PT: 13.1 sec;   INR: 1.13 ratio    PTT - ( 19 Mar 2022 13:27 )  PTT:31.8 sec    CULTURES:    Physical Examination:  General: Elderly gentleman.   HEENT: PERRL.  PULM: Clear to auscultation bilaterally. No increase WOB/respiratory distress.   CVS: s1/s2.  ABD: Soft, nondistended, nontender, normoactive bowel sounds.  EXT: No edema, nontender  SKIN: Warm.  NEURO: Alert, oriented, interactive, nonfocal.    RADIOLOGY:   < from: US Duplex Venous Upper Ext Ltd, Left (03.19.22 @ 14:09) >  ACC: [id number] EXAM:  US DPLX UPR EXT VEINS LTD LT                        PROCEDURE DATE:  03/19/2022    IMPRESSION:  Incomplete study with nonvisualization of the inflow brachial artery and   AV fistula.  Apparent patency of stent in the outflow cephalic vein.      71 year old male with PMH HTN, Dyslipidemia, CAD s/p PCI, ESRD on HD T/Th/Sat, s/p double lung transplant and recent L CEA was sent in from HD center due to nonfunctioning LUE AVF. Patient last has HD on Tuesday and went for HD on Thursday and was noted to have a nonfunctioning fistula; sent to [facility] where he had a fistulogram and stent placed. He went for HD this morning and was sent here.  Pt was admitted to CDU for observation w/ medical management of renal function and hyperkalemia w/ scheduled permacath placement w/ vascular for Monday 3/21, however now w/ worsening SCr/K, Nephrology would like temporary shiley placed via vascular and to be urgently dialyzed today. MICU consulted for further management.  Pt seen and examined at bedside, AAO. Endorses mild SOB, denies HA/dizziness, CP, abdominal pain, N/V/D. HD stable, satting well on NC.   Assessment:  1. ESRD on HD T/Th/S now w/ nonfunctioning LUE AVF  2. Hyperkalemia  3. Acidosis    Plan:  NEURO:  -No acute issues.  -Analgesia PRN.     CV:  -Maintain goal MAP >65.   -Keep K~4 and Mg>2 for optimal arrhythmia suppression.  -ASA/Statin, Plavix. Amlodipine.     RESP:  -Satting well on Nc, goal SpO2 >92%.    RENAL:  -ESRD on HD T/Th/S.   -trend lytes/Scr daily with BMP  -I's and O's, goal UOP 0.5 cc/kg/hr  -renal dose meds and avoid nephrotoxins   -To have Shiley placed via vascular today for urgent HD, followed by permacath placement in AM w/ vascular. Nephrology following, HD per them.   -Lokelma QD. Insulin/D50 ordered for Hyperkalemia. Renvela.     GI:  -DASH/TLC diet w/ renal restrictions. NPO after midnight for procedure in AM.   -Protonix QD.    ENDO:  -Aggressive glycemic control to limit FS glucose to <180mg/dl. BS WNL.    ID:  -Afebrile, mild leukocytosis, nontoxic appearing.  -S/p LungTx - Cellcept, Tacrolimus, Hydrocortisone BID.   -PPX w/ Bactrim, Diflucan.     HEME:  -DVT ppx w/ SC Heparin.     DISPO: D/w ICU intensivist Dr. Roland - will admit to MICU for urgent HD.     TIME SPENT: 38 minutes  Evaluating/treating patient, reviewing data/labs/imaging, discussing case with multidisciplinary team, discussing plan/goals of care with patient/family. Non-inclusive of procedure time.

## 2022-03-21 NOTE — DIETITIAN INITIAL EVALUATION ADULT. - OTHER INFO
Pt is a 71 year old male with PMH HTN, Dyslipidemia, CAD s/p PCI, ESRD on HD T/Th/Sat, s/p double lung transplant and recent L CEA was sent in from HD center due to nonfunctioning LUE AVF. Patient last has HD on Tuesday and went for HD on Thursday and was noted to have a nonfunctioning fistula; sent to [facility] where he had a fistulogram and stent placed. He went for HD this morning and was sent here. He had a temporary left groin HD catheter placed 3/20 and underwent HD later that night.  Plan for tunneled dialysis catheter placement planned for this admission.   Pt reports having poor appetite at this time; reports stomach has not been feeling right. Pt is unsure if he has lost any weight. Pt reports seeing a Dietitian at his dialysis diet and follows his Renal diet at home. Further education declined at this time. Brief NFPE conducted.

## 2022-03-21 NOTE — CHART NOTE - NSCHARTNOTEFT_GEN_A_CORE
Spoke w/ IR - patient on schedule for permacath placement this afternoon.   Pt stable for downgrade to tele. Sign out given to Hospitalist, Dr. Cutler.  Case d/w ICU intensivist Dr. Cardona.

## 2022-03-21 NOTE — DISCHARGE NOTE NURSING/CASE MANAGEMENT/SOCIAL WORK - PATIENT PORTAL LINK FT
You can access the FollowMyHealth Patient Portal offered by [facility] by registering at the following website: [email or web address]. By joining [facility]’s FollowMyHealth portal, you will also be able to view your health information using other applications (apps) compatible with our system.

## 2022-03-21 NOTE — DIETITIAN INITIAL EVALUATION ADULT. - ETIOLOGY
Related to inadequate protein energy intake with increased needs in setting of ESRD on HD admitted with nonfunctioning AV fistula.

## 2022-03-21 NOTE — PROGRESS NOTE ADULT - SUBJECTIVE AND OBJECTIVE BOX
Interval HPI:  underwent HD last night via [facility]  no complaints, just tired and wants to sleep    Exam:  alert and oriented, nad  reg rhythm  bilat air entry  abd soft  left arm fistula with palpable thrill, no bleeding from suture  trace edema  skin warm and dry  left fem HD cath present , no bleeding or hematoma in either groin (bandage over right groin)    On Admission  03-19-22 (2d)  HPI:  71 year old male with PMH HTN, Dyslipidemia, CAD s/p PCI, ESRD on HD T/Th/Sat, s/p double lung transplant and recent L CEA was sent in from HD center due to nonfunctioning LUE AVF. Patient last has HD on Tuesday and went for HD on Thursday and was noted to have a nonfunctioning fistula; sent to [facility] where he had a fistulogram and stent placed. He went for HD this morning and was sent here.  Denies any dyspnea, palpitations, dizziness, fever, chills, nausea, vomiting. Complains of pain in LUE AVF with cannulation not relieved with Tramadol. Makes little urine.    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    H/O heart artery stent  x3 @[facility]    History of hip replacement  right    Status post open reduction and internal fixation (ORIF) of fracture  left femur        Antimicrobial:  fluconAZOLE   Tablet 200 milliGRAM(s) Oral <User Schedule>  trimethoprim  160 mG/sulfamethoxazole 800 mG 1 Tablet(s) Oral <User Schedule>    Cardiovascular:  amLODIPine   Tablet 5 milliGRAM(s) Oral daily  tamsulosin 0.4 milliGRAM(s) Oral at bedtime    Pulmonary:    Hematalogic:  aspirin enteric coated 81 milliGRAM(s) Oral daily  clopidogrel Tablet 75 milliGRAM(s) Oral daily  heparin   Injectable 5000 Unit(s) SubCutaneous every 12 hours    Other:  acetaminophen     Tablet .. 650 milliGRAM(s) Oral every 6 hours PRN  atorvastatin 80 milliGRAM(s) Oral at bedtime  chlorhexidine 4% Liquid 1 Application(s) Topical <User Schedule>  hydrocortisone 10 milliGRAM(s) Oral two times a day  magnesium oxide 400 milliGRAM(s) Oral daily  multivitamin 1 Tablet(s) Oral daily  mycophenolate mofetil  Oral Tab/Cap - Peds 500 milliGRAM(s) Oral two times a day  pantoprazole    Tablet 40 milliGRAM(s) Oral before breakfast  sevelamer carbonate 1600 milliGRAM(s) Oral three times a day with meals  sodium zirconium cyclosilicate 10 Gram(s) Oral daily  tacrolimus 1 milliGRAM(s) Oral two times a day  tacrolimus 0.5 milliGRAM(s) Oral daily  traMADol 25 milliGRAM(s) Oral every 6 hours PRN      Drug Dosing Weight  Height (cm): 167.6 (19 Mar 2022 12:02)  Weight (kg): 65.3 (19 Mar 2022 12:02)  BMI (kg/m2): 23.2 (19 Mar 2022 12:02)  BSA (m2): 1.74 (19 Mar 2022 12:02)    T(C): 36.8 (03-21-22 @ 06:00), Max: 36.8 (03-20-22 @ 16:07)  HR: 100 (03-21-22 @ 07:00)  BP: 125/58 (03-21-22 @ 07:00)  BP(mean): 84 (03-21-22 @ 07:00)  ABP: --  ABP(mean): --  RR: 21 (03-21-22 @ 07:00)  SpO2: 90% (03-21-22 @ 07:00)          03-20 @ 07:01  -  03-21 @ 07:00  --------------------------------------------------------  IN: 50 mL / OUT: 2000 mL / NET: -1950 mL              LABS:  CBC Full  -  ( 21 Mar 2022 07:33 )  WBC Count : 12.84 K/uL  RBC Count : 3.34 M/uL  Hemoglobin : 9.3 g/dL  Hematocrit : 30.8 %  Platelet Count - Automated : 212 K/uL  Mean Cell Volume : 92.2 fl  Mean Cell Hemoglobin : 27.8 pg  Mean Cell Hemoglobin Concentration : 30.2 gm/dL  Auto Neutrophil # : x  Auto Lymphocyte # : x  Auto Monocyte # : x  Auto Eosinophil # : x  Auto Basophil # : x  Auto Neutrophil % : x  Auto Lymphocyte % : x  Auto Monocyte % : x  Auto Eosinophil % : x  Auto Basophil % : x    03-21    139  |  95<L>  |  40.9<H>  ----------------------------<  57<L>  5.0   |  22.0  |  7.11<H>    Ca    8.5<L>      21 Mar 2022 07:33  Phos  6.9     03-20    TPro  6.3<L>  /  Alb  3.7  /  TBili  0.5  /  DBili  x   /  AST  27  /  ALT  6   /  AlkPhos  169<H>  03-21    PT/INR - ( 19 Mar 2022 13:27 )   PT: 13.1 sec;   INR: 1.13 ratio         PTT - ( 19 Mar 2022 13:27 )  PTT:31.8 sec      ____________________________________________________________________________________________________  
[facility] DIVISION OF KIDNEY DISEASES AND HYPERTENSION -- HEMODIALYSIS NOTE  --------------------------------------------------------------------------------  Chief Complaint: ESRD/Ongoing hemodialysis requirement    24 hour events/subjective:  s/p HD yesterday  Pt seen and examined; he is upset and wants to go home      PAST HISTORY  --------------------------------------------------------------------------------  No significant changes to PMH, PSH, FHx, SHx, unless otherwise noted    ALLERGIES & MEDICATIONS  --------------------------------------------------------------------------------  Allergies    budesonide (Unknown)  cefpodoxime (Unknown)  Pollen (Unknown)    Intolerances      Standing Inpatient Medications  amLODIPine   Tablet 5 milliGRAM(s) Oral daily  atorvastatin 80 milliGRAM(s) Oral at bedtime  chlorhexidine 2% Cloths 1 Application(s) Topical daily  fluconAZOLE   Tablet 200 milliGRAM(s) Oral <User Schedule>  hydrocortisone 10 milliGRAM(s) Oral two times a day  magnesium oxide 400 milliGRAM(s) Oral daily  multivitamin 1 Tablet(s) Oral daily  mycophenolate mofetil  Oral Tab/Cap - Peds 500 milliGRAM(s) Oral two times a day  pantoprazole    Tablet 40 milliGRAM(s) Oral before breakfast  sevelamer carbonate 1600 milliGRAM(s) Oral three times a day with meals  sodium zirconium cyclosilicate 10 Gram(s) Oral daily  tacrolimus 1 milliGRAM(s) Oral two times a day  tacrolimus 0.5 milliGRAM(s) Oral daily  tamsulosin 0.4 milliGRAM(s) Oral at bedtime  trimethoprim  160 mG/sulfamethoxazole 800 mG 1 Tablet(s) Oral <User Schedule>    PRN Inpatient Medications  acetaminophen     Tablet .. 650 milliGRAM(s) Oral every 6 hours PRN  traMADol 25 milliGRAM(s) Oral every 6 hours PRN      REVIEW OF SYSTEMS  --------------------------------------------------------------------------------  Gen: No weight changes, fatigue, fevers/chills, weakness  Skin: No rashes  Head/Eyes/Ears/Mouth: No headache  Respiratory: No dyspnea, cough,  CV: No chest pain, orthopnea  GI: No abdominal pain, diarrhea, constipation, nausea, vomiting,  MSK: No joint pain  Neuro: No dizziness/lightheadedness, weakness  Heme: No bleeding  Psych: No significant nervousness, anxiety, stress, depression    All other systems were reviewed and are negative, except as noted.    VITALS/PHYSICAL EXAM  --------------------------------------------------------------------------------  T(C): 36.8 (03-21-22 @ 06:00), Max: 36.8 (03-20-22 @ 16:07)  HR: 97 (03-21-22 @ 14:00) (61 - 113)  BP: 131/62 (03-21-22 @ 14:00) (112/62 - 150/74)  RR: 18 (03-21-22 @ 14:00) (15 - 27)  SpO2: 97% (03-21-22 @ 14:00) (90% - 100%)  Wt(kg): --        03-20-22 @ 07:01  -  03-21-22 @ 07:00  --------------------------------------------------------  IN: 50 mL / OUT: 2000 mL / NET: -1950 mL      Physical Exam:  	Gen: NAD  	HEENT: Supple neck, on NC  	Pulm: CTA B/L  	CV: RRR, S1S2; no rub  	Abd: +BS, soft, nontender  	UE: Warm,  	LE: Warm, noedema  	Neuro: No focal deficits  	Psych: Normal affect and mood  	Skin: Warm, without rashes  	Vascular access: femoral non TDC    LABS/STUDIES  --------------------------------------------------------------------------------              9.3    12.84 >-----------<  212      [03-21-22 @ 07:33]              30.8     139  |  95  |  40.9  ----------------------------<  57      [03-21-22 @ 07:33]  5.0   |  22.0  |  7.11        Ca     8.5     [03-21-22 @ 07:33]      Phos  6.9     [03-20-22 @ 11:54]    TPro  6.3  /  Alb  3.7  /  TBili  0.5  /  DBili  x   /  AST  27  /  ALT  6   /  AlkPhos  169  [03-21-22 @ 07:33]          Iron 27, TIBC 247, %sat 11      [03-20-22 @ 03:23]  Ferritin 1149      [03-20-22 @ 03:23]  TSH 2.53      [04-14-21 @ 04:53]  Lipid: chol 119, TG 65, HDL 59, LDL --      [04-14-21 @ 04:53]    HBsAb <3.0      [03-21-22 @ 03:29]  HBsAg Nonreact      [03-21-22 @ 03:29]  HBcAb Nonreact      [03-21-22 @ 03:29]  HCV 0.12, Nonreact      [03-21-22 @ 03:29]  
Patient , S/P Lung Transplant- ESRD - HD    Presents w. access issues,     Recently had PTA,     Arm swollen,       Rec : Rest AVF,     TDC insertion,     HD in AM ,     D/W family & Dr. Rivera, 
        71 year old male with PMH HTN, Dyslipidemia, CAD s/p PCI, ESRD on HD T/Th/Sat, s/p double lung transplant and recent L CEA was sent in from HD center due to nonfunctioning LUE AVF. Patient last has HD on Tuesday and went for HD on Thursday and was noted to have a nonfunctioning fistula; sent to [facility] where he had a fistulogram and stent placed. He went for HD this morning and was sent here.  Pt was admitted to CDU for observation w/ medical management of renal function and hyperkalemia w/ scheduled permacath placement w/ vascular for Monday 3/21, however now w/ worsening SCr/K, Nephrology would like temporary shiley placed via vascular and to be urgently dialyzed today. MICU consulted for further management.  Pt seen and examined at bedside, AAO. Endorses mild SOB, denies HA/dizziness, CP, abdominal pain, N/V/D. HD stable,  pulse oximetry  > 90 % well on NC.     Assessment:    general   obese pleasant male no distress speaking full sentences  heent sclera anicteric mp 4  neck supple  lung  bilateral  air entry    heart s1 s2 rrr  abd + bowel sounds soft nt  extremities      no edema    - ICU monitoring  - HD  - follow   hemodynamics       Pt is seen and examined on dialysis. No symptoms. Hemodynamics stable. Tolerating dialysis and ultrafiltration.  Pre Laboratory values personally reviewed by me.  Dialysis adjusted appropriately based on current values.  Will continue the current medical management.  Next hemodialysis as scheduled.  Discussed with nursing, primary care team. 
RICA Hernandez & Chester,     Rec : Urgent NTDC Insertion & HD tonite,     TDC insertion in AM, 
Vital Signs Last 24 Hrs,    T(C): 36.5 (03-20-22 @ 08:29), Max: 36.7 (03-19-22 @ 12:02)  T(F): 97.7 (03-20-22 @ 08:29), Max: 98 (03-19-22 @ 12:02)  HR: 89 (03-20-22 @ 08:29) (85 - 92)  BP: 112/53 (03-20-22 @ 08:29) (112/53 - 141/77)  RR: 18 (03-20-22 @ 08:29) (18 - 20)  SpO2: 88% (03-20-22 @ 08:29) (88% - 98%)    138    |  94<L>  |  75.7<H>  ----------------------------<  85     Ca:8.2<L> (20 Mar 2022 08:02)  5.8<H>   |  19.0<L>  |  10.80<H>    TPro  6.6    /  Alb  3.8    /  TBili  0.4    /  DBili  x      /  AST  25     /  ALT  <5     /  AlkPhos  158<H>  20 Mar 2022 03:23                                9.1<L>  11.25<H> )-----------( 222      ( 20 Mar 2022 03:23 )                  30.6<L>    Ferritin:1149 ng/mL<H> Iron:27 ug/dL<L> TIBC:247 ug/dL Tsat:11 %<L> (03-20 @ 03:23)      
  HOSPITALIST PROGRESS NOTE    JU FERGUSON  [id number]  71yMale    Patient is a 71y old  Male who presents with a chief complaint of They sent me in (20 Mar 2022 08:39)      SUBJECTIVE:   Chart reviewed since last visit.    Hyperkalemia earlier today K 6.2 received cocktail.  Hypoxic to 88% on room air    Patient seen and examined at bedside for nonfunctioning AVF, ESRD, Hypokalemia.  Denies any dizziness, chest pain, palpitations, dyspnea, cough.    Very annoyed as was anticipating PermCath, HD and discharge home today. Doesn't want to stay another night      OBJECTIVE:  Vital Signs Last 24 Hrs  T(C): 36.4 (20 Mar 2022 11:47), Max: 36.7 (20 Mar 2022 04:41)  T(F): 97.6 (20 Mar 2022 11:47), Max: 98 (20 Mar 2022 04:41)  HR: 89 (20 Mar 2022 11:47) (85 - 92)  BP: 120/65 (20 Mar 2022 11:47) (112/53 - 141/77)   RR: 18 (20 Mar 2022 11:47) (18 - 20)  SpO2: 88% (20 Mar 2022 11:47) (88% - 96%)    PHYSICAL EXAMINATION  General: Elderly male sitting up in bed, no respiratory distress.   HEENT:  Right esotropia  NECK:  supple  CVS: regular rate and rhythm S1 S2  RESP:  CTAB  GI:  Soft nondistended nontender BS+  : No suprapubic tenderness  MSK:  FROM. No edema  CNS:  No gross focal or global deficit appreciated  INTEG: LUE AVF with bruit , thrill and significant ecchymoses around site  PSYCH:  Irritable mood    MONITOR:  CAPILLARY BLOOD GLUCOSE            I&O's Summary                          9.1    11.25 )-----------( 222      ( 20 Mar 2022 03:23 )             30.6     PT/INR - ( 19 Mar 2022 13:27 )   PT: 13.1 sec;   INR: 1.13 ratio         PTT - ( 19 Mar 2022 13:27 )  PTT:31.8 sec  03-20    136  |  92<L>  |  76.0<H>  ----------------------------<  99  6.0<H>   |  18.0<L>  |  11.06<H>    Ca    8.3<L>      20 Mar 2022 11:54  Phos  6.9     03-20    TPro  x   /  Alb  3.9  /  TBili  x   /  DBili  x   /  AST  x   /  ALT  x   /  AlkPhos  x   03-20            Culture:    TTE:    RADIOLOGY        MEDICATIONS  (STANDING):  amLODIPine   Tablet 5 milliGRAM(s) Oral daily  aspirin enteric coated 81 milliGRAM(s) Oral daily  atorvastatin 80 milliGRAM(s) Oral at bedtime  clopidogrel Tablet 75 milliGRAM(s) Oral daily  fluconAZOLE   Tablet 200 milliGRAM(s) Oral <User Schedule>  furosemide   Injectable 40 milliGRAM(s) IV Push once  heparin   Injectable 5000 Unit(s) SubCutaneous every 12 hours  hydrocortisone 10 milliGRAM(s) Oral two times a day  magnesium oxide 400 milliGRAM(s) Oral daily  multivitamin 1 Tablet(s) Oral daily  mycophenolate mofetil  Oral Tab/Cap - Peds 500 milliGRAM(s) Oral two times a day  pantoprazole    Tablet 40 milliGRAM(s) Oral before breakfast  sevelamer carbonate 1600 milliGRAM(s) Oral three times a day with meals  sodium zirconium cyclosilicate 10 Gram(s) Oral daily  tacrolimus 1 milliGRAM(s) Oral two times a day  tacrolimus 0.5 milliGRAM(s) Oral daily  tamsulosin 0.4 milliGRAM(s) Oral at bedtime  trimethoprim  160 mG/sulfamethoxazole 800 mG 1 Tablet(s) Oral <User Schedule>      MEDICATIONS  (PRN):  acetaminophen     Tablet .. 650 milliGRAM(s) Oral every 6 hours PRN Mild Pain (1 - 3)  traMADol 25 milliGRAM(s) Oral every 6 hours PRN Moderate-Severe pain (4-10)

## 2022-03-21 NOTE — DIETITIAN NUTRITION RISK NOTIFICATION - TREATMENT: THE FOLLOWING DIET HAS BEEN RECOMMENDED
Diet, NPO:   NPO for Procedure/Test     NPO Start Date: 20-Mar-2022,   NPO Start Time: 23:59  Except Medications  With Ice Chips/Sips of Water (03-19-22 @ 18:48) [Active]  Diet, Regular:   DASH/TLC {Sodium & Cholesterol Restricted} (DASH)  1000mL Fluid Restriction ([id number])  For patients receiving Renal Replacement - No Protein Restr, No Conc K, No Conc Phos, Low  Sodium (RENAL)  No Concentrated Potassium  No Concentrated Phosphorus (03-19-22 @ 14:37) [Active]

## 2022-03-21 NOTE — DISCHARGE NOTE PROVIDER - HOSPITAL COURSE
72 yo male with hx of CAD s/p PCI, HTN, HLD, ESRD on HD, lung transplant on immunosuppressives, Left CEA, presented to the hospital 3/19 with nonfunctioning AV fistula.  He had a temporary left groin HD catheter placed 3/20 admitted to MICU for urgent HD, underwent HD later that night. Permacath to be placed by IR 3/21, temporary left groin catheter to be removed, pt to undergo an hour of HD and to be discharged home.

## 2022-03-21 NOTE — DISCHARGE NOTE NURSING/CASE MANAGEMENT/SOCIAL WORK - NSDCPEFALRISK_GEN_ALL_CORE
For information on Fall & Injury Prevention, visit: [email or web address] OR  [email or web address] OR  https://www.cdc.gov/steadi/patient.html

## 2022-03-21 NOTE — BRIEF OPERATIVE NOTE - OPERATION/FINDINGS
right IJ vein occluded but right EJ vein patent and used for access. 19 cm x 14.5 fr glidepath permacath inserted. Pt given 50 mcg fentanyl and 1 mg versed IV.

## 2022-03-21 NOTE — CHART NOTE - NSCHARTNOTEFT_GEN_A_CORE
L femoral HD cath site undressed under clean conditions, sutures cut and removed. Line removed on exhalation with no resistance, hemostasis achieved with manual pressure. Site soft with no oozing noted, pressure bandage applied. Patient tolerated procedure. well.

## 2022-03-21 NOTE — DIETITIAN INITIAL EVALUATION ADULT. - PERTINENT MEDS FT
MEDICATIONS  (STANDING):  amLODIPine   Tablet 5 milliGRAM(s) Oral daily  aspirin enteric coated 81 milliGRAM(s) Oral daily  atorvastatin 80 milliGRAM(s) Oral at bedtime  chlorhexidine 2% Cloths 1 Application(s) Topical daily  clopidogrel Tablet 75 milliGRAM(s) Oral daily  fluconAZOLE   Tablet 200 milliGRAM(s) Oral <User Schedule>  heparin   Injectable 5000 Unit(s) SubCutaneous every 12 hours  hydrocortisone 10 milliGRAM(s) Oral two times a day  magnesium oxide 400 milliGRAM(s) Oral daily  multivitamin 1 Tablet(s) Oral daily  mycophenolate mofetil  Oral Tab/Cap - Peds 500 milliGRAM(s) Oral two times a day  pantoprazole    Tablet 40 milliGRAM(s) Oral before breakfast  sevelamer carbonate 1600 milliGRAM(s) Oral three times a day with meals  sodium zirconium cyclosilicate 10 Gram(s) Oral daily  tacrolimus 1 milliGRAM(s) Oral two times a day  tacrolimus 0.5 milliGRAM(s) Oral daily  tamsulosin 0.4 milliGRAM(s) Oral at bedtime  trimethoprim  160 mG/sulfamethoxazole 800 mG 1 Tablet(s) Oral <User Schedule>

## 2022-03-21 NOTE — DISCHARGE NOTE PROVIDER - NSDCCPCAREPLAN_GEN_ALL_CORE_FT
PRINCIPAL DISCHARGE DIAGNOSIS  Diagnosis: AV fistula occlusion, initial encounter  Assessment and Plan of Treatment:

## 2022-03-21 NOTE — CHART NOTE - NSCHARTNOTEFT_GEN_A_CORE
Vascular & Interventional Radiology Pre-Procedure Note    Procedure Name: permacath insertion    HPI: 71y Male with renal failure    72 yo male with hx of CAD s/p PCI, HTN, HLD, ESRD on HD, lung transplant on immunosuppressives, Left CEA, presented to the hospital 3/19 with nonfunctioning AV fistula.  He had a temporary left groin HD catheter placed 3/20 and underwent HD later that night. Now for permacath insertion.    PMH/PSH  Acute renal failure  Emphysema of lung  ESRD (end stage renal disease) on dialysis  Fungal meningitis  2019 novel coronavirus disease (COVID-19)  Pneumonia  [place] (hard of hearing)  HTN (hypertension)  Lumbar spondylosis  BPH without urinary obstruction  Hypercholesterolemia  H/O peripheral neuropathy  AV fistula occlusion, initial encounter  Anemia of chronic kidney failure  Leukocytosis  CAD (coronary artery disease)  H/O carotid stenosis    H/O lung transplant  H/O heart artery stent  History of hip replacement  Status post open reduction and internal fixation (ORIF) of fracture  Left Carotid stenting    Allergies: budesonide (Unknown)  cefpodoxime (Unknown)  Pollen (Unknown)    Medications (Abx/Cardiac/Anticoagulation/Blood Products)  acetaminophen     Tablet .. 650 milliGRAM(s) Oral every 6 hours PRN  amLODIPine   Tablet 5 milliGRAM(s) Oral daily  atorvastatin 80 milliGRAM(s) Oral at bedtime  chlorhexidine 2% Cloths 1 Application(s) Topical daily  fluconAZOLE   Tablet 200 milliGRAM(s) Oral <User Schedule>  hydrocortisone 10 milliGRAM(s) Oral two times a day  magnesium oxide 400 milliGRAM(s) Oral daily  multivitamin 1 Tablet(s) Oral daily  mycophenolate mofetil  Oral Tab/Cap - Peds 500 milliGRAM(s) Oral two times a day  pantoprazole    Tablet 40 milliGRAM(s) Oral before breakfast  sevelamer carbonate 1600 milliGRAM(s) Oral three times a day with meals  sodium zirconium cyclosilicate 10 Gram(s) Oral daily  tacrolimus 1 milliGRAM(s) Oral two times a day  tacrolimus 0.5 milliGRAM(s) Oral daily  tamsulosin 0.4 milliGRAM(s) Oral at bedtime  traMADol 25 milliGRAM(s) Oral every 6 hours PRN  trimethoprim  160 mG/sulfamethoxazole 800 mG 1 Tablet(s) Oral <User Schedule>      Data:      T(C): 36.8  HR: 97  BP: 131/62  RR: 18  SpO2: 97%      -WBC 12.84 / HgB 9.3 / Hct 30.8 / Plt 212  -Na 139 / Cl 95 / BUN 40.9 / Glucose 57  -K 5.0 / CO2 22.0 / Cr 7.11  -ALT 6 / Alk Phos 169 / T.Bili 0.5  -INR1.13      Imaging: na    Plan:   -71y Male presents for permacath insertion  -Risks/Benefits/alternatives explained with the patient and witnessed informed consent obtained. Vascular & Interventional Radiology Pre-Procedure Note    Procedure Name: permacath insertion    HPI: 71y Male with renal failure    70 yo male with hx of CAD s/p PCI, HTN, HLD, ESRD on HD, lung transplant on immunosuppressives, Left CEA, presented to the hospital 3/19 with nonfunctioning AV fistula.  He had a temporary left groin HD catheter placed 3/20 and underwent HD later that night. Now for permacath insertion.    PMH/PSH  Acute renal failure  Emphysema of lung  ESRD (end stage renal disease) on dialysis  Fungal meningitis  2019 novel coronavirus disease (COVID-19)  Pneumonia  [place] (hard of hearing)  HTN (hypertension)  Lumbar spondylosis  BPH without urinary obstruction  Hypercholesterolemia  H/O peripheral neuropathy  AV fistula occlusion, initial encounter  Anemia of chronic kidney failure  Leukocytosis  CAD (coronary artery disease)  H/O carotid stenosis    H/O lung transplant  H/O heart artery stent  History of hip replacement  Status post open reduction and internal fixation (ORIF) of fracture  Left Carotid stenting      Allergies: budesonide (Unknown)  cefpodoxime (Unknown)  Pollen (Unknown)    Medications (Abx/Cardiac/Anticoagulation/Blood Products)  acetaminophen     Tablet .. 650 milliGRAM(s) Oral every 6 hours PRN  amLODIPine   Tablet 5 milliGRAM(s) Oral daily  atorvastatin 80 milliGRAM(s) Oral at bedtime  chlorhexidine 2% Cloths 1 Application(s) Topical daily  fluconAZOLE   Tablet 200 milliGRAM(s) Oral <User Schedule>  hydrocortisone 10 milliGRAM(s) Oral two times a day  magnesium oxide 400 milliGRAM(s) Oral daily  multivitamin 1 Tablet(s) Oral daily  mycophenolate mofetil  Oral Tab/Cap - Peds 500 milliGRAM(s) Oral two times a day  pantoprazole    Tablet 40 milliGRAM(s) Oral before breakfast  sevelamer carbonate 1600 milliGRAM(s) Oral three times a day with meals  sodium zirconium cyclosilicate 10 Gram(s) Oral daily  tacrolimus 1 milliGRAM(s) Oral two times a day  tacrolimus 0.5 milliGRAM(s) Oral daily  tamsulosin 0.4 milliGRAM(s) Oral at bedtime  traMADol 25 milliGRAM(s) Oral every 6 hours PRN  trimethoprim  160 mG/sulfamethoxazole 800 mG 1 Tablet(s) Oral <User Schedule>      Data:      T(C): 36.8  HR: 97  BP: 131/62  RR: 18  SpO2: 97%  Mallampati 2    -WBC 12.84 / HgB 9.3 / Hct 30.8 / Plt 212  -Na 139 / Cl 95 / BUN 40.9 / Glucose 57  -K 5.0 / CO2 22.0 / Cr 7.11  -ALT 6 / Alk Phos 169 / T.Bili 0.5  -INR1.13      Imaging: na    Plan:   -71y Male presents for permacath insertion with moderate sedation  -Risks/Benefits/alternatives explained with the patient and witnessed informed consent obtained.

## 2022-03-21 NOTE — DISCHARGE NOTE PROVIDER - NSDCCPTREATMENT_GEN_ALL_CORE_FT
PRINCIPAL PROCEDURE  Procedure: Insertion of tunneled dialysis catheter with imaging guidance  Findings and Treatment:       SECONDARY PROCEDURE  Procedure: Insertion, catheter, temporary, dialysis  Findings and Treatment:     Procedure: Removal, catheter, dialysis, temporary  Findings and Treatment:

## 2022-03-21 NOTE — DISCHARGE NOTE PROVIDER - CARE PROVIDERS DIRECT ADDRESSES
[email or web address]

## 2022-03-21 NOTE — CHART NOTE - NSCHARTNOTEFT_GEN_A_CORE
Spoke to Nephrology/IR and Dr. Cardona.  Plan for patient to get permacath today, have a short session with dialysis and then be discharged home later tonight with Chester lombardi.  Dr. Cardona okay with patient being discharged from ICU

## 2022-03-21 NOTE — DISCHARGE NOTE PROVIDER - DETAILS OF MALNUTRITION DIAGNOSIS/DIAGNOSES
This patient has been assessed with a concern for Malnutrition and was treated during this hospitalization for the following Nutrition diagnosis/diagnoses:     -  03/21/2022: Moderate protein-calorie malnutrition

## 2022-03-21 NOTE — DISCHARGE NOTE PROVIDER - CARE PROVIDER_API CALL
Dimitri Hall)  Internal Medicine; Nephrology  [address] [place] [address]  Phone: [phone]  Fax: [phone]  Follow Up Time:

## 2022-03-21 NOTE — DISCHARGE NOTE PROVIDER - NSFOLLOWUPCLINICS_GEN_ALL_ED_FT
[facility]  Pulmonology  [address] [place] [address], [place] [address]  Phone: [phone]  Fax:

## 2022-03-21 NOTE — DISCHARGE NOTE PROVIDER - NSDCMRMEDTOKEN_GEN_ALL_CORE_FT
acetaminophen 500 mg oral capsule: 1 cap(s) orally every 6 hours   amLODIPine 5 mg oral tablet: 1 tab(s) orally once a day  Aspirin Low Dose 81 mg oral delayed release tablet: 1 tab(s) orally once a day  Bactrim 400 mg-80 mg oral tablet: 1 tab(s) orally Monday, Wednesday, and Friday  CellCept 250 mg oral capsule: 2 cap(s) orally 2 times a day  clopidogrel 75 mg oral tablet: 1 tab(s) orally once a day  Flomax 0.4 mg oral capsule: 1 cap(s) orally once a day  fluconazole 200 mg oral tablet: 1 tab(s) orally Tuesday, Thursday, Saturday  hydrocortisone 10 mg oral tablet: 1 tab(s) orally 2 times a day.    20mg in am  10mg in pm  magnesium oxide 400 mg (241.3 mg elemental magnesium) oral tablet: 1 tab(s) orally once a day  Multiple Vitamins oral tablet: 1 tab(s) orally once a day  Protonix 40 mg oral delayed release tablet: 1 tab(s) orally once a day  rosuvastatin 20 mg oral tablet: 1 tab(s) orally once a day  sevelamer carbonate 800 mg oral tablet: 2 tab(s) orally 3 times a day (with meals)  tacrolimus 0.5 mg oral capsule: 1 cap(s) orally once a day in the morning with 1 mg capsule; total dose 1.5 mg in the morning  tacrolimus 1 mg oral capsule: 1 cap(s) orally 2 times a day    traMADol 50 mg oral tablet: 0.5 tab(s) orally every 6 hours, As needed, Moderate-Severe pain (4-10) MDD:4

## 2022-03-21 NOTE — DIETITIAN INITIAL EVALUATION ADULT. - PERTINENT LABORATORY DATA
03-21 Na139 mmol/L Glu 57 mg/dL<L> K+ 5.0 mmol/L Cr  7.11 mg/dL<H> BUN 40.9 mg/dL<H> Phos n/a   Alb 3.7 g/dL PAB n/a

## 2022-03-21 NOTE — PROGRESS NOTE ADULT - ASSESSMENT
ESRD on HD- TTS schedule  nonfunctioning LUE AVF- s/p femoral non tdc  Anemia of CKD  HTN  Lung transplant recipient- on MMF, tacrolimus and hydrocortisone    Plan for TDC by IR today; HD today to confirm catheter function  remove femoral catheter after dialysis  Hb low- continue MENDY with HD  May resume HD on thursday at [facility]  
Hyperkalemia - On Medical Management,     Edema Much Improved - ZE GUAN + B & T,     Will D/W Dr. Hernandez & Family,          Will plan Road Map for Further Continued HD, 
Completed 3  hrs and 15 minutes of dialysis ,    Net fluid loss 2000 cc.       Vital signs stable post dialysis.     Primary RN Irvin.    TDC Insertion in AM ( IR Aware )    Rest JOAN ,    ANTHONY.W Luan Read, Art,
Impression:  72 yo male with hx of CAD s/p PCI, HTN, HLD, ESRD on HD, lung transplant on immunosuppressives, Left CEA, presented to the hospital 3/19 with nonfunctioning AV fistula.  He had a temporary left groin HD catheter placed 3/20 and underwent HD later that night.      ESRD on HD  He had a outpatient fistulagram and cephalic stent placement last week.  Plan for tunneled dialysis catheter placement planned for this admission  Currently has functioning left femoral vein temporary HD catheter    CAD s/p PCI  - DAPT, statin    Hx of lung transplant  - Bactrim and Fluconazole for prophylaxis  - Cellcept, Tacrolimus   - check tacrolimus level in AM    Sedation/Analgesia: none  Vasoactive medications: none  DVT prophylaxis: heparin SC  GI prophylaxis: protonix  Nutrition: NPO for procedure  Central line: left fem HD cath  Ro:  none  Mobility:  OOB  Family/Patient engagement/GOC:  patient aware of plan for today  
71 year old male with PMH HTN, Dyslipidemia, CAD s/p PCI, ESRD on HD T/Th/Sat, s/p double lung transplant and recent L CEA was sent in from HD center due to nonfunctioning LUE AVF.        ESRD on HD; Nonfunctioning HD catheter. No evidence of uremic toxicity,  fluid overload or cardiorespiratory compromise at admission; however starting to develop pedal edema and hypoxia with worsening Hyperkalemia.   Evaluated by Nephrology in ER -  initially not deemed to require urgent HD. Now given worsening hyperkalemia and hypoxia will transfer to MICU as per discussion with Nephrology   - Continue to monitor  - Low K diet  - Lokelma ordered by Nephrology  - Plan for HD later today after temporary HD catheter as per discussion with Nephrology.   - MARK Sigala on 3/21/22. Communicated with Dr Goldberg on 3/19/22, orders placed  Discussed upgrade to MICU with Intensivist Dr Roland    Leukocytosis; unclear etiology as no focus apparent. Likely due to manipulation of AVF. Interval improvement.  - Monitor clinically and repeat WBC in am    Anemia, chronic disease. Low Iron.  - monitor Hgb.   - Iron and MENDY with HD    Lung transplant  - Continue Tacrolimus, MMF and Hydrocortisone    HTN  - Amlodipine, BB    CAD, PAD  - DAPT, Statin, BB    BPH  - Tamsulosin    VTEp - Heparin  PJP - Bactrim  Antifungal - Fluconazole    Mobile, uses cane    Disposition - Acutely ill with worsening hyperkalemia, hypoxia.  Upgrade to MICU    Discussed with patient, spouse Veronica ([phone]), RN, Nephrology and MICU

## 2022-03-22 PROBLEM — N18.6 END STAGE RENAL DISEASE: Chronic | Status: ACTIVE | Noted: 2021-02-02

## 2022-03-22 PROBLEM — J18.9 PNEUMONIA, UNSPECIFIED ORGANISM: Chronic | Status: ACTIVE | Noted: 2021-11-03

## 2022-03-22 PROBLEM — G02: Chronic | Status: ACTIVE | Noted: 2021-11-03

## 2022-03-22 PROBLEM — U07.1 COVID-19: Chronic | Status: ACTIVE | Noted: 2021-11-03

## 2022-03-28 PROBLEM — Z95.828 PERMANENT CENTRAL VENOUS CATHETER IN PLACE: Status: ACTIVE | Noted: 2022-01-01

## 2022-03-28 NOTE — PHYSICAL EXAM
[Normal Rate and Rhythm] : normal rate and rhythm [Right Carotid Bruit] : right carotid bruit heard [2+] : left 2+ [Ankle Swelling (On Exam)] : present [Ankle Swelling Bilaterally] : bilaterally  [Ankle Swelling On The Right] : mild [Varicose Veins Of Lower Extremities] : not present [] : not present [Abdomen Tenderness] : ~T ~M No abdominal tenderness [No Rash or Lesion] : No rash or lesion [Alert] : alert [Oriented to Person] : oriented to person [Oriented to Place] : oriented to place [Oriented to Time] : oriented to time [Calm] : calm [de-identified] : NAD [de-identified] : R IJ permacath in place, dressing clean [de-identified] : supple, no masses [de-identified] : unlabored breathing [FreeTextEntry1] : LUE AVF, palpable thrill and thickened vein wall\par +ecchymosis adjacent to AVF\par suture in place [de-identified] : Not evaluated  [de-identified] : Not evaluated  [de-identified] : FROM of all 4 extremities, sensation intact \par

## 2022-03-28 NOTE — ASSESSMENT
[Medication Management] : medication management [Carotid Endarterectomy] : carotid endarterectomy [FreeTextEntry1] : 70 yo M with history of lung transplant on immunosuppressive medications, HTN, lumbar spondylosis, hypercholesterolemia, COPD, BPH, ESRD on HD via tunneled dialysis catheter presenting for evaluation of bleeding around right IJ perm cath. Sutures placed today and hemostasis occurred. Pt to FU with Dr. Raghav York \par

## 2022-03-28 NOTE — PHYSICAL EXAM
[Normal Rate and Rhythm] : normal rate and rhythm [Right Carotid Bruit] : right carotid bruit heard [2+] : left 2+ [Ankle Swelling (On Exam)] : present [Ankle Swelling Bilaterally] : bilaterally  [Ankle Swelling On The Right] : mild [No Rash or Lesion] : No rash or lesion [Alert] : alert [Oriented to Person] : oriented to person [Oriented to Place] : oriented to place [Oriented to Time] : oriented to time [Calm] : calm [Varicose Veins Of Lower Extremities] : not present [] : not present [Abdomen Tenderness] : ~T ~M No abdominal tenderness [de-identified] : NAD [de-identified] : R IJ permacath in place, blood oozing around perm cath [de-identified] : supple, no masses [de-identified] : unlabored breathing [FreeTextEntry1] : LUE AVF, palpable thrill and thickened vein wall\par +ecchymosis adjacent to AVF [de-identified] : Not evaluated  [de-identified] : Not evaluated  [de-identified] : FROM of all 4 extremities, sensation intact \par

## 2022-03-28 NOTE — ASSESSMENT
[FreeTextEntry1] : 70 yo M with history of lung transplant on immunosuppressive medications, HTN, lumbar spondylosis, hypercholesterolemia, COPD, BPH, ESRD on HD via tunneled dialysis catheter presenting for evaluation and creation of long term HD access.Now s/p LUE AVF fistula creation (11/10) and L TCAR (3/8/22).\par Underwent LUE fistulogram and angioplasty/ stent of AVF, followed by insertion of R IJ tunneled catheter\par \par \par  [Medication Management] : medication management [Carotid Endarterectomy] : carotid endarterectomy

## 2022-03-28 NOTE — PROCEDURE
[FreeTextEntry1] : After obtaining informed consent, I utilized a sterile prep, and 1% lido anesthesia. sutures placed to secure right IJ perm cath.  dry occlusive dressing was placed. Pt was discharged without complication. Pt instructed to FU with Dr. Ku\par \par

## 2022-03-28 NOTE — HISTORY OF PRESENT ILLNESS
[FreeTextEntry1] : 2/7/22: 70 yo M with history of lung transplant on immunosuppressive medications, HTN, lumbar spondylosis, hypercholesterolemia, COPD, BPH, ESRD on HD via tunneled dialysis catheter presenting for evaluation and creation of long term HD access. Pt is right hand dominant. Denies complaints of chest pain, SOB, ZAPATA, claudication, leg wounds or leg edema at this time.  Patient has been undergoing HD for 1.5 years with catheter, due to pandemic, he was unable to obtain surgery for AVF/ AVG creation, recently rescheduled. Now s/p LUE AVF creation (11/10). \par \par 3/23/22: Pt had right chest perm cath placed in ED recently and had bleeding around perm cath today. He denies dizziness, headaches, memory loss. He normally follows up with Dr. Ku but came into clinic today due to bleeding. \par \par PSHx:\par 3/8/22 left TCAR\par  [de-identified] : 70 yo M with a hx of ESRD (HD via R IJ TDC), now s/p LUE AVF creation.\par He underwent 4 successful consecutive cannulations of LUE AVF this past week and presents for tunneled catheter removal today.\par He is scheduled for L TCAR for carotid stenosis at the end of the month.\par He stopped his Plavix due to excessive bruising during HD cannulation.

## 2022-03-28 NOTE — HISTORY OF PRESENT ILLNESS
[FreeTextEntry1] : 72 yo M with history of lung transplant on immunosuppressive medications, HTN, lumbar spondylosis, hypercholesterolemia, COPD, BPH, ESRD on HD via tunneled dialysis catheter presenting for evaluation and creation of long term HD access. Pt is right hand dominant. Denies complaints of chest pain, SOB, ZAPATA, claudication, leg wounds or leg edema at this time.  Patient has been undergoing HD for 1.5 years with catheter, due to pandemic, he was unable to obtain surgery for AVF/ AVG creation, recently rescheduled. Now s/p LUE AVF creation (11/10). \par  [de-identified] : 70 yo M with a hx of ESRD (HD via R IJ TDC), now s/p LUE AVF creation.\par He underwent 4 successful consecutive cannulations of LUE AVF this past week and presents for tunneled catheter removal today.\par s/p L TCAR on 3/08/22. Doing well.\par He was recently admitted to [facility] after undergoing LUE fistulogram and stenting of the AVF ([id number]). R IJ tunneled catheter was inserted by IR.\par He is now being dialyzed via catheter.\par

## 2022-04-20 NOTE — ED PROVIDER NOTE - NSICDXPASTSURGICALHX_GEN_ALL_CORE_FT
PAST SURGICAL HISTORY:  H/O heart artery stent x3 @[facility]    H/O lung transplant bilateral - transplant - 1-2-2015 -  [facility]    History of hip replacement right    Status post open reduction and internal fixation (ORIF) of fracture left femur

## 2022-04-20 NOTE — ED PROVIDER NOTE - OBJECTIVE STATEMENT
72y M w/ hx double lung transplant on immunosuppressive medications, HTN, lumbar spondylosis, hypercholesterolemia, COPD, BPH, ESRD on HD; presenting for hip injury. Pt reports that he tripped and fell while stepping over a curb this afternoon. Says he landed on his L hip. Was able to crawl to his car and drive home, but has had persistent pain since then. Denies head strike or LOC. Denies other complaints. Pt currently on Eliquis. Has history of L hip fracture s/p surgery by Dr. Kaba in 2020. 72y M w/ hx double lung transplant on immunosuppressive medications, HTN, lumbar spondylosis, hypercholesterolemia, COPD, BPH, ESRD on HD Tu/Th/Sa via dialysis cath, R IJ occlusion; presenting for hip injury. Pt reports that he tripped and fell while stepping over a curb this afternoon. Says he landed on his L hip. Was able to crawl to his car and drive home, but has had persistent pain since then. Denies head strike or LOC. Denies other complaints. Pt currently on Eliquis. Has history of L hip fracture s/p surgery by Dr. Kaba in 2020.

## 2022-04-20 NOTE — ED ADULT NURSE NOTE - NSICDXPASTSURGICALHX_GEN_ALL_CORE_FT
PAST SURGICAL HISTORY:  H/O heart artery stent x3 @[facility]    H/O lung transplant bilateral - transplant - 1-2-2015 -  [facility]    History of hip replacement right    Status post open reduction and internal fixation (ORIF) of fracture left femur

## 2022-04-20 NOTE — ED PROVIDER NOTE - PHYSICAL EXAMINATION
Constitutional: Awake, alert, in no acute distress  Eyes: PERRL  HENT: no scalp tenderness or deformity, no facial tenderness, airway patent  Neck: no cervical spine tenderness, no palpable stepoff, no tracheal deviation  CV: no chest wall tenderness, no crepitus or subcutaneous emphysema.  RRR, no murmur, 2+ distal pulses in all extremities  Pulm: non-labored respirations, CTAB  Abdomen: soft, non-tender, non-distended, no ecchymosis  Back: no spinal tenderness, no palpable stepoff  Extremities: stable pelvis, +L hip/pelvis tenderness, no obvious deformity  Skin: no rash  Neuro: AAOx3, GCS 15, moving all extremities equally, no focal neurologic deficit

## 2022-04-20 NOTE — ED PROVIDER NOTE - NS ED ROS FT
Constitutional: no fever, no chills  Eyes: no vision changes  ENT: no nasal congestion, no sore throat  CV: no chest pain  Resp: no cough, no shortness of breath  GI: no abdominal pain, no vomiting, no diarrhea  : no dysuria  MSK: +joint pain  Skin: no rash  Neuro: no headache, no focal weakness, no paresthesias Constitutional: no fever, no chills  Eyes: no vision changes  ENT: no nasal congestion, no sore throat  CV: no chest pain  Resp: no cough, no shortness of breath  GI: no abdominal pain, no vomiting, no diarrhea, no rectal bleeding  : no dysuria  MSK: +joint pain  Skin: no rash  Neuro: no headache, no focal weakness, no paresthesias

## 2022-04-20 NOTE — ED PROVIDER NOTE - CLINICAL SUMMARY MEDICAL DECISION MAKING FREE TEXT BOX
72y M w/ hx double lung transplant, ESRD, L hip fracture, currently on Eliquis; presenting for L hip injury. Will check X-rays, possible CT, labs. Pain control, reassess.

## 2022-04-20 NOTE — ED ADULT NURSE NOTE - NSICDXPASTMEDICALHX_GEN_ALL_CORE_FT
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

## 2022-04-20 NOTE — ED PROVIDER NOTE - PROGRESS NOTE DETAILS
[place]: Pt with persistent pain. CT reviewed. Ortho consult placed. [place]: Pt with persistent pain. CT reviewed. Ortho/trauma consults placed. [place]: Ortho advising operative treatment of pelvic fractures. Trauma deferring admission to medicine.

## 2022-04-20 NOTE — ED PROVIDER NOTE - ATTENDING CONTRIBUTION TO CARE
72y M w/ hx double lung transplant on immunosuppressive medications, HTN, lumbar spondylosis, hypercholesterolemia, COPD, BPH, ESRD on HD Tu/Th/Sa via dialysis cath, R IJ occlusion; presenting for hip injury. Pt reports that he tripped and fell while stepping over a curb this afternoon and landed on hip. Able to drive home, but has had persistent pain since then prompting ambulance call. Denies head strike or LOC. Denies other complaints.  AP - well appearing. pain to left hip. will get CT imaging eval for traumatic injury. reassess

## 2022-04-20 NOTE — ED ADULT NURSE NOTE - NSIMPLEMENTINTERV_GEN_ALL_ED
Implemented All Universal Safety Interventions:  [place] to call system. Call bell, personal items and telephone within reach. Instruct patient to call for assistance. Room bathroom lighting operational. Non-slip footwear when patient is off stretcher. Physically safe environment: no spills, clutter or unnecessary equipment. Stretcher in lowest position, wheels locked, appropriate side rails in place.

## 2022-04-20 NOTE — ED PROVIDER NOTE - NSICDXPASTMEDICALHX_GEN_ALL_CORE_FT
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

## 2022-04-20 NOTE — ED ADULT TRIAGE NOTE - CHIEF COMPLAINT QUOTE
Pt. BIBA for fall and hip pain. Pt. states he fell in the parking lot earlier today after tripping off a curb. Pt. states he was able to get up off the ground and drive in his car. Pt. denies hitting his head, only landing on his left hip. No obvious shorting or deformity. Pt. has hx of left femur replacement. Pt. is on Eliquis.

## 2022-04-21 NOTE — PATIENT PROFILE ADULT - FALL HARM RISK - HARM RISK INTERVENTIONS

## 2022-04-21 NOTE — CONSULT NOTE ADULT - SUBJECTIVE AND OBJECTIVE BOX
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 Pt reports that he tripped and fell while stepping over a curb this afternoon, approximately 4:00pm, without walker or cane assistance. Patient uses a cane "on and off". Says he landed on his L hip. Reports crawling to his car and driving home. Patient reports Dr. Kaba performed surgery on right hip total hip arthroplasty 2020 and Left hip s/p hip fracture 2021 but documentation not found on sunrise. Denies head strike or LOC. Denies other complaints. Pt currently on Eliquis 2.5mg BID and Plavix 75mg qd, last dose yesterday. No other complaints. Denies any head injury and LOC. Denies fever, chills, nausea, vomiting, chest pain, and sob.     ROS: 10-system review is otherwise negative except HPI above.      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  2019 novel coronavirus disease (COVID-19)  Feb 2021 - hospitalized for 1 week at [facility]  Pneumonia  April 2021  [place] (hard of hearing)  HTN (hypertension)  Lumbar spondylosis  History of COPD  BPH without urinary obstruction  Hypercholesterolemia  Oliguria and anuria  H/O peripheral neuropathy  H/O lung transplant  bilateral - transplant - 1-2-2015 -  [facility]  H/O heart artery stent  x3 @[facility]  History of hip replacement  right  Status post open reduction and internal fixation (ORIF) of fracture  left femur    FAMILY HISTORY:  Family history of emphysema    SOCIAL HISTORY:  Denies any toxic habits    ALLERGIES: NKA budesonide (Unknown)  cefpodoxime (Unknown)  Pollen (Unknown)    HOME MEDICATIONS:  amLODIPine 5 mg oral tablet: 1 tab(s) orally once a day (08 Mar 2022 07:59)  Aspirin Low Dose 81 mg oral delayed release tablet: 1 tab(s) orally once a day (08 Mar 2022 07:59)  Bactrim 400 mg-80 mg oral tablet: 1 tab(s) orally Monday, Wednesday, and Friday (08 Mar 2022 07:59)  CellCept 250 mg oral capsule: 2 cap(s) orally 2 times a day (08 Mar 2022 07:59)  Flomax 0.4 mg oral capsule: 1 cap(s) orally once a day (08 Mar 2022 07:59)  fluconazole 200 mg oral tablet: 1 tab(s) orally Tuesday, Thursday, Saturday (19 Mar 2022 17:57)  hydrocortisone 10 mg oral tablet: 1 tab(s) orally 2 times a day.    20mg in am  10mg in pm (19 Mar 2022 17:54)  magnesium oxide 400 mg (241.3 mg elemental magnesium) oral tablet: 1 tab(s) orally once a day (08 Mar 2022 07:59)  Multiple Vitamins oral tablet: 1 tab(s) orally once a day (08 Mar 2022 07:59)  Protonix 40 mg oral delayed release tablet: 1 tab(s) orally once a day (08 Mar 2022 07:59)  rosuvastatin 20 mg oral tablet: 1 tab(s) orally once a day (08 Mar 2022 07:59)  sevelamer carbonate 800 mg oral tablet: 2 tab(s) orally 3 times a day (with meals) (19 Mar 2022 17:53)  tacrolimus 0.5 mg oral capsule: 1 cap(s) orally once a day in the morning with 1 mg capsule; total dose 1.5 mg in the morning (08 Mar 2022 07:59)  tacrolimus 1 mg oral capsule: 1 cap(s) orally 2 times a day   (08 Mar 2022 07:59)  --------------------------------------------------------------------------------------------  PHYSICAL EXAM:   General: NAD, Lying in bed comfortably  Neuro: A+Ox3  HEENT: EOMI, PERRLA, MMM  Cardio: RRR  Resp: Non labored breathing on RA  GI/Abd: Soft, NT/ND, no rebound/guarding, no masses palpated  Vascular: All 4 extremities warm and well perfused. Palpable b/l DP pulses.   Pelvis: stable  Musculoskeletal: All 4 extremities moving spontaneously, no limitations, no spinal tenderness. Left hip tenderness to palpation.   --------------------------------------------------------------------------------------------    LABS                 7.8    15.38  )----------(  137       ( 20 Apr 2022 22:05 )               26.9      141    |  98     |  40.1   ----------------------------<  122        ( 20 Apr 2022 22:05 )  4.1     |  24.0   |  6.27     Ca    8.6        ( 20 Apr 2022 22:05 )        PT/INR -  16.6 sec / 1.43 ratio   ( 20 Apr 2022 22:05 )       PTT -  32.0 sec   ( 20 Apr 2022 22:05 )  CAPILLARY BLOOD GLUCOSE  --------------------------------------------------------------------------------------------  IMAGING  < from: CT Pelvis Bony Only No Cont (04.21.22 @ 01:22) >  ACC: [id number] EXAM:  CT PELVIS BONY ONLY                          PROCEDURE DATE:  04/21/2022          INTERPRETATION:  CLINICAL INFORMATION: Status post fall with left hip   pain.    TECHNIQUE: CT of the pelvis and bilateral femurs were performed in bone   and soft tissue windows with coronal and sagittal reformats. No   intravenous contrast was administered. 3-D reformats were performed on a   separate workstation.    COMPARISON: CT of the pelvis from 5/20/2016.    FINDINGS:    Bone: Acute comminuted, intra-articular fractures of the superior,   anterior, medial and posterior left acetabulum is seen with fracture   extension into the left superior pubic ramus and into the left medial   ilium. Minimal medial displacement of a medial acetabular and iliac   fracture fragment is noted. An acute comminuted, mildly displaced   fracture of the left inferior pubic ramus is seen. A long stem left femur   intramedullary krista with proximal blade and distal interlocking screw is   noted stabilizing a prior intertrochanteric fracture. A total right hip   arthroplasty and trochanteric cerclage wire is seen without   periprosthetic fracture or dislocation. No periprosthetic lucency is   noted to suggest loosening. No additional fracture or dislocation is   demonstrated. Degenerative changes of the visualized lower lumbar spine   and sacroiliac joints are present.    Soft tissues: The left obturator internus muscle is enlarged likely due   to muscle strain and/or intramuscular hematoma. Vascular calcifications   are noted.    Miscellaneous: A partially visualized 2.1 cm saccular aneurysm emanating   from the infrarenal abdominal aorta is noted. A left renal cyst is seen.   Sigmoid diverticulosis is noted without diverticulitis. Appendix is  unremarkable. Circumferential urinary bladder wall thickening which could   be due to underdistention versus a cystitis. Mild to moderate   inflammatory change is seen in the left pelvis.    IMPRESSION:    Acute comminuted, intra-articular fractures of the superior, anterior,   medial and posterior left acetabulum with fracture extension into the   left superior pubic ramus and left medial ilium. Acute comminuted, mildly   displaced fracture of the left inferior pubic ramus.

## 2022-04-21 NOTE — CONSULT NOTE ADULT - NS ATTEND AMEND GEN_ALL_CORE FT
Fracture is minimally displaced. Would recommend TTWB but allow WBAT if unable to handle TTWB.  Recommend Endocrine eval for osteoporosis. Patient at high risk for future fractures given multiple medical issues, dialysis, immunosuppression and history of fractures.  Repeat x-rays in 3-4 weeks.    Negrito Hightower M.D.  Orthopaedic Trauma Surgery

## 2022-04-21 NOTE — CONSULT NOTE ADULT - ASSESSMENT
ASSESSMENT: Patient is a 72y old male with extensive left acetabular fracture after a trip and fall.     PLAN:    - No other evidence of traumatic injury   - f/u Ortho: likely OR when medically optimized.   - recommend medical evaluation given multiple comorbidities.   - No trauma surgery intervention required  - Plan discussed with Attending, Dr. Chahal    
-Hx double lung transplant in 2015 for emphysema; on tacrolimus, cellcept; latest PFT w/o obstruction or restriction  -ESRD on HD  -congestion on cxr  -Anemia  -CAD  -hip fx s/p fall    No absolute pulmonary contraindications to planned orthopedic surgery. Would be better optimized with some fluid mobilization.    RECC:  Nebs prn. Follow O2 sat. Early ambuation as possible. Cardiology f/u. HD and fluids per renal. Anemia per PMT and renal.

## 2022-04-21 NOTE — H&P ADULT - ASSESSMENT
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     PLAN:  L acetabulum fx, pubic ramus fx  -Admit to Medicine  -Fall Precautions  -IV Pain Control PRN  -Repeat Labs  -Hold Eliquis 2.5mg PO BID  -Hold Plavix 75mg PO q24  -VTE PPX SCD  -NPO   -Trauma Surgery consulted - cleared for ortho.  -Orthopedic Sx consulted - plan for OR.    COPD/Emphysema s/p Double Lung Transplant  -Cellcept 500mg PO BID  -Tacrolimus 1.5mg PO qAM + Tacrolimus 1mg PO qPM  -Hydrocortisone 20mg PO qAM + Hydrocortisone 10mg PO qPM -> Double dose to 40mg/20mg in anticipation of OR  -Pulmonary Consult ([place])    ESRD/HD, Hx Fungal Meningitis  -AVF malfunction recently currently using Permcath for HD  -TTS Due for HD today patient NOT to go to OR until after Nephro eval for HD  -Bactrim 400mg/80mg PO 3x/Week TTS  -Fluconazole 200mg PO 3x/Week TTS   -Hold Sevelamer TID ACHS while NPO  -Nephrology Consult ([place])    CAD s/p PCI x3, Carotid Disease, HTN, HLD  -Hold Plavix 75mg PO q24 for OR  -Atorvastatin 20mg PO qHS  -Amlodipine 5mg PO q24  -RCRI moderate   -Cardiology Consult ([facility] [place])    BPH  -Flomax 0.4mg PO qHS

## 2022-04-21 NOTE — CONSULT NOTE ADULT - NSCONSULTADDITIONALINFOA_GEN_ALL_CORE
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  Stress test from 11/2020 with no ischemia.  EKG unchanged.  Patient mod risk with no CV contraindication to surgery.

## 2022-04-21 NOTE — CONSULT NOTE ADULT - SUBJECTIVE AND OBJECTIVE BOX
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 Pt reports that he tripped and fell while stepping over a curb this afternoon, approximately 4:00pm, without walker or cane assistance. Patient uses a cane "on and off". Says he landed on his L hip. Reports crawling to his car and driving home. Patient reports Dr. Kaba performed surgery on right hip total hip arthroplasty 2020 and Left hip s/p hip fracture 2021 but documentation not found on sunrise. Denies head strike or LOC. Denies other complaints. Pt currently on Eliquis 2.5mg BID and Plavix 75mg qd.     REVIEW OF SYSTEMS ::See HPI    General: Alert, responsive, in NAD    Skin/Breast: No rashes, no pruritis   	  Ophthalmologic: No visual changes. No redness.     Respiratory and Thorax: See HPI No difficulty breathing. No cough.  	   Cardiovascular:	No chest pain. No palpitations.    Gastrointestinal:	 No abdominal pain. No diarrhea.     Genitourinary: No dysuria. No bleeding.    Musculoskeletal: SEE HPI.    Neurological: No sensory or motor changes.     Psychiatric: No anxiety or depression.    Hematology/Lymphatics: No swelling.    Endocrine: No Hx of diabetes.    ROS is otherwise negative.    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    2019 novel coronavirus disease (COVID-19)  Feb 2021 - hospitalized for 1 week at [facility]    Pneumonia  April 2021    [place] (hard of hearing)    HTN (hypertension)    Lumbar spondylosis    History of COPD    BPH without urinary obstruction    Hypercholesterolemia    Oliguria and anuria    H/O peripheral neuropathy    H/O lung transplant  bilateral - transplant - 1-2-2015 -  [facility]    H/O heart artery stent  x3 @[facility]    History of hip replacement  right    Status post open reduction and internal fixation (ORIF) of fracture  left femur      Allergies: budesonide (Unknown)  cefpodoxime (Unknown)  Pollen (Unknown)      Medications: acetaminophen   IVPB .. 1000 milliGRAM(s) IV Intermittent Once      FAMILY HISTORY:  Family history of emphysema  : non-contributory  Social History:     Ambulation: Walking independently With Cane                           7.8    15.38 )-----------( 137      ( 20 Apr 2022 22:05 )             26.9       04-20    141  |  98  |  40.1<H>  ----------------------------<  122<H>  4.1   |  24.0  |  6.27<H>    Ca    8.6      20 Apr 2022 22:05        Vital Signs Last 24 Hrs  T(C): 36.9 (20 Apr 2022 23:28), Max: 36.9 (20 Apr 2022 23:28)  T(F): 98.5 (20 Apr 2022 23:28), Max: 98.5 (20 Apr 2022 23:28)  HR: 88 (20 Apr 2022 23:28) (82 - 88)  BP: 159/67 (20 Apr 2022 23:28) (152/76 - 159/67)  BP(mean): --  RR: 20 (20 Apr 2022 23:28) (16 - 20)  SpO2: 93% (20 Apr 2022 23:28) (93% - 100%)    Daily Height in cm: 167.64 (20 Apr 2022 20:10)    Daily       PHYSICAL EXAM:      Appearance: Alert, responsive, in no acute distress.    Neurological: Sensation is grossly intact to light touch. No focal deficits or weaknesses found.    Skin: no rash on visible skin. Skin is clean, dry and intact. No bleeding. No abrasions. No ulcerations.    Vascular: 2+ distal pulses. Cap refill < 2 sec. No signs of venous insufficiency or stasis. No extremity ulcerations. No cyanosis.    Musculoskeletal:         Left Upper Extremity: Atraumatic. FROM. No pain reported       Right Upper Extremity:  Atraumatic. FROM. No pain reported       Left Lower Extremity: Left hip pain. Skin intact. No ecchymosis. - SLR due to pain. + dorsi plantar flexion. EHL, FHL intact. Compartments soft, compressible non-tender.       Right Lower Extremity: + SLR with radiating pain to left hip area. + dorsi plantar flexion. EHL, FHL intact. Compartments soft, compressible non-tender.    Imaging Studies:  < from: CT Pelvis Bony Only No Cont (04.21.22 @ 01:22) >    ACC: [id number] EXAM:  CT PELVIS BONY ONLY                          PROCEDURE DATE:  04/21/2022          INTERPRETATION:  CLINICAL INFORMATION: Status post fall with left hip   pain.    TECHNIQUE: CT of the pelvis and bilateral femurs were performed in bone   and soft tissue windows with coronal and sagittal reformats. No   intravenous contrast was administered. 3-D reformats were performed on a   separate workstation.    COMPARISON: CT of the pelvis from 5/20/2016.    FINDINGS:    Bone: Acute comminuted, intra-articular fractures of the superior,   anterior, medial and posterior left acetabulum is seen with fracture   extension into the left superior pubic ramus and into the left medial   ilium. Minimal medial displacement of a medial acetabular and iliac   fracture fragment is noted. An acute comminuted, mildly displaced   fracture of the left inferior pubic ramus is seen. A long stem left femur   intramedullary krista with proximal blade and distal interlocking screw is   noted stabilizing a prior intertrochanteric fracture. A total right hip   arthroplasty and trochanteric cerclage wire is seen without   periprosthetic fracture or dislocation. No periprosthetic lucency is   noted to suggest loosening. No additional fracture or dislocation is   demonstrated. Degenerative changes of the visualized lower lumbar spine   and sacroiliac joints are present.    Soft tissues: The left obturator internus muscle is enlarged likely due   to muscle strain and/or intramuscular hematoma. Vascular calcifications   are noted.    Miscellaneous: A partially visualized 2.1 cm saccular aneurysm emanating   from the infrarenal abdominal aorta is noted. A left renal cyst is seen.   Sigmoid diverticulosis is noted without diverticulitis. Appendix is  unremarkable. Circumferential urinary bladder wall thickening which could   be due to underdistention versus a cystitis. Mild to moderate   inflammatory change is seen in the left pelvis.    IMPRESSION:    Acute comminuted, intra-articular fractures of the superior, anterior,   medial and posterior left acetabulum with fracture extension into the   left superior pubic ramus and left medial ilium. Acute comminuted, mildly   displaced fracture of the left inferior pubic ramus.    --- End of Report ---      TUCKER PARRA MD; Attending Radiologist  This document has been electronically signed. Apr 21 2022  2:38AM    Xrays reviewed          A/P:  Pt is a  72y Male with Left acetabulum fracture, Left superior and inferior ramus fractures,    PLAN:   * NPO for possible OR tomorrow   * IV fluids ordered and to start once NPO  * Pre-operative ABX ordered  * Routine daily anticoagulation held for OR  * Medical optimization. 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 Pt reports that he tripped and fell while stepping over a curb this afternoon, approximately 4:00pm, without walker or cane assistance. Patient uses a cane "on and off". Says he landed on his L hip. Reports crawling to his car and driving home. Patient reports Dr. Kaba performed surgery on right hip total hip arthroplasty 2020 and Left hip s/p hip fracture 2021 but documentation not found on sunrise. Denies head strike or LOC. Denies other complaints. Pt currently on Eliquis 2.5mg BID and Plavix 75mg qd.     REVIEW OF SYSTEMS ::See HPI    General: Alert, responsive, in NAD    Skin/Breast: No rashes, no pruritis   	  Ophthalmologic: No visual changes. No redness.     Respiratory and Thorax: See HPI No difficulty breathing. No cough.  	   Cardiovascular:	No chest pain. No palpitations.    Gastrointestinal:	 No abdominal pain. No diarrhea.     Genitourinary: No dysuria. No bleeding.    Musculoskeletal: SEE HPI.    Neurological: No sensory or motor changes.     Psychiatric: No anxiety or depression.    Hematology/Lymphatics: No swelling.    Endocrine: No Hx of diabetes.    ROS is otherwise negative.    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    Pneumonia  April 2021    [place] (hard of hearing)    HTN (hypertension)    Lumbar spondylosis    History of COPD    BPH without urinary obstruction    Hypercholesterolemia    Oliguria and anuria    H/O peripheral neuropathy    H/O lung transplant  bilateral - transplant - 1-2-2015 -  [facility]    H/O heart artery stent  x3 @[facility]    History of hip replacement  right    Status post open reduction and internal fixation (ORIF) of fracture  left femur      Allergies: budesonide (Unknown)  cefpodoxime (Unknown)  Pollen (Unknown)      Medications: acetaminophen   IVPB .. 1000 milliGRAM(s) IV Intermittent Once      FAMILY HISTORY:  Family history of emphysema  : non-contributory  Social History:     Ambulation: Walking independently With Cane                           7.8    15.38 )-----------( 137      ( 20 Apr 2022 22:05 )             26.9       04-20    141  |  98  |  40.1<H>  ----------------------------<  122<H>  4.1   |  24.0  |  6.27<H>    Ca    8.6      20 Apr 2022 22:05        Vital Signs Last 24 Hrs  T(C): 36.9 (20 Apr 2022 23:28), Max: 36.9 (20 Apr 2022 23:28)  T(F): 98.5 (20 Apr 2022 23:28), Max: 98.5 (20 Apr 2022 23:28)  HR: 88 (20 Apr 2022 23:28) (82 - 88)  BP: 159/67 (20 Apr 2022 23:28) (152/76 - 159/67)  BP(mean): --  RR: 20 (20 Apr 2022 23:28) (16 - 20)  SpO2: 93% (20 Apr 2022 23:28) (93% - 100%)    Daily Height in cm: 167.64 (20 Apr 2022 20:10)    Daily       PHYSICAL EXAM:      Appearance: Alert, responsive, in no acute distress.    Neurological: Sensation is grossly intact to light touch. No focal deficits or weaknesses found.    Skin: no rash on visible skin. Skin is clean, dry and intact. No bleeding. No abrasions. No ulcerations.    Vascular: 2+ distal pulses. Cap refill < 2 sec. No signs of venous insufficiency or stasis. No extremity ulcerations. No cyanosis.    Musculoskeletal:         Left Upper Extremity: Atraumatic. FROM. No pain reported       Right Upper Extremity:  Atraumatic. FROM. No pain reported       Left Lower Extremity: Left hip pain. Skin intact. No ecchymosis. - SLR due to pain. + dorsi plantar flexion. EHL, FHL intact. Compartments soft, compressible non-tender.       Right Lower Extremity: + SLR with radiating pain to left hip area. + dorsi plantar flexion. EHL, FHL intact. Compartments soft, compressible non-tender.    Imaging Studies:  < from: CT Pelvis Bony Only No Cont (04.21.22 @ 01:22) >    ACC: [id number] EXAM:  CT PELVIS BONY ONLY                          PROCEDURE DATE:  04/21/2022          INTERPRETATION:  CLINICAL INFORMATION: Status post fall with left hip   pain.    TECHNIQUE: CT of the pelvis and bilateral femurs were performed in bone   and soft tissue windows with coronal and sagittal reformats. No   intravenous contrast was administered. 3-D reformats were performed on a   separate workstation.    COMPARISON: CT of the pelvis from 5/20/2016.    FINDINGS:    Bone: Acute comminuted, intra-articular fractures of the superior,   anterior, medial and posterior left acetabulum is seen with fracture   extension into the left superior pubic ramus and into the left medial   ilium. Minimal medial displacement of a medial acetabular and iliac   fracture fragment is noted. An acute comminuted, mildly displaced   fracture of the left inferior pubic ramus is seen. A long stem left femur   intramedullary krista with proximal blade and distal interlocking screw is   noted stabilizing a prior intertrochanteric fracture. A total right hip   arthroplasty and trochanteric cerclage wire is seen without   periprosthetic fracture or dislocation. No periprosthetic lucency is   noted to suggest loosening. No additional fracture or dislocation is   demonstrated. Degenerative changes of the visualized lower lumbar spine   and sacroiliac joints are present.    Soft tissues: The left obturator internus muscle is enlarged likely due   to muscle strain and/or intramuscular hematoma. Vascular calcifications   are noted.    Miscellaneous: A partially visualized 2.1 cm saccular aneurysm emanating   from the infrarenal abdominal aorta is noted. A left renal cyst is seen.   Sigmoid diverticulosis is noted without diverticulitis. Appendix is  unremarkable. Circumferential urinary bladder wall thickening which could   be due to underdistention versus a cystitis. Mild to moderate   inflammatory change is seen in the left pelvis.    IMPRESSION:    Acute comminuted, intra-articular fractures of the superior, anterior,   medial and posterior left acetabulum with fracture extension into the   left superior pubic ramus and left medial ilium. Acute comminuted, mildly   displaced fracture of the left inferior pubic ramus.    --- End of Report ---      TUCKER PARRA MD; Attending Radiologist  This document has been electronically signed. Apr 21 2022  2:38AM    Xrays reviewed          A/P:  Pt is a  72y Male with Left acetabulum fracture, Left superior and inferior ramus fractures,    PLAN:   * Medical optimization. Due for dialysis   * Trauma consult recommended  * TTWB  * Non-op treatment at present time  * D/w [place]  * F/u outpatient as needed

## 2022-04-21 NOTE — H&P ADULT - HISTORY OF PRESENT ILLNESS
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 Patient uses cane intermittently. Hx prior CEZAR by Dr Kaba. Recently started AC with Eliquis 2.5mg PO BID. Recently cleared for surgery for CEA at [facility] last month. Seen by Trauma Sx and cleared for Orthopedic Surgical intervention. Evaluated by Ortho Sx and plan for OR today. Patient due for HD today. ROS negative unless mentioned.

## 2022-04-21 NOTE — CONSULT NOTE ADULT - SUBJECTIVE AND OBJECTIVE BOX
PULMONARY CONSULT NOTE      JU FERGUSONCULLEN-[id number]    Patient is a 72y old  Male who presents with a chief complaint of Hip Fracture (21 Apr 2022 08:58)      HISTORY OF PRESENT ILLNESS: Hx of emphysema for which he had a double lung transplant at [facility] in 2015 on Tacrolimus/Cellcept. Follows with Dr Kaufman from [facility] remotely; f/u here locally with Dr Cordova. Latest PFT in 1/2022 showed no restriction, no obstruction. Not on O2. Reports no use of inhalers. Exercises regularly until recently. Exercise hampered by sob and pain in legs.    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 Patient uses cane intermittently. Hx prior CEZAR by Dr Kaba. Recently started AC with Eliquis 2.5mg PO BID. Recently cleared for surgery for CEA at [facility] last month. Seen by Trauma Sx and cleared for Orthopedic Surgical intervention. Evaluated by Ortho Sx and plan for OR today. Patient due for HD today. ROS negative unless mentioned.       MEDICATIONS  (STANDING):  amLODIPine   Tablet 5 milliGRAM(s) Oral daily  chlorhexidine 2% Cloths 1 Application(s) Topical <User Schedule>  fluconAZOLE   Tablet 200 milliGRAM(s) Oral <User Schedule>  hydrocortisone 40 milliGRAM(s) Oral daily  hydrocortisone 20 milliGRAM(s) Oral at bedtime  mycophenolate mofetil 500 milliGRAM(s) Oral two times a day  pantoprazole  Injectable 40 milliGRAM(s) IV Push every 24 hours  povidone iodine 5% Nasal Swab 1 Application(s) Both Nostrils once  tacrolimus 0.5 milliGRAM(s) Oral daily  tacrolimus 1 milliGRAM(s) Oral two times a day  tamsulosin 0.4 milliGRAM(s) Oral at bedtime  trimethoprim   80 mG/sulfamethoxazole 400 mG 1 Tablet(s) Oral <User Schedule>      MEDICATIONS  (PRN):  acetaminophen     Tablet .. 650 milliGRAM(s) Oral every 6 hours PRN Temp greater or equal to 38C (100.4F), Mild Pain (1 - 3)  HYDROmorphone  Injectable 0.5 milliGRAM(s) IV Push every 4 hours PRN Moderate Pain (4 - 6)  HYDROmorphone  Injectable 1 milliGRAM(s) IV Push every 4 hours PRN Severe Pain (7 - 10)  melatonin 3 milliGRAM(s) Oral at bedtime PRN Insomnia  ondansetron Injectable 4 milliGRAM(s) IV Push every 8 hours PRN Nausea and/or Vomiting      Allergies    budesonide (Unknown)  cefpodoxime (Unknown)  Pollen (Unknown)    Intolerances        PAST MEDICAL & SURGICAL HISTORY:  Emphysema of lung  s/p lung transplant    ESRD (end stage renal disease) on dialysis  on HD via LUE T/Th/Sat    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 ENT:  No earache, sore throat or runny nose.    CARDIOVASCULAR:  per HPI    RESPIRATORY: per HPI      GASTROINTESTINAL:  No abdominal pain, nausea, vomiting or diarrhea.    GENITOURINARY:  per HPI    NEUROLOGIC:  No paresthesias, fasciculations, seizures or weakness.    PSYCHIATRIC:  No disorder of thought or mood.    Vital Signs Last 24 Hrs  T(C): 36.6 (21 Apr 2022 07:54), Max: 36.9 (20 Apr 2022 23:28)  T(F): 97.9 (21 Apr 2022 07:54), Max: 98.5 (20 Apr 2022 23:28)  HR: 88 (21 Apr 2022 07:54) (82 - 88)  BP: 137/80 (21 Apr 2022 07:54) (128/72 - 159/67)  BP(mean): --  RR: 18 (21 Apr 2022 07:54) (16 - 20)  SpO2: 93% (21 Apr 2022 07:54) (93% - 100%)    PHYSICAL EXAMINATION:    GENERAL: The patient is  in no apparent distress.     HEENT: Head is normocephalic and atraumatic.  Mucous membranes are moist.     NECK: Supple.     LUNGS: Clear to auscultation without wheezing, rales, or rhonchi. Respirations unlabored    HEART: Regular rate and rhythm     ABDOMEN: Soft, nontender, and nondistended.     EXTREMITIES: pain in l hip      NEUROLOGIC: Grossly intact.      LABS:                        7.7    11.77 )-----------( 138      ( 21 Apr 2022 08:54 )             26.4     04-21    138  |  96<L>  |  46.4<H>  ----------------------------<  94  4.3   |  25.0  |  6.77<H>    Ca    8.7      21 Apr 2022 08:54    TPro  6.3<L>  /  Alb  4.0  /  TBili  0.5  /  DBili  x   /  AST  30  /  ALT  15  /  AlkPhos  138<H>  04-21    PT/INR - ( 21 Apr 2022 08:54 )   PT: 16.2 sec;   INR: 1.39 ratio         PTT - ( 21 Apr 2022 08:54 )  PTT:33.0 sec         MICROBIOLOGY:  COVID-19 PCR . (04.20.22 @ 22:05)    COVID-19 PCR: NotDetec: You can help in the fight against COVID-19. [facility] may contact  you to see if you are interested in voluntarily participating in one of  our clinical trials.  Testing is performed using polymerase chain reaction (PCR) or  transcription mediated amplification (TMA). This COVID-19 (SARS-CoV-2)  nucleic acid amplification test was validated by [facility] and is  in use under the FDA Emergency Use Authorization (EUA) for clinical labs  CLIA-certified to perform high complexity testing. Test results should be  correlated with clinical presentation, patient history, and epidemiology.        RADIOLOGY & ADDITIONAL STUDIES:  cxr done 4/20/22: by my reading, more PVC than previous    < from: Xray Femur 2 Views, Left (04.20.22 @ 21:19) >  ACC: [id number] EXAM:  XR FEMUR 2 VIEWS LT                          PROCEDURE DATE:  04/20/2022          INTERPRETATION:  Clinical history: 72-year-old male, left hip injury.    Four views of the left femur are compared to 3/26/2020 and demonstrate   that the patient is post ORIF of an intertrochanteric fracture,   anatomical alignment restored.    Intramedullary nail and screws in satisfactory position.    IMPRESSION:  Anatomical alignment post ORIF    --- End of Report ---            MAYANK VAUGHN DO; Attending Radiologist  This document has been electronically signed. Apr 21 2022  7:53AM    < end of copied text >  < from: CT Pelvis Bony Only No Cont (04.21.22 @ 01:22) >    ACC: [id number] EXAM:  CT PELVIS BONY ONLY                          PROCEDURE DATE:  04/21/2022          INTERPRETATION:  CLINICAL INFORMATION: Status post fall with left hip   pain.    TECHNIQUE: CT of the pelvis and bilateral femurs were performed in bone   and soft tissue windows with coronal and sagittal reformats. No   intravenous contrast was administered. 3-D reformats were performed on a   separate workstation.    COMPARISON: CT of the pelvis from 5/20/2016.    FINDINGS:    Bone: Acute comminuted, intra-articular fractures of the superior,   anterior, medial and posterior left acetabulum is seen with fracture   extension into the left superior pubic ramus and into the left medial   ilium. Minimal medial displacement of a medial acetabular and iliac   fracture fragment is noted. An acute comminuted, mildly displaced   fracture of the left inferior pubic ramus is seen. A long stem left femur   intramedullary krista with proximal blade and distal interlocking screw is   noted stabilizing a prior intertrochanteric fracture. A total right hip   arthroplasty and trochanteric cerclage wire is seen without   periprosthetic fracture or dislocation. No periprosthetic lucency is   noted to suggest loosening. No additional fracture or dislocation is   demonstrated. Degenerative changes of the visualized lower lumbar spine   and sacroiliac joints are present.    Soft tissues: The left obturator internus muscle is enlarged likely due   to muscle strain and/or intramuscular hematoma. Vascular calcifications   are noted.    Miscellaneous: A partially visualized 2.1 cm saccular aneurysm emanating   from the infrarenal abdominal aorta is noted. A left renal cyst is seen.   Sigmoid diverticulosis is noted without diverticulitis. Appendix is  unremarkable. Circumferential urinary bladder wall thickening which could   be due to underdistention versus a cystitis. Mild to moderate   inflammatory change is seen in the left pelvis.    IMPRESSION:    Acute comminuted, intra-articular fractures of the superior, anterior,   medial and posterior left acetabulum with fracture extension into the   left superior pubic ramus and left medial ilium. Acute comminuted, mildly   displaced fracture of the left inferior pubic ramus.    --- End of Report ---            TUCKER PARRA MD; Attending Radiologist  This document has been electronically signed. Apr 21 2022  2:38AM    < end of copied text >

## 2022-04-21 NOTE — CONSULT NOTE ADULT - SUBJECTIVE AND OBJECTIVE BOX
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 Patient uses cane intermittently. Hx prior CEZAR by Dr Kaba. Recently started AC with Eliquis 2.5mg PO BID. Recently cleared for surgery for CEA at [facility] last month. Seen by Trauma Sx and cleared for Orthopedic Surgical intervention. Evaluated by Ortho Sx and plan for OR today. Patient due for HD today.        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    2019 novel coronavirus disease (COVID-19)  Feb 2021 - hospitalized for 1 week at [facility]    Pneumonia  April 2021    [place]    HTN (hypertension)    Lumbar spondylosis    History of COPD    BPH without urinary obstruction    Hypercholesterolemia    Oliguria and anuria    H/O peripheral neuropathy    H/O lung transplant  bilateral - transplant - 1-2-2015 -  [facility]    H/O heart artery stent  x3 @[facility]    History of hip replacement  right    Status post open reduction and internal fixation (ORIF) of fracture  left femur        MEDICATIONS:  MEDICATIONS  (STANDING):  amLODIPine   Tablet 5 milliGRAM(s) Oral daily  chlorhexidine 2% Cloths 1 Application(s) Topical <User Schedule>  fluconAZOLE   Tablet 200 milliGRAM(s) Oral <User Schedule>  hydrocortisone 40 milliGRAM(s) Oral daily  hydrocortisone 20 milliGRAM(s) Oral at bedtime  mycophenolate mofetil 500 milliGRAM(s) Oral two times a day  pantoprazole  Injectable 40 milliGRAM(s) IV Push every 24 hours  povidone iodine 5% Nasal Swab 1 Application(s) Both Nostrils once  tacrolimus 0.5 milliGRAM(s) Oral daily  tacrolimus 1 milliGRAM(s) Oral two times a day  tamsulosin 0.4 milliGRAM(s) Oral at bedtime  trimethoprim   80 mG/sulfamethoxazole 400 mG 1 Tablet(s) Oral <User Schedule>    MEDICATIONS  (PRN):  acetaminophen     Tablet .. 650 milliGRAM(s) Oral every 6 hours PRN Temp greater or equal to 38C (100.4F), Mild Pain (1 - 3)  HYDROmorphone  Injectable 0.5 milliGRAM(s) IV Push every 4 hours PRN Moderate Pain (4 - 6)  HYDROmorphone  Injectable 1 milliGRAM(s) IV Push every 4 hours PRN Severe Pain (7 - 10)  melatonin 3 milliGRAM(s) Oral at bedtime PRN Insomnia  ondansetron Injectable 4 milliGRAM(s) IV Push every 8 hours PRN Nausea and/or Vomiting    acetaminophen     Tablet .. 650 milliGRAM(s) Oral every 6 hours PRN  amLODIPine   Tablet 5 milliGRAM(s) Oral daily  chlorhexidine 2% Cloths 1 Application(s) Topical <User Schedule>  fluconAZOLE   Tablet 200 milliGRAM(s) Oral <User Schedule>  hydrocortisone 40 milliGRAM(s) Oral daily  hydrocortisone 20 milliGRAM(s) Oral at bedtime  HYDROmorphone  Injectable 0.5 milliGRAM(s) IV Push every 4 hours PRN  HYDROmorphone  Injectable 1 milliGRAM(s) IV Push every 4 hours PRN  melatonin 3 milliGRAM(s) Oral at bedtime PRN  mycophenolate mofetil 500 milliGRAM(s) Oral two times a day  ondansetron Injectable 4 milliGRAM(s) IV Push every 8 hours PRN  pantoprazole  Injectable 40 milliGRAM(s) IV Push every 24 hours  povidone iodine 5% Nasal Swab 1 Application(s) Both Nostrils once  tacrolimus 0.5 milliGRAM(s) Oral daily  tacrolimus 1 milliGRAM(s) Oral two times a day  tamsulosin 0.4 milliGRAM(s) Oral at bedtime  trimethoprim   80 mG/sulfamethoxazole 400 mG 1 Tablet(s) Oral <User Schedule>      SOCIAL HISTORY:  Patient denies alcohol, tobacco, drug use    FAMILY HISTORY:  Family history of emphysema        ROS:  Patient denies cough, and other than noted above full ROS is unremarkable      PHYSICAL EXAM:  Vital Signs Last 24 Hrs  T(C): 36.6 (21 Apr 2022 07:54), Max: 36.9 (20 Apr 2022 23:28)  T(F): 97.9 (21 Apr 2022 07:54), Max: 98.5 (20 Apr 2022 23:28)  HR: 88 (21 Apr 2022 07:54) (82 - 88)  BP: 137/80 (21 Apr 2022 07:54) (128/72 - 159/67)  BP(mean): --  RR: 18 (21 Apr 2022 07:54) (16 - 20)  SpO2: 93% (21 Apr 2022 07:54) (93% - 100%)  General: Patient comfotable in NAD  HEENT: NCAT, mmm, EOMI  Neck: no JVD, no carotid bruits  CVS: nl s1, split s2, no s3, no s4, no murmur or rubs, RRR  Chest: CTA b/l  Abdomen: soft, nt/nd  Extremities: No c/c/e  Neuro: A&O x3  Psych: Normal affect      ECG:      LABS:                        7.8    15.38 )-----------( 137      ( 20 Apr 2022 22:05 )             26.9     04-20    141  |  98  |  40.1<H>  ----------------------------<  122<H>  4.1   |  24.0  |  6.27<H>    Ca    8.6      20 Apr 2022 22:05          PT/INR - ( 20 Apr 2022 22:05 )   PT: 16.6 sec;   INR: 1.43 ratio         PTT - ( 20 Apr 2022 22:05 )  PTT:32.0 sec      RADIOLOGY:  CT Pelvis Bony Only No Cont 04.21.22   IMPRESSION:    Acute comminuted, intra-articular fractures of the superior, anterior,   medial and posterior left acetabulum with fracture extension into the   left superior pubic ramus and left medial ilium. Acute comminuted, mildly   displaced fracture of the left inferior pubic ramus.    Echocardiogram 01/2022:  Mild concentric left ventricular hypertrophy, normal LV systolic function, EF 55-60%  Mild mitral regurgitation, mild tricuspid regurgitation    Carotid Duplex 01/2022:  Mild to moderate (16-49%) stenosis of the proximal right internal carotid artery  Moderate to severe (66-79%) stenosis of proximal left internal carotid artery    Cardiac catheterization 11/26/19:  PCI with CAITLIN to LAD and OM1    Pharmacologic nuclear stress test in 10/19:  Small are of moderate ischemia involving inferior wall. A diaphragmatic attenuation artifact is also present. There is normal wall motion and systolic function noted.         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 Pt denies any chest pain, SOB, palpitations preceding the fall. Denies LOC. States he had no exertional symptoms prior to fall. Pt admitted for L acetabulum  fx, planned for surgical intervention today. Cardiology consulted for preoperative CV risk assessment.     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    2019 novel coronavirus disease (COVID-19)  Feb 2021 - hospitalized for 1 week at [facility]    Pneumonia  April 2021    [place] ([place] of hearing)    HTN (hypertension)    Lumbar spondylosis    History of COPD    BPH without urinary obstruction    Hypercholesterolemia    Oliguria and anuria    H/O peripheral neuropathy    H/O lung transplant  bilateral - transplant - 1-2-2015 -  [facility]    H/O heart artery stent  x3 @[facility]    History of hip replacement  right    Status post open reduction and internal fixation (ORIF) of fracture  left femur        MEDICATIONS:  MEDICATIONS  (STANDING):  amLODIPine   Tablet 5 milliGRAM(s) Oral daily  chlorhexidine 2% Cloths 1 Application(s) Topical <User Schedule>  fluconAZOLE   Tablet 200 milliGRAM(s) Oral <User Schedule>  hydrocortisone 40 milliGRAM(s) Oral daily  hydrocortisone 20 milliGRAM(s) Oral at bedtime  mycophenolate mofetil 500 milliGRAM(s) Oral two times a day  pantoprazole  Injectable 40 milliGRAM(s) IV Push every 24 hours  povidone iodine 5% Nasal Swab 1 Application(s) Both Nostrils once  tacrolimus 0.5 milliGRAM(s) Oral daily  tacrolimus 1 milliGRAM(s) Oral two times a day  tamsulosin 0.4 milliGRAM(s) Oral at bedtime  trimethoprim   80 mG/sulfamethoxazole 400 mG 1 Tablet(s) Oral <User Schedule>    MEDICATIONS  (PRN):  acetaminophen     Tablet .. 650 milliGRAM(s) Oral every 6 hours PRN Temp greater or equal to 38C (100.4F), Mild Pain (1 - 3)  HYDROmorphone  Injectable 0.5 milliGRAM(s) IV Push every 4 hours PRN Moderate Pain (4 - 6)  HYDROmorphone  Injectable 1 milliGRAM(s) IV Push every 4 hours PRN Severe Pain (7 - 10)  melatonin 3 milliGRAM(s) Oral at bedtime PRN Insomnia  ondansetron Injectable 4 milliGRAM(s) IV Push every 8 hours PRN Nausea and/or Vomiting    acetaminophen     Tablet .. 650 milliGRAM(s) Oral every 6 hours PRN  amLODIPine   Tablet 5 milliGRAM(s) Oral daily  chlorhexidine 2% Cloths 1 Application(s) Topical <User Schedule>  fluconAZOLE   Tablet 200 milliGRAM(s) Oral <User Schedule>  hydrocortisone 40 milliGRAM(s) Oral daily  hydrocortisone 20 milliGRAM(s) Oral at bedtime  HYDROmorphone  Injectable 0.5 milliGRAM(s) IV Push every 4 hours PRN  HYDROmorphone  Injectable 1 milliGRAM(s) IV Push every 4 hours PRN  melatonin 3 milliGRAM(s) Oral at bedtime PRN  mycophenolate mofetil 500 milliGRAM(s) Oral two times a day  ondansetron Injectable 4 milliGRAM(s) IV Push every 8 hours PRN  pantoprazole  Injectable 40 milliGRAM(s) IV Push every 24 hours  povidone iodine 5% Nasal Swab 1 Application(s) Both Nostrils once  tacrolimus 0.5 milliGRAM(s) Oral daily  tacrolimus 1 milliGRAM(s) Oral two times a day  tamsulosin 0.4 milliGRAM(s) Oral at bedtime  trimethoprim   80 mG/sulfamethoxazole 400 mG 1 Tablet(s) Oral <User Schedule>      SOCIAL HISTORY:  Patient denies alcohol, tobacco, drug use    FAMILY HISTORY:  Family history of emphysema        ROS:  Patient denies cough, and other than noted above full ROS is unremarkable      PHYSICAL EXAM:  Vital Signs Last 24 Hrs  T(C): 36.6 (21 Apr 2022 07:54), Max: 36.9 (20 Apr 2022 23:28)  T(F): 97.9 (21 Apr 2022 07:54), Max: 98.5 (20 Apr 2022 23:28)  HR: 88 (21 Apr 2022 07:54) (82 - 88)  BP: 137/80 (21 Apr 2022 07:54) (128/72 - 159/67)  BP(mean): --  RR: 18 (21 Apr 2022 07:54) (16 - 20)  SpO2: 93% (21 Apr 2022 07:54) (93% - 100%)  General: Patient comfotable in NAD  HEENT: NCAT, mmm, EOMI  Neck: no JVD, no carotid bruits  CVS: nl s1, split s2, no s3, no s4, no murmur or rubs, RRR  Chest: CTA b/l  Abdomen: soft, nt/nd  Extremities: No c/c/e  Neuro: A&O x3  Psych: Normal affect      ECG:      LABS:                        7.8    15.38 )-----------( 137      ( 20 Apr 2022 22:05 )             26.9     04-20    141  |  98  |  40.1<H>  ----------------------------<  122<H>  4.1   |  24.0  |  6.27<H>    Ca    8.6      20 Apr 2022 22:05          PT/INR - ( 20 Apr 2022 22:05 )   PT: 16.6 sec;   INR: 1.43 ratio         PTT - ( 20 Apr 2022 22:05 )  PTT:32.0 sec      RADIOLOGY:    CT Pelvis Bony Only No Cont 04.21.22   IMPRESSION:  Acute comminuted, intra-articular fractures of the superior, anterior,   medial and posterior left acetabulum with fracture extension into the   left superior pubic ramus and left medial ilium. Acute comminuted, mildly   displaced fracture of the left inferior pubic ramus.    Echocardiogram 01/2022:  Mild concentric left ventricular hypertrophy, normal LV systolic function, EF 55-60%  Mild mitral regurgitation, mild tricuspid regurgitation    Carotid Duplex 01/2022:  Mild to moderate (16-49%) stenosis of the proximal right internal carotid artery  Moderate to severe (66-79%) stenosis of proximal left internal carotid artery    Cardiac catheterization 11/26/19:  PCI with CAITLIN to LAD and OM1    Pharmacologic nuclear stress test in 10/19:  Small are of moderate ischemia involving inferior wall. A diaphragmatic attenuation artifact is also present. There is normal wall motion and systolic function noted.         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 Pt denies any chest pain, SOB, palpitations preceding the fall. Denies LOC. States he had no exertional symptoms prior to fall. Pt admitted for L acetabulum  fx, planned for surgical intervention today. Cardiology consulted for preoperative CV risk assessment.     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    H/O heart artery stent  x3 @[facility]    History of hip replacement  right    Status post open reduction and internal fixation (ORIF) of fracture  left femur        MEDICATIONS:  MEDICATIONS  (STANDING):  amLODIPine   Tablet 5 milliGRAM(s) Oral daily  chlorhexidine 2% Cloths 1 Application(s) Topical <User Schedule>  fluconAZOLE   Tablet 200 milliGRAM(s) Oral <User Schedule>  hydrocortisone 40 milliGRAM(s) Oral daily  hydrocortisone 20 milliGRAM(s) Oral at bedtime  mycophenolate mofetil 500 milliGRAM(s) Oral two times a day  pantoprazole  Injectable 40 milliGRAM(s) IV Push every 24 hours  povidone iodine 5% Nasal Swab 1 Application(s) Both Nostrils once  tacrolimus 0.5 milliGRAM(s) Oral daily  tacrolimus 1 milliGRAM(s) Oral two times a day  tamsulosin 0.4 milliGRAM(s) Oral at bedtime  trimethoprim   80 mG/sulfamethoxazole 400 mG 1 Tablet(s) Oral <User Schedule>    MEDICATIONS  (PRN):  acetaminophen     Tablet .. 650 milliGRAM(s) Oral every 6 hours PRN Temp greater or equal to 38C (100.4F), Mild Pain (1 - 3)  HYDROmorphone  Injectable 0.5 milliGRAM(s) IV Push every 4 hours PRN Moderate Pain (4 - 6)  HYDROmorphone  Injectable 1 milliGRAM(s) IV Push every 4 hours PRN Severe Pain (7 - 10)  melatonin 3 milliGRAM(s) Oral at bedtime PRN Insomnia  ondansetron Injectable 4 milliGRAM(s) IV Push every 8 hours PRN Nausea and/or Vomiting    acetaminophen     Tablet .. 650 milliGRAM(s) Oral every 6 hours PRN  amLODIPine   Tablet 5 milliGRAM(s) Oral daily  chlorhexidine 2% Cloths 1 Application(s) Topical <User Schedule>  fluconAZOLE   Tablet 200 milliGRAM(s) Oral <User Schedule>  hydrocortisone 40 milliGRAM(s) Oral daily  hydrocortisone 20 milliGRAM(s) Oral at bedtime  HYDROmorphone  Injectable 0.5 milliGRAM(s) IV Push every 4 hours PRN  HYDROmorphone  Injectable 1 milliGRAM(s) IV Push every 4 hours PRN  melatonin 3 milliGRAM(s) Oral at bedtime PRN  mycophenolate mofetil 500 milliGRAM(s) Oral two times a day  ondansetron Injectable 4 milliGRAM(s) IV Push every 8 hours PRN  pantoprazole  Injectable 40 milliGRAM(s) IV Push every 24 hours  povidone iodine 5% Nasal Swab 1 Application(s) Both Nostrils once  tacrolimus 0.5 milliGRAM(s) Oral daily  tacrolimus 1 milliGRAM(s) Oral two times a day  tamsulosin 0.4 milliGRAM(s) Oral at bedtime  trimethoprim   80 mG/sulfamethoxazole 400 mG 1 Tablet(s) Oral <User Schedule>      SOCIAL HISTORY:  Patient denies alcohol, tobacco, drug use    FAMILY HISTORY:  Family history of emphysema        ROS:  Patient denies cough, and other than noted above full ROS is unremarkable      PHYSICAL EXAM:  Vital Signs Last 24 Hrs  T(C): 36.6 (21 Apr 2022 07:54), Max: 36.9 (20 Apr 2022 23:28)  T(F): 97.9 (21 Apr 2022 07:54), Max: 98.5 (20 Apr 2022 23:28)  HR: 88 (21 Apr 2022 07:54) (82 - 88)  BP: 137/80 (21 Apr 2022 07:54) (128/72 - 159/67)  BP(mean): --  RR: 18 (21 Apr 2022 07:54) (16 - 20)  SpO2: 93% (21 Apr 2022 07:54) (93% - 100%)  General: Patient comfotable in NAD  HEENT: NCAT, mmm, EOMI  Neck: no JVD, right carotid bruit  CVS: nl s1, split s2, no s3, no s4, no murmur or rubs, RRR  Chest: diminished anteriorly, poor effort  Abdomen: soft, nt/nd  Extremities: No c/c/e  Neuro: A&O x3  Psych: Normal affect      ECG: NSR at 95 bpm, normal intervals, normal axis, septal infarct of indeterminate age      LABS:                        7.8    15.38 )-----------( 137      ( 20 Apr 2022 22:05 )             26.9     04-20    141  |  98  |  40.1<H>  ----------------------------<  122<H>  4.1   |  24.0  |  6.27<H>    Ca    8.6      20 Apr 2022 22:05          PT/INR - ( 20 Apr 2022 22:05 )   PT: 16.6 sec;   INR: 1.43 ratio         PTT - ( 20 Apr 2022 22:05 )  PTT:32.0 sec      RADIOLOGY:    CT Pelvis Bony Only No Cont 04.21.22   IMPRESSION:  Acute comminuted, intra-articular fractures of the superior, anterior,   medial and posterior left acetabulum with fracture extension into the   left superior pubic ramus and left medial ilium. Acute comminuted, mildly   displaced fracture of the left inferior pubic ramus.    Echocardiogram 01/2022:  Mild concentric left ventricular hypertrophy, normal LV systolic function, EF 55-60%  Mild mitral regurgitation, mild tricuspid regurgitation    Carotid Duplex 01/2022:  Mild to moderate (16-49%) stenosis of the proximal right internal carotid artery  Moderate to severe (66-79%) stenosis of proximal left internal carotid artery    Cardiac catheterization 11/26/19:  PCI with CAITLIN to LAD and OM1    Pharmacologic nuclear stress test in 10/19:  Small are of moderate ischemia involving inferior wall. A diaphragmatic attenuation artifact is also present. There is normal wall motion and systolic function noted.         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 Pt with no active cardiac symptoms. No recent change in pt's functional capacity or exertional symptoms. BP reasonably controlled. Hgb 7.8. Planned for PRBC transfusion at HD today.     Plan:  1. Hold Plavix and Eliquis, restart when feasible from surgical standpoint.   2. No ischemic evaluation indicated at this time.   3. Continue other current CV meds at current doses.    No cardiovascular contraindication for planned surgery.                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 Pt denies any chest pain, SOB, palpitations preceding the fall. Denies LOC. States he had no exertional symptoms prior to fall. Pt admitted for L acetabulum  fx, planned for surgical intervention today. Cardiology consulted for preoperative CV risk assessment.     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    2019 novel coronavirus disease (COVID-19)  Feb 2021 - hospitalized for 1 week at [facility]    Pneumonia  April 2021    [place]    HTN (hypertension)    Lumbar spondylosis    History of COPD    BPH without urinary obstruction    Hypercholesterolemia    Oliguria and anuria    H/O peripheral neuropathy    H/O lung transplant  bilateral - transplant - 1-2-2015 -  [facility]    H/O heart artery stent  x3 @[facility]    History of hip replacement  right    Status post open reduction and internal fixation (ORIF) of fracture  left femur        MEDICATIONS:  MEDICATIONS  (STANDING):  amLODIPine   Tablet 5 milliGRAM(s) Oral daily  chlorhexidine 2% Cloths 1 Application(s) Topical <User Schedule>  fluconAZOLE   Tablet 200 milliGRAM(s) Oral <User Schedule>  hydrocortisone 40 milliGRAM(s) Oral daily  hydrocortisone 20 milliGRAM(s) Oral at bedtime  mycophenolate mofetil 500 milliGRAM(s) Oral two times a day  pantoprazole  Injectable 40 milliGRAM(s) IV Push every 24 hours  povidone iodine 5% Nasal Swab 1 Application(s) Both Nostrils once  tacrolimus 0.5 milliGRAM(s) Oral daily  tacrolimus 1 milliGRAM(s) Oral two times a day  tamsulosin 0.4 milliGRAM(s) Oral at bedtime  trimethoprim   80 mG/sulfamethoxazole 400 mG 1 Tablet(s) Oral <User Schedule>    MEDICATIONS  (PRN):  acetaminophen     Tablet .. 650 milliGRAM(s) Oral every 6 hours PRN Temp greater or equal to 38C (100.4F), Mild Pain (1 - 3)  HYDROmorphone  Injectable 0.5 milliGRAM(s) IV Push every 4 hours PRN Moderate Pain (4 - 6)  HYDROmorphone  Injectable 1 milliGRAM(s) IV Push every 4 hours PRN Severe Pain (7 - 10)  melatonin 3 milliGRAM(s) Oral at bedtime PRN Insomnia  ondansetron Injectable 4 milliGRAM(s) IV Push every 8 hours PRN Nausea and/or Vomiting    acetaminophen     Tablet .. 650 milliGRAM(s) Oral every 6 hours PRN  amLODIPine   Tablet 5 milliGRAM(s) Oral daily  chlorhexidine 2% Cloths 1 Application(s) Topical <User Schedule>  fluconAZOLE   Tablet 200 milliGRAM(s) Oral <User Schedule>  hydrocortisone 40 milliGRAM(s) Oral daily  hydrocortisone 20 milliGRAM(s) Oral at bedtime  HYDROmorphone  Injectable 0.5 milliGRAM(s) IV Push every 4 hours PRN  HYDROmorphone  Injectable 1 milliGRAM(s) IV Push every 4 hours PRN  melatonin 3 milliGRAM(s) Oral at bedtime PRN  mycophenolate mofetil 500 milliGRAM(s) Oral two times a day  ondansetron Injectable 4 milliGRAM(s) IV Push every 8 hours PRN  pantoprazole  Injectable 40 milliGRAM(s) IV Push every 24 hours  povidone iodine 5% Nasal Swab 1 Application(s) Both Nostrils once  tacrolimus 0.5 milliGRAM(s) Oral daily  tacrolimus 1 milliGRAM(s) Oral two times a day  tamsulosin 0.4 milliGRAM(s) Oral at bedtime  trimethoprim   80 mG/sulfamethoxazole 400 mG 1 Tablet(s) Oral <User Schedule>      SOCIAL HISTORY:  Patient denies alcohol, tobacco, drug use    FAMILY HISTORY:  Family history of emphysema        ROS:  Patient denies cough, and other than noted above full ROS is unremarkable      PHYSICAL EXAM:  Vital Signs Last 24 Hrs  T(C): 36.6 (21 Apr 2022 07:54), Max: 36.9 (20 Apr 2022 23:28)  T(F): 97.9 (21 Apr 2022 07:54), Max: 98.5 (20 Apr 2022 23:28)  HR: 88 (21 Apr 2022 07:54) (82 - 88)  BP: 137/80 (21 Apr 2022 07:54) (128/72 - 159/67)  BP(mean): --  RR: 18 (21 Apr 2022 07:54) (16 - 20)  SpO2: 93% (21 Apr 2022 07:54) (93% - 100%)  General: Patient comfotable in NAD  HEENT: NCAT, mmm, EOMI  Neck: no JVD, right carotid bruit  CVS: nl s1, split s2, no s3, no s4, no murmur or rubs, RRR  Chest: diminished anteriorly, poor effort  Abdomen: soft, nt/nd  Extremities: No c/c/e  Neuro: A&O x3  Psych: Normal affect      ECG: NSR at 95 bpm, normal intervals, normal axis, septal infarct of indeterminate age      LABS:                        7.8    15.38 )-----------( 137      ( 20 Apr 2022 22:05 )             26.9     04-20    141  |  98  |  40.1<H>  ----------------------------<  122<H>  4.1   |  24.0  |  6.27<H>    Ca    8.6      20 Apr 2022 22:05          PT/INR - ( 20 Apr 2022 22:05 )   PT: 16.6 sec;   INR: 1.43 ratio         PTT - ( 20 Apr 2022 22:05 )  PTT:32.0 sec      RADIOLOGY:    CT Pelvis Bony Only No Cont 04.21.22   IMPRESSION:  Acute comminuted, intra-articular fractures of the superior, anterior,   medial and posterior left acetabulum with fracture extension into the   left superior pubic ramus and left medial ilium. Acute comminuted, mildly   displaced fracture of the left inferior pubic ramus.    Echocardiogram 01/2022:  Mild concentric left ventricular hypertrophy, normal LV systolic function, EF 55-60%  Mild mitral regurgitation, mild tricuspid regurgitation    Carotid Duplex 01/2022:  Mild to moderate (16-49%) stenosis of the proximal right internal carotid artery  Moderate to severe (66-79%) stenosis of proximal left internal carotid artery    Cardiac catheterization 11/26/19:  PCI with CAITLIN to LAD and OM1    Pharmacologic nuclear stress test in 10/19:  Small are of moderate ischemia involving inferior wall. A diaphragmatic attenuation artifact is also present. There is normal wall motion and systolic function noted.         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 Pt with no active cardiac symptoms. No recent change in pt's functional capacity or exertional symptoms. BP reasonably controlled. Hgb 7.8. Planned for PRBC transfusion at HD today.     Plan:  1. Hold Plavix and Eliquis, restart when feasible from surgical standpoint.   2. No ischemic evaluation indicated at this time.   3. Continue other current CV meds at current doses.    No cardiovascular contraindication for planned surgery.                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 Pt denies any chest pain, SOB, palpitations preceding the fall. Denies LOC. States he had no exertional symptoms prior to fall. Pt admitted for L acetabulum  fx, planned for surgical intervention today. Cardiology consulted for preoperative CV risk assessment.     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    H/O heart artery stent  x3 [facility]    History of hip replacement  right    Status post open reduction and internal fixation (ORIF) of fracture  left femur        MEDICATIONS:  MEDICATIONS  (STANDING):  amLODIPine   Tablet 5 milliGRAM(s) Oral daily  chlorhexidine 2% Cloths 1 Application(s) Topical <User Schedule>  fluconAZOLE   Tablet 200 milliGRAM(s) Oral <User Schedule>  hydrocortisone 40 milliGRAM(s) Oral daily  hydrocortisone 20 milliGRAM(s) Oral at bedtime  mycophenolate mofetil 500 milliGRAM(s) Oral two times a day  pantoprazole  Injectable 40 milliGRAM(s) IV Push every 24 hours  povidone iodine 5% Nasal Swab 1 Application(s) Both Nostrils once  tacrolimus 0.5 milliGRAM(s) Oral daily  tacrolimus 1 milliGRAM(s) Oral two times a day  tamsulosin 0.4 milliGRAM(s) Oral at bedtime  trimethoprim   80 mG/sulfamethoxazole 400 mG 1 Tablet(s) Oral <User Schedule>    MEDICATIONS  (PRN):  acetaminophen     Tablet .. 650 milliGRAM(s) Oral every 6 hours PRN Temp greater or equal to 38C (100.4F), Mild Pain (1 - 3)  HYDROmorphone  Injectable 0.5 milliGRAM(s) IV Push every 4 hours PRN Moderate Pain (4 - 6)  HYDROmorphone  Injectable 1 milliGRAM(s) IV Push every 4 hours PRN Severe Pain (7 - 10)  melatonin 3 milliGRAM(s) Oral at bedtime PRN Insomnia  ondansetron Injectable 4 milliGRAM(s) IV Push every 8 hours PRN Nausea and/or Vomiting    acetaminophen     Tablet .. 650 milliGRAM(s) Oral every 6 hours PRN  amLODIPine   Tablet 5 milliGRAM(s) Oral daily  chlorhexidine 2% Cloths 1 Application(s) Topical <User Schedule>  fluconAZOLE   Tablet 200 milliGRAM(s) Oral <User Schedule>  hydrocortisone 40 milliGRAM(s) Oral daily  hydrocortisone 20 milliGRAM(s) Oral at bedtime  HYDROmorphone  Injectable 0.5 milliGRAM(s) IV Push every 4 hours PRN  HYDROmorphone  Injectable 1 milliGRAM(s) IV Push every 4 hours PRN  melatonin 3 milliGRAM(s) Oral at bedtime PRN  mycophenolate mofetil 500 milliGRAM(s) Oral two times a day  ondansetron Injectable 4 milliGRAM(s) IV Push every 8 hours PRN  pantoprazole  Injectable 40 milliGRAM(s) IV Push every 24 hours  povidone iodine 5% Nasal Swab 1 Application(s) Both Nostrils once  tacrolimus 0.5 milliGRAM(s) Oral daily  tacrolimus 1 milliGRAM(s) Oral two times a day  tamsulosin 0.4 milliGRAM(s) Oral at bedtime  trimethoprim   80 mG/sulfamethoxazole 400 mG 1 Tablet(s) Oral <User Schedule>      SOCIAL HISTORY:  Patient denies alcohol, tobacco, drug use    FAMILY HISTORY:  Family history of emphysema        ROS:  Patient denies cough, and other than noted above full ROS is unremarkable      PHYSICAL EXAM:  Vital Signs Last 24 Hrs  T(C): 36.6 (21 Apr 2022 07:54), Max: 36.9 (20 Apr 2022 23:28)  T(F): 97.9 (21 Apr 2022 07:54), Max: 98.5 (20 Apr 2022 23:28)  HR: 88 (21 Apr 2022 07:54) (82 - 88)  BP: 137/80 (21 Apr 2022 07:54) (128/72 - 159/67)  BP(mean): --  RR: 18 (21 Apr 2022 07:54) (16 - 20)  SpO2: 93% (21 Apr 2022 07:54) (93% - 100%)  General: Patient comfotable in NAD  HEENT: NCAT, mmm, EOMI  Neck: no JVD, right carotid bruit  CVS: nl s1, split s2, no s3, no s4, no murmur or rubs, RRR  Chest: diminished anteriorly, poor effort  Abdomen: soft, nt/nd  Extremities: No c/c/e  Neuro: A&O x3  Psych: Normal affect      ECG: NSR at 95 bpm, normal intervals, normal axis, septal infarct of indeterminate age      LABS:                        7.8    15.38 )-----------( 137      ( 20 Apr 2022 22:05 )             26.9     04-20    141  |  98  |  40.1<H>  ----------------------------<  122<H>  4.1   |  24.0  |  6.27<H>    Ca    8.6      20 Apr 2022 22:05          PT/INR - ( 20 Apr 2022 22:05 )   PT: 16.6 sec;   INR: 1.43 ratio         PTT - ( 20 Apr 2022 22:05 )  PTT:32.0 sec      RADIOLOGY:    CT Pelvis Bony Only No Cont 04.21.22   IMPRESSION:  Acute comminuted, intra-articular fractures of the superior, anterior,   medial and posterior left acetabulum with fracture extension into the   left superior pubic ramus and left medial ilium. Acute comminuted, mildly   displaced fracture of the left inferior pubic ramus.    Echocardiogram 01/2022:  Mild concentric left ventricular hypertrophy, normal LV systolic function, EF 55-60%  Mild mitral regurgitation, mild tricuspid regurgitation    Carotid Duplex 01/2022:  Mild to moderate (16-49%) stenosis of the proximal right internal carotid artery  Moderate to severe (66-79%) stenosis of proximal left internal carotid artery    Cardiac catheterization 11/26/19:  PCI with CAITLIN to LAD and OM1    Pharmacologic nuclear stress test 11/5/2020:   No ischemia, EF 52%    Pharmacologic nuclear stress test in 10/19:  Small are of moderate ischemia involving inferior wall. A diaphragmatic attenuation artifact is also present. There is normal wall motion and systolic function noted.         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 Pt with no active cardiac symptoms. No recent change in pt's functional capacity or exertional symptoms. Pharm stress test from 2020 negative for ischemia. EKG unchanged from prior. BP reasonably controlled. Hgb 7.8. Planned for PRBC transfusion at HD today.     Plan:  1. Hold Plavix and Eliquis, restart when feasible from surgical standpoint.   2. No ischemic evaluation indicated at this time.   3. Continue other current CV meds at current doses.    No cardiovascular contraindication for planned surgery.

## 2022-04-21 NOTE — H&P ADULT - NSHPPHYSICALEXAM_GEN_ALL_CORE
Vital Signs Last 24 Hrs  T(C): 36.8 (21 Apr 2022 05:36), Max: 36.9 (20 Apr 2022 23:28)  T(F): 98.3 (21 Apr 2022 05:36), Max: 98.5 (20 Apr 2022 23:28)  HR: 88 (21 Apr 2022 05:36) (82 - 88)  BP: 133/77 (21 Apr 2022 05:36) (128/72 - 159/67)  BP(mean): --  RR: 18 (21 Apr 2022 05:36) (16 - 20)  SpO2: 93% (21 Apr 2022 05:36) (93% - 100%)    Constitutional: Well-appearing, NAD, VSS  Head: NC/AT  Eyes: PERRL, EOMI, anicteric sclera, conjunctiva WNL  ENT: Normal Pharynx, MMM, No tonsillar exudate/erythema  Neck: Supple, Non-tender  Chest: Non-tender, no rashes  Cardio: RRR, s1/s2, no appreciable murmurs/rubs/gallops  Resp: BS CTA bilaterally, no wheezing/rhonchi/rales  Abd: Soft, Non-tender, Non-distended, no rebound/guarding/rigidity  : not examined  Rectal: not examined  MSK: RLE WNL +L Hip TTP  Ext: palpable distal pulses, good capillary refill  Psych: appropriate, cooperative  Neuro: CN II-XII grossly intact, no focal deficits  Skin: Warm/Dry. No rashes.

## 2022-04-21 NOTE — H&P ADULT - NSICDXPASTMEDICALHX_GEN_ALL_CORE_FT
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

## 2022-04-21 NOTE — H&P ADULT - NSHPLABSRESULTS_GEN_ALL_CORE
CT Pelvis WO IMPRESSION:  Acute comminuted, intra-articular fractures of the superior, anterior,   medial and posterior left acetabulum with fracture extension into the   left superior pubic ramus and left medial ilium. Acute comminuted, mildly   displaced fracture of the left inferior pubic ramus.

## 2022-04-21 NOTE — H&P ADULT - NSICDXPASTSURGICALHX_GEN_ALL_CORE_FT
PAST SURGICAL HISTORY:  H/O heart artery stent x3 @[facility]    H/O lung transplant bilateral - transplant - 1-2-2015 -  [facility]    History of hip replacement right    Status post open reduction and internal fixation (ORIF) of fracture left femur

## 2022-04-21 NOTE — CONSULT NOTE ADULT - SUBJECTIVE AND OBJECTIVE BOX
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 ER for L hip pain s/p mechanical trip/fall stepping over curb yesterday. Pt denies any chest pain, SOB, palpitations preceding the fall. Denies LOC. States he had no exertional symptoms prior to fall. Pt admitted for L acetabulum  fx, planned for surgical intervention today. Cardiology consulted for preoperative CV risk assessment.     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    Pneumonia  April 2021    [place] (hard of hearing)    HTN (hypertension)    Lumbar spondylosis    History of COPD    BPH without urinary obstruction    Hypercholesterolemia    Oliguria and anuria    H/O peripheral neuropathy    H/O lung transplant  bilateral - transplant - 1-2-2015 -  [facility]    H/O heart artery stent  x3 @[facility]    History of hip replacement  right    Status post open reduction and internal fixation (ORIF) of fracture  left femur        MEDICATIONS:  MEDICATIONS  (STANDING):  amLODIPine   Tablet 5 milliGRAM(s) Oral daily  chlorhexidine 2% Cloths 1 Application(s) Topical <User Schedule>  fluconAZOLE   Tablet 200 milliGRAM(s) Oral <User Schedule>  hydrocortisone 40 milliGRAM(s) Oral daily  hydrocortisone 20 milliGRAM(s) Oral at bedtime  mycophenolate mofetil 500 milliGRAM(s) Oral two times a day  pantoprazole  Injectable 40 milliGRAM(s) IV Push every 24 hours  povidone iodine 5% Nasal Swab 1 Application(s) Both Nostrils once  tacrolimus 0.5 milliGRAM(s) Oral daily  tacrolimus 1 milliGRAM(s) Oral two times a day  tamsulosin 0.4 milliGRAM(s) Oral at bedtime  trimethoprim   80 mG/sulfamethoxazole 400 mG 1 Tablet(s) Oral <User Schedule>    MEDICATIONS  (PRN):  acetaminophen     Tablet .. 650 milliGRAM(s) Oral every 6 hours PRN Temp greater or equal to 38C (100.4F), Mild Pain (1 - 3)  HYDROmorphone  Injectable 0.5 milliGRAM(s) IV Push every 4 hours PRN Moderate Pain (4 - 6)  HYDROmorphone  Injectable 1 milliGRAM(s) IV Push every 4 hours PRN Severe Pain (7 - 10)  melatonin 3 milliGRAM(s) Oral at bedtime PRN Insomnia  ondansetron Injectable 4 milliGRAM(s) IV Push every 8 hours PRN Nausea and/or Vomiting    acetaminophen     Tablet .. 650 milliGRAM(s) Oral every 6 hours PRN  amLODIPine   Tablet 5 milliGRAM(s) Oral daily  chlorhexidine 2% Cloths 1 Application(s) Topical <User Schedule>  fluconAZOLE   Tablet 200 milliGRAM(s) Oral <User Schedule>  hydrocortisone 40 milliGRAM(s) Oral daily  hydrocortisone 20 milliGRAM(s) Oral at bedtime  HYDROmorphone  Injectable 0.5 milliGRAM(s) IV Push every 4 hours PRN  HYDROmorphone  Injectable 1 milliGRAM(s) IV Push every 4 hours PRN  melatonin 3 milliGRAM(s) Oral at bedtime PRN  mycophenolate mofetil 500 milliGRAM(s) Oral two times a day  ondansetron Injectable 4 milliGRAM(s) IV Push every 8 hours PRN  pantoprazole  Injectable 40 milliGRAM(s) IV Push every 24 hours  povidone iodine 5% Nasal Swab 1 Application(s) Both Nostrils once  tacrolimus 0.5 milliGRAM(s) Oral daily  tacrolimus 1 milliGRAM(s) Oral two times a day  tamsulosin 0.4 milliGRAM(s) Oral at bedtime  trimethoprim   80 mG/sulfamethoxazole 400 mG 1 Tablet(s) Oral <User Schedule>      SOCIAL HISTORY:  Patient denies alcohol, tobacco, drug use    FAMILY HISTORY:  Family history of emphysema        ROS:  Patient denies cough, and other than noted above full ROS is unremarkable      PHYSICAL EXAM:  Vital Signs Last 24 Hrs  T(C): 36.6 (21 Apr 2022 07:54), Max: 36.9 (20 Apr 2022 23:28)  T(F): 97.9 (21 Apr 2022 07:54), Max: 98.5 (20 Apr 2022 23:28)  HR: 88 (21 Apr 2022 07:54) (82 - 88)  BP: 137/80 (21 Apr 2022 07:54) (128/72 - 159/67)  BP(mean): --  RR: 18 (21 Apr 2022 07:54) (16 - 20)  SpO2: 93% (21 Apr 2022 07:54) (93% - 100%)  General: Patient comfotable in NAD  HEENT: NCAT, mmm, EOMI  Neck: no JVD, right carotid bruit  CVS: nl s1, split s2, no s3, no s4, no murmur or rubs, RRR  Chest: diminished anteriorly, poor effort  Abdomen: soft, nt/nd  Extremities: No c/c/e  Neuro: A&O x3  Psych: Normal affect    ECG: NSR at 95 bpm, normal intervals, normal axis, septal infarct of indeterminate age    LABS:                        7.8    15.38 )-----------( 137      ( 20 Apr 2022 22:05 )             26.9     04-20    141  |  98  |  40.1<H>  ----------------------------<  122<H>  4.1   |  24.0  |  6.27<H>    Ca    8.6      20 Apr 2022 22:05    PT/INR - ( 20 Apr 2022 22:05 )   PT: 16.6 sec;   INR: 1.43 ratio         PTT - ( 20 Apr 2022 22:05 )  PTT:32.0 sec    RADIOLOGY:    CT Pelvis Bony Only No Cont 04.21.22     IMPRESSION:    Acute comminuted, intra-articular fractures of the superior, anterior,   medial and posterior left acetabulum with fracture extension into the   left superior pubic ramus and left medial ilium. Acute comminuted, mildly   displaced fracture of the left inferior pubic ramus.    Echocardiogram 01/2022:  Mild concentric left ventricular hypertrophy, normal LV systolic function, EF 55-60%  Mild mitral regurgitation, mild tricuspid regurgitation    Carotid Duplex 01/2022:  Mild to moderate (16-49%) stenosis of the proximal right internal carotid artery  Moderate to severe (66-79%) stenosis of proximal left internal carotid artery    Cardiac catheterization 11/26/19:  PCI with CAITLIN to LAD and OM1    Pharmacologic nuclear stress test 11/5/2020:   No ischemia, EF 52%    Pharmacologic nuclear stress test in 10/19:  Small are of moderate ischemia involving inferior wall. A diaphragmatic attenuation artifact is also present. There is normal wall motion and systolic function noted.         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 ER for L hip pain s/p mechanical trip/fall. Pt with no active cardiac symptoms. No recent change in pt's functional capacity or exertional symptoms. Pharm stress test from 2020 negative for ischemia. EKG unchanged from prior. BP reasonably controlled. Hgb 7.8. Planned for PRBC transfusion at HD today.       No ischemic evaluation indicated at this time.     Continue other current CV meds at current doses.      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 ER for L hip pain s/p mechanical trip/fall.  Stress test from 11/2020 with no ischemia.      EKG unchanged.  No surgery planned, Medical Management,     D/W Dr. Cutler,     SARA this PM ,     Transfusion - PRBC,

## 2022-04-22 NOTE — DISCHARGE NOTE PROVIDER - HOSPITAL COURSE
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  Patient was found to have Acute comminuted, intra-articular fractures of the superior, anterior, medial and posterior left acetabulum with fracture extension into the left superior pubic ramus and left medial ilium. Acute comminuted, mildly   displaced fracture of the left inferior pubic ramus.  As per orthopedics non surgical management.  Patient received dialysis and a unit of blood.  PT recommended ALIYAH

## 2022-04-22 NOTE — PROGRESS NOTE ADULT - SUBJECTIVE AND OBJECTIVE BOX
[facility] DIVISION OF KIDNEY DISEASES AND HYPERTENSION -- HEMODIALYSIS NOTE  --------------------------------------------------------------------------------  Chief Complaint: ESRD/Ongoing hemodialysis requirement    24 hour events/subjective:  s/p HD yesterday- tolerated 1 kg UF  pt seen and examined; feels well  Getting d/c'ed to [facility] today        PAST HISTORY  --------------------------------------------------------------------------------  No significant changes to PMH, PSH, FHx, SHx, unless otherwise noted    ALLERGIES & MEDICATIONS  --------------------------------------------------------------------------------  Allergies  budesonide (Unknown)  cefpodoxime (Unknown)  Pollen (Unknown)    Standing Inpatient Medications  amLODIPine   Tablet 5 milliGRAM(s) Oral daily  apixaban 2.5 milliGRAM(s) Oral every 12 hours  chlorhexidine 2% Cloths 1 Application(s) Topical <User Schedule>  clopidogrel Tablet 75 milliGRAM(s) Oral daily  dextrose 50% Injectable 25 Gram(s) IV Push once  dextrose Oral Gel 15 Gram(s) Oral once  fluconAZOLE   Tablet 200 milliGRAM(s) Oral <User Schedule>  hydrocortisone 20 milliGRAM(s) Oral daily  hydrocortisone 10 milliGRAM(s) Oral at bedtime  mycophenolate mofetil 500 milliGRAM(s) Oral two times a day  pantoprazole  Injectable 40 milliGRAM(s) IV Push every 24 hours  povidone iodine 5% Nasal Swab 1 Application(s) Both Nostrils once  tacrolimus 0.5 milliGRAM(s) Oral daily  tacrolimus 1 milliGRAM(s) Oral two times a day  tamsulosin 0.4 milliGRAM(s) Oral at bedtime  trimethoprim   80 mG/sulfamethoxazole 400 mG 1 Tablet(s) Oral <User Schedule>    PRN Inpatient Medications  acetaminophen     Tablet .. 650 milliGRAM(s) Oral every 6 hours PRN  HYDROmorphone  Injectable 0.5 milliGRAM(s) IV Push every 4 hours PRN  HYDROmorphone  Injectable 1 milliGRAM(s) IV Push every 4 hours PRN  melatonin 3 milliGRAM(s) Oral at bedtime PRN  ondansetron Injectable 4 milliGRAM(s) IV Push every 8 hours PRN      REVIEW OF SYSTEMS  --------------------------------------------------------------------------------  Gen: No weight changes, fatigue, fevers/chills, weakness  Skin: No rashes  Head/Eyes/Ears/Mouth: No headache  Respiratory: No dyspnea, cough,  CV: No chest pain, orthopnea  GI: No abdominal pain, diarrhea, constipation, nausea, vomiting,  MSK: No joint pain  Neuro: No dizziness/lightheadedness, weakness  Heme: No bleeding  Psych: No significant nervousness, anxiety, stress, depression    All other systems were reviewed and are negative, except as noted.    VITALS/PHYSICAL EXAM  --------------------------------------------------------------------------------  T(C): 36.7 (04-22-22 @ 07:55), Max: 37.5 (04-21-22 @ 21:39)  HR: 91 (04-22-22 @ 07:55) (86 - 103)  BP: 119/70 (04-22-22 @ 07:55) (86/42 - 166/80)  RR: 18 (04-22-22 @ 04:30) (17 - 18)  SpO2: 94% (04-22-22 @ 04:30) (92% - 100%)  Wt(kg): --  Height (cm): 167.6 (04-20-22 @ 20:10)  Weight (kg): 63.6 (04-21-22 @ 05:01)  BMI (kg/m2): 22.6 (04-21-22 @ 05:01)  BSA (m2): 1.72 (04-21-22 @ 05:01)      04-21-22 @ 07:01  -  04-22-22 @ 07:00  --------------------------------------------------------  IN: 1200 mL / OUT: 2200 mL / NET: -1000 mL      Physical Exam:  	Gen: NAD  	HEENT: PERRL, supple neck,  	Pulm: CTA B/L  	CV: RRR, S1S2; no rub  	Abd: +BS, soft, nontender  	UE: Warm  	LE: Warm, no edema  	Neuro: No focal deficits  	Psych: Normal affect  	Skin: Warm  	Vascular access: IJ CVC    LABS/STUDIES  --------------------------------------------------------------------------------              9.4    25.14 >-----------<  126      [04-22-22 @ 06:00]              31.5     138  |  95  |  34.9  ----------------------------<  32      [04-22-22 @ 06:00]  4.4   |  22.0  |  4.67        Ca     8.6     [04-22-22 @ 06:00]    TPro  6.3  /  Alb  4.0  /  TBili  0.5  /  DBili  x   /  AST  30  /  ALT  15  /  AlkPhos  138  [04-21-22 @ 08:54]    PT/INR: PT 16.2 , INR 1.39       [04-21-22 @ 08:54]  PTT: 33.0       [04-21-22 @ 08:54]      Iron 27, TIBC 247, %sat 11      [03-20-22 @ 03:23]  Ferritin 1149      [03-20-22 @ 03:23]    HBsAg Nonreact      [04-21-22 @ 22:26]  
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     PLAN:    L acetabulum fx, pubic ramus fx    COPD/Emphysema s/p Double Lung Transplant    ESRD/ HD, Hx Fungal Meningitis    CAD s/p PCI x 3, Carotid Disease, HTN,     ICU Vital Signs Last 24 Hrs,    T(C): 36.6 (21 Apr 2022 07:54), Max: 36.9 (20 Apr 2022 23:28)  T(F): 97.9 (21 Apr 2022 07:54), Max: 98.5 (20 Apr 2022 23:28)  HR: 88 (21 Apr 2022 07:54) (82 - 88)  BP: 137/80 (21 Apr 2022 07:54) (128/72 - 159/67)  RR: 18 (21 Apr 2022 07:54) (16 - 20)  SpO2: 93% (21 Apr 2022 07:54) (93% - 100%)    141    |  98     |  40.1<H>  ----------------------------<  122<H>  Ca:8.6   (20 Apr 2022 22:05)  4.1     |  24.0   |  6.27<H>                      7.8<L>  15.38<H> )-----------( 137<L>    ( 20 Apr 2022 22:05 )                  26.9<L>          
Patient was seen and evaluated on dialysis.   No c/o CP SOB NV  no F/C  no swelling    T(C): 36.7 (04-22-22 @ 07:55), Max: 37.5 (04-21-22 @ 21:39)  HR: 91 (04-22-22 @ 07:55) (85 - 103)  BP: 119/70 (04-22-22 @ 07:55) (86/42 - 166/80)  Wt(kg): -- NA  PE ;  NAD  lungs - CTA  CV gr 1 murmur,  No gallop or rub  Abd : soft, NT BS +, No masses  Ext- No edema  Neuro : Grossly intact, moving extremities                     9.4    25.14 )-----------( 126      ( 22 Apr 2022 06:00 )             31.5      04-22    138  |  95<L>  |  34.9<H>  ----------------------------<  32<LL>  4.4   |  22.0  |  4.67<H>    Ca    8.6      22 Apr 2022 06:00    TPro  6.3<L>  /  Alb  4.0  /  TBili  0.5  /  DBili  x   /  AST  30  /  ALT  15  /  AlkPhos  138<H>  04-21    MEDICATIONS  (STANDING):  acetaminophen     Tablet .. PRN  amLODIPine   Tablet  apixaban  chlorhexidine 2% Cloths  clopidogrel Tablet  dextrose 50% Injectable  dextrose Oral Gel  fluconAZOLE   Tablet  hydrocortisone  hydrocortisone  HYDROmorphone  Injectable PRN  HYDROmorphone  Injectable PRN  melatonin PRN  mycophenolate mofetil  ondansetron Injectable PRN  pantoprazole  Injectable  povidone iodine 5% Nasal Swab  tacrolimus  tacrolimus  tamsulosin  trimethoprim   80 mG/sulfamethoxazole 400 mG    Patient stable  [facility] HD easily  Continue Medical Management, D/W Dr. Cutler,

## 2022-04-22 NOTE — CHART NOTE - NSCHARTNOTEFT_GEN_A_CORE
Contacted with critical value - BG 32 on BMP this AM  Informed by RN that she rechecked fingerstick and was high 30s-40   Amp of dextrose ordered  Patient seen at bedside, appears comfortable, and is pleasant and conversive - states "I feel fine"  States he hasn't eaten in two days and his BG is labile at times, denies hx diabetes  Repeat fingerstick 105 while I was at bedside   Continue to monitor closely
Patient seen and examined this morning.  I agree with the H and P from 645 am with the following modifications.      As per orthopedics no plan for surgical intervention.  Restarted Eliquis and Plavix.  Will be toe touch weight bearing.  Diet restarted.   WIll receive 1 unit prbc with dialysis
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 ER for L hip pain s/p mechanical trip/fall admitted for L acetabulum fx, pubic ramus fx.   Called by RN, patient with Manual BP 85/48 MAP 60  Patient seen and evaluated at bedside  Patient in 10/10 left hip pain, patient states the pain is the same as on his admission to the hospital   Patient is s/p HD yesterday with 3L removed   ROS: No chest pain, palpitations, SOB, light headedness, dizziness, headache    Vital Signs   T(F): 98.2   HR: 88  BP: 85/48 MAP 60   RR: 18  SpO2: 94%    GENERAL: Uncomfortable 2/2 to pain   NERVOUS SYSTEM: Alert and awake, Good concentration  CHEST/LUNG: Clear to auscultation b/l; Unlabored respirations  HEART: S1S2+, Regular rate and rhythm  ABDOMEN: Soft, Nontender, Nondistended; BS+   EXTREMITIES:  2+ Peripheral Pulses, No LE edema, tender to palpation of L hip, NO ecchymosis or erythema of L hip/ flank/ groin seen. L hip soft to touch. + FROM in extremities   SKIN: Warm and dry    CT Pelvis Bony Only No Cont (04.21.22 @ 01:22)     Soft tissues: The left obturator internus muscle is enlarged likely due   to muscle strain and/or intramuscular hematoma. Vascular calcifications   are noted.    IMPRESSION:  Acute comminuted, intra-articular fractures of the superior, anterior,   medial and posterior left acetabulum with fracture extension into the   left superior pubic ramus and left medial ilium. Acute comminuted, mildly   displaced fracture of the left inferior pubic ramus.    Ofirmev 1g for pain (patient unable to get Dilaudid PRN 2/2 to soft BP)   Holding off on IVF as patient is on HD and has +congestion seen on CXR   Midodrine 5mg PO x1   STAT CBC - patient received 1U PRBCs yesterday afternoon   If drop in Hb would consider rescanning L hip to r/o bleed. Possible hematoma seen on recent CT scan.   Patient placed on /tele monitoring   RN to reschedule Plavix and Eliquis until 8AM and CBC is resulted   RN to notify with any acute changes

## 2022-04-22 NOTE — DISCHARGE NOTE NURSING/CASE MANAGEMENT/SOCIAL WORK - NSDCPEFALRISK_GEN_ALL_CORE
For information on Fall & Injury Prevention, visit: [email or web address] OR  [email or web address] OR  https://www.cdc.gov/steadi/patient.html

## 2022-04-22 NOTE — DISCHARGE NOTE PROVIDER - CARE PROVIDER_API CALL
Negrito Hightower)  Orthopaedic Surgery  [address] [place] [address]  Phone: [phone]  Fax: [phone]  Follow Up Time:

## 2022-04-22 NOTE — DISCHARGE NOTE PROVIDER - NSDCFUSCHEDAPPT_GEN_ALL_CORE_FT
JU FERGUSON ; 05/13/2022 ; NPP PulmMed [address] [facility]  JU FERGUSON ; 05/13/2022 ; NPP PulmMed [address] [facility]  JU FERGUSON ; 07/11/2022 ; NPP Vascular [address] [facility]  JU FERGUSON ; 07/11/2022 ; NPP Vascular [address] [facility]

## 2022-04-22 NOTE — DISCHARGE NOTE PROVIDER - NSDCMRMEDTOKEN_GEN_ALL_CORE_FT
amLODIPine 5 mg oral tablet: 1 tab(s) orally once a day  Bactrim 400 mg-80 mg oral tablet: 1 tab(s) orally Monday, Wednesday, and Friday  CellCept 250 mg oral capsule: 2 cap(s) orally 2 times a day  clopidogrel 75 mg oral tablet: 1 tab(s) orally once a day  Eliquis 2.5 mg oral tablet: 1 tab(s) orally 2 times a day   Flomax 0.4 mg oral capsule: 1 cap(s) orally once a day  fluconazole 200 mg oral tablet: 1 tab(s) orally Tuesday, Thursday, Saturday  hydrocortisone 10 mg oral tablet: 1 tab(s) orally once a day (at bedtime)  hydrocortisone 20 mg oral tablet: 1 tab(s) orally once a day  magnesium oxide 400 mg (241.3 mg elemental magnesium) oral tablet: 1 tab(s) orally once a day  Multiple Vitamins oral tablet: 1 tab(s) orally once a day  oxyCODONE 5 mg oral tablet: 1 tab(s) orally every 4 hours, As Needed  Protonix 40 mg oral delayed release tablet: 1 tab(s) orally once a day  rosuvastatin 20 mg oral tablet: 1 tab(s) orally once a day  sevelamer carbonate 800 mg oral tablet: 2 tab(s) orally 3 times a day (with meals)  tacrolimus 0.5 mg oral capsule: 1 cap(s) orally once a day in the morning with 1 mg capsule; total dose 1.5 mg in the morning  tacrolimus 1 mg oral capsule: 1 cap(s) orally 2 times a day

## 2022-04-22 NOTE — DISCHARGE NOTE NURSING/CASE MANAGEMENT/SOCIAL WORK - PATIENT PORTAL LINK FT
You can access the FollowMyHealth Patient Portal offered by [facility] by registering at the following website: [email or web address]. By joining [employer]’s FollowMyHealth portal, you will also be able to view your health information using other applications (apps) compatible with our system.

## 2022-04-22 NOTE — PHYSICAL THERAPY INITIAL EVALUATION ADULT - ACTIVE RANGE OF MOTION EXAMINATION, REHAB EVAL
Left LE grossly 1/5 due to 10/10 pain with attempted AROM, right LE grossly 3/5/bilateral upper extremity Active ROM was WFL (within functional limits)/Right LE Active ROM was WFL (within functional limits)/deficits as listed below

## 2022-04-22 NOTE — DISCHARGE NOTE PROVIDER - ATTENDING DISCHARGE PHYSICAL EXAMINATION:
Constitutional: Well-appearing, NAD, VSS  Head: NC/AT  Eyes: PERRL, EOMI   Neck: Supple, Non-tender  Cardio: RRR, s1/s2, no appreciable murmurs  Resp: BS CTA bilaterally, no wheezing/rhonchi/rales  Abd: Soft, Non-tender, Non-distended, no rebound/guarding/rigidity  MSK: RLE WNL +L Hip TTP  Ext: palpable distal pulses, good capillary refill  Psych: appropriate, cooperative  Neuro: CN II-XII grossly intact, no focal deficits  Skin: Warm/Dry. No rashes.

## 2022-04-22 NOTE — DISCHARGE NOTE PROVIDER - NSDCCPCAREPLAN_GEN_ALL_CORE_FT
PRINCIPAL DISCHARGE DIAGNOSIS  Diagnosis: Closed pelvic fracture  Assessment and Plan of Treatment: You have Acute comminuted, intra-articular fractures of the superior, anterior, medial and posterior left acetabulum with fracture extension into the left superior pubic ramus and left medial ilium.   Please follow up with Orthopedics in 4 weeks for repeat xrays      SECONDARY DISCHARGE DIAGNOSES  Diagnosis: Lung transplanted  Assessment and Plan of Treatment: Please continue immunosupression medications    Diagnosis: ESRD on dialysis  Assessment and Plan of Treatment: Please continue phosphate binders    Diagnosis: CAD (coronary artery disease)  Assessment and Plan of Treatment: Continue Asa and lipitor    Diagnosis: HTN (hypertension)  Assessment and Plan of Treatment: Continue elinor medications    Diagnosis: History of internal jugular thrombosis  Assessment and Plan of Treatment: Continue AC

## 2022-04-22 NOTE — PROGRESS NOTE ADULT - ASSESSMENT
Patient was seen and evaluated on dialysis.   Patient is tolerating the procedure well.   T(C): 36.7 (04-22-22 @ 07:55), Max: 37.5 (04-21-22 @ 21:39)  HR: 91 (04-22-22 @ 07:55) (85 - 103)  BP: 119/70 (04-22-22 @ 07:55) (86/42 - 166/80)  Continue dialysis: TIW  Dialyzer:  Revaclear -300        QB:  400 ml.,       QD: 500ml.,  Goal UF _1.5 L _ over _3.5_ Hours     Pt is seen and examined on dialysis. No symptoms. Hemodynamics stable. Tolerating dialysis and ultrafiltration.  Pre Laboratory values personally reviewed by me.  Dialysis adjusted appropriately based on current values.  Will continue the current medical management.  Next hemodialysis as scheduled.  Discussed with nursing, primary care team.     PRBC - 1 Unit Transfused, 
Will Resume HD,     Access : R - IJ TDC,     PRBC Transfusion,     MENDY, 
ESRD on HD- TTS schedule  S/P  Lung Transplant on Tacrolimus/Cellcept  s/p mechanical fall with acute comminuted, intra-articular fractures of the superior, anterior, medial and posterior left acetabulum with fracture extension into the left superior pubic ramus and left medial ilium. Acute comminuted, mildly   displaced fracture of the left inferior pubic ramus  Anemia of CKD    No surgical management as per Orthopedics  Continue HD TTS schedule at [facility]  s/p 1 U PRBC, Hb improved. Continue MENDY with HD

## 2022-04-22 NOTE — PHYSICAL THERAPY INITIAL EVALUATION ADULT - ADDITIONAL COMMENTS
Pt lives in a house with 2  steps to enter with  rails and 0 stairs inside with  rails.  Pt owns medical equipment: SAC, Rollator, shower chair. commode, wheel chair  Pt lives with: spouse   Someone is always available to provide assist.

## 2022-04-26 NOTE — ED ADULT NURSE REASSESSMENT NOTE - NS ED NURSE REASSESS COMMENT FT1
Pt remains alert and oriented X 4, tolerating BIPAP.  Vital signs stable.  Pt states he feels better with decreased work of breathing.  Intermittent nonproductive cough present.  Awaiting stepdown bed.  Side rails up. Call bell within reach.

## 2022-04-26 NOTE — ED ADULT TRIAGE NOTE - CHIEF COMPLAINT QUOTE
pt c/o abdominal pain and diarrhea that began today.
BIBEMS from Dialysis for low blood pressure. EMS states patient received one liter of fluid prior to dialysis. Denies dizziness or lightheadedness. Hx of renal failure dialysis T/TR/SA).

## 2022-04-26 NOTE — ED ADULT TRIAGE NOTE - NS ED NURSE AMBULANCES
[facility]

## 2022-04-26 NOTE — ED ADULT NURSE REASSESSMENT NOTE - NS ED NURSE REASSESS COMMENT FT1
Pt laying comfortably on stretcher, using phone, with bipap in place. Pt satting 95% on Bipap 50% O2 and NSR at 92. Last finger stick was 104. Patient Pt laying comfortably on stretcher, using phone, with bipap in place. Pt satting 95% on Bipap 50% O2 and NSR at 92. Last finger stick was 104. Patient complaining of left hip pain, 0.5mg of Dilaudid ordered and given. ICU consulted. Will continue to monitor.

## 2022-04-26 NOTE — ED PROVIDER NOTE - CLINICAL SUMMARY MEDICAL DECISION MAKING FREE TEXT BOX
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  Patient states he has been coughing and SOB for past 3 days.  Patient given IV fluids and medication.  EKG labs and imaging performed to evaluate the patient. 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  Patient states he has been coughing and SOB for past 3 days.  Patient given IV fluids and medication.  EKG labs and imaging performed to evaluate the patient. MICU consulted.  Pt continues to be hypoxic and required BIPAP.  Currently stable on BIPAP in CC.  Admit to stepdown per MICU for further management with ID following.

## 2022-04-26 NOTE — ED PROVIDER NOTE - PHYSICAL EXAMINATION
General: NAD, ill appearing  HEENT: Normocephalic, atraumatic  Neck: No apparent stiffness or JVD  Pulm: Chest wall symmetric and nontender, lungs crackles bilaterally  Cardiac: fast rate and regular rhythm  Abdomen: Nontender and nondistended  Skin: Skin is warm, dry and intact without rashes or lesions.  Neuro: No motor or sensory deficits above reported baseline  MSK: No deformity or tenderness other than hip

## 2022-04-26 NOTE — H&P ADULT - NSICDXPASTMEDICALHX_GEN_ALL_CORE_FT
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    H/O peripheral neuropathy     History of COPD     [place] (hard of hearing)     HTN (hypertension)     Hypercholesterolemia     Lumbar spondylosis     Oliguria and anuria     Pneumonia April 2021

## 2022-04-26 NOTE — ED ADULT NURSE REASSESSMENT NOTE - NS ED NURSE REASSESS COMMENT FT1
assumed care as critical care RN at 1430. Pt comes in after experiencing Hypotention and SOB at [facility]. Patient currently presenting to critical care with a wet cough. Patient currently satting at 95% on 2L NC and Sinus Tachycardia to 110's.  Bipap machine placed on 100% O2. Will continue to monitor. assumed care as critical care RN at 1430. Pt comes in after experiencing Hypotention and SOB. Patient currently presenting to critical care with a wet cough. Patient currently satting at 95% on 2L NC and Sinus Tachycardia to 110's.  Bipap machine placed on 50% O2. Will continue to monitor.

## 2022-04-26 NOTE — H&P ADULT - NSICDXPASTSURGICALHX_GEN_ALL_CORE_FT
PAST SURGICAL HISTORY:  H/O heart artery stent [facility]3 [facility]    H/O lung transplant bilateral - transplant - 1-2-2015 -  [facility]    History of hip replacement right    Status post open reduction and internal fixation (ORIF) of fracture left femur

## 2022-04-26 NOTE — CONSULT NOTE ADULT - ASSESSMENT
Lung Transplant - Immuno Compromised ( On Tacrolimus & MMF - Not on Steroids )    ESRD - HD   + TDC,     Now w. Hypotension, Tachycardia,  C/O Cough,     On NC O2,     ? Septic Shock,     Rec : MICU Evaluation for Sepsis & CC Management,     D/W Dr. Cardona,  Will Follow,

## 2022-04-26 NOTE — ED PROVIDER NOTE - ATTENDING CONTRIBUTION TO CARE
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 Started dialysis session but stopped shortly afterwards because of hypotension and sent here. patient endorses worsening sob and cough for last few days. Still with left hip pain but denies any recent falls.   AP - increased work of breathing with productive cough will start bipap for increased wob. attempted to discuss code status but patient unsure at current time. MICU consult. sepsis protocol initiated for possible pna. nephro. anticipate admission

## 2022-04-26 NOTE — H&P ADULT - ASSESSMENT
pt. 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 Patient was sent from hemodialysis for hypotension after an incomplete session, he doesn’t know how long. Received 1L fluids from EMS. Denies f/ch, abdominal pain, n/v. Not on oxygen at home. Former smoker. Hypoxic to 85% on room air in ER, placed on 3L NC, then upgraded to bipap for persistent respiratory distress. Reported hypotension improved on arrival to ER, /58. Vancomycin and zosyn given empirically for possible PNA. pt.Clinically improved on bipap. reports no cp, no abd. pain, no n/v/d. Later pt's covid-19 came back positive. pt. stated that his wife had covid last week. pt. also reported that he is covid 19 vaccinated and got booster as well. pt. evaluated by MICU but step down is recommended.   Recently pt. presented to [facility] ER on 04/21/22 for L hip pain s/p mechanical trip/fall stepping over curb.  Patient was found to have Acute comminuted, intra-articular fractures of the superior, anterior, medial and posterior left acetabulum with fracture extension into the left superior pubic ramus and left medial ilium. Acute comminuted, mildly displaced fracture of the left inferior pubic ramus.  As per orthopedics non surgical management.  Patient received dialysis and a unit of blood.  PT recommended ALIYAH and discharged on 04/22/22 and went to Hem[facility] at [place].

## 2022-04-26 NOTE — ED ADULT NURSE NOTE - CHIEF COMPLAINT QUOTE
BIBEMS from Dialysis for low blood pressure. EMS states patient received one liter of fluid prior to dialysis. Denies dizziness or lightheadedness. Hx of renal failure dialysis T/TR/SA).

## 2022-04-26 NOTE — ED PROVIDER NOTE - CARE PLAN
Principal Discharge DX:	Acute respiratory failure with hypoxia  Secondary Diagnosis:	ESRD on dialysis   1

## 2022-04-26 NOTE — CONSULT NOTE ADULT - SUBJECTIVE AND OBJECTIVE BOX
Patient is a 72y old  Male who presents with a chief complaint of   HPI:   Hypotension,  BIB EMS from Dialysis for low blood pressure.  EMS states patient received one liter of fluid prior to dialysis.  Denies dizziness or lightheadedness.   Hx of ESRD Hemodialysis T/Th/S).  Vital Signs:  · BP Systolic	  94 mm Hg · BP Diastolic	  54 mm Hg · Heart Rate	98 /min · Respiration Rate (breaths/min)	18 /min · Temp (F)	98 Degrees F · Temp (C)	36.7 Degrees C · SpO2 (%)	96 % · O2 Delivery/Oxygen Delivery Method	room air  Body Measurements: · Height (FEET)	5 Feet · Height (INCHES)	6 Inch(s) · Height (CENTIMETERS)	167.64 Centimeter(s)  D/W Dr. 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 after Each  HD)  2019 novel coronavirus disease (COVID-19)  Feb 2021 - hospitalized for 1 week at [facility]  Pneumonia, April 2021  [place] (hard of hearing)  HTN (hypertension)  Lumbar spondylosis  History of COPD  BPH without urinary obstruction  Hypercholesterolemia  H/O peripheral neuropathy  H/O lung transplant , Bilateral - transplant - 1-2-2015 -  [facility]  H/O heart artery stent x 3 @[facility]  History of hip replacement - right  Status post open reduction and internal fixation (ORIF) of fracture- left femur  FAMILY HISTORY:  Family history of emphysema  Social History: Former Smoker,   MEDICATIONS  (STANDING): To Be Reviewed,   MEDICATIONS  (PRN): None,   Allergies  budesonide (Unknown) cefpodoxime (Unknown) Pollen (Unknown)  Intolerances-None,   REVIEW OF SYSTEMS:  CONSTITUTIONAL: No fever, ++  weight loss, fatigue EYES: No eye pain, visual disturbances, or discharge ENMT:  No difficulty hearing, tinnitus, vertigo; No sinus or throat pain NECK: No pain or stiffness RESPIRATORY: ++ cough, wheezing, No  chills or hemoptysis; + shortness of breath CARDIOVASCULAR: No chest pain, palpitations, dizziness, + leg swelling GASTROINTESTINAL: No abdominal or epigastric pain. No nausea, vomiting, or hematemesis; No diarrhea or constipation. No melena or hematochezia. GENITOURINARY: No dysuria, frequency, hematuria, or incontinence NEUROLOGICAL: No headaches, memory loss, loss of strength, numbness, ++ tremors SKIN: No itching, burning, rashes, or lesions  LYMPH NODES: No enlarged glands ENDOCRINE: No heat , + cold intolerance; No hair loss MUSCULOSKELETAL: No joint pain or swelling; No muscle,  ++ back,  extremity pain PSYCHIATRIC: ++ depression, anxiety, mood swings, difficulty sleeping HEME/LYMPH: + easy bruising, No bleeding gums ALLERGY AND IMMUNOLOGIC: No hives or eczema  Vital Signs Last 24 Hrs T(C): 36.7 (26 Apr 2022 13:06), Max: 36.7 (26 Apr 2022 13:06) T(F): 98 (26 Apr 2022 13:06), Max: 98 (26 Apr 2022 13:06) HR: 98 (26 Apr 2022 13:06) (98 - 98) BP: 94/54 (26 Apr 2022 13:06) (94/54 - 94/54) RR: 18 (26 Apr 2022 13:06) (18 - 18) SpO2: 96% (26 Apr 2022 13:06) (96% - 96%)  PHYSICAL EXAM:  GENERAL: NAD, Pale, Frail,  HEAD:  Atraumatic, Normocephalic EYES: EOMI, PERRLA, conjunctiva and sclera clear ENMT: Dry  mucous membranes, No lesions NECK: Supple, No JVD,  NERVOUS SYSTEM:  Lethargic & Oriented X 2 , Poor  concentration;  CHEST/LUNG: Dull  to percussion bilaterally; ++ rales, rhonchi, wheezing, No  rubs HEART: Regular rate and rhythm; 2/6 Systolic  murmur, No  rubs, or gallops ABDOMEN: Soft, Nontender, Nondistended; Bowel sounds present EXTREMITIES:  2+ Peripheral Pulses, No clubbing, cyanosis, or edema LYMPH: No lymphadenopathy noted SKIN: No rashes or lesions  LABS: P :  RADIOLOGY & ADDITIONAL TESTS:  CT Pelvis Bony Only No Cont (04.21.22 @ 01:22)   ACC: [id number] EXAM:  CT PELVIS BONY ONLY                        PROCEDURE DATE:  04/21/2022    INTERPRETATION:  CLINICAL INFORMATION: Status post fall with left hip  pain.  TECHNIQUE: CT of the pelvis and bilateral femurs were performed in bone  and soft tissue windows with coronal and sagittal reformats. No  intravenous contrast was administered. 3-D reformats were performed on a  separate workstation.  COMPARISON: CT of the pelvis from 5/20/2016.  FINDINGS:  Bone: Acute comminuted, intra-articular fractures of the superior,  anterior, medial and posterior left acetabulum is seen with fracture  extension into the left superior pubic ramus and into the left medial  ilium. Minimal medial displacement of a medial acetabular and iliac  fracture fragment is noted. An acute comminuted, mildly displaced  fracture of the left inferior pubic ramus is seen. A long stem left femur  intramedullary krista with proximal blade and distal interlocking screw is  noted stabilizing a prior intertrochanteric fracture. A total right hip  arthroplasty and trochanteric cerclage wire is seen without  periprosthetic fracture or dislocation. No periprosthetic lucency is  noted to suggest loosening. No additional fracture or dislocation is  demonstrated. Degenerative changes of the visualized lower lumbar spine  and sacroiliac joints are present.  Soft tissues: The left obturator internus muscle is enlarged likely due  to muscle strain and/or intramuscular hematoma. Vascular calcifications  are noted.  Miscellaneous: A partially visualized 2.1 cm saccular aneurysm emanating  from the infrarenal abdominal aorta is noted. A left renal cyst is seen.  Sigmoid diverticulosis is noted without diverticulitis. Appendix is unremarkable. Circumferential urinary bladder wall thickening which could  be due to underdistention versus a cystitis. Mild to moderate  inflammatory change is seen in the left pelvis.  IMPRESSION:  Acute comminuted, intra-articular fractures of the superior, anterior,  medial and posterior left acetabulum with fracture extension into the  left superior pubic ramus and left medial ilium. Acute comminuted, mildly  displaced fracture of the left inferior pubic ramus.  Xray Chest 1 View- PORTABLE-Urgent (Xray Chest 1 View- PORTABLE-Urgent .) (04.20.22 @ 21:20)  ACC: [id number] EXAM:  XR CHEST PORTABLE URGENT 1V                        PROCEDURE DATE:  04/20/2022    INTERPRETATION:  Clinical history: 72-year-old male, trauma.  Single view of the chest is compared to 2/7/2022.  FINDINGS: Mild cardiomegaly and mild pulmonary venous congestion with no  consolidation, effusion, pneumothorax or acute osseous finding.  Large-bore double-lumen right-sided catheter with the tip in the right  atrium, unchanged.  Post sternotomy/CABG.  Minimal bibasilar platelike atelectasis, unchanged.  Skin fold projected over the left upper hemithorax  IMPRESSION:  Mild cardiomegaly and mild pulmonary venous congestion, grossly unchanged  12 Lead ECG (04.21.22 @ 11:20)  Ventricular Rate 87 BPM  Atrial Rate 87 BPM  P-R Interval 146 ms  QRS Duration 76 ms  Q-T Interval 382 ms  QTC Calculation(Bazett) 459 ms  P Axis 60 degrees  R Axis 19 degrees  T Axis 143 degrees  Diagnosis Line Normal sinus rhythm ST & T wave abnormality, consider lateral ischemia Abnormal ECG  Confirmed by NATASHA CASTELLON ([id number]) on 4/21/2022 8:19:30 PM  ESRD on HD- TTS schedule S/P  Lung Transplant on Tacrolimus/Cellcept s/p mechanical fall > 1 Week ago ,  with acute comminuted, intra-articular fractures of the superior, anterior, medial and posterior left acetabulum with fracture extension into the left superior pubic ramus and left medial ilium.  Acute comminuted, mildly displaced fracture of the left inferior pubic ramus Anemia of CKD  No surgical management as per Orthopedics  S/P 1 U PRBC  Recently, On  MENDY with HD WBC Count: 25.14 K/uL (04.22.22 @ 06:00)  Historical Values WBC Count: 25.14 K/uL (04.22.22 @ 06:00)  WBC Count: 11.77 K/uL (04.21.22 @ 08:54)  WBC Count: 15.38 K/uL (04.20.22 @ 22:05)  WBC Count: 12.84 K/uL (03.21.22 @ 07:33)  WBC Count: 11.25 K/uL (03.20.22 @ 03:23)  WBC Count: 12.82 K/uL (03.19.22 @ 13:27)  WBC Count: 9.61 K/uL (03.09.22 @ 04:19)  72M with PMH :  COPD/Emphysema s/p Double Lung Transplant, ESRD/ HD, Hx Fungal Meningitis, CAD s/p multivessel PCI with CAITLIN including LAD and OM1 (11/2019), Carotid disease s/p CEA, HTN, HLD, BPH, 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   Pt with no active cardiac symptoms. No recent change in pt's functional capacity or exertional symptoms. Pharm stress test from 2020 negative for ischemia. EKG unchanged from prior. BP reasonably controlled. 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  Stress test from 11/2020 with no ischemia.    EKG unchanged.  No surgery planned, Medical Management,

## 2022-04-26 NOTE — H&P ADULT - PROBLEM SELECTOR PLAN 1
continue bipap for now.  solumedrol  duoneb  underlying pneumonia, covid related ?   pulmonology consult.

## 2022-04-26 NOTE — H&P ADULT - NSHPLABSRESULTS_GEN_ALL_CORE
pt's ekg was repeated on the floor, sinus tach 106, st and t wave inversion lateral leads were seen on his old ekg from 04/21/22 as well. pt. with no cp.

## 2022-04-26 NOTE — ED ADULT NURSE NOTE - NSICDXPASTMEDICALHX_GEN_ALL_CORE_FT
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    H/O peripheral neuropathy     History of COPD     [place] (hard of hearing)     HTN (hypertension)     Hypercholesterolemia     Lumbar spondylosis     Oliguria and anuria     Pneumonia April 2021

## 2022-04-26 NOTE — H&P ADULT - HISTORY OF PRESENT ILLNESS
pt. 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 Patient was sent from hemodialysis for hypotension after an incomplete session, he doesn’t know how long. Received 1L fluids from EMS. Denies f/ch, abdominal pain, n/v. Not on oxygen at home. Former smoker. Hypoxic to 85% on room air in ER, placed on 3L NC, then upgraded to bipap for persistent respiratory distress. Reported hypotension improved on arrival to ER, /58. Vancomycin and zosyn given empirically for possible PNA. pt.Clinically improved on bipap. reports no cp, no abd. pain, no n/v/d. Later pt's covid-19 came back positive. pt. stated that his wife had covid last week. pt. also reported that he is covid 19 vaccinated and got booster as well. pt. evaluated by MICU but step down is recommended.    pt. 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 Patient was sent from hemodialysis for hypotension after an incomplete session, he doesn’t know how long. Received 1L fluids from EMS. Denies f/ch, abdominal pain, n/v. Not on oxygen at home. Former smoker. Hypoxic to 85% on room air in ER, placed on 3L NC, then upgraded to bipap for persistent respiratory distress. Reported hypotension improved on arrival to ER, /58. Vancomycin and zosyn given empirically for possible PNA. pt.Clinically improved on bipap. reports no cp, no abd. pain, no n/v/d. Later pt's covid-19 came back positive. pt. stated that his wife had covid last week. pt. also reported that he is covid 19 vaccinated and got booster as well. pt. evaluated by MICU but step down is recommended.   Recently pt. presented to [facility] ER on 04/21/22 for L hip pain s/p mechanical trip/fall stepping over curb.  Patient was found to have Acute comminuted, intra-articular fractures of the superior, anterior, medial and posterior left acetabulum with fracture extension into the left superior pubic ramus and left medial ilium. Acute comminuted, mildly displaced fracture of the left inferior pubic ramus.  As per orthopedics non surgical management.  Patient received dialysis and a unit of blood.  PT recommended ALIYAH and discharged on 04/22/22.    pt. 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 Patient was sent from hemodialysis for hypotension after an incomplete session, he doesn’t know how long. Received 1L fluids from EMS. Denies f/ch, abdominal pain, n/v. Not on oxygen at home. Former smoker. Hypoxic to 85% on room air in ER, placed on 3L NC, then upgraded to bipap for persistent respiratory distress. Reported hypotension improved on arrival to ER, /58. Vancomycin and zosyn given empirically for possible PNA. pt.Clinically improved on bipap. reports no cp, no abd. pain, no n/v/d. Later pt's covid-19 came back positive. pt. stated that his wife had covid last week. pt. also reported that he is covid 19 vaccinated and got booster as well. pt. evaluated by MICU but step down is recommended.   Recently pt. presented to [facility] ER on 04/21/22 for L hip pain s/p mechanical trip/fall stepping over curb.  Patient was found to have Acute comminuted, intra-articular fractures of the superior, anterior, medial and posterior left acetabulum with fracture extension into the left superior pubic ramus and left medial ilium. Acute comminuted, mildly displaced fracture of the left inferior pubic ramus.  As per orthopedics non surgical management.  Patient received dialysis and a unit of blood.  PT recommended ALIYAH and discharged on 04/22/22 and went to [facility] at [place].

## 2022-04-26 NOTE — ED ADULT NURSE NOTE - OBJECTIVE STATEMENT
Pt to ED from dialysis via EMS. Pt was hypotensive in dialysis. Pt states hx of this in the past. Took his BP meds prior to dialysis. Pt arrives pale, tachypneic, tachycardic, hypotensive, and pale. States "I think I am going to die." Gets dialysis T,Th, Sat for 1 year now. Broke his hip last week. Noticeable outward deformity noted to L leg. Assisted to 5L NC. Pt to go to critical r/t oxygenation status and impending doom.

## 2022-04-26 NOTE — CONSULT NOTE ADULT - ATTENDING COMMENTS
Agree with above.  Now covid positive but would cover empirically with broad spectrum abx pending culture results.  Hemodynamically stable, comfortable on bipap,

## 2022-04-26 NOTE — H&P ADULT - PROBLEM SELECTOR PLAN 5
will keep on duoneb, iv solumedrol, o2 support. hold pt's hydrocortisone for now as pt. on solumedrol. pulmonology consult.

## 2022-04-26 NOTE — ED ADULT NURSE NOTE - NS ED NURSE RECORD ANOTHER HT AND WT
Varun Brewer is a 75 y.o. female who presents today for   Chief Complaint   Patient presents with    Diabetes     not doing well        Patient is here for     Diabetes  She presents for her follow-up diabetic visit. She has type 2 diabetes mellitus. There are no hypoglycemic associated symptoms. Associated symptoms include fatigue, foot paresthesias, polydipsia, polyphagia, polyuria, visual change and weakness. Pertinent negatives for diabetes include no blurred vision, no chest pain and no foot ulcerations. There are no hypoglycemic complications. Symptoms are worsening. Diabetic complications include peripheral neuropathy. Risk factors for coronary artery disease include diabetes mellitus, dyslipidemia, family history, hypertension and sedentary lifestyle. Current diabetic treatment includes oral agent (dual therapy). She is compliant with treatment some of the time. Her weight is stable. She is following a generally healthy diet. Meal planning includes avoidance of concentrated sweets. Nausea & Vomiting  This is a recurrent problem. The current episode started 1 to 4 weeks ago. Episode frequency: when glucose is high. The problem has been waxing and waning. Associated symptoms include arthralgias, fatigue, nausea, a visual change and weakness. Pertinent negatives include no chest pain. Exacerbated by: elevated glucose. Treatments tried: zofran. The treatment provided moderate relief. No change in PMH, family, social, or surgical history unless mentioned above. Review of Systems   Constitutional: Positive for fatigue. HENT: Negative. Eyes: Negative. Negative for blurred vision. Respiratory: Negative. Cardiovascular: Negative. Negative for chest pain. Gastrointestinal: Positive for nausea. Endocrine: Positive for polydipsia, polyphagia and polyuria. Genitourinary: Negative. Musculoskeletal: Positive for arthralgias. Skin: Negative. Neurological: Positive for weakness. Psychiatric/Behavioral: Negative for self-injury and suicidal ideas. Past Medical History:   Diagnosis Date    Anxiety     Cervical disc disease     Chest discomfort 10/17/2014    H/o negative stress tests AUC indication 15, AUC score 4, [facility] 2012;[id number] 10/17/2014  Cath  Mild cad,normal LVFX      Diabetes mellitus ([facility] [address] 10/17/2014    Diabetic neuropathy (HCC)     Hyperlipidemia     Hypertension     Insomnia     Insomnia     Osteoarthritis     Restless leg syndrome        Current Outpatient Medications   Medication Sig Dispense Refill    insulin glargine (LANTUS SOLOSTAR) 100 UNIT/ML injection pen Start 12 units nightly, increase by two units every two nights until max of 20 units nightly. 5 pen 3    ondansetron (ZOFRAN) 4 MG tablet Take 1 tablet by mouth 3 times daily as needed for Nausea or Vomiting 30 tablet 0    amitriptyline (ELAVIL) 50 MG tablet Take 1 tablet by mouth nightly TAKE ONE TABLET BY MOUTH AT BEDTIME. 90 tablet 1    clonazePAM (KLONOPIN) 0.5 MG tablet TAKE (1) TABLET BY MOUTH TWICE A DAY AS NEEDED FOR ANXIETY 60 tablet 0    metFORMIN (GLUCOPHAGE) 1000 MG tablet Take 1 tablet by mouth 2 times daily (with meals) 180 tablet 1    lisinopril (PRINIVIL;ZESTRIL) 20 MG tablet Take 1 tablet by mouth daily 90 tablet 1    rOPINIRole (REQUIP) 1 MG tablet Take 1 tablet by mouth 2 times daily TAKE (1) TABLET BY MOUTH TWICE A DAY. 180 tablet 1    blood glucose monitor strips Test 4 times a day & PRN for symptoms of irregular blood glucose.  Dispense amount for testing frequency and additional for PRN testing needs 200 strip 2    Lancets MISC 4 each by Does not apply route 4 times daily 100 each 3    aspirin EC 81 MG EC tablet Take 1 tablet by mouth daily 90 tablet 5    Diabetic Shoe MISC by Does not apply route With insert Dx E11.9 1 each 0    oxyCODONE-acetaminophen (PERCOCET)  MG per tablet Take 1 tablet by mouth every 8 hours as needed       simvastatin (ZOCOR) 40 MG tablet Take 1 tablet by mouth nightly (Patient not taking: Reported on 3/12/2021) 90 tablet 1    cyanocobalamin 1000 MCG/ML injection Inject 1 mL into the muscle every 30 days (Patient not taking: Reported on 3/12/2021) 10 mL 0    Cyanocobalamin (B-12) 1000 MCG/ML KIT Needles and syringe size 25 gauge or smaller, substitute as needed 1\" - 12 needles (Patient not taking: Reported on 3/12/2021) 1 kit 0    tiZANidine (ZANAFLEX) 4 MG tablet Take 4 mg by mouth every 8 hours as needed       No current facility-administered medications for this visit. Allergies   Allergen Reactions    Codeine Shortness Of Breath    Morphine Shortness Of Breath    Sulfa Antibiotics        Past Surgical History:   Procedure Laterality Date    CARDIAC CATHETERIZATION  10/17/14  JDT    EF 50%    CHOLECYSTECTOMY      FOOT SURGERY      HARDWARE REMOVAL Right 5/3/2019    HARDWARE REMOVAL RIGHT ANKLE performed by Cheryl Dunham MD at [address] [place] [address],[address]      NECK SURGERY         Social History     Tobacco Use    Smoking status: Former Smoker     Packs/day: 1.00     Years: 30.00     Pack years: 30.00     Quit date: 3/1/1991     Years since quittin.0    Smokeless tobacco: Never Used   Substance Use Topics    Alcohol use: No    Drug use: No       Family History   Problem Relation Age of Onset    Heart Disease Mother     Cancer Father     Heart Disease Sister        /68   Pulse 115   Temp 98 °F (36.7 °C) (Temporal)   Ht 5' 5\" (1.651 m)   Wt 161 lb (73 kg)   LMP 1976 (LMP Unknown)   SpO2 97%   BMI 26.79 kg/m²     Physical Exam  Vitals signs and nursing note reviewed. Constitutional:       General: She is not in acute distress. Appearance: Normal appearance. She is well-developed. She is not diaphoretic. HENT:      Head: Normocephalic.       Right Ear: External ear normal.      Left Ear: External ear normal.      Nose: Nose normal.      Mouth/Throat:      Pharynx: No oropharyngeal exudate. Eyes:      General:         Right eye: No discharge. Left eye: No discharge. Conjunctiva/sclera: Conjunctivae normal.      Pupils: Pupils are equal, round, and reactive to light. Neck:      Musculoskeletal: Normal range of motion. Trachea: No tracheal deviation. Cardiovascular:      Rate and Rhythm: Normal rate and regular rhythm. Pulses: Normal pulses. Heart sounds: Normal heart sounds. No murmur. Pulmonary:      Effort: Pulmonary effort is normal. No respiratory distress. Breath sounds: Normal breath sounds. No stridor. Abdominal:      General: Bowel sounds are normal.      Palpations: Abdomen is soft. Tenderness: There is no abdominal tenderness. Musculoskeletal: Normal range of motion. General: No tenderness or deformity. Lymphadenopathy:      Cervical: No cervical adenopathy. Skin:     General: Skin is warm and dry. Capillary Refill: Capillary refill takes less than 2 seconds. Findings: No rash. Neurological:      Mental Status: She is alert and oriented to person, place, and time. Cranial Nerves: No cranial nerve deficit. Psychiatric:         Mood and Affect: Mood normal.         Behavior: Behavior normal.         Thought Content: Thought content normal.         Judgment: Judgment normal.         Assessment:    ICD-10-CM    1. Diabetic polyneuropathy associated with type 2 diabetes mellitus (HCC)  E11.42 POCT glycosylated hemoglobin (Hb A1C)     insulin glargine (LANTUS SOLOSTAR) 100 UNIT/ML injection pen   2. Nausea  R11.0 ondansetron (ZOFRAN) 4 MG tablet   3. Diabetic gastroparalysis ([facility] [address]  E11.43     K31.84        Plan:   1. Diabetic polyneuropathy associated with type 2 diabetes mellitus (HCC)  - POCT glycosylated hemoglobin (Hb A1C)  - insulin glargine (LANTUS SOLOSTAR) 100 UNIT/ML injection pen; Start 12 units nightly, increase by two units every two nights until max of 20 units nightly.   Dispense: 5 Yes

## 2022-04-26 NOTE — H&P ADULT - NSHPPHYSICALEXAM_GEN_ALL_CORE
Vital Signs Last 24 Hrs  T(C): 36.7 (26 Apr 2022 13:06), Max: 36.7 (26 Apr 2022 13:06)  T(F): 98 (26 Apr 2022 13:06), Max: 98 (26 Apr 2022 13:06)  HR: 90 (26 Apr 2022 17:00) (88 - 98)  BP: 101/53 (26 Apr 2022 17:00) (94/54 - 114/59)  BP(mean): --  RR: 26 (26 Apr 2022 17:00) (18 - 26)  SpO2: 96% (26 Apr 2022 17:00) (95% - 96%)    General: pt. in bed not in distress  HEENT: AT, NC. PERRL. intact EOM. bipap on.   Neck: supple. no JVD.  Chest: Coarse rhonchi bilaterally, air entry fair.   Heart: S1,S2. RRR. no heart murmur. no edema.   Abdomen: soft. non-tender. non-distended. + BS.   Ext: no calf tenderness.  LUE av graft functional, moves all ext. independently.   lymphatic system : no lymphadenopathy.  Neuro: AAO x3. no focal weakness. no speech disorder, follows commands appropriately.   psych : mood ok, no si/hi.   Skin: no rash noted, warm and dry.

## 2022-04-26 NOTE — ED ADULT NURSE NOTE - NSICDXPASTSURGICALHX_GEN_ALL_CORE_FT
PAST SURGICAL HISTORY:  H/O heart artery stent x3 @[facility]    H/O lung transplant bilateral - transplant - 1-2-2015 -  [facility]    History of hip replacement right    Status post open reduction and internal fixation (ORIF) of fracture left femur

## 2022-04-26 NOTE — CONSULT NOTE ADULT - SUBJECTIVE AND OBJECTIVE BOX
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 Patient was sent from hemodialysis for hypotension after an incomplete session, he doesn’t know how long. Denies f/ch, abdominal pain, n/v. Not on oxygen at home. Former smoker. Hypoxic to 85% on room air in ER, placed on 3L NC, then upgraded to bipap for persistent respiratory distress. Vancomycin and zosyn given empirically for possible PNA. Clinically improved on bipap.       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    2019 novel coronavirus disease (COVID-19)  Feb 2021 - hospitalized for 1 week at [facility]    Pneumonia  April 2021    [place] (hard of hearing)    HTN (hypertension)    Lumbar spondylosis    History of COPD    BPH without urinary obstruction    Hypercholesterolemia    Oliguria and anuria    H/O peripheral neuropathy    H/O lung transplant  bilateral - transplant - 1-2-2015 -  [facility]    H/O heart artery stent  x3 @[facility]    History of hip replacement  right    Status post open reduction and internal fixation (ORIF) of fracture  left femur      Allergies    budesonide (Unknown)  cefpodoxime (Unknown)  Pollen (Unknown)    Intolerances      FAMILY HISTORY:  Family history of emphysema        Review of Systems:  CONSTITUTIONAL: No fever, chills, or fatigue  EYES: No eye pain, visual disturbances, or discharge  ENMT:  No difficulty hearing, tinnitus, vertigo; No sinus or throat pain  NECK: No pain or stiffness  RESPIRATORY: +cough, no wheezing, chills or hemoptysis; +shortness of breath  CARDIOVASCULAR: No chest pain, palpitations, dizziness, or leg swelling  GASTROINTESTINAL: No abdominal or epigastric pain. No nausea, vomiting, or hematemesis; No diarrhea or constipation. No melena or hematochezia.  GENITOURINARY: No dysuria, frequency, hematuria, or incontinence  NEUROLOGICAL: No headaches, memory loss, loss of strength, numbness, or tremors  SKIN: No itching, burning, rashes, or lesions   MUSCULOSKELETAL: No joint pain or swelling; No muscle, back, or extremity pain  PSYCHIATRIC: No depression, anxiety, mood swings, or difficulty sleeping      Social History: former smoker    Medications:  HYDROmorphone  Injectable 0.5 milliGRAM(s) IV Push Once      ICU Vital Signs Last 24 Hrs  T(C): 36.7 (26 Apr 2022 13:06), Max: 36.7 (26 Apr 2022 13:06)  T(F): 98 (26 Apr 2022 13:06), Max: 98 (26 Apr 2022 13:06)  HR: 90 (26 Apr 2022 14:42) (90 - 98)  BP: 114/59 (26 Apr 2022 14:42) (94/54 - 114/59)  BP(mean): --  ABP: --  ABP(mean): --  RR: 23 (26 Apr 2022 14:42) (18 - 23)  SpO2: 95% (26 Apr 2022 14:42) (95% - 96%)    Vital Signs Last 24 Hrs  T(C): 36.7 (26 Apr 2022 13:06), Max: 36.7 (26 Apr 2022 13:06)  T(F): 98 (26 Apr 2022 13:06), Max: 98 (26 Apr 2022 13:06)  HR: 90 (26 Apr 2022 14:42) (90 - 98)  BP: 114/59 (26 Apr 2022 14:42) (94/54 - 114/59)  BP(mean): --  RR: 23 (26 Apr 2022 14:42) (18 - 23)  SpO2: 95% (26 Apr 2022 14:42) (95% - 96%)        I&O's Detail        LABS:                        8.4    8.83  )-----------( 102      ( 26 Apr 2022 14:30 )             25.9     04-26    138  |  98  |  42.2<H>  ----------------------------<  68<L>  3.9   |  24.0  |  5.19<H>    Ca    8.1<L>      26 Apr 2022 14:30    TPro  5.4<L>  /  Alb  3.0<L>  /  TBili  1.2  /  DBili  x   /  AST  86<H>  /  ALT  20  /  AlkPhos  362<H>  04-26          CAPILLARY BLOOD GLUCOSE      POCT Blood Glucose.: 89 mg/dL (26 Apr 2022 15:15)    PT/INR - ( 26 Apr 2022 14:30 )   PT: 16.6 sec;   INR: 1.43 ratio         PTT - ( 26 Apr 2022 14:30 )  PTT:41.2 sec    CULTURES:        Physical Examination:    General: No acute distress.  Alert, oriented, interactive, nonfocal    HEENT: Pupils equal, reactive to light.  Symmetric.    PULM: Rales bilaterally, frequent wet cough    CVS: Regular rate and rhythm, no murmurs, rubs, or gallops    ABD: Soft, nondistended, nontender, normoactive bowel sounds, no masses    EXT: No edema, nontender    SKIN: Warm and well perfused, no rashes noted.    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 Patient was sent from hemodialysis for hypotension after an incomplete session, he doesn’t know how long. Received 1L fluids from EMS. Denies f/ch, abdominal pain, n/v. Not on oxygen at home. Former smoker. Hypoxic to 85% on room air in ER, placed on 3L NC, then upgraded to bipap for persistent respiratory distress. Reported hypotension improved on arrival to ER, /58. Vancomycin and zosyn given empirically for possible PNA. Clinically improved on bipap.       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    2019 novel coronavirus disease (COVID-19)  Feb 2021 - hospitalized for 1 week at [facility]    Pneumonia  April 2021    [place] (hard of hearing)    HTN (hypertension)    Lumbar spondylosis    History of COPD    BPH without urinary obstruction    Hypercholesterolemia    Oliguria and anuria    H/O peripheral neuropathy    H/O lung transplant  bilateral - transplant - 1-2-2015 -  [facility]    H/O heart artery stent  x3 @[facility]    History of hip replacement  right    Status post open reduction and internal fixation (ORIF) of fracture  left femur      Allergies    budesonide (Unknown)  cefpodoxime (Unknown)  Pollen (Unknown)    Intolerances      FAMILY HISTORY:  Family history of emphysema        Review of Systems:  CONSTITUTIONAL: No fever, chills, or fatigue  EYES: No eye pain, visual disturbances, or discharge  ENMT:  No difficulty hearing, tinnitus, vertigo; No sinus or throat pain  NECK: No pain or stiffness  RESPIRATORY: +cough, no wheezing, chills or hemoptysis; +shortness of breath  CARDIOVASCULAR: No chest pain, palpitations, dizziness, or leg swelling  GASTROINTESTINAL: No abdominal or epigastric pain. No nausea, vomiting, or hematemesis; No diarrhea or constipation. No melena or hematochezia.  GENITOURINARY: No dysuria, frequency, hematuria, or incontinence  NEUROLOGICAL: No headaches, memory loss, loss of strength, numbness, or tremors  SKIN: No itching, burning, rashes, or lesions   MUSCULOSKELETAL: No joint pain or swelling; No muscle, back, or extremity pain  PSYCHIATRIC: No depression, anxiety, mood swings, or difficulty sleeping      Social History: former smoker    Medications:  HYDROmorphone  Injectable 0.5 milliGRAM(s) IV Push Once      ICU Vital Signs Last 24 Hrs  T(C): 36.7 (26 Apr 2022 13:06), Max: 36.7 (26 Apr 2022 13:06)  T(F): 98 (26 Apr 2022 13:06), Max: 98 (26 Apr 2022 13:06)  HR: 90 (26 Apr 2022 14:42) (90 - 98)  BP: 114/59 (26 Apr 2022 14:42) (94/54 - 114/59)  BP(mean): --  ABP: --  ABP(mean): --  RR: 23 (26 Apr 2022 14:42) (18 - 23)  SpO2: 95% (26 Apr 2022 14:42) (95% - 96%)    Vital Signs Last 24 Hrs  T(C): 36.7 (26 Apr 2022 13:06), Max: 36.7 (26 Apr 2022 13:06)  T(F): 98 (26 Apr 2022 13:06), Max: 98 (26 Apr 2022 13:06)  HR: 90 (26 Apr 2022 14:42) (90 - 98)  BP: 114/59 (26 Apr 2022 14:42) (94/54 - 114/59)  BP(mean): --  RR: 23 (26 Apr 2022 14:42) (18 - 23)  SpO2: 95% (26 Apr 2022 14:42) (95% - 96%)        I&O's Detail        LABS:                        8.4    8.83  )-----------( 102      ( 26 Apr 2022 14:30 )             25.9     04-26    138  |  98  |  42.2<H>  ----------------------------<  68<L>  3.9   |  24.0  |  5.19<H>    Ca    8.1<L>      26 Apr 2022 14:30    TPro  5.4<L>  /  Alb  3.0<L>  /  TBili  1.2  /  DBili  x   /  AST  86<H>  /  ALT  20  /  AlkPhos  362<H>  04-26          CAPILLARY BLOOD GLUCOSE      POCT Blood Glucose.: 89 mg/dL (26 Apr 2022 15:15)    PT/INR - ( 26 Apr 2022 14:30 )   PT: 16.6 sec;   INR: 1.43 ratio         PTT - ( 26 Apr 2022 14:30 )  PTT:41.2 sec    CULTURES:        Physical Examination:    General: No acute distress.  Alert, oriented, interactive, nonfocal    HEENT: Pupils equal, reactive to light.  Symmetric.    PULM: Rales bilaterally, frequent wet cough    CVS: Regular rate and rhythm, no murmurs, rubs, or gallops    ABD: Soft, nondistended, nontender, normoactive bowel sounds, no masses    EXT: No edema, nontender    SKIN: Warm and well perfused, no rashes noted.    NEURO: A&Ox3, moving all extremities      RADIOLOGY:  4/26/22 CXR  IMPRESSION:  Normal pulmonary vasculature, resolution of mild congestion.    Bilateral lower lobe platelike atelectasis/interstitial infiltrates,   increased

## 2022-04-26 NOTE — ED PROVIDER NOTE - OBJECTIVE STATEMENT
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  Patient states he has been coughing and SOB for past 3 days.  Otherwise denies pain, bleeding, chest pain, abdominal pain or fevers.

## 2022-04-26 NOTE — H&P ADULT - PROBLEM SELECTOR PLAN 2
possible medication /dialysis related ? underlying infection ? responded to iv fluid. will keep on meropenem and zithro for now, got one dose of vanco in the er. follow all cultures. ID consult.

## 2022-04-27 NOTE — PROGRESS NOTE ADULT - ASSESSMENT
pt. 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 Patient was sent from hemodialysis for hypotension after an incomplete session, he doesn’t know how long. Received 1L fluids from EMS. Denies f/ch, abdominal pain, n/v. Not on oxygen at home. Former smoker. Hypoxic to 85% on room air in ER, placed on 3L NC, then upgraded to bipap for persistent respiratory distress. Reported hypotension improved on arrival to ER, /58. Vancomycin and zosyn given empirically for possible PNA. pt.Clinically improved on bipap. reports no cp, no abd. pain, no n/v/d. Later pt's covid-19 came back positive. pt. stated that his wife had covid last week. pt. also reported that he is covid 19 vaccinated and got booster as well. pt. evaluated by MICU but step down is recommended.   Recently pt. presented to [facility] ER on 04/21/22 for L hip pain s/p mechanical trip/fall stepping over curb.  Patient was found to have Acute comminuted, intra-articular fractures of the superior, anterior, medial and posterior left acetabulum with fracture extension into the left superior pubic ramus and left medial ilium. Acute comminuted, mildly displaced fracture of the left inferior pubic ramus.  As per orthopedics non surgical management.  Patient received dialysis and a unit of blood.  PT recommended ALIYAH and discharged on 04/22/22 and went to Hem[facility] at [place].

## 2022-04-27 NOTE — PROGRESS NOTE ADULT - SUBJECTIVE AND OBJECTIVE BOX
Vital Signs Last 24 Hrs  T(C): 37 (27 Apr 2022 08:00), Max: 37.3 (26 Apr 2022 20:32)  T(F): 98.6 (27 Apr 2022 08:00), Max: 99.2 (26 Apr 2022 20:32)  HR: 100 (27 Apr 2022 10:07) (88 - 108)  BP: 109/65 (27 Apr 2022 10:07) (94/54 - 114/64)  BP(mean): 75 (27 Apr 2022 10:07) (70 - 75)  RR: 16 (27 Apr 2022 10:07) (16 - 26)  SpO2: 98% (27 Apr 2022 10:07) (95% - 100%)  HD today. Tolerating dialysis and ultrafiltration.  Pre Laboratory values personally reviewed by me.  Dialysis adjusted appropriately based on current values.  Will continue the current medical management.  Next hemodialysis as scheduled.  Discussed with nursing, primary care team.

## 2022-04-27 NOTE — CHART NOTE - NSCHARTNOTEFT_GEN_A_CORE
RN called due to pt c/o L hip pain. Pt was seen at bedside in NAD. He states that his pain is sharp and rates it a 10/10. He reports relief w/ laying on his right side. He denies any numbness, tingling, confusion, lightheadedness, and/or dizziness. Pt was scheduled for a CT chest earlier in the day but is unable to tolerate laying flat on back due to pain. Daytime team aware.    Vital Signs Last 24 Hrs  T(C): 37.2 (27 Apr 2022 00:28), Max: 37.3 (26 Apr 2022 20:32)  T(F): 99 (27 Apr 2022 00:28), Max: 99.2 (26 Apr 2022 20:32)  HR: 90 (27 Apr 2022 01:07) (88 - 108)  BP: 110/55 (27 Apr 2022 00:28) (94/54 - 114/59)  BP(mean): 70 (26 Apr 2022 20:32) (70 - 70)  RR: 22 (27 Apr 2022 01:07) (17 - 26)  SpO2: 97% (27 Apr 2022 01:07) (95% - 99%)    GENERAL: NAD, on Bipap w/ pillow support under his left buttocks  NERVOUS SYSTEM: A&Ox3, Good concentration  CHEST/LUNG: On Bipap  HEART: Regular rate and rhythm  ABDOMEN: Soft, Nondistended, RLQ TTP   MSK/EXTREMITIES:  Point tenderness of the L hip, 2+ Peripheral Pulses, No LE edema, no tenderness to palpation of b/l LE  SKIN: Warm and dry, no signs ecchymosis or discoloration noted along the right thigh and right side back    - Percocet 5mg for pain  - Previously admitted for acute comminuted, intra-articular fractures of the superior, anterior, medial and posterior left acetabulum with fracture extension into the left superior pubic ramus and left medial ilium. Acute comminuted, mildly displaced fracture of the left inferior pubic ramus. No surgical intervention.   - Hgb 8.4. 9.4 on previous admission.  - CT Abd/Pelvis Urgent r/o RP bleed  - Hgb/Hct and T&S STAT  - BP stable but soft  - Will continue to monitor  - RN to notify for any changes RN called due to pt c/o L hip pain. Pt was seen at bedside in NAD. He states that his pain is sharp and rates it a 10/10. He reports relief w/ laying on his right side. He denies any numbness, tingling, confusion, lightheadedness, and/or dizziness. Pt was scheduled for a CT chest earlier in the day but is unable to tolerate laying flat on back due to pain. Daytime team aware.    Vital Signs Last 24 Hrs  T(C): 37.2 (27 Apr 2022 00:28), Max: 37.3 (26 Apr 2022 20:32)  T(F): 99 (27 Apr 2022 00:28), Max: 99.2 (26 Apr 2022 20:32)  HR: 90 (27 Apr 2022 01:07) (88 - 108)  BP: 110/55 (27 Apr 2022 00:28) (94/54 - 114/59)  BP(mean): 70 (26 Apr 2022 20:32) (70 - 70)  RR: 22 (27 Apr 2022 01:07) (17 - 26)  SpO2: 97% (27 Apr 2022 01:07) (95% - 99%)    GENERAL: NAD, on Bipap w/ pillow support under his left buttocks  NERVOUS SYSTEM: A&Ox3, Good concentration  CHEST/LUNG: On Bipap  HEART: Regular rate and rhythm  ABDOMEN: Soft, Nondistended, RLQ TTP   MSK/EXTREMITIES:  Point tenderness of the L hip, 2+ Peripheral Pulses, No LE edema, no tenderness to palpation of b/l LE  SKIN: Warm and dry, no signs ecchymosis or discoloration noted along the right thigh and right side back    - Percocet 5mg for pain  - Previously admitted for acute comminuted, intra-articular fractures of the superior, anterior, medial and posterior left acetabulum with fracture extension into the left superior pubic ramus and left medial ilium. Acute comminuted, mildly displaced fracture of the left inferior pubic ramus. No surgical intervention.   - Hgb 8.4. 9.4 on previous admission.  - CT Abd/Pelvis Urgent r/o RP bleed  - Hgb/Hct and T&S STAT  - BP stable but soft  - Will continue to monitor  - RN to notify for any changes    Addendum [id number]  - H&H stable  - CT Chest and Abd/pelvis pending   - VSS  - RN to notify for any changes

## 2022-04-27 NOTE — CONSULT NOTE ADULT - SUBJECTIVE AND OBJECTIVE BOX
PULMONARY CONSULT NOTE      JU FERGUSON  MRN-[id number]    Patient is a 72y old  Male who presents with a chief complaint of acute hypoxic respiratory failure. (27 Apr 2022 13:02)      HISTORY OF PRESENT ILLNESS:  72M PMH COPD s/p double lung transplant 2015, ESRD on HD, fungal meningitis, CAD s/p PCI, carotid artery disease s/p CEA, R IJ VTE on Eliquis who presented on 4/26 with SOB, cough and sent from dialysis after being found with hypotension. In the ED he was hypoxic to 85% on room air and subsequently required BPAP for respiratory distress. Found to be positive for COVID-19. Recently he presented to ED for L hip fracture. He denies acute SOB at this time, denies chest pain, N/V/D.     MEDICATIONS  (STANDING):  apixaban 2.5 milliGRAM(s) Oral every 12 hours  atorvastatin 40 milliGRAM(s) Oral at bedtime  azithromycin  IVPB 500 milliGRAM(s) IV Intermittent every 24 hours  azithromycin  IVPB      chlorhexidine 2% Cloths 1 Application(s) Topical <User Schedule>  clopidogrel Tablet 75 milliGRAM(s) Oral daily  fluconAZOLE   Tablet 200 milliGRAM(s) Oral <User Schedule>  meropenem  IVPB 500 milliGRAM(s) IV Intermittent every 24 hours  methylPREDNISolone sodium succinate Injectable 40 milliGRAM(s) IV Push every 8 hours  multivitamin 1 Tablet(s) Oral daily  mycophenolate mofetil 500 milliGRAM(s) Oral two times a day  pantoprazole    Tablet 40 milliGRAM(s) Oral before breakfast  sevelamer carbonate 1600 milliGRAM(s) Oral three times a day with meals  tacrolimus 1.5 milliGRAM(s) Oral <User Schedule>  tacrolimus 1 milliGRAM(s) Oral <User Schedule>  tamsulosin 0.4 milliGRAM(s) Oral at bedtime  trimethoprim   80 mG/sulfamethoxazole 400 mG 1 Tablet(s) Oral <User Schedule>    MEDICATIONS  (PRN):  ALBUTerol    0.083% 2.5 milliGRAM(s) Nebulizer every 4 hours PRN Shortness of Breath and/or Wheezing  HYDROmorphone  Injectable 0.5 milliGRAM(s) IV Push every 6 hours PRN Moderate Pain (4 - 6)  HYDROmorphone  Injectable 1 milliGRAM(s) IV Push every 6 hours PRN Severe Pain (7 - 10)    Allergies    budesonide (Unknown)  cefpodoxime (Unknown)  Pollen (Unknown)    Intolerances      PAST MEDICAL & SURGICAL HISTORY:  Emphysema of lung  s/p lung transplant    ESRD (end stage renal disease) on dialysis  on HD via LUE T/Th/Sat    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    2019 novel coronavirus disease (COVID-19)  Feb 2021 - hospitalized for 1 week at [facility]    Pneumonia  April 2021    [place] (hard of hearing)    HTN (hypertension)    Lumbar spondylosis    History of COPD    BPH without urinary obstruction    Hypercholesterolemia    Oliguria and anuria    H/O peripheral neuropathy    H/O lung transplant  bilateral - transplant - 1-2-2015 -  [facility]    H/O heart artery stent  x3 @[facility]    History of hip replacement  right    Status post open reduction and internal fixation (ORIF) of fracture  left femur      FAMILY HISTORY:  Family history of emphysema  mother          SOCIAL HISTORY  Smoking History:   Former smoker    REVIEW OF SYSTEMS:  CONSTITUTIONAL:  No fevers, chills  HEENT:  No headache  CARDIOVASCULAR:  No chest pain, no palpitations  RESPIRATORY:  As per HPI  GASTROINTESTINAL:  No abdominal pain, N/V/D  GENITOURINARY:  No dysuria  NEUROLOGIC:  No seizures or headaches  PSYCHIATRIC:  No disorder of thought or mood      Vital Signs Last 24 Hrs  T(C): 36.6 (27 Apr 2022 11:40), Max: 37.3 (26 Apr 2022 20:32)  T(F): 97.8 (27 Apr 2022 11:40), Max: 99.2 (26 Apr 2022 20:32)  HR: 85 (27 Apr 2022 12:00) (80 - 108)  BP: 99/56 (27 Apr 2022 12:00) (93/52 - 114/64)  BP(mean): 75 (27 Apr 2022 10:07) (70 - 75)  RR: 16 (27 Apr 2022 12:00) (15 - 26)  SpO2: 99% (27 Apr 2022 12:00) (95% - 100%)      PHYSICAL EXAMINATION:  GENERAL: In no apparent distress  HEENT: NC/AT  NECK: Supple, non-tender   LUNGS: CTA B/L, good inspiratory effort, non-labored breathing  CV: +S1, S2, RRR  ABDOMEN: Soft, non-tender  EXTREMITIES: No pedal edema B/L  SKIN: No open wounds  NEUROLOGIC: Grossly non-focal  PSYCH: Normal affect      LABS:                        8.2    5.17  )-----------( 83       ( 27 Apr 2022 11:53 )             25.4     04-27    130<L>  |  91<L>  |  58.6<H>  ----------------------------<  143<H>  6.1<HH>   |  21.0<L>  |  6.57<H>    Ca    8.1<L>      27 Apr 2022 05:42    TPro  5.8<L>  /  Alb  2.9<L>  /  TBili  1.2  /  DBili  x   /  AST  103<H>  /  ALT  22  /  AlkPhos  514<H>  04-27    PT/INR - ( 26 Apr 2022 14:30 )   PT: 16.6 sec;   INR: 1.43 ratio         PTT - ( 26 Apr 2022 14:30 )  PTT:41.2 sec                Procalcitonin, Serum: 13.09 ng/mL (04-26-22 @ 17:02)      MICROBIOLOGY:  Respiratory Viral Panel with COVID-19 by RAVEN (04.26.22 @ 14:31)    Rapid RVP Result: Detected    SARS-CoV-2: Detected: This Respiratory Panel uses polymerase chain reaction (PCR) to detect for  adenovirus; coronavirus (HKU1, NL63, 229E, OC43); human metapneumovirus  (hMPV); human enterovirus/rhinovirus (Entero/RV); influenza A; influenza  A/H1; influenza A/H3; influenza A/H1-2009; influenza B; parainfluenza  viruses 1, 2, 3, 4; respiratory syncytial virus; Mycoplasma pneumoniae;  Chlamydophila pneumoniae; and SARS-CoV-2.    ~~~~~~~~~~~~~~~~~~~~~~~~~~~~~~~~~~~~~~~~~~~~~~~~~~~~~~~~~~~~~~      RADIOLOGY & ADDITIONAL STUDIES:  < from: Xray Chest 1 View- PORTABLE-Urgent (04.26.22 @ 14:49) >    ACC: [id number] EXAM:  XR CHEST PORTABLE URGENT 1V                          PROCEDURE DATE:  04/26/2022          INTERPRETATION:  Clinical history: 72-year-old male, sepsis.    Portable/expiratory view of the chest is compared to 4/20/2022.    FINDINGS: Mild cardiomegaly and normal pulmonary vasculature with no   consolidation, effusion or acute osseous finding.    Large-bore double-lumen right-sided catheter with the tip in the right   atrium, unchanged.    Post sternotomy/CABG.    Bilateral lower lobe platelike atelectasis/interstitial infiltrates,   increased.    IMPRESSION:  Normal pulmonary vasculature, resolution of mild congestion.    Bilateral lower lobe platelike atelectasis/interstitial infiltrates,   increased    --- End of Report ---            MAYANK VAUGHN DO; Attending Radiologist  This document has been electronically signed. Apr 26 2022  3:48PM    < end of copied text >    ~~~~~~~~~~~~~~~~~~~~~~~~~~~~~~~~~~~~~~~~~~~~~~~~~~~~~~~~~~~~~~  ECHO:

## 2022-04-27 NOTE — CONSULT NOTE ADULT - SUBJECTIVE AND OBJECTIVE BOX
Pt Name: JU FERGUSON    MRN: [id number]      Patient is a 72y Male presenting to the emergency department from hemodialysis for acute hypoxia and hypotension, pt admitted to stepdown unit +covid     Recently pt. presented to [facility] ER on 04/21/22 for L hip pain s/p mechanical trip/fall stepping over curb.  Patient was found to have Acute comminuted, intra-articular fractures of the superior, anterior, medial and posterior left acetabulum with fracture extension into the left superior pubic ramus and left medial ilium. Acute comminuted, mildly displaced fracture of the left inferior pubic ramus.  As per orthopedics non surgical management.  Patient received dialysis and a unit of blood.  PT recommended ALIYAH and discharged on 04/22/22 and went to Hemlet at [place].    consult called for continued Left LE/hip pain.  Pt unable to bear weight, pt denies new injury, denies numbness/tingling to left LE    HEALTH ISSUES - PROBLEM Dx:  Acute respiratory failure with hypoxia  2019 novel coronavirus disease (COVID-19)  ESRD on dialysis  COPD exacerbation  S/P lung transplant  Closed pelvic fracture        REVIEW OF SYSTEMS:	  Skin/Breast: no abrasions, no lacerations  Respiratory and Thorax: +SOB  Genitourinary:	  Musculoskeletal:	 see hpi  Neurological:	see hpi	    ROS is otherwise negative.      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  2019 novel coronavirus disease (COVID-19)  Feb 2021 - hospitalized for 1 week at [facility]  Pneumonia  April 2021  [place] (hard of hearing)  HTN (hypertension)  Lumbar spondylosis  History of COPD  BPH without urinary obstruction  Hypercholesterolemia  Oliguria and anuria  H/O peripheral neuropathy  H/O lung transplant  bilateral - transplant - 1-2-2015 -  [facility]  H/O heart artery stent  x3 @[facility]  History of hip replacement  right  Status post open reduction and internal fixation (ORIF) of fracture  left femur        Allergies: budesonide (Unknown)  cefpodoxime (Unknown)  Pollen (Unknown)      Medications: ALBUTerol    0.083% 2.5 milliGRAM(s) Nebulizer every 4 hours PRN  apixaban 2.5 milliGRAM(s) Oral every 12 hours  atorvastatin 40 milliGRAM(s) Oral at bedtime  azithromycin  IVPB 500 milliGRAM(s) IV Intermittent every 24 hours  azithromycin  IVPB      chlorhexidine 2% Cloths 1 Application(s) Topical <User Schedule>  clopidogrel Tablet 75 milliGRAM(s) Oral daily  epoetin ben-epbx (RETACRIT) Injectable 62713 Unit(s) IV Push once  fluconAZOLE   Tablet 200 milliGRAM(s) Oral <User Schedule>  HYDROmorphone  Injectable 0.5 milliGRAM(s) IV Push every 6 hours PRN  HYDROmorphone  Injectable 1 milliGRAM(s) IV Push every 6 hours PRN  meropenem  IVPB 500 milliGRAM(s) IV Intermittent every 24 hours  methylPREDNISolone sodium succinate Injectable 40 milliGRAM(s) IV Push every 8 hours  multivitamin 1 Tablet(s) Oral daily  mycophenolate mofetil 500 milliGRAM(s) Oral two times a day  pantoprazole    Tablet 40 milliGRAM(s) Oral before breakfast  sevelamer carbonate 1600 milliGRAM(s) Oral three times a day with meals  tacrolimus 1.5 milliGRAM(s) Oral <User Schedule>  tacrolimus 1 milliGRAM(s) Oral <User Schedule>  tamsulosin 0.4 milliGRAM(s) Oral at bedtime  trimethoprim   80 mG/sulfamethoxazole 400 mG 1 Tablet(s) Oral <User Schedule>      FAMILY HISTORY:  Family history of emphysema  mother    : non-contributory    Social History: pt from [facility]                          8.2    5.17  )-----------( 83       ( 27 Apr 2022 11:53 )             25.4     04-27    130<L>  |  91<L>  |  58.6<H>  ----------------------------<  143<H>  6.1<HH>   |  21.0<L>  |  6.57<H>    Ca    8.1<L>      27 Apr 2022 05:42    TPro  5.8<L>  /  Alb  2.9<L>  /  TBili  1.2  /  DBili  x   /  AST  103<H>  /  ALT  22  /  AlkPhos  514<H>  04-27      PHYSICAL EXAM:    Vital Signs Last 24 Hrs  T(C): 36.6 (27 Apr 2022 11:40), Max: 37.3 (26 Apr 2022 20:32)  T(F): 97.8 (27 Apr 2022 11:40), Max: 99.2 (26 Apr 2022 20:32)  HR: 80 (27 Apr 2022 11:40) (80 - 108)  BP: 93/52 (27 Apr 2022 11:40) (93/52 - 114/64)  BP(mean): 75 (27 Apr 2022 10:07) (70 - 75)  RR: 15 (27 Apr 2022 11:40) (15 - 26)  SpO2: 99% (27 Apr 2022 11:40) (95% - 100%)  Daily Height in cm: 167.64 (26 Apr 2022 13:06)    Daily     Appearance: Alert, responsive, in no acute distress.  Pt currently having hemodialysis  Neurological: Sensation is grossly intact to light touch. 5/5 motor function of all extremities. No focal deficits or weaknesses found.  Skin: no rash on visible skin. Skin is clean, dry and intact. No bleeding. No abrasions. No ulcerations.  Vascular: 2+ distal pulses. Cap refill < 2 sec. No signs of venous insuffiency or stasis. No extremity ulcerations. No cyanosis.    toe movement intact, sensation intact pedal pulse palp    Imaging Studies: pending xrays pf left hip/pelvis    A/P:  Pt is a  72y Male with left acetabular fx, inferior/sup pubic ramus fx     PLAN:   * Pain control  * New xrays pending, will f/u  * Bed rest

## 2022-04-27 NOTE — CONSULT NOTE ADULT - SUBJECTIVE AND OBJECTIVE BOX
[facility]  INFECTIOUS DISEASES AND INTERNAL MEDICINE at [place]  =======================================================                               J Carlos Galdamez MD#  Negrito Caro MD*                                     Isatu Rojas MD*    Perlita Solares MD*            Diplomates American Board of Internal Medicine & Infectious Diseases                # [place] Office - Appt - Tel  [phone] Fax [phone]                * [place] Office - Appt - Tel [phone] Fax [phone]                                  Hospital Consult line:  [phone]  =======================================================      MRN-[id number]  JU FERGUSON    CC: Patient is a 72y old  Male who presents with a chief complaint of acute hypoxic respiratory failure. 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 In the ED he was hypoxic to 85% on room air and subsequently required BPAP for respiratory distress. Found to be positive for COVID-19. Recently he presented to ED for L hip fracture from Nursing facility. In the ER patient was afebrile. Started on Meropenem, Vancomycin and Azithromycin. ID input requested.       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  2019 novel coronavirus disease (COVID-19)  Feb 2021 - hospitalized for 1 week at [facility]  Pneumonia April 2021  [place] (hard of hearing)  HTN (hypertension)  Lumbar spondylosis  History of COPD  BPH without urinary obstruction  Hypercholesterolemia  Oliguria and anuria  H/O peripheral neuropathy  H/O lung transplant  bilateral - transplant - 1-2-2015 -  [facility]  H/O heart artery stent x3 @[facility]  History of hip replacement right  Status post open reduction and internal fixation (ORIF) of fracture, left femur      Social Hx:  Denies smoking       FAMILY HISTORY:  Family history of emphysema - mother      Allergies  budesonide (Unknown)  cefpodoxime (Unknown)  Pollen (Unknown)       REVIEW OF SYSTEMS:  CONSTITUTIONAL:  No Fever or chills  HEENT:  No diplopia or blurred vision.  No earache, sore throat or runny nose.  CARDIOVASCULAR:  No pressure, squeezing, strangling, tightness, heaviness or aching about the chest, neck, axilla or epigastrium.  RESPIRATORY:  No cough. + shortness of breath  GASTROINTESTINAL:  No nausea, vomiting or diarrhea.  GENITOURINARY:  No dysuria, frequency or urgency.   MUSCULOSKELETAL:  no joint aches, no muscle pain  SKIN:  No change in skin, hair or nails.  NEUROLOGIC:  No Headaches, seizures   PSYCHIATRIC:  No disorder of thought or mood.  ENDOCRINE:  No heat or cold intolerance  HEMATOLOGICAL:  No easy bruising or bleeding.       Physical Exam:  GEN: NAD  HEENT: normocephalic and atraumatic. EOMI. PERRL.    NECK: Supple.   LUNGS: Decreased Basal BS B/L   HEART: RRR  ABDOMEN: Soft, NT, ND.  +BS.    : No CVA tenderness  EXTREMITIES: Without  edema.  MSK: No joint swelling  NEUROLOGIC: AAOx3  PSYCHIATRIC: Appropriate affect .  SKIN: No rash    Weight (kg): 66 (04-26 @ 23:03)      Vitals:  T(F): 97.8 (27 Apr 2022 11:40), Max: 99.2 (26 Apr 2022 20:32)  HR: 85 (27 Apr 2022 12:00)  BP: 99/56 (27 Apr 2022 12:00)  RR: 16 (27 Apr 2022 12:00)  SpO2: 99% (27 Apr 2022 12:00) (96% - 100%)  temp max in last 48H T(F): , Max: 99.2 (04-26-22 @ 20:32)    Current Antibiotics:  azithromycin  IVPB 500 milliGRAM(s) IV Intermittent every 24 hours  azithromycin  IVPB      fluconAZOLE   Tablet 200 milliGRAM(s) Oral <User Schedule>  meropenem  IVPB 500 milliGRAM(s) IV Intermittent every 24 hours  trimethoprim   80 mG/sulfamethoxazole 400 mG 1 Tablet(s) Oral <User Schedule>    Other medications:  apixaban 2.5 milliGRAM(s) Oral every 12 hours  atorvastatin 40 milliGRAM(s) Oral at bedtime  chlorhexidine 2% Cloths 1 Application(s) Topical <User Schedule>  clopidogrel Tablet 75 milliGRAM(s) Oral daily  methylPREDNISolone sodium succinate Injectable 40 milliGRAM(s) IV Push every 8 hours  multivitamin 1 Tablet(s) Oral daily  mycophenolate mofetil 500 milliGRAM(s) Oral two times a day  pantoprazole    Tablet 40 milliGRAM(s) Oral before breakfast  sevelamer carbonate 1600 milliGRAM(s) Oral three times a day with meals  tacrolimus 1.5 milliGRAM(s) Oral <User Schedule>  tacrolimus 1 milliGRAM(s) Oral <User Schedule>  tamsulosin 0.4 milliGRAM(s) Oral at bedtime                 8.2    5.17  )-----------( 83       ( 27 Apr 2022 11:53 )             25.4     04-27    130<L>  |  91<L>  |  58.6<H>  ----------------------------<  143<H>  6.1<HH>   |  21.0<L>  |  6.57<H>    Ca    8.1<L>      27 Apr 2022 05:42    TPro  5.8<L>  /  Alb  2.9<L>  /  TBili  1.2  /  DBili  x   /  AST  103<H>  /  ALT  22  /  AlkPhos  514<H>  04-27      WBC Count: 5.17 K/uL (04-27-22 @ 11:53)  WBC Count: 5.85 K/uL (04-27-22 @ 05:42)  WBC Count: 8.83 K/uL (04-26-22 @ 14:30)    Creatinine, Serum: 6.57 mg/dL (04-27-22 @ 05:42)  Creatinine, Serum: 5.19 mg/dL (04-26-22 @ 14:30)    Procalcitonin, Serum: 13.09 ng/mL (04-26-22 @ 17:02)     SARS-CoV-2: Detected (04-26-22 @ 14:31)  COVID-19 PCR: NotDetec (04-20-22 @ 22:05)        < from: Xray Chest 1 View- PORTABLE-Urgent (04.26.22 @ 14:49) >  ACC: [id number] EXAM:  XR CHEST PORTABLE URGENT 1V                          PROCEDURE DATE:  04/26/2022      INTERPRETATION:  Clinical history: 72-year-old male, sepsis.    Portable/expiratory view of the chest is compared to 4/20/2022.    FINDINGS: Mild cardiomegaly and normal pulmonary vasculature with no   consolidation, effusion or acute osseous finding.    Large-bore double-lumen right-sided catheter with the tip in the right   atrium, unchanged.    Post sternotomy/CABG.    Bilateral lower lobe platelike atelectasis/interstitial infiltrates,   increased.    IMPRESSION:  Normal pulmonary vasculature, resolution of mild congestion.    Bilateral lower lobe platelike atelectasis/interstitial infiltrates,   increased  --- End of Report ---  < end of copied text >

## 2022-04-27 NOTE — CONSULT NOTE ADULT - ASSESSMENT
- COPD s/p lung transplant 2015  - Immunosuppressed on Tacrolimus/Mycophenolate  - ESRD on HD  - H/o fungal meningitis  - CAD s/p PCI    Recommendations:  C/w steroids, c/w nebs. C/w ABx, check sputum cultures. HOB elevation with aspiration precautions. F/u CT chest. If no clinical improvement would consult thoracic for bronchoscopy.  F/u with ID. HD per nephrology. Monitor O2 requirements. PRN NIPPV vs HFNC for increased WOB.       Negrito Cardona M.D.  Pulmonary & Critical Care Medicine  [facility]  Pulmonary and Sleep Medicine at [place]  [address]  [place], [place] [address]  T: [phone]  F: [phone]

## 2022-04-27 NOTE — CONSULT NOTE ADULT - ASSESSMENT
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 In the ED he was hypoxic to 85% on room air and subsequently required BPAP for respiratory distress. Found to be positive for COVID-19. Recently he presented to ED for L hip fracture from Nursing facility. In the ER patient was afebrile. Started on Meropenem, Vancomycin and Azithromycin.       Acute Hypoxic respiratory failure  COVID-19 infection  B/L Pneumonia   shortness of breath  Transaminitis   ESRD on HD   Hip fracture   Lung Transplant on Immunosuppressants       - COVID 19 PCR Positive on 4/26/22  - Blood cultures Pending  - CXR with B/L PNA  - CT Chest pending  - Continue supportive care measures  - continue to trend inflammatory markers.   - trend CBC with diff, CMP,  CRP, Ferritin, D Dimer q 48hours  - Discussed Remdesivir with the patient.   - Start Remdesivir 200mg IV x1 followed by 100mg IV daily   - Monitor LFT's while on Remdesivir  - On Steroids per Pulm   - not a candidate for Bebtelovimab due to hypoxia   - pending cultures will continue Meropenem  - D/C Vancomycin and Azithromycin  - Start Doxycycline 100mg PO q 12hours  - Check Urine legionella Ag  - If not improvement will need Bronch  - Will adjust PPX Fluconazole, and Bactrom   - Follow up cultures  - Trend Fever  - Trend WBC      Thank you for allowing me to participate in the care of your patient.   Will Follow      d/w Dr Cardona and clinical pharmacy

## 2022-04-27 NOTE — PROGRESS NOTE ADULT - PROBLEM SELECTOR PLAN 1
Transitioned to NC from Bipap. CXR shows bilateral lower lobe platelike atelectasis/interstitial infiltrates, increased from prior  solumedrol 40 mg Q8H and Duoneb  - Appreciate nephro recs  - will c/w Meropenem/AZT for PNA  - Pulm c/s placed (Dr. Cardona)  - ID c/s placed (Dr. Rojas)

## 2022-04-27 NOTE — CHART NOTE - NSCHARTNOTEFT_GEN_A_CORE
Nephrology Consent  Two physician consent myself and Dr De Luna to resume chronic HD services in hospital  Pt with IMELDA and CoVID

## 2022-04-28 NOTE — CONSULT NOTE ADULT - NS ATTEND AMEND GEN_ALL_CORE FT
Pt seen for bronch eval.  Lung transplant and now COVID presents with atelectasis and mucous plug.  Will plan for bronch

## 2022-04-28 NOTE — DISCHARGE NOTE PROVIDER - DETAILS OF MALNUTRITION DIAGNOSIS/DIAGNOSES
This patient has been assessed with a concern for Malnutrition and was treated during this hospitalization for the following Nutrition diagnosis/diagnoses:     -  05/03/2022: Severe protein-calorie malnutrition

## 2022-04-28 NOTE — DISCHARGE NOTE PROVIDER - NSDCCPCAREPLAN_GEN_ALL_CORE_FT
PRINCIPAL DISCHARGE DIAGNOSIS  Diagnosis: Acute respiratory failure with hypoxia  Assessment and Plan of Treatment: resolved      SECONDARY DISCHARGE DIAGNOSES  Diagnosis: ESRD on dialysis  Assessment and Plan of Treatment:      PRINCIPAL DISCHARGE DIAGNOSIS  Diagnosis: Acute respiratory failure with hypoxia  Assessment and Plan of Treatment: resolved  completed course of antibiotics and remdesivir  continue bronchodilators as needed  repeat imaging as outpatient   follow up with your PCP within 1 week      SECONDARY DISCHARGE DIAGNOSES  Diagnosis: S/P lung transplant  Assessment and Plan of Treatment: continue immunosuppresants as directed and follow up with your transplant team as scheduled    Diagnosis: ESRD on dialysis  Assessment and Plan of Treatment: resume your outpatient HD schedule  continue procrit with dialysis  continue phosphate binders  follow up with nephrology as scheduled    Diagnosis: Closed pelvic fracture  Assessment and Plan of Treatment: continue TTWB as tolerated  transfer back to rehab  analgesia as needed  outpatient follow up with ortho within 1-2 weeks    Diagnosis: Abdominal aortic aneurysm  Assessment and Plan of Treatment: incidentally noted; 2.6 cm  recommended continued outpatient surveillance with your PCP

## 2022-04-28 NOTE — DISCHARGE NOTE PROVIDER - HOSPITAL COURSE
72 year old male with history of COPD, double lung transplant(2015), ESRD, fungal meningitis, CAD s/p PCI, carotid stenosis s/p CEA, HTN,HLD, BPH, RIJ occlusion presented to [facility] ED 4/26/22 with worsening SOB with associated cough. In the ED, be hypoxic and placed on BIPAP. Patient was evaluated by MICU, rec SDU. Patient was admitted for acute respiratory failure likely due to COPD exacerbation, COVID pna, and superimposed bacterial PNA. patient underwent bronchoscopy 4/29, with copious secretions, normal keerthi.  Patient's hypoxia has resolved and now satting well on room air, patient completed remdesivir, meropenem, and doxycycline. patient was transited to oral steroids. Patient is medically stable for discharge back to the [place]. 72 year old male with history of COPD, double lung transplant(2015), ESRD on HD, fungal meningitis, CAD s/p PCI, carotid stenosis s/p CEA, HTN,HLD, BPH, RIJ occlusion presented to [facility] ED 4/26/22 with worsening SOB with associated cough. In the ED, be hypoxic and placed on BIPAP. Patient was evaluated by MICU and admission to SDU was recommended. Patient was admitted for acute respiratory failure likely due to COPD exacerbation, COVID PNA, and superimposed bacterial PNA. Patient underwent bronchoscopy 4/29, with copious secretions, normal keerthi.  Patient's hypoxia has resolved and now satting well on room air, patient completed remdesivir, meropenem, and doxycycline. Patient was transitioned to oral steroids and has remained stable on room air. Patient remains medically stable for discharge back to the [place].

## 2022-04-28 NOTE — PROGRESS NOTE ADULT - ASSESSMENT
- COPD s/p double lung transplant 2015  - Immunosuppressed on Tacrolimus/Mycophenolate  - COVID-19 PNA  - Possible superimposed bacterial PNA  - Hypoxic respiratory failure  - ESRD on HD  - H/o fungal meningitis  - CAD s/p PCI    Recommendations:  C/w steroids, c/w nebs. C/w ABx - Meropenem, Doxycycline. On Remdesivir per ID, as well as Diflucan. CT chest showed marked narrowing of RUL bronchus and collapse of bronchus intermedius and segmental RML and RLL bronchi possibly due to mucoid impaction. Thoracic now on board, tentative plan for bronchoscopy tomorrow, 4/29/22. Would send sputum bacterial and fungal cultures, cell count, PCP PCR, as well as BAL galactomannan, BAL aspergillus Ag, and BAL Fungitell. HOB elevation with aspiration precautions. F/u with ID. HD per nephrology. Monitor O2 requirements. PRN NIPPV vs HFNC for increased WOB.     D/w thoracic surgery team.      Negrito Cardona M.D.  Pulmonary & Critical Care Medicine  [facility]  Pulmonary and Sleep Medicine at [place]  [address]  [place], [place] [address]  T: [phone]  F: [phone] - COPD s/p double lung transplant 2015  - Immunosuppressed on Tacrolimus/Mycophenolate  - COVID-19 PNA  - Possible superimposed bacterial PNA  - Mucoid impaction, bronchial collapse  - Hypoxic respiratory failure  - ESRD on HD  - H/o fungal meningitis  - CAD s/p PCI    Recommendations:  C/w steroids, c/w nebs. C/w ABx - Meropenem, Doxycycline. On Remdesivir per ID, as well as Diflucan. CT chest showed marked narrowing of RUL bronchus and collapse of bronchus intermedius and segmental RML and RLL bronchi possibly due to mucoid impaction. Thoracic now on board, tentative plan for bronchoscopy tomorrow, 4/29/22. Would send sputum bacterial and fungal cultures, cell count, PCP PCR, as well as BAL galactomannan, BAL aspergillus Ag, and BAL Fungitell. HOB elevation with aspiration precautions. F/u with ID. HD per nephrology. Monitor O2 requirements. PRN NIPPV vs HFNC for increased WOB.     D/w thoracic surgery team.      Negrito Cardona M.D.  Pulmonary & Critical Care Medicine  [facility]  Pulmonary and Sleep Medicine at [place]  [address]  [place], [place] [address]  T: [phone]  F: [phone] - COPD s/p double lung transplant 2015  - Immunosuppressed on Tacrolimus/Mycophenolate  - COVID-19 PNA  - Possible superimposed bacterial PNA  - Mucoid impaction, bronchial collapse  - Hypoxic respiratory failure  - ESRD on HD  - H/o fungal meningitis  - CAD s/p PCI    Recommendations:  C/w steroids, c/w nebs. C/w ABx - Meropenem, Doxycycline. On Remdesivir per ID, as well as Diflucan and prophylactic Bactrim TIW. CT chest showed marked narrowing of RUL bronchus and collapse of bronchus intermedius and segmental RML and RLL bronchi possibly due to mucoid impaction. Thoracic now on board, tentative plan for bronchoscopy tomorrow, 4/29/22. Would send sputum bacterial and fungal cultures, cell count, PCP PCR, as well as BAL galactomannan, BAL aspergillus Ag, and BAL Fungitell. HOB elevation with aspiration precautions. F/u with ID. HD per nephrology. Monitor O2 requirements. PRN NIPPV vs HFNC for increased WOB.     D/w thoracic surgery team.      Negrito Cardona M.D.  Pulmonary & Critical Care Medicine  [facility]  Pulmonary and Sleep Medicine at [place]  [address]  [place], [place] [address]  T: [phone]  F: [phone]

## 2022-04-28 NOTE — DISCHARGE NOTE PROVIDER - NSDCFUSCHEDAPPT_GEN_ALL_CORE_FT
[facility]  Puled [address]  Scheduled Appointment: 05/13/2022    J Carlos Cordova  [facility] [address]  Scheduled Appointment: 05/13/2022    [facility]  Vascular [address]  Scheduled Appointment: 07/11/2022    Manvar-Singh, Pallavi  [facility]  Vascular [address]  Scheduled Appointment: 07/11/2022

## 2022-04-28 NOTE — PROGRESS NOTE ADULT - ASSESSMENT
pt. 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 Patient was sent from hemodialysis for hypotension after an incomplete session, he doesn’t know how long. Received 1L fluids from EMS. Denies f/ch, abdominal pain, n/v. Not on oxygen at home. Former smoker. Hypoxic to 85% on room air in ER, placed on 3L NC, then upgraded to bipap for persistent respiratory distress. Reported hypotension improved on arrival to ER, /58. Vancomycin and zosyn given empirically for possible PNA. pt.Clinically improved on bipap. reports no cp, no abd. pain, no n/v/d. Later pt's covid-19 came back positive. pt. stated that his wife had covid last week. pt. also reported that he is covid 19 vaccinated and got booster as well. pt. evaluated by MICU but step down is recommended.   Recently pt. presented to [facility] ER on 04/21/22 for L hip pain s/p mechanical trip/fall stepping over curb.  Patient was found to have Acute comminuted, intra-articular fractures of the superior, anterior, medial and posterior left acetabulum with fracture extension into the left superior pubic ramus and left medial ilium. Acute comminuted, mildly displaced fracture of the left inferior pubic ramus.  As per orthopedics non surgical management.  Patient received dialysis and a unit of blood.  PT recommended ALIYAH and discharged on 04/22/22 and went to Hemlet at [place].

## 2022-04-28 NOTE — CONSULT NOTE ADULT - PROBLEM SELECTOR RECOMMENDATION 9
Plan for Flex Bronch for BAL with Dr. Kendall tomorrow 4/29/22  Type and screen is up to date, PRBC on hold  COVID + on 4/26, will be within 72 hour requirement for OR  NPO after midnight in place  Eliquis on hold for OR  Patient will be intubated for procedure, may warrant MICU admission postop in unable to be extubated   Continue treatment per primary care Plan for Flex Bronch for BAL with Dr. Kendall tomorrow 4/29/22  Type and screen is up to date, PRBC on hold  COVID + on 4/26, will be within 72 hour requirement for OR  NPO after midnight in place  Eliquis / Plavix on hold for OR  Patient will be intubated for procedure, may warrant MICU admission postop in unable to be extubated   Continue treatment per primary care

## 2022-04-28 NOTE — PROGRESS NOTE ADULT - SUBJECTIVE AND OBJECTIVE BOX
PULMONARY PROGRESS NOTE      JU FERGUSON  MRN-[id number]    Patient is a 72y old  Male who presents with a chief complaint of acute hypoxic respiratory failure. (28 Apr 2022 14:09)        INTERVAL HPI/OVERNIGHT EVENTS:  Pt seen and examined at the bedside. Overnight noted with acute hypoxia 50s% s/p rapid response, improved after being placed on NRB then BPAP.    MEDICATIONS  (STANDING):  atorvastatin 40 milliGRAM(s) Oral at bedtime  chlorhexidine 2% Cloths 1 Application(s) Topical <User Schedule>  doxycycline hyclate Capsule 100 milliGRAM(s) Oral every 12 hours  fluconAZOLE   Tablet 200 milliGRAM(s) Oral <User Schedule>  meropenem  IVPB 500 milliGRAM(s) IV Intermittent every 24 hours  methylPREDNISolone sodium succinate Injectable 40 milliGRAM(s) IV Push every 8 hours  multivitamin 1 Tablet(s) Oral daily  mycophenolate mofetil 500 milliGRAM(s) Oral two times a day  pantoprazole    Tablet 40 milliGRAM(s) Oral before breakfast  remdesivir  IVPB   IV Intermittent   remdesivir  IVPB 100 milliGRAM(s) IV Intermittent every 24 hours  sevelamer carbonate 1600 milliGRAM(s) Oral three times a day with meals  tacrolimus 1.5 milliGRAM(s) Oral <User Schedule>  tacrolimus 1 milliGRAM(s) Oral <User Schedule>  tamsulosin 0.4 milliGRAM(s) Oral at bedtime  trimethoprim   80 mG/sulfamethoxazole 400 mG 1 Tablet(s) Oral <User Schedule>    MEDICATIONS  (PRN):  ALBUTerol    0.083% 2.5 milliGRAM(s) Nebulizer every 4 hours PRN Shortness of Breath and/or Wheezing  HYDROmorphone  Injectable 0.5 milliGRAM(s) IV Push every 6 hours PRN Moderate Pain (4 - 6)  HYDROmorphone  Injectable 1 milliGRAM(s) IV Push every 6 hours PRN Severe Pain (7 - 10)    Allergies    budesonide (Unknown)  cefpodoxime (Unknown)  Pollen (Unknown)    Intolerances      PAST MEDICAL & SURGICAL HISTORY:  Emphysema of lung  s/p lung transplant    ESRD (end stage renal disease) on dialysis  on HD via LUE T/Th/Sat    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    History of hip replacement  right    Status post open reduction and internal fixation (ORIF) of fracture  left femur          REVIEW OF SYSTEMS:  Negative except as noted in HPI      Vital Signs Last 24 Hrs  T(C): 36.1 (28 Apr 2022 16:26), Max: 36.6 (28 Apr 2022 08:00)  T(F): 97 (28 Apr 2022 16:26), Max: 97.9 (28 Apr 2022 08:00)  HR: 89 (28 Apr 2022 17:56) (85 - 121)  BP: 107/56 (28 Apr 2022 17:56) (94/56 - 143/76)  BP(mean): 67 (28 Apr 2022 17:56) (67 - 73)  RR: 17 (28 Apr 2022 17:56) (17 - 30)  SpO2: 97% (28 Apr 2022 17:56) (52% - 98%)      PHYSICAL EXAMINATION:  GEN: In no apparent distress  HEENT: NC/AT  NECK: Supple  CV: +S1, S2  RESPIRATORY: B/L coarse breath sounds; good inspiratory effort, non-labored breathing  ABDOMEN: Soft, non-tender  EXTREMITIES: No pedal edema B/L  SKIN: No open wounds  PSYCH: Normal affect      LABS:                        9.1    7.40  )-----------( 113      ( 28 Apr 2022 07:53 )             28.5     04-28    137  |  95<L>  |  51.4<H>  ----------------------------<  179<H>  5.1   |  22.0  |  4.82<H>    Ca    8.0<L>      28 Apr 2022 07:53    TPro  5.9<L>  /  Alb  3.1<L>  /  TBili  0.8  /  DBili  0.5<H>  /  AST  97<H>  /  ALT  22  /  AlkPhos  433<H>  04-28    PT/INR - ( 28 Apr 2022 07:53 )   PT: 21.7 sec;   INR: 1.86 ratio             ABG - ( 28 Apr 2022 02:49 )  pH, Arterial: 7.380 pH, Blood: x     /  pCO2: 45    /  pO2: 186   / HCO3: 27    / Base Excess: 1.5   /  SaO2: 100.0                       Procalcitonin, Serum: 13.09 ng/mL (04-26-22 @ 17:02)      MICROBIOLOGY:  Respiratory Viral Panel with COVID-19 by RAVEN (04.26.22 @ 14:31)    Rapid RVP Result: Detected    SARS-CoV-2: Detected: This Respiratory Panel uses polymerase chain reaction (PCR) to detect for  adenovirus; coronavirus (HKU1, NL63, 229E, OC43); human metapneumovirus  (hMPV); human enterovirus/rhinovirus (Entero/RV); influenza A; influenza  A/H1; influenza A/H3; influenza A/H1-2009; influenza B; parainfluenza  viruses 1, 2, 3, 4; respiratory syncytial virus; Mycoplasma pneumoniae;  Chlamydophila pneumoniae; and SARS-CoV-2.    ~~~~~~~~~~~~~~~~~~~~~~~~~~~~~~~~~~~~~~~~~~~~~~~~~~~~~~~~~~~~~~      RADIOLOGY & ADDITIONAL STUDIES:  < from: CT Chest No Cont (04.28.22 @ 01:53) >    ACC: [id number] EXAM:  CT ABDOMEN AND PELVIS                        ACC: [id number] EXAM:  CT CHEST                          PROCEDURE DATE:  04/28/2022          INTERPRETATION:  CLINICAL INFORMATION: Acute hypoxic respiratory failure.  History of lungtransplants.  Transaminitis.      COMPARISON: CT scan chest 8/20/2021.    CONTRAST/COMPLICATIONS:  IV Contrast: NONE  Oral Contrast: NONE  Complications: None reported at time of study completion    PROCEDURE:  CT of the Chest, Abdomen and Pelvis wasperformed.  Sagittal and coronal reformats were performed.    FINDINGS:    CHEST:    LUNGS AND LARGE AIRWAYS: PLEURA:  Small bilateral pleural effusions, including small loculated components   within the bilateral major fissures.  There are patchy bilateral lower lobe airspace consolidations, more   pronounced in the right lower lobe.    There is mild groundglass haziness right middle and lower lobes.     There is marked narrowing of the right upper lobe bronchus at the level   of surgical clips.  There is collapse of the bronchus intermedius and segmental right middle   and lower lobe bronchi.  There is mild narrowing of the left upper lobe bronchus at the level of   surgical clips.    VESSELS: Atherosclerotic changes thoracic aorta and coronary artery   calcifications.  Right-sided central venous catheter, tip at the distal SVC.    HEART:  The heart is enlarged.  Mitral valvular calcifications.  Calcifications at the aortic root.   Minimal pericardial effusion/thickening.    MEDIASTINUMAND JANNY:  Small subcentimeter short axis precarinal lymph nodes, stable.  Calcified subcarinal mediastinal lymph node, stable.  The evaluation of the pulmonary hilum is limited without intravenous   contrast.    CHEST WALL AND LOWER NECK:  Sternotomy.    ABDOMEN AND PELVIS:  Streak artifact degrades image quality.    The evaluation of the solid organ parenchyma is limited without   intravenous contrast.    LIVER: Within normal limits.  BILE DUCTS: Normal caliber.  GALLBLADDER: Within normal limits.  SPLEEN: Within normal limits.  PANCREAS: Within normal limits.  ADRENALS: Within normal limits.  KIDNEYS/URETERS:  Bilateral atrophic kidneys with intrarenal vascular calcifications.  Partially duplicated left renal collecting system, no hydronephrosis.  Small layering stones distal right ureter, without hydroureter.    Metallic streak artifact related to patient's orthopedic hardware   degrades image quality limiting evaluation of the pelvis.  BLADDER: Small intraluminal calcification.  REPRODUCTIVE ORGANS: Limited.    BOWEL:  Colonic fecal retention with moderate distention of the rectum, no bowel   obstruction.  Sigmoid diverticulosis.  PERITONEUM:  Trace ascites.  Mild stranding within the bilateral posterior flanks.  No localized intra-abdominal fluid collection.    VESSELS: Atherosclerotic changes.  Infrarenal abdominal aortic dilatation measuring 2.6 cm.  RETROPERITONEUM/LYMPH NODES: No lymphadenopathy.  No retroperitoneal hematoma.    ABDOMINAL WALL: Subcutaneous edema.    BONES:  Multilevel age indeterminate compression fracture deformities mid to   lower thoracic vertebral bodies, as well as L1 L2 L3 and L4 vertebral   bodies.    Comminuted left acetabular fracture, extending superiorly into the   suprasellar acetabular iliac bone, and puboacetabular junction. This   places left inferior pubic ramus fracture.    Incompletely imaged right hip arthroplasty and intramedullary krista left   proximal femur.      IMPRESSION:    Bilateral airspace consolidations, more pronounced right lower lobe,   findings which may reflect atelectasis and/or pneumonia.  Small bilateral pleural effusions.    Marked narrowing of the right upper lobe bronchus and mild narrowing of   the left upper lobe bronchus at the level of the surgical clips,   correlate clinically for bronchial stenosis.    Collapse of the bronchus intermedius and segmental right middle and lower   lobe bronchi, may be secondary to secretions/mucoid impaction.  Recommend further clinical correlation and follow-up CT exam to exclude   an obstructive pneumonitis/atelectasis secondary to underlying mass.    Small layering calcifications distal right ureter with small intraluminal   bladder calcification; no hydronephrosis.    Other findings as discussed above.    This study was preliminary reported by the ED radiologist on 4/28/2022.    --- End of Report ---            RUTH MORALES MD; Attending Radiologist  This document has been electronically signed. Apr 28 2022 12:00PM    < end of copied text >    ~~~~~~~~~~~~~~~~~~~~~~~~~~~~~~~~~~~~~~~~~~~~~~~~~~~~~~~~~~~~~~    ECHO:

## 2022-04-28 NOTE — DISCHARGE NOTE PROVIDER - NSDCMRMEDTOKEN_GEN_ALL_CORE_FT
amLODIPine 5 mg oral tablet: 1 tab(s) orally once a day  atorvastatin 40 mg oral tablet: 1 tab(s) orally once a day  Bactrim 400 mg-80 mg oral tablet: 1 tab(s) orally Monday, Wednesday, and Friday  CellCept 250 mg oral capsule: 2 cap(s) orally 2 times a day  clopidogrel 75 mg oral tablet: 1 tab(s) orally once a day  Eliquis 2.5 mg oral tablet: 1 tab(s) orally 2 times a day   Flomax 0.4 mg oral capsule: 1 cap(s) orally once a day  fluconazole 200 mg oral tablet: 1 tab(s) orally Tuesday, Thursday, Saturday  hydrocortisone 10 mg oral tablet: 1 tab(s) orally once a day (at bedtime)  hydrocortisone 20 mg oral tablet: 1 tab(s) orally once a day  magnesium oxide 400 mg (241.3 mg elemental magnesium) oral tablet: 1 tab(s) orally once a day  Multiple Vitamins oral tablet: 1 tab(s) orally once a day  oxyCODONE 5 mg oral tablet: 1 tab(s) orally every 4 hours, As Needed  sevelamer carbonate 800 mg oral tablet: 2 tab(s) orally 3 times a day (with meals)  tacrolimus 0.5 mg oral capsule: 1 cap(s) orally once a day in the morning with 1 mg capsule; total dose 1.5 mg in the morning  tacrolimus 1 mg oral capsule: 1 cap(s) orally 2 times a day     albuterol 2.5 mg/3 mL (0.083%) inhalation solution: 3 milliliter(s) inhaled every 6 hours, As Needed  atorvastatin 40 mg oral tablet: 1 tab(s) orally once a day  Bactrim 400 mg-80 mg oral tablet: 1 tab(s) orally Monday, Wednesday, and Friday  CellCept 250 mg oral capsule: 2 cap(s) orally 2 times a day  clopidogrel 75 mg oral tablet: 1 tab(s) orally once a day  Eliquis 2.5 mg oral tablet: 1 tab(s) orally 2 times a day   Flomax 0.4 mg oral capsule: 1 cap(s) orally once a day  fluconazole 200 mg oral tablet: 1 tab(s) orally Tuesday, Thursday, Saturday  hydrocortisone 10 mg oral tablet: 1 tab(s) orally once a day (at bedtime)  hydrocortisone 20 mg oral tablet: 1 tab(s) orally once a day  Multiple Vitamins oral tablet: 1 tab(s) orally once a day  pantoprazole 40 mg oral delayed release tablet: 1 tab(s) orally once a day (before a meal)  sevelamer carbonate 800 mg oral tablet: 2 tab(s) orally 3 times a day (with meals)  tacrolimus 0.5 mg oral capsule: 1 cap(s) orally once a day in the morning with 1 mg capsule; total dose 1.5 mg in the morning  tacrolimus 1 mg oral capsule: 1 cap(s) orally 2 times a day    tiotropium 18 mcg inhalation capsule: 1 cap(s) inhaled once a day

## 2022-04-28 NOTE — DISCHARGE NOTE PROVIDER - CARE PROVIDERS DIRECT ADDRESSES
[email or web address]

## 2022-04-28 NOTE — PROGRESS NOTE ADULT - PROBLEM SELECTOR PLAN 1
Transitioned to NC from Bipap. CXR shows bilateral lower lobe platelike atelectasis/interstitial infiltrates, increased from prior  solumedrol 40 mg Q8H and Duoneb  - Pulm c/s placed- will c/s thoracic for bronchoscopy  - ID c/s placed- started Remdesivir D2/5. Will c/w Meropenem/Doxycycline and fluconazole/bactrim for hx of fungal meningitis.  - F/u thoracic c/s

## 2022-04-28 NOTE — CHART NOTE - NSCHARTNOTEFT_GEN_A_CORE
RAPID RESPONSE FOLLOW UP NOTE    Patient seen and examined at bedside. RR called @ 0200 for hypoxia into 50s. Pt reassessed around 0400. Patient states that he feels okay. Denies headaches, dizziness, chest pain, palpitations, abdominal pain, n/v/d or any other complaints.    Vital Signs Last 24 Hrs  T(C): 36.3 (28 Apr 2022 00:00), Max: 37 (27 Apr 2022 08:00)  T(F): 97.4 (28 Apr 2022 00:00), Max: 98.6 (27 Apr 2022 08:00)  HR: 115 (28 Apr 2022 02:17) (80 - 115)  BP: 98/54 (28 Apr 2022 00:00) (93/52 - 125/57)  BP(mean): 76 (27 Apr 2022 16:29) (70 - 76)  RR: 24 (28 Apr 2022 00:00) (15 - 25)  SpO2: 98% (28 Apr 2022 02:17) (90% - 100%)    PHYSICAL EXAM:  GENERAL: NAD  HEAD: Atraumatic, Normocephalic  NERVOUS SYSTEM: A&Ox4, Good concentration  CHEST/LUNG: Coarse rhonchi b/l  HEART: Regular rate and rhythm  ABDOMEN: Soft, Nontender, Nondistended  SKIN: Warm and dry      LABS:                        8.2    5.17  )-----------( 83       ( 27 Apr 2022 11:53 )             25.4       04-27    130<L>  |  91<L>  |  58.6<H>  ----------------------------<  143<H>  6.1<HH>   |  21.0<L>  |  6.57<H>    Ca    8.1<L>      27 Apr 2022 05:42    TPro  5.8<L>  /  Alb  2.9<L>  /  TBili  1.2  /  DBili  x   /  AST  103<H>  /  ALT  22  /  AlkPhos  514<H>  04-27      LIVER FUNCTIONS - ( 27 Apr 2022 05:42 )  Alb: 2.9 g/dL / Pro: 5.8 g/dL / ALK PHOS: 514 U/L / ALT: 22 U/L / AST: 103 U/L / GGT: x             CAPILLARY BLOOD GLUCOSE      POCT Blood Glucose.: 214 mg/dL (28 Apr 2022 02:04)      PT/INR - ( 26 Apr 2022 14:30 )   PT: 16.6 sec;   INR: 1.43 ratio         PTT - ( 26 Apr 2022 14:30 )  PTT:41.2 sec             ABG - ( 28 Apr 2022 02:49 )  pH, Arterial: 7.380 pH, Blood: x     /  pCO2: 45    /  pO2: 186   / HCO3: 27    / Base Excess: 1.5   /  SaO2: 100.0                     I&O's Summary    26 Apr 2022 07:01  -  27 Apr 2022 07:00  --------------------------------------------------------  IN: 100 mL / OUT: 0 mL / NET: 100 mL    27 Apr 2022 07:01  -  28 Apr 2022 04:09  --------------------------------------------------------  IN: 800 mL / OUT: 1300 mL / NET: -500 mL      IMAGING:  - CT Chest  ******PRELIMINARY REPORT******  INTERPRETATION: Cardiomegaly and small bilateral effusions. Ground glass haziness in right lung may reflect edema. Partial bilateral lower lobe atelectasis with question superimposed pneumonia in right lower lobe. Secretions in the right sided bronchi with cut off of the lobar bronchi and bronchus intermedius- could be related to secretions/impaction, however short term follow up ct is rec to exlude obstructing mass. Pulmonary hygeine is recommended. No urgent findings in the abdomen. Small acites. Small stones in distal right ureter and stone layering in bladder. No associated hydronephrosis. Follow up final report in AM.    ASSESSMENT/ PLAN: Pt is a 72y old Male s/p Rapid Response for hypoxia into 50s .   - ABG unremarkable  - D/C bipap > 5L NC @ 90s  - Pulmonary toilet    DISPOSITION:  - Maintain current level of care  - Continue to observe RAPID RESPONSE FOLLOW UP NOTE    Patient seen and examined at bedside. RR called @ 0200 for hypoxia into 50s. Pt reassessed around 0400. Patient states that he feels better and speaking in full sentences. Denies headaches, dizziness, chest pain, palpitations, abdominal pain, n/v/d or any other complaints.    Vital Signs Last 24 Hrs  T(C): 36.3 (28 Apr 2022 00:00), Max: 37 (27 Apr 2022 08:00)  T(F): 97.4 (28 Apr 2022 00:00), Max: 98.6 (27 Apr 2022 08:00)  HR: 115 (28 Apr 2022 02:17) (80 - 115)  BP: 98/54 (28 Apr 2022 00:00) (93/52 - 125/57)  BP(mean): 76 (27 Apr 2022 16:29) (70 - 76)  RR: 24 (28 Apr 2022 00:00) (15 - 25)  SpO2: 98% (28 Apr 2022 02:17) (90% - 100%)    PHYSICAL EXAM:  GENERAL: NAD  HEAD: Atraumatic, Normocephalic  NERVOUS SYSTEM: A&Ox4, Good concentration  CHEST/LUNG: Coarse rhonchi b/l  HEART: Regular rate and rhythm  ABDOMEN: Soft, Nontender, Nondistended  SKIN: Warm and dry      LABS:                        8.2    5.17  )-----------( 83       ( 27 Apr 2022 11:53 )             25.4       04-27    130<L>  |  91<L>  |  58.6<H>  ----------------------------<  143<H>  6.1<HH>   |  21.0<L>  |  6.57<H>    Ca    8.1<L>      27 Apr 2022 05:42    TPro  5.8<L>  /  Alb  2.9<L>  /  TBili  1.2  /  DBili  x   /  AST  103<H>  /  ALT  22  /  AlkPhos  514<H>  04-27      LIVER FUNCTIONS - ( 27 Apr 2022 05:42 )  Alb: 2.9 g/dL / Pro: 5.8 g/dL / ALK PHOS: 514 U/L / ALT: 22 U/L / AST: 103 U/L / GGT: x             CAPILLARY BLOOD GLUCOSE      POCT Blood Glucose.: 214 mg/dL (28 Apr 2022 02:04)      PT/INR - ( 26 Apr 2022 14:30 )   PT: 16.6 sec;   INR: 1.43 ratio         PTT - ( 26 Apr 2022 14:30 )  PTT:41.2 sec             ABG - ( 28 Apr 2022 02:49 )  pH, Arterial: 7.380 pH, Blood: x     /  pCO2: 45    /  pO2: 186   / HCO3: 27    / Base Excess: 1.5   /  SaO2: 100.0                     I&O's Summary    26 Apr 2022 07:01  -  27 Apr 2022 07:00  --------------------------------------------------------  IN: 100 mL / OUT: 0 mL / NET: 100 mL    27 Apr 2022 07:01  -  28 Apr 2022 04:09  --------------------------------------------------------  IN: 800 mL / OUT: 1300 mL / NET: -500 mL      IMAGING:  CT Chest  ******PRELIMINARY REPORT******  INTERPRETATION: Cardiomegaly and small bilateral effusions. Ground glass haziness in right lung may reflect edema. Partial bilateral lower lobe atelectasis with question superimposed pneumonia in right lower lobe. Secretions in the right sided bronchi with cut off of the lobar bronchi and bronchus intermedius- could be related to secretions/impaction, however short term follow up ct is rec to exclude obstructing mass. Pulmonary hygiene is recommended. No urgent findings in the abdomen. Small ascites. Small stones in distal right ureter and stone layering in bladder. No associated hydronephrosis. Follow up final report in AM.    ASSESSMENT/ PLAN: Pt is a 72y old Male s/p Rapid Response for hypoxia into 50s .   - ABG unremarkable  - D/C bipap > 5L NC @ high 90s    DISPOSITION:  - Maintain current level of care  - Continue to observe

## 2022-04-28 NOTE — PATIENT PROFILE ADULT - FALL HARM RISK - HARM RISK INTERVENTIONS

## 2022-04-28 NOTE — PROGRESS NOTE ADULT - SUBJECTIVE AND OBJECTIVE BOX
cc: hypoxic failure , pna       interval hx : patient seen and eval. comfortable. states sob better. no fever or chills.     Vital Signs Last 24 Hrs  T(C): 36.8 (15 Apr 2021 15:54), Max: 36.9 (15 Apr 2021 14:52)  T(F): 98.2 (15 Apr 2021 15:54), Max: 98.4 (15 Apr 2021 14:52)  HR: 77 (15 Apr 2021 15:54) (67 - 102)  BP: 158/80 (15 Apr 2021 15:54) (136/53 - 180/99)  BP(mean): 110 (14 Apr 2021 21:00) (101 - 110)  RR: 20 (15 Apr 2021 15:54) (12 - 24)  SpO2: 100% (15 Apr 2021 15:54) (95% - 100%)      General: pt. lying in bed not in distress.  HEENT: AT, NC. PERRL. intact EOM. no throat erythema or exudate.   Neck: supple. no JVD.   Chest: scattered rhonchi bilaterally, no sig. rales, no inter costal retractions. rt. upper chest wall dialysis catheter noted.   Heart: S1,S2. RRR. no heart murmur. no edema.   Abdomen: soft. non-tender. non-distended. + BS.   Ext: no calf tenderness. distal pulses 2 +, ROM of all ext. intact.   Neuro: AAO x3. no focal weakness. no speech disorder  Skin: warm and dry, no obvious rash noted. no pallor.   Psych : pleasant, co-operative, no si/hi.                           10.7   8.28  )-----------( 137      ( 15 Apr 2021 06:06 )             35.0   04-15    137  |  94<L>  |  47.0<H>  ----------------------------<  90  5.8<H>   |  25.0  |  6.82<H>    Ca    8.6      15 Apr 2021 06:06  Phos  7.0     04-15  Mg     2.6     04-15    TPro  5.9<L>  /  Alb  3.1<L>  /  TBili  0.7  /  DBili  x   /  AST  21  /  ALT  9   /  AlkPhos  146<H>  04-15    PT/INR - ( 13 Apr 2021 10:27 )   PT: 12.7 sec;   INR: 1.10 ratio       PTT - ( 13 Apr 2021 10:27 )  PTT:31.2 sec       shortly

## 2022-04-28 NOTE — DISCHARGE NOTE PROVIDER - PROVIDER TOKENS
PROVIDER:[TOKEN:[[id number]]] PROVIDER:[TOKEN:[[id number]]],PROVIDER:[TOKEN:[[id number]]] PROVIDER:[TOKEN:[[id number]]],PROVIDER:[TOKEN:[[id number]]],PROVIDER:[TOKEN:[[id number]]]

## 2022-04-28 NOTE — RAPID RESPONSE TEAM SUMMARY - NSSITUATIONBACKGROUNDRRT_GEN_ALL_CORE
Pt. 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 Patient was sent from hemodialysis for hypotension after an incomplete session, he doesn’t know how long. Received 1L fluids from EMS. Denies f/ch, abdominal pain, n/v. Not on oxygen at home. Former smoker. Hypoxic to 85% on room air in ER, placed on 3L NC, then upgraded to bipap for persistent respiratory distress. Reported hypotension improved on arrival to ER, /58. Vancomycin and zosyn given empirically for possible PNA. pt.Clinically improved on bipap. reports no cp, no abd. pain, no n/v/d. Later pt's covid-19 came back positive. pt. stated that his wife had covid last week. pt. also reported that he is covid 19 vaccinated and got booster as well. pt. evaluated by MICU but step down is recommended.   Recently pt. presented to [facility] ER on 04/21/22 for L hip pain s/p mechanical trip/fall stepping over curb.  Patient was found to have Acute comminuted, intra-articular fractures of the superior, anterior, medial and posterior left acetabulum with fracture extension into the left superior pubic ramus and left medial ilium. Acute comminuted, mildly displaced fracture of the left inferior pubic ramus.  As per orthopedics non surgical management.  Patient received dialysis and a unit of blood.  PT recommended ALIYAH and discharged on 04/22/22 and went to [facility] at [place].

## 2022-04-28 NOTE — DISCHARGE NOTE PROVIDER - NSDCFUADDINST_GEN_ALL_CORE_FT
ORTHO: NON-OPERATIVE CARE FOR L ACETABULUM/PUBIC RAMI FRACTURES AT THIS TIME. TOE TOUCH WEIGHT BEARING. DVT PROPHYLAXIS AS PER PRIMARY TEAM.

## 2022-04-28 NOTE — PROGRESS NOTE ADULT - ASSESSMENT
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 In the ED he was hypoxic to 85% on room air and subsequently required BPAP for respiratory distress. Found to be positive for COVID-19. Recently he presented to ED for L hip fracture from Nursing facility. In the ER patient was afebrile. Started on Meropenem, Vancomycin and Azithromycin.       Acute Hypoxic respiratory failure  COVID-19 infection  B/L Pneumonia   shortness of breath  Transaminitis   ESRD on HD   Hip fracture   Lung Transplant on Immunosuppressants       - COVID 19 PCR Positive on 4/26/22  - Blood cultures Pending  - CXR with B/L PNA  - CT Chest pending  - Continue supportive care measures  - continue to trend inflammatory markers.   - trend CBC with diff, CMP,  CRP, Ferritin, D Dimer q 48hours  - Discussed Remdesivir with the patient.   - Start Remdesivir 200mg IV x1 followed by 100mg IV daily   - Monitor LFT's while on Remdesivir  - On Steroids per Pulm   - not a candidate for Bebtelovimab due to hypoxia   - pending cultures will continue Meropenem  - Continue Doxycycline 100mg PO q 12hours  - Check Urine legionella Ag  - If not improvement will need Bronch  - Will adjust PPX Fluconazole, and Bactrom   - Follow up cultures  - Trend Fever  - Trend WBC      Will Follow      d/w clinical pharmacy

## 2022-04-28 NOTE — RAPID RESPONSE TEAM SUMMARY - NSADDTLFINDINGSRRT_GEN_ALL_CORE
RRT called for hypoxia into 50s and respiratory distress. Pt was previously on 5L NC satting in the 90s.     ROS: No chest pain, palpitations, SOB, light headedness, dizziness, headache, nausea/vomiting, fevers/chills, abdominal pain, dysuria or increased urinary frequency.    Vital Signs  02:00  HR: 120  BP: 130/82  RR: 30  SpO2: 50%    0205  HR: 121  BP: 143/76  RR: 28  SpO2: 79%    GENERAL: NAD  HEAD: Atraumatic, Normocephalic  NERVOUS SYSTEM: A&Ox4, Good concentration  CHEST/LUNG: Coarse rhonchi b/l  HEART: Regular rate and rhythm  ABDOMEN: Soft, Nontender, Nondistended  SKIN: Warm and dry   RRT called for hypoxia into 50s and respiratory distress. Pt was previously on 5L NC satting in the 90s.   Pt was seen at bedside in NAD. He does not endorse any symptoms of chest pain, palpitations, SOB, lightheadedness, dizziness, headache, nausea/vomiting, and fevers/chills. Recent hx of pelvic fx w/o surgical intervention. No changes in pain.    Vital Signs  02:00  HR: 120  BP: 130/82  RR: 30  SpO2: 50%    0205  HR: 121  BP: 143/76  RR: 28  SpO2: 79%    GENERAL: NAD  HEAD: Atraumatic, Normocephalic  NERVOUS SYSTEM: A&Ox4, Good concentration  CHEST/LUNG: Coarse rhonchi b/l  HEART: Regular rate and rhythm  ABDOMEN: Soft, Nontender, Nondistended  SKIN: Warm and dry   RRT called for hypoxia into 50s and respiratory distress. Pt was previously on 6L NC satting in the 90s.   Pt was seen at bedside in NAD. He does not endorse any symptoms of chest pain, palpitations, SOB, lightheadedness, dizziness, headache, nausea/vomiting, and fevers/chills. Recent hx of pelvic fx w/o surgical intervention. No changes in pain.    Vital Signs  02:00  HR: 120  BP: 130/82  RR: 30  SpO2: 50%    0205  HR: 121  BP: 143/76  RR: 28  SpO2: 79%    GENERAL: NAD  HEAD: Atraumatic, Normocephalic  NERVOUS SYSTEM: A&Ox4, Good concentration  CHEST/LUNG: Coarse rhonchi b/l  HEART: Regular rate and rhythm  ABDOMEN: Soft, Nontender, Nondistended  SKIN: Warm and dry

## 2022-04-28 NOTE — PROGRESS NOTE ADULT - SUBJECTIVE AND OBJECTIVE BOX
JU FERGUSON [id number]    Follow up: ESRD - HD , COVID 19     Had hypoxic episode over night, RRT, Placed On BiPaP,    No complaints this AM     Allergies:  budesonide (Unknown)  cefpodoxime (Unknown)  Pollen (Unknown)    REVIEW OF SYSTEMS:    CONSTITUTIONAL:  No Fever or chills  HEENT:  No diplopia or blurred vision.  No earache, sore throat or runny nose.  CARDIOVASCULAR:  No pressure, squeezing, strangling, tightness, heaviness or aching about the chest, neck, axilla or epigastrium.  RESPIRATORY:  + cough. + Orthopnea,   GASTROINTESTINAL:  No nausea, vomiting or diarrhea.  GENITOURINARY:  Oliguric,   MUSCULOSKELETAL:  no joint aches, no muscle pain  SKIN:  No change in skin, hair or nails.  NEUROLOGIC:  No Headaches, seizures   PSYCHIATRIC:  No disorder of thought or mood.  ENDOCRINE:  No heat , +cold intolerance  HEMATOLOGICAL:  + easy bruising or bleeding.     Physical Exam:    GEN: NAD, Frail, Pale,   HEENT: normocephalic and atraumatic. EOMI. PERRL.    NECK: Supple. + TDC,   LUNGS: Decreased Basal BS B/L   HEART: RRR  ABDOMEN: Soft, NT, ND.  +BS.    : No CVA tenderness  EXTREMITIES: Without  edema.  MSK: No joint swelling  NEUROLOGIC: AAOx3  PSYCHIATRIC: Appropriate affect .  SKIN: No rash    Vitals:    T(F): 97.9 (28 Apr 2022 08:00), Max: 97.9 (28 Apr 2022 08:00)  HR: 91 (28 Apr 2022 04:00)  BP: 117/64 (28 Apr 2022 04:00)  RR: 24 (28 Apr 2022 04:00)  SpO2: 96% (28 Apr 2022 04:00) (52% - 99%)  temp max in last 48H T(F): , Max: 99.2 (04-26-22 @ 20:32)    Current Antibiotics:    doxycycline hyclate Capsule 100 milliGRAM(s) Oral every 12 hours  fluconAZOLE   Tablet 200 milliGRAM(s) Oral <User Schedule>  meropenem  IVPB 500 milliGRAM(s) IV Intermittent every 24 hours  remdesivir  IVPB   IV Intermittent   remdesivir  IVPB 100 milliGRAM(s) IV Intermittent every 24 hours  trimethoprim   80 mG/sulfamethoxazole 400 mG 1 Tablet(s) Oral <User Schedule>    Other medications:  apixaban 2.5 milliGRAM(s) Oral every 12 hours  atorvastatin 40 milliGRAM(s) Oral at bedtime  chlorhexidine 2% Cloths 1 Application(s) Topical <User Schedule>  clopidogrel Tablet 75 milliGRAM(s) Oral daily  methylPREDNISolone sodium succinate Injectable 40 milliGRAM(s) IV Push every 8 hours  multivitamin 1 Tablet(s) Oral daily  mycophenolate mofetil 500 milliGRAM(s) Oral two times a day  pantoprazole    Tablet 40 milliGRAM(s) Oral before breakfast  sevelamer carbonate 1600 milliGRAM(s) Oral three times a day with meals  tacrolimus 1.5 milliGRAM(s) Oral <User Schedule>  tacrolimus 1 milliGRAM(s) Oral <User Schedule>  tamsulosin 0.4 milliGRAM(s) Oral at bedtime               9.1    7.40  )-----------( 113      ( 28 Apr 2022 07:53 )             28.5     04-28    137  |  95<L>  |  51.4<H>  ----------------------------<  179<H>  5.1   |  22.0  |  4.82<H>    Ca    8.0<L>      28 Apr 2022 07:53    TPro  5.9<L>  /  Alb  3.1<L>  /  TBili  0.8  /  DBili  0.5<H>  /  AST  97<H>  /  ALT  22  /  AlkPhos  433<H>  04-28    RECENT CULTURES:  04-26 @ 14:31    RVP  Detected - COVID 19    WBC Count: 7.40 K/uL (04-28-22 @ 07:53)  WBC Count: 5.17 K/uL (04-27-22 @ 11:53)  WBC Count: 5.85 K/uL (04-27-22 @ 05:42)  WBC Count: 8.83 K/uL (04-26-22 @ 14:30)    Creatinine, Serum: 4.82 mg/dL (04-28-22 @ 07:53)  Creatinine, Serum: 6.57 mg/dL (04-27-22 @ 05:42)  Creatinine, Serum: 5.19 mg/dL (04-26-22 @ 14:30)    Procalcitonin, Serum: 13.09 ng/mL (04-26-22 @ 17:02)     SARS-CoV-2: Detected (04-26-22 @ 14:31)  COVID-19 PCR: NotDetec (04-20-22 @ 22:05)    CT Chest No Cont (04.28.22 @ 01:53)     ACC: [id number] EXAM:  CT ABDOMEN AND PELVIS                        ACC: [id number] EXAM:  CT CHEST                          PROCEDURE DATE:  04/28/2022      INTERPRETATION:  CLINICAL INFORMATION: Acute hypoxic respiratory failure.  History of lungtransplants.  Transaminitis.    COMPARISON: CT scan chest 8/20/2021.    CONTRAST/COMPLICATIONS:  IV Contrast: NONE  Oral Contrast: NONE  Complications: None reported at time of study completion    PROCEDURE:  CT of the Chest, Abdomen and Pelvis wasperformed.  Sagittal and coronal reformats were performed.    FINDINGS:    CHEST:    LUNGS AND LARGE AIRWAYS: PLEURA:  Small bilateral pleural effusions, including small loculated components   within the bilateral major fissures.  There are patchy bilateral lower lobe airspace consolidations, more   pronounced in the right lower lobe.    There is mild groundglass haziness right middle and lower lobes.     There is marked narrowing of the right upper lobe bronchus at the level   of surgical clips.  There is collapse of the bronchus intermedius and segmental right middle   and lower lobe bronchi.  There is mild narrowing of the left upper lobe bronchus at the level of   surgical clips.    VESSELS: Atherosclerotic changes thoracic aorta and coronary artery   calcifications.  Right-sided central venous catheter, tip at the distal SVC.    HEART:  The heart is enlarged.  Mitral valvular calcifications.  Calcifications at the aortic root.   Minimal pericardial effusion/thickening.    MEDIASTINUMAND JANNY:  Small subcentimeter short axis precarinal lymph nodes, stable.  Calcified subcarinal mediastinal lymph node, stable.  The evaluation of the pulmonary hilum is limited without intravenous   contrast.    CHEST WALL AND LOWER NECK:  Sternotomy.    ABDOMEN AND PELVIS:  Streak artifact degrades image quality.    The evaluation of the solid organ parenchyma is limited without   intravenous contrast.    LIVER: Within normal limits.  BILE DUCTS: Normal caliber.  GALLBLADDER: Within normal limits.  SPLEEN: Within normal limits.  PANCREAS: Within normal limits.  ADRENALS: Within normal limits.  KIDNEYS/URETERS:  Bilateral atrophic kidneys with intrarenal vascular calcifications.  Partially duplicated left renal collecting system, no hydronephrosis.  Small layering stones distal right ureter, without hydroureter.    Metallic streak artifact related to patient's orthopedic hardware   degrades image quality limiting evaluation of the pelvis.  BLADDER: Small intraluminal calcification.  REPRODUCTIVE ORGANS: Limited.    BOWEL:  Colonic fecal retention with moderate distention of the rectum, no bowel   obstruction.  Sigmoid diverticulosis.  PERITONEUM:  Trace ascites.  Mild stranding within the bilateral posterior flanks.  No localized intra-abdominal fluid collection.    VESSELS: Atherosclerotic changes.  Infrarenal abdominal aortic dilatation measuring 2.6 cm.  RETROPERITONEUM/LYMPH NODES: No lymphadenopathy.  No retroperitoneal hematoma.    ABDOMINAL WALL: Subcutaneous edema.    BONES:  Multilevel age indeterminate compression fracture deformities mid to   lower thoracic vertebral bodies, as well as L1 L2 L3 and L4 vertebral   bodies.    Comminuted left acetabular fracture, extending superiorly into the   suprasellar acetabular iliac bone, and puboacetabular junction. This   places left inferior pubic ramus fracture.    Incompletely imaged right hip arthroplasty and intramedullary krista left   proximal femur.    IMPRESSION:    Bilateral airspace consolidations, more pronounced right lower lobe,   findings which may reflect atelectasis and/or pneumonia.  Small bilateral pleural effusions.    Marked narrowing of the right upper lobe bronchus and mild narrowing of   the left upper lobe bronchus at the level of the surgical clips,   correlate clinically for bronchial stenosis.    Collapse of the bronchus intermedius and segmental right middle and lower   lobe bronchi, may be secondary to secretions/mucoid impaction.  Recommend further clinical correlation and follow-up CT exam to exclude   an obstructive pneumonitis/atelectasis secondary to underlying mass.    Small layering calcifications distal right ureter with small intraluminal   bladder calcification; no hydronephrosis.    Other findings as discussed above.    This study was preliminary reported by the ED radiologist on 4/28/2022.    RUTH MORALES MD; Attending Radiologist  This document has been electronically signed. Apr 28 2022 12:00PM  pH, Arterial: 7.380: As of 6/9/2020 Reference Ranges have been amended for: MADDY, COHB, GLUWB,   HCO3, KWB, METHHB, NAWB, O2HB, PCO2. (04.28.22 @ 02:49)  Historical Values  pH, Arterial: 7.380: As of 6/9/2020 Reference Ranges have been amended for: MADDY, COHB, GLUWB,   HCO3, KWB, METHHB, NAWB, O2HB, PCO2. (04.28.22 @ 02:49)   pH, Arterial: 7.39 (04.13.21 @ 15:49)   pH, Arterial: 7.48 (02.05.21 @ 05:31)   pH, Arterial: 7.44 (02.03.21 @ 04:49)   pH, Arterial: 7.31: 84 (02.02.21 @ 07:35) Blood Gas Arterial, Lactate: 1.30 mmol/L (04.28.22 @ 02:49)   Historical Values  Blood Gas Arterial, Lactate: 1.30 mmol/L (04.28.22 @ 02:49)   Blood Gas Arterial, Lactate: 1.0: As of 4/5/17 the reference range for Lactic Acid, whole blood has changed. 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 In the ED he was hypoxic to 85% on room air and subsequently required BPAP for respiratory distress. Found to be positive for COVID-19. Recently he presented to ED for L hip fracture from Nursing facility. In the ER patient was afebrile. Started on Meropenem, Vancomycin and Azithromycin.     Acute Hypoxic respiratory failure  COVID-19 infection  B/L Pneumonia   Transaminitis   ESRD on HD   Hip fracture   Lung Transplant on Immunosuppressants

## 2022-04-28 NOTE — DISCHARGE NOTE PROVIDER - CARE PROVIDER_API CALL
Negrito Hightower)  Orthopaedic Surgery  [address] [place] [address]  Phone: [phone]  Fax: [phone]  Follow Up Time:    Negrito Hightower)  Orthopaedic Surgery  [address] [place] [address]  Phone: [phone]  Fax: [phone]  Follow Up Time:     Negrito Hernandez)  [facility]  [address], [facility] [address]  [place] [address]  Phone: [phone]  Fax: [phone]  Follow Up Time:    Negrito Hightower)  Orthopaedic Surgery  [address] [place] [address]  Phone: [phone]  Fax: [phone]  Follow Up Time:     Negrito Hernandez)  [facility]  [address], [facility] [address]  [place] [address]  Phone: [phone]  Fax: [phone]  Follow Up Time:     Dimitri Hall)  Internal Medicine; Nephrology  [address] [place] [address]  Phone: [phone]  Fax: [phone]  Follow Up Time:

## 2022-04-28 NOTE — DISCHARGE NOTE PROVIDER - ATTENDING DISCHARGE PHYSICAL EXAMINATION:
PHYSICAL EXAM:    GENERAL: elderly male, laying in bed, NAD  HEAD:  Atraumatic, Normocephalic  EYES: EOMI, PERRLA, conjunctiva and sclera clear  ENMT: Moist mucous membranes  NECK: Supple  NERVOUS SYSTEM:  Alert & Oriented X3, [place]  CHEST/LUNG: bilateral air entry  HEART: Regular rate and rhythm; + S1/S2  ABDOMEN: Soft, Nontender, Nondistended; Bowel sounds present, + hernia  EXTREMITIES:  no pedal edema     Vital Signs Last 24 Hrs  T(C): 36.4 (07 May 2022 09:05), Max: 36.8 (07 May 2022 04:36)  T(F): 97.5 (07 May 2022 09:05), Max: 98.2 (07 May 2022 04:36)  HR: 94 (07 May 2022 09:05) (70 - 95)  BP: 139/66 (07 May 2022 09:05) (105/64 - 152/65)  BP(mean): --  RR: 19 (07 May 2022 04:36) (17 - 19)  SpO2: 99% (07 May 2022 09:05) (96% - 100%)    PHYSICAL EXAM:    GENERAL: NAD, AOX3  HEAD:  Atraumatic, Normocephalic  EYES: EOMI, PERRLA, conjunctiva and sclera clear  ENMT: Moist mucous membranes  NECK: Supple  CHEST/LUNG: Clear to auscultation bilaterally; No rales, rhonchi, wheezing, or rubs  HEART: Regular rate and rhythm; No murmurs, rubs, or gallops  ABDOMEN: Soft, Nontender, Nondistended; Bowel sounds present  EXTREMITIES:  2+ Peripheral Pulses, No clubbing, cyanosis, or edema

## 2022-04-28 NOTE — PROGRESS NOTE ADULT - SUBJECTIVE AND OBJECTIVE BOX
[facility]  INFECTIOUS DISEASES AND INTERNAL MEDICINE at [place] and [place]  =======================================================                               J Carlos Galdamez MD#  Negrito Caro MD*                                     Isatu Rojas MD*    Perlita Solares MD*            Diplomates American Board of Internal Medicine & Infectious Diseases                # [place] Office - Appt - Tel  [phone] Fax [phone]                * [place] Office - Appt - Tel [phone] Fax [phone]                                  Hospital Consult line:  [phone]  =======================================================    MARTY JU [id number]    Follow up: COVID 19     Had hypoxic episode over night    No complaints this AM       Allergies:  budesonide (Unknown)  cefpodoxime (Unknown)  Pollen (Unknown)       REVIEW OF SYSTEMS:  CONSTITUTIONAL:  No Fever or chills  HEENT:  No diplopia or blurred vision.  No earache, sore throat or runny nose.  CARDIOVASCULAR:  No pressure, squeezing, strangling, tightness, heaviness or aching about the chest, neck, axilla or epigastrium.  RESPIRATORY:  No cough. + shortness of breath  GASTROINTESTINAL:  No nausea, vomiting or diarrhea.  GENITOURINARY:  No dysuria, frequency or urgency.   MUSCULOSKELETAL:  no joint aches, no muscle pain  SKIN:  No change in skin, hair or nails.  NEUROLOGIC:  No Headaches, seizures   PSYCHIATRIC:  No disorder of thought or mood.  ENDOCRINE:  No heat or cold intolerance  HEMATOLOGICAL:  No easy bruising or bleeding.       Physical Exam:  GEN: NAD  HEENT: normocephalic and atraumatic. EOMI. PERRL.    NECK: Supple.   LUNGS: Decreased Basal BS B/L   HEART: RRR  ABDOMEN: Soft, NT, ND.  +BS.    : No CVA tenderness  EXTREMITIES: Without  edema.  MSK: No joint swelling  NEUROLOGIC: AAOx3  PSYCHIATRIC: Appropriate affect .  SKIN: No rash      Vitals:  T(F): 97.9 (28 Apr 2022 08:00), Max: 97.9 (28 Apr 2022 08:00)  HR: 91 (28 Apr 2022 04:00)  BP: 117/64 (28 Apr 2022 04:00)  RR: 24 (28 Apr 2022 04:00)  SpO2: 96% (28 Apr 2022 04:00) (52% - 99%)  temp max in last 48H T(F): , Max: 99.2 (04-26-22 @ 20:32)    Current Antibiotics:  doxycycline hyclate Capsule 100 milliGRAM(s) Oral every 12 hours  fluconAZOLE   Tablet 200 milliGRAM(s) Oral <User Schedule>  meropenem  IVPB 500 milliGRAM(s) IV Intermittent every 24 hours  remdesivir  IVPB   IV Intermittent   remdesivir  IVPB 100 milliGRAM(s) IV Intermittent every 24 hours  trimethoprim   80 mG/sulfamethoxazole 400 mG 1 Tablet(s) Oral <User Schedule>    Other medications:  apixaban 2.5 milliGRAM(s) Oral every 12 hours  atorvastatin 40 milliGRAM(s) Oral at bedtime  chlorhexidine 2% Cloths 1 Application(s) Topical <User Schedule>  clopidogrel Tablet 75 milliGRAM(s) Oral daily  methylPREDNISolone sodium succinate Injectable 40 milliGRAM(s) IV Push every 8 hours  multivitamin 1 Tablet(s) Oral daily  mycophenolate mofetil 500 milliGRAM(s) Oral two times a day  pantoprazole    Tablet 40 milliGRAM(s) Oral before breakfast  sevelamer carbonate 1600 milliGRAM(s) Oral three times a day with meals  tacrolimus 1.5 milliGRAM(s) Oral <User Schedule>  tacrolimus 1 milliGRAM(s) Oral <User Schedule>  tamsulosin 0.4 milliGRAM(s) Oral at bedtime                 9.1    7.40  )-----------( 113      ( 28 Apr 2022 07:53 )             28.5     04-28    137  |  95<L>  |  51.4<H>  ----------------------------<  179<H>  5.1   |  22.0  |  4.82<H>    Ca    8.0<L>      28 Apr 2022 07:53    TPro  5.9<L>  /  Alb  3.1<L>  /  TBili  0.8  /  DBili  0.5<H>  /  AST  97<H>  /  ALT  22  /  AlkPhos  433<H>  04-28      RECENT CULTURES:  04-26 @ 14:31    RVP  Detected - COVID 19      WBC Count: 7.40 K/uL (04-28-22 @ 07:53)  WBC Count: 5.17 K/uL (04-27-22 @ 11:53)  WBC Count: 5.85 K/uL (04-27-22 @ 05:42)  WBC Count: 8.83 K/uL (04-26-22 @ 14:30)    Creatinine, Serum: 4.82 mg/dL (04-28-22 @ 07:53)  Creatinine, Serum: 6.57 mg/dL (04-27-22 @ 05:42)  Creatinine, Serum: 5.19 mg/dL (04-26-22 @ 14:30)    Procalcitonin, Serum: 13.09 ng/mL (04-26-22 @ 17:02)     SARS-CoV-2: Detected (04-26-22 @ 14:31)  COVID-19 PCR: NotDetec (04-20-22 @ 22:05)

## 2022-04-28 NOTE — CONSULT NOTE ADULT - ASSESSMENT
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 Pt was recently admitted 4/21/22 for left acetabular fx, inferior/sup pubic ramus fx  that did not require surgical management and was discharged on 04/22/22 to [facility] [place]. Patient was brought in by EMS to [facility] ED 4/26/22 after being hypotensive during HD. On arrival patient was found to by hypoxic (85% on RA) that required nasal cannula then BIPAP for respiratory distress. Patient tested positive for COVID on this admission. CT Chest shows collapse of the bronchus intermedius and segmental right middle and right lower lobe bronchi. Patient has continued to require increasing oxygen support and was seen by Pulmonary. At this time, Thoracic Surgery was consulted for Flexible bronchoscopy for BAL.

## 2022-04-28 NOTE — PROGRESS NOTE ADULT - SUBJECTIVE AND OBJECTIVE BOX
JU FERGUSON    [id number]    History: 72y Male with L acetabulum/pubic rami fxs. Patient had his original injury 4/20/22, was evaluated by ortho at that time and recommended for non-operative care. Patient is doing well and is comfortable. The patient's pain is controlled using the prescribed pain medications. The patient is participating in physical therapy. Denies nausea, vomiting, chest pain, shortness of breath, abdominal pain or fever. No new complaints.                            9.1    7.40  )-----------( 113      ( 28 Apr 2022 07:53 )             28.5     04-28    137  |  95<L>  |  51.4<H>  ----------------------------<  179<H>  5.1   |  22.0  |  4.82<H>    Ca    8.0<L>      28 Apr 2022 07:53    TPro  5.9<L>  /  Alb  3.1<L>  /  TBili  0.8  /  DBili  0.5<H>  /  AST  97<H>  /  ALT  22  /  AlkPhos  433<H>  04-28      MEDICATIONS  (STANDING):  apixaban 2.5 milliGRAM(s) Oral every 12 hours  atorvastatin 40 milliGRAM(s) Oral at bedtime  chlorhexidine 2% Cloths 1 Application(s) Topical <User Schedule>  clopidogrel Tablet 75 milliGRAM(s) Oral daily  doxycycline hyclate Capsule 100 milliGRAM(s) Oral every 12 hours  fluconAZOLE   Tablet 200 milliGRAM(s) Oral <User Schedule>  meropenem  IVPB 500 milliGRAM(s) IV Intermittent every 24 hours  methylPREDNISolone sodium succinate Injectable 40 milliGRAM(s) IV Push every 8 hours  multivitamin 1 Tablet(s) Oral daily  mycophenolate mofetil 500 milliGRAM(s) Oral two times a day  pantoprazole    Tablet 40 milliGRAM(s) Oral before breakfast  remdesivir  IVPB   IV Intermittent   remdesivir  IVPB 100 milliGRAM(s) IV Intermittent every 24 hours  sevelamer carbonate 1600 milliGRAM(s) Oral three times a day with meals  tacrolimus 1.5 milliGRAM(s) Oral <User Schedule>  tacrolimus 1 milliGRAM(s) Oral <User Schedule>  tamsulosin 0.4 milliGRAM(s) Oral at bedtime  trimethoprim   80 mG/sulfamethoxazole 400 mG 1 Tablet(s) Oral <User Schedule>    MEDICATIONS  (PRN):  ALBUTerol    0.083% 2.5 milliGRAM(s) Nebulizer every 4 hours PRN Shortness of Breath and/or Wheezing  HYDROmorphone  Injectable 0.5 milliGRAM(s) IV Push every 6 hours PRN Moderate Pain (4 - 6)  HYDROmorphone  Injectable 1 milliGRAM(s) IV Push every 6 hours PRN Severe Pain (7 - 10)      Physical exam:     Pelvis: Tenderness to palpation to L groin region   b/l LE: The calf is supple nontender. Sensation to light touch is grossly intact distally. Extensor hallucis longus and flexor hallucis longus are intact. 2+ dorsalis pedis pulse. Capillary refill is less than 2 seconds.     Primary Orthopedic Assessment:  • 72y Male with L acetabulum/pubic rami fxs.    Secondary  Orthopedic Assessment(s):   •     Secondary  Medical Assessment(s):   •     Plan:   • Pain control as clinically indicated  • DVT prophylaxis as prescribed, including use of compression devices and ankle pumps  • Continue physical therapy  • Toe touch weightbearing as of the left lower extremity with assistance of a walker  • f/u L hip XR  • Discharge planning as per primary team    JU FERGUSON    [id number]    History: 72y Male with L acetabulum/pubic rami fxs. Patient had his original injury 4/20/22, was evaluated by ortho at that time and recommended for non-operative care. Patient is doing well and is comfortable. The patient's pain is controlled using the prescribed pain medications. The patient is participating in physical therapy. Denies nausea, vomiting, chest pain, shortness of breath, abdominal pain or fever. No new complaints.                            9.1    7.40  )-----------( 113      ( 28 Apr 2022 07:53 )             28.5     04-28    137  |  95<L>  |  51.4<H>  ----------------------------<  179<H>  5.1   |  22.0  |  4.82<H>    Ca    8.0<L>      28 Apr 2022 07:53    TPro  5.9<L>  /  Alb  3.1<L>  /  TBili  0.8  /  DBili  0.5<H>  /  AST  97<H>  /  ALT  22  /  AlkPhos  433<H>  04-28      MEDICATIONS  (STANDING):  apixaban 2.5 milliGRAM(s) Oral every 12 hours  atorvastatin 40 milliGRAM(s) Oral at bedtime  chlorhexidine 2% Cloths 1 Application(s) Topical <User Schedule>  clopidogrel Tablet 75 milliGRAM(s) Oral daily  doxycycline hyclate Capsule 100 milliGRAM(s) Oral every 12 hours  fluconAZOLE   Tablet 200 milliGRAM(s) Oral <User Schedule>  meropenem  IVPB 500 milliGRAM(s) IV Intermittent every 24 hours  methylPREDNISolone sodium succinate Injectable 40 milliGRAM(s) IV Push every 8 hours  multivitamin 1 Tablet(s) Oral daily  mycophenolate mofetil 500 milliGRAM(s) Oral two times a day  pantoprazole    Tablet 40 milliGRAM(s) Oral before breakfast  remdesivir  IVPB   IV Intermittent   remdesivir  IVPB 100 milliGRAM(s) IV Intermittent every 24 hours  sevelamer carbonate 1600 milliGRAM(s) Oral three times a day with meals  tacrolimus 1.5 milliGRAM(s) Oral <User Schedule>  tacrolimus 1 milliGRAM(s) Oral <User Schedule>  tamsulosin 0.4 milliGRAM(s) Oral at bedtime  trimethoprim   80 mG/sulfamethoxazole 400 mG 1 Tablet(s) Oral <User Schedule>    MEDICATIONS  (PRN):  ALBUTerol    0.083% 2.5 milliGRAM(s) Nebulizer every 4 hours PRN Shortness of Breath and/or Wheezing  HYDROmorphone  Injectable 0.5 milliGRAM(s) IV Push every 6 hours PRN Moderate Pain (4 - 6)  HYDROmorphone  Injectable 1 milliGRAM(s) IV Push every 6 hours PRN Severe Pain (7 - 10)      Physical exam:     Pelvis: Tenderness to palpation to L groin region   b/l LE: The calf is supple nontender. Sensation to light touch is grossly intact distally. Extensor hallucis longus and flexor hallucis longus are intact. 2+ dorsalis pedis pulse. Capillary refill is less than 2 seconds.     Primary Orthopedic Assessment:  • 72y Male with L acetabulum/pubic rami fxs.    Secondary  Orthopedic Assessment(s):   •     Secondary  Medical Assessment(s):   •     Plan:   • Pain control as clinically indicated  • DVT prophylaxis as prescribed, including use of compression devices and ankle pumps  • Continue physical therapy  • Toe touch weightbearing as of the left lower extremity with assistance of a walker  • CT abd/pelvis reviewed by Dr. Hightower, continue TTWB, non-op care.   • Discharge planning as per primary team  • Follow-up with orthopedics outpatient in 1-2 weeks

## 2022-04-28 NOTE — PROGRESS NOTE ADULT - SUBJECTIVE AND OBJECTIVE BOX
[facility] Division of Hospital Medicine    Chief Complaint:  acute hypoxic respiratory failure     SUBJECTIVE / OVERNIGHT EVENTS: Overnight, patient had a RRT for hypoxia and was placed on Bipap. Today morning, patient is on NC and satting well.     Patient denies chest pain, SOB, abd pain, N/V, fever, chills, dysuria or any other complaints. All remainder ROS negative.     MEDICATIONS  (STANDING):  apixaban 2.5 milliGRAM(s) Oral every 12 hours  atorvastatin 40 milliGRAM(s) Oral at bedtime  chlorhexidine 2% Cloths 1 Application(s) Topical <User Schedule>  clopidogrel Tablet 75 milliGRAM(s) Oral daily  doxycycline hyclate Capsule 100 milliGRAM(s) Oral every 12 hours  fluconAZOLE   Tablet 200 milliGRAM(s) Oral <User Schedule>  meropenem  IVPB 500 milliGRAM(s) IV Intermittent every 24 hours  methylPREDNISolone sodium succinate Injectable 40 milliGRAM(s) IV Push every 8 hours  multivitamin 1 Tablet(s) Oral daily  mycophenolate mofetil 500 milliGRAM(s) Oral two times a day  pantoprazole    Tablet 40 milliGRAM(s) Oral before breakfast  remdesivir  IVPB   IV Intermittent   remdesivir  IVPB 100 milliGRAM(s) IV Intermittent every 24 hours  sevelamer carbonate 1600 milliGRAM(s) Oral three times a day with meals  tacrolimus 1.5 milliGRAM(s) Oral <User Schedule>  tacrolimus 1 milliGRAM(s) Oral <User Schedule>  tamsulosin 0.4 milliGRAM(s) Oral at bedtime  trimethoprim   80 mG/sulfamethoxazole 400 mG 1 Tablet(s) Oral <User Schedule>    MEDICATIONS  (PRN):  ALBUTerol    0.083% 2.5 milliGRAM(s) Nebulizer every 4 hours PRN Shortness of Breath and/or Wheezing  HYDROmorphone  Injectable 0.5 milliGRAM(s) IV Push every 6 hours PRN Moderate Pain (4 - 6)  HYDROmorphone  Injectable 1 milliGRAM(s) IV Push every 6 hours PRN Severe Pain (7 - 10)        I&O's Summary    27 Apr 2022 07:01  -  28 Apr 2022 07:00  --------------------------------------------------------  IN: 800 mL / OUT: 1300 mL / NET: -500 mL        PHYSICAL EXAM:  Vital Signs Last 24 Hrs  T(C): 36.6 (28 Apr 2022 08:00), Max: 36.6 (28 Apr 2022 08:00)  T(F): 97.9 (28 Apr 2022 08:00), Max: 97.9 (28 Apr 2022 08:00)  HR: 85 (28 Apr 2022 08:01) (85 - 121)  BP: 113/72 (28 Apr 2022 08:01) (94/56 - 143/76)  BP(mean): 76 (27 Apr 2022 16:29) (76 - 76)  RR: 19 (28 Apr 2022 08:01) (16 - 30)  SpO2: 98% (28 Apr 2022 08:01) (52% - 99%)      General: pt. in bed not in distress, resting comfortably  Chest: Coarse rhonchi bilaterally, air entry fair.   Heart: S1,S2. RRR. no heart murmur. no edema.   Abdomen: soft. non-tender. non-distended. + BS.   Ext: no calf tenderness.  LUE av graft functional, moves all ext. independently.   lymphatic system : no lymphadenopathy.  Neuro: AAO x3. no focal weakness. no speech disorder, follows commands appropriately.   psych : mood ok, no si/hi.   Skin: no rash noted, warm and dry.    LABS:                        9.1    7.40  )-----------( 113      ( 28 Apr 2022 07:53 )             28.5     04-28    137  |  95<L>  |  51.4<H>  ----------------------------<  179<H>  5.1   |  22.0  |  4.82<H>    Ca    8.0<L>      28 Apr 2022 07:53    TPro  5.9<L>  /  Alb  3.1<L>  /  TBili  0.8  /  DBili  0.5<H>  /  AST  97<H>  /  ALT  22  /  AlkPhos  433<H>  04-28    PT/INR - ( 28 Apr 2022 07:53 )   PT: 21.7 sec;   INR: 1.86 ratio         PTT - ( 26 Apr 2022 14:30 )  PTT:41.2 sec          CAPILLARY BLOOD GLUCOSE      POCT Blood Glucose.: 214 mg/dL (28 Apr 2022 02:04)        RADIOLOGY & ADDITIONAL TESTS:  Results Reviewed:   Imaging Personally Reviewed:  < from: CT Abdomen and Pelvis No Cont (04.28.22 @ 01:53) >  IMPRESSION:    Bilateral airspace consolidations, more pronounced right lower lobe,   findings which may reflect atelectasis and/or pneumonia.  Small bilateral pleural effusions.    Marked narrowing of the right upper lobe bronchus and mild narrowing of   the left upper lobe bronchus at the level of the surgical clips,   correlate clinically for bronchial stenosis.    Collapse of the bronchus intermedius and segmental right middle and lower   lobe bronchi, may be secondary to secretions/mucoid impaction.  Recommend further clinical correlation and follow-up CT exam to exclude   an obstructive pneumonitis/atelectasis secondary to underlying mass.    Small layering calcifications distal right ureter with small intraluminal   bladder calcification; no hydronephrosis.    Other findings as discussed above.    This study was preliminary reported by the ED radiologist on 4/28/2022.    --- End of Report ---            RUTH MORALES MD; Attending Radiologist  This document has been electronically signed. Apr 28 2022 12:00PM    < end of copied text >      Electrocardiogram Personally Reviewed:

## 2022-04-29 NOTE — BRIEF OPERATIVE NOTE - COMMENTS
recovered in endo - as pt is covid +   no cxr   sign out given to floor provider   will continue to follow

## 2022-04-29 NOTE — PROGRESS NOTE ADULT - PROBLEM SELECTOR PLAN 4
Dr. Reynolds service called by ER for dialysis. pt's K normal range.  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
Dr. Reynolds service called by ER for dialysis. pt's K normal range.  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
Dr. Reynolds service called by ER for dialysis. pt's K normal range.  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

## 2022-04-29 NOTE — PROGRESS NOTE ADULT - PROBLEM SELECTOR PLAN 3
- patient with Left acetabulum fracture, Left superior and inferior ramus fractures. Patient was evaluated by orthopedic in the inpatient setting. Was conservatively managed and sent to [facility]  - Pain control with Dilaudid 0.5 mg Q6H moderate and Dilaudid 1 mg Q6H for severe pain  - F/u X-Ray hip/pelvis and femur- stable
- patient with Left acetabulum fracture, Left superior and inferior ramus fractures. Patient was evaluated by orthopedic in the inpatient setting. Was conservatively managed and sent to [facility]  - Pain control with Dilaudid 0.5 mg Q6H moderate and Dilaudid 1 mg Q6H for severe pain  - F/u X-Ray hip/pelvis and femur and if new changes, will consult ortho.
- patient with Left acetabulum fracture, Left superior and inferior ramus fractures. Patient was evaluated by orthopedic in the inpatient setting. Was conservatively managed and sent to [facility]  - Pain control with dilaudid 0.5 mg Q6H moderate and dilaudid 1 mg Q6H for severe pain  - Will obtain repeat X-Ray hip/pelvis and femur and if new changes, will consult ortho.  - F/u CT A/P to r/o RP bleed

## 2022-04-29 NOTE — PROGRESS NOTE ADULT - ASSESSMENT
Peripheral IV  Date/Time: 11/8/2018 7:58 AM  Inserted by: Christen Muhammad MD    Placement  Needle size: 16 G  Laterality: right  Location: wrist  Local anesthetic: none  Site prep: alcohol  Technique: anatomical landmarks  Attempts: 1 pt. 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 Patient was sent from hemodialysis for hypotension after an incomplete session, he doesn’t know how long. Received 1L fluids from EMS. Denies f/ch, abdominal pain, n/v. Not on oxygen at home. Former smoker. Hypoxic to 85% on room air in ER, placed on 3L NC, then upgraded to bipap for persistent respiratory distress. Reported hypotension improved on arrival to ER, /58. Vancomycin and zosyn given empirically for possible PNA. pt.Clinically improved on bipap. reports no cp, no abd. pain, no n/v/d. Later pt's covid-19 came back positive. pt. stated that his wife had covid last week. pt. also reported that he is covid 19 vaccinated and got booster as well. pt. evaluated by MICU but step down is recommended. Recently pt. presented to [facility] ER on 04/21/22 for L hip pain s/p mechanical trip/fall stepping over curb.  Patient was found to have Acute comminuted, intra-articular fractures of the superior, anterior, medial and posterior left acetabulum with fracture extension into the left superior pubic ramus and left medial ilium. Acute comminuted, mildly displaced fracture of the left inferior pubic ramus.  As per orthopedics non surgical management.  Patient received dialysis and a unit of blood.  PT recommended ALIYAH and discharged on 04/22/22 and went to Hem[facility] at [place].

## 2022-04-29 NOTE — CONSULT NOTE ADULT - SUBJECTIVE AND OBJECTIVE BOX
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  Stress test from 11/2020 with no ischemia.  EKG unchanged. Patient is currently treated for COVID-19 PNA, Possible superimposed bacterial PNA and Mucoid impaction, bronchial collapse, he is planned for bronchoscopy by thoracic team.         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    2019 novel coronavirus disease (COVID-19)  Feb 2021 - hospitalized for 1 week at [facility]    Pneumonia  April 2021    [place] (hard of hearing)    HTN (hypertension)    Lumbar spondylosis    History of COPD    BPH without urinary obstruction    Hypercholesterolemia    Oliguria and anuria    H/O peripheral neuropathy    H/O lung transplant  bilateral - transplant - 1-2-2015 -  [facility]    H/O heart artery stent  x3 @[facility]    History of hip replacement  right    Status post open reduction and internal fixation (ORIF) of fracture  left femur        Allergies    budesonide (Unknown)  cefpodoxime (Unknown)  Pollen (Unknown)    Intolerances        MEDICATIONS  (STANDING):  atorvastatin 40 milliGRAM(s) Oral at bedtime  chlorhexidine 2% Cloths 1 Application(s) Topical <User Schedule>  doxycycline hyclate Capsule 100 milliGRAM(s) Oral every 12 hours  fluconAZOLE   Tablet 200 milliGRAM(s) Oral <User Schedule>  meropenem  IVPB 500 milliGRAM(s) IV Intermittent every 24 hours  methylPREDNISolone sodium succinate Injectable 40 milliGRAM(s) IV Push every 8 hours  multivitamin 1 Tablet(s) Oral daily  mycophenolate mofetil 500 milliGRAM(s) Oral two times a day  pantoprazole    Tablet 40 milliGRAM(s) Oral before breakfast  remdesivir  IVPB   IV Intermittent   remdesivir  IVPB 100 milliGRAM(s) IV Intermittent every 24 hours  sevelamer carbonate 1600 milliGRAM(s) Oral three times a day with meals  tacrolimus 1.5 milliGRAM(s) Oral <User Schedule>  tacrolimus 1 milliGRAM(s) Oral <User Schedule>  tamsulosin 0.4 milliGRAM(s) Oral at bedtime  trimethoprim   80 mG/sulfamethoxazole 400 mG 1 Tablet(s) Oral <User Schedule>    MEDICATIONS  (PRN):  ALBUTerol    0.083% 2.5 milliGRAM(s) Nebulizer every 4 hours PRN Shortness of Breath and/or Wheezing  HYDROmorphone  Injectable 0.5 milliGRAM(s) IV Push every 6 hours PRN Moderate Pain (4 - 6)  HYDROmorphone  Injectable 1 milliGRAM(s) IV Push every 6 hours PRN Severe Pain (7 - 10)      FAMILY HISTORY:  Family history of emphysema  mother        No family history of premature coronary artery disease or sudden cardiac death    SOCIAL HISTORY:  Smoking-  Alcohol-  Illicit Drug use-    REVIEW OF SYSTEMS:  Constitutional: [ ] fever, [ ]weight loss,  [ ]fatigue  Eyes: [ ] visual changes  Respiratory: [ x]shortness of breath;  [ x] cough, [ ]wheezing, [ ]chills, [ ]hemoptysis  Cardiovascular: [ ] chest pain, [ ]palpitations, [ ]dizziness,  [ ]leg swelling [ ]syncope  Gastrointestinal: [ ] abdominal pain, [ ]nausea, [ ]vomiting,  [ ]diarrhea   Genitourinary: [ ] dysuria, [ ] hematuria  Neurologic: [ ] headaches [ ] tremors  [ ] weakness [ ] lightheadedness  Skin: [ ] itching, [ ]burning, [ ] rashes  Endocrine: [ ] heat or cold intolerance  Musculoskeletal: [ ] joint pain or swelling; [ ] muscle, back, or extremity pain  Psychiatric: [ ] depression, [ ]anxiety, [ ]mood swings, or [ ]difficulty sleeping  Hematologic: [ ] easy bruising, [ ] bleeding gums     [ x] positive	  [ ] negative    Vital Signs Last 24 Hrs  T(C): 36.2 (29 Apr 2022 04:00), Max: 36.3 (29 Apr 2022 00:00)  T(F): 97.2 (29 Apr 2022 04:00), Max: 97.4 (29 Apr 2022 00:00)  HR: 66 (29 Apr 2022 04:00) (66 - 89)  BP: 109/59 (29 Apr 2022 04:00) (98/59 - 116/75)  BP(mean): 67 (28 Apr 2022 17:56) (67 - 73)  RR: 18 (29 Apr 2022 04:00) (17 - 24)  SpO2: 96% (29 Apr 2022 04:00) (95% - 99%)  I&O's Summary      PHYSICAL EXAM:  General: No acute distress  HEENT: EOMI  Neck:  No JVD  Lungs: B/L coarse breath sounds; good inspiratory effort, non-labored breathing  Heart: Regular rate and rhythm; No murmurs, rubs, or gallops  Abdomen: soft, non tender, non distended   Extremities: warm, no edema   Nervous system:  Alert & Oriented X3  Psychiatric: Normal affect  Skin: No rashes or lesions    LABS:  04-29    136  |  95<L>  |  81.4<H>  ----------------------------<  150<H>  5.4<H>   |  20.0<L>  |  6.87<H>    Ca    7.6<L>      29 Apr 2022 07:12    TPro  5.8<L>  /  Alb  3.0<L>  /  TBili  0.7  /  DBili  0.4<H>  /  AST  70<H>  /  ALT  20  /  AlkPhos  379<H>  04-29    Creatinine Trend: 6.87<--, 4.82<--, 6.57<--, 5.19<--, 4.67<--, 6.77<--                        9.5    6.04  )-----------( 137      ( 29 Apr 2022 07:12 )             28.6     PT/INR - ( 29 Apr 2022 07:12 )   PT: 20.2 sec;   INR: 1.73 ratio             Lipid Panel:   Cardiac Enzymes:           RADIOLOGY:    ECG [my interpretation]:    TELEMETRY:    ECHO:    STRESS TEST:    CATHETERIZATION:

## 2022-04-29 NOTE — PROGRESS NOTE ADULT - ASSESSMENT
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 In the ED he was hypoxic to 85% on room air and subsequently required BPAP for respiratory distress. Found to be positive for COVID-19. Recently he presented to ED for L hip fracture from Nursing facility. In the ER patient was afebrile. Started on Meropenem, Vancomycin and Azithromycin.       Acute Hypoxic respiratory failure  COVID-19 infection  B/L Pneumonia   shortness of breath  Transaminitis   ESRD on HD   Hip fracture   Lung Transplant on Immunosuppressants       - COVID 19 PCR Positive on 4/26/22  - Blood cultures 4/26 no growth   - CXR with B/L PNA  - CT Chest reporting B/L PNA   - Continue supportive care measures  - continue to trend inflammatory markers.   - trend CBC with diff, CMP,  CRP, Ferritin, D Dimer q 48hours  - Discussed Remdesivir with the patient.   - Continue Remdesivir 200mg IV x1 followed by 100mg IV daily, will complete 5 days on 5/1/22  - Monitor LFT's while on Remdesivir  - On Steroids per Pulm   - not a candidate for Bebtelovimab due to hypoxia   - pending cultures will continue Meropenem  - Continue Doxycycline 100mg PO q 12hours  - Check Urine legionella Ag  - Since no improvement will need Bronch  - Continue PPX Fluconazole, and Bactrim   - Follow up cultures  - Trend Fever  - Trend WBC      Will Follow      d/w Dr Rosado, clinical pharmacy and Dr De Luna

## 2022-04-29 NOTE — PROGRESS NOTE ADULT - PROBLEM SELECTOR PLAN 5
will keep on Duoneb, o2 support. hold pt's hydrocortisone for now as pt.
will keep on Duoneb, o2 support. hold pt's hydrocortisone for now as pt.
will keep on duoneb, iv solumedrol, o2 support. hold pt's hydrocortisone for now as pt. on solumedrol. pulmonology consult.

## 2022-04-29 NOTE — PROGRESS NOTE ADULT - SUBJECTIVE AND OBJECTIVE BOX
[facility]  INFECTIOUS DISEASES AND INTERNAL MEDICINE at [place] and [place]  =======================================================                               J Carlos Galdamez MD#  Negrito Caro MD*                                     Isatu Rojas MD*    Perlita Solares MD*            Diplomates American Board of Internal Medicine & Infectious Diseases                # [place] Office - Appt - Tel  [phone] Fax [phone]                * [place] Office - Appt - Tel [phone] Fax [phone]                                  Hospital Consult line:  [phone]  =======================================================    [id number] [id number]    Follow up: COVID 19     Remains on O2   No complaints this AM   Plans for Bronch today       Allergies:  budesonide (Unknown)  cefpodoxime (Unknown)  Pollen (Unknown)       REVIEW OF SYSTEMS:  CONSTITUTIONAL:  No Fever or chills  HEENT:  No diplopia or blurred vision.  No earache, sore throat or runny nose.  CARDIOVASCULAR:  No pressure, squeezing, strangling, tightness, heaviness or aching about the chest, neck, axilla or epigastrium.  RESPIRATORY:  No cough. + shortness of breath  GASTROINTESTINAL:  No nausea, vomiting or diarrhea.  GENITOURINARY:  No dysuria, frequency or urgency.   MUSCULOSKELETAL:  no joint aches, no muscle pain  SKIN:  No change in skin, hair or nails.  NEUROLOGIC:  No Headaches, seizures   PSYCHIATRIC:  No disorder of thought or mood.  ENDOCRINE:  No heat or cold intolerance  HEMATOLOGICAL:  No easy bruising or bleeding.       Physical Exam:  GEN: NAD  HEENT: normocephalic and atraumatic. EOMI. PERRL.    NECK: Supple.   LUNGS: Decreased Basal BS B/L   HEART: RRR  ABDOMEN: Soft, NT, ND.  +BS.    : No CVA tenderness  EXTREMITIES: Without  edema.  MSK: No joint swelling  NEUROLOGIC: AAOx3  PSYCHIATRIC: Appropriate affect .  SKIN: No rash      Vitals:  T(F): 97.3 (29 Apr 2022 14:15), Max: 97.4 (29 Apr 2022 00:00)  HR: 84 (29 Apr 2022 14:15)  BP: 116/63 (29 Apr 2022 14:15)  RR: 17 (29 Apr 2022 14:15)  SpO2: 96% (29 Apr 2022 14:15) (95% - 99%)  temp max in last 48H T(F): , Max: 97.9 (04-28-22 @ 08:00)      Current Antibiotics:  doxycycline hyclate Capsule 100 milliGRAM(s) Oral every 12 hours  fluconAZOLE   Tablet 200 milliGRAM(s) Oral <User Schedule>  meropenem  IVPB 500 milliGRAM(s) IV Intermittent every 24 hours  remdesivir  IVPB   IV Intermittent   remdesivir  IVPB 100 milliGRAM(s) IV Intermittent every 24 hours  trimethoprim   80 mG/sulfamethoxazole 400 mG 1 Tablet(s) Oral <User Schedule>    Other medications:  atorvastatin 40 milliGRAM(s) Oral at bedtime  chlorhexidine 2% Cloths 1 Application(s) Topical <User Schedule>  methylPREDNISolone sodium succinate Injectable 40 milliGRAM(s) IV Push every 8 hours  multivitamin 1 Tablet(s) Oral daily  mycophenolate mofetil 500 milliGRAM(s) Oral two times a day  pantoprazole    Tablet 40 milliGRAM(s) Oral before breakfast  sevelamer carbonate 1600 milliGRAM(s) Oral three times a day with meals  tacrolimus 1.5 milliGRAM(s) Oral <User Schedule>  tacrolimus 1 milliGRAM(s) Oral <User Schedule>  tamsulosin 0.4 milliGRAM(s) Oral at bedtime                            9.5    6.04  )-----------( 137      ( 29 Apr 2022 07:12 )             28.6     04-29    136  |  95<L>  |  81.4<H>  ----------------------------<  150<H>  5.4<H>   |  20.0<L>  |  6.87<H>    Ca    7.6<L>      29 Apr 2022 07:12    TPro  5.8<L>  /  Alb  3.0<L>  /  TBili  0.7  /  DBili  0.4<H>  /  AST  70<H>  /  ALT  20  /  AlkPhos  379<H>  04-29    RECENT CULTURES:  04-26 @ 14:29 .Blood Blood-Peripheral     No growth at 48 hours    04-26 @ 14:28 .Blood Blood-Peripheral     No growth at 48 hours      WBC Count: 6.04 K/uL (04-29-22 @ 07:12)  WBC Count: 7.40 K/uL (04-28-22 @ 07:53)  WBC Count: 5.17 K/uL (04-27-22 @ 11:53)  WBC Count: 5.85 K/uL (04-27-22 @ 05:42)  WBC Count: 8.83 K/uL (04-26-22 @ 14:30)    Creatinine, Serum: 6.87 mg/dL (04-29-22 @ 07:12)  Creatinine, Serum: 4.82 mg/dL (04-28-22 @ 07:53)  Creatinine, Serum: 6.57 mg/dL (04-27-22 @ 05:42)  Creatinine, Serum: 5.19 mg/dL (04-26-22 @ 14:30)    Procalcitonin, Serum: 13.09 ng/mL (04-26-22 @ 17:02)     SARS-CoV-2: Detected (04-26-22 @ 14:31)  COVID-19 PCR: NotDetec (04-20-22 @ 22:05)          < from: CT Chest No Cont (04.28.22 @ 01:53) >  ACC: [id number] EXAM:  CT ABDOMEN AND PELVIS                        ACC: [id number] EXAM:  CT CHEST                          PROCEDURE DATE:  04/28/2022      INTERPRETATION:  CLINICAL INFORMATION: Acute hypoxic respiratory failure.  History of lungtransplants.  Transaminitis.    COMPARISON: CT scan chest 8/20/2021.    CONTRAST/COMPLICATIONS:  IV Contrast: NONE  Oral Contrast: NONE  Complications: None reported at time of study completion    PROCEDURE:  CT of the Chest, Abdomen and Pelvis wasperformed.  Sagittal and coronal reformats were performed.    FINDINGS:    CHEST:    LUNGS AND LARGE AIRWAYS: PLEURA:  Small bilateral pleural effusions, including small loculated components   within the bilateral major fissures.  There are patchy bilateral lower lobe airspace consolidations, more   pronounced in the right lower lobe.    There is mild groundglass haziness right middle and lower lobes.     There is marked narrowing of the right upper lobe bronchus at the level   of surgical clips.  There is collapse of the bronchus intermedius and segmental right middle   and lower lobe bronchi.  There is mild narrowing of the left upper lobe bronchus at the level of   surgical clips.    VESSELS: Atherosclerotic changes thoracic aorta and coronary artery   calcifications.  Right-sided central venous catheter, tip at the distal SVC.    HEART:  The heart is enlarged.  Mitral valvular calcifications.  Calcifications at the aortic root.   Minimal pericardial effusion/thickening.    MEDIASTINUMAND JANNY:  Small subcentimeter short axis precarinal lymph nodes, stable.  Calcified subcarinal mediastinal lymph node, stable.  The evaluation of the pulmonary hilum is limited without intravenous   contrast.    CHEST WALL AND LOWER NECK:  Sternotomy.    ABDOMEN AND PELVIS:  Streak artifact degrades image quality.    The evaluation of the solid organ parenchyma is limited without   intravenous contrast.    LIVER: Within normal limits.  BILE DUCTS: Normal caliber.  GALLBLADDER: Within normal limits.  SPLEEN: Within normal limits.  PANCREAS: Within normal limits.  ADRENALS: Within normal limits.  KIDNEYS/URETERS:  Bilateral atrophic kidneys with intrarenal vascular calcifications.  Partially duplicated left renal collecting system, no hydronephrosis.  Small layering stones distal right ureter, without hydroureter.    Metallic streak artifact related to patient's orthopedic hardware   degrades image quality limiting evaluation of the pelvis.  BLADDER: Small intraluminal calcification.  REPRODUCTIVE ORGANS: Limited.    BOWEL:  Colonic fecal retention with moderate distention of the rectum, no bowel   obstruction.  Sigmoid diverticulosis.  PERITONEUM:  Trace ascites.  Mild stranding within the bilateral posterior flanks.  No localized intra-abdominal fluid collection.    VESSELS: Atherosclerotic changes.  Infrarenal abdominal aortic dilatation measuring 2.6 cm.  RETROPERITONEUM/LYMPH NODES: No lymphadenopathy.  No retroperitoneal hematoma.    ABDOMINAL WALL: Subcutaneous edema.    BONES:  Multilevel age indeterminate compression fracture deformities mid to   lower thoracic vertebral bodies, as well as L1 L2 L3 and L4 vertebral   bodies.    Comminuted left acetabular fracture, extending superiorly into the   suprasellar acetabular iliac bone, and puboacetabular junction. This   places left inferior pubic ramus fracture.    Incompletely imaged right hip arthroplasty and intramedullary krista left   proximal femur.    IMPRESSION:    Bilateral airspace consolidations, more pronounced right lower lobe,   findings which may reflect atelectasis and/or pneumonia.  Small bilateral pleural effusions.    Marked narrowing of the right upper lobe bronchus and mild narrowing of   the left upper lobe bronchus at the level of the surgical clips,   correlate clinically for bronchial stenosis.    Collapse of the bronchus intermedius and segmental right middle and lower   lobe bronchi, may be secondary to secretions/mucoid impaction.  Recommend further clinical correlation and follow-up CT exam to exclude   an obstructive pneumonitis/atelectasis secondary to underlying mass.    Small layering calcifications distal right ureter with small intraluminal   bladder calcification; no hydronephrosis.    Other findings as discussed above.    This study was preliminary reported by the ED radiologist on 4/28/2022.    < end of copied text >

## 2022-04-29 NOTE — PROGRESS NOTE ADULT - PROBLEM SELECTOR PLAN 6
continue pt's current transplant meds- c/w Cellcept and tacrolimus.

## 2022-04-29 NOTE — PROGRESS NOTE ADULT - SUBJECTIVE AND OBJECTIVE BOX
Vital Signs Last 24 Hrs  T(C): 36.2 (29 Apr 2022 04:00), Max: 36.3 (29 Apr 2022 00:00)  T(F): 97.2 (29 Apr 2022 04:00), Max: 97.4 (29 Apr 2022 00:00)  HR: 85 (29 Apr 2022 11:35) (65 - 89)  BP: 119/61 (29 Apr 2022 11:35) (98/59 - 119/61)  BP(mean): 67 (28 Apr 2022 17:56) (67 - 67)  ABP: --  ABP(mean): --  RR: 16 (29 Apr 2022 11:35) (16 - 24)  SpO2: 95% (29 Apr 2022 11:35) (95% - 99%)  HD today.  Pre Laboratory values personally reviewed by me.  Dialysis adjusted appropriately based on current values.  Will continue the current medical management.  Next hemodialysis as scheduled.  Discussed with nursing, primary care team.

## 2022-04-29 NOTE — PROGRESS NOTE ADULT - SUBJECTIVE AND OBJECTIVE BOX
[facility] Division of Hospital Medicine    Chief Complaint: acute hypoxic respiratory failure     SUBJECTIVE / OVERNIGHT EVENTS: No acute events overnight. HD stable. Patient continues to require NC 3L.     Patient denies chest pain, SOB, abd pain, N/V, fever, chills, dysuria or any other complaints. All remainder ROS negative.     MEDICATIONS  (STANDING):  atorvastatin 40 milliGRAM(s) Oral at bedtime  chlorhexidine 2% Cloths 1 Application(s) Topical <User Schedule>  doxycycline hyclate Capsule 100 milliGRAM(s) Oral every 12 hours  fluconAZOLE   Tablet 200 milliGRAM(s) Oral <User Schedule>  meropenem  IVPB 500 milliGRAM(s) IV Intermittent every 24 hours  methylPREDNISolone sodium succinate Injectable 40 milliGRAM(s) IV Push every 8 hours  multivitamin 1 Tablet(s) Oral daily  mycophenolate mofetil 500 milliGRAM(s) Oral two times a day  pantoprazole    Tablet 40 milliGRAM(s) Oral before breakfast  remdesivir  IVPB   IV Intermittent   remdesivir  IVPB 100 milliGRAM(s) IV Intermittent every 24 hours  sevelamer carbonate 1600 milliGRAM(s) Oral three times a day with meals  tacrolimus 1.5 milliGRAM(s) Oral <User Schedule>  tacrolimus 1 milliGRAM(s) Oral <User Schedule>  tamsulosin 0.4 milliGRAM(s) Oral at bedtime  trimethoprim   80 mG/sulfamethoxazole 400 mG 1 Tablet(s) Oral <User Schedule>    MEDICATIONS  (PRN):  ALBUTerol    0.083% 2.5 milliGRAM(s) Nebulizer every 4 hours PRN Shortness of Breath and/or Wheezing  HYDROmorphone  Injectable 0.5 milliGRAM(s) IV Push every 6 hours PRN Moderate Pain (4 - 6)  HYDROmorphone  Injectable 1 milliGRAM(s) IV Push every 6 hours PRN Severe Pain (7 - 10)        I&O's Summary      PHYSICAL EXAM:  Vital Signs Last 24 Hrs  T(C): 36.2 (29 Apr 2022 04:00), Max: 36.3 (29 Apr 2022 00:00)  T(F): 97.2 (29 Apr 2022 04:00), Max: 97.4 (29 Apr 2022 00:00)  HR: 85 (29 Apr 2022 11:35) (65 - 89)  BP: 119/61 (29 Apr 2022 11:35) (98/59 - 119/61)  BP(mean): 67 (28 Apr 2022 17:56) (67 - 67)  RR: 16 (29 Apr 2022 11:35) (16 - 24)  SpO2: 95% (29 Apr 2022 11:35) (95% - 99%)    General: pt. in bed not in distress, resting comfortably  Chest: Coarse rhonchi bilaterally, air entry fair.   Heart: S1,S2. RRR. no heart murmur. no edema.   Abdomen: soft. non-tender. non-distended. + BS.   Ext: no calf tenderness.  LUE av graft functional, moves all ext. independently.   lymphatic system : no lymphadenopathy.  Neuro: AAO x3. no focal weakness. no speech disorder, follows commands appropriately.     LABS:                        9.5    6.04  )-----------( 137      ( 29 Apr 2022 07:12 )             28.6     04-29    136  |  95<L>  |  81.4<H>  ----------------------------<  150<H>  5.4<H>   |  20.0<L>  |  6.87<H>    Ca    7.6<L>      29 Apr 2022 07:12    TPro  5.8<L>  /  Alb  3.0<L>  /  TBili  0.7  /  DBili  0.4<H>  /  AST  70<H>  /  ALT  20  /  AlkPhos  379<H>  04-29    PT/INR - ( 29 Apr 2022 07:12 )   PT: 20.2 sec;   INR: 1.73 ratio                   Culture - Blood (collected 26 Apr 2022 14:29)  Source: .Blood Blood-Peripheral  Preliminary Report (28 Apr 2022 15:00):    No growth at 48 hours    Culture - Blood (collected 26 Apr 2022 14:28)  Source: .Blood Blood-Peripheral  Preliminary Report (28 Apr 2022 15:00):    No growth at 48 hours      CAPILLARY BLOOD GLUCOSE      POCT Blood Glucose.: 139 mg/dL (29 Apr 2022 11:28)        RADIOLOGY & ADDITIONAL TESTS:  Results Reviewed:   Imaging Personally Reviewed:  Electrocardiogram Personally Reviewed:

## 2022-04-29 NOTE — PROGRESS NOTE ADULT - PROBLEM SELECTOR PLAN 1
Transitioned to NC from Bipap. CXR shows bilateral lower lobe platelike atelectasis/interstitial infiltrates, increased from prior  solumedrol 40 mg Q8H and Duoneb  - Pulm c/s placed- will c/s thoracic for bronchoscopy  - ID c/s placed- started Remdesivir D3/5. Will c/w Meropenem/Doxycycline and fluconazole/bactrim for hx of fungal meningitis.  - F/u thoracic c/s- plan for flex bronch for BAL today (4/29)

## 2022-04-29 NOTE — CONSULT NOTE ADULT - ASSESSMENT
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  Stress test from 11/2020 with no ischemia.  EKG unchanged. Patient is currently treated for COVID-19 PNA, Possible superimposed bacterial PNA and Mucoid impaction, bronchial collapse, he is planned for bronchoscopy by thoracic team.

## 2022-04-29 NOTE — CONSULT NOTE ADULT - REASON FOR ADMISSION
acute hypoxic respiratory failure.
Hypotension
acute hypoxic respiratory failure.

## 2022-04-29 NOTE — CONSULT NOTE ADULT - CONSULT REQUESTED DATE/TIME
27-Apr-2022 16:08
26-Apr-2022 13:31
27-Apr-2022 12:03
28-Apr-2022 14:09
27-Apr-2022 15:11
29-Apr-2022 08:21

## 2022-04-29 NOTE — PROGRESS NOTE ADULT - PROBLEM SELECTOR PLAN 2
isolation, covid 19 pneumonia   - plan as above
isolation, covid 19 pneumonia   - plan as above
isolation, covid 19 pneumonia ? iv steroids  - F/u CT chest  - ID c/s placed- will touch base in regards to Remdesivir given immunosuppressed

## 2022-04-30 NOTE — PROGRESS NOTE ADULT - SUBJECTIVE AND OBJECTIVE BOX
JU FERGUSON [id number]    Follow up: COVID 19 , ESRD ,     Remains on O2   No complaints this AM     Allergies:  budesonide (Unknown)  cefpodoxime (Unknown)  Pollen (Unknown)    REVIEW OF SYSTEMS:  CONSTITUTIONAL:  No Fever or chills  HEENT:  No diplopia or blurred vision.  No earache, sore throat or runny nose.  CARDIOVASCULAR:  No pressure, squeezing, strangling, tightness, heaviness or aching about the chest, neck, axilla or epigastrium.  RESPIRATORY:  No cough. + shortness of breath  GASTROINTESTINAL:  No nausea, vomiting or diarrhea.  GENITOURINARY:  No dysuria, frequency or urgency.   MUSCULOSKELETAL:  no joint aches, no muscle pain  SKIN:  No change in skin, hair or nails.  NEUROLOGIC:  No Headaches, seizures   PSYCHIATRIC:  No disorder of thought or mood.  ENDOCRINE:  No heat or cold intolerance  HEMATOLOGICAL:  No easy bruising or bleeding.     Physical Exam:    GEN: NAD  HEENT: normocephalic and atraumatic. EOMI. PERRL.    NECK: Supple.   LUNGS: Decreased Basal BS B/L   HEART: RRR  ABDOMEN: Soft, NT, ND.  +BS.    : No CVA tenderness  EXTREMITIES: Without  edema.  MSK: No joint swelling  NEUROLOGIC: AAOx3  PSYCHIATRIC: Appropriate affect .  SKIN: No rash , Access : TDC ( IJ )     Vitals:  T(F): 97.3 (29 Apr 2022 14:15), Max: 97.4 (29 Apr 2022 00:00)  HR: 84 (29 Apr 2022 14:15)  BP: 116/63 (29 Apr 2022 14:15)  RR: 17 (29 Apr 2022 14:15)  SpO2: 96% (29 Apr 2022 14:15) (95% - 99%)  temp max in last 48H T(F): , Max: 97.9 (04-28-22 @ 08:00)    Current Antibiotics:  doxycycline hyclate Capsule 100 milliGRAM(s) Oral every 12 hours  fluconAZOLE   Tablet 200 milliGRAM(s) Oral <User Schedule>  meropenem  IVPB 500 milliGRAM(s) IV Intermittent every 24 hours  remdesivir  IVPB   IV Intermittent   remdesivir  IVPB 100 milliGRAM(s) IV Intermittent every 24 hours  trimethoprim   80 mG/sulfamethoxazole 400 mG 1 Tablet(s) Oral <User Schedule>    Other medications:    atorvastatin 40 milliGRAM(s) Oral at bedtime  chlorhexidine 2% Cloths 1 Application(s) Topical <User Schedule>  methylPREDNISolone sodium succinate Injectable 40 milliGRAM(s) IV Push every 8 hours  multivitamin 1 Tablet(s) Oral daily  mycophenolate mofetil 500 milliGRAM(s) Oral two times a day  pantoprazole    Tablet 40 milliGRAM(s) Oral before breakfast  sevelamer carbonate 1600 milliGRAM(s) Oral three times a day with meals  tacrolimus 1.5 milliGRAM(s) Oral <User Schedule>  tacrolimus 1 milliGRAM(s) Oral <User Schedule>  tamsulosin 0.4 milliGRAM(s) Oral at bedtime                        9.5    6.04  )-----------( 137      ( 29 Apr 2022 07:12 )             28.6     04-29    136  |  95<L>  |  81.4<H>  ----------------------------<  150<H>  5.4<H>   |  20.0<L>  |  6.87<H>    Ca    7.6<L>      29 Apr 2022 07:12    TPro  5.8<L>  /  Alb  3.0<L>  /  TBili  0.7  /  DBili  0.4<H>  /  AST  70<H>  /  ALT  20  /  AlkPhos  379<H>  04-29    RECENT CULTURES:  04-26 @ 14:29 .Blood Blood-Peripheral     No growth at 48 hours    04-26 @ 14:28 .Blood Blood-Peripheral     No growth at 48 hours    Hepatic Function Panel (04.30.22 @ 10:38)   Indirect Reacting Bilirubin: 0.5 mg/dL   Protein Total, Serum: 5.4 g/dL   Albumin, Serum: 2.8 g/dL   Bilirubin Total, Serum: 0.9 mg/dL   Bilirubin Direct, Serum: 0.4 mg/dL   Alkaline Phosphatase, Serum: 335 U/L   Aspartate Aminotransferase (AST/SGOT): 70 U/L   Alanine Aminotransferase (ALT/SGPT): 19 U/L     Creatinine, Serum: 6.87 mg/dL (04-29-22 @ 07:12)  Creatinine, Serum: 4.82 mg/dL (04-28-22 @ 07:53)  Creatinine, Serum: 6.57 mg/dL (04-27-22 @ 05:42)  Creatinine, Serum: 5.19 mg/dL (04-26-22 @ 14:30)    Procalcitonin, Serum: 13.09 ng/mL (04-26-22 @ 17:02)     SARS-CoV-2: Detected (04-26-22 @ 14:31)  COVID-19 PCR: Not Detec (04-20-22 @ 22:05)    CT Chest No Cont (04.28.22 @ 01:53)  ACC: [id number] EXAM:  CT ABDOMEN AND PELVIS                        ACC: [id number] EXAM:  CT CHEST                          PROCEDURE DATE:  04/28/2022      INTERPRETATION:  CLINICAL INFORMATION: Acute hypoxic respiratory failure.  History of lung transplants.  Transaminitis.    COMPARISON: CT scan chest 8/20/2021.    CONTRAST/COMPLICATIONS:  IV Contrast: NONE  Oral Contrast: NONE  Complications: None reported at time of study completion    PROCEDURE:  CT of the Chest, Abdomen and Pelvis was performed.  Sagittal and coronal reformats were performed.    FINDINGS:    CHEST:    LUNGS AND LARGE AIRWAYS: PLEURA:  Small bilateral pleural effusions, including small loculated components   within the bilateral major fissures.  There are patchy bilateral lower lobe airspace consolidations, more   pronounced in the right lower lobe.    There is mild groundglass haziness right middle and lower lobes.     There is marked narrowing of the right upper lobe bronchus at the level   of surgical clips.  There is collapse of the bronchus intermedius and segmental right middle   and lower lobe bronchi.  There is mild narrowing of the left upper lobe bronchus at the level of   surgical clips.    VESSELS: Atherosclerotic changes thoracic aorta and coronary artery   calcifications.  Right-sided central venous catheter, tip at the distal SVC.    HEART:  The heart is enlarged.  Mitral valvular calcifications.  Calcifications at the aortic root.   Minimal pericardial effusion/thickening.    MEDIASTINUM AND JANNY:  Small subcentimeter short axis precarinal lymph nodes, stable.  Calcified subcarinal mediastinal lymph node, stable.  The evaluation of the pulmonary hilum is limited without intravenous   contrast.    CHEST WALL AND LOWER NECK:  Sternotomy.    ABDOMEN AND PELVIS:  Streak artifact degrades image quality.    The evaluation of the solid organ parenchyma is limited without   intravenous contrast.    LIVER: Within normal limits.  BILE DUCTS: Normal caliber.  GALLBLADDER: Within normal limits.  SPLEEN: Within normal limits.  PANCREAS: Within normal limits.  ADRENALS: Within normal limits.  KIDNEYS/URETERS:  Bilateral atrophic kidneys with intrarenal vascular calcifications.  Partially duplicated left renal collecting system, no hydronephrosis.  Small layering stones distal right ureter, without hydroureter.    Metallic streak artifact related to patient's orthopedic hardware   degrades image quality limiting evaluation of the pelvis.  BLADDER: Small intraluminal calcification.  REPRODUCTIVE ORGANS: Limited.    BOWEL:  Colonic fecal retention with moderate distention of the rectum, no bowel   obstruction.  Sigmoid diverticulosis.  PERITONEUM:  Trace ascites.  Mild stranding within the bilateral posterior flanks.  No localized intra-abdominal fluid collection.    VESSELS: Atherosclerotic changes.  Infrarenal abdominal aortic dilatation measuring 2.6 cm.  RETROPERITONEUM/LYMPH NODES: No lymphadenopathy.  No retroperitoneal hematoma.    ABDOMINAL WALL: Subcutaneous edema.    BONES:  Multilevel age indeterminate compression fracture deformities mid to   lower thoracic vertebral bodies, as well as L1 L2 L3 and L4 vertebral   bodies.    Comminuted left acetabular fracture, extending superiorly into the   suprasellar acetabular iliac bone, and puboacetabular junction. This   places left inferior pubic ramus fracture.    Incompletely imaged right hip arthroplasty and intramedullary krista left   proximal femur.    IMPRESSION:    Bilateral airspace consolidations, more pronounced right lower lobe,   findings which may reflect atelectasis and/or pneumonia.  Small bilateral pleural effusions.    Marked narrowing of the right upper lobe bronchus and mild narrowing of   the left upper lobe bronchus at the level of the surgical clips,   correlate clinically for bronchial stenosis.    Collapse of the bronchus intermedius and segmental right middle and lower   lobe bronchi, may be secondary to secretions/mucoid impaction.  Recommend further clinical correlation and follow-up CT exam to exclude   an obstructive pneumonitis/atelectasis secondary to underlying mass.    Small layering calcifications distal right ureter with small intraluminal   bladder calcification; no hydronephrosis.    Other findings as discussed above.    This study was preliminary reported by the ED radiologist on 4/28/2022.  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 In the ED he was hypoxic to 85% on room air and subsequently required BPAP for respiratory distress. Found to be positive for COVID-19. Recently he presented to ED for L hip fracture from Nursing facility. In the ER patient was afebrile. Started on Meropenem, Vancomycin and Azithromycin.       Acute Hypoxic respiratory failure  COVID-19 infection  B/L Pneumonia   Transaminitis   ESRD on HD   Hip fracture   Lung Transplant on Immunosuppressants     - COVID 19 PCR Positive on 4/26/22  - Blood cultures 4/26 no growth   - CXR with B/L PNA  - CT Chest reporting B/L PNA   - On Remdesivir 200mg IV x1 followed by 100mg IV daily, will complete 5 days on 5/1/22  - On Steroids per Pulm     · Operative Findings	copious secretions   see full dictation      · Specimens	BAL  · Estimated Blood Loss	0 milliLiter(s)

## 2022-04-30 NOTE — PROGRESS NOTE ADULT - PROBLEM SELECTOR PLAN 1
s/p Flex Bronch for BAL with Dr. Kendall 4/29- findings of slug- like secretions   May resume Plavix/Eliquis   wean O2 as tolerated  Encourage CDBE/IS use  Repeat CXR today, will f/u  Thoracic Surgery to follow  Rest of care per primary team  Plan discussed with Dr. York in AM rounds s/p Flex Bronch for BAL with Dr. Kendall 4/29- findings of slug- like secretions   May resume Plavix/Eliquis   wean O2 as tolerated by SpO2  Encourage CDBE/IS use  Repeat CXR today, will f/u  Thoracic Surgery to follow  Rest of care per primary team  Plan discussed with Dr. York in AM rounds

## 2022-04-30 NOTE — PROGRESS NOTE ADULT - ASSESSMENT
Imp--s/p lung xplant Pneumonia s/p BAL.  Clinically bacterial, improving.  Plan--complete abx, await BAL results.  LowerO2 as misha

## 2022-04-30 NOTE — PROGRESS NOTE ADULT - SUBJECTIVE AND OBJECTIVE BOX
Family history of asthma and other chronic lower respiratory diseases    HPI:  pt. 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 Patient was sent from hemodialysis for hypotension after an incomplete session, he doesn’t know how long. Received 1L fluids from EMS. Denies f/ch, abdominal pain, n/v. Not on oxygen at home. Former smoker. Hypoxic to 85% on room air in ER, placed on 3L NC, then upgraded to bipap for persistent respiratory distress. Reported hypotension improved on arrival to ER, /58. Vancomycin and zosyn given empirically for possible PNA. pt.Clinically improved on bipap. reports no cp, no abd. pain, no n/v/d. Later pt's covid-19 came back positive. pt. stated that his wife had covid last week. pt. also reported that he is covid 19 vaccinated and got booster as well. pt. evaluated by MICU but step down is recommended.   Recently pt. presented to [facility] ER on 04/21/22 for L hip pain s/p mechanical trip/fall stepping over curb.  Patient was found to have Acute comminuted, intra-articular fractures of the superior, anterior, medial and posterior left acetabulum with fracture extension into the left superior pubic ramus and left medial ilium. Acute comminuted, mildly displaced fracture of the left inferior pubic ramus.  As per orthopedics non surgical management.  Patient received dialysis and a unit of blood.  PT recommended ALIYAH and [facility] [place] (26 Apr 2022 17:25)    Interval History:  Patient was seen and examined at bedside around 12:30 pm.  Feels better.   Breathing is improving.   Denies chest pain, palpitations, headache, dizziness, visual symptoms, nausea, vomiting or abdominal pain.  Wanted to use bedside commode.     ROS:  As per interval history otherwise unremarkable.    PHYSICAL EXAM:  Vital Signs   T(C): 35.6 (30 Apr 2022 12:54), Max: 36.9 (30 Apr 2022 04:00)  T(F): 96.1 (30 Apr 2022 12:54), Max: 98.4 (30 Apr 2022 04:00)  HR: 88 (30 Apr 2022 12:54) (85 - 110)  BP: 103/55 (30 Apr 2022 12:54) (99/55 - 130/67)  BP(mean): 66 (30 Apr 2022 12:54) (65 - 66)  RR: 13 (30 Apr 2022 12:54) (13 - 19)  SpO2: 99% (30 Apr 2022 12:54) (97% - 100%)  General: Elderly male sitting in bed comfortably. No acute distress  HEENT: EOMI. Clear conjunctivae. Moist mucus membrane  Neck: Supple.   Chest: Good air entry. No wheezing, rales or rhonchi. No chest wall tenderness. Permacath in right upper chest.   Heart: Normal S1 & S2. RRR.   Abdomen: Soft. Non-tender. Non-distended. + BS  Ext: No pedal edema. No calf tenderness   Neuro: Active and alert. No focal deficit. No speech disorder  Skin: Warm and Dry  Psychiatry: Normal mood and affect    I&O's Summary    29 Apr 2022 07:01  -  30 Apr 2022 07:00  --------------------------------------------------------  IN: 250 mL / OUT: 500 mL / NET: -250 mL    30 Apr 2022 07:01  -  30 Apr 2022 15:24  --------------------------------------------------------  IN: 50 mL / OUT: 0 mL / NET: 50 mL    LABS:                       9.0    4.36  )-----------( 168      ( 30 Apr 2022 10:38 )             27.1     04-30    137  |  97<L>  |  53.1<H>  ----------------------------<  138<H>  5.1   |  22.0  |  4.32<H>    Ca    8.1<L>      30 Apr 2022 10:38    TPro  5.4<L>  /  Alb  2.9<L>  /  TBili  0.8  /  DBili  0.4<H>  /  AST  71<H>  /  ALT  19  /  AlkPhos  340<H>  04-30    PT/INR - ( 30 Apr 2022 10:38 )   PT: 20.4 sec;   INR: 1.75 ratio      RADIOLOGY & ADDITIONAL STUDIES:  Reviewed     MEDICATIONS  (STANDING):  atorvastatin 40 milliGRAM(s) Oral at bedtime  chlorhexidine 2% Cloths 1 Application(s) Topical <User Schedule>  doxycycline hyclate Capsule 100 milliGRAM(s) Oral every 12 hours  fluconAZOLE   Tablet 200 milliGRAM(s) Oral <User Schedule>  meropenem  IVPB 500 milliGRAM(s) IV Intermittent every 24 hours  methylPREDNISolone sodium succinate Injectable 40 milliGRAM(s) IV Push every 8 hours  multivitamin 1 Tablet(s) Oral daily  mycophenolate mofetil 500 milliGRAM(s) Oral two times a day  pantoprazole    Tablet 40 milliGRAM(s) Oral before breakfast  remdesivir  IVPB   IV Intermittent   remdesivir  IVPB 100 milliGRAM(s) IV Intermittent every 24 hours  sevelamer carbonate 1600 milliGRAM(s) Oral three times a day with meals  tacrolimus 1.5 milliGRAM(s) Oral <User Schedule>  tacrolimus 1 milliGRAM(s) Oral <User Schedule>  tamsulosin 0.4 milliGRAM(s) Oral at bedtime    MEDICATIONS  (PRN):  ALBUTerol    0.083% 2.5 milliGRAM(s) Nebulizer every 4 hours PRN Shortness of Breath and/or Wheezing  HYDROmorphone  Injectable 0.5 milliGRAM(s) IV Push every 6 hours PRN Moderate Pain (4 - 6)  HYDROmorphone  Injectable 1 milliGRAM(s) IV Push every 6 hours PRN Severe Pain (7 - 10)

## 2022-04-30 NOTE — PROGRESS NOTE ADULT - ASSESSMENT
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 Pt was recently admitted 4/21/22 for left acetabular fx, inferior/sup pubic ramus fx  that did not require surgical management and was discharged on 04/22/22 to [facility] at [place]. Patient was brought in by EMS to [facility] ED 4/26/22 after being hypotensive during HD. On arrival patient was found to by hypoxic (85% on RA) that required nasal cannula then BIPAP for respiratory distress. Patient tested positive for COVID on this admission. CT Chest shows collapse of the bronchus intermedius and segmental right middle and right lower lobe bronchi. Patient has continued to require increasing oxygen support and was seen by Pulmonary. Thoracic Surgery was consulted for Flexible bronchoscopy for BAL. Now s/p Flex Bronch and BAL on 4/29.      Problem/Plan - 1:    ·  Problem: Acute respiratory failure with hypoxia.     ·  Plan: s/p Flex Bronch for BAL with Dr. Kendall 4/29- findings of slug- like secretions     ESRD - HD On M W F ,

## 2022-04-30 NOTE — PROGRESS NOTE ADULT - SUBJECTIVE AND OBJECTIVE BOX
PULMONARY PROGRESS NOTE      [id number]    Patient is a 72y old  Male who presents with a chief complaint of acute hypoxic respiratory failure. (30 Apr 2022 07:51)      INTERVAL HPI/OVERNIGHT EVENTS:S/P bronch with copious secretions.  Currently NAD on NCO2.  Less cough.    MEDICATIONS  (STANDING):  atorvastatin 40 milliGRAM(s) Oral at bedtime  chlorhexidine 2% Cloths 1 Application(s) Topical <User Schedule>  doxycycline hyclate Capsule 100 milliGRAM(s) Oral every 12 hours  fluconAZOLE   Tablet 200 milliGRAM(s) Oral <User Schedule>  meropenem  IVPB 500 milliGRAM(s) IV Intermittent every 24 hours  methylPREDNISolone sodium succinate Injectable 40 milliGRAM(s) IV Push every 8 hours  multivitamin 1 Tablet(s) Oral daily  mycophenolate mofetil 500 milliGRAM(s) Oral two times a day  pantoprazole    Tablet 40 milliGRAM(s) Oral before breakfast  remdesivir  IVPB   IV Intermittent   remdesivir  IVPB 100 milliGRAM(s) IV Intermittent every 24 hours  sevelamer carbonate 1600 milliGRAM(s) Oral three times a day with meals  tacrolimus 1.5 milliGRAM(s) Oral <User Schedule>  tacrolimus 1 milliGRAM(s) Oral <User Schedule>  tamsulosin 0.4 milliGRAM(s) Oral at bedtime      MEDICATIONS  (PRN):  ALBUTerol    0.083% 2.5 milliGRAM(s) Nebulizer every 4 hours PRN Shortness of Breath and/or Wheezing  HYDROmorphone  Injectable 0.5 milliGRAM(s) IV Push every 6 hours PRN Moderate Pain (4 - 6)  HYDROmorphone  Injectable 1 milliGRAM(s) IV Push every 6 hours PRN Severe Pain (7 - 10)      Allergies    budesonide (Unknown)  cefpodoxime (Unknown)  Pollen (Unknown)    Intolerances        PAST MEDICAL & SURGICAL HISTORY:  Emphysema of lung  s/p lung transplant    ESRD (end stage renal disease) on dialysis  on HD via LUE T/Th/Sat    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    2019 novel coronavirus disease (COVID-19)  Feb 2021 - hospitalized for 1 week at [facility]    Pneumonia  April 2021    [place] (hard of hearing)    HTN (hypertension)    Lumbar spondylosis    History of COPD    BPH without urinary obstruction    Hypercholesterolemia    Oliguria and anuria    H/O peripheral neuropathy    H/O lung transplant  bilateral - transplant - 1-2-2015 -  [facility]    H/O heart artery stent  x3 @[facility]    History of hip replacement  right    Status post open reduction and internal fixation (ORIF) of fracture  left femur        SOCIAL HISTORY  Smoking History:       REVIEW OF SYSTEMS:    CONSTITUTIONAL:  per HPI    HEENT:  Eyes:  No diplopia or blurred vision. ENT:  No earache, sore throat or runny nose.    CARDIOVASCULAR:  No pressure, squeezing, tightness, heaviness or aching about the chest; no palpitations.    RESPIRATORY:  per hpi    GASTROINTESTINAL:  No nausea, vomiting or diarrhea.    GENITOURINARY:  No dysuria, frequency or urgency.    MUSCULOSKELETAL:  No joint pain    SKIN:  No new lesions.    NEUROLOGIC:  No paresthesias, fasciculations, seizures or weakness.    PSYCHIATRIC:  No disorder of thought or mood.    ENDOCRINE:  No heat or cold intolerance, polyuria or polydipsia.    HEMATOLOGICAL:  No easy bruising or bleeding.     Vital Signs Last 24 Hrs  T(C): 36.7 (30 Apr 2022 08:20), Max: 36.9 (30 Apr 2022 04:00)  T(F): 98 (30 Apr 2022 08:20), Max: 98.4 (30 Apr 2022 04:00)  HR: 89 (30 Apr 2022 04:00) (84 - 110)  BP: 103/55 (30 Apr 2022 04:00) (103/55 - 130/67)  BP(mean): 66 (30 Apr 2022 04:00) (65 - 66)  RR: 15 (30 Apr 2022 04:00) (14 - 19)  SpO2: 100% (30 Apr 2022 04:00) (95% - 100%)    PHYSICAL EXAMINATION:    GENERAL: The patient is awake and alert in no apparent distress.     HEENT: Head is normocephalic and atraumatic. Extraocular muscles are intact. Mucous membranes are moist.    NECK: Supple.    LUNGS: few crackles Rt base    HEART: Regular rate and rhythm without murmur.    ABDOMEN: Soft, nontender, and nondistended.      EXTREMITIES: Without any cyanosis, clubbing, rash, lesions or edema.    NEUROLOGIC: Grossly intact.    SKIN: No ulceration or induration present.      LABS:                        9.5    6.04  )-----------( 137      ( 29 Apr 2022 07:12 )             28.6     04-29    136  |  95<L>  |  81.4<H>  ----------------------------<  150<H>  5.4<H>   |  20.0<L>  |  6.87<H>    Ca    7.6<L>      29 Apr 2022 07:12    TPro  5.8<L>  /  Alb  3.0<L>  /  TBili  0.7  /  DBili  0.4<H>  /  AST  70<H>  /  ALT  20  /  AlkPhos  379<H>  04-29    PT/INR - ( 29 Apr 2022 07:12 )   PT: 20.2 sec;   INR: 1.73 ratio                             MICROBIOLOGY:    RADIOLOGY & ADDITIONAL STUDIES:Today's cxr appears improved.  Official report pending.

## 2022-04-30 NOTE — PROGRESS NOTE ADULT - ASSESSMENT
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 Pt was recently admitted 4/21/22 for left acetabular fx, inferior/sup pubic ramus fx  that did not require surgical management and was discharged on 04/22/22 to [facility] [place]. Patient was brought in by EMS to [facility] ED 4/26/22 after being hypotensive during HD. On arrival patient was found to by hypoxic (85% on RA) that required nasal cannula then BIPAP for respiratory distress. Patient tested positive for COVID on this admission. CT Chest shows collapse of the bronchus intermedius and segmental right middle and right lower lobe bronchi. Patient has continued to require increasing oxygen support and was seen by Pulmonary. Thoracic Surgery was consulted for Flexible bronchoscopy for BAL. Now s/p Flex Bronch and BAL on 4/29.

## 2022-04-30 NOTE — PROGRESS NOTE ADULT - SUBJECTIVE AND OBJECTIVE BOX
Subjective: to follow with full note     Pertinent events of the past 24 hours: Uneventful, recovering as expected    VITAL SIGNS  Vital Signs Last 24 Hrs  T(C): 36.9 (22 @ 04:00), Max: 36.9 (22 @ 04:00)  T(F): 98.4 (22 @ 04:00), Max: 98.4 (22 @ 04:00)  HR: 89 (22 @ 04:00) (65 - 110)  BP: 103/55 (22 @ 04:00) (103/55 - 130/67)  RR: 15 (22 @ 04:00) (14 - 19)  SpO2: 100% (22 @ 04:00) (95% - 100%)  on (O2)              MEDICATIONS  ALBUTerol    0.083% 2.5 milliGRAM(s) Nebulizer every 4 hours PRN  atorvastatin 40 milliGRAM(s) Oral at bedtime  chlorhexidine 2% Cloths 1 Application(s) Topical <User Schedule>  doxycycline hyclate Capsule 100 milliGRAM(s) Oral every 12 hours  fluconAZOLE   Tablet 200 milliGRAM(s) Oral <User Schedule>  HYDROmorphone  Injectable 0.5 milliGRAM(s) IV Push every 6 hours PRN  HYDROmorphone  Injectable 1 milliGRAM(s) IV Push every 6 hours PRN  meropenem  IVPB 500 milliGRAM(s) IV Intermittent every 24 hours  methylPREDNISolone sodium succinate Injectable 40 milliGRAM(s) IV Push every 8 hours  multivitamin 1 Tablet(s) Oral daily  mycophenolate mofetil 500 milliGRAM(s) Oral two times a day  pantoprazole    Tablet 40 milliGRAM(s) Oral before breakfast  remdesivir  IVPB   IV Intermittent   remdesivir  IVPB 100 milliGRAM(s) IV Intermittent every 24 hours  sevelamer carbonate 1600 milliGRAM(s) Oral three times a day with meals  tacrolimus 1.5 milliGRAM(s) Oral <User Schedule>  tacrolimus 1 milliGRAM(s) Oral <User Schedule>  tamsulosin 0.4 milliGRAM(s) Oral at bedtime      PHYSICAL EXAM  to follow with full note      @ 07:01  -   @ 07:00  --------------------------------------------------------  IN: 250 mL / OUT: 500 mL / NET: -250 mL    Weights:  Daily Height in cm: 167.64 (2022 08:20)    Daily Weight in k.4 (2022 04:00)  Admit Wt: Drug Dosing Weight  Height (cm): 167.6 (2022 08:20)  Weight (kg): 66 (2022 08:20)  BMI (kg/m2): 23.5 (2022 08:20)  BSA (m2): 1.75 (2022 08:20)    All laboratory results, radiology and medications reviewed.    LABS      136  |  95<L>  |  81.4<H>  ----------------------------<  150<H>  5.4<H>   |  20.0<L>  |  6.87<H>    Ca    7.6<L>      2022 07:12    TPro  5.8<L>  /  Alb  3.0<L>  /  TBili  0.7  /  DBili  0.4<H>  /  AST  70<H>  /  ALT  20  /  AlkPhos  379<H>                                   9.5    6.04  )-----------( 137      ( 2022 07:12 )             28.6          PT/INR - ( 2022 07:12 )   PT: 20.2 sec;   INR: 1.73 ratio             CAPILLARY BLOOD GLUCOSE  POCT Blood Glucose.: 139 mg/dL (2022 11:28)           Last CXR: < from: Xray Chest 1 View- PORTABLE-Urgent (22 @ 14:49) >  INTERPRETATION:  Clinical history: 72-year-old male, sepsis.    Portable/expiratory view of the chest is compared to 2022.    FINDINGS: Mild cardiomegaly and normal pulmonary vasculature with no   consolidation, effusion or acute osseous finding.    Large-bore double-lumen right-sided catheter with the tip in the right   atrium, unchanged.    Post sternotomy/CABG.    Bilateral lower lobe platelike atelectasis/interstitial infiltrates,   increased.    IMPRESSION:  Normal pulmonary vasculature, resolution of mild congestion.    Bilateral lower lobe platelike atelectasis/interstitial infiltrates,   increased    < end of copied text >  < from: CT Chest No Cont (22 @ 01:53) >  FINDINGS:    CHEST:    LUNGS AND LARGE AIRWAYS: PLEURA:  Small bilateral pleural effusions, including small loculated components   within the bilateral major fissures.  There are patchy bilateral lower lobe airspace consolidations, more   pronounced in the right lower lobe.    There is mild groundglass haziness right middle and lower lobes.     There is marked narrowing of the right upper lobe bronchus at the level   of surgical clips.  There is collapse of the bronchus intermedius and segmental right middle   and lower lobe bronchi.  There is mild narrowing of the left upper lobe bronchus at the level of   surgical clips.    VESSELS: Atherosclerotic changes thoracic aorta and coronary artery   calcifications.  Right-sided central venous catheter, tip at the distal SVC.    HEART:  The heart is enlarged.  Mitral valvular calcifications.  Calcifications at the aortic root.   Minimal pericardial effusion/thickening.    MEDIASTINUMAND JANNY:  Small subcentimeter short axis precarinal lymph nodes, stable.  Calcified subcarinal mediastinal lymph node, stable.  The evaluation of the pulmonary hilum is limited without intravenous   contrast.    CHEST WALL AND LOWER NECK:  Sternotomy.    ABDOMEN AND PELVIS:  Streak artifact degrades image quality.    The evaluation of the solid organ parenchyma is limited without   intravenous contrast.    LIVER: Within normal limits.  BILE DUCTS: Normal caliber.  GALLBLADDER: Within normal limits.  SPLEEN: Within normal limits.  PANCREAS: Within normal limits.  ADRENALS: Within normal limits.  KIDNEYS/URETERS:  Bilateral atrophic kidneys with intrarenal vascular calcifications.  Partially duplicated left renal collecting system, no hydronephrosis.  Small layering stones distal right ureter, without hydroureter.    Metallic streak artifact related to patient's orthopedic hardware   degrades image quality limiting evaluation of the pelvis.  BLADDER: Small intraluminal calcification.  REPRODUCTIVE ORGANS: Limited.    BOWEL:  Colonic fecal retention with moderate distention of the rectum, no bowel   obstruction.  Sigmoid diverticulosis.  PERITONEUM:  Trace ascites.  Mild stranding within the bilateral posterior flanks.  No localized intra-abdominal fluid collection.    VESSELS: Atherosclerotic changes.  Infrarenal abdominal aortic dilatation measuring 2.6 cm.  RETROPERITONEUM/LYMPH NODES: No lymphadenopathy.  No retroperitoneal hematoma.    ABDOMINAL WALL: Subcutaneous edema.    BONES:  Multilevel age indeterminate compression fracture deformities mid to   lower thoracic vertebral bodies, as well as L1 L2 L3 and L4 vertebral   bodies.    Comminuted left acetabular fracture, extending superiorly into the   suprasellar acetabular iliac bone, and puboacetabular junction. This   places left inferior pubic ramus fracture.    Incompletely imaged right hip arthroplasty and intramedullary krista left   proximal femur.      IMPRESSION:    Bilateral airspace consolidations, more pronounced right lower lobe,   findings which may reflect atelectasis and/or pneumonia.  Small bilateral pleural effusions.    Marked narrowing of the right upper lobe bronchus and mild narrowing of   the left upper lobe bronchus at the level of the surgical clips,   correlate clinically for bronchial stenosis.    Collapse of the bronchus intermedius and segmental right middle and lower   lobe bronchi, may be secondary to secretions/mucoid impaction.  Recommend further clinical correlation and follow-up CT exam to exclude   an obstructive pneumonitis/atelectasis secondary to underlying mass.    Small layering calcifications distal right ureter with small intraluminal   bladder calcification; no hydronephrosis.    Other findings as discussed above.    This study was preliminary reported by the ED radiologist on 2022.    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     novel coronavirus disease (COVID-19)  2021 - hospitalized for 1 week at [facility]    Pneumonia  2021    [place] (hard of hearing)    HTN (hypertension)    Lumbar spondylosis    History of COPD    BPH without urinary obstruction    Hypercholesterolemia    Oliguria and anuria    H/O peripheral neuropathy    H/O lung transplant  bilateral - transplant - 1-2- -  [facility]    H/O heart artery stent  x3 @[facility]    History of hip replacement  right    Status post open reduction and internal fixation (ORIF) of fracture  left femur        Subjective: Patient states "I've been feeling much better today, I would get out of bed if my hip with tolerate it" denies CP, palpitations, SOB, cough, fever, chills, itchiness/rash, diaphoresis, vision changes, HA, dizziness/lightheadedness, numbness/tingling, abd pain, N/V    Pertinent events of the past 24 hours: Uneventful, recovering as expected    VITAL SIGNS  Vital Signs Last 24 Hrs  T(C): 36.9 (22 @ 04:00), Max: 36.9 (22 @ 04:00)  T(F): 98.4 (22 @ 04:00), Max: 98.4 (22 @ 04:00)  HR: 89 (22 @ 04:00) (65 - 110)  BP: 103/55 (22 @ 04:00) (103/55 - 130/67)  RR: 15 (22 @ 04:00) (14 - 19)  SpO2: 100% (22 @ 04:00) (95% - 100%)  on (O2)              MEDICATIONS  ALBUTerol    0.083% 2.5 milliGRAM(s) Nebulizer every 4 hours PRN  atorvastatin 40 milliGRAM(s) Oral at bedtime  chlorhexidine 2% Cloths 1 Application(s) Topical <User Schedule>  doxycycline hyclate Capsule 100 milliGRAM(s) Oral every 12 hours  fluconAZOLE   Tablet 200 milliGRAM(s) Oral <User Schedule>  HYDROmorphone  Injectable 0.5 milliGRAM(s) IV Push every 6 hours PRN  HYDROmorphone  Injectable 1 milliGRAM(s) IV Push every 6 hours PRN  meropenem  IVPB 500 milliGRAM(s) IV Intermittent every 24 hours  methylPREDNISolone sodium succinate Injectable 40 milliGRAM(s) IV Push every 8 hours  multivitamin 1 Tablet(s) Oral daily  mycophenolate mofetil 500 milliGRAM(s) Oral two times a day  pantoprazole    Tablet 40 milliGRAM(s) Oral before breakfast  remdesivir  IVPB   IV Intermittent   remdesivir  IVPB 100 milliGRAM(s) IV Intermittent every 24 hours  sevelamer carbonate 1600 milliGRAM(s) Oral three times a day with meals  tacrolimus 1.5 milliGRAM(s) Oral <User Schedule>  tacrolimus 1 milliGRAM(s) Oral <User Schedule>  tamsulosin 0.4 milliGRAM(s) Oral at bedtime    PHYSICAL EXAM  Constitutional: NAD  Neuro: A+O x 3, non-focal, speech clear and intact  HEENT: [place], [place]/AT  CV: +S1S2  Pulm/chest: 3l nc, lung sounds CTA and equal bilaterally, no accessory muscle use noted  Abd: +BS, soft, NT, ND  Ext: RAND x 4, no C/C/E  Skin: warm, well perfused  Psych: calm, appropriate affect       @ 07:01  -   @ 07:00  --------------------------------------------------------  IN: 250 mL / OUT: 500 mL / NET: -250 mL    Weights:  Daily Height in cm: 167.64 (2022 08:20)    Daily Weight in k.4 (2022 04:00)  Admit Wt: Drug Dosing Weight  Height (cm): 167.6 (2022 08:20)  Weight (kg): 66 (2022 08:20)  BMI (kg/m2): 23.5 (2022 08:20)  BSA (m2): 1.75 (2022 08:20)    All laboratory results, radiology and medications reviewed.    LABS      136  |  95<L>  |  81.4<H>  ----------------------------<  150<H>  5.4<H>   |  20.0<L>  |  6.87<H>    Ca    7.6<L>      2022 07:12    TPro  5.8<L>  /  Alb  3.0<L>  /  TBili  0.7  /  DBili  0.4<H>  /  AST  70<H>  /  ALT  20  /  AlkPhos  379<H>                                   9.5    6.04  )-----------( 137      ( 2022 07:12 )             28.6          PT/INR - ( 2022 07:12 )   PT: 20.2 sec;   INR: 1.73 ratio             CAPILLARY BLOOD GLUCOSE  POCT Blood Glucose.: 139 mg/dL (2022 11:28)           Last CXR: < from: Xray Chest 1 View- PORTABLE-Urgent (22 @ 14:49) >  INTERPRETATION:  Clinical history: 72-year-old male, sepsis.    Portable/expiratory view of the chest is compared to 2022.    FINDINGS: Mild cardiomegaly and normal pulmonary vasculature with no   consolidation, effusion or acute osseous finding.    Large-bore double-lumen right-sided catheter with the tip in the right   atrium, unchanged.    Post sternotomy/CABG.    Bilateral lower lobe platelike atelectasis/interstitial infiltrates,   increased.    IMPRESSION:  Normal pulmonary vasculature, resolution of mild congestion.    Bilateral lower lobe platelike atelectasis/interstitial infiltrates,   increased    < end of copied text >  < from: CT Chest No Cont (22 @ 01:53) >  FINDINGS:    CHEST:    LUNGS AND LARGE AIRWAYS: PLEURA:  Small bilateral pleural effusions, including small loculated components   within the bilateral major fissures.  There are patchy bilateral lower lobe airspace consolidations, more   pronounced in the right lower lobe.    There is mild groundglass haziness right middle and lower lobes.     There is marked narrowing of the right upper lobe bronchus at the level   of surgical clips.  There is collapse of the bronchus intermedius and segmental right middle   and lower lobe bronchi.  There is mild narrowing of the left upper lobe bronchus at the level of   surgical clips.    VESSELS: Atherosclerotic changes thoracic aorta and coronary artery   calcifications.  Right-sided central venous catheter, tip at the distal SVC.    HEART:  The heart is enlarged.  Mitral valvular calcifications.  Calcifications at the aortic root.   Minimal pericardial effusion/thickening.    MEDIASTINUMAND JANNY:  Small subcentimeter short axis precarinal lymph nodes, stable.  Calcified subcarinal mediastinal lymph node, stable.  The evaluation of the pulmonary hilum is limited without intravenous   contrast.    CHEST WALL AND LOWER NECK:  Sternotomy.    ABDOMEN AND PELVIS:  Streak artifact degrades image quality.    The evaluation of the solid organ parenchyma is limited without   intravenous contrast.    LIVER: Within normal limits.  BILE DUCTS: Normal caliber.  GALLBLADDER: Within normal limits.  SPLEEN: Within normal limits.  PANCREAS: Within normal limits.  ADRENALS: Within normal limits.  KIDNEYS/URETERS:  Bilateral atrophic kidneys with intrarenal vascular calcifications.  Partially duplicated left renal collecting system, no hydronephrosis.  Small layering stones distal right ureter, without hydroureter.    Metallic streak artifact related to patient's orthopedic hardware   degrades image quality limiting evaluation of the pelvis.  BLADDER: Small intraluminal calcification.  REPRODUCTIVE ORGANS: Limited.    BOWEL:  Colonic fecal retention with moderate distention of the rectum, no bowel   obstruction.  Sigmoid diverticulosis.  PERITONEUM:  Trace ascites.  Mild stranding within the bilateral posterior flanks.  No localized intra-abdominal fluid collection.    VESSELS: Atherosclerotic changes.  Infrarenal abdominal aortic dilatation measuring 2.6 cm.  RETROPERITONEUM/LYMPH NODES: No lymphadenopathy.  No retroperitoneal hematoma.    ABDOMINAL WALL: Subcutaneous edema.    BONES:  Multilevel age indeterminate compression fracture deformities mid to   lower thoracic vertebral bodies, as well as L1 L2 L3 and L4 vertebral   bodies.    Comminuted left acetabular fracture, extending superiorly into the   suprasellar acetabular iliac bone, and puboacetabular junction. This   places left inferior pubic ramus fracture.    Incompletely imaged right hip arthroplasty and intramedullary krista left   proximal femur.      IMPRESSION:    Bilateral airspace consolidations, more pronounced right lower lobe,   findings which may reflect atelectasis and/or pneumonia.  Small bilateral pleural effusions.    Marked narrowing of the right upper lobe bronchus and mild narrowing of   the left upper lobe bronchus at the level of the surgical clips,   correlate clinically for bronchial stenosis.    Collapse of the bronchus intermedius and segmental right middle and lower   lobe bronchi, may be secondary to secretions/mucoid impaction.  Recommend further clinical correlation and follow-up CT exam to exclude   an obstructive pneumonitis/atelectasis secondary to underlying mass.    Small layering calcifications distal right ureter with small intraluminal   bladder calcification; no hydronephrosis.    Other findings as discussed above.    This study was preliminary reported by the ED radiologist on 2022.    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

## 2022-04-30 NOTE — PROGRESS NOTE ADULT - ASSESSMENT
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   Recently had a fall and was found to have pelvic/left hip fractures -- seen by Ortho and was recommended non operative management.     1) Acute respiratory failure with hypoxia / COPD Exacerbation   - CXR shows bilateral lower lobe platelike atelectasis/interstitial infiltrates, increased from prior  - COVID-19 Pneumonia   - Change Solumedrol to 40 mg q 12 hours   - Continue Remdesivir   - Continue Meropenem and Doxycycline   - Spiriva   - Albuterol PRN   - s/p Bronchoscopy on 4/29  - CTS / ID follow ups noted     2) Left Acetabulum / Pubic Rami Fractures   - Conservative treatment  - Pain control  - LLE TTWB  - PT  - Ortho follow up noted     3) ESRD   - Continue dialysis as per schedule   - Renal following     4) History of Fungal Meningitis   - Continue Fluconazole     5) History of Lung Transplant   - Continue Tacrolimus and Cellcept     6) Right IJ Occlusion   - Resume Eliquis    DVT Prophylaxis -- Patient is on Eliquis.     Advance Directives: Full Code.     Dispo: [facility] likely early next week.

## 2022-05-01 NOTE — PROGRESS NOTE ADULT - ASSESSMENT
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 In the ED he was hypoxic to 85% on room air and subsequently required BPAP for respiratory distress. Found to be positive for COVID-19. Recently he presented to ED for L hip fracture from Nursing facility. In the ER patient was afebrile. Started on Meropenem, Vancomycin and Azithromycin.     Acute Hypoxic respiratory failure  COVID-19 infection  B/L Pneumonia   Transaminitis   ESRD on HD    Hip fracture   Lung Transplant on Immunosuppressants     - COVID 19 PCR Positive on 4/26/22  - Blood cultures 4/26 no growth   - CXR with B/L PNA  - CT Chest reporting B/L PNA  -  .   - On Remdesivir 200mg IV x1 followed by 100mg IV daily, will complete 5 days on 5/1/22 ( LFT Noted )= Per ID,   - On Steroids per Pulm   -  S/P BRONCH CX SO FAR NEGATIVE   - On Meropenem/DOXY  ( F.U Cultures - P )   - On  PPX Fluconazole, and Bactrim     HD in AM,

## 2022-05-01 NOTE — PROGRESS NOTE ADULT - ASSESSMENT
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   Recently had a fall and was found to have pelvic/left hip fractures -- seen by Ortho and was recommended non operative management.     1) Acute respiratory failure with hypoxia / COPD Exacerbation   - CXR shows bilateral lower lobe platelike atelectasis/interstitial infiltrates, increased from prior  - COVID-19 Pneumonia   - Changed Solumedrol to 40 mg q 12 hours   - Continue Remdesivir   - Continue Meropenem and Doxycycline   - Spiriva   - Albuterol PRN   - s/p Bronchoscopy on 4/29  - CTS / ID follow ups noted     2) Left Acetabulum / Pubic Rami Fractures   - Conservative treatment  - Pain control  - LLE TTWB  - PT  - Ortho follow up noted     3) ESRD   - Continue dialysis as per schedule   - Renal following     4) History of Fungal Meningitis   - Continue Fluconazole     5) History of Lung Transplant   - Continue Tacrolimus and Cellcept   - Continue Bactrim for prophylaxis     6) Right IJ Occlusion   - Resumed Eliquis    DVT Prophylaxis -- Patient is on Eliquis.     Advance Directives: Full Code.     Dispo: [facility] likely in 48 hours.  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   Recently had a fall and was found to have pelvic/left hip fractures -- seen by Ortho and was recommended non operative management.     1) Acute respiratory failure with hypoxia / COPD Exacerbation   - CXR shows bilateral lower lobe platelike atelectasis/interstitial infiltrates, increased from prior  - COVID-19 Pneumonia   - Changed Solumedrol to 40 mg q 12 hours   - Continue Remdesivir   - Continue Meropenem and Doxycycline   - Spiriva   - Albuterol PRN   - s/p Bronchoscopy on 4/29  - CTS / ID follow ups noted     2) Left Acetabulum / Pubic Rami Fractures   - Conservative treatment  - Pain control  - LLE TTWB  - PT  - Ortho follow up noted     3) ESRD   - Continue dialysis as per schedule   - Renal following     4) History of Fungal Meningitis   - Continue Fluconazole     5) History of Lung Transplant   - Continue Tacrolimus and Cellcept   - Continue Bactrim for prophylaxis     6) Right IJ Occlusion   - Resumed Eliquis    7) Hyperkalemia  - Lokelma 10 gm x 3    DVT Prophylaxis -- Patient is on Eliquis.     Advance Directives: Full Code.     Dispo: [facility] likely in 48 hours.

## 2022-05-01 NOTE — PROGRESS NOTE ADULT - SUBJECTIVE AND OBJECTIVE BOX
Family history of asthma and other chronic lower respiratory diseases    HPI:  pt. 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 Patient was sent from hemodialysis for hypotension after an incomplete session, he doesn’t know how long. Received 1L fluids from EMS. Denies f/ch, abdominal pain, n/v. Not on oxygen at home. Former smoker. Hypoxic to 85% on room air in ER, placed on 3L NC, then upgraded to bipap for persistent respiratory distress. Reported hypotension improved on arrival to ER, /58. Vancomycin and zosyn given empirically for possible PNA. pt.Clinically improved on bipap. reports no cp, no abd. pain, no n/v/d. Later pt's covid-19 came back positive. pt. stated that his wife had covid last week. pt. also reported that he is covid 19 vaccinated and got booster as well. pt. evaluated by MICU but step down is recommended.   Recently pt. presented to [facility] ER on 04/21/22 for L hip pain s/p mechanical trip/fall stepping over curb.  Patient was found to have Acute comminuted, intra-articular fractures of the superior, anterior, medial and posterior left acetabulum with fracture extension into the left superior pubic ramus and left medial ilium. Acute comminuted, mildly displaced fracture of the left inferior pubic ramus.  As per orthopedics non surgical management.  Patient received dialysis and a unit of blood.  PT recommended ALIYAH and [facility] [place] (26 Apr 2022 17:25)    Interval History:  Patient was seen and examined at bedside around 11 am.  Doing well.   Pain is controlled in left hip/pelvis.   Breathing is improving.   Denies chest pain, palpitations, headache, dizziness, visual symptoms, nausea, vomiting or abdominal pain.    ROS:  As per interval history otherwise unremarkable.    PHYSICAL EXAM:  Vital Signs   T(C): 36.4 (01 May 2022 04:15), Max: 36.6 (30 Apr 2022 21:33)  T(F): 97.5 (01 May 2022 04:15), Max: 97.9 (30 Apr 2022 21:33)  HR: 86 (01 May 2022 04:15) (83 - 92)  BP: 110/54 (01 May 2022 04:15) (103/55 - 110/54)  BP(mean): 68 (30 Apr 2022 18:23) (66 - 68)  RR: 18 (01 May 2022 04:15) (12 - 18)  SpO2: 99% (01 May 2022 04:15) (99% - 100%)  General: Elderly male sitting in bed comfortably. No acute distress  HEENT: EOMI. Clear conjunctivae. Moist mucus membrane  Neck: Supple.   Chest: Good air entry. No wheezing, rales or rhonchi. No chest wall tenderness. Permacath in right upper chest.   Heart: Normal S1 & S2. RRR.   Abdomen: Soft. Non-tender. Non-distended. + BS  Ext: No pedal edema. No calf tenderness   Neuro: Active and alert. No focal deficit. No speech disorder  Skin: Warm and Dry  Psychiatry: Normal mood and affect    I&O's Summary    30 Apr 2022 07:01  -  01 May 2022 07:00  --------------------------------------------------------  IN: 50 mL / OUT: 0 mL / NET: 50 mL    LABS:                        9.0    4.36  )-----------( 168      ( 30 Apr 2022 10:38 )             27.1     05-01    137  |  98  |  78.5<H>  ----------------------------<  207<H>  5.5<H>   |  22.0  |  5.74<H>    Ca    8.0<L>      01 May 2022 07:44    TPro  5.4<L>  /  Alb  2.9<L>  /  TBili  0.7  /  DBili  x   /  AST  62<H>  /  ALT  16  /  AlkPhos  328<H>  05-01    PT/INR - ( 30 Apr 2022 10:38 )   PT: 20.4 sec;   INR: 1.75 ratio      Blood Culture: 04-29 @ 21:59  Organism --  Gram Stain Blood -- Gram Stain   No polymorphonuclear cells seen per low power field  No polymorphonuclear cells seen per low power field  Rare Gram Negative Rods per oil power field  Specimen Source .Bronchial BAL (RLL)  Culture-Blood --    04-26 @ 14:29  Organism --  Gram Stain Blood -- Gram Stain --  Specimen Source .Blood Blood-Peripheral  Culture-Blood --    04-26 @ 14:28  Organism --  Gram Stain Blood -- Gram Stain --  Specimen Source .Blood Blood-Peripheral  Culture-Blood --    RADIOLOGY & ADDITIONAL STUDIES:  Reviewed     MEDICATIONS  (STANDING):  apixaban 2.5 milliGRAM(s) Oral every 12 hours  atorvastatin 40 milliGRAM(s) Oral at bedtime  chlorhexidine 2% Cloths 1 Application(s) Topical <User Schedule>  clopidogrel Tablet 75 milliGRAM(s) Oral daily  doxycycline hyclate Capsule 100 milliGRAM(s) Oral every 12 hours  fluconAZOLE   Tablet 200 milliGRAM(s) Oral <User Schedule>  meropenem  IVPB 500 milliGRAM(s) IV Intermittent every 24 hours  methylPREDNISolone sodium succinate Injectable 40 milliGRAM(s) IV Push every 12 hours  multivitamin 1 Tablet(s) Oral daily  mycophenolate mofetil 500 milliGRAM(s) Oral two times a day  pantoprazole    Tablet 40 milliGRAM(s) Oral before breakfast  remdesivir  IVPB   IV Intermittent   remdesivir  IVPB 100 milliGRAM(s) IV Intermittent every 24 hours  sevelamer carbonate 1600 milliGRAM(s) Oral three times a day with meals  sodium zirconium cyclosilicate 10 Gram(s) Oral every 8 hours  tacrolimus 1.5 milliGRAM(s) Oral <User Schedule>  tacrolimus 1 milliGRAM(s) Oral <User Schedule>  tamsulosin 0.4 milliGRAM(s) Oral at bedtime  tiotropium 18 MICROgram(s) Capsule 1 Capsule(s) Inhalation daily    MEDICATIONS  (PRN):  ALBUTerol    0.083% 2.5 milliGRAM(s) Nebulizer every 4 hours PRN Shortness of Breath and/or Wheezing  HYDROmorphone  Injectable 0.5 milliGRAM(s) IV Push every 6 hours PRN Moderate Pain (4 - 6)  HYDROmorphone  Injectable 1 milliGRAM(s) IV Push every 6 hours PRN Severe Pain (7 - 10)

## 2022-05-01 NOTE — PROGRESS NOTE ADULT - ASSESSMENT
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 In the ED he was hypoxic to 85% on room air and subsequently required BPAP for respiratory distress. Found to be positive for COVID-19. Recently he presented to ED for L hip fracture from Nursing facility. In the ER patient was afebrile. Started on Meropenem, Vancomycin and Azithromycin.       Acute Hypoxic respiratory failure  COVID-19 infection  B/L Pneumonia   shortness of breath  Transaminitis   ESRD on HD   Hip fracture   Lung Transplant on Immunosuppressants       - COVID 19 PCR Positive on 4/26/22  - Blood cultures 4/26 no growth   - CXR with B/L PNA  - CT Chest reporting B/L PNA   - Continue supportive care measures  - continue to trend inflammatory markers.   - trend CBC with diff, CMP,  CRP, Ferritin, D Dimer q 48hours  -  .   - Continue Remdesivir 200mg IV x1 followed by 100mg IV daily, will complete 5 days on 5/1/22  - Monitor LFT's while on Remdesivir  - On Steroids per Pulm   -  S/P BRONCH CX SO FAR NEGATIVE   - pending cultures will continue Meropenem/DOXY   -    - Continue PPX Fluconazole, and Bactrim   - Follow up cultures  - Trend Fever  - Trend WBC   WILL FOLLOW UP

## 2022-05-01 NOTE — PROGRESS NOTE ADULT - SUBJECTIVE AND OBJECTIVE BOX
JU FERGUSON [id number]    Follow up: COVID 19 , ESRD,     Remains on O2 ,    No complaints this AM     S.P BRONCH 4/29    Allergies:  budesonide (Unknown)  cefpodoxime (Unknown)  Pollen (Unknown)     REVIEW OF SYSTEMS:  CONSTITUTIONAL:  No Fever or chills  HEENT:  No diplopia or blurred vision.  No earache, sore throat or runny nose.  CARDIOVASCULAR:  No pressure, squeezing, strangling, tightness, heaviness or aching about the chest, neck, axilla or epigastrium.  RESPIRATORY:  No cough. + shortness of breath  GASTROINTESTINAL:  No nausea, vomiting or diarrhea.  GENITOURINARY:  No dysuria, frequency or urgency.   MUSCULOSKELETAL:  no joint aches, no muscle pain  SKIN:  No change in skin, hair or nails.  NEUROLOGIC:  No Headaches, seizures   PSYCHIATRIC:  No disorder of thought or mood.  ENDOCRINE:  No heat or cold intolerance  HEMATOLOGICAL:  No easy bruising or bleeding.     Physical Exam:  GEN: NAD ON NC 02  HEENT: normocephalic and atraumatic. EOMI. PERRL.    NECK: Supple.   LUNGS: Decreased Basal BS B/L   HEART: RRR  ABDOMEN: Soft, NT, ND.  +BS.    : No CVA tenderness  EXTREMITIES: Without  edema.  MSK: No joint swelling  NEUROLOGIC: AAOx3  PSYCHIATRIC: Appropriate affect .  SKIN: No rash    Vitals: Vital Signs Last 24 Hrs  T(C): 36.4 (01 May 2022 04:15), Max: 36.6 (30 Apr 2022 21:33)  T(F): 97.5 (01 May 2022 04:15), Max: 97.9 (30 Apr 2022 21:33)  HR: 86 (01 May 2022 04:15) (83 - 92)  BP: 110/54 (01 May 2022 04:15) (103/55 - 110/54)  BP(mean): 68 (30 Apr 2022 18:23) (66 - 68)  RR: 18 (01 May 2022 04:15) (12 - 18)  SpO2: 99% (01 May 2022 04:15) (99% - 100%)    Current Antibiotics:    doxycycline hyclate Capsule 100 milliGRAM(s) Oral every 12 hours  fluconAZOLE   Tablet 200 milliGRAM(s) Oral <User Schedule>  meropenem  IVPB 500 milliGRAM(s) IV Intermittent every 24 hours  remdesivir  IVPB   IV Intermittent   remdesivir  IVPB 100 milliGRAM(s) IV Intermittent every 24 hours  trimethoprim   80 mG/sulfamethoxazole 400 mG 1 Tablet(s) Oral <User Schedule>    Other medications:  atorvastatin 40 milliGRAM(s) Oral at bedtime  chlorhexidine 2% Cloths 1 Application(s) Topical <User Schedule>  methylPREDNISolone sodium succinate Injectable 40 milliGRAM(s) IV Push every 8 hours  multivitamin 1 Tablet(s) Oral daily  mycophenolate mofetil 500 milliGRAM(s) Oral two times a day  pantoprazole    Tablet 40 milliGRAM(s) Oral before breakfast  sevelamer carbonate 1600 milliGRAM(s) Oral three times a day with meals  tacrolimus 1.5 milliGRAM(s) Oral <User Schedule>  tacrolimus 1 milliGRAM(s) Oral <User Schedule>  tamsulosin 0.4 milliGRAM(s) Oral at bedtime                                    9.0    4.36  )-----------( 168      ( 30 Apr 2022 10:38 )             27.1   05-01    137  |  98  |  78.5<H>  ----------------------------<  207<H>  5.5<H>   |  22.0  |  5.74<H>    Ca    8.0<L>      01 May 2022 07:44    TPro  5.4<L>  /  Alb  2.9<L>  /  TBili  0.7  /  DBili  x   /  AST  62<H>  /  ALT  16  /  AlkPhos  328<H>  05-01    RECENT CULTURES:  04-26 @ 14:29 .Blood Blood-Peripheral     No growth at 48 hours    04-26 @ 14:28 .Blood Blood-Peripheral     No growth at 48 hours    WBC Count: 6.04 K/uL (04-29-22 @ 07:12)  WBC Count: 7.40 K/uL (04-28-22 @ 07:53)  WBC Count: 5.17 K/uL (04-27-22 @ 11:53)  WBC Count: 5.85 K/uL (04-27-22 @ 05:42)  WBC Count: 8.83 K/uL (04-26-22 @ 14:30)    Creatinine, Serum: 6.87 mg/dL (04-29-22 @ 07:12)  Creatinine, Serum: 4.82 mg/dL (04-28-22 @ 07:53)  Creatinine, Serum: 6.57 mg/dL (04-27-22 @ 05:42)  Creatinine, Serum: 5.19 mg/dL (04-26-22 @ 14:30)    Procalcitonin, Serum: 13.09 ng/mL (04-26-22 @ 17:02)     SARS-CoV-2: Detected (04-26-22 @ 14:31)  COVID-19 PCR: NotDetec (04-20-22 @ 22:05)    CT Chest No Cont (04.28.22 @ 01:53)    ACC: [id number] EXAM:  CT ABDOMEN AND PELVIS                        ACC: [id number] EXAM:  CT CHEST                          PROCEDURE DATE:  04/28/2022      INTERPRETATION:  CLINICAL INFORMATION: Acute hypoxic respiratory failure.  History of lungtransplants.  Transaminitis.    COMPARISON: CT scan chest 8/20/2021.    CONTRAST/COMPLICATIONS:  IV Contrast: NONE  Oral Contrast: NONE  Complications: None reported at time of study completion    PROCEDURE:  CT of the Chest, Abdomen and Pelvis wasperformed.  Sagittal and coronal reformats were performed.    FINDINGS:    CHEST:    LUNGS AND LARGE AIRWAYS: PLEURA:  Small bilateral pleural effusions, including small loculated components   within the bilateral major fissures.  There are patchy bilateral lower lobe airspace consolidations, more   pronounced in the right lower lobe.    There is mild groundglass haziness right middle and lower lobes.     There is marked narrowing of the right upper lobe bronchus at the level   of surgical clips.  There is collapse of the bronchus intermedius and segmental right middle   and lower lobe bronchi.  There is mild narrowing of the left upper lobe bronchus at the level of   surgical clips.    VESSELS: Atherosclerotic changes thoracic aorta and coronary artery   calcifications.  Right-sided central venous catheter, tip at the distal SVC.    HEART:  The heart is enlarged.  Mitral valvular calcifications.  Calcifications at the aortic root.   Minimal pericardial effusion/thickening.    MEDIASTINUMAND JANNY:  Small subcentimeter short axis precarinal lymph nodes, stable.  Calcified subcarinal mediastinal lymph node, stable.  The evaluation of the pulmonary hilum is limited without intravenous   contrast.    CHEST WALL AND LOWER NECK:  Sternotomy.    ABDOMEN AND PELVIS:  Streak artifact degrades image quality.    The evaluation of the solid organ parenchyma is limited without   intravenous contrast.    LIVER: Within normal limits.  BILE DUCTS: Normal caliber.  GALLBLADDER: Within normal limits.  SPLEEN: Within normal limits.  PANCREAS: Within normal limits.  ADRENALS: Within normal limits.  KIDNEYS/URETERS:  Bilateral atrophic kidneys with intrarenal vascular calcifications.  Partially duplicated left renal collecting system, no hydronephrosis.  Small layering stones distal right ureter, without hydroureter.    Metallic streak artifact related to patient's orthopedic hardware   degrades image quality limiting evaluation of the pelvis.  BLADDER: Small intraluminal calcification.  REPRODUCTIVE ORGANS: Limited.    BOWEL:  Colonic fecal retention with moderate distention of the rectum, no bowel   obstruction.  Sigmoid diverticulosis.  PERITONEUM:  Trace ascites.  Mild stranding within the bilateral posterior flanks.  No localized intra-abdominal fluid collection.    VESSELS: Atherosclerotic changes.  Infrarenal abdominal aortic dilatation measuring 2.6 cm.  RETROPERITONEUM/LYMPH NODES: No lymphadenopathy.  No retroperitoneal hematoma.    ABDOMINAL WALL: Subcutaneous edema.    BONES:  Multilevel age indeterminate compression fracture deformities mid to   lower thoracic vertebral bodies, as well as L1 L2 L3 and L4 vertebral   bodies.    Comminuted left acetabular fracture, extending superiorly into the   suprasellar acetabular iliac bone, and puboacetabular junction. This   places left inferior pubic ramus fracture.    Incompletely imaged right hip arthroplasty and intramedullary krista left   proximal femur.    IMPRESSION:    Bilateral airspace consolidations, more pronounced right lower lobe,   findings which may reflect atelectasis and/or pneumonia.  Small bilateral pleural effusions.    Marked narrowing of the right upper lobe bronchus and mild narrowing of   the left upper lobe bronchus at the level of the surgical clips,   correlate clinically for bronchial stenosis.    Collapse of the bronchus intermedius and segmental right middle and lower   lobe bronchi, may be secondary to secretions/mucoid impaction.  Recommend further clinical correlation and follow-up CT exam to exclude   an obstructive pneumonitis/atelectasis secondary to underlying mass.    Small layering calcifications distal right ureter with small intraluminal   bladder calcification; no hydronephrosis.    Other findings as discussed above.    This study was preliminary reported by the ED radiologist on 4/28/2022.

## 2022-05-02 NOTE — PROGRESS NOTE ADULT - ASSESSMENT
Imp--s/p lung xplant Pneumonia s/p BAL.  Clinically bacterial, improving.  Plan--complete abx  Continue Spiriva, Tacrolimus and Cellcept  Continue Bactrim  HD per renal  LowerO2 as tolerated  ALIYAH likely in 24-48 hours   OP FU with Dr Cordova

## 2022-05-02 NOTE — PROGRESS NOTE ADULT - SUBJECTIVE AND OBJECTIVE BOX
PULMONARY PROGRESS NOTE      MARTY JUCULLEN-[id number]    Patient is a 72y old  Male who presents with a chief complaint of acute hypoxic respiratory failure. 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   Recently had a fall and was found to have pelvic/left hip fractures -- seen by Ortho and was recommended non operative management.   Acute respiratory failure with hypoxia / COPD Exacerbation   COVID-19 Pneumonia    INTERVAL HPI/OVERNIGHT EVENTS:  Little interval Change  OK     MEDICATIONS  (STANDING):  apixaban 2.5 milliGRAM(s) Oral every 12 hours  atorvastatin 40 milliGRAM(s) Oral at bedtime  chlorhexidine 2% Cloths 1 Application(s) Topical <User Schedule>  clopidogrel Tablet 75 milliGRAM(s) Oral daily  doxycycline hyclate Capsule 100 milliGRAM(s) Oral every 12 hours  fluconAZOLE   Tablet 200 milliGRAM(s) Oral <User Schedule>  meropenem  IVPB 500 milliGRAM(s) IV Intermittent every 24 hours  methylPREDNISolone sodium succinate Injectable 40 milliGRAM(s) IV Push every 12 hours  multivitamin 1 Tablet(s) Oral daily  mycophenolate mofetil 500 milliGRAM(s) Oral two times a day  pantoprazole    Tablet 40 milliGRAM(s) Oral before breakfast  sevelamer carbonate 1600 milliGRAM(s) Oral three times a day with meals  tacrolimus 1.5 milliGRAM(s) Oral <User Schedule>  tacrolimus 1 milliGRAM(s) Oral <User Schedule>  tamsulosin 0.4 milliGRAM(s) Oral at bedtime  tiotropium 18 MICROgram(s) Capsule 1 Capsule(s) Inhalation daily  trimethoprim   80 mG/sulfamethoxazole 400 mG 1 Tablet(s) Oral <User Schedule>      MEDICATIONS  (PRN):  ALBUTerol    0.083% 2.5 milliGRAM(s) Nebulizer every 4 hours PRN Shortness of Breath and/or Wheezing  HYDROmorphone  Injectable 0.5 milliGRAM(s) IV Push every 6 hours PRN Moderate Pain (4 - 6)  HYDROmorphone  Injectable 1 milliGRAM(s) IV Push every 6 hours PRN Severe Pain (7 - 10)      Allergies    budesonide (Unknown)  cefpodoxime (Unknown)  Pollen (Unknown)    Intolerances        PAST MEDICAL & SURGICAL HISTORY:  Emphysema of lung  s/p lung transplant    ESRD (end stage renal disease) on dialysis  on HD via LUE T/Th/Sat    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    2019 novel coronavirus disease (COVID-19)  Feb 2021 - hospitalized for 1 week at [facility]    Pneumonia  April 2021    [place]    HTN (hypertension)    Lumbar spondylosis    History of COPD    BPH without urinary obstruction    Hypercholesterolemia    Oliguria and anuria    H/O peripheral neuropathy    H/O lung transplant  bilateral - transplant - 1-2-2015 -  [facility]    [facility]    History of [facility] replacement  right    Status post open reduction and internal fixation (ORIF) of fracture  left femur        SOCIAL HISTORY  Smoking History:       REVIEW OF SYSTEMS:    CONSTITUTIONAL:  No distress  Steady improvement     Vital Signs Last 24 Hrs  T(C): 36.6 (02 May 2022 08:30), Max: 37.3 (02 May 2022 08:04)  T(F): 97.8 (02 May 2022 08:30), Max: 99.1 (02 May 2022 08:04)  HR: 86 (02 May 2022 08:45) (83 - 95)  BP: 120/66 (02 May 2022 08:30) (106/61 - 127/65)  BP(mean): --  RR: 18 (02 May 2022 08:30) (16 - 18)  SpO2: 96% (02 May 2022 08:45) (94% - 99%)    PHYSICAL EXAMINATION:    GENERAL: The patient is awake and alert in no apparent distress.     HEENT: Head is normocephalic and atraumatic. Extraocular muscles are intact. Mucous membranes are moist.    NECK: Supple.    LUNGS: Clear to auscultation without wheezing, rales or rhonchi; respirations unlabored    HEART: Regular rate and rhythm without murmur.    ABDOMEN: Soft, nontender, and nondistended.      EXTREMITIES: Without any cyanosis, clubbing, rash, lesions or edema.    NEUROLOGIC: Grossly intact.    LABS:    05-01    137  |  98  |  78.5<H>  ----------------------------<  207<H>  5.5<H>   |  22.0  |  5.74<H>    Ca    8.0<L>      01 May 2022 07:44    TPro  5.4<L>  /  Alb  2.9<L>  /  TBili  0.7  /  DBili  x   /  AST  62<H>  /  ALT  16  /  AlkPhos  328<H>  05-01      RADIOLOGY & ADDITIONAL STUDIES:    ACC: [id number] EXAM:  XR CHEST PORTABLE URGENT 1V                          PROCEDURE DATE:  04/30/2022          INTERPRETATION:  Exam:XR CHEST URGENT    clinical history:Status post bronchoscopy    Heart size is stable. Improved aeration of the lungs when compared to   April 26. No pneumothorax.    IMPRESSION: Improved aeration    --- End of Report ---            NATALEE HEBERT MD; Attending Radiologist  This document has been electronically signed. Apr 30 2022  2:08PM

## 2022-05-02 NOTE — PROGRESS NOTE ADULT - ASSESSMENT
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 In the ED he was hypoxic to 85% on room air and subsequently required BPAP for respiratory distress. Found to be positive for COVID-19. Recently he presented to ED for L hip fracture from Nursing facility. In the ER patient was afebrile. Started on Meropenem, Vancomycin and Azithromycin.       Acute Hypoxic respiratory failure  COVID-19 infection  B/L Pneumonia   shortness of breath  Transaminitis   ESRD on HD   Hip fracture   Lung Transplant on Immunosuppressants       - COVID 19 PCR Positive on 4/26/22  - Blood cultures 4/26 no growth   - CXR with B/L PNA  - CT Chest reporting B/L PNA   - Continue supportive care measures  - continue to trend inflammatory markers.   - trend CBC with diff, CMP,  CRP, Ferritin, D Dimer q 48hours  - Discussed Remdesivir with the patient.   - Completed Remdesivir  5 days on 5/1/22  - On Steroids per Pulm   - not a candidate for Bebtelovimab due to hypoxia   - Blood cultures 4/26 no growth   - BAL cultures 4/29 reporting normal keerthi   - Continue Meropenem  - Continue Doxycycline 100mg PO q 12hours  - Last day of antibiotics 5/3/22  - Continue PPX Fluconazole, and Bactrim   - Follow up cultures  - Trend Fever  - Trend WBC      Will Follow      d/w Dr Steele and Dr Jacques

## 2022-05-02 NOTE — PROGRESS NOTE ADULT - SUBJECTIVE AND OBJECTIVE BOX
[facility]  INFECTIOUS DISEASES AND INTERNAL MEDICINE at [place] and [place]  =======================================================                               J Carlos Galdamez MD#  Negrito Caro MD*                                     Isatu Rojas MD*    Perlita Solares MD*            Diplomates American Board of Internal Medicine & Infectious Diseases                # [place] Office - Appt - Tel  [phone] Fax [phone]                * [place] Office - Appt - Tel [phone] Fax [phone]                                  Hospital Consult line:  [phone]  =======================================================    JU FERGUSON [id number]    Follow up: COVID 19     Off O2   No complaints   feels well       Allergies:  budesonide (Unknown)  cefpodoxime (Unknown)  Pollen (Unknown)       REVIEW OF SYSTEMS:  CONSTITUTIONAL:  No Fever or chills  HEENT:  No diplopia or blurred vision.  No earache, sore throat or runny nose.  CARDIOVASCULAR:  No pressure, squeezing, strangling, tightness, heaviness or aching about the chest, neck, axilla or epigastrium.  RESPIRATORY:  No cough. + shortness of breath  GASTROINTESTINAL:  No nausea, vomiting or diarrhea.  GENITOURINARY:  No dysuria, frequency or urgency.   MUSCULOSKELETAL:  no joint aches, no muscle pain  SKIN:  No change in skin, hair or nails.  NEUROLOGIC:  No Headaches, seizures   PSYCHIATRIC:  No disorder of thought or mood.  ENDOCRINE:  No heat or cold intolerance  HEMATOLOGICAL:  No easy bruising or bleeding.       Physical Exam:  GEN: NAD  HEENT: normocephalic and atraumatic. EOMI. PERRL.    NECK: Supple.   LUNGS: Decreased Basal BS B/L   HEART: RRR  ABDOMEN: Soft, NT, ND.  +BS.    : No CVA tenderness  EXTREMITIES: Without  edema.  MSK: No joint swelling  NEUROLOGIC: AAOx3  PSYCHIATRIC: Appropriate affect .  SKIN: No rash      Vitals:    T(F): 97.7 (02 May 2022 12:10), Max: 99.1 (02 May 2022 08:04)  HR: 76 (02 May 2022 12:10)  BP: 144/86 (02 May 2022 12:10)  RR: 18 (02 May 2022 12:10)  SpO2: 97% (02 May 2022 12:10) (95% - 99%)  temp max in last 48H T(F): , Max: 99.1 (05-02-22 @ 08:04)    Current Antibiotics:  doxycycline hyclate Capsule 100 milliGRAM(s) Oral every 12 hours  fluconAZOLE   Tablet 200 milliGRAM(s) Oral <User Schedule>  meropenem  IVPB 500 milliGRAM(s) IV Intermittent every 24 hours  trimethoprim   80 mG/sulfamethoxazole 400 mG 1 Tablet(s) Oral <User Schedule>    Other medications:  apixaban 2.5 milliGRAM(s) Oral every 12 hours  atorvastatin 40 milliGRAM(s) Oral at bedtime  chlorhexidine 2% Cloths 1 Application(s) Topical <User Schedule>  clopidogrel Tablet 75 milliGRAM(s) Oral daily  methylPREDNISolone sodium succinate Injectable 40 milliGRAM(s) IV Push every 12 hours  multivitamin 1 Tablet(s) Oral daily  mycophenolate mofetil 500 milliGRAM(s) Oral two times a day  pantoprazole    Tablet 40 milliGRAM(s) Oral before breakfast  sevelamer carbonate 1600 milliGRAM(s) Oral three times a day with meals  tacrolimus 1.5 milliGRAM(s) Oral <User Schedule>  tacrolimus 1 milliGRAM(s) Oral <User Schedule>  tamsulosin 0.4 milliGRAM(s) Oral at bedtime  tiotropium 18 MICROgram(s) Capsule 1 Capsule(s) Inhalation daily                            8.0    6.02  )-----------( 208      ( 02 May 2022 11:41 )             24.2     05-02    136  |  93<L>  |  104.3<H>  ----------------------------<  140<H>  5.0   |  20.0<L>  |  7.22<H>    Ca    7.3<L>      02 May 2022 11:41    TPro  5.4<L>  /  Alb  2.9<L>  /  TBili  0.7  /  DBili  x   /  AST  62<H>  /  ALT  16  /  AlkPhos  328<H>  05-01    RECENT CULTURES:  04-29 @ 21:59 .Bronchial BAL (RLL)     Normal Respiratory Charis present  No polymorphonuclear cells seen per low power field  No polymorphonuclear cells seen per low power field  Rare Gram Negative Rods per oil power field    04-26 @ 14:29 .Blood Blood-Peripheral     No growth at 5 days.    04-26 @ 14:28 .Blood Blood-Peripheral     No growth at 5 days.      WBC Count: 6.02 K/uL (05-02-22 @ 11:41)  WBC Count: 4.36 K/uL (04-30-22 @ 10:38)  WBC Count: 6.04 K/uL (04-29-22 @ 07:12)  WBC Count: 7.40 K/uL (04-28-22 @ 07:53)    Creatinine, Serum: 7.22 mg/dL (05-02-22 @ 11:41)  Creatinine, Serum: 5.74 mg/dL (05-01-22 @ 07:44)  Creatinine, Serum: 4.32 mg/dL (04-30-22 @ 10:38)  Creatinine, Serum: 6.87 mg/dL (04-29-22 @ 07:12)  Creatinine, Serum: 4.82 mg/dL (04-28-22 @ 07:53)    C-Reactive Protein, Serum: 118 mg/L (05-01-22 @ 07:44)    Procalcitonin, Serum: 13.09 ng/mL (04-26-22 @ 17:02)     SARS-CoV-2: Detected (04-26-22 @ 14:31)  COVID-19 PCR: NotDetec (04-20-22 @ 22:05)            < from: Xray Chest 1 View- PORTABLE-Urgent (Xray Chest 1 View- PORTABLE-Urgent .) (04.30.22 @ 10:00) >  ACC: [id number] EXAM:  XR CHEST PORTABLE URGENT 1V                          PROCEDURE DATE:  04/30/2022      INTERPRETATION:  Exam:XR CHEST URGENT    clinical history:Status post bronchoscopy    Heart size is stable. Improved aeration of the lungs when compared to   April 26. No pneumothorax.    IMPRESSION: Improved aeration  --- End of Report ---  < end of copied text >

## 2022-05-02 NOTE — PROGRESS NOTE ADULT - NS ATTEST RISK PROBLEM GEN_ALL_CORE FT
Respiratory Failure / Bacterial + COVID Pneumonia -- needs close monitoring.

## 2022-05-02 NOTE — PROGRESS NOTE ADULT - ASSESSMENT
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   Recently had a fall and was found to have pelvic/left hip fractures -- seen by Ortho and was recommended non operative management.     1) Acute respiratory failure with hypoxia / COPD Exacerbation   - CXR shows bilateral lower lobe platelike atelectasis/interstitial infiltrates, increased from prior  - COVID-19 Pneumonia   - Change Solumedrol to Dexamethasone (home dose)   - Finished Remdesivir on 5/1   - Continue Meropenem and Doxycycline till 5/3/22  - Spiriva   - Albuterol PRN   - s/p Bronchoscopy on 4/29: copious secretions   - CTS / ID follow ups noted     2) Left Acetabulum / Pubic Rami Fractures   - Conservative treatment  - Pain control  - LLE TTWB  - PT  - Ortho follow up noted     3) ESRD   - Continue dialysis as per schedule   - Renal following     4) History of Fungal Meningitis   - Continue Fluconazole     5) History of Lung Transplant   - Continue Tacrolimus and Cellcept   - Continue Bactrim for prophylaxis   - Change Solumedrol to Dexamethasone     6) Right IJ Occlusion   - Resumed Eliquis    7) Hyperkalemia  - Lokelma 10 gm x 3    DVT Prophylaxis -- Patient is on Eliquis.     Advance Directives: Full Code.     Dispo: [facility] likely in 48 hours. PT Eval.     Updated patient's wife.  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   Recently had a fall and was found to have pelvic/left hip fractures -- seen by Ortho and was recommended non operative management.     1) Acute respiratory failure with hypoxia / COPD Exacerbation   - CXR shows bilateral lower lobe platelike atelectasis/interstitial infiltrates, increased from prior  - COVID-19 Pneumonia   - Change Solumedrol to Hydrocortisone (home dose)   - Finished Remdesivir on 5/1   - Continue Meropenem and Doxycycline till 5/3/22  - Spiriva   - Albuterol PRN   - s/p Bronchoscopy on 4/29: copious secretions   - CTS / ID follow ups noted     2) Left Acetabulum / Pubic Rami Fractures   - Conservative treatment  - Pain control  - LLE TTWB  - PT  - Ortho follow up noted     3) ESRD   - Continue dialysis as per schedule   - Renal following     4) History of Fungal Meningitis   - Continue Fluconazole     5) History of Lung Transplant   - Continue Tacrolimus and Cellcept   - Continue Bactrim for prophylaxis   - Change Solumedrol to Hydrocortisone      6) Right IJ Occlusion   - Resumed Eliquis    7) Hyperkalemia  - Lokelma 10 gm x 3    DVT Prophylaxis -- Patient is on Eliquis.     Advance Directives: Full Code.     Dispo: [facility] likely in 24-48 hours. PT Eval.     Updated patient's wife.

## 2022-05-02 NOTE — PROGRESS NOTE ADULT - SUBJECTIVE AND OBJECTIVE BOX
Vital Signs Last 24 Hrs  T(C): 36.6 (02 May 2022 08:30), Max: 37.3 (02 May 2022 08:04)  T(F): 97.8 (02 May 2022 08:30), Max: 99.1 (02 May 2022 08:04)  HR: 86 (02 May 2022 08:45) (83 - 95)  BP: 120/66 (02 May 2022 08:30) (106/61 - 127/65)  RR: 18 (02 May 2022 08:30) (16 - 18)  SpO2: 96% (02 May 2022 08:45) (94% - 99%)  Pt is seen and examined on dialysis. No symptoms. Hemodynamics stable. Tolerating dialysis and ultrafiltration.  Pre Laboratory values personally reviewed by me.  Dialysis adjusted appropriately based on current values.  Will continue the current medical management.  Next hemodialysis as scheduled.  Discussed with nursing, primary care team.

## 2022-05-03 NOTE — CHART NOTE - NSCHARTNOTEFT_GEN_A_CORE
S/p Bronch/BAL. Resulted.  ID following.  No further thoracic surgery intervention at this time.  Will sign off. Please re-consult as necessary.  D/w Dr. York.

## 2022-05-03 NOTE — DIETITIAN INITIAL EVALUATION ADULT - PERTINENT LABORATORY DATA
05-02    136  |  93<L>  |  104.3<H>  ----------------------------<  140<H>  5.0   |  20.0<L>  |  7.22<H>    Ca    7.3<L>      02 May 2022 11:41    A1C with Estimated Average Glucose Result: 4.6 % (11-03-21 @ 16:56)

## 2022-05-03 NOTE — DIETITIAN INITIAL EVALUATION ADULT - ORAL INTAKE PTA/DIET HISTORY
Pt reporting poor PO intake, appetite and early satiety since admission. Reporting -150 current weight 142.6 lbs

## 2022-05-03 NOTE — DIETITIAN INITIAL EVALUATION ADULT - NS FNS DIET ORDER
Diet, DASH/TLC:   Sodium & Cholesterol Restricted  For patients receiving Renal Replacement - No Protein Restr, No Conc K, No Conc Phos, Low  Sodium (RENAL) (04-26-22 @ 18:04)

## 2022-05-03 NOTE — PROGRESS NOTE ADULT - ASSESSMENT
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   Recently had a fall and was found to have pelvic/left hip fractures -- seen by Ortho and was recommended non operative management.     1) Acute respiratory failure with hypoxia likely due to COPD Exacerbation due to COVID PNA with likely superimposed bacterial PNA  - Hypoxia resolved; saturating well on room air  - CXR shows bilateral lower lobe platelike atelectasis/interstitial infiltrates, increased from prior  - COVID   - Change Solumedrol to Hydrocortisone (home dose)   - Finished Remdesivir on 5/1   - Complete Meropenem and Doxycycline today  - Continue bronchodilators  - s/p Bronchoscopy on 4/29: copious secretions with normal keerthi  - blood cultures no growth to date  - CTS recs appreciated; no further interventions required  - ID recs also reviewed    2) Left Acetabulum / Pubic Rami Fractures   - Conservative treatment  - Analgesia as needed  - LLE TTWB  - PT eval appreciated - [facility]  - Ortho follow up noted     3) ESRD on HD  - Continue dialysis as per schedule   - UF goal per renal  - Continue renvela with meals  - Monitor I/Os, daily weights  - Avoid Nephrotoxic agents and renally dose medications for eGFR < 10  - Renal recs appreciated    4) History of Fungal Meningitis   - Continue Fluconazole     5) History of Lung Transplant   - Continue Tacrolimus and Cellcept   - Continue Bactrim for prophylaxis   - Continue Hydrocortisone      6) Right IJ Occlusion   - Continue Eliquis    7) Hyperkalemia - resolved  - s/p Lokelma 10 gm x 3  - potassium bath adjustment per renal  - low K+ Diet    8) History of CAD  -continue plavix and statin    9) BPH  -continue flomax    10) Anemia of chronic kidney disease  -Hb noted and not at goal  -continue procrit as ordered  -no overt signs/symptoms of bleeding  -repeat CBC in AM    DVT Prophylaxis - Eliquis.     Advance Directives: Full Code.     Dispo: Medically stable for discharge to [facility]; can be discharged back to the [place] on 5/5 per SW.    Plan discussed with patient, RN, SW

## 2022-05-03 NOTE — PROGRESS NOTE ADULT - TIME BILLING
Review of hospital charts, including PACS and labs, and office and outside records if applicable.  Discussion of plans with referring service, coordinating respiratory therapy, including, but not limited to, CPAP, NIPPV, O2, Nebs, home devices.
reviewing labs, notes, orders, radiographic studies, as well as counseling and coordinating care with the relevant multidisciplinary team, including with the primary and consulting providers.

## 2022-05-03 NOTE — PROGRESS NOTE ADULT - SUBJECTIVE AND OBJECTIVE BOX
CHIEF COMPLAINT/INTERVAL HISTORY:    Patient is a 72y old  Male who presents with a chief complaint of acute hypoxic respiratory failure. (03 May 2022 23:40)    SUBJECTIVE & OBJECTIVE: Pt seen and examined at bedside. No overnight events. Patient denies any new complaints today. Pain controlled; PT evaluation pending.    ROS: No chest pain, palpitations, SOB, light headedness, dizziness, headache, nausea/vomiting, fevers/chills, abdominal pain, dysuria or increased urinary frequency.    ICU Vital Signs Last 24 Hrs  T(C): 36.9 (03 May 2022 20:00), Max: 36.9 (03 May 2022 20:00)  T(F): 98.4 (03 May 2022 20:00), Max: 98.4 (03 May 2022 20:00)  HR: 76 (03 May 2022 20:00) (76 - 99)  BP: 135/67 (03 May 2022 20:00) (99/64 - 135/67)  BP(mean): 90 (03 May 2022 20:00) (90 - 90)  ABP: --  ABP(mean): --  RR: 20 (03 May 2022 20:00) (18 - 20)  SpO2: 91% (03 May 2022 20:00) (91% - 96%)        MEDICATIONS  (STANDING):  apixaban 2.5 milliGRAM(s) Oral every 12 hours  atorvastatin 40 milliGRAM(s) Oral at bedtime  chlorhexidine 2% Cloths 1 Application(s) Topical <User Schedule>  clopidogrel Tablet 75 milliGRAM(s) Oral daily  epoetin ben-epbx (RETACRIT) Injectable 94000 Unit(s) IV Push <User Schedule>  fluconAZOLE   Tablet 200 milliGRAM(s) Oral <User Schedule>  hydrocortisone 10 milliGRAM(s) Oral <User Schedule>  hydrocortisone 20 milliGRAM(s) Oral <User Schedule>  multivitamin 1 Tablet(s) Oral daily  mycophenolate mofetil 500 milliGRAM(s) Oral two times a day  pantoprazole    Tablet 40 milliGRAM(s) Oral before breakfast  sevelamer carbonate 1600 milliGRAM(s) Oral three times a day with meals  tacrolimus 1.5 milliGRAM(s) Oral <User Schedule>  tacrolimus 1 milliGRAM(s) Oral <User Schedule>  tamsulosin 0.4 milliGRAM(s) Oral at bedtime  tiotropium 18 MICROgram(s) Capsule 1 Capsule(s) Inhalation daily  trimethoprim   80 mG/sulfamethoxazole 400 mG 1 Tablet(s) Oral <User Schedule>    MEDICATIONS  (PRN):  ALBUTerol    0.083% 2.5 milliGRAM(s) Nebulizer every 4 hours PRN Shortness of Breath and/or Wheezing  HYDROmorphone  Injectable 1 milliGRAM(s) IV Push every 6 hours PRN Severe Pain (7 - 10)  HYDROmorphone  Injectable 0.5 milliGRAM(s) IV Push every 6 hours PRN Moderate Pain (4 - 6)      LABS:                        8.0    6.02  )-----------( 208      ( 02 May 2022 11:41 )             24.2     05-02    136  |  93<L>  |  104.3<H>  ----------------------------<  140<H>  5.0   |  20.0<L>  |  7.22<H>    Ca    7.3<L>      02 May 2022 11:41    CAPILLARY BLOOD GLUCOSE      RECENT CULTURES:      RADIOLOGY & ADDITIONAL TESTS:      PHYSICAL EXAM:    GENERAL: elderly male, laying in bed, NAD  HEAD:  Atraumatic, Normocephalic  EYES: EOMI, PERRLA, conjunctiva and sclera clear  ENMT: Moist mucous membranes  NECK: Supple  NERVOUS SYSTEM:  Alert & Oriented X3, [place]  CHEST/LUNG: bilateral air entry  HEART: Regular rate and rhythm; + S1/S2  ABDOMEN: Soft, Nontender, Nondistended; Bowel sounds present, + hernia  EXTREMITIES:  no pedal edema

## 2022-05-03 NOTE — PROGRESS NOTE ADULT - PROBLEM SELECTOR PROBLEM 3
S/P lung transplant
Closed pelvic fracture
S/P lung transplant
Closed pelvic fracture
Closed pelvic fracture

## 2022-05-03 NOTE — PROGRESS NOTE ADULT - SUBJECTIVE AND OBJECTIVE BOX
[facility]  INFECTIOUS DISEASES AND INTERNAL MEDICINE at [place] and [place]  =======================================================                               J Carlos Galdamez MD#  Negrito Caro MD*                                     Isatu Rojas MD*    Perlita Solares MD*            Diplomates American Board of Internal Medicine & Infectious Diseases                # [place] Office - Appt - Tel  [phone] Fax [phone]                * [place] Office - Appt - Tel [phone] Fax [phone]                                  Hospital Consult line:  [phone]  =======================================================    JU FERGUSON [id number]    Follow up: COVID 19     Off O2   No complaints   feels well       Allergies:  budesonide (Unknown)  cefpodoxime (Unknown)  Pollen (Unknown)       REVIEW OF SYSTEMS:  CONSTITUTIONAL:  No Fever or chills  HEENT:  No diplopia or blurred vision.  No earache, sore throat or runny nose.  CARDIOVASCULAR:  No pressure, squeezing, strangling, tightness, heaviness or aching about the chest, neck, axilla or epigastrium.  RESPIRATORY:  No cough. + shortness of breath  GASTROINTESTINAL:  No nausea, vomiting or diarrhea.  GENITOURINARY:  No dysuria, frequency or urgency.   MUSCULOSKELETAL:  no joint aches, no muscle pain  SKIN:  No change in skin, hair or nails.  NEUROLOGIC:  No Headaches, seizures   PSYCHIATRIC:  No disorder of thought or mood.  ENDOCRINE:  No heat or cold intolerance  HEMATOLOGICAL:  No easy bruising or bleeding.       Physical Exam:  GEN: NAD  HEENT: normocephalic and atraumatic. EOMI. PERRL.    NECK: Supple.   LUNGS: Decreased Basal BS B/L   HEART: RRR  ABDOMEN: Soft, NT, ND.  +BS.    : No CVA tenderness  EXTREMITIES: Without  edema.  MSK: No joint swelling  NEUROLOGIC: AAOx3  PSYCHIATRIC: Appropriate affect .  SKIN: No rash      Vitals:    T(F): 97.6 (03 May 2022 10:51), Max: 98.1 (02 May 2022 21:41)  HR: 99 (03 May 2022 10:51)  BP: 103/66 (03 May 2022 10:51)  RR: 18 (03 May 2022 04:00)  SpO2: 91% (03 May 2022 10:51) (91% - 96%)  temp max in last 48H T(F): , Max: 99.1 (05-02-22 @ 08:04)    Current Antibiotics:  doxycycline hyclate Capsule 100 milliGRAM(s) Oral every 12 hours  fluconAZOLE   Tablet 200 milliGRAM(s) Oral <User Schedule>  trimethoprim   80 mG/sulfamethoxazole 400 mG 1 Tablet(s) Oral <User Schedule>    Other medications:  apixaban 2.5 milliGRAM(s) Oral every 12 hours  atorvastatin 40 milliGRAM(s) Oral at bedtime  chlorhexidine 2% Cloths 1 Application(s) Topical <User Schedule>  clopidogrel Tablet 75 milliGRAM(s) Oral daily  epoetin ben-epbx (RETACRIT) Injectable 11196 Unit(s) IV Push <User Schedule>  hydrocortisone 10 milliGRAM(s) Oral <User Schedule>  hydrocortisone 20 milliGRAM(s) Oral <User Schedule>  multivitamin 1 Tablet(s) Oral daily  mycophenolate mofetil 500 milliGRAM(s) Oral two times a day  pantoprazole    Tablet 40 milliGRAM(s) Oral before breakfast  sevelamer carbonate 1600 milliGRAM(s) Oral three times a day with meals  tacrolimus 1.5 milliGRAM(s) Oral <User Schedule>  tacrolimus 1 milliGRAM(s) Oral <User Schedule>  tamsulosin 0.4 milliGRAM(s) Oral at bedtime  tiotropium 18 MICROgram(s) Capsule 1 Capsule(s) Inhalation daily                            8.0    6.02  )-----------( 208      ( 02 May 2022 11:41 )             24.2     05-02    136  |  93<L>  |  104.3<H>  ----------------------------<  140<H>  5.0   |  20.0<L>  |  7.22<H>    Ca    7.3<L>      02 May 2022 11:41      RECENT CULTURES:  04-29 @ 21:59 .Bronchial BAL (RLL)     Normal Respiratory Charis present  No polymorphonuclear cells seen per low power field  No polymorphonuclear cells seen per low power field  Rare Gram Negative Rods per oil power field    04-26 @ 14:29 .Blood Blood-Peripheral     No growth at 5 days.    04-26 @ 14:28 .Blood Blood-Peripheral     No growth at 5 days.    WBC Count: 6.02 K/uL (05-02-22 @ 11:41)  WBC Count: 4.36 K/uL (04-30-22 @ 10:38)  WBC Count: 6.04 K/uL (04-29-22 @ 07:12)    Creatinine, Serum: 7.22 mg/dL (05-02-22 @ 11:41)  Creatinine, Serum: 5.74 mg/dL (05-01-22 @ 07:44)  Creatinine, Serum: 4.32 mg/dL (04-30-22 @ 10:38)  Creatinine, Serum: 6.87 mg/dL (04-29-22 @ 07:12)    C-Reactive Protein, Serum: 118 mg/L (05-01-22 @ 07:44)    Procalcitonin, Serum: 13.09 ng/mL (04-26-22 @ 17:02)     COVID-19 PCR: Detected (05-03-22 @ 09:20)  SARS-CoV-2: Detected (04-26-22 @ 14:31)  COVID-19 PCR: NotDetec (04-20-22 @ 22:05)            < from: Xray Chest 1 View- PORTABLE-Urgent (Xray Chest 1 View- PORTABLE-Urgent .) (04.30.22 @ 10:00) >  ACC: [id number] EXAM:  XR CHEST PORTABLE URGENT 1V                          PROCEDURE DATE:  04/30/2022      INTERPRETATION:  Exam:XR CHEST URGENT    clinical history:Status post bronchoscopy    Heart size is stable. Improved aeration of the lungs when compared to   April 26. No pneumothorax.    IMPRESSION: Improved aeration  --- End of Report ---  < end of copied text >

## 2022-05-03 NOTE — DIETITIAN INITIAL EVALUATION ADULT - ETIOLOGY
related to inadequate protein calorie intake in the setting of acute respiratory failure/ COPD exacerbation / COVID +

## 2022-05-03 NOTE — PROGRESS NOTE ADULT - SUBJECTIVE AND OBJECTIVE BOX
PULMONARY PROGRESS NOTE      MARTY JUCULLEN-[id number]    Patient is a 72y old  Male who presents with a chief complaint of acute hypoxic respiratory failure. 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   Recently had a fall and was found to have pelvic/left hip fractures -- seen by Ortho and was recommended non operative management.   Acute respiratory failure with hypoxia / COPD Exacerbation   COVID-19 Pneumonia    INTERVAL HPI/OVERNIGHT EVENTS:  Off 02  Post HD  OK     MEDICATIONS  (STANDING):  apixaban 2.5 milliGRAM(s) Oral every 12 hours  atorvastatin 40 milliGRAM(s) Oral at bedtime  chlorhexidine 2% Cloths 1 Application(s) Topical <User Schedule>  clopidogrel Tablet 75 milliGRAM(s) Oral daily  doxycycline hyclate Capsule 100 milliGRAM(s) Oral every 12 hours  fluconAZOLE   Tablet 200 milliGRAM(s) Oral <User Schedule>  meropenem  IVPB 500 milliGRAM(s) IV Intermittent every 24 hours  methylPREDNISolone sodium succinate Injectable 40 milliGRAM(s) IV Push every 12 hours  multivitamin 1 Tablet(s) Oral daily  mycophenolate mofetil 500 milliGRAM(s) Oral two times a day  pantoprazole    Tablet 40 milliGRAM(s) Oral before breakfast  sevelamer carbonate 1600 milliGRAM(s) Oral three times a day with meals  tacrolimus 1.5 milliGRAM(s) Oral <User Schedule>  tacrolimus 1 milliGRAM(s) Oral <User Schedule>  tamsulosin 0.4 milliGRAM(s) Oral at bedtime  tiotropium 18 MICROgram(s) Capsule 1 Capsule(s) Inhalation daily  trimethoprim   80 mG/sulfamethoxazole 400 mG 1 Tablet(s) Oral <User Schedule>      MEDICATIONS  (PRN):  ALBUTerol    0.083% 2.5 milliGRAM(s) Nebulizer every 4 hours PRN Shortness of Breath and/or Wheezing  HYDROmorphone  Injectable 0.5 milliGRAM(s) IV Push every 6 hours PRN Moderate Pain (4 - 6)  HYDROmorphone  Injectable 1 milliGRAM(s) IV Push every 6 hours PRN Severe Pain (7 - 10)      Allergies    budesonide (Unknown)  cefpodoxime (Unknown)  Pollen (Unknown)    Intolerances        PAST MEDICAL & SURGICAL HISTORY:  Emphysema of lung  s/p lung transplant    ESRD (end stage renal disease) on dialysis  on HD via LUE T/Th/Sat    Fungal meningitis  Dec 2020 at [facility]. 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    2019 novel coronavirus disease (COVID-19)  Feb 2021 - hospitalized for 1 week at [facility]    Pneumonia  April 2021    [place] (hard of hearing)    HTN (hypertension)    Lumbar spondylosis    History of COPD    BPH without urinary obstruction    Hypercholesterolemia    Oliguria and anuria    H/O peripheral neuropathy    H/O lung transplant  bilateral - transplant - 1-2-2015 -  [facility]    H/O heart artery stent  x3 @[facility]    History of hip replacement  right    Status post open reduction and internal fixation (ORIF) of fracture  left femur        SOCIAL HISTORY  Smoking History:       REVIEW OF SYSTEMS:    CONSTITUTIONAL:  No distress  Steady improvement     Vital Signs Last 24 Hrs  T(C): 36.6 (02 May 2022 08:30), Max: 37.3 (02 May 2022 08:04)  T(F): 97.8 (02 May 2022 08:30), Max: 99.1 (02 May 2022 08:04)  HR: 86 (02 May 2022 08:45) (83 - 95)  BP: 120/66 (02 May 2022 08:30) (106/61 - 127/65)  BP(mean): --  RR: 18 (02 May 2022 08:30) (16 - 18)  SpO2: 96% (02 May 2022 08:45) (94% - 99%)    PHYSICAL EXAMINATION:    GENERAL: The patient is awake and alert in no apparent distress.     HEENT: Head is normocephalic and atraumatic. Extraocular muscles are intact. Mucous membranes are moist.    NECK: Supple.    LUNGS: Clear to auscultation without wheezing, rales or rhonchi; respirations unlabored    HEART: Regular rate and rhythm without murmur.    ABDOMEN: Soft, nontender, and nondistended.      EXTREMITIES: Without any cyanosis, clubbing, rash, lesions or edema.    NEUROLOGIC: Grossly intact.    LABS:    05-01    137  |  98  |  78.5<H>  ----------------------------<  207<H>  5.5<H>   |  22.0  |  5.74<H>    Ca    8.0<L>      01 May 2022 07:44    TPro  5.4<L>  /  Alb  2.9<L>  /  TBili  0.7  /  DBili  x   /  AST  62<H>  /  ALT  16  /  AlkPhos  328<H>  05-01      RADIOLOGY & ADDITIONAL STUDIES:    ACC: [id number] EXAM:  XR CHEST PORTABLE URGENT 1V                          PROCEDURE DATE:  04/30/2022          INTERPRETATION:  Exam:XR CHEST URGENT    clinical history:Status post bronchoscopy    Heart size is stable. Improved aeration of the lungs when compared to   April 26. No pneumothorax.    IMPRESSION: Improved aeration    --- End of Report ---            NATALEE HEBERT MD; Attending Radiologist  This document has been electronically signed. Apr 30 2022  2:08PM

## 2022-05-03 NOTE — DIETITIAN INITIAL EVALUATION ADULT - PERTINENT MEDS FT
MEDICATIONS  (STANDING):  apixaban 2.5 milliGRAM(s) Oral every 12 hours  atorvastatin 40 milliGRAM(s) Oral at bedtime  chlorhexidine 2% Cloths 1 Application(s) Topical <User Schedule>  clopidogrel Tablet 75 milliGRAM(s) Oral daily  doxycycline hyclate Capsule 100 milliGRAM(s) Oral every 12 hours  epoetin ben-epbx (RETACRIT) Injectable 39248 Unit(s) IV Push <User Schedule>  fluconAZOLE   Tablet 200 milliGRAM(s) Oral <User Schedule>  hydrocortisone 10 milliGRAM(s) Oral <User Schedule>  hydrocortisone 20 milliGRAM(s) Oral <User Schedule>  multivitamin 1 Tablet(s) Oral daily  mycophenolate mofetil 500 milliGRAM(s) Oral two times a day  pantoprazole    Tablet 40 milliGRAM(s) Oral before breakfast  sevelamer carbonate 1600 milliGRAM(s) Oral three times a day with meals  tacrolimus 1.5 milliGRAM(s) Oral <User Schedule>  tacrolimus 1 milliGRAM(s) Oral <User Schedule>  tamsulosin 0.4 milliGRAM(s) Oral at bedtime  tiotropium 18 MICROgram(s) Capsule 1 Capsule(s) Inhalation daily  trimethoprim   80 mG/sulfamethoxazole 400 mG 1 Tablet(s) Oral <User Schedule>    MEDICATIONS  (PRN):  ALBUTerol    0.083% 2.5 milliGRAM(s) Nebulizer every 4 hours PRN Shortness of Breath and/or Wheezing  HYDROmorphone  Injectable 0.5 milliGRAM(s) IV Push every 6 hours PRN Moderate Pain (4 - 6)  HYDROmorphone  Injectable 1 milliGRAM(s) IV Push every 6 hours PRN Severe Pain (7 - 10)

## 2022-05-03 NOTE — DIETITIAN INITIAL EVALUATION ADULT - NSICDXPASTMEDICALHX_GEN_ALL_CORE_FT
PAST MEDICAL HISTORY:  2019 novel coronavirus disease (COVID-19) Feb 2021 - hospitalized for 1 week at [facility]    BPH without urinary obstruction     Emphysema of lung s/p lung transplant    ESRD (end stage renal disease) on dialysis on HD via LUE T/Th/Sat    Fungal meningitis Dec 2020 at [facility]. 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    H/O peripheral neuropathy     History of COPD     [place] (hard of hearing)     HTN (hypertension)     Hypercholesterolemia     Lumbar spondylosis     Oliguria and anuria     Pneumonia April 2021

## 2022-05-03 NOTE — PROGRESS NOTE ADULT - PROBLEM SELECTOR PROBLEM 2
COPD exacerbation
2019 novel coronavirus disease (COVID-19)
COPD exacerbation
2019 novel coronavirus disease (COVID-19)
2019 novel coronavirus disease (COVID-19)

## 2022-05-03 NOTE — DIETITIAN INITIAL EVALUATION ADULT - NSICDXPASTSURGICALHX_GEN_ALL_CORE_FT
PAST SURGICAL HISTORY:  H/O heart artery stent x3 @[facility]    H/O lung transplant bilateral - transplant - 1-2-2015 -  [facility]    History of hip replacement right    Status post open reduction and internal fixation (ORIF) of fracture left femur

## 2022-05-03 NOTE — PROGRESS NOTE ADULT - ASSESSMENT
Imp--s/p lung xplant Pneumonia s/p BAL.  Clinically bacterial, improving.  Plan--complete abx  Continue Spiriva, Tacrolimus and Cellcept  Continue Bactrim  HD per renal  ALIYAH when able   OP FU with Dr Cordova

## 2022-05-03 NOTE — PHYSICAL THERAPY INITIAL EVALUATION ADULT - MANUAL MUSCLE TESTING RESULTS, REHAB EVAL
bilateral shoulder flex 4-/5, elbow flex 3+/5. right hip flex 3+/5, knee ext 4-/5, ankle df 4+/5; left hip flex 2-/5, knee ext 3-/5, ankle df 4/5

## 2022-05-03 NOTE — PROGRESS NOTE ADULT - ASSESSMENT
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 In the ED he was hypoxic to 85% on room air and subsequently required BPAP for respiratory distress. Found to be positive for COVID-19. Recently he presented to ED for L hip fracture from Nursing facility. In the ER patient was afebrile. Started on Meropenem, Vancomycin and Azithromycin.       Acute Hypoxic respiratory failure  COVID-19 infection  B/L Pneumonia   shortness of breath  Transaminitis   ESRD on HD   Hip fracture   Lung Transplant on Immunosuppressants       - COVID 19 PCR Positive on 4/26/22  - Blood cultures 4/26 no growth   - CXR with B/L PNA  - CT Chest reporting B/L PNA   - Continue supportive care measures  - continue to trend inflammatory markers.   - trend CBC with diff, CMP,  CRP, Ferritin, D Dimer q 48hours  - Discussed Remdesivir with the patient.   - Completed Remdesivir  5 days on 5/1/22  - On Steroids per Pulm   - not a candidate for Bebtelovimab due to hypoxia   - Blood cultures 4/26 no growth   - BAL cultures 4/29 reporting normal keerthi   - Completed Meropenem  - Continue Doxycycline 100mg PO q 12hours  - Last day of antibiotics 5/3/22  - Continue PPX Fluconazole, and Bactrim   - Follow up cultures  - Trend Fever  - Trend WBC      Will Follow      d/w Dr Avilez

## 2022-05-03 NOTE — DIETITIAN INITIAL EVALUATION ADULT - OTHER INFO
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   Recently had a fall and was found to have pelvic/left hip fractures -- seen by Ortho and was recommended non operative management.    Acute respiratory failure with hypoxia / COPD Exacerbation

## 2022-05-03 NOTE — DIETITIAN NUTRITION RISK NOTIFICATION - TREATMENT: THE FOLLOWING DIET HAS BEEN RECOMMENDED
Diet, DASH/TLC:   Sodium & Cholesterol Restricted  For patients receiving Renal Replacement - No Protein Restr, No Conc K, No Conc Phos, Low  Sodium (RENAL) (04-26-22 @ 18:04) [Active]

## 2022-05-03 NOTE — PHYSICAL THERAPY INITIAL EVALUATION ADULT - PERTINENT HX OF CURRENT PROBLEM, REHAB EVAL
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 Recently had a fall and was found to have pelvic/left hip fractures -- seen by Ortho and was recommended non operative management.

## 2022-05-04 NOTE — PROGRESS NOTE ADULT - SUBJECTIVE AND OBJECTIVE BOX
JU FERGUSON [id number]    Follow up: ESRD - HD ,  COVID 19     Off O2   No complaints   Feels well     Allergies:  budesonide (Unknown)  cefpodoxime (Unknown)  Pollen (Unknown)    REVIEW OF SYSTEMS:  CONSTITUTIONAL:  No Fever or chills  HEENT:  No diplopia or blurred vision.  No earache, sore throat or runny nose.  CARDIOVASCULAR:  No pressure, squeezing, strangling, tightness, heaviness or aching about the chest, neck, axilla or epigastrium.  RESPIRATORY:  No cough. + shortness of breath  GASTROINTESTINAL:  No nausea, vomiting or diarrhea.  GENITOURINARY:  No dysuria, frequency or urgency.   MUSCULOSKELETAL:  no joint aches, no muscle pain  SKIN:  No change in skin, hair or nails.  NEUROLOGIC:  No Headaches, seizures   PSYCHIATRIC:  No disorder of thought or mood.  ENDOCRINE:  No heat or cold intolerance  HEMATOLOGICAL:  No easy bruising or bleeding.     Physical Exam:    GEN: NAD  HEENT: normocephalic and atraumatic. EOMI. PERRL.    NECK: Supple.   LUNGS: Decreased Basal BS B/L   HEART: RRR  ABDOMEN: Soft, NT, ND.  +BS.    : No CVA tenderness  EXTREMITIES: Without  edema.  MSK: No joint swelling  NEUROLOGIC: AAOx3  PSYCHIATRIC: Appropriate affect .  SKIN: No rash    Vitals:    T(F): 97.6 (03 May 2022 10:51), Max: 98.1 (02 May 2022 21:41)  HR: 99 (03 May 2022 10:51)  BP: 103/66 (03 May 2022 10:51)  RR: 18 (03 May 2022 04:00)  SpO2: 91% (03 May 2022 10:51) (91% - 96%)  temp max in last 48H T(F): , Max: 99.1 (05-02-22 @ 08:04)    Current Antibiotics:  doxycycline hyclate Capsule 100 milliGRAM(s) Oral every 12 hours  fluconAZOLE   Tablet 200 milliGRAM(s) Oral <User Schedule>  trimethoprim   80 mG/sulfamethoxazole 400 mG 1 Tablet(s) Oral <User Schedule>    Other medications:  apixaban 2.5 milliGRAM(s) Oral every 12 hours  atorvastatin 40 milliGRAM(s) Oral at bedtime  chlorhexidine 2% Cloths 1 Application(s) Topical <User Schedule>  clopidogrel Tablet 75 milliGRAM(s) Oral daily  epoetin ben-epbx (RETACRIT) Injectable 88273 Unit(s) IV Push <User Schedule>  hydrocortisone 10 milliGRAM(s) Oral <User Schedule>  hydrocortisone 20 milliGRAM(s) Oral <User Schedule>  multivitamin 1 Tablet(s) Oral daily  mycophenolate mofetil 500 milliGRAM(s) Oral two times a day  pantoprazole    Tablet 40 milliGRAM(s) Oral before breakfast  sevelamer carbonate 1600 milliGRAM(s) Oral three times a day with meals  tacrolimus 1.5 milliGRAM(s) Oral <User Schedule>  tacrolimus 1 milliGRAM(s) Oral <User Schedule>  tamsulosin 0.4 milliGRAM(s) Oral at bedtime  tiotropium 18 MICROgram(s) Capsule 1 Capsule(s) Inhalation daily                       8.0    6.02  )-----------( 208      ( 02 May 2022 11:41 )             24.2     05-02    136  |  93<L>  |  104.3<H>  ----------------------------<  140<H>  5.0   |  20.0<L>  |  7.22<H>    Ca    7.3<L>      02 May 2022 11:41    RECENT CULTURES:  04-29 @ 21:59 .Bronchial BAL (RLL)     Normal Respiratory Keerthi present  No polymorphonuclear cells seen per low power field  No polymorphonuclear cells seen per low power field  Rare Gram Negative Rods per oil power field    04-26 @ 14:29 .Blood Blood-Peripheral     No growth at 5 days.    04-26 @ 14:28 .Blood Blood-Peripheral     No growth at 5 days.    WBC Count: 6.02 K/uL (05-02-22 @ 11:41)  WBC Count: 4.36 K/uL (04-30-22 @ 10:38)  WBC Count: 6.04 K/uL (04-29-22 @ 07:12)    Creatinine, Serum: 7.22 mg/dL (05-02-22 @ 11:41)  Creatinine, Serum: 5.74 mg/dL (05-01-22 @ 07:44)  Creatinine, Serum: 4.32 mg/dL (04-30-22 @ 10:38)  Creatinine, Serum: 6.87 mg/dL (04-29-22 @ 07:12)    C-Reactive Protein, Serum: 118 mg/L (05-01-22 @ 07:44)    Procalcitonin, Serum: 13.09 ng/mL (04-26-22 @ 17:02)     COVID-19 PCR: Detected (05-03-22 @ 09:20)  SARS-CoV-2: Detected (04-26-22 @ 14:31)  COVID-19 PCR: NotDetec (04-20-22 @ 22:05)    X ray Chest 1 View- PORTABLE-Urgent ( X ray Chest 1 View- PORTABLE-Urgent .) (04.30.22 @ 10:00)    ACC: [id number] EXAM:  XR CHEST PORTABLE URGENT 1V                          PROCEDURE DATE:  04/30/2022      INTERPRETATION:  Exam: XR CHEST URGENT    clinical history: Status post bronchoscopy    Heart size is stable. Improved aeration of the lungs when compared to     April 26. No pneumothorax.    IMPRESSION: Improved aeration    12 Lead ECG (04.26.22 @ 22:06)    Ventricular Rate 106 BPM    Atrial Rate 106 BPM    P-R Interval 168 ms    QRS Duration 90 ms    Q-T Interval 358 ms    QTC Calculation(Bazett) 475 ms    P Axis 51 degrees    R Axis 9 degrees    T Axis 208 degrees    Diagnosis Line Sinus tachycardia  ST & T wave abnormality, consider lateral ischemia  Abnormal ECG    Confirmed by MELECIO CONNER ([id number]) on 4/27/2022 7:00:17 AM                  72y  Male COPD s/p double lung transplant 2015, ESRD on HD, fungal meningitis, CAD s/p PCI, carotid artery disease s/p CEA, R IJ VTE on Eliquis who presented on 4/26 with SOB, cough and sent from dialysis after being found with hypotension. In the ED he was hypoxic to 85% on room air and subsequently required BPAP for respiratory distress. Found to be positive for COVID-19. Recently he presented to ED for L hip fracture from Nursing facility. In the ER patient was afebrile. S    Started on Meropenem, Vancomycin and Azithromycin.     Acute Hypoxic respiratory failure  COVID-19 infection  B/L Pneumonia   Transaminitis   ESRD on HD   Hip fracture   Lung Transplant on Immunosuppressants     - COVID 19 PCR Positive on 4/26/22  - Blood cultures 4/26 no growth   - CXR with B/L PNA  - CT Chest : B/L PNA      - Completed Remdesivir  5 days on 5/1/22  - On Steroids per Pulmonary Medicine,    - Blood cultures 4/26 no growth   - BAL cultures 4/29 : normal keerthi   - Completed Meropenem  - On oxycycline 100mg PO q 12hours , Completed,   - On PPX Fluconazole, and Bactrim

## 2022-05-04 NOTE — PROGRESS NOTE ADULT - ASSESSMENT
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 In the ED he was hypoxic to 85% on room air and subsequently required BPAP for respiratory distress. Found to be positive for COVID-19. Recently he presented to ED for L hip fracture from Nursing facility. In the ER patient was afebrile. Started on Meropenem, Vancomycin and Azithromycin.       Acute Hypoxic respiratory failure  COVID-19 infection  B/L Pneumonia   shortness of breath  Transaminitis   ESRD on HD   Hip fracture   Lung Transplant on Immunosuppressants       - COVID 19 PCR Positive on 4/26/22  - Blood cultures 4/26 no growth   - CXR with B/L PNA  - CT Chest reporting B/L PNA   - Continue supportive care measures  - continue to trend inflammatory markers.   - trend CBC with diff, CMP,  CRP, Ferritin, D Dimer q 48hours  - Discussed Remdesivir with the patient.   - Completed Remdesivir  5 days on 5/1/22  - On Steroids per Pulm   - not a candidate for Bebtelovimab due to hypoxia   - Blood cultures 4/26 no growth   - BAL cultures 4/29 reporting normal keerthi   - Completed Meropenem  - Completed Doxycycline 100mg PO q 12hours till 5/3/22  - Continue PPX Fluconazole, and Bactrim   - Follow up cultures  - Trend Fever  - Trend WBC      Will sign off. Please call PRN.       d/w Dr Avilez

## 2022-05-04 NOTE — PROGRESS NOTE ADULT - ASSESSMENT
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   Recently had a fall and was found to have pelvic/left hip fractures -- seen by Ortho and was recommended non operative management.     1) Acute respiratory failure with hypoxia likely due to COPD Exacerbation due to COVID PNA with likely superimposed bacterial PNA  - Hypoxia resolved; saturating well on room air  - CXR shows bilateral lower lobe platelike atelectasis/interstitial infiltrates, increased from prior  - COVID   - Change Solumedrol to Hydrocortisone (home dose)   - Finished Remdesivir on 5/1   - Completederopenem and Doxycycline    - Continue bronchodilators  - s/p Bronchoscopy on 4/29: copious secretions with normal keerthi  - blood cultures no growth to date  - CTS recs appreciated; no further interventions required  - ID recs also reviewed    2) Left Acetabulum / Pubic Rami Fractures   - Conservative treatment  - Analgesia as needed  - LLE TTWB  - PT eval appreciated - [facility]  - Ortho follow up noted     3) ESRD on HD  - Continue dialysis as per schedule   - UF goal per renal  - Hyperphosphatemia; Continue renvela with meals and add phoslo  - Monitor I/Os, daily weights  - Avoid Nephrotoxic agents and renally dose medications for eGFR < 10  - Renal recs appreciated    4) History of Fungal Meningitis   - Continue Fluconazole     5) History of Lung Transplant   - Continue Tacrolimus and Cellcept   - Continue Bactrim for prophylaxis   - Continue Hydrocortisone      6) Right IJ Occlusion   - Continue Eliquis    7) Hyperkalemia - resolved  - s/p Lokelma 10 gm x 3  - potassium bath adjustment per renal  - low K+ Diet    8) History of CAD  -continue plavix and statin    9) BPH  -continue flomax    10) Anemia of chronic kidney disease  -Hb noted and not at goal  -continue procrit as ordered  -no overt signs/symptoms of bleeding    DVT Prophylaxis - Eliquis.     Advance Directives: Full Code.     Dispo: Medically stable for discharge to [facility]; can be discharged back to the [facility] on 5/5 once bed is available.    Plan discussed with patient, RN, SW

## 2022-05-04 NOTE — PROGRESS NOTE ADULT - SUBJECTIVE AND OBJECTIVE BOX
Patient was seen and evaluated on dialysis.   Patient is tolerating the procedure well.   T(C): 36.6 (05-05-22 @ 09:11), Max: 37 (05-04-22 @ 15:10)  HR: 91 (05-05-22 @ 09:11) (90 - 102)  BP: 124/62 (05-05-22 @ 09:11) (118/78 - 134/82)  Continue dialysis:  On Saturday,   Dialyzer: Revaclear 300         QB:  400 ml.,       QD: 500ml.,  Goal UF _1 L _ over _3.5_ Hours

## 2022-05-04 NOTE — PROGRESS NOTE ADULT - SUBJECTIVE AND OBJECTIVE BOX
[facility]  INFECTIOUS DISEASES AND INTERNAL MEDICINE at [place] and [place]  =======================================================                               J Carlos Galdamez MD#  Negrito Caro MD*                                     Isatu Rojas MD*    Perlita Solares MD*            Diplomates American Board of Internal Medicine & Infectious Diseases                # [place] Office - Appt - Tel  [phone] Fax [phone]                * [place] Office - Appt - Tel [phone] Fax [phone]                                  Hospital Consult line:  [phone]  =======================================================    JU FERGUSON [id number]    Follow up: COVID 19     Off O2   No complaints   feels well       Allergies:  budesonide (Unknown)  cefpodoxime (Unknown)  Pollen (Unknown)       REVIEW OF SYSTEMS:  CONSTITUTIONAL:  No Fever or chills  HEENT:  No diplopia or blurred vision.  No earache, sore throat or runny nose.  CARDIOVASCULAR:  No pressure, squeezing, strangling, tightness, heaviness or aching about the chest, neck, axilla or epigastrium.  RESPIRATORY:  No cough. + shortness of breath  GASTROINTESTINAL:  No nausea, vomiting or diarrhea.  GENITOURINARY:  No dysuria, frequency or urgency.   MUSCULOSKELETAL:  no joint aches, no muscle pain  SKIN:  No change in skin, hair or nails.  NEUROLOGIC:  No Headaches, seizures   PSYCHIATRIC:  No disorder of thought or mood.  ENDOCRINE:  No heat or cold intolerance  HEMATOLOGICAL:  No easy bruising or bleeding.       Physical Exam:  GEN: NAD  HEENT: normocephalic and atraumatic. EOMI. PERRL.    NECK: Supple.   LUNGS: Decreased Basal BS B/L   HEART: RRR  ABDOMEN: Soft, NT, ND.  +BS.    : No CVA tenderness  EXTREMITIES: Without  edema.  MSK: No joint swelling  NEUROLOGIC: AAOx3  PSYCHIATRIC: Appropriate affect .  SKIN: No rash      Vitals:  T(F): 97.7 (04 May 2022 08:26), Max: 98.7 (04 May 2022 05:26)  HR: 90 (04 May 2022 08:26)  BP: 126/71 (04 May 2022 08:26)  RR: 18 (04 May 2022 05:26)  SpO2: 92% (04 May 2022 08:26) (91% - 95%)  temp max in last 48H T(F): , Max: 98.7 (05-02-22 @ 15:20)      Current Antibiotics:  fluconAZOLE   Tablet 200 milliGRAM(s) Oral <User Schedule>  trimethoprim   80 mG/sulfamethoxazole 400 mG 1 Tablet(s) Oral <User Schedule>    Other medications:  apixaban 2.5 milliGRAM(s) Oral every 12 hours  atorvastatin 40 milliGRAM(s) Oral at bedtime  chlorhexidine 2% Cloths 1 Application(s) Topical <User Schedule>  clopidogrel Tablet 75 milliGRAM(s) Oral daily  epoetin bne-epbx (RETACRIT) Injectable 97701 Unit(s) IV Push <User Schedule>  hydrocortisone 10 milliGRAM(s) Oral <User Schedule>  hydrocortisone 20 milliGRAM(s) Oral <User Schedule>  multivitamin 1 Tablet(s) Oral daily  mycophenolate mofetil 500 milliGRAM(s) Oral two times a day  pantoprazole    Tablet 40 milliGRAM(s) Oral before breakfast  sevelamer carbonate 1600 milliGRAM(s) Oral three times a day with meals  tacrolimus 1.5 milliGRAM(s) Oral <User Schedule>  tacrolimus 1 milliGRAM(s) Oral <User Schedule>  tamsulosin 0.4 milliGRAM(s) Oral at bedtime  tiotropium 18 MICROgram(s) Capsule 1 Capsule(s) Inhalation daily                 8.5    9.79  )-----------( 280      ( 04 May 2022 10:45 )             26.2     05-04    140  |  97<L>  |  81.0<H>  ----------------------------<  107<H>  4.1   |  23.0  |  6.47<H>    Ca    7.8<L>      04 May 2022 10:45  Phos  6.2     05-04  Mg     2.4     05-04      RECENT CULTURES:  04-29 @ 21:59 .Bronchial BAL (RLL)     Normal Respiratory Charis present  No polymorphonuclear cells seen per low power field  No polymorphonuclear cells seen per low power field  Rare Gram Negative Rods per oil power field    04-26 @ 14:29 .Blood Blood-Peripheral     No growth at 5 days.    04-26 @ 14:28 .Blood Blood-Peripheral     No growth at 5 days.      WBC Count: 9.79 K/uL (05-04-22 @ 10:45)  WBC Count: 6.02 K/uL (05-02-22 @ 11:41)  WBC Count: 4.36 K/uL (04-30-22 @ 10:38)    Creatinine, Serum: 6.47 mg/dL (05-04-22 @ 10:45)  Creatinine, Serum: 7.22 mg/dL (05-02-22 @ 11:41)  Creatinine, Serum: 5.74 mg/dL (05-01-22 @ 07:44)  Creatinine, Serum: 4.32 mg/dL (04-30-22 @ 10:38)    C-Reactive Protein, Serum: 118 mg/L (05-01-22 @ 07:44)    Procalcitonin, Serum: 13.09 ng/mL (04-26-22 @ 17:02)     COVID-19 PCR: Detected (05-03-22 @ 09:20)  SARS-CoV-2: Detected (04-26-22 @ 14:31)  COVID-19 PCR: NotDetec (04-20-22 @ 22:05)

## 2022-05-04 NOTE — PROGRESS NOTE ADULT - ASSESSMENT
Completed 3.5  hrs of dialysis , BP remained stable, net fluid loss 1000 cc.       Vital signs stable post dialysis. Endorsed  to [facility].    Pt is seen and examined on dialysis. No symptoms. Hemodynamics stable. Tolerating dialysis and ultrafiltration.  Pre Laboratory values personally reviewed by me.  Dialysis adjusted appropriately based on current values.  Will continue the current medical management.  Next hemodialysis as scheduled.  Discussed with nursing, primary care team.

## 2022-05-04 NOTE — PROGRESS NOTE ADULT - ASSESSMENT
ESRD on HD  - Continue dialysis , M W F ( @ [facility] )   - Continue Renvela with meals  - Monitor I/Os, daily weights  - Avoid Nephrotoxic agents and renally dose medications for ESRD - HD     Patient was seen and evaluated on dialysis.   Patient is tolerating the procedure well.   T(C): 36.5 (05-04-22 @ 08:26), Max: 37.1 (05-04-22 @ 05:26)  HR: 90 (05-04-22 @ 08:26) (76 - 97)  BP: 126/71 (05-04-22 @ 08:26) (113/68 - 135/67)  Continue dialysis: Friday,   Dialyzer: Revaclear 300         QB: 350 ml.,        QD: 500ml.,  Goal UF _As Suzan., _ over _3.5 _ Hours     Pt is seen and examined on dialysis. No symptoms. Hemodynamics stable. Tolerating dialysis and ultrafiltration.  Pre Laboratory values personally reviewed by me.  Dialysis adjusted appropriately based on current values.  Will continue the current medical management.  Next hemodialysis as scheduled.  Discussed with nursing, primary care team.

## 2022-05-05 NOTE — PROGRESS NOTE ADULT - ASSESSMENT
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 Recently had a fall and was found to have pelvic/left hip fractures -- seen by Ortho and was recommended non operative management.     1) Acute respiratory failure with hypoxia likely due to COPD Exacerbation due to COVID PNA with likely superimposed bacterial PNA  - Hypoxia resolved; saturating well on room air  - CXR shows bilateral lower lobe platelike atelectasis/interstitial infiltrates  - COVID 19 negative  - Change Solumedrol to Hydrocortisone (home dose)   - Finished Remdesivir on 5/1   - Completed meropenem and Doxycycline    - Continue bronchodilators  - s/p Bronchoscopy on 4/29: copious secretions with normal keerthi  - blood cultures no growth to date  - CTS recs appreciated; no further interventions required  - ID recs also reviewed    2) Left Acetabulum / Pubic Rami Fractures   - Conservative treatment  - Analgesia as needed  - LLE TTWB  - PT eval appreciated - [facility]  - Ortho follow up noted     3) ESRD on HD  - Continue dialysis as per schedule   - UF goal per renal  - Hyperphosphatemia; Continue renvela with meals and add phoslo  - Monitor I/Os, daily weights  - Avoid Nephrotoxic agents and renally dose medications for eGFR < 10  - Renal recs appreciated    4) History of Fungal Meningitis   - Continue Fluconazole     5) History of Lung Transplant   - Continue Tacrolimus and Cellcept   - Continue Bactrim for prophylaxis   - Continue Hydrocortisone      6) Right IJ Occlusion   - Continue Eliquis    7) Hyperkalemia - resolved  - s/p Lokelma 10 gm x 3  - potassium bath adjustment per renal  - low K+ Diet    8) History of CAD  -continue plavix and statin    9) BPH  -continue flomax    10) Anemia of chronic kidney disease  -Hb noted and not at goal  -continue procrit as ordered  -no overt signs/symptoms of bleeding    DVT Prophylaxis - Eliquis    Dispo: Medically stable for discharge to [facility]; family now requesting an alternative facility.     Plan discussed with patient, RN, SW

## 2022-05-05 NOTE — PROGRESS NOTE ADULT - SUBJECTIVE AND OBJECTIVE BOX
JU FERGUSON [id number]    Follow up: ESRD - HD ,  COVID 19     Off O2   No complaints   Feels well     Allergies:  budesonide (Unknown)  cefpodoxime (Unknown)  Pollen (Unknown)    REVIEW OF SYSTEMS:  CONSTITUTIONAL:  No Fever or chills  HEENT:  No diplopia or blurred vision.  No earache, sore throat or runny nose.  CARDIOVASCULAR:  No pressure, squeezing, strangling, tightness, heaviness or aching about the chest, neck, axilla or epigastrium.  RESPIRATORY:  No cough. + shortness of breath  GASTROINTESTINAL:  No nausea, vomiting or diarrhea.  GENITOURINARY:  No dysuria, frequency or urgency.   MUSCULOSKELETAL:  no joint aches, no muscle pain  SKIN:  No change in skin, hair or nails.  NEUROLOGIC:  No Headaches, seizures   PSYCHIATRIC:  No disorder of thought or mood.  ENDOCRINE:  No heat or cold intolerance  HEMATOLOGICAL:  No easy bruising or bleeding.     Physical Exam:    GEN: NAD  HEENT: normocephalic and atraumatic. EOMI. PERRL.    NECK: Supple.   LUNGS: Decreased Basal BS B/L   HEART: RRR  ABDOMEN: Soft, NT, ND.  +BS.    : No CVA tenderness  EXTREMITIES: Without  edema.  MSK: No joint swelling  NEUROLOGIC: AAOx3  PSYCHIATRIC: Appropriate affect .  SKIN: No rash    Vitals:    ICU Vital Signs Last 24 Hrs  T(C): 36.6 (05 May 2022 09:11), Max: 37 (04 May 2022 15:10)  T(F): 97.9 (05 May 2022 09:11), Max: 98.6 (04 May 2022 15:10)  HR: 91 (05 May 2022 09:11) (90 - 102)  BP: 124/62 (05 May 2022 09:11) (118/78 - 134/82)  RR: 18 (05 May 2022 04:37) (18 - 20)  SpO2: 96% (05 May 2022 09:11) (94% - 98%)    T(F): 97.6 (03 May 2022 10:51), Max: 98.1 (02 May 2022 21:41)  HR: 99 (03 May 2022 10:51)  BP: 103/66 (03 May 2022 10:51)  RR: 18 (03 May 2022 04:00)  SpO2: 91% (03 May 2022 10:51) (91% - 96%)  temp max in last 48H T(F): , Max: 99.1 (22 @ 08:04)    Current Antibiotics:  doxycycline hyclate Capsule 100 milliGRAM(s) Oral every 12 hours  fluconAZOLE   Tablet 200 milliGRAM(s) Oral <User Schedule>  trimethoprim   80 mG/sulfamethoxazole 400 mG 1 Tablet(s) Oral <User Schedule>    Other medications:  apixaban 2.5 milliGRAM(s) Oral every 12 hours  atorvastatin 40 milliGRAM(s) Oral at bedtime  chlorhexidine 2% Cloths 1 Application(s) Topical <User Schedule>  clopidogrel Tablet 75 milliGRAM(s) Oral daily  epoetin ben-epbx (RETACRIT) Injectable 77323 Unit(s) IV Push <User Schedule>  hydrocortisone 10 milliGRAM(s) Oral <User Schedule>  hydrocortisone 20 milliGRAM(s) Oral <User Schedule>  multivitamin 1 Tablet(s) Oral daily  mycophenolate mofetil 500 milliGRAM(s) Oral two times a day  pantoprazole    Tablet 40 milliGRAM(s) Oral before breakfast  sevelamer carbonate 1600 milliGRAM(s) Oral three times a day with meals  tacrolimus 1.5 milliGRAM(s) Oral <User Schedule>  tacrolimus 1 milliGRAM(s) Oral <User Schedule>  tamsulosin 0.4 milliGRAM(s) Oral at bedtime  tiotropium 18 MICROgram(s) Capsule 1 Capsule(s) Inhalation daily    ############################################    140    |  97<L>  |  81.0<H>  ----------------------------<  107<H>  Ca:7.8<L> (04 May 2022 10:45)  4.1     |  23.0   |  6.47<H>                      8.5<L>  9.79  )-----------( 280      ( 04 May 2022 10:45 )             26.2<L>    Phos:6.2 mg/dL<H> M.4 mg/dL PTH:-- Uric acid:-- Serum Osm:--  Ferritin:-- Iron:-- TIBC:-- Tsat:--  B12:-- TSH:-- ( @ 10:45)  _______________________________________________________________________                       8.0    6.02  )-----------( 208      ( 02 May 2022 11:41 )             24.2         136  |  93<L>  |  104.3<H>  ----------------------------<  140<H>  5.0   |  20.0<L>  |  7.22<H>    Ca    7.3<L>      02 May 2022 11:41    RECENT CULTURES:   @ 21:59 .Bronchial BAL (RLL)     Normal Respiratory Keerthi present  No polymorphonuclear cells seen per low power field  No polymorphonuclear cells seen per low power field  Rare Gram Negative Rods per oil power field     @ 14:29 .Blood Blood-Peripheral     No growth at 5 days.     @ 14:28 .Blood Blood-Peripheral     No growth at 5 days.    WBC Count: 6.02 K/uL (22 @ 11:41)  WBC Count: 4.36 K/uL (22 @ 10:38)  WBC Count: 6.04 K/uL (22 @ 07:12)    Creatinine, Serum: 7.22 mg/dL (22 @ 11:41)  Creatinine, Serum: 5.74 mg/dL (22 @ 07:44)  Creatinine, Serum: 4.32 mg/dL (22 @ 10:38)  Creatinine, Serum: 6.87 mg/dL (22 @ 07:12)    C-Reactive Protein, Serum: 118 mg/L (22 @ 07:44)    Procalcitonin, Serum: 13.09 ng/mL (22 @ 17:02)     COVID-19 PCR: Detected (22 @ 09:20)  SARS-CoV-2: Detected (22 @ 14:31)  COVID-19 PCR: NotDetec (22 @ 22:05)    X ray Chest 1 View- PORTABLE-Urgent ( X ray Chest 1 View- PORTABLE-Urgent .) (22 @ 10:00)    ACC: [id number] EXAM:  XR CHEST PORTABLE URGENT 1V                          PROCEDURE DATE:  2022      INTERPRETATION:  Exam: XR CHEST URGENT    clinical history: Status post bronchoscopy    Heart size is stable. Improved aeration of the lungs when compared to     . No pneumothorax.    IMPRESSION: Improved aeration    12 Lead ECG (22 @ 22:06)    Ventricular Rate 106 BPM    Atrial Rate 106 BPM    P-R Interval 168 ms    QRS Duration 90 ms    Q-T Interval 358 ms    QTC Calculation(Bazett) 475 ms    P Axis 51 degrees    R Axis 9 degrees    T Axis 208 degrees    Diagnosis Line Sinus tachycardia  ST & T wave abnormality, consider lateral ischemia  Abnormal ECG    Confirmed by MELECIO CONNER ([id number]) on 2022 7:00:17 AM                  72y  Male COPD s/p double lung transplant , ESRD on HD, fungal meningitis, CAD s/p PCI, carotid artery disease s/p CEA, R IJ VTE on Eliquis who presented on  with SOB, cough and sent from dialysis after being found with hypotension. In the ED he was hypoxic to 85% on room air and subsequently required BPAP for respiratory distress. Found to be positive for COVID-19. Recently he presented to ED for L hip fracture from Nursing facility. In the ER patient was afebrile. S    Started on Meropenem, Vancomycin and Azithromycin.     Acute Hypoxic respiratory failure  COVID-19 infection  B/L Pneumonia   Transaminitis   ESRD on HD   Hip fracture   Lung Transplant on Immunosuppressants     - COVID 19 PCR Positive on 22  - Blood cultures  no growth   - CXR with B/L PNA  - CT Chest : B/L PNA      - Completed Remdesivir  5 days on 22  - On Steroids per Pulmonary Medicine,    - Blood cultures  no growth   - BAL cultures  : normal keerthi   - Completed Meropenem  - On oxycycline 100mg PO q 12hours , Completed,   - On PPX Fluconazole, and Bactrim       DX ; ESRD on HD

## 2022-05-05 NOTE — PROGRESS NOTE ADULT - SUBJECTIVE AND OBJECTIVE BOX
CHIEF COMPLAINT/INTERVAL HISTORY:    Patient is a 72y old  Male who presents with a chief complaint of acute hypoxic respiratory failure. (05 May 2022 09:36)    SUBJECTIVE & OBJECTIVE: Pt seen and examined at bedside. No overnight events. Denies any new complaints. HD tomorrow.    ROS: No chest pain, palpitations, SOB, light headedness, dizziness, headache, nausea/vomiting, fevers/chills, abdominal pain, dysuria or increased urinary frequency.    ICU Vital Signs Last 24 Hrs  T(C): 36.6 (05 May 2022 09:11), Max: 37 (05 May 2022 04:37)  T(F): 97.9 (05 May 2022 09:11), Max: 98.6 (05 May 2022 04:37)  HR: 91 (05 May 2022 09:11) (91 - 100)  BP: 124/62 (05 May 2022 09:11) (118/78 - 124/62)  RR: 18 (05 May 2022 04:37) (18 - 20)  SpO2: 96% (05 May 2022 09:11) (96% - 98%)      MEDICATIONS  (STANDING):  apixaban 2.5 milliGRAM(s) Oral every 12 hours  atorvastatin 40 milliGRAM(s) Oral at bedtime  calcium acetate 667 milliGRAM(s) Oral three times a day with meals  chlorhexidine 2% Cloths 1 Application(s) Topical <User Schedule>  clopidogrel Tablet 75 milliGRAM(s) Oral daily  epoetin ben-epbx (RETACRIT) Injectable 93818 Unit(s) IV Push <User Schedule>  fluconAZOLE   Tablet 200 milliGRAM(s) Oral <User Schedule>  hydrocortisone 10 milliGRAM(s) Oral <User Schedule>  hydrocortisone 20 milliGRAM(s) Oral <User Schedule>  multivitamin 1 Tablet(s) Oral daily  mycophenolate mofetil 500 milliGRAM(s) Oral two times a day  pantoprazole    Tablet 40 milliGRAM(s) Oral before breakfast  sevelamer carbonate 1600 milliGRAM(s) Oral three times a day with meals  tacrolimus 1.5 milliGRAM(s) Oral <User Schedule>  tacrolimus 1 milliGRAM(s) Oral <User Schedule>  tamsulosin 0.4 milliGRAM(s) Oral at bedtime  tiotropium 18 MICROgram(s) Capsule 1 Capsule(s) Inhalation daily  trimethoprim   80 mG/sulfamethoxazole 400 mG 1 Tablet(s) Oral <User Schedule>    MEDICATIONS  (PRN):  ALBUTerol    0.083% 2.5 milliGRAM(s) Nebulizer every 4 hours PRN Shortness of Breath and/or Wheezing  HYDROmorphone  Injectable 0.5 milliGRAM(s) IV Push every 6 hours PRN Moderate Pain (4 - 6)  HYDROmorphone  Injectable 1 milliGRAM(s) IV Push every 6 hours PRN Severe Pain (7 - 10)      LABS:                        8.5    9.79  )-----------( 280      ( 04 May 2022 10:45 )             26.2     05-04    140  |  97<L>  |  81.0<H>  ----------------------------<  107<H>  4.1   |  23.0  |  6.47<H>    Ca    7.8<L>      04 May 2022 10:45  Phos  6.2     05-04  Mg     2.4     05-04      PHYSICAL EXAM:    GENERAL: elderly male, laying in bed, NAD  HEAD:  Atraumatic, Normocephalic  EYES: EOMI, PERRLA, conjunctiva and sclera clear  ENMT: Moist mucous membranes  NECK: Supple  NERVOUS SYSTEM:  Alert & Oriented X3, [place]  CHEST/LUNG: bilateral air entry  HEART: Regular rate and rhythm; + S1/S2  ABDOMEN: Soft, Nontender, Nondistended; Bowel sounds present, + hernia  EXTREMITIES:  no pedal edema

## 2022-05-06 NOTE — CHART NOTE - NSCHARTNOTEFT_GEN_A_CORE
Source: Patient [ ]  Family [ ]   other [X ] EMR and Nursing    Current Diet: Diet, DASH/TLC:   Sodium & Cholesterol Restricted  For patients receiving Renal Replacement - No Protein Restr, No Conc K, No Conc Phos, Low  Sodium (RENAL) (04-26-22 @ 18:04)    Patient reports: Per nursing, reports not liking food, eating outside meals.    PO intake:  < 50% [X ]     Source for PO intake :  [X] chart [X ] staff    Current Weight: 133.3lbs (5/4)  (5/2) 142.8lbs  (4/30) 146.3lbs  (4/29) 139.3lbs    % Weight Change: weight fluctations observed; Edema 1+ L hip; ESRD on HD    Pertinent Medications: MEDICATIONS  (STANDING):  apixaban 2.5 milliGRAM(s) Oral every 12 hours  atorvastatin 40 milliGRAM(s) Oral at bedtime  calcium acetate 667 milliGRAM(s) Oral three times a day with meals  chlorhexidine 2% Cloths 1 Application(s) Topical <User Schedule>  clopidogrel Tablet 75 milliGRAM(s) Oral daily  epoetin ben-epbx (RETACRIT) Injectable 77450 Unit(s) IV Push <User Schedule>  fluconAZOLE   Tablet 200 milliGRAM(s) Oral <User Schedule>  hydrocortisone 10 milliGRAM(s) Oral <User Schedule>  hydrocortisone 20 milliGRAM(s) Oral <User Schedule>  multivitamin 1 Tablet(s) Oral daily  mycophenolate mofetil 500 milliGRAM(s) Oral two times a day  pantoprazole    Tablet 40 milliGRAM(s) Oral before breakfast  sevelamer carbonate 1600 milliGRAM(s) Oral three times a day with meals  tacrolimus 1.5 milliGRAM(s) Oral <User Schedule>  tacrolimus 1 milliGRAM(s) Oral <User Schedule>  tamsulosin 0.4 milliGRAM(s) Oral at bedtime  tiotropium 18 MICROgram(s) Capsule 1 Capsule(s) Inhalation daily  trimethoprim   80 mG/sulfamethoxazole 400 mG 1 Tablet(s) Oral <User Schedule>    MEDICATIONS  (PRN):  ALBUTerol    0.083% 2.5 milliGRAM(s) Nebulizer every 4 hours PRN Shortness of Breath and/or Wheezing  HYDROmorphone  Injectable 0.5 milliGRAM(s) IV Push every 6 hours PRN Moderate Pain (4 - 6)  HYDROmorphone  Injectable 1 milliGRAM(s) IV Push every 6 hours PRN Severe Pain (7 - 10)    Pertinent Labs: CBC Full  -  ( 06 May 2022 07:32 )  WBC Count : 9.89 K/uL  RBC Count : 3.46 M/uL  Hemoglobin : 9.5 g/dL  Hematocrit : 29.9 %  Platelet Count - Automated : 262 K/uL  Mean Cell Volume : 86.4 fl  Mean Cell Hemoglobin : 27.5 pg  Mean Cell Hemoglobin Concentration : 31.8 gm/dL  Auto Neutrophil # : 8.83 K/uL  Auto Lymphocyte # : 0.18 K/uL  Auto Monocyte # : 0.27 K/uL  Auto Eosinophil # : 0.00 K/uL  Auto Basophil # : 0.00 K/uL  Auto Neutrophil % : 89.3 %  Auto Lymphocyte % : 1.8 %  Auto Monocyte % : 2.7 %  Auto Eosinophil % : 0.0 %  Auto Basophil % : 0.0 %      Skin: L hand skin tear  Edema: 1+ L hip     Nutrition focused physical exam conducted - found signs of malnutrition [ ]absent [X]present    Subcutaneous fat loss: [X ] Orbital fat pads region, [X]Buccal fat region, [ ]Triceps region,  [ ]Ribs region    Muscle wasting: [X ]Temples region, [ X]Clavicle region, [ X]Shoulder region, [ ]Scapula region, [ ]Interosseous region,  [ ]thigh region, [ ]Calf region    Estimated Needs:   [X ] no change since previous assessment  [ ] recalculated:     Current Nutrition Diagnosis: Patient remains at risk for malnutrition secondary to malnutrition (severe acute; likely chronic mild/mod) related to inadequate protein-calorie intake in the setting of acute respiratory failure / COPD exacerbation / COVID+ as evidenced by PO <50% estimated needs, 4.7% reported weight loss and physical findings of muscle/fat depletion.     Obtained updated nutrition and clinical status from nursing. Patient PO intake remains poor due to reports of altered taste - possibly related to COVID+. Patient eating home-brought snacks in room. SOB remains, which continues to affect comfort and satiety during meals. No BM remains. Per nursing, discharge in progress to [facility].     Recommendations:   1. Nepro TID (1260kcal, 57gpro) per ESRD on HD and poor PO intake  2. Continue Calcium Acetate per low Ca levels  3. Continue MVI related to ESRD on HD and clinical status  4. Monitor all nutrition related labs  5. Monitor PO intake and weights    Monitoring and Evaluation:   [x ] PO intake [ x] Tolerance to diet prescription [X] Weights  [X] Follow up per protocol [X] Labs:

## 2022-05-06 NOTE — DISCHARGE NOTE NURSING/CASE MANAGEMENT/SOCIAL WORK - PATIENT PORTAL LINK FT
You can access the FollowMyHealth Patient Portal offered by [facility] by registering at the following website: [email or web address]. By joining [employer]’s FollowMyHealth portal, you will also be able to view your health information using other applications (apps) compatible with our system.

## 2022-05-06 NOTE — PROGRESS NOTE ADULT - SUBJECTIVE AND OBJECTIVE BOX
Vital Signs Last 24 Hrs  T(C): 36.3 (06 May 2022 10:40), Max: 37.4 (06 May 2022 00:15)  T(F): 97.4 (06 May 2022 10:40), Max: 99.3 (06 May 2022 00:15)  HR: 82 (06 May 2022 10:40) (72 - 101)  BP: 105/64 (06 May 2022 10:40) (105/64 - 152/65)  RR: 17 (06 May 2022 10:40) (17 - 18)  SpO2: 100% (06 May 2022 10:40) (95% - 100%)  HD today. On CoVID isolation. No symptoms. Hemodynamics stable. Tolerating dialysis and ultrafiltration.  Pre Laboratory values personally reviewed by me.  Dialysis adjusted appropriately based on current values.  Will continue the current medical management.  Next hemodialysis as scheduled.  Discussed with nursing, primary care team.

## 2022-05-06 NOTE — DISCHARGE NOTE NURSING/CASE MANAGEMENT/SOCIAL WORK - NSDCPEFALRISK_GEN_ALL_CORE
For information on Fall & Injury Prevention, visit: [email or web address] OR  [email or web address] OR  https://www.cdc.gov/steadi/patient.html

## 2022-05-07 NOTE — PROGRESS NOTE ADULT - SUBJECTIVE AND OBJECTIVE BOX
CC: Follow up     INTERVAL HPI/OVERNIGHT EVENTS: Patient seen and examined, no acute complaints overnight. Awaiting ambulance transport to [facility]      Vital Signs Last 24 Hrs  T(C): 36.4 (07 May 2022 09:05), Max: 36.8 (07 May 2022 04:36)  T(F): 97.5 (07 May 2022 09:05), Max: 98.2 (07 May 2022 04:36)  HR: 94 (07 May 2022 09:05) (70 - 95)  BP: 139/66 (07 May 2022 09:05) (105/64 - 152/65)  BP(mean): --  RR: 19 (07 May 2022 04:36) (17 - 19)  SpO2: 99% (07 May 2022 09:05) (96% - 100%)    PHYSICAL EXAM:    GENERAL: NAD, AOX3  HEAD:  Atraumatic, Normocephalic  EYES: EOMI, PERRLA, conjunctiva and sclera clear  ENMT: Moist mucous membranes  NECK: Supple  CHEST/LUNG: Clear to auscultation bilaterally; No rales, rhonchi, wheezing, or rubs  HEART: Regular rate and rhythm; No murmurs, rubs, or gallops  ABDOMEN: Soft, Nontender, Nondistended; Bowel sounds present  EXTREMITIES:  2+ Peripheral Pulses, No clubbing, cyanosis, or edema        MEDICATIONS  (STANDING):  apixaban 2.5 milliGRAM(s) Oral every 12 hours  atorvastatin 40 milliGRAM(s) Oral at bedtime  calcium acetate 667 milliGRAM(s) Oral three times a day with meals  chlorhexidine 2% Cloths 1 Application(s) Topical <User Schedule>  clopidogrel Tablet 75 milliGRAM(s) Oral daily  epoetin ben-epbx (RETACRIT) Injectable 30997 Unit(s) IV Push <User Schedule>  fluconAZOLE   Tablet 200 milliGRAM(s) Oral <User Schedule>  hydrocortisone 10 milliGRAM(s) Oral <User Schedule>  hydrocortisone 20 milliGRAM(s) Oral <User Schedule>  multivitamin 1 Tablet(s) Oral daily  mycophenolate mofetil 500 milliGRAM(s) Oral two times a day  pantoprazole    Tablet 40 milliGRAM(s) Oral before breakfast  sevelamer carbonate 1600 milliGRAM(s) Oral three times a day with meals  tacrolimus 1.5 milliGRAM(s) Oral <User Schedule>  tacrolimus 1 milliGRAM(s) Oral <User Schedule>  tamsulosin 0.4 milliGRAM(s) Oral at bedtime  tiotropium 18 MICROgram(s) Capsule 1 Capsule(s) Inhalation daily  trimethoprim   80 mG/sulfamethoxazole 400 mG 1 Tablet(s) Oral <User Schedule>    MEDICATIONS  (PRN):  ALBUTerol    0.083% 2.5 milliGRAM(s) Nebulizer every 4 hours PRN Shortness of Breath and/or Wheezing  HYDROmorphone  Injectable 0.5 milliGRAM(s) IV Push every 6 hours PRN Moderate Pain (4 - 6)  HYDROmorphone  Injectable 1 milliGRAM(s) IV Push every 6 hours PRN Severe Pain (7 - 10)      Allergies    budesonide (Unknown)  cefpodoxime (Unknown)  Pollen (Unknown)    Intolerances          LABS:                          9.5    9.89  )-----------( 262      ( 06 May 2022 07:32 )             29.9     05-06    137  |  96<L>  |  67.6<H>  ----------------------------<  132<H>  4.0   |  22.0  |  6.06<H>    Ca    8.0<L>      06 May 2022 07:32  Phos  4.0     05-06  Mg     2.1     05-06            RADIOLOGY & ADDITIONAL TESTS:

## 2022-05-07 NOTE — PROGRESS NOTE ADULT - ASSESSMENT
1) ESRD on HD  2) MBD of renal dx  3) Anemia of renal dx  4) Vol HTN    Resume HD @ [facility]  Discharge planning  Will see outpatient

## 2022-05-07 NOTE — PROGRESS NOTE ADULT - PROVIDER SPECIALTY LIST ADULT
Infectious Disease
Nephrology
Hospitalist
Infectious Disease
Nephrology
Nephrology
Hospitalist
Infectious Disease
Infectious Disease
Nephrology
Orthopedics
Hospitalist
Nephrology
Pulmonology
Pulmonology
Thoracic Surgery
Hospitalist
Pulmonology
Pulmonology
Hospitalist

## 2022-05-07 NOTE — PROGRESS NOTE ADULT - REASON FOR ADMISSION
acute hypoxic respiratory failure.

## 2022-05-07 NOTE — PROGRESS NOTE ADULT - SUBJECTIVE AND OBJECTIVE BOX
[facility] DIVISION OF KIDNEY DISEASES AND HYPERTENSION -- FOLLOW UP NOTE  --------------------------------------------------------------------------------  Chief Complaint:ESRD HD    24 hour events/subjective:  Last HD yesterday  Tolerated  Awaiting DC today    PAST HISTORY  --------------------------------------------------------------------------------  No significant changes to PMH, PSH, FHx, SHx, unless otherwise noted    ALLERGIES & MEDICATIONS  --------------------------------------------------------------------------------  Allergies    budesonide (Unknown)  cefpodoxime (Unknown)  Pollen (Unknown)    Intolerances      Standing Inpatient Medications  apixaban 2.5 milliGRAM(s) Oral every 12 hours  atorvastatin 40 milliGRAM(s) Oral at bedtime  calcium acetate 667 milliGRAM(s) Oral three times a day with meals  chlorhexidine 2% Cloths 1 Application(s) Topical <User Schedule>  clopidogrel Tablet 75 milliGRAM(s) Oral daily  epoetin ben-epbx (RETACRIT) Injectable 54225 Unit(s) IV Push <User Schedule>  fluconAZOLE   Tablet 200 milliGRAM(s) Oral <User Schedule>  hydrocortisone 10 milliGRAM(s) Oral <User Schedule>  hydrocortisone 20 milliGRAM(s) Oral <User Schedule>  multivitamin 1 Tablet(s) Oral daily  mycophenolate mofetil 500 milliGRAM(s) Oral two times a day  pantoprazole    Tablet 40 milliGRAM(s) Oral before breakfast  sevelamer carbonate 1600 milliGRAM(s) Oral three times a day with meals  tacrolimus 1.5 milliGRAM(s) Oral <User Schedule>  tacrolimus 1 milliGRAM(s) Oral <User Schedule>  tamsulosin 0.4 milliGRAM(s) Oral at bedtime  tiotropium 18 MICROgram(s) Capsule 1 Capsule(s) Inhalation daily  trimethoprim   80 mG/sulfamethoxazole 400 mG 1 Tablet(s) Oral <User Schedule>    PRN Inpatient Medications  ALBUTerol    0.083% 2.5 milliGRAM(s) Nebulizer every 4 hours PRN  HYDROmorphone  Injectable 0.5 milliGRAM(s) IV Push every 6 hours PRN  HYDROmorphone  Injectable 1 milliGRAM(s) IV Push every 6 hours PRN      REVIEW OF SYSTEMS  --------------------------------------------------------------------------------  Gen: No weight changes, fatigue, fevers/chills, weakness  Skin: No rashes  Head/Eyes/Ears/Mouth: No headache; Normal hearing; Normal vision w/o blurriness; No sinus pain/discomfort, sore throat  Respiratory: No dyspnea, cough, wheezing, hemoptysis  CV: No chest pain, PND, orthopnea  GI: No abdominal pain, diarrhea, constipation, nausea, vomiting, melena, hematochezia  : No increased frequency, dysuria, hematuria, nocturia  MSK: No joint pain/swelling; no back pain; no edema  Neuro: No dizziness/lightheadedness, weakness, seizures, numbness, tingling  Heme: No easy bruising or bleeding  Endo: No heat/cold intolerance  Psych: No significant nervousness, anxiety, stress, depression    All other systems were reviewed and are negative, except as noted.    VITALS/PHYSICAL EXAM  --------------------------------------------------------------------------------  T(C): 36.4 (05-07-22 @ 09:05), Max: 36.8 (05-07-22 @ 04:36)  HR: 94 (05-07-22 @ 09:05) (70 - 95)  BP: 139/66 (05-07-22 @ 09:05) (125/79 - 152/65)  RR: 19 (05-07-22 @ 04:36) (18 - 19)  SpO2: 99% (05-07-22 @ 09:05) (96% - 100%)  Wt(kg): --        05-06-22 @ 07:01  -  05-07-22 @ 07:00  --------------------------------------------------------  IN: 900 mL / OUT: 1900 mL / NET: -1000 mL      Physical Exam:  	Gen: NAD   	HEENT: PERRL, supple neck, clear oropharynx  	Pulm: CTA B/L  	CV: RRR, S1S2; no rub  	Back: No spinal or CVA tenderness; no sacral edema  	Abd: +BS, soft, nontender/nondistended  	: No suprapubic tenderness  	UE: Warm, FROM, no clubbing, intact strength; no edema; no asterixis  	LE: Warm, FROM, no clubbing, intact strength; no edema  	Neuro: No focal deficits, intact gait  	Psych: Normal affect and mood  	Skin: Warm, without rashes  	Vascular access:    LABS/STUDIES  --------------------------------------------------------------------------------              9.5    9.89  >-----------<  262      [05-06-22 @ 07:32]              29.9     137  |  96  |  67.6  ----------------------------<  132      [05-06-22 @ 07:32]  4.0   |  22.0  |  6.06        Ca     8.0     [05-06-22 @ 07:32]      Mg     2.1     [05-06-22 @ 07:32]      Phos  4.0     [05-06-22 @ 07:32]            Creatinine Trend:  SCr 6.06 [05-06 @ 07:32]  SCr 6.47 [05-04 @ 10:45]  SCr 7.22 [05-02 @ 11:41]  SCr 5.74 [05-01 @ 07:44]  SCr 4.32 [04-30 @ 10:38]        Iron 27, TIBC 247, %sat 11      [03-20-22 @ 03:23]  Ferritin 4169      [05-01-22 @ 07:44]    HBsAg Nonreact      [04-21-22 @ 22:26]

## 2022-05-07 NOTE — PROGRESS NOTE ADULT - ASSESSMENT
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 Recently had a fall and was found to have pelvic/left hip fractures -- seen by Ortho and was recommended non operative management.     Assessment/Plan:    1) Acute respiratory failure with hypoxia likely due to COPD Exacerbation due to COVID PNA with likely superimposed bacterial PNA  - Hypoxia resolved; saturating well on room air  - CXR shows bilateral lower lobe platelike atelectasis/interstitial infiltrates  - COVID 19 negative  - Change Solumedrol to Hydrocortisone (home dose)   - Finished Remdesivir on 5/1   - Completed meropenem and Doxycycline    - Continue bronchodilators  - s/p Bronchoscopy on 4/29: copious secretions with normal keerthi  - blood cultures no growth to date  - CTS recs appreciated; no further interventions required  - ID recs also reviewed    2) Left Acetabulum / Pubic Rami Fractures   - Conservative treatment  - Analgesia as needed  - LLE TTWB  - PT eval appreciated - [facility]  - Ortho follow up noted     3) ESRD on HD  - Continue dialysis as per schedule   - UF goal per renal  - Hyperphosphatemia; Continue renvela with meals and add phoslo  - Monitor I/Os, daily weights  - Avoid Nephrotoxic agents and renally dose medications for eGFR < 10  - Renal recs appreciated    4) History of Fungal Meningitis   - Continue Fluconazole     5) History of Lung Transplant   - Continue Tacrolimus and Cellcept   - Continue Bactrim for prophylaxis   - Continue Hydrocortisone      6) Right IJ Occlusion   - Continue Eliquis    7) Hyperkalemia - resolved  - s/p Lokelma 10 gm x 3  - potassium bath adjustment per renal  - low K+ Diet    8) History of CAD  -continue plavix and statin    9) BPH  -continue flomax    10) Anemia of chronic kidney disease  -Hb noted and not at goal  -continue procrit as ordered  -no overt signs/symptoms of bleeding    DVT Prophylaxis - Eliquis    Dispo: Medically stable for discharge to [facility]

## 2022-05-07 NOTE — PROGRESS NOTE ADULT - NUTRITIONAL ASSESSMENT
This patient has been assessed with a concern for Malnutrition and has been determined to have a diagnosis/diagnoses of Severe protein-calorie malnutrition.    This patient is being managed with:   Diet DASH/TLC-  Sodium & Cholesterol Restricted  For patients receiving Renal Replacement - No Protein Restr No Conc K No Conc Phos Low  Sodium (RENAL)  Entered: Apr 26 2022  5:59PM    

## 2022-05-18 NOTE — ED ADULT NURSE NOTE - NSFALLRSKPASTHIST_ED_ALL_ED
[FreeTextEntry1] : #DM2\par - relatively stable recent a1c of 8.0\par - janumet XR  qd, levemir 25 bid, humalog 10 tidac, and pravastatin 10 qd per endo\par - discussed diet and lifestyle modifications\par - endocrine follow up, next scheduled for today\par - ophthalmology and podiatry referral ordered, number for appointment provided\par \par #MNG\par - clinically euthyroid with recent TFTs wnl\par - did not want to see head and neck surgery for possible FNA biopsy\par - repeat US ordered, number for appointment provided\par - continue endocrine follow up\par \par #HCM\par - PHQ2 of\par - flu shot, tdap at next office visit; PPSV23 ordered in 5/2014, unclear if it was administered\par - shingrix at pharmacy\par - colonoscopy referral given, number for appointment provided
no

## 2022-05-18 NOTE — ED CLERICAL - NS ED CLERK NOTE PRE-ARRIVAL INFORMATION; ADDITIONAL PRE-ARRIVAL INFORMATION
This patient is enrolled in a readmission reduction program and has active care navigation. This patient can be followed up by the care navigation team within 24 hours. To arrange close follow-up or to obtain additional clinical information about this patient, please call the contact number above.     Please speak with the [place] ED Case Manager for assistance with discharge planning

## 2022-05-18 NOTE — ED ADULT NURSE NOTE - NSICDXPASTSURGICALHX_GEN_ALL_CORE_FT
PAST SURGICAL HISTORY:  H/O heart artery stent x3 @[facility]    H/O lung transplant bilateral - transplant - 1-2-2015 -  [facility]    History of hip replacement right    Status post open reduction and internal fixation (ORIF) of fracture left femur

## 2022-05-18 NOTE — ED ADULT NURSE NOTE - OBJECTIVE STATEMENT
Pt at dialysis appt today where he was found to have a hgb of 6 Pt presents to ED after dialysis appt today where he was found to have a hgb of 6. Pt denies SOB, chest pain, headache, weakness, numbness, or tingling. Pt denies blood in stool and urine. A/O x3 Respirations are even and unlabored. Wife at bedside states pt needs irradiated blood due to bilateral lung transplant. Pt educated on plan of care and expresses understanding.

## 2022-05-18 NOTE — ED PROVIDER NOTE - NSICDXPASTSURGICALHX_GEN_ALL_CORE_FT
PAST SURGICAL HISTORY:  H/[facility] heart artery [facility] x3 @[facility]    H/O lung transplant bilateral - transplant - 1-2-2015 -  [facility]    History of hip replacement right    Status post open reduction and internal fixation (ORIF) of fracture left femur

## 2022-05-18 NOTE — ED ADULT TRIAGE NOTE - CHIEF COMPLAINT QUOTE
pt states he needs a blood transfusion because his blood was drawn and was "6" today.  dialysis mwf, completed dialysis today.

## 2022-05-18 NOTE — ED PROVIDER NOTE - COVID-19 RESULT DATE/TIME
Acute Care - Occupational Therapy Initial Evaluation  [facility]     Patient Name: Murali Dennis  : 1931  MRN: [id number]  Today's Date: 2020             Admit Date: 2019       ICD-10-CM ICD-9-CM   1. Acute pancreatitis, unspecified complication status, unspecified pancreatitis type K85.90 577.0   2. HELIO (acute kidney injury) (CMS/[facility]) N17.9 584.9   3. Dysphagia, unspecified type R13.10 787.20   4. Impaired mobility and ADLs Z74.09 799.89     Patient Active Problem List   Diagnosis   • Dementia (CMS/[facility])   • Hypothyroid   • Stage 3 chronic kidney disease (CMS/[facility])   • Coronary artery disease involving native coronary artery of native heart without angina pectoris   • A-fib (CMS/[facility])   • Benign prostatic hyperplasia without lower urinary tract symptoms   • Type 2 diabetes mellitus without complication, without long-term current use of insulin (CMS/[facility])   • Gastroesophageal reflux disease   • Ventricular tachycardia (CMS/[facility])   • Parkinson's disease (CMS/[facility])   • ANGELA on CPAP   • Cardiomyopathy (EF 30%)   • Mixed hyperlipidemia   • Pancreatitis   • Sepsis (CMS/[facility])   • Bacteremia due to Streptococcus   • Acute renal failure superimposed on stage 3 chronic kidney disease (CMS/[facility])   • Acute cholecystitis   • Acute pancreatitis     Past Medical History:   Diagnosis Date   • Alzheimer disease (CMS/[facility])    • [place] esophagus     followed  with GI in [place]   • Benign prostatic hyperplasia without lower urinary tract symptoms 10/17/2018   • Chronic congestive heart failure (CMS/[facility]) 2017     echo report from 17 EF 60-65%, mild TR, mild AR ([place], [place]) Echocardiogram 17: EF 28%, mild to moderate MR    • COPD (chronic obstructive pulmonary disease) (CMS/[facility])    • Coronary artery disease    • Dementia (CMS/[facility])    • Diarrhea 2019   • Environmental allergies 10/17/2018   • Essential hypertension 10/17/2018   • Gastroesophageal reflux disease 2018    • GERD (gastroesophageal reflux disease)    • Hypothyroid     started after amiodarone use in 2003   • Sleep apnea     wears cpap   • Stroke (CMS/HCC) 2003   • TIA (transient ischemic attack) 12/29/2017   • Unintended weight loss 10/17/2018     Past Surgical History:   Procedure Laterality Date   • CARDIAC CATHETERIZATION  2003   • CARDIAC CATHETERIZATION N/A 1/19/2018    Procedure: Left Heart Cath;  Surgeon: Hollis Ugarte MD;  Location:  [facility] CATH INVASIVE LOCATION;  Service:    • CARPAL TUNNEL RELEASE Right    • CATARACT EXTRACTION Bilateral    • CORONARY ARTERY BYPASS GRAFT  2003    Triple Bypass, @ [facility] @ [place], [place]   • ERCP N/A 12/30/2019    Procedure: ENDOSCOPIC RETROGRADE CHOLANGIOPANCREATOGRAPHY;  Surgeon: Arsh White MD;  Location:  [facility] ENDOSCOPY;  Service: Gastroenterology   • HEMORRHOIDECTOMY     • HERNIA REPAIR            OT ASSESSMENT FLOWSHEET (last 12 hours)      Occupational Therapy Evaluation     Row Name 01/02/20 1020                   OT Evaluation Time/Intention    Subjective Information  no complaints  -AN        Document Type  evaluation  -AN        Mode of Treatment  occupational therapy  -AN        Patient Effort  adequate  -AN        Symptoms Noted During/After Treatment  fatigue  -AN        Comment  pt having trouble opening eyes, drowsy  -AN           General Information    Patient Profile Reviewed?  yes  -AN        Patient/Family Observations  spouse and son present, other family end of session  -AN        General Observations of Patient  pt supine in bed, UE's elevated  -AN        Prior Level of Function  independent:;all household mobility;dressing;grooming;feeding;mod assist:;bathing  -AN        Equipment Currently Used at Home  cane, straight;shower chair  -AN        Pertinent History of Current Functional Problem  Pt with abdominal pain; pt with hx of dementia, Parkinson's  -AN        Existing Precautions/Restrictions  fall  -AN         Limitations/Impairments  safety/cognitive  -AN        Risks Reviewed  patient and family:;LOB;dizziness;increased discomfort;change in vital signs  -AN        Benefits Reviewed  patient and family:;improve function;increase independence;increase strength;increase balance  -AN        Barriers to Rehab  medically complex;previous functional deficit;cognitive status  -AN           Relationship/Environment    Primary Source of Support/Comfort  spouse;child(shelley)  -AN        Lives With  spouse  -AN        Family Caregiver if Needed  spouse  -AN           Resource/Environmental Concerns    Current Living Arrangements  home/apartment/condo  -AN           Home Main Entrance    Number of Stairs, Main Entrance  two  -AN           Stairs Within Home, Primary    Number of Stairs, Within Home, Primary  none  -AN           Cognitive Assessment/Interventions    Additional Documentation  Cognitive Assessment/Intervention (Group)  -AN           Cognitive Assessment/Intervention- PT/OT    Affect/Mental Status (Cognitive)  low arousal/lethargic  -AN        Orientation Status (Cognition)  oriented to;person pt reports he is at home  -AN        Follows Commands (Cognition)  follows one step commands;0-24% accuracy  -AN        Personal Safety Interventions  fall prevention program maintained  -AN           Bed Mobility Assessment/Treatment    Bed Mobility Assessment/Treatment  supine-sit;sit-supine  -AN        Supine-Sit [place] (Bed Mobility)  maximum assist (25% patient effort);2 person assist  -AN        Sit-Supine [place] (Bed Mobility)  maximum assist (25% patient effort);2 person assist  -AN        Assistive Device (Bed Mobility)  head of bed elevated;draw sheet  -AN           Transfer Assessment/Treatment    Transfer Assessment/Treatment  sit-stand transfer;toilet transfer  -AN           Sit-Stand Transfer    Sit-Stand [place] (Transfers)  moderate assist (50% patient effort);2 person assist  -AN           Toilet  Transfer    Type (Toilet Transfer)  stand pivot/stand step  -AN        [place] Level (Toilet Transfer)  maximum assist (25% patient effort);2 person assist  -AN        Assistive Device (Toilet Transfer)  commode, bedside without drop arms  -AN           ADL Assessment/Intervention    BADL Assessment/Intervention  upper body dressing;toileting  -AN           Upper Body Dressing Assessment/Training    Upper Body Dressing [place] Level  doff;don;maximum assist (25% patient effort) hospital gown  -AN           Toileting Assessment/Training    [place] Level (Toileting)  perform perineal hygiene;dependent (less than 25% patient effort)  -AN        Comment (Toileting)  max for standing, D hygiene  -AN           BADL Safety/Performance    Impairments, BADL Safety/Performance  balance;cognition;endurance/activity tolerance;motor planning;strength  -AN           General ROM    GENERAL ROM COMMENTS  no able to formally assess  -AN           MMT (Manual Muscle Testing)    General MMT Comments  no able to formally assess  -AN           Sensory Assessment/Intervention    Additional Documentation  Vision Assessment/Intervention (Group)  -AN           Vision Assessment/Intervention    Visual Impairment/Limitations  unable/difficult to assess  -AN           Positioning and Restraints    Pre-Treatment Position  in bed  -AN        Post Treatment Position  bed  -AN        In Bed  supine;call light within reach;encouraged to call for assist;with family/caregiver  -AN           Pain Assessment    Additional Documentation  Pain Scale: Numbers Pre/Post-Treatment (Group)  -AN           Pain Scale: Numbers Pre/Post-Treatment    Pain Scale: Numbers, Pretreatment  0/10 - no pain  -AN        Pain Scale: Numbers, Post-Treatment  0/10 - no pain  -AN           Plan of Care Review    Plan of Care Reviewed With  patient;family  -AN           Clinical Impression (OT)    OT Diagnosis  impaired ADLs  -AN        Patient/Family Goals  Statement (OT Eval)  agreeable to OT  -AN        Criteria for Skilled Therapeutic Interventions Met (OT Eval)  yes;treatment indicated  -AN        Rehab Potential (OT Eval)  good, to achieve stated therapy goals  -AN        Therapy Frequency (OT Eval)  daily  -AN        Care Plan Review (OT)  evaluation/treatment results reviewed  -AN        Anticipated Discharge Disposition (OT)  [facility] nursing facility  -AN           Vital Signs    Pre Systolic BP Rehab  139  -AN        Pre Treatment Diastolic BP  66  -AN        Post Systolic BP Rehab  151  -AN        Post Treatment Diastolic BP  68  -AN        Pretreatment Heart Rate (beats/min)  62  -AN        Posttreatment Heart Rate (beats/min)  60  -AN        Pre SpO2 (%)  97  -AN        O2 Delivery Pre Treatment  room air  -AN        Post SpO2 (%)  95  -AN        O2 Delivery Post Treatment  room air  -AN           OT Goals    Transfer Goal Selection (OT)  transfer, OT goal 1  -AN        Dressing Goal Selection (OT)  dressing, OT goal 1  -AN           Transfer Goal 1 (OT)    Activity/Assistive Device (Transfer Goal 1, OT)  toilet;commode, bedside without drop arms;walker, rolling  -AN        [place] Level/Cues Needed (Transfer Goal 1, OT)  moderate assist (50-74% patient effort)  -AN        Time Frame (Transfer Goal 1, OT)  10 days  -AN        Progress/Outcome (Transfer Goal 1, OT)  goal ongoing  -AN           Dressing Goal 1 (OT)    Activity/Assistive Device (Dressing Goal 1, OT)  other (see comments) don pants  -AN        [place]/Cues Needed (Dressing Goal 1, OT)  moderate assist (50-74% patient effort)  -AN        Time Frame (Dressing Goal 1, OT)  10 days  -AN        Progress/Outcome (Dressing Goal 1, OT)  goal ongoing  -AN           OT Cognitive Goals    Directions Goal Selection (OT)  direction following, OT goal 1  -AN        Additional Documentation  Directions Goal Selection (OT) (Row)  -AN           Direction Following Goal 1 (OT)    Activity (Direction  Following Goal 1, OT)  follow one step directions  -AN        [place]/Cues/Accuracy (Direction Following Goal 1, OT)  with moderate;verbal cues/redirection;with 60% accuracy  -AN        Time Frame (Direction Following Goal 1, OT)  10 days  -AN        Progress/Outcome (Direction Following Goal 1, OT)  goal ongoing  -AN           Living Environment    Home Accessibility  stairs to enter home  -AN          User Key  (r) = Recorded By, (t) = Taken By, (c) = Cosigned By    Initials Name Effective Dates    Mary Goss OT 06/22/15 -                OT Recommendation and Plan  Outcome Summary/Treatment Plan (OT)  Anticipated Discharge Disposition (OT): skilled nursing facility  Therapy Frequency (OT Eval): daily  Plan of Care Review  Plan of Care Reviewed With: patient, family  Plan of Care Reviewed With: patient, family  Outcome Summary: Pt currently max x 2 for bed mobility and toilet txfr. Pt oriented to self and has difficulty followiing directiions. Pt dependent for toilet hygeine and UB dressiing. Will need SNF at dischare.    Outcome Measures     Row Name 01/02/20 1020             How much help from another is currently needed...    Putting on and taking off regular lower body clothing?  1  -AN      Bathing (including washing, rinsing, and drying)  1  -AN      Toileting (which includes using toilet bed pan or urinal)  1  -AN      Putting on and taking off regular upper body clothing  1  -AN      Taking care of personal grooming (such as brushing teeth)  1  -AN      Eating meals  1  -AN      AM-PAC 6 Clicks Score (OT)  6  -AN         Functional Assessment    Outcome Measure Options  AM-PAC 6 Clicks Daily Activity (OT)  -AN        User Key  (r) = Recorded By, (t) = Taken By, (c) = Cosigned By    Initials Name Provider Type    Mary Goss OT Occupational Therapist          Time Calculation:   Time Calculation- OT     Row Name 01/02/20 1118             Time Calculation- OT    OT Start Time  1020  -AN       Total Timed Code Minutes- OT  0 minute(s)  -AN      OT Received On  01/02/20  -AN      OT Goal Re-Cert Due Date  01/12/20  -AN        User Key  (r) = Recorded By, (t) = Taken By, (c) = Cosigned By    Initials Name Provider Type    Mary Goss OT Occupational Therapist        Therapy Charges for Today     Code Description Service Date Service Provider Modifiers Qty    59713008237 HC OT EVAL LOW COMPLEXITY 4 1/2/2020 Mary Martínez OT GO 1    65009282830 HC OT EVAL HIGH COMPLEXITY 4 1/2/2020 Mary Martínez, OT GO 1    57739359476 HC OT THER SUPP EA 15 MIN 1/2/2020 Mary Martínez OT GO 1    71589241244 HC OT THERAPEUTIC ACT EA 15 MIN 1/2/2020 Mary Martínez, OT GO 1               Mary ARMSTRONG. RAUL Martínez  1/2/2020   03-May-2022 10:02

## 2022-05-18 NOTE — ED PROVIDER NOTE - PROGRESS NOTE DETAILS
Marian Jacobsen for ED attending, Dr. Adkins: spoke to Dr. Preston, states it hemoglobin 6 recommends 2 units of blood with 80 of IV Lasix in between, and can be d/odell back to rehab Marian Jacobsen for ED attending, Dr. Adkins: pt consented for blood. Lucille JEROME: Hb was 7, received one unit of PRBC and 40 IV lasix. No complications, remains asymptomatic. Arranging for transport back to rehab. Marian Jacobsen for ED attending, Dr. Adkins: spoke to Dr. Preston, states if it hemoglobin 6 recommends 2 units of blood with 80 of IV Lasix in between, and can be d/odell back to rehab

## 2022-05-18 NOTE — ED PROVIDER NOTE - NS_ATTENDINGSCRIBE_ED_ALL_ED
Patient is a 70y old  Male who presents with a chief complaint of Change in Mental Status (17 May 2021 12:33)        Over Night Events: NO major events overnight        ROS:     All ROS are negative except HPI         PHYSICAL EXAM    ICU Vital Signs Last 24 Hrs  T(C): 35.8 (18 May 2021 08:29), Max: 37.1 (17 May 2021 15:00)  T(F): 96.4 (18 May 2021 08:29), Max: 98.8 (17 May 2021 15:00)  HR: 76 (18 May 2021 08:29) (72 - 78)  BP: 123/70 (18 May 2021 08:29) (120/60 - 127/74)  BP(mean): 92 (18 May 2021 08:29) (84 - 96)  RR: 14 (18 May 2021 08:29) (14 - 23)  SpO2: 100% (18 May 2021 05:01) (95% - 100%)      CONSTITUTIONAL:  Well nourished.  NAD    ENT:   Airway patent,   Mouth with normal mucosa.   No thrush    EYES:   Pupils equal,   Round and reactive to light.    CARDIAC:   Normal rate,   Regular rhythm.    No edema      Vascular:  Normal systolic impulse  No Carotid bruits    RESPIRATORY:   No wheezing  Bilateral BS  Normal chest expansion  Not tachypneic,  No use of accessory muscles    GASTROINTESTINAL:  Abdomen soft,   Non-tender,   No guarding,   + BS    MUSCULOSKELETAL:   Range of motion is not limited,  No clubbing, cyanosis    NEUROLOGICAL:   Alert, awake, not following commands.  generalized weakness    SKIN:   Skin normal color for race,   Warm and dry and intact.   No evidence of rash.  Sacral ulcer    PSYCHIATRIC:   Normal mood and affect.   No apparent risk to self or others.    HEMATOLOGICAL:  No cervical  lymphadenopathy.  no inguinal lymphadenopathy      05-17-21 @ 07:01  -  05-18-21 @ 07:00  --------------------------------------------------------  IN:    sodium chloride 0.9%: 900 mL  Total IN: 900 mL    OUT:    Drain (mL): 10 mL  Total OUT: 10 mL    Total NET: 890 mL      05-18-21 @ 07:01  -  05-18-21 @ 09:47  --------------------------------------------------------  IN:    sodium chloride 0.9%: 150 mL  Total IN: 150 mL    OUT:  Total OUT: 0 mL    Total NET: 150 mL          LABS:                            7.7    4.71  )-----------( 166      ( 17 May 2021 06:10 )             24.9                                               05-17    135  |  99  |  16  ----------------------------<  86  4.0   |  23  |  0.7    Ca    8.1<L>      17 May 2021 06:10  Phos  3.4     05-16  Mg     1.9     05-17    TPro  6.2  /  Alb  2.1<L>  /  TBili  0.4  /  DBili  x   /  AST  24  /  ALT  <5  /  AlkPhos  139<H>  05-17      PT/INR - ( 16 May 2021 11:37 )   PT: 14.00 sec;   INR: 1.22 ratio         PTT - ( 16 May 2021 11:37 )  PTT:39.3 sec                                                                                     LIVER FUNCTIONS - ( 17 May 2021 06:10 )  Alb: 2.1 g/dL / Pro: 6.2 g/dL / ALK PHOS: 139 U/L / ALT: <5 U/L / AST: 24 U/L / GGT: x                                                                                               Mode: AC/ CMV (Assist Control/ Continuous Mandatory Ventilation)  RR (machine): 14  TV (machine): 400  FiO2: 30  PEEP: 5  ITime: 1  MAP: 9  PIP: 27                                      ABG - ( 18 May 2021 05:16 )  pH, Arterial: 7.47  pH, Blood: x     /  pCO2: 37    /  pO2: 127   / HCO3: 27    / Base Excess: 3.4   /  SaO2: 99                  MEDICATIONS  (STANDING):  ascorbic acid 500 milliGRAM(s) Oral daily  atorvastatin 40 milliGRAM(s) Oral at bedtime  carbidopa/levodopa  25/100 2 Tablet(s) Oral <User Schedule>  chlorhexidine 0.12% Liquid 15 milliLiter(s) Oral Mucosa every 12 hours  chlorhexidine 4% Liquid 1 Application(s) Topical <User Schedule>  collagenase Ointment 1 Application(s) Topical two times a day  Dakins Solution - 1/2 Strength 1 Application(s) Topical two times a day  enoxaparin Injectable 40 milliGRAM(s) SubCutaneous daily  lidocaine 2% Gel 1 Application(s) Topical once  lidocaine 2% Viscous 100 milliLiter(s) Swish and Spit once  magnesium oxide 400 milliGRAM(s) Oral every 8 hours  midodrine 10 milliGRAM(s) Oral every 8 hours  pantoprazole   Suspension 40 milliGRAM(s) Oral daily  polyethylene glycol 3350 17 Gram(s) Oral daily  senna 2 Tablet(s) Oral at bedtime  sodium chloride 0.9%. 1000 milliLiter(s) (75 mL/Hr) IV Continuous <Continuous>  zinc sulfate 220 milliGRAM(s) Oral daily    MEDICATIONS  (PRN):  acetaminophen   Tablet .. 650 milliGRAM(s) Oral every 6 hours PRN Temp greater or equal to 38C (100.4F)  midazolam Injectable 2 milliGRAM(s) IV Push every 4 hours PRN agitation      New X-rays reviewed:                                                                                  ECHO    CXR interpreted by me:  no change     Patient is a 70y old  Male who presents with a chief complaint of Change in Mental Status (17 May 2021 12:33)        Over Night Events: NO major events overnight.  remains on MV.  For trach today         ROS:     All ROS are negative except HPI         PHYSICAL EXAM    ICU Vital Signs Last 24 Hrs  T(C): 35.8 (18 May 2021 08:29), Max: 37.1 (17 May 2021 15:00)  T(F): 96.4 (18 May 2021 08:29), Max: 98.8 (17 May 2021 15:00)  HR: 76 (18 May 2021 08:29) (72 - 78)  BP: 123/70 (18 May 2021 08:29) (120/60 - 127/74)  BP(mean): 92 (18 May 2021 08:29) (84 - 96)  RR: 14 (18 May 2021 08:29) (14 - 23)  SpO2: 100% (18 May 2021 05:01) (95% - 100%)      CONSTITUTIONAL:  Well nourished.  NAD    ENT:   Airway patent,   Mouth with normal mucosa.   No thrush    EYES:   Pupils equal,   Round and reactive to light.    CARDIAC:   Normal rate,   Regular rhythm.    No edema      Vascular:  Normal systolic impulse  No Carotid bruits    RESPIRATORY:   No wheezing  Bilateral BS  Normal chest expansion  Not tachypneic,  No use of accessory muscles    GASTROINTESTINAL:  Abdomen soft,   Non-tender,   No guarding,   + BS    MUSCULOSKELETAL:   Range of motion is not limited,  No clubbing, cyanosis    NEUROLOGICAL:   Alert, awake, not following commands.  generalized weakness    SKIN:   Skin normal color for race,   Warm and dry and intact.   No evidence of rash.  Sacral ulcer    PSYCHIATRIC:   Normal mood and affect.   No apparent risk to self or others.    HEMATOLOGICAL:  No cervical  lymphadenopathy.  no inguinal lymphadenopathy      05-17-21 @ 07:01  -  05-18-21 @ 07:00  --------------------------------------------------------  IN:    sodium chloride 0.9%: 900 mL  Total IN: 900 mL    OUT:    Drain (mL): 10 mL  Total OUT: 10 mL    Total NET: 890 mL      05-18-21 @ 07:01  -  05-18-21 @ 09:47  --------------------------------------------------------  IN:    sodium chloride 0.9%: 150 mL  Total IN: 150 mL    OUT:  Total OUT: 0 mL    Total NET: 150 mL          LABS:                            7.7    4.71  )-----------( 166      ( 17 May 2021 06:10 )             24.9                                               05-17    135  |  99  |  16  ----------------------------<  86  4.0   |  23  |  0.7    Ca    8.1<L>      17 May 2021 06:10  Phos  3.4     05-16  Mg     1.9     05-17    TPro  6.2  /  Alb  2.1<L>  /  TBili  0.4  /  DBili  x   /  AST  24  /  ALT  <5  /  AlkPhos  139<H>  05-17      PT/INR - ( 16 May 2021 11:37 )   PT: 14.00 sec;   INR: 1.22 ratio         PTT - ( 16 May 2021 11:37 )  PTT:39.3 sec                                                                                     LIVER FUNCTIONS - ( 17 May 2021 06:10 )  Alb: 2.1 g/dL / Pro: 6.2 g/dL / ALK PHOS: 139 U/L / ALT: <5 U/L / AST: 24 U/L / GGT: x                                                                                               Mode: AC/ CMV (Assist Control/ Continuous Mandatory Ventilation)  RR (machine): 14  TV (machine): 400  FiO2: 30  PEEP: 5  ITime: 1  MAP: 9  PIP: 27                                      ABG - ( 18 May 2021 05:16 )  pH, Arterial: 7.47  pH, Blood: x     /  pCO2: 37    /  pO2: 127   / HCO3: 27    / Base Excess: 3.4   /  SaO2: 99                  MEDICATIONS  (STANDING):  ascorbic acid 500 milliGRAM(s) Oral daily  atorvastatin 40 milliGRAM(s) Oral at bedtime  carbidopa/levodopa  25/100 2 Tablet(s) Oral <User Schedule>  chlorhexidine 0.12% Liquid 15 milliLiter(s) Oral Mucosa every 12 hours  chlorhexidine 4% Liquid 1 Application(s) Topical <User Schedule>  collagenase Ointment 1 Application(s) Topical two times a day  Dakins Solution - 1/2 Strength 1 Application(s) Topical two times a day  enoxaparin Injectable 40 milliGRAM(s) SubCutaneous daily  lidocaine 2% Gel 1 Application(s) Topical once  lidocaine 2% Viscous 100 milliLiter(s) Swish and Spit once  magnesium oxide 400 milliGRAM(s) Oral every 8 hours  midodrine 10 milliGRAM(s) Oral every 8 hours  pantoprazole   Suspension 40 milliGRAM(s) Oral daily  polyethylene glycol 3350 17 Gram(s) Oral daily  senna 2 Tablet(s) Oral at bedtime  sodium chloride 0.9%. 1000 milliLiter(s) (75 mL/Hr) IV Continuous <Continuous>  zinc sulfate 220 milliGRAM(s) Oral daily    MEDICATIONS  (PRN):  acetaminophen   Tablet .. 650 milliGRAM(s) Oral every 6 hours PRN Temp greater or equal to 38C (100.4F)  midazolam Injectable 2 milliGRAM(s) IV Push every 4 hours PRN agitation      New X-rays reviewed:                                                                                  ECHO    CXR interpreted by me:  no change     I personally performed the service described in the documentation recorded by the scribe in my presence, and it accurately and completely records my words and actions.

## 2022-05-18 NOTE — ED PROVIDER NOTE - NSICDXPASTMEDICALHX_GEN_ALL_CORE_FT
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

## 2022-05-18 NOTE — ED PROVIDER NOTE - PHYSICAL EXAMINATION
Gen: Well appearing in NAD  Head: NC/AT  Neck: trachea midline  Card: regular rate and rhythm, dialysis catheter right anterior chest wall  Resp:  CTAB  Abd: soft, non-tender  Ext: no deformities  Neuro:  A&O appears non focal  Skin:  Warm and dry as visualized  Psych:  Normal affect and mood

## 2022-05-18 NOTE — ED PROVIDER NOTE - PATIENT PORTAL LINK FT
You can access the FollowMyHealth Patient Portal offered by [facility] by registering at the following website: [email or web address]. By joining [facility]’s FollowMyHealth portal, you will also be able to view your health information using other applications (apps) compatible with our system.

## 2022-05-18 NOTE — ED PROVIDER NOTE - OBJECTIVE STATEMENT
71 y/o male with PMHx of bilateral lung transplant, ESRD on dialysis M/W/F had normal dialysis earlier today presents to the ED from [facility]  with low hemoglobin, states it was around 6 when they checked it at dialysis earlier today. Pt states he need irradiated blood due to his lung transplant. Pt denies black or blood stool, hematuria, epistaxis.  Neph: Mohan

## 2022-05-18 NOTE — ED PROVIDER NOTE - NSFOLLOWUPINSTRUCTIONS_ED_ALL_ED_FT
- Follow up with your doctor within 2-3 days.   - You received 1 unit of PRBC and 40mg IV lasix.   - Bring results with you to the appointment.   - Return to the ED for any new or worsening symptoms.     Anemia    Anemia is a condition in which the concentration of red blood cells or hemoglobin in the blood is below normal. Hemoglobin is a substance in red blood cells that carries oxygen to the tissues of the body. Anemia results in not enough oxygen reaching these tissues which can cause symptoms such as weakness, dizziness/lightheadedness, shortness of breath, chest pain, paleness, or nausea. The cause of your anemia may or may not be determined immediately. If your hemoglobin was dangerously low, you may have received a blood transfusion. Usually reactions to transfusions occur immediately but monitor yourself for any fevers, rash, or shortness of breath.    SEEK IMMEDIATE MEDICAL CARE IF YOU HAVE ANY OF THE FOLLOWING SYMPTOMS: extreme weakness/chest pain/shortness of breath, black or bloody stools, vomiting blood, fainting, fever, or any signs of dehydration.

## 2022-05-29 NOTE — END OF VISIT
[Time Spent: ___ minutes] : I have spent [unfilled] minutes of time on the encounter. [>50% of the face to face encounter time was spent on counseling and/or coordination of care for ___] : Greater than 50% of the face to face encounter time was spent on counseling and/or coordination of care for [unfilled] Alert and oriented, no focal deficits, no motor or sensory deficits. Yes

## 2022-06-15 PROBLEM — S32.435A: Status: ACTIVE | Noted: 2022-01-01

## 2022-06-15 NOTE — DISCUSSION/SUMMARY
[de-identified] : 72-year-old male with left acetabular fracture in the early stages of healing.  There has been no significant displacement from his hospital films.  We will allow him to weight-bear as tolerated.  No restrictions at the present time.  Would recommend physical therapy for range of motion, gait training, strengthening, balance.\par \par Osteopenia and osteoporosis are significant risk factors for fragility fractures. Given the type of fracture that has occurred, I would highly recommend that the patient followup with their primary care physician to discuss treatment for low bone mineral density. We discussed the benefits of these medications frequently far outweigh the small risks. Their primary care physician can call the office with any specific questions or concerns.\par \par The question of when to drive is impossible to generalize to everyone because it is largely dependent on the individual.  Importantly, doctors do not have a license with the DMV to "clear you" or "release you" to return to driving.  There are 3 primary criteria that must be met.  You need to be off of narcotic pain medicines (otherwise you are driving under the influence).  You need to be able to get in and out of the 's seat comfortably.  And you must have regained your normal reflexes / strength.  Also, return to driving depends partly on what side had surgery (ie. Right leg operates the pedals; people with Left side surgery can generally get back to driving much sooner unless you have a clutch).  The average time to return to driving is around 2 weeks after you return to normal walking for the right side and usually sooner for the left.  We recommend 'testing' yourself with another licensed  in an empty parking lot or quiet street first in order to check your reflexes moving your foot from pedal to pedal.\par \par No orthopedic trauma intervention is required but we will help facilitate future care as needed. The patient may follow up as needed.\par \par Negrito Hightower MD\par Orthopaedic Trauma Surgeon\par [facility]\par [facility]\par Director Orthopaedic Trauma, [facility]\par \par \par \par

## 2022-06-15 NOTE — REASON FOR VISIT
[Initial Visit] : an initial visit for [Family Member] : family member [FreeTextEntry2] : left hip pain

## 2022-06-15 NOTE — HISTORY OF PRESENT ILLNESS
[de-identified] : Patient 72-year-old gentleman presents today for evaluation of left hip pain.  At the end of April he suffered a fall.  He was seen at [facility] and a left acetabular fracture was diagnosed.  He comes in today for follow-up of that.  He is delayed in his follow-up secondary to having been diagnosed with COVID only a few weeks after that injury.  He has been struggling with that.  He is also on dialysis.  He is currently still toe-touch weightbearing as this was the initial plan upon discharge from the hospital but as he had been unable to follow-up none of the rehabs has advanced his activity.  Minimal pain at the present time.  Mostly complains of weakness. [2] : a current pain level of 2/10 [Bending] : worsened by bending [Lifting] : worsened by lifting [Weight Bearing] : worsened by weight bearing [Recumbency] : relieved by recumbency [Rest] : relieved by rest [de-identified] : aching

## 2022-06-15 NOTE — PHYSICAL EXAM
[de-identified] : Physical Exam:\par General: Well appearing, no acute distress, A&O\par Neurologic: A&Ox3, No focal deficits\par Head: NCAT without abrasions, lacerations, or ecchymosis to head, face, or scalp\par Respiratory: Equal chest wall expansion bilaterally, no accessory muscle use\par Lymphatic: No lymphadenopathy palpated\par Skin: Warm and dry\par Psychiatric: Normal mood and affect\par \par LLE:\par SILT s/s/sp/dp/t\par Fires EHL/FHL/GS/TA\par 2+ DP/PT pulse\par brisk capillary refill [de-identified] : 3 views of the pelvis obtained today show a right total hip arthroplasty, a left femur intramedullary nail and a left anterior column acetabular fracture with early healing

## 2022-07-11 PROBLEM — I82.C19 INTERNAL JUGULAR VEIN THROMBOSIS: Status: ACTIVE | Noted: 2022-01-01

## 2022-07-11 PROBLEM — N18.6 ESRD (END STAGE RENAL DISEASE) ON DIALYSIS: Status: ACTIVE | Noted: 2021-10-22

## 2022-07-11 PROBLEM — I65.29 CAROTID ARTERY STENOSIS: Status: ACTIVE | Noted: 2021-11-29

## 2022-07-12 PROBLEM — S32.9XXA PELVIC FRACTURE: Status: ACTIVE | Noted: 2022-01-01

## 2022-07-12 NOTE — PHYSICAL EXAM
[2+] : left 2+ [Alert] : alert [Calm] : calm [Ankle Swelling (On Exam)] : not present [Varicose Veins Of Lower Extremities] : not present [] : not present [de-identified] : NAD [de-identified] : NC/AT [de-identified] : tip SAGE Permacath with clean dressing [de-identified] : no incresed work of breathing [FreeTextEntry1] : LAVF fistula, palpable stent with good thrill, not pulsatile [de-identified] : abdomen soft non tender [de-identified] : fistula with healed incision

## 2022-07-12 NOTE — PHYSICAL EXAM
[Normal Rate and Rhythm] : normal rate and rhythm [Right Carotid Bruit] : right carotid bruit heard [2+] : left 2+ [Ankle Swelling Bilaterally] : bilaterally  [Ankle Swelling On The Right] : mild [No Rash or Lesion] : No rash or lesion [Alert] : alert [Oriented to Person] : oriented to person [Oriented to Place] : oriented to place [Oriented to Time] : oriented to time [Calm] : calm [Ankle Swelling (On Exam)] : not present [Varicose Veins Of Lower Extremities] : not present [] : not present [Abdomen Tenderness] : ~T ~M No abdominal tenderness [de-identified] : NAD [de-identified] : NC/AT [de-identified] : rigth IJ permacath neck  [de-identified] : no incresed work of breathing [FreeTextEntry1] : LAVF fistula, palpable stent with good thrill distally and proximally, not pulsatile [de-identified] : abdomen soft non tender [de-identified] : Not evaluated  [de-identified] : Not evaluated  [de-identified] : FROM of all 4 extremities, sensation intact \par  [de-identified] : fistula with healed incision

## 2022-07-12 NOTE — ASSESSMENT
[Medication Management] : medication management [Carotid Endarterectomy] : carotid endarterectomy [Other: _____] : [unfilled] [FreeTextEntry1] : 70 yo M with history of lung transplant on immunosuppressive medications, HTN, lumbar spondylosis, hypercholesterolemia, COPD, BPH, ESRD on HD via tunneled dialysis catheter presenting for evaluation and creation of long term HD access.Now s/p LUE AVF fistula creation (11/10) and L TCAR (3/8/22).\par Underwent LUE fistulogram and angioplasty/ stent of AVF, followed by insertion of R IJ tunneled catheter, Fistula with good flow however fresh stent in place, will continue HD through permacath, regarding IJ thrombus barber start on eliquis, follow up in 3 months with repeat carotid duplex and right upper extremity vein duplex. Carotid stent from L TCAR without evidence of restenosis. <50% stenosis of R ICA\par

## 2022-07-12 NOTE — HISTORY OF PRESENT ILLNESS
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 Pt is right hand dominant. Denies complaints of chest pain, SOB, ZAPATA, claudication, leg wounds or leg edema at this time. Patient has been undergoing HD for 1.5 years with catheter, due to pandemic, he was unable to obtain surgery for AVF/ AVG creation, recently rescheduled. Now s/p LUE AVF creation (11/10).  [de-identified] : 70 yo M with a hx of ESRD (HD via R IJ TDC), now s/p LUE AVF creation\par s/p L TCAR on 3/08/22. Complains of some left arm pain. no pain in his RUE or swelling, permacath in RIJ, Has been dialyzed through catheter. States when they attempt to cannulate fistula he experiences sever pain when cannulation. Today studies in office whoen adequate flows with chronic changes in proximal segment. Carotid US shows patent stent with <50% stenosis. BALDEMAR asymptomatic stable at 50-69% stenosis. However incidental finding of right IJ DVT\par \par 7/11 f/u: Pt's spouse reports pt had rough medical experiences since the last time he was here 3 months ago. He is only using his tunneled catheter for HD and refuses to allow anyone to cannulate the AVF. Needs eliquis refill, still takes twice a day. Still on plavix. 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 In a wheel chair, due to pelvic fracture, has been walking.

## 2022-07-12 NOTE — HISTORY OF PRESENT ILLNESS
[FreeTextEntry1] : 72 yo M with history of lung transplant on immunosuppressive medications, HTN, lumbar spondylosis, hypercholesterolemia, COPD, BPH, ESRD on HD via tunneled dialysis catheter presenting for evaluation and creation of long term HD access. Pt is right hand dominant. Denies complaints of chest pain, SOB, ZAPATA, claudication, leg wounds or leg edema at this time. Patient has been undergoing HD for 1.5 years with catheter, due to pandemic, he was unable to obtain surgery for AVF/ AVG creation, recently rescheduled. Now s/p LUE AVF creation (11/10).  [de-identified] : 72 yo M with a hx of ESRD (HD via R IJ TDC), now s/p LUE AVF creation\par s/p L TCAR on 3/08/22. Complains of some left arm pain. no pain in his RUE or swelling, permacath in RIJ, Has been dialyzed through catheter. States when they attempt to cannulate fistula he experiences sever pain when cannulation. Today studies in office with adequate flows with chronic changes in proximal segment. Carotid US shows patent stent with <50% stenosis. BALDEMAR asymptomatic stable at 50-69% stenosis. However incidental finding of right IJ DVT involving tunneled catheter. AMerican access placed a viabhan stent in center of the AVF due to bleeding post fistulogram.

## 2022-07-12 NOTE — ASSESSMENT
[Other: _____] : [unfilled] [FreeTextEntry1] : 72 yo M with history of lung transplant on immunosuppressive medications, HTN, lumbar spondylosis, hypercholesterolemia, COPD, BPH, ESRD on HD via tunneled dialysis catheter presenting for evaluation and creation of long term HD access.Now s/p LUE AVF fistula creation (11/10) and L TCAR (3/8/22).\par Underwent LUE fistulogram and angioplasty/ stent of AVF, followed by insertion of R IJ tunneled catheter, Fistula with good flow however fresh stent in place, will continue HD through permacath, regarding IJ thrombus barber start on eliquis, follow up in 3 months with repeat carotid duplex and right upper extremity vein duplex. Carotid stent from TCAR stable\par

## 2022-09-22 NOTE — ED PROVIDER NOTE - CLINICAL SUMMARY MEDICAL DECISION MAKING FREE TEXT BOX
Chronic illness Patient presnets with left hi ppain.  XR with fracture.  VSS.  Labs with hypokalemia and hypomagnesemia and repleted.  Ortho will follow.  d/w Dr. Booth and will admit.  Non toxic.  Well appearing. Uneventful ED observation period.

## 2022-10-05 NOTE — PHYSICAL THERAPY INITIAL EVALUATION ADULT - THERAPY FREQUENCY, PT EVAL
[facility]  Podiatry  .  [place]   Phone: [phone]  Fax:   Follow Up Time: 1-3 Days    
3-5x/week

## 2022-11-11 NOTE — ED PROVIDER NOTE - CLINICAL SUMMARY MEDICAL DECISION MAKING FREE TEXT BOX
clinical constellation suggests that the pt is fluid overloaded. There is b/l LE pitting edema. I performed POCUS of abd, which displays mild to moderate ascites. Lung imaging displays diffuse intersitial infiltrates, again further suggestive of pulmonary edema. pt reports oliguria, so attempted to diuresis with 80 mg lasix, but no response. Renal consulted for dialysis. The pt is immunocompromised, with elevated WBC. This WBC count could be due chronic steroid usage, however cannot rule out infectious etiology. Given tachycardia and leukocytosis the pt trechnically meets sepsis criteria, but would be contraindicated for sepsis fluids as I clinically suspect the etiology of the dyspnea to be acute pulmonary edema, and sepsis fluids would worsen this. treated with broad spectrum antibiotics. clinical constellation suggests that the pt is fluid overloaded. There is b/l LE pitting edema. I performed POCUS of abd, which displays mild to moderate ascites. Lung imaging displays diffuse intersitial infiltrates, again further suggestive of pulmonary edema. pt reports oliguria, so attempted to diuresis with 80 mg lasix, but no response. Renal consulted for dialysis. The pt is immunocompromised, with elevated WBC. This WBC count could be due chronic steroid usage, however cannot rule out infectious etiology. Given tachycardia and leukocytosis the pt technically meets sepsis criteria, but would be contraindicated for sepsis fluids as I clinically suspect the etiology of the dyspnea to be acute pulmonary edema, and sepsis fluids would worsen this. treated with broad spectrum antibiotics.

## 2022-11-11 NOTE — ED ADULT NURSE NOTE - OBJECTIVE STATEMENT
Pt is alert and oriented. Pt arrives to ER complaining of sob. Pt placed on 4L NC. Pt sating 98%. Pt has fluid retention in his bilateral LE and face. Pt has +2 pitting edema in his bilateral legs and feet. Pt has a fistula in the left arm and a permacath in the right chest wall. After treatment pt is no longer sob. Pt denies sob, chest pain, nausea, vomiting, dizziness and pain. Pt resp are even and unlabored, skin color sandee for race. Pt updated on plan of care. Pt resting on cm. Pt is alert and oriented. Pt arrives to ER complaining of sob. Pt placed on 4L NC. Pt sating 98%. Pt has fluid retention in his bilateral LE and face. Pt has +2 pitting edema in his bilateral legs and feet. Pt has a fistula in the left arm and a permacath in the right chest wall. Pt received dialysis yesterday. Pt receives dialysis on Tuesday, Thursday and Saturday. After treatment pt is no longer sob. Pt denies sob, chest pain, nausea, vomiting, dizziness and pain. Pt resp are even and unlabored, skin color sandee for race. Pt updated on plan of care. Pt resting on cm.

## 2022-11-11 NOTE — H&P ADULT - NSICDXPASTSURGICALHX_GEN_ALL_CORE_FT
PAST SURGICAL HISTORY:  H/O heart artery stent x3 @[facility]    H/O lung transplant bilateral - transplant - 1-2-2015 -  [facility]    History of hip replacement right    Status post open reduction and internal fixation (ORIF) of fracture left femur

## 2022-11-11 NOTE — CONSULT NOTE ADULT - SUBJECTIVE AND OBJECTIVE BOX
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 He walks with a walker and was getting easily tired. He also endorsed worsening abdominal distention and leg swelling. Wife noted patient to be more sleepy lately and had to use supplemental oxygen the last couple of days. Usually not oxygen dependent. Denies cough, fever  Patient went to HD yesterday, reported no relief  post HD. Per wife he had been c/o cramping in his legs and thinks he may have requested shorter HD sessions. Admitted with hypoxic respiratory failure/ fluid overload. Cardiology consulted for increasing respiratory distress, elevated troponin level.     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      2019 novel coronavirus disease (COVID-19)  Feb 2021 - hospitalized for 1 week at [facility]      Pneumonia  April 2021      [place] (hard of hearing)      HTN (hypertension)      Lumbar spondylosis      History of COPD      BPH without urinary obstruction      Hypercholesterolemia      Oliguria and anuria      H/O peripheral neuropathy      H/O lung transplant  bilateral - transplant - 1-2-2015 -  [facility]      H/O heart artery stent  x3 @[facility]      History of hip replacement  right      Status post open reduction and internal fixation (ORIF) of fracture  left femur          Allergies    budesonide (Unknown)  cefpodoxime (Unknown)  Pollen (Unknown)    Intolerances        MEDICATIONS  (STANDING):  albumin human 25% IVPB 100 milliLiter(s) IV Intermittent once  amLODIPine   Tablet 5 milliGRAM(s) Oral daily  apixaban 2.5 milliGRAM(s) Oral every 12 hours  atorvastatin 80 milliGRAM(s) Oral at bedtime  clopidogrel Tablet 75 milliGRAM(s) Oral daily  doxycycline IVPB      doxycycline IVPB 100 milliGRAM(s) IV Intermittent once  fluconAZOLE   Tablet 200 milliGRAM(s) Oral <User Schedule>  hydrocortisone 10 milliGRAM(s) Oral two times a day  meropenem  IVPB      meropenem  IVPB 1000 milliGRAM(s) IV Intermittent once  multivitamin 1 Tablet(s) Oral daily  mycophenolate mofetil 500 milliGRAM(s) Oral two times a day  pantoprazole    Tablet 40 milliGRAM(s) Oral before breakfast  sevelamer carbonate 1600 milliGRAM(s) Oral three times a day with meals  tacrolimus 1.5 milliGRAM(s) Oral daily  tamsulosin 0.4 milliGRAM(s) Oral at bedtime  trimethoprim   80 mG/sulfamethoxazole 400 mG 1 Tablet(s) Oral <User Schedule>    MEDICATIONS  (PRN):  acetaminophen     Tablet .. 650 milliGRAM(s) Oral every 6 hours PRN Temp greater or equal to 38C (100.4F), Mild Pain (1 - 3)  aluminum hydroxide/magnesium hydroxide/simethicone Suspension 30 milliLiter(s) Oral every 4 hours PRN Dyspepsia  melatonin 3 milliGRAM(s) Oral at bedtime PRN Insomnia  ondansetron Injectable 4 milliGRAM(s) IV Push every 8 hours PRN Nausea and/or Vomiting      FAMILY HISTORY:  Family history of emphysema  mother        ***No family history of premature coronary artery disease or sudden cardiac death    SOCIAL HISTORY:  Smoking-  Alcohol-  Illicit Drug use-    REVIEW OF SYSTEMS:  Constitutional: [ ] fever, [ ]weight loss,  [ ]fatigue  Eyes: [ ] visual changes  Respiratory: [ ]shortness of breath;  [ ] cough, [ ]wheezing, [ ]chills, [ ]hemoptysis  Cardiovascular: [ ] chest pain, [ ]palpitations, [ ]dizziness,  [ ]leg swelling [ ]syncope  Gastrointestinal: [ ] abdominal pain, [ ]nausea, [ ]vomiting,  [ ]diarrhea   Genitourinary: [ ] dysuria, [ ] hematuria  Neurologic: [ ] headaches [ ] tremors  [ ] weakness [ ] lightheadedness  Skin: [ ] itching, [ ]burning, [ ] rashes  Endocrine: [ ] heat or cold intolerance  Musculoskeletal: [ ] joint pain or swelling; [ ] muscle, back, or extremity pain  Psychiatric: [ ] depression, [ ]anxiety, [ ]mood swings, or [ ]difficulty sleeping  Hematologic: [ ] easy bruising, [ ] bleeding gums     [ x] All others negative	  [ ] Unable to obtain    Vital Signs Last 24 Hrs  T(C): 36.9 (11 Nov 2022 10:11), Max: 36.9 (11 Nov 2022 09:44)  T(F): 98.4 (11 Nov 2022 10:11), Max: 98.4 (11 Nov 2022 09:44)  HR: 113 (11 Nov 2022 15:15) (110 - 116)  BP: 96/59 (11 Nov 2022 15:15) (91/59 - 106/59)  BP(mean): 71 (11 Nov 2022 15:15) (71 - 71)  RR: 19 (11 Nov 2022 15:09) (19 - 22)  SpO2: 98% (11 Nov 2022 15:09) (88% - 99%)    Parameters below as of 11 Nov 2022 15:09  Patient On (Oxygen Delivery Method): nasal cannula  O2 Flow (L/min): 4    I&O's Summary      PHYSICAL EXAM:  General: No acute distress  HEENT: EOMI  Neck:  No JVD  Lungs: Clear to auscultation bilaterally; No rales or wheezing  Heart: Regular rate and rhythm; No murmurs, rubs, or gallops  Abdomen: soft, non tender, non distended   Extremities: warm, no edema   Nervous system:  Alert & Oriented X3  Psychiatric: Normal affect  Skin: No rashes or lesions    LABS:  11-11    137  |  94<L>  |  30.7<H>  ----------------------------<  131<H>  4.1   |  28.0  |  4.31<H>    Ca    8.6      11 Nov 2022 10:11  Phos  2.7     11-11  Mg     1.9     11-11    TPro  6.2<L>  /  Alb  3.7  /  TBili  1.1  /  DBili  x   /  AST  60<H>  /  ALT  23  /  AlkPhos  320<H>  11-11    Creatinine Trend: 4.31<--                        9.6    28.57 )-----------( 154      ( 11 Nov 2022 10:11 )             30.9     PT/INR - ( 11 Nov 2022 10:11 )   PT: 22.6 sec;   INR: 1.94 ratio         PTT - ( 11 Nov 2022 10:11 )  PTT:35.6 sec    Lipid Panel:   Cardiac Enzymes: CARDIAC MARKERS ( 11 Nov 2022 10:11 )  x     / 0.24 ng/mL / x     / x     / x          Serum Pro-Brain Natriuretic Peptide: >31216 pg/mL (11-11-22 @ 10:11)        RADIOLOGY: CT chest:  New interstitial edema and new areas of groundglass in both lungs which are nonspecific but may also represent edema, underlying infection could also have this appearance. Increased small pleural effusions, mild upper abdominal ascites and anasarca.    ECG: Sinus tachycardia with Premature atrial complexes, ST & T wave abnormality, consider inferolateral ischemia    TELEMETRY:    Echocardiogram in January 2022: Mild concentric left ventricular hypertrophy with normal left ventricular systolic function, ejection fraction 55 to 60%, mild mitral regurgitation, mild tricuspid regurgitation.  Carotid duplex January 2022:  Mild to moderate (16-49%) stenosis of the proximal right internal carotid artery, moderate to severe (66-79%) stenosis of proximal left internal carotid artery.   Cardiac catheterization on 11/26/19:  PCI with drug-eluting stent to LAD and OM1.  Cardiac catheterization, this information is taken from a progress note from the [facility] documenting cardiac catheterization 01/14 showing 60% proximal LAD stenosis, 60% mid LAD stenosis, 60% proximal circumflex stenosis, 60% ostial OM1 stenosis, 40% proximal right coronary artery stenosis, 40% proximal PDA documenting an FFR of the mid LAD of 0.79. 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 He walks with a walker and was getting easily tired. He also endorsed worsening abdominal distention and leg swelling. Wife noted patient to be more sleepy lately and had to use supplemental oxygen the last couple of days. Usually not oxygen dependent. Denies cough, fever  Patient went to HD yesterday, reported no relief  post HD. Per wife he had been c/o cramping in his legs and thinks he may have requested shorter HD sessions. Admitted with hypoxic respiratory failure/ fluid overload. Cardiology consulted for increasing respiratory distress, elevated troponin level.     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      2019 novel coronavirus disease (COVID-19)  Feb 2021 - hospitalized for 1 week at [facility]      Pneumonia  April 2021      [place] (hard of hearing)      HTN (hypertension)      Lumbar spondylosis      History of COPD      BPH without urinary obstruction      Hypercholesterolemia      Oliguria and anuria      H/O peripheral neuropathy      H/O lung transplant  bilateral - transplant - 1-2-2015 -  [facility]      H/O heart artery stent  x3 @[facility]      History of hip replacement  right      Status post open reduction and internal fixation (ORIF) of fracture  left femur          Allergies    budesonide (Unknown)  cefpodoxime (Unknown)  Pollen (Unknown)    Intolerances        MEDICATIONS  (STANDING):  albumin human 25% IVPB 100 milliLiter(s) IV Intermittent once  amLODIPine   Tablet 5 milliGRAM(s) Oral daily  apixaban 2.5 milliGRAM(s) Oral every 12 hours  atorvastatin 80 milliGRAM(s) Oral at bedtime  clopidogrel Tablet 75 milliGRAM(s) Oral daily  doxycycline IVPB      doxycycline IVPB 100 milliGRAM(s) IV Intermittent once  fluconAZOLE   Tablet 200 milliGRAM(s) Oral <User Schedule>  hydrocortisone 10 milliGRAM(s) Oral two times a day  meropenem  IVPB      meropenem  IVPB 1000 milliGRAM(s) IV Intermittent once  multivitamin 1 Tablet(s) Oral daily  mycophenolate mofetil 500 milliGRAM(s) Oral two times a day  pantoprazole    Tablet 40 milliGRAM(s) Oral before breakfast  sevelamer carbonate 1600 milliGRAM(s) Oral three times a day with meals  tacrolimus 1.5 milliGRAM(s) Oral daily  tamsulosin 0.4 milliGRAM(s) Oral at bedtime  trimethoprim   80 mG/sulfamethoxazole 400 mG 1 Tablet(s) Oral <User Schedule>    MEDICATIONS  (PRN):  acetaminophen     Tablet .. 650 milliGRAM(s) Oral every 6 hours PRN Temp greater or equal to 38C (100.4F), Mild Pain (1 - 3)  aluminum hydroxide/magnesium hydroxide/simethicone Suspension 30 milliLiter(s) Oral every 4 hours PRN Dyspepsia  melatonin 3 milliGRAM(s) Oral at bedtime PRN Insomnia  ondansetron Injectable 4 milliGRAM(s) IV Push every 8 hours PRN Nausea and/or Vomiting      FAMILY HISTORY:  Family history of emphysema  mother        ***No family history of premature coronary artery disease or sudden cardiac death    SOCIAL HISTORY:  Smoking-  Alcohol-  Illicit Drug use-    REVIEW OF SYSTEMS:  Constitutional: [ ] fever, [ ]weight loss,  [ ]fatigue  Eyes: [ ] visual changes  Respiratory: [ ]shortness of breath;  [ ] cough, [ ]wheezing, [ ]chills, [ ]hemoptysis  Cardiovascular: [ ] chest pain, [ ]palpitations, [ ]dizziness,  [ ]leg swelling [ ]syncope  Gastrointestinal: [ ] abdominal pain, [ ]nausea, [ ]vomiting,  [ ]diarrhea   Genitourinary: [ ] dysuria, [ ] hematuria  Neurologic: [ ] headaches [ ] tremors  [ ] weakness [ ] lightheadedness  Skin: [ ] itching, [ ]burning, [ ] rashes  Endocrine: [ ] heat or cold intolerance  Musculoskeletal: [ ] joint pain or swelling; [ ] muscle, back, or extremity pain  Psychiatric: [ ] depression, [ ]anxiety, [ ]mood swings, or [ ]difficulty sleeping  Hematologic: [ ] easy bruising, [ ] bleeding gums     [ x] All others negative	  [ ] Unable to obtain    Vital Signs Last 24 Hrs  T(C): 36.9 (11 Nov 2022 10:11), Max: 36.9 (11 Nov 2022 09:44)  T(F): 98.4 (11 Nov 2022 10:11), Max: 98.4 (11 Nov 2022 09:44)  HR: 113 (11 Nov 2022 15:15) (110 - 116)  BP: 96/59 (11 Nov 2022 15:15) (91/59 - 106/59)  BP(mean): 71 (11 Nov 2022 15:15) (71 - 71)  RR: 19 (11 Nov 2022 15:09) (19 - 22)  SpO2: 98% (11 Nov 2022 15:09) (88% - 99%)    Parameters below as of 11 Nov 2022 15:09  Patient On (Oxygen Delivery Method): nasal cannula  O2 Flow (L/min): 4    I&O's Summary      PHYSICAL EXAM:  General: No acute distress  HEENT: EOMI  Neck:  No JVD  Lungs: Clear to auscultation bilaterally; No rales or wheezing, right perm cath  Heart: Regular rate and rhythm; No murmurs, rubs, or gallops  Abdomen: soft, non tender, non distended   Extremities: warm, no edema   Nervous system:  Alert & Oriented X3  Psychiatric: Normal affect  Skin: No rashes or lesions    LABS:  11-11    137  |  94<L>  |  30.7<H>  ----------------------------<  131<H>  4.1   |  28.0  |  4.31<H>    Ca    8.6      11 Nov 2022 10:11  Phos  2.7     11-11  Mg     1.9     11-11    TPro  6.2<L>  /  Alb  3.7  /  TBili  1.1  /  DBili  x   /  AST  60<H>  /  ALT  23  /  AlkPhos  320<H>  11-11    Creatinine Trend: 4.31<--                        9.6    28.57 )-----------( 154      ( 11 Nov 2022 10:11 )             30.9     PT/INR - ( 11 Nov 2022 10:11 )   PT: 22.6 sec;   INR: 1.94 ratio         PTT - ( 11 Nov 2022 10:11 )  PTT:35.6 sec    Lipid Panel:   Cardiac Enzymes: CARDIAC MARKERS ( 11 Nov 2022 10:11 )  x     / 0.24 ng/mL / x     / x     / x          Serum Pro-Brain Natriuretic Peptide: >65315 pg/mL (11-11-22 @ 10:11)        RADIOLOGY: CT chest:  New interstitial edema and new areas of groundglass in both lungs which are nonspecific but may also represent edema, underlying infection could also have this appearance. Increased small pleural effusions, mild upper abdominal ascites and anasarca.    ECG: Sinus tachycardia with Premature atrial complexes, ST & T wave abnormality, consider inferolateral ischemia    TELEMETRY:    Echocardiogram in January 2022: Mild concentric left ventricular hypertrophy with normal left ventricular systolic function, ejection fraction 55 to 60%, mild mitral regurgitation, mild tricuspid regurgitation.  Carotid duplex January 2022:  Mild to moderate (16-49%) stenosis of the proximal right internal carotid artery, moderate to severe (66-79%) stenosis of proximal left internal carotid artery.   Cardiac catheterization on 11/26/19:  PCI with drug-eluting stent to LAD and OM1.  Cardiac catheterization, this information is taken from a progress note from the [facility] documenting cardiac catheterization 01/14 showing 60% proximal LAD stenosis, 60% mid LAD stenosis, 60% proximal circumflex stenosis, 60% ostial OM1 stenosis, 40% proximal right coronary artery stenosis, 40% proximal PDA documenting an FFR of the mid LAD of 0.79.

## 2022-11-11 NOTE — ED ADULT NURSE NOTE - CAS EDN DISCHARGE ASSESSMENT
You can access the FollowMyHealth Patient Portal offered by [facility] by registering at the following website: [email or web address]. By joining [employer] [facility] [employer]’s FollowMyHealth portal, you will also be able to view your health information using other applications (apps) compatible with our system. Alert and oriented to person, place and time

## 2022-11-11 NOTE — H&P ADULT - HISTORY OF PRESENT ILLNESS
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 Patient is hard of hearing and history obtained from wife at bedside. Reports she noticed patient to be having worsening shortness of breath for the past 2 days. He walks with a walker and was getting easily tired. He also endorsed worsening abdominal distention and leg swelling. 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 Patient is hard of hearing and history obtained from wife at bedside. Reports she noticed patient to be having worsening shortness of breath for the past 2 days. He walks with a walker and was getting easily tired. He also endorsed worsening abdominal distention and leg swelling. He denied chest pain, dizziness, fever, chills, nausea, vomiting, no sick contacts. Wife noted patient to be more sleepy lately and had to use supplemental oxygen the last couple of days. Usually not oxygen dependent. Denies cough.   Patient went to HD yesterday, reported no relief with symptoms post HD.  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 Patient is hard of hearing and history obtained from wife at bedside. Reports she noticed patient to be having worsening shortness of breath for the past 2 days. He walks with a walker and was getting easily tired. He also endorsed worsening abdominal distention and leg swelling. He denied chest pain, dizziness, fever, chills, nausea, vomiting, no sick contacts. Wife noted patient to be more sleepy lately and had to use supplemental oxygen the last couple of days. Usually not oxygen dependent. Denies cough.   Patient went to HD yesterday, reported no relief  post HD.

## 2022-11-11 NOTE — CONSULT NOTE ADULT - ASSESSMENT
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 He walks with a walker and was getting easily tired. He also endorsed worsening abdominal distention and leg swelling. Wife noted patient to be more sleepy lately and had to use supplemental oxygen the last couple of days. Usually not oxygen dependent. Denies cough, fever  Patient went to HD yesterday, reported no relief  post HD. Per wife he had been c/o cramping in his legs and thinks he may have requested shorter HD sessions. Admitted with hypoxic respiratory failure/ fluid overload. Cardiology consulted for increasing respiratory distress, elevated troponin level.   r/o infectious process     Plan   Fluid management with HD  echocardiogram   serial troponin levels  serial EKGs  r/o infectious process   antibiotics per ID  Pulm consult  further recs based on results.

## 2022-11-11 NOTE — PATIENT PROFILE ADULT - FALL HARM RISK - HARM RISK INTERVENTIONS

## 2022-11-11 NOTE — CONSULT NOTE ADULT - SUBJECTIVE AND OBJECTIVE BOX
[facility]                                                INFECTIOUS DISEASES  =======================================================                               J Carlos Galdamez MD#    Isatu Rojas MD*                           Perltia Solares MD*   Sumaya Morales MD*            Diplomates American Board of Internal Medicine & Infectious Diseases                  # [place] Office - Appt - Tel  [phone] Fax [phone]                * [place] Office - Appt - Tel [phone] Fax [phone]                                  Hospital Consult line:  [phone]  =======================================================      [id number]  JU FERGUSON   HPI:  72 yr old male with hypertension, hyperlipidemia, ESRD on HD, emphysema s/p lung transplant, history of fungal meningitis, carotid stenosis s/p CEA, CAD s/p PCI in 2019, 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 Patient is hard of hearing and history obtained from wife at bedside. Reports she noticed patient to be having worsening shortness of breath for the past 2 days. He walks with a walker and was getting easily tired. He also endorsed worsening abdominal distention and leg swelling. He denied chest pain, dizziness, fever, chills, nausea, vomiting, no sick contacts. Wife noted patient to be more sleepy lately and had to use supplemental oxygen the last couple of days. Usually not oxygen dependent. Denies cough.   Patient went to HD yesterday, reported no relief  post HD.  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 -  [facility]      H/O heart artery stent  x3 @[facility]      History of hip replacement  right      Status post open reduction and internal fixation (ORIF) of fracture  left femur        Problem List:  ==========  HEALTH ISSUES - PROBLEM Dx:        Social Hx:  =======  no toxic habits currently    FAMILY HISTORY:  Family history of emphysema  mother    no significant family history of immunosuppressive disorders in mother or father   =======================================================    REVIEW OF SYSTEMS:  CONSTITUTIONAL:  No Fever or chills  HEENT:  No diplopia or blurred vision.  No earache, sore throat or runny nose.  CARDIOVASCULAR:  No pressure, squeezing, strangling, tightness, heaviness or aching about the chest, neck, axilla or epigastrium.  RESPIRATORY:  No cough, + shortness of breath  GASTROINTESTINAL:  No nausea, vomiting or diarrhea.  GENITOURINARY:  No dysuria, frequency or urgency. No Blood in urine  MUSCULOSKELETAL:  no joint aches, no muscle pain  SKIN:  No change in skin, hair or nails.  NEUROLOGIC:  No Headaches, seizures or weakness.  PSYCHIATRIC:  No disorder of thought or mood.  ENDOCRINE:  No heat or cold intolerance  HEMATOLOGICAL:  No easy bruising or bleeding.    =======================================================  Allergies    budesonide (Unknown)  cefpodoxime (Unknown)  Pollen (Unknown)    Intolerances    Antibiotics:  doxycycline IVPB      fluconAZOLE   Tablet 200 milliGRAM(s) Oral <User Schedule>  meropenem  IVPB      trimethoprim   80 mG/sulfamethoxazole 400 mG 1 Tablet(s) Oral <User Schedule>    Other medications:  albumin human 25% IVPB 100 milliLiter(s) IV Intermittent once  amLODIPine   Tablet 5 milliGRAM(s) Oral daily  apixaban 2.5 milliGRAM(s) Oral every 12 hours  atorvastatin 80 milliGRAM(s) Oral at bedtime  clopidogrel Tablet 75 milliGRAM(s) Oral daily  hydrocortisone 10 milliGRAM(s) Oral two times a day  multivitamin 1 Tablet(s) Oral daily  mycophenolate mofetil 500 milliGRAM(s) Oral two times a day  pantoprazole    Tablet 40 milliGRAM(s) Oral before breakfast  sevelamer carbonate 1600 milliGRAM(s) Oral three times a day with meals  tacrolimus 1.5 milliGRAM(s) Oral daily  tamsulosin 0.4 milliGRAM(s) Oral at bedtime     aztreonam  IVPB   100 mL/Hr IV Intermittent (11-11-22 @ 13:36)    vancomycin  IVPB.   250 mL/Hr IV Intermittent (11-11-22 @ 11:29)      ======================================================  Physical Exam:  ============  T(F): 98.4 (11 Nov 2022 10:11), Max: 98.4 (11 Nov 2022 09:44)  HR: 110 (11 Nov 2022 15:09)  BP: 91/59 (11 Nov 2022 15:09)  RR: 19 (11 Nov 2022 15:09)  SpO2: 98% (11 Nov 2022 15:09) (88% - 99%)  temp max in last 48H T(F): , Max: 98.4 (11-11-22 @ 09:44)  Weight (kg): 56.699 (11-11-22 @ 09:44)    General:  No acute distress. on o2  Eye: Normal conjunctiva.  Neck: Supple, No lymphadenopathy.  Respiratory: crackles to auscultation, Respirations are non-labored. rt chest permacath  Cardiovascular: Normal rate, Regular rhythm, s1 + s2  Gastrointestinal: Soft, Non-tender, Non-distended, Normal bowel sounds.  Genitourinary: No costovertebral angle tenderness.  Lymphatics: No lymphadenopathy neck,   Musculoskeletal: Normal range of motion, Normal strength. lue fistula  Integumentary: b/l Le edema  Neurologic: Alert, Oriented, No focal deficits  Psychiatric: Appropriate mood & affect.    =======================================================  Labs:                        9.6    28.57 )-----------( 154      ( 11 Nov 2022 10:11 )             30.9     11-11    137  |  94<L>  |  30.7<H>  ----------------------------<  131<H>  4.1   |  28.0  |  4.31<H>    Ca    8.6      11 Nov 2022 10:11  Phos  2.7     11-11  Mg     1.9     11-11    TPro  6.2<L>  /  Alb  3.7  /  TBili  1.1  /  DBili  x   /  AST  60<H>  /  ALT  23  /  AlkPhos  320<H>  11-11       SARS-CoV-2 Result: NotDetec (11-11-22 @ 10:11)       < from: CT Chest No Cont (11.11.22 @ 11:20) >  IMPRESSION:    New interstitial edema and new areas of groundglass in both lungs which   are nonspecific but may also represent edema, underlying infection could   also have this appearance. Increased small pleural effusions, mild upper   abdominal ascites and anasarca.    < end of copied text >

## 2022-11-11 NOTE — ED PROVIDER NOTE - NSICDXPASTMEDICALHX_GEN_ALL_CORE_FT
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

## 2022-11-11 NOTE — ED ADULT NURSE NOTE - NSICDXFAMILYHX_GEN_ALL_CORE_FT
Vaccine Information Statement(s) was given today. This has been reviewed, questions answered, and verbal consent given by Patient for injection(s) and administration of Pneumococcal Conjugate (PCV13).    Patient tolerated without incident. See immunization grid for documentation.    
[FreeTextEntry1] : 12/17:  TSH < 0.01, T3 323, free T4 3.7, TSH Rec Ab 13.57, TPO 1791, Tg Ab 734
FAMILY HISTORY:  Family history of emphysema, mother

## 2022-11-11 NOTE — ED PROVIDER NOTE - OBJECTIVE STATEMENT
72 year old male with PMH b/l lung transplant 2015 maintained on cellcept/tacrolimus, CAD, HTn, HLD, and ESRD on HD T,TH,S presents with SOB. pt reports that he has had 2 days of SOB. Sx are constant, worse with exertion and laying flat, and associated with b/l LE edema and abd distension. NO chest pain, fevers, hemoptysis, abd pain, vomiting.

## 2022-11-11 NOTE — ED ADULT TRIAGE NOTE - CHIEF COMPLAINT QUOTE
Patient presents to ER C/O shortness of breath, HD T,T,S, fistula to left arm and permacath to right chest wall, denies fever, no N/V.

## 2022-11-11 NOTE — ED PROVIDER NOTE - NSICDXPASTSURGICALHX_GEN_ALL_CORE_FT
PAST SURGICAL HISTORY:  H/O heart artery stent x3 @[facility]    H/O lung transplant bilateral - transplant - 1-2-2015 -  [facility]    History of hip replacement right    Status post open reduction and internal fixation (ORIF) of fracture left femur

## 2022-11-11 NOTE — CONSULT NOTE ADULT - ASSESSMENT
72 yr old male with hypertension, ESRD on HD, emphysema s/p lung transplant, history of fungal meningitis presents with complaints of shortness of breath for 2 days. CT chest new interstitial edema and new GGO in both lungs.     1. ESRD on HD:   Via LUEAVF on TTS  Will arrange for HD today though doubt that SOB is entirely related to pul edema as patient reports no subjective improvement in symptoms w/ HD even when he had leg cramps during last part of his HD yesterday.    2. Ac hypoxic resp failure: as above #1; in immunocompromised patient r/o PNA.  3. Emphysema s/p B/L Lung Transplant on IS: patient continued of tacrolimus and MMF for now. Also on bactrim and fluconazole pphx.   4. Hypertension: acceptable limits, c/w amlodipine,   5. BMD w/ CKD: C/w sevelamer carbonate 800 mg oral tablet; 2 tab orally 3 times a day (with meals)  72 yr old male with hypertension, ESRD on HD, emphysema s/p lung transplant, history of fungal meningitis presents with complaints of shortness of breath for 2 days. CT chest new interstitial edema and new GGO in both lungs.     1. ESRD on HD:   Via LUEAVF on TTS  Will arrange for HD today though doubt that SOB is entirely related to pul edema as patient reports no subjective improvement in symptoms w/ HD even when he had leg cramps during last part of his HD yesterday.    2. Ac hypoxic resp failure: as above #1; in immunocompromised patient r/o PNA.  3. Emphysema s/p B/L Lung Transplant on IS: patient continued of tacrolimus and MMF for now. Also on bactrim and fluconazole pphx.   4. Hypertension: acceptable limits, c/w amlodipine,   5. BMD w/ CKD: C/w sevelamer carbonate 800 mg oral tablet; 2 tab orally 3 times a day (with meals)       ***Patient was later seen on HD  Seen on HD.  Qd, Qb, UF rate reviewed.  Vitals stable.  Access working well.  Patient tolerating HD well.

## 2022-11-11 NOTE — CONSULT NOTE ADULT - SUBJECTIVE AND OBJECTIVE BOX
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 Patient is hard of hearing and history obtained from wife at bedside. Reports she noticed patient to be having worsening shortness of breath for the past 2 days. He walks with a walker and was getting easily tired. He also endorsed worsening abdominal distention and leg swelling. He denied chest pain, dizziness, fever, chills, nausea, vomiting, no sick contacts. Wife noted patient to be more sleepy lately and had to use supplemental oxygen the last couple of days. Usually not oxygen dependent. Denies cough.   Patient went to HD yesterday, reported no relief  post HD.  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      Pneumonia  April 2021      [place] ([place] of hearing)      HTN (hypertension)      Lumbar spondylosis      History of COPD      BPH without urinary obstruction      Hypercholesterolemia      Oliguria and anuria      H/O peripheral neuropathy      H/O lung transplant  bilateral - transplant - 1-2-2015 -  [facility]      H/O heart artery stent  x3 @[facility]      History of hip replacement  right      Status post open reduction and internal fixation (ORIF) of fracture  left femur          FAMILY HISTORY:  Family history of emphysema  mother        Social History:    MEDICATIONS  (STANDING):  amLODIPine   Tablet 5 milliGRAM(s) Oral daily  apixaban 2.5 milliGRAM(s) Oral every 12 hours  atorvastatin 80 milliGRAM(s) Oral at bedtime  clopidogrel Tablet 75 milliGRAM(s) Oral daily  doxycycline IVPB      doxycycline IVPB 100 milliGRAM(s) IV Intermittent once  fluconAZOLE   Tablet 200 milliGRAM(s) Oral <User Schedule>  hydrocortisone 10 milliGRAM(s) Oral two times a day  meropenem  IVPB      meropenem  IVPB 1000 milliGRAM(s) IV Intermittent once  multivitamin 1 Tablet(s) Oral daily  mycophenolate mofetil 500 milliGRAM(s) Oral two times a day  pantoprazole    Tablet 40 milliGRAM(s) Oral before breakfast  sevelamer carbonate 1600 milliGRAM(s) Oral three times a day with meals  tacrolimus 1.5 milliGRAM(s) Oral daily  tamsulosin 0.4 milliGRAM(s) Oral at bedtime  trimethoprim   80 mG/sulfamethoxazole 400 mG 1 Tablet(s) Oral <User Schedule>    MEDICATIONS  (PRN):  acetaminophen     Tablet .. 650 milliGRAM(s) Oral every 6 hours PRN Temp greater or equal to 38C (100.4F), Mild Pain (1 - 3)  aluminum hydroxide/magnesium hydroxide/simethicone Suspension 30 milliLiter(s) Oral every 4 hours PRN Dyspepsia  melatonin 3 milliGRAM(s) Oral at bedtime PRN Insomnia  ondansetron Injectable 4 milliGRAM(s) IV Push every 8 hours PRN Nausea and/or Vomiting      Allergies    budesonide (Unknown)  cefpodoxime (Unknown)  Pollen (Unknown)    Intolerances        REVIEW OF SYSTEMS:    CONSTITUTIONAL: No fever, weight loss, or fatigue  EYES: No eye pain, visual disturbances, or discharge  ENMT:  No difficulty hearing, tinnitus, vertigo; No sinus or throat pain  NECK: No pain or stiffness  BREASTS: No pain, masses, or nipple discharge  RESPIRATORY: No cough, wheezing, chills or hemoptysis; No shortness of breath  CARDIOVASCULAR: No chest pain, palpitations, dizziness, or leg swelling  GASTROINTESTINAL: No abdominal or epigastric pain. No nausea, vomiting, or hematemesis; No diarrhea or constipation. No melena or hematochezia.  GENITOURINARY: No dysuria, frequency, hematuria, or incontinence  NEUROLOGICAL: No headaches, memory loss, loss of strength, numbness, or tremors  SKIN: No itching, burning, rashes, or lesions   LYMPH NODES: No enlarged glands  ENDOCRINE: No heat or cold intolerance; No hair loss  MUSCULOSKELETAL: No joint pain or swelling; No muscle, back, or extremity pain  PSYCHIATRIC: No depression, anxiety, mood swings, or difficulty sleeping  HEME/LYMPH: No easy bruising, or bleeding gums  ALLERY AND IMMUNOLOGIC: No hives or eczema      Vital Signs Last 24 Hrs  T(C): 37.1 (11 Nov 2022 18:55), Max: 37.3 (11 Nov 2022 15:35)  T(F): 98.8 (11 Nov 2022 18:55), Max: 99.1 (11 Nov 2022 15:35)  HR: 105 (11 Nov 2022 18:55) (105 - 116)  BP: 99/67 (11 Nov 2022 18:55) (84/56 - 106/59)  BP(mean): 71 (11 Nov 2022 15:15) (71 - 71)  RR: 20 (11 Nov 2022 18:55) (19 - 22)  SpO2: 99% (11 Nov 2022 18:55) (88% - 99%)    Parameters below as of 11 Nov 2022 18:55  Patient On (Oxygen Delivery Method): nasal cannula  O2 Flow (L/min): 4      PHYSICAL EXAM:    GENERAL: NAD, well-groomed, well-developed  HEAD:  Atraumatic, Normocephalic  EYES: EOMI, PERRLA, conjunctiva and sclera clear  ENMT: No tonsillar erythema, exudates, or enlargement; Moist mucous membranes, Good dentition, No lesions  NECK: Supple, No JVD, Normal thyroid  NERVOUS SYSTEM:  Alert & Oriented X3, Good concentration; Motor Strength 5/5 B/L upper and lower extremities; DTRs 2+ intact and symmetric  CHEST/LUNG: Clear to percussion bilaterally; No rales, rhonchi, wheezing, or rubs  HEART: Regular rate and rhythm; No murmurs, rubs, or gallops  ABDOMEN: Soft, Nontender, Nondistended; Bowel sounds present  EXTREMITIES:  2+ Peripheral Pulses, No clubbing, cyanosis, or edema  LYMPH: No lymphadenopathy noted  SKIN: No rashes or lesions      LABS:                        9.6    28.57 )-----------( 154      ( 11 Nov 2022 10:11 )             30.9     11-11    137  |  94<L>  |  30.7<H>  ----------------------------<  131<H>  4.1   |  28.0  |  4.31<H>    Ca    8.6      11 Nov 2022 10:11  Phos  2.7     11-11  Mg     1.9     11-11    TPro  6.2<L>  /  Alb  3.7  /  TBili  1.1  /  DBili  x   /  AST  60<H>  /  ALT  23  /  AlkPhos  320<H>  11-11    PT/INR - ( 11 Nov 2022 10:11 )   PT: 22.6 sec;   INR: 1.94 ratio         PTT - ( 11 Nov 2022 10:11 )  PTT:35.6 sec    Magnesium, Serum: 1.9 mg/dL (11-11 @ 10:11)  Phosphorus Level, Serum: 2.7 mg/dL (11-11 @ 10:11)      RADIOLOGY & ADDITIONAL TESTS:   HPI: 72 yr old male with hypertension, ESRD on HD, emphysema s/p lung transplant, history of fungal meningitis presents with complaints of shortness of breath for 2 days.  Patient complaining of worsening abdominal distention and leg swelling. He denied chest pain, dizziness, fever, chills, nausea, vomiting, no sick contacts. Wife noted patient to be more sleepy lately and had to use supplemental oxygen the last couple of days. Patient went to HD yesterday, reported no relief  post HD. C/o cramps during the later part of HD session.  He denied fever, chills, skin rash or itching, nausea, vomiting, diarrhea, bleeding problems and joint pain. Review of other systems was negative.    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  2019 novel coronavirus disease (COVID-19) Feb 2021 - hospitalized for 1 week at [facility]  Pneumonia April 2021  [place] (hard of hearing)  HTN (hypertension)  Lumbar spondylosis  History of COPD  BPH without urinary obstruction  Hypercholesterolemia  Oliguria and anuria  H/O peripheral neuropathy  H/O lung transplant bilateral - transplant - 1-2-2015 -  [facility]  H/O heart artery stent x3 @[facility]  History of hip replacement right  Status post open reduction and internal fixation (ORIF) of fracture left femur    FAMILY HISTORY:  Family history of emphysema, mother    SOCIAL HISTORY: Denies current tobacco, alcohol, drug abuse.     MEDICATIONS  (STANDING):  amLODIPine   Tablet 5 milliGRAM(s) Oral daily  apixaban 2.5 milliGRAM(s) Oral every 12 hours  atorvastatin 80 milliGRAM(s) Oral at bedtime  clopidogrel Tablet 75 milliGRAM(s) Oral daily  doxycycline IVPB    doxycycline IVPB 100 milliGRAM(s) IV Intermittent once  fluconAZOLE   Tablet 200 milliGRAM(s) Oral <User Schedule>  hydrocortisone 10 milliGRAM(s) Oral two times a day  meropenem  IVPB    meropenem  IVPB 1000 milliGRAM(s) IV Intermittent once  multivitamin 1 Tablet(s) Oral daily  mycophenolate mofetil 500 milliGRAM(s) Oral two times a day  pantoprazole    Tablet 40 milliGRAM(s) Oral before breakfast  sevelamer carbonate 1600 milliGRAM(s) Oral three times a day with meals  tacrolimus 1.5 milliGRAM(s) Oral daily  tamsulosin 0.4 milliGRAM(s) Oral at bedtime  trimethoprim   80 mG/sulfamethoxazole 400 mG 1 Tablet(s) Oral <User Schedule>    MEDICATIONS  (PRN):  acetaminophen     Tablet .. 650 milliGRAM(s) Oral every 6 hours PRN Temp greater or equal to 38C (100.4F), Mild Pain (1 - 3)  aluminum hydroxide/magnesium hydroxide/simethicone Suspension 30 milliLiter(s) Oral every 4 hours PRN Dyspepsia  melatonin 3 milliGRAM(s) Oral at bedtime PRN Insomnia  ondansetron Injectable 4 milliGRAM(s) IV Push every 8 hours PRN Nausea and/or Vomiting    ALLERGIES:  budesonide (Unknown)  cefpodoxime (Unknown)  Pollen (Unknown)    REVIEW OF SYSTEMS: All systems were reviewed in detail. Pertinent positive and negative have been detailed in HPI, otherwise negative.     Vital Signs Last 24 Hrs  T(C): 37.1 (11 Nov 2022 18:55), Max: 37.3 (11 Nov 2022 15:35)  T(F): 98.8 (11 Nov 2022 18:55), Max: 99.1 (11 Nov 2022 15:35)  HR: 105 (11 Nov 2022 18:55) (105 - 116)  BP: 99/67 (11 Nov 2022 18:55) (84/56 - 106/59)  BP(mean): 71 (11 Nov 2022 15:15) (71 - 71)  RR: 20 (11 Nov 2022 18:55) (19 - 22)  SpO2: 99% (11 Nov 2022 18:55) (88% - 99%)  Parameters below as of 11 Nov 2022 18:55  Patient On (Oxygen Delivery Method): nasal cannula  O2 Flow (L/min): 4    PHYSICAL EXAM:  GENERAL: NAD, on supplemental O2  HEAD:  Atraumatic, Normocephalic  EYES: EOMI. conjunctiva and sclera clear  ENMT: MMM, OP clear  NECK: No JVD  NERVOUS SYSTEM:  Alert & Oriented X3, no gross focal deficit  CHEST/LUNG: Scattered wheezing  HEART: Regular rate and rhythm; No murmurs, rubs, or gallops  ABDOMEN: Soft, Nontender, distended  EXTREMITIES:  No clubbing, cyanosis, or edema  ACCESS: LUEAVF, good bruit/thrill  SKIN: No rashes or lesions    LABS:                        9.6    28.57 )-----------( 154      ( 11 Nov 2022 10:11 )             30.9     11-11    137  |  94<L>  |  30.7<H>  ----------------------------<  131<H>  4.1   |  28.0  |  4.31<H>    Ca    8.6      11 Nov 2022 10:11  Phos  2.7     11-11  Mg     1.9     11-11    TPro  6.2<L>  /  Alb  3.7  /  TBili  1.1  /  DBili  x   /  AST  60<H>  /  ALT  23  /  AlkPhos  320<H>  11-11  PT/INR - ( 11 Nov 2022 10:11 )   PT: 22.6 sec;   INR: 1.94 ratio     PTT - ( 11 Nov 2022 10:11 )  PTT:35.6 sec  Magnesium, Serum: 1.9 mg/dL (11-11 @ 10:11)  Phosphorus Level, Serum: 2.7 mg/dL (11-11 @ 10:11)    RADIOLOGY & ADDITIONAL TESTS: Reviewed, CT chest new interstitial edema and new GGO in both lungs, may represent edema underlying infection could not be excluded

## 2022-11-11 NOTE — ED ADULT NURSE NOTE - NSIMPLEMENTINTERV_GEN_ALL_ED
Implemented All Fall with Harm Risk Interventions:  [place] to call system. Call bell, personal items and telephone within reach. Instruct patient to call for assistance. Room bathroom lighting operational. Non-slip footwear when patient is off stretcher. Physically safe environment: no spills, clutter or unnecessary equipment. Stretcher in lowest position, wheels locked, appropriate side rails in place. Provide visual cue, wrist band, yellow gown, etc. Monitor gait and stability. Monitor for mental status changes and reorient to person, place, and time. Review medications for side effects contributing to fall risk. Reinforce activity limits and safety measures with patient and family. Provide visual clues: red socks.

## 2022-11-11 NOTE — ED ADULT NURSE NOTE - NSICDXPASTMEDICALHX_GEN_ALL_CORE_FT
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

## 2022-11-11 NOTE — H&P ADULT - NSICDXPASTMEDICALHX_GEN_ALL_CORE_FT
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

## 2022-11-11 NOTE — ED ADULT NURSE NOTE - NSICDXPASTSURGICALHX_GEN_ALL_CORE_FT
PAST SURGICAL HISTORY:  H/O heart artery stent x3 @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, [place]    History of hip replacement right    Status post open reduction and internal fixation (ORIF) of fracture left femur

## 2022-11-11 NOTE — ED PROVIDER NOTE - PATIENT PORTAL LINK FT
You can access the FollowMyHealth Patient Portal offered by [facility] by registering at the following website: [email or web address]. By joining [employer]’s FollowMyHealth portal, you will also be able to view your health information using other applications (apps) compatible with our system.

## 2022-11-11 NOTE — CONSULT NOTE ADULT - ASSESSMENT
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 Patient is hard of hearing and history obtained from wife at bedside.   Reports she noticed patient to be having worsening shortness of breath. He walks with a walker and was getting easily tired. He also endorsed worsening abdominal distention and leg swelling. Wife noted patient to be more sleepy lately and had to use supplemental oxygen the last couple of days. Usually not oxygen dependent. Denies cough, fever  Patient went to HD yesterday, reported no relief  post HD. Per wife he had been c/o cramping in his legs and thinks he may have requested shorter HD sessions  In Ed afebrile,  requiring o2 + leukocytosis, lactate 2.3, ct chest new interstitial edema and new GGO in both lungs, may represent edema underlying infection could not be excluded    Acute hypoxic respiratory failure likely 2/2 fluid overload  s/p lung transplant  h/o fungal meningitis  ESRD  Leukocytosis on steroids    - symptoms likely 2/2 fluid overload  - f/u bcx  - f/u sputum cx  - legionella urine if able  - flu/rsv/covid (-)  - suggest empiric meropenem adjusted for HD and ad doxycycline to cover empirically for pneumonia  - c/w bactrim and fluconazole ppx  - Trend Fever  - Trend Leukocytosis-on steroids    d/w attending, wife, pt  Will Follow

## 2022-11-11 NOTE — H&P ADULT - ASSESSMENT
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 Labs and imaging noted, leucocytosis, elevated BNP and troponin. Patient on chronic steroid therapy. EKG sinus tach 114.     1. Acute hypoxic respiratory failure sec fluid overload:  Supplemental oxygen, monitor pulse ox   nephrology consulted for additional HD today, s/p Lasix 80 mg in ED with minimal response.   Check ECHO, serial cardiac enzymes, cardiology evaluation  admit to telemetry  CT chest with pulmonary edema  check cultures  low suspicion for sepsis, given immunocompromised status, will empirically give broad spectrum until cultures results.  ID eval if positive cultures.   RVP negative    2. ESRD on HD:  As above  continue Sevelamer    3. Emphysema, h/o lung transplant:  Continue Tacrolimus, Cellcept  supplemental oxygen  not in COPD exacerbation  continue chronic steroid therapy    4. CAD s/p PCI:  Trend cardiac enzymes  telemetry  echo  continue statin, amlodipine, plavix    5. Chronic IJ occlusion:  On Eliquis  vascular follow up as outpatient    6. Hypertension/hyperlipidemia:  Amlodipine, statin    7. BPH:  Continue Flomax    8. H/o fungal meningitis:  continue Bactrim and Fluconazole    9. DVT ppx:  Eliquis.    Discussed with patient and wife at bedside.  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 Labs and imaging noted, leucocytosis, elevated BNP and troponin. Patient on chronic steroid therapy. EKG sinus tach 114.     1. Acute hypoxic respiratory failure sec fluid overload:  Supplemental oxygen, monitor pulse ox   nephrology consulted for additional HD today, s/p Lasix 80 mg in ED with minimal response.   Check ECHO, serial cardiac enzymes, cardiology evaluation  admit to telemetry  CT chest with pulmonary edema  check cultures  low suspicion for sepsis, given immunocompromised status, will empirically give broad spectrum until cultures results.  RVP negative    2. ESRD on HD:  As above  continue Sevelamer    3. Emphysema, h/o lung transplant:  Continue Tacrolimus, Cellcept  supplemental oxygen  not in COPD exacerbation  continue chronic steroid therapy    4. CAD s/p PCI:  Trend cardiac enzymes  telemetry  echo  continue statin, amlodipine, plavix    5. Chronic IJ occlusion:  On Eliquis  vascular follow up as outpatient    6. Hypertension/hyperlipidemia:  Amlodipine, statin    7. BPH:  Continue Flomax    8. H/o fungal meningitis:  continue Bactrim and Fluconazole    9. DVT ppx:  Eliquis.    Discussed with patient and wife at bedside.

## 2022-11-12 NOTE — PROGRESS NOTE ADULT - SUBJECTIVE AND OBJECTIVE BOX
CC: shortness of breath (12 Nov 2022 13:30)    INTERVAL HPI/OVERNIGHT EVENTS:  no acute events overnight    Vital Signs Last 24 Hrs  T(C): 36.7 (12 Nov 2022 11:56), Max: 37.3 (11 Nov 2022 15:35)  T(F): 98 (12 Nov 2022 11:56), Max: 99.1 (11 Nov 2022 15:35)  HR: 91 (12 Nov 2022 11:56) (91 - 125)  BP: 94/61 (12 Nov 2022 11:56) (84/56 - 112/68)  BP(mean): 83 (11 Nov 2022 19:45) (71 - 83)  RR: 18 (12 Nov 2022 11:56) (18 - 22)  SpO2: 97% (12 Nov 2022 11:56) (97% - 99%)    Parameters below as of 11 Nov 2022 19:45  Patient On (Oxygen Delivery Method): nasal cannula  O2 Flow (L/min): 4    PHYSICAL EXAM:  General: in no acute distress  Eyes: PERRLA, EOMI; conjunctiva and sclera clear  ENMT: No nasal discharge; airway clear; [place]  Respiratory: No wheezes, rales or rhonchi; right chest wall permacath  Cardiovascular: Regular rate and rhythm. S1 and S2 Normal; No murmurs, gallops or rubs  Gastrointestinal: Soft non-tender non-distended; Normal bowel sounds  Extremities: Normal range of motion, No clubbing, cyanosis or edema; left AVF with positive thrill  Vascular: Peripheral pulses palpable 2+ bilaterally  Neurological: Alert and oriented x4  Skin: Warm and dry. No acute rash  Psychiatric: Cooperative and appropriate    I&O's Detail    11 Nov 2022 07:01  -  12 Nov 2022 07:00  --------------------------------------------------------  IN:  Total IN: 0 mL    OUT:    Other (mL): 1500 mL  Total OUT: 1500 mL    Total NET: -1500 mL    CARDIAC MARKERS ( 12 Nov 2022 05:44 )  x     / 0.18 ng/mL / x     / x     / x      CARDIAC MARKERS ( 12 Nov 2022 00:55 )  x     / 0.20 ng/mL / x     / x     / x      CARDIAC MARKERS ( 11 Nov 2022 17:15 )  x     / 0.20 ng/mL / x     / x     / x      CARDIAC MARKERS ( 11 Nov 2022 10:11 )  x     / 0.24 ng/mL / x     / x     / x                   8.4    16.10 )-----------( 139      ( 12 Nov 2022 05:44 )             26.8     12 Nov 2022 05:44    138    |  98     |  20.8   ----------------------------<  121    3.9     |  29.0   |  3.25     Ca    8.2        12 Nov 2022 05:44  Phos  2.7       11 Nov 2022 10:11  Mg     1.9       11 Nov 2022 10:11    TPro  5.9    /  Alb  3.6    /  TBili  0.6    /  DBili  x      /  AST  54     /  ALT  21     /  AlkPhos  335    12 Nov 2022 05:44    PT/INR - ( 12 Nov 2022 05:44 )   PT: 20.9 sec;   INR: 1.79 ratio      PTT - ( 11 Nov 2022 10:11 )  PTT:35.6 sec  CAPILLARY BLOOD GLUCOSE    LIVER FUNCTIONS - ( 12 Nov 2022 05:44 )  Alb: 3.6 g/dL / Pro: 5.9 g/dL / ALK PHOS: 335 U/L / ALT: 21 U/L / AST: 54 U/L / GGT: x           MEDICATIONS  (STANDING):  amLODIPine   Tablet 5 milliGRAM(s) Oral daily  apixaban 2.5 milliGRAM(s) Oral every 12 hours  atorvastatin 80 milliGRAM(s) Oral at bedtime  chlorhexidine 2% Cloths 1 Application(s) Topical <User Schedule>  clopidogrel Tablet 75 milliGRAM(s) Oral daily  fluconAZOLE   Tablet 200 milliGRAM(s) Oral <User Schedule>  hydrocortisone 10 milliGRAM(s) Oral two times a day  metoprolol tartrate 25 milliGRAM(s) Oral two times a day  multivitamin 1 Tablet(s) Oral daily  mycophenolate mofetil 500 milliGRAM(s) Oral two times a day  pantoprazole    Tablet 40 milliGRAM(s) Oral before breakfast  piperacillin/tazobactam IVPB.- 3.375 Gram(s) IV Intermittent once  sevelamer carbonate 1600 milliGRAM(s) Oral three times a day with meals  tacrolimus 1.5 milliGRAM(s) Oral daily  tamsulosin 0.4 milliGRAM(s) Oral at bedtime  trimethoprim   80 mG/sulfamethoxazole 400 mG 1 Tablet(s) Oral <User Schedule>    MEDICATIONS  (PRN):  acetaminophen     Tablet .. 650 milliGRAM(s) Oral every 6 hours PRN Temp greater or equal to 38C (100.4F), Mild Pain (1 - 3)  aluminum hydroxide/magnesium hydroxide/simethicone Suspension 30 milliLiter(s) Oral every 4 hours PRN Dyspepsia  melatonin 3 milliGRAM(s) Oral at bedtime PRN Insomnia  ondansetron Injectable 4 milliGRAM(s) IV Push every 8 hours PRN Nausea and/or Vomiting      RADIOLOGY & ADDITIONAL TESTS:

## 2022-11-12 NOTE — PROGRESS NOTE ADULT - ASSESSMENT
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 Labs and imaging noted, leucocytosis, elevated BNP and troponin. Patient on chronic steroid therapy. EKG sinus tach 114.     #Acute hypoxic respiratory failure sec fluid overload - patient with depressed EF; suspect acute on chronic HFrEF  - Supplemental oxygen, monitor pulse ox   - HD as per nephrology  - continue tele  - cardiology following    #enterococcus bacteremia  - repeat set ordered  - echo without vegetation  - follow up repeat cultures  - ID consulted  - abx as per them  - may need permacath line removed - patient states when placed several days ago was bleeding slowly for 4 days    #ESRD on HD:  - As above  - continue Sevelamer  - gets HD through permacath and not AVF  - vascular to determine for new access - possibly [facility]    #Emphysema, h/o lung transplant:  - Continue Tacrolimus, Cellcept  - supplemental oxygen  - not in COPD exacerbation  - continue chronic steroid therapy    #CAD s/p PCI:  - telemetry  - continue statin, amlodipine, plavix    #Chronic IJ occlusion:  - On Eliquis  - vascular follow up as outpatient    #Hypertension/hyperlipidemia:  - Amlodipine, statin    #BPH:  Continue Flomax    #H/o fungal meningitis:  continue Bactrim and Fluconazole    #DVT ppx:  Eliquis. 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 Labs and imaging noted, leucocytosis, elevated BNP and troponin. Patient on chronic steroid therapy. EKG sinus tach 114.     #Acute hypoxic respiratory failure sec fluid overload - patient with depressed EF; suspect acute on chronic HFrEF  - Supplemental oxygen, monitor pulse ox   - HD as per nephrology  - continue tele  - cardiology following    #enterococcus bacteremia  - repeat set ordered  - echo without vegetation  - follow up repeat cultures  - ID consulted  - abx as per them  - may need permacath line removed - patient states when placed several days ago was bleeding slowly for 4 days - ideally should have a holiday  - will discuss with nephro regarding next HD    #ESRD on HD:  - As above  - continue Sevelamer  - gets HD through permacath and not AVF  - will discuss with nephro regarding next HD  - vascular to determine for new access - possibly [facility]    #Emphysema, h/o lung transplant:  - Continue Tacrolimus, Cellcept  - supplemental oxygen  - not in COPD exacerbation  - continue chronic steroid therapy    #CAD s/p PCI:  - telemetry  - continue statin, amlodipine, plavix    #Chronic IJ occlusion:  - On Eliquis  - vascular follow up as outpatient    #Hypertension/hyperlipidemia:  - Amlodipine, statin    #BPH:  Continue Flomax    #H/o fungal meningitis:  continue Bactrim and Fluconazole    #DVT ppx:  Eliquis.

## 2022-11-12 NOTE — PROGRESS NOTE ADULT - SUBJECTIVE AND OBJECTIVE BOX
Annmarie Galaviz M.D., F.A.C.C.                                                                                            [facility]                                                                                                [address] [place] [address]                                                                                                     [phone]        COVERING FOR JU CORRAL is a 72yMale  72-year-old man with hypertension, hyperlipidemia, bilateral carotid disease, coronary artery disease status post multi-vessel PCI with drug-eluting stent (November 2019 at [facility]), severe COPD status post successful bilateral lung transplantation and more recently end-stage renal disease, now on hemodialysis, peripheral vascular disease status post carotid endarterectomy (March 2022), 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  Came to  ED having worsening shortness of breath. He walks with a walker and was getting easily tired. He also endorsed worsening abdominal distention and leg swelling. Wife noted patient to be more sleepy lately and had to use supplemental oxygen the last couple of days. Usually not oxygen dependent. Had HD the other day but still SOB   Admitted with hypoxic respiratory failure/ fluid overload. Feels OK after HD   Cardiology consulted for increasing respiratory distress, elevated troponin level.     In AF with increased ventricular response  Has positive blood cultures  For Echo     MEDICATIONS  (STANDING):  amLODIPine   Tablet 5 milliGRAM(s) Oral daily  apixaban 2.5 milliGRAM(s) Oral every 12 hours  atorvastatin 80 milliGRAM(s) Oral at bedtime  chlorhexidine 2% Cloths 1 Application(s) Topical <User Schedule>  clopidogrel Tablet 75 milliGRAM(s) Oral daily  doxycycline IVPB 100 milliGRAM(s) IV Intermittent every 12 hours  doxycycline IVPB      fluconAZOLE   Tablet 200 milliGRAM(s) Oral <User Schedule>  hydrocortisone 10 milliGRAM(s) Oral two times a day  meropenem Injectable 1000 milliGRAM(s) IV Push every 12 hours  metoprolol tartrate 25 milliGRAM(s) Oral two times a day  multivitamin 1 Tablet(s) Oral daily  mycophenolate mofetil 500 milliGRAM(s) Oral two times a day  pantoprazole    Tablet 40 milliGRAM(s) Oral before breakfast  sevelamer carbonate 1600 milliGRAM(s) Oral three times a day with meals  tacrolimus 1.5 milliGRAM(s) Oral daily  tamsulosin 0.4 milliGRAM(s) Oral at bedtime  trimethoprim   80 mG/sulfamethoxazole 400 mG 1 Tablet(s) Oral <User Schedule>    MEDICATIONS  (PRN):  acetaminophen     Tablet .. 650 milliGRAM(s) Oral every 6 hours PRN Temp greater or equal to 38C (100.4F), Mild Pain (1 - 3)  aluminum hydroxide/magnesium hydroxide/simethicone Suspension 30 milliLiter(s) Oral every 4 hours PRN Dyspepsia  melatonin 3 milliGRAM(s) Oral at bedtime PRN Insomnia  ondansetron Injectable 4 milliGRAM(s) IV Push every 8 hours PRN Nausea and/or Vomiting      Vital Signs Last 24 Hrs  T(C): 36.7 (12 Nov 2022 11:56), Max: 37.3 (11 Nov 2022 15:35)  T(F): 98 (12 Nov 2022 11:56), Max: 99.1 (11 Nov 2022 15:35)  HR: 91 (12 Nov 2022 11:56) (91 - 125)  BP: 94/61 (12 Nov 2022 11:56) (84/56 - 112/68)  BP(mean): 83 (11 Nov 2022 19:45) (71 - 83)  RR: 18 (12 Nov 2022 11:56) (18 - 22)  SpO2: 97% (12 Nov 2022 11:56) (97% - 99%)    Parameters below as of 11 Nov 2022 19:45  Patient On (Oxygen Delivery Method): nasal cannula  O2 Flow (L/min): 4      I&O's Detail    11 Nov 2022 07:01  -  12 Nov 2022 07:00  --------------------------------------------------------  IN:  Total IN: 0 mL    OUT:    Other (mL): 1500 mL  Total OUT: 1500 mL    Total NET: -1500 mL          Constitutional:     NC/AT: Normal     HEENT: Normal     Neck:  No JVD, bruits or thyromegaly    Respiratory:  Clear without rales or rhonchi    Cardiovascular:  I RR without murmur, rub or gallop.    Gastrointestinal: Soft without hepatosplenomegaly.    Extremities: without cyanosis, clubbing or edema.    Neurological:  Oriented   x  3    . No gross sensory or motor defects.    Skin: Clear    Psychiatric: Normal affect.                              8.4    16.10 )-----------( 139      ( 12 Nov 2022 05:44 )             26.8     11-12    138  |  98  |  20.8<H>  ----------------------------<  121<H>  3.9   |  29.0  |  3.25<H>    Ca    8.2<L>      12 Nov 2022 05:44  Phos  2.7     11-11  Mg     1.9     11-11    TPro  5.9<L>  /  Alb  3.6  /  TBili  0.6  /  DBili  x   /  AST  54<H>  /  ALT  21  /  AlkPhos  335<H>  11-12    PT/INR - ( 12 Nov 2022 05:44 )   PT: 20.9 sec;   INR: 1.79 ratio         PTT - ( 11 Nov 2022 10:11 )  PTT:35.6 sec  CARDIAC MARKERS ( 12 Nov 2022 05:44 )  x     / 0.18 ng/mL / x     / x     / x      CARDIAC MARKERS ( 12 Nov 2022 00:55 )  x     / 0.20 ng/mL / x     / x     / x      CARDIAC MARKERS ( 11 Nov 2022 17:15 )  x     / 0.20 ng/mL / x     / x     / x      CARDIAC MARKERS ( 11 Nov 2022 10:11 )  x     / 0.24 ng/mL / x     / x     / x                  Active Issues:  HEALTH ISSUES - PROBLEM Dx:          IMPRESSION:    SOB  Hx of lung transplt  Renal failure on HD  Positive Bl cxs  Afib  Hx of CAD/stenting    Trops flat and not consistent with ACS  AF rapid-- start on Lopressor (already on Eliquis)  Positive bl cxs-- Echo pending (may need CRISTIAN and removal of permacath)  Continue HD as needed.

## 2022-11-12 NOTE — PROGRESS NOTE ADULT - SUBJECTIVE AND OBJECTIVE BOX
Reason for visit: ESRD on HD    Subjective: No acute overnight event. Patient BCX +ve for enterococcus. No fever/chills. EF reduced to 25-30 but in setting of AFib w/ RVR.    ROS: All systems were reviewed in detail pertinent positive and negative mentioned above, rest are negative.    Physical Exam:  Gen: no acute distress  MS: alert, conversing normally, [place]  Eyes: EOMI, no icterus  HENT: NCAT, MMM  CV: rhythm irreg irreg, rate tachy  Chest: CTAB, no w/r/r,  Abd: soft, NT, Distended  Extremities: No edema  Permcath, AVF  =======================================================  Vital Signs Last 24 Hrs  T(C): 36.7 (2022 17:24), Max: 37.1 (2022 18:55)  T(F): 98 (2022 17:24), Max: 98.8 (2022 18:55)  HR: 118 (2022 17:24) (91 - 125)  BP: 103/73 (2022 17:24) (94/61 - 112/68)  BP(mean): 83 (2022 19:45) (83 - 83)  RR: 18 (:24) (18 - 20)  SpO2: 100% (:24) (97% - 100%)    Parameters below as of 2022 19:45  Patient On (Oxygen Delivery Method): nasal cannula  O2 Flow (L/min): 4    I&O's Summary    2022 07:01  -  2022 07:00  --------------------------------------------------------  IN: 0 mL / OUT: 1500 mL / NET: -1500 mL      =======================================================  Current Antibiotics:  fluconAZOLE   Tablet 200 milliGRAM(s) Oral <User Schedule>  piperacillin/tazobactam IVPB.- 3.375 Gram(s) IV Intermittent once  trimethoprim   80 mG/sulfamethoxazole 400 mG 1 Tablet(s) Oral <User Schedule>    Other medications:  amLODIPine   Tablet 5 milliGRAM(s) Oral daily  apixaban 2.5 milliGRAM(s) Oral every 12 hours  atorvastatin 80 milliGRAM(s) Oral at bedtime  chlorhexidine 2% Cloths 1 Application(s) Topical <User Schedule>  clopidogrel Tablet 75 milliGRAM(s) Oral daily  hydrocortisone 10 milliGRAM(s) Oral two times a day  metoprolol tartrate 25 milliGRAM(s) Oral two times a day  multivitamin 1 Tablet(s) Oral daily  mycophenolate mofetil 500 milliGRAM(s) Oral two times a day  pantoprazole    Tablet 40 milliGRAM(s) Oral before breakfast  sevelamer carbonate 1600 milliGRAM(s) Oral three times a day with meals  tacrolimus 1.5 milliGRAM(s) Oral daily  tamsulosin 0.4 milliGRAM(s) Oral at bedtime    =======================================================      138  |  98  |  20.8<H>  ----------------------------<  121<H>  3.9   |  29.0  |  3.25<H>    Ca    8.2<L>      2022 05:44  Phos  2.7       Mg     1.9         TPro  5.9<L>  /  Alb  3.6  /  TBili  0.6  /  DBili  x   /  AST  54<H>  /  ALT  21  /  AlkPhos  335<H>      Creatinine, Serum: 3.25 mg/dL (22 @ 05:44)  Creatinine, Serum: 4.31 mg/dL (22 @ 10:11)    Urinalysis Basic - ( 2022 15:15 )    Color: Brianne / Appearance: Slightly Turbid / S.010 / pH: x  Gluc: x / Ketone: Negative  / Bili: Negative / Urobili: Negative mg/dL   Blood: x / Protein: 500 mg/dL / Nitrite: Negative   Leuk Esterase: Trace / RBC: 0-2 /HPF / WBC 6-10 /HPF   Sq Epi: x / Non Sq Epi: Few / Bacteria: Few      =======================================================

## 2022-11-12 NOTE — CONSULT NOTE ADULT - SUBJECTIVE AND OBJECTIVE BOX
Vascular Surgery Consult    Consulting attending: Dr. Ku    Vascular surgery consulted for tunnelled dialysis catheter removal in this patient with bacteremia. Well known to vascular service. Enterococcus seen on blood culture. Patient is also immunocompromised given immunosuppressive medications for transplant history. Had HD yesterday and tolerated well, with no additional dialysis plans for weekend. Patient has LUE AVF that has palpable thrill and likely functional, though patient has avoided its used d/t pain with access. 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 Patient is hard of hearing and history obtained from wife at bedside. Reports she noticed patient to be having worsening shortness of breath for the past 2 days. He walks with a walker and was getting easily tired. He also endorsed worsening abdominal distention and leg swelling. He denied chest pain, dizziness, fever, chills, nausea, vomiting, no sick contacts. Wife noted patient to be more sleepy lately and had to use supplemental oxygen the last couple of days. Usually not oxygen dependent. Denies cough.   Patient went to HD yesterday, reported no relief  post HD.  (2022 13:25)        PAST MEDICAL HISTORY:  Emphysema of lung    ESRD (end stage renal disease) on dialysis    Fungal meningitis    2019 novel coronavirus disease (COVID-19)    Pneumonia    [place] (hard of hearing)    HTN (hypertension)    Lumbar spondylosis    History of COPD    BPH without urinary obstruction    Hypercholesterolemia    Oliguria and anuria    H/O peripheral neuropathy          PAST SURGICAL HISTORY:  H/O lung transplant    H/O heart artery stent    History of hip replacement    Status post open reduction and internal fixation (ORIF) of fracture          MEDICATIONS:  acetaminophen     Tablet .. 650 milliGRAM(s) Oral every 6 hours PRN  aluminum hydroxide/magnesium hydroxide/simethicone Suspension 30 milliLiter(s) Oral every 4 hours PRN  amLODIPine   Tablet 5 milliGRAM(s) Oral daily  apixaban 2.5 milliGRAM(s) Oral every 12 hours  atorvastatin 80 milliGRAM(s) Oral at bedtime  chlorhexidine 2% Cloths 1 Application(s) Topical <User Schedule>  clopidogrel Tablet 75 milliGRAM(s) Oral daily  fluconAZOLE   Tablet 200 milliGRAM(s) Oral <User Schedule>  hydrocortisone 10 milliGRAM(s) Oral two times a day  melatonin 3 milliGRAM(s) Oral at bedtime PRN  metoprolol tartrate 25 milliGRAM(s) Oral two times a day  multivitamin 1 Tablet(s) Oral daily  mycophenolate mofetil 500 milliGRAM(s) Oral two times a day  ondansetron Injectable 4 milliGRAM(s) IV Push every 8 hours PRN  pantoprazole    Tablet 40 milliGRAM(s) Oral before breakfast  sevelamer carbonate 1600 milliGRAM(s) Oral three times a day with meals  tacrolimus 1.5 milliGRAM(s) Oral daily  tamsulosin 0.4 milliGRAM(s) Oral at bedtime  trimethoprim   80 mG/sulfamethoxazole 400 mG 1 Tablet(s) Oral <User Schedule>        ALLERGIES:  budesonide (Unknown)  cefpodoxime (Unknown)  Pollen (Unknown)        VITALS & I/Os:  Vital Signs Last 24 Hrs  T(C): 36.7 (2022 17:24), Max: 36.7 (2022 04:43)  T(F): 98 (2022 17:24), Max: 98 (2022 04:43)  HR: 118 (2022 17:24) (91 - 118)  BP: 103/73 (2022 17:24) (94/61 - 103/73)  BP(mean): --  RR: 18 (2022 17:24) (18 - 18)  SpO2: 100% (2022 17:24) (97% - 100%)        I&O's Summary    2022 07:01  -  2022 07:00  --------------------------------------------------------  IN: 0 mL / OUT: 1500 mL / NET: -1500 mL          PHYSICAL EXAM:  General: No acute distress, laying in bed, answering appropriately, hard-of hearing  Respiratory: Nonlabored, no conversational dyspnea  Cardiovascular: RRR  Chest: Right chest wall TDC noted, insertion site clean without drainage or bleeding.   Abdominal: Soft, nondistended, nontender.  Extremities: Warm, full ROM. LUE antecubital AVF with palpable thrill. Outflow with palpable stent.         LABS:                        8.4    16.10 )-----------( 139      ( 2022 05:44 )             26.8     11    138  |  98  |  20.8<H>  ----------------------------<  121<H>  3.9   |  29.0  |  3.25<H>    Ca    8.2<L>      2022 05:44  Phos  2.7       Mg     1.9         TPro  5.9<L>  /  Alb  3.6  /  TBili  0.6  /  DBili  x   /  AST  54<H>  /  ALT  21  /  AlkPhos  335<H>      Lactate:    PT/INR - ( 2022 05:44 )   PT: 20.9 sec;   INR: 1.79 ratio         PTT - ( 2022 10:11 )  PTT:35.6 sec    CARDIAC MARKERS ( 2022 05:44 )  x     / 0.18 ng/mL / x     / x     / x      CARDIAC MARKERS ( 2022 00:55 )  x     / 0.20 ng/mL / x     / x     / x      CARDIAC MARKERS ( 2022 17:15 )  x     / 0.20 ng/mL / x     / x     / x      CARDIAC MARKERS ( 2022 10:11 )  x     / 0.24 ng/mL / x     / x     / x            Urinalysis Basic - ( 2022 15:15 )    Color: Brianne / Appearance: Slightly Turbid / S.010 / pH: x  Gluc: x / Ketone: Negative  / Bili: Negative / Urobili: Negative mg/dL   Blood: x / Protein: 500 mg/dL / Nitrite: Negative   Leuk Esterase: Trace / RBC: 0-2 /HPF / WBC 6-10 /HPF   Sq Epi: x / Non Sq Epi: Few / Bacteria: Few      IMAGING:  None relevant.

## 2022-11-12 NOTE — CONSULT NOTE ADULT - ATTENDING COMMENTS
Patient with bacteremia and sepsis, with ground glass opacities on imaging with history of lung transplant on immunosuppresive medications. R EJ permacath in place, and patient has a functional, patent LUE brachiocephalic AVF with viabhan stent in place, but patient refuses to have AVF accessed due to previous canalization site complications as outpatient. States he prefers catheter for HD.     Plan  Remove tunneled catheter now  Patient will be encouraged to use AVF for HD, due to high risk of catheter infections and endocarditis  CRISTIAN  IV abx  Ok to use AVF for HD  Avoid catheter placement

## 2022-11-12 NOTE — PROGRESS NOTE ADULT - SUBJECTIVE AND OBJECTIVE BOX
[facility]                                                INFECTIOUS DISEASES  =======================================================                               J Carlos Galdamez MD#    Isatu Rojas MD*                           Perlita Solares MD*   Sumaya Morales MD*            Diplomates American Board of Internal Medicine & Infectious Diseases                  # [place] Office - Appt - Tel  [phone] Fax [phone]                * [place] Office - Appt - Tel [phone] Fax [phone]                                  Hospital Consult line:  [phone]  =======================================================      [id number]  JU HENSLEYMARYDALJIT   follow up for: bacteremia  bcx + enterococcus  pt denies complaints, feels better today  patient seen and examined.       I have personally reviewed the labs and data; pertinent labs and data are listed in this note; please see below.   ===================================================  REVIEW OF SYSTEMS:  CONSTITUTIONAL:  No Fever or chills  HEENT:  No diplopia or blurred vision.  No earache, sore throat or runny nose.  CARDIOVASCULAR:  No pressure, squeezing, strangling, tightness, heaviness or aching about the chest, neck, axilla or epigastrium.  RESPIRATORY:  No cough, shortness of breath  GASTROINTESTINAL:  No nausea, vomiting or diarrhea.  GENITOURINARY:  No dysuria, frequency or urgency. No Blood in urine  MUSCULOSKELETAL:  no joint aches, no muscle pain  SKIN:  No change in skin, hair or nails.  NEUROLOGIC:  No Headaches, seizures or weakness.  PSYCHIATRIC:  No disorder of thought or mood.  ENDOCRINE:  No heat or cold intolerance  HEMATOLOGICAL:  No easy bruising or bleeding.    =======================================================  Allergies    budesonide (Unknown)  cefpodoxime (Unknown)  Pollen (Unknown)    Intolerances    Antibiotics:  fluconAZOLE   Tablet 200 milliGRAM(s) Oral <User Schedule>  piperacillin/tazobactam IVPB.- 3.375 Gram(s) IV Intermittent once  trimethoprim   80 mG/sulfamethoxazole 400 mG 1 Tablet(s) Oral <User Schedule>    Other medications:  amLODIPine   Tablet 5 milliGRAM(s) Oral daily  apixaban 2.5 milliGRAM(s) Oral every 12 hours  atorvastatin 80 milliGRAM(s) Oral at bedtime  chlorhexidine 2% Cloths 1 Application(s) Topical <User Schedule>  clopidogrel Tablet 75 milliGRAM(s) Oral daily  hydrocortisone 10 milliGRAM(s) Oral two times a day  metoprolol tartrate 25 milliGRAM(s) Oral two times a day  multivitamin 1 Tablet(s) Oral daily  mycophenolate mofetil 500 milliGRAM(s) Oral two times a day  pantoprazole    Tablet 40 milliGRAM(s) Oral before breakfast  sevelamer carbonate 1600 milliGRAM(s) Oral three times a day with meals  tacrolimus 1.5 milliGRAM(s) Oral daily  tamsulosin 0.4 milliGRAM(s) Oral at bedtime    ======================================================  Physical Exam:  ============  T(F): 98 (12 Nov 2022 11:56), Max: 99.1 (11 Nov 2022 15:35)  HR: 91 (12 Nov 2022 11:56)  BP: 94/61 (12 Nov 2022 11:56)  RR: 18 (12 Nov 2022 11:56)  SpO2: 97% (12 Nov 2022 11:56) (97% - 99%)  temp max in last 48H T(F): , Max: 99.1 (11-11-22 @ 15:35)    General:  No acute distress. on o2  Eye: no conjunctival pallor, no scleral icterus  Respiratory: Lungs are clear to auscultation, Respirations are non-labored.  Cardiovascular: Normal rate, Regular rhythm,  s1+s2  Gastrointestinal: Soft, Non-tender, Non-distended, Normal bowel sounds. rt chest permacath  Genitourinary: No costovertebral angle tenderness.  Musculoskeletal: Normal range of motion, Normal strength.  Integumentary: No rash.  Neurologic: Alert, Oriented, No focal deficits  Psychiatric: Appropriate mood & affect.  =======================================================  Labs:                        8.4    16.10 )-----------( 139      ( 12 Nov 2022 05:44 )             26.8     11-12    138  |  98  |  20.8<H>  ----------------------------<  121<H>  3.9   |  29.0  |  3.25<H>    Ca    8.2<L>      12 Nov 2022 05:44  Phos  2.7     11-11  Mg     1.9     11-11    TPro  5.9<L>  /  Alb  3.6  /  TBili  0.6  /  DBili  x   /  AST  54<H>  /  ALT  21  /  AlkPhos  335<H>  11-12      Culture - Blood (collected 11-11-22 @ 10:15)  Source: .Blood Blood-Peripheral  Gram Stain (11-12-22 @ 08:24):    Growth in anaerobic bottle: Gram Positive Cocci in Pairs and Chains    Growth in aerobic bottle: Gram Positive Cocci in Pairs and Chains    Culture - Blood (collected 11-11-22 @ 10:11)  Source: .Blood Blood-Peripheral  Gram Stain (11-12-22 @ 06:52):    Growth in aerobic bottle: Gram positive cocci in pairs  Organism: Blood Culture PCR (11-12-22 @ 08:34)  Organism: Blood Culture PCR (11-12-22 @ 08:34)    Sensitivities:      -  Enterococcus faecalis: Detec      Method Type: PCR                                                    [facility]                                                INFECTIOUS DISEASES  =======================================================                               J Carlos Galdamez MD#    Isatu Rojas MD*                           Perlita Solares MD*   Sumaya Morales MD*            Diplomates American Board of Internal Medicine & Infectious Diseases                  # [place] Office - Appt - Tel  [phone] Fax [phone]                * [place] Office - Appt - Tel [phone] Fax [phone]                                  Hospital Consult line:  [phone]  =======================================================      [id number]  JU HENSLEYMARYDALJIT   follow up for: bacteremia  bcx + enterococcus  pt denies complaints, feels better today  patient seen and examined.       I have personally reviewed the labs and data; pertinent labs and data are listed in this note; please see below.   ===================================================  REVIEW OF SYSTEMS:  CONSTITUTIONAL:  No Fever or chills  HEENT:  No diplopia or blurred vision.  No earache, sore throat or runny nose.  CARDIOVASCULAR:  No pressure, squeezing, strangling, tightness, heaviness or aching about the chest, neck, axilla or epigastrium.  RESPIRATORY:  No cough, shortness of breath  GASTROINTESTINAL:  No nausea, vomiting or diarrhea.  GENITOURINARY:  No dysuria, frequency or urgency. No Blood in urine  MUSCULOSKELETAL:  no joint aches, no muscle pain  SKIN:  No change in skin, hair or nails.  NEUROLOGIC:  No Headaches, seizures or weakness.  PSYCHIATRIC:  No disorder of thought or mood.  ENDOCRINE:  No heat or cold intolerance  HEMATOLOGICAL:  No easy bruising or bleeding.    =======================================================  Allergies    budesonide (Unknown)  cefpodoxime (Unknown)  Pollen (Unknown)    Intolerances    Antibiotics:  fluconAZOLE   Tablet 200 milliGRAM(s) Oral <User Schedule>  piperacillin/tazobactam IVPB.- 3.375 Gram(s) IV Intermittent once  trimethoprim   80 mG/sulfamethoxazole 400 mG 1 Tablet(s) Oral <User Schedule>    Other medications:  amLODIPine   Tablet 5 milliGRAM(s) Oral daily  apixaban 2.5 milliGRAM(s) Oral every 12 hours  atorvastatin 80 milliGRAM(s) Oral at bedtime  chlorhexidine 2% Cloths 1 Application(s) Topical <User Schedule>  clopidogrel Tablet 75 milliGRAM(s) Oral daily  hydrocortisone 10 milliGRAM(s) Oral two times a day  metoprolol tartrate 25 milliGRAM(s) Oral two times a day  multivitamin 1 Tablet(s) Oral daily  mycophenolate mofetil 500 milliGRAM(s) Oral two times a day  pantoprazole    Tablet 40 milliGRAM(s) Oral before breakfast  sevelamer carbonate 1600 milliGRAM(s) Oral three times a day with meals  tacrolimus 1.5 milliGRAM(s) Oral daily  tamsulosin 0.4 milliGRAM(s) Oral at bedtime    ======================================================  Physical Exam:  ============  T(F): 98 (12 Nov 2022 11:56), Max: 99.1 (11 Nov 2022 15:35)  HR: 91 (12 Nov 2022 11:56)  BP: 94/61 (12 Nov 2022 11:56)  RR: 18 (12 Nov 2022 11:56)  SpO2: 97% (12 Nov 2022 11:56) (97% - 99%)  temp max in last 48H T(F): , Max: 99.1 (11-11-22 @ 15:35)    General:  No acute distress. on o2  Eye: no conjunctival pallor, no scleral icterus  Respiratory: crackles at bases to auscultation, Respirations are non-labored.  Cardiovascular: Normal rate, Regular rhythm,  s1+s2  Gastrointestinal: Soft, Non-tender, Non-distended, Normal bowel sounds. rt chest permacath  Genitourinary: No costovertebral angle tenderness.  Musculoskeletal: Normal range of motion, Normal strength.  Integumentary: No rash. mild b/l Le edema  Neurologic: Alert, Oriented, No focal deficits  Psychiatric: Appropriate mood & affect.  =======================================================  Labs:                        8.4    16.10 )-----------( 139      ( 12 Nov 2022 05:44 )             26.8     11-12    138  |  98  |  20.8<H>  ----------------------------<  121<H>  3.9   |  29.0  |  3.25<H>    Ca    8.2<L>      12 Nov 2022 05:44  Phos  2.7     11-11  Mg     1.9     11-11    TPro  5.9<L>  /  Alb  3.6  /  TBili  0.6  /  DBili  x   /  AST  54<H>  /  ALT  21  /  AlkPhos  335<H>  11-12      Culture - Blood (collected 11-11-22 @ 10:15)  Source: .Blood Blood-Peripheral  Gram Stain (11-12-22 @ 08:24):    Growth in anaerobic bottle: Gram Positive Cocci in Pairs and Chains    Growth in aerobic bottle: Gram Positive Cocci in Pairs and Chains    Culture - Blood (collected 11-11-22 @ 10:11)  Source: .Blood Blood-Peripheral  Gram Stain (11-12-22 @ 06:52):    Growth in aerobic bottle: Gram positive cocci in pairs  Organism: Blood Culture PCR (11-12-22 @ 08:34)  Organism: Blood Culture PCR (11-12-22 @ 08:34)    Sensitivities:      -  Enterococcus faecalis: Detec      Method Type: PCR

## 2022-11-12 NOTE — PROGRESS NOTE ADULT - ASSESSMENT
72 yr old male with hypertension, ESRD on HD, emphysema s/p lung transplant, history of fungal meningitis presents with complaints of shortness of breath for 2 days. CT chest new interstitial edema and new GGO in both lungs.     1. ESRD on HD:   Via LUEAVF on TTS  Last HD yesterday, not much symptomatic relief w/ UF  No HD planned over the weekend.  2. Ac hypoxic resp failure: a.fib w/ RVR, immunocompromised patient r/o PNA. Management per primary team.  3. Emphysema s/p B/L Lung Transplant on IS: patient continued of tacrolimus and MMF for now. Also on bactrim and fluconazole pphx. Management per ID.  4. Hypertension: acceptable limits, c/w amlodipine,   5. BMD w/ CKD: C/w sevelamer carbonate 800 mg oral tablet; 2 tab orally 3 times a day (with meals)   6. Enetrococcus bacteremia: Abx and IS adjustments per ID. If ID recommends then Permcath can be removed.   7. Cirrhosis w/ ascites: consider US abdomen to evaluate need for paracentesis.

## 2022-11-12 NOTE — CONSULT NOTE ADULT - ASSESSMENT
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 Found to be bacteremic, enterococcus on blood Cx 11/11. Vascular surgery consulted for tunnelled dialysis catheter removal in this patient with bacteremia. No need for additional HD this weekend, would likely be able to use AVF for HD thereafter.    #tunnelled dialysis catheter, bacteremia  - Will remove TDC and send tip for Cx  - Last dose eliquis was at 17:00, to delay removal for later this evening / early morning.   - Will discuss use of LUE AVF with patient for HD    Plan discussed with Dr. Ku who agrees.

## 2022-11-12 NOTE — PROGRESS NOTE ADULT - ASSESSMENT
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 Patient is hard of hearing and history obtained from wife at bedside.   Reports she noticed patient to be having worsening shortness of breath. He walks with a walker and was getting easily tired. He also endorsed worsening abdominal distention and leg swelling. Wife noted patient to be more sleepy lately and had to use supplemental oxygen the last couple of days. Usually not oxygen dependent. Denies cough, fever  Patient went to HD yesterday, reported no relief  post HD. Per wife he had been c/o cramping in his legs and thinks he may have requested shorter HD sessions  In Ed afebrile,  requiring o2 + leukocytosis, lactate 2.3, ct chest new interstitial edema and new GGO in both lungs, may represent edema underlying infection could not be excluded. Found to have e.faecalis bacteremia    Acute hypoxic respiratory failure likely 2/2 fluid overload  s/p lung transplant  h/o fungal meningitis  ESRD  Leukocytosis on steroids  Bacteremia    - bcx + e.faecalis  - f/u bcx x 2  - dc sol/doxy-->zosyn adjusted for HD. pt not aware of cefpodoxime allergy. has received zosyn in the past, will monitor for reaction  - ct a/p  - TTE  - permacath should be removed. 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 Patient is hard of hearing and history obtained from wife at bedside.   Reports she noticed patient to be having worsening shortness of breath. He walks with a walker and was getting easily tired. He also endorsed worsening abdominal distention and leg swelling. Wife noted patient to be more sleepy lately and had to use supplemental oxygen the last couple of days. Usually not oxygen dependent. Denies cough, fever  Patient went to HD yesterday, reported no relief  post HD. Per wife he had been c/o cramping in his legs and thinks he may have requested shorter HD sessions  In Ed afebrile,  requiring o2 + leukocytosis, lactate 2.3, ct chest new interstitial edema and new GGO in both lungs, may represent edema underlying infection could not be excluded. Found to have e.faecalis bacteremia    Acute hypoxic respiratory failure likely 2/2 fluid overload  s/p lung transplant  h/o fungal meningitis  ESRD  Leukocytosis on steroids  Bacteremia    - bcx + e.faecalis, f/u (S)  - f/u bcx x 2  - dc sol/doxy-->zosyn adjusted for HD. pt not aware of cefpodoxime allergy. has received zosyn in the past, will monitor for reaction  - ct a/p  - TTE  - permacath should be removed. reportedly he noted bleeding from permacath recently  - f/u sputum cx  - legionella urine if able  - flu/rsv/covid (-)  - c/w bactrim and fluconazole ppx  - Trend Fever  - Trend Leukocytosis-on steroids    d/w Dr Chavira, pt, RN  Will Follow

## 2022-11-13 NOTE — PROGRESS NOTE ADULT - ASSESSMENT
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 Found to be bacteremic, enterococcus on blood Cx 11/11. Vascular surgery consulted for tunnelled dialysis catheter removal in this patient with bacteremia. No need for additional HD this weekend. While AVF is likely functional, patient has strong aversion to its use.     #tunnelled dialysis catheter, bacteremia  - TDC removed without issue and tip sent for Cx  - Eliquis dose this morning was rescheduled  - Will discuss other access plans vs use of LUE AVF with patient for HD

## 2022-11-13 NOTE — PROGRESS NOTE ADULT - SUBJECTIVE AND OBJECTIVE BOX
HPI/OVERNIGHT EVENTS:  No acute overnight events. Vital signs stable. Held AM eliquis to take permacath out this AM. Patient was hesitant for removal of right TDC as he does not want to use his LUE AVF for HD. Patient called his wife Veronica and discussion was had about necessity of catheter removal in setting of bacteremia.  Concern is more about what access will be placed as they do not want to use the AVF. Ultimately agreed to removal of permacath, verbally consented by patient and wife.     MEDICATIONS  (STANDING):  amLODIPine   Tablet 5 milliGRAM(s) Oral daily  apixaban 2.5 milliGRAM(s) Oral every 12 hours  atorvastatin 80 milliGRAM(s) Oral at bedtime  chlorhexidine 2% Cloths 1 Application(s) Topical <User Schedule>  clopidogrel Tablet 75 milliGRAM(s) Oral daily  fluconAZOLE   Tablet 200 milliGRAM(s) Oral <User Schedule>  hydrocortisone 10 milliGRAM(s) Oral two times a day  metoprolol tartrate 25 milliGRAM(s) Oral two times a day  multivitamin 1 Tablet(s) Oral daily  mycophenolate mofetil 500 milliGRAM(s) Oral two times a day  pantoprazole    Tablet 40 milliGRAM(s) Oral before breakfast  piperacillin/tazobactam IVPB.. 3.375 Gram(s) IV Intermittent every 12 hours  sevelamer carbonate 1600 milliGRAM(s) Oral three times a day with meals  tacrolimus 1.5 milliGRAM(s) Oral daily  tamsulosin 0.4 milliGRAM(s) Oral at bedtime  trimethoprim   80 mG/sulfamethoxazole 400 mG 1 Tablet(s) Oral <User Schedule>    MEDICATIONS  (PRN):  acetaminophen     Tablet .. 650 milliGRAM(s) Oral every 6 hours PRN Temp greater or equal to 38C (100.4F), Mild Pain (1 - 3)  aluminum hydroxide/magnesium hydroxide/simethicone Suspension 30 milliLiter(s) Oral every 4 hours PRN Dyspepsia  melatonin 3 milliGRAM(s) Oral at bedtime PRN Insomnia  ondansetron Injectable 4 milliGRAM(s) IV Push every 8 hours PRN Nausea and/or Vomiting      Vital Signs Last 24 Hrs  T(C): 36.3 (2022 06:47), Max: 36.7 (2022 11:56)  T(F): 97.3 (2022 06:47), Max: 98 (2022 11:56)  HR: 114 (2022 06:47) (91 - 118)  BP: 97/63 (2022 06:47) (94/61 - 103/73)  BP(mean): --  RR: 18 (2022 06:47) (18 - 18)  SpO2: 98% (2022 06:47) (97% - 100%)        Constitutional: Sitting up in bed, in no acute distress, hard of hearing  HEENT: EOMI, no active drainage or redness  Neck: Full ROM without pain, right neck and chest wall tunnelled catheter site clean appearing   Respiratory: respirations are unlabored, no accessory muscle use, no conversational dyspnea  Cardiovascular: regular rate & rhythm  Gastrointestinal: Abdomen soft, non-tender, non-distended  Neurological: no gross sensory / motor / coordination deficits  Musculoskeletal: No limitation of movement          LABS:                        8.7    11. )-----------( 140      ( 2022 05:40 )             28.1     11-13    138  |  96  |  37.7<H>  ----------------------------<  114<H>  4.4   |  27.0  |  5.05<H>    Ca    8.1<L>      2022 05:40  Phos  2.7     11-11  Mg     1.9     11-11    TPro  5.9<L>  /  Alb  3.6  /  TBili  0.6  /  DBili  x   /  AST  54<H>  /  ALT  21  /  AlkPhos  335<H>  11-12    PT/INR - ( 2022 05:44 )   PT: 20.9 sec;   INR: 1.79 ratio         PTT - ( 2022 10:11 )  PTT:35.6 sec  Urinalysis Basic - ( 2022 15:15 )    Color: Brianne / Appearance: Slightly Turbid / S.010 / pH: x  Gluc: x / Ketone: Negative  / Bili: Negative / Urobili: Negative mg/dL   Blood: x / Protein: 500 mg/dL / Nitrite: Negative   Leuk Esterase: Trace / RBC: 0-2 /HPF / WBC 6-10 /HPF   Sq Epi: x / Non Sq Epi: Few / Bacteria: Few

## 2022-11-13 NOTE — PROGRESS NOTE ADULT - SUBJECTIVE AND OBJECTIVE BOX
Reason for visit: ESRD on HD    Subjective: No acute overnight event. Patient BCX +ve for enterococcus. No fever/chills. He had Permcath removed.     ROS: All systems were reviewed in detail pertinent positive and negative mentioned above, rest are negative.    Physical Exam:  Gen: no acute distress  MS: alert, conversing normally, [place]  Eyes: EOMI, no icterus  HENT: NCAT, MMM  CV: rhythm irreg irreg, rate tachy  Chest: CTAB, no w/r/r,  Abd: soft, NT, Distended  Extremities: No edema  LUEAVG w/ good bruit    =======================================================  Vital Signs Last 24 Hrs  T(C): 36.3 (2022 06:47), Max: 36.7 (2022 17:24)  T(F): 97.3 (2022 06:47), Max: 98 (2022 17:24)  HR: 104 (2022 11:30) (104 - 118)  BP: 97/63 (2022 06:47) (97/63 - 103/73)  BP(mean): --  RR: 18 (2022 06:47) (18 - 18)  SpO2: 97% (2022 13:23) (97% - 100%)    Parameters below as of 2022 13:23  Patient On (Oxygen Delivery Method): nasal cannula  O2 Flow (L/min): 3    I&O's Summary    =======================================================  Current Antibiotics:  fluconAZOLE   Tablet 200 milliGRAM(s) Oral <User Schedule>  piperacillin/tazobactam IVPB.. 3.375 Gram(s) IV Intermittent every 12 hours  trimethoprim   80 mG/sulfamethoxazole 400 mG 1 Tablet(s) Oral <User Schedule>    Other medications:  amLODIPine   Tablet 5 milliGRAM(s) Oral daily  atorvastatin 80 milliGRAM(s) Oral at bedtime  chlorhexidine 2% Cloths 1 Application(s) Topical <User Schedule>  clopidogrel Tablet 75 milliGRAM(s) Oral daily  hydrocortisone 10 milliGRAM(s) Oral two times a day  metoprolol tartrate 25 milliGRAM(s) Oral two times a day  multivitamin 1 Tablet(s) Oral daily  mycophenolate mofetil 500 milliGRAM(s) Oral two times a day  pantoprazole    Tablet 40 milliGRAM(s) Oral before breakfast  sacubitril 24 mG/valsartan 26 mG 1 Tablet(s) Oral two times a day  sevelamer carbonate 1600 milliGRAM(s) Oral three times a day with meals  tacrolimus 1.5 milliGRAM(s) Oral daily  tamsulosin 0.4 milliGRAM(s) Oral at bedtime    =======================================================      138  |  96  |  37.7<H>  ----------------------------<  114<H>  4.4   |  27.0  |  5.05<H>    Ca    8.1<L>      2022 05:40    TPro  5.9<L>  /  Alb  3.6  /  TBili  0.6  /  DBili  x   /  AST  54<H>  /  ALT  21  /  AlkPhos  335<H>      Creatinine, Serum: 5.05 mg/dL (22 @ 05:40)  Creatinine, Serum: 3.25 mg/dL (22 @ 05:44)  Creatinine, Serum: 4.31 mg/dL (22 @ 10:11)    Urinalysis Basic - ( 2022 15:15 )    Color: Brianne / Appearance: Slightly Turbid / S.010 / pH: x  Gluc: x / Ketone: Negative  / Bili: Negative / Urobili: Negative mg/dL   Blood: x / Protein: 500 mg/dL / Nitrite: Negative   Leuk Esterase: Trace / RBC: 0-2 /HPF / WBC 6-10 /HPF   Sq Epi: x / Non Sq Epi: Few / Bacteria: Few      =======================================================

## 2022-11-13 NOTE — PROGRESS NOTE ADULT - ASSESSMENT
Patient Education     Upper Extremity Contusion  You have a contusion (bruise) of an upper extremity (arm, wrist, hand, or fingers). Symptoms include pain, swelling, and skin discoloration. No bones are broken. This injury may take from a few days to a few weeks to heal.  During that time, the bruise may change from reddish in color, to purple-blue, to green-yellow, to yellow-brown.  Home care  · Unless another medication was prescribed, you can take acetaminophen, ibuprofen, or naproxen to control pain. (If you have chronic liver or kidney disease or ever had a stomach ulcer or GI bleeding, talk with your doctor before using these medicines.)  · Elevate the injured area to reduce pain and swelling. As much as possible, sit or lie down with the injured area raised about the level of your heart. This is especially important during the first 48 hours.  · Ice the injured area to help reduce pain and swelling. Wrap a cold source (ice pack or ice cubes in a plastic bag) in a thin towel. Apply to the bruised area for 20 minutes every 1 to 2 hours the first day. Continue this 3 to 4 times a day until the pain and swelling goes away.  · If a sling was provided, you may remove it to shower or bathe. To prevent joint stiffness, do not wear it for more than one week.  Follow up  Follow up with your health care provider or our staff as advised. Call if you are not improving within the next 1 to 2 weeks.  When to seek medical advice   Call your health care provider right away if any of these occur:  · Increased pain or swelling  · Hand or fingers become cold, blue, numb or tingly  · Signs of infection: Warmth, drainage, or increased redness or pain around the injury  · Inability to move the injured body part   · Frequent bruising for unknown reasons  © 0421-1601 [employer]. [address] [place] [address], [place] [address]. All rights reserved. This information is not intended as a substitute for professional medical  care. Always follow your healthcare professional's instructions.            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 Labs and imaging noted, leucocytosis, elevated BNP and troponin. Patient on chronic steroid therapy. EKG sinus tach 114.     #Acute hypoxic respiratory failure sec fluid overload - patient with depressed EF; suspect acute on chronic HFrEF  - Supplemental oxygen, monitor pulse ox   - HD as per nephrology  - continue tele  - cardiology following  - suggest entresto once clinically stable  - continue metoprolol    #enterococcus bacteremia  - repeat set ordered  - echo without vegetation  - follow up repeat cultures (done on 11/12)  - ID consulted  - continue abx  - peramcath removed  - HD as per nephro    #ESRD on HD:  - As above  - continue Sevelamer  - gets HD through permacath and not AVF  - vascular to determine for new access - possibly shiley once HD needed    #abdominal distension  - will order IR for tomorrow, possible paracentesis  - patient states has been an issue since having 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

## 2022-11-13 NOTE — PROGRESS NOTE ADULT - SUBJECTIVE AND OBJECTIVE BOX
Annmarie Galaviz M.D., F.A.C.C.                                                                                            [facility]                                                                                                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  Came to  ED having worsening shortness of breath. He walks with a walker and was getting easily tired. He also endorsed worsening abdominal distention and leg swelling. Wife noted patient to be more sleepy lately and had to use supplemental oxygen the last couple of days. Usually not oxygen dependent. Had HD the other day but still SOB   Admitted with hypoxic respiratory failure/ fluid overload   Cardiology consulted for increasing respiratory distress, elevated troponin level.  Yesterday was in  AF with increased ventricular response  Lopressor restarted and now back in NSR  Had positive blood culture (enterococcus)  and permacath pulled out  To have paracentesis  for ascites  Echo (see below) with drop in LVEF from previously (but was in rapid AF)      MEDICATIONS  (STANDING):  amLODIPine   Tablet 5 milliGRAM(s) Oral daily  apixaban 2.5 milliGRAM(s) Oral every 12 hours  atorvastatin 80 milliGRAM(s) Oral at bedtime  chlorhexidine 2% Cloths 1 Application(s) Topical <User Schedule>  clopidogrel Tablet 75 milliGRAM(s) Oral daily  fluconAZOLE   Tablet 200 milliGRAM(s) Oral <User Schedule>  hydrocortisone 10 milliGRAM(s) Oral two times a day  metoprolol tartrate 25 milliGRAM(s) Oral two times a day  multivitamin 1 Tablet(s) Oral daily  mycophenolate mofetil 500 milliGRAM(s) Oral two times a day  pantoprazole    Tablet 40 milliGRAM(s) Oral before breakfast  piperacillin/tazobactam IVPB.. 3.375 Gram(s) IV Intermittent every 12 hours  sevelamer carbonate 1600 milliGRAM(s) Oral three times a day with meals  tacrolimus 1.5 milliGRAM(s) Oral daily  tamsulosin 0.4 milliGRAM(s) Oral at bedtime  trimethoprim   80 mG/sulfamethoxazole 400 mG 1 Tablet(s) Oral <User Schedule>    MEDICATIONS  (PRN):  acetaminophen     Tablet .. 650 milliGRAM(s) Oral every 6 hours PRN Temp greater or equal to 38C (100.4F), Mild Pain (1 - 3)  aluminum hydroxide/magnesium hydroxide/simethicone Suspension 30 milliLiter(s) Oral every 4 hours PRN Dyspepsia  melatonin 3 milliGRAM(s) Oral at bedtime PRN Insomnia  ondansetron Injectable 4 milliGRAM(s) IV Push every 8 hours PRN Nausea and/or Vomiting      Vital Signs Last 24 Hrs  T(C): 36.3 (2022 06:47), Max: 36.7 (2022 11:56)  T(F): 97.3 (2022 06:47), Max: 98 (2022 11:56)  HR: 114 (2022 06:47) (91 - 118)  BP: 97/63 (2022 06:47) (94/61 - 103/73)  BP(mean): --  RR: 18 (2022 06:47) (18 - 18)  SpO2: 98% (2022 06:47) (97% - 100%)        I&O's Detail      Constitutional:    NC/AT: Normal     HEENT: Normal     Neck:  No JVD, bruits or thyromegaly    Respiratory:  Clear without rales or rhonchi    Cardiovascular:  RR without murmur, rub or gallop.    Gastrointestinal: Soft with ascites.    Extremities: without cyanosis, clubbing or edema.    Neurological:  Oriented   x  3      . No gross sensory or motor defects.    Skin: Normal     Psychiatric: Normal affect.                              8.7    11. )-----------( 140      ( 2022 05:40 )             28.1     11    138  |  96  |  37.7<H>  ----------------------------<  114<H>  4.4   |  27.0  |  5.05<H>    Ca    8.1<L>      2022 05:40    TPro  5.9<L>  /  Alb  3.6  /  TBili  0.6  /  DBili  x   /  AST  54<H>  /  ALT  21  /  AlkPhos  335<H>  11    PT/INR - ( 2022 05:44 )   PT: 20.9 sec;   INR: 1.79 ratio           CARDIAC MARKERS ( 2022 05:44 )  x     / 0.18 ng/mL / x     / x     / x      CARDIAC MARKERS ( 2022 00:55 )  x     / 0.20 ng/mL / x     / x     / x      CARDIAC MARKERS ( 2022 17:15 )  x     / 0.20 ng/mL / x     / x     / x          ACC: [id number] EXAM:  ECHO TTE WO CON COMP W DOPP                          PROCEDURE DATE:  2022          INTERPRETATION:  TRANSTHORACIC ECHOCARDIOGRAM REPORT        Patient Name:   JU FERGUSON Patient Location: Inpatient  [facility] Rec #:  [id number]         Accession #:      [id number]  Account #:                       Height:           65.7 in 167.0 cm  YOB: 1950        Weight:           123.5 lb 56.00 kg  Patient Age:    72 years         BSA:              1.62 m²  Patient Gender: M                BP:               104/62 mmHg      Date of Exam:        2022 12:01:46 PM  Sonographer:         Lucretia Castillo  Referring Physician: Rebecca Villagran MD    Procedure:     2D Echo/Doppler/Color Doppler Complete and Echocardiogram   with                 Definity Contrast.  Indications:   Dyspnea, unspecified - R06.00  Diagnosis:     Dyspnea, unspecified - R06.00  Study Details: Technically difficult study. Study quality was adversely   affected                 due to arrhythmia.        2D AND M-MODE MEASUREMENTS (normal ranges within parentheses):  Left                 Normal   Aorta/Left            Normal  Ventricle:                    Atrium:  IVSd (2D):    1.02  (0.7-1.1) Aortic Root    2.90  (2.4-3.7)                 cm             (2D):           cm  LVPWd (2D):   1.02  (0.7-1.1) Left Atrium    4.30  (1.9-4.0)                 cm             (2D):           cm  LVIDd (2D):   4.13  (3.4-5.7) LA Volume      59.5                 cm             Index         ml/m²  LVIDs (2D):   3.26            Right Ventricle:                 cm             TAPSE:           1.31 cm  LV FS (2D):   21.1   (>25%)                  %  Relative Wall 0.49   (<0.42)  Thickness    LV SYSTOLIC FUNCTION BY 2D PLANIMETRY (MOD):  EF-A4C View: 27.6 % EF-A2C View: 25.7 % EF-Biplane: 28 %    SPECTRAL DOPPLER ANALYSIS (where applicable):  Aortic Valve: AoV Max [place]: 1.04 m/s AoV Peak P.3 mmHg AoV Mean P.9 mmHg    LVOT Vmax: 0.80 m/s LVOT VTI: 0.107 m LVOT Diameter: 1.90 cm    AoV Area, Vmax: 2.19 cm² AoV Area, VTI: 2.06 cm² AoV Area, Vmn: 1.99 cm²  Ao VTI: 0.148  Tricuspid Valve and PA/RV Systolic Pressure: TR Max Velocity: 2.61 m/s RA   Pressure: 8 mmHg RVSP/PASP: 35.2 mmHg      PHYSICIAN INTERPRETATION:  Left Ventricle: Endocardial visualization was enhanced with intravenous   echo contrast. The left ventricular internal cavity size is normal. Left   ventricular wall thickness is normal.  Global LV systolic function was severely decreased. Left ventricular   ejection fraction, by visual estimation, is 25 to 30%. The mitral in-flow   pattern reveals no discernable A-wave, therefore no comment on diastolic   function can be made.  Right Ventricle: The right ventricular size is mildly enlarged. RV   systolic function is moderately reduced.  Left Atrium: Severely enlarged left atrium.  Right Atrium: Severely enlarged right atrium.  Pericardium: There is no evidence of pericardial effusion. There is a   small pleural effusion in the right lateral region.  Mitral Valve: Mild thickening of the anterior and posterior mitral valve   leaflets. There is moderate mitral annular calcification. Mild mitral   valve regurgitation is seen.  Tricuspid Valve: The tricuspid valve is normal in structure. Mild   tricuspid regurgitation is visualized. Estimated pulmonary artery   systolic pressure is 35.2 mmHg assuming a right atrial pressure of 8   mmHg, which is consistent with borderline pulmonary hypertension.  Aortic Valve: The aortic valve is trileaflet. Sclerotic aortic valve with   decreased opening. No evidence of aortic valve regurgitation is seen.  Pulmonic Valve: The pulmonic valve was not well visualized. Trace   pulmonic valve regurgitation.  Aorta: The aortic root and ascending aorta are structurally normal, with   no evidence of dilitation.  Pulmonary Artery: The pulmonary artery is not well seen.  Venous: The inferior vena cava was dilated, with respiratory size   variation greater than 50%.      Summary:   1. Technically difficult study.   2. Endocardial visualization was enhanced with intravenous echo contrast.   3. Left ventricular ejection fraction, by visual estimation, is 25 to   30%.   4. Severely decreased global left ventricular systolic function.   5. The mitral in-flow pattern reveals no discernable A-wave, therefore   no comment on diastolic function can be made.   6. Mildly enlarged right ventricle.   7. Moderately reduced RV systolic function.   8. Mild mitral valve regurgitation.   9. Estimated pulmonary artery systolic pressure is 35.2 mmHg assuming a   right atrial pressure of 8 mmHg, which is consistent with borderline   pulmonary hypertension.  10. In comparison to prior TTE 2021 LVEF is now reduced to 25-30%.   (LVEF was 40-45%). Rhythm monitoring shows Atrial Fibrillation with RVR   which may adversly affect LVEF evaluation. Consider to obtain Limited FU   TTE once HR controlled or rhythm restored to SR if clinically indicated.  11. Results d/w Dr. Thomas Chavira.    MD Gautam Electronically signed on 2022 at 1:49:41 PM                    Active Issues:  HEALTH ISSUES - PROBLEM Dx:          IMPRESSION:    Positive blood cultures (enterococcus)  Hx of lung transplt  Hx of CAD and stenting  CRF on HD  PAF  LV dysfunction      Continue Lopressor and anticoagulation   Would add Entresto to regimen given LV dysfunction  Paracentesis   Will ultimately require reasssessment of LV fx and ? CAD (now that is in NSR)  ?Repeat blood cultures to see if clears  [facility] will be following this pt.

## 2022-11-13 NOTE — PROCEDURE NOTE - GENERAL PROCEDURE DETAILS
Lidocaine injected around cuffed area of catheter. Hemostat and scissors used to dissect scar tissue. Removed and manual pressure held at insertion site and venotomy site. Dressed, no active bleeding.

## 2022-11-13 NOTE — PROCEDURE NOTE - NSICDXPROCEDURE_GEN_ALL_CORE_FT
PROCEDURES:  Removal of tunnelled central venous catheter without port 13-Nov-2022 08:32:21  Brandon Dhaliwal

## 2022-11-13 NOTE — PROGRESS NOTE ADULT - SUBJECTIVE AND OBJECTIVE BOX
CC: shortness of breath (12 Nov 2022 13:30)    INTERVAL HPI/OVERNIGHT EVENTS:  no acute events overnight    Vital Signs Last 24 Hrs  T(C): 36.3 (13 Nov 2022 06:47), Max: 36.7 (12 Nov 2022 11:56)  T(F): 97.3 (13 Nov 2022 06:47), Max: 98 (12 Nov 2022 11:56)  HR: 114 (13 Nov 2022 06:47) (91 - 118)  BP: 97/63 (13 Nov 2022 06:47) (94/61 - 103/73)  BP(mean): --  RR: 18 (13 Nov 2022 06:47) (18 - 18)  SpO2: 98% (13 Nov 2022 06:47) (97% - 100%)    PHYSICAL EXAM:  General: in no acute distress  Eyes: PERRLA, EOMI; conjunctiva and sclera clear  ENMT: No nasal discharge; airway clear; [place]  Respiratory: No wheezes, rales or rhonchi; right chest wall permacath  Cardiovascular: Regular rate and rhythm. S1 and S2 Normal; No murmurs, gallops or rubs  Gastrointestinal: Soft non-tender distended; Normal bowel sounds  Extremities: Normal range of motion, No clubbing, cyanosis or edema; left AVF with positive thrill  Vascular: Peripheral pulses palpable 2+ bilaterally  Neurological: Alert and oriented x4  Skin: Warm and dry. No acute rash  Psychiatric: Cooperative and appropriate    I&O's Detail    11 Nov 2022 07:01  -  12 Nov 2022 07:00  --------------------------------------------------------  IN:  Total IN: 0 mL    OUT:    Other (mL): 1500 mL  Total OUT: 1500 mL    Total NET: -1500 mL    CARDIAC MARKERS ( 12 Nov 2022 05:44 )  x     / 0.18 ng/mL / x     / x     / x      CARDIAC MARKERS ( 12 Nov 2022 00:55 )  x     / 0.20 ng/mL / x     / x     / x      CARDIAC MARKERS ( 11 Nov 2022 17:15 )  x     / 0.20 ng/mL / x     / x     / x      CARDIAC MARKERS ( 11 Nov 2022 10:11 )  x     / 0.24 ng/mL / x     / x     / x                   8.4    16.10 )-----------( 139      ( 12 Nov 2022 05:44 )             26.8     12 Nov 2022 05:44    138    |  98     |  20.8   ----------------------------<  121    3.9     |  29.0   |  3.25     Ca    8.2        12 Nov 2022 05:44  Phos  2.7       11 Nov 2022 10:11  Mg     1.9       11 Nov 2022 10:11    TPro  5.9    /  Alb  3.6    /  TBili  0.6    /  DBili  x      /  AST  54     /  ALT  21     /  AlkPhos  335    12 Nov 2022 05:44    PT/INR - ( 12 Nov 2022 05:44 )   PT: 20.9 sec;   INR: 1.79 ratio      PTT - ( 11 Nov 2022 10:11 )  PTT:35.6 sec  CAPILLARY BLOOD GLUCOSE    LIVER FUNCTIONS - ( 12 Nov 2022 05:44 )  Alb: 3.6 g/dL / Pro: 5.9 g/dL / ALK PHOS: 335 U/L / ALT: 21 U/L / AST: 54 U/L / GGT: x           MEDICATIONS  (STANDING):  amLODIPine   Tablet 5 milliGRAM(s) Oral daily  apixaban 2.5 milliGRAM(s) Oral every 12 hours  atorvastatin 80 milliGRAM(s) Oral at bedtime  chlorhexidine 2% Cloths 1 Application(s) Topical <User Schedule>  clopidogrel Tablet 75 milliGRAM(s) Oral daily  fluconAZOLE   Tablet 200 milliGRAM(s) Oral <User Schedule>  hydrocortisone 10 milliGRAM(s) Oral two times a day  metoprolol tartrate 25 milliGRAM(s) Oral two times a day  multivitamin 1 Tablet(s) Oral daily  mycophenolate mofetil 500 milliGRAM(s) Oral two times a day  pantoprazole    Tablet 40 milliGRAM(s) Oral before breakfast  piperacillin/tazobactam IVPB.- 3.375 Gram(s) IV Intermittent once  sevelamer carbonate 1600 milliGRAM(s) Oral three times a day with meals  tacrolimus 1.5 milliGRAM(s) Oral daily  tamsulosin 0.4 milliGRAM(s) Oral at bedtime  trimethoprim   80 mG/sulfamethoxazole 400 mG 1 Tablet(s) Oral <User Schedule>    MEDICATIONS  (PRN):  acetaminophen     Tablet .. 650 milliGRAM(s) Oral every 6 hours PRN Temp greater or equal to 38C (100.4F), Mild Pain (1 - 3)  aluminum hydroxide/magnesium hydroxide/simethicone Suspension 30 milliLiter(s) Oral every 4 hours PRN Dyspepsia  melatonin 3 milliGRAM(s) Oral at bedtime PRN Insomnia  ondansetron Injectable 4 milliGRAM(s) IV Push every 8 hours PRN Nausea and/or Vomiting      RADIOLOGY & ADDITIONAL TESTS:

## 2022-11-14 NOTE — ASU PREOP CHECKLIST - NOTHING BY MOUTH SINCE
CC:  Marco Maguire is here today for physical exam.    Medications: medications verified and updated  Refills needed today?  NO      Patient would like communication of their results via:      Cell Phone:   Telephone Information:   Mobile [phone]     Okay to leave a message containing results? Yes             Health Maintenance Due   Topic Date Due   • Pneumococcal Vaccine 0-64 (1 - PCV) Never done   • Depression Screening  Never done   • COVID-19 Vaccine (3 - Booster for Sarah series) 03/02/2022   • Influenza Vaccine (1) 09/01/2022       Patient is due for the topics as listed above and wishes to proceed with them. Orders placed for Immunization(s) Influenza.            07-Mar-2022 21:00

## 2022-11-14 NOTE — PROGRESS NOTE ADULT - SUBJECTIVE AND OBJECTIVE BOX
HPI/Overnight Events:  Patient seen and assessed this morning, patient resting in bed comfortably. No acute events overnight.      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 Found to be bacteremic, enterococcus on blood Cx . Pt has functional LUE AVF (stent placed to cephalic vein by [employer] [facility] on 3/17)      PAST MEDICAL HISTORY:  Emphysema of lung  ESRD (end stage renal disease) on dialysis  Fungal meningitis  2019 novel coronavirus disease (COVID-19)  Pneumonia  [place] (hard of hearing)  HTN (hypertension)  Lumbar spondylosis  History of COPD  BPH without urinary obstruction  Hypercholesterolemia  Oliguria and anuria  H/O peripheral neuropathy      PAST SURGICAL HISTORY:  H/O lung transplant  H/O heart artery stent  History of hip replacement  Status post open reduction and internal fixation (ORIF) of fracture      MEDICATIONS:  acetaminophen     Tablet .. 650 milliGRAM(s) Oral every 6 hours PRN  aluminum hydroxide/magnesium hydroxide/simethicone Suspension 30 milliLiter(s) Oral every 4 hours PRN  atorvastatin 80 milliGRAM(s) Oral at bedtime  chlorhexidine 2% Cloths 1 Application(s) Topical <User Schedule>  clopidogrel Tablet 75 milliGRAM(s) Oral daily  fluconAZOLE   Tablet 200 milliGRAM(s) Oral <User Schedule>  hydrocortisone 10 milliGRAM(s) Oral two times a day  melatonin 3 milliGRAM(s) Oral at bedtime PRN  metoprolol tartrate 25 milliGRAM(s) Oral two times a day  multivitamin 1 Tablet(s) Oral daily  mycophenolate mofetil 500 milliGRAM(s) Oral two times a day  ondansetron Injectable 4 milliGRAM(s) IV Push every 8 hours PRN  pantoprazole    Tablet 40 milliGRAM(s) Oral before breakfast  piperacillin/tazobactam IVPB.. 3.375 Gram(s) IV Intermittent every 12 hours  sacubitril 24 mG/valsartan 26 mG 1 Tablet(s) Oral two times a day  sevelamer carbonate 1600 milliGRAM(s) Oral three times a day with meals  tacrolimus 1.5 milliGRAM(s) Oral daily  tamsulosin 0.4 milliGRAM(s) Oral at bedtime  trimethoprim   80 mG/sulfamethoxazole 400 mG 1 Tablet(s) Oral <User Schedule>      ALLERGIES:  budesonide (Unknown)  cefpodoxime (Unknown)  Pollen (Unknown)      VASCULAR IMAGING:       VITALS & I/Os:  Vital Signs Last 24 Hrs  T(C): 36.3 (2022 06:11), Max: 36.4 (2022 18:21)  T(F): 97.4 (2022 06:11), Max: 97.5 (2022 18:21)  HR: 84 (2022 06:11) (83 - 104)  BP: 104/56 (2022 06:11) (104/56 - 125/71)  BP(mean): --  RR: 19 (2022 06:11) (19 - 19)  SpO2: 95% (2022 06:11) (95% - 99%)    Parameters below as of 2022 06:11  Patient On (Oxygen Delivery Method): nasal cannula  O2 Flow (L/min): 3      I&O's Summary    2022 07:01  -  2022 07:00  --------------------------------------------------------  IN: 100 mL / OUT: 0 mL / NET: 100 mL        PHYSICAL EXAM:  Constitutional: no acute distress  HEENT: EOMI, no active drainage or redness, no scleral icterus, [place]  Neck: Full ROM without pain  Respiratory: respirations are unlabored, no accessory muscle use, no conversational dyspnea  Cardiovascular: regular rate & rhythm  Gastrointestinal: Abdomen soft, non-tender, non-distended, No rebound or guarding. No organomegaly, no palpable mass.  Neurological: A&O x 3; no gross sensory / motor / coordination deficits  Musculoskeletal: RAND  Vascular: Extremities warm and well perfused. LUE AVF with good thrill         LABS:                        8.7    11.27 )-----------( 140      ( 2022 05:40 )             28.1     11-13    138  |  96  |  37.7<H>  ----------------------------<  114<H>  4.4   |  27.0  |  5.05<H>    Ca    8.1<L>      2022 05:40      Lactate:              Urinalysis Basic - ( 2022 15:15 )    Color: Brianne / Appearance: Slightly Turbid / S.010 / pH: x  Gluc: x / Ketone: Negative  / Bili: Negative / Urobili: Negative mg/dL   Blood: x / Protein: 500 mg/dL / Nitrite: Negative   Leuk Esterase: Trace / RBC: 0-2 /HPF / WBC 6-10 /HPF   Sq Epi: x / Non Sq Epi: Few / Bacteria: Few

## 2022-11-14 NOTE — PROGRESS NOTE ADULT - ASSESSMENT
72 yr old male with hypertension, ESRD on HD, emphysema s/p lung transplant, history of fungal meningitis presents with complaints of shortness of breath for 2 days. CT chest new interstitial edema and new GGO in both lungs.     1. ESRD on HD:   Via LUEAVG on TTS  HD today for hypervolemia, will resume TTS schedule.  Access working well, pt however with aversion to use due to pain.    2. Ac hypoxic resp failure: a.fib w/ RVR, pleura effusions on CT. UF as tolerated by BP  3. Emphysema s/p B/L Lung Transplant on IS: patient continued on tacrolimus and MMF for now. Also on bactrim and fluconazole pphx. Management per ID.  4. Hypertension: Bp stable, off amlodipine at this time.  5. BMD w/ CKD: C/w sevelamer carbonate 800 mg oral tablet; 2 tab orally 3 times a day (with meals)   6. Enetrococcus bacteremia: Abx and IS adjustments per ID. s/p Permcath removal . TTE with no vegetations.  7. Cirrhosis w/ ascites: CT abdomen with ascites and anasarca- evaluate need for paracentesis.

## 2022-11-14 NOTE — PROGRESS NOTE ADULT - SUBJECTIVE AND OBJECTIVE BOX
JU FERGUSON  [id number]        Chief Complaint: follow up CHFrEF/bateremia/CKD/PAF      Subjective:      24 hour Tele:        acetaminophen     Tablet .. 650 milliGRAM(s) Oral every 6 hours PRN  aluminum hydroxide/magnesium hydroxide/simethicone Suspension 30 milliLiter(s) Oral every 4 hours PRN  atorvastatin 80 milliGRAM(s) Oral at bedtime  chlorhexidine 2% Cloths 1 Application(s) Topical <User Schedule>  clopidogrel Tablet 75 milliGRAM(s) Oral daily  fluconAZOLE   Tablet 200 milliGRAM(s) Oral <User Schedule>  hydrocortisone 10 milliGRAM(s) Oral two times a day  melatonin 3 milliGRAM(s) Oral at bedtime PRN  metoprolol tartrate 25 milliGRAM(s) Oral two times a day  multivitamin 1 Tablet(s) Oral daily  mycophenolate mofetil 500 milliGRAM(s) Oral two times a day  ondansetron Injectable 4 milliGRAM(s) IV Push every 8 hours PRN  pantoprazole    Tablet 40 milliGRAM(s) Oral before breakfast  piperacillin/tazobactam IVPB.. 3.375 Gram(s) IV Intermittent every 12 hours  sacubitril 24 mG/valsartan 26 mG 1 Tablet(s) Oral two times a day  sevelamer carbonate 1600 milliGRAM(s) Oral three times a day with meals  tacrolimus 1.5 milliGRAM(s) Oral daily  tamsulosin 0.4 milliGRAM(s) Oral at bedtime  trimethoprim   80 mG/sulfamethoxazole 400 mG 1 Tablet(s) Oral <User Schedule>          Physical Exam:  T(C): 36.3 (11-14-22 @ 06:11), Max: 36.4 (11-13-22 @ 18:21)  HR: 84 (11-14-22 @ 06:11) (83 - 104)  BP: 104/56 (11-14-22 @ 06:11) (104/56 - 125/71)  RR: 19 (11-14-22 @ 06:11) (19 - 19)  SpO2: 97% (11-14-22 @ 08:57) (95% - 99%)  Wt(kg): --  General: Comfortable in NAD  HEENT: MMM, sclera anicteric  Resp: CTA bilaterally  CVS: nl s1s2, rrr, no s3/JVD  Abd: soft, NT, ND, BS+  Ext: No c/c/e  Neuro A&O x3  Psych: Normal affect    I&O's Summary    13 Nov 2022 07:01  -  14 Nov 2022 07:00  --------------------------------------------------------  IN: 100 mL / OUT: 0 mL / NET: 100 mL          Labs:   13 Nov 2022 05:40    138    |  96     |  37.7   ----------------------------<  114    4.4     |  27.0   |  5.05     Ca    8.1        13 Nov 2022 05:40                            8.7    11.27 )-----------( 140      ( 13 Nov 2022 05:40 )             28.1       Echocardiogram in January 2022: Mild concentric left ventricular hypertrophy with normal left ventricular systolic function, ejection fraction 55 to 60%, mild mitral regurgitation, mild tricuspid regurgitation.  Carotid duplex January 2022:  Mild to moderate (16-49%) stenosis of the proximal right internal carotid artery, moderate to severe (66-79%) stenosis of proximal left internal carotid artery.   Cardiac catheterization on 11/26/19:  PCI with drug-eluting stent to LAD and OM1.  Cardiac catheterization, this information is taken from a progress note from the [facility] documenting cardiac catheterization 01/14 showing 60% proximal LAD stenosis, 60% mid LAD stenosis, 60% proximal circumflex stenosis, 60% ostial OM1 stenosis, 40% proximal right coronary artery stenosis, 40% proximal PDA documenting an FFR of the mid LAD of 0.79.    ECHO 11/2022:   1. Technically difficult study.   2. Endocardial visualization was enhanced with intravenous echo contrast.   3. Left ventricular ejection fraction, by visual estimation, is 25 to 30%.   4. Severely decreased global left ventricular systolic function.   5. The mitral in-flow pattern reveals no discernable A-wave, therefore   no comment on diastolic function can be made.   6. Mildly enlarged right ventricle.   7. Moderately reduced RV systolic function.   8. Mild mitral valve regurgitation.   9. Estimated pulmonary artery systolic pressure is 35.2 mmHg assuming a   right atrial pressure of 8 mmHg, which is consistent with borderline   pulmonary hypertension.  10. In comparison to prior TTE 4/14/2021 LVEF is now reduced to 25-30%.   (LVEF was 40-45%). Rhythm monitoring shows Atrial Fibrillation with RVR   which may adversly affect LVEF evaluation. Consider to obtain Limited FU   TTE once HR controlled or rhythm restored to SR if clinically indicated.  11. Results d/w Dr. Thomas Chavira.    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 Admitted with hypoxic respiratory failure/ fluid overload.   -Had rapid AF now in SR, on A/C  -Acute systolic CHF, fluid managed with HD  -Bacteremia with enterococcus, on antibiotics and permacath removed. Echo without vegetation  -Drop in EF maybe from rapid AF, will repeat prior to discharge to re-evaluaet   -Minimally elevated trops from demand ischemia        Plan: (TO BE SEEN)  1.   2. No signs vegetation on echo and no significant valve disease. Will hold off on CRISTIAN unless persistent bacteremia given high aspiration risk and immunocompromised state.   3. Repeat echo in coming days to re-assess EF    Aman Cali MD JU FERGUSON  [id number]        Chief Complaint: follow up CHFrEF/bateremia/CKD/PAF      Subjective: uncomfortable with SOB from abdominal distention      24 hour Tele: SR        acetaminophen     Tablet .. 650 milliGRAM(s) Oral every 6 hours PRN  aluminum hydroxide/magnesium hydroxide/simethicone Suspension 30 milliLiter(s) Oral every 4 hours PRN  atorvastatin 80 milliGRAM(s) Oral at bedtime  chlorhexidine 2% Cloths 1 Application(s) Topical <User Schedule>  clopidogrel Tablet 75 milliGRAM(s) Oral daily  fluconAZOLE   Tablet 200 milliGRAM(s) Oral <User Schedule>  hydrocortisone 10 milliGRAM(s) Oral two times a day  melatonin 3 milliGRAM(s) Oral at bedtime PRN  metoprolol tartrate 25 milliGRAM(s) Oral two times a day  multivitamin 1 Tablet(s) Oral daily  mycophenolate mofetil 500 milliGRAM(s) Oral two times a day  ondansetron Injectable 4 milliGRAM(s) IV Push every 8 hours PRN  pantoprazole    Tablet 40 milliGRAM(s) Oral before breakfast  piperacillin/tazobactam IVPB.. 3.375 Gram(s) IV Intermittent every 12 hours  sacubitril 24 mG/valsartan 26 mG 1 Tablet(s) Oral two times a day  sevelamer carbonate 1600 milliGRAM(s) Oral three times a day with meals  tacrolimus 1.5 milliGRAM(s) Oral daily  tamsulosin 0.4 milliGRAM(s) Oral at bedtime  trimethoprim   80 mG/sulfamethoxazole 400 mG 1 Tablet(s) Oral <User Schedule>          Physical Exam:  T(C): 36.3 (11-14-22 @ 06:11), Max: 36.4 (11-13-22 @ 18:21)  HR: 84 (11-14-22 @ 06:11) (83 - 104)  BP: 104/56 (11-14-22 @ 06:11) (104/56 - 125/71)  RR: 19 (11-14-22 @ 06:11) (19 - 19)  SpO2: 97% (11-14-22 @ 08:57) (95% - 99%)  Wt(kg): --  General: Comfortable in NAD  HEENT: dry MM, sclera anicteric  Resp: CTA bilaterally, diminished at bases  CVS: nl s1s2, rrr, no s3/JVD  Abd: soft, NT, mildly distended   Ext: No c/c/e  Neuro A&O x3  Psych: Normal affect    I&O's Summary    13 Nov 2022 07:01  -  14 Nov 2022 07:00  --------------------------------------------------------  IN: 100 mL / OUT: 0 mL / NET: 100 mL          Labs:   13 Nov 2022 05:40    138    |  96     |  37.7   ----------------------------<  114    4.4     |  27.0   |  5.05     Ca    8.1        13 Nov 2022 05:40                            8.7    11.27 )-----------( 140      ( 13 Nov 2022 05:40 )             28.1       Echocardiogram in January 2022: Mild concentric left ventricular hypertrophy with normal left ventricular systolic function, ejection fraction 55 to 60%, mild mitral regurgitation, mild tricuspid regurgitation.  Carotid duplex January 2022:  Mild to moderate (16-49%) stenosis of the proximal right internal carotid artery, moderate to severe (66-79%) stenosis of proximal left internal carotid artery.   Cardiac catheterization on 11/26/19:  PCI with drug-eluting stent to LAD and OM1.  Cardiac catheterization, this information is taken from a progress note from the [facility] documenting cardiac catheterization 01/14 showing 60% proximal LAD stenosis, 60% mid LAD stenosis, 60% proximal circumflex stenosis, 60% ostial OM1 stenosis, 40% proximal right coronary artery stenosis, 40% proximal PDA documenting an FFR of the mid LAD of 0.79.    ECHO 11/2022:   1. Technically difficult study.   2. Endocardial visualization was enhanced with intravenous echo contrast.   3. Left ventricular ejection fraction, by visual estimation, is 25 to 30%.   4. Severely decreased global left ventricular systolic function.   5. The mitral in-flow pattern reveals no discernable A-wave, therefore   no comment on diastolic function can be made.   6. Mildly enlarged right ventricle.   7. Moderately reduced RV systolic function.   8. Mild mitral valve regurgitation.   9. Estimated pulmonary artery systolic pressure is 35.2 mmHg assuming a   right atrial pressure of 8 mmHg, which is consistent with borderline   pulmonary hypertension.  10. In comparison to prior TTE 4/14/2021 LVEF is now reduced to 25-30%.   (LVEF was 40-45%). Rhythm monitoring shows Atrial Fibrillation with RVR   which may adversly affect LVEF evaluation. Consider to obtain Limited FU   TTE once HR controlled or rhythm restored to SR if clinically indicated.  11. Results d/w Dr. Thomas Chavira.    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 Admitted with hypoxic respiratory failure/ fluid overload.   -Had rapid AF now in SR, on A/C  -Acute systolic CHF, fluid managed with HD  -Bacteremia with enterococcus, on antibiotics and permacath removed. Echo without vegetation  -Drop in EF maybe from rapid AF, will repeat prior to discharge to re-evaluaet   -Minimally elevated trops from demand ischemia        Plan:   1. Keep negative with HD  2. No signs vegetation on echo and no significant valve disease. Will hold off on CRISTIAN unless persistent bacteremia given high aspiration risk and immunocompromised state.   3. Repeat echo in next couple days to re-evaluate EF now that back in SR  4. Continue A/C  5. Consider paracentesis for symptomatic relief  6. Racheal management of CAD at this time    Aman Cali MD

## 2022-11-14 NOTE — PROGRESS NOTE ADULT - ASSESSMENT
Vascular surgery consulted for tunnelled dialysis catheter removal in this patient with bacteremia. No need for additional HD this weekend. While AVF is likely functional, patient has strong aversion to its use. Will encourage use as there is no contraindication.    Bacteremia  - TDC removed without issue and tip sent for Cx on 11/13  - Encourage patient to use LUE AVF for HD  - Would not recommend placing other access until bacteremia cleared

## 2022-11-14 NOTE — PROGRESS NOTE ADULT - SUBJECTIVE AND OBJECTIVE BOX
[facility] DIVISION OF KIDNEY DISEASES AND HYPERTENSION -- HEMODIALYSIS NOTE  --------------------------------------------------------------------------------  Chief Complaint: ESRD/Ongoing hemodialysis requirement    24 hour events/subjective:  no acute event noted  pt seen and examined; feels well  reports pain in AVF site with cannulation      PAST HISTORY  --------------------------------------------------------------------------------  No significant changes to PMH, PSH, FHx, SHx, unless otherwise noted    ALLERGIES & MEDICATIONS  --------------------------------------------------------------------------------  Allergies    budesonide (Unknown)  cefpodoxime (Unknown)  Pollen (Unknown)    Intolerances      Standing Inpatient Medications  atorvastatin 80 milliGRAM(s) Oral at bedtime  chlorhexidine 2% Cloths 1 Application(s) Topical <User Schedule>  clopidogrel Tablet 75 milliGRAM(s) Oral daily  fluconAZOLE   Tablet 200 milliGRAM(s) Oral <User Schedule>  hydrocortisone 10 milliGRAM(s) Oral two times a day  metoprolol tartrate 25 milliGRAM(s) Oral two times a day  multivitamin 1 Tablet(s) Oral daily  mycophenolate mofetil 500 milliGRAM(s) Oral two times a day  pantoprazole    Tablet 40 milliGRAM(s) Oral before breakfast  piperacillin/tazobactam IVPB.. 3.375 Gram(s) IV Intermittent every 12 hours  sacubitril 24 mG/valsartan 26 mG 1 Tablet(s) Oral two times a day  sevelamer carbonate 1600 milliGRAM(s) Oral three times a day with meals  tacrolimus 1.5 milliGRAM(s) Oral daily  tamsulosin 0.4 milliGRAM(s) Oral at bedtime  trimethoprim   80 mG/sulfamethoxazole 400 mG 1 Tablet(s) Oral <User Schedule>    PRN Inpatient Medications  acetaminophen     Tablet .. 650 milliGRAM(s) Oral every 6 hours PRN  aluminum hydroxide/magnesium hydroxide/simethicone Suspension 30 milliLiter(s) Oral every 4 hours PRN  melatonin 3 milliGRAM(s) Oral at bedtime PRN  ondansetron Injectable 4 milliGRAM(s) IV Push every 8 hours PRN      REVIEW OF SYSTEMS  --------------------------------------------------------------------------------  Gen: No weight changes, fatigue, fevers/chills, weakness  Skin: No rashes  Head/Eyes/Ears/Mouth: No headache  Respiratory: No dyspnea, cough,  CV: No chest pain, orthopnea  GI: No abdominal pain, diarrhea, constipation, nausea, vomiting,  MSK: No joint pain  Neuro: No dizziness/lightheadedness, weakness  Heme: No bleeding  Psych: No significant nervousness, anxiety, stress, depression    All other systems were reviewed and are negative, except as noted.    VITALS/PHYSICAL EXAM  --------------------------------------------------------------------------------  T(C): 36.4 (11-14-22 @ 09:10), Max: 36.4 (11-13-22 @ 18:21)  HR: 104 (11-14-22 @ 11:30) (83 - 104)  BP: 119/71 (11-14-22 @ 09:15) (104/56 - 140/77)  RR: 18 (11-14-22 @ 09:10) (18 - 19)  SpO2: 100% (11-14-22 @ 09:10) (95% - 100%)  Wt(kg): --        11-13-22 @ 07:01  -  11-14-22 @ 07:00  --------------------------------------------------------  IN: 100 mL / OUT: 0 mL / NET: 100 mL      Physical Exam:  	Gen: NAD  	HEENT: PERRL, supple neck,  	Pulm: CTA B/L  	CV: RRR, S1S2; no rub  	Abd: +BS, soft, nontender, distended  	UE: Warm,  	LE: Warm, no edema  	Neuro: No focal deficits  	Psych: Normal affect and mood  	Skin: Warm  	Vascular access: ROGE PINEDAF    LABS/STUDIES  --------------------------------------------------------------------------------              8.7    7.58  >-----------<  126      [11-14-22 @ 09:15]              27.5     139  |  98  |  47.1  ----------------------------<  107      [11-14-22 @ 09:15]  4.0   |  25.0  |  6.15        Ca     8.0     [11-14-22 @ 09:15]    TPro  5.5  /  Alb  3.3  /  TBili  0.5  /  DBili  x   /  AST  44  /  ALT  17  /  AlkPhos  259  [11-14-22 @ 09:15]          Iron 27, TIBC 247, %sat 11      [03-20-22 @ 03:23]  Ferritin 4169      [05-01-22 @ 07:44]  Lipid: chol 84, , HDL 26, LDL --      [11-12-22 @ 05:44]

## 2022-11-14 NOTE — PROGRESS NOTE ADULT - ASSESSMENT
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 Labs and imaging noted, leucocytosis, elevated BNP and troponin. Patient on chronic steroid therapy. EKG sinus tach 114.     #Acute hypoxic respiratory failure sec fluid overload - patient with depressed EF; suspect acute on chronic HFrEF  - Supplemental oxygen, monitor pulse ox   - HD as per nephrology  - continue tele  - cardiology following  - continue metoprolol    #enterococcus bacteremia  - repeat set ordered  - echo without vegetation  - follow up repeat cultures 11/12 are positive still  - ID following  - continue abx  - permacath removed on 11/13  - HD as per nephro    #ESRD on HD:  - As above  - continue Sevelamer  - today for HD through AVF    #abdominal distension  - asked for paracentesis today with IR (diagnostic only)  - patient states has been an issue since having 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 Labs and imaging noted, leucocytosis, elevated BNP and troponin. Patient on chronic steroid therapy. EKG sinus tach 114.     #Acute hypoxic respiratory failure sec fluid overload - patient with depressed EF; suspect acute on chronic HFrEF  - Supplemental oxygen, monitor pulse ox   - HD as per nephrology  - continue tele  - cardiology following  - continue metoprolol    #enterococcus bacteremia  - repeat set ordered  - echo without vegetation  - follow up repeat cultures 11/12 are positive still  - ID following  - continue abx  - permacath removed on 11/13  - HD as per nephro    #ESRD on HD:  - As above  - continue Sevelamer  - today for HD through AVF    #cirrhosis with ascites abdominal distension and ascites  - patient states has been an issue since having 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  - not on diuretics - states it's typically resistant to dialysis  - no encephalopathy  - hold eliquis - paracentesis for tomorrow    #Emphysema, h/o lung transplant:  - Continue Tacrolimus, Cellcept  - supplemental oxygen  - not in COPD exacerbation  - continue chronic steroid therapy    #CAD s/p PCI:  - telemetry  - continue statin, amlodipine, plavix    #a-fib with RVR  - rate improved  - continue metoprolol    #Chronic IJ occlusion:  - On Eliquis  - vascular follow up as outpatient    #Hypertension/hyperlipidemia:  - Amlodipine, statin    #BPH:  Continue Flomax    #H/o fungal meningitis:  continue Bactrim and Fluconazole    #DVT ppx:  Eliquis.

## 2022-11-14 NOTE — PROGRESS NOTE ADULT - SUBJECTIVE AND OBJECTIVE BOX
CC: shortness of breath (12 Nov 2022 13:30)    INTERVAL HPI/OVERNIGHT EVENTS:  no acute events overnight    Vital Signs Last 24 Hrs  T(C): 36.8 (14 Nov 2022 13:48), Max: 36.8 (14 Nov 2022 13:03)  T(F): 98.3 (14 Nov 2022 13:48), Max: 98.3 (14 Nov 2022 13:48)  HR: 106 (14 Nov 2022 13:48) (80 - 106)  BP: 99/63 (14 Nov 2022 13:48) (99/63 - 140/77)  BP(mean): --  RR: 18 (14 Nov 2022 13:03) (18 - 19)  SpO2: 94% (14 Nov 2022 13:48) (94% - 100%)    Parameters below as of 14 Nov 2022 13:48  Patient On (Oxygen Delivery Method): nasal cannula,3L NC  O2 Flow (L/min): 3    PHYSICAL EXAM:  General: in no acute distress  Eyes: PERRLA, EOMI; conjunctiva and sclera clear  ENMT: No nasal discharge; airway clear; [place]  Respiratory: No wheezes, rales or rhonchi; right chest wall permacath  Cardiovascular: Regular rate and rhythm. S1 and S2 Normal; No murmurs, gallops or rubs  Gastrointestinal: Soft non-tender distended; Normal bowel sounds  Extremities: Normal range of motion, No clubbing, cyanosis or edema; left AVF with positive thrill  Vascular: Peripheral pulses palpable 2+ bilaterally  Neurological: Alert and oriented x4  Skin: Warm and dry. No acute rash  Psychiatric: Cooperative and appropriate    I&O's Detail    11 Nov 2022 07:01  -  12 Nov 2022 07:00  --------------------------------------------------------  IN:  Total IN: 0 mL    OUT:    Other (mL): 1500 mL  Total OUT: 1500 mL    Total NET: -1500 mL    CARDIAC MARKERS ( 12 Nov 2022 05:44 )  x     / 0.18 ng/mL / x     / x     / x      CARDIAC MARKERS ( 12 Nov 2022 00:55 )  x     / 0.20 ng/mL / x     / x     / x      CARDIAC MARKERS ( 11 Nov 2022 17:15 )  x     / 0.20 ng/mL / x     / x     / x      CARDIAC MARKERS ( 11 Nov 2022 10:11 )  x     / 0.24 ng/mL / x     / x     / x                   8.4    16.10 )-----------( 139      ( 12 Nov 2022 05:44 )             26.8     12 Nov 2022 05:44    138    |  98     |  20.8   ----------------------------<  121    3.9     |  29.0   |  3.25     Ca    8.2        12 Nov 2022 05:44  Phos  2.7       11 Nov 2022 10:11  Mg     1.9       11 Nov 2022 10:11    TPro  5.9    /  Alb  3.6    /  TBili  0.6    /  DBili  x      /  AST  54     /  ALT  21     /  AlkPhos  335    12 Nov 2022 05:44    PT/INR - ( 12 Nov 2022 05:44 )   PT: 20.9 sec;   INR: 1.79 ratio      PTT - ( 11 Nov 2022 10:11 )  PTT:35.6 sec  CAPILLARY BLOOD GLUCOSE    LIVER FUNCTIONS - ( 12 Nov 2022 05:44 )  Alb: 3.6 g/dL / Pro: 5.9 g/dL / ALK PHOS: 335 U/L / ALT: 21 U/L / AST: 54 U/L / GGT: x           MEDICATIONS  (STANDING):  amLODIPine   Tablet 5 milliGRAM(s) Oral daily  apixaban 2.5 milliGRAM(s) Oral every 12 hours  atorvastatin 80 milliGRAM(s) Oral at bedtime  chlorhexidine 2% Cloths 1 Application(s) Topical <User Schedule>  clopidogrel Tablet 75 milliGRAM(s) Oral daily  fluconAZOLE   Tablet 200 milliGRAM(s) Oral <User Schedule>  hydrocortisone 10 milliGRAM(s) Oral two times a day  metoprolol tartrate 25 milliGRAM(s) Oral two times a day  multivitamin 1 Tablet(s) Oral daily  mycophenolate mofetil 500 milliGRAM(s) Oral two times a day  pantoprazole    Tablet 40 milliGRAM(s) Oral before breakfast  piperacillin/tazobactam IVPB.- 3.375 Gram(s) IV Intermittent once  sevelamer carbonate 1600 milliGRAM(s) Oral three times a day with meals  tacrolimus 1.5 milliGRAM(s) Oral daily  tamsulosin 0.4 milliGRAM(s) Oral at bedtime  trimethoprim   80 mG/sulfamethoxazole 400 mG 1 Tablet(s) Oral <User Schedule>    MEDICATIONS  (PRN):  acetaminophen     Tablet .. 650 milliGRAM(s) Oral every 6 hours PRN Temp greater or equal to 38C (100.4F), Mild Pain (1 - 3)  aluminum hydroxide/magnesium hydroxide/simethicone Suspension 30 milliLiter(s) Oral every 4 hours PRN Dyspepsia  melatonin 3 milliGRAM(s) Oral at bedtime PRN Insomnia  ondansetron Injectable 4 milliGRAM(s) IV Push every 8 hours PRN Nausea and/or Vomiting      RADIOLOGY & ADDITIONAL TESTS:

## 2022-11-15 NOTE — PROGRESS NOTE ADULT - SUBJECTIVE AND OBJECTIVE BOX
JU FERGUSON  [id number]        Chief Complaint: follow up CHFrEF/bateremia/CKD/PAF      Subjective: s/p paracentesis       24 hour Tele: SR with APCs        acetaminophen     Tablet .. 650 milliGRAM(s) Oral every 6 hours PRN  aluminum hydroxide/magnesium hydroxide/simethicone Suspension 30 milliLiter(s) Oral every 4 hours PRN  apixaban 2.5 milliGRAM(s) Oral two times a day  atorvastatin 80 milliGRAM(s) Oral at bedtime  chlorhexidine 2% Cloths 1 Application(s) Topical <User Schedule>  clopidogrel Tablet 75 milliGRAM(s) Oral daily  fluconAZOLE   Tablet 200 milliGRAM(s) Oral <User Schedule>  hydrocortisone 10 milliGRAM(s) Oral two times a day  melatonin 3 milliGRAM(s) Oral at bedtime PRN  metoprolol tartrate 25 milliGRAM(s) Oral two times a day  multivitamin 1 Tablet(s) Oral daily  mycophenolate mofetil 500 milliGRAM(s) Oral two times a day  ondansetron Injectable 4 milliGRAM(s) IV Push every 8 hours PRN  pantoprazole    Tablet 40 milliGRAM(s) Oral before breakfast  piperacillin/tazobactam IVPB.. 3.375 Gram(s) IV Intermittent every 12 hours  sacubitril 24 mG/valsartan 26 mG 1 Tablet(s) Oral two times a day  sevelamer carbonate 1600 milliGRAM(s) Oral three times a day with meals  tacrolimus 1.5 milliGRAM(s) Oral daily  tamsulosin 0.4 milliGRAM(s) Oral at bedtime  trimethoprim   80 mG/sulfamethoxazole 400 mG 1 Tablet(s) Oral <User Schedule>          Physical Exam:  T(C): 36.8 (11-15-22 @ 10:51), Max: 37 (11-15-22 @ 04:50)  HR: 100 (11-15-22 @ 10:51) (80 - 106)  BP: 118/64 (11-15-22 @ 10:51) (99/63 - 118/64)  RR: 18 (11-15-22 @ 10:51) (18 - 18)  SpO2: 100% (11-15-22 @ 10:51) (94% - 100%)  Wt(kg): --  General: Comfortable in NAD  Neck: No JVD  CVS: nl s1s2, no s3  Pulm: CTA b/l  Abd: soft, non-tender  Ext: No c/c/e  Neuro A&O x3  Psych: Normal affect      Labs:   15 Nov 2022 06:40    141    |  98     |  39.4   ----------------------------<  85     4.2     |  26.0   |  5.91     Ca    8.1        15 Nov 2022 06:40    TPro  5.5    /  Alb  3.3    /  TBili  0.5    /  DBili  x      /  AST  44     /  ALT  17     /  AlkPhos  259    14 Nov 2022 09:15                          8.5    7.50  )-----------( 135      ( 15 Nov 2022 06:40 )             27.3               Cardiac catheterization on 11/26/19:  PCI with drug-eluting stent to LAD and OM1.  Cardiac catheterization, this information is taken from a progress note from the [facility] documenting cardiac catheterization 01/14 showing 60% proximal LAD stenosis, 60% mid LAD stenosis, 60% proximal circumflex stenosis, 60% ostial OM1 stenosis, 40% proximal right coronary artery stenosis, 40% proximal PDA documenting an FFR of the mid LAD of 0.79.    ECHO 11/2022:   1. Technically difficult study.   2. Endocardial visualization was enhanced with intravenous echo contrast.   3. Left ventricular ejection fraction, by visual estimation, is 25 to 30%.   4. Severely decreased global left ventricular systolic function.   5. The mitral in-flow pattern reveals no discernable A-wave, therefore   no comment on diastolic function can be made.   6. Mildly enlarged right ventricle.   7. Moderately reduced RV systolic function.   8. Mild mitral valve regurgitation.   9. Estimated pulmonary artery systolic pressure is 35.2 mmHg assuming a   right atrial pressure of 8 mmHg, which is consistent with borderline   pulmonary hypertension.  10. In comparison to prior TTE 4/14/2021 LVEF is now reduced to 25-30%.   (LVEF was 40-45%). Rhythm monitoring shows Atrial Fibrillation with RVR   which may adversly affect LVEF evaluation. Consider to obtain Limited FU   TTE once HR controlled or rhythm restored to SR if clinically indicated.      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 Admitted with hypoxic respiratory failure/ fluid overload.   -Had rapid AF now in SR, on A/C  -Acute systolic CHF, fluid managed with HD  -Bacteremia with enterococcus, on antibiotics and permacath removed. Echo without vegetation  -Drop in EF maybe from rapid AF, will repeat prior to discharge to re-evaluate   -Minimally elevated trops from demand ischemia  -s/p paracentesis         Plan:   1. fluid status managed with HD  2. No signs vegetation on echo and no significant valve disease. Will hold off on CRISTIAN unless persistent bacteremia given high aspiration risk and immunocompromised state.   3. Repeat echo tomorrow to re-evaluate EF now that back in SR  4. Continue A/C  5. s/p paracentesis.   6. Racheal management of CAD at this time  7. Will continue to follow.

## 2022-11-15 NOTE — PROGRESS NOTE ADULT - ASSESSMENT
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 Patient is hard of hearing and history obtained from wife at bedside.   Reports she noticed patient to be having worsening shortness of breath. He walks with a walker and was getting easily tired. He also endorsed worsening abdominal distention and leg swelling. Wife noted patient to be more sleepy lately and had to use supplemental oxygen the last couple of days. Usually not oxygen dependent. Denies cough, fever  Patient went to HD yesterday, reported no relief  post HD. Per wife he had been c/o cramping in his legs and thinks he may have requested shorter HD sessions  In Ed afebrile,  requiring o2 + leukocytosis, lactate 2.3, ct chest new interstitial edema and new GGO in both lungs, may represent edema underlying infection could not be excluded. Found to have e.faecalis bacteremia    Acute hypoxic respiratory failure likely 2/2 fluid overload  s/p lung transplant  h/o fungal meningitis  ESRD  Leukocytosis on steroids  Bacteremia ? endocarditis    - bcx + 11/10, 11/11 + e.faecalis, f/u (S)  - f/u bcx x 2  - c/w zosyn adjusted for HD. pt reports palpitations to cefpodoxime.   - ct a/p no evidence of infection  - TTE to be repeated  - permacath removed  - sputum cx not sent  - legionella urine (-)  - flu/rsv/covid (-)  - c/w bactrim and fluconazole ppx  - Trend Fever  - Trend Leukocytosis-on steroids    d/w pt,wife, primary ID Dr Fields  Will Follow

## 2022-11-15 NOTE — PROCEDURE NOTE - ADDITIONAL PROCEDURE DETAILS
Diagnosis: Ascites    Procedure: Paracentesis    : Iban Jurado PA-C    Contrast: None    Anesthesia: 1% Lidocaine Subcutaneous    Estimated Blood Loss: Less than 10cc    Specimens: Identified, Labeled, Confirmed and Sent to Lab.    Complications: No Immediate Complications    Anticoagulation: Resume in 24 Hours    Findings & Plan: RLQ Paracentesis performed w 5F Yueh needle after local anesthesia under US guidance.  60 cc of clear yellow fluid drained from abdomen.     Please call Interventional Radiology with any questions, concerns, or issues.

## 2022-11-15 NOTE — PROGRESS NOTE ADULT - SUBJECTIVE AND OBJECTIVE BOX
CC: shortness of breath (12 Nov 2022 13:30)    INTERVAL HPI/OVERNIGHT EVENTS:  no acute events overnight    Vital Signs Last 24 Hrs  T(C): 36.8 (15 Nov 2022 10:51), Max: 37 (15 Nov 2022 04:50)  T(F): 98.2 (15 Nov 2022 10:51), Max: 98.6 (15 Nov 2022 04:50)  HR: 100 (15 Nov 2022 10:51) (80 - 106)  BP: 118/64 (15 Nov 2022 10:51) (99/63 - 118/64)  BP(mean): --  RR: 18 (15 Nov 2022 10:51) (18 - 18)  SpO2: 100% (15 Nov 2022 10:51) (94% - 100%)    Parameters below as of 15 Nov 2022 10:51  Patient On (Oxygen Delivery Method): nasal cannula    PHYSICAL EXAM:  General: in no acute distress  Eyes: PERRLA, EOMI; conjunctiva and sclera clear  ENMT: No nasal discharge; airway clear; [place]  Respiratory: No wheezes, rales or rhonchi; right chest wall permacath  Cardiovascular: Regular rate and rhythm. S1 and S2 Normal; No murmurs, gallops or rubs  Gastrointestinal: Soft non-tender distended; Normal bowel sounds  Extremities: Normal range of motion, No clubbing, cyanosis or edema; left AVF with positive thrill  Vascular: Peripheral pulses palpable 2+ bilaterally  Neurological: Alert and oriented x4  Skin: Warm and dry. No acute rash  Psychiatric: Cooperative and appropriate    I&O's Detail    11 Nov 2022 07:01  -  12 Nov 2022 07:00  --------------------------------------------------------  IN:  Total IN: 0 mL    OUT:    Other (mL): 1500 mL  Total OUT: 1500 mL    Total NET: -1500 mL    CARDIAC MARKERS ( 12 Nov 2022 05:44 )  x     / 0.18 ng/mL / x     / x     / x      CARDIAC MARKERS ( 12 Nov 2022 00:55 )  x     / 0.20 ng/mL / x     / x     / x      CARDIAC MARKERS ( 11 Nov 2022 17:15 )  x     / 0.20 ng/mL / x     / x     / x      CARDIAC MARKERS ( 11 Nov 2022 10:11 )  x     / 0.24 ng/mL / x     / x     / x                   8.4    16.10 )-----------( 139      ( 12 Nov 2022 05:44 )             26.8     12 Nov 2022 05:44    138    |  98     |  20.8   ----------------------------<  121    3.9     |  29.0   |  3.25     Ca    8.2        12 Nov 2022 05:44  Phos  2.7       11 Nov 2022 10:11  Mg     1.9       11 Nov 2022 10:11    TPro  5.9    /  Alb  3.6    /  TBili  0.6    /  DBili  x      /  AST  54     /  ALT  21     /  AlkPhos  335    12 Nov 2022 05:44    PT/INR - ( 12 Nov 2022 05:44 )   PT: 20.9 sec;   INR: 1.79 ratio      PTT - ( 11 Nov 2022 10:11 )  PTT:35.6 sec  CAPILLARY BLOOD GLUCOSE    LIVER FUNCTIONS - ( 12 Nov 2022 05:44 )  Alb: 3.6 g/dL / Pro: 5.9 g/dL / ALK PHOS: 335 U/L / ALT: 21 U/L / AST: 54 U/L / GGT: x           MEDICATIONS  (STANDING):  amLODIPine   Tablet 5 milliGRAM(s) Oral daily  apixaban 2.5 milliGRAM(s) Oral every 12 hours  atorvastatin 80 milliGRAM(s) Oral at bedtime  chlorhexidine 2% Cloths 1 Application(s) Topical <User Schedule>  clopidogrel Tablet 75 milliGRAM(s) Oral daily  fluconAZOLE   Tablet 200 milliGRAM(s) Oral <User Schedule>  hydrocortisone 10 milliGRAM(s) Oral two times a day  metoprolol tartrate 25 milliGRAM(s) Oral two times a day  multivitamin 1 Tablet(s) Oral daily  mycophenolate mofetil 500 milliGRAM(s) Oral two times a day  pantoprazole    Tablet 40 milliGRAM(s) Oral before breakfast  piperacillin/tazobactam IVPB.- 3.375 Gram(s) IV Intermittent once  sevelamer carbonate 1600 milliGRAM(s) Oral three times a day with meals  tacrolimus 1.5 milliGRAM(s) Oral daily  tamsulosin 0.4 milliGRAM(s) Oral at bedtime  trimethoprim   80 mG/sulfamethoxazole 400 mG 1 Tablet(s) Oral <User Schedule>    MEDICATIONS  (PRN):  acetaminophen     Tablet .. 650 milliGRAM(s) Oral every 6 hours PRN Temp greater or equal to 38C (100.4F), Mild Pain (1 - 3)  aluminum hydroxide/magnesium hydroxide/simethicone Suspension 30 milliLiter(s) Oral every 4 hours PRN Dyspepsia  melatonin 3 milliGRAM(s) Oral at bedtime PRN Insomnia  ondansetron Injectable 4 milliGRAM(s) IV Push every 8 hours PRN Nausea and/or Vomiting      RADIOLOGY & ADDITIONAL TESTS:

## 2022-11-15 NOTE — PROGRESS NOTE ADULT - SUBJECTIVE AND OBJECTIVE BOX
[facility] DIVISION OF KIDNEY DISEASES AND HYPERTENSION -- HEMODIALYSIS NOTE  --------------------------------------------------------------------------------  Chief Complaint: ESRD/Ongoing hemodialysis requirement    24 hour events/subjective:  s/p HD yesterday  No acute event noted  Pt seen and examined, feels well      PAST HISTORY  --------------------------------------------------------------------------------  No significant changes to PMH, PSH, FHx, SHx, unless otherwise noted    ALLERGIES & MEDICATIONS  --------------------------------------------------------------------------------  Allergies    budesonide (Unknown)  cefpodoxime (Unknown)  Pollen (Unknown)    Intolerances      Standing Inpatient Medications  apixaban 2.5 milliGRAM(s) Oral two times a day  atorvastatin 80 milliGRAM(s) Oral at bedtime  chlorhexidine 2% Cloths 1 Application(s) Topical <User Schedule>  clopidogrel Tablet 75 milliGRAM(s) Oral daily  fluconAZOLE   Tablet 200 milliGRAM(s) Oral <User Schedule>  hydrocortisone 10 milliGRAM(s) Oral two times a day  metoprolol tartrate 25 milliGRAM(s) Oral two times a day  multivitamin 1 Tablet(s) Oral daily  mycophenolate mofetil 500 milliGRAM(s) Oral two times a day  pantoprazole    Tablet 40 milliGRAM(s) Oral before breakfast  piperacillin/tazobactam IVPB.. 3.375 Gram(s) IV Intermittent every 12 hours  sacubitril 24 mG/valsartan 26 mG 1 Tablet(s) Oral two times a day  sevelamer carbonate 1600 milliGRAM(s) Oral three times a day with meals  tacrolimus 1.5 milliGRAM(s) Oral daily  tamsulosin 0.4 milliGRAM(s) Oral at bedtime  trimethoprim   80 mG/sulfamethoxazole 400 mG 1 Tablet(s) Oral <User Schedule>    PRN Inpatient Medications  acetaminophen     Tablet .. 650 milliGRAM(s) Oral every 6 hours PRN  aluminum hydroxide/magnesium hydroxide/simethicone Suspension 30 milliLiter(s) Oral every 4 hours PRN  melatonin 3 milliGRAM(s) Oral at bedtime PRN  ondansetron Injectable 4 milliGRAM(s) IV Push every 8 hours PRN      REVIEW OF SYSTEMS  --------------------------------------------------------------------------------  Gen: No weight changes, fatigue, fevers/chills, weakness  Skin: No rashes  Head/Eyes/Ears/Mouth: No headache  Respiratory: No dyspnea, cough,  CV: No chest pain, orthopnea  GI: No abdominal pain, diarrhea, constipation, nausea, vomiting,  MSK: No joint pain  Neuro: No dizziness/lightheadedness, weakness  Heme: No bleeding  Psych: No significant nervousness, anxiety, stress, depression    All other systems were reviewed and are negative, except as noted.    VITALS/PHYSICAL EXAM  --------------------------------------------------------------------------------  T(C): 36.6 (11-15-22 @ 17:36), Max: 37 (11-15-22 @ 04:50)  HR: 100 (11-15-22 @ 17:36) (100 - 102)  BP: 106/67 (11-15-22 @ 17:36) (104/68 - 118/64)  RR: 20 (11-15-22 @ 17:36) (18 - 20)  SpO2: 100% (11-15-22 @ 17:36) (97% - 100%)  Wt(kg): --        11-14-22 @ 07:01  -  11-15-22 @ 07:00  --------------------------------------------------------  IN: 100 mL / OUT: 800 mL / NET: -700 mL      Physical Exam:  	Gen: NAD  	HEENT: PERRL, supple neck,  	Pulm: CTA B/L  	CV: RRR, S1S2; no rub  	Abd: +BS, soft, nontender  	UE: Warm  	LE: Warm, no edema  	Neuro: No focal deficits  	Psych: Normal affect and mood  	Skin: Warm,  	Vascular access: ROGE FRANCO    LABS/STUDIES  --------------------------------------------------------------------------------              8.5    7.50  >-----------<  135      [11-15-22 @ 06:40]              27.3     141  |  98  |  39.4  ----------------------------<  85      [11-15-22 @ 06:40]  4.2   |  26.0  |  5.91        Ca     8.1     [11-15-22 @ 06:40]    TPro  5.5  /  Alb  3.3  /  TBili  0.5  /  DBili  x   /  AST  44  /  ALT  17  /  AlkPhos  259  [11-14-22 @ 09:15]          Iron 27, TIBC 247, %sat 11      [03-20-22 @ 03:23]  Ferritin 4169      [05-01-22 @ 07:44]  Lipid: chol 84, , HDL 26, LDL --      [11-12-22 @ 05:44]

## 2022-11-15 NOTE — PROGRESS NOTE ADULT - ASSESSMENT
72 yr old male with hypertension, ESRD on HD, emphysema s/p lung transplant, history of fungal meningitis presents with complaints of shortness of breath for 2 days. CT chest new interstitial edema and new GGO in both lungs.     1. ESRD on HD:   Via LUE AVF on TTS- off schedule at this time  Next HD tomorrow  Access working well, pt however with aversion to use due to pain.  no intervention needed as per vascular surgery    2. Ac hypoxic resp failure: a.fib w/ RVR, pleura effusions on CT. UF as tolerated by BP    3. Emphysema s/p B/L Lung Transplant on IS: patient continued on tacrolimus and MMF for now. Also on bactrim and fluconazole pphx. Management per ID.    4. Hypertension: Bp stable, off amlodipine at this time.    5. BMD w/ CKD: C/w sevelamer carbonate 800 mg oral tablet; 2 tab orally 3 times a day (with meals)     6. Enetrococcus bacteremia: Abx and IS adjustments per ID. s/p Permcath removal . TTE with no vegetations.    7. Cirrhosis w/ ascites: CT abdomen with ascites and anasarca- evaluate need for paracentesis.

## 2022-11-15 NOTE — PROGRESS NOTE ADULT - SUBJECTIVE AND OBJECTIVE BOX
Vascular Surgery follow up     labs/vitals reviewed  patient seen and evaluated     s/p removal of permacath due to bacteremia  Hd currently via left upper ext AVF, HD successful yesterday      -- will continue to follow up peripherally  -- continue to utilize AVF  -- No plan for additional intervention from our part at this time

## 2022-11-15 NOTE — PROGRESS NOTE ADULT - ASSESSMENT
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 Labs and imaging noted, leucocytosis, elevated BNP and troponin. Patient on chronic steroid therapy. EKG sinus tach 114.     #Acute hypoxic respiratory failure sec fluid overload - patient with depressed EF; suspect acute on chronic HFrEF - improving  - Supplemental oxygen, monitor pulse ox   - HD as per nephrology  - continue tele  - cardiology following  - continue metoprolol  - no plan for cath - medical management at this time    #enterococcus bacteremia  - repeat set ordered  - echo without vegetation  - CT abdomen without source  - diagnostic para without signs of SBP  - follow up repeat cultures 11/12 are positive still - 11/14 blood cultures pending  - ID following  - continue abx  - permacath removed on 11/13  - HD as per nephro    #ESRD on HD:  - As above  - continue Sevelamer  - HD through AVF for now    #cirrhosis with ascites abdominal distension and ascites  - patient states has been an issue since having 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  - not on diuretics as he doesn't make urine - states ascites is typically resistant to dialysis  - no encephalopathy at this time  - on metoprolol   - eliquis can be restarted since s/p parcentesis today    #Emphysema, h/o lung transplant:  - Continue Tacrolimus, Cellcept  - supplemental oxygen  - not in COPD exacerbation  - continue chronic steroid therapy    #CAD s/p PCI:  - telemetry  - continue statin, amlodipine, plavix    #a-fib with RVR - now in NSR  - continue metoprolol  - resume eliquis tonite    #Chronic IJ occlusion:  - On Eliquis  - vascular follow up as outpatient    #Hypertension/hyperlipidemia:  - Amlodipine, statin    #BPH:  Continue Flomax    #H/o fungal meningitis:  continue Bactrim and Fluconazole    #DVT ppx:  Eliquis.

## 2022-11-15 NOTE — PROGRESS NOTE ADULT - SUBJECTIVE AND OBJECTIVE BOX
[facility]                                                INFECTIOUS DISEASES  =======================================================                               J Carlos Galdamez MD#    Isatu Rojas MD*                           Perlita Solares MD*   Sumaya Morales MD*            Diplomates American Board of Internal Medicine & Infectious Diseases                  # [place] Office - Appt - Tel  [phone] Fax [phone]                * [place] Office - Appt - Tel [phone] Fax [phone]                                  Hospital Consult line:  [phone]  =======================================================      [id number]  JU HENSLEYMARYDALJIT   follow up for: bacteremia  bcx + enterococcus  permacath removed, cx + enterococcus  paracentesis attempted  pt denies complaints, feels better today  patient seen and examined.       I have personally reviewed the labs and data; pertinent labs and data are listed in this note; please see below.   ===================================================  REVIEW OF SYSTEMS:  CONSTITUTIONAL:  No Fever or chills  HEENT:  No diplopia or blurred vision.  No earache, sore throat or runny nose.  CARDIOVASCULAR:  No pressure, squeezing, strangling, tightness, heaviness or aching about the chest, neck, axilla or epigastrium.  RESPIRATORY:  No cough, shortness of breath  GASTROINTESTINAL:  No nausea, vomiting or diarrhea.  GENITOURINARY:  No dysuria, frequency or urgency. No Blood in urine  MUSCULOSKELETAL:  no joint aches, no muscle pain  SKIN:  No change in skin, hair or nails.  NEUROLOGIC:  No Headaches, seizures or weakness.  PSYCHIATRIC:  No disorder of thought or mood.  ENDOCRINE:  No heat or cold intolerance  HEMATOLOGICAL:  No easy bruising or bleeding.    =======================================================  Allergies    budesonide (Unknown)  cefpodoxime (Unknown)  Pollen (Unknown)    Intolerances    Antibiotics:  fluconAZOLE   Tablet 200 milliGRAM(s) Oral <User Schedule>  piperacillin/tazobactam IVPB.- 3.375 Gram(s) IV Intermittent once  trimethoprim   80 mG/sulfamethoxazole 400 mG 1 Tablet(s) Oral <User Schedule>    Other medications:  amLODIPine   Tablet 5 milliGRAM(s) Oral daily  apixaban 2.5 milliGRAM(s) Oral every 12 hours  atorvastatin 80 milliGRAM(s) Oral at bedtime  chlorhexidine 2% Cloths 1 Application(s) Topical <User Schedule>  clopidogrel Tablet 75 milliGRAM(s) Oral daily  hydrocortisone 10 milliGRAM(s) Oral two times a day  metoprolol tartrate 25 milliGRAM(s) Oral two times a day  multivitamin 1 Tablet(s) Oral daily  mycophenolate mofetil 500 milliGRAM(s) Oral two times a day  pantoprazole    Tablet 40 milliGRAM(s) Oral before breakfast  sevelamer carbonate 1600 milliGRAM(s) Oral three times a day with meals  tacrolimus 1.5 milliGRAM(s) Oral daily  tamsulosin 0.4 milliGRAM(s) Oral at bedtime    ======================================================  Physical Exam:  ============  Vital Signs Last 24 Hrs  T(C): 36.6 (15 Nov 2022 17:36), Max: 37 (15 Nov 2022 04:50)  T(F): 97.8 (15 Nov 2022 17:36), Max: 98.6 (15 Nov 2022 04:50)  HR: 100 (15 Nov 2022 17:36) (100 - 102)  BP: 106/67 (15 Nov 2022 17:36) (104/68 - 118/64)  BP(mean): --  RR: 20 (15 Nov 2022 17:36) (18 - 20)  SpO2: 100% (15 Nov 2022 17:36) (97% - 100%)    Parameters below as of 15 Nov 2022 17:36  Patient On (Oxygen Delivery Method): nasal cannula        General:  No acute distress. on o2  Eye: no conjunctival pallor, no scleral icterus  Respiratory: crackles at bases to auscultation, Respirations are non-labored.  Cardiovascular: Normal rate, Regular rhythm,  s1+s2  Gastrointestinal: Soft, Non-tender, Non-distended, Normal bowel sounds. rt chest permacath  Genitourinary: No costovertebral angle tenderness.  Musculoskeletal: Normal range of motion, Normal strength.  Integumentary: No rash. mild b/l Le edema  Neurologic: Alert, Oriented, No focal deficits  Psychiatric: Appropriate mood & affect.  =======================================================  Labs:                                       8.5    7.50  )-----------( 135      ( 15 Nov 2022 06:40 )             27.3       11-15    141  |  98  |  39.4<H>  ----------------------------<  85  4.2   |  26.0  |  5.91<H>    Ca    8.1<L>      15 Nov 2022 06:40    TPro  5.5<L>  /  Alb  3.3  /  TBili  0.5  /  DBili  x   /  AST  44<H>  /  ALT  17  /  AlkPhos  259<H>  11-14                            CAPILLARY BLOOD GLUCOSE                Culture - Blood (collected 11-11-22 @ 10:15)  Source: .Blood Blood-Peripheral  Gram Stain (11-12-22 @ 08:24):    Growth in anaerobic bottle: Gram Positive Cocci in Pairs and Chains    Growth in aerobic bottle: Gram Positive Cocci in Pairs and Chains    Culture - Blood (collected 11-11-22 @ 10:11)  Source: .Blood Blood-Peripheral  Gram Stain (11-12-22 @ 06:52):    Growth in aerobic bottle: Gram positive cocci in pairs  Organism: Blood Culture PCR (11-12-22 @ 08:34)  Organism: Blood Culture PCR (11-12-22 @ 08:34)    Sensitivities:      -  Enterococcus faecalis: Detec      Method Type: PCR      < from: CT Abdomen and Pelvis No Cont (11.13.22 @ 12:19) >    IMPRESSION:  No imaging evidence of abdominal or pelvic abscess, infection or   inflammatory process.  Probable CHF.  Cirrhotic morphology of the liver.  Diminished renal cortical thickness and atrophic right kidney. No   hydronephrosis.  Ascites and anasarca.  Additional findings as above.      VERTEBRAL BODY ANALYSIS: Vertebral compression fractures as described,   consider further workup for osteoporosis.        < end of copied text >                                                [facility]                                                INFECTIOUS DISEASES  =======================================================                               J Carlos Galdamez MD#    Isatu Rojas MD*                           Perlita Solares MD*   Sumaya Morales MD*            Diplomates American Board of Internal Medicine & Infectious Diseases                  # [place] Office - Appt - Tel  [phone] Fax [phone]                * [place] Office - Appt - Tel [phone] Fax [phone]                                  Hospital Consult line:  [phone]  =======================================================      [id number]  JU HENSLEYMARYDALJIT   follow up for: bacteremia  bcx + enterococcus  permacath removed, cx + enterococcus  paracentesis attempted  pt denies complaints, feels better today  patient seen and examined.       I have personally reviewed the labs and data; pertinent labs and data are listed in this note; please see below.   ===================================================  REVIEW OF SYSTEMS:  CONSTITUTIONAL:  No Fever or chills  HEENT:  No diplopia or blurred vision.  No earache, sore throat or runny nose.  CARDIOVASCULAR:  No pressure, squeezing, strangling, tightness, heaviness or aching about the chest, neck, axilla or epigastrium.  RESPIRATORY:  No cough, shortness of breath  GASTROINTESTINAL:  No nausea, vomiting or diarrhea.  GENITOURINARY:  No dysuria, frequency or urgency. No Blood in urine  MUSCULOSKELETAL:  no joint aches, no muscle pain  SKIN:  No change in skin, hair or nails.  NEUROLOGIC:  No Headaches, seizures or weakness.  PSYCHIATRIC:  No disorder of thought or mood.  ENDOCRINE:  No heat or cold intolerance  HEMATOLOGICAL:  No easy bruising or bleeding.    =======================================================  Allergies    budesonide (Unknown)  cefpodoxime (Unknown)  Pollen (Unknown)    Intolerances    Antibiotics:  fluconAZOLE   Tablet 200 milliGRAM(s) Oral <User Schedule>  piperacillin/tazobactam IVPB.- 3.375 Gram(s) IV Intermittent once  trimethoprim   80 mG/sulfamethoxazole 400 mG 1 Tablet(s) Oral <User Schedule>    Other medications:  amLODIPine   Tablet 5 milliGRAM(s) Oral daily  apixaban 2.5 milliGRAM(s) Oral every 12 hours  atorvastatin 80 milliGRAM(s) Oral at bedtime  chlorhexidine 2% Cloths 1 Application(s) Topical <User Schedule>  clopidogrel Tablet 75 milliGRAM(s) Oral daily  hydrocortisone 10 milliGRAM(s) Oral two times a day  metoprolol tartrate 25 milliGRAM(s) Oral two times a day  multivitamin 1 Tablet(s) Oral daily  mycophenolate mofetil 500 milliGRAM(s) Oral two times a day  pantoprazole    Tablet 40 milliGRAM(s) Oral before breakfast  sevelamer carbonate 1600 milliGRAM(s) Oral three times a day with meals  tacrolimus 1.5 milliGRAM(s) Oral daily  tamsulosin 0.4 milliGRAM(s) Oral at bedtime    ======================================================  Physical Exam:  ============  Vital Signs Last 24 Hrs  T(C): 36.6 (15 Nov 2022 17:36), Max: 37 (15 Nov 2022 04:50)  T(F): 97.8 (15 Nov 2022 17:36), Max: 98.6 (15 Nov 2022 04:50)  HR: 100 (15 Nov 2022 17:36) (100 - 102)  BP: 106/67 (15 Nov 2022 17:36) (104/68 - 118/64)  BP(mean): --  RR: 20 (15 Nov 2022 17:36) (18 - 20)  SpO2: 100% (15 Nov 2022 17:36) (97% - 100%)    Parameters below as of 15 Nov 2022 17:36  Patient On (Oxygen Delivery Method): nasal cannula        General:  No acute distress. on o2  Eye: no conjunctival pallor, no scleral icterus  Respiratory: crackles at bases to auscultation, Respirations are non-labored.  Cardiovascular: Normal rate, Regular rhythm,  s1+s2  Gastrointestinal: Soft, Non-tender, Non-distended, Normal bowel sounds.   Genitourinary: No costovertebral angle tenderness.  Musculoskeletal: Normal range of motion, Normal strength.  Integumentary: No rash. mild b/l Le edema  Neurologic: Alert, Oriented, No focal deficits  Psychiatric: Appropriate mood & affect.  =======================================================  Labs:                                       8.5    7.50  )-----------( 135      ( 15 Nov 2022 06:40 )             27.3       11-15    141  |  98  |  39.4<H>  ----------------------------<  85  4.2   |  26.0  |  5.91<H>    Ca    8.1<L>      15 Nov 2022 06:40    TPro  5.5<L>  /  Alb  3.3  /  TBili  0.5  /  DBili  x   /  AST  44<H>  /  ALT  17  /  AlkPhos  259<H>  11-14                            CAPILLARY BLOOD GLUCOSE                Culture - Blood (collected 11-11-22 @ 10:15)  Source: .Blood Blood-Peripheral  Gram Stain (11-12-22 @ 08:24):    Growth in anaerobic bottle: Gram Positive Cocci in Pairs and Chains    Growth in aerobic bottle: Gram Positive Cocci in Pairs and Chains    Culture - Blood (collected 11-11-22 @ 10:11)  Source: .Blood Blood-Peripheral  Gram Stain (11-12-22 @ 06:52):    Growth in aerobic bottle: Gram positive cocci in pairs  Organism: Blood Culture PCR (11-12-22 @ 08:34)  Organism: Blood Culture PCR (11-12-22 @ 08:34)    Sensitivities:      -  Enterococcus faecalis: Detec      Method Type: PCR      < from: CT Abdomen and Pelvis No Cont (11.13.22 @ 12:19) >    IMPRESSION:  No imaging evidence of abdominal or pelvic abscess, infection or   inflammatory process.  Probable CHF.  Cirrhotic morphology of the liver.  Diminished renal cortical thickness and atrophic right kidney. No   hydronephrosis.  Ascites and anasarca.  Additional findings as above.      VERTEBRAL BODY ANALYSIS: Vertebral compression fractures as described,   consider further workup for osteoporosis.        < end of copied text >

## 2022-11-16 NOTE — PHYSICAL THERAPY INITIAL EVALUATION ADULT - ADDITIONAL COMMENTS
pt states he lives with his wife in a 2-story house with 2 steps to enter (+pillar to hold), then staying on first floor. pt uses a RW for mobility. wife home and able to assist. RW, commode, shower chair, rollator, transport chair.

## 2022-11-16 NOTE — PROGRESS NOTE ADULT - SUBJECTIVE AND OBJECTIVE BOX
CC: shortness of breath (12 Nov 2022 13:30)    INTERVAL HPI/OVERNIGHT EVENTS:  no acute events overnight    Vital Signs Last 24 Hrs  T(C): 36.8 (15 Nov 2022 10:51), Max: 37 (15 Nov 2022 04:50)  T(F): 98.2 (15 Nov 2022 10:51), Max: 98.6 (15 Nov 2022 04:50)  HR: 100 (15 Nov 2022 10:51) (80 - 106)  BP: 118/64 (15 Nov 2022 10:51) (99/63 - 118/64)  BP(mean): --  RR: 18 (15 Nov 2022 10:51) (18 - 18)  SpO2: 100% (15 Nov 2022 10:51) (94% - 100%)    Parameters below as of 15 Nov 2022 10:51  Patient On (Oxygen Delivery Method): nasal cannula    PHYSICAL EXAM:  General: in no acute distress  Eyes: PERRLA, EOMI; conjunctiva and sclera clear  ENMT: No nasal discharge; airway clear; [place]  Respiratory: No wheezes, rales or rhonchi; right chest wall permacath  Cardiovascular: Regular rate and rhythm. S1 and S2 Normal; No murmurs, gallops or rubs  Gastrointestinal: Soft non-tender distended; Normal bowel sounds  Extremities: Normal range of motion, No clubbing, cyanosis or edema; left AVF with positive thrill  Vascular: Peripheral pulses palpable 2+ bilaterally  Neurological: Alert and oriented x4  Skin: Warm and dry. No acute rash  Psychiatric: Cooperative and appropriate    I&O's Detail    11 Nov 2022 07:01  -  12 Nov 2022 07:00  --------------------------------------------------------  IN:  Total IN: 0 mL    OUT:    Other (mL): 1500 mL  Total OUT: 1500 mL    Total NET: -1500 mL    CARDIAC MARKERS ( 12 Nov 2022 05:44 )  x     / 0.18 ng/mL / x     / x     / x      CARDIAC MARKERS ( 12 Nov 2022 00:55 )  x     / 0.20 ng/mL / x     / x     / x      CARDIAC MARKERS ( 11 Nov 2022 17:15 )  x     / 0.20 ng/mL / x     / x     / x      CARDIAC MARKERS ( 11 Nov 2022 10:11 )  x     / 0.24 ng/mL / x     / x     / x                   8.4    16.10 )-----------( 139      ( 12 Nov 2022 05:44 )             26.8     12 Nov 2022 05:44    138    |  98     |  20.8   ----------------------------<  121    3.9     |  29.0   |  3.25     Ca    8.2        12 Nov 2022 05:44  Phos  2.7       11 Nov 2022 10:11  Mg     1.9       11 Nov 2022 10:11    TPro  5.9    /  Alb  3.6    /  TBili  0.6    /  DBili  x      /  AST  54     /  ALT  21     /  AlkPhos  335    12 Nov 2022 05:44    PT/INR - ( 12 Nov 2022 05:44 )   PT: 20.9 sec;   INR: 1.79 ratio      PTT - ( 11 Nov 2022 10:11 )  PTT:35.6 sec  CAPILLARY BLOOD GLUCOSE    LIVER FUNCTIONS - ( 12 Nov 2022 05:44 )  Alb: 3.6 g/dL / Pro: 5.9 g/dL / ALK PHOS: 335 U/L / ALT: 21 U/L / AST: 54 U/L / GGT: x           MEDICATIONS  (STANDING):  amLODIPine   Tablet 5 milliGRAM(s) Oral daily  apixaban 2.5 milliGRAM(s) Oral every 12 hours  atorvastatin 80 milliGRAM(s) Oral at bedtime  chlorhexidine 2% Cloths 1 Application(s) Topical <User Schedule>  clopidogrel Tablet 75 milliGRAM(s) Oral daily  fluconAZOLE   Tablet 200 milliGRAM(s) Oral <User Schedule>  hydrocortisone 10 milliGRAM(s) Oral two times a day  metoprolol tartrate 25 milliGRAM(s) Oral two times a day  multivitamin 1 Tablet(s) Oral daily  mycophenolate mofetil 500 milliGRAM(s) Oral two times a day  pantoprazole    Tablet 40 milliGRAM(s) Oral before breakfast  piperacillin/tazobactam IVPB.- 3.375 Gram(s) IV Intermittent once  sevelamer carbonate 1600 milliGRAM(s) Oral three times a day with meals  tacrolimus 1.5 milliGRAM(s) Oral daily  tamsulosin 0.4 milliGRAM(s) Oral at bedtime  trimethoprim   80 mG/sulfamethoxazole 400 mG 1 Tablet(s) Oral <User Schedule>    MEDICATIONS  (PRN):  acetaminophen     Tablet .. 650 milliGRAM(s) Oral every 6 hours PRN Temp greater or equal to 38C (100.4F), Mild Pain (1 - 3)  aluminum hydroxide/magnesium hydroxide/simethicone Suspension 30 milliLiter(s) Oral every 4 hours PRN Dyspepsia  melatonin 3 milliGRAM(s) Oral at bedtime PRN Insomnia  ondansetron Injectable 4 milliGRAM(s) IV Push every 8 hours PRN Nausea and/or Vomiting      RADIOLOGY & ADDITIONAL TESTS: Follow up for  shortness of breath     seen around noon , hard of hearing   He is with c/o left sided lower back discomfort which is cronic he said   sitting in the  bed       MEDICATIONS  (STANDING):  apixaban 2.5 milliGRAM(s) Oral two times a day  atorvastatin 80 milliGRAM(s) Oral at bedtime  chlorhexidine 2% Cloths 1 Application(s) Topical <User Schedule>  clopidogrel Tablet 75 milliGRAM(s) Oral daily  fluconAZOLE   Tablet 200 milliGRAM(s) Oral <User Schedule>  hydrocortisone 10 milliGRAM(s) Oral two times a day  metoprolol tartrate 25 milliGRAM(s) Oral two times a day  multivitamin 1 Tablet(s) Oral daily  mycophenolate mofetil 500 milliGRAM(s) Oral two times a day  pantoprazole    Tablet 40 milliGRAM(s) Oral before breakfast  piperacillin/tazobactam IVPB.. 3.375 Gram(s) IV Intermittent every 12 hours  sacubitril 24 mG/valsartan 26 mG 1 Tablet(s) Oral two times a day  sevelamer carbonate 1600 milliGRAM(s) Oral three times a day with meals  tacrolimus 1.5 milliGRAM(s) Oral daily  tamsulosin 0.4 milliGRAM(s) Oral at bedtime  trimethoprim   80 mG/sulfamethoxazole 400 mG 1 Tablet(s) Oral <User Schedule>      Vital Signs Last 24 Hrs  T(C): 36.3 (16 Nov 2022 16:03), Max: 36.7 (16 Nov 2022 12:43)  T(F): 97.4 (16 Nov 2022 16:03), Max: 98 (16 Nov 2022 12:43)  HR: 104 (16 Nov 2022 16:03) (80 - 104)  BP: 130/67 (16 Nov 2022 16:03) (103/63 - 130/67)  BP(mean): --  RR: 18 (16 Nov 2022 16:03) (18 - 20)  SpO2: 99% (16 Nov 2022 16:03) (97% - 100%)    Parameters below as of 16 Nov 2022 12:43  Patient On (Oxygen Delivery Method): nasal cannula    PHYSICAL EXAM:  General:  no acute distress, awake   ENMT: No nasal discharge; airway clear  Respiratory: No wheezes, rales or rhonchi  Cardiovascular: Regular rate and rhythm. S1 and S2 Normal;  Gastrointestinal: Soft non-tender distended; Normal bowel sounds  Extremities: Normal range of motion, No clubbing, cyanosis or edema; left AVF with positive thrill  Neurological: Alert and oriented x4, no deficits   Psychiatric: Cooperative and appropriate    I                      7.9    6.86  )-----------( 127      ( 16 Nov 2022 05:41 )             25.2   11-16    142  |  100  |  54.9<H>  ----------------------------<  135<H>  4.2   |  25.0  |  7.14<H>    Ca    8.0<L>      16 Nov 2022 05:41

## 2022-11-16 NOTE — PROGRESS NOTE ADULT - ASSESSMENT
s/p removal of permacath due to bacteremia  Hd currently via left upper ext AVF successful      -- will sign off for now  -- continue to utilize AVF  -- No plan for additional intervention from our part at this time  -- recall if necessary

## 2022-11-16 NOTE — PROGRESS NOTE ADULT - SUBJECTIVE AND OBJECTIVE BOX
JU FERGUSON  [id number]        Chief Complaint: follow up CHFrEF/bateremia/CKD/PAF      Subjective: s/p paracentesis       24 hour Tele: SR with APCs          acetaminophen     Tablet .. 650 milliGRAM(s) Oral every 6 hours PRN  aluminum hydroxide/magnesium hydroxide/simethicone Suspension 30 milliLiter(s) Oral every 4 hours PRN  apixaban 2.5 milliGRAM(s) Oral two times a day  atorvastatin 80 milliGRAM(s) Oral at bedtime  chlorhexidine 2% Cloths 1 Application(s) Topical <User Schedule>  clopidogrel Tablet 75 milliGRAM(s) Oral daily  fluconAZOLE   Tablet 200 milliGRAM(s) Oral <User Schedule>  hydrocortisone 10 milliGRAM(s) Oral two times a day  melatonin 3 milliGRAM(s) Oral at bedtime PRN  metoprolol tartrate 25 milliGRAM(s) Oral two times a day  multivitamin 1 Tablet(s) Oral daily  mycophenolate mofetil 500 milliGRAM(s) Oral two times a day  ondansetron Injectable 4 milliGRAM(s) IV Push every 8 hours PRN  pantoprazole    Tablet 40 milliGRAM(s) Oral before breakfast  piperacillin/tazobactam IVPB.. 3.375 Gram(s) IV Intermittent every 12 hours  sacubitril 24 mG/valsartan 26 mG 1 Tablet(s) Oral two times a day  sevelamer carbonate 1600 milliGRAM(s) Oral three times a day with meals  tacrolimus 1.5 milliGRAM(s) Oral daily  tamsulosin 0.4 milliGRAM(s) Oral at bedtime  trimethoprim   80 mG/sulfamethoxazole 400 mG 1 Tablet(s) Oral <User Schedule>          Physical Exam:  T(C): 36.3 (11-16-22 @ 04:00), Max: 36.8 (11-15-22 @ 10:51)  HR: 98 (11-16-22 @ 04:00) (98 - 100)  BP: 113/64 (11-16-22 @ 04:00) (106/67 - 118/64)  RR: 19 (11-16-22 @ 04:00) (18 - 20)  SpO2: 98% (11-16-22 @ 04:00) (98% - 100%)  Wt(kg): --  General: Comfortable in NAD  HEENT: dry MM, sclera anicteric  Resp: CTA bilaterally, diminished at bases  CVS: nl s1s2, rrr, no s3/JVD  Abd: soft, NT, mildly distended   Ext: No c/c/e  Neuro A&O x3  Psych: Normal affect    I&O's Summary        Labs:   16 Nov 2022 05:41    142    |  100    |  54.9   ----------------------------<  135    4.2     |  25.0   |  7.14     Ca    8.0        16 Nov 2022 05:41    TPro  5.5    /  Alb  3.3    /  TBili  0.5    /  DBili  x      /  AST  44     /  ALT  17     /  AlkPhos  259    14 Nov 2022 09:15                          7.9    6.86  )-----------( 127      ( 16 Nov 2022 05:41 )             25.2         Cardiac catheterization on 11/26/19:  PCI with drug-eluting stent to LAD and OM1.  Cardiac catheterization, this information is taken from a progress note from the [facility] documenting cardiac catheterization 01/14 showing 60% proximal LAD stenosis, 60% mid LAD stenosis, 60% proximal circumflex stenosis, 60% ostial OM1 stenosis, 40% proximal right coronary artery stenosis, 40% proximal PDA documenting an FFR of the mid LAD of 0.79.    ECHO 11/2022:   1. Technically difficult study.   2. Endocardial visualization was enhanced with intravenous echo contrast.   3. Left ventricular ejection fraction, by visual estimation, is 25 to 30%.   4. Severely decreased global left ventricular systolic function.   5. The mitral in-flow pattern reveals no discernable A-wave, therefore   no comment on diastolic function can be made.   6. Mildly enlarged right ventricle.   7. Moderately reduced RV systolic function.   8. Mild mitral valve regurgitation.   9. Estimated pulmonary artery systolic pressure is 35.2 mmHg assuming a   right atrial pressure of 8 mmHg, which is consistent with borderline   pulmonary hypertension.  10. In comparison to prior TTE 4/14/2021 LVEF is now reduced to 25-30%.   (LVEF was 40-45%). Rhythm monitoring shows Atrial Fibrillation with RVR   which may adversly affect LVEF evaluation. Consider to obtain Limited FU   TTE once HR controlled or rhythm restored to SR if clinically indicated.      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 Admitted with hypoxic respiratory failure/ fluid overload.   -Had rapid AF now in SR, on A/C  -Acute systolic CHF, fluid managed with HD  -Bacteremia with enterococcus, on antibiotics and permacath removed. Echo without vegetation  -Drop in EF maybe from rapid AF, will repeat prior to discharge to re-evaluate   -Minimally elevated trops from demand ischemia  -s/p paracentesis         Plan: (TO BE SEEN)   1. Fluid status managed with HD  2. No signs vegetation on echo and no significant valve disease. Will hold off on CRISTIAN unless persistent bacteremia given high aspiration risk and immunocompromised state.   3. Repeat echo today to re-evaluate LV function now that back in SR  4. Continue A/C  5. s/p paracentesis.   6. Racheal management of CAD at this time            Aman Cali MD JU FERGUSON  [id number]        Chief Complaint: follow up CHFrEF/bateremia/CKD/PAF      Subjective: s/p paracentesis yesterday but does not feel any better      24 hour Tele: SR with APCs          acetaminophen     Tablet .. 650 milliGRAM(s) Oral every 6 hours PRN  aluminum hydroxide/magnesium hydroxide/simethicone Suspension 30 milliLiter(s) Oral every 4 hours PRN  apixaban 2.5 milliGRAM(s) Oral two times a day  atorvastatin 80 milliGRAM(s) Oral at bedtime  chlorhexidine 2% Cloths 1 Application(s) Topical <User Schedule>  clopidogrel Tablet 75 milliGRAM(s) Oral daily  fluconAZOLE   Tablet 200 milliGRAM(s) Oral <User Schedule>  hydrocortisone 10 milliGRAM(s) Oral two times a day  melatonin 3 milliGRAM(s) Oral at bedtime PRN  metoprolol tartrate 25 milliGRAM(s) Oral two times a day  multivitamin 1 Tablet(s) Oral daily  mycophenolate mofetil 500 milliGRAM(s) Oral two times a day  ondansetron Injectable 4 milliGRAM(s) IV Push every 8 hours PRN  pantoprazole    Tablet 40 milliGRAM(s) Oral before breakfast  piperacillin/tazobactam IVPB.. 3.375 Gram(s) IV Intermittent every 12 hours  sacubitril 24 mG/valsartan 26 mG 1 Tablet(s) Oral two times a day  sevelamer carbonate 1600 milliGRAM(s) Oral three times a day with meals  tacrolimus 1.5 milliGRAM(s) Oral daily  tamsulosin 0.4 milliGRAM(s) Oral at bedtime  trimethoprim   80 mG/sulfamethoxazole 400 mG 1 Tablet(s) Oral <User Schedule>          Physical Exam:  T(C): 36.3 (11-16-22 @ 04:00), Max: 36.8 (11-15-22 @ 10:51)  HR: 98 (11-16-22 @ 04:00) (98 - 100)  BP: 113/64 (11-16-22 @ 04:00) (106/67 - 118/64)  RR: 19 (11-16-22 @ 04:00) (18 - 20)  SpO2: 98% (11-16-22 @ 04:00) (98% - 100%)  Wt(kg): --  General: Comfortable in NAD  HEENT: dry MM, sclera anicteric  Resp: CTA bilaterally, diminished at bases  CVS: nl s1s2, rrr, no s3/JVD  Abd: soft, NT, ND  Ext: No c/c/e  Neuro A&O x3  Psych: Normal affect    I&O's Summary        Labs:   16 Nov 2022 05:41    142    |  100    |  54.9   ----------------------------<  135    4.2     |  25.0   |  7.14     Ca    8.0        16 Nov 2022 05:41    TPro  5.5    /  Alb  3.3    /  TBili  0.5    /  DBili  x      /  AST  44     /  ALT  17     /  AlkPhos  259    14 Nov 2022 09:15                          7.9    6.86  )-----------( 127      ( 16 Nov 2022 05:41 )             25.2         Cardiac catheterization on 11/26/19:  PCI with drug-eluting stent to LAD and OM1.  Cardiac catheterization, this information is taken from a progress note from the [facility] documenting cardiac catheterization 01/14 showing 60% proximal LAD stenosis, 60% mid LAD stenosis, 60% proximal circumflex stenosis, 60% ostial OM1 stenosis, 40% proximal right coronary artery stenosis, 40% proximal PDA documenting an FFR of the mid LAD of 0.79.    ECHO 11/2022:   1. Technically difficult study.   2. Endocardial visualization was enhanced with intravenous echo contrast.   3. Left ventricular ejection fraction, by visual estimation, is 25 to 30%.   4. Severely decreased global left ventricular systolic function.   5. The mitral in-flow pattern reveals no discernable A-wave, therefore   no comment on diastolic function can be made.   6. Mildly enlarged right ventricle.   7. Moderately reduced RV systolic function.   8. Mild mitral valve regurgitation.   9. Estimated pulmonary artery systolic pressure is 35.2 mmHg assuming a   right atrial pressure of 8 mmHg, which is consistent with borderline   pulmonary hypertension.  10. In comparison to prior TTE 4/14/2021 LVEF is now reduced to 25-30%.   (LVEF was 40-45%). Rhythm monitoring shows Atrial Fibrillation with RVR   which may adversly affect LVEF evaluation. Consider to obtain Limited FU   TTE once HR controlled or rhythm restored to SR if clinically indicated.      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 Admitted with hypoxic respiratory failure/ fluid overload.   -Had rapid AF now in SR, on A/C  -Acute systolic CHF, fluid managed with HD  -Bacteremia with enterococcus, on antibiotics and permacath removed. Echo without vegetation  -Drop in EF maybe from rapid AF, will repeat prior to discharge to re-evaluate   -Minimally elevated trops from demand ischemia  -s/p paracentesis without significant symptomatic improvement he states        Plan:   1. Fluid status managed with HD  2. No signs vegetation on echo and no significant valve disease. Will hold off on CRISTIAN unless persistent bacteremia given high aspiration risk and immunocompromised state.   3. Repeat echo today to re-evaluate LV function now that back in SR. Otherwise CV stable  4. Continue A/C  5. s/p paracentesis.   6. Racheal management of CAD at this time            Aman Cali MD

## 2022-11-16 NOTE — PHYSICAL THERAPY INITIAL EVALUATION ADULT - NEUROVASCULAR ASSESSMENT RLE
-- Message is from the [facility]--    Patient is requesting a medication refill - medication is on active medication list    Patient is currently OUT of the requested medication.    RX Name and Dose:  (copy from med list)  Methylphenidate HCl ER (CD) 30 MG Oral Capsule Extended Release New     Add as: methylphenidate (METADATE CD) 30 MG CR capsule     TAKE 1 CAPSULE BY MOUTH EVERY MORNING         Duration: 90 days    Pharmacy  [id number] - [address]    Patient confirmed the above pharmacy as correct?  Yes        Alternative phone number:     Turnaround time given to caller:   \"This message will be sent to [state Provider's name]. The clinical team will fulfill your request as soon as they review your message.\"  
warm/no numbness/no tingling/no discoloration

## 2022-11-16 NOTE — PROGRESS NOTE ADULT - SUBJECTIVE AND OBJECTIVE BOX
INTERVAL HPI/OVERNIGHT EVENTS: c/o pain with HD access to AVF    s/p removal of permacath due to bacteremia      MEDICATIONS  (STANDING):  apixaban 2.5 milliGRAM(s) Oral two times a day  atorvastatin 80 milliGRAM(s) Oral at bedtime  chlorhexidine 2% Cloths 1 Application(s) Topical <User Schedule>  clopidogrel Tablet 75 milliGRAM(s) Oral daily  fluconAZOLE   Tablet 200 milliGRAM(s) Oral <User Schedule>  hydrocortisone 10 milliGRAM(s) Oral two times a day  metoprolol tartrate 25 milliGRAM(s) Oral two times a day  multivitamin 1 Tablet(s) Oral daily  mycophenolate mofetil 500 milliGRAM(s) Oral two times a day  pantoprazole    Tablet 40 milliGRAM(s) Oral before breakfast  piperacillin/tazobactam IVPB.. 3.375 Gram(s) IV Intermittent every 12 hours  sacubitril 24 mG/valsartan 26 mG 1 Tablet(s) Oral two times a day  sevelamer carbonate 1600 milliGRAM(s) Oral three times a day with meals  tacrolimus 1.5 milliGRAM(s) Oral daily  tamsulosin 0.4 milliGRAM(s) Oral at bedtime  trimethoprim   80 mG/sulfamethoxazole 400 mG 1 Tablet(s) Oral <User Schedule>    MEDICATIONS  (PRN):  acetaminophen     Tablet .. 650 milliGRAM(s) Oral every 6 hours PRN Temp greater or equal to 38C (100.4F), Mild Pain (1 - 3)  aluminum hydroxide/magnesium hydroxide/simethicone Suspension 30 milliLiter(s) Oral every 4 hours PRN Dyspepsia  melatonin 3 milliGRAM(s) Oral at bedtime PRN Insomnia  ondansetron Injectable 4 milliGRAM(s) IV Push every 8 hours PRN Nausea and/or Vomiting      Vital Signs Last 24 Hrs  T(C): 36.6 (16 Nov 2022 08:41), Max: 36.8 (15 Nov 2022 10:51)  T(F): 97.9 (16 Nov 2022 08:41), Max: 98.2 (15 Nov 2022 10:51)  HR: 80 (16 Nov 2022 08:41) (80 - 100)  BP: 106/50 (16 Nov 2022 08:41) (106/50 - 118/64)  BP(mean): --  RR: 18 (16 Nov 2022 08:41) (18 - 20)  SpO2: 100% (16 Nov 2022 08:41) (98% - 100%)    Parameters below as of 16 Nov 2022 08:41  Patient On (Oxygen Delivery Method): nasal cannula          I&O's Detail      LABS:                        7.9    6.86  )-----------( 127      ( 16 Nov 2022 05:41 )             25.2     11-16    142  |  100  |  54.9<H>  ----------------------------<  135<H>  4.2   |  25.0  |  7.14<H>    Ca    8.0<L>      16 Nov 2022 05:41    TPro  5.5<L>  /  Alb  3.3  /  TBili  0.5  /  DBili  x   /  AST  44<H>  /  ALT  17  /  AlkPhos  259<H>  11-14          RADIOLOGY & ADDITIONAL STUDIES:

## 2022-11-16 NOTE — PROGRESS NOTE ADULT - ASSESSMENT
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 Labs and imaging noted, leucocytosis, elevated BNP and troponin. Patient on chronic steroid therapy. EKG sinus tach 114.     #Acute hypoxic respiratory failure sec fluid overload - patient with depressed EF; suspect acute on chronic HFrEF - improving  - Supplemental oxygen, monitor pulse ox   - HD as per nephrology  - continue tele  - cardiology following  - continue metoprolol  - no plan for cath - medical management at this time    #enterococcus bacteremia  - repeat set ordered  - echo without vegetation  - CT abdomen without source  - diagnostic para without signs of SBP  - follow up repeat cultures 11/12 are positive still - 11/14 blood cultures pending  - ID following  - continue abx  - permacath removed on 11/13  - HD as per nephro    #ESRD on HD:  - As above  - continue Sevelamer  - HD through AVF for now    #cirrhosis with ascites abdominal distension and ascites  - patient states has been an issue since having 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  - not on diuretics as he doesn't make urine - states ascites is typically resistant to dialysis  - no encephalopathy at this time  - on metoprolol   - eliquis can be restarted since s/p parcentesis today    #Emphysema, h/o lung transplant:  - Continue Tacrolimus, Cellcept  - supplemental oxygen  - not in COPD exacerbation  - continue chronic steroid therapy    #CAD s/p PCI:  - telemetry  - continue statin, amlodipine, plavix    #a-fib with RVR - now in NSR  - continue metoprolol  - resume eliquis tonite    #Chronic IJ occlusion:  - On Eliquis  - vascular follow up as outpatient    #Hypertension/hyperlipidemia:  - Amlodipine, statin    #BPH:  Continue Flomax    #H/o fungal meningitis:  continue Bactrim and Fluconazole    #DVT ppx:  Eliquis. 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 Labs and imaging noted, leucocytosis, elevated BNP and troponin. Patient on chronic steroid therapy. EKG sinus tach 114.     1-Acute hypoxic respiratory failure sec fluid overload - patient with depressed EF; suspect acute on chronic HFrEF    improving  Supplemental oxygen as needed   HD for fluid removal   monitor pulse ox on RA and ambulation prior DC   - continue telemetry   - cardiology follow up ppreciated   - continue metoprolol  - no plan for cath - medical management at this time    2-enterococcus bacteremia  - repeat blood cx 11/14 are neg   - echo without vegetation, repeat TTE ordered pending   high risk for CRISTIAN per cardio   diagnostic para without signs of SBP  - ID follow up for IV abx course likely long term needed   s/p permacath removal on 11/13    3-ESRD on HD:  cont HD via AVF   permacath removed     4-cirrhosis with ascites abdominal distension and ascites  - patient states has been an issue since having 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  - not on diuretics as he doesn't make urine - states ascites is typically resistant to dialysis  - no encephalopathy at this time  - on metoprolol   - eliquis can be restarted since s/p paracentesis today    5- Multiple thoracic lumbar spine compression Fx on CT ,m incidental findings   pt states those are old with cronic back pain   tylenol as needed       6-Emphysema, h/o lung transplant  - Continue Tacrolimus, Cellcept  - supplemental oxygen as needed   - continue chronic steroid therapy    7-CAD s/p PCI:  - continue statin, amlodipine, plavix    8-h/o atrial fib with RVR - now in NSR  - continue metoprolol  on eliquis 2.5 mg bid     9-Chronic IJ occlusion:  - On Eliquis  - vascular follow up as outpatient    10-Hypertension  - Amlodipine    11-BPH:  Continue Flomax    12-H/o fungal meningitis  continue Bactrim and Fluconazole    #DVT ppx:  Eliquis.    Dc home pending TTE and IV abx course ID follow up

## 2022-11-16 NOTE — PHYSICAL THERAPY INITIAL EVALUATION ADULT - PERTINENT HX OF CURRENT PROBLEM, REHAB EVAL
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 Labs and imaging noted, leucocytosis, elevated BNP and troponin. Patient on chronic steroid therapy. EKG sinus tach 114.

## 2022-11-16 NOTE — PHYSICAL THERAPY INITIAL EVALUATION ADULT - GENERAL OBSERVATIONS, REHAB EVAL
awake amb from restroom with RW and NA on room air, +telemonitor with . placed back on 2L/min o2 via nc. wife, hari, present with pt's permission

## 2022-11-17 NOTE — DIETITIAN NUTRITION RISK NOTIFICATION - ADDITIONAL COMMENTS/DIETITIAN RECOMMENDATIONS
1) Pt requesting Ensure in place of Nepro, recommend BID  2) Change to Nephro-alok daily  3) Encourage HBV protein sources  4) Monitor weights daily for trend/accuracy   5) Monitor electrolytes and renal labs, correct PRN

## 2022-11-17 NOTE — PROGRESS NOTE ADULT - ASSESSMENT
72 year old male with PMH of HTN, HLD, CAD s/p stent, COPD/emphysema s/p lung transplantation, history of fungal meningitis, hx of hip fracture s/p ORIF, and ESRD on HD presents with SOB. CT chest showed new interstitial edema and new GGO in both lungs. Admitted for further work up. Hospital course is notable for Enterococcus faecalis bacteremia. Nephrology is managing ESRD on HD.     -Access: L AV access   -Outpatient dialysis days are Tuesdays Thursdays Saturdays  -Off cycle while here, currently on a Monday Wednesday Friday dialysis schedule    -Last dialyzed yesterday; if labs tomorrow are acceptable, then will hold off hemodialysis until this upcoming Saturday to get the patient back onto his outpatient dialysis schedule  -BP intermittently soft - currently on Entresto and metoprolol - will defer to Cardiology   -Anemia in setting of CKD - MENDY with HD   -Mineral Bone Disease in setting of CKD - continue oral phos binder   -Enterococcus bacteremia: s/p Permcath removal; TTE negative for vegetations; Anx as per ID  -s/p Lung transplantation - on immunosuppressive medications - management as per MINISTERIO Graham DO  Nephrology  [facility]  [address] [place] [address]  Office Number [phone]

## 2022-11-17 NOTE — DIETITIAN INITIAL EVALUATION ADULT - ORAL INTAKE PTA/DIET HISTORY
Interview conducted with Pt/wife at bedside, report Pt generally with good PO intake at home aware of dietary restrictions and states lab work has been consistent and WNL. Weight PTA ~130lbs with some fluctuations 2/2 fluid, confirms some weight loss since Dec 2021 with weight 142lbs. Encouraged adequate protein intake, renal restrictions as appropriate. Pt/wife receptive to information, RD to remain available.

## 2022-11-17 NOTE — PROGRESS NOTE ADULT - SUBJECTIVE AND OBJECTIVE BOX
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 Patient is hard of hearing and history obtained from wife at bedside. Reports she noticed patient to be having worsening shortness of breath for the past 2 days. He walks with a walker and was getting easily tired. He also endorsed worsening abdominal distention and leg swelling. He denied chest pain, dizziness, fever, chills, nausea, vomiting, no sick contacts. Wife noted patient to be more sleepy lately and had to use supplemental oxygen the last couple of days. Usually not oxygen dependent. Denies cough.   Patient went to HD yesterday, reported no relief  post HD.  (11 Nov 2022 13:25)    The patient was seen and evaluated   The patient is in no acute distress.        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 -  [facility]      H/O heart artery stent  x3 @[facility]      History of hip replacement  right      Status post open reduction and internal fixation (ORIF) of fracture  left femur              Vital Signs Last 24 Hrs  T(C): 36.8 (17 Nov 2022 11:04), Max: 36.8 (17 Nov 2022 11:04)  T(F): 98.2 (17 Nov 2022 11:04), Max: 98.2 (17 Nov 2022 11:04)  HR: 68 (17 Nov 2022 11:04) (68 - 104)  BP: 92/50 (17 Nov 2022 11:04) (92/50 - 130/67)  BP(mean): --  RR: 18 (17 Nov 2022 11:04) (18 - 18)  SpO2: 98% (17 Nov 2022 11:04) (98% - 99%)    Parameters below as of 17 Nov 2022 11:04  Patient On (Oxygen Delivery Method): nasal cannula    T(C): 36.8 (11-17-22 @ 11:04), Max: 36.8 (11-17-22 @ 11:04)  HR: 68 (11-17-22 @ 11:04) (68 - 104)  BP: 92/50 (11-17-22 @ 11:04) (92/50 - 130/67)  RR: 18 (11-17-22 @ 11:04) (18 - 18)  SpO2: 98% (11-17-22 @ 11:04) (98% - 99%)  Wt(kg): --    PHYSICAL EXAM:    GENERAL: NAD  HEAD:  Atraumatic, Normocephalic  EYES: EOMI, PERRL, conjunctiva and sclera clear  ENMT:  Moist mucous membranes,    NECK: Supple, No JVD,   NERVOUS SYSTEM:  Alert & Oriented X3, upper and lower extremities; CNS-II-XII  CHEST/LUNG: Clear to auscultation bilaterally; No rales, rhonchi, wheezing,   HEART: Regular rate and rhythm; No murmurs,   ABDOMEN: Soft, Nontender, Nondistended; Bowel sounds present  EXTREMITIES:  Peripheral Pulses, No  cyanosis, or edema  SKIN: No rashes   psychiatry- mood and affect appropriate,     acetaminophen     Tablet .. 650 milliGRAM(s) Oral every 6 hours PRN  aluminum hydroxide/magnesium hydroxide/simethicone Suspension 30 milliLiter(s) Oral every 4 hours PRN  apixaban 2.5 milliGRAM(s) Oral two times a day  atorvastatin 80 milliGRAM(s) Oral at bedtime  chlorhexidine 2% Cloths 1 Application(s) Topical <User Schedule>  clopidogrel Tablet 75 milliGRAM(s) Oral daily  fluconAZOLE   Tablet 200 milliGRAM(s) Oral <User Schedule>  hydrocortisone 10 milliGRAM(s) Oral two times a day  melatonin 3 milliGRAM(s) Oral at bedtime PRN  metoprolol tartrate 25 milliGRAM(s) Oral two times a day  multivitamin 1 Tablet(s) Oral daily  mycophenolate mofetil 500 milliGRAM(s) Oral two times a day  ondansetron Injectable 4 milliGRAM(s) IV Push every 8 hours PRN  pantoprazole    Tablet 40 milliGRAM(s) Oral before breakfast  piperacillin/tazobactam IVPB.. 3.375 Gram(s) IV Intermittent every 12 hours  sacubitril 24 mG/valsartan 26 mG 1 Tablet(s) Oral two times a day  sevelamer carbonate 1600 milliGRAM(s) Oral three times a day with meals  tacrolimus 1.5 milliGRAM(s) Oral daily  tamsulosin 0.4 milliGRAM(s) Oral at bedtime  trimethoprim   80 mG/sulfamethoxazole 400 mG 1 Tablet(s) Oral <User Schedule>      LABS:                          7.9    6.86  )-----------( 127      ( 16 Nov 2022 05:41 )             25.2     11-16    142  |  100  |  54.9<H>  ----------------------------<  135<H>  4.2   |  25.0  |  7.14<H>    Ca    8.0<L>      16 Nov 2022 05:41                CAPILLARY BLOOD GLUCOSE          RADIOLOGY & ADDITIONAL TESTS:      Consultant notes reviewed    Case discussed with consultant/provider/ family /patient

## 2022-11-17 NOTE — DIETITIAN INITIAL EVALUATION ADULT - PERTINENT MEDS FT
MEDICATIONS  (STANDING):  apixaban 2.5 milliGRAM(s) Oral two times a day  atorvastatin 80 milliGRAM(s) Oral at bedtime  chlorhexidine 2% Cloths 1 Application(s) Topical <User Schedule>  clopidogrel Tablet 75 milliGRAM(s) Oral daily  fluconAZOLE   Tablet 200 milliGRAM(s) Oral <User Schedule>  hydrocortisone 10 milliGRAM(s) Oral two times a day  metoprolol tartrate 25 milliGRAM(s) Oral two times a day  multivitamin 1 Tablet(s) Oral daily  mycophenolate mofetil 500 milliGRAM(s) Oral two times a day  pantoprazole    Tablet 40 milliGRAM(s) Oral before breakfast  piperacillin/tazobactam IVPB.. 3.375 Gram(s) IV Intermittent every 12 hours  sacubitril 24 mG/valsartan 26 mG 1 Tablet(s) Oral two times a day  sevelamer carbonate 1600 milliGRAM(s) Oral three times a day with meals  tacrolimus 1.5 milliGRAM(s) Oral daily  tamsulosin 0.4 milliGRAM(s) Oral at bedtime  trimethoprim   80 mG/sulfamethoxazole 400 mG 1 Tablet(s) Oral <User Schedule>    MEDICATIONS  (PRN):  acetaminophen     Tablet .. 650 milliGRAM(s) Oral every 6 hours PRN Temp greater or equal to 38C (100.4F), Mild Pain (1 - 3)  aluminum hydroxide/magnesium hydroxide/simethicone Suspension 30 milliLiter(s) Oral every 4 hours PRN Dyspepsia  melatonin 3 milliGRAM(s) Oral at bedtime PRN Insomnia  ondansetron Injectable 4 milliGRAM(s) IV Push every 8 hours PRN Nausea and/or Vomiting

## 2022-11-17 NOTE — DIETITIAN INITIAL EVALUATION ADULT - ETIOLOGY
related to inability to meet sufficient protein-energy needs in setting of acute hypoxic resp failure 2/2 ESRD on HD, emphysema

## 2022-11-17 NOTE — DIETITIAN INITIAL EVALUATION ADULT - NSICDXPASTSURGICALHX_GEN_ALL_CORE_FT
PAST SURGICAL HISTORY:  H/O heart artery stent x3 @[facility]    H/O lung transplant bilateral - transplant - 1-2-2015 -  [facility]    History of hip replacement right    Status post open reduction and internal fixation (ORIF) of fracture left femur

## 2022-11-17 NOTE — PROGRESS NOTE ADULT - SUBJECTIVE AND OBJECTIVE BOX
Last dialyzed yesterday. Asking for placement of new tunneled dialysis catheter as he complains of pain during cannulation of AV access.     Vital Signs Last 24 Hrs  T(C): 36.8 (17 Nov 2022 11:04), Max: 36.8 (17 Nov 2022 11:04)  T(F): 98.2 (17 Nov 2022 11:04), Max: 98.2 (17 Nov 2022 11:04)  HR: 68 (17 Nov 2022 11:04) (68 - 104)  BP: 92/50 (17 Nov 2022 11:04) (92/50 - 130/67)  BP(mean): --  RR: 18 (17 Nov 2022 11:04) (18 - 18)  SpO2: 98% (17 Nov 2022 11:04) (98% - 99%)    Parameters below as of 17 Nov 2022 11:04  Patient On (Oxygen Delivery Method): nasal cannula    I&O's Summary    16 Nov 2022 07:01  -  17 Nov 2022 07:00  --------------------------------------------------------  IN: 0 mL / OUT: 1500 mL / NET: -1500 mL    Physical Exam  General: WDWN male in NAD  HEENT: Extremely hard of hearing   Cardiac: S1S2 RRR  Respiratory: CTAB  Abdomen: Soft, NT  Extremities: No appreciable edema  Neuro: AAOx3    11-16    142  |  100  |  54.9<H>  ----------------------------<  135<H>  4.2   |  25.0  |  7.14<H>    Ca    8.0<L>      16 Nov 2022 05:41                        7.9    6.86  )-----------( 127      ( 16 Nov 2022 05:41 )             25.2     MEDICATIONS  (STANDING):  apixaban 2.5 milliGRAM(s) Oral two times a day  atorvastatin 80 milliGRAM(s) Oral at bedtime  chlorhexidine 2% Cloths 1 Application(s) Topical <User Schedule>  clopidogrel Tablet 75 milliGRAM(s) Oral daily  fluconAZOLE   Tablet 200 milliGRAM(s) Oral <User Schedule>  hydrocortisone 10 milliGRAM(s) Oral two times a day  metoprolol tartrate 25 milliGRAM(s) Oral two times a day  multivitamin 1 Tablet(s) Oral daily  mycophenolate mofetil 500 milliGRAM(s) Oral two times a day  pantoprazole    Tablet 40 milliGRAM(s) Oral before breakfast  piperacillin/tazobactam IVPB.. 3.375 Gram(s) IV Intermittent every 12 hours  sacubitril 24 mG/valsartan 26 mG 1 Tablet(s) Oral two times a day  sevelamer carbonate 1600 milliGRAM(s) Oral three times a day with meals  tacrolimus 1.5 milliGRAM(s) Oral daily  tamsulosin 0.4 milliGRAM(s) Oral at bedtime  trimethoprim   80 mG/sulfamethoxazole 400 mG 1 Tablet(s) Oral <User Schedule>    MEDICATIONS  (PRN):  acetaminophen     Tablet .. 650 milliGRAM(s) Oral every 6 hours PRN Temp greater or equal to 38C (100.4F), Mild Pain (1 - 3)  aluminum hydroxide/magnesium hydroxide/simethicone Suspension 30 milliLiter(s) Oral every 4 hours PRN Dyspepsia  melatonin 3 milliGRAM(s) Oral at bedtime PRN Insomnia  ondansetron Injectable 4 milliGRAM(s) IV Push every 8 hours PRN Nausea and/or Vomiting

## 2022-11-17 NOTE — PROGRESS NOTE ADULT - SUBJECTIVE AND OBJECTIVE BOX
[facility]                                                INFECTIOUS DISEASES  =======================================================                               J Carlos Galdamez MD#    Isatu Rojas MD*                           Perlita Solares MD*   Sumaya Morales MD*            Diplomates American Board of Internal Medicine & Infectious Diseases                  # [place] Office - Appt - Tel  [phone] Fax [phone]                * [place] Office - Appt - Tel [phone] Fax [phone]                                  Hospital Consult line:  [phone]  =======================================================      [facility]-[id number]  JU AYDENJAROD   follow up for: enterococcus bacteremia  no fever  denies complaints  patient seen and examined.       I have personally reviewed the labs and data; pertinent labs and data are listed in this note; please see below.   ===================================================  REVIEW OF SYSTEMS:  CONSTITUTIONAL:  No Fever or chills  HEENT:  No diplopia or blurred vision.  No earache, sore throat or runny nose.  CARDIOVASCULAR:  No pressure, squeezing, strangling, tightness, heaviness or aching about the chest, neck, axilla or epigastrium.  RESPIRATORY:  No cough, shortness of breath  GASTROINTESTINAL:  No nausea, vomiting or diarrhea.  GENITOURINARY:  No dysuria, frequency or urgency. No Blood in urine  MUSCULOSKELETAL:  no joint aches, no muscle pain  SKIN:  No change in skin, hair or nails.  NEUROLOGIC:  No Headaches, seizures or weakness.  PSYCHIATRIC:  No disorder of thought or mood.  ENDOCRINE:  No heat or cold intolerance  HEMATOLOGICAL:  No easy bruising or bleeding.    =======================================================  Allergies    budesonide (Unknown)  cefpodoxime (Unknown)  Pollen (Unknown)    Intolerances    Antibiotics:  fluconAZOLE   Tablet 200 milliGRAM(s) Oral <User Schedule>  piperacillin/tazobactam IVPB.. 3.375 Gram(s) IV Intermittent every 12 hours  trimethoprim   80 mG/sulfamethoxazole 400 mG 1 Tablet(s) Oral <User Schedule>    Other medications:  apixaban 2.5 milliGRAM(s) Oral two times a day  atorvastatin 80 milliGRAM(s) Oral at bedtime  chlorhexidine 2% Cloths 1 Application(s) Topical <User Schedule>  clopidogrel Tablet 75 milliGRAM(s) Oral daily  hydrocortisone 10 milliGRAM(s) Oral two times a day  metoprolol tartrate 25 milliGRAM(s) Oral two times a day  multivitamin 1 Tablet(s) Oral daily  mycophenolate mofetil 500 milliGRAM(s) Oral two times a day  pantoprazole    Tablet 40 milliGRAM(s) Oral before breakfast  sacubitril 24 mG/valsartan 26 mG 1 Tablet(s) Oral two times a day  sevelamer carbonate 1600 milliGRAM(s) Oral three times a day with meals  tacrolimus 1.5 milliGRAM(s) Oral daily  tamsulosin 0.4 milliGRAM(s) Oral at bedtime    ======================================================  Physical Exam:  ============  T(F): 97.6 (17 Nov 2022 16:35), Max: 98.2 (17 Nov 2022 11:04)  HR: 110 (17 Nov 2022 16:35)  BP: 114/49 (17 Nov 2022 16:35)  RR: 18 (17 Nov 2022 16:35)  SpO2: 100% (17 Nov 2022 16:35) (98% - 100%)  temp max in last 48H T(F): , Max: 98.2 (11-17-22 @ 11:04)    General:  No acute distress.  Eye: no conjunctival pallor, no scleral icterus  Respiratory: Lungs are clear to auscultation, Respirations are non-labored.  Cardiovascular: Normal rate, Regular rhythm,  s1+s2  Gastrointestinal: Soft, Non-tender, distended, Normal bowel sounds.  Genitourinary: No costovertebral angle tenderness.    Musculoskeletal: Normal range of motion, Normal strength. lue avf  Integumentary: No rash.  Neurologic: Alert, Oriented, No focal deficits  Psychiatric: Appropriate mood & affect.  =======================================================  Labs:                        7.9    6.86  )-----------( 127      ( 16 Nov 2022 05:41 )             25.2     11-16    142  |  100  |  54.9<H>  ----------------------------<  135<H>  4.2   |  25.0  |  7.14<H>    Ca    8.0<L>      16 Nov 2022 05:41        Culture - Fungal, Body Fluid (collected 11-15-22 @ 09:00)  Source: Peritoneal Peritoneal Fluid    Culture - Body Fluid with Gram Stain (collected 11-15-22 @ 09:00)  Source: Peritoneal Peritoneal Fluid  Gram Stain (11-16-22 @ 03:02):    polymorphonuclear leukocytes seen    No organisms seen    by cytocentrifuge    Culture - Blood (collected 11-14-22 @ 21:20)  Source: .Blood Blood-Peripheral    Culture - Blood (collected 11-14-22 @ 21:15)  Source: .Blood Blood-Venous    Culture - Catheter (collected 11-13-22 @ 08:00)  Source: .Catheter IV Dialysis Perm Cath  Final Report (11-15-22 @ 21:57):    Greater than or equal to 15 Colonies Enterococcus faecalis  Organism: Enterococcus faecalis (11-15-22 @ 21:57)  Organism: Enterococcus faecalis (11-15-22 @ 21:57)    Sensitivities:      -  Ampicillin: S <=2 Predicts results to ampicillin/sulbactam, amoxacillin-clavulanate and  piperacillin-tazobactam.      -  Tetracycline: R >8      -  Vancomycin: S 2      Method Type: ANDRE    Culture - Blood (collected 11-12-22 @ 09:38)  Source: .Blood Blood  Gram Stain (11-13-22 @ 11:48):    Growth in anaerobic bottle: Gram Positive Cocci in Pairs and Chains    Growth in aerobic bottle: Gram Positive Cocci in Pairs and Chains  Final Report (11-14-22 @ 15:08):    Growth in aerobic and anaerobic bottles: Enterococcus faecalis    See previous culture [id number]    Culture - Blood (collected 11-12-22 @ 09:30)  Source: .Blood Blood  Gram Stain (11-13-22 @ 22:10):    Growth in aerobic bottle: Gram positive cocci in pairs    Growth in anaerobic bottle: Gram positive cocci in pairs  Final Report (11-14-22 @ 15:07):    Growth in aerobic and anaerobic bottles: Enterococcus faecalis    See previous culture [id number]    Culture - Blood (collected 11-11-22 @ 10:15)  Source: .Blood Blood-Peripheral  Gram Stain (11-12-22 @ 08:24):    Growth in anaerobic bottle: Gram Positive Cocci in Pairs and Chains    Growth in aerobic bottle: Gram Positive Cocci in Pairs and Chains  Final Report (11-14-22 @ 13:58):    Growth in aerobic and anaerobic bottles: Enterococcus faecalis    See previous culture [id number]    Culture - Blood (collected 11-11-22 @ 10:11)  Source: .Blood Blood-Peripheral  Gram Stain (11-12-22 @ 19:35):    Growth in aerobic bottle: Gram positive cocci in pairs    Growth in anaerobic bottle: Gram Positive Cocci in Pairs and Chains  Final Report (11-15-22 @ 11:22):    Growth in aerobic and anaerobic bottles: Enterococcus faecalis    ***Blood Panel PCR results on this specimen are available    approximately 3 hours after the Gram stain result.***    Gram stain, PCR, and/or culture results may not always    correspond dueto difference in methodologies.    ************************************************************    This PCR assay was performed by multiplex PCR. This    Assay tests for 66 bacterial and resistance gene targets.    Please refer to the [facility] test directory    at [email or web address] for details.  Organism: Blood Culture PCR  Enterococcus faecalis (11-15-22 @ 11:22)  Organism: Enterococcus faecalis (11-15-22 @ 11:22)    Sensitivities:      -  Ampicillin: S <=2 Predicts results to ampicillin/sulbactam, amoxacillin-clavulanate and  piperacillin-tazobactam.      -  Gentamicin synergy: R >500      -  Streptomycin synergy: S <=1000      -  Vancomycin: S 2      Method Type: ANDRE  Organism: Blood Culture PCR (11-15-22 @ 11:22)    Sensitivities:      -  Enterococcus faecalis: Detec      Method Type: PCR        < from: TTE Echo Complete w/o Contrast w/ Doppler (11.16.22 @ 16:10) >  Summary:   1. Left ventricular ejection fraction, by visual estimation, is 25 to   30%.   2. Moderately to severely decreased global left ventricular systolic   function.   3. Normal right ventricular size and function.   4. Mild mitral annular calcification.   5. Mild mitral valve regurgitation.   6. Thickening and calcification of the anterior and posterior mitral   valve leaflets.   7. Mild tricuspid regurgitation.   8. Sclerotic aortic valve with normal opening.   9. Recommendations: May Consider limited 2D echo with definity for   evaluation LVEF.    < end of copied text >

## 2022-11-17 NOTE — DIETITIAN INITIAL EVALUATION ADULT - OTHER INFO
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 Patient is hard of hearing and history obtained from wife at bedside. Reports she noticed patient to be having worsening shortness of breath for the past 2 days. He walks with a walker and was getting easily tired. He also endorsed worsening abdominal distention and leg swelling. Wife noted patient to be more sleepy lately and had to use supplemental oxygen the last couple of days.   Pt admitted with acute hypoxic resp failure 2/2 fluid overload. S/p paracentesis 11/15 with 60mL removed. S/p permacath removal 11/13.

## 2022-11-17 NOTE — DIETITIAN NUTRITION RISK NOTIFICATION - TREATMENT: THE FOLLOWING DIET HAS BEEN RECOMMENDED
Diet, Renal Restrictions:   For patients receiving Renal Replacement - No Protein Restr, No Conc K, No Conc Phos, Low Sodium (11-11-22 @ 16:32) [Active]

## 2022-11-17 NOTE — DIETITIAN INITIAL EVALUATION ADULT - NSICDXPASTMEDICALHX_GEN_ALL_CORE_FT
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

## 2022-11-17 NOTE — DIETITIAN INITIAL EVALUATION ADULT - PERTINENT LABORATORY DATA
11-16    142  |  100  |  54.9<H>  ----------------------------<  135<H>  4.2   |  25.0  |  7.14<H>    Ca    8.0<L>      16 Nov 2022 05:41    A1C with Estimated Average Glucose Result: 5.5 % (11-12-22 @ 05:44)

## 2022-11-17 NOTE — DIETITIAN INITIAL EVALUATION ADULT - ADD RECOMMEND
Pt requesting Ensure in place of Nepro, recommend BID; Change to Nephro-alok daily; Encourage HBV protein sources

## 2022-11-17 NOTE — PROGRESS NOTE ADULT - ASSESSMENT
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 Labs and imaging noted, leucocytosis, elevated BNP and troponin. Patient on chronic steroid therapy. EKG sinus tach 114.     1-Acute hypoxic respiratory failure -Acute on chronic Systolic heart HFrEF   on Entresto 24/26  Supplemental oxygen prn  HD   cardiology follow up appreciated   monitor pulse ox on RA and ambulation prior DC     2-Enterococcus bacteremia->on zosyn  - repeat blood cx 11/14 are neg   -ECHO without vegetation, repeat TTE also negative  high risk for CRISTIAN per cardio   diagnostic paracentesis without evidence of SBP  - ID follow up for IV Abx course likely long term needed   s/p PermCath removal on 11/13    3-ESRD on HD:  cont HD via AVF , RRT  PermCath removed     4-Cirrhosis with ascites abdominal distension   - patient states has been an issue since having 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  - not on diuretics as he doesn't make urine - states ascites is typically resistant to dialysis  - no encephalopathy at this time    5- Multiple thoracic lumbar spine compression Fx on CT incidental findings   pt states those are old with chronic back pain   Tylenol as needed     6-Emphysema, h/o lung transplant,-H/o fungal meningitis- on transplant meds  - Continue Tacrolimus, Cellcept  cont Bactrim, continue Fluconazole  - continue chronic hydrocortisone 10 BID     7-CAD s/p PCI/ Hypertension  - continue, Plavix, statin, metoprolol and  amlodipine    8 PAtrial fib with RVR - now in NSR  - continue metoprolol  on Eliquis 2.5 mg bid     9-Chronic IJ occlusion:  - On Eliquis  - vascular follow up as outpatient    10-BPH:  Continue Flomax    #DVT ppx:  Eliquis.    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 Labs and imaging noted, leucocytosis, elevated BNP and troponin. Patient on chronic steroid therapy. EKG sinus tach 114.     1-Acute hypoxic respiratory failure -Acute on chronic Systolic heart HFrEF   on Entresto 24/26  Supplemental oxygen prn  HD to manage fluid status   cardiology follow up appreciated   monitor pulse ox on RA and ambulation prior DC     2-Enterococcus bacteremia->on zosyn  - repeat blood cx 11/14 are neg   -ECHO without vegetation, repeat TTE also negative  high risk for complications with infection on CRISTIAN per cardio   diagnostic paracentesis without evidence of SBP  - ID follow up for IV Abx course likely long term needed   s/p PermCath removal on 11/13    3-ESRD on HD:  cont HD via AVF , RRT  PermCath removed     4-Cirrhosis with ascites abdominal distension   - patient states has been an issue since having 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  - not on diuretics as he doesn't make urine - states ascites is typically resistant to dialysis  - no encephalopathy at this time    5- Multiple thoracic lumbar spine compression Fx on CT incidental findings   pt states those are old with chronic back pain   Tylenol as needed     6-Emphysema, h/o lung transplant,-H/o fungal meningitis- on transplant meds  - Continue Tacrolimus, Cellcept  cont Bactrim, continue Fluconazole  - continue chronic hydrocortisone 10 BID     7-CAD s/p PCI/ Hypertension  - continue, Plavix, statin, metoprolol and  amlodipine    8 PAtrial fib with RVR - now in NSR-EF 25-30% in sinus   - continue metoprolol  on Eliquis 2.5 mg bid     9-Chronic IJ occlusion:  - On Eliquis  - vascular follow up as outpatient    10-BPH:  Continue Flomax    #DVT ppx:  Eliquis.    negative TTE and IV abx course- discussed with ID if can be given Iv abx with HD- will evaluate     HOME WITH HOME PT- PER PT eval

## 2022-11-17 NOTE — PROGRESS NOTE ADULT - ASSESSMENT
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 Patient is hard of hearing and history obtained from wife at bedside.   Reports she noticed patient to be having worsening shortness of breath. He walks with a walker and was getting easily tired. He also endorsed worsening abdominal distention and leg swelling. Wife noted patient to be more sleepy lately and had to use supplemental oxygen the last couple of days. Usually not oxygen dependent. Denies cough, fever  Patient went to HD yesterday, reported no relief  post HD. Per wife he had been c/o cramping in his legs and thinks he may have requested shorter HD sessions  In Ed afebrile,  requiring o2 + leukocytosis, lactate 2.3, ct chest new interstitial edema and new GGO in both lungs, may represent edema underlying infection could not be excluded. Found to have e.faecalis bacteremia    Acute hypoxic respiratory failure likely 2/2 fluid overload  s/p lung transplant  h/o fungal meningitis  ESRD  Leukocytosis on steroids  Bacteremia ? endocarditis    - bcx + 11/10, 11/11 + e.faecalis, amp sensitive  - f/u bcx x 2 ngtd  - pt reports palpitations to cefpodoxime, no h/o allergic reaction  - ct a/p no evidence of infection  - TTE x 2 did not report vegetations. has low EF  - permacath removed. tip cx + e. faecalis  - CRISTIAN could not be obtained. suggest treat empirically for enterococcus endocarditis   - zosyn-->ceftriaxone/ampicillin  - will need picc when ready for dc  - c/w bactrim and fluconazole ppx  - Trend Fever  - Trend Leukocytosis-on chronic steroids    d/w pharmacy, Dr Milton  Will Follow

## 2022-11-17 NOTE — DIETITIAN INITIAL EVALUATION ADULT - NS FNS DIET ORDER
Diet, Renal Restrictions:   For patients receiving Renal Replacement - No Protein Restr, No Conc K, No Conc Phos, Low Sodium (11-11-22 @ 16:32)

## 2022-11-18 NOTE — PROGRESS NOTE ADULT - SUBJECTIVE AND OBJECTIVE BOX
JU HENSLEYJAROD  [id number]      Chief Complaint:  Follow up CHFrEF/bateremia/CKD/PAF    Interval History:  Patient c/o some pressure in his abdomen still.  Reports no significant SOB.  Denies CP, palps.            acetaminophen     Tablet .. 650 milliGRAM(s) Oral every 6 hours PRN  ALPRAZolam 0.5 milliGRAM(s) Oral two times a day PRN  ampicillin  IVPB 2 Gram(s) IV Intermittent every 12 hours  apixaban 2.5 milliGRAM(s) Oral two times a day  atorvastatin 80 milliGRAM(s) Oral at bedtime  cefTRIAXone Injectable. 2000 milliGRAM(s) IV Push every 12 hours  chlorhexidine 2% Cloths 1 Application(s) Topical <User Schedule>  clopidogrel Tablet 75 milliGRAM(s) Oral daily  fluconAZOLE   Tablet 200 milliGRAM(s) Oral <User Schedule>  hydrocortisone 10 milliGRAM(s) Oral two times a day  melatonin 3 milliGRAM(s) Oral at bedtime PRN  metoprolol tartrate 25 milliGRAM(s) Oral two times a day  multivitamin 1 Tablet(s) Oral daily  mycophenolate mofetil 500 milliGRAM(s) Oral two times a day  ondansetron Injectable 4 milliGRAM(s) IV Push every 8 hours PRN  pantoprazole    Tablet 40 milliGRAM(s) Oral before breakfast  sacubitril 24 mG/valsartan 26 mG 1 Tablet(s) Oral two times a day  sevelamer carbonate 1600 milliGRAM(s) Oral three times a day with meals  tacrolimus 1.5 milliGRAM(s) Oral daily  tamsulosin 0.4 milliGRAM(s) Oral at bedtime  trimethoprim   80 mG/sulfamethoxazole 400 mG 1 Tablet(s) Oral <User Schedule>          Physical Exam:  T(C): 37.1 (11-18-22 @ 04:33), Max: 37.1 (11-18-22 @ 04:33)  HR: 107 (11-18-22 @ 06:54) (68 - 110)  BP: 110/64 (11-18-22 @ 06:54) (90/52 - 132/79)  RR: 18 (11-18-22 @ 05:16) (16 - 18)  SpO2: 96% (11-18-22 @ 05:16) (96% - 100%)  General: Comfortable in NAD  Neck: No JVD  CVS: nl s1s2, no s3  Pulm: CTA b/l, diminished at bases  Abd: soft, non-tender  Ext: No c/c/e  Neuro A&O x3  Psych: Normal affect      Labs:   18 Nov 2022 05:51    140    |  100    |  53.9   ----------------------------<  118    4.5     |  26.0   |  6.27     Ca    8.2        18 Nov 2022 05:51    TPro  5.3    /  Alb  3.4    /  TBili  0.5    /  DBili  x      /  AST  29     /  ALT  12     /  AlkPhos  191    18 Nov 2022 05:51                          6.6    11.86 )-----------( 156      ( 18 Nov 2022 07:34 )             21.9           Cardiac catheterization on 11/26/19:  PCI with drug-eluting stent to LAD and OM1.  Cardiac catheterization, this information is taken from a progress note from the [facility] documenting cardiac catheterization 01/14 showing 60% proximal LAD stenosis, 60% mid LAD stenosis, 60% proximal circumflex stenosis, 60% ostial OM1 stenosis, 40% proximal right coronary artery stenosis, 40% proximal PDA documenting an FFR of the mid LAD of 0.79.    ECHO 11/12/2022:   1. Technically difficult study.   2. Endocardial visualization was enhanced with intravenous echo contrast.   3. Left ventricular ejection fraction, by visual estimation, is 25 to 30%.   4. Severely decreased global left ventricular systolic function.   5. The mitral in-flow pattern reveals no discernable A-wave, therefore   no comment on diastolic function can be made.   6. Mildly enlarged right ventricle.   7. Moderately reduced RV systolic function.   8. Mild mitral valve regurgitation.   9. Estimated pulmonary artery systolic pressure is 35.2 mmHg assuming a   right atrial pressure of 8 mmHg, which is consistent with borderline   pulmonary hypertension.  10. In comparison to prior TTE 4/14/2021 LVEF is now reduced to 25-30%.   (LVEF was 40-45%). Rhythm monitoring shows Atrial Fibrillation with RVR   which may adversly affect LVEF evaluation. Consider to obtain Limited FU   TTE once HR controlled or rhythm restored to SR if clinically indicated.      Echo (11/16/22):   1. Left ventricular ejection fraction, by visual estimation, is 25 to 30%.   2. Moderately to severely decreased global left ventricular systolic function.   3. Normal right ventricular size and function.   4. Mild mitral annular calcification.   5. Mild mitral valve regurgitation.   6. Thickening and calcification of the anterior and posterior mitral valve leaflets.   7. Mild tricuspid regurgitation.   8. Sclerotic aortic valve with normal opening.        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 Admitted with hypoxic respiratory failure/ fluid overload.   -Had rapid AF now in SR, on A/C.  -Acute systolic CHF, fluid managed with HD.  -Bacteremia with enterococcus, on antibiotics and permacath removed. Echo without vegetation.  -Drop in EF maybe from rapid AF, will repeat prior to discharge to re-evaluate.  -Minimally elevated trops from demand ischemia.  -s/p paracentesis without significant symptomatic improvement.  -Now with more significant anemia and will need transfusion.  -Repeat echo in SR still with severe LV dysfunction, but may still be tachymyopathy.  Will reassess as OP>      Plan:   1. Fluid status managed with HD.  2. No signs vegetation on echo and no significant valve disease. Will hold off on CRISTIAN unless persistent bacteremia given high aspiration risk and immunocompromised state.   3. Continue A/C.  4. Continue current CV meds at current doses.  5. Plan rapid uptitration of GDMT as OP.  Consider Aldactone.  6. Repeat echo as OP and also stress testing.  Given anemia espeically will hold off on cath for now.      D/w Dr. Milton.

## 2022-11-18 NOTE — PROGRESS NOTE ADULT - ASSESSMENT
72 year old male with PMH of HTN, HLD, CAD s/p stent, COPD/emphysema s/p lung transplantation, history of fungal meningitis, hx of hip fracture s/p ORIF, and ESRD on HD presents with SOB. CT chest showed new interstitial edema and new GGO in both lungs. Admitted for further work up. Hospital course is notable for Enterococcus faecalis bacteremia. Nephrology is managing ESRD on HD.     -Access: L AV access   -Outpatient dialysis days are TTS,   -Off cycle while here, currently on  MWF schedule   - HD today, will give 1 U PRBC transfusion  -BP intermittently soft - currently on Entresto and metoprolol - UF as tolerated.  -Anemia in setting of CKD - start MENDY with HD, PRBC transfusion . monitor h/h   -Mineral Bone Disease in setting of CKD - continue oral phos binder   -Enterococcus bacteremia: s/p TDC removal; TTE negative for vegetations; Abx as per ID  -s/p Lung transplantation - on immunosuppressive medications - management as per ID

## 2022-11-18 NOTE — PROGRESS NOTE ADULT - ASSESSMENT
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 Patient is hard of hearing and history obtained from wife at bedside.   Reports she noticed patient to be having worsening shortness of breath. He walks with a walker and was getting easily tired. He also endorsed worsening abdominal distention and leg swelling. Wife noted patient to be more sleepy lately and had to use supplemental oxygen the last couple of days. Usually not oxygen dependent. Denies cough, fever  Patient went to HD yesterday, reported no relief  post HD. Per wife he had been c/o cramping in his legs and thinks he may have requested shorter HD sessions  In Ed afebrile,  requiring o2 + leukocytosis, lactate 2.3, ct chest new interstitial edema and new GGO in both lungs, may represent edema underlying infection could not be excluded. Found to have e.faecalis bacteremia    Acute hypoxic respiratory failure likely 2/2 fluid overload  s/p lung transplant  h/o fungal meningitis  ESRD  Leukocytosis on steroids  Bacteremia ? endocarditis    - bcx + 11/10, 11/11 + e.faecalis, amp sensitive  - f/u bcx x 2 ngtd  - pt reports palpitations to cefpodoxime, no h/o allergic reaction  - ct a/p no evidence of infection  - TTE x 2 did not report vegetations. has low EF  - permacath removed. tip cx + e. faecalis  - CRISTIAN could not be obtained. suggest treat empirically for enterococcus endocarditis   - ceftriaxone 2g iv q12h and ampicillin 2g iv q12h adjusted for renal function through 12/26/22, weekly cbc cmp esr crp. wife unsure if she will be able to manage picc at home, will d/w pt  - will need picc when ready for dc  - c/w bactrim and fluconazole ppx  - Trend Fever  - Trend Leukocytosis-on chronic steroids  - have called [facility] transplant team regarding need to adjust immunosuppressives, awaiting callback. 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 Patient is hard of hearing and history obtained from wife at bedside.   Reports she noticed patient to be having worsening shortness of breath. He walks with a walker and was getting easily tired. He also endorsed worsening abdominal distention and leg swelling. Wife noted patient to be more sleepy lately and had to use supplemental oxygen the last couple of days. Usually not oxygen dependent. Denies cough, fever  Patient went to HD yesterday, reported no relief  post HD. Per wife he had been c/o cramping in his legs and thinks he may have requested shorter HD sessions  In Ed afebrile,  requiring o2 + leukocytosis, lactate 2.3, ct chest new interstitial edema and new GGO in both lungs, may represent edema underlying infection could not be excluded. Found to have e.faecalis bacteremia    Acute hypoxic respiratory failure likely 2/2 fluid overload  s/p lung transplant  h/o fungal meningitis  ESRD  Leukocytosis on steroids  Bacteremia ? endocarditis    - bcx + 11/10, 11/11 + e.faecalis, amp sensitive  - f/u bcx x 2 ngtd  - pt reports palpitations to cefpodoxime, no h/o allergic reaction  - ct a/p no evidence of infection  - TTE x 2 did not report vegetations. has low EF  - permacath removed. tip cx + e. faecalis  - CRISTIAN could not be obtained. suggest treat empirically for enterococcus endocarditis   - ceftriaxone 2g iv q12h and ampicillin 2g iv q12h adjusted for renal function through 12/26/22, weekly cbc cmp esr crp. wife unsure if she will be able to manage picc at home, will d/w pt  - will need picc when ready for dc  - c/w bactrim and fluconazole ppx  - Trend Fever  - Trend Leukocytosis-on chronic steroids  - have called [facility] transplant team regarding need to adjust immunosuppressives, awaiting callback. pt otherwise stable and bacteremia cleared    d/w pharmacy, Dr Milton, pt  Will Follow    addendum called by patients [facility] transplant team, they advise hold cellcept x 7 days and check tacrolimus levels, goal 6-8. 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 Patient is hard of hearing and history obtained from wife at bedside.   Reports she noticed patient to be having worsening shortness of breath. He walks with a walker and was getting easily tired. He also endorsed worsening abdominal distention and leg swelling. Wife noted patient to be more sleepy lately and had to use supplemental oxygen the last couple of days. Usually not oxygen dependent. Denies cough, fever  Patient went to HD yesterday, reported no relief  post HD. Per wife he had been c/o cramping in his legs and thinks he may have requested shorter HD sessions  In Ed afebrile,  requiring o2 + leukocytosis, lactate 2.3, ct chest new interstitial edema and new GGO in both lungs, may represent edema underlying infection could not be excluded. Found to have e.faecalis bacteremia    Acute hypoxic respiratory failure likely 2/2 fluid overload  s/p lung transplant  h/o fungal meningitis  ESRD  Leukocytosis on steroids  Bacteremia ? endocarditis    - bcx + 11/10, 11/11 + e.faecalis, amp sensitive  - f/u bcx x 2 ngtd  - pt reports palpitations to cefpodoxime, no h/o allergic reaction  - ct a/p no evidence of infection  - TTE x 2 did not report vegetations. has low EF  - permacath removed. tip cx + e. faecalis  - CRISTIAN could not be obtained. suggest treat empirically for enterococcus endocarditis   - ceftriaxone 2g iv q12h and ampicillin 2g iv q12h adjusted for renal function through 12/26/22, weekly cbc cmp esr crp. wife unsure if she will be able to manage picc at home, will d/w pt  - will need picc when ready for dc  - c/w bactrim and fluconazole ppx  - Trend Fever  - Trend Leukocytosis-on chronic steroids  - have called [facility] transplant team regarding need to adjust immunosuppressives, awaiting callback. pt otherwise stable and bacteremia cleared    d/w pharmacy, Dr Milton, pt  Will Follow    addendum called by patients [facility] transplant team, they advise hold cellcept x 7 days despite negative followup blood cultures, and check tacrolimus levels, goal 6-8. Transplant team # [phone] to be updated prior to discharge

## 2022-11-18 NOTE — PROGRESS NOTE ADULT - SUBJECTIVE AND OBJECTIVE BOX
[facility] DIVISION OF KIDNEY DISEASES AND HYPERTENSION -- HEMODIALYSIS NOTE  --------------------------------------------------------------------------------  Chief Complaint: ESRD/Ongoing hemodialysis requirement    24 hour events/subjective:  no acute event noted  pt seen and examined; denies any acute complaint      PAST HISTORY  --------------------------------------------------------------------------------  No significant changes to PMH, PSH, FHx, SHx, unless otherwise noted    ALLERGIES & MEDICATIONS  --------------------------------------------------------------------------------  Allergies    budesonide (Unknown)  cefpodoxime (Unknown)  Pollen (Unknown)    Intolerances      Standing Inpatient Medications  ampicillin  IVPB 2 Gram(s) IV Intermittent every 12 hours  apixaban 2.5 milliGRAM(s) Oral two times a day  atorvastatin 80 milliGRAM(s) Oral at bedtime  cefTRIAXone Injectable. 2000 milliGRAM(s) IV Push every 12 hours  chlorhexidine 2% Cloths 1 Application(s) Topical <User Schedule>  clopidogrel Tablet 75 milliGRAM(s) Oral daily  fluconAZOLE   Tablet 200 milliGRAM(s) Oral <User Schedule>  hydrocortisone 10 milliGRAM(s) Oral two times a day  metoprolol tartrate 25 milliGRAM(s) Oral two times a day  multivitamin 1 Tablet(s) Oral daily  mycophenolate mofetil 500 milliGRAM(s) Oral two times a day  pantoprazole    Tablet 40 milliGRAM(s) Oral before breakfast  sacubitril 24 mG/valsartan 26 mG 1 Tablet(s) Oral two times a day  sevelamer carbonate 1600 milliGRAM(s) Oral three times a day with meals  tacrolimus 1.5 milliGRAM(s) Oral daily  tamsulosin 0.4 milliGRAM(s) Oral at bedtime  trimethoprim   80 mG/sulfamethoxazole 400 mG 1 Tablet(s) Oral <User Schedule>    PRN Inpatient Medications  acetaminophen     Tablet .. 650 milliGRAM(s) Oral every 6 hours PRN  ALPRAZolam 0.5 milliGRAM(s) Oral two times a day PRN  melatonin 3 milliGRAM(s) Oral at bedtime PRN  ondansetron Injectable 4 milliGRAM(s) IV Push every 8 hours PRN      REVIEW OF SYSTEMS  --------------------------------------------------------------------------------  Gen: No weight changes, fatigue, fevers/chills, weakness  Skin: No rashes  Head/Eyes/Ears/Mouth: No headache  Respiratory: No dyspnea, cough,  CV: No chest pain, orthopnea  GI: No abdominal pain, diarrhea, constipation, nausea, vomiting,  MSK: No joint pain  Neuro: No dizziness/lightheadedness, weakness  Heme: No bleeding  Psych: No significant nervousness, anxiety, stress, depression    All other systems were reviewed and are negative, except as noted.    VITALS/PHYSICAL EXAM  --------------------------------------------------------------------------------  T(C): 37 (11-18-22 @ 12:33), Max: 37.1 (11-18-22 @ 04:33)  HR: 73 (11-18-22 @ 12:33) (68 - 110)  BP: 92/54 (11-18-22 @ 12:33) (90/52 - 132/79)  RR: 18 (11-18-22 @ 12:33) (16 - 18)  SpO2: 97% (11-18-22 @ 12:33) (96% - 100%)  Wt(kg): --        Physical Exam:  	Gen: NAD  	HEENT: Supple neck,  	Pulm: CTA B/L  	CV: RRR, S1S2; no rub  	Abd: +BS, soft, nontender  	LE: Warm, no edema  	Neuro: No focal deficits  	Psych: Normal affect and mood  	Skin: Warm, without rashes  	Vascular access: LUE AVF    LABS/STUDIES  --------------------------------------------------------------------------------              6.6    11.86 >-----------<  156      [11-18-22 @ 07:34]              21.9     140  |  100  |  53.9  ----------------------------<  118      [11-18-22 @ 05:51]  4.5   |  26.0  |  6.27        Ca     8.2     [11-18-22 @ 05:51]    TPro  5.3  /  Alb  3.4  /  TBili  0.5  /  DBili  x   /  AST  29  /  ALT  12  /  AlkPhos  191  [11-18-22 @ 05:51]          Iron 27, TIBC 247, %sat 11      [03-20-22 @ 03:23]  Ferritin 4169      [05-01-22 @ 07:44]  Vitamin D (25OH) 29.1      [11-17-22 @ 05:45]  Lipid: chol 84, , HDL 26, LDL --      [11-12-22 @ 05:44]

## 2022-11-18 NOTE — PROGRESS NOTE ADULT - SUBJECTIVE AND OBJECTIVE BOX
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 Patient is hard of hearing and history obtained from wife at bedside. Reports she noticed patient to be having worsening shortness of breath for the past 2 days. He walks with a walker and was getting easily tired. He also endorsed worsening abdominal distention and leg swelling. He denied chest pain, dizziness, fever, chills, nausea, vomiting, no sick contacts. Wife noted patient to be more sleepy lately and had to use supplemental oxygen the last couple of days. Usually not oxygen dependent. Denies cough.   Patient went to HD yesterday, reported no relief  post HD.  (11 Nov 2022 13:25)    The patient was seen and evaluated   The patient is in no acute distress.  [place]- AAO times 3 - frustrated - states is very anxious hasn't been able to sleep has been thinking about one treatment plan after another and is getting overwhelmed with all these diagnosis wishes he could just sleep- agreeable to trying xanax - states half his problems would go away if he could just sleep few hours      I&O's Summary    Allergies    budesonide (Unknown)  cefpodoxime (Unknown)  Pollen (Unknown)    Intolerances      HEALTH ISSUES - PROBLEM Dx:  Sepsis    Bacteremia    ESRD on dialysis          PAST MEDICAL & SURGICAL HISTORY:  Emphysema of lung  s/p lung transplant      ESRD (end stage renal disease) on dialysis  on HD via LUE T/Th/Sat      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      H/O heart artery stent  x3 @[facility]      History of hip replacement  right      Status post open reduction and internal fixation (ORIF) of fracture  left femur              Vital Signs Last 24 Hrs  T(C): 36.4 (18 Nov 2022 15:10), Max: 37.1 (18 Nov 2022 04:33)  T(F): 97.6 (18 Nov 2022 15:10), Max: 98.7 (18 Nov 2022 04:33)  HR: 91 (18 Nov 2022 15:10) (68 - 110)  BP: 107/58 (18 Nov 2022 15:10) (90/52 - 132/79)  BP(mean): --  RR: 19 (18 Nov 2022 15:10) (16 - 19)  SpO2: 98% (18 Nov 2022 15:10) (96% - 100%)    Parameters below as of 18 Nov 2022 15:10  Patient On (Oxygen Delivery Method): nasal cannula  O2 Flow (L/min): 3  T(C): 36.4 (11-18-22 @ 15:10), Max: 37.1 (11-18-22 @ 04:33)  HR: 91 (11-18-22 @ 15:10) (68 - 110)  BP: 107/58 (11-18-22 @ 15:10) (90/52 - 132/79)  RR: 19 (11-18-22 @ 15:10) (16 - 19)  SpO2: 98% (11-18-22 @ 15:10) (96% - 100%)  Wt(kg): --    PHYSICAL EXAM:    GENERAL: NAD, sitting gin chair - conversing   HEAD:  Atraumatic, Normocephalic  EYES: EOMI, PERRL, conjunctiva and sclera clear  ENMT:  Moist mucous membranes,  No lesions  NECK: Supple, No JVD, Normal thyroid  NERVOUS SYSTEM:  Alert & Oriented X3, in chair  Moves upper and lower extremities; CNS-II-XII  CHEST/LUNG: Clear to auscultation bilaterally; No rales, rhonchi, wheezing,   HEART: Regular rate and rhythm; No murmurs,   ABDOMEN: Soft, Nontender, Nondistended; Bowel sounds present  EXTREMITIES:  Peripheral Pulses, No  cyanosis, or edema  SKIN: No rashes   psychiatry- mood and affect appropriate, Insight and judgement intact     acetaminophen     Tablet .. 650 milliGRAM(s) Oral every 6 hours PRN  ALPRAZolam 0.5 milliGRAM(s) Oral two times a day PRN  ampicillin  IVPB 2 Gram(s) IV Intermittent every 12 hours  apixaban 2.5 milliGRAM(s) Oral two times a day  atorvastatin 80 milliGRAM(s) Oral at bedtime  cefTRIAXone Injectable. 2000 milliGRAM(s) IV Push every 12 hours  chlorhexidine 2% Cloths 1 Application(s) Topical <User Schedule>  clopidogrel Tablet 75 milliGRAM(s) Oral daily  epoetin ben-epbx (RETACRIT) Injectable 02168 Unit(s) IV Push <User Schedule>  fluconAZOLE   Tablet 200 milliGRAM(s) Oral <User Schedule>  hydrocortisone 10 milliGRAM(s) Oral two times a day  melatonin 3 milliGRAM(s) Oral at bedtime PRN  metoprolol tartrate 25 milliGRAM(s) Oral two times a day  multivitamin 1 Tablet(s) Oral daily  mycophenolate mofetil 500 milliGRAM(s) Oral two times a day  ondansetron Injectable 4 milliGRAM(s) IV Push every 8 hours PRN  pantoprazole    Tablet 40 milliGRAM(s) Oral before breakfast  sacubitril 24 mG/valsartan 26 mG 1 Tablet(s) Oral two times a day  sevelamer carbonate 1600 milliGRAM(s) Oral three times a day with meals  tacrolimus 1.5 milliGRAM(s) Oral daily  tamsulosin 0.4 milliGRAM(s) Oral at bedtime  trimethoprim   80 mG/sulfamethoxazole 400 mG 1 Tablet(s) Oral <User Schedule>      LABS:                          6.6    11.86 )-----------( 156      ( 18 Nov 2022 07:34 )             21.9     11-18    140  |  100  |  53.9<H>  ----------------------------<  118<H>  4.5   |  26.0  |  6.27<H>    Ca    8.2<L>      18 Nov 2022 05:51    TPro  5.3<L>  /  Alb  3.4  /  TBili  0.5  /  DBili  x   /  AST  29  /  ALT  12  /  AlkPhos  191<H>  11-18    LIVER FUNCTIONS - ( 18 Nov 2022 05:51 )  Alb: 3.4 g/dL / Pro: 5.3 g/dL / ALK PHOS: 191 U/L / ALT: 12 U/L / AST: 29 U/L / GGT: x                   CAPILLARY BLOOD GLUCOSE          RADIOLOGY & ADDITIONAL TESTS:      Consultant notes reviewed    Case discussed with consultant/provider/ family /patient

## 2022-11-18 NOTE — PROVIDER CONTACT NOTE (CRITICAL VALUE NOTIFICATION) - TEST AND RESULT REPORTED:
H&H 6.6
+ BC. Anarobic and aerobic gram + cocci in pairs and chains
+Blood cultures from 11/12 anaerobic bottle Gram + cocci in pairs and chains
Hgb- 6.7

## 2022-11-18 NOTE — PROGRESS NOTE ADULT - SUBJECTIVE AND OBJECTIVE BOX
[facility]                                                INFECTIOUS DISEASES  =======================================================                               J Carlos Galdamez MD#    Isatu Rojas MD*                           Perlita Solares MD*   Sumaya Morales MD*            Diplomates American Board of Internal Medicine & Infectious Diseases                  # [place] Office - Appt - Tel  [phone] Fax [phone]                * [place] Office - Appt - Tel [phone] Fax [phone]                                  Hospital Consult line:  [phone]  =======================================================      [facility]-[id number]  JU HENSLEYMARYDALJIT   follow up for: enterococcus bacteremia  seen earlier today, asking for xanax to sleep  no fever  hb low, plan for transfusion  no reaction to cefpodoxime  denies complaints  patient seen and examined.       I have personally reviewed the labs and data; pertinent labs and data are listed in this note; please see below.   ===================================================  REVIEW OF SYSTEMS:  CONSTITUTIONAL:  No Fever or chills  HEENT:  No diplopia or blurred vision.  No earache, sore throat or runny nose.  CARDIOVASCULAR:  No pressure, squeezing, strangling, tightness, heaviness or aching about the chest, neck, axilla or epigastrium.  RESPIRATORY:  No cough, shortness of breath  GASTROINTESTINAL:  No nausea, vomiting or diarrhea.  GENITOURINARY:  No dysuria, frequency or urgency. No Blood in urine  MUSCULOSKELETAL:  no joint aches, no muscle pain  SKIN:  No change in skin, hair or nails.  NEUROLOGIC:  No Headaches, seizures or weakness.  PSYCHIATRIC:  No disorder of thought or mood.  ENDOCRINE:  No heat or cold intolerance  HEMATOLOGICAL:  No easy bruising or bleeding.    =======================================================  Allergies    budesonide (Unknown)  cefpodoxime (Unknown)  Pollen (Unknown)    Intolerances    Antibiotics:  fluconAZOLE   Tablet 200 milliGRAM(s) Oral <User Schedule>  piperacillin/tazobactam IVPB.. 3.375 Gram(s) IV Intermittent every 12 hours  trimethoprim   80 mG/sulfamethoxazole 400 mG 1 Tablet(s) Oral <User Schedule>    Other medications:  apixaban 2.5 milliGRAM(s) Oral two times a day  atorvastatin 80 milliGRAM(s) Oral at bedtime  chlorhexidine 2% Cloths 1 Application(s) Topical <User Schedule>  clopidogrel Tablet 75 milliGRAM(s) Oral daily  hydrocortisone 10 milliGRAM(s) Oral two times a day  metoprolol tartrate 25 milliGRAM(s) Oral two times a day  multivitamin 1 Tablet(s) Oral daily  mycophenolate mofetil 500 milliGRAM(s) Oral two times a day  pantoprazole    Tablet 40 milliGRAM(s) Oral before breakfast  sacubitril 24 mG/valsartan 26 mG 1 Tablet(s) Oral two times a day  sevelamer carbonate 1600 milliGRAM(s) Oral three times a day with meals  tacrolimus 1.5 milliGRAM(s) Oral daily  tamsulosin 0.4 milliGRAM(s) Oral at bedtime    ======================================================  Physical Exam:  ============  Vital Signs Last 24 Hrs  T(C): 36.4 (18 Nov 2022 15:10), Max: 37.1 (18 Nov 2022 04:33)  T(F): 97.6 (18 Nov 2022 15:10), Max: 98.7 (18 Nov 2022 04:33)  HR: 91 (18 Nov 2022 15:10) (68 - 107)  BP: 107/58 (18 Nov 2022 15:10) (90/52 - 132/79)  BP(mean): --  RR: 19 (18 Nov 2022 15:10) (16 - 19)  SpO2: 98% (18 Nov 2022 15:10) (96% - 98%)    Parameters below as of 18 Nov 2022 15:10  Patient On (Oxygen Delivery Method): nasal cannula  O2 Flow (L/min): 3      General:  No acute distress.  Eye: no conjunctival pallor, no scleral icterus  Respiratory: Lungs are clear to auscultation, Respirations are non-labored.  Cardiovascular: Normal rate, Regular rhythm,  s1+s2  Gastrointestinal: Soft, Non-tender, distended, Normal bowel sounds.  Genitourinary: No costovertebral angle tenderness.    Musculoskeletal: Normal range of motion, Normal strength. lue avf  Integumentary: No rash.  Neurologic: Alert, Oriented, No focal deficits  Psychiatric: Appropriate mood & affect.  =======================================================  Labs:                                   6.6    11.86 )-----------( 156      ( 18 Nov 2022 07:34 )             21.9       11-18    140  |  100  |  53.9<H>  ----------------------------<  118<H>  4.5   |  26.0  |  6.27<H>    Ca    8.2<L>      18 Nov 2022 05:51    TPro  5.3<L>  /  Alb  3.4  /  TBili  0.5  /  DBili  x   /  AST  29  /  ALT  12  /  AlkPhos  191<H>  11-18                            CAPILLARY BLOOD GLUCOSE                  Culture - Fungal, Body Fluid (collected 11-15-22 @ 09:00)  Source: Peritoneal Peritoneal Fluid    Culture - Body Fluid with Gram Stain (collected 11-15-22 @ 09:00)  Source: Peritoneal Peritoneal Fluid  Gram Stain (11-16-22 @ 03:02):    polymorphonuclear leukocytes seen    No organisms seen    by cytocentrifuge    Culture - Blood (collected 11-14-22 @ 21:20)  Source: .Blood Blood-Peripheral    Culture - Blood (collected 11-14-22 @ 21:15)  Source: .Blood Blood-Venous    Culture - Catheter (collected 11-13-22 @ 08:00)  Source: .Catheter IV Dialysis Perm Cath  Final Report (11-15-22 @ 21:57):    Greater than or equal to 15 Colonies Enterococcus faecalis  Organism: Enterococcus faecalis (11-15-22 @ 21:57)  Organism: Enterococcus faecalis (11-15-22 @ 21:57)    Sensitivities:      -  Ampicillin: S <=2 Predicts results to ampicillin/sulbactam, amoxacillin-clavulanate and  piperacillin-tazobactam.      -  Tetracycline: R >8      -  Vancomycin: S 2      Method Type: ANDRE    Culture - Blood (collected 11-12-22 @ 09:38)  Source: .Blood Blood  Gram Stain (11-13-22 @ 11:48):    Growth in anaerobic bottle: Gram Positive Cocci in Pairs and Chains    Growth in aerobic bottle: Gram Positive Cocci in Pairs and Chains  Final Report (11-14-22 @ 15:08):    Growth in aerobic and anaerobic bottles: Enterococcus faecalis    See previous culture [id number]    Culture - Blood (collected 11-12-22 @ 09:30)  Source: .Blood Blood  Gram Stain (11-13-22 @ 22:10):    Growth in aerobic bottle: Gram positive cocci in pairs    Growth in anaerobic bottle: Gram positive cocci in pairs  Final Report (11-14-22 @ 15:07):    Growth in aerobic and anaerobic bottles: Enterococcus faecalis    See previous culture [id number]    Culture - Blood (collected 11-11-22 @ 10:15)  Source: .Blood Blood-Peripheral  Gram Stain (11-12-22 @ 08:24):    Growth in anaerobic bottle: Gram Positive Cocci in Pairs and Chains    Growth in aerobic bottle: Gram Positive Cocci in Pairs and Chains  Final Report (11-14-22 @ 13:58):    Growth in aerobic and anaerobic bottles: Enterococcus faecalis    See previous culture [id number]    Culture - Blood (collected 11-11-22 @ 10:11)  Source: .Blood Blood-Peripheral  Gram Stain (11-12-22 @ 19:35):    Growth in aerobic bottle: Gram positive cocci in pairs    Growth in anaerobic bottle: Gram Positive Cocci in Pairs and Chains  Final Report (11-15-22 @ 11:22):    Growth in aerobic and anaerobic bottles: Enterococcus faecalis    ***Blood Panel PCR results on this specimen are available    approximately 3 hours after the Gram stain result.***    Gram stain, PCR, and/or culture results may not always    correspond dueto difference in methodologies.    ************************************************************    This PCR assay was performed by multiplex PCR. This    Assay tests for 66 bacterial and resistance gene targets.    Please refer to the [facility] test directory    at [email or web address] for details.  Organism: Blood Culture PCR  Enterococcus faecalis (11-15-22 @ 11:22)  Organism: Enterococcus faecalis (11-15-22 @ 11:22)    Sensitivities:      -  Ampicillin: S <=2 Predicts results to ampicillin/sulbactam, amoxacillin-clavulanate and  piperacillin-tazobactam.      -  Gentamicin synergy: R >500      -  Streptomycin synergy: S <=1000      -  Vancomycin: S 2      Method Type: ANDRE  Organism: Blood Culture PCR (11-15-22 @ 11:22)    Sensitivities:      -  Enterococcus faecalis: Detec      Method Type: PCR        < from: TTE Echo Complete w/o Contrast w/ Doppler (11.16.22 @ 16:10) >  Summary:   1. Left ventricular ejection fraction, by visual estimation, is 25 to   30%.   2. Moderately to severely decreased global left ventricular systolic   function.   3. Normal right ventricular size and function.   4. Mild mitral annular calcification.   5. Mild mitral valve regurgitation.   6. Thickening and calcification of the anterior and posterior mitral   valve leaflets.   7. Mild tricuspid regurgitation.   8. Sclerotic aortic valve with normal opening.   9. Recommendations: May Consider limited 2D echo with definity for   evaluation LVEF.    < end of copied text >

## 2022-11-18 NOTE — PROVIDER CONTACT NOTE (CRITICAL VALUE NOTIFICATION) - SITUATION
Pt on abx will discuss with  if changes need to be made
Will order unit of blood. H&H confirmed
patients hgb 6.7

## 2022-11-18 NOTE — PROGRESS NOTE ADULT - ASSESSMENT
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 Labs and imaging noted, leucocytosis, elevated BNP and troponin. Patient on chronic steroid therapy. EKG sinus tach 114.     1-Acute hypoxic respiratory failure -Acute on chronic Systolic heart HFrEF - EF 25-30%  on Entresto 24/26- discussed with Dr Burger  Supplemental oxygen prn  HD to manage fluid status - add aldactone if BP permits and if needed 107/58 BP now with entresto and metoprolol  cardiology follow up- outpatietn stress- noted new decline in EF  monitor pulse ox on RA and ambulation prior DC     2-Enterococcus bacteremia->on zosyn  - repeat blood cx 11/14 are neg   -ECHO without vegetation, repeat TTE also negative  high risk for complications with infection on CRISTIAN per cardio   diagnostic paracentesis without evidence of SBP  - ID follow up for IV Abx course likely long term needed   s/p PermCath removal on 11/13    3-ESRD on HD:- discussed with DR Bejarano for blood PRBC with HD   cont HD via AVF , RRT  PermCath removed     4-Cirrhosis with ascites abdominal distension   - patient states has been an issue since having 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  - not on diuretics as he doesn't make urine - states ascites is typically resistant to dialysis  - no encephalopathy at this time    5- Multiple thoracic lumbar spine compression Fx on CT incidental findings   pt states those are old with chronic back pain   Tylenol as needed     6-Emphysema, h/o lung transplant,-H/o fungal meningitis- on transplant meds  - Continue Tacrolimus, Cellcept  cont Bactrim, continue Fluconazole  - continue chronic hydrocortisone 10 BID     7-CAD s/p PCI/ Hypertension  - continue, Plavix, statin, metoprolol and  amlodipine    8 PAtrial fib with RVR - now in NSR-EF 25-30% in sinus   - continue metoprolol  on Eliquis 2.5 mg bid     9-Chronic IJ occlusion:  - On Eliquis  - vascular follow up as outpatient    10-BPH:  Continue Flomax    #DVT ppx:  Eliquis.    negative TTE and IV abx course- discussed with ID if can be given Iv abx with HD- will evaluate     HOME WITH HOME PT- PER PT eval

## 2022-11-19 NOTE — PROGRESS NOTE ADULT - SUBJECTIVE AND OBJECTIVE BOX
INTERVAL HPI/OVERNIGHT EVENTS:    CC: acute CHF, acute anemia, ESRD on HD, enterococcus endocarditis, h/o lung transplant, CAD    Chart and course reviewed.  Events noted  received PRBC with HD yesterday  states he feels tired  complaining of abdomen being distended.    Vital Signs Last 24 Hrs  T(C): 36.6 (19 Nov 2022 17:29), Max: 36.7 (19 Nov 2022 04:58)  T(F): 97.8 (19 Nov 2022 17:29), Max: 98 (19 Nov 2022 04:58)  HR: 49 (19 Nov 2022 17:29) (49 - 99)  BP: 96/63 (19 Nov 2022 17:29) (88/50 - 115/69)  BP(mean): --  RR: 18 (19 Nov 2022 17:29) (18 - 18)  SpO2: 100% (19 Nov 2022 17:29) (95% - 100%)    Parameters below as of 19 Nov 2022 11:00  Patient On (Oxygen Delivery Method): nasal cannula        PHYSICAL EXAM:    GENERAL: tired looking, alert, not in distress  CHEST/LUNG: b/l air entry, decreased in bases  HEART: reg  ABDOMEN: distended, non tender, bs+  EXTREMITIES:  no edema, tenderness    MEDICATIONS  (STANDING):  ampicillin  IVPB 2 Gram(s) IV Intermittent every 12 hours  atorvastatin 80 milliGRAM(s) Oral at bedtime  cefTRIAXone Injectable. 2000 milliGRAM(s) IV Push every 12 hours  chlorhexidine 2% Cloths 1 Application(s) Topical <User Schedule>  clopidogrel Tablet 75 milliGRAM(s) Oral daily  epoetin ben-epbx (RETACRIT) Injectable 90593 Unit(s) IV Push <User Schedule>  fluconAZOLE   Tablet 200 milliGRAM(s) Oral <User Schedule>  hydrocortisone 10 milliGRAM(s) Oral two times a day  metoprolol tartrate 25 milliGRAM(s) Oral two times a day  multivitamin 1 Tablet(s) Oral daily  pantoprazole    Tablet 40 milliGRAM(s) Oral before breakfast  sevelamer carbonate 1600 milliGRAM(s) Oral three times a day with meals  tacrolimus 1.5 milliGRAM(s) Oral daily  tamsulosin 0.4 milliGRAM(s) Oral at bedtime  trimethoprim   80 mG/sulfamethoxazole 400 mG 1 Tablet(s) Oral <User Schedule>    MEDICATIONS  (PRN):  acetaminophen     Tablet .. 650 milliGRAM(s) Oral every 6 hours PRN Temp greater or equal to 38C (100.4F), Mild Pain (1 - 3)  ALPRAZolam 0.5 milliGRAM(s) Oral two times a day PRN anxiety and insomnia  melatonin 3 milliGRAM(s) Oral at bedtime PRN Insomnia  ondansetron Injectable 4 milliGRAM(s) IV Push every 8 hours PRN Nausea and/or Vomiting      Allergies    budesonide (Unknown)  cefpodoxime (Unknown)  Pollen (Unknown)    Intolerances          LABS:                          8.0    11.04 )-----------( 156      ( 19 Nov 2022 04:45 )             26.6     11-19    141  |  100  |  32.7<H>  ----------------------------<  93  4.5   |  29.0  |  4.45<H>    Ca    8.4      19 Nov 2022 04:45    TPro  5.3<L>  /  Alb  3.4  /  TBili  0.5  /  DBili  x   /  AST  29  /  ALT  12  /  AlkPhos  191<H>  11-18          RADIOLOGY & ADDITIONAL TESTS:

## 2022-11-19 NOTE — PROGRESS NOTE ADULT - ASSESSMENT
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 Noted to have leucocytosis, elevated BNP and troponin. Patient noted to be in acute hypoxic respiratory failure, requiring supplemental oxygen. Nephrology consulted and he received urgent HD for fluid removal. ID and cardiology consultations were requested. ECHO was ordered. He was given Meropenem and Doxycycline and cultures were sent. Course was complicated by new onset rapid atrial fibrillation and ECHO with new drop in EF 25%. Blood cultures positive for Enterococcus fecalis, antibiotics were changed to Zosyn.  He reverted back to sinus rhythm, Metoprolol was given for rate control, Eliquis was continued. Given bacteremia, vascular surgery was consulted and PermCath was removed and HD was continued via AVF. Entresto added for CHF. He was noted to have abdominal distention and ascites, IR consulted and paracentesis was done, SBP was ruled out. His repeat blood cultures were negative. CRISTIAN was deferred by cardiology as he is severely immunocompromised. 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 A repeat ECHO was done to assess LV function, showed low EF and no vegetations. Outpatient stress testing and ischemic evaluation was recommended by cardiology. He was noted to have a drop in his Hb 6.6, for which he received PRBC with HD on 11/18. FOBT was checked, which was positive. He had borderline BP and episodes of hypotension, Entresto was held. CT abdomen/pelvis was ordered to rule out RP bleed, GI consultation was requested. Eliquis was held.     1. Acute hypoxic respiratory failure sec acute CHF:  Fluid management with HD  repeat ECHO with low EF, outpatient ischemic evaluation  hold Entresto for now given episodes of hypotension  will re evaluate need for repeat paracentesis in 24-48 hrs.    2. Enterococcus bacteremia and infected HD catheter:  Catheter removed  repeat cultures negative.  continue 6 weeks of Ampicillin and Ceftriaxone for presumed endocarditis  TTE x 2 without vegetations.  CRISTIAN deferred given immunocompromised state  will need PICC closer to discharge.    3. Acute blood loss anemia, suspect GI bleed:  S/p PRBC 11/18  monitor CBC  FOBT positive  on PPI  GI consulted to eval for endoscopy  check CT abdo/pelvis to r/o RP bleed  hold Eliquis for now, will continue Plavix.    4. Par A fib:  Continue Metoprolol  Eliquis on hold  will resume pending course and w/u for anemia      5. ESRD on HD:  As above, continue via AVF  nephrology following.  continue Sevelamer    3. Emphysema, h/o lung transplant:  Continue Tacrolimus, levels sent as per primary transplant team recs  Cellcept on hold.  supplemental oxygen  not in COPD exacerbation  continue chronic steroid therapy    4. CAD s/p PCI:  outpatient ischemic eval  ECHO with low EF  maintain Plavix and statin for now.    5. Chronic IJ occlusion:  catheter now removed  Eliquis now being given for a fib, held given anemia.    6. Hypertension/hyperlipidemia:  continue statin  cont Metoprolol    7. BPH:  Continue Flomax    8. H/o fungal meningitis:  continue Bactrim and Fluconazole    9. DVT ppx:  SCDs.    Discussed with patient.  Updated wife and daughter.  Guarded prognosis.  palliative care consulted.   PT re eval closer to discharge.  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 Noted to have leucocytosis, elevated BNP and troponin. Patient noted to be in acute hypoxic respiratory failure, requiring supplemental oxygen. Nephrology consulted and he received urgent HD for fluid removal. ID and cardiology consultations were requested. ECHO was ordered. He was given Meropenem and Doxycycline and cultures were sent. Course was complicated by new onset rapid atrial fibrillation and ECHO with new drop in EF 25%. Blood cultures positive for Enterococcus fecalis, antibiotics were changed to Zosyn.  He reverted back to sinus rhythm, Metoprolol was given for rate control, Eliquis was continued. Given bacteremia, vascular surgery was consulted and PermCath was removed and HD was continued via AVF. Entresto added for CHF. He was noted to have abdominal distention and ascites, IR consulted and paracentesis was done, SBP was ruled out. His repeat blood cultures were negative. CRISTIAN was deferred by cardiology as he is severely immunocompromised. 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 A repeat ECHO was done to assess LV function, showed low EF and no vegetations. Outpatient stress testing and ischemic evaluation was recommended by cardiology. He was noted to have a drop in his Hb 6.6, for which he received PRBC with HD on 11/18. FOBT was checked, which was positive. He had borderline BP and episodes of hypotension, Entresto was held. CT abdomen/pelvis was ordered to rule out RP bleed, GI consultation was requested. Eliquis was held.     1. Acute hypoxic respiratory failure sec fluid overload and acute CHF:  Fluid management with HD  repeat ECHO with low EF, outpatient ischemic evaluation  hold Entresto for now given episodes of hypotension  will re evaluate need for repeat paracentesis in 24-48 hrs.    2. Enterococcus bacteremia and infected HD catheter:  Catheter removed  repeat cultures negative.  continue 6 weeks of Ampicillin and Ceftriaxone for presumed endocarditis  TTE x 2 without vegetations.  CRISTIAN deferred given immunocompromised state  will need PICC closer to discharge.    3. Acute blood loss anemia, suspect GI bleed:  S/p PRBC 11/18  monitor CBC  FOBT positive  on PPI  GI consulted to yohan for endoscopy  check CT abdo/pelvis to r/o RP bleed  hold Eliquis for now, will continue Plavix.    4. Par A fib:  Continue Metoprolol  Eliquis on hold  will resume pending course and w/u for anemia      5. ESRD on HD:  As above, continue via AVF  nephrology following.  continue Sevelamer    3. Emphysema, h/o lung transplant:  Continue Tacrolimus, levels sent as per primary transplant team recs  Cellcept on hold.  supplemental oxygen  not in COPD exacerbation  continue chronic steroid therapy    4. CAD s/p PCI:  outpatient ischemic eval  ECHO with low EF  maintain Plavix and statin for now.    5. Chronic IJ occlusion:  catheter now removed  Eliquis now being given for a fib, held given anemia.    6. Hypertension/hyperlipidemia:  continue statin  cont Metoprolol    7. BPH:  Continue Flomax    8. H/o fungal meningitis:  continue Bactrim and Fluconazole    9. DVT ppx:  SCDs.    Discussed with patient.  Updated wife and daughter.  Guarded prognosis.  palliative care consulted.   PT re eval closer to discharge.

## 2022-11-19 NOTE — PROGRESS NOTE ADULT - SUBJECTIVE AND OBJECTIVE BOX
JU FERGUSON  [id number]      Chief Complaint:  Follow up CHFrEF/bateremia/CKD/PAF    Interval History:  Patient c/o some pressure in his abdomen still.  Reports no significant SOB.  Denies CP, palps.      Tele:    Not available     acetaminophen     Tablet .. 650 milliGRAM(s) Oral every 6 hours PRN  ALPRAZolam 0.5 milliGRAM(s) Oral two times a day PRN  ampicillin  IVPB 2 Gram(s) IV Intermittent every 12 hours  apixaban 2.5 milliGRAM(s) Oral two times a day  atorvastatin 80 milliGRAM(s) Oral at bedtime  cefTRIAXone Injectable. 2000 milliGRAM(s) IV Push every 12 hours  chlorhexidine 2% Cloths 1 Application(s) Topical <User Schedule>  clopidogrel Tablet 75 milliGRAM(s) Oral daily  epoetin ben-epbx (RETACRIT) Injectable 82428 Unit(s) IV Push <User Schedule>  fluconAZOLE   Tablet 200 milliGRAM(s) Oral <User Schedule>  hydrocortisone 10 milliGRAM(s) Oral two times a day  melatonin 3 milliGRAM(s) Oral at bedtime PRN  metoprolol tartrate 25 milliGRAM(s) Oral two times a day  multivitamin 1 Tablet(s) Oral daily  ondansetron Injectable 4 milliGRAM(s) IV Push every 8 hours PRN  pantoprazole    Tablet 40 milliGRAM(s) Oral before breakfast  sacubitril 24 mG/valsartan 26 mG 1 Tablet(s) Oral two times a day  sevelamer carbonate 1600 milliGRAM(s) Oral three times a day with meals  tacrolimus 1.5 milliGRAM(s) Oral daily  tamsulosin 0.4 milliGRAM(s) Oral at bedtime  trimethoprim   80 mG/sulfamethoxazole 400 mG 1 Tablet(s) Oral <User Schedule>          Physical Exam:  T(C): 36.3 (11-19-22 @ 11:00), Max: 36.7 (11-19-22 @ 04:58)  HR: 98 (11-19-22 @ 12:17) (88 - 99)  BP: 105/61 (11-19-22 @ 12:17) (88/50 - 115/69)  RR: 18 (11-19-22 @ 11:00) (18 - 19)  SpO2: 98% (11-19-22 @ 11:00) (95% - 98%)  Wt(kg): --  General: Comfortable in NAD  Neck: No JVD  CVS: nl s1s2, no s3  Pulm: CTA b/l  Abd: soft, non-tender  Ext: No c/c/e  Neuro A&O x3  Psych: Normal affect      Labs:   19 Nov 2022 04:45    141    |  100    |  32.7   ----------------------------<  93     4.5     |  29.0   |  4.45     Ca    8.4        19 Nov 2022 04:45    TPro  5.3    /  Alb  3.4    /  TBili  0.5    /  DBili  x      /  AST  29     /  ALT  12     /  AlkPhos  191    18 Nov 2022 05:51                          8.0    11.04 )-----------( 156      ( 19 Nov 2022 04:45 )             26.6           Cardiac catheterization on 11/26/19:  PCI with drug-eluting stent to LAD and OM1.  Cardiac catheterization, this information is taken from a progress note from the [facility] documenting cardiac catheterization 01/14 showing 60% proximal LAD stenosis, 60% mid LAD stenosis, 60% proximal circumflex stenosis, 60% ostial OM1 stenosis, 40% proximal right coronary artery stenosis, 40% proximal PDA documenting an FFR of the mid LAD of 0.79.    ECHO 11/12/2022:   1. Technically difficult study.   2. Endocardial visualization was enhanced with intravenous echo contrast.   3. Left ventricular ejection fraction, by visual estimation, is 25 to 30%.   4. Severely decreased global left ventricular systolic function.   5. The mitral in-flow pattern reveals no discernable A-wave, therefore   no comment on diastolic function can be made.   6. Mildly enlarged right ventricle.   7. Moderately reduced RV systolic function.   8. Mild mitral valve regurgitation.   9. Estimated pulmonary artery systolic pressure is 35.2 mmHg assuming a   right atrial pressure of 8 mmHg, which is consistent with borderline   pulmonary hypertension.  10. In comparison to prior TTE 4/14/2021 LVEF is now reduced to 25-30%.   (LVEF was 40-45%). Rhythm monitoring shows Atrial Fibrillation with RVR   which may adversly affect LVEF evaluation. Consider to obtain Limited FU   TTE once HR controlled or rhythm restored to SR if clinically indicated.      Echo (11/16/22):   1. Left ventricular ejection fraction, by visual estimation, is 25 to 30%.   2. Moderately to severely decreased global left ventricular systolic function.   3. Normal right ventricular size and function.   4. Mild mitral annular calcification.   5. Mild mitral valve regurgitation.   6. Thickening and calcification of the anterior and posterior mitral valve leaflets.   7. Mild tricuspid regurgitation.   8. Sclerotic aortic valve with normal opening.        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 Admitted with hypoxic respiratory failure/ fluid overload.   -Had rapid AF now in SR, on A/C.  -Acute systolic CHF, fluid managed with HD.  -Bacteremia with enterococcus, on antibiotics and permacath removed. Echo without vegetation.  -Drop in EF maybe from rapid AF, will repeat prior to discharge to re-evaluate.  -Minimally elevated trops from demand ischemia.  -s/p paracentesis without significant symptomatic improvement.  -Now with more significant anemia, s/p one unit.   -Repeat echo in SR still with severe LV dysfunction, euvolemic at present time.  Wi      Plan:   1. Fluid status managed with HD.  2. No signs vegetation on echo and no significant valve disease. Will hold off on CRISTIAN unless persistent bacteremia given high aspiration risk and immunocompromised state.   3. Primary team holding AC and antiplatelets, resume once feasible from anemia perspective.   4. Continue current CV meds at current doses.  5. Borderline BP preventing implementation of GDMT.  Add as tolerated  6. Repeat echo as OP and also stress testing.  Given anemia especially will hold off on cath for now.

## 2022-11-20 NOTE — PROGRESS NOTE ADULT - SUBJECTIVE AND OBJECTIVE BOX
JU HENSLEYMARYDALJIT  [id number]        Chief Complaint:  Follow up CHFrEF/bateremia/CKD/PAF    Interval History:  Patient c/o some pressure in his abdomen still.  Reports no significant SOB.  Denies CP, palps.      Tele:    Not available     acetaminophen     Tablet .. 650 milliGRAM(s) Oral every 6 hours PRN  ALPRAZolam 0.5 milliGRAM(s) Oral two times a day PRN  ampicillin  IVPB 2 Gram(s) IV Intermittent every 12 hours  atorvastatin 80 milliGRAM(s) Oral at bedtime  cefTRIAXone Injectable. 2000 milliGRAM(s) IV Push every 12 hours  chlorhexidine 2% Cloths 1 Application(s) Topical <User Schedule>  epoetin ben-epbx (RETACRIT) Injectable 41788 Unit(s) IV Push <User Schedule>  fluconAZOLE   Tablet 200 milliGRAM(s) Oral <User Schedule>  hydrocortisone 10 milliGRAM(s) Oral two times a day  melatonin 3 milliGRAM(s) Oral at bedtime PRN  metoprolol tartrate 25 milliGRAM(s) Oral two times a day  multivitamin 1 Tablet(s) Oral daily  ondansetron Injectable 4 milliGRAM(s) IV Push every 8 hours PRN  pantoprazole    Tablet 40 milliGRAM(s) Oral before breakfast  sevelamer carbonate 1600 milliGRAM(s) Oral three times a day with meals  tacrolimus 1.5 milliGRAM(s) Oral daily  tamsulosin 0.4 milliGRAM(s) Oral at bedtime  trimethoprim   80 mG/sulfamethoxazole 400 mG 1 Tablet(s) Oral <User Schedule>          Physical Exam:  T(C): 36.4 (11-20-22 @ 11:34), Max: 36.7 (11-19-22 @ 21:00)  HR: 78 (11-20-22 @ 11:34) (49 - 80)  BP: 97/49 (11-20-22 @ 11:34) (90/50 - 97/49)  RR: 20 (11-20-22 @ 11:34) (18 - 20)  SpO2: 95% (11-20-22 @ 04:56) (95% - 100%)  Wt(kg): --  General: Comfortable in NAD  Neck: No JVD  CVS: nl s1s2, no s3  Pulm: CTA b/l  Abd: soft, non-tender  Ext: No c/c/e  Neuro A&O x2-3      Labs:   20 Nov 2022 05:00    143    |  103    |  47.9   ----------------------------<  115    5.1     |  27.0   |  6.12     Ca    8.4        20 Nov 2022 05:00  Phos  4.1       20 Nov 2022 05:00  Mg     2.1       20 Nov 2022 05:00                            7.1    10.24 )-----------( 165      ( 20 Nov 2022 05:00 )             23.6               Cardiac catheterization on 11/26/19:  PCI with drug-eluting stent to LAD and OM1.  Cardiac catheterization, this information is taken from a progress note from the [facility] documenting cardiac catheterization 01/14 showing 60% proximal LAD stenosis, 60% mid LAD stenosis, 60% proximal circumflex stenosis, 60% ostial OM1 stenosis, 40% proximal right coronary artery stenosis, 40% proximal PDA documenting an FFR of the mid LAD of 0.79.    ECHO 11/12/2022:   1. Technically difficult study.   2. Endocardial visualization was enhanced with intravenous echo contrast.   3. Left ventricular ejection fraction, by visual estimation, is 25 to 30%.   4. Severely decreased global left ventricular systolic function.   5. The mitral in-flow pattern reveals no discernable A-wave, therefore   no comment on diastolic function can be made.   6. Mildly enlarged right ventricle.   7. Moderately reduced RV systolic function.   8. Mild mitral valve regurgitation.   9. Estimated pulmonary artery systolic pressure is 35.2 mmHg assuming a   right atrial pressure of 8 mmHg, which is consistent with borderline   pulmonary hypertension.  10. In comparison to prior TTE 4/14/2021 LVEF is now reduced to 25-30%.   (LVEF was 40-45%). Rhythm monitoring shows Atrial Fibrillation with RVR   which may adversly affect LVEF evaluation. Consider to obtain Limited FU   TTE once HR controlled or rhythm restored to SR if clinically indicated.      Echo (11/16/22):   1. Left ventricular ejection fraction, by visual estimation, is 25 to 30%.   2. Moderately to severely decreased global left ventricular systolic function.   3. Normal right ventricular size and function.   4. Mild mitral annular calcification.   5. Mild mitral valve regurgitation.   6. Thickening and calcification of the anterior and posterior mitral valve leaflets.   7. Mild tricuspid regurgitation.   8. Sclerotic aortic valve with normal opening.        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 Admitted with hypoxic respiratory failure/ fluid overload.   -Had rapid AF now in SR, A/C on hold  -Acute systolic CHF, fluid managed with HD.  -Bacteremia with enterococcus, on antibiotics and permacath removed. Echo without vegetation.  -Drop in EF maybe from rapid AF, will repeat prior to discharge to re-evaluate.  -Minimally elevated trops from demand ischemia.  -s/p paracentesis without significant symptomatic improvement.  -Now with more significant anemia, s/p one unit.   -Repeat echo in SR still with severe LV dysfunction, euvolemic at present time.   -Planned for EGD      Plan:   1. Fluid status managed with HD.  2. No signs vegetation on echo and no significant valve disease. Will hold off on CRISTIAN unless persistent bacteremia given high aspiration risk and immunocompromised state.   3. Primary team holding AC and antiplatelets, resume once feasible from anemia perspective.   4. Continue current CV meds at current doses.  5. Borderline BP preventing implementation of GDMT.  Add as tolerated  6. Repeat echo as OP and also stress testing.  Given anemia especially will hold off on cath for now.  7. Planned for  low risk diagnostic procedure (EGD), patient is at high risk given multiple co-morbid conditions, from cardiac perspective he is at high risk. However information from EGD and future colonoscopy will be extremely important to determine feasibility of future cardiovascular testing/interventions.

## 2022-11-20 NOTE — CONSULT NOTE ADULT - NS ATTEND AMEND GEN_ALL_CORE FT
Admitted with bacteremia/sepsis on HD. Drop in Hb. No prior EGD, pt not certain on prior colonoscopy. s/p PRBC today. Off eliquis and plavix since 11/19. Plan for EGD Tuesday. May need colonoscopy as outpatient, pt does not feel well enough for colonoscopy at this time. Cardiology assessment for EGD,

## 2022-11-20 NOTE — PROGRESS NOTE ADULT - SUBJECTIVE AND OBJECTIVE BOX
INTERVAL HPI/OVERNIGHT EVENTS:    CC: acute CHF, acute anemia, ESRD on HD, enterococcus endocarditis, h/o lung transplant, CAD    No overnight events  low Hb this am  discussed labs with patient, and explained need for PRBC and HD today  plan to meet with patient and family later today  reported frustration over being ill and feeling tired.     Vital Signs Last 24 Hrs  T(C): 36.4 (20 Nov 2022 11:34), Max: 36.7 (19 Nov 2022 21:00)  T(F): 97.6 (20 Nov 2022 11:34), Max: 98.1 (19 Nov 2022 21:00)  HR: 78 (20 Nov 2022 11:34) (49 - 98)  BP: 97/49 (20 Nov 2022 11:34) (90/50 - 105/61)  BP(mean): --  RR: 20 (20 Nov 2022 11:34) (18 - 20)  SpO2: 95% (20 Nov 2022 04:56) (95% - 100%)    Parameters below as of 19 Nov 2022 21:00  Patient On (Oxygen Delivery Method): nasal cannula        PHYSICAL EXAM:    GENERAL: pale, not in distress, alert  CHEST/LUNG: b/l air entry, decreased in bases  HEART: reg  ABDOMEN: soft, distended, non tender, BS+  EXTREMITIES:  no edema, tenderness    MEDICATIONS  (STANDING):  ampicillin  IVPB 2 Gram(s) IV Intermittent every 12 hours  atorvastatin 80 milliGRAM(s) Oral at bedtime  cefTRIAXone Injectable. 2000 milliGRAM(s) IV Push every 12 hours  chlorhexidine 2% Cloths 1 Application(s) Topical <User Schedule>  epoetin ben-epbx (RETACRIT) Injectable 77244 Unit(s) IV Push <User Schedule>  fluconAZOLE   Tablet 200 milliGRAM(s) Oral <User Schedule>  hydrocortisone 10 milliGRAM(s) Oral two times a day  metoprolol tartrate 25 milliGRAM(s) Oral two times a day  multivitamin 1 Tablet(s) Oral daily  pantoprazole    Tablet 40 milliGRAM(s) Oral before breakfast  sevelamer carbonate 1600 milliGRAM(s) Oral three times a day with meals  tacrolimus 1.5 milliGRAM(s) Oral daily  tamsulosin 0.4 milliGRAM(s) Oral at bedtime  trimethoprim   80 mG/sulfamethoxazole 400 mG 1 Tablet(s) Oral <User Schedule>    MEDICATIONS  (PRN):  acetaminophen     Tablet .. 650 milliGRAM(s) Oral every 6 hours PRN Temp greater or equal to 38C (100.4F), Mild Pain (1 - 3)  ALPRAZolam 0.5 milliGRAM(s) Oral two times a day PRN anxiety and insomnia  melatonin 3 milliGRAM(s) Oral at bedtime PRN Insomnia  ondansetron Injectable 4 milliGRAM(s) IV Push every 8 hours PRN Nausea and/or Vomiting      Allergies    budesonide (Unknown)  cefpodoxime (Unknown)  Pollen (Unknown)    Intolerances          LABS:                          7.1    10.24 )-----------( 165      ( 20 Nov 2022 05:00 )             23.6     11-20    143  |  103  |  47.9<H>  ----------------------------<  115<H>  5.1   |  27.0  |  6.12<H>    Ca    8.4      20 Nov 2022 05:00  Phos  4.1     11-20  Mg     2.1     11-20            RADIOLOGY & ADDITIONAL TESTS:

## 2022-11-20 NOTE — CONSULT NOTE ADULT - SUBJECTIVE AND OBJECTIVE BOX
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 Hospital course was complicated by new onset rapid atrial fibrillation and ECHO with new drop in EF 25%. Blood cultures positive for Enterococcus fecalis. He was noted to have abdominal distention and ascites, IR consulted and paracentesis was done, SBP was ruled out. His repeat blood cultures were negative. GI consulted for drop in hemoglobin, Hb 6.6, for which he received PRBC with HD on 11/18, +FOBT.  Patient denies abdominal pain, pressure, palpitation. Reports "feeling very sick from head to toe". Denies chest pain, palpitation, hematemesis, hematochezia. His hemoglobin 7.1 gm this morning.     Review of Systems:  . Constitutional: No fever, chills,  . Neck: No pain or stiffness  · Respiratory and Thorax: No shortness of breath, no cough, no wheezing  · Cardiovascular: No chest pain, palpitation,  · Gastrointestinal: see above.  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    2019 novel coronavirus disease (COVID-19)  Feb 2021 - hospitalized for 1 week at [facility]    Pneumonia  April 2021    [place]    HTN (hypertension)    Lumbar spondylosis    History of COPD    BPH without urinary obstruction    Hypercholesterolemia    Oliguria and anuria    H/O peripheral neuropathy    H/O lung transplant  bilateral - transplant - 1-2-2015 -  [facility]    H/O heart artery stent  x3 @[facility]    History of hip replacement  right    Status post open reduction and internal fixation (ORIF) of fracture  left femur      SOCIAL HISTORY:  - TOBACCO: Denies  - ALCOHOL: Denies  - ILLICIT DRUG USE: Denies    FAMILY HISTORY:  No known history of gastrointestinal or liver disease;  Family history of emphysema  mother        HOME MEDICATIONS:  amLODIPine 5 mg oral tablet: 1 tab(s) orally once a day (11 Nov 2022 13:05)  Bactrim 400 mg-80 mg oral tablet: 1 tab(s) orally Monday, Wednesday, and Friday (11 Nov 2022 13:03)  CellCept 250 mg oral capsule: 2 cap(s) orally 2 times a day (11 Nov 2022 13:03)  Flomax 0.4 mg oral capsule: 1 cap(s) orally once a day (11 Nov 2022 13:03)  fluconazole 200 mg oral tablet: 1 tab(s) orally Tuesday, Thursday, Saturday (11 Nov 2022 13:03)  hydrocortisone 10 mg oral tablet: 1 tab(s) orally 2 times a day (11 Nov 2022 13:05)  Multiple Vitamins oral tablet: 1 tab(s) orally once a day (11 Nov 2022 13:03)  pantoprazole 40 mg oral delayed release tablet: 1 tab(s) orally once a day (before a meal) (11 Nov 2022 13:03)  rosuvastatin 20 mg oral tablet: 1 tab(s) orally once a day (11 Nov 2022 13:05)  sevelamer carbonate 800 mg oral tablet: 2 tab(s) orally 3 times a day (with meals) (11 Nov 2022 13:03)  tacrolimus 0.5 mg oral capsule: 1.5 milligram(s) orally once a day (11 Nov 2022 13:05)    INPATIENT MEDICATIONS:  MEDICATIONS  (STANDING):  ampicillin  IVPB 2 Gram(s) IV Intermittent every 12 hours  atorvastatin 80 milliGRAM(s) Oral at bedtime  cefTRIAXone Injectable. 2000 milliGRAM(s) IV Push every 12 hours  chlorhexidine 2% Cloths 1 Application(s) Topical <User Schedule>  epoetin ben-epbx (RETACRIT) Injectable 78155 Unit(s) IV Push <User Schedule>  fluconAZOLE   Tablet 200 milliGRAM(s) Oral <User Schedule>  hydrocortisone 10 milliGRAM(s) Oral two times a day  metoprolol tartrate 25 milliGRAM(s) Oral two times a day  multivitamin 1 Tablet(s) Oral daily  pantoprazole    Tablet 40 milliGRAM(s) Oral before breakfast  sevelamer carbonate 1600 milliGRAM(s) Oral three times a day with meals  tacrolimus 1.5 milliGRAM(s) Oral daily  tamsulosin 0.4 milliGRAM(s) Oral at bedtime  trimethoprim   80 mG/sulfamethoxazole 400 mG 1 Tablet(s) Oral <User Schedule>    MEDICATIONS  (PRN):  acetaminophen     Tablet .. 650 milliGRAM(s) Oral every 6 hours PRN Temp greater or equal to 38C (100.4F), Mild Pain (1 - 3)  ALPRAZolam 0.5 milliGRAM(s) Oral two times a day PRN anxiety and insomnia  melatonin 3 milliGRAM(s) Oral at bedtime PRN Insomnia  ondansetron Injectable 4 milliGRAM(s) IV Push every 8 hours PRN Nausea and/or Vomiting    ALLERGIES:  budesonide (Unknown)  cefpodoxime (Unknown)  Pollen (Unknown)    T(C): 36.7 (11-20-22 @ 04:56), Max: 36.7 (11-19-22 @ 21:00)  HR: 76 (11-20-22 @ 04:56) (49 - 99)  BP: 96/58 (11-20-22 @ 04:56) (88/50 - 105/61)  RR: 18 (11-20-22 @ 04:56) (18 - 18)  SpO2: 95% (11-20-22 @ 04:56) (95% - 100%)      PHYSICAL EXAM:    Constitutional: No acute distress, pale appearing.   Neuro: Awake alert, oriented to person, place and situation, non-focal, speech clear and intact  HEENT: PERRL, anicteric sclerae, oral mucosa pink and moist  Neck: supple, no JVD  CV: regular rate, regular rhythm, +S1S2,   Pulm/chest: lung sounds CTA and equal bilaterally, no accessory muscle use noted  Abd: soft, NT, ND, +BS  Ext: no Cyanosis, clubbing or edema  Skin: warm, pale, no jaundice   Psych: calm, appropriate affect      LABS:             7.1    10.24 )-----------( 165      ( 11-20 @ 05:00 )             23.6                8.0    11.04 )-----------( 156      ( 11-19 @ 04:45 )             26.6                7.7    13.33 )-----------( 156      ( 11-18 @ 22:40 )             25.0                6.6    11.86 )-----------( 156      ( 11-18 @ 07:34 )             21.9                6.7    10.13 )-----------( 144      ( 11-18 @ 05:51 )             21.8         11-20    143  |  103  |  47.9<H>  ----------------------------<  115<H>  5.1   |  27.0  |  6.12<H>    Ca    8.4      20 Nov 2022 05:00  Phos  4.1     11-20  Mg     2.1     11-20      < from: CT Abdomen and Pelvis No Cont (11.19.22 @ 17:47) >  FINDINGS:    Septal thickening and groundglass opacities are again noted with fissural   fluid. Cardiomegaly.    Cirrhotic liver. Tiny gallstones. Atrophic kidneys. Unremarkable   pancreas, spleen, and adrenal glands.    Moderate ascites.    No bowel obstruction. Normal appendix.    No retroperitoneal lymphadenopathy. A 3.1 cm abdominal aortic aneurysm is   unchanged.  No retroperitoneal hematoma.    Chronic left rib fractures. Sternal wires.  Right hip arthroplasty. Left femoral fixation hardware. Chronic left   inferior pubic ramus fracture. Chronic fractures of the left acetabulum   and iliac bone. Chronic fracture deformity of the left femur. Bilateral   sacral insufficiency fractures are noted. Multiple compression fractures   are unchanged.    IMPRESSION:  No retroperitoneal hematoma.    Cirrhotic liver with moderate ascites.    Septal thickening and ground glass opacities may reflect pulmonary edema   and/or infection.    Multiple chronic fractures as above.      < end of copied text >

## 2022-11-20 NOTE — CONSULT NOTE ADULT - PROBLEM SELECTOR RECOMMENDATION 9
Hemoglobin dropped to 6.6 gm on 11/18 from baseline 7.9 -8.7). s/p transfusion x1 PRBCs, with appropriate response. Hemoglobin 8.l1 this morning.   Eliquis now d/c, laser dose yesterday morning. Plavix last dose yesterday  Trend CBC, transfuse to hgb 8 or higher  Will plan for EGD on Tuesday (11/22/22) once cleared by the cardiologist  Recommended to have HD performed 1 day prior to EGD  Diet as tolerated  Continue Protonix  Plan discussed with hospital attending

## 2022-11-20 NOTE — PROGRESS NOTE ADULT - ASSESSMENT
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 Noted to have leucocytosis, elevated BNP and troponin. Patient noted to be in acute hypoxic respiratory failure, requiring supplemental oxygen. Nephrology consulted and he received urgent HD for fluid removal. ID and cardiology consultations were requested. ECHO was ordered. He was given Meropenem and Doxycycline and cultures were sent. Course was complicated by new onset rapid atrial fibrillation and ECHO with new drop in EF 25%. Blood cultures positive for Enterococcus fecalis, antibiotics were changed to Zosyn.  He reverted back to sinus rhythm, Metoprolol was given for rate control, Eliquis was continued. Given bacteremia, vascular surgery was consulted and PermCath was removed and HD was continued via AVF. Entresto added for CHF. He was noted to have abdominal distention and ascites, IR consulted and paracentesis was done, SBP was ruled out. His repeat blood cultures were negative. CRISTIAN was deferred by cardiology as he is severely immunocompromised. 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 A repeat ECHO was done to assess LV function, showed low EF and no vegetations. Outpatient stress testing and ischemic evaluation was recommended by cardiology. He was noted to have a drop in his Hb 6.6, for which he received PRBC with HD on 11/18. FOBT was checked, which was positive. He had borderline BP and episodes of hypotension, Entresto was held. CT abdomen/pelvis was ordered to rule out RP bleed, GI consultation was requested. Eliquis was held. CT abdomen/pelvis was done, negative for RP bleed, did reveal moderate ascites. GI suggested EGD to further evaluate anemia.     1. Acute hypoxic respiratory failure sec fluid overload and acute CHF:  Fluid management with HD  repeat HD today, will request nephrology for additional session tomorrow in prep for EGD on Tuesday  repeat ECHO with low EF, outpatient ischemic evaluation  will re evaluate need for repeat paracentesis in 24-48 hrs, moderate ascites on CT abdomen.     2. Enterococcus bacteremia and infected HD catheter:  Catheter removed  repeat cultures negative.  continue 6 weeks of Ampicillin and Ceftriaxone for presumed endocarditis  TTE x 2 without vegetations.  CRISTIAN deferred given immunocompromised state  will need PICC closer to discharge.    3. Acute blood loss anemia, suspect GI bleed:  S/p PRBC 11/18   transfuse PRBC today for Hb 7.1, to be given with HD today.  monitor CBC  FOBT positive, EGD on Tuesday, off Plavix and Eliquis.  CT negative for RP bleed.     4. Par A fib:  Continue Metoprolol  Eliquis on hold  will resume pending course and w/u for anemia      5. ESRD on HD:  As above, continue via AVF  nephrology following.  continue Sevelamer    3. Emphysema, h/o lung transplant:  Continue Tacrolimus, levels sent as per primary transplant team recs  Cellcept on hold.  supplemental oxygen  not in COPD exacerbation  continue chronic steroid therapy    4. CAD s/p PCI:  outpatient ischemic eval  ECHO with low EF  continue statin.  Hold Plavix.    5. Chronic IJ occlusion:  catheter now removed  Eliquis now being given for a fib, held given anemia.    6. Hypertension/hyperlipidemia:  continue statin  cont Metoprolol    7. BPH:  Continue Flomax    8. H/o fungal meningitis:  continue Bactrim and Fluconazole    9. DVT ppx:  SCDs.    Discussed with patient and nephrology.  Plan for EGD on Tuesday.

## 2022-11-20 NOTE — CONSULT NOTE ADULT - ASSESSMENT
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 Admitted with hypoxic respiratory failure/ fluid overload. Hospital course significant for new onset Afib, was started on Eliquis, now d/c after drop in hemoglobin, +FOBT.

## 2022-11-20 NOTE — GOALS OF CARE CONVERSATION - ADVANCED CARE PLANNING - CONVERSATION DETAILS
Met with patient and his family at bedside. Patient reported feeling tired and depressed today, frustrated about having to receive HD so frequently.. We discussed about his low blood counts, need for transfusions and an endoscopy to evaluate source of blood loss. Patient wants to proceed with the test at this time.   Met with wife and daughter earlier and explained clinical course, plan for work for anemia, eventual  outpatient cardiac work up to evaluate low EF, and need  for antibiotics post discharge for bacteremia and presumed endocarditis. They expressed concern over his overall debilitated state. On prior occasions, patient had wanted to be full code. We discussed the importance of readdressing advance directives and goals of care at this time, and getting palliative care consultation to provide patient and family ongoing support. Patient is extremely hard of hearing. Patient and family noted to be very emotional, family feels he is 'giving up' and request we postpone additional discussions with him regarding his wishes when he is in a better 'emotional state'.   At this time, he is full code, palliative care has been consulted.

## 2022-11-21 NOTE — PROGRESS NOTE ADULT - ASSESSMENT
72 year old male with PMH of HTN, HLD, CAD s/p stent, COPD/emphysema s/p lung transplantation, history of fungal meningitis, hx of hip fracture s/p ORIF, and ESRD on HD presents with SOB. CT chest showed new interstitial edema and new GGO in both lungs. Admitted for further work up. Hospital course is notable for Enterococcus faecalis bacteremia. Nephrology is managing ESRD on HD.     -Access: L AV access   -Outpatient dialysis days are TTS,   - HD today  -BP intermittently soft - currently on Entresto and metoprolol - UF as tolerated.  -Anemia in setting of CKD - start MENDY with HD, PRBC transfusion . 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 FOBT positive, EGD on Tuesday, off Plavix and Eliquis.. CT negative for RP bleed.

## 2022-11-21 NOTE — CONSULT NOTE ADULT - ASSESSMENT
72yr man with  ESRD on HD, emphysema s/p lung transplant, history of fungal meningitis, carotid stenosis s/p CEA, CAD s/p PCI in 2019, HTN, HLD admitted with hypoxia 2/2 CHF complicated by Enterococcus bacteremia with concern for endocarditis, anemia concern for GI bleed    Hypoxia  cont O2 support    CHF  on HD for fluid mgmt    Bacteremia  TTE neg for vegetations  CRISTIAN deferred  IV abx per ID  monitor for fever    Anemia  for endoscopy tomorrow  monitor Hg    ESRD  HD per renal  avoid nephrotoxic agents    Anxiety   Xanax PRN  monitor use  May need SSRI  Psychosocial support    Encounter for Palliative Care   Palliative Care consulted to assist with goals of care .  See GOC note for details.    Patient known to me from previous admission in April 2021.  At the time he hope to obtain a kidney transplant.  Patient appears  to have a fair  understanding of his condition and issues at hand.  However, his seems to be struggling with his  wishes with advance directives at end of life.    Overall, for now wishes CPR,    Continued support.  Plan for further GOC discussions.      72yr man with  ESRD on HD, emphysema s/p lung transplant, history of fungal meningitis, carotid stenosis s/p CEA, CAD s/p PCI in 2019, HTN, HLD admitted with hypoxia 2/2 CHF complicated by Enterococcus bacteremia with concern for endocarditis, anemia concern for GI bleed    Hypoxia  cont O2 support    CHF  on HD for fluid mgmt    Bacteremia  TTE neg for vegetations  CRISTIAN deferred  IV abx per ID  monitor for fever    Anemia  for endoscopy tomorrow  monitor Hg    ESRD  HD per renal  avoid nephrotoxic agents    Anxiety   Xanax PRN  monitor use  May need SSRI  Psychosocial support    Encounter for Palliative Care   Palliative Care consulted to assist with goals of care .  See GOC note for details.    Patient known to me from previous admission in April 2021.  At the time he hope to obtain a kidney transplant.  Patient appears to understand his condition and issues at hand. He seems to be preparing himself if his condition does not improve.    Overall, for now wishes CPR,    Continued support.  Plan for further GOC discussions.

## 2022-11-21 NOTE — PROGRESS NOTE ADULT - SUBJECTIVE AND OBJECTIVE BOX
[facility] DIVISION OF KIDNEY DISEASES AND HYPERTENSION -- HEMODIALYSIS NOTE  --------------------------------------------------------------------------------  Chief Complaint: ESRD/Ongoing hemodialysis requirement    24 hour events/subjective:  s/p 1 U PRBC yesterday.  Pt seen during HD.        PAST HISTORY  --------------------------------------------------------------------------------  No significant changes to PMH, PSH, FHx, SHx, unless otherwise noted    ALLERGIES & MEDICATIONS  --------------------------------------------------------------------------------  Allergies    budesonide (Unknown)  cefpodoxime (Unknown)  Pollen (Unknown)    Intolerances      Standing Inpatient Medications  ampicillin  IVPB 2 Gram(s) IV Intermittent every 12 hours  atorvastatin 80 milliGRAM(s) Oral at bedtime  cefTRIAXone Injectable. 2000 milliGRAM(s) IV Push every 12 hours  chlorhexidine 2% Cloths 1 Application(s) Topical <User Schedule>  epoetin ben-epbx (RETACRIT) Injectable 51016 Unit(s) IV Push <User Schedule>  fluconAZOLE   Tablet 200 milliGRAM(s) Oral <User Schedule>  hydrocortisone 10 milliGRAM(s) Oral two times a day  metoprolol tartrate 25 milliGRAM(s) Oral two times a day  multivitamin 1 Tablet(s) Oral daily  pantoprazole    Tablet 40 milliGRAM(s) Oral before breakfast  sevelamer carbonate 1600 milliGRAM(s) Oral three times a day with meals  tacrolimus 1.5 milliGRAM(s) Oral daily  tamsulosin 0.4 milliGRAM(s) Oral at bedtime  trimethoprim   80 mG/sulfamethoxazole 400 mG 1 Tablet(s) Oral <User Schedule>    PRN Inpatient Medications  acetaminophen     Tablet .. 650 milliGRAM(s) Oral every 6 hours PRN  ALPRAZolam 0.5 milliGRAM(s) Oral two times a day PRN  melatonin 3 milliGRAM(s) Oral at bedtime PRN  ondansetron Injectable 4 milliGRAM(s) IV Push every 8 hours PRN      REVIEW OF SYSTEMS  --------------------------------------------------------------------------------  Gen: No weight changes, fatigue, fevers/chills, weakness  Skin: No rashes  Head/Eyes/Ears/Mouth: No headache  Respiratory: No dyspnea, cough,  CV: No chest pain, orthopnea  GI: No abdominal pain, diarrhea, constipation, nausea, vomiting,  MSK: No joint pain  Neuro: No dizziness/lightheadedness, weakness  Heme: No bleeding  Psych: No significant nervousness, anxiety, stress, depression    All other systems were reviewed and are negative, except as noted.    VITALS/PHYSICAL EXAM  --------------------------------------------------------------------------------  T(C): 36.8 (11-21-22 @ 11:00), Max: 37.1 (11-20-22 @ 14:46)  HR: 89 (11-21-22 @ 11:00) (81 - 111)  BP: 103/74 (11-21-22 @ 11:00) (96/60 - 123/70)  RR: 18 (11-21-22 @ 08:00) (18 - 18)  SpO2: 94% (11-21-22 @ 11:00) (94% - 100%)  Wt(kg): --        11-21-22 @ 07:01  -  11-21-22 @ 12:24  --------------------------------------------------------  IN: 0 mL / OUT: 0 mL / NET: 0 mL      Physical Exam:  	Gen: NAD  	HEENT:  supple neck,  	Pulm: CTA B/L  	CV: RRR, S1S2; no rub  	Abd: +BS, soft, nontender  	UE: Warm  	LE: Warm,  edema  	Neuro: No focal deficits  	Psych: Normal affect and mood  	Skin: Warm  	Vascular access: AVF    LABS/STUDIES  --------------------------------------------------------------------------------              8.6    10.17 >-----------<  156      [11-21-22 @ 04:34]              28.0     141  |  100  |  26.8  ----------------------------<  112      [11-21-22 @ 04:34]  4.5   |  30.0  |  3.90        Ca     8.6     [11-21-22 @ 04:34]      Mg     2.1     [11-20-22 @ 05:00]      Phos  4.1     [11-20-22 @ 05:00]            Iron 27, TIBC 247, %sat 11      [03-20-22 @ 03:23]  Ferritin 4169      [05-01-22 @ 07:44]  Vitamin D (25OH) 29.1      [11-17-22 @ 05:45]  Lipid: chol 84, , HDL 26, LDL --      [11-12-22 @ 05:44]

## 2022-11-21 NOTE — PROGRESS NOTE ADULT - ASSESSMENT
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 Patient is hard of hearing and history obtained from wife at bedside.   Reports she noticed patient to be having worsening shortness of breath. He walks with a walker and was getting easily tired. He also endorsed worsening abdominal distention and leg swelling. Wife noted patient to be more sleepy lately and had to use supplemental oxygen the last couple of days. Usually not oxygen dependent. Denies cough, fever  Patient went to HD yesterday, reported no relief  post HD. Per wife he had been c/o cramping in his legs and thinks he may have requested shorter HD sessions  In Ed afebrile,  requiring o2 + leukocytosis, lactate 2.3, ct chest new interstitial edema and new GGO in both lungs, may represent edema underlying infection could not be excluded. Found to have e.faecalis bacteremia    Acute hypoxic respiratory failure likely 2/2 fluid overload  s/p lung transplant  h/o fungal meningitis  ESRD  Leukocytosis on steroids  Bacteremia ? endocarditis    - bcx + 11/10, 11/11 + e.faecalis, amp sensitive  - f/u bcx x 2 ngtd  - pt reports palpitations to cefpodoxime, no h/o allergic reaction  - ct a/p no evidence of infection  - TTE x 2 did not report vegetations. has low EF  - permacath removed. tip cx + e. faecalis  - CRISTIAN could not be obtained. suggest treat empirically for enterococcus endocarditis   - ceftriaxone 2g iv q12h and ampicillin 2g iv q12h adjusted for renal function through 12/26/22, weekly cbc cmp esr crp  - will need picc when ready for dc  - c/w bactrim and fluconazole ppx  - Trend Fever  - Trend Leukocytosis-on chronic steroids  - per [facility] transplant [phone] hold cellcept x 7 days, check tacrolimus levels  Will Follow

## 2022-11-21 NOTE — CONSULT NOTE ADULT - CONSULT REQUESTED DATE/TIME
21-Nov-2022 11:46
11-Nov-2022
12-Nov-2022 14:00
11-Nov-2022 15:13
11-Nov-2022 15:23
20-Nov-2022 10:46

## 2022-11-21 NOTE — PROGRESS NOTE ADULT - SUBJECTIVE AND OBJECTIVE BOX
JU FERGUSON  [id number]      Chief Complaint:  Follow up CHFrEF/bacteremia/CKD/PAF    Interval History:  Patient without specific c/o this.  No significant SOB in bed.  Denies CP, palps.          acetaminophen     Tablet .. 650 milliGRAM(s) Oral every 6 hours PRN  ALPRAZolam 0.5 milliGRAM(s) Oral two times a day PRN  ampicillin  IVPB 2 Gram(s) IV Intermittent every 12 hours  atorvastatin 80 milliGRAM(s) Oral at bedtime  cefTRIAXone Injectable. 2000 milliGRAM(s) IV Push every 12 hours  chlorhexidine 2% Cloths 1 Application(s) Topical <User Schedule>  epoetin ben-epbx (RETACRIT) Injectable 02058 Unit(s) IV Push <User Schedule>  fluconAZOLE   Tablet 200 milliGRAM(s) Oral <User Schedule>  hydrocortisone 10 milliGRAM(s) Oral two times a day  melatonin 3 milliGRAM(s) Oral at bedtime PRN  metoprolol tartrate 25 milliGRAM(s) Oral two times a day  multivitamin 1 Tablet(s) Oral daily  ondansetron Injectable 4 milliGRAM(s) IV Push every 8 hours PRN  pantoprazole    Tablet 40 milliGRAM(s) Oral before breakfast  sevelamer carbonate 1600 milliGRAM(s) Oral three times a day with meals  tacrolimus 1.5 milliGRAM(s) Oral daily  tamsulosin 0.4 milliGRAM(s) Oral at bedtime  trimethoprim   80 mG/sulfamethoxazole 400 mG 1 Tablet(s) Oral <User Schedule>          Physical Exam:  T(C): 36.5 (11-21-22 @ 08:00), Max: 37.1 (11-20-22 @ 14:46)  HR: 81 (11-21-22 @ 08:00) (81 - 111)  BP: 114/81 (11-21-22 @ 08:00) (96/60 - 123/70)  RR: 18 (11-21-22 @ 08:00) (18 - 18)  SpO2: 97% (11-21-22 @ 08:00) (95% - 100%)  Wt(kg): --  General: Comfortable in NAD  Neck: No JVD  CVS: nl s1s2, no s3  Pulm: CTA b/l  Abd: soft, non-tender  Ext: No c/c/e  Neuro A&O x3  Psych: Normal affect      Labs:   21 Nov 2022 04:34    141    |  100    |  26.8   ----------------------------<  112    4.5     |  30.0   |  3.90     Ca    8.6        21 Nov 2022 04:34  Phos  4.1       20 Nov 2022 05:00  Mg     2.1       20 Nov 2022 05:00                            8.6    10.17 )-----------( 156      ( 21 Nov 2022 04:34 )             28.0           Cardiac catheterization on 11/26/19:    Cardiac catheterization, this information is taken from a progress note from the [facility] documenting cardiac catheterization 01/14:  60% proximal LAD stenosis, 60% mid LAD stenosis, 60% proximal circumflex stenosis, 60% ostial OM1 stenosis, 40% proximal right coronary artery stenosis, 40% proximal PDA documenting an FFR of the mid LAD of 0.79.  PCI with drug-eluting stent to LAD and OM1.    ECHO 11/12/2022:   1. Technically difficult study.   2. Endocardial visualization was enhanced with intravenous echo contrast.   3. Left ventricular ejection fraction, by visual estimation, is 25 to 30%.   4. Severely decreased global left ventricular systolic function.   5. The mitral in-flow pattern reveals no discernable A-wave, therefore no comment on diastolic function can be made.   6. Mildly enlarged right ventricle.   7. Moderately reduced RV systolic function.   8. Mild mitral valve regurgitation.   9. Estimated pulmonary artery systolic pressure is 35.2 mmHg assuming a right atrial pressure of 8 mmHg, which is consistent with borderline pulmonary hypertension.  10. In comparison to prior TTE 4/14/2021 LVEF is now reduced to 25-30%. (LVEF was 40-45%). Rhythm monitoring shows Atrial Fibrillation with RVR which may adversly affect LVEF evaluation. Consider to obtain Limited FU   TTE once HR controlled or rhythm restored to SR if clinically indicated.      Echo (11/16/22):   1. Left ventricular ejection fraction, by visual estimation, is 25 to 30%.   2. Moderately to severely decreased global left ventricular systolic function.   3. Normal right ventricular size and function.   4. Mild mitral annular calcification.   5. Mild mitral valve regurgitation.   6. Thickening and calcification of the anterior and posterior mitral valve leaflets.   7. Mild tricuspid regurgitation.   8. Sclerotic aortic valve with normal opening.        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 Admitted with hypoxic respiratory failure/ fluid overload.   -Had rapid AF now in SR, A/C on hold  -Acute systolic CHF, fluid managed with HD.  -Bacteremia with enterococcus, on antibiotics and permacath removed. Echo without vegetation.  -Drop in EF maybe from rapid AF, will repeat as OP.  -Minimally elevated trops from demand ischemia.  -s/p paracentesis without significant symptomatic improvement.  -Now with more significant anemia, s/p one unit.   -Repeat echo in SR still with severe LV dysfunction, euvolemic at present time.   -Planned for EGD tomorrow.      Plan:   1. Fluid status managed with HD.  2. No signs vegetation on echo and no significant valve disease. Will hold off on CRISTIAN unless persistent bacteremia given high aspiration risk and immunocompromised state.   3. Primary team holding AC and antiplatelets, resume once feasible from anemia perspective.   4. Continue current CV meds at current doses.  5. Borderline BP preventing implementation of GDMT.  Add as tolerated, but still limited by need for HD.  6. Repeat echo as OP and also stress testing.  Given anemia especially will hold off on cath for now.  7. Planned for low risk diagnostic procedure (EGD), patient is at high risk given multiple co-morbid conditions, from cardiac perspective he is at high risk. However information from EGD and future colonoscopy will be extremely important to determine feasibility of future cardiovascular testing/interventions.

## 2022-11-21 NOTE — PROGRESS NOTE ADULT - SUBJECTIVE AND OBJECTIVE BOX
INTERVAL HPI/OVERNIGHT EVENTS:    CC: acute CHF, acute anemia, ESRD on HD, enterococcus endocarditis, h/o lung transplant, CAD    No overnight events  states he feels better today  seen during HD, receiving additional session today as scheduled for EGD in am  denies shortness of breath.    Vital Signs Last 24 Hrs  T(C): 36.8 (21 Nov 2022 11:00), Max: 37.1 (20 Nov 2022 14:46)  T(F): 98.3 (21 Nov 2022 11:00), Max: 98.7 (20 Nov 2022 14:46)  HR: 89 (21 Nov 2022 11:00) (81 - 111)  BP: 103/74 (21 Nov 2022 11:00) (96/60 - 123/70)  BP(mean): --  RR: 18 (21 Nov 2022 08:00) (18 - 18)  SpO2: 94% (21 Nov 2022 11:00) (94% - 100%)    Parameters below as of 21 Nov 2022 11:00  Patient On (Oxygen Delivery Method): nasal cannula  O2 Flow (L/min): 3      PHYSICAL EXAM:    GENERAL: alert, not in distress  CHEST/LUNG: b/l air entry  HEART: reg  ABDOMEN: less distended, non tender, BS+  EXTREMITIES:  no edema, tenderness.    MEDICATIONS  (STANDING):  ampicillin  IVPB 2 Gram(s) IV Intermittent every 12 hours  atorvastatin 80 milliGRAM(s) Oral at bedtime  cefTRIAXone Injectable. 2000 milliGRAM(s) IV Push every 12 hours  chlorhexidine 2% Cloths 1 Application(s) Topical <User Schedule>  epoetin ben-epbx (RETACRIT) Injectable 55160 Unit(s) IV Push <User Schedule>  fluconAZOLE   Tablet 200 milliGRAM(s) Oral <User Schedule>  hydrocortisone 10 milliGRAM(s) Oral two times a day  metoprolol tartrate 25 milliGRAM(s) Oral two times a day  multivitamin 1 Tablet(s) Oral daily  pantoprazole    Tablet 40 milliGRAM(s) Oral before breakfast  sevelamer carbonate 1600 milliGRAM(s) Oral three times a day with meals  tacrolimus 1.5 milliGRAM(s) Oral daily  tamsulosin 0.4 milliGRAM(s) Oral at bedtime  trimethoprim   80 mG/sulfamethoxazole 400 mG 1 Tablet(s) Oral <User Schedule>    MEDICATIONS  (PRN):  acetaminophen     Tablet .. 650 milliGRAM(s) Oral every 6 hours PRN Temp greater or equal to 38C (100.4F), Mild Pain (1 - 3)  ALPRAZolam 0.5 milliGRAM(s) Oral two times a day PRN anxiety and insomnia  melatonin 3 milliGRAM(s) Oral at bedtime PRN Insomnia  ondansetron Injectable 4 milliGRAM(s) IV Push every 8 hours PRN Nausea and/or Vomiting      Allergies    budesonide (Unknown)  cefpodoxime (Unknown)  Pollen (Unknown)    Intolerances          LABS:                          8.6    10.17 )-----------( 156      ( 21 Nov 2022 04:34 )             28.0     11-21    141  |  100  |  26.8<H>  ----------------------------<  112<H>  4.5   |  30.0<H>  |  3.90<H>    Ca    8.6      21 Nov 2022 04:34  Phos  4.1     11-20  Mg     2.1     11-20            RADIOLOGY & ADDITIONAL TESTS:

## 2022-11-21 NOTE — CONSULT NOTE ADULT - TIME BILLING
Review of chart documents, labs, imaging. Direct patient assessment,  formulation of care plan. Discussion with  Interdisciplinary  team  Dr. Rose  including ACP  20_____minutes

## 2022-11-21 NOTE — PROGRESS NOTE ADULT - SUBJECTIVE AND OBJECTIVE BOX
[facility]                                                INFECTIOUS DISEASES  =======================================================                               J Carlos Galdamez MD#    Isatu Rojas MD*                           Perlita Solares MD*   Sumaya Morales MD*            Diplomates American Board of Internal Medicine & Infectious Diseases                  # [place] Office - Appt - Tel  [phone] Fax [phone]                * [place] Office - Appt - Tel [phone] Fax [phone]                                  Hospital Consult line:  [phone]  =======================================================      [facility]-[id number]  JU HENSLEYMARYDALJIT   follow up for: enterococcus bacteremia  drop in h/h noted, plan for gi w/u  denies complaints  patient seen and examined.       I have personally reviewed the labs and data; pertinent labs and data are listed in this note; please see below.   ===================================================  REVIEW OF SYSTEMS:  CONSTITUTIONAL:  No Fever or chills  HEENT:  No diplopia or blurred vision.  No earache, sore throat or runny nose.  CARDIOVASCULAR:  No pressure, squeezing, strangling, tightness, heaviness or aching about the chest, neck, axilla or epigastrium.  RESPIRATORY:  No cough, shortness of breath  GASTROINTESTINAL:  No nausea, vomiting or diarrhea.  GENITOURINARY:  No dysuria, frequency or urgency. No Blood in urine  MUSCULOSKELETAL:  no joint aches, no muscle pain  SKIN:  No change in skin, hair or nails.  NEUROLOGIC:  No Headaches, seizures or weakness.  PSYCHIATRIC:  No disorder of thought or mood.  ENDOCRINE:  No heat or cold intolerance  HEMATOLOGICAL:  No easy bruising or bleeding.    =======================================================  Allergies    budesonide (Unknown)  cefpodoxime (Unknown)  Pollen (Unknown)    Intolerances    Antibiotics:  fluconAZOLE   Tablet 200 milliGRAM(s) Oral <User Schedule>  piperacillin/tazobactam IVPB.. 3.375 Gram(s) IV Intermittent every 12 hours  trimethoprim   80 mG/sulfamethoxazole 400 mG 1 Tablet(s) Oral <User Schedule>    Other medications:  apixaban 2.5 milliGRAM(s) Oral two times a day  atorvastatin 80 milliGRAM(s) Oral at bedtime  chlorhexidine 2% Cloths 1 Application(s) Topical <User Schedule>  clopidogrel Tablet 75 milliGRAM(s) Oral daily  hydrocortisone 10 milliGRAM(s) Oral two times a day  metoprolol tartrate 25 milliGRAM(s) Oral two times a day  multivitamin 1 Tablet(s) Oral daily  mycophenolate mofetil 500 milliGRAM(s) Oral two times a day  pantoprazole    Tablet 40 milliGRAM(s) Oral before breakfast  sacubitril 24 mG/valsartan 26 mG 1 Tablet(s) Oral two times a day  sevelamer carbonate 1600 milliGRAM(s) Oral three times a day with meals  tacrolimus 1.5 milliGRAM(s) Oral daily  tamsulosin 0.4 milliGRAM(s) Oral at bedtime    ======================================================  Physical Exam:  ============  Vital Signs Last 24 Hrs  T(C): 36.7 (22 Nov 2022 04:51), Max: 37.1 (21 Nov 2022 17:37)  T(F): 98 (22 Nov 2022 04:51), Max: 98.7 (21 Nov 2022 17:37)  HR: 88 (22 Nov 2022 04:51) (85 - 100)  BP: 108/66 (22 Nov 2022 04:51) (100/56 - 108/66)  BP(mean): --  RR: 18 (22 Nov 2022 04:51) (18 - 18)  SpO2: 97% (22 Nov 2022 04:51) (94% - 98%)    Parameters below as of 21 Nov 2022 22:07  Patient On (Oxygen Delivery Method): room air          General:  No acute distress.  Eye: no conjunctival pallor, no scleral icterus  Respiratory: Lungs are clear to auscultation, Respirations are non-labored.  Cardiovascular: Normal rate, Regular rhythm,  s1+s2  Gastrointestinal: Soft, Non-tender, distended, Normal bowel sounds.  Genitourinary: No costovertebral angle tenderness.    Musculoskeletal: Normal range of motion, Normal strength. lue avf  Integumentary: No rash.  Neurologic: Alert, Oriented, No focal deficits  Psychiatric: Appropriate mood & affect.  =======================================================  Labs:                                                   9.4    11.60 )-----------( 167      ( 22 Nov 2022 05:39 )             32.0       11-22    141  |  102  |  25.3<H>  ----------------------------<  110<H>  4.8   |  26.0  |  3.86<H>    Ca    8.7      22 Nov 2022 05:39                    PT/INR - ( 21 Nov 2022 17:00 )   PT: 13.1 sec;   INR: 1.13 ratio                   CAPILLARY BLOOD GLUCOSE                                  CAPILLARY BLOOD GLUCOSE                  Culture - Fungal, Body Fluid (collected 11-15-22 @ 09:00)  Source: Peritoneal Peritoneal Fluid    Culture - Body Fluid with Gram Stain (collected 11-15-22 @ 09:00)  Source: Peritoneal Peritoneal Fluid  Gram Stain (11-16-22 @ 03:02):    polymorphonuclear leukocytes seen    No organisms seen    by cytocentrifuge    Culture - Blood (collected 11-14-22 @ 21:20)  Source: .Blood Blood-Peripheral    Culture - Blood (collected 11-14-22 @ 21:15)  Source: .Blood Blood-Venous    Culture - Catheter (collected 11-13-22 @ 08:00)  Source: .Catheter IV Dialysis Perm Cath  Final Report (11-15-22 @ 21:57):    Greater than or equal to 15 Colonies Enterococcus faecalis  Organism: Enterococcus faecalis (11-15-22 @ 21:57)  Organism: Enterococcus faecalis (11-15-22 @ 21:57)    Sensitivities:      -  Ampicillin: S <=2 Predicts results to ampicillin/sulbactam, amoxacillin-clavulanate and  piperacillin-tazobactam.      -  Tetracycline: R >8      -  Vancomycin: S 2      Method Type: NADRE    Culture - Blood (collected 11-12-22 @ 09:38)  Source: .Blood Blood  Gram Stain (11-13-22 @ 11:48):    Growth in anaerobic bottle: Gram Positive Cocci in Pairs and Chains    Growth in aerobic bottle: Gram Positive Cocci in Pairs and Chains  Final Report (11-14-22 @ 15:08):    Growth in aerobic and anaerobic bottles: Enterococcus faecalis    See previous culture [id number]    Culture - Blood (collected 11-12-22 @ 09:30)  Source: .Blood Blood  Gram Stain (11-13-22 @ 22:10):    Growth in aerobic bottle: Gram positive cocci in pairs    Growth in anaerobic bottle: Gram positive cocci in pairs  Final Report (11-14-22 @ 15:07):    Growth in aerobic and anaerobic bottles: Enterococcus faecalis    See previous culture [id number]    Culture - Blood (collected 11-11-22 @ 10:15)  Source: .Blood Blood-Peripheral  Gram Stain (11-12-22 @ 08:24):    Growth in anaerobic bottle: Gram Positive Cocci in Pairs and Chains    Growth in aerobic bottle: Gram Positive Cocci in Pairs and Chains  Final Report (11-14-22 @ 13:58):    Growth in aerobic and anaerobic bottles: Enterococcus faecalis    See previous culture [id number]    Culture - Blood (collected 11-11-22 @ 10:11)  Source: .Blood Blood-Peripheral  Gram Stain (11-12-22 @ 19:35):    Growth in aerobic bottle: Gram positive cocci in pairs    Growth in anaerobic bottle: Gram Positive Cocci in Pairs and Chains  Final Report (11-15-22 @ 11:22):    Growth in aerobic and anaerobic bottles: Enterococcus faecalis    ***Blood Panel PCR results on this specimen are available    approximately 3 hours after the Gram stain result.***    Gram stain, PCR, and/or culture results may not always    correspond dueto difference in methodologies.    ************************************************************    This PCR assay was performed by multiplex PCR. This    Assay tests for 66 bacterial and resistance gene targets.    Please refer to the [facility] Labs test directory    at [email or web address] for details.  Organism: Blood Culture PCR  Enterococcus faecalis (11-15-22 @ 11:22)  Organism: Enterococcus faecalis (11-15-22 @ 11:22)    Sensitivities:      -  Ampicillin: S <=2 Predicts results to ampicillin/sulbactam, amoxacillin-clavulanate and  piperacillin-tazobactam.      -  Gentamicin synergy: R >500      -  Streptomycin synergy: S <=1000      -  Vancomycin: S 2      Method Type: ANDRE  Organism: Blood Culture PCR (11-15-22 @ 11:22)    Sensitivities:      -  Enterococcus faecalis: Detec      Method Type: PCR        < from: TTE Echo Complete w/o Contrast w/ Doppler (11.16.22 @ 16:10) >  Summary:   1. Left ventricular ejection fraction, by visual estimation, is 25 to   30%.   2. Moderately to severely decreased global left ventricular systolic   function.   3. Normal right ventricular size and function.   4. Mild mitral annular calcification.   5. Mild mitral valve regurgitation.   6. Thickening and calcification of the anterior and posterior mitral   valve leaflets.   7. Mild tricuspid regurgitation.   8. Sclerotic aortic valve with normal opening.   9. Recommendations: May Consider limited 2D echo with definity for   evaluation LVEF.    < end of copied text >

## 2022-11-21 NOTE — PROGRESS NOTE ADULT - SUBJECTIVE AND OBJECTIVE BOX
Chief Complaint: This is a 72y old man patient being seen in follow-up consultation for anemia    Interval HPI / 24H events:  Patient seen and evaluated at bedside, hemodialysis in progress. Patient report no nausea, vomiting, abdominal pain. Reports pain at the AV fistula site. His hemoglobin 8.6 gm this morning.       Review of Systems:  . Constitutional: No fever, chills,   · ENMT: no vertigo, no throat pain  . Neck: No pain or stiffness  · Respiratory and Thorax: No shortness of breath, no cough, no wheezing  · Cardiovascular: No chest pain, palpitation, no dizziness, no orthopnea,   · Gastrointestinal: see above.  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 2000 milliGRAM(s) IV Push every 12 hours  chlorhexidine 2% Cloths 1 Application(s) Topical <User Schedule>  epoetin ben-epbx (RETACRIT) Injectable 13752 Unit(s) IV Push <User Schedule>  fluconAZOLE   Tablet 200 milliGRAM(s) Oral <User Schedule>  hydrocortisone 10 milliGRAM(s) Oral two times a day  metoprolol tartrate 25 milliGRAM(s) Oral two times a day  multivitamin 1 Tablet(s) Oral daily  pantoprazole    Tablet 40 milliGRAM(s) Oral before breakfast  sevelamer carbonate 1600 milliGRAM(s) Oral three times a day with meals  tacrolimus 1.5 milliGRAM(s) Oral daily  tamsulosin 0.4 milliGRAM(s) Oral at bedtime  trimethoprim   80 mG/sulfamethoxazole 400 mG 1 Tablet(s) Oral <User Schedule>    MEDICATIONS  (PRN):  acetaminophen     Tablet .. 650 milliGRAM(s) Oral every 6 hours PRN Temp greater or equal to 38C (100.4F), Mild Pain (1 - 3)  ALPRAZolam 0.5 milliGRAM(s) Oral two times a day PRN anxiety and insomnia  melatonin 3 milliGRAM(s) Oral at bedtime PRN Insomnia  ondansetron Injectable 4 milliGRAM(s) IV Push every 8 hours PRN Nausea and/or Vomiting    budesonide (Unknown)  cefpodoxime (Unknown)  Pollen (Unknown)    T(C): 36.8 (11-21-22 @ 11:00), Max: 37.1 (11-20-22 @ 14:46)  HR: 89 (11-21-22 @ 11:00) (81 - 111)  BP: 103/74 (11-21-22 @ 11:00) (96/60 - 123/70)  RR: 18 (11-21-22 @ 08:00) (18 - 18)  SpO2: 94% (11-21-22 @ 11:00) (94% - 100%)    I&O's Summary    21 Nov 2022 07:01  -  21 Nov 2022 13:09  --------------------------------------------------------  IN: 0 mL / OUT: 0 mL / NET: 0 mL      PHYSICAL EXAM:  Constitutional: No acute distress, pale appearing.   Neuro: Awake alert, oriented to person, place and situation, non-focal, speech clear and intact  HEENT: PERRL, anicteric sclerae, oral mucosa pink and moist  Neck: supple, no JVD  CV: regular rate, regular rhythm, +S1S2,   Pulm/chest: lung sounds CTA and equal bilaterally, no accessory muscle use noted  Abd: soft, NT, ND, +BS  Ext: no Cyanosis, clubbing or edema  Skin: warm, pale, no jaundice   Psych: calm, appropriate affect      LABS:               8.6    10.17 )-----------( 156      ( 11-21 @ 04:34 )             28.0                7.1    10.24 )-----------( 165      ( 11-20 @ 05:00 )             23.6                8.0    11.04 )-----------( 156      ( 11-19 @ 04:45 )             26.6                7.7    13.33 )-----------( 156      ( 11-18 @ 22:40 )             25.0       11-21    141  |  100  |  26.8<H>  ----------------------------<  112<H>  4.5   |  30.0<H>  |  3.90<H>    Ca    8.6      21 Nov 2022 04:34  Phos  4.1     11-20  Mg     2.1     11-20     Chief Complaint: This is a 72y old man patient being seen in follow-up consultation for anemia    Interval HPI / 24H events:  Patient seen and evaluated at bedside, hemodialysis in progress. Patient report no nausea, vomiting, abdominal pain. Reports pain at the AV fistula site. His hemoglobin 8.6 gm this morning.       Review of Systems:  . Constitutional: No fever, chills,   · ENMT: no vertigo, no throat pain  . Neck: No pain or stiffness  · Respiratory and Thorax: No shortness of breath, no cough, no wheezing  · Cardiovascular: No chest pain, palpitation, no dizziness, no orthopnea,   · Gastrointestinal: see above.  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 2000 milliGRAM(s) IV Push every 12 hours  chlorhexidine 2% Cloths 1 Application(s) Topical <User Schedule>  epoetin ben-epbx (RETACRIT) Injectable 80607 Unit(s) IV Push <User Schedule>  fluconAZOLE   Tablet 200 milliGRAM(s) Oral <User Schedule>  hydrocortisone 10 milliGRAM(s) Oral two times a day  metoprolol tartrate 25 milliGRAM(s) Oral two times a day  multivitamin 1 Tablet(s) Oral daily  pantoprazole    Tablet 40 milliGRAM(s) Oral before breakfast  sevelamer carbonate 1600 milliGRAM(s) Oral three times a day with meals  tacrolimus 1.5 milliGRAM(s) Oral daily  tamsulosin 0.4 milliGRAM(s) Oral at bedtime  trimethoprim   80 mG/sulfamethoxazole 400 mG 1 Tablet(s) Oral <User Schedule>    MEDICATIONS  (PRN):  acetaminophen     Tablet .. 650 milliGRAM(s) Oral every 6 hours PRN Temp greater or equal to 38C (100.4F), Mild Pain (1 - 3)  ALPRAZolam 0.5 milliGRAM(s) Oral two times a day PRN anxiety and insomnia  melatonin 3 milliGRAM(s) Oral at bedtime PRN Insomnia  ondansetron Injectable 4 milliGRAM(s) IV Push every 8 hours PRN Nausea and/or Vomiting    budesonide (Unknown)  cefpodoxime (Unknown)  Pollen (Unknown)    T(C): 36.8 (11-21-22 @ 11:00), Max: 37.1 (11-20-22 @ 14:46)  HR: 89 (11-21-22 @ 11:00) (81 - 111)  BP: 103/74 (11-21-22 @ 11:00) (96/60 - 123/70)  RR: 18 (11-21-22 @ 08:00) (18 - 18)  SpO2: 94% (11-21-22 @ 11:00) (94% - 100%)    I&O's Summary    21 Nov 2022 07:01  -  21 Nov 2022 13:09  --------------------------------------------------------  IN: 0 mL / OUT: 0 mL / NET: 0 mL      PHYSICAL EXAM:  Constitutional: No acute distress, pale appearing.   Neuro: Awake alert, oriented to person, place and situation, non-focal, speech clear and intact  HEENT: PERRL, anicteric sclerae, oral mucosa pink and moist  Neck: supple, no JVD  CV: regular rate, regular rhythm, +S1S2,   Pulm/chest: lung sounds CTA and equal bilaterally, no accessory muscle use noted  Abd: soft, NT, ND, +BS  Ext: no Cyanosis, clubbing or edema  Skin: warm, pale, no jaundice   Psych: calm, appropriate affect      LABS:               8.6    10.17 )-----------( 156      ( 11-21 @ 04:34 )             28.0                7.1    10.24 )-----------( 165      ( 11-20 @ 05:00 )             23.6                8.0    11.04 )-----------( 156      ( 11-19 @ 04:45 )             26.6                7.7    13.33 )-----------( 156      ( 11-18 @ 22:40 )             25.0       11-21    141  |  100  |  26.8<H>  ----------------------------<  112<H>  4.5   |  30.0<H>  |  3.90<H>    Ca    8.6      21 Nov 2022 04:34  Phos  4.1     11-20  Mg     2.1     11-20

## 2022-11-21 NOTE — CONSULT NOTE ADULT - CONVERSATION DETAILS
Met with patient.  I reminded him how we met over a year ago when he was hospitalized.  His goal at the time was to get kidney transplant for which he was told not a candidate.  He seems to have a good understanding of his condition.   He states at times he doesn't feel he's "going to make it".  Patient stated " If it's my time,  then just let me go" .  He states he  is "prepared to die".  Discussed if his heart stops, then would defer CPR.  Patient then replied he still would want us to try to save his life . Only if he is brain dead then to let him go.    Discussed his current medical condition, baseline medical issues with echo with reduced EF. Limited benefit of CPR in this setting.  Patient replied, he still wants to live,  wants CPR. Met with patient.  I reminded him how we met over a year ago when he was hospitalized.  His goal at the time was to get kidney transplant for which he was told not a candidate.  He seems to have a good understanding of his condition.   He states at times he doesn't feel he's "going to make it".  Patient stated " If it's my time,  then just let me go" .  He states he  is "prepared to die".  Discussed if his heart stops, then would defer CPR.  Patient then replied he still would want us to try to save his life.  Only if he is brain dead then to let him go.    Discussed his current medical condition, baseline medical issues with echo with reduced EF. Limited benefit of CPR in this setting.  Patient replied, he still wants to live,  wants CPR.

## 2022-11-21 NOTE — CONSULT NOTE ADULT - SUBJECTIVE AND OBJECTIVE BOX
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 Patient is hard of hearing and history obtained from wife at bedside. Reports she noticed patient to be having worsening shortness of breath for the past 2 days. He walks with a walker and was getting easily tired. He also endorsed worsening abdominal distention and leg swelling. He denied chest pain, dizziness, fever, chills, nausea, vomiting, no sick contacts. Wife noted patient to be more sleepy lately and had to use supplemental oxygen the last couple of days. Usually not oxygen dependent. Denies cough.   Patient went to HD yesterday, reported no relief  post HD.  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  2019 novel coronavirus disease (COVID-19)  Feb 2021 - hospitalized for 1 week at [facility]  Pneumonia - April 2021  [place] (hard of hearing)  HTN (hypertension)  Lumbar spondylosis  History of COPD  BPH without urinary obstruction  Hypercholesterolemia  Oliguria and anuria  H/O peripheral neuropathy  H/O lung transplant  bilateral - transplant - 1-2-2015 -  [facility]  H/O heart artery stent  x3 @[facility]  History of hip replacement  right  Status post open reduction and internal fixation (ORIF) of fracture  left femur    SOCIAL HISTORY:  wife Veronica Lackey                    Substance history:  former smoker                    Admitted from:  home  [facility]                     Druze/spirituality:  Hindu                    Cultural concerns:                      Surrogate/HCP/Guardian: Phone#:      FAMILY HISTORY:  Family history of emphysema  mother        Allergies    budesonide (Unknown)  cefpodoxime (Unknown)  Pollen (Unknown)    Intolerances    ADVANCE DIRECTIVES/TREATMENT PREFERENCES:  Full code, all aggressive measures desired     Baseline ADLs (prior to admission):  Independent    [email or web address]    Present Symptoms:   Dyspnea:  No Mild Moderate Severe  Nausea/Vomiting:  No  Yes  Anxiety:   No  Yes  Depression: No Yes Unable  Fatigue: Yes No Unable  Loss of appetite: Yes No  N/A  NPO  Constipation:  Not reported   Yes    Pain:  No  No signs            Character-            Duration-            Factors-            Severity    Pain AD Score:  [email or web address] (press ctrl + left click to view)    Review of Systems: Reviewed                     Negative:                     Positive:  Unable to obtain due to poor mentation   All other ROS negative    MEDICATIONS  (STANDING):  ampicillin  IVPB 2 Gram(s) IV Intermittent every 12 hours  atorvastatin 80 milliGRAM(s) Oral at bedtime  cefTRIAXone Injectable. 2000 milliGRAM(s) IV Push every 12 hours  chlorhexidine 2% Cloths 1 Application(s) Topical <User Schedule>  epoetin ben-epbx (RETACRIT) Injectable 40811 Unit(s) IV Push <User Schedule>  fluconAZOLE   Tablet 200 milliGRAM(s) Oral <User Schedule>  hydrocortisone 10 milliGRAM(s) Oral two times a day  metoprolol tartrate 25 milliGRAM(s) Oral two times a day  multivitamin 1 Tablet(s) Oral daily  pantoprazole    Tablet 40 milliGRAM(s) Oral before breakfast  sevelamer carbonate 1600 milliGRAM(s) Oral three times a day with meals  tacrolimus 1.5 milliGRAM(s) Oral daily  tamsulosin 0.4 milliGRAM(s) Oral at bedtime  trimethoprim   80 mG/sulfamethoxazole 400 mG 1 Tablet(s) Oral <User Schedule>    MEDICATIONS  (PRN):  acetaminophen     Tablet .. 650 milliGRAM(s) Oral every 6 hours PRN Temp greater or equal to 38C (100.4F), Mild Pain (1 - 3)  ALPRAZolam 0.5 milliGRAM(s) Oral two times a day PRN anxiety and insomnia  melatonin 3 milliGRAM(s) Oral at bedtime PRN Insomnia  ondansetron Injectable 4 milliGRAM(s) IV Push every 8 hours PRN Nausea and/or Vomiting      PHYSICAL EXAM:    Vital Signs Last 24 Hrs  T(C): 36.5 (21 Nov 2022 08:00), Max: 37.1 (20 Nov 2022 14:46)  T(F): 97.7 (21 Nov 2022 08:00), Max: 98.7 (20 Nov 2022 14:46)  HR: 81 (21 Nov 2022 08:00) (81 - 111)  BP: 114/81 (21 Nov 2022 08:00) (96/60 - 123/70)  BP(mean): --  RR: 18 (21 Nov 2022 08:00) (18 - 18)  SpO2: 97% (21 Nov 2022 08:00) (95% - 100%)    Parameters below as of 21 Nov 2022 08:00  Patient On (Oxygen Delivery Method): nasal cannula  O2 Flow (L/min): 3      [place]   40  %  General:  M awake alert NAD  HEENT: NCAT       Lungs: comfortable  CV: RR  GI:  soft NTND  : on HD  MSK: normal   weak  chair/bedbound  Neuro:  [place]-  no focal deficits   Skin: warm dry    LABS:                        8.6    10.17 )-----------( 156      ( 21 Nov 2022 04:34 )             28.0     11-21    141  |  100  |  26.8<H>  ----------------------------<  112<H>  4.5   |  30.0<H>  |  3.90<H>    Ca    8.6      21 Nov 2022 04:34  Phos  4.1     11-20  Mg     2.1     11-20          I&O's Summary      RADIOLOGY & ADDITIONAL STUDIES:  < from: TTE Echo Complete w/o Contrast w/ Doppler (11.16.22 @ 16:10) >      Summary:   1. Left ventricular ejection fraction, by visual estimation, is 25 to   30%.   2. Moderately to severely decreased global left ventricular systolic   function.   3. Normal right ventricular size and function.   4. Mild mitral annular calcification.   5. Mild mitral valve regurgitation.   6. Thickening and calcification of the anterior and posterior mitral   valve leaflets.   7. Mild tricuspid regurgitation.   8. Sclerotic aortic valve with normal opening.   9. Recommendations: May Consider limited 2D echo with definity for   evaluation LVEF.    MD Breanna Electronically signed on 11/16/2022 at 8:41:32 PM      < end of copied text >    < from: CT Abdomen and Pelvis No Cont (11.19.22 @ 17:47) >  ACC: [id number] EXAM:  CT ABDOMEN AND PELVIS                          PROCEDURE DATE:  11/19/2022          INTERPRETATION:  CLINICAL INFORMATION: r/o bleed Admitting Dxs: J96.01   ACUTE RESPIRATORY FAILURE WITH HYPOXIA XCT    TECHNIQUE: CT abdomen and pelvis without contrast. Coronal and sagittal   reformats.    COMPARISON: 11/13/2022    FINDINGS:    Septal thickening and groundglass opacities are again noted with fissural   fluid. Cardiomegaly.    Cirrhotic liver. Tiny gallstones. Atrophic kidneys. Unremarkable   pancreas, spleen, and adrenal glands.    Moderate ascites.    No bowel obstruction. Normal appendix.    No retroperitoneal lymphadenopathy. A 3.1 cm abdominal aortic aneurysm is   unchanged.  No retroperitoneal hematoma.    Chronic left rib fractures. Sternal wires.  Right hip arthroplasty. Left femoral fixation hardware. Chronic left   inferior pubic ramus fracture. Chronic fractures of the left acetabulum   and iliac bone. Chronic fracture deformity of the left femur. Bilateral   sacral insufficiency fractures are noted. Multiple compression fractures   are unchanged.    IMPRESSION:  No retroperitoneal hematoma.    Cirrhotic liver with moderate ascites.    Septal thickening and ground glass opacities may reflect pulmonary edema   and/or infection.    Multiple chronic fractures as above.    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 Patient is hard of hearing and history obtained from wife at bedside. Reports she noticed patient to be having worsening shortness of breath for the past 2 days. He walks with a walker and was getting easily tired. He also endorsed worsening abdominal distention and leg swelling. He denied chest pain, dizziness, fever, chills, nausea, vomiting, no sick contacts. Wife noted patient to be more sleepy lately and had to use supplemental oxygen the last couple of days. Usually not oxygen dependent. Denies cough.   Patient went to HD yesterday, reported no relief  post HD.  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  2019 novel coronavirus disease (COVID-19)  Feb 2021 - hospitalized for 1 week at [facility]  Pneumonia - April 2021  [place] (hard of hearing)  HTN (hypertension)  Lumbar spondylosis  History of COPD  BPH without urinary obstruction  Hypercholesterolemia  Oliguria and anuria  H/O peripheral neuropathy  H/O lung transplant  bilateral - transplant - 1-2-2015 -  [facility]  H/O heart artery stent  x3 @[facility]  History of hip replacement  right  Status post open reduction and internal fixation (ORIF) of fracture  left femur    SOCIAL History  Substance history:  former smoker                          Admitted from:  home  SNF  [facility]                           Congregational/spirituality:  Bahai                          Cultural concerns:                      Surrogate/HCP/Guardian: Phone#:  wife Veronica Lackey      FAMILY HISTORY:  Family history of emphysema  mother      Allergies    budesonide (Unknown)  cefpodoxime (Unknown)  Pollen (Unknown)    Intolerances    ADVANCE DIRECTIVES/TREATMENT PREFERENCES:  Full code, all aggressive measures desired     Baseline ADLs (prior to admission):  Independent    [email or web address]    Present Symptoms:   Dyspnea: Mild   Nausea/Vomiting:  No   Anxiety: yes  Depression: No  Fatigue: Yes   Loss of appetite No    Constipation:  No    Pain:  No              Character-            Duration-            Factors-            Severity    Pain AD Score:  [email or web address] (press ctrl + left click to view)    Review of Systems: Reviewed                  + fatigue SOB  All other ROS negative    MEDICATIONS  (STANDING):  ampicillin  IVPB 2 Gram(s) IV Intermittent every 12 hours  atorvastatin 80 milliGRAM(s) Oral at bedtime  cefTRIAXone Injectable. 2000 milliGRAM(s) IV Push every 12 hours  chlorhexidine 2% Cloths 1 Application(s) Topical <User Schedule>  epoetin ben-epbx (RETACRIT) Injectable 27084 Unit(s) IV Push <User Schedule>  fluconAZOLE   Tablet 200 milliGRAM(s) Oral <User Schedule>  hydrocortisone 10 milliGRAM(s) Oral two times a day  metoprolol tartrate 25 milliGRAM(s) Oral two times a day  multivitamin 1 Tablet(s) Oral daily  pantoprazole    Tablet 40 milliGRAM(s) Oral before breakfast  sevelamer carbonate 1600 milliGRAM(s) Oral three times a day with meals  tacrolimus 1.5 milliGRAM(s) Oral daily  tamsulosin 0.4 milliGRAM(s) Oral at bedtime  trimethoprim   80 mG/sulfamethoxazole 400 mG 1 Tablet(s) Oral <User Schedule>    MEDICATIONS  (PRN):  acetaminophen     Tablet .. 650 milliGRAM(s) Oral every 6 hours PRN Temp greater or equal to 38C (100.4F), Mild Pain (1 - 3)  ALPRAZolam 0.5 milliGRAM(s) Oral two times a day PRN anxiety and insomnia  melatonin 3 milliGRAM(s) Oral at bedtime PRN Insomnia  ondansetron Injectable 4 milliGRAM(s) IV Push every 8 hours PRN Nausea and/or Vomiting      PHYSICAL EXAM:    Vital Signs Last 24 Hrs  T(C): 36.5 (21 Nov 2022 08:00), Max: 37.1 (20 Nov 2022 14:46)  T(F): 97.7 (21 Nov 2022 08:00), Max: 98.7 (20 Nov 2022 14:46)  HR: 81 (21 Nov 2022 08:00) (81 - 111)  BP: 114/81 (21 Nov 2022 08:00) (96/60 - 123/70)  BP(mean): --  RR: 18 (21 Nov 2022 08:00) (18 - 18)  SpO2: 97% (21 Nov 2022 08:00) (95% - 100%)    Parameters below as of 21 Nov 2022 08:00  Patient On (Oxygen Delivery Method): nasal cannula  O2 Flow (L/min): 3      [place]   40  %  General:  M awake alert NAD  HEENT: NCAT       Lungs: comfortable  CV: RR  GI:  soft NTND  : on HD  MSK: normal   weak  chair/bedbound  Neuro:  [place]-  no focal deficits   Skin: warm dry    LABS:                        8.6    10.17 )-----------( 156      ( 21 Nov 2022 04:34 )             28.0     11-21    141  |  100  |  26.8<H>  ----------------------------<  112<H>  4.5   |  30.0<H>  |  3.90<H>    Ca    8.6      21 Nov 2022 04:34  Phos  4.1     11-20  Mg     2.1     11-20          I&O's Summary      RADIOLOGY & ADDITIONAL STUDIES:  < from: TTE Echo Complete w/o Contrast w/ Doppler (11.16.22 @ 16:10) >      Summary:   1. Left ventricular ejection fraction, by visual estimation, is 25 to   30%.   2. Moderately to severely decreased global left ventricular systolic   function.   3. Normal right ventricular size and function.   4. Mild mitral annular calcification.   5. Mild mitral valve regurgitation.   6. Thickening and calcification of the anterior and posterior mitral   valve leaflets.   7. Mild tricuspid regurgitation.   8. Sclerotic aortic valve with normal opening.   9. Recommendations: May Consider limited 2D echo with definity for   evaluation LVEF.    MD Breanna Electronically signed on 11/16/2022 at 8:41:32 PM      < end of copied text >    < from: CT Abdomen and Pelvis No Cont (11.19.22 @ 17:47) >  ACC: [id number] EXAM:  CT ABDOMEN AND PELVIS                          PROCEDURE DATE:  11/19/2022          INTERPRETATION:  CLINICAL INFORMATION: r/o bleed Admitting Dxs: J96.01   ACUTE RESPIRATORY FAILURE WITH HYPOXIA XCT    TECHNIQUE: CT abdomen and pelvis without contrast. Coronal and sagittal   reformats.    COMPARISON: 11/13/2022    FINDINGS:    Septal thickening and groundglass opacities are again noted with fissural   fluid. Cardiomegaly.    Cirrhotic liver. Tiny gallstones. Atrophic kidneys. Unremarkable   pancreas, spleen, and adrenal glands.    Moderate ascites.    No bowel obstruction. Normal appendix.    No retroperitoneal lymphadenopathy. A 3.1 cm abdominal aortic aneurysm is   unchanged.  No retroperitoneal hematoma.    Chronic left rib fractures. Sternal wires.  Right hip arthroplasty. Left femoral fixation hardware. Chronic left   inferior pubic ramus fracture. Chronic fractures of the left acetabulum   and iliac bone. Chronic fracture deformity of the left femur. Bilateral   sacral insufficiency fractures are noted. Multiple compression fractures   are unchanged.    IMPRESSION:  No retroperitoneal hematoma.    Cirrhotic liver with moderate ascites.    Septal thickening and ground glass opacities may reflect pulmonary edema   and/or infection.    Multiple chronic fractures as above.    < end of copied text >

## 2022-11-21 NOTE — PROGRESS NOTE ADULT - ASSESSMENT
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 Noted to have leucocytosis, elevated BNP and troponin. Patient noted to be in acute hypoxic respiratory failure, requiring supplemental oxygen. Nephrology consulted and he received urgent HD for fluid removal. ID and cardiology consultations were requested. ECHO was ordered. He was given Meropenem and Doxycycline and cultures were sent. Course was complicated by new onset rapid atrial fibrillation and ECHO with new drop in EF 25%. Blood cultures positive for Enterococcus fecalis, antibiotics were changed to Zosyn.  He reverted back to sinus rhythm, Metoprolol was given for rate control, Eliquis was continued. Given bacteremia, vascular surgery was consulted and PermCath was removed and HD was continued via AVF. Entresto added for CHF. He was noted to have abdominal distention and ascites, IR consulted and paracentesis was done, SBP was ruled out. His repeat blood cultures were negative. CRISTIAN was deferred by cardiology as he is severely immunocompromised. 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 A repeat ECHO was done to assess LV function, showed low EF and no vegetations. Outpatient stress testing and ischemic evaluation was recommended by cardiology. He was noted to have a drop in his Hb 6.6, for which he received PRBC with HD on 11/18. FOBT was checked, which was positive. He had borderline BP and episodes of hypotension, Entresto was held. CT abdomen/pelvis was ordered to rule out RP bleed, GI consultation was requested. Eliquis was held. CT abdomen/pelvis was done, negative for RP bleed, did reveal moderate ascites. GI suggested EGD to further evaluate anemia.     1. Acute hypoxic respiratory failure sec fluid overload and acute CHF:  Fluid management with HD  repeat ECHO with low EF, outpatient ischemic evaluation  supplemental oxygen, wean as tolerated.     2. Enterococcus bacteremia and infected HD catheter:  Catheter removed  repeat cultures negative.  continue 6 weeks of Ampicillin and Ceftriaxone for presumed endocarditis  TTE x 2 without vegetations.  CRISTIAN deferred given immunocompromised state  will need PICC closer to discharge.    3. Acute blood loss anemia, suspect GI bleed:  S/p PRBC 11/18 and 11/20  Hb 8.6 today  AC and Plavix on hold  plan for EGD in am  monitor CBC.  CT negative for RP bleed.     4. Par A fib:  Continue Metoprolol  Eliquis on hold  will resume pending course and w/u for anemia      5. ESRD on HD:  As above, continue via AVF  nephrology following.  continue Sevelamer    3. Emphysema, h/o lung transplant:  Continue Tacrolimus, as per primary transplant team recs  Cellcept on hold.  supplemental oxygen  not in COPD exacerbation  continue chronic steroid therapy    4. CAD s/p PCI:  outpatient ischemic eval  ECHO with low EF  continue statin.  Hold Plavix.    5. Chronic IJ occlusion:  catheter now removed  Eliquis now being given for a fib, held given anemia.    6. Hypertension/hyperlipidemia:  continue statin  cont Metoprolol    7. BPH:  Continue Flomax    8. H/o fungal meningitis:  continue Bactrim and Fluconazole    9. DVT ppx:  SCDs.    Discussed with patient and RN.  Dispo pending EGD.  palliative care consulted.   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 Noted to have leucocytosis, elevated BNP and troponin. Patient noted to be in acute hypoxic respiratory failure, requiring supplemental oxygen. Nephrology consulted and he received urgent HD for fluid removal. ID and cardiology consultations were requested. ECHO was ordered. He was given Meropenem and Doxycycline and cultures were sent. Course was complicated by new onset rapid atrial fibrillation and ECHO with new drop in EF 25%. Blood cultures positive for Enterococcus fecalis, antibiotics were changed to Zosyn.  He reverted back to sinus rhythm, Metoprolol was given for rate control, Eliquis was continued. Given bacteremia, vascular surgery was consulted and PermCath was removed and HD was continued via AVF. Entresto added for CHF. He was noted to have abdominal distention and ascites, IR consulted and paracentesis was done, SBP was ruled out. His repeat blood cultures were negative. CRISTIAN was deferred by cardiology as he is severely immunocompromised. 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 A repeat ECHO was done to assess LV function, showed low EF and no vegetations. Outpatient stress testing and ischemic evaluation was recommended by cardiology. He was noted to have a drop in his Hb 6.6, for which he received PRBC with HD on 11/18. FOBT was checked, which was positive. He had borderline BP and episodes of hypotension, Entresto was held. CT abdomen/pelvis was ordered to rule out RP bleed, GI consultation was requested. Eliquis was held. CT abdomen/pelvis was done, negative for RP bleed, did reveal moderate ascites. GI suggested EGD to further evaluate anemia.     1. Acute hypoxic respiratory failure sec fluid overload and acute CHF:  Fluid management with HD  repeat ECHO with low EF, outpatient ischemic evaluation  supplemental oxygen, wean as tolerated.     2. Enterococcus bacteremia and infected HD catheter:  Catheter removed  repeat cultures negative.  continue 6 weeks of Ampicillin and Ceftriaxone for presumed endocarditis  TTE x 2 without vegetations.  CRISTIAN deferred given immunocompromised state  will need PICC closer to discharge.    3. Acute blood loss anemia, suspect GI bleed:  S/p PRBC 11/18 and 11/20  Hb 8.6 today  AC and Plavix on hold  plan for EGD in am  monitor CBC.  CT negative for RP bleed.     4. Par A fib:  Continue Metoprolol  Eliquis on hold  will resume pending course and w/u for anemia      5. ESRD on HD:  As above, continue via AVF  nephrology following.  continue Sevelamer    3. Emphysema, h/o lung transplant:  Continue Tacrolimus, as per primary transplant team recs  Cellcept on hold.  supplemental oxygen  not in COPD exacerbation  continue chronic steroid therapy    4. CAD s/p PCI:  outpatient ischemic eval  ECHO with low EF  continue statin.  Hold Plavix.    5. Chronic IJ occlusion:  catheter now removed  Eliquis now being given for a fib, held given anemia.    6. Hypertension/hyperlipidemia:  continue statin  cont Metoprolol    7. BPH:  Continue Flomax    8. H/o fungal meningitis:  continue Bactrim and Fluconazole    9. DVT ppx:  SCDs.    Discussed with patient and RN.  Dispo pending EGD.  palliative care consulted.  updated wife.

## 2022-11-21 NOTE — PROGRESS NOTE ADULT - ASSESSMENT
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 Admitted with hypoxic respiratory failure/ fluid overload. Hospital course significant for new onset Afib, was started on Eliquis, now d/c after drop in hemoglobin, +FOBT.

## 2022-11-22 NOTE — PROGRESS NOTE ADULT - SUBJECTIVE AND OBJECTIVE BOX
INTERVAL HPI/OVERNIGHT EVENTS:    CC:  acute CHF, acute anemia, ESRD on HD, enterococcus endocarditis, h/o lung transplant, CAD     No overnight events  awaiting EGD  no new complaints.    Vital Signs Last 24 Hrs  T(C): 36.4 (22 Nov 2022 09:53), Max: 37.1 (21 Nov 2022 17:37)  T(F): 97.6 (22 Nov 2022 09:53), Max: 98.7 (21 Nov 2022 17:37)  HR: 71 (22 Nov 2022 09:53) (71 - 100)  BP: 103/57 (22 Nov 2022 09:53) (100/56 - 108/66)  BP(mean): --  RR: 18 (22 Nov 2022 09:53) (18 - 18)  SpO2: 97% (22 Nov 2022 09:53) (96% - 98%)    Parameters below as of 21 Nov 2022 22:07  Patient On (Oxygen Delivery Method): room air        PHYSICAL EXAM:    GENERAL: alert, not in distress  CHEST/LUNG: b/l air entry, decreased in bases  HEART: reg  ABDOMEN: less distended, non tender  EXTREMITIES:  no edema, tenderness    MEDICATIONS  (STANDING):  ampicillin  IVPB 2 Gram(s) IV Intermittent every 12 hours  atorvastatin 80 milliGRAM(s) Oral at bedtime  cefTRIAXone Injectable. 2000 milliGRAM(s) IV Push every 12 hours  chlorhexidine 2% Cloths 1 Application(s) Topical <User Schedule>  epoetin ben-epbx (RETACRIT) Injectable 02615 Unit(s) IV Push <User Schedule>  fluconAZOLE   Tablet 200 milliGRAM(s) Oral <User Schedule>  hydrocortisone 10 milliGRAM(s) Oral two times a day  metoprolol succinate ER 25 milliGRAM(s) Oral daily  multivitamin 1 Tablet(s) Oral daily  pantoprazole    Tablet 40 milliGRAM(s) Oral before breakfast  sevelamer carbonate 1600 milliGRAM(s) Oral three times a day with meals  tacrolimus 1.5 milliGRAM(s) Oral daily  tamsulosin 0.4 milliGRAM(s) Oral at bedtime  trimethoprim   80 mG/sulfamethoxazole 400 mG 1 Tablet(s) Oral <User Schedule>    MEDICATIONS  (PRN):  acetaminophen     Tablet .. 650 milliGRAM(s) Oral every 6 hours PRN Temp greater or equal to 38C (100.4F), Mild Pain (1 - 3)  ALPRAZolam 0.5 milliGRAM(s) Oral two times a day PRN anxiety and insomnia  melatonin 3 milliGRAM(s) Oral at bedtime PRN Insomnia  ondansetron Injectable 4 milliGRAM(s) IV Push every 8 hours PRN Nausea and/or Vomiting      Allergies    budesonide (Unknown)  cefpodoxime (Unknown)  Pollen (Unknown)    Intolerances          LABS:                          9.4    11.60 )-----------( 167      ( 22 Nov 2022 05:39 )             32.0     11-22    141  |  102  |  25.3<H>  ----------------------------<  110<H>  4.8   |  26.0  |  3.86<H>    Ca    8.7      22 Nov 2022 05:39      PT/INR - ( 21 Nov 2022 17:00 )   PT: 13.1 sec;   INR: 1.13 ratio               RADIOLOGY & ADDITIONAL TESTS:

## 2022-11-22 NOTE — PROGRESS NOTE ADULT - ASSESSMENT
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 Noted to have leucocytosis, elevated BNP and troponin. Patient noted to be in acute hypoxic respiratory failure, requiring supplemental oxygen. Nephrology consulted and he received urgent HD for fluid removal. ID and cardiology consultations were requested. ECHO was ordered. He was given Meropenem and Doxycycline and cultures were sent. Course was complicated by new onset rapid atrial fibrillation and ECHO with new drop in EF 25%. Blood cultures positive for Enterococcus fecalis, antibiotics were changed to Zosyn.  He reverted back to sinus rhythm, Metoprolol was given for rate control, Eliquis was continued. Given bacteremia, vascular surgery was consulted and PermCath was removed and HD was continued via AVF. Entresto added for CHF. He was noted to have abdominal distention and ascites, IR consulted and paracentesis was done, SBP was ruled out. His repeat blood cultures were negative. CRISTIAN was deferred by cardiology as he is severely immunocompromised. 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 A repeat ECHO was done to assess LV function, showed low EF and no vegetations. Outpatient stress testing and ischemic evaluation was recommended by cardiology. He was noted to have a drop in his Hb 6.6, for which he received PRBC with HD on 11/18. FOBT was checked, which was positive. He had borderline BP and episodes of hypotension, Entresto was held. CT abdomen/pelvis was ordered to rule out RP bleed, GI consultation was requested. Eliquis was held. CT abdomen/pelvis was done, negative for RP bleed, did reveal moderate ascites. GI suggested EGD to further evaluate anemia.     1. Acute hypoxic respiratory failure sec fluid overload and acute CHF:  Fluid management with HD  repeat ECHO with low EF, outpatient ischemic evaluation  supplemental oxygen, wean as tolerated.     2. Enterococcus bacteremia and infected HD catheter:  Catheter removed  repeat cultures negative.  continue 6 weeks of Ampicillin and Ceftriaxone for presumed endocarditis  TTE x 2 without vegetations.  CRISTIAN deferred given immunocompromised state  will need PICC closer to discharge.    3. Acute blood loss anemia, suspect GI bleed:  S/p PRBC 11/18 and 11/20  Hb stable  AC and Plavix on hold  EGD today  DAPT and AC to be resumed pending results of EGD  monitor CBC.  CT negative for RP bleed.     4. Par A fib:  Continue Metoprolol  Eliquis on hold  will resume pending course and w/u for anemia      5. ESRD on HD:  As above, continue via AVF  nephrology following.  continue Sevelamer    3. Emphysema, h/o lung transplant:  Continue Tacrolimus, as per primary transplant team recs  Cellcept on hold.  supplemental oxygen  not in COPD exacerbation  continue chronic steroid therapy    4. CAD s/p PCI:  outpatient ischemic eval  ECHO with low EF  continue statin.  Hold Plavix.    5. Chronic IJ occlusion:  catheter now removed  Eliquis now being given for a fib, held given anemia.    6. Hypertension/hyperlipidemia:  continue statin  cont Metoprolol    7. BPH:  Continue Flomax    8. H/o fungal meningitis:  continue Bactrim and Fluconazole    9. DVT ppx:  SCDs.    Discussed with patient and RN.  Dispo pending EGD.   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 Noted to have leucocytosis, elevated BNP and troponin. Patient noted to be in acute hypoxic respiratory failure, requiring supplemental oxygen. Nephrology consulted and he received urgent HD for fluid removal. ID and cardiology consultations were requested. ECHO was ordered. He was given Meropenem and Doxycycline and cultures were sent. Course was complicated by new onset rapid atrial fibrillation and ECHO with new drop in EF 25%. Blood cultures positive for Enterococcus fecalis, antibiotics were changed to Zosyn.  He reverted back to sinus rhythm, Metoprolol was given for rate control, Eliquis was continued. Given bacteremia, vascular surgery was consulted and PermCath was removed and HD was continued via AVF. Entresto added for CHF. He was noted to have abdominal distention and ascites, IR consulted and paracentesis was done, SBP was ruled out. His repeat blood cultures were negative. CRISTIAN was deferred by cardiology as he is severely immunocompromised. 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 A repeat ECHO was done to assess LV function, showed low EF and no vegetations. Outpatient stress testing and ischemic evaluation was recommended by cardiology. He was noted to have a drop in his Hb 6.6, for which he received PRBC with HD on 11/18. FOBT was checked, which was positive. He had borderline BP and episodes of hypotension, Entresto was held. CT abdomen/pelvis was ordered to rule out RP bleed, GI consultation was requested. Eliquis was held. CT abdomen/pelvis was done, negative for RP bleed, did reveal moderate ascites. GI suggested EGD to further evaluate anemia.     1. Acute hypoxic respiratory failure sec fluid overload and acute CHF:  Fluid management with HD  repeat ECHO with low EF, outpatient ischemic evaluation  supplemental oxygen, wean as tolerated.     2. Enterococcus bacteremia and infected HD catheter:  Catheter removed  repeat cultures negative.  continue 6 weeks of Ampicillin and Ceftriaxone for presumed endocarditis  TTE x 2 without vegetations.  CRISTIAN deferred given immunocompromised state  will need PICC closer to discharge.    3. Acute blood loss anemia, suspect GI bleed:  S/p PRBC 11/18 and 11/20  Hb stable  AC and Plavix on hold  EGD today  DAPT and AC to be resumed pending results of EGD  monitor CBC.  CT negative for RP bleed.     4. Par A fib:  Continue Metoprolol  Eliquis on hold  will resume pending course and w/u for anemia      5. ESRD on HD:  As above, continue via AVF  nephrology following.  continue Sevelamer    3. Emphysema, h/o lung transplant:  Continue Tacrolimus, as per primary transplant team recs  Cellcept on hold.  supplemental oxygen  not in COPD exacerbation  continue chronic steroid therapy    4. CAD s/p PCI:  outpatient ischemic eval  ECHO with low EF  continue statin.  Hold Plavix.    5. Chronic IJ occlusion:  catheter now removed  Eliquis now being given for a fib, held given anemia.    6. Hypertension/hyperlipidemia:  continue statin  cont Metoprolol    7. BPH:  Continue Flomax    8. H/o fungal meningitis:  continue Bactrim and Fluconazole    9. DVT ppx:  SCDs.    Discussed with patient and RN.  Dispo pending EGD.    addendum:  egd results noted and d/w GI  advised to initiate plavix or eliquis, decision deferred to cardio  d/w cardio, advised initiate eliquis now  will monitor cbc and plavix to be resumed as OP if cbc stable.  no colonoscopy now  called to update wife, VM left.

## 2022-11-22 NOTE — PROGRESS NOTE ADULT - ASSESSMENT
72 year old male with PMH of HTN, HLD, CAD s/p stent, COPD/emphysema s/p lung transplantation, history of fungal meningitis, hx of hip fracture s/p ORIF, and ESRD on HD presents with SOB. CT chest showed new interstitial edema and new GGO in both lungs. Admitted for further work up. Hospital course is notable for Enterococcus faecalis bacteremia. Nephrology is managing ESRD on HD.     -Access: L AV access   -Outpatient dialysis days are TTS  - HD tomorrow ( Holiday schedule)  -BP intermittently soft - currently on Entresto and metoprolol - UF as tolerated.  -Anemia in setting of CKD - PRBC transfusion . monitor h/h   -Mineral Bone Disease in setting of CKD - continue oral phos binder   -Enterococcus bacteremia: s/p TDC removal; TTE negative for vegetations; Abx as per ID  -s/p Lung transplantation - on immunosuppressive medications - management as per ID  -Hb stable, s/p 2 U PRBC transfusion.  FOBT positive, EGD today, off Plavix and Eliquis.. CT negative for RP bleed.

## 2022-11-22 NOTE — PROGRESS NOTE ADULT - SUBJECTIVE AND OBJECTIVE BOX
JU HENSLEYJAROD  [id number]        Chief Complaint:  Follow up CHFrEF/bacteremia/CKD/PAF    Interval History:  Patient without specific c/o this.  No significant SOB in bed.  Denies CP, palps        acetaminophen     Tablet .. 650 milliGRAM(s) Oral every 6 hours PRN  ALPRAZolam 0.5 milliGRAM(s) Oral two times a day PRN  ampicillin  IVPB 2 Gram(s) IV Intermittent every 12 hours  atorvastatin 80 milliGRAM(s) Oral at bedtime  cefTRIAXone Injectable. 2000 milliGRAM(s) IV Push every 12 hours  chlorhexidine 2% Cloths 1 Application(s) Topical <User Schedule>  epoetin ben-epbx (RETACRIT) Injectable 48156 Unit(s) IV Push <User Schedule>  fluconAZOLE   Tablet 200 milliGRAM(s) Oral <User Schedule>  hydrocortisone 10 milliGRAM(s) Oral two times a day  melatonin 3 milliGRAM(s) Oral at bedtime PRN  metoprolol succinate ER 25 milliGRAM(s) Oral daily  multivitamin 1 Tablet(s) Oral daily  ondansetron Injectable 4 milliGRAM(s) IV Push every 8 hours PRN  pantoprazole    Tablet 40 milliGRAM(s) Oral before breakfast  sevelamer carbonate 1600 milliGRAM(s) Oral three times a day with meals  tacrolimus 1.5 milliGRAM(s) Oral daily  tamsulosin 0.4 milliGRAM(s) Oral at bedtime  trimethoprim   80 mG/sulfamethoxazole 400 mG 1 Tablet(s) Oral <User Schedule>          Physical Exam:  T(C): 36.4 (11-22-22 @ 09:53), Max: 37.1 (11-21-22 @ 17:37)  HR: 71 (11-22-22 @ 09:53) (71 - 100)  BP: 103/57 (11-22-22 @ 09:53) (100/56 - 108/66)  RR: 18 (11-22-22 @ 09:53) (18 - 18)  SpO2: 97% (11-22-22 @ 09:53) (96% - 98%)  Wt(kg): --  General: Comfortable in NAD  Neck: No JVD  CVS: nl s1s2, no s3  Pulm: CTA b/l  Abd: soft, non-tender  Ext: No c/c/e  Neuro A&O x3  Psych: Normal affect      Labs:   22 Nov 2022 05:39    141    |  102    |  25.3   ----------------------------<  110    4.8     |  26.0   |  3.86     Ca    8.7        22 Nov 2022 05:39                            9.4    11.60 )-----------( 167      ( 22 Nov 2022 05:39 )             32.0     PT/INR - ( 21 Nov 2022 17:00 )   PT: 13.1 sec;   INR: 1.13 ratio                 Cardiac catheterization on 11/26/19:    Cardiac catheterization, this information is taken from a progress note from the [facility] documenting cardiac catheterization 01/14:  60% proximal LAD stenosis, 60% mid LAD stenosis, 60% proximal circumflex stenosis, 60% ostial OM1 stenosis, 40% proximal right coronary artery stenosis, 40% proximal PDA documenting an FFR of the mid LAD of 0.79.  PCI with drug-eluting stent to LAD and OM1.    ECHO 11/12/2022:   1. Technically difficult study.   2. Endocardial visualization was enhanced with intravenous echo contrast.   3. Left ventricular ejection fraction, by visual estimation, is 25 to 30%.   4. Severely decreased global left ventricular systolic function.   5. The mitral in-flow pattern reveals no discernable A-wave, therefore no comment on diastolic function can be made.   6. Mildly enlarged right ventricle.   7. Moderately reduced RV systolic function.   8. Mild mitral valve regurgitation.   9. Estimated pulmonary artery systolic pressure is 35.2 mmHg assuming a right atrial pressure of 8 mmHg, which is consistent with borderline pulmonary hypertension.  10. In comparison to prior TTE 4/14/2021 LVEF is now reduced to 25-30%. (LVEF was 40-45%). Rhythm monitoring shows Atrial Fibrillation with RVR which may adversly affect LVEF evaluation. Consider to obtain Limited FU   TTE once HR controlled or rhythm restored to SR if clinically indicated.      Echo (11/16/22):   1. Left ventricular ejection fraction, by visual estimation, is 25 to 30%.   2. Moderately to severely decreased global left ventricular systolic function.   3. Normal right ventricular size and function.   4. Mild mitral annular calcification.   5. Mild mitral valve regurgitation.   6. Thickening and calcification of the anterior and posterior mitral valve leaflets.   7. Mild tricuspid regurgitation.   8. Sclerotic aortic valve with normal opening.        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 Admitted with hypoxic respiratory failure/ fluid overload.   -Had rapid AF now in SR, A/C on hold  -Acute systolic CHF, fluid managed with HD. euvolemic at present time  -Bacteremia with enterococcus, on antibiotics and permacath removed. Echo without vegetation.  -Drop in EF maybe from rapid AF, will repeat as OP.  -s/p paracentesis without significant symptomatic improvement.  -Now with more significant anemia, s/p one unit.   -Repeat echo in SR still with severe LV dysfunction, euvolemic at present time.   -Planned for EGD today      Plan:   1. Fluid status managed with HD.  2. No signs vegetation on echo and no significant valve disease. Will hold off on CRISTIAN unless persistent bacteremia given high aspiration risk and immunocompromised state.   3. Primary team holding AC and antiplatelets, resume once feasible from anemia perspective.   4. Continue current CV meds at current doses.  5. Borderline BP preventing implementation of GDMT.  Add as tolerated, but still limited by need for HD.  6. Repeat echo as OP and also stress testing.  Given anemia especially will hold off on cath for now.  7. Planned for low risk diagnostic procedure (EGD), patient is at high risk given multiple co-morbid conditions, from cardiac perspective he is at high risk. However information from EGD and future colonoscopy will be extremely important to determine feasibility of future cardiovascular testing/interventions.   8. Stable from cardiovascular perspective, please call us back with questions or concerns.

## 2022-11-22 NOTE — PROGRESS NOTE ADULT - SUBJECTIVE AND OBJECTIVE BOX
CC:  Follow up GOC , Symptoms    OVERNIGHT EVENTS:  anxious tho have endoscopy done    Present Symptoms:   Dyspnea:  No      Nausea/Vomiting:  No    Anxiety:   Yes    Depression:  No    Fatigue:  Yes    Loss of appetite:   NA   Constipation: Not Reported     Pain: No               Character-            Duration-            Location-            Severity-    Pain AD Score:  [email or web address] (press ctrl + left click to view)    Review of Systems: Reviewed as above  All others negative    MEDICATIONS  (STANDING):  ampicillin  IVPB 2 Gram(s) IV Intermittent every 12 hours  atorvastatin 80 milliGRAM(s) Oral at bedtime  cefTRIAXone Injectable. 2000 milliGRAM(s) IV Push every 12 hours  chlorhexidine 2% Cloths 1 Application(s) Topical <User Schedule>  epoetin ben-epbx (RETACRIT) Injectable 17904 Unit(s) IV Push <User Schedule>  fluconAZOLE   Tablet 200 milliGRAM(s) Oral <User Schedule>  hydrocortisone 10 milliGRAM(s) Oral two times a day  metoprolol succinate ER 25 milliGRAM(s) Oral daily  multivitamin 1 Tablet(s) Oral daily  pantoprazole    Tablet 40 milliGRAM(s) Oral before breakfast  sevelamer carbonate 1600 milliGRAM(s) Oral three times a day with meals  tacrolimus 1.5 milliGRAM(s) Oral daily  tamsulosin 0.4 milliGRAM(s) Oral at bedtime  trimethoprim   80 mG/sulfamethoxazole 400 mG 1 Tablet(s) Oral <User Schedule>    MEDICATIONS  (PRN):  acetaminophen     Tablet .. 650 milliGRAM(s) Oral every 6 hours PRN Temp greater or equal to 38C (100.4F), Mild Pain (1 - 3)  ALPRAZolam 0.5 milliGRAM(s) Oral two times a day PRN anxiety and insomnia  melatonin 3 milliGRAM(s) Oral at bedtime PRN Insomnia  ondansetron Injectable 4 milliGRAM(s) IV Push every 8 hours PRN Nausea and/or Vomiting      PHYSICAL EXAM:    Vital Signs Last 24 Hrs  T(C): 36.4 (22 Nov 2022 09:53), Max: 37.1 (21 Nov 2022 17:37)  T(F): 97.6 (22 Nov 2022 09:53), Max: 98.7 (21 Nov 2022 17:37)  HR: 71 (22 Nov 2022 09:53) (71 - 100)  BP: 103/57 (22 Nov 2022 09:53) (100/56 - 108/66)  BP(mean): --  RR: 18 (22 Nov 2022 09:53) (18 - 18)  SpO2: 97% (22 Nov 2022 09:53) (96% - 98%)    Parameters below as of 21 Nov 2022 22:07  Patient On (Oxygen Delivery Method): room air        Karnofsky:  10%  General: M NAD       HEENT:  NCAT   non icteric  Lungs: comfortable  non labored  CV:  RR  GI:   soft NTND  :   on HD          MSK: weak  Skin:  warm/dry  Neuro  no focal deficits    LABS:                          9.4    11.60 )-----------( 167      ( 22 Nov 2022 05:39 )             32.0     11-22    141  |  102  |  25.3<H>  ----------------------------<  110<H>  4.8   |  26.0  |  3.86<H>    Ca    8.7      22 Nov 2022 05:39      PT/INR - ( 21 Nov 2022 17:00 )   PT: 13.1 sec;   INR: 1.13 ratio             I&O's Summary    21 Nov 2022 07:01  -  22 Nov 2022 07:00  --------------------------------------------------------  IN: 0 mL / OUT: 0 mL / NET: 0 mL        RADIOLOGY & ADDITIONAL STUDIES:  < from: CT Abdomen and Pelvis No Cont (11.19.22 @ 17:47) >  INTERPRETATION:  CLINICAL INFORMATION: r/o bleed Admitting Dxs: J96.01   ACUTE RESPIRATORY FAILURE WITH HYPOXIA XCT    TECHNIQUE: CT abdomen and pelvis without contrast. Coronal and sagittal   reformats.    COMPARISON: 11/13/2022    FINDINGS:    Septal thickening and groundglass opacities are again noted with fissural   fluid. Cardiomegaly.    Cirrhotic liver. Tiny gallstones. Atrophic kidneys. Unremarkable   pancreas, spleen, and adrenal glands.    Moderate ascites.    No bowel obstruction. Normal appendix.    No retroperitoneal lymphadenopathy. A 3.1 cm abdominal aortic aneurysm is   unchanged.  No retroperitoneal hematoma.    Chronic left rib fractures. Sternal wires.  Right hip arthroplasty. Left femoral fixation hardware. Chronic left   inferior pubic ramus fracture. Chronic fractures of the left acetabulum   and iliac bone. Chronic fracture deformity of the left femur. Bilateral   sacral insufficiency fractures are noted. Multiple compression fractures   are unchanged.    IMPRESSION:  No retroperitoneal hematoma.    Cirrhotic liver with moderate ascites.    Septal thickening and ground glass opacities may reflect pulmonary edema   and/or infection.    Multiple chronic fractures as above.    < end of copied text >        ADVANCE DIRECTIVES/TREATMENT PREFERENCES:

## 2022-11-22 NOTE — PROGRESS NOTE ADULT - ASSESSMENT
72yr man with  ESRD on HD, emphysema s/p lung transplant, history of fungal meningitis, carotid stenosis s/p CEA, CAD s/p PCI in 2019, HTN, HLD admitted with hypoxia 2/2 CHF complicated by Enterococcus bacteremia with concern for endocarditis, anemia concern for GI bleed    Hypoxia  cont O2 support    CHF  on HD for fluid mgmt    Bacteremia  TTE neg for vegetations  CRISTIAN deferred  IV abx per ID  monitor for fever    Anemia  for endoscopy   monitor Hg    ESRD  HD per renal  avoid nephrotoxic agents    Anxiety   Xanax PRN  monitor use  Psychosocial support    Encounter for Palliative Care  72yr man with  ESRD on HD, emphysema s/p lung transplant, history of fungal meningitis, carotid stenosis s/p CEA, CAD s/p PCI in 2019, HTN, HLD admitted with hypoxia 2/2 CHF complicated by Enterococcus bacteremia with concern for endocarditis, anemia concern for GI bleed    Hypoxia  cont O2 support    CHF  on HD for fluid mgmt    Bacteremia  TTE neg for vegetations  CRISTIAN deferred  IV abx per ID  monitor for fever    Anemia  for endoscopy   monitor Hg    ESRD  HD per renal  avoid nephrotoxic agents    Anxiety   Xanax PRN  monitor use  Psychosocial support    Encounter for Palliative Care   Patient anxious to have endoscopy.   Wishes Full Code

## 2022-11-22 NOTE — PROGRESS NOTE ADULT - SUBJECTIVE AND OBJECTIVE BOX
[facility] DIVISION OF KIDNEY DISEASES AND HYPERTENSION -- HEMODIALYSIS NOTE  --------------------------------------------------------------------------------  Chief Complaint: ESRD/Ongoing hemodialysis requirement    24 hour events/subjective:  no acute event noted  pt seen and examined; npo for EGD today        PAST HISTORY  --------------------------------------------------------------------------------  No significant changes to PMH, PSH, FHx, SHx, unless otherwise noted    ALLERGIES & MEDICATIONS  --------------------------------------------------------------------------------  Allergies    budesonide (Unknown)  cefpodoxime (Unknown)  Pollen (Unknown)    Intolerances      Standing Inpatient Medications  ampicillin  IVPB 2 Gram(s) IV Intermittent every 12 hours  apixaban 2.5 milliGRAM(s) Oral every 12 hours  atorvastatin 80 milliGRAM(s) Oral at bedtime  cefTRIAXone Injectable. 2000 milliGRAM(s) IV Push every 12 hours  chlorhexidine 2% Cloths 1 Application(s) Topical <User Schedule>  epoetin ben-epbx (RETACRIT) Injectable 25337 Unit(s) IV Push <User Schedule>  fluconAZOLE   Tablet 200 milliGRAM(s) Oral <User Schedule>  hydrocortisone 10 milliGRAM(s) Oral two times a day  metoprolol succinate ER 25 milliGRAM(s) Oral daily  multivitamin 1 Tablet(s) Oral daily  pantoprazole    Tablet 40 milliGRAM(s) Oral before breakfast  sevelamer carbonate 1600 milliGRAM(s) Oral three times a day with meals  tacrolimus 1.5 milliGRAM(s) Oral daily  tamsulosin 0.4 milliGRAM(s) Oral at bedtime  trimethoprim   80 mG/sulfamethoxazole 400 mG 1 Tablet(s) Oral <User Schedule>    PRN Inpatient Medications  acetaminophen     Tablet .. 650 milliGRAM(s) Oral every 6 hours PRN  ALPRAZolam 0.5 milliGRAM(s) Oral two times a day PRN  melatonin 3 milliGRAM(s) Oral at bedtime PRN  ondansetron Injectable 4 milliGRAM(s) IV Push every 8 hours PRN      REVIEW OF SYSTEMS  --------------------------------------------------------------------------------  Gen: No weight changes, fatigue, fevers/chills, weakness  Skin: No rashes  Head/Eyes/Ears/Mouth: No headache  Respiratory: No dyspnea, cough,  CV: No chest pain, orthopnea  GI: No abdominal pain, diarrhea, constipation, nausea, vomiting,  MSK: No joint pain  Neuro: No dizziness/lightheadedness, weakness  Heme: No bleeding  Psych: No significant nervousness, anxiety, stress, depression    All other systems were reviewed and are negative, except as noted.    VITALS/PHYSICAL EXAM  --------------------------------------------------------------------------------  T(C): 36.4 (11-22-22 @ 09:53), Max: 37.1 (11-21-22 @ 17:37)  HR: 71 (11-22-22 @ 09:53) (71 - 100)  BP: 103/57 (11-22-22 @ 09:53) (100/56 - 108/66)  RR: 18 (11-22-22 @ 09:53) (18 - 18)  SpO2: 97% (11-22-22 @ 09:53) (96% - 98%)  Wt(kg): --        11-21-22 @ 07:01  -  11-22-22 @ 07:00  --------------------------------------------------------  IN: 0 mL / OUT: 0 mL / NET: 0 mL      Physical Exam:  	Gen: NAD  	HEENT:  supple neck,  	Pulm: CTA B/L  	CV: RRR, S1S2; no rub  	Abd: +BS, soft, nontender  	UE: Warm  	LE: Warm,  edema  	Neuro: No focal deficits  	Psych: Normal affect and mood  	Skin: Warm  	Vascular access: AVF    LABS/STUDIES  --------------------------------------------------------------------------------              9.4    11.60 >-----------<  167      [11-22-22 @ 05:39]              32.0     141  |  102  |  25.3  ----------------------------<  110      [11-22-22 @ 05:39]  4.8   |  26.0  |  3.86        Ca     8.7     [11-22-22 @ 05:39]      PT/INR: PT 13.1 , INR 1.13       [11-21-22 @ 17:00]      Iron 27, TIBC 247, %sat 11      [03-20-22 @ 03:23]  Ferritin 4169      [05-01-22 @ 07:44]  Vitamin D (25OH) 29.1      [11-17-22 @ 05:45]  Lipid: chol 84, , HDL 26, LDL --      [11-12-22 @ 05:44]

## 2022-11-23 NOTE — DISCHARGE NOTE PROVIDER - HOSPITAL COURSE
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 Noted to have leucocytosis, elevated BNP and troponin. Patient noted to be in acute hypoxic respiratory failure, requiring supplemental oxygen. Nephrology consulted and he received urgent HD for fluid removal. ID and cardiology consultations were requested. ECHO was ordered. He was given Meropenem and Doxycycline and cultures were sent. Course was complicated by new onset rapid atrial fibrillation and ECHO with new drop in EF 25%. Blood cultures positive for Enterococcus fecalis, antibiotics were changed to Zosyn.  He reverted back to sinus rhythm, Metoprolol was given for rate control, Eliquis was continued. Given bacteremia, vascular surgery was consulted and PermCath was removed and HD was continued via AVF. Entresto added for CHF. He was noted to have abdominal distention and ascites, IR consulted and paracentesis was done, SBP was ruled out. His repeat blood cultures were negative. CRISTIAN was deferred by cardiology as he is severely immunocompromised. 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 A repeat ECHO was done to assess LV function, showed low EF and no vegetations. Outpatient stress testing and ischemic evaluation was recommended by cardiology. He was noted to have a drop in his Hb 6.6, for which he received PRBC with HD on 11/18. FOBT was checked, which was positive. He had borderline BP and episodes of hypotension, Entresto was held. CT abdomen/pelvis was ordered to rule out RP bleed, GI consultation was requested. Eliquis was held. CT abdomen/pelvis was done, negative for RP bleed, did reveal moderate ascites. GI suggested EGD to further evaluate anemia. EGD revealed some non erosive gastritis. Given anemia and underlying CAD, Eliquis was resumed. Hb remained stable. Plavix to be resumed as outpatient. PICC line was placed. Home antibiotics were arranged. He is stable for discharge home.

## 2022-11-23 NOTE — PROGRESS NOTE ADULT - NS ATTEND AMEND GEN_ALL_CORE FT
Patient's LUE brachicephalic AVF accessed successfully, but he continues to complain of pain associated with needle placement. Patient has used Emla cream in the past and c/o sensitivity.  He states "he would rather die due to infection with catheter than have needles put into his arm."  Follow up blood cultures  Catheter tip grew enterococcus    Vascular will not  place tunneled catheter, and patient should be encouraged to use functional AVF. Morbidity and mortality associated with catheters and sepsis in the face of immunosuppressive medications and lung transplant is too profound.    Patient was highly discouraged from requesting another catheter, and its implications were explained to him at length at bedside (as well as outpatient in the past).
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 Admitted with hypoxic respiratory failure/ fluid overload. Hospital course significant for new onset Afib, was started on Eliquis, now d/c after drop in hemoglobin, +FOBT.   Jadyn wants to anticoagulate, but hesitant in light of 3 U PRBC needed during this admission (2 U 11/18, 1 U 11/17). Jadyn also feels he is high risk for endoscopy, considering his EF 25%, lung transplant status, new AF.   Most dangerous bleeding source upper GI, so to decrease time under anesthesia would do EGD only tomorrow and have this inform jadyn decision re: A/C  NPO after midnight   Pt can give own consent  would increase PPI to BID
I saw pt with NP   NO melena  hemoglobin stable  EGD showed erosive gastritis, small duodenal  polyp not bx as pt is on anticoagulation. This maybe cause of anemia. Poor candidate for colonoscopy, will likely not prep well and may not tolerate .   I agree with the above assessment and plan

## 2022-11-23 NOTE — DISCHARGE NOTE NURSING/CASE MANAGEMENT/SOCIAL WORK - NURSING SECTION COMPLETE
[___ Year(s) Ago] : [unfilled] year(s) ago [Health Maintenance] : health maintenance [Mother] : mother [Unmarried] : unmarried [Working Full Time] : working full time [Occupation ___] : occupation: [unfilled] [Never Smoked Cigarettes] : has never smoked cigarettes [Never] : has never used illicit drugs [Fair] : fair [Reg. Dental Visits] : She has regular dental visits [Healthy Diet] : She consumes a diverse and healthy diet [Premenopausal] : the patient is premenopausal [Binge Drinking] : denies binge drinking [Patient Concern] : no personal concern about alcohol use [Family Concern] : no family concern about alcohol use [Vision Problems] : She denies vision problems [Hearing Loss] : She denies hearing loss [de-identified] : brother [de-identified] : hx flu shot 1/18, hx Tdap 2/17, hx hep B series, hx HPV series [FreeTextEntry1] : \par Feeling well.\par Not fasting.\par \par Reports injury to right pointer finger few weeks ago- was wrestling with brother when accidentally over extended digit when his knee hit it.  Notes pain, swelling less since onset.  Used ice initially, not since.  No pain meds used as pain minimal.\par -is right handed\par -denies paresthesias or weakness\par \par hx throat sx's- resolved\par -hx ? 2/2 shrimp and mustard reaction- remains resolved, avoids citrus and tomatoes also\par -has been eating shrimp w/o event but avoids mustard\par -hx eval by A/I 2/17 with negative food allergy testing, awaiting lab testing- f/u pending\par -hx eval by ENT 3/17 with fiberoptic exam showing reflux, allergic rhinitis- is s/p 30 d trial of omeprazole with sx resolution and off PPI x few months.  \par -denies sore throat, cough, sob or dysphagia\par -reports hx eating marinara sauce prior to initial sx onset, also was eating close to bedtime- avoid both now.\par \par hx neurofibromatosis- dx'd as infant\par -followed by neuro 5/17, s/p negative screening brain MRI 9/17\par -followed by plastics- is s/p removal of lesion at right thigh 11/17 c/b hematoma- now improved.  using local care, denies redness/pain, fever, chills or d/c.  Last seen by plastics 5/18- told doing well and no further intervention needed.  \par -denies HA, dizziness, paresthesias, weakness or joint swelling\par \par preDM- \par -tries to eating healthy, cut out sweets/carbs \par -exercise: treadmill running 2 miles 2-3x/wk as able- not done since 12/17 s/p thigh surgery, occ obstacle course events 1-2x/wk- done w/o sx's or limitations\par \par Reports nl appetite and BMs; denies GI complaints.  Wt stable.\par \par hx seasonal allergies- mainly sneezing, postnasal drip with mild cough associated.  Not active currently\par -no meds used\par \par \par Not currently sexually active, hx 1 male partner in past yr, condoms used regularly; denies hx STD dx\par -denies  complaints.\par \par Denies depression or anxiety.\par  Patient/Caregiver provided printed discharge information.

## 2022-11-23 NOTE — PROGRESS NOTE ADULT - ASSESSMENT
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 Noted to have leucocytosis, elevated BNP and troponin. Patient noted to be in acute hypoxic respiratory failure, requiring supplemental oxygen. Nephrology consulted and he received urgent HD for fluid removal. ID and cardiology consultations were requested. ECHO was ordered. He was given Meropenem and Doxycycline and cultures were sent. Course was complicated by new onset rapid atrial fibrillation and ECHO with new drop in EF 25%. Blood cultures positive for Enterococcus fecalis, antibiotics were changed to Zosyn.  He reverted back to sinus rhythm, Metoprolol was given for rate control, Eliquis was continued. Given bacteremia, vascular surgery was consulted and PermCath was removed and HD was continued via AVF. Entresto added for CHF. He was noted to have abdominal distention and ascites, IR consulted and paracentesis was done, SBP was ruled out. His repeat blood cultures were negative. CRISTIAN was deferred by cardiology as he is severely immunocompromised. 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 A repeat ECHO was done to assess LV function, showed low EF and no vegetations. Outpatient stress testing and ischemic evaluation was recommended by cardiology. He was noted to have a drop in his Hb 6.6, for which he received PRBC with HD on 11/18. FOBT was checked, which was positive. He had borderline BP and episodes of hypotension, Entresto was held. CT abdomen/pelvis was ordered to rule out RP bleed, GI consultation was requested. Eliquis was held. CT abdomen/pelvis was done, negative for RP bleed, did reveal moderate ascites. GI suggested EGD to further evaluate anemia. EGD revealed some non erosive gastritis. Given anemia and underlying CAD, Eliquis was resumed. Hb remained stable. Plavix to be resumed as outpatient. PICC line was placed.     1. Acute hypoxic respiratory failure sec fluid overload and acute CHF:  Fluid management with HD  repeat ECHO with low EF, outpatient ischemic evaluation  supplemental oxygen, wean as tolerated.     2. Enterococcus bacteremia and infected HD catheter:  Catheter removed  repeat cultures negative.  continue 6 weeks of Ampicillin and Ceftriaxone for presumed endocarditis  TTE x 2 without vegetations.  CRISTIAN deferred given immunocompromised state  PICC line placed today.    3. Acute blood loss anemia, suspect GI bleed:  S/p PRBC 11/18 and 11/20  Hb stable  Eliquis was resumed and Hb has been stable  EGD revealed non erosive gastritis  no plan for colonoscopy    4. Par A fib:  Continue Metoprolol  eliquis resumed.      5. ESRD on HD:  As above, continue via AVF  nephrology following.  continue Sevelamer    3. Emphysema, h/o lung transplant:  Continue Tacrolimus, as per primary transplant team recs  Cellcept on hold.  supplemental oxygen  not in COPD exacerbation  continue chronic steroid therapy    4. CAD s/p PCI:  outpatient ischemic eval  ECHO with low EF  continue statin.  Hold Plavix, eliquis has been resumed  Plavix to be resumed as outpatient by his cardiologist.    5. Chronic IJ occlusion:  catheter now removed    6. Hypertension/hyperlipidemia:  continue statin  cont Metoprolol    7. BPH:  Continue Flomax    8. H/o fungal meningitis:  continue Bactrim and Fluconazole    9. DVT ppx:  SCDs.    Discussed with patient and RN.  PICC placed  needs home antibiotic to be arranged.  discharge planning.

## 2022-11-23 NOTE — DISCHARGE NOTE PROVIDER - CARE PROVIDER_API CALL
Fam Lugo)  Cardiovascular Disease; Internal Medicine  [address], [address]  [place] [address]  Phone: [phone]  Fax: [phone]  Follow Up Time:     Perlita Solares)  Internal Medicine  [address] [place] [address]  Phone: [phone]  Fax: [phone]  Follow Up Time:     Dimitri Hall)  Internal Medicine; Nephrology  [address] [place] [address]  Phone: [phone]  Fax: [phone]  Follow Up Time:

## 2022-11-23 NOTE — PROGRESS NOTE ADULT - TIME BILLING
Review of chart documents, labs, imaging. Direct patient assessment,  formulation of care plan. Discussion with  Interdisciplinary  team Dr. Lala
I reviewed the labs , notes EGD reports  I agreed with the above assessment and plna

## 2022-11-23 NOTE — PROGRESS NOTE ADULT - ASSESSMENT
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 Admitted with hypoxic respiratory failure/ fluid overload. Hospital course significant for new onset Afib, was started on Eliquis, now d/c after drop in hemoglobin, +FOBT.

## 2022-11-23 NOTE — DISCHARGE NOTE PROVIDER - PROVIDER TOKENS
PROVIDER:[TOKEN:[[id number]]],PROVIDER:[TOKEN:[[id number]]],PROVIDER:[TOKEN:[[id number]]]

## 2022-11-23 NOTE — PROGRESS NOTE ADULT - SUBJECTIVE AND OBJECTIVE BOX
Chief Complaint: This is a 72y old man patient being seen in follow-up consultation for anemia    Interval HPI / 24H events:  Patient seen and evaluated at bedside. S/p EGD yesterday shows non erosive gastritis, duodenal ulcer. His hemoglobin 8.9 this morning. Denies nausea, vomiting, hematemesis, hematochezia, melena.       Review of Systems:  . Constitutional: No fever, chills,   . Neck: Negative  · Respiratory and Thorax: Negative  · Cardiovascular: Negative  · Gastrointestinal: see above.  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    2019 novel coronavirus disease (COVID-19)  Feb 2021 - hospitalized for 1 week at [facility]    Pneumonia  April 2021    [place] ([place] of hearing)    HTN (hypertension)    Lumbar spondylosis    History of COPD    BPH without urinary obstruction    Hypercholesterolemia    Oliguria and anuria    H/O peripheral neuropathy    H/O lung transplant  bilateral - transplant - 1-2-2015 -  [facility]    H/O heart artery stent  x3 @[facility]    History of hip replacement  right    Status post open reduction and internal fixation (ORIF) of fracture  left femur      MEDICATIONS  (STANDING):  ampicillin  IVPB 2 Gram(s) IV Intermittent every 12 hours  apixaban 2.5 milliGRAM(s) Oral every 12 hours  atorvastatin 80 milliGRAM(s) Oral at bedtime  cefTRIAXone Injectable. 2000 milliGRAM(s) IV Push every 12 hours  chlorhexidine 2% Cloths 1 Application(s) Topical <User Schedule>  epoetin ben-epbx (RETACRIT) Injectable 53291 Unit(s) IV Push <User Schedule>  fluconAZOLE   Tablet 200 milliGRAM(s) Oral <User Schedule>  hydrocortisone 10 milliGRAM(s) Oral two times a day  metoprolol succinate ER 25 milliGRAM(s) Oral daily  multivitamin 1 Tablet(s) Oral daily  pantoprazole    Tablet 40 milliGRAM(s) Oral before breakfast  sevelamer carbonate 1600 milliGRAM(s) Oral three times a day with meals  tacrolimus 1.5 milliGRAM(s) Oral daily  tamsulosin 0.4 milliGRAM(s) Oral at bedtime  trimethoprim   80 mG/sulfamethoxazole 400 mG 1 Tablet(s) Oral <User Schedule>    MEDICATIONS  (PRN):  acetaminophen     Tablet .. 650 milliGRAM(s) Oral every 6 hours PRN Temp greater or equal to 38C (100.4F), Mild Pain (1 - 3)  ALPRAZolam 0.5 milliGRAM(s) Oral two times a day PRN anxiety and insomnia  melatonin 3 milliGRAM(s) Oral at bedtime PRN Insomnia  ondansetron Injectable 4 milliGRAM(s) IV Push every 8 hours PRN Nausea and/or Vomiting    budesonide (Unknown)  cefpodoxime (Unknown)  Pollen (Unknown)    T(C): 36.2 (11-23-22 @ 04:25), Max: 36.6 (11-22-22 @ 17:33)  HR: 86 (11-23-22 @ 04:25) (83 - 86)  BP: 106/59 (11-23-22 @ 04:25) (98/54 - 106/59)  RR: 18 (11-23-22 @ 04:25) (18 - 18)  SpO2: 98% (11-23-22 @ 04:25) (98% - 98%)      PHYSICAL EXAM:  Constitutional: No acute distress, pale appearing.   Neuro: Awake alert, oriented to person, place and situation, non-focal, speech clear and intact  HEENT: PERRL, anicteric sclerae, oral mucosa pink and moist  Neck: supple, no JVD  CV: regular rate, regular rhythm, +S1S2,   Pulm/chest: lung sounds CTA and equal bilaterally, no accessory muscle use noted  Abd: soft, NT, ND, +BS  Ext: no Cyanosis, clubbing or edema  Skin: warm, pale, no jaundice   Psych: calm, appropriate affect        LABS:               8.9    10.75 )-----------( 202      ( 11-23 @ 05:44 )             29.9                9.4    11.60 )-----------( 167      ( 11-22 @ 05:39 )             32.0                8.6    10.17 )-----------( 156      ( 11-21 @ 04:34 )             28.0       11-23    143  |  103  |  35.6<H>  ----------------------------<  114<H>  4.7   |  29.0  |  4.95<H>    Ca    9.0      23 Nov 2022 05:44  Phos  4.1     11-23  Mg     2.1     11-23        PT/INR - ( 21 Nov 2022 17:00 )   PT: 13.1 sec;   INR: 1.13 ratio           < from: EGD (11.22.22 @ 12:18) >  Findings:      Esophagus Mucosa Normal mucosa was noted in the whole esophagus.      Stomach Mucosa Localized erythema and nodularity of the mucosa was noted in the    pre-pyloric region. These findings are compatible with non-erosive gastritis.      Duodenum Protruding lesions Three polyps ranging in size from 3 mm to 8 mm were    found in the second part of the duodenum.      Impressions:      Normal mucosa in the gastroesophageal junction.      Erythema and nodularity in the pre-pyloric region compatible with non-erosive    gastritis.      Polyps (3 mm to 8 mm) in the second part of the duodenum.      Plan:      Return to floor for further management    < end of copied text >   Chief Complaint: This is a 72y old man patient being seen in follow-up consultation for anemia    Interval HPI / 24H events:  Patient seen and evaluated at bedside. S/p EGD yesterday shows non erosive gastritis, duodenal polyp 3-8 mm , not biopsied . His hemoglobin 8.9 this morning. Denies nausea, vomiting, hematemesis, hematochezia, melena.       Review of Systems:  . Constitutional: No fever, chills,   . Neck: Negative  · Respiratory and Thorax: Negative  · Cardiovascular: Negative  · Gastrointestinal: see above.  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    2019 novel coronavirus disease (COVID-19)  Feb 2021 - hospitalized for 1 week at [facility]    Pneumonia  April 2021    [place] ([place] hearing)    HTN (hypertension)    Lumbar spondylosis    History of COPD    BPH without urinary obstruction    Hypercholesterolemia    Oliguria and anuria    H/O peripheral neuropathy    H/O lung transplant  bilateral - transplant - 1-2-2015 -  [facility]    H/O heart artery stent  x3 @[facility]    History of hip replacement  right    Status post open reduction and internal fixation (ORIF) of fracture  left femur      MEDICATIONS  (STANDING):  ampicillin  IVPB 2 Gram(s) IV Intermittent every 12 hours  apixaban 2.5 milliGRAM(s) Oral every 12 hours  atorvastatin 80 milliGRAM(s) Oral at bedtime  cefTRIAXone Injectable. 2000 milliGRAM(s) IV Push every 12 hours  chlorhexidine 2% Cloths 1 Application(s) Topical <User Schedule>  epoetin ben-epbx (RETACRIT) Injectable 75066 Unit(s) IV Push <User Schedule>  fluconAZOLE   Tablet 200 milliGRAM(s) Oral <User Schedule>  hydrocortisone 10 milliGRAM(s) Oral two times a day  metoprolol succinate ER 25 milliGRAM(s) Oral daily  multivitamin 1 Tablet(s) Oral daily  pantoprazole    Tablet 40 milliGRAM(s) Oral before breakfast  sevelamer carbonate 1600 milliGRAM(s) Oral three times a day with meals  tacrolimus 1.5 milliGRAM(s) Oral daily  tamsulosin 0.4 milliGRAM(s) Oral at bedtime  trimethoprim   80 mG/sulfamethoxazole 400 mG 1 Tablet(s) Oral <User Schedule>    MEDICATIONS  (PRN):  acetaminophen     Tablet .. 650 milliGRAM(s) Oral every 6 hours PRN Temp greater or equal to 38C (100.4F), Mild Pain (1 - 3)  ALPRAZolam 0.5 milliGRAM(s) Oral two times a day PRN anxiety and insomnia  melatonin 3 milliGRAM(s) Oral at bedtime PRN Insomnia  ondansetron Injectable 4 milliGRAM(s) IV Push every 8 hours PRN Nausea and/or Vomiting    budesonide (Unknown)  cefpodoxime (Unknown)  Pollen (Unknown)    T(C): 36.2 (11-23-22 @ 04:25), Max: 36.6 (11-22-22 @ 17:33)  HR: 86 (11-23-22 @ 04:25) (83 - 86)  BP: 106/59 (11-23-22 @ 04:25) (98/54 - 106/59)  RR: 18 (11-23-22 @ 04:25) (18 - 18)  SpO2: 98% (11-23-22 @ 04:25) (98% - 98%)      PHYSICAL EXAM:  Constitutional: No acute distress, pale appearing.   Neuro: Awake alert, oriented to person, place and situation, non-focal, speech clear and intact  HEENT: PERRL, anicteric sclerae, oral mucosa pink and moist  Neck: supple, no JVD  CV: regular rate, regular rhythm, +S1S2,   Pulm/chest: lung sounds CTA and equal bilaterally, no accessory muscle use noted  Abd: soft, NT, ND, +BS  Ext: no Cyanosis, clubbing or edema  Skin: warm, pale, no jaundice   Psych: calm, appropriate affect        LABS:               8.9    10.75 )-----------( 202      ( 11-23 @ 05:44 )             29.9                9.4    11.60 )-----------( 167      ( 11-22 @ 05:39 )             32.0                8.6    10.17 )-----------( 156      ( 11-21 @ 04:34 )             28.0       11-23    143  |  103  |  35.6<H>  ----------------------------<  114<H>  4.7   |  29.0  |  4.95<H>    Ca    9.0      23 Nov 2022 05:44  Phos  4.1     11-23  Mg     2.1     11-23        PT/INR - ( 21 Nov 2022 17:00 )   PT: 13.1 sec;   INR: 1.13 ratio           < from: EGD (11.22.22 @ 12:18) >  Findings:      Esophagus Mucosa Normal mucosa was noted in the whole esophagus.      Stomach Mucosa Localized erythema and nodularity of the mucosa was noted in the    pre-pyloric region. These findings are compatible with non-erosive gastritis.      Duodenum Protruding lesions Three polyps ranging in size from 3 mm to 8 mm were    found in the second part of the duodenum.      Impressions:      Normal mucosa in the gastroesophageal junction.      Erythema and nodularity in the pre-pyloric region compatible with non-erosive    gastritis.      Polyps (3 mm to 8 mm) in the second part of the duodenum.      Plan:      Return to floor for further management    < end of copied text >

## 2022-11-23 NOTE — DISCHARGE NOTE NURSING/CASE MANAGEMENT/SOCIAL WORK - PATIENT PORTAL LINK FT
You can access the FollowMyHealth Patient Portal offered by [facility] by registering at the following website: [email or web address]. By joining [facility]’s FollowMyHealth portal, you will also be able to view your health information using other applications (apps) compatible with our system.

## 2022-11-23 NOTE — DISCHARGE NOTE PROVIDER - NSDCCPCAREPLAN_GEN_ALL_CORE_FT
PRINCIPAL DISCHARGE DIAGNOSIS  Diagnosis: Acute respiratory failure with hypoxia  Assessment and Plan of Treatment: sec CHF exacerbation  continue HD      SECONDARY DISCHARGE DIAGNOSES  Diagnosis: Bacteremia due to Enterococcus  Assessment and Plan of Treatment: continue ampicillin and ceftriaxone  follow up with ID    Diagnosis: CAD (coronary artery disease)  Assessment and Plan of Treatment: continue Eliquis and statin  follow up with cardiology for ischemic eval and resumption of Plavix.    Diagnosis: ESRD on hemodialysis  Assessment and Plan of Treatment: continue HD as per schedule    Diagnosis: Acute on chronic systolic congestive heart failure  Assessment and Plan of Treatment: continue medications  ischemic evaluation with cardiology    Diagnosis: Paroxysmal atrial fibrillation  Assessment and Plan of Treatment: continue Metoprolol and Eliquis.    Diagnosis: Lung transplanted  Assessment and Plan of Treatment: follow up with transplant team at [facility].    Diagnosis: GI bleed  Assessment and Plan of Treatment: received 2 units of PRBC  inconclusive EGD  continue Pantoprazole  follow up with GI       PRINCIPAL DISCHARGE DIAGNOSIS  Diagnosis: Acute respiratory failure with hypoxia  Assessment and Plan of Treatment: sec CHF exacerbation  continue HD      SECONDARY DISCHARGE DIAGNOSES  Diagnosis: Bacteremia due to Enterococcus  Assessment and Plan of Treatment: continue ampicillin and ceftriaxone  follow up with ID    Diagnosis: CAD (coronary artery disease)  Assessment and Plan of Treatment: continue Eliquis and statin  follow up with cardiology for ischemic eval and resumption of Plavix.    Diagnosis: ESRD on hemodialysis  Assessment and Plan of Treatment: continue HD as per schedule    Diagnosis: Acute on chronic systolic congestive heart failure  Assessment and Plan of Treatment: continue medications  ischemic evaluation with cardiology    Diagnosis: Paroxysmal atrial fibrillation  Assessment and Plan of Treatment: continue Metoprolol and Eliquis.    Diagnosis: Lung transplanted  Assessment and Plan of Treatment: follow up with transplant team at [facility].  Hold Cellcept for 2 more weeks and resume prior dose of Tacrolimus as per [facility]. Repeat Tacrolimus levels next week.    Diagnosis: GI bleed  Assessment and Plan of Treatment: received 2 units of PRBC  inconclusive EGD  continue Pantoprazole  follow up with GI

## 2022-11-23 NOTE — PROGRESS NOTE ADULT - ASSESSMENT
72 year old male with PMH of HTN, HLD, CAD s/p stent, COPD/emphysema s/p lung transplantation, history of fungal meningitis, hx of hip fracture s/p ORIF, and ESRD on HD presents with SOB. CT chest showed new interstitial edema and new GGO in both lungs. Admitted for further work up. Hospital course is notable for Enterococcus faecalis bacteremia. Nephrology is managing ESRD on HD.     -Access: L AV access   -Outpatient dialysis days are TTS,   -Seen on HD.  Qd, Qb, UF rate reviewed.  Vitals stable.  Access working well.  Patient tolerating HD well.  -BP ok - currently on Entresto and metoprolol - UF as tolerated.  -Anemia in setting of CKD - started on MENDY with HD, PRBC transfusion . 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 FOBT positive, EGD on Tuesday, off Plavix and Eliquis.. CT negative for RP bleed.

## 2022-11-23 NOTE — PROGRESS NOTE ADULT - NUTRITIONAL ASSESSMENT
This patient has been assessed with a concern for Malnutrition and has been determined to have a diagnosis/diagnoses of Severe protein-calorie malnutrition.    This patient is being managed with:   Diet Renal Restrictions-  For patients receiving Renal Replacement - No Protein Restr No Conc K No Conc Phos Low Sodium  Entered: Nov 11 2022  4:32PM    
This patient has been assessed with a concern for Malnutrition and has been determined to have a diagnosis/diagnoses of Severe protein-calorie malnutrition.    This patient is being managed with:   Diet NPO after Midnight-     NPO Start Date: 21-Nov-2022   NPO Start Time: 23:59  Entered: Nov 21 2022  2:10PM    Diet Renal Restrictions-  For patients receiving Renal Replacement - No Protein Restr No Conc K No Conc Phos Low Sodium  Entered: Nov 11 2022  4:32PM    
This patient has been assessed with a concern for Malnutrition and has been determined to have a diagnosis/diagnoses of Severe protein-calorie malnutrition.    This patient is being managed with:   Diet Renal Restrictions-  For patients receiving Renal Replacement - No Protein Restr No Conc K No Conc Phos Low Sodium  Entered: Nov 11 2022  4:32PM    
This patient has been assessed with a concern for Malnutrition and has been determined to have a diagnosis/diagnoses of Severe protein-calorie malnutrition.    This patient is being managed with:   Diet Renal Restrictions-  For patients receiving Renal Replacement - No Protein Restr No Conc K No Conc Phos Low Sodium  Entered: Nov 11 2022  4:32PM    
This patient has been assessed with a concern for Malnutrition and has been determined to have a diagnosis/diagnoses of Severe protein-calorie malnutrition.    This patient is being managed with:   Diet NPO after Midnight-     NPO Start Date: 21-Nov-2022   NPO Start Time: 23:59  Entered: Nov 21 2022  2:10PM    Diet Renal Restrictions-  For patients receiving Renal Replacement - No Protein Restr No Conc K No Conc Phos Low Sodium  Entered: Nov 11 2022  4:32PM

## 2022-11-23 NOTE — DISCHARGE NOTE NURSING/CASE MANAGEMENT/SOCIAL WORK - NSDCPEFALRISK_GEN_ALL_CORE
For information on Fall & Injury Prevention, visit: [email or web address] OR  [email or web address] OR  https://www.cdc.gov/steadi/patient.html

## 2022-11-23 NOTE — PROGRESS NOTE ADULT - REASON FOR ADMISSION

## 2022-11-23 NOTE — PROGRESS NOTE ADULT - PROBLEM SELECTOR PLAN 1
Hemoglobin dropped to 6.6 gm on 11/18 from baseline 7.9 -8.7). s/p transfusion x1 PRBCs, with appropriate response. Hemoglobin 8.6 this morning.   Eliquis now d/c, last dose 11/19/22.  Plavix on hold since 1/19/22  Trend CBC, transfuse to hgb 8 or higher  Seen by cardiology, input appreciated.   Diet as tolerated. Please keep NPO after midnight for possible EGD tomorrow. Patient underwent hemodialysis today.  Continue Protonix  Active Type and screen, INR, COVID PCR
Hemoglobin dropped to 6.6 gm on 11/18 from baseline 7.9 -8.7). s/p transfusion x1 PRBCs, with appropriate response. Hemoglobin 8.9 this morning.   S/p EGD yesterday shows nonerosive gastritis, duodenal polyps (3- 8 mm)  Continue Protonix  Okay to start 1 anticoagulation/ antiplatelet agent (was on Eliquis and Plavix) at a time, continue to monitor hemoglobin closely  Trend CBC, transfuse to hgb 8 or higher

## 2022-11-23 NOTE — PROGRESS NOTE ADULT - SUBJECTIVE AND OBJECTIVE BOX
INTERVAL HPI/OVERNIGHT EVENTS:    CC: acute CHF, acute anemia, ESRD on HD, enterococcus endocarditis, h/o lung transplant, CAD    No overnight events  PICC placed today  no new complaints.    Vital Signs Last 24 Hrs  T(C): 36.2 (23 Nov 2022 04:25), Max: 36.6 (22 Nov 2022 17:33)  T(F): 97.2 (23 Nov 2022 04:25), Max: 97.8 (22 Nov 2022 17:33)  HR: 86 (23 Nov 2022 04:25) (83 - 86)  BP: 106/59 (23 Nov 2022 04:25) (98/54 - 106/59)  BP(mean): --  RR: 18 (23 Nov 2022 04:25) (18 - 18)  SpO2: 98% (23 Nov 2022 04:25) (98% - 98%)    Parameters below as of 23 Nov 2022 04:25  Patient On (Oxygen Delivery Method): room air        PHYSICAL EXAM:    GENERAL: alert, not in distress  CHEST/LUNG: b/l air entry  HEART: reg  ABDOMEN: soft, bs+  EXTREMITIES:  no edema, tenderness    MEDICATIONS  (STANDING):  ampicillin  IVPB 2 Gram(s) IV Intermittent every 12 hours  apixaban 2.5 milliGRAM(s) Oral every 12 hours  atorvastatin 80 milliGRAM(s) Oral at bedtime  cefTRIAXone Injectable. 2000 milliGRAM(s) IV Push every 12 hours  chlorhexidine 2% Cloths 1 Application(s) Topical <User Schedule>  chlorhexidine 4% Liquid 1 Application(s) Topical <User Schedule>  epoetin ben-epbx (RETACRIT) Injectable 69677 Unit(s) IV Push <User Schedule>  fluconAZOLE   Tablet 200 milliGRAM(s) Oral <User Schedule>  hydrocortisone 10 milliGRAM(s) Oral two times a day  metoprolol succinate ER 25 milliGRAM(s) Oral daily  multivitamin 1 Tablet(s) Oral daily  pantoprazole    Tablet 40 milliGRAM(s) Oral before breakfast  sevelamer carbonate 1600 milliGRAM(s) Oral three times a day with meals  tacrolimus 1.5 milliGRAM(s) Oral daily  tamsulosin 0.4 milliGRAM(s) Oral at bedtime  trimethoprim   80 mG/sulfamethoxazole 400 mG 1 Tablet(s) Oral <User Schedule>    MEDICATIONS  (PRN):  acetaminophen     Tablet .. 650 milliGRAM(s) Oral every 6 hours PRN Temp greater or equal to 38C (100.4F), Mild Pain (1 - 3)  ALPRAZolam 0.5 milliGRAM(s) Oral two times a day PRN anxiety and insomnia  melatonin 3 milliGRAM(s) Oral at bedtime PRN Insomnia  ondansetron Injectable 4 milliGRAM(s) IV Push every 8 hours PRN Nausea and/or Vomiting  sodium chloride 0.9% lock flush 10 milliLiter(s) IV Push every 1 hour PRN Pre/post blood products, medications, blood draw, and to maintain line patency      Allergies    budesonide (Unknown)  cefpodoxime (Unknown)  Pollen (Unknown)    Intolerances          LABS:                          8.9    10.75 )-----------( 202      ( 23 Nov 2022 05:44 )             29.9     11-23    143  |  103  |  35.6<H>  ----------------------------<  114<H>  4.7   |  29.0  |  4.95<H>    Ca    9.0      23 Nov 2022 05:44  Phos  4.1     11-23  Mg     2.1     11-23      PT/INR - ( 21 Nov 2022 17:00 )   PT: 13.1 sec;   INR: 1.13 ratio               RADIOLOGY & ADDITIONAL TESTS:

## 2022-11-23 NOTE — PROGRESS NOTE ADULT - SUBJECTIVE AND OBJECTIVE BOX
Spoke with patient she stated she would like to go back to work today if possible.  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    Not sure if pt is able to return to work with the type of work she does.?    Pt surgery date 5/21/2021 robotic gastric band removal   Pt post op office visit scheduled for 6/04/2021     Pt stated if ok for return to work excuse can be faxed to [phone]   Reason for visit: ESRD    Subjective: Seen on HD earlier today. Patient denied any cardiac or urinary complains. No fever/chills.     ROS: All systems were reviewed in detail pertinent positive and negative mentioned above, rest are negative.    Physical Exam  General: WDWN male in NAD  HEENT: Extremely hard of hearing   Cardiac: S1S2 RRR  Respiratory: CTAB  Abdomen: Soft, NT  Extremities: No appreciable edema  Neuro: AAOx3    =======================================================  Vital Signs Last 24 Hrs  T(C): 36.3 (23 Nov 2022 17:03), Max: 36.7 (23 Nov 2022 11:31)  T(F): 97.4 (23 Nov 2022 17:03), Max: 98 (23 Nov 2022 11:31)  HR: 83 (23 Nov 2022 17:03) (56 - 86)  BP: 128/65 (23 Nov 2022 17:03) (93/68 - 128/65)  BP(mean): --  RR: 19 (23 Nov 2022 17:03) (17 - 19)  SpO2: 97% (23 Nov 2022 17:03) (97% - 100%)    Parameters below as of 23 Nov 2022 15:21  Patient On (Oxygen Delivery Method): room air      I&O's Summary    23 Nov 2022 07:01  -  24 Nov 2022 01:15  --------------------------------------------------------  IN: 0 mL / OUT: 500 mL / NET: -500 mL      =======================================================  Current Antibiotics:  ampicillin  IVPB 2 Gram(s) IV Intermittent every 12 hours  cefTRIAXone Injectable. 2000 milliGRAM(s) IV Push every 12 hours  fluconAZOLE   Tablet 200 milliGRAM(s) Oral <User Schedule>  trimethoprim   80 mG/sulfamethoxazole 400 mG 1 Tablet(s) Oral <User Schedule>    Other medications:  apixaban 2.5 milliGRAM(s) Oral every 12 hours  atorvastatin 80 milliGRAM(s) Oral at bedtime  chlorhexidine 2% Cloths 1 Application(s) Topical <User Schedule>  chlorhexidine 4% Liquid 1 Application(s) Topical <User Schedule>  epoetin ben-epbx (RETACRIT) Injectable 07678 Unit(s) IV Push <User Schedule>  hydrocortisone 10 milliGRAM(s) Oral two times a day  metoprolol succinate ER 25 milliGRAM(s) Oral daily  multivitamin 1 Tablet(s) Oral daily  pantoprazole    Tablet 40 milliGRAM(s) Oral before breakfast  sevelamer carbonate 1600 milliGRAM(s) Oral three times a day with meals  tacrolimus 1.5 milliGRAM(s) Oral daily  tamsulosin 0.4 milliGRAM(s) Oral at bedtime    =======================================================  11-23    143  |  103  |  35.6<H>  ----------------------------<  114<H>  4.7   |  29.0  |  4.95<H>    Ca    9.0      23 Nov 2022 05:44  Phos  4.1     11-23  Mg     2.1     11-23      Creatinine, Serum: 4.95 mg/dL (11-23-22 @ 05:44)  Creatinine, Serum: 3.86 mg/dL (11-22-22 @ 05:39)  Creatinine, Serum: 3.90 mg/dL (11-21-22 @ 04:34)  Creatinine, Serum: 6.12 mg/dL (11-20-22 @ 05:00)  Creatinine, Serum: 4.45 mg/dL (11-19-22 @ 04:45)      =======================================================

## 2022-11-23 NOTE — PROGRESS NOTE ADULT - PROVIDER SPECIALTY LIST ADULT
Cardiology
Gastroenterology
Nephrology
Nephrology
Vascular Surgery
Cardiology
Hospitalist
Infectious Disease
Infectious Disease
Vascular Surgery
Cardiology
Hospitalist
Infectious Disease
Nephrology
Hospitalist
Hospitalist
Infectious Disease
Infectious Disease
Nephrology
Palliative Care
Vascular Surgery
Vascular Surgery
Hospitalist
Gastroenterology

## 2022-11-23 NOTE — PROCEDURE NOTE - NSINFORMCONSENT_GEN_A_CORE
Benefits, risks, and possible complications of procedure explained to patient/caregiver who verbalized understanding and gave written consent.
Patient and his wife, Veronica/Benefits, risks, and possible complications of procedure explained to patient/caregiver who verbalized understanding and gave verbal consent.
Benefits, risks, and possible complications of procedure explained to patient/caregiver who verbalized understanding and gave written consent.

## 2022-11-23 NOTE — DISCHARGE NOTE PROVIDER - ATTENDING DISCHARGE PHYSICAL EXAMINATION:
alert, not in distress  lungs: b/l air entry  cvs: regular  abdo: soft, bs+  ext: no edema, tenderness

## 2022-11-23 NOTE — DISCHARGE NOTE PROVIDER - CARE PROVIDERS DIRECT ADDRESSES
,DirectAddress_Unknown,DirectAddress_Unknown,[email or web address]

## 2022-11-23 NOTE — DISCHARGE NOTE PROVIDER - NSDCMRMEDTOKEN_GEN_ALL_CORE_FT
amLODIPine 5 mg oral tablet: 1 tab(s) orally once a day  ampicillin 2 g injection: 2 gram(s) intravenously every 12 hours (at least one dose after HD on HD day) MDD:2 g IV q12h end 12/26/22 weekly [facility] CMP ESr CRP fax [phone]  Bactrim 400 mg-80 mg oral tablet: 1 tab(s) orally Monday, Wednesday, and Friday  cefTRIAXone 2 g/50 mL-iso-osmotic dextrose intravenous solution: 2 gram(s) intravenously IVPB every 12 hours MDD:2g iv q12h via picc end 12/26/22 weekly CBc CMP ESr CRP fax [phone]  CellCept 250 mg oral capsule: 2 cap(s) orally 2 times a day  clopidogrel 75 mg oral tablet: 1 tab(s) orally once a day  Eliquis 2.5 mg oral tablet: 1 tab(s) orally 2 times a day   Flomax 0.4 mg oral capsule: 1 cap(s) orally once a day  fluconazole 200 mg oral tablet: 1 tab(s) orally Tuesday, Thursday, Saturday  hydrocortisone 10 mg oral tablet: 1 tab(s) orally 2 times a day  Multiple Vitamins oral tablet: 1 tab(s) orally once a day  pantoprazole 40 mg oral delayed release tablet: 1 tab(s) orally once a day (before a meal)  rosuvastatin 20 mg oral tablet: 1 tab(s) orally once a day  sevelamer carbonate 800 mg oral tablet: 2 tab(s) orally 3 times a day (with meals)  tacrolimus 0.5 mg oral capsule: 1.5 milligram(s) orally once a day

## 2022-11-25 NOTE — CDI QUERY NOTE - NSCDIOTHERTXTBX_GEN_ALL_CORE_HH
ED ADULT Triage Note [Charted Location: [facility] ED] [Authored: 11-Nov-2022 09:44]  Vital Signs:  • BP Systolic	106 mm Hg  • BP Diastolic	  59 mm Hg  • Heart Rate	  116 /min  • Respiration Rate (breaths/min)	20 /min  • Temp (F)	98.4 Degrees F  • Temp (C)	36.9 Degrees C  • Temp site	oral  • SpO2 (%)	  88 %  • O2 Delivery/Oxygen Delivery Method	room air      WBC Count:  WBC Count: 6.86 K/uL (11.16.22 @ 05:41)   WBC Count: 7.50 K/uL (11.15.22 @ 06:40)   WBC Count: 7.58 K/uL (11.14.22 @ 09:15)   WBC Count: 11.27 K/uL (11.13.22 @ 05:40)   WBC Count: 16.10: Specimen Integrity Verified. K/uL (11.12.22 @ 05:44)   WBC Count: 28.57 K/uL (11.11.22 @ 10:11)       Blood Gas Venous-Lactate:  Blood Gas Venous - Lactate: 2.30: (11.11.22 @ 10:11)       ED Provider Note [Charted Location: [facility] ED] [Authored: 11-Nov-2022 11:56]  Clinical Summary  (MDM): Summarize the clinical encounter  clinical constellation suggests that the pt is fluid overloaded. There is b/l LE pitting edema. I performed POCUS of abd, which displays mild to moderate ascites. Lung imaging displays diffuse intersitial infiltrates, again further suggestive of pulmonary edema. pt reports oliguria, so attempted to diuresis with 80 mg lasix, but no response. Renal consulted for dialysis. The pt is immunocompromised, with elevated WBC. This WBC count could be due chronic steroid usage, however cannot rule out infectious etiology. Given tachycardia and leukocytosis the pt technically meets sepsis criteria, but would be contraindicated for sepsis fluids as I clinically suspect the etiology of the dyspnea to be acute pulmonary edema, and sepsis fluids would worsen this. treated with broad spectrum antibiotics.      Consult Note Adult-Vascular Surgery Resident/Attending [Charted Location: [facility] [id number]] [Authored: 12-Nov-2022 22:19]  Problem/Recommendation - 1:  •? Problem: Sepsis.      Problem/Recommendation - 2:  •? Problem: Bacteremia.     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 R EJ permacath in place, and patient has a functional, patent LUE brachiocephalic AVF with viabhan stent in place, but patient refuses to have AVF accessed due to previous canalization site complications as outpatient. States he prefers catheter for HD.     Plan  Remove tunneled catheter now  Patient will be encouraged to use AVF for HD, due to high risk of catheter infections and endocarditis  CRISTIAN  IV abx  Ok to use AVF for HD  Avoid catheter placement .        Progress Note Adult-Infectious Disease Attending [Charted Location: [place] [address]] [Authored: 15-Nov-2022 18:28]  MRN-[id number]  JU FERGUSON   follow up for: bacteremia  bcx + enterococcus  permacath removed, cx + enterococcus        Progress Note Adult-Nephrology Attending [Charted Location: [place] [address]] [Authored: 15-Nov-2022 18:33]  6. Enetrococcus bacteremia: Abx and IS adjustments per ID. s/p Permcath removal . TTE with no vegetations.        Progress Note Adult-Hospitalist Attending [Charted Location: [place] [address]] [Authored: 19-Nov-2022 17:51]  Enterococcus bacteremia and infected HD catheter:  Catheter removed  repeat cultures negative.  continue 6 weeks of Ampicillin and Ceftriaxone for presumed endocarditis  TTE x 2 without vegetations.  CRISTIAN deferred given immunocompromised state  will need PICC closer to discharge.        ED provider had documented “Given tachycardia and leukocytosis the pt technically meets sepsis criteria” and Vascular team also noted sepsis on their consult note, after study can you please clarify the POA status of sepsis and its etiology?  A.	Sepsis present on admission likely due to infected HD catheter  B.	Other, please specify  C.	Not clinically significant

## 2022-11-29 PROBLEM — R53.81 PHYSICAL DECONDITIONING: Status: ACTIVE | Noted: 2022-01-01

## 2022-12-13 NOTE — ED PROVIDER NOTE - NSICDXPASTSURGICALHX_GEN_ALL_CORE_FT
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

## 2022-12-13 NOTE — ED ADULT NURSE NOTE - NSICDXPASTSURGICALHX_GEN_ALL_CORE_FT
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

## 2022-12-13 NOTE — ED ADULT NURSE REASSESSMENT NOTE - NS ED NURSE REASSESS COMMENT FT1
patient started to become restless & gasping for breath, VS taken, seen by MD at bedside with order made & carried out

## 2022-12-13 NOTE — H&P ADULT - ASSESSMENT
72M with PMH of  hypertension, hyperlipidemia, ESRD on HD, emphysema s/p lung transplant in [place] by Dr. 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 faecalis bacteremia, newly diagnosed HFrEF 25%, AF complicated by GIB was discharged on home IV Abx now presenting with AMS     Acute Metabolic Encephalopathy   Differential is Broad in this patient with numerous comorbidities  Sepsis, Encephalitis, SBP in s/o Cirrhosis/ascites, endocarditis, hepatic encephalopathy?  Neuro consult for LP  IR consult for Diagnostic para  F/u Blood Cultures   Vanc to level. check vanc level daily  Michael   Rectal Lactulose  Lactulose q6h via NGT when NGT placed    E. Faecalis Bacteremia - Suspected endocarditis. CRISTIAN deferred on last admission secondary to comorbidities and immunocompromised state   Diagnosed on recent admission and discharged home on IV antibiotics  Start Vanc Michael for now given concern for infectious etiology of Encephalopathy  ID consult   F/u culture data     Follow up   Emphysema s/p Lung Transplant  Check tacro level  Hold tacro for now based on above level  Cellcept on Hold at home until 12/26 as per discussions with Spouse and Dr. Kaufman)  Saturating well    Cirrhosis with Hx of ascites   Possible hepatic encephalopathy?  CT on recent admission revealing the above  IR consult for Para and culture   Coag levels ordered  Viral panel done 3/2022 negative     ESRD on HD via AVF TTS  Nephrology consulted     AF  Hold eliquis for now  Cont metop with holding parameters    Diet: NPO  Pending NGT and confirmatory cxr   DVT ppx: SCDs  Code Status: Patient is full code pending further GOC   Dispo: Admit to Medicine     72M with PMH of  hypertension, hyperlipidemia, ESRD on HD, emphysema s/p lung transplant in [place] by Dr. 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 after complex admission with e. faecalis bacteremia, newly diagnosed HFrEF 25%, AF complicated by GIB was discharged on home IV Abx now presenting with AMS     Acute Metabolic Encephalopathy   Differential is Broad in this patient with numerous comorbidities  Sepsis, Encephalitis, SBP in s/o Cirrhosis/ascites, endocarditis, hepatic encephalopathy?  Neuro consult for LP  IR consult for Diagnostic para  F/u Blood Cultures   Vanc to level. check vanc level daily  Michael   Rectal Lactulose  Lactulose q6h via NGT when NGT placed    E. Faecalis Bacteremia - Suspected endocarditis. CRISTIAN deferred on last admission secondary to comorbidities and immunocompromised state   Diagnosed on recent admission and discharged home on IV antibiotics  Start Vanc Michael for now given concern for infectious etiology of Encephalopathy  ID consult   F/u culture data     Follow up   Emphysema s/p Lung Transplant  Check tacro level  Hold tacro for now based on above level  Cellcept on Hold at home until 12/26 as per discussions with Spouse and Dr. Kaufman)  Saturating well    Cirrhosis with Hx of ascites   Possible hepatic encephalopathy?  CT on recent admission revealing the above  IR consult for Para and culture   Coag levels ordered  Viral panel done 3/2022 negative     ESRD on HD via AVF TTS  Nephrology consulted     AF  Hold eliquis for now  Cont metop with holding parameters    Diet: NPO  Pending NGT and confirmatory cxr   DVT ppx: SCDs  Code Status: Patient is full code pending further GOC   Dispo: Admit to Medicine    Patient with multiorgan failure and immunosuppressed with suspected ongoing infection and encephalopathy. Prognosis is grave. Patient has high mortality risk.

## 2022-12-13 NOTE — H&P ADULT - NSICDXPASTMEDICALHX_GEN_ALL_CORE_FT
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

## 2022-12-13 NOTE — CONSULT NOTE ADULT - ASSESSMENT
ESRD on HD- TTS schedule  Pt altered- unable to voice any complaint  Does not appear to be in resp distress  BP soft  Labs reviewed- lytes grossly wnl  Will hold off on HD today and schedule for tomorrow      AMS- infectious work up  Neurology eval pending     ESRD on HD- TTS schedule  Pt altered- unable to voice any complaint  Does not appear to be in resp distress  BP soft  Labs reviewed- lytes grossly wnl  Will hold off on HD today and schedule for tomorrow      AMS- infectious work up  Neurology eval pending      Lung transplant recipient; hold cellcept in setting of potential infectious etiology

## 2022-12-13 NOTE — ED ADULT NURSE NOTE - NSICDXPASTMEDICALHX_GEN_ALL_CORE_FT
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

## 2022-12-13 NOTE — ED ADULT NURSE REASSESSMENT NOTE - NS ED NURSE REASSESS COMMENT FT1
Pt received in bed, lethargic, heart monitor in place. Pt responsive to verbal stimuli, nonverbal. LUE fistula in place and WNL. Pt awaiting for bed, will monitor, safety maintained.

## 2022-12-13 NOTE — ED PROVIDER NOTE - NSICDXPASTMEDICALHX_GEN_ALL_CORE_FT
PAST MEDICAL HISTORY:  2019 novel coronavirus disease (COVID-19) Feb 2021 - hospitalized for 1 week at 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

## 2022-12-13 NOTE — CONSULT NOTE ADULT - SUBJECTIVE AND OBJECTIVE BOX
[facility]                                                INFECTIOUS DISEASES  =======================================================                               J Carlos Galdamez MD#    Isatu Rojas MD*                           Perlita Solares MD*   Sumaya Morales MD*            Diplomates American Board of Internal Medicine & Infectious Diseases                  # [place] Office - Appt - Tel  [phone] Fax [phone]                * [place] Office - Appt - Tel [phone] Fax [phone]                                  Hospital Consult line:  [phone]  =======================================================      [id number]  JU FERGUSON   HPI:  72M with PMH of  hypertension, hyperlipidemia, ESRD on HD, emphysema s/p lung transplant in [place] by Dr. 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 feacalis bacteremia, newly diagnosed HFrEF 25%, AF complicated by GIB was discharged on home IV Abx now presenting with AMS    Patient unable to contribute to history secondary to mental status. History obtained by EMR and Spouse.  Patient was discharged day prior to Thanksgiving with home iv abx. Per Spouse has been receiving IV abx administered by wife and daughter. Has not missed any HD sessions with exception to today.  Since discharge has been becoming increasingly tired. Wife attributes this to not sleeping well. Furthermore patient complained of severe nausea yesterday afternoon but did not have emesis. Approx 1 hour after that became more ''confused and out of it'' but still talking. Symptoms continued to progress and get worse throughout the night and in the morning was only moaning and extremely lethargic.     No recent cough, cp, sob. Denies recent fevers, chills.     Presented to the ED with daughter. CTH obtained and did not reveal any acute findings.  Patient was admitted to medicine for encephalopathy.         (13 Dec 2022 08:45)          I have personally reviewed the labs and data; pertinent labs and data are listed in this note; please see below.   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 -  [facility]      H/O heart artery stent  x3 @[facility]      History of hip replacement  right      Status post open reduction and internal fixation (ORIF) of fracture  left femur        Problem List:  ==========  HEALTH ISSUES - PROBLEM Dx:        Social Hx:  =======  no toxic habits currently    FAMILY HISTORY:  Family history of emphysema  mother    no significant family history of immunosuppressive disorders in mother or father   =======================================================    REVIEW OF SYSTEMS:  CONSTITUTIONAL:  No Fever or chills  HEENT:  No diplopia or blurred vision.  No earache, sore throat or runny nose.  CARDIOVASCULAR:  No pressure, squeezing, strangling, tightness, heaviness or aching about the chest, neck, axilla or epigastrium.  RESPIRATORY:  No cough, shortness of breath  GASTROINTESTINAL:  No nausea, vomiting or diarrhea.  GENITOURINARY:  No dysuria, frequency or urgency. No Blood in urine  MUSCULOSKELETAL:  no joint aches, no muscle pain  SKIN:  No change in skin, hair or nails.  NEUROLOGIC:  No Headaches, seizures or weakness.  PSYCHIATRIC:  No disorder of thought or mood.  ENDOCRINE:  No heat or cold intolerance  HEMATOLOGICAL:  No easy bruising or bleeding.    =======================================================  Allergies    budesonide (Unknown)  cefpodoxime (Unknown)  Pollen (Unknown)    Intolerances    Antibiotics:  fluconAZOLE   Tablet 200 milliGRAM(s) Oral <User Schedule>  meropenem Injectable 1000 milliGRAM(s) IV Push every 24 hours  trimethoprim  40 mG/sulfamethoxazole 200 mG Suspension 80 milliGRAM(s) Oral <User Schedule>    Other medications:  atorvastatin 80 milliGRAM(s) Oral at bedtime  hydrocortisone 10 milliGRAM(s) Oral two times a day  lactulose Syrup 10 Gram(s) Oral every 6 hours  metoprolol tartrate 12.5 milliGRAM(s) Oral two times a day  pantoprazole  Injectable 40 milliGRAM(s) IV Push daily  sevelamer carbonate 800 milliGRAM(s) Oral three times a day with meals     ampicillin  IVPB   200 mL/Hr IV Intermittent (11-17-22 @ 21:07)   200 mL/Hr IV Intermittent (11-18-22 @ 08:58)   200 mL/Hr IV Intermittent (11-18-22 @ 21:29)   200 mL/Hr IV Intermittent (11-19-22 @ 09:00)   200 mL/Hr IV Intermittent (11-19-22 @ 21:27)   200 mL/Hr IV Intermittent (11-20-22 @ 09:02)   200 mL/Hr IV Intermittent (11-20-22 @ 21:51)   200 mL/Hr IV Intermittent (11-21-22 @ 11:39)   200 mL/Hr IV Intermittent (11-21-22 @ 22:09)   200 mL/Hr IV Intermittent (11-22-22 @ 09:24)   200 mL/Hr IV Intermittent (11-22-22 @ 21:40)    aztreonam  IVPB   100 mL/Hr IV Intermittent (11-11-22 @ 13:36)    cefTRIAXone Injectable.   2000 milliGRAM(s) IV Push (11-17-22 @ 22:11)   2000 milliGRAM(s) IV Push (11-18-22 @ 08:58)   2000 milliGRAM(s) IV Push (11-19-22 @ 00:57)   2000 milliGRAM(s) IV Push (11-19-22 @ 09:08)   2000 milliGRAM(s) IV Push (11-19-22 @ 21:27)   2000 milliGRAM(s) IV Push (11-20-22 @ 11:35)   2000 milliGRAM(s) IV Push (11-20-22 @ 21:52)   2000 milliGRAM(s) IV Push (11-21-22 @ 11:33)   2000 milliGRAM(s) IV Push (11-21-22 @ 22:16)   2000 milliGRAM(s) IV Push (11-22-22 @ 10:14)   2000 milliGRAM(s) IV Push (11-22-22 @ 22:06)    doxycycline IVPB   100 mL/Hr IV Intermittent (11-11-22 @ 20:40)    doxycycline IVPB   100 mL/Hr IV Intermittent (11-12-22 @ 05:27)    fluconAZOLE   Tablet   200 milliGRAM(s) Oral (11-12-22 @ 11:11)   200 milliGRAM(s) Oral (11-15-22 @ 10:50)   200 milliGRAM(s) Oral (11-17-22 @ 09:20)   200 milliGRAM(s) Oral (11-19-22 @ 06:06)    meropenem Injectable   1000 milliGRAM(s) IV Push (12-13-22 @ 14:07)    meropenem Injectable   1000 milliGRAM(s) IV Push (11-11-22 @ 20:40)   1000 milliGRAM(s) IV Push (11-12-22 @ 05:27)    piperacillin/tazobactam IVPB.   200 mL/Hr IV Intermittent (11-12-22 @ 13:05)    piperacillin/tazobactam IVPB.-   25 mL/Hr IV Intermittent (11-12-22 @ 22:11)    piperacillin/tazobactam IVPB..   25 mL/Hr IV Intermittent (11-13-22 @ 07:00)   25 mL/Hr IV Intermittent (11-13-22 @ 17:35)   25 mL/Hr IV Intermittent (11-14-22 @ 06:15)   25 mL/Hr IV Intermittent (11-14-22 @ 17:25)   25 mL/Hr IV Intermittent (11-15-22 @ 06:56)   25 mL/Hr IV Intermittent (11-15-22 @ 18:10)   25 mL/Hr IV Intermittent (11-16-22 @ 05:53)   25 mL/Hr IV Intermittent (11-16-22 @ 17:25)   25 mL/Hr IV Intermittent (11-17-22 @ 07:09)   25 mL/Hr IV Intermittent (11-17-22 @ 17:30)    trimethoprim   80 mG/sulfamethoxazole 400 mG   1 Tablet(s) Oral (11-14-22 @ 08:00)   1 Tablet(s) Oral (11-16-22 @ 12:49)   1 Tablet(s) Oral (11-18-22 @ 08:58)   1 Tablet(s) Oral (11-21-22 @ 12:05)   1 Tablet(s) Oral (11-23-22 @ 17:36)    vancomycin  IVPB   250 mL/Hr IV Intermittent (12-13-22 @ 11:34)    vancomycin  IVPB.   250 mL/Hr IV Intermittent (11-11-22 @ 11:29)      ======================================================  Physical Exam:  ============  T(F): 98.3 (13 Dec 2022 11:20), Max: 98.3 (13 Dec 2022 11:20)  HR: 105 (13 Dec 2022 12:22)  BP: 139/88 (13 Dec 2022 12:22)  RR: 18 (13 Dec 2022 12:22)  SpO2: 98% (13 Dec 2022 12:22) (97% - 100%)  temp max in last 48H T(F): , Max: 98.3 (12-13-22 @ 11:20)Height (cm): 160 (12-13-22 @ 01:38)  Weight (kg): 68 (12-13-22 @ 01:38)  BMI (kg/m2): 26.6 (12-13-22 @ 01:38)  BSA (m2): 1.71 (12-13-22 @ 01:38)    General:  No acute distress.  Eye: Pupils are equal, round and reactive to light, Normal conjunctiva.  HENT: Normocephalic, Oral mucosa is moist, No pharyngeal erythema, No sinus tenderness.  Neck: Supple, No lymphadenopathy.  Respiratory: Lungs are clear to auscultation, Respirations are non-labored.  Cardiovascular: Normal rate, Regular rhythm, s1 + s2  Gastrointestinal: Soft, Non-tender, Non-distended, Normal bowel sounds.  Genitourinary: No costovertebral angle tenderness.  Lymphatics: No lymphadenopathy neck,   Musculoskeletal: Normal range of motion, Normal strength.  Integumentary: No rash.  Neurologic: Alert, Oriented, No focal deficits  Psychiatric: Appropriate mood & affect.    =======================================================  Labs:                        10.8   10.45 )-----------( 112      ( 13 Dec 2022 02:17 )             34.6     12-13    140  |  95<L>  |  47.1<H>  ----------------------------<  109<H>  3.8   |  24.0  |  6.81<H>    Ca    7.9<L>      13 Dec 2022 02:17  Mg     2.3     12-13    TPro  6.1<L>  /  Alb  3.6  /  TBili  0.4  /  DBili  x   /  AST  25  /  ALT  11  /  AlkPhos  187<H>  12-13       SARS-CoV-2 Result: NotDetec (12-13-22 @ 02:17)  COVID-19 PCR: NotDetec (11-21-22 @ 18:40)                                                   [facility]                                                INFECTIOUS DISEASES  =======================================================                               J Carlos Galdamez MD#    Isatu Rojas MD*                           Perlita Solares MD*   Sumaya Morales MD*            Diplomates American Board of Internal Medicine & Infectious Diseases                  # [place] Office - Appt - Tel  [phone] Fax [phone]                * [place] Office - Appt - Tel [phone] Fax [phone]                                  Hospital Consult line:  [phone]  =======================================================      MRN-[id number]  JU FERGUSON   HPI:  72M with PMH of  hypertension, hyperlipidemia, ESRD on HD, emphysema s/p lung transplant in [place] by Dr. 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 after complex admission with e. feacalis bacteremia, newly diagnosed HFrEF 25%, AF complicated by GIB was discharged on home IV Abx now presenting with AMS    Patient unable to contribute to history secondary to mental status. History obtained by EMR and Spouse.  Patient was discharged day prior to Thanksgiving with home iv abx. Per Spouse has been receiving IV abx administered by wife and daughter. Has not missed any HD sessions with exception to today.  Since discharge has been becoming increasingly tired. Wife attributes this to not sleeping well. Furthermore patient complained of severe nausea yesterday afternoon but did not have emesis. Approx 1 hour after that became more ''confused and out of it'' but still talking. Symptoms continued to progress and get worse throughout the night and in the morning was only moaning and extremely lethargic.     No recent cough, cp, sob. Denies recent fevers, chills.     Presented to the ED with daughter. CTH obtained and did not reveal any acute findings.  Patient was admitted to medicine for encephalopathy.         (13 Dec 2022 08:45)          I have personally reviewed the labs and data; pertinent labs and data are listed in this note; please see below.   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      2019 novel coronavirus disease (COVID-19)  Feb 2021 - hospitalized for 1 week at [facility]      Pneumonia  April 2021      [place] (hard of hearing)      HTN (hypertension)      Lumbar spondylosis      History of COPD      BPH without urinary obstruction      Hypercholesterolemia      Oliguria and anuria      H/O peripheral neuropathy      H/O lung transplant  bilateral - transplant - 1-2-2015 -  [facility]      H/O heart artery stent  [facility] @[facility]      History of hip replacement  right      Status post open reduction and internal fixation (ORIF) of fracture  left femur        Problem List:  ==========  HEALTH ISSUES - PROBLEM Dx:        Social Hx:  =======  no toxic habits currently    FAMILY HISTORY:  Family history of emphysema  mother    no significant family history of immunosuppressive disorders in mother or father   =======================================================    REVIEW OF SYSTEMS:  cannot obtain    =======================================================  Allergies    budesonide (Unknown)  cefpodoxime (Unknown)  Pollen (Unknown)    Intolerances    Antibiotics:  fluconAZOLE   Tablet 200 milliGRAM(s) Oral <User Schedule>  meropenem Injectable 1000 milliGRAM(s) IV Push every 24 hours  trimethoprim  40 mG/sulfamethoxazole 200 mG Suspension 80 milliGRAM(s) Oral <User Schedule>    Other medications:  atorvastatin 80 milliGRAM(s) Oral at bedtime  hydrocortisone 10 milliGRAM(s) Oral two times a day  lactulose Syrup 10 Gram(s) Oral every 6 hours  metoprolol tartrate 12.5 milliGRAM(s) Oral two times a day  pantoprazole  Injectable 40 milliGRAM(s) IV Push daily  sevelamer carbonate 800 milliGRAM(s) Oral three times a day with meals     ampicillin  IVPB   200 mL/Hr IV Intermittent (11-17-22 @ 21:07)   200 mL/Hr IV Intermittent (11-18-22 @ 08:58)   200 mL/Hr IV Intermittent (11-18-22 @ 21:29)   200 mL/Hr IV Intermittent (11-19-22 @ 09:00)   200 mL/Hr IV Intermittent (11-19-22 @ 21:27)   200 mL/Hr IV Intermittent (11-20-22 @ 09:02)   200 mL/Hr IV Intermittent (11-20-22 @ 21:51)   200 mL/Hr IV Intermittent (11-21-22 @ 11:39)   200 mL/Hr IV Intermittent (11-21-22 @ 22:09)   200 mL/Hr IV Intermittent (11-22-22 @ 09:24)   200 mL/Hr IV Intermittent (11-22-22 @ 21:40)    aztreonam  IVPB   100 mL/Hr IV Intermittent (11-11-22 @ 13:36)    cefTRIAXone Injectable.   2000 milliGRAM(s) IV Push (11-17-22 @ 22:11)   2000 milliGRAM(s) IV Push (11-18-22 @ 08:58)   2000 milliGRAM(s) IV Push (11-19-22 @ 00:57)   2000 milliGRAM(s) IV Push (11-19-22 @ 09:08)   2000 milliGRAM(s) IV Push (11-19-22 @ 21:27)   2000 milliGRAM(s) IV Push (11-20-22 @ 11:35)   2000 milliGRAM(s) IV Push (11-20-22 @ 21:52)   2000 milliGRAM(s) IV Push (11-21-22 @ 11:33)   2000 milliGRAM(s) IV Push (11-21-22 @ 22:16)   2000 milliGRAM(s) IV Push (11-22-22 @ 10:14)   2000 milliGRAM(s) IV Push (11-22-22 @ 22:06)    doxycycline IVPB   100 mL/Hr IV Intermittent (11-11-22 @ 20:40)    doxycycline IVPB   100 mL/Hr IV Intermittent (11-12-22 @ 05:27)    fluconAZOLE   Tablet   200 milliGRAM(s) Oral (11-12-22 @ 11:11)   200 milliGRAM(s) Oral (11-15-22 @ 10:50)   200 milliGRAM(s) Oral (11-17-22 @ 09:20)   200 milliGRAM(s) Oral (11-19-22 @ 06:06)    meropenem Injectable   1000 milliGRAM(s) IV Push (12-13-22 @ 14:07)    meropenem Injectable   1000 milliGRAM(s) IV Push (11-11-22 @ 20:40)   1000 milliGRAM(s) IV Push (11-12-22 @ 05:27)    piperacillin/tazobactam IVPB.   200 mL/Hr IV Intermittent (11-12-22 @ 13:05)    piperacillin/tazobactam IVPB.-   25 mL/Hr IV Intermittent (11-12-22 @ 22:11)    piperacillin/tazobactam IVPB..   25 mL/Hr IV Intermittent (11-13-22 @ 07:00)   25 mL/Hr IV Intermittent (11-13-22 @ 17:35)   25 mL/Hr IV Intermittent (11-14-22 @ 06:15)   25 mL/Hr IV Intermittent (11-14-22 @ 17:25)   25 mL/Hr IV Intermittent (11-15-22 @ 06:56)   25 mL/Hr IV Intermittent (11-15-22 @ 18:10)   25 mL/Hr IV Intermittent (11-16-22 @ 05:53)   25 mL/Hr IV Intermittent (11-16-22 @ 17:25)   25 mL/Hr IV Intermittent (11-17-22 @ 07:09)   25 mL/Hr IV Intermittent (11-17-22 @ 17:30)    trimethoprim   80 mG/sulfamethoxazole 400 mG   1 Tablet(s) Oral (11-14-22 @ 08:00)   1 Tablet(s) Oral (11-16-22 @ 12:49)   1 Tablet(s) Oral (11-18-22 @ 08:58)   1 Tablet(s) Oral (11-21-22 @ 12:05)   1 Tablet(s) Oral (11-23-22 @ 17:36)    vancomycin  IVPB   250 mL/Hr IV Intermittent (12-13-22 @ 11:34)    vancomycin  IVPB.   250 mL/Hr IV Intermittent (11-11-22 @ 11:29)      ======================================================  Physical Exam:  ============  T(F): 98.3 (13 Dec 2022 11:20), Max: 98.3 (13 Dec 2022 11:20)  HR: 105 (13 Dec 2022 12:22)  BP: 139/88 (13 Dec 2022 12:22)  RR: 18 (13 Dec 2022 12:22)  SpO2: 98% (13 Dec 2022 12:22) (97% - 100%)  temp max in last 48H T(F): , Max: 98.3 (12-13-22 @ 11:20)Height (cm): 160 (12-13-22 @ 01:38)  Weight (kg): 68 (12-13-22 @ 01:38)  BMI (kg/m2): 26.6 (12-13-22 @ 01:38)  BSA (m2): 1.71 (12-13-22 @ 01:38)    General:  obtunded, responds to pain, on o2  Eye: Pupils are equal, round and reactive to light, Normal conjunctiva.  HENT: Normocephalic, Oral mucosa is moist, No pharyngeal erythema, No sinus tenderness.  Neck: Supple, No lymphadenopathy.  Respiratory: Lungs are clear to auscultation, Respirations are non-labored.  Cardiovascular: Normal rate, Regular rhythm, s1 + s2  Gastrointestinal: Soft, Non-tender, Non-distended, Normal bowel sounds.  Genitourinary: No costovertebral angle tenderness.  Lymphatics: No lymphadenopathy neck,   Musculoskeletal: Normal range of motion, Normal strength. rue picc, left UE avf  Integumentary: No rash.  Neurologic: obtunded, grimace to pain    =======================================================  Labs:                        10.8   10.45 )-----------( 112      ( 13 Dec 2022 02:17 )             34.6     12-13    140  |  95<L>  |  47.1<H>  ----------------------------<  109<H>  3.8   |  24.0  |  6.81<H>    Ca    7.9<L>      13 Dec 2022 02:17  Mg     2.3     12-13    TPro  6.1<L>  /  Alb  3.6  /  TBili  0.4  /  DBili  x   /  AST  25  /  ALT  11  /  AlkPhos  187<H>  12-13       SARS-CoV-2 Result: NotDetec (12-13-22 @ 02:17)  COVID-19 PCR: NotDetec (11-21-22 @ 18:40)       < from: Xray Chest 1 View- PORTABLE-Urgent (12.13.22 @ 02:49) >    IMPRESSION:  Improved patchy left midlung opacities, but no significant   interval change in patchy right and left lower lung opacities. The   appearance is nonspecific and could be due to areas of atelectasis,   pneumonia, and/or dependent pulmonary edema.    Small loculated right pleural effusion, not definitely seen on the prior   chest x-ray but apparent on the CT scan.    Likely small loculated left pleural effusion as well.    --- End of Report ---      < end of copied text >    < from: CT Head No Cont (12.13.22 @ 02:10) >  FINDINGS:  Study is limited by motion artifact.  No acute intracranial hemorrhage, mass effect or midline shift.  The ventricles and cortical sulci are prominent reflecting parenchymal   volume loss.  Patchy hypodensities in the periventricular white matter are nonspecific,   but likely sequela of small vessel ischemic disease.    Mild mucosal thickening of the visualized right maxillary sinus. Near   complete opacification of the right mastoid air cells. The native ocular   lenses are surgically absent.  No displaced calvarial fracture.    IMPRESSION: Limited study.  No acute intracranial hemorrhage or mass effect.    --- End of Report ---    < end of copied text >

## 2022-12-13 NOTE — CONSULT NOTE ADULT - SUBJECTIVE AND OBJECTIVE BOX
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   Nephrology consulted for HD.  Pt seen and examined in ED; lethargic- not answering any questions.    Pt gets HD TTS at [facility].    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      History of hip replacement  right      Status post open reduction and internal fixation (ORIF) of fracture  left femur        FAMILY HISTORY:  Family history of emphysema  mother      PAST SOCIAL HISTORY: lives at home    ALLERGIES & MEDICATIONS  --------------------------------------------------------------------------------  Allergies    budesonide (Unknown)  cefpodoxime (Unknown)  Pollen (Unknown)    Intolerances      Standing Inpatient Medications  lactulose Retention Enema 200 Gram(s) Rectal once  lactulose Syrup 10 Gram(s) Oral every 6 hours    PRN Inpatient Medications      REVIEW OF SYSTEMS  --------------------------------------------------------------------------------  unable to obtain    VITALS/PHYSICAL EXAM  --------------------------------------------------------------------------------  T(C): 36.7 (12-13-22 @ 07:31), Max: 36.7 (12-13-22 @ 07:31)  HR: 98 (12-13-22 @ 07:31) (86 - 104)  BP: 101/74 (12-13-22 @ 07:31) (101/74 - 177/94)  RR: 18 (12-13-22 @ 07:31) (16 - 19)  SpO2: 100% (12-13-22 @ 07:31) (99% - 100%)  Wt(kg): --  Height (cm): 160 (12-13-22 @ 01:38)  Weight (kg): 68 (12-13-22 @ 01:38)  BMI (kg/m2): 26.6 (12-13-22 @ 01:38)  BSA (m2): 1.71 (12-13-22 @ 01:38)      Physical Exam:  	Gen: letahrgic  	Pulm: CTA B/L ant  	CV: RRR, S1S2; no rub  	Abd: +BS, soft, nontender/nondistended  	: No suprapubic tenderness  	UE: Warm, no edema; no asterixis  	LE: Warm, no edema  	Neuro: unable to assess  	Skin: Warm  	Vascular access: ROGE FRANCO    LABS/STUDIES  --------------------------------------------------------------------------------              10.8   10.45 >-----------<  112      [12-13-22 @ 02:17]              34.6     140  |  95  |  47.1  ----------------------------<  109      [12-13-22 @ 02:17]  3.8   |  24.0  |  6.81        Ca     7.9     [12-13-22 @ 02:17]      Mg     2.3     [12-13-22 @ 02:17]    TPro  6.1  /  Alb  3.6  /  TBili  0.4  /  DBili  x   /  AST  25  /  ALT  11  /  AlkPhos  187  [12-13-22 @ 02:17]        Troponin 0.11      [12-13-22 @ 02:17]    Creatinine Trend:  SCr 6.81 [12-13 @ 02:17]  SCr 4.95 [11-23 @ 05:44]  SCr 3.86 [11-22 @ 05:39]  SCr 3.90 [11-21 @ 04:34]  SCr 6.12 [11-20 @ 05:00]    Urinalysis - [11-12-22 @ 15:15]      Color Brianne / Appearance Slightly Turbid / SG 1.010 / pH 8.0      Gluc Negative / Ketone Negative  / Bili Negative / Urobili Negative       Blood Moderate / Protein 500 / Leuk Est Trace / Nitrite Negative      RBC 0-2 / WBC 6-10 / Hyaline  / Gran  / Sq Epi  / Non Sq Epi Few / Bacteria Few      Iron 27, TIBC 247, %sat 11      [03-20-22 @ 03:23]  Ferritin 4169      [05-01-22 @ 07:44]  Vitamin D (25OH) 29.1      [11-17-22 @ 05:45]  Lipid: chol 84, , HDL 26, LDL --      [11-12-22 @ 05:44]    HBsAb <3.0      [03-21-22 @ 03:29]  HBsAg Nonreact      [04-21-22 @ 22:26]  HBcAb Nonreact      [03-21-22 @ 03:29]  HCV 0.12, Nonreact      [03-21-22 @ 03:29]     [facility] DIVISION OF KIDNEY DISEASES AND HYPERTENSION -- INITIAL CONSULT NOTE  --------------------------------------------------------------------------------  HPI:    73 y/o male ptwith PMHx of CAD, ESRD on dialysis T/T/S, Lung transplant, HTN, recently discharged from [facility] on IV antibiotics for 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   Nephrology consulted for HD.  Pt seen and examined in ED; lethargic- not answering any questions.    Pt gets HD TTS at [facility].    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  2019 novel coronavirus disease (COVID-19)  Feb 2021 - hospitalized for 1 week at [facility]  Pneumonia  April 2021    [place] (hard of hearing)    HTN (hypertension)    Lumbar spondylosis      History of COPD      BPH without urinary obstruction      Hypercholesterolemia      Oliguria and anuria      H/O peripheral neuropathy      H/O lung transplant  bilateral - transplant - 1-2-2015 -  [facility]      H/O heart artery stent  x3 @[facility]      History of hip replacement  right      Status post open reduction and internal fixation (ORIF) of fracture  left femur        FAMILY HISTORY:  Family history of emphysema  mother      PAST SOCIAL HISTORY: lives at home    ALLERGIES & MEDICATIONS  --------------------------------------------------------------------------------  Allergies    budesonide (Unknown)  cefpodoxime (Unknown)  Pollen (Unknown)    Intolerances      Standing Inpatient Medications  lactulose Retention Enema 200 Gram(s) Rectal once  lactulose Syrup 10 Gram(s) Oral every 6 hours    PRN Inpatient Medications      REVIEW OF SYSTEMS  --------------------------------------------------------------------------------  unable to obtain    VITALS/PHYSICAL EXAM  --------------------------------------------------------------------------------  T(C): 36.7 (12-13-22 @ 07:31), Max: 36.7 (12-13-22 @ 07:31)  HR: 98 (12-13-22 @ 07:31) (86 - 104)  BP: 101/74 (12-13-22 @ 07:31) (101/74 - 177/94)  RR: 18 (12-13-22 @ 07:31) (16 - 19)  SpO2: 100% (12-13-22 @ 07:31) (99% - 100%)  Wt(kg): --  Height (cm): 160 (12-13-22 @ 01:38)  Weight (kg): 68 (12-13-22 @ 01:38)  BMI (kg/m2): 26.6 (12-13-22 @ 01:38)  BSA (m2): 1.71 (12-13-22 @ 01:38)      Physical Exam:  	Gen: lethargic  	Pulm: CTA B/L ant  	CV: RRR, S1S2; no rub  	Abd: +BS, soft, nontender/nondistended  	: No suprapubic tenderness  	UE: Warm, no edema; no asterixis  	LE: Warm, no edema  	Neuro: unable to assess  	Skin: Warm  	Vascular access: ROGE FRANCO    LABS/STUDIES  --------------------------------------------------------------------------------              10.8   10.45 >-----------<  112      [12-13-22 @ 02:17]              34.6     140  |  95  |  47.1  ----------------------------<  109      [12-13-22 @ 02:17]  3.8   |  24.0  |  6.81        Ca     7.9     [12-13-22 @ 02:17]      Mg     2.3     [12-13-22 @ 02:17]    TPro  6.1  /  Alb  3.6  /  TBili  0.4  /  DBili  x   /  AST  25  /  ALT  11  /  AlkPhos  187  [12-13-22 @ 02:17]        Troponin 0.11      [12-13-22 @ 02:17]    Creatinine Trend:  SCr 6.81 [12-13 @ 02:17]  SCr 4.95 [11-23 @ 05:44]  SCr 3.86 [11-22 @ 05:39]  SCr 3.90 [11-21 @ 04:34]  SCr 6.12 [11-20 @ 05:00]    Urinalysis - [11-12-22 @ 15:15]      Color Brianne / Appearance Slightly Turbid / SG 1.010 / pH 8.0      Gluc Negative / Ketone Negative  / Bili Negative / Urobili Negative       Blood Moderate / Protein 500 / Leuk Est Trace / Nitrite Negative      RBC 0-2 / WBC 6-10 / Hyaline  / Gran  / Sq Epi  / Non Sq Epi Few / Bacteria Few      Iron 27, TIBC 247, %sat 11      [03-20-22 @ 03:23]  Ferritin 4169      [05-01-22 @ 07:44]  Vitamin D (25OH) 29.1      [11-17-22 @ 05:45]  Lipid: chol 84, , HDL 26, LDL --      [11-12-22 @ 05:44]    HBsAb <3.0      [03-21-22 @ 03:29]  HBsAg Nonreact      [04-21-22 @ 22:26]  HBcAb Nonreact      [03-21-22 @ 03:29]  HCV 0.12, Nonreact      [03-21-22 @ 03:29]

## 2022-12-13 NOTE — CONSULT NOTE ADULT - SUBJECTIVE AND OBJECTIVE BOX
[facility]                                        Neurology at [place]                                  Jose Antonio Brooks, & Riley                                      [address]                                           [place], [place], [address]                                                [phone]        CC: Encephalopathy    HISTORY:  The patient is a 72y Male with multiple medical issues who was recently hospitalized with hypoxic respiratory failure.  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   He was ultimately discharged 11/23/22.   He now presented with altered mental status and lethargy.   Neurology asked to evaluation for meningitis.     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  2019 novel coronavirus disease (COVID-19)  Feb 2021 - hospitalized for 1 week at [facility]  Pneumonia  [place] (hard of hearing)  HTN (hypertension)  Lumbar spondylosis  History of COPD  BPH without urinary obstruction  Hypercholesterolemia  Oliguria and anuria  H/O peripheral neuropathy  H/O lung transplant  bilateral - transplant - 1-2-2015 -  [facility]  H/O heart artery stent  x3 @[facility]  History of hip replacement  right  Status post open reduction and internal fixation (ORIF) of fracture  left femur    MEDICATIONS  (STANDING):  atorvastatin 80 milliGRAM(s) Oral at bedtime  fluconAZOLE   Tablet 200 milliGRAM(s) Oral <User Schedule>  hydrocortisone 10 milliGRAM(s) Oral two times a day  lactulose Retention Enema 200 Gram(s) Rectal once  lactulose Syrup 10 Gram(s) Oral every 6 hours  meropenem  IVPB 1000 milliGRAM(s) IV Intermittent every 24 hours  metoprolol succinate ER 25 milliGRAM(s) Oral daily  pantoprazole    Tablet 40 milliGRAM(s) Oral before breakfast  sevelamer carbonate 800 milliGRAM(s) Oral three times a day with meals  tamsulosin 0.4 milliGRAM(s) Oral at bedtime  trimethoprim   80 mG/sulfamethoxazole 400 mG 1 Tablet(s) Oral <User Schedule>  vancomycin  IVPB 1000 milliGRAM(s) IV Intermittent once    Allergies  budesonide (Unknown)  cefpodoxime (Unknown)  Pollen (Unknown)    SOCIAL HISTORY:  Non smoker.     FAMILY HISTORY:  Family history of emphysema  mother    ROS:  Constitutional: Unobtainable due to patient's condition.   Neuro: Unobtainable due to patient's condition.   Eyes: Unobtainable due to patient's condition.   Ears/nose/throat: Unobtainable due to patient's condition.   Cardiac: Unobtainable due to patient's condition.   Respiratory: Unobtainable due to patient's condition.   GI: Unobtainable due to patient's condition.   : Unobtainable due to patient's condition..  Integumentary: Unobtainable due to patient's condition.  Psych: Unobtainable due to patient's condition.  Heme: Unobtainable due to patient's condition.     Exam:  Vital Signs Last 24 Hrs  T(C): 36.7 (13 Dec 2022 07:31), Max: 36.7 (13 Dec 2022 07:31)  T(F): 98 (13 Dec 2022 07:31), Max: 98 (13 Dec 2022 07:31)  HR: 98 (13 Dec 2022 08:42) (86 - 104)  BP: 160/85 (13 Dec 2022 08:42) (101/74 - 177/94)  RR: 18 (13 Dec 2022 07:31) (16 - 19)  SpO2: 100% (13 Dec 2022 07:31) (99% - 100%)    Parameters below as of 13 Dec 2022 07:31  Patient On (Oxygen Delivery Method): nasal cannula    General: NAD.   Carotid bruits absent.   No neck stiffness.     Mental status: The patient opens eyes to voice. Not speaking or following instructions.     Cranial nerves: There is no papilledema. Pupils react symmetrically to light. He blinks to threat bilaterally.  He tracks slightly with gaze. Face is grossly symmetric. Palate and tongue cannot be assessed.     Motor/Sensory: There is normal bulk and tone.  Symmetric limb movement to stimuli.     Reflexes: 1+ throughout and plantar responses are flexor.    Cerebellar: Cannot be tested.     LABS:                         10.8   10.45 )-----------( 112      ( 13 Dec 2022 02:17 )             34.6       12-13    140  |  95<L>  |  47.1<H>  ----------------------------<  109<H>  3.8   |  24.0  |  6.81<H>    Ca    7.9<L>      13 Dec 2022 02:17  Mg     2.3     12-13    TPro  6.1<L>  /  Alb  3.6  /  TBili  0.4  /  DBili  x   /  AST  25  /  ALT  11  /  AlkPhos  187<H>  12-13          RADIOLOGY   CT head images reviewed (and concur with report): There is no acute pathology.

## 2022-12-13 NOTE — CHART NOTE - NSCHARTNOTEFT_GEN_A_CORE
PICC line placed 11/23/22 here on last admission.   Placement confirmed on CXR today, confirmed with MIA Hess to use PICC

## 2022-12-13 NOTE — ED PROVIDER NOTE - OBJECTIVE STATEMENT
71 y/o male with PMHx of CAD, ESRD on dialysis T/R/S, Lung transplant, HTN, recently discharged from [facility] on IV antibiotics for sepsis presents to the ED with AMS. Per EMS pt became confused around 3 hours PTA. 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 Per EMS pt became confused around 3 hours PTA. Patient AAOx3 on arrival, answering questions, does appear tired and somewhat "out of it".

## 2022-12-13 NOTE — ED ADULT TRIAGE NOTE - CHIEF COMPLAINT QUOTE
Pt A&Ox2-3 states "I want to sit up."  BIBA c/o AMS x 3 hours per daughter. Patient was here for sepsis last week is on home Abx, and does HD RUE MARGARITA [place]. GCS 14

## 2022-12-13 NOTE — H&P ADULT - HISTORY OF PRESENT ILLNESS
72M with PMH of  hypertension, hyperlipidemia, ESRD on HD, emphysema s/p lung transplant in [place] by Dr. 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 feacalis bacteremia, newly diagnosed HFrEF 25%, AF complicated by GIB was discharged on home IV Abx now presenting with AMS    Patient unable to contribute to history secondary to mental status. History obtained by EMR and Spouse.  Patient was discharged day prior to Thanksgiving with home iv abx. Per Spouse has been receiving IV abx administered by wife and daughter. Has not missed any HD sessions with exception to today.  Since discharge has been becoming increasingly tired. Wife attributes this to not sleeping well. Furthermore patient complained of severe nausea yesterday afternoon but did not have emesis. Approx 1 hour after that became more ''confused and out of it'' but still talking. Symptoms continued to progress and get worse throughout the night and in the morning was only moaning and extremely lethargic     No recent cough, cp, sob. Denies recent fevers, chills.     Presented to the ED with daughter. CTH obtained and did not reveal any acute findings.  Patient was admitted to medicine for encephalopathy.         72M with PMH of  hypertension, hyperlipidemia, ESRD on HD, emphysema s/p lung transplant in [place] by Dr. 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 after complex admission with e. feacalis bacteremia, newly diagnosed HFrEF 25%, AF complicated by GIB was discharged on home IV Abx now presenting with AMS    Patient unable to contribute to history secondary to mental status. History obtained by EMR and Spouse.  Patient was discharged day prior to Thanksgiving with home iv abx. Per Spouse has been receiving IV abx administered by wife and daughter. Has not missed any HD sessions with exception to today.  Since discharge has been becoming increasingly tired. Wife attributes this to not sleeping well. Furthermore patient complained of severe nausea yesterday afternoon but did not have emesis. Approx 1 hour after that became more ''confused and out of it'' but still talking. Symptoms continued to progress and get worse throughout the night and in the morning was only moaning and extremely lethargic.     No recent cough, cp, sob. Denies recent fevers, chills.     Presented to the ED with daughter. CTH obtained and did not reveal any acute findings.  Patient was admitted to medicine for encephalopathy.

## 2022-12-13 NOTE — ED PROVIDER NOTE - PROGRESS NOTE DETAILS
Called to patient's bedside by nurse. Patient appeared confused and agitated. Given versed, now sleeping comfortably. - Federica Labs and imaging reviewed. CT head negative. BUN/Cr 41.1/6.81, up from 35.6/4.95 three weeks ago. Swab negative. CXR similar to prior. UA pending. BCx drawn. Nephro consult placed as patient is due to dialysis tomorrow. Will admit to medicine for further management. - Federica Called to patient's bedside by nurse. Patient appeared confused and agitated. Given versed, now sleeping comfortably. - Federica Adkins MD: patient moaning, retching and groaning in the bed, mumbling words, not answering questions appropriately, not redirectable. Wife at bedside confirms that this is how he was acting at home. Patient provided with zofran and small dose of zofran. Now resting comfortably. vital signs remain stable. Abdomen soft, NTND.

## 2022-12-13 NOTE — CONSULT NOTE ADULT - ASSESSMENT
The patient is a 72y Male with multiple medical issues now with encephalopathy.     Encephalopathy.   Likely toxic metabolic secondary to multiple medical issues.   Antibiotics per ID.     Rule out meningitis.   Will attempt LP for CSF evaluation.      Case discussed with Dr Ko.

## 2022-12-13 NOTE — H&P ADULT - NSICDXPASTSURGICALHX_GEN_ALL_CORE_FT
PAST SURGICAL HISTORY:  H/O heart artery stent x3 @[facility]    H/O lung transplant bilateral - transplant - 1-2-2015 -  [facility]    History of hip replacement right    Status post open reduction and internal fixation (ORIF) of fracture left femur

## 2022-12-13 NOTE — H&P ADULT - NSHPLABSRESULTS_GEN_ALL_CORE
10.8   10.45  )----------(  112       ( 13 Dec 2022 02:17 )               34.6      140    |  95     |  47.1   ----------------------------<  109        ( 13 Dec 2022 02:17 )  3.8     |  24.0   |  6.81     Ca    7.9        ( 13 Dec 2022 02:17 )  Mg     2.3       ( 13 Dec 2022 02:17 )    TPro  6.1    /  Alb  3.6    /  TBili  0.4    /  DBili  x      /  AST  25     /  ALT  11     /  AlkPhos  187    ( 13 Dec 2022 02:17 )    LIVER FUNCTIONS - ( 13 Dec 2022 02:17 )  Alb: 3.6 g/dL / Pro: 6.1 g/dL / ALK PHOS: 187 U/L / ALT: 11 U/L / AST: 25 U/L / GGT: x               CAPILLARY BLOOD GLUCOSE    CARDIAC MARKERS ( 13 Dec 2022 02:17 )  x     / 0.11 ng/mL / x     / x     / x

## 2022-12-13 NOTE — ED PROVIDER NOTE - CLINICAL SUMMARY MEDICAL DECISION MAKING FREE TEXT BOX
pt with increased confusion over the past few hours, plan for CT head, infectious work-up, rectal temp, reassess

## 2022-12-13 NOTE — CONSULT NOTE ADULT - ASSESSMENT
72M with PMH of  hypertension, hyperlipidemia, ESRD on HD, emphysema s/p lung transplant in [place] by Dr. 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 faecalis bacteremia, newly diagnosed HFrEF 25%, AF complicated by GIB was discharged on home IV Abx now presenting with AMS  Presented to the ED with daughter. CTH obtained and did not reveal any acute findings.  Patient was admitted to medicine for encephalopathy. LP attempted bedside but unsuccessful    #AMS-unclear etiology, no fever. check ct c/ap.  agree with LP and broaden abx coverage to meropenem and vanco by level. consider MRi head. Neuro f/u    #h/o e.faecalis bacteremia -currently on course of ceftriaxone and ampicillin to empirically cover endocarditis. CRISTIAN was deferred by cardio on recent admission. currently on meropenem, vanco by level. f/u BCX    # s/p lung transplant- per transplant team, cellcept  on hold, currently on tacrolimus. c/w fluc and bactrim per home dose for ppx    # h/o fungal meningitis- c/w fluconazole suppression    # ESRD on HD- adjusted abd for renal function, monitor vanco level    d/w attending  will f/u 72M with PMH of  hypertension, hyperlipidemia, ESRD on HD, emphysema s/p lung transplant in [place] by Dr. 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 faecalis bacteremia, newly diagnosed HFrEF 25%, AF complicated by GIB was discharged on home IV Abx now presenting with AMS  Presented to the ED with daughter. CTH obtained and did not reveal any acute findings.  Patient was admitted to medicine for encephalopathy. LP attempted bedside but unsuccessful    #AMS-unclear etiology, no fever. ammonia level normal. check ct c/ap.  agree with LP and broaden abx coverage to meropenem and vanco by level. consider MRi head. Neuro f/u    #h/o e.faecalis bacteremia -currently on course of ceftriaxone and ampicillin to empirically cover endocarditis. CRISTIAN was deferred by cardio on recent admission. currently on meropenem, vanco by level. f/u BCX    # s/p lung transplant- per transplant team, cellcept  on hold, currently cortef, c/w tacrolimus and check levels. c/w fluc and bactrim per home dose for ppx    # h/o fungal meningitis- c/w fluconazole suppression    # ESRD on HD- adjusted abd for renal function, monitor vanco level    d/w attending  will f/u

## 2022-12-13 NOTE — CHART NOTE - NSCHARTNOTEFT_GEN_A_CORE
[facility]                                        Neurology at [place]                                 Jose Antonio Brooks, & Riley                                  [address]                                        [place], [place], [address]                                             [phone]          Addendum:       LP attempted unsuccessfully.  No CSF obtained.   Will need to arrange with IR if ID feels needed.

## 2022-12-13 NOTE — H&P ADULT - PARTICIPANTS
Spoke with Wife Veronica. Updates provided in regards to severity of illness and mortality risk given presence of  immunosuppresision/lung transplant, heart failure, cirrhosis, and suspected underlying infection. Significant emotional support provided. Veronica aknowledges that her  is very ill and does not want him to suffer and that he may be dying. GOC assessed and wife wants to make sure that he is comfortable and would not want him to suffer, undfergo aggressive chest compression, end up on a vent or in a nursing home but does not want to make a decision that would go against her daughter's opinion. Wife has not slept in 24 hours and is emotionally exhausted, distressed. Wants to speak with her daughter this morning. meanwhile patients wife concents to pusuing diagnostic procedures such as LP and Abdominal paracentesis to initiate an infectious work up. Time spent 1 hour 15 minutes/Patient/Family Spoke with Wife Veroinca. Updates provided in regards to severity of illness and mortality risk given presence of  immunosuppresision/lung transplant, heart failure, cirrhosis, and suspected underlying infection. Significant emotional support provided. Veronica aknowledges that her  is very ill and does not want him to suffer and that he may be dying. GOC assessed and wife wants to make sure that he is comfortable and would not want him to suffer, undfergo aggressive chest compression, end up on a vent or in a nursing home but does not want to make a decision that would go against her daughter's opinion. Wife has not slept in 24 hours and is emotionally exhausted, distressed. Wants to speak with her daughter this morning. meanwhile patients wife concents to pusuing diagnostic procedures such as LP and Abdominal paracentesis to initiate an infectious work up. Time spent 1 hour 16 minutes/Patient/Family

## 2022-12-13 NOTE — H&P ADULT - NSHPPHYSICALEXAM_GEN_ALL_CORE
VITALS:   T(C): 36.7 (12-13-22 @ 07:31), Max: 36.7 (12-13-22 @ 07:31)  HR: 98 (12-13-22 @ 08:42) (86 - 104)  BP: 160/85 (12-13-22 @ 08:42) (101/74 - 177/94)  RR: 18 (12-13-22 @ 07:31) (16 - 19)  SpO2: 100% (12-13-22 @ 07:31) (99% - 100%)    GENERAL: NAD, lying supine, ill appearing   HEAD:  Atraumatic, Normocephalic  EYES: EOMI, PERRLA, conjunctiva and sclera clear  ENT: Moist mucous membranes  NECK: Supple, No JVD  CHEST/LUNG: Clear to auscultation bilaterally; No rales, rhonchi, wheezing, or rubs. Unlabored respirations  HEART: Regular rate and rhythm; No murmurs, rubs, or gallops  ABDOMEN: BSx4; Soft, distended but non tender  EXTREMITIES:  2+ Peripheral Pulses, brisk capillary refill. No clubbing, cyanosis, or edema  NERVOUS SYSTEM:  A&Ox0, eyes open and tracking intermittently. blinks to threat but not speaking. moves x 4 extremities spontaneously    SKIN: No rashes or lesions

## 2022-12-13 NOTE — ED PROVIDER NOTE - ATTENDING CONTRIBUTION TO CARE
Lucille: I performed a face to face bedside interview with patient regarding history of present illness, review of symptoms and past medical history. I completed an independent physical exam and ordered tests/medications as needed.  I have discussed patient's plan of care with the resident. The resident assisted in  executing the discussed plan. I was available for any questions or issues that may have arose during the execution of the plan of care.

## 2022-12-13 NOTE — ED PROVIDER NOTE - PHYSICAL EXAMINATION
Gen: elderly appearing male in NAD  Head: NC/AT  Neck: trachea midline  Card: regular rate and rhythm  Resp:  CTAB  Abd: soft, non-distended, non-tender  Ext: no deformities, left upper extremity AV fistula with + thrill, + right upper extremity PICC line  Neuro:  A&Ox3 CN II-XII intact  Skin:  Warm and dry as visualized  Psych:  Normal affect and mood

## 2022-12-14 NOTE — PROGRESS NOTE ADULT - SUBJECTIVE AND OBJECTIVE BOX
[facility]                                        Neurology at [place]                                 [facility]                                  [address]                                        [place], [place], [address]                                             [phone]        CC: Encephalopathy    HPI:   The patient is a 72y Male with multiple medical issues who was recently hospitalized with hypoxic respiratory failure.  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   He was ultimately discharged 11/23/22.   He now presented with altered mental status and lethargy.   Neurology asked to evaluation for meningitis. LP attempted (neuro JW) and was unsuccessful while in ER.    Interim history:  Now on 4 [place].     ROS:   Unobtainable due to patient's condition.     MEDICATIONS  (STANDING):  atorvastatin 80 milliGRAM(s) Oral at bedtime  fluconAZOLE   Tablet 200 milliGRAM(s) Oral <User Schedule>  hydrocortisone 10 milliGRAM(s) Oral two times a day  lactulose Syrup 10 Gram(s) Enteral Tube every 6 hours  meropenem Injectable 1000 milliGRAM(s) IV Push every 24 hours  metoprolol tartrate 12.5 milliGRAM(s) Oral two times a day  pantoprazole  Injectable 40 milliGRAM(s) IV Push daily  sevelamer carbonate 800 milliGRAM(s) Oral three times a day with meals  trimethoprim  40 mG/sulfamethoxazole 200 mG Suspension 80 milliGRAM(s) Oral <User Schedule>    Vital Signs Last 24 Hrs  T(C): 36.6 (14 Dec 2022 09:14), Max: 36.8 (13 Dec 2022 11:20)  T(F): 97.8 (14 Dec 2022 09:14), Max: 98.3 (13 Dec 2022 11:20)  HR: 111 (14 Dec 2022 09:14) (75 - 111)  BP: 100/31 (14 Dec 2022 09:14) (100/31 - 156/88)  BP(mean): 115 (14 Dec 2022 03:28) (115 - 115)  RR: 18 (14 Dec 2022 09:14) (18 - 20)  SpO2: 95% (14 Dec 2022 09:14) (95% - 99%)    Parameters below as of 14 Dec 2022 09:14  Patient On (Oxygen Delivery Method): nasal cannula    Detailed Neurologic Exam:    Mental status: The patient opens eyes to voice. Not speaking or following instructions.     Cranial nerves: There is no papilledema. Pupils react symmetrically to light. He blinks to threat bilaterally.  He tracks slightly with gaze. Face is grossly symmetric. Palate and tongue cannot be assessed.     Motor/Sensory: There is normal bulk and tone.  Symmetric limb movement to stimuli.     Reflexes: 1+ throughout and plantar responses are flexor.    Cerebellar: Cannot be tested.     Labs:     12-14    146<H>  |  99  |  57.4<H>  ----------------------------<  38<LL>  3.8   |  22.0  |  8.13<H>    Ca    8.0<L>      14 Dec 2022 07:50  Phos  6.3     12-14  Mg     2.4     12-14    TPro  6.1<L>  /  Alb  3.6  /  TBili  0.4  /  DBili  x   /  AST  25  /  ALT  11  /  AlkPhos  187<H>  12-13                            11.4   15.29 )-----------( 110      ( 14 Dec 2022 07:50 )             36.5

## 2022-12-14 NOTE — PROGRESS NOTE ADULT - SUBJECTIVE AND OBJECTIVE BOX
Reason for visit: ESRD    Subjective: Seen on HD earlier today. Patient did not tolerate fluid removal. Was lethargic and hypoglycemic this AM.    ROS: Unable to obtain secondary to patient current clinical condition.     Physical Exam  General: WDWN male  HEENT: Extremely hard of hearing   Cardiac: S1S2 RRR  Respiratory: CTAB  Abdomen: Soft, NT  Extremities: No appreciable edema  Neuro: lethargic    =======================================================  Vital Signs Last 24 Hrs  T(C): 37.3 (14 Dec 2022 20:55), Max: 37.3 (14 Dec 2022 20:55)  T(F): 99.1 (14 Dec 2022 20:55), Max: 99.1 (14 Dec 2022 20:55)  HR: 110 (14 Dec 2022 20:55) (88 - 118)  BP: 95/62 (14 Dec 2022 20:55) (95/62 - 156/88)  BP(mean): 75 (14 Dec 2022 16:08) (75 - 115)  RR: 20 (14 Dec 2022 20:55) (18 - 20)  SpO2: 97% (14 Dec 2022 20:55) (95% - 100%)    Parameters below as of 14 Dec 2022 20:55  Patient On (Oxygen Delivery Method): nasal cannula  O2 Flow (L/min): 2    I&O's Summary    13 Dec 2022 07:01  -  14 Dec 2022 07:00  --------------------------------------------------------  IN: 0 mL / OUT: 300 mL / NET: -300 mL    14 Dec 2022 07:01  -  14 Dec 2022 22:48  --------------------------------------------------------  IN: 10 mL / OUT: 0 mL / NET: 10 mL      =======================================================  Current Antibiotics:  acyclovir IVPB      fluconAZOLE   Tablet 200 milliGRAM(s) Oral <User Schedule>  meropenem Injectable 1000 milliGRAM(s) IV Push every 24 hours  trimethoprim  40 mG/sulfamethoxazole 200 mG Suspension 80 milliGRAM(s) Oral <User Schedule>    Other medications:  atorvastatin 80 milliGRAM(s) Oral at bedtime  dextrose 50% Injectable 25 Gram(s) IV Push once  dextrose 50% Injectable 12.5 Gram(s) IV Push once  dextrose 50% Injectable 25 Gram(s) IV Push once  dextrose Oral Gel 15 Gram(s) Oral once  glucagon  Injectable 1 milliGRAM(s) IntraMuscular once  heparin   Injectable 5000 Unit(s) SubCutaneous every 8 hours  hydrocortisone 10 milliGRAM(s) Oral two times a day  lactulose Syrup 10 Gram(s) Enteral Tube every 6 hours  metoprolol tartrate 12.5 milliGRAM(s) Oral two times a day  pantoprazole  Injectable 40 milliGRAM(s) IV Push daily  sevelamer carbonate 800 milliGRAM(s) Oral three times a day with meals  tacrolimus 1.5 milliGRAM(s) Oral daily    =======================================================  12-14    x   |  x   |  x   ----------------------------<  104<H>  x    |  x   |  x     Ca    8.0<L>      14 Dec 2022 07:50  Phos  6.3     12-14  Mg     2.4     12-14    TPro  6.1<L>  /  Alb  3.6  /  TBili  0.4  /  DBili  x   /  AST  25  /  ALT  11  /  AlkPhos  187<H>  12-13    Creatinine, Serum: 8.13 mg/dL (12-14-22 @ 07:50)  Creatinine, Serum: 6.81 mg/dL (12-13-22 @ 02:17)      =======================================================

## 2022-12-14 NOTE — PATIENT PROFILE ADULT - FUNCTIONAL SCREEN CURRENT LEVEL: COMMUNICATION, MLM
Unable to reach Dr. Galan, [facility] group paged for admission. 2 = difficulty understanding and speaking (not related to language barrier)

## 2022-12-14 NOTE — PATIENT PROFILE ADULT - FALL HARM RISK - HARM RISK INTERVENTIONS
Assistance OOB with selected safe patient handling equipment/Communicate Risk of Fall with Harm to all staff/Monitor for mental status changes/Move patient closer to nurses' station/Reinforce activity limits and safety measures with patient and family/Reorient to person, place and time as needed/Tailored Fall Risk Interventions/Toileting schedule using arm’s reach rule for commode and bathroom/Use of alarms - bed, chair and/or voice tab/Visual Cue: Yellow wristband and red socks/Bed in lowest position, wheels locked, appropriate side rails in place/Call bell, personal items and telephone in reach/Instruct patient to call for assistance before getting out of bed or chair/Non-slip footwear when patient is out of bed/[place] to call system/Physically safe environment - no spills, clutter or unnecessary equipment/Purposeful Proactive Rounding/Room/bathroom lighting operational, light cord in reach

## 2022-12-14 NOTE — PROGRESS NOTE ADULT - SUBJECTIVE AND OBJECTIVE BOX
[facility]                                                INFECTIOUS DISEASES  =======================================================                               J Carlos Galdamez MD#    Isatu Rojas MD*                           Perlita Solares MD*   Sumaya Morales MD*            Diplomates American Board of Internal Medicine & Infectious Diseases                  # [place] Office - Appt - Tel  [phone] Fax [phone]                * [place] Office - Appt - Tel [phone] Fax [phone]                                  Hospital Consult line:  [phone]  =======================================================      [facility]-[id number]  JU FERGUSON   follow up for: ams  pt not responding verbally  was hyoglycemic earlier today  patient seen and examined.       I have personally reviewed the labs and data; pertinent labs and data are listed in this note; please see below.   ===================================================  REVIEW OF SYSTEMS:  cannot obtain    =======================================================  Allergies    budesonide (Unknown)  cefpodoxime (Unknown)  Pollen (Unknown)    Intolerances    Antibiotics:  acyclovir IVPB      fluconAZOLE   Tablet 200 milliGRAM(s) Oral <User Schedule>  meropenem Injectable 1000 milliGRAM(s) IV Push every 24 hours  trimethoprim  40 mG/sulfamethoxazole 200 mG Suspension 80 milliGRAM(s) Oral <User Schedule>    Other medications:  atorvastatin 80 milliGRAM(s) Oral at bedtime  dextrose 50% Injectable 25 Gram(s) IV Push once  dextrose 50% Injectable 12.5 Gram(s) IV Push once  dextrose 50% Injectable 25 Gram(s) IV Push once  dextrose Oral Gel 15 Gram(s) Oral once  glucagon  Injectable 1 milliGRAM(s) IntraMuscular once  heparin   Injectable 5000 Unit(s) SubCutaneous every 8 hours  hydrocortisone 10 milliGRAM(s) Oral two times a day  lactulose Syrup 10 Gram(s) Enteral Tube every 6 hours  metoprolol tartrate 12.5 milliGRAM(s) Oral two times a day  pantoprazole  Injectable 40 milliGRAM(s) IV Push daily  sevelamer carbonate 800 milliGRAM(s) Oral three times a day with meals  tacrolimus 1.5 milliGRAM(s) Oral daily    ======================================================  Physical Exam:  ============  T(F): 99.1 (14 Dec 2022 20:55), Max: 99.1 (14 Dec 2022 20:55)  HR: 110 (14 Dec 2022 20:55)  BP: 95/62 (14 Dec 2022 20:55)  RR: 20 (14 Dec 2022 20:55)  SpO2: 97% (14 Dec 2022 20:55) (95% - 100%)  temp max in last 48H T(F): , Max: 99.1 (12-14-22 @ 20:55)    General:  lethargic  Eye: no conjunctival pallor, no scleral icterus pupils constricted, reactive  Neck: Supple, No lymphadenopathy. ngt  Respiratory: Lungs are clear to auscultation, Respirations are non-labored.  Cardiovascular: Normal rate, Regular rhythm,  s1+s2  Gastrointestinal: Soft, Non-tender, Non-distended, Normal bowel sounds.  Genitourinary: No costovertebral angle tenderness.  Lymphatics: No lymphadenopathy neck  Musculoskeletal: Normal range of motion, Normal strength. rue picc, left avf  Integumentary:  LE excoriations, LLE erythema, ?mottling, pulses palpable  Neurologic: lethargic, not responding verbally   =======================================================  Labs:                        11.4   15.29 )-----------( 110      ( 14 Dec 2022 07:50 )             36.5     12-14    x   |  x   |  x   ----------------------------<  104<H>  x    |  x   |  x     Ca    8.0<L>      14 Dec 2022 07:50  Phos  6.3     12-14  Mg     2.4     12-14    TPro  6.1<L>  /  Alb  3.6  /  TBili  0.4  /  DBili  x   /  AST  25  /  ALT  11  /  AlkPhos  187<H>  12-13      Culture - Blood (collected 12-13-22 @ 05:41)  Source: .Blood Blood-Peripheral    Culture - Blood (collected 12-13-22 @ 05:41)  Source: .Blood Blood-Peripheral      < from: CT Abdomen and Pelvis No Cont (12.13.22 @ 23:27) >  IMPRESSION:  Redemonstrated pulmonary edema with small bilateral pleural effusions.   Increasing groundglass opacities in the upper lobes secondary to   pulmonary edema or superimposed pneumonia.    Constipated rectosigmoid colon.    Cirrhosis. Ascites. Anasarca.    Additional findings as above.    VERTEBRAL BODY ANALYSIS: Vertebral compression fractures as described,   consider further workup for osteoporosis.      < end of copied text >

## 2022-12-14 NOTE — PROGRESS NOTE ADULT - ASSESSMENT
72y/oM PMH HTN, HLD, ESRD on HD, emphysema s/p lung transplant in [place] by Dr. 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 faecalis bacteremia, newly diagnosed HFrEF 25%, AF complicated by GIB, discharged on home IV abx, now presenting with AMS admitted for further w/u    Acute metabolic encephalopathy   etiology unclear at this point   pt on tx for recent E faecalis bacteremia, endocarditis   hx fungal meningitis   r/o cva   Cirrhosis with ascites   r/o sbp, r/o hepatic encephalopathy   -LP attempt unsuccessful by neuro; IR consulted, however, need to wait 5 days after last Eliquis dose (12/13)   -CTH neg for acute ICH or mass effect   -CT c/a/p: pulmonary edema w/small b/l pleural effusions, increasing ggo in upper lobes 2/2 pulmonary edema or superimposed pna, +constipated rectosigmoid colon, cirrhosis, ascites, anasarca, diffuse vertebral compression fx   -blood cx 12/13 ngtd   -cont meropenem, vanc, acyclovir   -cont fluconazole   -ammonia wnl   -cont lactulose   -f/u mri   -neuro recs appreciated   -ID recs appreciated   -f/u fluid analysis, cx   -ngt placed, start tube feeds   -f/u nutrition consult     Hypoglycemia   -cont to monitor, start tube feeds     Emphysema s/p lung transplant    -holding cellcept while on abx   -cont cortef   -cont tacrolimus and f/u levels   -cont bactrim     chronic afib   -holding eliquis   -cont metoprolol      ESRD on HD   -HD as per nephro     vte ppx: heparin sq while holding Eliquis, will stop heparin closer to LP     pt remains acutely ill

## 2022-12-14 NOTE — PROGRESS NOTE ADULT - ASSESSMENT
72 year old male with PMH of HTN, HLD, CAD s/p stent, COPD/emphysema s/p lung transplantation, history of fungal meningitis, hx of hip fracture s/p ORIF, and ESRD on HD presents with AMS. Recent discharge from [facility] for Enterococcus faecalis bacteremia. Nephrology is managing ESRD on HD.     -Access: L AV access   -Outpatient dialysis days are TTS,   -Seen on HD.  Qd, Qb, UF rate reviewed.  Vitals stable but did not tolerated much UF on HD today  Access working well.  -BP ok - currently on Entresto and metoprolol - UF as tolerated.  -Anemia in setting of CKD - started on MENDY with HD, prn PRBC transfusion . monitor h/h   -Mineral Bone Disease in setting of CKD - continue oral phos binder   -s/p Lung transplantation - on immunosuppressive medications - management as per ID  -AMS, extensive imaging and w/u ordered by ID

## 2022-12-14 NOTE — CONSULT NOTE ADULT - SUBJECTIVE AND OBJECTIVE BOX
Palliative Care Consult  HPI 72 year old male with PMHx hypertension, hyperlipidemia, ESRD on HD, emphysema s/p lung transplant in [place] by Dr. 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 feacalis bacteremia, newly diagnosed HFrEF 25%, AF complicated by GIB was discharged on home IV Abx now presenting with AMS  No reports of chest pain, sob, fever, chills, n/v/d/c noted. Called for palliative consult for [facility]. Patient was admitted to medicine for encephalopathy.    <HPI:  72M with PMH of  hypertension, hyperlipidemia, ESRD on HD, emphysema s/p lung transplant in [place] by Dr. 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 feacalis bacteremia, newly diagnosed HFrEF 25%, AF complicated by GIB was discharged on home IV Abx now presenting with AMS  Patient unable to contribute to history secondary to mental status. History obtained by EMR and Spouse.  Patient was discharged day prior to Thanksgiving with home iv abx. Per Spouse has been receiving IV abx administered by wife and daughter. Has not missed any HD sessions with exception to today.  Since discharge has been becoming increasingly tired. Wife attributes this to not sleeping well. Furthermore patient complained of severe nausea yesterday afternoon but did not have emesis. Approx 1 hour after that became more ''confused and out of it'' but still talking. Symptoms continued to progress and get worse throughout the night and in the morning was only moaning and extremely lethargic.   No recent cough, cp, sob. Denies recent fevers, chills.   Presented to the ED with daughter. CTH obtained and did not reveal any acute findings.  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. 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-2015 -  [facility]      H/O heart artery stent  x3 @[facility]      History of hip replacement  right      Status post open reduction and internal fixation (ORIF) of fracture  left femur      SOCIAL HISTORY:                      Substance history: none                    Admitted from:  home                       Christianity/spirituality:cat                    Cultural concerns:none                      Surrogate/HCP/Guardian: Phone#:Veronica [phone]    FAMILY HISTORY:  Family history of emphysema    Allergies  budesonide (Unknown)  cefpodoxime (Unknown)  Pollen (Unknown)    ADVANCE DIRECTIVES/TREATMENT PREFERENCES:  Full Code    Baseline ADLs (prior to admission):  Independent\    Karnofsky/Palliative Performance Status Version 2:  %30  http://npcrc.org/files/news/palliative_performance_scale_ppsv2.pdf    Present Symptoms:   Unable to obtain due to poor mentation     Pain: no s/s of discomfort            Character-            Duration-            Effect-            Factors-            Frequency-            Location-            Severity-    Pain AD Score: 0  http://geriatrictoolkit.[place].[email or web address]/painad.pdf (press ctrl + left click to view)    Review of Systems: Reviewed  Unable to obtain due to poor mentation       MEDICATIONS  (STANDING):  atorvastatin 80 milliGRAM(s) Oral at bedtime  fluconAZOLE   Tablet 200 milliGRAM(s) Oral <User Schedule>  hydrocortisone 10 milliGRAM(s) Oral two times a day  lactulose Syrup 10 Gram(s) Enteral Tube every 6 hours  meropenem Injectable 1000 milliGRAM(s) IV Push every 24 hours  metoprolol tartrate 12.5 milliGRAM(s) Oral two times a day  pantoprazole  Injectable 40 milliGRAM(s) IV Push daily  sevelamer carbonate 800 milliGRAM(s) Oral three times a day with meals  trimethoprim  40 mG/sulfamethoxazole 200 mG Suspension 80 milliGRAM(s) Oral <User Schedule>    MEDICATIONS  (PRN):  HYDROmorphone  Injectable 0.25 milliGRAM(s) IV Push every 6 hours PRN discomfort, pain, moaning      PHYSICAL EXAM:    Vital Signs Last 24 Hrs  T(C): 36.6 (14 Dec 2022 09:14), Max: 36.7 (14 Dec 2022 08:04)  T(F): 97.8 (14 Dec 2022 09:14), Max: 98 (14 Dec 2022 08:04)  HR: 111 (14 Dec 2022 09:14) (88 - 111)  BP: 100/31 (14 Dec 2022 09:14) (100/31 - 156/88)  BP(mean): 115 (14 Dec 2022 03:28) (115 - 115)  RR: 18 (14 Dec 2022 09:14) (18 - 20)  SpO2: 95% (14 Dec 2022 09:14) (95% - 99%)    Parameters below as of 14 Dec 2022 09:14  Patient On (Oxygen Delivery Method): nasal cannula      General: lethargic    HEENT: dry mouth     Lungs: comfortable    CV: normal  tachycardia    GI: NGT, distended abdomen    : fly    MSK: weakness   bedbound    Neuro: lethargy    Skin:thin frail dry skin    LABS:                        11.4   15.29 )-----------( 110      ( 14 Dec 2022 07:50 )             36.5     12-14    146<H>  |  99  |  57.4<H>  ----------------------------<  38<LL>  3.8   |  22.0  |  8.13<H>    Ca    8.0<L>      14 Dec 2022 07:50  Phos  6.3     12-14  Mg     2.4     12-14    TPro  6.1<L>  /  Alb  3.6  /  TBili  0.4  /  DBili  x   /  AST  25  /  ALT  11  /  AlkPhos  187<H>  12-13    PT/INR - ( 13 Dec 2022 10:10 )   PT: 16.2 sec;   INR: 1.39 ratio         PTT - ( 13 Dec 2022 10:10 )  PTT:34.5 sec    I&O's Summary    13 Dec 2022 07:01  -  14 Dec 2022 07:00  --------------------------------------------------------  IN: 0 mL / OUT: 300 mL / NET: -300 mL    RADIOLOGY & ADDITIONAL STUDIES:  CT abd 12.13.22  IMPRESSION:  Redemonstrated pulmonary edema with small bilateral pleural effusions.   Increasing groundglass opacities in the upper lobes secondary to   pulmonary edema or superimposed pneumonia.    Constipated rectosigmoid colon.    Cirrhosis. Ascites. Anasarca.    Additional findings as above.    VERTEBRAL BODY ANALYSIS: Vertebral compression fractures as described,   consider further workup for osteoporosis.

## 2022-12-14 NOTE — PROGRESS NOTE ADULT - ASSESSMENT
72y Male with multiple medical issues now with encephalopathy.     Encephalopathy.   Likely toxic metabolic secondary to multiple medical issues.   Antibiotics per ID.     Rule out meningitis.   Empiric coverage for now per ID.   LP by IR if CSF still needed.     Case discussed with Dr Sharma.

## 2022-12-14 NOTE — PROGRESS NOTE ADULT - SUBJECTIVE AND OBJECTIVE BOX
JU FERGUSON    [id number]    72y      Male    CC: ams    INTERVAL HPI/OVERNIGHT EVENTS: pt seen and examined in HD. hypoglycemic, received d50    REVIEW OF SYSTEMS:  unable to obtain due to mental status     Vital Signs Last 24 Hrs  T(C): 36.7 (14 Dec 2022 12:31), Max: 36.7 (14 Dec 2022 08:04)  T(F): 98.1 (14 Dec 2022 12:31), Max: 98.1 (14 Dec 2022 12:31)  HR: 116 (14 Dec 2022 12:31) (88 - 116)  BP: 138/55 (14 Dec 2022 12:31) (100/31 - 156/88)  BP(mean): 115 (14 Dec 2022 03:28) (115 - 115)  RR: 18 (14 Dec 2022 12:31) (18 - 20)  SpO2: 100% (14 Dec 2022 12:31) (95% - 100%)    Parameters below as of 14 Dec 2022 12:31  Patient On (Oxygen Delivery Method): nasal cannula        PHYSICAL EXAM:    GENERAL: NAD  HEENT: +EOMI  NECK: soft, supple  CHEST/LUNG: Clear to auscultation bilaterally  HEART: S1S2+, irregular   ABDOMEN: Soft, Nontender, Bowel sounds present, +distention   SKIN: warm, dry  NEURO: drowsy, pt responding to voice, tactile stimuli, not answering questions or following commands     LABS:                        11.4   15.29 )-----------( 110      ( 14 Dec 2022 07:50 )             36.5     12-14    146<H>  |  99  |  57.4<H>  ----------------------------<  38<LL>  3.8   |  22.0  |  8.13<H>    Ca    8.0<L>      14 Dec 2022 07:50  Phos  6.3     12-14  Mg     2.4     12-14    TPro  6.1<L>  /  Alb  3.6  /  TBili  0.4  /  DBili  x   /  AST  25  /  ALT  11  /  AlkPhos  187<H>  12-13    PT/INR - ( 13 Dec 2022 10:10 )   PT: 16.2 sec;   INR: 1.39 ratio         PTT - ( 13 Dec 2022 10:10 )  PTT:34.5 sec        MEDICATIONS  (STANDING):  acyclovir IVPB      atorvastatin 80 milliGRAM(s) Oral at bedtime  dextrose 50% Injectable 25 Gram(s) IV Push once  dextrose 50% Injectable 12.5 Gram(s) IV Push once  dextrose 50% Injectable 25 Gram(s) IV Push once  dextrose Oral Gel 15 Gram(s) Oral once  fluconAZOLE   Tablet 200 milliGRAM(s) Oral <User Schedule>  glucagon  Injectable 1 milliGRAM(s) IntraMuscular once  hydrocortisone 10 milliGRAM(s) Oral two times a day  lactulose Syrup 10 Gram(s) Enteral Tube every 6 hours  meropenem Injectable 1000 milliGRAM(s) IV Push every 24 hours  metoprolol tartrate 12.5 milliGRAM(s) Oral two times a day  pantoprazole  Injectable 40 milliGRAM(s) IV Push daily  sevelamer carbonate 800 milliGRAM(s) Oral three times a day with meals  trimethoprim  40 mG/sulfamethoxazole 200 mG Suspension 80 milliGRAM(s) Oral <User Schedule>  vancomycin  IVPB 750 milliGRAM(s) IV Intermittent once    MEDICATIONS  (PRN):  HYDROmorphone  Injectable 0.25 milliGRAM(s) IV Push every 6 hours PRN discomfort, pain, moaning      RADIOLOGY & ADDITIONAL TESTS:

## 2022-12-14 NOTE — PROGRESS NOTE ADULT - ASSESSMENT
72M with PMH of  hypertension, hyperlipidemia, ESRD on HD, emphysema s/p lung transplant in [place] by Dr. 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 faecalis bacteremia, newly diagnosed HFrEF 25%, AF complicated by GIB was discharged on home IV Abx now presenting with AMS  Presented to the ED with daughter. CTH obtained and did not reveal any acute findings.  Patient was admitted to medicine for encephalopathy. LP attempted bedside but unsuccessful    #AMS-unclear etiology, no fever. ammonia level normal.  ct c/ap pna/edema.  agree with LP and broaden abx coverage to meropenem and vanco by level. added acyclovir empirically. consider MRi head. Neuro f/u    #h/o e.faecalis bacteremia -currently on course of ceftriaxone and ampicillin to empirically cover endocarditis. CRISTIAN was deferred by cardio on recent admission. currently on meropenem, vanco by level. f/u BCX    # s/p lung transplant- per transplant team, cellcept  on hold, currently on cortef, c/w tacrolimus and check levels. c/w fluc and bactrim per home dose for ppx    # h/o fungal meningitis- c/w fluconazole suppression    # ESRD on HD- adjusted abx for renal function, monitor vanco level    guarded prognosis    d/w attending, pharmacy  will f/u

## 2022-12-14 NOTE — CONSULT NOTE ADULT - ASSESSMENT
72 year old male admitted to medicine for encephalopathy    Problem/Recommendation 1:AMS  Consider trying to avoid/minimize deliriogenic drugs such as benzodiazepines and anticholinergics   being treated recently for bacteremia , on iv abx   Monitor for fevers    Problem/Recommendation 2: Debility  Assist in ADLS  Maintain safety, fall, aspiration precautions    Problem/Recommendation 3: Palliative Care Encounter/Goals of care  Met with patient at bedside, lethargic, RN at bedside providing tube feeds  ESRD (end stage renal disease) on dialysis  Call placed to patient's wife, Veronica. Veronica explained patient lives with her at home - his mentation has signficantly declined from baseline (normally AO x 4) now lethargic and not making sense to her.   Discussed code status at length. Explained the difference between Do Not Resuscitate/Do Not Intubate versus CPR and Intubation. At the end of our conversation decision was made for CPR and Intubation (FULL CODE)  Veronica explained if his condition worsens and prognosis looks poor she will reconsider code status and will likely opt for DNR/DNI  Emotional support provided  Palliative to follow    Surrogate/HCP/Guardian: Phone#:Veronica [phone]  Code status: Full Code    Total Time Spent__50__ minutes  This includes chart review, patient assessment, discussion and collaboration with interdisciplinary team members, ACP planning    COUNSELING:  Face to face meeting to discuss Advanced Care Planning - Time Spent __17____Minutes.      Thank you for the opportunity to assist with the care of this patient.   [facility] Palliative Medicine Consult Service [phone].

## 2022-12-15 NOTE — PROGRESS NOTE ADULT - SUBJECTIVE AND OBJECTIVE BOX
[facility]                                        Neurology at [place]                                 [address]                                  [address]                                        [place], [place], [address]                                             [phone]        CC: Encephalopathy    HPI:   The patient is a 72y Male with multiple medical issues who was recently hospitalized with hypoxic respiratory failure.  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   He was ultimately discharged 11/23/22.   He now presented with altered mental status and lethargy.   Neurology asked to evaluation for meningitis. LP attempted (neuro JW) and was unsuccessful while in ER.    Interim history:  Now on 4 [place].     ROS:   Unobtainable due to patient's condition.     MEDICATIONS  (STANDING):  acyclovir IVPB 350 milliGRAM(s) IV Intermittent every 24 hours  acyclovir IVPB      atorvastatin 80 milliGRAM(s) Oral at bedtime  chlorhexidine 2% Cloths 1 Application(s) Topical <User Schedule>  dextrose Oral Gel 15 Gram(s) Oral once  fluconAZOLE   Tablet 200 milliGRAM(s) Oral <User Schedule>  glucagon  Injectable 1 milliGRAM(s) IntraMuscular once  heparin   Injectable 5000 Unit(s) SubCutaneous every 8 hours  hydrocortisone 10 milliGRAM(s) Oral two times a day  lactulose Syrup 10 Gram(s) Enteral Tube every 6 hours  meropenem Injectable 1000 milliGRAM(s) IV Push every 24 hours  metoprolol tartrate 12.5 milliGRAM(s) Oral two times a day  pantoprazole  Injectable 40 milliGRAM(s) IV Push daily  sevelamer carbonate 800 milliGRAM(s) Oral three times a day with meals  tacrolimus 1.5 milliGRAM(s) Oral daily  trimethoprim  40 mG/sulfamethoxazole 200 mG Suspension 80 milliGRAM(s) Oral <User Schedule>    Vital Signs Last 24 Hrs  T(C): 36.7 (15 Dec 2022 08:57), Max: 37.6 (15 Dec 2022 02:57)  T(F): 98 (15 Dec 2022 08:57), Max: 99.7 (15 Dec 2022 02:57)  HR: 110 (15 Dec 2022 08:57) (91 - 118)  BP: 143/67 (15 Dec 2022 08:57) (95/62 - 143/67)  BP(mean): 75 (14 Dec 2022 16:08) (75 - 75)  RR: 20 (15 Dec 2022 08:57) (18 - 20)  SpO2: 95% (15 Dec 2022 08:57) (92% - 100%)    Parameters below as of 15 Dec 2022 08:57  Patient On (Oxygen Delivery Method): nasal cannula  O2 Flow (L/min): 4    Detailed Neurologic Exam:    Mental status: The patient is awake but non verbal. He follows simple instructions with visual cues only.   Not following purely verbal instructions.     Cranial nerves: There is no papilledema. Pupils react symmetrically to light. He blinks to threat bilaterally.  He tracks slightly with gaze. Face is grossly symmetric. Palate and tongue cannot be assessed.     Motor/Sensory: There is normal bulk and tone.  Symmetric limb movement to stimuli.     Reflexes: 1+ throughout and plantar responses are flexor.    Cerebellar: Cannot be tested.     Labs:     12-15    143  |  97  |  31.6<H>  ----------------------------<  82  3.6   |  26.0  |  5.50<H>    Ca    7.9<L>      15 Dec 2022 06:25  Phos  4.3     12-15  Mg     2.1     12-15    TPro  5.7<L>  /  Alb  3.2<L>  /  TBili  0.4  /  DBili  x   /  AST  27  /  ALT  13  /  AlkPhos  208<H>  12-15                            10.4   12.45 )-----------( 106      ( 15 Dec 2022 06:25 )             33.8       Rad:   ***

## 2022-12-15 NOTE — PROGRESS NOTE ADULT - ASSESSMENT
72y/oM PMH HTN, HLD, ESRD on HD, emphysema s/p lung transplant in [place] by Dr. Kaufman, hx fungal meningitis, carotid stenosis s/p CEA, CAD s/p PCI in 2019, RIJ on occlusion on Eliquis, recently discharged from [facility] after admission with E. faecalis bacteremia, newly diagnosed HFrEF 25%, AF complicated by GIB, discharged on home IV abx, now presenting with AMS admitted for further w/u    Acute metabolic encephalopathy   etiology unclear at this point   pt on tx for recent E faecalis bacteremia, endocarditis   hx fungal meningitis   r/o cva   Cirrhosis with ascites   r/o sbp, r/o hepatic encephalopathy   -LP attempt unsuccessful by neuro; IR consulted, however, need to wait 5 days after last Eliquis dose (12/13)   -CTH neg for acute ICH or mass effect   -CT c/a/p: pulmonary edema w/small b/l pleural effusions, increasing ggo in upper lobes 2/2 pulmonary edema or superimposed pna, +constipated rectosigmoid colon, cirrhosis, ascites, anasarca, diffuse vertebral compression fx   -blood cx 12/13 ngtd   -cont meropenem, vanc, acyclovir   -cont fluconazole   -ammonia wnl   -cont lactulose   -f/u mri   -neuro recs appreciated   -ID recs appreciated    -ngt placed, start tube feeds  -f/u nutrition consult   -paracentesis ordered, unable to get today 12/15 due to agitation  -abd sono obtained to reassess ascites as well as elevated alk phos: +ascites, dilated cbd up to 9mm; b/l nonobstructing nephrolithiasis   -mrcp ordered, f/u repeat labs    Hypoglycemia   -cont to monitor, s/p hypoglycemia protocol, cont tube feeds     Emphysema s/p lung transplant    -holding cellcept while on abx   -cont cortef   -cont tacrolimus and f/u levels   -cont bactrim     chronic afib   -holding eliquis   -cont metoprolol      ESRD on HD   -HD as per nephro     vte ppx: heparin sq while holding Eliquis, will stop heparin closer to LP     pt remains acutely ill

## 2022-12-15 NOTE — PROGRESS NOTE ADULT - ASSESSMENT
72 year old male admitted to medicine for encephalopathy    Problem/Recommendation 1:AMS  Consider trying to avoid/minimize deliriogenic drugs such as benzodiazepines and anticholinergics   being treated recently for bacteremia , on iv abx   Monitor for fevers    Problem/Recommendation 2: Debility  Assist in ADLS  Maintain safety, fall, aspiration precautions    Problem/Recommendation 3: Palliative Care Encounter/Goals of care  Met with patient at bedside, cognition slightly improved from yesterday' encounter as today is he awake, alert, able to communicate yes and no but is confused to situation, able to tell me name, , but incorrect/disorientation to hospital and situation  Remains with ESRD (end stage renal disease) on dialysis  Call placed to patient's wife yesterday, Veronica. Lengthy GOC held with her over the phone with the result being to continue to medically optimize, hopeful for recovery but remained realistic that if his condition worsens she would consider alternative plans and decisions that may need to be discussed.   Discussed code status at length- At the end of our conversation decision was made for CPR and Intubation (FULL CODE)  Veronica explained if his condition worsens and prognosis looks poor she will reconsider code status and will likely opt for DNR/DNI  Patient with moaning today during encounter - agree with PRN dilaudid 0.25mg IVP q 6 hours   Can consider 0.5mg IVP q 6 hrs PRN if previous dose appears to be ineffective, but would consider hold parameters for SBP<100 or lethargy as he is currently full code     Palliative to follow    Surrogate/HCP/Guardian: Phone#:Veronica [phone]  Code status: Full Code    Total Time Spent_40___ minutes  This includes chart review, patient assessment, discussion and collaboration with interdisciplinary team members, ACP planning      Thank you for the opportunity to assist with the care of this patient.   [facility] Palliative Medicine Consult Service [phone].

## 2022-12-15 NOTE — PROGRESS NOTE ADULT - SUBJECTIVE AND OBJECTIVE BOX
JU FERGUSON    [id number]    72y      Male    CC: ams    INTERVAL HPI/OVERNIGHT EVENTS: pt seen and examined in am. ngt removed and replaced o/n. awake, screaming/moaning. following some commands and answering some questions. c/o generalized pain but non specific and not verbalizing other complaints     REVIEW OF SYSTEMS:  unable to obtain 2/2 mental status     Vital Signs Last 24 Hrs  T(C): 36.7 (15 Dec 2022 16:52), Max: 37.6 (15 Dec 2022 02:57)  T(F): 98 (15 Dec 2022 16:52), Max: 99.7 (15 Dec 2022 02:57)  HR: 118 (15 Dec 2022 16:52) (110 - 118)  BP: 145/62 (15 Dec 2022 16:52) (95/62 - 145/62)  BP(mean): --  RR: 20 (15 Dec 2022 16:52) (20 - 20)  SpO2: 96% (15 Dec 2022 16:52) (92% - 97%)    Parameters below as of 15 Dec 2022 16:52  Patient On (Oxygen Delivery Method): nasal cannula  O2 Flow (L/min): 4      PHYSICAL EXAM:    GENERAL: NAD  HEENT: PERRL  NECK: soft, supple  CHEST/LUNG: Clear to auscultation bilaterally  HEART: S1S2+, irregular, mildly tachycardic  ABDOMEN: Soft, Nontender, Nondistended; Bowel sounds present  SKIN: warm, dry  NEURO: Awake, alert; oriented to self and place; intermittently follows some commands   PSYCH: agitated     LABS:                        10.4   12.45 )-----------( 106      ( 15 Dec 2022 06:25 )             33.8     12-15    143  |  97  |  31.6<H>  ----------------------------<  82  3.6   |  26.0  |  5.50<H>    Ca    7.9<L>      15 Dec 2022 06:25  Phos  4.3     12-15  Mg     2.1     12-15    TPro  5.7<L>  /  Alb  3.2<L>  /  TBili  0.4  /  DBili  x   /  AST  27  /  ALT  13  /  AlkPhos  208<H>  12-15            MEDICATIONS  (STANDING):  acyclovir IVPB      acyclovir IVPB 350 milliGRAM(s) IV Intermittent every 24 hours  atorvastatin 80 milliGRAM(s) Oral at bedtime  chlorhexidine 2% Cloths 1 Application(s) Topical <User Schedule>  dextrose 50% Injectable 25 Gram(s) IV Push once  dextrose 50% Injectable 12.5 Gram(s) IV Push once  dextrose 50% Injectable 25 Gram(s) IV Push once  dextrose Oral Gel 15 Gram(s) Oral once  fluconAZOLE   Tablet 200 milliGRAM(s) Oral <User Schedule>  glucagon  Injectable 1 milliGRAM(s) IntraMuscular once  heparin   Injectable 5000 Unit(s) SubCutaneous every 8 hours  hydrocortisone 10 milliGRAM(s) Oral two times a day  lactulose Syrup 10 Gram(s) Enteral Tube every 6 hours  meropenem Injectable 1000 milliGRAM(s) IV Push every 24 hours  metoprolol tartrate 12.5 milliGRAM(s) Oral two times a day  pantoprazole  Injectable 40 milliGRAM(s) IV Push daily  sevelamer carbonate 800 milliGRAM(s) Oral three times a day with meals  tacrolimus 1.5 milliGRAM(s) Oral daily  trimethoprim  40 mG/sulfamethoxazole 200 mG Suspension 80 milliGRAM(s) Oral <User Schedule>    MEDICATIONS  (PRN):  HYDROmorphone  Injectable 0.5 milliGRAM(s) IV Push every 4 hours PRN Severe Pain (7 - 10)  metoprolol tartrate Injectable 5 milliGRAM(s) IV Push once PRN sustained HR >140      RADIOLOGY & ADDITIONAL TESTS:  < from: US Abdomen Complete (US Abdomen Complete .) (12.15.22 @ 11:21) >  IMPRESSION:  Cirrhosis with ascites.    Cholelithiasis. Gallbladder wall thickening likely reactive.    Choledochomegaly. Correlate with LFTs and MRCP as warranted    Bilateral nonobstructive nephrolithiasis    Additional findings as discussed    < end of copied text >

## 2022-12-15 NOTE — PROGRESS NOTE ADULT - ASSESSMENT
72 year old male with PMH of HTN, HLD, CAD s/p PCI, carotid stenosis s/p CEA, ESRD on HD, right IJ occlusion (on Eliquis), emphysema s/p lung transplant, hx of fungal meningitis, with recent complex admission with E feacalis bacteremia, newly diagnosed HFrEF 25%, AF complicated by GIB was discharged on home IV antibiotics, now presents with altered mental status. CT head negative. Admitted for encephalopathy. Awaiting LP by IR. Nephrology is consulted for resumption of hemodialysis.    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 - continue oral phos binder   -s/p Lung transplantation - on immunosuppressive medications - management as per MINISTERIO Graham DO  Nephrology   [facility]  [address] [place] [address]  Office Number [phone]                  72 year old male with PMH of HTN, HLD, CAD s/p PCI, carotid stenosis s/p CEA, ESRD on HD, right IJ occlusion (on Eliquis), emphysema s/p lung transplant, hx of fungal meningitis, with recent complex admission with E feacalis bacteremia, newly diagnosed HFrEF 25%, AF complicated by GIB was discharged on home IV antibiotics, now presents with altered mental status. CT head negative. Admitted for encephalopathy. Awaiting LP by IR. Nephrology is consulted for resumption of hemodialysis.    -Access: L AV access   -Outpatient dialysis days are Tuesdays Thursdays Saturdays  -Off cycle while here, currently on a Monday Wednesday Friday dialysis schedule    -Last dialyzed yesterday; next hemodialysis will be tomorrow   -BP acceptable    -Anemia in setting of CKD - hemoglobin is at goal; no indication for MENDY at this time    -Mineral Bone Disease in setting of CKD - continue oral phos binder   -s/p Lung transplantation - on immunosuppressive medications - management as per MINISTERIO Graham DO  Nephrology  [facility]  [address] [place] [address]  Office Number [phone]

## 2022-12-15 NOTE — PROGRESS NOTE ADULT - SUBJECTIVE AND OBJECTIVE BOX
This note is a late entry. Patient was seen and examined this a.m. Lethargic at time of my evaluation.     Vital Signs Last 24 Hrs  T(C): 36.6 (15 Dec 2022 17:44), Max: 37.6 (15 Dec 2022 02:57)  T(F): 97.8 (15 Dec 2022 17:44), Max: 99.7 (15 Dec 2022 02:57)  HR: 95 (15 Dec 2022 17:44) (95 - 118)  BP: 134/69 (15 Dec 2022 17:44) (95/62 - 145/62)  BP(mean): --  RR: 20 (15 Dec 2022 17:44) (20 - 22)  SpO2: 96% (15 Dec 2022 17:44) (92% - 97%)    Parameters below as of 15 Dec 2022 17:31  Patient On (Oxygen Delivery Method): nasal cannula  O2 Flow (L/min): 4    I&O's Summary    14 Dec 2022 07:01  -  15 Dec 2022 07:00  --------------------------------------------------------  IN: 10 mL / OUT: 400 mL / NET: -390 mL    Physical Exam  General: WDWN male in NAD  HEENT: +NG tube  Cardiac: S1S2 RRR  Respiratory: CTAB  Abdomen: Soft, NT  Extremities: No appreciable edema  Neuro: Lethargic     12-15    143  |  97  |  31.6<H>  ----------------------------<  82  3.6   |  26.0  |  5.50<H>    Ca    7.9<L>      15 Dec 2022 06:25  Phos  4.3     12-15  Mg     2.1     12-15    TPro  5.7<L>  /  Alb  3.2<L>  /  TBili  0.4  /  DBili  x   /  AST  27  /  ALT  13  /  AlkPhos  208<H>  12-15                        10.4   12.45 )-----------( 106      ( 15 Dec 2022 06:25 )             33.8     MEDICATIONS  (STANDING):  acyclovir IVPB 350 milliGRAM(s) IV Intermittent every 24 hours  acyclovir IVPB      atorvastatin 80 milliGRAM(s) Oral at bedtime  chlorhexidine 2% Cloths 1 Application(s) Topical <User Schedule>  dextrose 50% Injectable 25 Gram(s) IV Push once  dextrose 50% Injectable 12.5 Gram(s) IV Push once  dextrose 50% Injectable 25 Gram(s) IV Push once  dextrose Oral Gel 15 Gram(s) Oral once  fluconAZOLE   Tablet 200 milliGRAM(s) Oral <User Schedule>  glucagon  Injectable 1 milliGRAM(s) IntraMuscular once  heparin   Injectable 5000 Unit(s) SubCutaneous every 8 hours  hydrocortisone 10 milliGRAM(s) Oral two times a day  lactulose Syrup 10 Gram(s) Enteral Tube every 6 hours  meropenem Injectable 1000 milliGRAM(s) IV Push every 24 hours  metoprolol tartrate 12.5 milliGRAM(s) Oral two times a day  pantoprazole  Injectable 40 milliGRAM(s) IV Push daily  sevelamer carbonate 800 milliGRAM(s) Oral three times a day with meals  tacrolimus 1.5 milliGRAM(s) Oral daily  trimethoprim  40 mG/sulfamethoxazole 200 mG Suspension 80 milliGRAM(s) Oral <User Schedule>    MEDICATIONS  (PRN):  HYDROmorphone  Injectable 0.5 milliGRAM(s) IV Push every 4 hours PRN Severe Pain (7 - 10)  metoprolol tartrate Injectable 5 milliGRAM(s) IV Push once PRN sustained HR >140

## 2022-12-15 NOTE — PROGRESS NOTE ADULT - ASSESSMENT
72y Male with multiple medical issues now with encephalopathy.     Encephalopathy.   Likely toxic metabolic secondary to multiple medical issues.   Antibiotics per ID.   In current condition, it does not appear he will tolerate MRI without sedation.    Rule out meningitis.   Empiric coverage for now per ID.   LP will need to be done by IR if CSF still needed.

## 2022-12-15 NOTE — PROGRESS NOTE ADULT - SUBJECTIVE AND OBJECTIVE BOX
Palliative Care Followup    OVERNIGHT EVENTS: no acute overnight events - patient moaning during encounter today    Present Symptoms:   Dyspnea: Mild  Nausea/Vomiting: No  Anxiety:  Yes   Depression: No  Fatigue: Yes     Pain: patient is able to use non verbal communication of pain with moaning and grimacing - he also says yes to pain and "everything hurts"            Character-            Duration-            Effect-            Factors-            Frequency-            Location-            Severity-    Pain AD Score:4  [email or web address] (press ctrl + left click to view)    Review of Systems: Reviewed                     Positive:pain  Difficult to obtain due to poor mentation     MEDICATIONS  (STANDING):  acyclovir IVPB 350 milliGRAM(s) IV Intermittent every 24 hours  acyclovir IVPB      atorvastatin 80 milliGRAM(s) Oral at bedtime  chlorhexidine 2% Cloths 1 Application(s) Topical <User Schedule>  dextrose 50% Injectable 25 Gram(s) IV Push once  dextrose 50% Injectable 12.5 Gram(s) IV Push once  dextrose 50% Injectable 25 Gram(s) IV Push once  dextrose Oral Gel 15 Gram(s) Oral once  fluconAZOLE   Tablet 200 milliGRAM(s) Oral <User Schedule>  glucagon  Injectable 1 milliGRAM(s) IntraMuscular once  heparin   Injectable 5000 Unit(s) SubCutaneous every 8 hours  hydrocortisone 10 milliGRAM(s) Oral two times a day  lactulose Syrup 10 Gram(s) Enteral Tube every 6 hours  meropenem Injectable 1000 milliGRAM(s) IV Push every 24 hours  metoprolol tartrate 12.5 milliGRAM(s) Oral two times a day  pantoprazole  Injectable 40 milliGRAM(s) IV Push daily  sevelamer carbonate 800 milliGRAM(s) Oral three times a day with meals  tacrolimus 1.5 milliGRAM(s) Oral daily  trimethoprim  40 mG/sulfamethoxazole 200 mG Suspension 80 milliGRAM(s) Oral <User Schedule>    MEDICATIONS  (PRN):  HYDROmorphone  Injectable 0.25 milliGRAM(s) IV Push every 6 hours PRN discomfort, pain, moaning      PHYSICAL EXAM:    Vital Signs Last 24 Hrs  T(C): 36.7 (15 Dec 2022 08:57), Max: 37.6 (15 Dec 2022 02:57)  T(F): 98 (15 Dec 2022 08:57), Max: 99.7 (15 Dec 2022 02:57)  HR: 110 (15 Dec 2022 08:57) (91 - 118)  BP: 143/67 (15 Dec 2022 08:57) (95/62 - 143/67)  BP(mean): 75 (14 Dec 2022 16:08) (75 - 75)  RR: 20 (15 Dec 2022 08:57) (18 - 20)  SpO2: 95% (15 Dec 2022 08:57) (92% - 100%)    Parameters below as of 15 Dec 2022 08:57  Patient On (Oxygen Delivery Method): nasal cannula  O2 Flow (L/min): 4      General: awake, alert, able to communicate yes and no but is confused to situation, able to tell me name, , but incorrect hospital and situation    HEENT: dry mouth     Lungs: comfortable    CV: normal  tachycardia    GI: NGT, distended abdomen    : bill    MSK: weakness   bedbound    Neuro: lethargy    Skin:thin frail dry skin    LABS:                          10.4   12.45 )-----------( 106      ( 15 Dec 2022 06:25 )             33.8     12-15    143  |  97  |  31.6<H>  ----------------------------<  82  3.6   |  26.0  |  5.50<H>    Ca    7.9<L>      15 Dec 2022 06:25  Phos  4.3     12-15  Mg     2.1     12-15    TPro  5.7<L>  /  Alb  3.2<L>  /  TBili  0.4  /  DBili  x   /  AST  27  /  ALT  13  /  AlkPhos  208<H>  12-15        I&O's Summary    14 Dec 2022 07:01  -  15 Dec 2022 07:00  --------------------------------------------------------  IN: 10 mL / OUT: 400 mL / NET: -390 mL    RADIOLOGY & ADDITIONAL STUDIES:  No new today    ADVANCE DIRECTIVES/TREATMENT PREFERENCES:  Full Code

## 2022-12-16 NOTE — DIETITIAN INITIAL EVALUATION ADULT - ENTERAL
Continue Nepro 1.8 @ 50mL/hr which provides 1800kcal, 81gpro, 730mL free water and 100% RDI vitamins/minerals.

## 2022-12-16 NOTE — PROGRESS NOTE ADULT - ASSESSMENT
72y/oM PMH HTN, HLD, ESRD on HD, emphysema s/p lung transplant in [place] by Dr. 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 faecalis bacteremia, newly diagnosed HFrEF 25%, AF complicated by GIB, discharged on home IV abx, now presenting with AMS admitted for further w/u    Acute metabolic encephalopathy   etiology unclear at this point   pt on tx for recent E faecalis bacteremia, endocarditis   hx fungal meningitis   r/o cva   Cirrhosis with ascites   r/o sbp, r/o hepatic encephalopathy   -LP attempt unsuccessful by neuro; IR consulted, however, need to wait 5 days after last Eliquis dose (12/13)   -CTH neg for acute ICH or mass effect   -CT c/a/p: pulmonary edema w/small b/l pleural effusions, increasing ggo in upper lobes 2/2 pulmonary edema or superimposed pna, +constipated rectosigmoid colon, cirrhosis, ascites, anasarca, diffuse vertebral compression fx   -blood cx 12/13 ngtd   -cont meropenem, vanc, acyclovir   -cont fluconazole   -ammonia wnl   -cont lactulose   -f/u mri   -neuro recs appreciated   -ID recs appreciated    -ngt placed, tube feeds; **holding tube feeds pm 12/16 due to increased wob, ?poss aspiration   -f/u nutrition consult   -paracentesis ordered, unable to get tdue to agitation  -abd sono obtained to reassess ascites as well as elevated alk phos: +ascites, dilated cbd up to 9mm; b/l nonobstructing nephrolithiasis   -mrcp ordered  -gi recs appreciated     Hypoglycemia   -cont to monitor, s/p hypoglycemia protocol, tube feeds on hold  -start gentle dextrose ivf    Emphysema s/p lung transplant    -holding cellcept while on abx   -cont cortef   -cont tacrolimus and f/u levels   -cont bactrim     chronic afib   -holding eliquis   -cont metoprolol      ESRD on HD   -HD as per nephro     vte ppx: heparin sq while holding Eliquis, will stop heparin closer to LP     pt remains acutely ill  prognosis guarded   palliative following   attempted to reach pt's wife, hari, vm left

## 2022-12-16 NOTE — PROGRESS NOTE ADULT - SUBJECTIVE AND OBJECTIVE BOX
Palliative Care Followup    OVERNIGHT EVENTS: no acute overnight events - patient moaning and agitated in dialysis    Present Symptoms:   unable to obtain due to poor mentation    Pain: patient is able to use non verbal communication of pain with moaning and grimacing             Character-            Duration-            Effect-            Factors-            Frequency-            Location-            Severity-    Pain AD Score:2  [email or web address] (press ctrl + left click to view)    Review of Systems: Reviewed  Difficult to obtain due to poor mentation     MEDICATIONS  (STANDING):  acyclovir IVPB 350 milliGRAM(s) IV Intermittent every 24 hours  acyclovir IVPB      atorvastatin 80 milliGRAM(s) Oral at bedtime  chlorhexidine 2% Cloths 1 Application(s) Topical <User Schedule>  dextrose 50% Injectable 25 Gram(s) IV Push once  dextrose 50% Injectable 12.5 Gram(s) IV Push once  dextrose 50% Injectable 25 Gram(s) IV Push once  dextrose Oral Gel 15 Gram(s) Oral once  fluconAZOLE   Tablet 200 milliGRAM(s) Oral <User Schedule>  glucagon  Injectable 1 milliGRAM(s) IntraMuscular once  heparin   Injectable 5000 Unit(s) SubCutaneous every 8 hours  hydrocortisone 10 milliGRAM(s) Oral two times a day  lactulose Syrup 10 Gram(s) Enteral Tube every 6 hours  meropenem Injectable 1000 milliGRAM(s) IV Push every 24 hours  metoprolol tartrate 12.5 milliGRAM(s) Oral two times a day  pantoprazole  Injectable 40 milliGRAM(s) IV Push daily  sevelamer carbonate 800 milliGRAM(s) Oral three times a day with meals  tacrolimus 1.5 milliGRAM(s) Oral daily  trimethoprim  40 mG/sulfamethoxazole 200 mG Suspension 80 milliGRAM(s) Oral <User Schedule>    MEDICATIONS  (PRN):  HYDROmorphone  Injectable 0.25 milliGRAM(s) IV Push every 6 hours PRN discomfort, pain, moaning      PHYSICAL EXAM:    Vital Signs Last 24 Hrs  T(C): 36.8 (16 Dec 2022 09:59), Max: 36.8 (16 Dec 2022 09:59)  T(F): 98.2 (16 Dec 2022 09:59), Max: 98.2 (16 Dec 2022 09:59)  HR: 115 (16 Dec 2022 09:59) (95 - 118)  BP: 161/96 (16 Dec 2022 09:59) (127/64 - 161/96)  BP(mean): --  RR: 18 (16 Dec 2022 09:59) (18 - 22)  SpO2: 94% (16 Dec 2022 09:59) (94% - 98%)    Parameters below as of 16 Dec 2022 09:59  Patient On (Oxygen Delivery Method): nasal cannula      General: awake, alert, agitated    HEENT: dry mouth     Lungs: comfortable    CV: normal  tachycardia    GI: NGT, distended abdomen    : bill    MSK: weakness   bedbound    Neuro: lethargy    Skin:thin frail dry skin    LABS:  Basic Metabolic Panel in AM (12.16.22 @ 06:49)    Sodium, Serum: 140 mmol/L    Potassium, Serum: 3.8 mmol/L    Chloride, Serum: 97: Chloride reference range changed from ..10/26/2022 mmol/L    Carbon Dioxide, Serum: 27.0 mmol/L    Anion Gap, Serum: 16 mmol/L    Blood Urea Nitrogen, Serum: 44.6 mg/dL    Creatinine, Serum: 6.54 mg/dL    Glucose, Serum: 108 mg/dL    Calcium, Total Serum: 8.7: Prior Reference Range of 8.6 – 10.2 was amended as of 7/26/2022 to 8.4 –  10.5. mg/dL    eGFR: 8: The estimated glomerular filtration rate (eGFR) is calculated using the  2021 CKD-EPI creatinine equation, which does not have a coefficient for  race and is validated in individuals 18 years of age and older (N Engl J  Med 2021; 385:1654-6692). Creatinine-based eGFR may be inaccurate in  various situations including but not limited to extremes of muscle mass,  altered dietary protein intake, or medications that affect renal tubular  creatinine secretion. mL/min/1.73m2    RADIOLOGY & ADDITIONAL STUDIES:  CT abd 12.13.22    IMPRESSION:  Redemonstrated pulmonary edema with small bilateral pleural effusions.   Increasing groundglass opacities in the upper lobes secondary to   pulmonary edema or superimposed pneumonia.    Constipated rectosigmoid colon.    Cirrhosis. Ascites. Anasarca.    Additional findings as above.    VERTEBRAL BODY ANALYSIS: Vertebral compression fractures as described,   consider further workup for osteoporosis.      ADVANCE DIRECTIVES/TREATMENT PREFERENCES:  Full Code

## 2022-12-16 NOTE — DIETITIAN INITIAL EVALUATION ADULT - ORAL INTAKE PTA/DIET HISTORY
Nutrition consulted for tube feeding recommendations. Patient remains with AMS. IN PROGRESS: Nutrition consulted for tube feeding recommendations. Patient remains with AMS. See enteral nutrition recommendations below. IN PROGRESS: Nutrition consulted for tube feeding recommendations. Patient remains with AMS. Not in room at time of interview. See enteral nutrition recommendations below. RD to followup as scheduled.

## 2022-12-16 NOTE — PROGRESS NOTE ADULT - ASSESSMENT
72 year old male admitted to medicine for encephalopathy    Problem/Recommendation 1:AMS  Consider trying to avoid/minimize deliriogenic drugs such as benzodiazepines and anticholinergics   being treated recently for bacteremia , on iv abx   Monitor for fevers  Can consider low dose haldol if agitation worsens if okay with primary team    Problem/Recommendation 2: Debility  Assist in ADLS  Maintain safety, fall, aspiration precautions    Problem/Recommendation 3: Palliative Care Encounter/Goals of care  Met with patient - receiving dialysis, agitated, confused  Yesterday patient's cognition was slightly improved as he was awake, alert, able to communicate yes and no to symptom check but today he returns back to being agitated and unable to communicate effectively with  Call placed to patient's wife Veronica for support but she did not answer the phone call - will try again   Per my last conversation with Veronica plan was to continue medical optimization , dialysis, current course and remain full code  Veronica explained if his condition worsens and prognosis looks poor she will reconsider code status and will likely opt for DNR/DNI  Palliative to follow    Surrogate/HCP/Guardian: Phone#: [phone]  Code status: Full Code    Total Time Spent_35__ minutes  This includes chart review, patient assessment, discussion and collaboration with interdisciplinary team members, ACP planning    Thank you for the opportunity to assist with the care of this patient.   [facility] Palliative Medicine Consult Service [phone].

## 2022-12-16 NOTE — CONSULT NOTE ADULT - ASSESSMENT
72 year old male with HTN, HLD, CAD s/p PCI, COPD s/p bilateral lung transplant and ESRD on HD with recent e faecalis bacteremia and anemia with gastritis now with altered mental status. GI consulted for dilated CBD    Dilated CBD on US, recent CT with normal CBD no obvious stones  ALK phos as been persistently elevated but t bili is normal   Send Alk phos fractionated to determine if elevation in biliary   If able obtain MRI/MRCP to evalutate for biliary pathology   MELD 23  72 year old male with HTN, HLD, CAD s/p PCI, COPD s/p bilateral lung transplant and ESRD on HD with recent e faecalis bacteremia and anemia with gastritis now with altered mental status. GI consulted for dilated CBD.    Dilated CBD on US, recent CT with normal CBD no obvious stones  ALK phos as been persistently elevated but t bili is normal   Send Alk phos fractionated to determine if elevation in biliary   If able obtain MRI/MRCP to evaluate for biliary pathology   Monitor CMP    Decompensated cirrhosis with ascites MELD 23  Ammonia not elevated to support HE  Consider diagnostic paracentesis to rule out SBP in light of altered mental status  Continue dialysis per renal   Avoid nsaids  Low sodium diet/tube feeds   Continue lactulose titrated to 2-3 soft bowel movements per day, consider a trial of rifaximin to evaluate for improvement in metal status    Gastritis  Continue pantoprazole 40mg po daily  Avoid nsaids  HGB stable

## 2022-12-16 NOTE — DIETITIAN INITIAL EVALUATION ADULT - ADD RECOMMEND
RECOMMENDATIONS:  1) Prostat QD (100kcal,15gpro) to better meet protein/kcal needs  2) Continue Nepro 1.8 @ goal 50mL/hr as tolerated; monitor GRVs  3) Monitor nutrition related labs and hydration status; Monitor lytes, H/H  4) Monitor weights

## 2022-12-16 NOTE — DIETITIAN INITIAL EVALUATION ADULT - PERTINENT LABORATORY DATA
12-16    140  |  97  |  44.6<H>  ----------------------------<  108<H>  3.8   |  27.0  |  6.54<H>    Ca    8.7      16 Dec 2022 06:49  Phos  4.8     12-16  Mg     2.2     12-16    TPro  5.8<L>  /  Alb  3.1<L>  /  TBili  0.4  /  DBili  0.2  /  AST  32  /  ALT  13  /  AlkPhos  292<H>  12-16  POCT Blood Glucose.: 105 mg/dL (12-16-22 @ 06:24)  A1C with Estimated Average Glucose Result: 5.5 % (11-12-22 @ 05:44)  12-16 Na140 mmol/L Glu 108 mg/dL<H> K+ 3.8 mmol/L Cr  6.54 mg/dL<H> BUN 44.6 mg/dL<H> Phos 4.8 mg/dL<H> Alb 3.1 g/dL<L> PAB n/a

## 2022-12-16 NOTE — DIETITIAN INITIAL EVALUATION ADULT - PERTINENT MEDS FT
MEDICATIONS  (STANDING):  acyclovir IVPB 350 milliGRAM(s) IV Intermittent every 24 hours  acyclovir IVPB      atorvastatin 80 milliGRAM(s) Oral at bedtime  chlorhexidine 2% Cloths 1 Application(s) Topical <User Schedule>  dextrose 50% Injectable 25 Gram(s) IV Push once  dextrose 50% Injectable 12.5 Gram(s) IV Push once  dextrose 50% Injectable 25 Gram(s) IV Push once  dextrose Oral Gel 15 Gram(s) Oral once  fluconAZOLE   Tablet 200 milliGRAM(s) Oral <User Schedule>  glucagon  Injectable 1 milliGRAM(s) IntraMuscular once  heparin   Injectable 5000 Unit(s) SubCutaneous every 8 hours  hydrocortisone 10 milliGRAM(s) Oral two times a day  lactulose Syrup 10 Gram(s) Enteral Tube every 6 hours  meropenem Injectable 1000 milliGRAM(s) IV Push every 24 hours  metoprolol tartrate 12.5 milliGRAM(s) Oral two times a day  pantoprazole  Injectable 40 milliGRAM(s) IV Push daily  sevelamer carbonate 800 milliGRAM(s) Oral three times a day with meals  tacrolimus 1.5 milliGRAM(s) Oral daily  trimethoprim  40 mG/sulfamethoxazole 200 mG Suspension 80 milliGRAM(s) Oral <User Schedule>    MEDICATIONS  (PRN):  HYDROmorphone  Injectable 0.5 milliGRAM(s) IV Push every 4 hours PRN Severe Pain (7 - 10)  metoprolol tartrate Injectable 5 milliGRAM(s) IV Push once PRN sustained HR >140

## 2022-12-16 NOTE — PROGRESS NOTE ADULT - ASSESSMENT
72M with PMH of  hypertension, hyperlipidemia, ESRD on HD, emphysema s/p lung transplant in [place] by Dr. Kaufman, Hx fungal meningitis, carotid stenosis s/p CEA, CAD s/p PCI in 2019, 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, recently discharged from [facility] after complex admission with e. faecalis bacteremia, newly diagnosed HFrEF 25%, AF complicated by GIB was discharged on home IV Abx now presenting with AMS  Presented to the ED with daughter. CTH obtained and did not reveal any acute findings.  Patient was admitted to medicine for encephalopathy. LP attempted bedside but unsuccessful    #AMS-unclear etiology, no fever. ammonia level normal.  ct c/ap ?pna/edema/ascites.  agree with LP-pending by IR. c/w meropenem and vanco by level. added acyclovir empirically. consider MRi head. Neuro f/u. h/o fungal meningitis but is on fluconazole ppx    #h/o e.faecalis bacteremia -currently on course of ceftriaxone and ampicillin to empirically cover endocarditis. CRISTIAN was deferred by cardio on recent admission. currently on meropenem, vanco by level. f/u BCX    # s/p lung transplant- per transplant team, cellcept  on hold, currently on cortef, c/w tacrolimus and check levels. c/w fluc and bactrim per home dose for ppx    # h/o fungal meningitis- c/w fluconazole suppression    # ESRD on HD- adjusted abx for renal function, monitor vanco level    #Dilated CBD on USG- plan for MRCP    # AScites- paracentesis if able, on meropenem    guarded prognosis    d/w attending  will f/u

## 2022-12-16 NOTE — PROGRESS NOTE ADULT - SUBJECTIVE AND OBJECTIVE BOX
[facility]                                                INFECTIOUS DISEASES  =======================================================                               J Carlos Galdamez MD#    Isatu Rojas MD*                           Perlita Solares MD*   Sumaya Morales MD*            Diplomates American Board of Internal Medicine & Infectious Diseases                  # [place] Office - Appt - Tel  [phone] Fax [phone]                * [place] Office - Appt - Tel [phone] Fax [phone]                                  Hospital Consult line:  [phone]  =======================================================      [facility]-[id number]  JU FERGUSON   follow up for: ams  patient seen and examined.       I have personally reviewed the labs and data; pertinent labs and data are listed in this note; please see below.   ===================================================  REVIEW OF SYSTEMS:  CONSTITUTIONAL:  No Fever or chills  HEENT:  No diplopia or blurred vision.  No earache, sore throat or runny nose.  CARDIOVASCULAR:  No pressure, squeezing, strangling, tightness, heaviness or aching about the chest, neck, axilla or epigastrium.  RESPIRATORY:  No cough, shortness of breath  GASTROINTESTINAL:  No nausea, vomiting or diarrhea.  GENITOURINARY:  No dysuria, frequency or urgency. No Blood in urine  MUSCULOSKELETAL:  no joint aches, no muscle pain  SKIN:  No change in skin, hair or nails.  NEUROLOGIC:  No Headaches, seizures or weakness.  PSYCHIATRIC:  No disorder of thought or mood.  ENDOCRINE:  No heat or cold intolerance  HEMATOLOGICAL:  No easy bruising or bleeding.    =======================================================  Allergies    budesonide (Unknown)  cefpodoxime (Unknown)  Pollen (Unknown)    Intolerances    Antibiotics:  acyclovir IVPB 350 milliGRAM(s) IV Intermittent every 24 hours  acyclovir IVPB      fluconAZOLE   Tablet 200 milliGRAM(s) Oral <User Schedule>  meropenem Injectable 1000 milliGRAM(s) IV Push every 24 hours  trimethoprim  40 mG/sulfamethoxazole 200 mG Suspension 80 milliGRAM(s) Oral <User Schedule>    Other medications:  atorvastatin 80 milliGRAM(s) Oral at bedtime  chlorhexidine 2% Cloths 1 Application(s) Topical <User Schedule>  dextrose 50% Injectable 25 Gram(s) IV Push once  dextrose 50% Injectable 12.5 Gram(s) IV Push once  dextrose 50% Injectable 25 Gram(s) IV Push once  dextrose Oral Gel 15 Gram(s) Oral once  glucagon  Injectable 1 milliGRAM(s) IntraMuscular once  heparin   Injectable 5000 Unit(s) SubCutaneous every 8 hours  hydrocortisone 10 milliGRAM(s) Oral two times a day  lactulose Syrup 10 Gram(s) Enteral Tube every 6 hours  metoprolol tartrate 12.5 milliGRAM(s) Oral two times a day  pantoprazole  Injectable 40 milliGRAM(s) IV Push daily  sevelamer carbonate 800 milliGRAM(s) Oral three times a day with meals  tacrolimus 1.5 milliGRAM(s) Oral daily    ======================================================  Physical Exam:  ============  T(F): 98.2 (16 Dec 2022 09:59), Max: 98.2 (16 Dec 2022 09:59)  HR: 115 (16 Dec 2022 09:59)  BP: 161/96 (16 Dec 2022 09:59)  RR: 18 (16 Dec 2022 09:59)  SpO2: 94% (16 Dec 2022 09:59) (94% - 98%)  temp max in last 48H T(F): , Max: 99.7 (12-15-22 @ 02:57)    General:  No acute distress.  Eye: no conjunctival pallor, no scleral icterus  HENT: Normocephalic, No pharyngeal erythema, No sinus tenderness.  Neck: Supple, No lymphadenopathy.  Respiratory: Lungs are clear to auscultation, Respirations are non-labored.  Cardiovascular: Normal rate, Regular rhythm,  s1+s2  Gastrointestinal: Soft, Non-tender, Non-distended, Normal bowel sounds.  Genitourinary: No costovertebral angle tenderness.  Lymphatics: No lymphadenopathy neck  Musculoskeletal: Normal range of motion, Normal strength.  Integumentary: No rash.  Neurologic: Alert, Oriented, No focal deficits  Psychiatric: Appropriate mood & affect.  =======================================================  Labs:                        10.7   11.75 )-----------( 97       ( 16 Dec 2022 06:49 )             34.2     12-16    140  |  97  |  44.6<H>  ----------------------------<  108<H>  3.8   |  27.0  |  6.54<H>    Ca    8.7      16 Dec 2022 06:49  Phos  4.8     12-16  Mg     2.2     12-16    TPro  5.8<L>  /  Alb  3.1<L>  /  TBili  0.4  /  DBili  0.2  /  AST  32  /  ALT  13  /  AlkPhos  292<H>  12-16      Culture - Blood (collected 12-13-22 @ 05:41)  Source: .Blood Blood-Peripheral    Culture - Blood (collected 12-13-22 @ 05:41)  Source: .Blood Blood-Peripheral    < from: US Abdomen Complete (US Abdomen Complete .) (12.15.22 @ 11:21) >    IMPRESSION:  Cirrhosis with ascites.    Cholelithiasis. Gallbladder wall thickening likely reactive.    Choledochomegaly. Correlate with LFTs and MRCP as warranted    Bilateral nonobstructive nephrolithiasis    Additional findings as discussed    --- End of Report ---    < end of copied text >    < from: CT Abdomen and Pelvis No Cont (12.13.22 @ 23:27) >    INTERPRETATION:  CLINICAL INFORMATION: 72 years  Male with AMS, concern   for infection. History history of lung transplants.    COMPARISON: CT abdomen and pelvis 11/19/2022 and chest CT 11/11/2022    CONTRAST/COMPLICATIONS:  IV Contrast: NONE  Oral Contrast: NONE  Complications: None reported at time of study completion    PROCEDURE:  CT of the Chest, Abdomen and Pelvis was performed.  Sagittal and coronal reformats were performed.    LIMITATIONS: Evaluation of the solid organs, vascular structures and GI   tract is limited without oral and IV contrast. Motion artifact.    FINDINGS:  CHEST:  LUNGS AND LARGE AIRWAYS: Patent central airways. Increased septal   thickening reidentified. Bilateral groundglass attenuation has increased   in the upper lobes and is otherwise unchanged.  PLEURA: Stable small bilateral pleural effusions which track into the   major fissures..  VESSELS: Right PICC line terminates in the superior vena cava. Marked   coronary and aortic atherosclerosis.  HEART: Moderate cardiomegaly. No pericardial effusion.  MEDIASTINUM AND JANNY: No lymphadenopathy. Subcarinal and right hilar   surgical clips.  CHEST WALL AND LOWER NECK: Gynecomastia.    ABDOMEN AND PELVIS:  LIVER: Cirrhosis.  BILE DUCTS: Normal caliber.  GALLBLADDER: Cholelithiasis.  SPLEEN: Within normal limits.  PANCREAS: Within normal limits.  ADRENALS: Within normal limits.  KIDNEYS/URETERS: Atrophic kidneys with bilateral nonobstructing calculi.   No hydronephrosis. Left lower pole cyst.    BLADDER: Decompressed and obscured by streak artifact.  REPRODUCTIVE ORGANS: Obscured by streak artifact.    BOWEL: No bowel obstruction. Appendix is not visualized and cannot be   assessed.. Enteric catheter terminates in the stomach. Constipated   rectosigmoid colon. Descending and sigmoid colonic diverticulosis without   diverticulitis.  PERITONEUM: No ascites.  VESSELS: Atherosclerotic aorta and aortic branches. Infrarenal ectasia to   3.3 cm. Left arm vascular graft.  RETROPERITONEUM/LYMPH NODES: No lymphadenopathy.  ABDOMINAL WALL: Anasarca.  BONES: Multiple thoracolumbar vertebral compression fractures   reidentified. Right hip arthroplasty. ORIF left hip. Old left pelvic   fracture deformities.  AI VERTEBRAL BODY ANALYSIS:  T4: Moderate compression fracture with 29% height loss.  T5: Moderate compression fracture with 29% height loss.  T6: Moderate compression fracture with 37% height loss.  T7: Moderate compression fracture with 31% height loss.  T8: Moderate compression fracture with 32% height loss.  T9: Moderate compression fracture with 37% height loss.  T10: Moderate compression fracture with 29% height loss.  T11: Severe compression fracture with 56% height loss.  T12: Severe compression fracture with 43% height loss.  L1: Severe compression fracture with 57% height loss.  L2: Moderate compression fracture with 33% height loss.  L3: Moderate compression fracture with 26% height loss.    IMPRESSION:  Redemonstrated pulmonary edema with small bilateral pleural effusions.   Increasing groundglass opacities in the upper lobes secondary to   pulmonary edema or superimposed pneumonia.    Constipated rectosigmoid colon.    Cirrhosis. Ascites. Anasarca.    Additional findings as above.    VERTEBRAL BODY ANALYSIS: Vertebral compression fractures as described,   consider further workup for osteoporosis.      < end of copied text >

## 2022-12-16 NOTE — DIETITIAN INITIAL EVALUATION ADULT - NSICDXPASTSURGICALHX_GEN_ALL_CORE_FT
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

## 2022-12-16 NOTE — DIETITIAN INITIAL EVALUATION ADULT - NSICDXPASTMEDICALHX_GEN_ALL_CORE_FT
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

## 2022-12-16 NOTE — PROGRESS NOTE ADULT - ASSESSMENT
72 year old male with PMH of HTN, HLD, CAD s/p stent, COPD/emphysema s/p lung transplantation, history of fungal meningitis, hx of hip fracture s/p ORIF, and ESRD on HD presents with AMS. Recent discharge from [facility] for Enterococcus faecalis bacteremia. Nephrology is managing ESRD on HD.     -Access: L AV access   -Outpatient dialysis days are TTS,   -Seen on HD.  Qd, Qb, UF rate reviewed.  Vitals stable but did not tolerated much UF on HD today  Access working well.  -BP ok - currently on Entresto and metoprolol - UF as tolerated.  -Anemia in setting of CKD - started on MENDY with HD, prn PRBC transfusion . monitor h/h   -Mineral Bone Disease in setting of CKD - continue oral phos binder   -s/p Lung transplantation - on immunosuppressive medications - management as per ID  -AMS, extensive imaging and w/u ordered by ID

## 2022-12-16 NOTE — PROGRESS NOTE ADULT - SUBJECTIVE AND OBJECTIVE BOX
JU FERGUSON    [id number]    72y      Male    CC: [facility]  Reason for visit: ESRD on HD    INTERVAL HPI/OVERNIGHT EVENTS: pt seen and reevaluated on HD. He had worsening hypoxia requiring NRB,     REVIEW OF SYSTEMS:  unable to obtain due to mental status     Vital Signs Last 24 Hrs  T(C): 36.6 (16 Dec 2022 15:30), Max: 36.8 (16 Dec 2022 09:59)  T(F): 97.9 (16 Dec 2022 15:30), Max: 98.2 (16 Dec 2022 09:59)  HR: 130 (16 Dec 2022 18:00) (107 - 145)  BP: 155/81 (16 Dec 2022 18:00) (127/64 - 161/96)  BP(mean): --  RR: 30 (16 Dec 2022 18:00) (18 - 30)  SpO2: 92% (16 Dec 2022 18:00) (92% - 100%)    Parameters below as of 16 Dec 2022 18:00  Patient On (Oxygen Delivery Method): nasal cannula  O2 Flow (L/min): 4    PHYSICAL EXAM:  GENERAL: in mild resp distress  HEENT: NRB+  NECK: soft, supple  CHEST/LUNG: Clear to auscultation bilaterally  HEART: S1S2+, irregular   ABDOMEN: Soft, Nontender, Bowel sounds present, +distention   SKIN: warm, dry  NEURO: drowsy, pt responding to voice, tactile stimuli, not answering questions or following commands     LABS:  12-16    140  |  97  |  44.6<H>  ----------------------------<  108<H>  3.8   |  27.0  |  6.54<H>    Ca    8.7      16 Dec 2022 06:49  Phos  4.8     12-16  Mg     2.2     12-16  TPro  5.8<L>  /  Alb  3.1<L>  /  TBili  0.4  /  DBili  0.2  /  AST  32  /  ALT  13  /  AlkPhos  292<H>  12-16                        10.7   11.75 )-----------( 97       ( 16 Dec 2022 06:49 )             34.2     MEDICATIONS  (STANDING):  acyclovir IVPB 350 milliGRAM(s) IV Intermittent every 24 hours  acyclovir IVPB      atorvastatin 80 milliGRAM(s) Oral at bedtime  chlorhexidine 2% Cloths 1 Application(s) Topical <User Schedule>  dextrose 5%. 1000 milliLiter(s) (50 mL/Hr) IV Continuous <Continuous>  dextrose 50% Injectable 25 Gram(s) IV Push once  dextrose 50% Injectable 12.5 Gram(s) IV Push once  dextrose 50% Injectable 25 Gram(s) IV Push once  dextrose Oral Gel 15 Gram(s) Oral once  fluconAZOLE   Tablet 200 milliGRAM(s) Oral <User Schedule>  glucagon  Injectable 1 milliGRAM(s) IntraMuscular once  heparin   Injectable 5000 Unit(s) SubCutaneous every 8 hours  hydrocortisone 10 milliGRAM(s) Oral two times a day  lactulose Syrup 10 Gram(s) Enteral Tube every 6 hours  meropenem Injectable 1000 milliGRAM(s) IV Push every 24 hours  metoprolol tartrate 12.5 milliGRAM(s) Oral two times a day  pantoprazole  Injectable 40 milliGRAM(s) IV Push daily  sevelamer carbonate 800 milliGRAM(s) Oral three times a day with meals  tacrolimus 1.5 milliGRAM(s) Oral daily  trimethoprim  40 mG/sulfamethoxazole 200 mG Suspension 80 milliGRAM(s) Oral <User Schedule>    MEDICATIONS  (PRN):  HYDROmorphone  Injectable 0.5 milliGRAM(s) IV Push every 4 hours PRN Severe Pain (7 - 10)  metoprolol tartrate Injectable 5 milliGRAM(s) IV Push once PRN sustained HR >140  OLANZapine Injectable 5 milliGRAM(s) IntraMuscular every 6 hours PRN agitation

## 2022-12-16 NOTE — DIETITIAN INITIAL EVALUATION ADULT - NS FNS DIET ORDER
Diet, NPO with Tube Feed:   Tube Feeding Modality: Nasogastric  Nepro (NEPRORTH)  Total Volume for 24 Hours (mL): 1200  Continuous  Starting Tube Feed Rate {mL per Hour}: 10  Increase Tube Feed Rate by (mL): 10     Every 2 hours  Until Goal Tube Feed Rate (mL per Hour): 50  Tube Feed Duration (in Hours): 24  Tube Feed Start Time: 15:00 (12-14-22 @ 13:48) [Active]

## 2022-12-16 NOTE — CONSULT NOTE ADULT - SUBJECTIVE AND OBJECTIVE BOX
HISTORY OF PRESENT ILLNESS: This is a 72y old man with a past medical history significant for ESRD on 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 HTN, HLD, COPD s/p lung transplant, CHF, with recent GIB who presented with altered mental status.        Unable to obtain ROS due to AMS. 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     novel coronavirus disease (COVID-19)  2021 - hospitalized for 1 week at [facility]    Pneumonia  2021    [place]    HTN (hypertension)    Lumbar spondylosis    History of COPD    BPH without urinary obstruction    Hypercholesterolemia    Oliguria and anuria    H/O peripheral neuropathy    H/O lung transplant  bilateral - transplant - 2015 -  [facility]    H/O heart artery stent  x3 @[facility]    History of hip replacement  right    Status post open reduction and internal fixation (ORIF) of fracture  left femur      SOCIAL HISTORY:  - TOBACCO: Denies  - ALCOHOL: Denies  - ILLICIT DRUG USE: Denies    FAMILY HISTORY:  No known history of gastrointestinal or liver disease;  Family history of emphysema  mother        HOME MEDICATIONS:  Bactrim 400 mg-80 mg oral tablet: 1 tab(s) orally Monday, Wednesday, and Friday (13 Dec 2022 10:09)  Crestor 20 mg oral tablet: 1 tab(s) orally once a day (13 Dec 2022 10:09)  Flomax 0.4 mg oral capsule: 1 cap(s) orally once a day (13 Dec 2022 10:09)  fluconazole 200 mg oral tablet: 1 tab(s) orally Tuesday, Thursday, Saturday (13 Dec 2022 10:09)  hydrocortisone 10 mg oral tablet: 1 tab(s) orally 2 times a day (13 Dec 2022 10:09)  Multiple Vitamins oral tablet: 1 tab(s) orally once a day (13 Dec 2022 10:09)  pantoprazole 40 mg oral delayed release tablet: 1 tab(s) orally once a day (before a meal) (13 Dec 2022 10:09)  sevelamer carbonate 800 mg oral tablet: 2 tab(s) orally 3 times a day (with meals) (13 Dec 2022 10:09)  tacrolimus 0.5 mg oral capsule: 1.5 milligram(s) orally 2 times a day  repeat levels next week (13 Dec 2022 10:09)    INPATIENT MEDICATIONS:  MEDICATIONS  (STANDING):  acyclovir IVPB 350 milliGRAM(s) IV Intermittent every 24 hours  acyclovir IVPB      atorvastatin 80 milliGRAM(s) Oral at bedtime  chlorhexidine 2% Cloths 1 Application(s) Topical <User Schedule>  dextrose 50% Injectable 25 Gram(s) IV Push once  dextrose 50% Injectable 12.5 Gram(s) IV Push once  dextrose 50% Injectable 25 Gram(s) IV Push once  dextrose Oral Gel 15 Gram(s) Oral once  fluconAZOLE   Tablet 200 milliGRAM(s) Oral <User Schedule>  glucagon  Injectable 1 milliGRAM(s) IntraMuscular once  heparin   Injectable 5000 Unit(s) SubCutaneous every 8 hours  hydrocortisone 10 milliGRAM(s) Oral two times a day  lactulose Syrup 10 Gram(s) Enteral Tube every 6 hours  meropenem Injectable 1000 milliGRAM(s) IV Push every 24 hours  metoprolol tartrate 12.5 milliGRAM(s) Oral two times a day  pantoprazole  Injectable 40 milliGRAM(s) IV Push daily  sevelamer carbonate 800 milliGRAM(s) Oral three times a day with meals  tacrolimus 1.5 milliGRAM(s) Oral daily  trimethoprim  40 mG/sulfamethoxazole 200 mG Suspension 80 milliGRAM(s) Oral <User Schedule>    MEDICATIONS  (PRN):  HYDROmorphone  Injectable 0.5 milliGRAM(s) IV Push every 4 hours PRN Severe Pain (7 - 10)  metoprolol tartrate Injectable 5 milliGRAM(s) IV Push once PRN sustained HR >140    ALLERGIES:  budesonide (Unknown)  cefpodoxime (Unknown)  Pollen (Unknown)    T(C): 36.6 (22 @ 05:10), Max: 36.7 (12-15-22 @ 13:07)  HR: 107 (22 @ 05:10) (95 - 118)  BP: 127/64 (22 @ 05:10) (127/64 - 145/62)  RR: 18 (22 @ 05:10) (18 - 22)  SpO2: 94% (22 @ 05:10) (94% - 98%)    12-15-22 @ 07:01  -   @ 07:00  --------------------------------------------------------  IN: 240 mL / OUT: 0 mL / NET: 240 mL        PHYSICAL EXAM:  Constitutional: in no apparent distress  Eyes: Sclerae anicteric, conjunctivae normal  ENMT: Mucus membranes moist, no oropharyngeal thrush noted  Respiratory: Breathing nonlabored; clear to auscultation  Cardiovascular: tachycardic  Gastrointestinal: Soft, nontender, nondistended, normoactive bowel sounds; no rebound tenderness or involuntary guarding  Neurological: Lethargic    Skin: No jaundice  Musculoskeletal: No significant peripheral atrophy      EGD Report        Date: 2022 12:18 PM        Patient Name: JU FERGUSON        MRN: [id number]        Account Number:        [id number]        Gender: Male         (age): 1950 (72)        Instrument(s):        GIF  ([id number])([id number])        Attending/Fellow:        Ketan York MD                Procedure Room #:        PROCEDURE ROOM 1        Referring Physician:        Rebecca Villagran MD        [address] [place] [address]                History of Present Illness:        The patient is seen for EGD evaluation of anemia. History of HD, COPD, A fib,    CHF.        Administered Medications:        As per Anesthesiology Record        Indications:        Anemia: 285.9 - D64.9        Procedure:        The procedure, indications, preparation and potential complications were    explained to the patient, who indicated understanding and signed the    corresponding consent forms. MAC was administered by anesthesiologist.    Continuous pulse oximetry and blood pressure monitoring were used throughout the    procedure. Supplemental oxygen was used. Patient was placed in the left lateral    decubitus position. The endoscope was introduced through the mouth and advanced    under direct visualization until the second part of the duodenum was reached.    Patient tolerance to the procedure was good. The procedure was not difficult.    Blood loss was minimal.        Limitations/Complications:        There were no apparent limitations or complications        Findings:        Esophagus Mucosa Normal mucosa was noted in the whole esophagus.        Stomach Mucosa Localized erythema and nodularity of the mucosa was noted in the    pre-pyloric region. These findings are compatible with non-erosive gastritis.        Duodenum Protruding lesions Three polyps ranging in size from 3 mm to 8 mm were    found in the second part of the duodenum.        Impressions:        Normal mucosa in the gastroesophageal junction.        Erythema and nodularity in the pre-pyloric region compatible with non-erosive    gastritis.        Polyps (3 mm to 8 mm) in the second part of the duodenum.        Plan:        Return to floor for further management                Ketan York MD        Version 1, Electronically signed on 2022 1:16:06 PM by Ketan York MD      LABS:             10.7   11.75 )-----------( 97       (  @ 06:49 )             34.2                10.4   12.45 )-----------( 106      ( 15 @ 06:25 )             33.8                11.4   15.29 )-----------( 110      ( 14 @ 07:50 )             36.5             140  |  97  |  44.6<H>  ----------------------------<  108<H>  3.8   |  27.0  |  6.54<H>    Ca    8.7      16 Dec 2022 06:49  Phos  4.8     -  Mg     2.2     -    TPro  5.8<L>  /  Alb  3.1<L>  /  TBili  0.4  /  DBili  0.2  /  AST  32  /  ALT  13  /  AlkPhos  292<H>      LIVER FUNCTIONS - ( 16 Dec 2022 06:49 )  Alb: 3.1 g/dL / Pro: 5.8 g/dL / ALK PHOS: 292 U/L / ALT: 13 U/L / AST: 32 U/L / GGT: x             Lipase, Serum: 12 U/L (22 @ 06:49)        IMAGING: I personally reviewed the CTAP and I agree with the radiologist's interpretation as described below:   HISTORY OF PRESENT ILLNESS: This is a 72y old man with a past medical history significant for ESRD on 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 HTN, HLD, COPD s/p lung transplant, CHF, with recent GIB who presented with altered mental status.    GI consulted for dilated CBD of 9mm on US. Unable to obtain ROS due to AMS. History obtained from chart. ALk phos has been persistently elevated with a normal T bili. CBD on most recent CT normal caliber. There is ascites on US and CT. He is being followed by ID pending LP to evaluate for meningitis.         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     novel coronavirus disease (COVID-19)  2021 - hospitalized for 1 week at [facility]    P[place]onia  2021    [place]    HTN (hypertension)    Lumbar spondylosis    History of COPD    BPH without urinary obstruction    Hypercholesterolemia    Oliguria and anuria    H/O peripheral neuropathy    H/O lung transplant  bilateral - transplant - 2015 -  [facility]    H/O heart artery stent  x3 @[facility]    History of hip replacement  right    Status post open reduction and internal fixation (ORIF) of fracture  left femur      SOCIAL HISTORY:  - TOBACCO: Denies  - ALCOHOL: Denies  - ILLICIT DRUG USE: Denies    FAMILY HISTORY:  No known history of gastrointestinal or liver disease;  Family history of emphysema  mother        HOME MEDICATIONS:  Bactrim 400 mg-80 mg oral tablet: 1 tab(s) orally Monday, Wednesday, and Friday (13 Dec 2022 10:09)  Crestor 20 mg oral tablet: 1 tab(s) orally once a day (13 Dec 2022 10:09)  Flomax 0.4 mg oral capsule: 1 cap(s) orally once a day (13 Dec 2022 10:09)  fluconazole 200 mg oral tablet: 1 tab(s) orally Tuesday, Thursday, Saturday (13 Dec 2022 10:09)  hydrocortisone 10 mg oral tablet: 1 tab(s) orally 2 times a day (13 Dec 2022 10:09)  Multiple Vitamins oral tablet: 1 tab(s) orally once a day (13 Dec 2022 10:09)  pantoprazole 40 mg oral delayed release tablet: 1 tab(s) orally once a day (before a meal) (13 Dec 2022 10:09)  sevelamer carbonate 800 mg oral tablet: 2 tab(s) orally 3 times a day (with meals) (13 Dec 2022 10:09)  tacrolimus 0.5 mg oral capsule: 1.5 milligram(s) orally 2 times a day  repeat levels next week (13 Dec 2022 10:09)    INPATIENT MEDICATIONS:  MEDICATIONS  (STANDING):  acyclovir IVPB 350 milliGRAM(s) IV Intermittent every 24 hours  acyclovir IVPB      atorvastatin 80 milliGRAM(s) Oral at bedtime  chlorhexidine 2% Cloths 1 Application(s) Topical <User Schedule>  dextrose 50% Injectable 25 Gram(s) IV Push once  dextrose 50% Injectable 12.5 Gram(s) IV Push once  dextrose 50% Injectable 25 Gram(s) IV Push once  dextrose Oral Gel 15 Gram(s) Oral once  fluconAZOLE   Tablet 200 milliGRAM(s) Oral <User Schedule>  glucagon  Injectable 1 milliGRAM(s) IntraMuscular once  heparin   Injectable 5000 Unit(s) SubCutaneous every 8 hours  hydrocortisone 10 milliGRAM(s) Oral two times a day  lactulose Syrup 10 Gram(s) Enteral Tube every 6 hours  meropenem Injectable 1000 milliGRAM(s) IV Push every 24 hours  metoprolol tartrate 12.5 milliGRAM(s) Oral two times a day  pantoprazole  Injectable 40 milliGRAM(s) IV Push daily  sevelamer carbonate 800 milliGRAM(s) Oral three times a day with meals  tacrolimus 1.5 milliGRAM(s) Oral daily  trimethoprim  40 mG/sulfamethoxazole 200 mG Suspension 80 milliGRAM(s) Oral <User Schedule>    MEDICATIONS  (PRN):  HYDROmorphone  Injectable 0.5 milliGRAM(s) IV Push every 4 hours PRN Severe Pain (7 - 10)  metoprolol tartrate Injectable 5 milliGRAM(s) IV Push once PRN sustained HR >140    ALLERGIES:  budesonide (Unknown)  cefpodoxime (Unknown)  Pollen (Unknown)    T(C): 36.6 (22 @ 05:10), Max: 36.7 (12-15-22 @ 13:07)  HR: 107 (22 @ 05:10) (95 - 118)  BP: 127/64 (22 @ 05:10) (127/64 - 145/62)  RR: 18 (22 @ 05:10) (18 - 22)  SpO2: 94% (22 @ 05:10) (94% - 98%)    12-15-22 @ 07:01  -  22 @ 07:00  --------------------------------------------------------  IN: 240 mL / OUT: 0 mL / NET: 240 mL        PHYSICAL EXAM:  Constitutional: in no apparent distress  Eyes: Sclerae anicteric, conjunctivae normal  ENMT: Mucus membranes moist, no oropharyngeal thrush noted  Respiratory: Breathing nonlabored; clear to auscultation  Cardiovascular: tachycardic  Gastrointestinal: Soft, nontender, nondistended, normoactive bowel sounds; no rebound tenderness or involuntary guarding  Neurological: Lethargic    Skin: No jaundice  Musculoskeletal: No significant peripheral atrophy      EGD Report        Date: 2022 12:18 PM        Patient Name: JU FERGUSON        MRN: [id number]        Account Number:        [id number]        Gender: Male         (age): 1950 (72)        Instrument(s):        GIF  ([id number])([id number])        Attending/Fellow:        Ketan York MD                Procedure Room #:        PROCEDURE ROOM 1        Referring Physician:        Rebecca Villagran MD        [address] [place] [address]                History of Present Illness:        The patient is seen for EGD evaluation of anemia. History of HD, COPD, A fib,    CHF.        Administered Medications:        As per Anesthesiology Record        Indications:        Anemia: 285.9 - D64.9        Procedure:        The procedure, indications, preparation and potential complications were    explained to the patient, who indicated understanding and signed the    corresponding consent forms. MAC was administered by anesthesiologist.    Continuous pulse oximetry and blood pressure monitoring were used throughout the    procedure. Supplemental oxygen was used. Patient was placed in the left lateral    decubitus position. The endoscope was introduced through the mouth and advanced    under direct visualization until the second part of the duodenum was reached.    Patient tolerance to the procedure was good. The procedure was not difficult.    Blood loss was minimal.        Limitations/Complications:        There were no apparent limitations or complications        Findings:        Esophagus Mucosa Normal mucosa was noted in the whole esophagus.        Stomach Mucosa Localized erythema and nodularity of the mucosa was noted in the    pre-pyloric region. These findings are compatible with non-erosive gastritis.        Duodenum Protruding lesions Three polyps ranging in size from 3 mm to 8 mm were    found in the second part of the duodenum.        Impressions:        Normal mucosa in the gastroesophageal junction.        Erythema and nodularity in the pre-pyloric region compatible with non-erosive    gastritis.        Polyps (3 mm to 8 mm) in the second part of the duodenum.        Plan:        Return to floor for further management                Ketan York MD        Version 1, Electronically signed on 2022 1:16:06 PM by Ketan York MD      LABS:             10.7   11.75 )-----------( 97       (  @ 06:49 )             34.2                10.4   12.45 )-----------( 106      ( 12-15 @ 06:25 )             33.8                11.4   15.29 )-----------( 110      ( 14 @ 07:50 )             36.5             140  |  97  |  44.6<H>  ----------------------------<  108<H>  3.8   |  27.0  |  6.54<H>    Ca    8.7      16 Dec 2022 06:49  Phos  4.8       Mg     2.2         TPro  5.8<L>  /  Alb  3.1<L>  /  TBili  0.4  /  DBili  0.2  /  AST  32  /  ALT  13  /  AlkPhos  292<H>      LIVER FUNCTIONS - ( 16 Dec 2022 06:49 )  Alb: 3.1 g/dL / Pro: 5.8 g/dL / ALK PHOS: 292 U/L / ALT: 13 U/L / AST: 32 U/L / GGT: x             Lipase, Serum: 12 U/L (22 @ 06:49)        IMAGING: I personally reviewed the CTAP and I agree with the radiologist's interpretation as described below: normal CBD ascites

## 2022-12-16 NOTE — DIETITIAN INITIAL EVALUATION ADULT - OTHER INFO
This is a 72y old man with a past medical history significant for ESRD on 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 HTN, HLD, COPD s/p lung transplant, CHF, with recent GIB who presented with altered mental status.

## 2022-12-16 NOTE — DIETITIAN INITIAL EVALUATION ADULT - NSFNSGIIOFT_GEN_A_CORE
12-15-22 @ 07:01  -  12-16-22 @ 07:00  --------------------------------------------------------  OUT:  Total OUT: 0 mL    Total NET: 120 mL

## 2022-12-16 NOTE — PROGRESS NOTE ADULT - SUBJECTIVE AND OBJECTIVE BOX
JU FERGUSON    [id number]    72y      Male    CC: ams    INTERVAL HPI/OVERNIGHT EVENTS: pt seen and examined.      REVIEW OF SYSTEMS:    CONSTITUTIONAL: No fever, weight loss, or fatigue  RESPIRATORY: No cough, wheezing, hemoptysis; No shortness of breath  CARDIOVASCULAR: No chest pain, palpitations  GASTROINTESTINAL: No abdominal or epigastric pain. No nausea, vomiting  NEUROLOGICAL: No headaches, memory loss, loss of strength.    Vital Signs Last 24 Hrs  T(C): 36.4 (16 Dec 2022 13:30), Max: 36.8 (16 Dec 2022 09:59)  T(F): 97.6 (16 Dec 2022 13:30), Max: 98.2 (16 Dec 2022 09:59)  HR: 117 (16 Dec 2022 13:30) (95 - 118)  BP: 134/92 (16 Dec 2022 13:30) (127/64 - 161/96)  BP(mean): --  RR: 18 (16 Dec 2022 13:30) (18 - 22)  SpO2: 100% (16 Dec 2022 13:30) (94% - 100%)    Parameters below as of 16 Dec 2022 13:30  Patient On (Oxygen Delivery Method): mask, nonrebreather  O2 Flow (L/min): 15      PHYSICAL EXAM:    GENERAL: NAD  HEENT: PERRL  NECK: soft, supple  CHEST/LUNG: seen in HD on VM, O2 sats high 90s  HEART: S1S2+, irregular, mildly tachycardic  ABDOMEN: Soft, Nontender, Nondistended; Bowel sounds present  SKIN: warm, dry  NEURO: Awake, alert; oriented to self and place; intermittently follows some commands   PSYCH: less agitated than yesterday     LABS:                        10.7   11.75 )-----------( 97       ( 16 Dec 2022 06:49 )             34.2     12-16    140  |  97  |  44.6<H>  ----------------------------<  108<H>  3.8   |  27.0  |  6.54<H>    Ca    8.7      16 Dec 2022 06:49  Phos  4.8     12-16  Mg     2.2     12-16    TPro  5.8<L>  /  Alb  3.1<L>  /  TBili  0.4  /  DBili  0.2  /  AST  32  /  ALT  13  /  AlkPhos  292<H>  12-16            MEDICATIONS  (STANDING):  acyclovir IVPB 350 milliGRAM(s) IV Intermittent every 24 hours  acyclovir IVPB      atorvastatin 80 milliGRAM(s) Oral at bedtime  chlorhexidine 2% Cloths 1 Application(s) Topical <User Schedule>  dextrose 50% Injectable 25 Gram(s) IV Push once  dextrose 50% Injectable 12.5 Gram(s) IV Push once  dextrose 50% Injectable 25 Gram(s) IV Push once  dextrose Oral Gel 15 Gram(s) Oral once  fluconAZOLE   Tablet 200 milliGRAM(s) Oral <User Schedule>  glucagon  Injectable 1 milliGRAM(s) IntraMuscular once  heparin   Injectable 5000 Unit(s) SubCutaneous every 8 hours  hydrocortisone 10 milliGRAM(s) Oral two times a day  lactulose Syrup 10 Gram(s) Enteral Tube every 6 hours  meropenem Injectable 1000 milliGRAM(s) IV Push every 24 hours  metoprolol tartrate 12.5 milliGRAM(s) Oral two times a day  pantoprazole  Injectable 40 milliGRAM(s) IV Push daily  sevelamer carbonate 800 milliGRAM(s) Oral three times a day with meals  tacrolimus 1.5 milliGRAM(s) Oral daily  trimethoprim  40 mG/sulfamethoxazole 200 mG Suspension 80 milliGRAM(s) Oral <User Schedule>  vancomycin  IVPB 750 milliGRAM(s) IV Intermittent once    MEDICATIONS  (PRN):  HYDROmorphone  Injectable 0.5 milliGRAM(s) IV Push every 4 hours PRN Severe Pain (7 - 10)  metoprolol tartrate Injectable 5 milliGRAM(s) IV Push once PRN sustained HR >140      RADIOLOGY & ADDITIONAL TESTS:   JU FERGUSON    [id number]    72y      Male    CC: ams    INTERVAL HPI/OVERNIGHT EVENTS: pt seen and examined in HD, more lethargic than yesterday but verbalizing some responses, knows name and place. otherwise, nonsensical verbalization      REVIEW OF SYSTEMS:  unable to obtain    Vital Signs Last 24 Hrs  T(C): 36.4 (16 Dec 2022 13:30), Max: 36.8 (16 Dec 2022 09:59)  T(F): 97.6 (16 Dec 2022 13:30), Max: 98.2 (16 Dec 2022 09:59)  HR: 117 (16 Dec 2022 13:30) (95 - 118)  BP: 134/92 (16 Dec 2022 13:30) (127/64 - 161/96)  BP(mean): --  RR: 18 (16 Dec 2022 13:30) (18 - 22)  SpO2: 100% (16 Dec 2022 13:30) (94% - 100%)    Parameters below as of 16 Dec 2022 13:30  Patient On (Oxygen Delivery Method): mask, nonrebreather  O2 Flow (L/min): 15      PHYSICAL EXAM:    GENERAL: NAD  HEENT: PERRL  NECK: soft, supple  CHEST/LUNG: seen in HD on VM, O2 sats high 90s  HEART: S1S2+, irregular, mildly tachycardic  ABDOMEN: Soft, Nontender, Nondistended; Bowel sounds present  SKIN: warm, dry  NEURO: Awake, alert; oriented to self and place; intermittently follows some commands   PSYCH: less agitated than yesterday     LABS:                        10.7   11.75 )-----------( 97       ( 16 Dec 2022 06:49 )             34.2     12-16    140  |  97  |  44.6<H>  ----------------------------<  108<H>  3.8   |  27.0  |  6.54<H>    Ca    8.7      16 Dec 2022 06:49  Phos  4.8     12-16  Mg     2.2     12-16    TPro  5.8<L>  /  Alb  3.1<L>  /  TBili  0.4  /  DBili  0.2  /  AST  32  /  ALT  13  /  AlkPhos  292<H>  12-16            MEDICATIONS  (STANDING):  acyclovir IVPB 350 milliGRAM(s) IV Intermittent every 24 hours  acyclovir IVPB      atorvastatin 80 milliGRAM(s) Oral at bedtime  chlorhexidine 2% Cloths 1 Application(s) Topical <User Schedule>  dextrose 50% Injectable 25 Gram(s) IV Push once  dextrose 50% Injectable 12.5 Gram(s) IV Push once  dextrose 50% Injectable 25 Gram(s) IV Push once  dextrose Oral Gel 15 Gram(s) Oral once  fluconAZOLE   Tablet 200 milliGRAM(s) Oral <User Schedule>  glucagon  Injectable 1 milliGRAM(s) IntraMuscular once  heparin   Injectable 5000 Unit(s) SubCutaneous every 8 hours  hydrocortisone 10 milliGRAM(s) Oral two times a day  lactulose Syrup 10 Gram(s) Enteral Tube every 6 hours  meropenem Injectable 1000 milliGRAM(s) IV Push every 24 hours  metoprolol tartrate 12.5 milliGRAM(s) Oral two times a day  pantoprazole  Injectable 40 milliGRAM(s) IV Push daily  sevelamer carbonate 800 milliGRAM(s) Oral three times a day with meals  tacrolimus 1.5 milliGRAM(s) Oral daily  trimethoprim  40 mG/sulfamethoxazole 200 mG Suspension 80 milliGRAM(s) Oral <User Schedule>  vancomycin  IVPB 750 milliGRAM(s) IV Intermittent once    MEDICATIONS  (PRN):  HYDROmorphone  Injectable 0.5 milliGRAM(s) IV Push every 4 hours PRN Severe Pain (7 - 10)  metoprolol tartrate Injectable 5 milliGRAM(s) IV Push once PRN sustained HR >140      RADIOLOGY & ADDITIONAL TESTS:

## 2022-12-17 NOTE — PROGRESS NOTE ADULT - SUBJECTIVE AND OBJECTIVE BOX
[facility]                                        Neurology at [place]                                 Jose Antonio Brooks, & Riley                                  [address]                                        [place], [place], [address]                                             [phone]        CC: Encephalopathy    HPI:   The patient is a 72y Male with multiple medical issues who was recently hospitalized with hypoxic respiratory failure.  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   He was ultimately discharged 11/23/22.   He now presented with altered mental status and lethargy.   Neurology asked to evaluation for meningitis. LP attempted (neuro JW) and was unsuccessful while in ER.    Interim history:  Remains on [address] [place].   Now more awake.    ROS:   Denies headache or dizziness.  Denies chest pain.  Denies shortness of breath.    MEDICATIONS  (STANDING):  acyclovir IVPB      acyclovir IVPB 350 milliGRAM(s) IV Intermittent every 24 hours  atorvastatin 80 milliGRAM(s) Oral at bedtime  chlorhexidine 2% Cloths 1 Application(s) Topical <User Schedule>  dextrose 5%. 1000 milliLiter(s) (50 mL/Hr) IV Continuous <Continuous>  dextrose Oral Gel 15 Gram(s) Oral once  fluconAZOLE   Tablet 200 milliGRAM(s) Oral <User Schedule>  glucagon  Injectable 1 milliGRAM(s) IntraMuscular once  heparin   Injectable 5000 Unit(s) SubCutaneous every 8 hours  hydrocortisone 10 milliGRAM(s) Oral two times a day  lactulose Syrup 10 Gram(s) Enteral Tube every 6 hours  meropenem Injectable 1000 milliGRAM(s) IV Push every 24 hours  metoprolol tartrate 12.5 milliGRAM(s) Oral two times a day  pantoprazole  Injectable 40 milliGRAM(s) IV Push daily  sevelamer carbonate 800 milliGRAM(s) Oral three times a day with meals  tacrolimus 1.5 milliGRAM(s) Oral daily  trimethoprim  40 mG/sulfamethoxazole 200 mG Suspension 80 milliGRAM(s) Oral <User Schedule>    Vital Signs Last 24 Hrs  T(C): 36.6 (17 Dec 2022 06:17), Max: 37.3 (16 Dec 2022 22:20)  T(F): 97.8 (17 Dec 2022 06:17), Max: 99.1 (16 Dec 2022 22:20)  HR: 112 (17 Dec 2022 06:17) (89 - 145)  BP: 144/73 (17 Dec 2022 06:17) (134/92 - 155/81)  RR: 18 (17 Dec 2022 06:17) (18 - 30)  SpO2: 98% (17 Dec 2022 06:17) (92% - 100%)    Parameters below as of 17 Dec 2022 06:17  Patient On (Oxygen Delivery Method): nasal cannula  O2 Flow (L/min): 4    Detailed Neurologic Exam:    Mental status: The patient is awake and alert but oriented to self only. When asked his age he replies "I'm dead." He follows simple instructions.    Cranial nerves: Pupils equal and react symmetrically to light. There is no visual field deficit to threat. Extraocular motion is full with no nystagmus. Facial sensation is intact. Facial musculature is symmetric. Palate elevates symmetrically. Tongue is midline.    Motor: There is normal bulk and tone.  There is no tremor.  Strength grossly symmetric in the upper extremities and lower extremities.    Sensation: Grossly intact to light touch and pin.    Reflexes: 1+ throughout and plantar responses are flexor.    Cerebellar: Difficulty with the instruction.    Labs:     12-17    138  |  96  |  32.6<H>  ----------------------------<  81  3.7   |  30.0<H>  |  5.35<H>    Ca    8.6      17 Dec 2022 09:33  Phos  4.8     12-16  Mg     2.2     12-16    TPro  5.7<L>  /  Alb  3.1<L>  /  TBili  0.4  /  DBili  0.2  /  AST  29  /  ALT  11  /  AlkPhos  247<H>  12-17                            10.6   8.31  )-----------( 89       ( 17 Dec 2022 09:33 )             33.9

## 2022-12-17 NOTE — PROGRESS NOTE ADULT - ASSESSMENT
72y Male with multiple medical issues now with encephalopathy.     Encephalopathy.   Clinically improved although still with confusion.  Likely toxic metabolic secondary to multiple medical issues.   Antibiotics per ID.     Rule out meningitis.   Empiric coverage for now per ID.   LP will need to be done by IR if CSF still needed.

## 2022-12-17 NOTE — PROGRESS NOTE ADULT - SUBJECTIVE AND OBJECTIVE BOX
[facility] DIVISION OF KIDNEY DISEASES AND HYPERTENSION -- HEMODIALYSIS NOTE  --------------------------------------------------------------------------------  Chief Complaint: ESRD/Ongoing hemodialysis requirement    24 hour events/subjective:  pt seen and examined;   Soiled himself with bowel movement and confused at the time of my evaluation        PAST HISTORY  --------------------------------------------------------------------------------  No significant changes to PMH, PSH, FHx, SHx, unless otherwise noted    ALLERGIES & MEDICATIONS  --------------------------------------------------------------------------------  Allergies    budesonide (Unknown)  cefpodoxime (Unknown)  Pollen (Unknown)    Intolerances      Standing Inpatient Medications  acyclovir IVPB      acyclovir IVPB 350 milliGRAM(s) IV Intermittent every 24 hours  atorvastatin 80 milliGRAM(s) Oral at bedtime  chlorhexidine 2% Cloths 1 Application(s) Topical <User Schedule>  dextrose 5%. 1000 milliLiter(s) IV Continuous <Continuous>  dextrose 50% Injectable 25 Gram(s) IV Push once  dextrose 50% Injectable 12.5 Gram(s) IV Push once  dextrose 50% Injectable 25 Gram(s) IV Push once  dextrose Oral Gel 15 Gram(s) Oral once  fluconAZOLE   Tablet 200 milliGRAM(s) Oral <User Schedule>  glucagon  Injectable 1 milliGRAM(s) IntraMuscular once  heparin   Injectable 5000 Unit(s) SubCutaneous every 8 hours  hydrocortisone 10 milliGRAM(s) Oral two times a day  lactulose Syrup 10 Gram(s) Enteral Tube every 6 hours  meropenem Injectable 1000 milliGRAM(s) IV Push every 24 hours  metoprolol tartrate 12.5 milliGRAM(s) Oral two times a day  pantoprazole  Injectable 40 milliGRAM(s) IV Push daily  sevelamer carbonate 800 milliGRAM(s) Oral three times a day with meals  tacrolimus 1.5 milliGRAM(s) Oral daily  trimethoprim  40 mG/sulfamethoxazole 200 mG Suspension 80 milliGRAM(s) Oral <User Schedule>    PRN Inpatient Medications  HYDROmorphone  Injectable 0.5 milliGRAM(s) IV Push every 4 hours PRN  metoprolol tartrate Injectable 5 milliGRAM(s) IV Push once PRN  OLANZapine Injectable 5 milliGRAM(s) IntraMuscular every 6 hours PRN      REVIEW OF SYSTEMS  --------------------------------------------------------------------------------  unable to obtain    VITALS/PHYSICAL EXAM  --------------------------------------------------------------------------------  T(C): 36.3 (12-17-22 @ 12:30), Max: 37.3 (12-16-22 @ 22:20)  HR: 132 (12-17-22 @ 12:30) (89 - 145)  BP: 155/86 (12-17-22 @ 12:30) (144/66 - 155/86)  RR: 18 (12-17-22 @ 12:30) (18 - 30)  SpO2: 98% (12-17-22 @ 12:30) (92% - 98%)  Wt(kg): --        12-16-22 @ 07:01  -  12-17-22 @ 07:00  --------------------------------------------------------  IN: 640 mL / OUT: 1300 mL / NET: -660 mL    12-17-22 @ 07:01  -  12-17-22 @ 13:39  --------------------------------------------------------  IN: 0 mL / OUT: 1 mL / NET: -1 mL      Physical Exam:  	Gen: awake, confused  	Pulm: CTA B/L ant  	CV: RRR, S1S2; no rub  	Abd: +BS, soft, nontender/nondistended  	: No suprapubic tenderness  	UE: Warm,  	LE: Warm, no edema  	Neuro: confused  	Skin: Warm  	Vascular access: RO[place]  LABS/STUDIES  --------------------------------------------------------------------------------              10.6   8.31  >-----------<  89       [12-17-22 @ 09:33]              33.9     138  |  96  |  32.6  ----------------------------<  81      [12-17-22 @ 09:33]  3.7   |  30.0  |  5.35        Ca     8.6     [12-17-22 @ 09:33]      Mg     2.2     [12-16-22 @ 06:49]      Phos  4.8     [12-16-22 @ 06:49]    TPro  5.7  /  Alb  3.1  /  TBili  0.4  /  DBili  0.2  /  AST  29  /  ALT  11  /  AlkPhos  247  [12-17-22 @ 09:33]          Iron 27, TIBC 247, %sat 11      [03-20-22 @ 03:23]  Ferritin 4169      [05-01-22 @ 07:44]  Vitamin D (25OH) 29.1      [11-17-22 @ 05:45]  TSH 4.01      [12-14-22 @ 07:50]  Lipid: chol 84, , HDL 26, LDL --      [11-12-22 @ 05:44]    HBsAb <3.0      [12-16-22 @ 08:59]  HBsAb Nonreact      [12-16-22 @ 08:59]  HBsAg Nonreact      [12-16-22 @ 08:59]  HBcAb Nonreact      [12-16-22 @ 08:59]  HCV 0.09, Nonreact      [12-16-22 @ 08:59]

## 2022-12-17 NOTE — PROGRESS NOTE ADULT - ASSESSMENT
72M with PMH of  hypertension, hyperlipidemia, ESRD on HD, emphysema s/p lung transplant in [place] by Dr. 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 faecalis bacteremia, newly diagnosed HFrEF 25%, AF complicated by GIB was discharged on home IV Abx now presenting with AMS  Presented to the ED with daughter. CTH obtained and did not reveal any acute findings.  Patient was admitted to medicine for encephalopathy. LP attempted bedside but unsuccessful    #AMS-unclear etiology, no fever. ammonia level normal.  ct c/ap ?pna/edema/ascites.  agree with LP-pending by IR. c/w meropenem and vanco by level. added acyclovir empirically. consider MRi head. Neuro f/u. h/o fungal meningitis but is on fluconazole ppx    #h/o e.faecalis bacteremia - itis. CRISTIAN was deferred by cardio on recent admission. currently on meropenem, vanco by level. f/u BCX    # s/p lung transplant- per transplant team, cellcept  on hold, currently on cortef, c/w tacrolimus and check levels. c/w fluc and bactrim per home dose for ppx    # h/o fungal meningitis- c/w fluconazole suppression    # ESRD on HD- adjusted abx for renal function, monitor vanco level    #Dilated CBD on USG- plan for MRCP    # AScites- paracentesis if able, on meropenem    AWAIT LP  WILL FOLLOW UP

## 2022-12-17 NOTE — PROGRESS NOTE ADULT - SUBJECTIVE AND OBJECTIVE BOX
JU MARTY  ----------------------------------------  The patient was seen at bedside. Patient with encephalopathy. Awake and conversive but somewhat disoriented. Able to identify himself and his birthday and acknowledged being in the hospital but is unaware of the circumstances.    Vital Signs Last 24 Hrs  T(C): 36.3 (17 Dec 2022 12:30), Max: 37.3 (16 Dec 2022 22:20)  T(F): 97.4 (17 Dec 2022 12:30), Max: 99.1 (16 Dec 2022 22:20)  HR: 132 (17 Dec 2022 12:30) (89 - 145)  BP: 155/86 (17 Dec 2022 12:30) (134/92 - 155/86)  BP(mean): --  RR: 18 (17 Dec 2022 12:30) (18 - 30)  SpO2: 98% (17 Dec 2022 12:30) (92% - 100%)    Parameters below as of 17 Dec 2022 12:30  Patient On (Oxygen Delivery Method): nasal cannula  O2 Flow (L/min): 4    CAPILLARY BLOOD GLUCOSE  POCT Blood Glucose.: 71 mg/dL (17 Dec 2022 12:17)  POCT Blood Glucose.: 91 mg/dL (17 Dec 2022 06:46)  POCT Blood Glucose.: 106 mg/dL (17 Dec 2022 00:50)  POCT Blood Glucose.: 113 mg/dL (16 Dec 2022 21:41)  POCT Blood Glucose.: 135 mg/dL (16 Dec 2022 17:06)    PHYSICAL EXAMINATION:  ----------------------------------------  General appearance: No acute distress, Awake, Alert  HEENT: Normocephalic, Atraumatic, Conjunctiva clear, EOMI  Neck: Supple, No JVD, No tenderness  Lungs: Breath sound equal bilaterally, No wheezes, No rales  Cardiovascular: S1S2, Regular rhythm  Abdomen: Soft, Nontender, Nondistended, No guarding/rebound, Positive bowel sounds  Extremities: No clubbing, No cyanosis, No edema, No calf tenderness, Left upper extremity AV fistula  Neuro: Strength equal bilaterally, No tremors  Psychiatric: Appropriate mood, Normal affect    LABORATORY STUDIES:  ----------------------------------------             10.6   8.31  )-----------( 89       ( 17 Dec 2022 09:33 )             33.9     12-17    138  |  96  |  32.6<H>  ----------------------------<  81  3.7   |  30.0<H>  |  5.35<H>    Ca    8.6      17 Dec 2022 09:33  Phos  4.8     12-16  Mg     2.2     12-16    TPro  5.7<L>  /  Alb  3.1<L>  /  TBili  0.4  /  DBili  0.2  /  AST  29  /  ALT  11  /  AlkPhos  247<H>  12-17    LIVER FUNCTIONS - ( 17 Dec 2022 09:33 )  Alb: 3.1 g/dL / Pro: 5.7 g/dL / ALK PHOS: 247 U/L / ALT: 11 U/L / AST: 29 U/L / GGT: x           MEDICATIONS  (STANDING):  acyclovir IVPB 350 milliGRAM(s) IV Intermittent every 24 hours  acyclovir IVPB      atorvastatin 80 milliGRAM(s) Oral at bedtime  chlorhexidine 2% Cloths 1 Application(s) Topical <User Schedule>  dextrose 5%. 1000 milliLiter(s) (50 mL/Hr) IV Continuous <Continuous>  dextrose 50% Injectable 25 Gram(s) IV Push once  dextrose 50% Injectable 12.5 Gram(s) IV Push once  dextrose 50% Injectable 25 Gram(s) IV Push once  dextrose Oral Gel 15 Gram(s) Oral once  fluconAZOLE   Tablet 200 milliGRAM(s) Oral <User Schedule>  glucagon  Injectable 1 milliGRAM(s) IntraMuscular once  heparin   Injectable 5000 Unit(s) SubCutaneous every 8 hours  hydrocortisone 10 milliGRAM(s) Oral two times a day  lactulose Syrup 10 Gram(s) Enteral Tube every 6 hours  meropenem Injectable 1000 milliGRAM(s) IV Push every 24 hours  metoprolol tartrate 12.5 milliGRAM(s) Oral two times a day  pantoprazole  Injectable 40 milliGRAM(s) IV Push daily  sevelamer carbonate 800 milliGRAM(s) Oral three times a day with meals  tacrolimus 1.5 milliGRAM(s) Oral daily  trimethoprim  40 mG/sulfamethoxazole 200 mG Suspension 80 milliGRAM(s) Oral <User Schedule>    MEDICATIONS  (PRN):  HYDROmorphone  Injectable 0.5 milliGRAM(s) IV Push every 4 hours PRN Severe Pain (7 - 10)  metoprolol tartrate Injectable 5 milliGRAM(s) IV Push once PRN sustained HR >140  OLANZapine Injectable 5 milliGRAM(s) IntraMuscular every 6 hours PRN agitation      ASSESSMENT / PLAN:  ----------------------------------------  72M with a history of emphysema and lung transplant, fungal meningitis, coronary artery disease, heart failure, atrial fibrillation, carotid stenosis with carotid endarterectomy, and recent hospitalization for Enterococcus faecalis bacteremia who presented with altered mental status and lethargy.    Acute metabolic encephalopathy - Unclear etiology. CT of the head was without acute intracranial pathology. Lumbar puncture was deferred as apixaban needed to be held prior to the procedure. On empiric acyclovir and meropenem. On olanzapine as needed. For resumption of nasogastric tube feeds.    Cirrhosis / Ascites - Attempt at paracentesis was unsuccessful due to patient agitation. Ammonia level was not elevated. On lactulose.    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    Lung transplant - On hydrocortisone and tacrolimus. Mycophenolate held. On prophylactic trimethoprim/sulfamethoxazole.     Atrial fibrillation - On metoprolol. Apixaban held in anticipation of lumbar puncture.    ESRD - Hemodialysis as scheduled. JU MARTY  ----------------------------------------  The patient was seen at bedside. Patient with encephalopathy. Awake and conversive but somewhat disoriented. Able to identify himself and his birthday and acknowledged being in the hospital but is unaware of the circumstances.    Vital Signs Last 24 Hrs  T(C): 36.3 (17 Dec 2022 12:30), Max: 37.3 (16 Dec 2022 22:20)  T(F): 97.4 (17 Dec 2022 12:30), Max: 99.1 (16 Dec 2022 22:20)  HR: 132 (17 Dec 2022 12:30) (89 - 145)  BP: 155/86 (17 Dec 2022 12:30) (134/92 - 155/86)  BP(mean): --  RR: 18 (17 Dec 2022 12:30) (18 - 30)  SpO2: 98% (17 Dec 2022 12:30) (92% - 100%)    Parameters below as of 17 Dec 2022 12:30  Patient On (Oxygen Delivery Method): nasal cannula  O2 Flow (L/min): 4    CAPILLARY BLOOD GLUCOSE  POCT Blood Glucose.: 71 mg/dL (17 Dec 2022 12:17)  POCT Blood Glucose.: 91 mg/dL (17 Dec 2022 06:46)  POCT Blood Glucose.: 106 mg/dL (17 Dec 2022 00:50)  POCT Blood Glucose.: 113 mg/dL (16 Dec 2022 21:41)  POCT Blood Glucose.: 135 mg/dL (16 Dec 2022 17:06)    PHYSICAL EXAMINATION:  ----------------------------------------  General appearance: No acute distress, Awake, Alert  HEENT: Normocephalic, Atraumatic, Conjunctiva clear, EOMI  Neck: Supple, No JVD, No tenderness  Lungs: Breath sound equal bilaterally, No wheezes, No rales  Cardiovascular: S1S2, Regular rhythm  Abdomen: Soft, Nontender, Nondistended, No guarding/rebound, Positive bowel sounds  Extremities: No clubbing, No cyanosis, No edema, No calf tenderness, Left upper extremity AV fistula  Neuro: Strength equal bilaterally, No tremors  Psychiatric: Appropriate mood, Normal affect    LABORATORY STUDIES:  ----------------------------------------             10.6   8.31  )-----------( 89       ( 17 Dec 2022 09:33 )             33.9     12-17    138  |  96  |  32.6<H>  ----------------------------<  81  3.7   |  30.0<H>  |  5.35<H>    Ca    8.6      17 Dec 2022 09:33  Phos  4.8     12-16  Mg     2.2     12-16    TPro  5.7<L>  /  Alb  3.1<L>  /  TBili  0.4  /  DBili  0.2  /  AST  29  /  ALT  11  /  AlkPhos  247<H>  12-17    LIVER FUNCTIONS - ( 17 Dec 2022 09:33 )  Alb: 3.1 g/dL / Pro: 5.7 g/dL / ALK PHOS: 247 U/L / ALT: 11 U/L / AST: 29 U/L / GGT: x           MEDICATIONS  (STANDING):  acyclovir IVPB 350 milliGRAM(s) IV Intermittent every 24 hours  acyclovir IVPB      atorvastatin 80 milliGRAM(s) Oral at bedtime  chlorhexidine 2% Cloths 1 Application(s) Topical <User Schedule>  dextrose 5%. 1000 milliLiter(s) (50 mL/Hr) IV Continuous <Continuous>  dextrose 50% Injectable 25 Gram(s) IV Push once  dextrose 50% Injectable 12.5 Gram(s) IV Push once  dextrose 50% Injectable 25 Gram(s) IV Push once  dextrose Oral Gel 15 Gram(s) Oral once  fluconAZOLE   Tablet 200 milliGRAM(s) Oral <User Schedule>  glucagon  Injectable 1 milliGRAM(s) IntraMuscular once  heparin   Injectable 5000 Unit(s) SubCutaneous every 8 hours  hydrocortisone 10 milliGRAM(s) Oral two times a day  lactulose Syrup 10 Gram(s) Enteral Tube every 6 hours  meropenem Injectable 1000 milliGRAM(s) IV Push every 24 hours  metoprolol tartrate 12.5 milliGRAM(s) Oral two times a day  pantoprazole  Injectable 40 milliGRAM(s) IV Push daily  sevelamer carbonate 800 milliGRAM(s) Oral three times a day with meals  tacrolimus 1.5 milliGRAM(s) Oral daily  trimethoprim  40 mG/sulfamethoxazole 200 mG Suspension 80 milliGRAM(s) Oral <User Schedule>    MEDICATIONS  (PRN):  HYDROmorphone  Injectable 0.5 milliGRAM(s) IV Push every 4 hours PRN Severe Pain (7 - 10)  metoprolol tartrate Injectable 5 milliGRAM(s) IV Push once PRN sustained HR >140  OLANZapine Injectable 5 milliGRAM(s) IntraMuscular every 6 hours PRN agitation      ASSESSMENT / PLAN:  ----------------------------------------  72M with a history of emphysema and lung transplant, fungal meningitis, coronary artery disease, heart failure, atrial fibrillation, carotid stenosis with carotid endarterectomy, and recent hospitalization for Enterococcus faecalis bacteremia who presented with altered mental status and lethargy.    Acute metabolic encephalopathy - Unclear etiology. CT of the head was without acute intracranial pathology. Lumbar puncture was deferred as apixaban needed to be held prior to the procedure. On empiric acyclovir and meropenem. Blood cultures were without growth. On olanzapine as needed. For resumption of nasogastric tube feeds.    Cirrhosis / Ascites - Attempt at paracentesis was unsuccessful due to patient agitation. Ammonia level was not elevated. On lactulose.    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    Lung transplant - On hydrocortisone and tacrolimus. Mycophenolate held. On prophylactic trimethoprim/sulfamethoxazole.     Atrial fibrillation - On metoprolol. Apixaban held in anticipation of lumbar puncture.    ESRD - Hemodialysis as scheduled.

## 2022-12-17 NOTE — PROGRESS NOTE ADULT - SUBJECTIVE AND OBJECTIVE BOX
[facility]                                                INFECTIOUS DISEASES  =======================================================                               J Carlos Galdamez MD#    Isatu Rojas MD*                           Perlita Solares MD*   Sumaya Morales MD*            Diplomates American Board of Internal Medicine & Infectious Diseases                  # [place] Office - Appt - Tel  [phone] Fax [phone]                * [place] Office - Appt - Tel [phone] Fax [phone]                                  Hospital Consult line:  [phone]  =======================================================      [facility]-[id number]  JU FERGUSON   follow up for: ams  patient seen and examined.       I have personally reviewed the labs and data; pertinent labs and data are listed in this note; please see below.   ===================================================  REVIEW OF SYSTEMS:  CONFUSED UNABLE TO OBTAIN    =======================================================  Allergies    budesonide (Unknown)  cefpodoxime (Unknown)  Pollen (Unknown)    Intolerances    Antibiotics:  acyclovir IVPB 350 milliGRAM(s) IV Intermittent every 24 hours  acyclovir IVPB      fluconAZOLE   Tablet 200 milliGRAM(s) Oral <User Schedule>  meropenem Injectable 1000 milliGRAM(s) IV Push every 24 hours  trimethoprim  40 mG/sulfamethoxazole 200 mG Suspension 80 milliGRAM(s) Oral <User Schedule>    Other medications:  atorvastatin 80 milliGRAM(s) Oral at bedtime  chlorhexidine 2% Cloths 1 Application(s) Topical <User Schedule>  dextrose 50% Injectable 25 Gram(s) IV Push once  dextrose 50% Injectable 12.5 Gram(s) IV Push once  dextrose 50% Injectable 25 Gram(s) IV Push once  dextrose Oral Gel 15 Gram(s) Oral once  glucagon  Injectable 1 milliGRAM(s) IntraMuscular once  heparin   Injectable 5000 Unit(s) SubCutaneous every 8 hours  hydrocortisone 10 milliGRAM(s) Oral two times a day  lactulose Syrup 10 Gram(s) Enteral Tube every 6 hours  metoprolol tartrate 12.5 milliGRAM(s) Oral two times a day  pantoprazole  Injectable 40 milliGRAM(s) IV Push daily  sevelamer carbonate 800 milliGRAM(s) Oral three times a day with meals  tacrolimus 1.5 milliGRAM(s) Oral daily    ======================================================  Physical Exam:  ============   Vital Signs Last 24 Hrs  T(C): 36.6 (17 Dec 2022 06:17), Max: 37.3 (16 Dec 2022 22:20)  T(F): 97.8 (17 Dec 2022 06:17), Max: 99.1 (16 Dec 2022 22:20)  HR: 112 (17 Dec 2022 06:17) (89 - 145)  BP: 144/73 (17 Dec 2022 06:17) (134/92 - 155/81)  BP(mean): --  RR: 18 (17 Dec 2022 06:17) (18 - 30)  SpO2: 98% (17 Dec 2022 06:17) (92% - 100%)    Parameters below as of 17 Dec 2022 06:17  Patient On (Oxygen Delivery Method): nasal cannula  O2 Flow (L/min): 4      General:  No acute distress.  Eye: no conjunctival pallor, no scleral icterus  HENT: Normocephalic, No pharyngeal erythema, No sinus tenderness.  Neck: Supple, No lymphadenopathy.  Respiratory: Lungs are clear to auscultation, Respirations are non-labored.  Cardiovascular: Normal rate, Regular rhythm,  s1+s2  Gastrointestinal: Soft, Non-tender, Non-distended, Normal bowel sounds.  Genitourinary: No costovertebral angle tenderness.  Lymphatics: No lymphadenopathy neck  Musculoskeletal: Normal range of motion, Normal strength.  Integumentary: No rash.  Neurologic: Alert, Oriented, No focal deficits  Psychiatric: Appropriate mood & affect.  =======================================================  Labs:                                           10.6   8.31  )-----------( 89       ( 17 Dec 2022 09:33 )             33.9   12-17    138  |  96  |  32.6<H>  ----------------------------<  81  3.7   |  30.0<H>  |  5.35<H>    Ca    8.6      17 Dec 2022 09:33  Phos  4.8     12-16  Mg     2.2     12-16    TPro  5.7<L>  /  Alb  3.1<L>  /  TBili  0.4  /  DBili  0.2  /  AST  29  /  ALT  11  /  AlkPhos  247<H>  12-17        Culture - Blood (collected 12-13-22 @ 05:41)  Source: .Blood Blood-Peripheral    Culture - Blood (collected 12-13-22 @ 05:41)  Source: .Blood Blood-Peripheral    < from: US Abdomen Complete (US Abdomen Complete .) (12.15.22 @ 11:21) >    IMPRESSION:  Cirrhosis with ascites.    Cholelithiasis. Gallbladder wall thickening likely reactive.    Choledochomegaly. Correlate with LFTs and MRCP as warranted    Bilateral nonobstructive nephrolithiasis    Additional findings as discussed    --- End of Report ---    < end of copied text >    < from: CT Abdomen and Pelvis No Cont (12.13.22 @ 23:27) >    INTERPRETATION:  CLINICAL INFORMATION: 72 years  Male with AMS, concern   for infection. History history of lung transplants.    COMPARISON: CT abdomen and pelvis 11/19/2022 and chest CT 11/11/2022    CONTRAST/COMPLICATIONS:  IV Contrast: NONE  Oral Contrast: NONE  Complications: None reported at time of study completion    PROCEDURE:  CT of the Chest, Abdomen and Pelvis was performed.  Sagittal and coronal reformats were performed.    LIMITATIONS: Evaluation of the solid organs, vascular structures and GI   tract is limited without oral and IV contrast. Motion artifact.    FINDINGS:  CHEST:  LUNGS AND LARGE AIRWAYS: Patent central airways. Increased septal   thickening reidentified. Bilateral groundglass attenuation has increased   in the upper lobes and is otherwise unchanged.  PLEURA: Stable small bilateral pleural effusions which track into the   major fissures..  VESSELS: Right PICC line terminates in the superior vena cava. Marked   coronary and aortic atherosclerosis.  HEART: Moderate cardiomegaly. No pericardial effusion.  MEDIASTINUM AND JANNY: No lymphadenopathy. Subcarinal and right hilar   surgical clips.  CHEST WALL AND LOWER NECK: Gynecomastia.    ABDOMEN AND PELVIS:  LIVER: Cirrhosis.  BILE DUCTS: Normal caliber.  GALLBLADDER: Cholelithiasis.  SPLEEN: Within normal limits.  PANCREAS: Within normal limits.  ADRENALS: Within normal limits.  KIDNEYS/URETERS: Atrophic kidneys with bilateral nonobstructing calculi.   No hydronephrosis. Left lower pole cyst.    BLADDER: Decompressed and obscured by streak artifact.  REPRODUCTIVE ORGANS: Obscured by streak artifact.    BOWEL: No bowel obstruction. Appendix is not visualized and cannot be   assessed.. Enteric catheter terminates in the stomach. Constipated   rectosigmoid colon. Descending and sigmoid colonic diverticulosis without   diverticulitis.  PERITONEUM: No ascites.  VESSELS: Atherosclerotic aorta and aortic branches. Infrarenal ectasia to   3.3 cm. Left arm vascular graft.  RETROPERITONEUM/LYMPH NODES: No lymphadenopathy.  ABDOMINAL WALL: Anasarca.  BONES: Multiple thoracolumbar vertebral compression fractures   reidentified. Right hip arthroplasty. ORIF left hip. Old left pelvic   fracture deformities.  AI VERTEBRAL BODY ANALYSIS:  T4: Moderate compression fracture with 29% height loss.  T5: Moderate compression fracture with 29% height loss.  T6: Moderate compression fracture with 37% height loss.  T7: Moderate compression fracture with 31% height loss.  T8: Moderate compression fracture with 32% height loss.  T9: Moderate compression fracture with 37% height loss.  T10: Moderate compression fracture with 29% height loss.  T11: Severe compression fracture with 56% height loss.  T12: Severe compression fracture with 43% height loss.  L1: Severe compression fracture with 57% height loss.  L2: Moderate compression fracture with 33% height loss.  L3: Moderate compression fracture with 26% height loss.    IMPRESSION:  Redemonstrated pulmonary edema with small bilateral pleural effusions.   Increasing groundglass opacities in the upper lobes secondary to   pulmonary edema or superimposed pneumonia.    Constipated rectosigmoid colon.    Cirrhosis. Ascites. Anasarca.    Additional findings as above.    VERTEBRAL BODY ANALYSIS: Vertebral compression fractures as described,   consider further workup for osteoporosis.      < end of copied text >

## 2022-12-17 NOTE — PROGRESS NOTE ADULT - ASSESSMENT
72 year old male with PMH of HTN, HLD, CAD s/p stent, COPD/emphysema s/p lung transplantation, history of fungal meningitis, hx of hip fracture s/p ORIF, and ESRD on HD presents with AMS. Recent discharge from [facility] for Enterococcus faecalis bacteremia. Nephrology is managing ESRD on HD.     -Access: L AV access   -Outpatient dialysis days are TTS; last HD was yesterday  -BP stable - currently on Entresto and metoprolol - UF as tolerated.  -Anemia in setting of CKD - started on MENDY with HD, prn PRBC transfusion . monitor h/h   -Mineral Bone Disease in setting of CKD - continue oral phos binder   -s/p Lung transplantation -MMF on hold, continue Tacro and prednisone   -AMS, extensive imaging and w/u ordered by ID

## 2022-12-17 NOTE — PROGRESS NOTE ADULT - SUBJECTIVE AND OBJECTIVE BOX
Chief Complaint:  Patient is a 72y old  Male who presents with a chief complaint of AMS (16 Dec 2022 13:51)     patient being seen in follow-up consultation for Elevated alk phos>incidental US finding dilated CBD  Interval Events / Subjective: Patient seen and examined at bedside  "Hello"  Lying in bed, disoriented, intermittently follows commands      Review of Systems: Does not cooperate      MEDICATIONS:   MEDICATIONS  (STANDING):  acyclovir IVPB 350 milliGRAM(s) IV Intermittent every 24 hours  acyclovir IVPB      atorvastatin 80 milliGRAM(s) Oral at bedtime  chlorhexidine 2% Cloths 1 Application(s) Topical <User Schedule>  dextrose 5%. 1000 milliLiter(s) (50 mL/Hr) IV Continuous <Continuous>  dextrose 50% Injectable 25 Gram(s) IV Push once  dextrose 50% Injectable 12.5 Gram(s) IV Push once  dextrose 50% Injectable 25 Gram(s) IV Push once  dextrose Oral Gel 15 Gram(s) Oral once  fluconAZOLE   Tablet 200 milliGRAM(s) Oral <User Schedule>  glucagon  Injectable 1 milliGRAM(s) IntraMuscular once  heparin   Injectable 5000 Unit(s) SubCutaneous every 8 hours  hydrocortisone 10 milliGRAM(s) Oral two times a day  lactulose Syrup 10 Gram(s) Enteral Tube every 6 hours  meropenem Injectable 1000 milliGRAM(s) IV Push every 24 hours  metoprolol tartrate 12.5 milliGRAM(s) Oral two times a day  pantoprazole  Injectable 40 milliGRAM(s) IV Push daily  sevelamer carbonate 800 milliGRAM(s) Oral three times a day with meals  tacrolimus 1.5 milliGRAM(s) Oral daily  trimethoprim  40 mG/sulfamethoxazole 200 mG Suspension 80 milliGRAM(s) Oral <User Schedule>    MEDICATIONS  (PRN):  HYDROmorphone  Injectable 0.5 milliGRAM(s) IV Push every 4 hours PRN Severe Pain (7 - 10)  metoprolol tartrate Injectable 5 milliGRAM(s) IV Push once PRN sustained HR >140  OLANZapine Injectable 5 milliGRAM(s) IntraMuscular every 6 hours PRN agitation      ALLERGIES:   Allergies    budesonide (Unknown)  cefpodoxime (Unknown)  Pollen (Unknown)    Intolerances        VITAL SIGNS:   Vital Signs Last 24 Hrs  T(C): 36.6 (17 Dec 2022 06:17), Max: 37.3 (16 Dec 2022 22:20)  T(F): 97.8 (17 Dec 2022 06:17), Max: 99.1 (16 Dec 2022 22:20)  HR: 112 (17 Dec 2022 06:17) (89 - 145)  BP: 144/73 (17 Dec 2022 06:17) (134/92 - 161/96)  BP(mean): --  RR: 18 (17 Dec 2022 06:17) (18 - 30)  SpO2: 98% (17 Dec 2022 06:17) (92% - 100%)    Parameters below as of 17 Dec 2022 06:17  Patient On (Oxygen Delivery Method): nasal cannula  O2 Flow (L/min): 4    I&O's Summary    16 Dec 2022 07:01  -  17 Dec 2022 07:00  --------------------------------------------------------  IN: 640 mL / OUT: 1300 mL / NET: -660 mL        PHYSICAL EXAM:   Appearance: Normal	  HEENT:   Normal oral mucosa, PERRL, EOMI	+ NGT>clamped  Cardiovascular: Normal S1 S2,   Respiratory: clear   Psychiatry: alert, pleasant,cooperative  Gastrointestinal:  Soft, Non-tender, + BS	Incont stool  Skin: No jaundice  Neurologic: Oriented to self, not events or day  Extremities: Normal range of motion, No edema          LABS:  CBC Full  -  ( 16 Dec 2022 06:49 )  WBC Count : 11.75 K/uL  RBC Count : 3.54 M/uL  Hemoglobin : 10.7 g/dL  Hematocrit : 34.2 %  Platelet Count - Automated : 97 K/uL  Mean Cell Volume : 96.6 fl  Mean Cell Hemoglobin : 30.2 pg  Mean Cell Hemoglobin Concentration : 31.3 gm/dL  Auto Neutrophil # : 10.75 K/uL  Auto Lymphocyte # : 0.20 K/uL  Auto Monocyte # : 0.59 K/uL  Auto Eosinophil # : 0.07 K/uL  Auto Basophil # : 0.04 K/uL  Auto Neutrophil % : 91.5 %  Auto Lymphocyte % : 1.7 %  Auto Monocyte % : 5.0 %  Auto Eosinophil % : 0.6 %  Auto Basophil % : 0.3 %    12-16    140  |  97  |  44.6<H>  ----------------------------<  108<H>  3.8   |  27.0  |  6.54<H>    Ca    8.7      16 Dec 2022 06:49  Phos  4.8     12-16  Mg     2.2     12-16    TPro  5.8<L>  /  Alb  3.1<L>  /  TBili  0.4  /  DBili  0.2  /  AST  32  /  ALT  13  /  AlkPhos  292<H>  12-16    LIVER FUNCTIONS - ( 16 Dec 2022 06:49 )  Alb: 3.1 g/dL / Pro: 5.8 g/dL / ALK PHOS: 292 U/L / ALT: 13 U/L / AST: 32 U/L / GGT: x                   RADIOLOGY & ADDITIONAL STUDIES (The following images were personally reviewed):    US< from: US Abdomen Complete (US Abdomen Complete .) (12.15.22 @ 11:21) >  FINDINGS:  Liver: Cirrhotic.  Bile ducts: Common bile duct dilated up to 9 mm.  Correlate with LFTs   MRCP as warranted  Gallbladder: Cholelithiasis. Nonspecific gallbladder wall thickening   likely reactive in this setting.  Pancreas: Not adequately visualized.  Spleen: 10.3 cm. Within normal limits.  Right kidney: 7.8 cm. No hydronephrosis. Atrophic, echogenic consistent   with chronic medical renal disease. There are 6 mm and 4 mm   nonobstructing right lower pole calculi  Left kidney: 8.8 cm. No hydronephrosis. Atrophic, echogenic consistent   with chronic medical renal disease. Nonobstructive calculi measuring 8 mm   and 7 mm. A 6.0 cm cortical cyst.  Ascites: Moderate.  Aorta and IVC: Ectatic mid abdominal aorta up to 3.0 cm. Lower abdominal   aorta not visualized..    IMPRESSION:  Cirrhosis with ascites.    Cholelithiasis. Gallbladder wall thickening likely reactive.    Choledochomegaly. 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 HTN, HLD, COPD s/p lung transplant, CHF, with recent GIB who presented with altered mental status.    Plan:     Dilated CBD on US, recent CT with normal CBD no obvious stones  ALK phos as been persistently elevated but t bili is normal   Send Alk phos fractionated to determine if elevation in biliary   Eventual  MRI/MRCP to R/O  biliary pathology   Monitor CMP    Gastritis  pantoprazole 40mg po daily  Avoid nsaids  HGB stable    Decompensated cirrhosis with ascites  Ammonia not elevated to support HE  May need diagnostic paracentesis to rule out SBP in light of altered mental status  Continue dialysis per renal   Avoid nsaids  Low sodium diet/tube feeds   Continue lactulose titrated to 2-3 soft bowel movements per day, consider a trial of rifaximin to evaluate for improvement in metal status

## 2022-12-18 NOTE — CONSULT NOTE ADULT - SUBJECTIVE AND OBJECTIVE BOX
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 faecalis bacteremia, admitted on 12/13 with altered mental status of unclear etiology. Has been being treated empirically for meningitis on the medicine service. Diagnostic LP has been deferred due to anticoagulation. Paracentesis unsuccessful due to patient agitation. Incidentally found to have a dilated CBD with plan for MRCP.    Events last 24 hours: Patient developed respiratory distress, hypoxemia, and worsened mental status, necessitating emergent intubation/mechanical ventilation. ABG revealed severe hypercapnia/resp acidosis. Noted to have what appeared to be tube feedings in his airways. Developed shock state requiring IV vasopressor.      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          Medications:  acyclovir IVPB      acyclovir IVPB 350 milliGRAM(s) IV Intermittent every 24 hours  fluconAZOLE   Tablet 200 milliGRAM(s) Oral <User Schedule>  meropenem Injectable 1000 milliGRAM(s) IV Push every 24 hours  trimethoprim  40 mG/sulfamethoxazole 200 mG Suspension 80 milliGRAM(s) Oral <User Schedule>  metoprolol tartrate 12.5 milliGRAM(s) Oral two times a day  metoprolol tartrate Injectable 5 milliGRAM(s) IV Push once PRN  norepinephrine Infusion 0.05 MICROgram(s)/kG/Min IV Continuous <Continuous>  HYDROmorphone  Injectable 0.5 milliGRAM(s) IV Push every 4 hours PRN  OLANZapine Injectable 5 milliGRAM(s) IntraMuscular every 6 hours PRN  propofol Infusion 10 MICROgram(s)/kG/Min IV Continuous <Continuous>  heparin   Injectable 5000 Unit(s) SubCutaneous every 8 hours  lactulose Syrup 10 Gram(s) Enteral Tube every 6 hours  pantoprazole  Injectable 40 milliGRAM(s) IV Push daily  atorvastatin 80 milliGRAM(s) Oral at bedtime  dextrose 50% Injectable 25 Gram(s) IV Push once  dextrose 50% Injectable 12.5 Gram(s) IV Push once  dextrose 50% Injectable 25 Gram(s) IV Push once  dextrose Oral Gel 15 Gram(s) Oral once  glucagon  Injectable 1 milliGRAM(s) IntraMuscular once  hydrocortisone 10 milliGRAM(s) Oral two times a day  dextrose 5%. 1000 milliLiter(s) IV Continuous <Continuous>  tacrolimus 1.5 milliGRAM(s) Oral daily  chlorhexidine 0.12% Liquid 15 milliLiter(s) Oral Mucosa every 12 hours  chlorhexidine 2% Cloths 1 Application(s) Topical <User Schedule>  sevelamer carbonate 800 milliGRAM(s) Oral three times a day with meals      Mode: AC/ CMV (Assist Control/ Continuous Mandatory Ventilation)  RR (machine): 16  TV (machine): 450  FiO2: 100  PEEP: 5  MAP: 17  PIP: 29      ICU Vital Signs Last 24 Hrs  T(C): 34.9 (18 Dec 2022 20:15), Max: 37 (18 Dec 2022 09:20)  T(F): 94.8 (18 Dec 2022 20:15), Max: 98.6 (18 Dec 2022 09:20)  HR: 75 (18 Dec 2022 20:15) (65 - 112)  BP: 149/79 (18 Dec 2022 20:15) (49/40 - 172/96)  BP(mean): 100 (18 Dec 2022 20:15) (45 - 100)  ABP: --  ABP(mean): --  RR: 13 (18 Dec 2022 20:15) (13 - 32)  SpO2: 93% (18 Dec 2022 20:15) (91% - 100%)    O2 Parameters below as of 18 Dec 2022 20:15  Patient On (Oxygen Delivery Method): ventilator          Vital Signs Last 24 Hrs  T(C): 34.9 (18 Dec 2022 20:15), Max: 37 (18 Dec 2022 09:20)  T(F): 94.8 (18 Dec 2022 20:15), Max: 98.6 (18 Dec 2022 09:20)  HR: 75 (18 Dec 2022 20:15) (65 - 112)  BP: 149/79 (18 Dec 2022 20:15) (49/40 - 172/96)  BP(mean): 100 (18 Dec 2022 20:15) (45 - 100)  RR: 13 (18 Dec 2022 20:15) (13 - 32)  SpO2: 93% (18 Dec 2022 20:15) (91% - 100%)    Parameters below as of 18 Dec 2022 20:15  Patient On (Oxygen Delivery Method): ventilator        ABG - ( 18 Dec 2022 17:55 )  pH, Arterial: 7.130 pH, Blood: x     /  pCO2: 86    /  pO2: 82    / HCO3: 29    / Base Excess: -0.6  /  SaO2: 95.9                I&O's Detail    17 Dec 2022 07:01  -  18 Dec 2022 07:00  --------------------------------------------------------  IN:  Total IN: 0 mL    OUT:    Stool (mL): 1 mL  Total OUT: 1 mL    Total NET: -1 mL            LABS:                        11.7   9.82  )-----------( 118      ( 18 Dec 2022 18:05 )             37.9     12-18    133<L>  |  91<L>  |  48.3<H>  ----------------------------<  132<H>  4.3   |  26.0  |  6.86<H>    Ca    8.8      18 Dec 2022 18:05    TPro  6.2<L>  /  Alb  3.4  /  TBili  0.4  /  DBili  x   /  AST  34  /  ALT  12  /  AlkPhos  274<H>  12-18          CAPILLARY BLOOD GLUCOSE      POCT Blood Glucose.: 135 mg/dL (18 Dec 2022 17:53)    PT/INR - ( 18 Dec 2022 18:05 )   PT: 13.2 sec;   INR: 1.14 ratio         PTT - ( 18 Dec 2022 18:05 )  PTT:60.1 sec    CULTURES:  Culture Results:   No Growth Final (12-13 @ 05:41)  Culture Results:   No Growth Final (12-13 @ 05:41)        Physical Examination:    General: No acute distress.  sedated, intubated, supine    HEENT: Pupils equal, reactive to light.  Symmetric.    PULM: Clear to auscultation bilaterally, no significant sputum production    CVS: Regular rate and rhythm, no murmurs, rubs, or gallops    ABD: Soft, mildly distended, nontender, normoactive bowel sounds, reducible ventral hernia    EXT: No edema, nontender    SKIN: Warm and well perfused, no rashes noted.    NEURO: sedated, moves all extremities      RADIOLOGY:     < from: US Abdomen Complete (US Abdomen Complete .) (12.15.22 @ 11:21) >  FINDINGS:  Liver: Cirrhotic.  Bile ducts: Common bile duct dilated up to 9 mm.  Correlate with LFTs   MRCP as warranted  Gallbladder: Cholelithiasis. Nonspecific gallbladder wall thickening   likely reactive in this setting.  Pancreas: Not adequately visualized.  Spleen: 10.3 cm. Within normal limits.  Right kidney: 7.8 cm. No hydronephrosis. Atrophic, echogenic consistent   with chronic medical renal disease. There are 6 mm and 4 mm   nonobstructing right lower pole calculi  Left kidney: 8.8 cm. No hydronephrosis. Atrophic, echogenic consistent   with chronic medical renal disease. Nonobstructive calculi measuring 8 mm   and 7 mm. A 6.0 cm cortical cyst.  Ascites: Moderate.  Aorta and IVC: Ectatic mid abdominal aorta up to 3.0 cm. Lower abdominal   aorta not visualized..    IMPRESSION:  Cirrhosis with ascites.    Cholelithiasis. Gallbladder wall thickening likely reactive.    Choledochomegaly. Correlate with LFTs and MRCP as warranted    Bilateral nonobstructive nephrolithiasis    Additional findings as discussed        < from: Xray Chest 1 View- PORTABLE-Urgent (Xray Chest 1 View- PORTABLE-Urgent .) (12.15.22 @ 19:29) >  IMPRESSION:    Low lung volumes. Nasogastric tube right PICC line reidentified in   position. No gross change in bilateral congestive changes and small   bilateral effusions. Correlate clinically for concomitant infection. No   pneumothorax or other new abnormality.          CRITICAL CARE TIME SPENT: 65 mins  Time spent evaluating/treating patient with medical issues that pose a high risk for life threatening deterioration and/or end-organ damage, reviewing data/labs/imaging, discussing case with multidisciplinary team, discussing plan/goals of care with patient/family. Non-inclusive of procedure time.

## 2022-12-18 NOTE — PROGRESS NOTE ADULT - SUBJECTIVE AND OBJECTIVE BOX
[facility] DIVISION OF KIDNEY DISEASES AND HYPERTENSION -- HEMODIALYSIS NOTE  --------------------------------------------------------------------------------  Chief Complaint: ESRD/Ongoing hemodialysis requirement    24 hour events/subjective:  pt lethargic-   RRT called for worsening lethargy and increased work of breathing  pt intubated and upgraded to MICU        PAST HISTORY  --------------------------------------------------------------------------------  No significant changes to PMH, PSH, FHx, SHx, unless otherwise noted    ALLERGIES & MEDICATIONS  --------------------------------------------------------------------------------  Allergies    budesonide (Unknown)  cefpodoxime (Unknown)  Pollen (Unknown)    Intolerances      Standing Inpatient Medications  acyclovir IVPB 350 milliGRAM(s) IV Intermittent every 24 hours  acyclovir IVPB      atorvastatin 80 milliGRAM(s) Oral at bedtime  chlorhexidine 0.12% Liquid 15 milliLiter(s) Oral Mucosa every 12 hours  chlorhexidine 2% Cloths 1 Application(s) Topical <User Schedule>  dextrose 5%. 1000 milliLiter(s) IV Continuous <Continuous>  dextrose 50% Injectable 25 Gram(s) IV Push once  dextrose 50% Injectable 12.5 Gram(s) IV Push once  dextrose 50% Injectable 25 Gram(s) IV Push once  dextrose Oral Gel 15 Gram(s) Oral once  fluconAZOLE   Tablet 200 milliGRAM(s) Oral <User Schedule>  glucagon  Injectable 1 milliGRAM(s) IntraMuscular once  heparin   Injectable 5000 Unit(s) SubCutaneous every 8 hours  hydrocortisone 10 milliGRAM(s) Oral two times a day  lactulose Syrup 10 Gram(s) Enteral Tube every 6 hours  meropenem Injectable 1000 milliGRAM(s) IV Push every 24 hours  metoprolol tartrate 12.5 milliGRAM(s) Oral two times a day  norepinephrine Infusion 0.05 MICROgram(s)/kG/Min IV Continuous <Continuous>  pantoprazole  Injectable 40 milliGRAM(s) IV Push daily  propofol Infusion 10 MICROgram(s)/kG/Min IV Continuous <Continuous>  sevelamer carbonate 800 milliGRAM(s) Oral three times a day with meals  tacrolimus 1.5 milliGRAM(s) Oral daily  trimethoprim  40 mG/sulfamethoxazole 200 mG Suspension 80 milliGRAM(s) Oral <User Schedule>    PRN Inpatient Medications  HYDROmorphone  Injectable 0.5 milliGRAM(s) IV Push every 4 hours PRN  metoprolol tartrate Injectable 5 milliGRAM(s) IV Push once PRN  OLANZapine Injectable 5 milliGRAM(s) IntraMuscular every 6 hours PRN      REVIEW OF SYSTEMS  unable to obtain      VITALS/PHYSICAL EXAM  --------------------------------------------------------------------------------  T(C): 34.9 (12-18-22 @ 17:38), Max: 37 (12-18-22 @ 09:20)  HR: 106 (12-18-22 @ 17:38) (105 - 112)  BP: 141/68 (12-18-22 @ 17:38) (115/96 - 172/96)  RR: 32 (12-18-22 @ 17:38) (18 - 32)  SpO2: 92% (12-18-22 @ 17:38) (91% - 100%)  Wt(kg): --        12-17-22 @ 07:01  -  12-18-22 @ 07:00  --------------------------------------------------------  IN: 0 mL / OUT: 1 mL / NET: -1 mL      Physical Exam:  	Gen: lethargic  	HEENT: on nc  	Pulm: coarse bs  	CV: RRR, S1S2; no rub  	Abd: +BS, soft, nontender  	UE: Warm  	LE: Warm, + edema  	Neuro: unable to assess  	Vascular access: araceli quiroz    LABS/STUDIES  --------------------------------------------------------------------------------              11.3   8.19  >-----------<  102      [12-18-22 @ 06:45]              37.0     137  |  94  |  42.9  ----------------------------<  89      [12-18-22 @ 06:45]  4.1   |  28.0  |  5.95        Ca     8.8     [12-18-22 @ 06:45]    TPro  5.6  /  Alb  3.2  /  TBili  0.5  /  DBili  x   /  AST  31  /  ALT  11  /  AlkPhos  258  [12-18-22 @ 06:45]    PT/INR: PT 13.2 , INR 1.14       [12-18-22 @ 18:05]  PTT: 60.1       [12-18-22 @ 18:05]      Iron 27, TIBC 247, %sat 11      [03-20-22 @ 03:23]  Ferritin 4169      [05-01-22 @ 07:44]  Vitamin D (25OH) 29.1      [11-17-22 @ 05:45]  TSH 4.01      [12-14-22 @ 07:50]  Lipid: chol 84, , HDL 26, LDL --      [11-12-22 @ 05:44]    HBsAb <3.0      [12-16-22 @ 08:59]  HBsAb Nonreact      [12-16-22 @ 08:59]  HBsAg Nonreact      [12-16-22 @ 08:59]  HBcAb Nonreact      [12-16-22 @ 08:59]  HCV 0.09, Nonreact      [12-16-22 @ 08:59]

## 2022-12-18 NOTE — PROGRESS NOTE ADULT - SUBJECTIVE AND OBJECTIVE BOX
JU FERGUSON  ----------------------------------------  The patient was seen at bedside. Patient with encephalopathy. Disoriented but answers simple questions.    Vital Signs Last 24 Hrs  T(C): 36.9 (18 Dec 2022 13:05), Max: 37 (18 Dec 2022 09:20)  T(F): 98.4 (18 Dec 2022 13:05), Max: 98.6 (18 Dec 2022 09:20)  HR: 109 (18 Dec 2022 13:05) (105 - 113)  BP: 172/96 (18 Dec 2022 13:05) (115/96 - 172/96)  BP(mean): --  RR: 18 (18 Dec 2022 13:05) (18 - 20)  SpO2: 91% (18 Dec 2022 13:05) (91% - 100%)    Parameters below as of 18 Dec 2022 13:05  Patient On (Oxygen Delivery Method): nasal cannula  O2 Flow (L/min): 4    CAPILLARY BLOOD GLUCOSE  POCT Blood Glucose.: 71 mg/dL (18 Dec 2022 11:31)  POCT Blood Glucose.: 79 mg/dL (18 Dec 2022 05:41)  POCT Blood Glucose.: 74 mg/dL (18 Dec 2022 01:39)  POCT Blood Glucose.: 82 mg/dL (17 Dec 2022 17:44)    PHYSICAL EXAMINATION:  ----------------------------------------  General appearance: No acute distress, Awake, Disoriented  HEENT: Normocephalic, Atraumatic, Conjunctiva clear, EOMI  Neck: Supple, No JVD, No tenderness  Lungs: Breath sound equal bilaterally, No wheezes, No rales  Cardiovascular: S1S2, Regular rhythm  Abdomen: Soft, Nontender, Nondistended, No guarding/rebound, Positive bowel sounds  Extremities: No clubbing, No cyanosis, No edema, No calf tenderness, Left upper extremity AV fistula  Neuro: Strength equal bilaterally, No tremors  Psychiatric: Appropriate mood, Normal affect    LABORATORY STUDIES:  ----------------------------------------             11.3   8.19  )-----------( 102      ( 18 Dec 2022 06:45 )             37.0     12-18    137  |  94<L>  |  42.9<H>  ----------------------------<  89  4.1   |  28.0  |  5.95<H>    Ca    8.8      18 Dec 2022 06:45    TPro  5.6<L>  /  Alb  3.2<L>  /  TBili  0.5  /  DBili  x   /  AST  31  /  ALT  11  /  AlkPhos  258<H>  12-18    LIVER FUNCTIONS - ( 18 Dec 2022 06:45 )  Alb: 3.2 g/dL / Pro: 5.6 g/dL / ALK PHOS: 258 U/L / ALT: 11 U/L / AST: 31 U/L / GGT: x           MEDICATIONS  (STANDING):  acyclovir IVPB 350 milliGRAM(s) IV Intermittent every 24 hours  acyclovir IVPB      atorvastatin 80 milliGRAM(s) Oral at bedtime  chlorhexidine 2% Cloths 1 Application(s) Topical <User Schedule>  dextrose 5%. 1000 milliLiter(s) (50 mL/Hr) IV Continuous <Continuous>  dextrose 50% Injectable 25 Gram(s) IV Push once  dextrose 50% Injectable 12.5 Gram(s) IV Push once  dextrose 50% Injectable 25 Gram(s) IV Push once  dextrose Oral Gel 15 Gram(s) Oral once  fluconAZOLE   Tablet 200 milliGRAM(s) Oral <User Schedule>  glucagon  Injectable 1 milliGRAM(s) IntraMuscular once  heparin   Injectable 5000 Unit(s) SubCutaneous every 8 hours  hydrocortisone 10 milliGRAM(s) Oral two times a day  lactulose Syrup 10 Gram(s) Enteral Tube every 6 hours  meropenem Injectable 1000 milliGRAM(s) IV Push every 24 hours  metoprolol tartrate 12.5 milliGRAM(s) Oral two times a day  pantoprazole  Injectable 40 milliGRAM(s) IV Push daily  sevelamer carbonate 800 milliGRAM(s) Oral three times a day with meals  tacrolimus 1.5 milliGRAM(s) Oral daily  trimethoprim  40 mG/sulfamethoxazole 200 mG Suspension 80 milliGRAM(s) Oral <User Schedule>    MEDICATIONS  (PRN):  HYDROmorphone  Injectable 0.5 milliGRAM(s) IV Push every 4 hours PRN Severe Pain (7 - 10)  metoprolol tartrate Injectable 5 milliGRAM(s) IV Push once PRN sustained HR >140  OLANZapine Injectable 5 milliGRAM(s) IntraMuscular every 6 hours PRN agitation      ASSESSMENT / PLAN:  ----------------------------------------  72M with a history of emphysema and lung transplant, fungal meningitis, coronary artery disease, heart failure, atrial fibrillation, carotid stenosis with carotid endarterectomy, and recent hospitalization for Enterococcus faecalis bacteremia who presented with altered mental status and lethargy.    Acute metabolic encephalopathy - Unclear etiology. CT of the head was without acute intracranial pathology. Lumbar puncture was deferred as apixaban needed to be held prior to the procedure. On empiric acyclovir and meropenem. Blood cultures were without growth. On olanzapine as needed. On nasogastric tube feeds.    Cirrhosis / Ascites - Prior attempt at paracentesis was unsuccessful due to patient agitation. Ammonia level was not elevated. On lactulose.    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    Lung transplant - On hydrocortisone and tacrolimus. Tacrolimus level was within the therapeutic range. Mycophenolate was held. On prophylactic trimethoprim/sulfamethoxazole.     Atrial fibrillation - On metoprolol. Apixaban held in anticipation of lumbar puncture.    ESRD - Hemodialysis as scheduled.    Discussed with the patient's wife at the bedside in detail. Questions answered.

## 2022-12-18 NOTE — CONSULT NOTE ADULT - ASSESSMENT
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 faecalis bacteremia, with:    # Acute mixed hypercapnic/hypoxemic respiratory failure  # Aspiration pneumonitis/pneumonia  # Distributive shock  # Metabolic encephalopathy    Transfer to MICU.    Emergently intubated in the setting of worsened mentation, severe hypercapnia/resp acidosis, and aspiration of tube feedings.    - actively titrating ventilator settings to maintain SpO2 > 92% and adequate minute ventilation  - follow up post-intubation ABG and adjust settings accordingly  - reduced FiO2 to 80% thus far  - will consider therapeutic bronchoscopy pending clinical course  - shock state is likely distributive in nature from sedation, possible septic component, reduced preload from intrathoracic positive pressure/mechanical ventilation  - start norepinephrine infusion to maintain a MAP > 65  - continue empiric meropenem, vancomycin (by level), fluconazole, and acyclovir  - ID is following  - IR guided LP has been deferred for 5 days in the setting of apixaban  - will evaluate abdominal ascites and perform diagnostic paracentesis as able  - GI input appreciated, dilated CBD, no obstruction, elevated alk phos, TBili normal, plan for MRCP  - fractionated alk phos pending  - ammonia level is normal, on empiric lactulose, will add rifaximin to see if it makes any difference  - trend BUN/Cr, electrolytes, acid-base balance, monitor UOP  - hemodialysis as per nephrology  - MRI brain ordered    Case discussed with MICU physician, Dr. Dunn.   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 faecalis bacteremia, with:    # Acute mixed hypercapnic/hypoxemic respiratory failure  # Aspiration pneumonitis/pneumonia  # Distributive shock  # Metabolic encephalopathy    Transfer to MICU.    Emergently intubated in the setting of worsened mentation, severe hypercapnia/resp acidosis, and aspiration of tube feedings.    - actively titrating ventilator settings to maintain SpO2 > 92% and adequate minute ventilation  - follow up post-intubation ABG and adjust settings accordingly  - reduced FiO2 to 80% thus far  - will consider therapeutic bronchoscopy pending clinical course  - shock state is likely distributive in nature from sedation, possible septic component, reduced preload from intrathoracic positive pressure/mechanical ventilation  - start norepinephrine infusion to maintain a MAP > 65  - escalate chronic hydrocortisone to stress dosing  - continue empiric meropenem, vancomycin (by level), fluconazole, and acyclovir  - ID is following  - IR guided LP has been deferred for 5 days in the setting of apixaban  - will evaluate abdominal ascites and perform diagnostic paracentesis as able  - GI input appreciated, dilated CBD, no obstruction, elevated alk phos, TBili normal, plan for MRCP  - fractionated alk phos pending  - ammonia level is normal, on empiric lactulose, will add rifaximin to see if it makes any difference  - trend BUN/Cr, electrolytes, acid-base balance, monitor UOP  - hemodialysis as per nephrology  - MRI brain ordered    Case discussed with MICU physician, Dr. Dunn.

## 2022-12-18 NOTE — PROGRESS NOTE ADULT - ASSESSMENT
72 year old male with PMH of HTN, HLD, CAD s/p stent, COPD/emphysema s/p lung transplantation, history of fungal meningitis, hx of hip fracture s/p ORIF, and ESRD on HD presents with AMS. Recent discharge from [facility] for Enterococcus faecalis bacteremia. Nephrology is managing ESRD on HD.     -Access: L AV access   -Outpatient dialysis days are TTS; pt has been off schedule here  -last HD was on Monday    _Pt now with worsening hypoxic/ hypercapnic resp failure, s/p urgent intubation  - Will schedule for PUF session today and put on schedule for HD session tomorrow      -BP stable - currently on Entresto and metoprolol - UF as tolerated.    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

## 2022-12-18 NOTE — PROCEDURE NOTE - NSINFORMCONSENT_GEN_A_CORE
medical attending spoke to patient over the phone/This was an emergent procedure. medical attending spoke to patient's wife over the phone/This was an emergent procedure.

## 2022-12-18 NOTE — RAPID RESPONSE TEAM SUMMARY - NSADDTLFINDINGSRRT_GEN_ALL_CORE
RRT called for increased WOB and change in mental status. VS: /79, , RR 30, SPO2 94% on NRB. Pt requiring emergent HD and intubation. Dr. Ferrer called and update wife on current status and wife states pt is a full code. Pt transferred to higher level of care.  RRT called for increased WOB and change in mental status. VS: /79, , RR 30, SPO2 94% on NRB, temp 94 F rectal. Pt requiring emergent HD and intubation. Dr. Fererr called and update wife on current status and wife states pt is a full code. ICU was consulted. Sepsis w/u initiated. Pt transferred to higher level of care.

## 2022-12-18 NOTE — CHART NOTE - NSCHARTNOTEFT_GEN_A_CORE
updated wife Veronica that patient is in the MICU ICU, intubated, s/p dialysis, and started on levophed.  Discussed that with his current state, CPR will likely not change his overall outcome if he went into cardiac arrest.    Per wife, patient's daughter still in business trip, she will want her daughter to come back to have further goal of care discussion.  Also, patient has a booklet that the patient himself wrote down his "5 wishes of life", before he got lung transplanted.  One of his wishes is that he will want life support ONLY if doctors think he can get some of his life back.   Appreciate wife input, and ensure her that we will honor his wishes, and will continue our best effort to help him.  Will carry out further GOC when daughter is here together with wife.  At this time, will continue Full Code.

## 2022-12-18 NOTE — PROGRESS NOTE ADULT - SUBJECTIVE AND OBJECTIVE BOX
Chief Complaint:  Patient is a 72y old  Male who presents with a chief complaint of Incidental dilated CBD (17 Dec 2022 09:17)     72y old male patient being seen in follow-up consultation for incidental CBD dilatation  Interval Events / Subjective: Patient seen and examined at bedside  "Hello"  Curled in fetal position  Appears feeding tube dislodged      Review of Systems: lethargic, unable to participate     MEDICATIONS:   MEDICATIONS  (STANDING):  acyclovir IVPB 350 milliGRAM(s) IV Intermittent every 24 hours  acyclovir IVPB      atorvastatin 80 milliGRAM(s) Oral at bedtime  chlorhexidine 2% Cloths 1 Application(s) Topical <User Schedule>  dextrose 5%. 1000 milliLiter(s) (50 mL/Hr) IV Continuous <Continuous>  dextrose 50% Injectable 25 Gram(s) IV Push once  dextrose 50% Injectable 12.5 Gram(s) IV Push once  dextrose 50% Injectable 25 Gram(s) IV Push once  dextrose Oral Gel 15 Gram(s) Oral once  fluconAZOLE   Tablet 200 milliGRAM(s) Oral <User Schedule>  glucagon  Injectable 1 milliGRAM(s) IntraMuscular once  heparin   Injectable 5000 Unit(s) SubCutaneous every 8 hours  hydrocortisone 10 milliGRAM(s) Oral two times a day  lactulose Syrup 10 Gram(s) Enteral Tube every 6 hours  meropenem Injectable 1000 milliGRAM(s) IV Push every 24 hours  metoprolol tartrate 12.5 milliGRAM(s) Oral two times a day  pantoprazole  Injectable 40 milliGRAM(s) IV Push daily  sevelamer carbonate 800 milliGRAM(s) Oral three times a day with meals  tacrolimus 1.5 milliGRAM(s) Oral daily  trimethoprim  40 mG/sulfamethoxazole 200 mG Suspension 80 milliGRAM(s) Oral <User Schedule>    MEDICATIONS  (PRN):  HYDROmorphone  Injectable 0.5 milliGRAM(s) IV Push every 4 hours PRN Severe Pain (7 - 10)  metoprolol tartrate Injectable 5 milliGRAM(s) IV Push once PRN sustained HR >140  OLANZapine Injectable 5 milliGRAM(s) IntraMuscular every 6 hours PRN agitation      ALLERGIES:   Allergies    budesonide (Unknown)  cefpodoxime (Unknown)  Pollen (Unknown)    Intolerances        VITAL SIGNS:   Vital Signs Last 24 Hrs  T(C): 36.7 (18 Dec 2022 04:00), Max: 36.7 (18 Dec 2022 04:00)  T(F): 98.1 (18 Dec 2022 04:00), Max: 98.1 (18 Dec 2022 04:00)  HR: 112 (18 Dec 2022 04:00) (112 - 132)  BP: 163/91 (18 Dec 2022 04:00) (149/82 - 163/91)  BP(mean): --  RR: 18 (18 Dec 2022 04:00) (18 - 18)  SpO2: 97% (18 Dec 2022 04:00) (96% - 98%)    Parameters below as of 18 Dec 2022 04:00  Patient On (Oxygen Delivery Method): nasal cannula  O2 Flow (L/min): 4    I&O's Summary    17 Dec 2022 07:01  -  18 Dec 2022 07:00  --------------------------------------------------------  IN: 0 mL / OUT: 1 mL / NET: -1 mL        PHYSICAL EXAM:   Appearance: Normal	  HEENT:   Normal oral mucosa, PERRL, EOMI	+ NGT>clamped  Cardiovascular: Normal S1 S2,   Respiratory: clear   Psychiatry: alert, pleasant,cooperative  Gastrointestinal:  Soft, Non-tender, + BS	Incont stool  Skin: No jaundice  Neurologic: Oriented to self, not events or day  Extremities: Normal range of motion, No edema            LABS:  CBC Full  -  ( 18 Dec 2022 06:45 )  WBC Count : 8.19 K/uL  RBC Count : 3.79 M/uL  Hemoglobin : 11.3 g/dL  Hematocrit : 37.0 %  Platelet Count - Automated : 102 K/uL  Mean Cell Volume : 97.6 fl  Mean Cell Hemoglobin : 29.8 pg  Mean Cell Hemoglobin Concentration : 30.5 gm/dL  Auto Neutrophil # : x  Auto Lymphocyte # : x  Auto Monocyte # : x  Auto Eosinophil # : x  Auto Basophil # : x  Auto Neutrophil % : x  Auto Lymphocyte % : x  Auto Monocyte % : x  Auto Eosinophil % : x  Auto Basophil % : x    12-18    137  |  94<L>  |  42.9<H>  ----------------------------<  89  4.1   |  28.0  |  5.95<H>    Ca    8.8      18 Dec 2022 06:45    TPro  5.6<L>  /  Alb  3.2<L>  /  TBili  0.5  /  DBili  x   /  AST  31  /  ALT  11  /  AlkPhos  258<H>  12-18    LIVER FUNCTIONS - ( 18 Dec 2022 06:45 )  Alb: 3.2 g/dL / Pro: 5.6 g/dL / ALK PHOS: 258 U/L / ALT: 11 U/L / AST: 31 U/L / GGT: x               Assess: 72y old man with a past medical history significant for ESRD on 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 HTN, HLD, COPD s/p lung transplant, CHF, with recent GIB who presented with altered mental status.      Plan:     Dilated CBD on US, recent CT with normal CBD no obvious stones  ALK phos as been persistently elevated but t bili is normal   Send Alk phos fractionated to determine if elevation in biliary   Eventual  MRI/MRCP to R/O  biliary pathology   Monitor CMP    Gastritis  pantoprazole 40mg po daily  Avoid nsaids  HGB stable    Decompensated cirrhosis with ascites  Ammonia not elevated to support HE  May need diagnostic paracentesis to rule out SBP in light of altered mental status  Continue dialysis per renal   Avoid nsaids  Low sodium diet/tube feeds >recheck xray confirm placement  Continue lactulose titrated to 2-3 soft bowel movements per day, consider a trial of rifaximin to evaluate for improvement in metal status    Elevated alk phos level    CBD 9 mm  on U/S   check fractionated alk phos  check MRCP             Chief Complaint:  Patient is a 72y old  Male who presents with a chief complaint of Incidental dilated CBD (17 Dec 2022 09:17)     72y old male patient being seen in follow-up consultation for incidental CBD dilatation  Interval Events / Subjective: Patient seen and examined at bedside  "Hello"  Curled in fetal position  Appears feeding tube dislodged      Review of Systems: lethargic, unable to participate     MEDICATIONS:   MEDICATIONS  (STANDING):  acyclovir IVPB 350 milliGRAM(s) IV Intermittent every 24 hours  acyclovir IVPB      atorvastatin 80 milliGRAM(s) Oral at bedtime  chlorhexidine 2% Cloths 1 Application(s) Topical <User Schedule>  dextrose 5%. 1000 milliLiter(s) (50 mL/Hr) IV Continuous <Continuous>  dextrose 50% Injectable 25 Gram(s) IV Push once  dextrose 50% Injectable 12.5 Gram(s) IV Push once  dextrose 50% Injectable 25 Gram(s) IV Push once  dextrose Oral Gel 15 Gram(s) Oral once  fluconAZOLE   Tablet 200 milliGRAM(s) Oral <User Schedule>  glucagon  Injectable 1 milliGRAM(s) IntraMuscular once  heparin   Injectable 5000 Unit(s) SubCutaneous every 8 hours  hydrocortisone 10 milliGRAM(s) Oral two times a day  lactulose Syrup 10 Gram(s) Enteral Tube every 6 hours  meropenem Injectable 1000 milliGRAM(s) IV Push every 24 hours  metoprolol tartrate 12.5 milliGRAM(s) Oral two times a day  pantoprazole  Injectable 40 milliGRAM(s) IV Push daily  sevelamer carbonate 800 milliGRAM(s) Oral three times a day with meals  tacrolimus 1.5 milliGRAM(s) Oral daily  trimethoprim  40 mG/sulfamethoxazole 200 mG Suspension 80 milliGRAM(s) Oral <User Schedule>    MEDICATIONS  (PRN):  HYDROmorphone  Injectable 0.5 milliGRAM(s) IV Push every 4 hours PRN Severe Pain (7 - 10)  metoprolol tartrate Injectable 5 milliGRAM(s) IV Push once PRN sustained HR >140  OLANZapine Injectable 5 milliGRAM(s) IntraMuscular every 6 hours PRN agitation      ALLERGIES:   Allergies    budesonide (Unknown)  cefpodoxime (Unknown)  Pollen (Unknown)    Intolerances        VITAL SIGNS:   Vital Signs Last 24 Hrs  T(C): 36.7 (18 Dec 2022 04:00), Max: 36.7 (18 Dec 2022 04:00)  T(F): 98.1 (18 Dec 2022 04:00), Max: 98.1 (18 Dec 2022 04:00)  HR: 112 (18 Dec 2022 04:00) (112 - 132)  BP: 163/91 (18 Dec 2022 04:00) (149/82 - 163/91)  BP(mean): --  RR: 18 (18 Dec 2022 04:00) (18 - 18)  SpO2: 97% (18 Dec 2022 04:00) (96% - 98%)    Parameters below as of 18 Dec 2022 04:00  Patient On (Oxygen Delivery Method): nasal cannula  O2 Flow (L/min): 4    I&O's Summary    17 Dec 2022 07:01  -  18 Dec 2022 07:00  --------------------------------------------------------  IN: 0 mL / OUT: 1 mL / NET: -1 mL        PHYSICAL EXAM:   Appearance: Normal, elderly 	  HEENT:   Normal oral mucosa, PERRL, EOMI	+ NGT>clamped  Cardiovascular: Normal S1 S2,   Respiratory: clear   Psychiatry: alert, pleasant,cooperative  Gastrointestinal:  Soft, Non-tender, + BS	Incont stool  Skin: No jaundice  Neurologic: Oriented to self, not events or day  Extremities: Normal range of motion, No edema            LABS:  CBC Full  -  ( 18 Dec 2022 06:45 )  WBC Count : 8.19 K/uL  RBC Count : 3.79 M/uL  Hemoglobin : 11.3 g/dL  Hematocrit : 37.0 %  Platelet Count - Automated : 102 K/uL  Mean Cell Volume : 97.6 fl  Mean Cell Hemoglobin : 29.8 pg  Mean Cell Hemoglobin Concentration : 30.5 gm/dL  Auto Neutrophil # : x  Auto Lymphocyte # : x  Auto Monocyte # : x  Auto Eosinophil # : x  Auto Basophil # : x  Auto Neutrophil % : x  Auto Lymphocyte % : x  Auto Monocyte % : x  Auto Eosinophil % : x  Auto Basophil % : x    12-18    137  |  94<L>  |  42.9<H>  ----------------------------<  89  4.1   |  28.0  |  5.95<H>    Ca    8.8      18 Dec 2022 06:45    TPro  5.6<L>  /  Alb  3.2<L>  /  TBili  0.5  /  DBili  x   /  AST  31  /  ALT  11  /  AlkPhos  258<H>  12-18    LIVER FUNCTIONS - ( 18 Dec 2022 06:45 )  Alb: 3.2 g/dL / Pro: 5.6 g/dL / ALK PHOS: 258 U/L / ALT: 11 U/L / AST: 31 U/L / GGT: x               Assess: 72y old man with a past medical history significant for ESRD on 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 HTN, HLD, COPD s/p lung transplant, CHF, with recent GIB who presented with altered mental status.      Plan:     Dilated CBD on US, recent CT with normal CBD no obvious stones  ALK phos as been persistently elevated but t bili is normal   Send Alk phos fractionated to determine if elevation in biliary   Eventual  MRI/MRCP to R/O  biliary pathology   Monitor CMP    Gastritis  pantoprazole 40mg po daily  Avoid nsaids  HGB stable    Decompensated cirrhosis with ascites  Ammonia not elevated to support HE  May need diagnostic paracentesis to rule out SBP in light of altered mental status  Continue dialysis per renal   Avoid nsaids  Low sodium diet/tube feeds >recheck xray confirm placement  Continue lactulose titrated to 2-3 soft bowel movements per day, consider a trial of rifaximin to evaluate for improvement in metal status    Elevated alk phos level    CBD 9 mm  on U/S   check fractionated alk phos  check MRCP

## 2022-12-19 NOTE — PROGRESS NOTE ADULT - SUBJECTIVE AND OBJECTIVE BOX
[facility]                                        Neu[facility] [place]                                 [facility]                                  [address]                                        [place], [place], [address]                                             [phone]        CC: Encephalopathy    HPI:   The patient is a 72y Male with multiple medical issues who was recently hospitalized with hypoxic respiratory failure.  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   He was ultimately discharged 11/23/22.   He now presented with altered mental status and lethargy.   Neurology asked to evaluation for meningitis. LP attempted (neuro JW) and was unsuccessful while in ER. (form JW note)    Interim history: now intubated in MICU    ROS:   unable to obtain    MEDICATIONS  (STANDING):  acyclovir IVPB      acyclovir IVPB 350 milliGRAM(s) IV Intermittent every 24 hours  atorvastatin 80 milliGRAM(s) Oral at bedtime  chlorhexidine 0.12% Liquid 15 milliLiter(s) Oral Mucosa every 12 hours  chlorhexidine 2% Cloths 1 Application(s) Topical <User Schedule>  dextrose 50% Injectable 25 Gram(s) IV Push once  dextrose 50% Injectable 12.5 Gram(s) IV Push once  dextrose 50% Injectable 25 Gram(s) IV Push once  dextrose Oral Gel 15 Gram(s) Oral once  fluconAZOLE   Tablet 200 milliGRAM(s) Oral <User Schedule>  glucagon  Injectable 1 milliGRAM(s) IntraMuscular once  heparin   Injectable 5000 Unit(s) SubCutaneous every 8 hours  hydrocortisone sodium succinate Injectable 50 milliGRAM(s) IV Push every 6 hours  lactulose Syrup 10 Gram(s) Enteral Tube every 6 hours  metoprolol tartrate 12.5 milliGRAM(s) Enteral Tube every 12 hours  norepinephrine Infusion 0.05 MICROgram(s)/kG/Min (5.93 mL/Hr) IV Continuous <Continuous>  pantoprazole  Injectable 40 milliGRAM(s) IV Push daily  propofol Infusion 10 MICROgram(s)/kG/Min (3.8 mL/Hr) IV Continuous <Continuous>  rifAXIMin 550 milliGRAM(s) Oral two times a day  sevelamer carbonate Powder 800 milliGRAM(s) Oral three times a day with meals  tacrolimus 1.5 milliGRAM(s) Oral daily  trimethoprim  40 mG/sulfamethoxazole 200 mG Suspension 80 milliGRAM(s) Oral <User Schedule>    MEDICATIONS  (PRN):  HYDROmorphone  Injectable 0.5 milliGRAM(s) IV Push every 4 hours PRN Severe Pain (7 - 10)  OLANZapine Injectable 5 milliGRAM(s) IntraMuscular every 6 hours PRN agitation      Vital Signs Last 24 Hrs  T(C): 36.5 (19 Dec 2022 15:00), Max: 37.9 (19 Dec 2022 00:00)  T(F): 97.7 (19 Dec 2022 15:00), Max: 100.2 (19 Dec 2022 00:00)  HR: 104 (19 Dec 2022 15:00) (65 - 133)  BP: 122/63 (19 Dec 2022 15:00) (49/40 - 166/78)  BP(mean): 79 (19 Dec 2022 15:00) (45 - 139)  RR: 15 (19 Dec 2022 15:00) (13 - 32)  SpO2: 99% (19 Dec 2022 15:00) (91% - 100%)    Parameters below as of 19 Dec 2022 09:38  Patient On (Oxygen Delivery Method): ventilator    Detailed Neurologic Exam:    Mental status: The patient is intubated and sedated. Unable to assess mental status    Cranial nerves: Pupils equal and react symmetrically to light. There is no blink to threat. Extraocular motion is full with dolls eyes. Facial sensation is intact. Facial musculature is grossly symmetric.     Motor: There is normal bulk and tone.  There is no tremor.  withdraws top nailbed pressure    Sensation: withdraws to nailbed pressure    Reflexes: 1+ throughout and plantar responses are flexor.    Cerebellar: Unable to assess    Labs:                           10.6   11.54 )-----------( 99       ( 19 Dec 2022 03:25 )             33.3     12-19    138  |  94<L>  |  31.6<H>  ----------------------------<  84  3.7   |  27.0  |  5.21<H>    Ca    8.8      19 Dec 2022 03:25    TPro  5.4<L>  /  Alb  2.9<L>  /  TBili  0.6  /  DBili  x   /  AST  32  /  ALT  9   /  AlkPhos  240<H>  12-19    LIVER FUNCTIONS - ( 19 Dec 2022 03:25 )  Alb: 2.9 g/dL / Pro: 5.4 g/dL / ALK PHOS: 240 U/L / ALT: 9 U/L / AST: 32 U/L / GGT: x           PT/INR - ( 18 Dec 2022 18:05 )   PT: 13.2 sec;   INR: 1.14 ratio         PTT - ( 18 Dec 2022 18:05 )  PTT:60.1 sec

## 2022-12-19 NOTE — PROGRESS NOTE ADULT - SUBJECTIVE AND OBJECTIVE BOX
[facility] PALLIATIVE MEDICINE     CC: FOLLOW UP VISIT + GOC    INTERVAL HPI/OVERNIGHT EVENTS:  Source if other than patient:  []Family   [x]Team     Overnight with RRT for increased work of breathing and hypoxia s/p intubation, transfer to MICU and emergent dialysis for fluid overload. Subsequent GOC noted by MICU attending last evening, full code for now.     Seen and examined at bedside. HD RN and wife Veronica at bedside. Pt actively getting dialysis during visit. Remains off IV vasopressor since overnight and off IV propofol since ~4AM.     PRESENT SYMPTOMS:     Dyspnea: intubated  Nausea/Vomiting:  No  Anxiety:   No  Depression: unable to assess  Fatigue: Yes    Loss of appetite: NPO  Constipation: No    Pain:             Character-            Duration-            Effect-            Factors-            Frequency-            Location-            Severity-    Pain AD Score:  [email or web address] (press ctrl + left click to view)    Review of Systems: Unable to obtain due to poor mentation/encephalopathy     MEDICATIONS  (STANDING):  acyclovir IVPB 350 milliGRAM(s) IV Intermittent every 24 hours  acyclovir IVPB      atorvastatin 80 milliGRAM(s) Oral at bedtime  chlorhexidine 0.12% Liquid 15 milliLiter(s) Oral Mucosa every 12 hours  chlorhexidine 2% Cloths 1 Application(s) Topical <User Schedule>  dextrose 50% Injectable 25 Gram(s) IV Push once  dextrose 50% Injectable 12.5 Gram(s) IV Push once  dextrose 50% Injectable 25 Gram(s) IV Push once  dextrose Oral Gel 15 Gram(s) Oral once  fluconAZOLE   Tablet 200 milliGRAM(s) Oral <User Schedule>  glucagon  Injectable 1 milliGRAM(s) IntraMuscular once  heparin   Injectable 5000 Unit(s) SubCutaneous every 8 hours  hydrocortisone sodium succinate Injectable 50 milliGRAM(s) IV Push every 6 hours  lactulose Syrup 10 Gram(s) Enteral Tube every 6 hours  meropenem Injectable 1000 milliGRAM(s) IV Push every 24 hours  metoprolol tartrate 12.5 milliGRAM(s) Enteral Tube every 12 hours  norepinephrine Infusion 0.05 MICROgram(s)/kG/Min (5.93 mL/Hr) IV Continuous <Continuous>  pantoprazole  Injectable 40 milliGRAM(s) IV Push daily  potassium chloride  10 mEq/100 mL IVPB 10 milliEquivalent(s) IV Intermittent once  propofol Infusion 10 MICROgram(s)/kG/Min (3.8 mL/Hr) IV Continuous <Continuous>  rifAXIMin 550 milliGRAM(s) Oral two times a day  sevelamer carbonate 800 milliGRAM(s) Oral three times a day with meals  tacrolimus 1.5 milliGRAM(s) Oral daily  trimethoprim  40 mG/sulfamethoxazole 200 mG Suspension 80 milliGRAM(s) Oral <User Schedule>    MEDICATIONS  (PRN):  HYDROmorphone  Injectable 0.5 milliGRAM(s) IV Push every 4 hours PRN Severe Pain (7 - 10)  OLANZapine Injectable 5 milliGRAM(s) IntraMuscular every 6 hours PRN agitation      PHYSICAL EXAM:    Vital Signs Last 24 Hrs  T(C): 37.2 (19 Dec 2022 10:00), Max: 37.9 (19 Dec 2022 00:00)  T(F): 99 (19 Dec 2022 10:00), Max: 100.2 (19 Dec 2022 00:00)  HR: 106 (19 Dec 2022 10:00) (65 - 133)  BP: 154/84 (19 Dec 2022 10:00) (49/40 - 172/96)  BP(mean): 102 (19 Dec 2022 10:00) (45 - 102)  RR: 16 (19 Dec 2022 10:00) (13 - 32)  SpO2: 100% (19 Dec 2022 10:00) (91% - 100%)    Parameters below as of 19 Dec 2022 09:38  Patient On (Oxygen Delivery Method): ventilator    Karnofsky:  30%    GEN: chronically ill. resting comfortably and no acute distress    HEENT: mucous membrane moist  +ETT    Lungs: comfortable, nonlabored     CV: +s1/s2 regular rate and rhythm      GI: +BS, abdomen soft, nontender, nondistended      : anuric    MSK:  no cyanosis. +generalized edema +weakness       NEURO: nonfocal. lethargic    Skin: warm and dry.      LABS:                          10.6   11.54 )-----------( 99       ( 19 Dec 2022 03:25 )             33.3     12-19    138  |  94<L>  |  31.6<H>  ----------------------------<  84  3.7   |  27.0  |  5.21<H>    Ca    8.8      19 Dec 2022 03:25    TPro  5.4<L>  /  Alb  2.9<L>  /  TBili  0.6  /  DBili  x   /  AST  32  /  ALT  9   /  AlkPhos  240<H>  12-19    PT/INR - ( 18 Dec 2022 18:05 )   PT: 13.2 sec;   INR: 1.14 ratio         PTT - ( 18 Dec 2022 18:05 )  PTT:60.1 sec    I&O's Summary    18 Dec 2022 07:01  -  19 Dec 2022 07:00  --------------------------------------------------------  IN: 345.9 mL / OUT: 2000 mL / NET: -1654.1 mL    RADIOLOGY & ADDITIONAL STUDIES: Reviewed     ADVANCE DIRECTIVES/TREATMENT PREFERENCES:  DNR YES NO  Completed on:                     MOLST  YES NO   Completed on:  Living Will  YES NO   Completed on:    NEUROLOGICAL MEDICATIONS/OPIOIDS/BENZODIAZEPINE IN PAST 24 HOURS    propofol Infusion   3.8 mL/Hr IV Continuous (12-18-22 @ 19:01)   3.8 mL/Hr IV Continuous (12-18-22 @ 18:27)

## 2022-12-19 NOTE — PROGRESS NOTE ADULT - ASSESSMENT
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 CT head is negative. Hospital course is notable for empiric treatment for meningitis; also with dilated common bile duct; awaiting MRCP; acutely decompensated on 12/18 with acute hypercapnic respiratory failure in setting of aspiration pneumonia, emergently intubated on 12/18; also in shock, on pressors. Nephrology is managing ESRD on HD.    -Access: L AV access   -Outpatient dialysis days are Tuesdays Thursdays Saturdays  -Off cycle while here, currently on a Monday Wednesday Friday dialysis schedule    -Dialysis today; then next hemodialysis will be this upcoming Wednesday (unless clinical indication warrants it to be sooner)   -Shock - pressor support as per primary team   -Anemia in setting of CKD - hemoglobin is at goal; no indication for MENDY at this time    -Mineral Bone Disease in setting of CKD - continue oral phos binder   -s/p Lung transplantation - on immunosuppressive medications - management as per ID  -Prognosis guarded   -Goals of care - per wife, full code for now; awaiting daughter to return to [place] from a business trip for further goals of care discussion as per spouse    Tressa Graham DO  Nephrology  [facility]  [address] [place] [address]  Office Number [phone]      72 year old male with PMH of HTN, HLD, CAD s/p PCI, carotid stenosis s/p CEA, ESRD on HD, 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 CT head is negative. Hospital course is notable for empiric treatment for meningitis; also with dilated common bile duct; awaiting MRCP; acutely decompensated on 12/18 with acute hypercapnic respiratory failure in setting of aspiration pneumonia, emergently intubated; also in shock, on pressors. Nephrology is managing ESRD on HD.    -Access: L AV access   -Outpatient dialysis days are Tuesdays Thursdays Saturdays  -Off cycle while here, currently on a Monday Wednesday Friday dialysis schedule    -Dialysis today; then next hemodialysis will be this upcoming Wednesday (unless clinical indication warrants it to be sooner)   -Shock - pressor support as per primary team   -Anemia in setting of CKD - hemoglobin is at goal; no indication for MENDY at this time    -Mineral Bone Disease in setting of CKD - continue oral phos binder   -s/p Lung transplantation - on immunosuppressive medications - management as per ID  -Prognosis guarded   -Goals of care - per wife, full code for now; awaiting daughter to return to [place] from a business trip for further goals of care discussion as per spouse    Tressa Graham DO  Nephrology  [facility]  [address] [place] [address]  Office Number [phone]

## 2022-12-19 NOTE — PROGRESS NOTE ADULT - SUBJECTIVE AND OBJECTIVE BOX
[facility]                                                INFECTIOUS DISEASES  =======================================================                               J Carlos Galdamez MD#    Isatu Rojas MD*                           Perlita Solares MD*   Sumaya Morales MD*            Diplomates American Board of Internal Medicine & Infectious Diseases                  # [place] Office - Appt - Tel  [phone] Fax [phone]                * [place] Office - Appt - Tel [phone] Fax [phone]                                  Hospital Consult line:  [phone]  =======================================================      [facility]-[id number]  JU FERGUSON   follow up for: ams  intubated overnight likely 2/2 aspiration-per micu notes was noted to have tube feedings in airways during intubation  patient seen and examined.       I have personally reviewed the labs and data; pertinent labs and data are listed in this note; please see below.   ===================================================  REVIEW OF SYSTEMS:  cannot obtain    =======================================================  Allergies    budesonide (Unknown)  cefpodoxime (Unknown)  Pollen (Unknown)    Intolerances    Antibiotics:  acyclovir IVPB      acyclovir IVPB 350 milliGRAM(s) IV Intermittent every 24 hours  amphotericin B  liposome  IVPB 320 milliGRAM(s) IV Intermittent every 24 hours  flucytosine 1500 milliGRAM(s) Oral daily  meropenem Injectable 1000 milliGRAM(s) IV Push <User Schedule>  rifAXIMin 550 milliGRAM(s) Oral two times a day  trimethoprim  40 mG/sulfamethoxazole 200 mG Suspension 80 milliGRAM(s) Oral <User Schedule>    Other medications:  atorvastatin 80 milliGRAM(s) Oral at bedtime  chlorhexidine 0.12% Liquid 15 milliLiter(s) Oral Mucosa every 12 hours  chlorhexidine 2% Cloths 1 Application(s) Topical <User Schedule>  dextrose 50% Injectable 25 Gram(s) IV Push once  dextrose 50% Injectable 12.5 Gram(s) IV Push once  dextrose 50% Injectable 25 Gram(s) IV Push once  dextrose Oral Gel 15 Gram(s) Oral once  glucagon  Injectable 1 milliGRAM(s) IntraMuscular once  heparin   Injectable 5000 Unit(s) SubCutaneous every 8 hours  hydrocortisone 10 milliGRAM(s) Oral two times a day  lactulose Syrup 10 Gram(s) Enteral Tube every 6 hours  metoprolol tartrate 12.5 milliGRAM(s) Enteral Tube every 12 hours  pantoprazole  Injectable 40 milliGRAM(s) IV Push daily  propofol Infusion 10 MICROgram(s)/kG/Min IV Continuous <Continuous>  sevelamer carbonate Powder 800 milliGRAM(s) Oral three times a day with meals  tacrolimus 1.5 milliGRAM(s) Oral daily    ======================================================  Physical Exam:  ============  T(F): 97.9 (20 Dec 2022 00:00), Max: 99.9 (19 Dec 2022 02:00)  HR: 113 (20 Dec 2022 00:00)  BP: 113/76 (20 Dec 2022 00:00)  RR: 16 (20 Dec 2022 00:00)  SpO2: 99% (20 Dec 2022 00:00) (90% - 100%)  temp max in last 48H T(F): , Max: 100.2 (12-19-22 @ 00:00)    General:  intubated sedated  Neck: Supple, No lymphadenopathy.  Respiratory: Lungs are clear to auscultation, Respirations are non-labored.  Cardiovascular: Normal rate, Regular rhythm,  s1+s2  Gastrointestinal: Soft, Non-tender, Non-distended, Normal bowel sounds.  Genitourinary: No costovertebral angle tenderness.  Lymphatics: No lymphadenopathy neck  Musculoskeletal: Normal range of motion, Normal strength., lue avf  Integumentary: No rash. rue picc  Neurologic: sedated  =======================================================  Labs:                        10.6   11.54 )-----------( 99       ( 19 Dec 2022 03:25 )             33.3     12-19    138  |  94<L>  |  31.6<H>  ----------------------------<  84  3.7   |  27.0  |  5.21<H>    Ca    8.8      19 Dec 2022 03:25    TPro  5.4<L>  /  Alb  2.9<L>  /  TBili  0.6  /  DBili  x   /  AST  32  /  ALT  9   /  AlkPhos  240<H>  12-19      Culture - Body Fluid with Gram Stain (collected 12-19-22 @ 13:50)  Source: Paracentesis Paracentesis Fluid  Gram Stain (12-19-22 @ 22:30):    No polymorphonuclear leukocytes seen per low power field    No organisms seen per oil power field    Culture - Blood (collected 12-13-22 @ 05:41)  Source: .Blood Blood-Peripheral  Final Report (12-18-22 @ 10:00):    No Growth Final    Culture - Blood (collected 12-13-22 @ 05:41)  Source: .Blood Blood-Peripheral  Final Report (12-18-22 @ 10:00):    No Growth Final

## 2022-12-19 NOTE — CHART NOTE - NSCHARTNOTEFT_GEN_A_CORE
Patient scheduled for LP this AM with IR, however was upgraded overnight due to respiratory distress and shock requiring emergent intubation and pressor support. Given this change in clinical status would defer IR LP at this time. Discussed case with Dr. Moreira and recommend ICU team and/or anesthesia attempt LP at bedside. If unsuccessful, can attempt in IR when patient is more stable.     please call IR [phone] with any questions, issues or concerns with above.

## 2022-12-19 NOTE — PROGRESS NOTE ADULT - PROBLEM SELECTOR PLAN 1
Dilated CBD on US, recent CT with normal CBD no obvious stones. Alk phos as been persistently elevated but Tbili is normal     - Alk phos fractionated is pending   - Monitor CMP  - Will eventually need MRI/MRCP to evaluate for biliary pathology Dilated CBD on US, recent CT with normal CBD no obvious stones. Alk phos as been persistently elevated but Tbili is normal     - Alk phos fractionated is pending   - Monitor CMP  - Will eventually need MRI/MRCP to evaluate for biliary pathology this may be performed as an outpatient, he is critically ill

## 2022-12-19 NOTE — PROGRESS NOTE ADULT - ASSESSMENT
72y Male with multiple medical issues now with encephalopathy.     Encephalopathy.   Unable to fully assess today due to sedtaion  He had been dexribed by Dr Barksdale as clinically improved although still with confusion.  Likely toxic metabolic secondary to multiple medical issues.   Antibiotics per ID.     Rule out meningitis.   Empiric coverage for now per ID.     will follow with you    Luis Brady MD PhD   [id number]

## 2022-12-19 NOTE — PROGRESS NOTE ADULT - ASSESSMENT
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  Patient was treated for presumed meningitis, however was unable to undergo a lumbar puncture.  He was transferred to the medical ICU on 12/18 with aspiration pneumonia and acute respiratory failure requiring intubation.    Plan:    AMS/metabolic encephalopathy/ possible meningitis  - on empiric abx   - will attempt LP today at bedside    Emphysema s/p lung transplant  - continue steroids  - bactrim TIW for pjp prophylaxis   - will need to restart Tacrolimus soon    ESRD on HD  - dialysis today    AF on eliquis 2.5 mg BID at home/ IJ DVT  - holding AC for LP    Chronic HFrEF  - goal directed medical therapy when off pressors    CAD s/p PCI  - lipitor, metoprolol  - does not appear to be on antiplatelet at home    Sedation/Analgesia: prop  Vasoactive medications: levo  DVT prophylaxis: heparin  GI prophylaxis: protonix  Nutrition: tube feeds  Central line: IJ TLC  Ro: none  Mobility: bedrest  Family/Patient engagement/GOC: updated wife Veronica by phone, poor prognosis overall   72-year-old male with past medical history of emphysema status post lung transplant in 2015, end-stage renal disease on hemodialysis, prior fungal meningitis, carotid stenosis status post CEA, CAD status post PCI, 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  Patient was treated for presumed meningitis, however was unable to undergo a lumbar puncture.  He was transferred to the medical ICU on 12/18 with aspiration pneumonia and acute respiratory failure requiring intubation.    Plan:    AMS/metabolic encephalopathy/ possible meningitis  - on empiric abx   - will attempt LP today at bedside    Emphysema s/p lung transplant  - continue steroids  - bactrim TIW for pjp prophylaxis   - will need to restart Tacrolimus soon    ESRD on HD  - dialysis today    AF on eliquis 2.5 mg BID at home/ IJ DVT  - holding AC for LP    Chronic HFrEF  - goal directed medical therapy when off pressors    CAD s/p PCI  - lipitor, metoprolol  - does not appear to be on antiplatelet at home    Dilated CBG/ decompensated cirrhosis  - eventual MRCP vs ERCP per GI  - rifaximin for hepatic encephalopathy  - will attempt diagnostic para if we can find pocket of fluid    Sedation/Analgesia: prop  Vasoactive medications: levo  DVT prophylaxis: heparin  GI prophylaxis: protonix  Nutrition: tube feeds  Central line: IJ TLC  Ro: none  Mobility: bedrest  Family/Patient engagement/GOC: updated wife Veronica by phone, poor prognosis overall

## 2022-12-19 NOTE — PROGRESS NOTE ADULT - ASSESSMENT
72M with PMH of  hypertension, hyperlipidemia, ESRD on HD, emphysema s/p lung transplant in [place] by Dr. 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 faecalis bacteremia, newly diagnosed HFrEF 25%, AF complicated by GIB was discharged on home IV Abx now presenting with AMS  Presented to the ED with daughter. CTH obtained and did not reveal any acute findings.  Patient was admitted to medicine for encephalopathy. LP attempted bedside but unsuccessful    #Acute hypoxic respiratory failure likely 2/2 aspiration- intubated and transferred to icu. per wife pt had become more awake and was talking to her yesterday    #AMS-unclear etiology, no fever. ammonia level normal.  ct c/ap ?pna/edema/ascites.  agree with LP-pending by IR. c/w meropenem and vanco by level. added acyclovir empirically. consider MRi head. Neuro f/u. h/o fungal meningitis but is on fluconazole ppx. s/p paracentesisi f/u cultures      #h/o e.faecalis bacteremia - . CRISTIAN was deferred by cardio on recent admission. currently on meropenem, vanco by level. f/u BCX    # s/p lung transplant- per transplant team, cellcept  on hold, currently on cortef, c/w tacrolimus and check levels. c/w fluc and bactrim per home dose for ppx    # h/o fungal meningitis- con empiric antifungal    # ESRD on HD- adjusted abx for renal function, monitor vanco level    #Dilated CBD on USG- plan for MRCP    # AScites- paracentesis if able, on meropenem    AWAIT LP  WILL FOLLOW UP  d/w micu and wife

## 2022-12-19 NOTE — PROGRESS NOTE ADULT - ASSESSMENT
71 y/o M with PMHx significant for ESRD on HD RIJ thrombus on Eliquis HTN, HLD, COPD s/p lung transplant, CHF, with recent GIB who presented with altered mental status.

## 2022-12-19 NOTE — PROGRESS NOTE ADULT - PROBLEM SELECTOR PLAN 3
- May need diagnostic paracentesis to rule out SBP in light of altered mental status  - Continue dialysis per renal   - Low sodium diet/tube feeds   - Continue lactulose titrated to 2-3 soft bowel movements per day, consider a trial of rifaximin to evaluate for improvement in metal status  - Continue care per ICU  _________________________________________________________________  Assessment and recommendations are final when note is signed by the attending physician. - May need diagnostic paracentesis to rule out SBP in light of altered mental status  - Continue dialysis per renal   - Low sodium diet/tube feeds   - Continue lactulose titrated to 2-3 soft bowel movements per day, continue rifaximin   - Continue care per ICU

## 2022-12-19 NOTE — PROGRESS NOTE ADULT - ASSESSMENT
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 Faecalis bacteremia now readmitted on 12/13 for altered mental status, sepsis workup including ?acute meningitis, found also with dilated CBD on ultrasound complicated by acute respiratory failure with hypoxia  requiring intubation and transferred to MICU on 12/18.     Acute Respiratory Failure with hypoxia  - intubated overnight, on full vent support, wean as tolerated  - has been off sedation with propofol since 4AM    Acute Encephalopathy  - etiology unclear  - normal ammonia level  - CT CAP with pulmonary edema/ ?superimposed PNA  - recent admission for E. faecalis bacteremia on home antibiotic  - also hx of fungal meningitis on maintenance fluconazole  - pending IR guided LP when more clinically stable  - IV meropenem/vanco and empiric acyclovir per ID    Acute Decompensated Cirrhosis with Ascites  - normal ammonia, elevated alk phos (trending down)  - on lactulose and rifaximin  - GI input appreciated, rec diagnostic paracentesis to r/o SBP given AMS    ESRD on HD   - since 2020 after presumed contrast induced nephropathy for CAD with stenting   - currently getting active dialysis during visit, mgnt per nephrology  - avoid nephrotoxic agents    Hx of Bilateral Lung Transplant for ES COPD  - in 2015, follows closely with [facility]   - on tacrolimus, home cellcept on hold    Palliative Care Encounter/Goals of care  - Surrogate/HCP/Guardian: wife Veronica Marshall [phone]  - [facility] noted on 12/18 by MICU, full code and continue active treatments to monitor for further progress.    Met with wife Veronica at bedside to offer support. She acknowledged acute events over the past 24 hours including conversations with ICU last evening. She is aware of overall guarded prognosis. She brought in previously completed 5 Wishes Booklet for advance care planning done prior to his lung transplant by [facility] staff. Upon review, pt indicated wishes for life supportive treatments if doctors felt they could restore him to prior baseline function. Wife states he would not want to remain on life support some sense of independence. Wife shared collateral history that at baseline he is able to carry out conversation with family, oriented x 2-3 with intermittent confusion. Wife remains hopeful that  will be able to come off vent support with time like his prior admission. Her primary support system is daughter Jyoti who is currently out of town on a business trip and will arrive home tomorrow evening. Significant emotional support and all questions answered.

## 2022-12-19 NOTE — PROGRESS NOTE ADULT - SUBJECTIVE AND OBJECTIVE BOX
Seen and examined this a.m. while on hemodialysis. Remains critically ill - on mechanical ventilation + pressors. Wife was at bedside.     Vital Signs Last 24 Hrs  T(C): 36.8 (19 Dec 2022 12:00), Max: 37.9 (19 Dec 2022 00:00)  T(F): 98.2 (19 Dec 2022 12:00), Max: 100.2 (19 Dec 2022 00:00)  HR: 109 (19 Dec 2022 12:37) (65 - 133)  BP: 99/54 (19 Dec 2022 12:00) (49/40 - 166/78)  BP(mean): 67 (19 Dec 2022 12:00) (45 - 102)  RR: 16 (19 Dec 2022 12:00) (13 - 32)  SpO2: 100% (19 Dec 2022 12:37) (91% - 100%)    Parameters below as of 19 Dec 2022 09:38  Patient On (Oxygen Delivery Method): ventilator    I&O's Summary    18 Dec 2022 07:01  -  19 Dec 2022 07:00  --------------------------------------------------------  IN: 345.9 mL / OUT: 2000 mL / NET: -1654.1 mL    Physical Exam  General: Chronically ill appearing male lying in bed  HEENT: +Intubated  Cardiac: S1S2 regular rhythm, tachycardic  Respiratory: Transmitted breath sounds b/l  Abdomen: Soft  Extremities: Trace edema of b/l LE estephanie  Neuro: RASS -5    12-19    138  |  94<L>  |  31.6<H>  ----------------------------<  84  3.7   |  27.0  |  5.21<H>    Ca    8.8      19 Dec 2022 03:25    TPro  5.4<L>  /  Alb  2.9<L>  /  TBili  0.6  /  DBili  x   /  AST  32  /  ALT  9   /  AlkPhos  240<H>  12-19               10.6   11.54 )-----------( 99       ( 19 Dec 2022 03:25 )             33.3     MEDICATIONS  (STANDING):  acyclovir IVPB      acyclovir IVPB 350 milliGRAM(s) IV Intermittent every 24 hours  atorvastatin 80 milliGRAM(s) Oral at bedtime  chlorhexidine 0.12% Liquid 15 milliLiter(s) Oral Mucosa every 12 hours  chlorhexidine 2% Cloths 1 Application(s) Topical <User Schedule>  dextrose 50% Injectable 25 Gram(s) IV Push once  dextrose 50% Injectable 12.5 Gram(s) IV Push once  dextrose 50% Injectable 25 Gram(s) IV Push once  dextrose Oral Gel 15 Gram(s) Oral once  fluconAZOLE   Tablet 200 milliGRAM(s) Oral <User Schedule>  glucagon  Injectable 1 milliGRAM(s) IntraMuscular once  heparin   Injectable 5000 Unit(s) SubCutaneous every 8 hours  hydrocortisone sodium succinate Injectable 50 milliGRAM(s) IV Push every 6 hours  lactulose Syrup 10 Gram(s) Enteral Tube every 6 hours  metoprolol tartrate 12.5 milliGRAM(s) Enteral Tube every 12 hours  norepinephrine Infusion 0.05 MICROgram(s)/kG/Min (5.93 mL/Hr) IV Continuous <Continuous>  pantoprazole  Injectable 40 milliGRAM(s) IV Push daily  propofol Infusion 10 MICROgram(s)/kG/Min (3.8 mL/Hr) IV Continuous <Continuous>  rifAXIMin 550 milliGRAM(s) Oral two times a day  sevelamer carbonate Powder 800 milliGRAM(s) Oral three times a day with meals  tacrolimus 1.5 milliGRAM(s) Oral daily  trimethoprim  40 mG/sulfamethoxazole 200 mG Suspension 80 milliGRAM(s) Oral <User Schedule>    MEDICATIONS  (PRN):  HYDROmorphone  Injectable 0.5 milliGRAM(s) IV Push every 4 hours PRN Severe Pain (7 - 10)  OLANZapine Injectable 5 milliGRAM(s) IntraMuscular every 6 hours PRN agitation   Seen and examined this a.m. while on hemodialysis. Remains critically ill - on mechanical ventilation + pressors. Wife was at bedside.     Vital Signs Last 24 Hrs  T(C): 36.8 (19 Dec 2022 12:00), Max: 37.9 (19 Dec 2022 00:00)  T(F): 98.2 (19 Dec 2022 12:00), Max: 100.2 (19 Dec 2022 00:00)  HR: 109 (19 Dec 2022 12:37) (65 - 133)  BP: 99/54 (19 Dec 2022 12:00) (49/40 - 166/78)  BP(mean): 67 (19 Dec 2022 12:00) (45 - 102)  RR: 16 (19 Dec 2022 12:00) (13 - 32)  SpO2: 100% (19 Dec 2022 12:37) (91% - 100%)    Parameters below as of 19 Dec 2022 09:38  Patient On (Oxygen Delivery Method): ventilator    I&O's Summary    18 Dec 2022 07:01  -  19 Dec 2022 07:00  --------------------------------------------------------  IN: 345.9 mL / OUT: 2000 mL / NET: -1654.1 mL    Physical Exam  General: Chronically ill appearing male lying in bed  HEENT: +Intubated  Cardiac: S1S2 regular rhythm, tachycardic  Respiratory: Transmitted breath sounds b/l  Abdomen: Soft  Extremities: Trace edema of b/l LE   Neuro: RASS -4    12-19    138  |  94<L>  |  31.6<H>  ----------------------------<  84  3.7   |  27.0  |  5.21<H>    Ca    8.8      19 Dec 2022 03:25    TPro  5.4<L>  /  Alb  2.9<L>  /  TBili  0.6  /  DBili  x   /  AST  32  /  ALT  9   /  AlkPhos  240<H>  12-19               10.6   11.54 )-----------( 99       ( 19 Dec 2022 03:25 )             33.3     MEDICATIONS  (STANDING):  acyclovir IVPB      acyclovir IVPB 350 milliGRAM(s) IV Intermittent every 24 hours  atorvastatin 80 milliGRAM(s) Oral at bedtime  chlorhexidine 0.12% Liquid 15 milliLiter(s) Oral Mucosa every 12 hours  chlorhexidine 2% Cloths 1 Application(s) Topical <User Schedule>  dextrose 50% Injectable 25 Gram(s) IV Push once  dextrose 50% Injectable 12.5 Gram(s) IV Push once  dextrose 50% Injectable 25 Gram(s) IV Push once  dextrose Oral Gel 15 Gram(s) Oral once  fluconAZOLE   Tablet 200 milliGRAM(s) Oral <User Schedule>  glucagon  Injectable 1 milliGRAM(s) IntraMuscular once  heparin   Injectable 5000 Unit(s) SubCutaneous every 8 hours  hydrocortisone sodium succinate Injectable 50 milliGRAM(s) IV Push every 6 hours  lactulose Syrup 10 Gram(s) Enteral Tube every 6 hours  metoprolol tartrate 12.5 milliGRAM(s) Enteral Tube every 12 hours  norepinephrine Infusion 0.05 MICROgram(s)/kG/Min (5.93 mL/Hr) IV Continuous <Continuous>  pantoprazole  Injectable 40 milliGRAM(s) IV Push daily  propofol Infusion 10 MICROgram(s)/kG/Min (3.8 mL/Hr) IV Continuous <Continuous>  rifAXIMin 550 milliGRAM(s) Oral two times a day  sevelamer carbonate Powder 800 milliGRAM(s) Oral three times a day with meals  tacrolimus 1.5 milliGRAM(s) Oral daily  trimethoprim  40 mG/sulfamethoxazole 200 mG Suspension 80 milliGRAM(s) Oral <User Schedule>    MEDICATIONS  (PRN):  HYDROmorphone  Injectable 0.5 milliGRAM(s) IV Push every 4 hours PRN Severe Pain (7 - 10)  OLANZapine Injectable 5 milliGRAM(s) IntraMuscular every 6 hours PRN agitation

## 2022-12-19 NOTE — PROGRESS NOTE ADULT - SUBJECTIVE AND OBJECTIVE BOX
Interval HPI:  - receiving HD now  - intubated last night, transferred to MICU    Exam:  intubated and sedated  pupils constricted, equal  irreg rhythm  bilat air entry, scattered rhonchi  abd nontender  trace edema    Radiology: cxr from last night- bilat patchy opacities, possible air under right diaphragm vs lineal atelectasis - repeat cxr pending    On Admission  12-13-22 (6d)  HPI:  72M with PMH of  hypertension, hyperlipidemia, ESRD on HD, emphysema s/p lung transplant in [place] by Dr. 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 feacalis bacteremia, newly diagnosed HFrEF 25%, AF complicated by GIB was discharged on home IV Abx now presenting with AMS    Patient unable to contribute to history secondary to mental status. History obtained by EMR and Spouse.  Patient was discharged day prior to Thanksgiving with home iv abx. Per Spouse has been receiving IV abx administered by wife and daughter. Has not missed any HD sessions with exception to today.  Since discharge has been becoming increasingly tired. Wife attributes this to not sleeping well. Furthermore patient complained of severe nausea yesterday afternoon but did not have emesis. Approx 1 hour after that became more ''confused and out of it'' but still talking. Symptoms continued to progress and get worse throughout the night and in the morning was only moaning and extremely lethargic.     No recent cough, cp, sob. Denies recent fevers, chills.     Presented to the ED with daughter. CTH obtained and did not reveal any acute findings.  Patient was admitted to medicine for encephalopathy.         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      2019 novel coronavirus disease (COVID-19)  Feb 2021 - hospitalized for 1 week at [facility]      Pneumonia  April 2021      [place] (hard of hearing)      HTN (hypertension)      Lumbar spondylosis      History of COPD      BPH without urinary obstruction      Hypercholesterolemia      Oliguria and anuria      H/O peripheral neuropathy      H/O lung transplant  bilateral - transplant - 1-2-2015 -  [facility]      H/O heart artery stent  x3 @[facility]      History of hip replacement  right      Status post open reduction and internal fixation (ORIF) of fracture  left femur          Antimicrobial:  acyclovir IVPB 350 milliGRAM(s) IV Intermittent every 24 hours  acyclovir IVPB      fluconAZOLE   Tablet 200 milliGRAM(s) Oral <User Schedule>  rifAXIMin 550 milliGRAM(s) Oral two times a day  trimethoprim  40 mG/sulfamethoxazole 200 mG Suspension 80 milliGRAM(s) Oral <User Schedule>    Cardiovascular:  metoprolol tartrate 12.5 milliGRAM(s) Enteral Tube every 12 hours  norepinephrine Infusion 0.05 MICROgram(s)/kG/Min IV Continuous <Continuous>    Pulmonary:    Hematalogic:  heparin   Injectable 5000 Unit(s) SubCutaneous every 8 hours    Other:  atorvastatin 80 milliGRAM(s) Oral at bedtime  chlorhexidine 0.12% Liquid 15 milliLiter(s) Oral Mucosa every 12 hours  chlorhexidine 2% Cloths 1 Application(s) Topical <User Schedule>  dextrose 50% Injectable 25 Gram(s) IV Push once  dextrose 50% Injectable 12.5 Gram(s) IV Push once  dextrose 50% Injectable 25 Gram(s) IV Push once  dextrose Oral Gel 15 Gram(s) Oral once  glucagon  Injectable 1 milliGRAM(s) IntraMuscular once  hydrocortisone sodium succinate Injectable 50 milliGRAM(s) IV Push every 6 hours  HYDROmorphone  Injectable 0.5 milliGRAM(s) IV Push every 4 hours PRN  lactulose Syrup 10 Gram(s) Enteral Tube every 6 hours  OLANZapine Injectable 5 milliGRAM(s) IntraMuscular every 6 hours PRN  pantoprazole  Injectable 40 milliGRAM(s) IV Push daily  propofol Infusion 10 MICROgram(s)/kG/Min IV Continuous <Continuous>  sevelamer carbonate Powder 800 milliGRAM(s) Oral three times a day with meals  tacrolimus 1.5 milliGRAM(s) Oral daily      Drug Dosing Weight  Height (cm): 160 (13 Dec 2022 01:38)  Weight (kg): 63.3 (14 Dec 2022 03:28)  BMI (kg/m2): 24.7 (14 Dec 2022 03:28)  BSA (m2): 1.66 (14 Dec 2022 03:28)    T(C): 36.8 (12-19-22 @ 12:00), Max: 37.9 (12-19-22 @ 00:00)  HR: 109 (12-19-22 @ 12:37)  BP: 99/54 (12-19-22 @ 12:00)  BP(mean): 67 (12-19-22 @ 12:00)  ABP: --  ABP(mean): --  RR: 16 (12-19-22 @ 12:00)  SpO2: 100% (12-19-22 @ 12:37)    ABG - ( 18 Dec 2022 21:51 )  pH, Arterial: 7.390 pH, Blood: x     /  pCO2: 47    /  pO2: 212   / HCO3: 28    / Base Excess: 3.5   /  SaO2: 100.0                 12-18 @ 07:01  -  12-19 @ 07:00  --------------------------------------------------------  IN: 345.9 mL / OUT: 2000 mL / NET: -1654.1 mL        Mode: AC/ CMV (Assist Control/ Continuous Mandatory Ventilation)  RR (machine): 16  TV (machine): 450  FiO2: 40  PEEP: 6  MAP: 11  PIP: 24        LABS:  CBC Full  -  ( 19 Dec 2022 03:25 )  WBC Count : 11.54 K/uL  RBC Count : 3.53 M/uL  Hemoglobin : 10.6 g/dL  Hematocrit : 33.3 %  Platelet Count - Automated : 99 K/uL  Mean Cell Volume : 94.3 fl  Mean Cell Hemoglobin : 30.0 pg  Mean Cell Hemoglobin Concentration : 31.8 gm/dL  Auto Neutrophil # : x  Auto Lymphocyte # : x  Auto Monocyte # : x  Auto Eosinophil # : x  Auto Basophil # : x  Auto Neutrophil % : x  Auto Lymphocyte % : x  Auto Monocyte % : x  Auto Eosinophil % : x  Auto Basophil % : x    12-19    138  |  94<L>  |  31.6<H>  ----------------------------<  84  3.7   |  27.0  |  5.21<H>    Ca    8.8      19 Dec 2022 03:25    TPro  5.4<L>  /  Alb  2.9<L>  /  TBili  0.6  /  DBili  x   /  AST  32  /  ALT  9   /  AlkPhos  240<H>  12-19    PT/INR - ( 18 Dec 2022 18:05 )   PT: 13.2 sec;   INR: 1.14 ratio         PTT - ( 18 Dec 2022 18:05 )  PTT:60.1 sec      ____________________________________________________________________________________________________

## 2022-12-19 NOTE — PROGRESS NOTE ADULT - SUBJECTIVE AND OBJECTIVE BOX
Chief Complaint:  Patient is a 72y old  Male who presents with a chief complaint of Dilated CBD (18 Dec 2022 09:46)      HPI/ 24 hr events: Patient seen and examined at bedside. Overnight an RRT was called for increased WOB and change in mental status, pt tachycardic, tachypneic and hypothermic, he required emergency HD and intubation, he is now in ICU care. He remains intubated. Alk phos improved to 240, other LFTs normal.       REVIEW OF SYSTEMS:   unable to obtain     MEDICATIONS:   MEDICATIONS  (STANDING):  acyclovir IVPB 350 milliGRAM(s) IV Intermittent every 24 hours  acyclovir IVPB      atorvastatin 80 milliGRAM(s) Oral at bedtime  chlorhexidine 0.12% Liquid 15 milliLiter(s) Oral Mucosa every 12 hours  chlorhexidine 2% Cloths 1 Application(s) Topical <User Schedule>  dextrose 50% Injectable 25 Gram(s) IV Push once  dextrose 50% Injectable 12.5 Gram(s) IV Push once  dextrose 50% Injectable 25 Gram(s) IV Push once  dextrose Oral Gel 15 Gram(s) Oral once  fluconAZOLE   Tablet 200 milliGRAM(s) Oral <User Schedule>  glucagon  Injectable 1 milliGRAM(s) IntraMuscular once  heparin   Injectable 5000 Unit(s) SubCutaneous every 8 hours  hydrocortisone sodium succinate Injectable 50 milliGRAM(s) IV Push every 6 hours  lactulose Syrup 10 Gram(s) Enteral Tube every 6 hours  meropenem Injectable 1000 milliGRAM(s) IV Push every 24 hours  metoprolol tartrate 12.5 milliGRAM(s) Enteral Tube every 12 hours  norepinephrine Infusion 0.05 MICROgram(s)/kG/Min (5.93 mL/Hr) IV Continuous <Continuous>  pantoprazole  Injectable 40 milliGRAM(s) IV Push daily  potassium chloride  10 mEq/100 mL IVPB 10 milliEquivalent(s) IV Intermittent once  propofol Infusion 10 MICROgram(s)/kG/Min (3.8 mL/Hr) IV Continuous <Continuous>  rifAXIMin 550 milliGRAM(s) Oral two times a day  sevelamer carbonate 800 milliGRAM(s) Oral three times a day with meals  tacrolimus 1.5 milliGRAM(s) Oral daily  trimethoprim  40 mG/sulfamethoxazole 200 mG Suspension 80 milliGRAM(s) Oral <User Schedule>    MEDICATIONS  (PRN):  HYDROmorphone  Injectable 0.5 milliGRAM(s) IV Push every 4 hours PRN Severe Pain (7 - 10)  OLANZapine Injectable 5 milliGRAM(s) IntraMuscular every 6 hours PRN agitation      ALLERGIES:   Allergies    budesonide (Unknown)  cefpodoxime (Unknown)  Pollen (Unknown)    Intolerances        VITAL SIGNS:   Vital Signs Last 24 Hrs  T(C): 37.4 (19 Dec 2022 08:00), Max: 37.9 (19 Dec 2022 00:00)  T(F): 99.3 (19 Dec 2022 08:00), Max: 100.2 (19 Dec 2022 00:00)  HR: 104 (19 Dec 2022 08:00) (65 - 133)  BP: 117/75 (19 Dec 2022 08:00) (49/40 - 172/96)  BP(mean): 88 (19 Dec 2022 08:00) (45 - 102)  RR: 16 (19 Dec 2022 08:00) (13 - 32)  SpO2: 98% (19 Dec 2022 08:00) (91% - 100%)    Parameters below as of 19 Dec 2022 04:00  Patient On (Oxygen Delivery Method): ventilator    O2 Concentration (%): 40  I&O's Summary    18 Dec 2022 07:01  -  19 Dec 2022 07:00  --------------------------------------------------------  IN: 345.9 mL / OUT: 2000 mL / NET: -1654.1 mL        PHYSICAL EXAM:   GENERAL:  Sedated   HEENT:  NC/AT, conjunctiva clear, sclera anicteric  CHEST:  Intubated   HEART:  Regular rhythm  ABDOMEN:  Soft, non-tender, non-distended, normoactive bowel sounds, no rebound or guarding  SKIN:  Warm, dry  NEURO: Sedated     LABS:                        10.6   11.54 )-----------( 99       ( 19 Dec 2022 03:25 )             33.3     12-19    138  |  94<L>  |  31.6<H>  ----------------------------<  84  3.7   |  27.0  |  5.21<H>    Ca    8.8      19 Dec 2022 03:25    TPro  5.4<L>  /  Alb  2.9<L>  /  TBili  0.6  /  DBili  x   /  AST  32  /  ALT  9   /  AlkPhos  240<H>  12-19    LIVER FUNCTIONS - ( 19 Dec 2022 03:25 )  Alb: 2.9 g/dL / Pro: 5.4 g/dL / ALK PHOS: 240 U/L / ALT: 9 U/L / AST: 32 U/L / GGT: x           PT/INR - ( 18 Dec 2022 18:05 )   PT: 13.2 sec;   INR: 1.14 ratio         PTT - ( 18 Dec 2022 18:05 )  PTT:60.1 sec                                    RADIOLOGY & ADDITIONAL STUDIES:             Chief Complaint:  Patient is a 72y old  Male who presents with a chief complaint of Dilated CBD (18 Dec 2022 09:46)      HPI/ 24 hr events: Patient seen and examined at bedside. Overnight an RRT was called for increased WOB and change in mental status, pt tachycardic, tachypneic and hypothermic, he required emergency HD and intubation, he is now in ICU care. He remains intubated. Alk phos improved to 240, other LFTs normal.       REVIEW OF SYSTEMS:   unable to obtain intubated     MEDICATIONS:   MEDICATIONS  (STANDING):  acyclovir IVPB 350 milliGRAM(s) IV Intermittent every 24 hours  acyclovir IVPB      atorvastatin 80 milliGRAM(s) Oral at bedtime  chlorhexidine 0.12% Liquid 15 milliLiter(s) Oral Mucosa every 12 hours  chlorhexidine 2% Cloths 1 Application(s) Topical <User Schedule>  dextrose 50% Injectable 25 Gram(s) IV Push once  dextrose 50% Injectable 12.5 Gram(s) IV Push once  dextrose 50% Injectable 25 Gram(s) IV Push once  dextrose Oral Gel 15 Gram(s) Oral once  fluconAZOLE   Tablet 200 milliGRAM(s) Oral <User Schedule>  glucagon  Injectable 1 milliGRAM(s) IntraMuscular once  heparin   Injectable 5000 Unit(s) SubCutaneous every 8 hours  hydrocortisone sodium succinate Injectable 50 milliGRAM(s) IV Push every 6 hours  lactulose Syrup 10 Gram(s) Enteral Tube every 6 hours  meropenem Injectable 1000 milliGRAM(s) IV Push every 24 hours  metoprolol tartrate 12.5 milliGRAM(s) Enteral Tube every 12 hours  norepinephrine Infusion 0.05 MICROgram(s)/kG/Min (5.93 mL/Hr) IV Continuous <Continuous>  pantoprazole  Injectable 40 milliGRAM(s) IV Push daily  potassium chloride  10 mEq/100 mL IVPB 10 milliEquivalent(s) IV Intermittent once  propofol Infusion 10 MICROgram(s)/kG/Min (3.8 mL/Hr) IV Continuous <Continuous>  rifAXIMin 550 milliGRAM(s) Oral two times a day  sevelamer carbonate 800 milliGRAM(s) Oral three times a day with meals  tacrolimus 1.5 milliGRAM(s) Oral daily  trimethoprim  40 mG/sulfamethoxazole 200 mG Suspension 80 milliGRAM(s) Oral <User Schedule>    MEDICATIONS  (PRN):  HYDROmorphone  Injectable 0.5 milliGRAM(s) IV Push every 4 hours PRN Severe Pain (7 - 10)  OLANZapine Injectable 5 milliGRAM(s) IntraMuscular every 6 hours PRN agitation      ALLERGIES:   Allergies    budesonide (Unknown)  cefpodoxime (Unknown)  Pollen (Unknown)    Intolerances        VITAL SIGNS:   Vital Signs Last 24 Hrs  T(C): 37.4 (19 Dec 2022 08:00), Max: 37.9 (19 Dec 2022 00:00)  T(F): 99.3 (19 Dec 2022 08:00), Max: 100.2 (19 Dec 2022 00:00)  HR: 104 (19 Dec 2022 08:00) (65 - 133)  BP: 117/75 (19 Dec 2022 08:00) (49/40 - 172/96)  BP(mean): 88 (19 Dec 2022 08:00) (45 - 102)  RR: 16 (19 Dec 2022 08:00) (13 - 32)  SpO2: 98% (19 Dec 2022 08:00) (91% - 100%)    Parameters below as of 19 Dec 2022 04:00  Patient On (Oxygen Delivery Method): ventilator    O2 Concentration (%): 40  I&O's Summary    18 Dec 2022 07:01  -  19 Dec 2022 07:00  --------------------------------------------------------  IN: 345.9 mL / OUT: 2000 mL / NET: -1654.1 mL        PHYSICAL EXAM:   GENERAL:  Sedated   HEENT:  NC/AT, conjunctiva clear, sclera anicteric ogt in place intubated  CHEST: course breaths sounds bilaterally anterior chest   HEART:  Regular rhythm  ABDOMEN:  Soft, non-tender, non-distended, normoactive bowel sounds, no rebound or guarding  SKIN:  Warm, dry  NEURO: Sedated     LABS:                        10.6   11.54 )-----------( 99       ( 19 Dec 2022 03:25 )             33.3     12-19    138  |  94<L>  |  31.6<H>  ----------------------------<  84  3.7   |  27.0  |  5.21<H>    Ca    8.8      19 Dec 2022 03:25    TPro  5.4<L>  /  Alb  2.9<L>  /  TBili  0.6  /  DBili  x   /  AST  32  /  ALT  9   /  AlkPhos  240<H>  12-19    LIVER FUNCTIONS - ( 19 Dec 2022 03:25 )  Alb: 2.9 g/dL / Pro: 5.4 g/dL / ALK PHOS: 240 U/L / ALT: 9 U/L / AST: 32 U/L / GGT: x           PT/INR - ( 18 Dec 2022 18:05 )   PT: 13.2 sec;   INR: 1.14 ratio         PTT - ( 18 Dec 2022 18:05 )  PTT:60.1 sec            RADIOLOGY & ADDITIONAL STUDIES:

## 2022-12-19 NOTE — PROGRESS NOTE ADULT - NS ATTEND AMEND GEN_ALL_CORE FT
I saw and examined pt with NP   + BS, Soft, non tender   agree with above assessment  and plan
Pt seen and examined with NP   Poor historian  denies abdominal pain  awaiting blood work and MRCP
Agree with above. Patient seen and examined at bedside. No with acute respiratory failure. Alk phos elevated remained LAES wnl. Isozymes pending. He is critically ill. He does not have cholangitis MRCP can be performed once patient is improved. GI will sign off. Please call with questions.

## 2022-12-20 NOTE — PROGRESS NOTE ADULT - ASSESSMENT
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 CT head is negative. Hospital course is notable for empiric treatment for meningitis; also with dilated common bile duct; awaiting MRCP; acutely decompensated on 12/18 with acute hypercapnic respiratory failure in setting of aspiration pneumonia, emergently intubated, now extubated; also in shock (resolved). Nephrology is managing ESRD on HD.    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

## 2022-12-20 NOTE — PROGRESS NOTE ADULT - SUBJECTIVE AND OBJECTIVE BOX
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 faecalis bacteremia, admitted on 12/13 with altered mental status of unclear etiology. Had been being treated empirically for meningitis on the medicine service. Diagnostic LP had been deferred due to anticoagulation.  Incidentally found to have a dilated CBD with eventual plan for MRCP. Transferred to MICU on 12/18 after developing respiratory distress, hypoxemia, and worsened mental status, necessitating emergent intubation/mechanical ventilation. ABG revealed severe hypercapnia/resp acidosis. Noted to have what appeared to be tube feedings in his airways. Developed shock state requiring IV vasopressor.    Events last 24 hours: Remains on full mechanical vent support. S/p LP yesterday and CSF shows positive cryptococcal Ag. Weaned off IV vasopressor. Tolerated HD yesterday. Sedated on propofol.        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      2019 novel coronavirus disease (COVID-19)  Feb 2021 - hospitalized for 1 week at [facility]      Pneumonia  April 2021      [place]      HTN (hypertension)      Lumbar spondylosis      History of COPD      BPH without urinary obstruction      Hypercholesterolemia      Oliguria and anuria      H/O peripheral neuropathy      H/O lung transplant  bilateral - transplant - 1-2-2015 -  [facility]      H/O heart artery stent  x3 @[facility]      History of hip replacement  right      Status post open reduction and internal fixation (ORIF) of fracture  left femur          Review of Systems:  Unable to obtain secondary to sedation/intubation.    Medications:  acyclovir IVPB      acyclovir IVPB 350 milliGRAM(s) IV Intermittent every 24 hours  amphotericin B  liposome  IVPB 320 milliGRAM(s) IV Intermittent every 24 hours  flucytosine 1500 milliGRAM(s) Oral daily  meropenem Injectable 1000 milliGRAM(s) IV Push <User Schedule>  rifAXIMin 550 milliGRAM(s) Oral two times a day  trimethoprim  40 mG/sulfamethoxazole 200 mG Suspension 80 milliGRAM(s) Oral <User Schedule>  metoprolol tartrate 12.5 milliGRAM(s) Enteral Tube every 12 hours  HYDROmorphone  Injectable 0.5 milliGRAM(s) IV Push every 4 hours PRN  OLANZapine Injectable 5 milliGRAM(s) IntraMuscular every 6 hours PRN  propofol Infusion 10 MICROgram(s)/kG/Min IV Continuous <Continuous>  heparin   Injectable 5000 Unit(s) SubCutaneous every 8 hours  lactulose Syrup 10 Gram(s) Enteral Tube every 6 hours  pantoprazole  Injectable 40 milliGRAM(s) IV Push daily  atorvastatin 80 milliGRAM(s) Oral at bedtime  dextrose 50% Injectable 25 Gram(s) IV Push once  dextrose 50% Injectable 12.5 Gram(s) IV Push once  dextrose 50% Injectable 25 Gram(s) IV Push once  dextrose Oral Gel 15 Gram(s) Oral once  glucagon  Injectable 1 milliGRAM(s) IntraMuscular once  hydrocortisone 10 milliGRAM(s) Oral two times a day  tacrolimus 1.5 milliGRAM(s) Oral daily  chlorhexidine 0.12% Liquid 15 milliLiter(s) Oral Mucosa every 12 hours  chlorhexidine 2% Cloths 1 Application(s) Topical <User Schedule>  sevelamer carbonate Powder 800 milliGRAM(s) Oral three times a day with meals      Mode: AC/ CMV (Assist Control/ Continuous Mandatory Ventilation)  RR (machine): 16  TV (machine): 450  FiO2: 50  PEEP: 6  MAP: 11  PIP: 21      ICU Vital Signs Last 24 Hrs  T(C): 36.3 (20 Dec 2022 04:00), Max: 37.4 (19 Dec 2022 06:00)  T(F): 97.3 (20 Dec 2022 04:00), Max: 99.3 (19 Dec 2022 06:00)  HR: 80 (20 Dec 2022 04:36) (66 - 115)  BP: 122/66 (20 Dec 2022 04:00) (94/48 - 166/78)  BP(mean): 81 (20 Dec 2022 04:00) (63 - 139)  ABP: --  ABP(mean): --  RR: 19 (20 Dec 2022 04:00) (15 - 24)  SpO2: 100% (20 Dec 2022 04:36) (90% - 100%)    O2 Parameters below as of 20 Dec 2022 04:00  Patient On (Oxygen Delivery Method): ventilator    O2 Concentration (%): 50        ABG - ( 19 Dec 2022 18:39 )  pH, Arterial: 7.540 pH, Blood: x     /  pCO2: 32    /  pO2: 311   / HCO3: 27    / Base Excess: 4.9   /  SaO2: 100.0               I&O's Detail    18 Dec 2022 07:01  -  19 Dec 2022 07:00  --------------------------------------------------------  IN:    Enteral Tube Flush: 180 mL    Norepinephrine: 56 mL    Propofol: 109.9 mL  Total IN: 345.9 mL    OUT:    Other (mL): 2000 mL  Total OUT: 2000 mL    Total NET: -1654.1 mL      19 Dec 2022 07:01  -  20 Dec 2022 04:42  --------------------------------------------------------  IN:    Enteral Tube Flush: 520 mL    IV PiggyBack: 100 mL    IV PiggyBack: 100 mL    IV PiggyBack: 250 mL    Nepro: 260 mL    Propofol: 204 mL  Total IN: 1434 mL    OUT:    Other (mL): 1500 mL  Total OUT: 1500 mL    Total NET: -66 mL            LABS:                        11.3   11.38 )-----------( 114      ( 20 Dec 2022 03:45 )             33.7     12-20    135  |  94<L>  |  27.9<H>  ----------------------------<  175<H>  3.5   |  28.0  |  3.97<H>    Ca    8.3<L>      20 Dec 2022 03:45  Phos  3.7     12-20  Mg     2.0     12-20    TPro  5.2<L>  /  Alb  2.7<L>  /  TBili  0.4  /  DBili  x   /  AST  34  /  ALT  8   /  AlkPhos  221<H>  12-20          CAPILLARY BLOOD GLUCOSE      POCT Blood Glucose.: 135 mg/dL (18 Dec 2022 17:53)    PT/INR - ( 20 Dec 2022 03:45 )   PT: 13.2 sec;   INR: 1.14 ratio         PTT - ( 18 Dec 2022 18:05 )  PTT:60.1 sec    CULTURES:  Culture Results:   No Growth Final (12-13-22 @ 05:41)  Culture Results:   No Growth Final (12-13-22 @ 05:41)        Physical Examination:    General: No acute distress.  sedated, intubated, supine    HEENT: Pupils equal, reactive to light.  Symmetric.    PULM: Clear to auscultation bilaterally, no significant sputum production    CVS: tachycardic, reg rhythm, no murmurs, rubs, or gallops    ABD: Soft, mildly distended, nontender, normoactive bowel sounds, reducible ventral hernia    EXT: No edema, nontender    SKIN: Warm and well perfused, no rashes noted.    NEURO: sedated, moves all extremities        RADIOLOGY:     < from: Xray Chest 1 View- PORTABLE-Urgent (Xray Chest 1 View- PORTABLE-Urgent .) (12.19.22 @ 14:21) >  FINDINGS/  IMPRESSION:  Endotracheal tube terminates just above the konrad. The nasogastric tube   courses below the left hemidiaphragm, tip off edge of film.. A   right-sided PICC line is seen in the SVC region. Multiple wires overlie   the patient.  HEART:  Enlarged  LUNGS: Bilateral patchy infiltrates are seen left greater than right with   left effusion.  BONES: sternotomy wires    No free air.          CRITICAL CARE TIME SPENT: 35 mins  Time spent evaluating/treating patient with medical issues that pose a high risk for life threatening deterioration and/or end-organ damage, reviewing data/labs/imaging, discussing case with multidisciplinary team, discussing plan/goals of care with patient/family. Non-inclusive of procedure time.

## 2022-12-20 NOTE — PROGRESS NOTE ADULT - RESPIRATORY
bilateral air entry and no signs of labored breathing with NC, no w/r/r bilateral air entry and no signs of labored breathing with NC, no w/r/r/no rales/no rhonchi

## 2022-12-20 NOTE — CHART NOTE - NSCHARTNOTEFT_GEN_A_CORE
Source: Patient [ ]  Family [ ]   other [x ]    Current Diet: Diet, NPO with Tube Feed:   Tube Feeding Modality: Orogastric  Nepro (NEPRORTH)  Total Volume for 24 Hours (mL): 960  Continuous  Starting Tube Feed Rate {mL per Hour}: 10  Increase Tube Feed Rate by (mL): 10     Every 2 hours  Until Goal Tube Feed Rate (mL per Hour): 40  Tube Feed Duration (in Hours): 24  Tube Feed Start Time: 18:00 (12-19-22 @ 13:25)    Enteral /Parenteral Nutrition: Nepro at goal rate of 40 ml/hr (x20 hrs) to provide 800 ml, 1440 kcal, 65g protein, 582 ml free water, and 80% of RDIs for vitamins/minerals. Additional free water per MD discretion.     Current Weight:   (12/20) 138.8 lbs  (12/18) 137.7 lbs  (12/16) 138.2 lbs  (12/14) 138 lbs  Obtain daily wts, continue to monitor  No edema noted    Pertinent Medications: MEDICATIONS  (STANDING):  amphotericin B  liposome  IVPB 320 milliGRAM(s) IV Intermittent every 24 hours  atorvastatin 80 milliGRAM(s) Oral at bedtime  chlorhexidine 0.12% Liquid 15 milliLiter(s) Oral Mucosa every 12 hours  chlorhexidine 2% Cloths 1 Application(s) Topical <User Schedule>  dextrose 50% Injectable 25 Gram(s) IV Push once  dextrose 50% Injectable 12.5 Gram(s) IV Push once  dextrose 50% Injectable 25 Gram(s) IV Push once  dextrose Oral Gel 15 Gram(s) Oral once  flucytosine 1500 milliGRAM(s) Oral <User Schedule>  glucagon  Injectable 1 milliGRAM(s) IntraMuscular once  heparin   Injectable 5000 Unit(s) SubCutaneous every 8 hours  hydrocortisone 10 milliGRAM(s) Oral two times a day  meropenem Injectable 1000 milliGRAM(s) IV Push <User Schedule>  metoprolol tartrate 12.5 milliGRAM(s) Enteral Tube every 12 hours  pantoprazole  Injectable 40 milliGRAM(s) IV Push daily  propofol Infusion 10 MICROgram(s)/kG/Min (3.8 mL/Hr) IV Continuous <Continuous>  sevelamer carbonate Powder 800 milliGRAM(s) Oral three times a day with meals  tacrolimus 1.5 milliGRAM(s) Oral daily  trimethoprim  40 mG/sulfamethoxazole 200 mG Suspension 80 milliGRAM(s) Oral <User Schedule>    MEDICATIONS  (PRN):  HYDROmorphone  Injectable 0.5 milliGRAM(s) IV Push every 4 hours PRN Severe Pain (7 - 10)  OLANZapine Injectable 5 milliGRAM(s) IntraMuscular every 6 hours PRN agitation    Pertinent Labs: CBC Full  -  ( 20 Dec 2022 03:45 )  WBC Count : 11.38 K/uL  RBC Count : 3.75 M/uL  Hemoglobin : 11.3 g/dL  Hematocrit : 33.7 %  Platelet Count - Automated : 114 K/uL  Mean Cell Volume : 89.9 fl  Mean Cell Hemoglobin : 30.1 pg  Mean Cell Hemoglobin Concentration : 33.5 gm/dL  Auto Neutrophil # : 10.66 K/uL  Auto Lymphocyte # : 0.25 K/uL  Auto Monocyte # : 0.32 K/uL  Auto Eosinophil # : 0.01 K/uL  Auto Basophil # : 0.02 K/uL  Auto Neutrophil % : 93.6 %  Auto Lymphocyte % : 2.2 %  Auto Monocyte % : 2.8 %  Auto Eosinophil % : 0.1 %  Auto Basophil % : 0.2 %  12-20 Na135 mmol/L Glu 175 mg/dL<H> K+ 3.5 mmol/L Cr  3.97 mg/dL<H> BUN 27.9 mg/dL<H> Phos 3.7 mg/dL Alb 2.7 g/dL<L> PAB n/a       Skin: no breakdown noted    Nutrition focused physical exam conducted - found signs of malnutrition [x ]absent [ ]present    Subcutaneous fat loss: [ ] Orbital fat pads region, [ ]Buccal fat region, [ ]Triceps region,  [ ]Ribs region    Muscle wasting: [ ]Temples region, [ ]Clavicle region, [ ]Shoulder region, [ ]Scapula region, [ ]Interosseous region,  [ ]thigh region, [ ]Calf region    Estimated Needs:   [x ] no change since previous assessment  [ ] recalculated:     Current Nutrition Diagnosis: Pt remains at high nutrition risk secondary to Increased Nutrient Needs related to increased physiological demand for nutrients as evidenced by ESRD on HD, pmhx. Pt was extubated this morning. Tube feed running at goal rate of 40ml/hr and tolerating well. Aware propofol daily total 230ml providing additional 253kcal. Last documented BM 12/20.     Recommendations:   1) Increase tube feeding to goal rate 55 ml/hr (x20 hrs) to provide 1100 ml, 1980 kcal, 89g protein, 800 ml free water, and >100% of RDIs for vitamins/minerals. Additional free water per MD discretion.   2) Consider initiating po diet as/when medically feasible   3) RX: nephro-alok daily  4) Monitor wts and labs    Monitoring and Evaluation:   [ x] PO intake [x ] Tolerance to diet prescription [X] Weights  [X] Follow up per protocol [X] Labs:

## 2022-12-20 NOTE — PROGRESS NOTE ADULT - SUBJECTIVE AND OBJECTIVE BOX
[facility]                                                INFECTIOUS DISEASES  =======================================================                               J Carlos Galdamez MD#    Isatu Rojas MD*                           Perlita Solares MD*   Sumaya Morales MD*            Diplomates American Board of Internal Medicine & Infectious Diseases                  # [place] Office - Appt - Tel  [phone] Fax [phone]                * [place] Office - Appt - Tel [phone] Fax [phone]                                  Hospital Consult line:  [phone]  =======================================================      [id number]  JU HENSLEYMARYDALJIT   follow up for: ams  s/p LP normal cell counts glucose, pro 51, csf pcr (-)  crypt antigen in csf + but no titer reported    pt extubated lethargic, confused but axox2 conversant and follows commands  patient seen and examined.       I have personally reviewed the labs and data; pertinent labs and data are listed in this note; please see below.   ===================================================  REVIEW OF SYSTEMS:  CONSTITUTIONAL:  No Fever or chills  HEENT:  No diplopia or blurred vision.  No earache, sore throat or runny nose.  CARDIOVASCULAR:  No pressure, squeezing, strangling, tightness, heaviness or aching about the chest, neck, axilla or epigastrium.  RESPIRATORY:  No cough, shortness of breath  GASTROINTESTINAL:  No nausea, vomiting or diarrhea.  GENITOURINARY:  No dysuria, frequency or urgency. No Blood in urine  MUSCULOSKELETAL:  no joint aches, no muscle pain  SKIN:  No change in skin, hair or nails.  NEUROLOGIC:  No Headaches, seizures or weakness.  PSYCHIATRIC:  No disorder of thought or mood.  ENDOCRINE:  No heat or cold intolerance  HEMATOLOGICAL:  No easy bruising or bleeding.    =======================================================  Allergies    budesonide (Unknown)  cefpodoxime (Unknown)  Pollen (Unknown)    Intolerances    Antibiotics:  amphotericin B  liposome  IVPB 320 milliGRAM(s) IV Intermittent every 24 hours  fluconAZOLE IVPB      flucytosine 1500 milliGRAM(s) Oral <User Schedule>  meropenem Injectable 1000 milliGRAM(s) IV Push <User Schedule>  trimethoprim  40 mG/sulfamethoxazole 200 mG Suspension 80 milliGRAM(s) Oral <User Schedule>    Other medications:  atorvastatin 80 milliGRAM(s) Oral at bedtime  chlorhexidine 0.12% Liquid 15 milliLiter(s) Oral Mucosa every 12 hours  chlorhexidine 2% Cloths 1 Application(s) Topical <User Schedule>  dextrose 50% Injectable 25 Gram(s) IV Push once  dextrose 50% Injectable 12.5 Gram(s) IV Push once  dextrose 50% Injectable 25 Gram(s) IV Push once  dextrose Oral Gel 15 Gram(s) Oral once  heparin  Infusion. 800 Unit(s)/Hr IV Continuous <Continuous>  hydrocortisone sodium succinate Injectable 10 milliGRAM(s) IV Push every 8 hours  metoprolol tartrate 12.5 milliGRAM(s) Enteral Tube every 12 hours  pantoprazole  Injectable 40 milliGRAM(s) IV Push daily  sevelamer carbonate Powder 800 milliGRAM(s) Oral three times a day with meals  tacrolimus  IVPB 0.375 milliGRAM(s) IV Intermittent every 24 hours    ======================================================  Physical Exam:  ============  T(F): 97.9 (20 Dec 2022 19:00), Max: 98.1 (20 Dec 2022 02:00)  HR: 105 (20 Dec 2022 19:00)  BP: 122/69 (20 Dec 2022 19:00)  RR: 23 (20 Dec 2022 19:00)  SpO2: 100% (20 Dec 2022 19:00) (85% - 100%)  temp max in last 48H T(F): , Max: 100.2 (12-19-22 @ 00:00)    General:  No acute distress. lethargic, confused  Eye: no conjunctival pallor, no scleral icterus  HENT: Normocephalic, No pharyngeal erythema, No sinus tenderness.  Neck: Supple, No lymphadenopathy.  Respiratory: Lungs are clear to auscultation, Respirations are non-labored.  Cardiovascular: Normal rate, Regular rhythm,  s1+s2  Gastrointestinal: Soft, Non-tender, Non-distended, Normal bowel sounds.  Genitourinary: No costovertebral angle tenderness.  Lymphatics: No lymphadenopathy neck  Musculoskeletal: Normal range of motion, Normal strength. lue avf  Integumentary: No rash.  Neurologic: Alert, Oriented x 2, No focal deficits  =======================================================  Labs:                        11.3   12.20 )-----------( Clumped    ( 20 Dec 2022 17:40 )             34.3     12-20    135  |  94<L>  |  27.9<H>  ----------------------------<  175<H>  3.5   |  28.0  |  3.97<H>    Ca    8.3<L>      20 Dec 2022 03:45  Phos  3.7     12-20  Mg     2.0     12-20    TPro  5.2<L>  /  Alb  2.7<L>  /  TBili  0.4  /  DBili  x   /  AST  34  /  ALT  8   /  AlkPhos  221<H>  12-20      Culture - CSF with Gram Stain (collected 12-19-22 @ 17:10)  Source: .CSF CSF  Gram Stain (12-20-22 @ 15:20):    No polymorphonuclear leukocytes seen per low power field    No organisms seen per oil power field    by cytocentrifuge    Culture - Acid Fast - CSF (collected 12-19-22 @ 17:10)  Source: .CSF CSF    Culture - Sputum (collected 12-19-22 @ 16:00)  Source: .Sputum Sputum  Gram Stain (12-20-22 @ 01:59):    Moderate polymorphonuclear leukocytes per low power field    Few Squamous epithelial cells per low power field    Few Gram positive cocci in pairs seen per oil power field    Few Yeast like cells seen per oil power field    Few Gram Negative Rods seen per oil power field    Culture - Acid Fast - Body Fluid w/Smear (collected 12-19-22 @ 13:50)  Source: Paracentesis Paracentesis Fluid    Culture - Fungal, Body Fluid (collected 12-19-22 @ 13:50)  Source: Peritoneal Peritoneal Fluid    Culture - Body Fluid with Gram Stain (collected 12-19-22 @ 13:50)  Source: Paracentesis Paracentesis Fluid  Gram Stain (12-19-22 @ 22:30):    No polymorphonuclear leukocytes seen per low power field    No organisms seen per oil power field    Culture - Blood (collected 12-13-22 @ 05:41)  Source: .Blood Blood-Peripheral  Final Report (12-18-22 @ 10:00):    No Growth Final    Culture - Blood (collected 12-13-22 @ 05:41)  Source: .Blood Blood-Peripheral  Final Report (12-18-22 @ 10:00):    No Growth Final

## 2022-12-20 NOTE — PROGRESS NOTE ADULT - ASSESSMENT
72M with PMH of  hypertension, hyperlipidemia, ESRD on HD, emphysema s/p lung transplant in [place] by Dr. 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 faecalis bacteremia, newly diagnosed HFrEF 25%, AF complicated by GIB was discharged on home IV Abx now presenting with AMS  Presented to the ED with daughter. CTH obtained and did not reveal any acute findings.  Patient was admitted to medicine for encephalopathy. LP attempted bedside but unsuccessful. Was on empiric treatment and mental status was improving and then ultimately intubated for aspiration pneumonia    #Acute hypoxic respiratory failure likely 2/2 aspiration- now extubated, mental status improved. c/w meropenem. f/u sputum cx    #AMS-unclear etiology, no fever. ammonia level normal.  ct c/ap ?pna/edema/ascites.    s/p LP normal cell counts glucose and protein 51. CSF pcr (-) and cultures pending. Per [facility] micro lab CSF gram stain reported as "few organisms" but there was no further info and specimen sent to core lab. Asked core lab to repeat gram stain.   CSF crptyAg + but no titers available. unclear if recurrent fungal meningitis (appears less likely due to clinical improvement even prior to starting antifungal, negative CSF pcr)  vs positive CSF titers from past infection. asked core lab to check titers. called primary ID Dr Fields-no prior titers available as pt was treated at [facility], will try to obtain previous levels  check serum crytp antigen  c/w meropenem and vanco by level.  stopped acyclovir  on empiric amphotericin/flucytosine adjusted for HD  consider MRi head  s/p paracentesis low cell counts, cx pending      #h/o e.faecalis bacteremia - . CRISTIAN was deferred by cardio on recent admission. currently on meropenem, vanco by level. f/u BCX ngtd    # s/p lung transplant- per transplant team, cellcept  on hold, currently on cortef, c/w tacrolimus and check levels. c/w bactrim per home dose for ppx, fluc on hold    # h/o fungal meningitis- con empiric antifungal    # ESRD on HD- adjusted abx for renal function, monitor vanco level    #Dilated CBD on USG- plan for MRCP    # AScites- paracentesis if able, on meropenem    #Leukocytosis- multifactorial, on steroids, monitor    d/w micu, pharmacy, core lab

## 2022-12-20 NOTE — PROGRESS NOTE ADULT - CRITICAL CARE ATTENDING COMMENT
cc time spent titrating mechanical vent settings, vasoactive meds, IV sedatives, adjusting other life sustaining therapies.
cc time spent adjusting vent settings, performing spontaneous breathing trial, assessing respiratory status and ultimately liberating from vent, titrating IV sedatives and IV pressors.

## 2022-12-20 NOTE — PROGRESS NOTE ADULT - SUBJECTIVE AND OBJECTIVE BOX
Last dialyzed yesterday. Had LP yesterday. Extubated to nasal cannula this a.m. Awake but confused.    Vital Signs Last 24 Hrs  T(C): 36.4 (20 Dec 2022 08:00), Max: 37.2 (19 Dec 2022 09:38)  T(F): 97.5 (20 Dec 2022 08:00), Max: 99 (19 Dec 2022 09:38)  HR: 79 (20 Dec 2022 08:11) (66 - 115)  BP: 130/71 (20 Dec 2022 08:00) (94/48 - 166/78)  BP(mean): 90 (20 Dec 2022 08:00) (63 - 139)  RR: 13 (20 Dec 2022 08:00) (13 - 24)  SpO2: 100% (20 Dec 2022 08:11) (90% - 100%)    Parameters below as of 20 Dec 2022 09:03    O2 Flow (L/min): 3  O2 Concentration (%): 0    I&O's Summary    19 Dec 2022 07:01  -  20 Dec 2022 07:00  --------------------------------------------------------  IN: 1680 mL / OUT: 1500 mL / NET: 180 mL    20 Dec 2022 07:01  -  20 Dec 2022 09:18  --------------------------------------------------------  IN: 80 mL / OUT: 0 mL / NET: 80 mL    Physical Exam  General: Chronically ill appearing male lying in bed  Cardiac: S1S2 RRR  Respiratory: Diminished breath sounds b/l   Abdomen: Soft  Extremities: No appreciable edema of b/l LE  Neuro: Awake, confused, intermittently moaning     12-20    135  |  94<L>  |  27.9<H>  ----------------------------<  175<H>  3.5   |  28.0  |  3.97<H>    Ca    8.3<L>      20 Dec 2022 03:45  Phos  3.7     12-20  Mg     2.0     12-20    TPro  5.2<L>  /  Alb  2.7<L>  /  TBili  0.4  /  DBili  x   /  AST  34  /  ALT  8   /  AlkPhos  221<H>  12-20                        11.3   11.38 )-----------( 114      ( 20 Dec 2022 03:45 )             33.7     MEDICATIONS  (STANDING):  amphotericin B  liposome  IVPB 320 milliGRAM(s) IV Intermittent every 24 hours  atorvastatin 80 milliGRAM(s) Oral at bedtime  chlorhexidine 0.12% Liquid 15 milliLiter(s) Oral Mucosa every 12 hours  chlorhexidine 2% Cloths 1 Application(s) Topical <User Schedule>  dextrose 50% Injectable 25 Gram(s) IV Push once  dextrose 50% Injectable 12.5 Gram(s) IV Push once  dextrose 50% Injectable 25 Gram(s) IV Push once  dextrose Oral Gel 15 Gram(s) Oral once  flucytosine 1500 milliGRAM(s) Oral <User Schedule>  glucagon  Injectable 1 milliGRAM(s) IntraMuscular once  heparin   Injectable 5000 Unit(s) SubCutaneous every 8 hours  hydrocortisone 10 milliGRAM(s) Oral two times a day  meropenem Injectable 1000 milliGRAM(s) IV Push <User Schedule>  metoprolol tartrate 12.5 milliGRAM(s) Enteral Tube every 12 hours  pantoprazole  Injectable 40 milliGRAM(s) IV Push daily  propofol Infusion 10 MICROgram(s)/kG/Min (3.8 mL/Hr) IV Continuous <Continuous>  sevelamer carbonate Powder 800 milliGRAM(s) Oral three times a day with meals  tacrolimus 1.5 milliGRAM(s) Oral daily  trimethoprim  40 mG/sulfamethoxazole 200 mG Suspension 80 milliGRAM(s) Oral <User Schedule>    MEDICATIONS  (PRN):  HYDROmorphone  Injectable 0.5 milliGRAM(s) IV Push every 4 hours PRN Severe Pain (7 - 10)  OLANZapine Injectable 5 milliGRAM(s) IntraMuscular every 6 hours PRN agitation

## 2022-12-20 NOTE — PROGRESS NOTE ADULT - SUBJECTIVE AND OBJECTIVE BOX
Interval HPI:   - extubated and on NC 5L  - appears lethargic but responsive to commands  - unable to give additional history at the time    On Admission  12-13-22 (6d)  HPI:  72M with PMH of  hypertension, hyperlipidemia, ESRD on HD, emphysema s/p lung transplant in [place] by Dr. 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 feacalis bacteremia, newly diagnosed HFrEF 25%, AF complicated by GIB was discharged on home IV Abx now presenting with AMS    Patient unable to contribute to history secondary to mental status. History obtained by EMR and Spouse.  Patient was discharged day prior to Thanksgiving with home iv abx. Per Spouse has been receiving IV abx administered by wife and daughter. Has not missed any HD sessions with exception to today.  Since discharge has been becoming increasingly tired. Wife attributes this to not sleeping well. Furthermore patient complained of severe nausea yesterday afternoon but did not have emesis. Approx 1 hour after that became more ''confused and out of it'' but still talking. Symptoms continued to progress and get worse throughout the night and in the morning was only moaning and extremely lethargic.     No recent cough, cp, sob. Denies recent fevers, chills.     Presented to the ED with daughter. CTH obtained and did not reveal any acute findings.  Patient was admitted to medicine for encephalopathy. Interval HPI:   - placed, on spontaneous breathing trial, weaned down pressure support, extubated and on NC  - weaned off sedatives   - appears lethargic but responsive to commands  - unable to give additional history at the time    On Admission  12-13-22 (6d)  HPI:  72M with PMH of  hypertension, hyperlipidemia, ESRD on HD, emphysema s/p lung transplant in [place] by Dr. 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 feacalis bacteremia, newly diagnosed HFrEF 25%, AF complicated by GIB, was discharged on home IV Abx now presenting with AMS    Patient unable to contribute to history secondary to mental status. History obtained by EMR and Spouse.  Patient was discharged day prior to Thanksgiving with home iv abx. Per Spouse has been receiving IV abx administered by wife and daughter. Has not missed any HD sessions with exception to today.  Since discharge has been becoming increasingly tired. Wife attributes this to not sleeping well. Furthermore patient complained of severe nausea yesterday afternoon but did not have emesis. Approx 1 hour after that became more ''confused and out of it'' but still talking. Symptoms continued to progress and get worse throughout the night and in the morning was only moaning and extremely lethargic.     No recent cough, cp, sob. Denies recent fevers, chills.     Presented to the ED with daughter. CTH obtained and did not reveal any acute findings.  Patient was admitted to medicine for encephalopathy.

## 2022-12-20 NOTE — PROGRESS NOTE ADULT - ASSESSMENT
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 faecalis bacteremia, with:    # Acute mixed hypercapnic/hypoxemic respiratory failure  # Aspiration pneumonitis/pneumonia  # Recurrent cryptococcal meningitis  # Distributive shock  # Metabolic encephalopathy    - actively titrating ventilator settings to maintain SpO2 > 92% and adequate minute ventilation  - sedate with propofol to promote vent synchrony and patient comfort  - will perform spontaneous awakening and breathing trials this morning if he meets criteria  - shock state resolved, weaned off norepinephrine infusion, restart as necessary to maintain a MAP > 65  - reduce hydrocortisone back to 10mg BID (outpatient dosing)  - continue empiric meropenem, start amphotericin B and flucytosine   - diagnostic paracentesis performed on 12/19, peritoneal fluid culture is pending  - consider reduction in tacrolimus dose?  - GI input appreciated, dilated CBD, no obstruction, elevated alk phos, TBili normal, plan for MRCP when patient is stabilized  - ammonia level normal, will stop empiric lactulose and rifaximin now given CSF findings  - trend BUN/Cr, electrolytes, acid-base balance, monitor UOP  - hemodialysis as per nephrology    Case discussed with MICU physician, Dr. Briggs.

## 2022-12-20 NOTE — PROGRESS NOTE ADULT - ASSESSMENT
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 Faecalis bacteremia now readmitted on 12/13 for altered mental status, sepsis workup including ?acute meningitis, found also with dilated CBD on ultrasound complicated by acute respiratory failure with hypoxia  requiring intubation and transferred to MICU on 12/18.     Acute Respiratory Failure with hypoxia  - s/p extubation this morning to nasal cannula    Acute Encephalopathy  Bacterial PNA, ?Aspiration Event  Recurrent Cryptococcal Meningitis  Hx of E Faecalis Bacteremia on Home IV Antibioitic  - encephalopathy likely multifactorial  - CT CAP with pulmonary edema/ ?superimposed PNA  - s/p paracentesis and LP 12/19 --> CSF positive for cryptococcal antigen  - on Amphotericin B, flucytosine, Meropenem/Vanco, mgnt per ID  - follow up final cultures    Distended Gallbladder Duct  - normal ammonia, elevated alk phos (trending down)  - GI on board, eventual MRCP once more stable     ESRD on HD   - since 2020 after presumed contrast induced nephropathy for CAD with stenting   - mgnt per nephrology  - avoid nephrotoxic agents    Hx of Bilateral Lung Transplant for ES COPD  - in 2015, follows closely with [facility]   - on tacrolimus, home cellcept on hold    Palliative Care Encounter/Goals of care  - Surrogate/HCP/Guardian: wife Veronica Marshall [phone]  - Ongoing GOC pending clinical course/progress. Per discussion with wife yesterday she remains hopeful for further clinical improvement and wishes to continue all active medical interventions including FULL CODE status. Daughter is returning this evening from out of town and is her primary support system. Palliative team will continue to support and follow along.  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 Faecalis bacteremia now readmitted on 12/13 for altered mental status, sepsis workup including ?acute meningitis, found also with dilated CBD on ultrasound complicated by acute respiratory failure with hypoxia  requiring intubation and transferred to MICU on 12/18.     Acute Respiratory Failure with hypoxia  - s/p extubation this morning to nasal cannula    Acute Encephalopathy  Bacterial PNA, ?Aspiration Event  Recurrent Cryptococcal Meningitis  Hx of E Faecalis Bacteremia on Home IV Antibioitic  - encephalopathy likely multifactorial  - CT CAP with pulmonary edema/ ?superimposed PNA  - s/p paracentesis and LP 12/19 --> CSF positive for cryptococcal antigen  - on Amphotericin B, flucytosine, Meropenem/Vanco, mgnt per ID  - follow up final cultures     Acute Decompensated Liver Cirrhosis  - normal ammonia, elevated alk phos (trending down)  - GI on board, eventual MRCP once more stable     ESRD on HD   - since 2020 after presumed contrast induced nephropathy for CAD with stenting   - mgnt per nephrology  - avoid nephrotoxic agents    Hx of Bilateral Lung Transplant for ES COPD  - in 2015, follows closely with [facility]   - on tacrolimus, home cellcept on hold    Palliative Care Encounter/Goals of care  - Surrogate/HCP/Guardian: wife Veronica Marshall [phone]  - Ongoing GOC pending clinical course/progress. Per discussion with wife yesterday she remains hopeful for further clinical improvement and wishes to continue all active medical interventions including FULL CODE status. Daughter is returning this evening from out of town and is her primary support system. Palliative team will continue to support and follow along.

## 2022-12-20 NOTE — PROGRESS NOTE ADULT - ASSESSMENT
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  He was transferred to the medical ICU on 12/18 with aspiration pneumonia and acute respiratory failure requiring intubation. LP done bedside and CSF tested positive for cryptococcal antigens and patient started on Amphotericin B. Results from paracentesis pending. Patient extubated and on 5L NC.     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  He was transferred to the medical ICU on 12/18 with aspiration pneumonia and acute respiratory failure requiring intubation. Diagnostic paracentesis and LP done at bedside 12/19 and CSF tested positive for cryptococcal antigen along with positive gram smear. Patient extubated to nasal cannula on 12/21.     Plan    Meningitis - cryptococcal antigen positive, gram smear positive  - antifungal coverage per ID  - on empiric meropenem/vancomycin (by levels) pending culture results  - will d/c acyclovir given negative HSV PCR    Metabolic encephalopathy  - resolving, patient now follows commands    Acute respiratory failure/ aspiration pneumonia  - resolving, extubated today    Recent enterococcus feacalis bacteremia  - blood cultures this admission are negative    Afib/ recent IJ thrombus  - restart AC with heparin drip  - on discharge will restart eliquis  - metoprolol for rate control     Emphysema s/p lung transplant  - TAC level ordered for this morning  - currently on TAC and steroids    Sedation/Analgesia: off  Vasoactive medications: weaned off levo   DVT prophylaxis: starting heparin drip  GI prophylaxis: protonix  Nutrition: swallow eval  Central line: picc  Ro: none  Mobility: oob, pt  Family/Patient engagement/GOC: wife updated daily   Impression:  72-year-old male with past medical history of emphysema status post lung transplant in 2015, end-stage renal disease on hemodialysis, prior fungal meningitis, carotid stenosis status post CEA, CAD status post PCI, right IJ DVT on Eliquis, recent admission to [place] for Enterococcus faecalis bacteremia, newly diagnosed heart failure with reduced EF, atrial fibrillation, GI bleed,  who presented to the hospital on 12/13 with altered mental status.  He was transferred to the medical ICU on 12/18 with aspiration pneumonia and acute respiratory failure requiring intubation. Diagnostic paracentesis and LP done at bedside 12/19 and CSF tested positive for cryptococcal antigen along with positive gram smear. Patient extubated to nasal cannula on 12/21.     Plan    Meningitis - cryptococcal antigen positive, gram smear positive  - antifungal coverage per ID  - on empiric meropenem/vancomycin (by levels) pending culture results  - will d/c acyclovir given negative HSV PCR    Metabolic encephalopathy  - resolving, patient now follows commands    Acute respiratory failure/ aspiration pneumonia  - resolving, extubated today    Recent enterococcus feacalis bacteremia  - blood cultures this admission are negative    Afib/ recent IJ thrombus  - restart AC with heparin drip  - on discharge will restart eliquis  - metoprolol for rate control     Emphysema s/p lung transplant  - TAC level ordered for this morning  - currently on TAC and steroids    Chronic HFrEF  - goal directed medical therapy when BP improves    CAD s/p PCI  - lipitor, metoprolol    Dilated CBG/ decompensated cirrhosis  - eventual MRCP vs ERCP per GI  - rifaximin for hepatic encephalopathy  - will attempt diagnostic para if we can find pocket of fluid    Sedation/Analgesia: off  Vasoactive medications: weaned off levo   DVT prophylaxis: starting heparin drip  GI prophylaxis: protonix  Nutrition: swallow eval  Central line: picc  Ro: none  Mobility: oob, pt  Family/Patient engagement/GOC: wife updated daily

## 2022-12-21 NOTE — PROGRESS NOTE ADULT - ASSESSMENT
72M with PMH of  hypertension, hyperlipidemia, ESRD on HD, emphysema s/p lung transplant in [place] by Dr. 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 faecalis bacteremia, newly diagnosed HFrEF 25%, AF complicated by GIB was discharged on home IV Abx now presenting with AMS  Presented to the ED with daughter. CTH obtained and did not reveal any acute findings.  Patient was admitted to medicine for encephalopathy. LP attempted bedside but unsuccessful. Was on empiric treatment and mental status was improving and then ultimately intubated for aspiration pneumonia    #Acute hypoxic respiratory failure likely 2/2 aspiration- now extubated, mental status improved. c/w meropenem. f/u sputum cx    #AMS-unclear etiology, no fever. ammonia level normal.  ct c/ap ?pna/edema/ascites.    s/p LP normal cell counts glucose and protein 51. CSF pcr (-) and cultures pending. Per [facility] micro lab CSF gram stain reported as "few organisms" . gram stain repeated at core lab and micro and is NEGATIVE. initial report has been corrected. CSF titer to be sent out  CSF crptyAg + but no titers available. unclear if recurrent fungal meningitis (appears less likely due to clinical improvement even prior to starting antifungal, negative CSF pcr)  vs positive CSF titers from past infection. f/u csf titers   called primary ID Dr Fields-no prior titers available as pt was treated at [facility], will try to obtain previous levels  check serum crytp antigen  c/w meropenem and vanco by level for now  stopped acyclovir  on empiric amphotericin/flucytosine adjusted for HD  s/p paracentesis low cell counts, cx pending      #h/o e.faecalis bacteremia - . CRISTIAN was deferred by cardio on recent admission. currently on meropenem, vanco by level. f/u BCX ngtd, repeat TTE    # s/p lung transplant- per transplant team, cellcept  on hold as per pt wife discussion with [facility] transplant team, currently on cortef, c/w tacrolimus and check levels. c/w bactrim per home dose for ppx, fluc on hold    # h/o fungal meningitis- con empiric antifungal    # ESRD on HD- adjusted abx for renal function, monitor vanco level    #Dilated CBD on USG- plan for MRCP, not urgent as per GI    # AScites- s/p paracentesis not suggestive of sbp, on meropenem    #Leukocytosis- multifactorial, on steroids, monitor    d/w Dr Cutler, pharmacy[facility]    will f/u

## 2022-12-21 NOTE — PROGRESS NOTE ADULT - ASSESSMENT
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 faecalis bacteremia, admitted on 12/13 with altered mental status of unclear etiology who subseqently developed acute hypoxic respiratory failure secondary to aspiration pneumonia and septic shock

## 2022-12-21 NOTE — PROGRESS NOTE ADULT - SUBJECTIVE AND OBJECTIVE BOX
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      2019 novel coronavirus disease (COVID-19)  Feb 2021 - hospitalized for 1 week at [facility]      Pneumonia  April 2021      [place] (hard of hearing)      HTN (hypertension)      Lumbar spondylosis      History of COPD      BPH without urinary obstruction      Hypercholesterolemia      Oliguria and anuria      H/O peripheral neuropathy      H/O lung transplant  bilateral - transplant - 1-2-2015 -  [facility]      H/O heart artery stent  x3 @[facility]      History of hip replacement  right      Status post open reduction and internal fixation (ORIF) of fracture  left femur              SOCIAL HISTORY:        Review of Systems:  CONSTITUTIONAL: No fever, chills, or fatigue  EYES: No visual disturbances  ENMT:  No difficulty hearing  NECK: No pain  RESPIRATORY: No cough. No shortness of breath  CARDIOVASCULAR: No chest pain, palpitations, or leg swelling  GASTROINTESTINAL: No abdominal pain. No nausea, vomiting, diarrhea, or constipation. No hematemesis, melena or hematochezia  GENITOURINARY: No dysuria, frequency, hematuria, or incontinence  NEUROLOGICAL: No headaches, loss of strength, numbness, or tremors  SKIN: No rashes  MUSCULOSKELETAL: No back or extremity pain. No calf pain  PSYCHIATRIC: No depression, anxiety, or difficulty sleeping      [  ] Due to altered mental status/intubation, subjective information was not able to be obtained from the patient. History was obtained, to the extent possible, from review of the chart and collateral sources of information.        Medications:  amphotericin B  liposome  IVPB 320 milliGRAM(s) IV Intermittent every 24 hours  flucytosine 1500 milliGRAM(s) Oral <User Schedule>  meropenem Injectable 1000 milliGRAM(s) IV Push <User Schedule>  trimethoprim   80 mG/sulfamethoxazole 400 mG 1 Tablet(s) Oral <User Schedule>    metoprolol succinate ER 25 milliGRAM(s) Oral daily          heparin   Injectable 5000 Unit(s) IV Push every 6 hours PRN  heparin   Injectable 2500 Unit(s) IV Push every 6 hours PRN  heparin  Infusion. 400 Unit(s)/Hr IV Continuous <Continuous>    pantoprazole    Tablet 40 milliGRAM(s) Oral before breakfast      atorvastatin 80 milliGRAM(s) Oral at bedtime  dextrose 50% Injectable 25 Gram(s) IV Push once  dextrose 50% Injectable 12.5 Gram(s) IV Push once  dextrose 50% Injectable 25 Gram(s) IV Push once  dextrose Oral Gel 15 Gram(s) Oral once  hydrocortisone 10 milliGRAM(s) Oral every 12 hours      tacrolimus  IVPB 0.375 milliGRAM(s) IV Intermittent every 24 hours    chlorhexidine 2% Cloths 1 Application(s) Topical <User Schedule>    sevelamer carbonate 800 milliGRAM(s) Oral three times a day with meals          ICU Vital Signs Last 24 Hrs  T(C): 36.4 (21 Dec 2022 11:05), Max: 36.6 (20 Dec 2022 17:00)  T(F): 97.5 (21 Dec 2022 11:05), Max: 97.9 (20 Dec 2022 17:00)  HR: 81 (21 Dec 2022 10:00) (73 - 110)  BP: 142/85 (21 Dec 2022 10:00) (113/71 - 148/90)  BP(mean): 95 (21 Dec 2022 10:00) (79 - 111)  ABP: --  ABP(mean): --  RR: 27 (21 Dec 2022 10:00) (13 - 31)  SpO2: 100% (21 Dec 2022 10:00) (85% - 100%)    O2 Parameters below as of 21 Dec 2022 10:00  Patient On (Oxygen Delivery Method): nasal cannula  O2 Flow (L/min): 2          ABG - ( 19 Dec 2022 18:39 )  pH, Arterial: 7.540 pH, Blood: x     /  pCO2: 32    /  pO2: 311   / HCO3: 27    / Base Excess: 4.9   /  SaO2: 100.0               I&O's Detail    20 Dec 2022 07:01  -  21 Dec 2022 07:00  --------------------------------------------------------  IN:    Heparin Infusion: 62 mL    Heparin Infusion: 13 mL    Heparin Infusion: 24 mL    IV PiggyBack: 250 mL    IV PiggyBack: 250 mL    IV PiggyBack: 200 mL    Nepro: 80 mL    Tacrolimus: 58.5 mL  Total IN: 937.5 mL    OUT:    Propofol: 0 mL  Total OUT: 0 mL    Total NET: 937.5 mL            LABS:                        10.7   11.14 )-----------( 146      ( 21 Dec 2022 07:45 )             32.2     12-21    135  |  95<L>  |  43.7<H>  ----------------------------<  93  3.5   |  27.0  |  5.25<H>    Ca    8.5      21 Dec 2022 05:00  Phos  4.6     12-21  Mg     2.1     12-21    TPro  5.2<L>  /  Alb  2.7<L>  /  TBili  0.4  /  DBili  x   /  AST  34  /  ALT  8   /  AlkPhos  221<H>  12-20          CAPILLARY BLOOD GLUCOSE      POCT Blood Glucose.: 83 mg/dL (21 Dec 2022 06:11)    PT/INR - ( 20 Dec 2022 03:45 )   PT: 13.2 sec;   INR: 1.14 ratio         PTT - ( 21 Dec 2022 07:45 )  PTT:48.2 sec    CULTURES:  Culture Results:   No growth to date. (12-19 @ 17:10)  Culture Results:   Testing in progress (12-19 @ 17:10)  Culture Results:   Normal Respiratory Charis present (12-19 @ 16:00)  Culture Results:   No growth (12-19 @ 13:50)  Culture Results:   Testing in progress (12-19 @ 13:50)            Physical Examination:    General: No acute distress.      HEENT: Pupils equal, reactive to light.  Symmetric.    PULM: Clear to auscultation bilaterally, no significant sputum production    CVS: Regular rate and rhythm, no murmurs, rubs, or gallops    ABD: Soft, nondistended, nontender, normoactive bowel sounds, no masses    EXT: No edema, nontender    SKIN: Warm and well perfused, no rashes noted.    NEURO: Alert, oriented, interactive, nonfocal        RADIOLOGY: ***        INVASIVE LINES:  INDWELLING WATKINS:  VTE PROPHYLAXIS:  CAM ICU:  CODE STATUS:        CRITICAL CARE TIME SPENT: *** minutes spent performing frequent bedside reassessments and augmenting plan of care to address problems of acute critical illness that pose high probability of life threatening deterioration and/or end organ damage/dysfunction and discussing goals of care, non-inclusive of time spent on procedures performed. 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 faecalis bacteremia, admitted on 12/13 with altered mental status of unclear etiology. Had been being treated empirically for meningitis on the medicine service. Diagnostic LP had been deferred due to anticoagulation.  Incidentally found to have a dilated CBD with eventual plan for MRCP. Transferred to MICU on 12/18 after developing respiratory distress, hypoxemia, and worsened mental status, necessitating emergent intubation/mechanical ventilation. Noted to have dislodged NGT with what appeared to be tube feedings in his airways. ABG revealed severe hypercapnea with acute respiratory acidosis. Developed distributive shock state requiring IV vasopressor. LP performed 12/19 positive Cryptococcal Ag, negative PCR and Gram stain. 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 Paracentesis negative to date. Extubated 12/20 weaned to nasal cannula, shock has resolved and weaned off IV vasopressors.      Events last 24 hours: Encephalopathy much improved today, more alert and interactive though remains confused. Oxygenating well on nasal cannula, hemodynamics remain stable. Undergoing regularly scheduled hemodialysis.          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      History of hip replacement  right      Status post open reduction and internal fixation (ORIF) of fracture  left femur              SOCIAL HISTORY: UATO due to AMS        Review of Systems: Due to altered mental status, subjective information was not able to be obtained from the patient. History was obtained, to the extent possible, from review of the chart and collateral sources of information.        Medications:  amphotericin B  liposome  IVPB 320 milliGRAM(s) IV Intermittent every 24 hours  flucytosine 1500 milliGRAM(s) Oral <User Schedule>  meropenem Injectable 1000 milliGRAM(s) IV Push <User Schedule>  trimethoprim   80 mG/sulfamethoxazole 400 mG 1 Tablet(s) Oral <User Schedule>    metoprolol succinate ER 25 milliGRAM(s) Oral daily          heparin   Injectable 5000 Unit(s) IV Push every 6 hours PRN  heparin   Injectable 2500 Unit(s) IV Push every 6 hours PRN  heparin  Infusion. 400 Unit(s)/Hr IV Continuous <Continuous>    pantoprazole    Tablet 40 milliGRAM(s) Oral before breakfast      atorvastatin 80 milliGRAM(s) Oral at bedtime  dextrose 50% Injectable 25 Gram(s) IV Push once  dextrose 50% Injectable 12.5 Gram(s) IV Push once  dextrose 50% Injectable 25 Gram(s) IV Push once  dextrose Oral Gel 15 Gram(s) Oral once  hydrocortisone 10 milliGRAM(s) Oral every 12 hours      tacrolimus  IVPB 0.375 milliGRAM(s) IV Intermittent every 24 hours    chlorhexidine 2% Cloths 1 Application(s) Topical <User Schedule>    sevelamer carbonate 800 milliGRAM(s) Oral three times a day with meals          ICU Vital Signs Last 24 Hrs  T(C): 36.4 (21 Dec 2022 11:05), Max: 36.6 (20 Dec 2022 17:00)  T(F): 97.5 (21 Dec 2022 11:05), Max: 97.9 (20 Dec 2022 17:00)  HR: 81 (21 Dec 2022 10:00) (73 - 110)  BP: 142/85 (21 Dec 2022 10:00) (113/71 - 148/90)  BP(mean): 95 (21 Dec 2022 10:00) (79 - 111)  ABP: --  ABP(mean): --  RR: 27 (21 Dec 2022 10:00) (13 - 31)  SpO2: 100% (21 Dec 2022 10:00) (85% - 100%)    O2 Parameters below as of 21 Dec 2022 10:00  Patient On (Oxygen Delivery Method): nasal cannula  O2 Flow (L/min): 2          ABG - ( 19 Dec 2022 18:39 )  pH, Arterial: 7.540 pH, Blood: x     /  pCO2: 32    /  pO2: 311   / HCO3: 27    / Base Excess: 4.9   /  SaO2: 100.0               I&O's Detail    20 Dec 2022 07:01  -  21 Dec 2022 07:00  --------------------------------------------------------  IN:    Heparin Infusion: 62 mL    Heparin Infusion: 13 mL    Heparin Infusion: 24 mL    IV PiggyBack: 250 mL    IV PiggyBack: 250 mL    IV PiggyBack: 200 mL    Nepro: 80 mL    Tacrolimus: 58.5 mL  Total IN: 937.5 mL    OUT:    Propofol: 0 mL  Total OUT: 0 mL    Total NET: 937.5 mL            LABS:                        10.7   11.14 )-----------( 146      ( 21 Dec 2022 07:45 )             32.2     12-21    135  |  95<L>  |  43.7<H>  ----------------------------<  93  3.5   |  27.0  |  5.25<H>    Ca    8.5      21 Dec 2022 05:00  Phos  4.6     12-21  Mg     2.1     12-21    TPro  5.2<L>  /  Alb  2.7<L>  /  TBili  0.4  /  DBili  x   /  AST  34  /  ALT  8   /  AlkPhos  221<H>  12-20          CAPILLARY BLOOD GLUCOSE      POCT Blood Glucose.: 83 mg/dL (21 Dec 2022 06:11)    PT/INR - ( 20 Dec 2022 03:45 )   PT: 13.2 sec;   INR: 1.14 ratio         PTT - ( 21 Dec 2022 07:45 )  PTT:48.2 sec    CULTURES:  Culture Results:   No growth to date. (12-19 @ 17:10)  Culture Results:   Testing in progress (12-19 @ 17:10)  Culture Results:   Normal Respiratory Charis present (12-19 @ 16:00)  Culture Results:   No growth (12-19 @ 13:50)  Culture Results:   Testing in progress (12-19 @ 13:50)            Physical Examination:    General: No acute distress.      HEENT: Pupils equal, reactive to light.  Symmetric.    PULM: Clear to auscultation bilaterally, no significant sputum production    CVS: Irregular rhythm (AFib) regular rate    ABD: Soft, nondistended, nontender, normoactive bowel sounds, no masses    EXT: No edema, nontender    SKIN: Warm and well perfused, no rashes noted.    NEURO: Alert, oriented to self, interactive, nonfocal        RADIOLOGY:  < from: Xray Chest 1 View- PORTABLE-Urgent (Xray Chest 1 View- PORTABLE-Urgent .) (12.19.22 @ 18:47) >  ACC: [id number] EXAM:  XR CHEST PORTABLE URGENT 1V                          PROCEDURE DATE:  12/19/2022          INTERPRETATION:  TIME OF EXAM: December 19, 2022 at 6:14 PM.    CLINICAL INFORMATION: Intubated. Hypoxia.    COMPARISON:  December 19, 2022 at 1:59 PM.    TECHNIQUE:   AP Portable chest x-ray.    INTERPRETATION:    Heart size and the mediastinum cannot be accurately evaluated on this   projection. Median sternotomy sutures and surgical clips again seen.  Calcified tortuous thoracic aorta.  ET tube tip is 3.4 cm above the konrad. Enteric tube extends into the   stomach. Tip not included on image.  Right upper extremity PICC line with tip in the right atrium.  Bilateral patchy lung opacities, most pronounced in the mid and lower   lungs, not significantly changed.  Bilateral trace pleural effusions again noted.  Lucency in the superior lateral periphery of the left hemithorax is   likely a skin fold rather than a minimal pneumothorax.  There is osteoarthritic degenerative change of the spine.      IMPRESSION:  Line and tubes as above.    Bilateral patchy lung opacities, not significantly changed.    Bilateral trace pleural effusions again noted.    --- End of Report ---    MICHAELLE HARDING MD; Attending Radiologist  This document has been electronically signed. 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

## 2022-12-21 NOTE — PROGRESS NOTE ADULT - PROBLEM SELECTOR PROBLEM 1
Elevated alkaline phosphatase level
Dilated cbd, acquired
Elevated alkaline phosphatase level
Septic shock

## 2022-12-21 NOTE — PROGRESS NOTE ADULT - PROBLEM SELECTOR PLAN 7
PICC line in place from previous admissions  repeat blood cultures are negative   Continue Meropenem.  Treatment completion 12/26

## 2022-12-21 NOTE — PROGRESS NOTE ADULT - SUBJECTIVE AND OBJECTIVE BOX
[facility] DIVISION OF KIDNEY DISEASES AND HYPERTENSION -- HEMODIALYSIS NOTE  --------------------------------------------------------------------------------  Chief Complaint: ESRD/Ongoing hemodialysis requirement    24 hour events/subjective:  Pt seen and examined in MICU  Getting HD; vitals stable  awake but confused        PAST HISTORY  --------------------------------------------------------------------------------  No significant changes to PMH, PSH, FHx, SHx, unless otherwise noted    ALLERGIES & MEDICATIONS  --------------------------------------------------------------------------------  Allergies    budesonide (Unknown)  cefpodoxime (Unknown)  Pollen (Unknown)        Standing Inpatient Medications  amphotericin B  liposome  IVPB 320 milliGRAM(s) IV Intermittent every 24 hours  atorvastatin 80 milliGRAM(s) Oral at bedtime  chlorhexidine 2% Cloths 1 Application(s) Topical <User Schedule>  dextrose 50% Injectable 25 Gram(s) IV Push once  dextrose 50% Injectable 12.5 Gram(s) IV Push once  dextrose 50% Injectable 25 Gram(s) IV Push once  dextrose Oral Gel 15 Gram(s) Oral once  flucytosine 1500 milliGRAM(s) Oral <User Schedule>  heparin  Infusion. 400 Unit(s)/Hr IV Continuous <Continuous>  hydrocortisone 10 milliGRAM(s) Oral every 12 hours  meropenem Injectable 1000 milliGRAM(s) IV Push <User Schedule>  metoprolol succinate ER 25 milliGRAM(s) Oral daily  pantoprazole    Tablet 40 milliGRAM(s) Oral before breakfast  sevelamer carbonate 800 milliGRAM(s) Oral three times a day with meals  tacrolimus  IVPB 0.375 milliGRAM(s) IV Intermittent every 24 hours  trimethoprim   80 mG/sulfamethoxazole 400 mG 1 Tablet(s) Oral <User Schedule>    PRN Inpatient Medications  heparin   Injectable 5000 Unit(s) IV Push every 6 hours PRN  heparin   Injectable 2500 Unit(s) IV Push every 6 hours PRN      REVIEW OF SYSTEMS  --------------------------------------------------------------------------------  limited by confusion    VITALS/PHYSICAL EXAM  --------------------------------------------------------------------------------  T(C): 36.4 (12-21-22 @ 11:05), Max: 36.6 (12-20-22 @ 17:00)  HR: 81 (12-21-22 @ 10:00) (73 - 110)  BP: 142/85 (12-21-22 @ 10:00) (113/71 - 148/90)  RR: 27 (12-21-22 @ 10:00) (13 - 31)  SpO2: 100% (12-21-22 @ 10:00) (85% - 100%)  Wt(kg): --        12-20-22 @ 07:01  -  12-21-22 @ 07:00  --------------------------------------------------------  IN: 937.5 mL / OUT: 0 mL / NET: 937.5 mL      Physical Exam:  	Gen: NAD  	HEENT: PERRL, supple neck,  	Pulm: CTA B/L  	CV: RRR, S1S2; no rub  	Abd: +BS, soft, nontender  	UE: Warm  	LE: Warm, no edema  	Neuro: No focal deficits  	Psych: Normal affect and mood  	Skin: Warm, without rashes  	Vascular access: AVf    LABS/STUDIES  --------------------------------------------------------------------------------              10.7   11.14 >-----------<  146      [12-21-22 @ 07:45]              32.2     135  |  95  |  43.7  ----------------------------<  93      [12-21-22 @ 05:00]  3.5   |  27.0  |  5.25        Ca     8.5     [12-21-22 @ 05:00]      Mg     2.1     [12-21-22 @ 05:00]      Phos  4.6     [12-21-22 @ 05:00]    TPro  5.2  /  Alb  2.7  /  TBili  0.4  /  DBili  x   /  AST  34  /  ALT  8   /  AlkPhos  221  [12-20-22 @ 03:45]    PT/INR: PT 13.2 , INR 1.14       [12-20-22 @ 03:45]  PTT: 48.2       [12-21-22 @ 07:45]      Iron 27, TIBC 247, %sat 11      [03-20-22 @ 03:23]  Ferritin 4169      [05-01-22 @ 07:44]  Vitamin D (25OH) 29.1      [11-17-22 @ 05:45]  TSH 4.01      [12-14-22 @ 07:50]  Lipid: chol 84, , HDL 26, LDL --      [11-12-22 @ 05:44]    HBsAb <3.0      [12-16-22 @ 08:59]  HBsAb Nonreact      [12-16-22 @ 08:59]  HBsAg Nonreact      [12-16-22 @ 08:59]  HBcAb Nonreact      [12-16-22 @ 08:59]  HCV 0.09, Nonreact      [12-16-22 @ 08:59]

## 2022-12-21 NOTE — PROGRESS NOTE ADULT - ASSESSMENT
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 faecalis bacteremia, with:    1. Acute toxic-metabolic encephalopathy  2. Acute mixed hypercapnic/hypoxemic respiratory failure  3. Aspiration pneumonitis/pneumonia  4. 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 Distributive shock  6. ESRD on HD  7. COPD s/p lung transplant, immunocompromised status  8. Atrial fibrillation  9. Right IJ thrombosis  10. Cirrhosis with ascites     Neuro: Encephalopathy likely toxic-metabolic in the setting of critical illness, aspiration pneumonia, and underlying comorbidities. Uncertain if patient truly has Cryptococcal meningitis, as he clinically began improving prior to initiation of antifungal treatment. Mental status continues to improve. PT consult placed. Failed SLP evaluation yesterday however in light of improved encephalopathy today will retrial.  Pulm: Hypoxia and ventilation improved, extubated yesterday 12/20 and weaned down to nasal cannula. Needs aggressive pulmonary toileting, aspiration precautions. Lung transplant status on Tacrolimus, dose adjusted, and chronic steroids which are IV for now while NPO. Convert back to home dose of PO hydrocortisone when able.  CV: Distributive shock in the setting of sepsis pneumonia and sedation burden, now resolved. AFib rate controlled.  GI: NPO, advance diet as tolerated pending SLP evaluation. Elevated Alk phos with new finding of dilated CBD, no obstruction on imaging. MRCP pending when stable. GI evaluation appreciated. Ammonia level negative, discontinued off lactulose and Rifaximin.  Renal: ESRD on HD, receiving HD today.  ID: LP results initially with positive Gram stain for "few organisms", Cryptococcal Ag positive, however PCR negative. Discussed at length with ID Dr. Solares today, case reviewed with [facility] - initial Gram stain is NEGATIVE for organisms. Uncertain how to interpret Crypto Ag results, serum Crypto Ag sent for titers. ID to follow up to guide further management. For now, to be continued on empiric antibiotics and antifungal treatment with amphotericin, flucytosine, Meropenem, and Vancomycin by level post HD. Antibiotic regimen to be augmented by ID pending results of further workup. Diagnostic paracentesis negative to date.  Heme: Heparin gtt for chronic R IJ thrombosis and AFib    Given that encephalopathy is improving, and shock and respiratory failure have resolved, patient is optimized for transfer out of MICU to medical floors (Step Down Unit recommended given high risk for recurrent aspiration). Case endorsed to Dr. Cutler, who will resume care from this point.

## 2022-12-21 NOTE — PROGRESS NOTE ADULT - SUBJECTIVE AND OBJECTIVE BOX
[facility]                                                INFECTIOUS DISEASES  =======================================================                               J Carlos Galdamez MD#    Isatu Rojas MD*                           Perlita Solares MD*   Sumaya Morales MD*            Diplomates American Board of Internal Medicine & Infectious Diseases                  # [place] Office - Appt - Tel  [phone] Fax [phone]                * [place] Office - Appt - Tel [phone] Fax [phone]                                  Hospital Consult line:  [phone]  =======================================================      [id number]  JU AYDENJAROD   follow up for: ams    seen earlier today while on HD in [facility]  pt extubated now on o2 lethargic, confused but axox2 conversant and follows commands  patient seen and examined.       I have personally reviewed the labs and data; pertinent labs and data are listed in this note; please see below.   ===================================================  REVIEW OF SYSTEMS:  CONSTITUTIONAL:  No Fever or chills  HEENT:  No diplopia or blurred vision.  No earache, sore throat or runny nose.  CARDIOVASCULAR:  No pressure, squeezing, strangling, tightness, heaviness or aching about the chest, neck, axilla or epigastrium.  RESPIRATORY:  No cough, shortness of breath  GASTROINTESTINAL:  No nausea, vomiting or diarrhea.  GENITOURINARY:  No dysuria, frequency or urgency. No Blood in urine  MUSCULOSKELETAL:  no joint aches, no muscle pain  SKIN:  No change in skin, hair or nails.  NEUROLOGIC:  No Headaches, seizures or weakness.  PSYCHIATRIC:  No disorder of thought or mood.  ENDOCRINE:  No heat or cold intolerance  HEMATOLOGICAL:  No easy bruising or bleeding.    =======================================================  Allergies    budesonide (Unknown)  cefpodoxime (Unknown)  Pollen (Unknown)    Intolerances    Antibiotics:  amphotericin B  liposome  IVPB 320 milliGRAM(s) IV Intermittent every 24 hours  fluconAZOLE IVPB      flucytosine 1500 milliGRAM(s) Oral <User Schedule>  meropenem Injectable 1000 milliGRAM(s) IV Push <User Schedule>  trimethoprim  40 mG/sulfamethoxazole 200 mG Suspension 80 milliGRAM(s) Oral <User Schedule>    Other medications:  atorvastatin 80 milliGRAM(s) Oral at bedtime  chlorhexidine 0.12% Liquid 15 milliLiter(s) Oral Mucosa every 12 hours  chlorhexidine 2% Cloths 1 Application(s) Topical <User Schedule>  dextrose 50% Injectable 25 Gram(s) IV Push once  dextrose 50% Injectable 12.5 Gram(s) IV Push once  dextrose 50% Injectable 25 Gram(s) IV Push once  dextrose Oral Gel 15 Gram(s) Oral once  heparin  Infusion. 800 Unit(s)/Hr IV Continuous <Continuous>  hydrocortisone sodium succinate Injectable 10 milliGRAM(s) IV Push every 8 hours  metoprolol tartrate 12.5 milliGRAM(s) Enteral Tube every 12 hours  pantoprazole  Injectable 40 milliGRAM(s) IV Push daily  sevelamer carbonate Powder 800 milliGRAM(s) Oral three times a day with meals  tacrolimus  IVPB 0.375 milliGRAM(s) IV Intermittent every 24 hours    ======================================================  Physical Exam:  ============  Vital Signs Last 24 Hrs  T(C): 36.6 (21 Dec 2022 17:00), Max: 36.6 (21 Dec 2022 17:00)  T(F): 97.9 (21 Dec 2022 17:00), Max: 97.9 (21 Dec 2022 17:00)  HR: 87 (21 Dec 2022 17:00) (67 - 121)  BP: 149/83 (21 Dec 2022 16:00) (116/69 - 149/83)  BP(mean): 100 (21 Dec 2022 16:00) (79 - 117)  RR: 20 (21 Dec 2022 17:00) (13 - 31)  SpO2: 100% (21 Dec 2022 17:00) (87% - 100%)    Parameters below as of 21 Dec 2022 17:00  Patient On (Oxygen Delivery Method): room air        General:  No acute distress. lethargic, confused  Eye: no conjunctival pallor, no scleral icterus  HENT: Normocephalic, No pharyngeal erythema, No sinus tenderness.  Neck: Supple, No lymphadenopathy.  Respiratory: Lungs are clear to auscultation, Respirations are non-labored.  Cardiovascular: Normal rate, Regular rhythm,  s1+s2  Gastrointestinal: Soft, Non-tender, Non-distended, Normal bowel sounds.  Genitourinary: No costovertebral angle tenderness.  Lymphatics: No lymphadenopathy neck  Musculoskeletal: Normal range of motion, Normal strength. lue avf  Integumentary: No rash.  Neurologic: Alert, Oriented x 2, No focal deficits  =======================================================  Labs:                                   10.7   11.14 )-----------( 146      ( 21 Dec 2022 07:45 )             32.2       12-21    135  |  95<L>  |  43.7<H>  ----------------------------<  93  3.5   |  27.0  |  5.25<H>    Ca    8.5      21 Dec 2022 05:00  Phos  4.6     12-21  Mg     2.1     12-21    TPro  5.2<L>  /  Alb  2.7<L>  /  TBili  0.4  /  DBili  x   /  AST  34  /  ALT  8   /  AlkPhos  221<H>  12-20                  PT/INR - ( 20 Dec 2022 03:45 )   PT: 13.2 sec;   INR: 1.14 ratio         PTT - ( 21 Dec 2022 15:13 )  PTT:71.7 sec          CAPILLARY BLOOD GLUCOSE      POCT Blood Glucose.: 83 mg/dL (21 Dec 2022 06:11)            Culture - CSF with Gram Stain (collected 12-19-22 @ 17:10)  Source: .CSF CSF  Gram Stain (12-20-22 @ 15:20):    No polymorphonuclear leukocytes seen per low power field    No organisms seen per oil power field    by cytocentrifuge    Culture - Acid Fast - CSF (collected 12-19-22 @ 17:10)  Source: .CSF CSF    Culture - Sputum (collected 12-19-22 @ 16:00)  Source: .Sputum Sputum  Gram Stain (12-20-22 @ 01:59):    Moderate polymorphonuclear leukocytes per low power field    Few Squamous epithelial cells per low power field    Few Gram positive cocci in pairs seen per oil power field    Few Yeast like cells seen per oil power field    Few Gram Negative Rods seen per oil power field    Culture - Acid Fast - Body Fluid w/Smear (collected 12-19-22 @ 13:50)  Source: Paracentesis Paracentesis Fluid    Culture - Fungal, Body Fluid (collected 12-19-22 @ 13:50)  Source: Peritoneal Peritoneal Fluid    Culture - Body Fluid with Gram Stain (collected 12-19-22 @ 13:50)  Source: Paracentesis Paracentesis Fluid  Gram Stain (12-19-22 @ 22:30):    No polymorphonuclear leukocytes seen per low power field    No organisms seen per oil power field    Culture - Blood (collected 12-13-22 @ 05:41)  Source: .Blood Blood-Peripheral  Final Report (12-18-22 @ 10:00):    No Growth Final    Culture - Blood (collected 12-13-22 @ 05:41)  Source: .Blood Blood-Peripheral  Final Report (12-18-22 @ 10:00):    No Growth Final

## 2022-12-21 NOTE — PROGRESS NOTE ADULT - ASSESSMENT
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 CT head is negative. Hospital course is notable for empiric treatment for meningitis; also with dilated common bile duct; awaiting MRCP; acutely decompensated on 12/18 with acute hypercapnic respiratory failure in setting of aspiration pneumonia, emergently intubated, now extubated; also in shock (resolved). Nephrology is managing ESRD on HD.    -Access: L AV access   -Outpatient dialysis days are Tuesdays Thursdays Saturdays  -Off cycle while here, currently on a Monday Wednesday Friday dialysis schedule    - HD today  -Blood pressure - off pressors (shock resolved); hemodynamically stable   -Anemia in setting of CKD - hemoglobin is at goal; no indication for MENDY at this time    -Mineral Bone Disease in setting of CKD - continue oral phos binder   -s/p Lung transplantation - immunosuppressive management as per ID

## 2022-12-21 NOTE — PROGRESS NOTE ADULT - PROBLEM SELECTOR PLAN 5
s/p lung transplant   Continue hydrocortisone and Tacrolimus s/p lung transplant   Continue hydrocortisone and Tacrolimus  As per ID who spoke to transplant center.  Plan to hold Cellcept until treatment for bacteria resolves 12/26 tentative date

## 2022-12-21 NOTE — PROGRESS NOTE ADULT - ASSESSMENT
72y Male with multiple medical issues now with encephalopathy.     Encephalopathy.   Improved AMS  Likely toxic metabolic secondary to multiple medical issues.   Antibiotics per ID.   ?(+) cryptococcus Ag but (-) PCR    Rule out meningitis.   CSF results as above  Abx covverage for now per ID.   crypto Ag (+) but PCR (-)    will follow with you    Luis Brady MD PhD   [id number]

## 2022-12-21 NOTE — PROGRESS NOTE ADULT - SUBJECTIVE AND OBJECTIVE BOX
Hospital Course   73 y/o M with a h/o hypertension, hyperlipidemia, ESRD on HD, emphysema s/p lung transplant (on immunosuppressants), fungal meningitis, carotid stenosis s/p CEA, CAD s/p PCI in 2019, CHFrEF, pAF, 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, cirrhosis/ascites, recently discharged from [facility] after complex admission with e. faecalis bacteremia, admitted on 12/13 with altered mental status of unclear etiology. Had been being treated empirically for meningitis on the medicine service.  Developed Acute hypoxic respiratory failure likely due to aspiration and was intubated on 12/18 and required pressure support.  A diagnostic LP and paracenteses was performed.  Extubated on 12/20.    [facility] Division of Hospital Medicine    Chief Complaint:  Encephalopathy     SUBJECTIVE / OVERNIGHT EVENTS:  Patient passed speech and swallow this AM     Patient denies chest pain, SOB, abd pain, N/V, fever, chills, dysuria or any other complaints. All remainder ROS negative.     MEDICATIONS  (STANDING):  amphotericin B  liposome  IVPB 320 milliGRAM(s) IV Intermittent every 24 hours  atorvastatin 80 milliGRAM(s) Oral at bedtime  chlorhexidine 2% Cloths 1 Application(s) Topical <User Schedule>  dextrose 50% Injectable 25 Gram(s) IV Push once  dextrose 50% Injectable 12.5 Gram(s) IV Push once  dextrose 50% Injectable 25 Gram(s) IV Push once  dextrose Oral Gel 15 Gram(s) Oral once  flucytosine 1500 milliGRAM(s) Oral <User Schedule>  heparin  Infusion. 400 Unit(s)/Hr (4 mL/Hr) IV Continuous <Continuous>  hydrocortisone 10 milliGRAM(s) Oral every 12 hours  meropenem Injectable 1000 milliGRAM(s) IV Push <User Schedule>  metoprolol succinate ER 25 milliGRAM(s) Oral daily  pantoprazole    Tablet 40 milliGRAM(s) Oral before breakfast  sevelamer carbonate 800 milliGRAM(s) Oral three times a day with meals  tacrolimus  IVPB 0.375 milliGRAM(s) IV Intermittent every 24 hours  trimethoprim   80 mG/sulfamethoxazole 400 mG 1 Tablet(s) Oral <User Schedule>    MEDICATIONS  (PRN):  heparin   Injectable 5000 Unit(s) IV Push every 6 hours PRN For aPTT less than 40  heparin   Injectable 2500 Unit(s) IV Push every 6 hours PRN For aPTT between 40 - 57        I&O's Summary    20 Dec 2022 07:01  -  21 Dec 2022 07:00  --------------------------------------------------------  IN: 937.5 mL / OUT: 0 mL / NET: 937.5 mL        PHYSICAL EXAM:  Vital Signs Last 24 Hrs  T(C): 36.3 (21 Dec 2022 09:35), Max: 36.6 (20 Dec 2022 17:00)  T(F): 97.4 (21 Dec 2022 09:35), Max: 97.9 (20 Dec 2022 17:00)  HR: 81 (21 Dec 2022 10:00) (73 - 110)  BP: 142/85 (21 Dec 2022 10:00) (113/71 - 148/90)  BP(mean): 95 (21 Dec 2022 10:00) (79 - 111)  RR: 27 (21 Dec 2022 10:00) (13 - 31)  SpO2: 100% (21 Dec 2022 10:00) (85% - 100%)    Parameters below as of 21 Dec 2022 10:00  Patient On (Oxygen Delivery Method): nasal cannula  O2 Flow (L/min): 2          CONSTITUTIONAL: ill appearing.    ENMT: Moist oral mucosa, no pharyngeal injection    RESPIRATORY: Normal respiratory effort; lungs are clear   CARDIOVASCULAR: Irregular rate and rhythm, normal S1 and S2, no murmur/rub/gallop; No lower extremity edema;   ABDOMEN: Nontender to palpation, normoactive bowel sounds, no rebound   PSYCH: A+O to person, place, and time; affect appropriate  NEUROLOGY: CN 2-12 are intact and symmetric; no gross sensory deficits;   SKIN: No rashes; no palpable lesions    LABS:                        10.7   11.14 )-----------( 146      ( 21 Dec 2022 07:45 )             32.2     12-21    135  |  95<L>  |  43.7<H>  ----------------------------<  93  3.5   |  27.0  |  5.25<H>    Ca    8.5      21 Dec 2022 05:00  Phos  4.6     12-21  Mg     2.1     12-21    TPro  5.2<L>  /  Alb  2.7<L>  /  TBili  0.4  /  DBili  x   /  AST  34  /  ALT  8   /  AlkPhos  221<H>  12-20    PT/INR - ( 20 Dec 2022 03:45 )   PT: 13.2 sec;   INR: 1.14 ratio         PTT - ( 21 Dec 2022 07:45 )  PTT:48.2 sec          Culture - Fungal, CSF (collected 19 Dec 2022 17:10)  Source: .CSF CSF  Preliminary Report (21 Dec 2022 07:25):    Testing in progress    Culture - CSF with Gram Stain (collected 19 Dec 2022 17:10)  Source: .CSF CSF  Gram Stain (20 Dec 2022 15:20):    No polymorphonuclear leukocytes seen per low power field    No organisms seen per oil power field    by cytocentrifuge  Preliminary Report (21 Dec 2022 08:50):    No growth to date.    Culture - Acid Fast - CSF (collected 19 Dec 2022 17:10)  Source: .CSF CSF    Culture - Sputum (collected 19 Dec 2022 16:00)  Source: .Sputum Sputum  Gram Stain (20 Dec 2022 01:59):    Moderate polymorphonuclear leukocytes per low power field    Few Squamous epithelial cells per low power field    Few Gram positive cocci in pairs seen per oil power field    Few Yeast like cells seen per oil power field    Few Gram Negative Rods seen per oil power field  Preliminary Report (20 Dec 2022 19:23):    Normal Respiratory Charis present    Culture - Acid Fast - Body Fluid w/Smear (collected 19 Dec 2022 13:50)  Source: Paracentesis Paracentesis Fluid    Culture - Fungal, Body Fluid (collected 19 Dec 2022 13:50)  Source: Peritoneal Peritoneal Fluid  Preliminary Report (20 Dec 2022 07:09):    Testing in progress    Culture - Body Fluid with Gram Stain (collected 19 Dec 2022 13:50)  Source: Paracentesis Paracentesis Fluid  Gram Stain (19 Dec 2022 22:30):    No polymorphonuclear leukocytes seen per low power field    No organisms seen per oil power field  Preliminary Report (20 Dec 2022 17:33):    No growth      CAPILLARY BLOOD GLUCOSE      POCT Blood Glucose.: 83 mg/dL (21 Dec 2022 06:11)  POCT Blood Glucose.: 78 mg/dL (21 Dec 2022 00:32)        RADIOLOGY & ADDITIONAL TESTS:  Results Reviewed:   Imaging Personally Reviewed:  Electrocardiogram Personally Reviewed:

## 2022-12-21 NOTE — PROGRESS NOTE ADULT - PROBLEM SELECTOR PLAN 1
Now currently resolved, likely from aspiration pneumonia.    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  -Continue Amphotericin B and Flucytosine   -LP cultures still finalizing   -Paracentesis still finalizing   -Blood cultures have been negative.

## 2022-12-21 NOTE — PROGRESS NOTE ADULT - SUBJECTIVE AND OBJECTIVE BOX
[facility]                                        Neurology at [facility]                                 [facility], [facility]                                  [address]                                        [place], [place], [address]                                             [phone]        CC: Encephalopathy    HPI:   The patient is a 72y Male with multiple medical issues who was recently hospitalized with hypoxic respiratory failure.  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   He was ultimately discharged 11/23/22.   He now presented with altered mental status and lethargy.   Neurology asked to evaluation for meningitis. LP attempted (neuro JW) and was unsuccessful while in ER. (from JW note)    Interim history: extubated, alert answering questions    ROS:   no headache, dizziness, weakness or numbness    MEDICATIONS  (STANDING):  amphotericin B  liposome  IVPB 320 milliGRAM(s) IV Intermittent every 24 hours  apixaban 2.5 milliGRAM(s) Oral two times a day  atorvastatin 80 milliGRAM(s) Oral at bedtime  chlorhexidine 2% Cloths 1 Application(s) Topical <User Schedule>  dextrose 50% Injectable 25 Gram(s) IV Push once  dextrose 50% Injectable 12.5 Gram(s) IV Push once  dextrose 50% Injectable 25 Gram(s) IV Push once  dextrose Oral Gel 15 Gram(s) Oral once  flucytosine 1500 milliGRAM(s) Oral <User Schedule>  hydrocortisone 10 milliGRAM(s) Oral every 12 hours  meropenem Injectable 1000 milliGRAM(s) IV Push <User Schedule>  metoprolol succinate ER 25 milliGRAM(s) Oral daily  pantoprazole    Tablet 40 milliGRAM(s) Oral before breakfast  sevelamer carbonate 800 milliGRAM(s) Oral three times a day with meals  tacrolimus  IVPB 0.375 milliGRAM(s) IV Intermittent every 24 hours  tamsulosin 0.4 milliGRAM(s) Oral at bedtime  trimethoprim   80 mG/sulfamethoxazole 400 mG 1 Tablet(s) Oral <User Schedule>    MEDICATIONS  (PRN):      Vital Signs Last 24 Hrs  T(C): 36.4 (21 Dec 2022 12:45), Max: 36.6 (20 Dec 2022 17:00)  T(F): 97.5 (21 Dec 2022 12:45), Max: 97.9 (20 Dec 2022 17:00)  HR: 72 (21 Dec 2022 14:00) (72 - 121)  BP: 126/65 (21 Dec 2022 14:00) (113/71 - 148/90)  BP(mean): 83 (21 Dec 2022 14:00) (79 - 117)  RR: 15 (21 Dec 2022 14:00) (13 - 31)  SpO2: 100% (21 Dec 2022 14:00) (85% - 100%)    Parameters below as of 21 Dec 2022 14:00  Patient On (Oxygen Delivery Method): nasal cannula  O2 Flow (L/min): 2      Detailed Neurologic Exam:    Mental status: The patient is awake and alert and has slightly diminished attention span.  The patient is fully oriented in 3 spheres. The patient is oriented to his wife at bedside. The patient is able to name objects, follow commands, repeat sentences.    Cranial nerves: Pupils equal and react symmetrically to light. There is no visual field deficit to confrontation. Extraocular motion is full with no nystagmus. There is no ptosis. Facial sensation is intact. Facial musculature is symmetric. Palate elevates symmetrically. Tongue is midline. (+) hearing loss    Motor: There is normal bulk and tone.  There is no tremor.  Strength is 5/5 in the right arm and leg.   Strength is 5/5 in the left arm and leg.    Sensation: Intact to light touch and pin in 4 extremities    Reflexes: 1+ throughout and plantar responses are flexor.    Cerebellar: There is no dysmetria on finger to nose testing.    Gait : deferred    Labs:                           10.7   11.14 )-----------( 146      ( 21 Dec 2022 07:45 )             32.2     12-21    135  |  95<L>  |  43.7<H>  ----------------------------<  93  3.5   |  27.0  |  5.25<H>    Ca    8.5      21 Dec 2022 05:00  Phos  4.6     12-21  Mg     2.1     12-21    TPro  5.2<L>  /  Alb  2.7<L>  /  TBili  0.4  /  DBili  x   /  AST  34  /  ALT  8   /  AlkPhos  221<H>  12-20    LIVER FUNCTIONS - ( 20 Dec 2022 03:45 )  Alb: 2.7 g/dL / Pro: 5.2 g/dL / ALK PHOS: 221 U/L / ALT: 8 U/L / AST: 34 U/L / GGT: x           PT/INR - ( 20 Dec 2022 03:45 )   PT: 13.2 sec;   INR: 1.14 ratio         PTT - ( 21 Dec 2022 07:45 )  PTT:48.2 sec      Cerebrospinal Fluid Cell Count-1 (12.19.22 @ 17:10)    Tube Type: Tube 3    CSF Appearance: Clear    CSF Neutrophils: NA %    Appearance Spun: Colorless    RBC Count - Spinal Fluid: 2 /cmm    Total Nucleated Cell Count, CSF: 4 /uL    CSF Color: No Color    Protein, CSF (12.19.22 @ 17:10)    Protein, CSF: 51 mg/dL    Glucose, CSF (12.19.22 @ 17:10)    Glucose, CSF: 48 mg/dLG/24h    Herpes Simplex Virus 1/2 PCR, CSF (12.19.22 @ 17:10)    Source HSV 1/2: CSF    HSV 1/2 PCR: NotDetec: HSV1/2 PCR assay is a real-time PCR test that detects and differentiates  HSV-1 and/or HSV-2 DNA in CSF (Vitreous Fluid). The results of this test  should be interpreted with consideration of all clinical and laboratory  findings.    CSF PCR Panel (12.19.22 @ 17:10)    CSF PCR Result: NotDetec: The meningitis/encephalitis (ME) panel (CSFPCR) is a PCR based assay that  screens for: Escherichia coli; Haemophilus influenzae; Listeria  monocytogenes; Neisseria meningitidis; Streptococcus agalactiae;  Streptococcus pneumoniae; CMV; Enterovirus; HSV-1; HSV-2; Human  herpesvirus 6; Parechovirus; VZV and Cryptococcus. Result should be  interpreted in context of clinical presentation, imaging and other lab  tests. Positive predictive value may be lower in patients with normal CSF  chemistry and cell count.    Cryptococcal Antigen - CSF (12.19.22 @ 17:10)    Cryptococcal Antigen - CSF: Positive:    Culture - Fungal, CSF (12.19.22 @ 17:10)    Specimen Source: .CSF CSF    Culture Results:   Testing in progress    Culture - CSF with Gram Stain . (12.19.22 @ 17:10)    Gram Stain:   No polymorphonuclear leukocytes seen per low power field  No organisms seen per oil power field  by cytocentrifuge    Specimen Source: .CSF CSF    Culture Results:   No growth to date.    Culture - Acid Fast - CSF (12.19.22 @ 17:10)    Specimen Source: .CSF CSF    Acid Fast Bacilli Smear:   Less than 5 cc of CSF received,  unable to perform AFB smear.

## 2022-12-22 NOTE — PROGRESS NOTE ADULT - SUBJECTIVE AND OBJECTIVE BOX
[facility] DIVISION OF KIDNEY DISEASES AND HYPERTENSION -- FOLLOW UP NOTE  --------------------------------------------------------------------------------  Chief Complaint: ESRD on HD    24 hour events/subjective:  s/p HD yesterday in MICU  Pt seen and examined this Am; awake And alert  Off supplemental O2        PAST HISTORY  --------------------------------------------------------------------------------  No significant changes to PMH, PSH, FHx, SHx, unless otherwise noted    ALLERGIES & MEDICATIONS  --------------------------------------------------------------------------------  Allergies    budesonide (Unknown)  cefpodoxime (Unknown)  Pollen (Unknown)    Intolerances      Standing Inpatient Medications  amphotericin B  liposome  IVPB 320 milliGRAM(s) IV Intermittent every 24 hours  apixaban 2.5 milliGRAM(s) Oral two times a day  atorvastatin 80 milliGRAM(s) Oral at bedtime  chlorhexidine 2% Cloths 1 Application(s) Topical <User Schedule>  dextrose 50% Injectable 25 Gram(s) IV Push once  dextrose 50% Injectable 12.5 Gram(s) IV Push once  dextrose 50% Injectable 25 Gram(s) IV Push once  dextrose Oral Gel 15 Gram(s) Oral once  flucytosine 1500 milliGRAM(s) Oral <User Schedule>  hydrocortisone 10 milliGRAM(s) Oral every 12 hours  meropenem Injectable 1000 milliGRAM(s) IV Push <User Schedule>  metoprolol succinate ER 25 milliGRAM(s) Oral daily  pantoprazole    Tablet 40 milliGRAM(s) Oral before breakfast  sevelamer carbonate 800 milliGRAM(s) Oral three times a day with meals  tacrolimus 1.5 milliGRAM(s) Oral daily  tamsulosin 0.4 milliGRAM(s) Oral at bedtime  trimethoprim   80 mG/sulfamethoxazole 400 mG 1 Tablet(s) Oral <User Schedule>    PRN Inpatient Medications      REVIEW OF SYSTEMS  --------------------------------------------------------------------------------  Gen: No weight changes, fatigue, fevers/chills, weakness  Skin: No rashes  Head/Eyes/Ears/Mouth: No headache; Normal hearing; Normal vision w/o blurriness; No sinus pain/discomfort, sore throat  Respiratory: No dyspnea, cough, wheezing, hemoptysis  CV: No chest pain, PND, orthopnea  GI: No abdominal pain, diarrhea, constipation, nausea, vomiting, melena, hematochezia  : No increased frequency, dysuria, hematuria, nocturia  MSK: No joint pain/swelling; no back pain; no edema  Neuro: No dizziness/lightheadedness, weakness, seizures, numbness, tingling  Heme: No easy bruising or bleeding  Endo: No heat/cold intolerance  Psych: No significant nervousness, anxiety, stress, depression    All other systems were reviewed and are negative, except as noted.    VITALS/PHYSICAL EXAM  --------------------------------------------------------------------------------  T(C): 36.4 (12-22-22 @ 10:08), Max: 36.8 (12-21-22 @ 19:00)  HR: 85 (12-22-22 @ 10:08) (67 - 103)  BP: 149/83 (12-22-22 @ 10:08) (102/71 - 149/83)  RR: 18 (12-22-22 @ 10:08) (15 - 26)  SpO2: 98% (12-22-22 @ 10:08) (98% - 100%)  Wt(kg): --        12-21-22 @ 07:01  -  12-22-22 @ 07:00  --------------------------------------------------------  IN: 58.5 mL / OUT: 850 mL / NET: -791.5 mL    12-22-22 @ 07:01  -  12-22-22 @ 13:54  --------------------------------------------------------  IN: 240 mL / OUT: 0 mL / NET: 240 mL      Physical Exam:  	Gen: NAD,  	HEENT: Supple neck, clear oropharynx  	Pulm: CTA B/L  	CV: RRR, S1S2; no rub  	Back: No spinal or CVA tenderness; no sacral edema  	Abd: +BS, soft, nontender/nondistended  	: No suprapubic tenderness  	UE: Warm,  no edema; no asterixis  	LE: Warm, ; no edema  	Neuro: No focal deficit  	Psych: Normal affect and mood  	Skin: Warm	              Vascular access: ROGE FRANCO    LABS/STUDIES  --------------------------------------------------------------------------------              10.0   5.78  >-----------<  124      [12-22-22 @ 06:40]              31.6     137  |  99  |  27.5  ----------------------------<  79      [12-22-22 @ 06:40]  3.7   |  25.0  |  3.93        Ca     8.4     [12-22-22 @ 06:40]      Mg     2.0     [12-22-22 @ 06:40]      Phos  4.3     [12-22-22 @ 06:40]    TPro  5.0  /  Alb  2.6  /  TBili  0.6  /  DBili  x   /  AST  32  /  ALT  9   /  AlkPhos  200  [12-22-22 @ 06:40]      PTT: 33.9       [12-22-22 @ 06:40]      Creatinine Trend:  SCr 3.93 [12-22 @ 06:40]  SCr 5.25 [12-21 @ 05:00]  SCr 3.97 [12-20 @ 03:45]  SCr 5.21 [12-19 @ 03:25]  SCr 6.86 [12-18 @ 18:05]        Iron 27, TIBC 247, %sat 11      [03-20-22 @ 03:23]  Ferritin 4169      [05-01-22 @ 07:44]  Vitamin D (25OH) 29.1      [11-17-22 @ 05:45]  TSH 4.01      [12-14-22 @ 07:50]  Lipid: chol 84, , HDL 26, LDL --      [11-12-22 @ 05:44]    HBsAb <3.0      [12-16-22 @ 08:59]  HBsAb Nonreact      [12-16-22 @ 08:59]  HBsAg Nonreact      [12-16-22 @ 08:59]  HBcAb Nonreact      [12-16-22 @ 08:59]  HCV 0.09, Nonreact      [12-16-22 @ 08:59]    Syphilis Screen (Treponema Pallidum Ab) Negative      [12-14-22 @ 07:50]

## 2022-12-22 NOTE — PROGRESS NOTE ADULT - SUBJECTIVE AND OBJECTIVE BOX
[facility]                                                INFECTIOUS DISEASES  =======================================================                               J Carlos Galdamez MD#    Isatu Rojas MD*                           Perlita Solares MD*   Sumaya Morales MD*            Diplomates American Board of Internal Medicine & Infectious Diseases                  # [place] Office - Appt - Tel  [phone] Fax [phone]                * [place] Office - Appt - Tel [phone] Fax [phone]                                  Hospital Consult line:  [phone]  =======================================================      [facility]-[id number]  JU FERGUSON   follow up for: AMS  axox 2-3  more alert and conversant  denies complaints  on RA  patient seen and examined.       I have personally reviewed the labs and data; pertinent labs and data are listed in this note; please see below.   ===================================================  REVIEW OF SYSTEMS:  CONSTITUTIONAL:  No Fever or chills  HEENT:  No diplopia or blurred vision.  No earache, sore throat or runny nose.  CARDIOVASCULAR:  No pressure, squeezing, strangling, tightness, heaviness or aching about the chest, neck, axilla or epigastrium.  RESPIRATORY:  No cough, shortness of breath  GASTROINTESTINAL:  No nausea, vomiting or diarrhea.  GENITOURINARY:  No dysuria, frequency or urgency. No Blood in urine  MUSCULOSKELETAL:  no joint aches, no muscle pain  SKIN:  No change in skin, hair or nails.  NEUROLOGIC:  No Headaches, seizures or weakness.  PSYCHIATRIC:  No disorder of thought or mood.  ENDOCRINE:  No heat or cold intolerance  HEMATOLOGICAL:  No easy bruising or bleeding.    =======================================================  Allergies    budesonide (Unknown)  cefpodoxime (Unknown)  Pollen (Unknown)    Intolerances    Antibiotics:  acyclovir IVPB      acyclovir IVPB 320 milliGRAM(s) IV Intermittent once  ampicillin  IVPB 2 Gram(s) IV Intermittent every 12 hours  cefTRIAXone Injectable. 2000 milliGRAM(s) IV Push every 12 hours  fluconAZOLE   Tablet 200 milliGRAM(s) Oral <User Schedule>  trimethoprim   80 mG/sulfamethoxazole 400 mG 1 Tablet(s) Oral <User Schedule>    Other medications:  apixaban 2.5 milliGRAM(s) Oral two times a day  atorvastatin 80 milliGRAM(s) Oral at bedtime  chlorhexidine 2% Cloths 1 Application(s) Topical <User Schedule>  dextrose 50% Injectable 25 Gram(s) IV Push once  dextrose 50% Injectable 12.5 Gram(s) IV Push once  dextrose 50% Injectable 25 Gram(s) IV Push once  dextrose Oral Gel 15 Gram(s) Oral once  epoetin ben-epbx (RETACRIT) Injectable 4000 Unit(s) IV Push <User Schedule>  hydrocortisone 10 milliGRAM(s) Oral every 12 hours  metoprolol succinate ER 25 milliGRAM(s) Oral daily  pantoprazole    Tablet 40 milliGRAM(s) Oral before breakfast  sevelamer carbonate 800 milliGRAM(s) Oral three times a day with meals  tacrolimus 1.5 milliGRAM(s) Oral daily  tamsulosin 0.4 milliGRAM(s) Oral at bedtime    ======================================================  Physical Exam:  ============  T(F): 98.1 (22 Dec 2022 16:00), Max: 98.3 (21 Dec 2022 19:00)  HR: 100 (22 Dec 2022 16:00)  BP: 143/75 (22 Dec 2022 16:00)  RR: 18 (22 Dec 2022 16:00)  SpO2: 95% (22 Dec 2022 16:00) (95% - 100%)  temp max in last 48H T(F): , Max: 98.3 (12-21-22 @ 19:00)    General:  No acute distress.  Eye: no conjunctival pallor, no scleral icterus  HENT: Normocephalic, No pharyngeal erythema, No sinus tenderness.  Neck: Supple, No lymphadenopathy.  Respiratory: Lungs are clear to auscultation, Respirations are non-labored.  Cardiovascular: Normal rate, Regular rhythm,  s1+s2  Gastrointestinal: Soft, Non-tender, Non-distended, Normal bowel sounds.  Genitourinary: No costovertebral angle tenderness.  Lymphatics: No lymphadenopathy neck  Musculoskeletal: Normal range of motion, Normal strength. rue picc, lue avf  Integumentary: No rash.  Neurologic: Alert, Oriented, No focal deficits  Psychiatric: Appropriate mood & affect.  =======================================================  Labs:                        10.0   5.78  )-----------( 124      ( 22 Dec 2022 06:40 )             31.6     12-22    137  |  99  |  27.5<H>  ----------------------------<  79  3.7   |  25.0  |  3.93<H>    Ca    8.4      22 Dec 2022 06:40  Phos  4.3     12-22  Mg     2.0     12-22    TPro  5.0<L>  /  Alb  2.6<L>  /  TBili  0.6  /  DBili  x   /  AST  32  /  ALT  9   /  AlkPhos  200<H>  12-22      Culture - Fungal, CSF (collected 12-19-22 @ 17:10)  Source: .CSF CSF    Culture - CSF with Gram Stain (collected 12-19-22 @ 17:10)  Source: .CSF CSF  Gram Stain (12-20-22 @ 15:20):    No polymorphonuclear leukocytes seen per low power field    No organisms seen per oil power field    by cytocentrifuge    Culture - Acid Fast - CSF (collected 12-19-22 @ 17:10)  Source: .CSF CSF    Culture - Sputum (collected 12-19-22 @ 16:00)  Source: .Sputum Sputum  Gram Stain (12-20-22 @ 01:59):    Moderate polymorphonuclear leukocytes per low power field    Few Squamous epithelial cells per low power field    Few Gram positive cocci in pairs seen per oil power field    Few Yeast like cells seen per oil power field    Few Gram Negative Rods seen per oil power field  Final Report (12-21-22 @ 23:09):    Normal Respiratory Charis present    Culture - Acid Fast - Body Fluid w/Smear (collected 12-19-22 @ 13:50)  Source: Paracentesis Paracentesis Fluid    Culture - Fungal, Body Fluid (collected 12-19-22 @ 13:50)  Source: Peritoneal Peritoneal Fluid    Culture - Body Fluid with Gram Stain (collected 12-19-22 @ 13:50)  Source: Paracentesis Paracentesis Fluid  Gram Stain (12-19-22 @ 22:30):    No polymorphonuclear leukocytes seen per low power field    No organisms seen per oil power field    Culture - Blood (collected 12-13-22 @ 05:41)  Source: .Blood Blood-Peripheral  Final Report (12-18-22 @ 10:00):    No Growth Final    Culture - Blood (collected 12-13-22 @ 05:41)  Source: .Blood Blood-Peripheral  Final Report (12-18-22 @ 10:00):    No Growth Final

## 2022-12-22 NOTE — PROGRESS NOTE ADULT - SUBJECTIVE AND OBJECTIVE BOX
[facility] PALLIATIVE MEDICINE     CC: FOLLOW UP VISIT + GOC    INTERVAL HPI/OVERNIGHT EVENTS:  Source if other than patient:  []Family   [x]Team     No acute events reported overnight.  Seen and examined at bedside.   He is awake and alert, conversant, out of bed to chair    PRESENT SYMPTOMS:     Dyspnea: No  Nausea/Vomiting:  No  Anxiety:   No  Depression: No  Fatigue: No  Loss of appetite: Yes  Constipation: No    Pain: No            Character-            Duration-            Effect-            Factors-            Frequency-            Location-            Severity-    Pain AD Score:  http://geriatrictoolkit.[facility]/cog/painad.pdf (press ctrl + left click to view)    Review of Systems: No chest pain or dyspnea at rest    MEDICATIONS  (STANDING):  amphotericin B  liposome  IVPB 320 milliGRAM(s) IV Intermittent every 24 hours  apixaban 2.5 milliGRAM(s) Oral two times a day  atorvastatin 80 milliGRAM(s) Oral at bedtime  chlorhexidine 2% Cloths 1 Application(s) Topical <User Schedule>  dextrose 50% Injectable 25 Gram(s) IV Push once  dextrose 50% Injectable 12.5 Gram(s) IV Push once  dextrose 50% Injectable 25 Gram(s) IV Push once  dextrose Oral Gel 15 Gram(s) Oral once  flucytosine 1500 milliGRAM(s) Oral <User Schedule>  hydrocortisone 10 milliGRAM(s) Oral every 12 hours  meropenem Injectable 1000 milliGRAM(s) IV Push <User Schedule>  metoprolol succinate ER 25 milliGRAM(s) Oral daily  pantoprazole    Tablet 40 milliGRAM(s) Oral before breakfast  sevelamer carbonate 800 milliGRAM(s) Oral three times a day with meals  tacrolimus 1.5 milliGRAM(s) Oral daily  tamsulosin 0.4 milliGRAM(s) Oral at bedtime  trimethoprim   80 mG/sulfamethoxazole 400 mG 1 Tablet(s) Oral <User Schedule>    MEDICATIONS  (PRN):    PHYSICAL EXAM:    Vital Signs Last 24 Hrs  T(C): 36.4 (22 Dec 2022 10:08), Max: 36.8 (21 Dec 2022 19:00)  T(F): 97.6 (22 Dec 2022 10:08), Max: 98.3 (21 Dec 2022 19:00)  HR: 85 (22 Dec 2022 10:08) (67 - 103)  BP: 149/83 (22 Dec 2022 10:08) (102/71 - 149/83)  BP(mean): 100 (21 Dec 2022 16:00) (83 - 105)  RR: 18 (22 Dec 2022 10:08) (15 - 26)  SpO2: 98% (22 Dec 2022 10:08) (91% - 100%)    Parameters below as of 22 Dec 2022 10:08  Patient On (Oxygen Delivery Method): nasal cannula    O2 Concentration (%): 3    Karnofsky:  50%    GEN: chronically ill. resting comfortably and no acute distress    HEENT: mucous membrane moist       Lungs: comfortable, nonlabored     CV: +s1/s2 regular rate and rhythm      GI: +BS, abdomen soft, nontender, nondistended      : anuric    MSK:  no cyanosis or edema. +weakness       NEURO: nonfocal. awake and alert, oriented x 3, interactive    Skin: warm and dry.      LABS:                          10.0   5.78  )-----------( 124      ( 22 Dec 2022 06:40 )             31.6     12-22    137  |  99  |  27.5<H>  ----------------------------<  79  3.7   |  25.0  |  3.93<H>    Ca    8.4      22 Dec 2022 06:40  Phos  4.3     12-22  Mg     2.0     12-22    TPro  5.0<L>  /  Alb  2.6<L>  /  TBili  0.6  /  DBili  x   /  AST  32  /  ALT  9   /  AlkPhos  200<H>  12-22      RADIOLOGY & ADDITIONAL STUDIES: reviewed     ADVANCE DIRECTIVES/TREATMENT PREFERENCES: FULL CODE  DNR YES NO  Completed on:                     MOLST  YES NO   Completed on:  Living Will  YES NO   Completed on:

## 2022-12-22 NOTE — PROGRESS NOTE ADULT - SUBJECTIVE AND OBJECTIVE BOX
Hospitalist Daily Progress Note    Chief Complaint:  Patient is a 72y old  Male who presents with a chief complaint of Encephalopathy (21 Dec 2022 11:30)      SUBJECTIVE / OVERNIGHT EVENTS:  Patient was seen and examined at bedside. No active complaints. Weaned off of oxygen.  Patient denies chest pain, SOB, abd pain, N/V, fever, chills, dysuria or any other complaints. All remainder ROS negative.     MEDICATIONS  (STANDING):  amphotericin B  liposome  IVPB 320 milliGRAM(s) IV Intermittent every 24 hours  apixaban 2.5 milliGRAM(s) Oral two times a day  atorvastatin 80 milliGRAM(s) Oral at bedtime  chlorhexidine 2% Cloths 1 Application(s) Topical <User Schedule>  dextrose 50% Injectable 25 Gram(s) IV Push once  dextrose 50% Injectable 12.5 Gram(s) IV Push once  dextrose 50% Injectable 25 Gram(s) IV Push once  dextrose Oral Gel 15 Gram(s) Oral once  epoetin ben-epbx (RETACRIT) Injectable 4000 Unit(s) IV Push <User Schedule>  flucytosine 1500 milliGRAM(s) Oral <User Schedule>  hydrocortisone 10 milliGRAM(s) Oral every 12 hours  meropenem Injectable 1000 milliGRAM(s) IV Push <User Schedule>  metoprolol succinate ER 25 milliGRAM(s) Oral daily  pantoprazole    Tablet 40 milliGRAM(s) Oral before breakfast  sevelamer carbonate 800 milliGRAM(s) Oral three times a day with meals  tacrolimus 1.5 milliGRAM(s) Oral daily  tamsulosin 0.4 milliGRAM(s) Oral at bedtime  trimethoprim   80 mG/sulfamethoxazole 400 mG 1 Tablet(s) Oral <User Schedule>    MEDICATIONS  (PRN):        I&O's Summary    21 Dec 2022 07:01  -  22 Dec 2022 07:00  --------------------------------------------------------  IN: 58.5 mL / OUT: 850 mL / NET: -791.5 mL    22 Dec 2022 07:01  -  22 Dec 2022 15:28  --------------------------------------------------------  IN: 240 mL / OUT: 0 mL / NET: 240 mL        PHYSICAL EXAM:  Vital Signs Last 24 Hrs  T(C): 36.4 (22 Dec 2022 10:08), Max: 36.8 (21 Dec 2022 19:00)  T(F): 97.6 (22 Dec 2022 10:08), Max: 98.3 (21 Dec 2022 19:00)  HR: 85 (22 Dec 2022 10:08) (67 - 101)  BP: 149/83 (22 Dec 2022 10:08) (102/71 - 149/83)  BP(mean): 100 (21 Dec 2022 16:00) (100 - 100)  RR: 18 (22 Dec 2022 10:08) (18 - 26)  SpO2: 98% (22 Dec 2022 10:08) (98% - 100%)    Parameters below as of 22 Dec 2022 10:08  Patient On (Oxygen Delivery Method): nasal cannula          Constitutional: NAD, Resting, chronically ill appearing male   ENT: Supple, No JVD  Lungs: CTA B/L, Non-labored breathing  Cardio: RRR, S1/S2, No murmur  Abdomen: Soft, Nontender, Nondistended; Bowel sounds present  Extremities: No calf tenderness, No pitting edema  Musculoskeletal:   No clubbing or cyanosis of digits; no joint swelling or tenderness to palpation  Psych: Calm, cooperative affect appropriate  Neuro: Awake and alert, oriented x 4, moves all extremities  Skin: No rashes; no palpable lesions    LABS:                        10.0   5.78  )-----------( 124      ( 22 Dec 2022 06:40 )             31.6     12-22    137  |  99  |  27.5<H>  ----------------------------<  79  3.7   |  25.0  |  3.93<H>    Ca    8.4      22 Dec 2022 06:40  Phos  4.3     12-22  Mg     2.0     12-22    TPro  5.0<L>  /  Alb  2.6<L>  /  TBili  0.6  /  DBili  x   /  AST  32  /  ALT  9   /  AlkPhos  200<H>  12-22    PTT - ( 22 Dec 2022 06:40 )  PTT:33.9 sec          Culture - Fungal, CSF (collected 19 Dec 2022 17:10)  Source: .CSF CSF  Preliminary Report (21 Dec 2022 15:05):    Culture is being performed. Fungal cultures are held for 4 weeks.    Culture - CSF with Gram Stain (collected 19 Dec 2022 17:10)  Source: .CSF CSF  Gram Stain (20 Dec 2022 15:20):    No polymorphonuclear leukocytes seen per low power field    No organisms seen per oil power field    by cytocentrifuge  Preliminary Report (21 Dec 2022 08:50):    No growth to date.    Culture - Acid Fast - CSF (collected 19 Dec 2022 17:10)  Source: .CSF CSF  Preliminary Report (21 Dec 2022 15:09):    Culture is being performed.    Culture - Sputum (collected 19 Dec 2022 16:00)  Source: .Sputum Sputum  Gram Stain (20 Dec 2022 01:59):    Moderate polymorphonuclear leukocytes per low power field    Few Squamous epithelial cells per low power field    Few Gram positive cocci in pairs seen per oil power field    Few Yeast like cells seen per oil power field    Few Gram Negative Rods seen per oil power field  Final Report (21 Dec 2022 23:09):    Normal Respiratory Charis present      CAPILLARY BLOOD GLUCOSE            RADIOLOGY REVIEWED

## 2022-12-22 NOTE — PROGRESS NOTE ADULT - ASSESSMENT
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 faecalis bacteremia, admitted on 12/13 with altered mental status of unclear etiology who subseqently developed acute hypoxic respiratory failure secondary to aspiration pneumonia and septic shock. Now downgraded to medicine for further management.    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 faecalis bacteremia  Will complete ABX on 12/26  Cellcept is on hold until then    #Lung transplant  On immunosuppression therapy  Continue hydrocortisone and Tacrolimus  As per ID who spoke to transplant center.  Plan to hold Cellcept until treatment for bacteria resolves 12/26 tentative date.    #ESRD on dialysis.   L AV access.    HD per renal  [place]   Abx adjusted for renal functioning.    #Chronic atrial fibrillation.   C/w Metoprolol and eliquis    #Chronic systolic congestive heart failure.   Metoprolol  Needs to follow up with cardiology outpatinet  Patient needs outpatient stress/ischemic evaluation     #Dilated CBD  No current abdominal pain  Patient will need eventual MRCP to r/o biliary pathology but can be outpatient    #Hepatic cirrhosis  Noted on imaging  Currently not altered and was taken off of lactulose    DVT prophylaxis: Eliquis      PT PENDING    Discussed with patients transplant surgeon Dr. Hung Lewis [phone].

## 2022-12-22 NOTE — PROGRESS NOTE ADULT - ASSESSMENT
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 Faecalis bacteremia now readmitted on 12/13 for altered mental status, sepsis workup including ?acute meningitis, found also with dilated CBD on ultrasound complicated by acute respiratory failure with hypoxia  requiring intubation and transferred to MICU on 12/18, s/p LP and paracentesis, medically extubated on 12/20 and downgraded to medicine service, positive EBV CSF PCR and improving encephalopathy.     Acute Respiratory Failure with hypoxia  - s/p extubation on 12/20, maintaining adequate sats on room air    Acute Encephalopathy  Bacterial PNA, ?Aspiration Event  Recurrent Cryptococcal Meningitis  Hx of E Faecalis Bacteremia on Home IV Antibiotic  - mentation improving, close to baseline   - CT CAP with pulmonary edema/ ?superimposed PNA  - s/p paracentesis and LP 12/19: + cryptococcal Ag (?recurrent) and +EBV CSF PCR  - follow up final results of LP studies   - on Amphotericin B, flucytosine, Meropenem + Vanco by level per ID     Acute Decompensated Liver Cirrhosis  - normal ammonia, elevated alk phos (trending down)  - eventual MRI/MRCP to evaluate biliary pathology, can be done outpatient per GI    ESRD on HD   - since 2020 after presumed contrast induced nephropathy for CAD with stenting   - pt/wife wishes to continue ongoing HD  - mgnt per nephrology  - avoid nephrotoxic agents    Hx of Bilateral Lung Transplant for ES COPD  - in 2015, follows closely with [facility]   - on tacrolimus, home cellcept on hold until bacteremia resolved    Palliative Care Encounter/Goals of care  - Surrogate/HCP/Guardian: wife Veronica Marshall [phone]    Met with patient at bedside. He is awake and oriented x 3 and able to provide reasonable insight into his comorbidities and hospitalization course. He acknowledged awareness of being intubated and "it was scary" in reference to his acute confusion and overall hospital course. He sates "feeling weak" and is hopeful to continue to improve and continue ongoing treatment including dialysis. Remains full code. He plans to follow up with transplant team at [facility] on discharge and shared that its been "8 years" since lung transplant and "got good use out of them." Emotional support provided and all questions answered.     Recommend advance illness program for additional supportive services.   Remains high risk of rehospitalization due to multitude of complex comorbidities.  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 Faecalis bacteremia now readmitted on 12/13 for altered mental status, sepsis workup including ?acute meningitis, found also with dilated CBD on ultrasound complicated by acute respiratory failure with hypoxia  requiring intubation and transferred to MICU on 12/18, s/p LP and paracentesis, medically extubated on 12/20 and downgraded to medicine service, positive EBV CSF PCR and improving encephalopathy.     Acute Respiratory Failure with hypoxia  - s/p extubation on 12/20, maintaining adequate sats on room air    Acute Encephalopathy  Bacterial PNA, ?Aspiration Event  Recurrent Cryptococcal Meningitis  Hx of E Faecalis Bacteremia on Home IV Antibiotic  - mentation improving, close to baseline   - CT CAP with pulmonary edema/ ?superimposed PNA  - s/p LP 12/19: + cryptococcal Ag but PCR neg (?recurrent) and +EBV CSF PCR  - follow up final results of LP studies   - on Amphotericin B, flucytosine, Meropenem + Vanco by level per ID     Acute Decompensated Liver Cirrhosis  - normal ammonia, elevated alk phos (trending down)  - eventual MRI/MRCP to evaluate biliary pathology, can be done outpatient per GI    ESRD on HD   - since 2020 after presumed contrast induced nephropathy for CAD with stenting   - pt/wife wishes to continue ongoing HD  - mgnt per nephrology  - avoid nephrotoxic agents    Hx of Bilateral Lung Transplant for ES COPD  - in 2015, follows closely with [facility]   - on tacrolimus, home cellcept on hold until bacteremia resolved    Palliative Care Encounter/Goals of care  - Surrogate/HCP/Guardian: wife Veronica Marshall [phone]    Met with patient at bedside. He is awake and oriented x 3 and able to provide reasonable insight into his comorbidities and hospitalization course. He acknowledged awareness of being intubated and "it was scary" in reference to his acute confusion and overall hospital course. He sates "feeling weak" and is hopeful to continue to improve and continue ongoing treatment including dialysis. Remains full code. He plans to follow up with transplant team at [facility] on discharge and shared that its been "8 years" since lung transplant and "got good use out of them." Emotional support provided and all questions answered.     Recommend advance illness program for additional supportive services.   Remains high risk of rehospitalization due to multitude of complex comorbidities.   No further recommendations from a palliative standpoint. Will sign off. Please reconsult PRN  Dispo planning per CCC. Ongoing medical mgnt per primary team.

## 2022-12-22 NOTE — PROGRESS NOTE ADULT - SUBJECTIVE AND OBJECTIVE BOX
[facility]                                        [facility]                                 [address] [facility], [address]                                  [address]                                        [place], [place], [address]                                             [phone]        CC: Encephalopathy    HPI:   The patient is a 72y Male with multiple medical issues who was recently hospitalized with hypoxic respiratory failure.  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   He was ultimately discharged 11/23/22.   He now presented with altered mental status and lethargy.   Neurology asked to evaluation for meningitis. LP attempted (neuro SHEELA) and was unsuccessful while in ER. (from JW note)    Interim history: doing well, transferred to 4T, alert and oriented    ROS:   no headache, dizziness, weakness or numbness    MEDICATIONS  (STANDING):  amphotericin B  liposome  IVPB 320 milliGRAM(s) IV Intermittent every 24 hours  apixaban 2.5 milliGRAM(s) Oral two times a day  atorvastatin 80 milliGRAM(s) Oral at bedtime  chlorhexidine 2% Cloths 1 Application(s) Topical <User Schedule>  dextrose 50% Injectable 25 Gram(s) IV Push once  dextrose 50% Injectable 12.5 Gram(s) IV Push once  dextrose 50% Injectable 25 Gram(s) IV Push once  dextrose Oral Gel 15 Gram(s) Oral once  epoetin bne-epbx (RETACRIT) Injectable 4000 Unit(s) IV Push <User Schedule>  flucytosine 1500 milliGRAM(s) Oral <User Schedule>  hydrocortisone 10 milliGRAM(s) Oral every 12 hours  meropenem Injectable 1000 milliGRAM(s) IV Push <User Schedule>  metoprolol succinate ER 25 milliGRAM(s) Oral daily  pantoprazole    Tablet 40 milliGRAM(s) Oral before breakfast  sevelamer carbonate 800 milliGRAM(s) Oral three times a day with meals  tacrolimus 1.5 milliGRAM(s) Oral daily  tamsulosin 0.4 milliGRAM(s) Oral at bedtime  trimethoprim   80 mG/sulfamethoxazole 400 mG 1 Tablet(s) Oral <User Schedule>    MEDICATIONS  (PRN):      Vital Signs Last 24 Hrs  T(C): 36.4 (22 Dec 2022 10:08), Max: 36.8 (21 Dec 2022 19:00)  T(F): 97.6 (22 Dec 2022 10:08), Max: 98.3 (21 Dec 2022 19:00)  HR: 85 (22 Dec 2022 10:08) (67 - 101)  BP: 149/83 (22 Dec 2022 10:08) (102/71 - 149/83)  BP(mean): 100 (21 Dec 2022 16:00) (100 - 100)  RR: 18 (22 Dec 2022 10:08) (18 - 26)  SpO2: 98% (22 Dec 2022 10:08) (98% - 100%)    Parameters below as of 22 Dec 2022 10:08  Patient On (Oxygen Delivery Method): nasal cannula      Detailed Neurologic Exam:    Mental status: The patient is awake and alert and has slightly diminished attention span.  The patient is fully oriented in 3 spheres.  The patient is able to name objects, follow commands, repeat sentences.    Cranial nerves: Pupils equal and react symmetrically to light. There is no visual field deficit to confrontation. Extraocular motion is full (right eye deviated up in primary gaze) with no nystagmus. There is no ptosis. Facial sensation is intact. Facial musculature is symmetric. Palate elevates symmetrically. Tongue is midline. (+) hearing loss    Motor: There is normal bulk and tone.  There is no tremor.  Strength is 5/5 in the right arm and leg.   Strength is 5/5 in the left arm and leg.    Sensation: Intact to light touch and pin in 4 extremities    Reflexes: 1+ throughout and plantar responses are flexor.    Cerebellar: There is no dysmetria on finger to nose testing.    Gait : deferred    Labs:                           10.7   11.14 )-----------( 146      ( 21 Dec 2022 07:45 )             32.2     12-21    135  |  95<L>  |  43.7<H>  ----------------------------<  93  3.5   |  27.0  |  5.25<H>    Ca    8.5      21 Dec 2022 05:00  Phos  4.6     12-21  Mg     2.1     12-21    TPro  5.2<L>  /  Alb  2.7<L>  /  TBili  0.4  /  DBili  x   /  AST  34  /  ALT  8   /  AlkPhos  221<H>  12-20    LIVER FUNCTIONS - ( 20 Dec 2022 03:45 )  Alb: 2.7 g/dL / Pro: 5.2 g/dL / ALK PHOS: 221 U/L / ALT: 8 U/L / AST: 34 U/L / GGT: x           PT/INR - ( 20 Dec 2022 03:45 )   PT: 13.2 sec;   INR: 1.14 ratio         PTT - ( 21 Dec 2022 07:45 )  PTT:48.2 sec      Cerebrospinal Fluid Cell Count-1 (12.19.22 @ 17:10)    Tube Type: Tube 3    CSF Appearance: Clear    CSF Neutrophils: NA %    Appearance Spun: Colorless    RBC Count - Spinal Fluid: 2 /cmm    Total Nucleated Cell Count, CSF: 4 /uL    CSF Color: No Color    Protein, CSF (12.19.22 @ 17:10)    Protein, CSF: 51 mg/dL    Glucose, CSF (12.19.22 @ 17:10)    Glucose, CSF: 48 mg/dLG/24h    Herpes Simplex Virus 1/2 PCR, CSF (12.19.22 @ 17:10)    Source HSV 1/2: CSF    HSV 1/2 PCR: NotDete: HSV1/2 PCR assay is a real-time PCR test that detects and differentiates  HSV-1 and/or HSV-2 DNA in CSF (Vitreous Fluid). The results of this test  should be interpreted with consideration of all clinical and laboratory  findings.    CSF PCR Panel (12.19.22 @ 17:10)    CSF PCR Result: NotDete: The meningitis/encephalitis (ME) panel (CSFPCR) is a PCR based assay that  screens for: Escherichia coli; Haemophilus influenzae; Listeria  monocytogenes; Neisseria meningitidis; Streptococcus agalactiae;  Streptococcus pneumoniae; CMV; Enterovirus; HSV-1; HSV-2; Human  herpesvirus 6; Parechovirus; VZV and Cryptococcus. Result should be  interpreted in context of clinical presentation, imaging and other lab  tests. Positive predictive value may be lower in patients with normal CSF  chemistry and cell count.    Cryptococcal Antigen - CSF (12.19.22 @ 17:10)    Cryptococcal Antigen - CSF: Positive:    Culture - Fungal, CSF (12.19.22 @ 17:10)    Specimen Source: .CSF CSF    Culture Results:   Testing in progress    Culture - CSF with Gram Stain . (12.19.22 @ 17:10)    Gram Stain:   No polymorphonuclear leukocytes seen per low power field  No organisms seen per oil power field  by cytocentrifuge    Specimen Source: .CSF CSF    Culture Results:   No growth to date.    Culture - Acid Fast - CSF (12.19.22 @ 17:10)    Specimen Source: .CSF CSF    Acid Fast Bacilli Smear:   Less than 5 cc of CSF received,  unable to perform AFB smear.    KARMEN Virus DNA by PCR - CSF (12.19.22 @ 17:10)    KARMEN Virus DNA by PCR - CSF: NotDete:     Vasyl Barr Virus Detection by PCR -CSF (12.19.22 @ 17:10)    EBV PCR: 128: TESTING PERFORMED AT LOW VOLUME ON CSF SPECIMEN FOR EBV, MAY AFFECT  RESULTS.  Assay Range: 52 IU/mL to 1.69E+08 IU/mL  One IU is equal to 0.59 copies of EBV.  The limit of quantitation (LOQ) is 52 IU/mL. EBV DNA detected below  the LOQ will be reported as Detected:<52 IU/mL.  This test was developed and its performance characteristics  determined by [employer]. It has not been cleared or approved  by the U.S. Food and Drug Administration. Results should be used in  conjunction with clinical findings, and should not form the sole  basis for a diagnosis or treatment decision.

## 2022-12-22 NOTE — PROGRESS NOTE ADULT - ASSESSMENT
72M with PMH of  hypertension, hyperlipidemia, ESRD on HD, emphysema s/p lung transplant in [place] by Dr. 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 faecalis bacteremia, newly diagnosed HFrEF 25%, AF complicated by GIB was discharged on home IV Abx now presenting with AMS  Presented to the ED with daughter. CTH obtained and did not reveal any acute findings.  Patient was admitted to medicine for encephalopathy. LP attempted bedside but unsuccessful. Was on empiric treatment and mental status was improving and then ultimately intubated for aspiration pneumonia    #Acute hypoxic respiratory failure likely 2/2 aspiration- now extubated, mental status improved. sputum cx normal keerthi    #AMS-unclear etiology, no fever. ammonia level normal.  ct c/ap ?pna/edema/ascites.   ??EBV encephalitis  s/p LP normal cell counts glucose and protein 51. CSF pcr (-) and cultures pending. P  er [facility] micro lab CSF gram stain reported as "few organisms" . gram stain repeated at core lab and micro and is NEGATIVE. initial report has been corrected. CSF crytp titer 1:40, as per [facility] his initial CSF titer in 12/2020 was 1:2560. no serum titer available. 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 f/u CSf fungal CX. 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 CRISTIAN was deferred by cardio on recent admission. dc sol/vacn. resume ceftriaxone /ampicillin as previously planned. f/u BCX ngtd, repeat TTE    # s/p lung transplant- per transplant team, cellcept  on hold as per pt wife discussion with [facility] transplant team, currently on cortef, c/w tacrolimus and check levels. c/w bactrim per home dose for ppx, fluc resumed    # h/o fungal meningitis- resumed fluc      #Dilated CBD on USG- plan for MRCP, not urgent as per GI    # AScites- s/p paracentesis not suggestive of sbp,f/u cx    #Leukocytosis- multifactorial, on steroids, monitor    d/w Dr Briggs, pharmacy, [facility] ID    will f/u

## 2022-12-22 NOTE — PROGRESS NOTE ADULT - ASSESSMENT
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 CT head is negative. Hospital course is notable for empiric treatment for meningitis; also with dilated common bile duct; awaiting MRCP; acutely decompensated on 12/18 with acute hypercapnic respiratory failure in setting of aspiration pneumonia, emergently intubated, now extubated; also in shock (resolved). Nephrology is managing ESRD on HD.    -Access: L AV access   -Outpatient dialysis days are Tuesdays Thursdays Saturdays  - Pt has been off schedule due to getting extra HD sessions over the weekend  - Last HD was yesterday, will dialyze tomorrow    -Blood pressure -  shock resolved,  hemodynamically stable   -Anemia in setting of CKD - hemoglobin is at goal, however downtrending. 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

## 2022-12-22 NOTE — PROGRESS NOTE ADULT - ASSESSMENT
72y Male with multiple medical issues now with encephalopathy.     Encephalopathy.   Improved AMS  Likely toxic metabolic secondary to multiple medical issues.   Antibiotics per ID.   ?(+) cryptococcus Ag but (-) PCR      Rule out meningitis.   CSF results as above  Abx coverage for now per ID.   crypto Ag (+) but PCR (-)  EBV detected by PCR in CSF, low level, unclear significance    will follow with you    Luis Brady MD PhD   [id number]

## 2022-12-23 NOTE — PROGRESS NOTE ADULT - ASSESSMENT
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 faecalis bacteremia, admitted on 12/13 with altered mental status of unclear etiology who subseqently developed acute hypoxic respiratory failure secondary to aspiration pneumonia and septic shock. Now downgraded to medicine for further management.    #Acute hypoxemic respiratory failure secondary to septic shock secondary to likely aspiration pneumonia  Resolved  Was intubated in ICU but now extubated to Room air  ICU course reviewed  C/w Ampicillin, ceftriaxone per ID    #Toxic Metabolic Encephalopathy    Concern for possible recurrent cryptococcal meningitis due to positive antigen in LP but unlikely upon discussion with ID  CSF was positive for EBV   Discussed with ID - Ordered EBV serum and serology  Will be Acyclovir to complete course   MRI Brain ordered after discussion with ID to R/o Lymphoma  Was on Amphotericin B and Flucytosine that was stopped  F/u LP cx  F/u Paracentesis cx  Blood cx NGTD  Will order CT C/A/P With IV contrast to assess for any lymphadenoapthy - May need biopsy if present to r/o lymphoma     #Previous Bacteremia  Patient w/ hx of e. faecalis bacteremia  Will complete ABX on 12/26  Cellcept is on hold until then - Please discuss with patients transplant surgeon prior to starting     #Lung transplant  On immunosuppression therapy  Continue hydrocortisone and Tacrolimus  As per ID who spoke to transplant center.  Plan to hold Cellcept until treatment for bacteria resolves 12/26 tentative date.    #ESRD on dialysis.   L AV access.    HD per renal  [place]   Abx adjusted for renal functioning.    #Chronic atrial fibrillation.   C/w Metoprolol and eliquis    #Chronic systolic congestive heart failure.   Metoprolol  Needs to follow up with cardiology outpatinet  Patient needs outpatient stress/ischemic evaluation     #Dilated CBD  No current abdominal pain  Patient will need eventual MRCP to r/o biliary pathology but can be outpatient    #Hepatic cirrhosis  Noted on imaging  Currently not altered and was taken off of lactulose    DVT prophylaxis: Eliquis      Discussed with patients transplant surgeon Dr. Hung Lewis [phone] 12/22    Dispo: Remains acute, needs MRI, CT C/A/P. Completes Course of abx/anti-virals

## 2022-12-23 NOTE — PROGRESS NOTE ADULT - SUBJECTIVE AND OBJECTIVE BOX
[facility]                                        [facility] at [place]                                 Jose Antonio Brooks, & Riley                                  [address]                                        [place], [place], [address]                                             [phone]        CC: Encephalopathy    HPI:   The patient is a 72y Male with multiple medical issues who was recently hospitalized with hypoxic respiratory failure.  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   He was ultimately discharged 11/23/22.   He now presented with altered mental status and lethargy.   Neurology asked to evaluation for meningitis. LP attempted (neuro JW) and was unsuccessful while in ER. (from JW note)    Interim history: doing well, transferred to T, alert and oriented. no new issues    ROS:   no headache, dizziness, weakness or numbness    MEDICATIONS  (STANDING):  acyclovir IVPB      acyclovir IVPB 320 milliGRAM(s) IV Intermittent every 24 hours  ampicillin  IVPB 2 Gram(s) IV Intermittent every 12 hours  apixaban 2.5 milliGRAM(s) Oral two times a day  atorvastatin 80 milliGRAM(s) Oral at bedtime  cefTRIAXone Injectable. 2000 milliGRAM(s) IV Push every 12 hours  chlorhexidine 2% Cloths 1 Application(s) Topical <User Schedule>  dextrose 50% Injectable 25 Gram(s) IV Push once  dextrose 50% Injectable 12.5 Gram(s) IV Push once  dextrose 50% Injectable 25 Gram(s) IV Push once  dextrose Oral Gel 15 Gram(s) Oral once  epoetin ben-epbx (RETACRIT) Injectable 4000 Unit(s) IV Push <User Schedule>  fluconAZOLE   Tablet 200 milliGRAM(s) Oral <User Schedule>  hydrocortisone 10 milliGRAM(s) Oral every 12 hours  metoprolol succinate ER 25 milliGRAM(s) Oral daily  pantoprazole    Tablet 40 milliGRAM(s) Oral before breakfast  sevelamer carbonate 800 milliGRAM(s) Oral three times a day with meals  tacrolimus 1.5 milliGRAM(s) Oral daily  tamsulosin 0.4 milliGRAM(s) Oral at bedtime  trimethoprim   80 mG/sulfamethoxazole 400 mG 1 Tablet(s) Oral <User Schedule>    MEDICATIONS  (PRN):      Vital Signs Last 24 Hrs  T(C): 36.6 (23 Dec 2022 12:11), Max: 36.9 (22 Dec 2022 21:00)  T(F): 97.8 (23 Dec 2022 12:11), Max: 98.5 (22 Dec 2022 21:00)  HR: 105 (23 Dec 2022 12:11) (78 - 115)  BP: 126/75 (23 Dec 2022 12:11) (109/71 - 143/75)  BP(mean): --  RR: 17 (23 Dec 2022 12:11) (17 - 18)  SpO2: 100% (23 Dec 2022 12:11) (95% - 100%)    Parameters below as of 23 Dec 2022 12:11  Patient On (Oxygen Delivery Method): room air      Detailed Neurologic Exam:    Mental status: The patient is awake and alert and has slightly diminished attention span.  The patient is fully oriented in 3 spheres.  The patient is able to name objects, follow commands, repeat sentences.    Cranial nerves: Pupils equal and react symmetrically to light. There is no visual field deficit to confrontation. Extraocular motion is full (right eye deviated up in primary gaze) with no nystagmus. There is no ptosis. Facial sensation is intact. Facial musculature is symmetric. Palate elevates symmetrically. Tongue is midline. (+) hearing loss    Motor: There is normal bulk and tone.  There is no tremor.  Strength is 5/5 in the right arm and leg.   Strength is 5/5 in the left arm and leg.    Sensation: Intact to light touch and pin in 4 extremities    Reflexes: 1+ throughout and plantar responses are flexor.    Cerebellar: There is no dysmetria on finger to nose testing.    Gait : deferred    Labs:                           9.8    4.62  )-----------( 133      ( 23 Dec 2022 04:30 )             30.2     12-22    137  |  99  |  27.5<H>  ----------------------------<  79  3.7   |  25.0  |  3.93<H>    Ca    8.4      22 Dec 2022 06:40  Phos  4.3     12-22  Mg     2.0     12-22    TPro  5.0<L>  /  Alb  2.6<L>  /  TBili  0.6  /  DBili  x   /  AST  32  /  ALT  9   /  AlkPhos  200<H>  12-22    LIVER FUNCTIONS - ( 22 Dec 2022 06:40 )  Alb: 2.6 g/dL / Pro: 5.0 g/dL / ALK PHOS: 200 U/L / ALT: 9 U/L / AST: 32 U/L / GGT: x           PTT - ( 22 Dec 2022 06:40 )  PTT:33.9 sec      Cerebrospinal Fluid Cell Count-1 (12.19.22 @ 17:10)    Tube Type: Tube 3    CSF Appearance: Clear    CSF Neutrophils: NA %    Appearance Spun: Colorless    RBC Count - Spinal Fluid: 2 /cmm    Total Nucleated Cell Count, CSF: 4 /uL    CSF Color: No Color    Protein, CSF (12.19.22 @ 17:10)    Protein, CSF: 51 mg/dL    Glucose, CSF (12.19.22 @ 17:10)    Glucose, CSF: 48 mg/dLG/24h    Herpes Simplex Virus 1/2 PCR, CSF (12.19.22 @ 17:10)    Source HSV 1/2: CSF    HSV 1/2 PCR: NotDetec: HSV1/2 PCR assay is a real-time PCR test that detects and differentiates  HSV-1 and/or HSV-2 DNA in CSF (Vitreous Fluid). The results of this test  should be interpreted with consideration of all clinical and laboratory  findings.    CSF PCR Panel (12.19.22 @ 17:10)    CSF PCR Result: NotDetec: The meningitis/encephalitis (ME) panel (CSFPCR) is a PCR based assay that  screens for: Escherichia coli; Haemophilus influenzae; Listeria  monocytogenes; Neisseria meningitidis; Streptococcus agalactiae;  Streptococcus pneumoniae; CMV; Enterovirus; HSV-1; HSV-2; Human  herpesvirus 6; Parechovirus; VZV and Cryptococcus. Result should be  interpreted in context of clinical presentation, imaging and other lab  tests. Positive predictive value may be lower in patients with normal CSF  chemistry and cell count.    Cryptococcal Antigen - CSF (12.19.22 @ 17:10)    Cryptococcal Antigen - CSF: Positive:    Culture - Fungal, CSF (12.19.22 @ 17:10)    Specimen Source: .CSF CSF    Culture Results:   Testing in progress    Culture - CSF with Gram Stain . (12.19.22 @ 17:10)    Gram Stain:   No polymorphonuclear leukocytes seen per low power field  No organisms seen per oil power field  by cytocentrifuge    Specimen Source: .CSF CSF    Culture Results:   No growth to date.    Culture - Acid Fast - CSF (12.19.22 @ 17:10)    Specimen Source: .CSF CSF    Acid Fast Bacilli Smear:   Less than 5 cc of CSF received,  unable to perform AFB smear.    KARMEN Virus DNA by PCR - CSF (12.19.22 @ 17:10)    KARMEN Virus DNA by PCR - CSF: NotDete:     Vasyl Barr Virus Detection by PCR -CSF (12.19.22 @ 17:10)    EBV PCR: 128: TESTING PERFORMED AT LOW VOLUME ON CSF SPECIMEN FOR EBV, MAY AFFECT  RESULTS.  Assay Range: 52 IU/mL to 1.69E+08 IU/mL  One IU is equal to 0.59 copies of EBV.  The limit of quantitation (LOQ) is 52 IU/mL. EBV DNA detected below  the LOQ will be reported as Detected:<52 IU/mL.  This test was developed and its performance characteristics  determined by [facility]. It has not been cleared or approved  by the U.[place]. Food and Drug Administration. Results should be used in  conjunction with clinical findings, and should not form the sole  basis for a diagnosis or treatment decision.

## 2022-12-23 NOTE — PROGRESS NOTE ADULT - SUBJECTIVE AND OBJECTIVE BOX
Hospitalist Daily Progress Note    Chief Complaint:  Patient is a 72y old  Male who presents with a chief complaint of Encephalopathy (21 Dec 2022 11:30)      SUBJECTIVE / OVERNIGHT EVENTS:  Patient was seen and examined at bedside in HD. No complaints. Feels well.   Patient denies chest pain, SOB, abd pain, N/V, fever, chills, dysuria or any other complaints. All remainder ROS negative.     MEDICATIONS  (STANDING):  acyclovir IVPB      acyclovir IVPB 320 milliGRAM(s) IV Intermittent every 24 hours  ampicillin  IVPB 2 Gram(s) IV Intermittent every 12 hours  apixaban 2.5 milliGRAM(s) Oral two times a day  atorvastatin 80 milliGRAM(s) Oral at bedtime  cefTRIAXone Injectable. 2000 milliGRAM(s) IV Push every 12 hours  chlorhexidine 2% Cloths 1 Application(s) Topical <User Schedule>  dextrose 50% Injectable 25 Gram(s) IV Push once  dextrose 50% Injectable 12.5 Gram(s) IV Push once  dextrose 50% Injectable 25 Gram(s) IV Push once  dextrose Oral Gel 15 Gram(s) Oral once  epoetin ben-epbx (RETACRIT) Injectable 4000 Unit(s) IV Push <User Schedule>  fluconAZOLE   Tablet 200 milliGRAM(s) Oral <User Schedule>  hydrocortisone 10 milliGRAM(s) Oral every 12 hours  metoprolol succinate ER 25 milliGRAM(s) Oral daily  pantoprazole    Tablet 40 milliGRAM(s) Oral before breakfast  sevelamer carbonate 800 milliGRAM(s) Oral three times a day with meals  tacrolimus 1.5 milliGRAM(s) Oral daily  tamsulosin 0.4 milliGRAM(s) Oral at bedtime  trimethoprim   80 mG/sulfamethoxazole 400 mG 1 Tablet(s) Oral <User Schedule>    MEDICATIONS  (PRN):        I&O's Summary    22 Dec 2022 07:01  -  23 Dec 2022 07:00  --------------------------------------------------------  IN: 360 mL / OUT: 0 mL / NET: 360 mL    23 Dec 2022 07:01  -  23 Dec 2022 15:16  --------------------------------------------------------  IN: 240 mL / OUT: 1000 mL / NET: -760 mL        PHYSICAL EXAM:  Vital Signs Last 24 Hrs  T(C): 36.7 (23 Dec 2022 15:00), Max: 36.9 (22 Dec 2022 21:00)  T(F): 98.1 (23 Dec 2022 15:00), Max: 98.5 (22 Dec 2022 21:00)  HR: 97 (23 Dec 2022 15:00) (78 - 115)  BP: 107/60 (23 Dec 2022 15:00) (107/60 - 143/75)  BP(mean): --  RR: 17 (23 Dec 2022 15:00) (17 - 18)  SpO2: 100% (23 Dec 2022 15:00) (95% - 100%)    Parameters below as of 23 Dec 2022 15:00  Patient On (Oxygen Delivery Method): room air     Constitutional: NAD, Resting, chronically ill appearing male   ENT: Supple, No JVD  Lungs: CTA B/L, Non-labored breathing  Cardio: RRR, S1/S2, No murmur  Abdomen: Soft, Nontender, Nondistended; Bowel sounds present  Extremities: No calf tenderness, No pitting edema  Musculoskeletal:   No clubbing or cyanosis of digits; no joint swelling or tenderness to palpation  Psych: Calm, cooperative affect appropriate  Neuro: Awake and alert, oriented x 4, moves all extremities  Skin: No rashes; no palpable lesions    LABS:                        9.8    4.62  )-----------( 133      ( 23 Dec 2022 04:30 )             30.2     12-22    137  |  99  |  27.5<H>  ----------------------------<  79  3.7   |  25.0  |  3.93<H>    Ca    8.4      22 Dec 2022 06:40  Phos  4.3     12-22  Mg     2.0     12-22    TPro  5.0<L>  /  Alb  2.6<L>  /  TBili  0.6  /  DBili  x   /  AST  32  /  ALT  9   /  AlkPhos  200<H>  12-22    PTT - ( 22 Dec 2022 06:40 )  PTT:33.9 sec          CAPILLARY BLOOD GLUCOSE      POCT Blood Glucose.: 76 mg/dL (23 Dec 2022 08:07)        RADIOLOGY REVIEWED

## 2022-12-23 NOTE — PROGRESS NOTE ADULT - ASSESSMENT
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 CT head is negative. Hospital course is notable for empiric treatment for meningitis; also with dilated common bile duct; awaiting MRCP; acutely decompensated on 12/18 with acute hypercapnic respiratory failure in setting of aspiration pneumonia, emergently intubated, now extubated; also in shock (resolved). Nephrology is managing ESRD on HD.    -Access: L AV access   -Outpatient dialysis days are Tuesdays Thursdays Saturdays  - Pt has been off schedule  - HD today    -Blood pressure -  shock resolved,  hemodynamically stable   -Anemia in setting of CKD - hemoglobin is at goal, however downtrending. 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

## 2022-12-23 NOTE — PROGRESS NOTE ADULT - ASSESSMENT
72M with PMH of  hypertension, hyperlipidemia, ESRD on HD, emphysema s/p lung transplant in [place] by Dr. 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 faecalis bacteremia, newly diagnosed HFrEF 25%, AF complicated by GIB was discharged on home IV Abx now presenting with AMS  Presented to the ED with daughter. CTH obtained and did not reveal any acute findings.  Patient was admitted to medicine for encephalopathy. LP attempted bedside but unsuccessful. Was on empiric treatment and mental status was improving and then ultimately intubated for aspiration pneumonia    #Acute hypoxic respiratory failure likely 2/2 aspiration- now extubated, mental status improved. sputum cx normal keerthi    #AMS-unclear etiology, no fever. ammonia level normal.  ct c/ap ?pna/edema/ascites.   ??EBV encephalitis  s/p LP normal cell counts glucose and protein 51. CSF pcr (-) and cultures pending.   [facility] micro lab CSF gram stain reported as "few organisms" . gram stain repeated at core lab and micro and is NEGATIVE. initial report has been corrected. CSF crytp titer 1:40, as per [facility] his initial CSF titer in 12/2020 was 1:2560. no serum titer available. 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 f/u CSf fungal CX. 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 CRISTIAN was deferred by cardio on recent admission.  resumed ceftriaxone /ampicillin as previously planned. f/u BCX ngtd, repeat TTE    # s/p lung transplant- per transplant team, cellcept  on hold as per pt wife discussion with [facility] transplant team, currently on cortef, c/w tacrolimus and check levels. c/w bactrim per home dose for ppx, fluc resumed    # h/o fungal meningitis- resumed fluc      #Dilated CBD on USG- plan for MRCP, not urgent as per GI    # AScites- s/p paracentesis not suggestive of sbp,f/u cx ngtd    #Leukocytosis- multifactorial, on steroids, monitor    d/w Dr Briggs    will f/u

## 2022-12-23 NOTE — PROGRESS NOTE ADULT - SUBJECTIVE AND OBJECTIVE BOX
[facility]                                                INFECTIOUS DISEASES  =======================================================                               J Carlos Galdamez MD#    Isatu Rojas MD*                           Perlita Solares MD*   Sumaya Morales MD*            Diplomates American Board of Internal Medicine & Infectious Diseases                  # [place] Office - Appt - Tel  [phone] Fax [phone]                * [place] Office - Appt - Tel [phone] Fax [phone]                                  Hospital Consult line:  [phone]  =======================================================      [facility]-[id number]  JU AYDENJAROD   follow up for: AMS, h/o enterococcus bacteremia  sitting up in chair, awake and alert  on room air  feels well  patient seen and examined.       I have personally reviewed the labs and data; pertinent labs and data are listed in this note; please see below.   ===================================================  REVIEW OF SYSTEMS:  CONSTITUTIONAL:  No Fever or chills  HEENT:  No diplopia or blurred vision.  No earache, sore throat or runny nose.  CARDIOVASCULAR:  No pressure, squeezing, strangling, tightness, heaviness or aching about the chest, neck, axilla or epigastrium.  RESPIRATORY:  No cough, shortness of breath  GASTROINTESTINAL:  No nausea, vomiting or diarrhea.  GENITOURINARY:  No dysuria, frequency or urgency. No Blood in urine  MUSCULOSKELETAL:  no joint aches, no muscle pain  SKIN:  No change in skin, hair or nails.  NEUROLOGIC:  No Headaches, seizures or weakness.  PSYCHIATRIC:  No disorder of thought or mood.  ENDOCRINE:  No heat or cold intolerance  HEMATOLOGICAL:  No easy bruising or bleeding.    =======================================================  Allergies    budesonide (Unknown)  cefpodoxime (Unknown)  Pollen (Unknown)    Intolerances    Antibiotics:  acyclovir IVPB      acyclovir IVPB 320 milliGRAM(s) IV Intermittent every 24 hours  ampicillin  IVPB 2 Gram(s) IV Intermittent every 12 hours  cefTRIAXone Injectable. 2000 milliGRAM(s) IV Push every 12 hours  fluconAZOLE   Tablet 200 milliGRAM(s) Oral <User Schedule>  trimethoprim   80 mG/sulfamethoxazole 400 mG 1 Tablet(s) Oral <User Schedule>    Other medications:  apixaban 2.5 milliGRAM(s) Oral two times a day  atorvastatin 80 milliGRAM(s) Oral at bedtime  chlorhexidine 2% Cloths 1 Application(s) Topical <User Schedule>  dextrose 50% Injectable 25 Gram(s) IV Push once  dextrose 50% Injectable 12.5 Gram(s) IV Push once  dextrose 50% Injectable 25 Gram(s) IV Push once  dextrose Oral Gel 15 Gram(s) Oral once  epoetin ben-epbx (RETACRIT) Injectable 4000 Unit(s) IV Push <User Schedule>  hydrocortisone 10 milliGRAM(s) Oral every 12 hours  metoprolol succinate ER 25 milliGRAM(s) Oral daily  pantoprazole    Tablet 40 milliGRAM(s) Oral before breakfast  sevelamer carbonate 800 milliGRAM(s) Oral three times a day with meals  tacrolimus 1.5 milliGRAM(s) Oral daily  tamsulosin 0.4 milliGRAM(s) Oral at bedtime    ======================================================  Physical Exam:  ============  T(F): 97.8 (23 Dec 2022 12:11), Max: 98.5 (22 Dec 2022 21:00)  HR: 105 (23 Dec 2022 12:11)  BP: 126/75 (23 Dec 2022 12:11)  RR: 17 (23 Dec 2022 12:11)  SpO2: 100% (23 Dec 2022 12:11) (95% - 100%)  temp max in last 48H T(F): , Max: 98.5 (12-22-22 @ 21:00)    General:  No acute distress.  Eye: no conjunctival pallor, no scleral icterus  HENT: Normocephalic, No pharyngeal erythema, No sinus tenderness.  Neck: Supple, No lymphadenopathy.  Respiratory: Lungs are clear to auscultation, Respirations are non-labored.  Cardiovascular: Normal rate, Regular rhythm,  s1+s2  Gastrointestinal: Soft, Non-tender, Non-distended, Normal bowel sounds.  Genitourinary: No costovertebral angle tenderness.  Lymphatics: No lymphadenopathy neck  Musculoskeletal: Normal range of motion, Normal strength.  Integumentary: No rash.  Neurologic: Alert, Oriented, No focal deficits  Psychiatric: Appropriate mood & affect.  =======================================================  Labs:                        9.8    4.62  )-----------( 133      ( 23 Dec 2022 04:30 )             30.2     12-22    137  |  99  |  27.5<H>  ----------------------------<  79  3.7   |  25.0  |  3.93<H>    Ca    8.4      22 Dec 2022 06:40  Phos  4.3     12-22  Mg     2.0     12-22    TPro  5.0<L>  /  Alb  2.6<L>  /  TBili  0.6  /  DBili  x   /  AST  32  /  ALT  9   /  AlkPhos  200<H>  12-22      Culture - Fungal, CSF (collected 12-19-22 @ 17:10)  Source: .CSF CSF    Culture - CSF with Gram Stain (collected 12-19-22 @ 17:10)  Source: .CSF CSF  Gram Stain (12-20-22 @ 15:20):    No polymorphonuclear leukocytes seen per low power field    No organisms seen per oil power field    by cytocentrifuge  Final Report (12-23-22 @ 08:48):    No growth at 3 days.    Culture - Acid Fast - CSF (collected 12-19-22 @ 17:10)  Source: .CSF CSF    Culture - Sputum (collected 12-19-22 @ 16:00)  Source: .Sputum Sputum  Gram Stain (12-20-22 @ 01:59):    Moderate polymorphonuclear leukocytes per low power field    Few Squamous epithelial cells per low power field    Few Gram positive cocci in pairs seen per oil power field    Few Yeast like cells seen per oil power field    Few Gram Negative Rods seen per oil power field  Final Report (12-21-22 @ 23:09):    Normal Respiratory Charis present    Culture - Acid Fast - Body Fluid w/Smear (collected 12-19-22 @ 13:50)  Source: Paracentesis Paracentesis Fluid    Culture - Fungal, Body Fluid (collected 12-19-22 @ 13:50)  Source: Peritoneal Peritoneal Fluid    Culture - Body Fluid with Gram Stain (collected 12-19-22 @ 13:50)  Source: Paracentesis Paracentesis Fluid  Gram Stain (12-19-22 @ 22:30):    No polymorphonuclear leukocytes seen per low power field    No organisms seen per oil power field    Culture - Blood (collected 12-13-22 @ 05:41)  Source: .Blood Blood-Peripheral  Final Report (12-18-22 @ 10:00):    No Growth Final    Culture - Blood (collected 12-13-22 @ 05:41)  Source: .Blood Blood-Peripheral  Final Report (12-18-22 @ 10:00):    No Growth Final

## 2022-12-23 NOTE — PROGRESS NOTE ADULT - SUBJECTIVE AND OBJECTIVE BOX
[facility] DIVISION OF KIDNEY DISEASES AND HYPERTENSION -- HEMODIALYSIS NOTE  --------------------------------------------------------------------------------  Chief Complaint: ESRD/Ongoing hemodialysis requirement    24 hour events/subjective:  No acute event noted  pt seen and examined; feels well        PAST HISTORY  --------------------------------------------------------------------------------  No significant changes to PMH, PSH, FHx, SHx, unless otherwise noted    ALLERGIES & MEDICATIONS  --------------------------------------------------------------------------------  Allergies    budesonide (Unknown)  cefpodoxime (Unknown)  Pollen (Unknown)    Intolerances      Standing Inpatient Medications  acyclovir IVPB      acyclovir IVPB 320 milliGRAM(s) IV Intermittent every 24 hours  ampicillin  IVPB 2 Gram(s) IV Intermittent every 12 hours  apixaban 2.5 milliGRAM(s) Oral two times a day  atorvastatin 80 milliGRAM(s) Oral at bedtime  cefTRIAXone Injectable. 2000 milliGRAM(s) IV Push every 12 hours  chlorhexidine 2% Cloths 1 Application(s) Topical <User Schedule>  dextrose 50% Injectable 25 Gram(s) IV Push once  dextrose 50% Injectable 12.5 Gram(s) IV Push once  dextrose 50% Injectable 25 Gram(s) IV Push once  dextrose Oral Gel 15 Gram(s) Oral once  epoetin ben-epbx (RETACRIT) Injectable 4000 Unit(s) IV Push <User Schedule>  fluconAZOLE   Tablet 200 milliGRAM(s) Oral <User Schedule>  hydrocortisone 10 milliGRAM(s) Oral every 12 hours  metoprolol succinate ER 25 milliGRAM(s) Oral daily  pantoprazole    Tablet 40 milliGRAM(s) Oral before breakfast  sevelamer carbonate 800 milliGRAM(s) Oral three times a day with meals  tacrolimus 1.5 milliGRAM(s) Oral daily  tamsulosin 0.4 milliGRAM(s) Oral at bedtime  trimethoprim   80 mG/sulfamethoxazole 400 mG 1 Tablet(s) Oral <User Schedule>    PRN Inpatient Medications      REVIEW OF SYSTEMS  --------------------------------------------------------------------------------  Gen: No weight changes, fatigue, fevers/chills, weakness  Skin: No rashes  Head/Eyes/Ears/Mouth: No headache  Respiratory: No dyspnea, cough,  CV: No chest pain, orthopnea  GI: No abdominal pain, diarrhea, constipation, nausea, vomiting,  MSK: No joint pain  Neuro: No dizziness/lightheadedness, weakness  Heme: No bleeding  Psych: No significant nervousness, anxiety, stress, depression    All other systems were reviewed and are negative, except as noted.    VITALS/PHYSICAL EXAM  --------------------------------------------------------------------------------  T(C): 36.7 (12-23-22 @ 15:00), Max: 36.9 (12-22-22 @ 21:00)  HR: 97 (12-23-22 @ 15:00) (78 - 115)  BP: 107/60 (12-23-22 @ 15:00) (107/60 - 143/75)  RR: 17 (12-23-22 @ 15:00) (17 - 18)  SpO2: 100% (12-23-22 @ 15:00) (95% - 100%)  Wt(kg): --        12-22-22 @ 07:01  -  12-23-22 @ 07:00  --------------------------------------------------------  IN: 360 mL / OUT: 0 mL / NET: 360 mL    12-23-22 @ 07:01  -  12-23-22 @ 15:33  --------------------------------------------------------  IN: 240 mL / OUT: 1000 mL / NET: -760 mL      Physical Exam:  	Gen: NAD  	HEENT:  supple neck,  	Pulm: CTA B/L  	CV: RRR, S1S2; no rub  	Abd: +BS, soft, nontender  	UE: Warm  	LE: Warm, no edema  	Neuro: No focal deficits  	Psych: Normal affect and mood  	Skin: Warm  	Vascular access: AVF    LABS/STUDIES  --------------------------------------------------------------------------------              9.8    4.62  >-----------<  133      [12-23-22 @ 04:30]              30.2     137  |  99  |  27.5  ----------------------------<  79      [12-22-22 @ 06:40]  3.7   |  25.0  |  3.93        Ca     8.4     [12-22-22 @ 06:40]      Mg     2.0     [12-22-22 @ 06:40]      Phos  4.3     [12-22-22 @ 06:40]    TPro  5.0  /  Alb  2.6  /  TBili  0.6  /  DBili  x   /  AST  32  /  ALT  9   /  AlkPhos  200  [12-22-22 @ 06:40]      PTT: 33.9       [12-22-22 @ 06:40]      Iron 27, TIBC 247, %sat 11      [03-20-22 @ 03:23]  Ferritin 4169      [05-01-22 @ 07:44]  Vitamin D (25OH) 29.1      [11-17-22 @ 05:45]  TSH 4.01      [12-14-22 @ 07:50]  Lipid: chol 84, , HDL 26, LDL --      [11-12-22 @ 05:44]    HBsAb <3.0      [12-16-22 @ 08:59]  HBsAb Nonreact      [12-16-22 @ 08:59]  HBsAg Nonreact      [12-16-22 @ 08:59]  HBcAb Nonreact      [12-16-22 @ 08:59]  HCV 0.09, Nonreact      [12-16-22 @ 08:59]

## 2022-12-23 NOTE — PROGRESS NOTE ADULT - ASSESSMENT
72y Male with multiple medical issues now with encephalopathy.     Encephalopathy.   Improved AMS- AAOx3  Likely toxic metabolic secondary to multiple medical issues.   Antibiotics per ID.   ?(+) cryptococcus Ag but (-) PCR    Rule out meningitis.   CSF results as above  Abx coverage for now per ID.   crypto Ag (+) but PCR (-)  EBV detected by PCR in CSF, low level, unclear significance    Neuro stable.  Will be available as needed    Thank you for allowing me to participate in the care of your patient    Luis Brady MD, PhD   [id number]

## 2022-12-24 NOTE — PROGRESS NOTE ADULT - ASSESSMENT
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 CT head is negative. Hospital course is notable for empiric treatment for meningitis; EBV positive in CSF; also with dilated common bile duct; awaiting MRCP; acutely decompensated on 12/18 with acute hypercapnic respiratory failure in setting of aspiration pneumonia, emergently intubated, now extubated; also in shock (resolved). Nephrology is managing ESRD on HD.    -Access: L AV access   -Outpatient dialysis days are Tuesdays Thursdays Saturdays  -Off cycle while here, currently on a Monday Wednesday Friday dialysis schedule    -Last dialyzed yesterday; next hemodialysis will be this upcoming Monday 12/26 (unless clinical indication warrants it to be sooner)    -Blood pressures acceptable    -Anemia in setting of CKD - hemoglobin is at goal; no indication for MENDY at this time    -Mineral Bone Disease in setting of CKD - continue oral phos binder   -s/p Lung transplantation - immunosuppressive management as per ID  -EBV in CSF - CT chest/abdomen/pelvis with IV contrast is negative for lymphadenopathy     Tressa Graham DO  Nephrology  [facility]  [address] [place] [address]  Office Number [phone]

## 2022-12-24 NOTE — CONSULT NOTE ADULT - SUBJECTIVE AND OBJECTIVE BOX
JU HENSLEYMARYDALJIT  [id number]        HPI:  71 y/o male PMHx hypertension, hyperlipidemia, AF, ESRD on HD, emphysema s/p lung transplant in [place] by Dr. Kaufman, Hx fungal meningitis, carotid stenosis s/p CEA, CAD s/p PCI in 2019, 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 recently discharged from [facility] after complex admission with e. Faecalis bacteremia, newly diagnosed HFrEF 25%, AF complicated by GIB was discharged on home IV Abx now presenting with AMS.  Patient admitted and treated with empiric treatment for meningitis.  Also with dilated common bile duct acutely decompensated on 12/18 with acute hypercapnic respiratory failure in setting of aspiration pneumonia, emergently intubated, now extubated; also was in shock.  Noted now to be in AF with RVR.  Also c/o chest discomfort this morning to Hospitalist.  When asked patient denies having had any chest discomfort.  Reports no other c/o at this time.  Denies SOB, palps, orthopnea.      ALLERGIES:  budesonide (Unknown)  cefpodoxime (Unknown)  Pollen (Unknown)      PAST MEDICAL & SURGICAL HISTORY:  [place] (hard of hearing)  Lumbar spondylosis  BPH without urinary obstruction  Oliguria and anuria  History of hip replacement  Status post open reduction and internal fixation (ORIF) of fracture of left femur  Otherwise, as noted above      MEDICATIONS:  acyclovir IVPB 320 milliGRAM(s) IV Intermittent every 24 hours  ampicillin  IVPB 2 Gram(s) IV Intermittent every 12 hours  apixaban 2.5 milliGRAM(s) Oral two times a day  atorvastatin 80 milliGRAM(s) Oral at bedtime  cefTRIAXone Injectable. 2000 milliGRAM(s) IV Push every 12 hours  chlorhexidine 2% Cloths 1 Application(s) Topical <User Schedule>  dextrose 50% Injectable 25 Gram(s) IV Push once  dextrose 50% Injectable 12.5 Gram(s) IV Push once  dextrose 50% Injectable 25 Gram(s) IV Push once  dextrose Oral Gel 15 Gram(s) Oral once  epoetin ben-epbx (RETACRIT) Injectable 4000 Unit(s) IV Push <User Schedule>  fluconAZOLE   Tablet 200 milliGRAM(s) Oral <User Schedule>  hydrocortisone 10 milliGRAM(s) Oral every 12 hours  metoprolol succinate ER 25 milliGRAM(s) Oral daily  pantoprazole    Tablet 40 milliGRAM(s) Oral before breakfast  sevelamer carbonate 800 milliGRAM(s) Oral three times a day with meals  tacrolimus 1.5 milliGRAM(s) Oral daily  tamsulosin 0.4 milliGRAM(s) Oral at bedtime  trimethoprim   80 mG/sulfamethoxazole 400 mG 1 Tablet(s) Oral <User Schedule>      SOCIAL HISTORY:  Patient denies alcohol, tobacco, drug use    FAMILY HISTORY:  Family history of emphysema    ROS:  Patient denies cough, and other than noted above full ROS is unremarkable      PHYSICAL EXAM:  Vital Signs Last 24 Hrs  T(C): 36.6 (24 Dec 2022 08:53), Max: 36.9 (23 Dec 2022 15:44)  T(F): 97.8 (24 Dec 2022 08:53), Max: 98.5 (23 Dec 2022 15:44)  HR: 119 (24 Dec 2022 08:53) (84 - 119)  BP: 145/111 (24 Dec 2022 08:53) (107/60 - 145/111)  BP(mean): 91 (23 Dec 2022 15:44) (91 - 91)  RR: 15 (24 Dec 2022 08:53) (15 - 19)  SpO2: 92% (24 Dec 2022 08:53) (92% - 100%)  General: Patient comfortable in NAD  HEENT: NCAT, mmm, EOMI  Neck: no JVD, no carotid bruits  CVS: nl s1, split s2, no s3, +s4, no murmur or rubs, RRR  Chest: CTA b/l  Abdomen: soft, nt/nd  Extremities: No c/c/e  Neuro: A&O x3  Psych: Normal affect      ECG:  Junctional rhythm with PVCs.  Diffuse nonspecific T-wave changes.    LABS:                        10.3   5.15  )-----------( 160      ( 24 Dec 2022 05:00 )             31.2     12-24    138  |  95<L>  |  22.8<H>  ----------------------------<  104<H>  3.8   |  29.0  |  4.04<H>    Ca    8.4      24 Dec 2022 05:00  Phos  3.9     12-24  Mg     1.9     12-24    TPro  5.5<L>  /  Alb  3.1<L>  /  TBili  0.4  /  DBili  x   /  AST  33  /  ALT  7   /  AlkPhos  218<H>  12-24    CARDIAC MARKERS ( 24 Dec 2022 09:10 )  x     / 0.11 ng/mL / x     / x     / x                RADIOLOGY:  CTC (12/23):  Mild interval improvement in pulmonary edema.    Echo (12/21):   1. Technically difficult study.   2. Normal left ventricular internal cavity size.   3. Severely decreased global left ventricular systolic function.   4. Left ventricular ejection fraction, by visual estimation, is 30 to 35%.   5. Mildly reduced RV systolic function.   6. Severely enlarged left atrium.   7. Moderately enlarged right atrium.   8. Sclerotic aortic valve with normal opening.   9. Moderate thickening and calcification of the anterior and posterior mitral valve leaflets.  10. Moderate mitral annular calcification.  11. Mild mitral valve regurgitation.  12. Mild tricuspid regurgitation.  13. Estimated pulmonary artery systolic pressure is 36.3 mmHg assuming a right atrial pressure of 15 mmHg, whichis consistent with borderline pulmonary hypertension.          Assessment:  71 y/o male PMHx hypertension, hyperlipidemia, AF, ESRD on HD, emphysema s/p lung transplant in [place] by Dr. Kaufman, Hx fungal meningitis, carotid stenosis s/p CEA, CAD s/p PCI in 2019, 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 recently discharged from [facility] after complex admission with e. Faecalis bacteremia, newly diagnosed HFrEF 25%, AF complicated by GIB was discharged on home IV Abx now presenting with AMS.  Patient admitted and treated with empiric treatment for meningitis.  Patient acutely decompensated on 12/18 with acute hypercapnic respiratory failure in setting of aspiration pneumonia, emergently intubated, now extubated; also was in shock.  Noted now to be in AF with RVR.  Also c/o chest discomfort this morning to Hospitalist.  When asked patient denies having had any chest discomfort.  Trop mildly elevated likely 2/2 ESRD and RVR.  EKG with junctional rhythm and PVCs.  Diffuse T-wave changes looking somewhat more prominent on today's EKG.  Still would doubt ACS. Echo from 12/21 with some improvement in EF and mild VHD.    Plan:  1. Trend trop.  2. Consider tele.  3. Would change Toprol to Metoprolol Tartrate 25mg TID.  4. Continue Eliquis for CVA PPX.  5. No need to repeat echo for now.  No additional CV testing now.  6. Abx per med/ID.    D/w Dr. Ko.  Will follow.  Thanks!

## 2022-12-24 NOTE — PROGRESS NOTE ADULT - ASSESSMENT
71 y/o M with a h/o hypertension, hyperlipidemia, ESRD on HD, emphysema s/p lung transplant (on immunosuppressants), fungal meningitis, carotid stenosis s/p CEA, CAD s/p PCI in 2019, CHFrEF, pAF, 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, cirrhosis/ascites, recently discharged from [facility] after complex admission with e. faecalis bacteremia, admitted on 12/13 with altered mental status of unclear etiology who subseqently developed acute hypoxic respiratory failure secondary to aspiration pneumonia and septic shock. Now downgraded to medicine for further management.    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 faecalis bacteremia  Will complete ABX on 12/26  Cellcept is on hold until then - Please discuss with patients transplant surgeon prior to starting     #Lung transplant  On immunosuppression therapy  Continue hydrocortisone and Tacrolimus  As per ID who spoke to transplant center.  Plan to hold Cellcept until treatment for bacteria resolves 12/26 tentative date.    #ESRD on dialysis.   L AV access.    HD per renal  [place]   Abx adjusted for renal functioning.    #Chronic atrial fibrillation.   Intermittent RVR with junctional rythma  eliquis  metop succ 25 daily to tartrate 25mg tid    #Chronic systolic congestive heart failure.   Metoprolol  Needs to follow up with cardiology outpatinet  Patient needs outpatient stress/ischemic evaluation     #Dilated CBD  No current abdominal pain  Patient will need eventual MRCP to r/o biliary pathology but can be outpatient    #Hepatic cirrhosis  Noted on imaging  Currently not altered and was taken off of lactulose    DVT prophylaxis: Eliquis      Discussed with patients transplant surgeon Dr. Hung Lewis [phone] 12/22    Dispo: Remains acute, needs MRI. Completes Course of abx/anti-virals

## 2022-12-24 NOTE — PROGRESS NOTE ADULT - SUBJECTIVE AND OBJECTIVE BOX
Denied LE edema and abdominal distension.    Vital Signs Last 24 Hrs  T(C): 37 (24 Dec 2022 16:29), Max: 37.1 (24 Dec 2022 12:21)  T(F): 98.6 (24 Dec 2022 16:29), Max: 98.8 (24 Dec 2022 12:21)  HR: 123 (24 Dec 2022 16:29) (84 - 124)  BP: 138/67 (24 Dec 2022 16:29) (114/76 - 145/111)  BP(mean): 91 (24 Dec 2022 16:29) (91 - 91)  RR: 18 (24 Dec 2022 16:29) (15 - 19)  SpO2: 96% (24 Dec 2022 16:29) (92% - 98%)    Parameters below as of 24 Dec 2022 16:29  Patient On (Oxygen Delivery Method): room air    I&O's Summary    23 Dec 2022 07:01  -  24 Dec 2022 07:00  --------------------------------------------------------  IN: 1070 mL / OUT: 1050 mL / NET: 20 mL    24 Dec 2022 07:01  -  24 Dec 2022 17:34  --------------------------------------------------------  IN: 480 mL / OUT: 0 mL / NET: 480 mL    Physical Exam  General: WDWN male sitting in chair   Cardiac: S1S2 RRR  Respiratory: CTAB  Abdomen: Soft  Extremities: No appreciable edema of b/l LE  Neuro: AAOx3     12-24    138  |  95<L>  |  22.8<H>  ----------------------------<  104<H>  3.8   |  29.0  |  4.04<H>    Ca    8.4      24 Dec 2022 05:00  Phos  3.9     12-24  Mg     1.9     12-24    TPro  5.5<L>  /  Alb  3.1<L>  /  TBili  0.4  /  DBili  x   /  AST  33  /  ALT  7   /  AlkPhos  218<H>  12-24                        10.3   5.15  )-----------( 160      ( 24 Dec 2022 05:00 )             31.2     MEDICATIONS  (STANDING):  acyclovir IVPB 320 milliGRAM(s) IV Intermittent every 24 hours  acyclovir IVPB      ampicillin  IVPB 2 Gram(s) IV Intermittent every 12 hours  apixaban 2.5 milliGRAM(s) Oral two times a day  atorvastatin 80 milliGRAM(s) Oral at bedtime  cefTRIAXone Injectable. 2000 milliGRAM(s) IV Push every 12 hours  chlorhexidine 2% Cloths 1 Application(s) Topical <User Schedule>  dextrose 50% Injectable 25 Gram(s) IV Push once  dextrose 50% Injectable 12.5 Gram(s) IV Push once  dextrose 50% Injectable 25 Gram(s) IV Push once  dextrose Oral Gel 15 Gram(s) Oral once  epoetin ben-epbx (RETACRIT) Injectable 4000 Unit(s) IV Push <User Schedule>  fluconAZOLE   Tablet 200 milliGRAM(s) Oral <User Schedule>  hydrocortisone 10 milliGRAM(s) Oral every 12 hours  metoprolol tartrate 25 milliGRAM(s) Oral three times a day  pantoprazole    Tablet 40 milliGRAM(s) Oral before breakfast  sevelamer carbonate 800 milliGRAM(s) Oral three times a day with meals  tacrolimus 1.5 milliGRAM(s) Oral daily  tamsulosin 0.4 milliGRAM(s) Oral at bedtime  trimethoprim   80 mG/sulfamethoxazole 400 mG 1 Tablet(s) Oral <User Schedule>    MEDICATIONS  (PRN):

## 2022-12-24 NOTE — PROGRESS NOTE ADULT - SUBJECTIVE AND OBJECTIVE BOX
[facility] Division of Hospital Medicine    Chief Complaint:    AMS/TME    SUBJECTIVE / OVERNIGHT EVENTS:  Patient with intermittent tachycardia to 130's on tele. noted over last 48s. This am with 5 beats of nsvt   patient suddenly complained of ''sob'' this am on further questioning endorsed pressure like sensation but not painful earlier this am. Since resolved    Patient denies abd pain, N/V, fever, chills, dysuria or any other complaints. All remainder ROS negative.     MEDICATIONS  (STANDING):  acyclovir IVPB 320 milliGRAM(s) IV Intermittent every 24 hours  acyclovir IVPB      ampicillin  IVPB 2 Gram(s) IV Intermittent every 12 hours  apixaban 2.5 milliGRAM(s) Oral two times a day  atorvastatin 80 milliGRAM(s) Oral at bedtime  cefTRIAXone Injectable. 2000 milliGRAM(s) IV Push every 12 hours  chlorhexidine 2% Cloths 1 Application(s) Topical <User Schedule>  dextrose 50% Injectable 25 Gram(s) IV Push once  dextrose 50% Injectable 12.5 Gram(s) IV Push once  dextrose 50% Injectable 25 Gram(s) IV Push once  dextrose Oral Gel 15 Gram(s) Oral once  epoetin ben-epbx (RETACRIT) Injectable 4000 Unit(s) IV Push <User Schedule>  fluconAZOLE   Tablet 200 milliGRAM(s) Oral <User Schedule>  hydrocortisone 10 milliGRAM(s) Oral every 12 hours  metoprolol tartrate 25 milliGRAM(s) Oral three times a day  pantoprazole    Tablet 40 milliGRAM(s) Oral before breakfast  sevelamer carbonate 800 milliGRAM(s) Oral three times a day with meals  tacrolimus 1.5 milliGRAM(s) Oral daily  tamsulosin 0.4 milliGRAM(s) Oral at bedtime  trimethoprim   80 mG/sulfamethoxazole 400 mG 1 Tablet(s) Oral <User Schedule>    MEDICATIONS  (PRN):        I&O's Summary    23 Dec 2022 07:01  -  24 Dec 2022 07:00  --------------------------------------------------------  IN: 1070 mL / OUT: 1050 mL / NET: 20 mL    24 Dec 2022 07:01  -  24 Dec 2022 13:12  --------------------------------------------------------  IN: 240 mL / OUT: 0 mL / NET: 240 mL        PHYSICAL EXAM:  Vital Signs Last 24 Hrs  T(C): 37.1 (24 Dec 2022 12:21), Max: 37.1 (24 Dec 2022 12:21)  T(F): 98.8 (24 Dec 2022 12:21), Max: 98.8 (24 Dec 2022 12:21)  HR: 124 (24 Dec 2022 12:21) (84 - 124)  BP: 114/76 (24 Dec 2022 12:21) (107/60 - 145/111)  BP(mean): 91 (23 Dec 2022 15:44) (91 - 91)  RR: 17 (24 Dec 2022 12:21) (15 - 19)  SpO2: 98% (24 Dec 2022 12:21) (92% - 100%)    Parameters below as of 24 Dec 2022 12:21  Patient On (Oxygen Delivery Method): room air            CONSTITUTIONAL: NAD, well-developed  ENMT: Moist oral mucosa, no pharyngeal injection or exudates; normal dentition  RESPIRATORY: Normal respiratory effort; lungs are clear to auscultation bilaterally  CARDIOVASCULAR: Regular rate and rhythm, normal S1 and S2, no murmur/rub/gallop; Peripheral pulses are 2+ bilaterally  ABDOMEN: Nontender to palpation, normoactive bowel sounds, no rebound/guarding;   MUSCLOSKELETAL:  No clubbing or cyanosis of digits; no joint swelling or tenderness to palpation  PSYCH: A+O to person, place, and time; affect appropriate  NEUROLOGY: CN 2-12 are intact and symmetric; no gross sensory deficits;   SKIN: No rashes; no palpable lesions    LABS:                        10.3   5.15  )-----------( 160      ( 24 Dec 2022 05:00 )             31.2     12-24    138  |  95<L>  |  22.8<H>  ----------------------------<  104<H>  3.8   |  29.0  |  4.04<H>    Ca    8.4      24 Dec 2022 05:00  Phos  3.9     12-24  Mg     1.9     12-24    TPro  5.5<L>  /  Alb  3.1<L>  /  TBili  0.4  /  DBili  x   /  AST  33  /  ALT  7   /  AlkPhos  218<H>  12-24      CARDIAC MARKERS ( 24 Dec 2022 09:10 )  x     / 0.11 ng/mL / x     / x     / x              CAPILLARY BLOOD GLUCOSE            RADIOLOGY & ADDITIONAL TESTS:  Results Reviewed:   Imaging Personally Reviewed:  Electrocardiogram Personally Reviewed:

## 2022-12-25 NOTE — PROGRESS NOTE ADULT - ASSESSMENT
72M with PMH of  hypertension, hyperlipidemia, ESRD on HD, emphysema s/p lung transplant in [place] by Dr. 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 faecalis bacteremia, newly diagnosed HFrEF 25%, AF complicated by GIB was discharged on home IV Abx now presenting with AMS  Presented to the ED with daughter. CTH obtained and did not reveal any acute findings.  Patient was admitted to medicine for encephalopathy. LP attempted bedside but unsuccessful. Was on empiric treatment and mental status was improving and then ultimately intubated for aspiration pneumonia    #Acute hypoxic respiratory failure likely 2/2 aspiration- now extubated, mental status improved. sputum cx normal keerthi    #AMS-unclear etiology, no fever. ammonia level normal.  ct c/ap ?pna/edema/ascites.   ??EBV encephalitis  s/p LP normal cell counts glucose and protein 51. CSF pcr (-) and cultures pending.   [facility] micro lab CSF gram stain reported as "few organisms" . gram stain repeated at core lab and micro and is NEGATIVE. initial report has been corrected. CSF crytp titer 1:40, as per [facility] his initial CSF titer in 12/2020 was 1:2560. no serum titer available. 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   f/u CSf fungal CX.   stopped amphotericin/flucytosine and resumed fluconazole suppression post HD  serum crypt Ag 1:40, most likely old infection  EBv CSF PCR/serum + but very low, unclear if EBV encephalitis , although possible in this immuncompromised pt who appeared to have clinically responded to acyclovir   EBV serology past vs reactivation  on acyclovir to complete total 14 days 12/14-12/20 , restarted 12/22 through 12/28/22  MRi brain suggested no acute abnormality  no lymphadenopathy on ct cap  s/p paracentesis low cell counts, cx ngtd      #h/o e.faecalis bacteremia - . CRISTIAN was deferred by cardio on recent admission.  resumed ceftriaxone /ampicillin as previously planned through 12/26/22. f/u BCX ngtd, repeat TTE no vegetations    # s/p lung transplant- per transplant team, cellcept  on hold as per pt wife discussion with [facility] transplant team, currently on cortef, c/w tacrolimus and check levels. c/w bactrim per home dose for ppx, fluc resumed    # h/o fungal meningitis- resumed fluc      #Dilated CBD on USG- mrcp as above    # AScites- s/p paracentesis not suggestive of sbp, f/u cx ngtd    #Leukocytosis- multifactorial, on steroids, monitor    dc picc once abx/antiviral completed  ID f/u outpatient to repeat BCX    signing off     14-Sep-2020 07:01

## 2022-12-25 NOTE — PROGRESS NOTE ADULT - SUBJECTIVE AND OBJECTIVE BOX
[facility]                                                INFECTIOUS DISEASES  =======================================================                               J Carlos Galdamez MD#    Isatu Rojas MD*                           Perlita Solares MD*   Sumaya Morales MD*            Diplomates American Board of Internal Medicine & Infectious Diseases                  # [employer] Office - Appt - Tel  [phone] Fax [phone]                * [place] Office - Appt - Tel [phone] Fax [phone]                                  Hospital Consult line:  [phone]  =======================================================      [id number]  JU FERGUSON   follow up for: AMS, h/o enterococcus bacteremia  awake and alert  on room air  feels well  patient seen and examined.       I have personally reviewed the labs and data; pertinent labs and data are listed in this note; please see below.   ===================================================  REVIEW OF SYSTEMS:  CONSTITUTIONAL:  No Fever or chills  HEENT:  No diplopia or blurred vision.  No earache, sore throat or runny nose.  CARDIOVASCULAR:  No pressure, squeezing, strangling, tightness, heaviness or aching about the chest, neck, axilla or epigastrium.  RESPIRATORY:  No cough, shortness of breath  GASTROINTESTINAL:  No nausea, vomiting or diarrhea.  GENITOURINARY:  No dysuria, frequency or urgency. No Blood in urine  MUSCULOSKELETAL:  no joint aches, no muscle pain  SKIN:  No change in skin, hair or nails.  NEUROLOGIC:  No Headaches, seizures or weakness.  PSYCHIATRIC:  No disorder of thought or mood.  ENDOCRINE:  No heat or cold intolerance  HEMATOLOGICAL:  No easy bruising or bleeding.    =======================================================  Allergies    budesonide (Unknown)  cefpodoxime (Unknown)  Pollen (Unknown)    Intolerances    Antibiotics:  acyclovir IVPB      acyclovir IVPB 320 milliGRAM(s) IV Intermittent every 24 hours  ampicillin  IVPB 2 Gram(s) IV Intermittent every 12 hours  cefTRIAXone Injectable. 2000 milliGRAM(s) IV Push every 12 hours  fluconAZOLE   Tablet 200 milliGRAM(s) Oral <User Schedule>  trimethoprim   80 mG/sulfamethoxazole 400 mG 1 Tablet(s) Oral <User Schedule>    Other medications:  apixaban 2.5 milliGRAM(s) Oral two times a day  atorvastatin 80 milliGRAM(s) Oral at bedtime  chlorhexidine 2% Cloths 1 Application(s) Topical <User Schedule>  dextrose 50% Injectable 25 Gram(s) IV Push once  dextrose 50% Injectable 12.5 Gram(s) IV Push once  dextrose 50% Injectable 25 Gram(s) IV Push once  dextrose Oral Gel 15 Gram(s) Oral once  epoetin ben-epbx (RETACRIT) Injectable 4000 Unit(s) IV Push <User Schedule>  hydrocortisone 10 milliGRAM(s) Oral every 12 hours  metoprolol succinate ER 25 milliGRAM(s) Oral daily  pantoprazole    Tablet 40 milliGRAM(s) Oral before breakfast  sevelamer carbonate 800 milliGRAM(s) Oral three times a day with meals  tacrolimus 1.5 milliGRAM(s) Oral daily  tamsulosin 0.4 milliGRAM(s) Oral at bedtime    ======================================================  Physical Exam:  ============  Vital Signs Last 24 Hrs  T(C): 36.6 (25 Dec 2022 20:36), Max: 37.1 (25 Dec 2022 00:02)  T(F): 97.8 (25 Dec 2022 20:36), Max: 98.8 (25 Dec 2022 00:02)  HR: 74 (25 Dec 2022 20:36) (40 - 120)  BP: 128/65 (25 Dec 2022 20:36) (121/75 - 146/79)  BP(mean): 105 (25 Dec 2022 16:00) (104 - 105)  RR: 20 (25 Dec 2022 20:36) (16 - 22)  SpO2: 92% (25 Dec 2022 20:36) (91% - 99%)    Parameters below as of 25 Dec 2022 20:36  Patient On (Oxygen Delivery Method): room air        General:  No acute distress.    Respiratory: Lungs are clear to auscultation, Respirations are non-labored.  Cardiovascular: Normal rate, Regular rhythm,  s1+s2  Gastrointestinal: Soft, Non-tender, Non-distended, Normal bowel sounds.  Genitourinary: No costovertebral angle tenderness.  Lymphatics: No lymphadenopathy neck  Musculoskeletal: Normal range of motion, Normal strength. lue avf, rue picc  Integumentary: No rash.  Neurologic: Alert, Oriented, No focal deficits  Psychiatric: Appropriate mood & affect.  =======================================================  Labs:                                   10.0   5.32  )-----------( 187      ( 25 Dec 2022 03:40 )             31.3       12-25    142  |  99  |  31.3<H>  ----------------------------<  104<H>  3.8   |  29.0  |  5.35<H>    Ca    8.3<L>      25 Dec 2022 03:40  Phos  4.5     12-25  Mg     2.1     12-25    TPro  5.5<L>  /  Alb  3.1<L>  /  TBili  0.4  /  DBili  x   /  AST  33  /  ALT  7   /  AlkPhos  218<H>  12-24                      CARDIAC MARKERS ( 24 Dec 2022 14:44 )  x     / 0.11 ng/mL / x     / x     / x      CARDIAC MARKERS ( 24 Dec 2022 09:10 )  x     / 0.11 ng/mL / x     / x     / x            CAPILLARY BLOOD GLUCOSE                  Culture - Fungal, CSF (collected 12-19-22 @ 17:10)  Source: .CSF CSF    Culture - CSF with Gram Stain (collected 12-19-22 @ 17:10)  Source: .CSF CSF  Gram Stain (12-20-22 @ 15:20):    No polymorphonuclear leukocytes seen per low power field    No organisms seen per oil power field    by cytocentrifuge  Final Report (12-23-22 @ 08:48):    No growth at 3 days.    Culture - Acid Fast - CSF (collected 12-19-22 @ 17:10)  Source: .CSF CSF    Culture - Sputum (collected 12-19-22 @ 16:00)  Source: .Sputum Sputum  Gram Stain (12-20-22 @ 01:59):    Moderate polymorphonuclear leukocytes per low power field    Few Squamous epithelial cells per low power field    Few Gram positive cocci in pairs seen per oil power field    Few Yeast like cells seen per oil power field    Few Gram Negative Rods seen per oil power field  Final Report (12-21-22 @ 23:09):    Normal Respiratory Charis present    Culture - Acid Fast - Body Fluid w/Smear (collected 12-19-22 @ 13:50)  Source: Paracentesis Paracentesis Fluid    Culture - Fungal, Body Fluid (collected 12-19-22 @ 13:50)  Source: Peritoneal Peritoneal Fluid    Culture - Body Fluid with Gram Stain (collected 12-19-22 @ 13:50)  Source: Paracentesis Paracentesis Fluid  Gram Stain (12-19-22 @ 22:30):    No polymorphonuclear leukocytes seen per low power field    No organisms seen per oil power field    Culture - Blood (collected 12-13-22 @ 05:41)  Source: .Blood Blood-Peripheral  Final Report (12-18-22 @ 10:00):    No Growth Final    Culture - Blood (collected 12-13-22 @ 05:41)  Source: .Blood Blood-Peripheral  Final Report (12-18-22 @ 10:00):    No Growth Final    < from: MR MRCP No Cont (12.25.22 @ 09:48) >    IMPRESSION:  Examination limited secondary to motion artifact.    Common bile duct upper limits of normal for caliber for age measuring 7   mm; no common bile duct stone.    Iron deposition in the liver and spleen/hemosiderosis.    Bibasilar lung opacities and small bilateral pleural effusions, likely   pulmonary edema;also see CT chest/abdomen/pelvis report 12/23/2022.    < end of copied text >    < from: MR Head No Cont (12.25.22 @ 09:29) >  IMPRESSION:    No acute intracranial hemorrhage, acute infarction, extra-axial fluid   collection or hydrocephalus.    If clinicians have any questions/need for clarification regarding this   report, Dr. Shahab Cloud can be best reached via the patient [place]   hospital email system    --- End of Report ---        < end of copied text >    < from: CT Abdomen and Pelvis w/ IV Cont (12.23.22 @ 16:24) >    IMPRESSION:  Mild interval improvement in pulmonary edema.  Third spacingof fluid. No lymphadenopathy.  VERTEBRAL BODY ANALYSIS: Vertebral compression fractures as described,   consider further workup for osteoporosis.      < end of copied text >    < from: TTE Echo Complete w/o Contrast w/ Doppler (12.21.22 @ 22:38) >  Summary:   1. Technically difficult study.   2. Normal left ventricular internal cavity size.   3. Severely decreased global left ventricular systolic function.   4. Left ventricular ejection fraction, by visual estimation, is 30 to   35%.   5. Mildly reduced RV systolic function.   6. Severely enlarged left atrium.   7. Moderately enlarged right atrium.   8. Sclerotic aortic valve with normal opening.   9. Moderate thickening and calcification of the anterior and posterior   mitral valve leaflets.  10. Moderate mitral annular calcification.  11. Mild mitral valve regurgitation.  12. Mild tricuspid regurgitation.  13. Estimated pulmonary artery systolic pressure is 36.3 mmHg assuming a   right atrial pressure of 15 mmHg, whichis consistent with borderline   pulmonary hypertension.  14. No gross evidence of vegetation, moderately degenerative and   calcified aortic and mitral valves. Consider CRISTIAN if clinically indicated   and high suspicion.    MD Anel Electronically signed on 12/22/2022 at 12:54:18 PM        < end of copied text >

## 2022-12-25 NOTE — PROGRESS NOTE ADULT - SUBJECTIVE AND OBJECTIVE BOX
[facility] Division of Hospital Medicine    SUBJECTIVE / OVERNIGHT EVENTS:  Noted to have intermittent af rvr, with junctional rythm on EKG. Metop titrated to TID  Today patient is alrt and conversational but less oriented compared to exam from yesterday.   No focal complaints     Patient denies chest pain, SOB, abd pain, N/V, fever, chills, dysuria or any other complaints. All remainder ROS negative.     MEDICATIONS  (STANDING):  acyclovir IVPB      acyclovir IVPB 320 milliGRAM(s) IV Intermittent every 24 hours  ampicillin  IVPB 2 Gram(s) IV Intermittent every 12 hours  apixaban 2.5 milliGRAM(s) Oral two times a day  atorvastatin 80 milliGRAM(s) Oral at bedtime  cefTRIAXone Injectable. 2000 milliGRAM(s) IV Push every 12 hours  chlorhexidine 2% Cloths 1 Application(s) Topical <User Schedule>  dextrose 50% Injectable 25 Gram(s) IV Push once  dextrose 50% Injectable 12.5 Gram(s) IV Push once  dextrose 50% Injectable 25 Gram(s) IV Push once  dextrose Oral Gel 15 Gram(s) Oral once  fluconAZOLE   Tablet 200 milliGRAM(s) Oral <User Schedule>  hydrocortisone 10 milliGRAM(s) Oral every 12 hours  metoprolol tartrate 25 milliGRAM(s) Oral three times a day  pantoprazole    Tablet 40 milliGRAM(s) Oral before breakfast  sevelamer carbonate 800 milliGRAM(s) Oral three times a day with meals  tacrolimus 1.5 milliGRAM(s) Oral daily  tamsulosin 0.4 milliGRAM(s) Oral at bedtime  trimethoprim   80 mG/sulfamethoxazole 400 mG 1 Tablet(s) Oral <User Schedule>    MEDICATIONS  (PRN):        I&O's Summary    24 Dec 2022 07:01  -  25 Dec 2022 07:00  --------------------------------------------------------  IN: 1310 mL / OUT: 50 mL / NET: 1260 mL    25 Dec 2022 07:01  -  25 Dec 2022 13:57  --------------------------------------------------------  IN: 116 mL / OUT: 0 mL / NET: 116 mL        PHYSICAL EXAM:  Vital Signs Last 24 Hrs  T(C): 36.4 (25 Dec 2022 12:10), Max: 37.1 (25 Dec 2022 00:02)  T(F): 97.6 (25 Dec 2022 12:10), Max: 98.8 (25 Dec 2022 00:02)  HR: 40 (25 Dec 2022 12:10) (40 - 123)  BP: 121/75 (25 Dec 2022 12:10) (121/75 - 146/79)  BP(mean): 104 (25 Dec 2022 00:02) (91 - 104)  RR: 22 (25 Dec 2022 12:10) (16 - 22)  SpO2: 93% (25 Dec 2022 12:10) (91% - 99%)    Parameters below as of 25 Dec 2022 12:10  Patient On (Oxygen Delivery Method): room air            CONSTITUTIONAL: NAD, well-developed  ENMT: Moist oral mucosa, no pharyngeal injection or exudates; normal dentition  RESPIRATORY: Normal respiratory effort; lungs are clear to auscultation bilaterally  CARDIOVASCULAR: Regular rate and rhythm, normal S1 and S2, no murmur/rub/gallop; Peripheral pulses are 2+ bilaterally  ABDOMEN: Nontender to palpation, normoactive bowel sounds, no rebound/guarding;   MUSCLOSKELETAL:  No clubbing or cyanosis of digits; no joint swelling or tenderness to palpation  PSYCH: A+O to person, place; affect appropriate  NEUROLOGY: CN 2-12 are intact and symmetric; no gross sensory deficits;   SKIN: No rashes; no palpable lesions    LABS:                        10.0   5.32  )-----------( 187      ( 25 Dec 2022 03:40 )             31.3     12-25    142  |  99  |  31.3<H>  ----------------------------<  104<H>  3.8   |  29.0  |  5.35<H>    Ca    8.3<L>      25 Dec 2022 03:40  Phos  4.5     12-25  Mg     2.1     12-25    TPro  5.5<L>  /  Alb  3.1<L>  /  TBili  0.4  /  DBili  x   /  AST  33  /  ALT  7   /  AlkPhos  218<H>  12-24      CARDIAC MARKERS ( 24 Dec 2022 14:44 )  x     / 0.11 ng/mL / x     / x     / x      CARDIAC MARKERS ( 24 Dec 2022 09:10 )  x     / 0.11 ng/mL / x     / x     / x              CAPILLARY BLOOD GLUCOSE            RADIOLOGY & ADDITIONAL TESTS:  Results Reviewed:   Imaging Personally Reviewed:  Electrocardiogram Personally Reviewed:

## 2022-12-25 NOTE — PROGRESS NOTE ADULT - ASSESSMENT
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 faecalis bacteremia, admitted on 12/13 with altered mental status of unclear etiology who subseqently developed acute hypoxic respiratory failure secondary to aspiration pneumonia and septic shock. Now downgraded to medicine for further management.    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 faecalis bacteremia  Will complete ABX on 12/26  Cellcept is on hold until then - Please discuss with patients transplant surgeon prior to starting     #Lung transplant  On immunosuppression therapy  Continue hydrocortisone and Tacrolimus  As per ID who spoke to transplant center.  Plan to hold Cellcept until treatment for bacteria resolves 12/26 tentative date.    #ESRD on dialysis.   L AV access.    HD per renal  [place]   Abx adjusted for renal functioning.    #Chronic atrial fibrillation.   Intermittent RVR with junctional rythma  eliquis  metop succ 25 daily to tartrate 25mg tid    #Chronic systolic congestive heart failure.   Metoprolol  Needs to follow up with cardiology outpatinet  Patient needs outpatient stress/ischemic evaluation     #Dilated CBD  No current abdominal pain  Patient will need eventual MRCP to r/o biliary pathology but can be outpatient    #Hepatic cirrhosis  Noted on imaging  Currently not altered and was taken off of lactulose  MRCP revealed irond deposition of liver and spleen. Possible HC?  Check iron studies in am    DVT prophylaxis: [facility]      Transplant surgeon Dr. Hung Lewis [phone] 12/22    Dispo: Remains acute. 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 faecalis bacteremia, admitted on 12/13 with altered mental status of unclear etiology who subseqently developed acute hypoxic respiratory failure secondary to aspiration pneumonia and septic shock. Now downgraded to medicine for further management.    #Acute hypoxemic respiratory failure secondary to septic shock secondary to likely aspiration pneumonia  Resolved  Was intubated in ICU but now extubated to Room air  C/w Ampicillin, ceftriaxone per ID    #Toxic Metabolic Encephalopathy    Concern for possible recurrent cryptococcal meningitis due to positive antigen in LP but unlikely upon discussion with ID  CSF was positive for EBV   Discussed with ID - Ordered EBV serum and serology  Will be Acyclovir to complete course   MRI Brain ordered after discussion with ID to R/o Lymphoma  Was on Amphotericin B and Flucytosine that was stopped  Blood cx NGTD  CT C/A/P With IV contrast without any lymphadenoapthy  More disoriented compared to yesterday   Will restart lactulose for possible hepatic enceph component   low dose ativan x 1 now ( avoid antipsychotics given development of bigeminy today)  #Previous Bacteremia  Patient w/ hx of e. faecalis bacteremia  Will complete ABX on 12/26  Cellcept is on hold until then - Please discuss with patients transplant surgeon prior to starting     #Lung transplant  On immunosuppression therapy  Continue hydrocortisone and Tacrolimus  As per ID who spoke to transplant center.  Plan to hold Cellcept until treatment for bacteria resolves 12/26 tentative date.    #ESRD on dialysis.   L AV access.    HD per renal  [place]   Abx adjusted for renal functioning.    #Chronic atrial fibrillation.   Intermittent RVR with junctional rythma  eliquis  metop succ 25 daily to tartrate 25mg tid  With episodes of bigeminy in tele today.  Cardiology re-consulted and pending evaluation   EKG    #Chronic systolic congestive heart failure.   Metoprolol  Needs to follow up with cardiology outpatinet  Patient needs outpatient stress/ischemic evaluation     #Dilated CBD  No current abdominal pain  Patient will need eventual MRCP to r/o biliary pathology but can be outpatient    #Hepatic cirrhosis  Noted on imaging  Currently not altered and was taken off of lactulose  MRCP revealed irond deposition of liver and spleen. Possible HC?  Check iron studies in am    DVT prophylaxis: Eliquis      Transplant surgeon Dr. Hung Lewis [phone] 12/22    Dispo: Remains acute. Completes Course of abx/anti-virals

## 2022-12-25 NOTE — PROGRESS NOTE ADULT - SUBJECTIVE AND OBJECTIVE BOX
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 Faecalis bacteremia,  presenting with AMS.  Patient admitted and treated with empiric treatment for meningitis. 12/18 with acute hypercapnic respiratory failure in setting of aspiration pneumonia, emergently intubated, now extubated; also was in shock.  Noted now to be in AF with RVR.  Frequently switching to Sinus with PVC's    Interval History:  Currently denies CP,  SOB, palps, orthopnea.    Tele:  Mostly AF with slightly increased VR and periods of Sinus with Ventricular bigemeny      acyclovir IVPB 320 milliGRAM(s) IV Intermittent every 24 hours  acyclovir IVPB      ampicillin  IVPB 2 Gram(s) IV Intermittent every 12 hours  apixaban 2.5 milliGRAM(s) Oral two times a day  atorvastatin 80 milliGRAM(s) Oral at bedtime  cefTRIAXone Injectable. 2000 milliGRAM(s) IV Push every 12 hours  chlorhexidine 2% Cloths 1 Application(s) Topical <User Schedule>  dextrose 50% Injectable 12.5 Gram(s) IV Push once  dextrose 50% Injectable 25 Gram(s) IV Push once  dextrose 50% Injectable 25 Gram(s) IV Push once  dextrose Oral Gel 15 Gram(s) Oral once  fluconAZOLE   Tablet 200 milliGRAM(s) Oral <User Schedule>  hydrocortisone 10 milliGRAM(s) Oral every 12 hours  lactulose Syrup 10 Gram(s) Oral three times a day  LORazepam     Tablet 0.5 milliGRAM(s) Oral once  metoprolol tartrate 25 milliGRAM(s) Oral three times a day  pantoprazole    Tablet 40 milliGRAM(s) Oral before breakfast  sevelamer carbonate 800 milliGRAM(s) Oral three times a day with meals  tacrolimus 1.5 milliGRAM(s) Oral daily  tamsulosin 0.4 milliGRAM(s) Oral at bedtime  trimethoprim   80 mG/sulfamethoxazole 400 mG 1 Tablet(s) Oral <User Schedule>          Physical Exam:  T(C): 36.4 (12-25-22 @ 12:10), Max: 37.1 (12-25-22 @ 00:02)  HR: 40 (12-25-22 @ 12:10) (40 - 123)  BP: 121/75 (12-25-22 @ 12:10) (121/75 - 146/79)  RR: 22 (12-25-22 @ 12:10) (16 - 22)  SpO2: 93% (12-25-22 @ 12:10) (91% - 99%)  Wt(kg): --  General: Comfortable in NAD  Neck: No JVD  CVS: nl s1s2, no s3  Pulm: Few crackles at bases bilat  Abd: soft, non-tender  Ext: No c/c/e  Neuro A&O x3  Psych: Normal affect    I&O's Summary    24 Dec 2022 07:01  -  25 Dec 2022 07:00  --------------------------------------------------------  IN: 1310 mL / OUT: 50 mL / NET: 1260 mL    25 Dec 2022 07:01  -  25 Dec 2022 15:00  --------------------------------------------------------  IN: 116 mL / OUT: 0 mL / NET: 116 mL      Labs:   25 Dec 2022 03:40    142    |  99     |  31.3   ----------------------------<  104    3.8     |  29.0   |  5.35     Ca    8.3        25 Dec 2022 03:40  Phos  4.5       25 Dec 2022 03:40  Mg     2.1       25 Dec 2022 03:40    TPro  5.5    /  Alb  3.1    /  TBili  0.4    /  DBili  x      /  AST  33     /  ALT  7      /  AlkPhos  218    24 Dec 2022 05:00                          10.0   5.32  )-----------( 187      ( 25 Dec 2022 03:40 )             31.3       CARDIAC MARKERS ( 24 Dec 2022 14:44 )  x     / 0.11 ng/mL / x     / x     / x      CARDIAC MARKERS ( 24 Dec 2022 09:10 )  x     / 0.11 ng/mL / x     / x     / x          Echo (12/21):   1. Technically difficult study.   2. Normal left ventricular internal cavity size.   3. Severely decreased global left ventricular systolic function.   4. Left ventricular ejection fraction, by visual estimation, is 30 to 35%.   5. Mildly reduced RV systolic function.   6. Severely enlarged left atrium.   7. Moderately enlarged right atrium.   8. Sclerotic aortic valve with normal opening.   9. Moderate thickening and calcification of the anterior and posterior mitral valve leaflets.  10. Moderate mitral annular calcification.  11. Mild mitral valve regurgitation.  12. Mild tricuspid regurgitation.  13. Estimated pulmonary artery systolic pressure is 36.3 mmHg assuming a right atrial pressure of 15 mmHg, whichis consistent with borderline pulmonary hypertension.    Assessment:    1. PAF with inc VR and frequent PVC's occ with Vent Bigemeny    2. HFr EF with new CM slight improved on most recent Echo 25% --- 35%    3. S/P Lung transplant on Immunosup rx with recent fungal sepsis    4. ESRD     5. CAD s/p PCI    6. Intubation for resp failure with sepsis, Shock and HF - much improved    7. Presentation with AMS likely related to much of the above    Plan:  1. Discussed at bedside with pt martinf avel  2. Continue tele  3. Continue to increase Metoprolol Tartrate 25mg as BP tolerates   4. Continue Eliquis for CVA PPX.  5. No need to repeat echo for now.  No additional CV testing now.  6. Abx per med/ID.  7. No indication for further cardiac testing currently

## 2022-12-26 NOTE — PROGRESS NOTE ADULT - SUBJECTIVE AND OBJECTIVE BOX
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 Faecalis bacteremia,  presenting with AMS.  Patient admitted and treated with empiric treatment for meningitis. 12/18 with acute hypercapnic respiratory failure in setting of aspiration pneumonia, emergently intubated, now extubated; also was in shock.  Noted now to be in AF with RVR.  Frequently switching to Sinus with PVC's    Interval History:  Currently denies CP,  SOB, palps, orthopnea.    Tele:  Mostly AF with slightly increased VR and periods of Sinus with Ventricular bigemeny        acyclovir IVPB      acyclovir IVPB 320 milliGRAM(s) IV Intermittent every 24 hours  ampicillin  IVPB 2 Gram(s) IV Intermittent every 12 hours  apixaban 2.5 milliGRAM(s) Oral two times a day  atorvastatin 80 milliGRAM(s) Oral at bedtime  cefTRIAXone Injectable. 2000 milliGRAM(s) IV Push every 12 hours  chlorhexidine 2% Cloths 1 Application(s) Topical <User Schedule>  dextrose 50% Injectable 12.5 Gram(s) IV Push once  dextrose 50% Injectable 25 Gram(s) IV Push once  dextrose 50% Injectable 25 Gram(s) IV Push once  dextrose Oral Gel 15 Gram(s) Oral once  fluconAZOLE   Tablet 200 milliGRAM(s) Oral <User Schedule>  hydrocortisone 10 milliGRAM(s) Oral every 12 hours  lactulose Retention Enema 200 Gram(s) Rectal every 6 hours  lactulose Syrup 10 Gram(s) Oral three times a day  metoprolol tartrate 25 milliGRAM(s) Oral three times a day  pantoprazole    Tablet 40 milliGRAM(s) Oral before breakfast  sevelamer carbonate 800 milliGRAM(s) Oral three times a day with meals  tacrolimus 1.5 milliGRAM(s) Oral daily  tamsulosin 0.4 milliGRAM(s) Oral at bedtime  trimethoprim   80 mG/sulfamethoxazole 400 mG 1 Tablet(s) Oral <User Schedule>          Physical Exam:  T(C): 36.4 (12-26-22 @ 10:32), Max: 36.7 (12-25-22 @ 16:00)  HR: 86 (12-26-22 @ 10:32) (74 - 100)  BP: 132/85 (12-26-22 @ 10:32) (128/65 - 145/87)  RR: 18 (12-26-22 @ 10:32) (18 - 20)  SpO2: 87% (12-26-22 @ 10:32) (87% - 94%)  Wt(kg): --  General: Comfortable in NAD  Neck: No JVD  CVS: nl s1s2, no s3  Pulm: Few crackles at bases bilat  Abd: soft, non-tender  Ext: No c/c/e  Neuro A&O x3  Psych: Normal affect      I&O's Summary    25 Dec 2022 07:01  -  26 Dec 2022 07:00  --------------------------------------------------------  IN: 836 mL / OUT: 0 mL / NET: 836 mL          Labs:   26 Dec 2022 10:45    143    |  98     |  36.3   ----------------------------<  89     3.7     |  27.0   |  6.39     Ca    8.8        26 Dec 2022 10:45  Phos  5.8       26 Dec 2022 10:45  Mg     2.2       26 Dec 2022 10:45                            10.0   6.51  )-----------( 203      ( 26 Dec 2022 10:45 )             30.8       CARDIAC MARKERS ( 24 Dec 2022 14:44 )  x     / 0.11 ng/mL / x     / x     / x          Echo (12/21):   1. Technically difficult study.   2. Normal left ventricular internal cavity size.   3. Severely decreased global left ventricular systolic function.   4. Left ventricular ejection fraction, by visual estimation, is 30 to 35%.   5. Mildly reduced RV systolic function.   6. Severely enlarged left atrium.   7. Moderately enlarged right atrium.   8. Sclerotic aortic valve with normal opening.   9. Moderate thickening and calcification of the anterior and posterior mitral valve leaflets.  10. Moderate mitral annular calcification.  11. Mild mitral valve regurgitation.  12. Mild tricuspid regurgitation.  13. Estimated pulmonary artery systolic pressure is 36.3 mmHg assuming a right atrial pressure of 15 mmHg, whichis consistent with borderline pulmonary hypertension.    Assessment:    1. PAF with inc VR and frequent PVC's occ with Vent Bigemeny    2. HFr EF with new CM slight improved on most recent Echo 25% --- 35%    3. S/P Lung transplant on Immunosup rx with recent fungal sepsis    4. ESRD     5. CAD s/p PCI    6. Intubation for resp failure with sepsis, Shock and HF - much improved    7. Presentation with AMS likely related to much of the above    Plan:  1. Discussed at bedside with pt anf fam  2. Continue tele  3. Continue to increase Metoprolol Tartrate 25mg as BP tolerates   4. Continue Eliquis for CVA PPX.  5. No need to repeat echo for now.  No additional CV testing now.  6. Abx per med/ID.  7. 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 Faecalis bacteremia,  presenting with AMS.  Patient admitted and treated with empiric treatment for meningitis. 12/18 with acute hypercapnic respiratory failure in setting of aspiration pneumonia, emergently intubated, now extubated; also was in shock.  Noted now to be in AF with RVR.  Frequently switching to Sinus with PVC's    Interval History:  More lethargic today  Sleeping   Evaluated by Neuro, consider his status more a function of Toxic metabolic derangement    Tele:  Mostly AF with slightly increased VR and periods of Sinus . PVC' less frequent    acyclovir IVPB      acyclovir IVPB 320 milliGRAM(s) IV Intermittent every 24 hours  ampicillin  IVPB 2 Gram(s) IV Intermittent every 12 hours  apixaban 2.5 milliGRAM(s) Oral two times a day  atorvastatin 80 milliGRAM(s) Oral at bedtime  cefTRIAXone Injectable. 2000 milliGRAM(s) IV Push every 12 hours  chlorhexidine 2% Cloths 1 Application(s) Topical <User Schedule>  dextrose 50% Injectable 12.5 Gram(s) IV Push once  dextrose 50% Injectable 25 Gram(s) IV Push once  dextrose 50% Injectable 25 Gram(s) IV Push once  dextrose Oral Gel 15 Gram(s) Oral once  fluconAZOLE   Tablet 200 milliGRAM(s) Oral <User Schedule>  hydrocortisone 10 milliGRAM(s) Oral every 12 hours  lactulose Retention Enema 200 Gram(s) Rectal every 6 hours  lactulose Syrup 10 Gram(s) Oral three times a day  metoprolol tartrate 25 milliGRAM(s) Oral three times a day  pantoprazole    Tablet 40 milliGRAM(s) Oral before breakfast  sevelamer carbonate 800 milliGRAM(s) Oral three times a day with meals  tacrolimus 1.5 milliGRAM(s) Oral daily  tamsulosin 0.4 milliGRAM(s) Oral at bedtime  trimethoprim   80 mG/sulfamethoxazole 400 mG 1 Tablet(s) Oral <User Schedule>          Physical Exam:  T(C): 36.4 (12-26-22 @ 10:32), Max: 36.7 (12-25-22 @ 16:00)  HR: 86 (12-26-22 @ 10:32) (74 - 100)  BP: 132/85 (12-26-22 @ 10:32) (128/65 - 145/87)  RR: 18 (12-26-22 @ 10:32) (18 - 20)  SpO2: 87% (12-26-22 @ 10:32) (87% - 94%)  Wt(kg): --  General: Comfortable in NAD  Neck: No JVD  CVS: nl s1s2, no s3 1-2/6 EM  Pulm: Few crackles at bases bilat  Abd: soft, non-tender  Ext: No c/c/e  Neuro lethargic  Psych: lethargic      I&O's Summary    25 Dec 2022 07:01  -  26 Dec 2022 07:00  --------------------------------------------------------  IN: 836 mL / OUT: 0 mL / NET: 836 mL          Labs:   26 Dec 2022 10:45    143    |  98     |  36.3   ----------------------------<  89     3.7     |  27.0   |  6.39     Ca    8.8        26 Dec 2022 10:45  Phos  5.8       26 Dec 2022 10:45  Mg     2.2       26 Dec 2022 10:45                            10.0   6.51  )-----------( 203      ( 26 Dec 2022 10:45 )             30.8       CARDIAC MARKERS ( 24 Dec 2022 14:44 )  x     / 0.11 ng/mL / x     / x     / x          Echo (12/21):   1. Technically difficult study.   2. Normal left ventricular internal cavity size.   3. Severely decreased global left ventricular systolic function.   4. Left ventricular ejection fraction, by visual estimation, is 30 to 35%.   5. Mildly reduced RV systolic function.   6. Severely enlarged left atrium.   7. Moderately enlarged right atrium.   8. Sclerotic aortic valve with normal opening.   9. Moderate thickening and calcification of the anterior and posterior mitral valve leaflets.  10. Moderate mitral annular calcification.  11. Mild mitral valve regurgitation.  12. Mild tricuspid regurgitation.  13. Estimated pulmonary artery systolic pressure is 36.3 mmHg assuming a right atrial pressure of 15 mmHg, whichis consistent with borderline pulmonary hypertension.    Assessment:    1. PAF with inc VR and  PVC's less frequent today    2. HFr EF with new CM slight improved on most recent Echo 25% ---> 35%    3. S/P Lung transplant on Immunosup rx with recent fungal sepsis    4. ESRD     5. CAD s/p PCI    6. Intubation for resp failure with sepsis, Shock and HF - much improved    7. Presentation with AMS likely related to much of the above.    Plan:  1. Continue to increase Metoprolol Tartrate 25mg as BP tolerates   2. Continue tele  3. Monitor Neuro status  4. Continue Eliquis for CVA PPX.  5. No need to repeat echo for now.  No additional CV testing now.  6. Abx per med/ID.  7. No indication for further cardiac testing currently

## 2022-12-26 NOTE — PROGRESS NOTE ADULT - SUBJECTIVE AND OBJECTIVE BOX
[facility] Division of Hospital Medicine    Chief Complaint:    AMS    SUBJECTIVE / OVERNIGHT EVENTS:  Patient with poor mentation this morning. Not conversational. This reflects a regression to patient's mental status to initial presentation    Unable to perform ROS    MEDICATIONS  (STANDING):  acyclovir IVPB      acyclovir IVPB 320 milliGRAM(s) IV Intermittent every 24 hours  ampicillin  IVPB 2 Gram(s) IV Intermittent every 12 hours  apixaban 2.5 milliGRAM(s) Oral two times a day  atorvastatin 80 milliGRAM(s) Oral at bedtime  cefTRIAXone Injectable. 2000 milliGRAM(s) IV Push every 12 hours  chlorhexidine 2% Cloths 1 Application(s) Topical <User Schedule>  dextrose 50% Injectable 25 Gram(s) IV Push once  dextrose 50% Injectable 12.5 Gram(s) IV Push once  dextrose 50% Injectable 25 Gram(s) IV Push once  dextrose Oral Gel 15 Gram(s) Oral once  fluconAZOLE   Tablet 200 milliGRAM(s) Oral <User Schedule>  hydrocortisone 10 milliGRAM(s) Oral every 12 hours  lactulose Retention Enema 200 Gram(s) Rectal every 6 hours  lactulose Syrup 10 Gram(s) Oral three times a day  metoprolol tartrate 50 milliGRAM(s) Oral two times a day  pantoprazole    Tablet 40 milliGRAM(s) Oral before breakfast  sevelamer carbonate 800 milliGRAM(s) Oral three times a day with meals  tacrolimus 1.5 milliGRAM(s) Oral daily  tamsulosin 0.4 milliGRAM(s) Oral at bedtime  trimethoprim   80 mG/sulfamethoxazole 400 mG 1 Tablet(s) Oral <User Schedule>    MEDICATIONS  (PRN):        I&O's Summary    25 Dec 2022 07:01  -  26 Dec 2022 07:00  --------------------------------------------------------  IN: 836 mL / OUT: 0 mL / NET: 836 mL        PHYSICAL EXAM:  Vital Signs Last 24 Hrs  T(C): 36.4 (26 Dec 2022 10:32), Max: 36.7 (25 Dec 2022 16:00)  T(F): 97.6 (26 Dec 2022 10:32), Max: 98 (25 Dec 2022 16:00)  HR: 86 (26 Dec 2022 10:32) (74 - 100)  BP: 132/85 (26 Dec 2022 10:32) (128/65 - 145/87)  BP(mean): 105 (25 Dec 2022 16:00) (105 - 105)  RR: 18 (26 Dec 2022 10:35) (18 - 20)  SpO2: 94% (26 Dec 2022 10:35) (87% - 94%)    Parameters below as of 26 Dec 2022 10:35  Patient On (Oxygen Delivery Method): nasal cannula  O2 Flow (L/min): 3          CONSTITUTIONAL: NAD, elderly  ENMT: Moist oral mucosa, no pharyngeal injection or exudates; normal dentition  RESPIRATORY: Normal respiratory effort; lungs are clear to auscultation bilaterally  CARDIOVASCULAR: Regular rate and rhythm, normal S1 and S2, no murmur/rub/gallop; Peripheral pulses are 2+ bilaterally  ABDOMEN: Nontender to palpation, normoactive bowel sounds, no rebound/guarding;   MUSCLOSKELETAL:  No clubbing or cyanosis of digits; no joint swelling or tenderness to palpation  PSYCH:Not alert, not oriented  NEUROLOGY: CN 2-12 are intact and symmetric; no gross sensory deficits;   SKIN: No rashes; no palpable lesions    LABS:                        10.0   6.51  )-----------( 203      ( 26 Dec 2022 10:45 )             30.8     12-26    143  |  98  |  36.3<H>  ----------------------------<  89  3.7   |  27.0  |  6.39<H>    Ca    8.8      26 Dec 2022 10:45  Phos  5.8     12-26  Mg     2.2     12-26        CARDIAC MARKERS ( 24 Dec 2022 14:44 )  x     / 0.11 ng/mL / x     / x     / x              CAPILLARY BLOOD GLUCOSE            RADIOLOGY & ADDITIONAL TESTS:  Results Reviewed:   Imaging Personally Reviewed:  Electrocardiogram Personally Reviewed:

## 2022-12-26 NOTE — CONSULT NOTE ADULT - ASSESSMENT
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 faecalis bacteremia, admitted on 12/13 with altered mental status of unclear etiology who subseqently developed acute hypoxic respiratory failure secondary to aspiration pneumonia and septic shock. Hematology consulted for possible iron deposition in the liver.     Given previously normal iron saturation, my suspicion for hereditary hemochromatosis is low, but should be excluded with genetic testing. Other etiologies for iron deposition include his history of transfusions and underlying cirrhosis. Biopsy can be performed to confirm this diagnosis if no other etiology for cirrhosis is discovered.     # iron deposition in the liver  - please send hemochromatosis gene panel.   - Consider liver biopsy.   - Can follow up as outpatient regarding diagnosis of hemochromatosis and for consideration of iron chelation.

## 2022-12-26 NOTE — PROGRESS NOTE ADULT - SUBJECTIVE AND OBJECTIVE BOX
Obtunded today. On vEEG.    Vital Signs Last 24 Hrs  T(C): 36.6 (26 Dec 2022 16:00), Max: 36.6 (25 Dec 2022 20:36)  T(F): 97.8 (26 Dec 2022 16:00), Max: 97.8 (25 Dec 2022 20:36)  HR: 72 (26 Dec 2022 16:00) (72 - 98)  BP: 123/73 (26 Dec 2022 16:00) (123/73 - 145/87)  BP(mean): --  RR: 18 (26 Dec 2022 16:00) (18 - 20)  SpO2: 98% (26 Dec 2022 16:00) (87% - 98%)    Parameters below as of 26 Dec 2022 10:35  Patient On (Oxygen Delivery Method): nasal cannula  O2 Flow (L/min): 3    I&O's Summary    25 Dec 2022 07:01  -  26 Dec 2022 07:00  --------------------------------------------------------  IN: 836 mL / OUT: 0 mL / NET: 836 mL    12-26    143  |  98  |  36.3<H>  ----------------------------<  89  3.7   |  27.0  |  6.39<H>    Physical Exam  General: WDWN male lying in bed, obtunded  Cardiac: S1S2 RRR  Respiratory: CTAB  Abdomen: Soft, NT  Extremities: No appreciable edema  Neuro: Obtunded     Ca    8.8      26 Dec 2022 10:45  Phos  5.8     12-26  Mg     2.2     12-26               10.0   6.51  )-----------( 203      ( 26 Dec 2022 10:45 )             30.8     MEDICATIONS  (STANDING):  acyclovir IVPB 320 milliGRAM(s) IV Intermittent every 24 hours  acyclovir IVPB      ampicillin  IVPB 2 Gram(s) IV Intermittent every 12 hours  atorvastatin 80 milliGRAM(s) Oral at bedtime  cefTRIAXone Injectable. 2000 milliGRAM(s) IV Push every 12 hours  chlorhexidine 2% Cloths 1 Application(s) Topical <User Schedule>  dextrose 5% + sodium chloride 0.45%. 1000 milliLiter(s) (40 mL/Hr) IV Continuous <Continuous>  dextrose 50% Injectable 12.5 Gram(s) IV Push once  dextrose 50% Injectable 25 Gram(s) IV Push once  dextrose 50% Injectable 25 Gram(s) IV Push once  dextrose Oral Gel 15 Gram(s) Oral once  fluconAZOLE   Tablet 200 milliGRAM(s) Oral <User Schedule>  heparin  Infusion.  Unit(s)/Hr (11 mL/Hr) IV Continuous <Continuous>  hydrocortisone 10 milliGRAM(s) Oral every 12 hours  lactulose Retention Enema 200 Gram(s) Rectal every 6 hours  metoprolol tartrate 50 milliGRAM(s) Oral two times a day  pantoprazole    Tablet 40 milliGRAM(s) Oral before breakfast  sevelamer carbonate 800 milliGRAM(s) Oral three times a day with meals  tacrolimus 1.5 milliGRAM(s) Oral daily  tamsulosin 0.4 milliGRAM(s) Oral at bedtime  trimethoprim   80 mG/sulfamethoxazole 400 mG 1 Tablet(s) Oral <User Schedule>    MEDICATIONS  (PRN):

## 2022-12-26 NOTE — PROGRESS NOTE ADULT - ASSESSMENT
72y Male with multiple medical issues now with encephalopathy.     Encephalopathy.   Mental status waxing and waning.   Likely toxic metabolic secondary to multiple medical issues.   ?Hepatic component.   Will obtain EEG to rule out any epileptiform activity.     Rule out meningitis.   CSF results as above  Antibiotic coverage for now per ID.   crypto Ag (+) but PCR (-)  EBV detected by PCR in CSF, low level, unclear significance    Case discussed with Dr Ko.

## 2022-12-26 NOTE — PROGRESS NOTE ADULT - ASSESSMENT
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 faecalis bacteremia, admitted on 12/13 with altered mental status of unclear etiology who subseqently developed acute hypoxic respiratory failure secondary to aspiration pneumonia and septic shock. Now downgraded to medicine for further management.    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 12/26 regressed to initial mental status from presentation   Patient is receiving CTZ/Amp through 12/26, Acyclovir through 12/28 per ID  On admission patient was started on lactulose empirically for possible component of hepatic encephalopathy (Normal Ammonia level does not rule out)   Chart review reveals that patient's last dose of lactulose was 12/19 evening.   Hepatic encephalopathy will be reconsidered as possible etiology given a correlation of improved mental status during hospital course and regression after cessation   Was restarted on lactulose 12/25 but only received 1 dose. Will restart lactulose for possible hepatic enceph component   Neuro to perform EEG to investigate for possible seizures  Repeat Bcx    #Previous Bacteremia  Patient w/ hx of e. faecalis bacteremia  Will complete ABX on 12/26  Cellcept is on hold until then -     #Lung transplant  On immunosuppression therapy  Continue hydrocortisone and Tacrolimus  As per ID who spoke to transplant center.  Plan to hold Cellcept until treatment for bacteria resolves 12/26 tentative date.    #ESRD on dialysis.   L AV access.    HD per renal  [place]   Abx adjusted for renal functioning.    #Chronic atrial fibrillation.   Intermittent RVR   eliquis to heparin as patient cannot tolerate PO  metop succ 25 daily to tartrate 25mg tid  With episodes of bigeminy on tele 12/25  Cardiology consulted    #Chronic systolic congestive heart failure.   Metoprolol  Needs to follow up with cardiology outpatient  Patient needs outpatient stress/ischemic evaluation     #Dilated CBD  No current abdominal pain  Patient will need eventual MRCP to r/o biliary pathology but can be outpatient    #Hepatic cirrhosis  Noted on imaging  Currently not altered and was taken off of lactulose  MRCP revealed irond deposition of liver and spleen. Possible HC?  Heme consult for possible Iron Overload Syndrome    DVT prophylaxis: Eliquis to Heparin  DIET NPO pending improvement of mental status     Transplant surgeon Dr. Hung Lewis [phone] 12/22    Dispo: Remains acute. Completes Course of abx/anti-virals

## 2022-12-26 NOTE — PROGRESS NOTE ADULT - SUBJECTIVE AND OBJECTIVE BOX
[facility]                                        [facility] [place]                                 Jose Antonio Brooks, & [facility]                                  [address]                                        [place], [place], [address]                                             [phone]        CC: Encephalopathy    HPI:   The patient is a 72y Male with multiple medical issues who was recently hospitalized with hypoxic respiratory failure.  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   He was ultimately discharged 11/23/22.   He now presented with altered mental status and lethargy.   Neurology asked to evaluation for meningitis. LP attempted (neuro JW) and was unsuccessful while in ER.    The patient had been gradually improving with regard to mental status, but has deteriorated again over the past few days.     Interim history:  On 4 Tower.    ROS:   Unobtainable due to patient's condition.     MEDICATIONS  (STANDING):  acyclovir IVPB 320 milliGRAM(s) IV Intermittent every 24 hours  acyclovir IVPB      ampicillin  IVPB 2 Gram(s) IV Intermittent every 12 hours  apixaban 2.5 milliGRAM(s) Oral two times a day  atorvastatin 80 milliGRAM(s) Oral at bedtime  cefTRIAXone Injectable. 2000 milliGRAM(s) IV Push every 12 hours  chlorhexidine 2% Cloths 1 Application(s) Topical <User Schedule>  dextrose Oral Gel 15 Gram(s) Oral once  fluconAZOLE   Tablet 200 milliGRAM(s) Oral <User Schedule>  hydrocortisone 10 milliGRAM(s) Oral every 12 hours  lactulose Retention Enema 200 Gram(s) Rectal every 6 hours  lactulose Syrup 10 Gram(s) Oral three times a day  metoprolol tartrate 25 milliGRAM(s) Oral three times a day  pantoprazole    Tablet 40 milliGRAM(s) Oral before breakfast  sevelamer carbonate 800 milliGRAM(s) Oral three times a day with meals  tacrolimus 1.5 milliGRAM(s) Oral daily  tamsulosin 0.4 milliGRAM(s) Oral at bedtime  trimethoprim   80 mG/sulfamethoxazole 400 mG 1 Tablet(s) Oral <User Schedule>    Vital Signs Last 24 Hrs  T(C): 36.4 (26 Dec 2022 10:32), Max: 36.7 (25 Dec 2022 16:00)  T(F): 97.6 (26 Dec 2022 10:32), Max: 98 (25 Dec 2022 16:00)  HR: 86 (26 Dec 2022 10:32) (40 - 100)  BP: 132/85 (26 Dec 2022 10:32) (121/75 - 145/87)  BP(mean): 105 (25 Dec 2022 16:00) (105 - 105)  RR: 18 (26 Dec 2022 10:32) (18 - 22)  SpO2: 87% (26 Dec 2022 10:32) (87% - 94%)    Parameters below as of 26 Dec 2022 10:32  Patient On (Oxygen Delivery Method): room air    Detailed Neurologic Exam:    Mental status: The patient opens eyes briefly to noxious stimuli. Not speaking or following instructions.     Cranial nerves: There is no papilledema. Pupils react symmetrically to light. He blinks to threat bilaterally.  He tracks slightly with gaze. Face is grossly symmetric. Palate and tongue cannot be assessed.     Motor/Sensory: There is normal bulk and tone.  Symmetric (although minimal) limb movement to stimuli.     Reflexes: 1+ throughout and plantar responses are flexor.    Cerebellar: Cannot be tested.     Labs:     12-25    142  |  99  |  31.3<H>  ----------------------------<  104<H>  3.8   |  29.0  |  5.35<H>    Ca    8.3<L>      25 Dec 2022 03:40  Phos  4.5     12-25  Mg     2.1     12-25                              10.0   6.51  )-----------( 203      ( 26 Dec 2022 10:45 )             30.8       Rad:   MRI brain 12/25/22 images reviewed (and concur with report): There is no acute pathology.   There is some chronic ischemic change.

## 2022-12-26 NOTE — CONSULT NOTE ADULT - SUBJECTIVE AND OBJECTIVE BOX
REASON FOR CONSULTATION    HPI:  72M with PMH of  hypertension, hyperlipidemia, ESRD on HD,. history of transfusions, emphysema s/p lung transplant in [place] by Dr. 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 feacalis bacteremia, newly diagnosed HFrEF 25%, AF complicated by GIB was discharged on home IV Abx now presenting with AMS    Patient unable to contribute to history secondary to mental status. History obtained by EMR and Spouse.  Patient was discharged day prior to Thanksgiving with home iv abx. Per Spouse has been receiving IV abx administered by wife and daughter. Has not missed any HD sessions with exception to today.  Since discharge has been becoming increasingly tired. Wife attributes this to not sleeping well. Furthermore patient complained of severe nausea yesterday afternoon but did not have emesis. Approx 1 hour after that became more ''confused and out of it'' but still talking. Symptoms continued to progress and get worse throughout the night and in the morning was only moaning and extremely lethargic.     No recent cough, cp, sob. Denies recent fevers, chills.     Presented to the ED with daughter. CTH obtained and did not reveal any acute findings.  Patient was admitted to medicine for encephalopathy.         (13 Dec 2022 08:45)      REVIEW OF SYSTEMS:    REVIEW OF SYSTEMS:  unable to assess due to mental status.     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      2019 novel coronavirus disease (COVID-19)  Feb 2021 - hospitalized for 1 week at [facility]      Pneumonia  April 2021      [place]      HTN (hypertension)      Lumbar spondylosis      History of COPD      BPH without urinary obstruction      Hypercholesterolemia      Oliguria and anuria      H/O peripheral neuropathy      H/O lung transplant  bilateral - transplant - 1-2-2015 -  [facility]      H/O heart artery stent  x3 [facility]      History of hip replacement  right      Status post open reduction and internal fixation (ORIF) of fracture  left femur          SOCIAL HISTORY:    FAMILY HISTORY:  Family history of emphysema  mother        SOCIAL HISTORY:    Allergies    budesonide (Unknown)  cefpodoxime (Unknown)  Pollen (Unknown)    Intolerances        MEDICATIONS  (STANDING):  acyclovir IVPB      acyclovir IVPB 320 milliGRAM(s) IV Intermittent every 24 hours  ampicillin  IVPB 2 Gram(s) IV Intermittent every 12 hours  apixaban 2.5 milliGRAM(s) Oral two times a day  atorvastatin 80 milliGRAM(s) Oral at bedtime  cefTRIAXone Injectable. 2000 milliGRAM(s) IV Push every 12 hours  chlorhexidine 2% Cloths 1 Application(s) Topical <User Schedule>  dextrose 50% Injectable 25 Gram(s) IV Push once  dextrose 50% Injectable 12.5 Gram(s) IV Push once  dextrose 50% Injectable 25 Gram(s) IV Push once  dextrose Oral Gel 15 Gram(s) Oral once  fluconAZOLE   Tablet 200 milliGRAM(s) Oral <User Schedule>  hydrocortisone 10 milliGRAM(s) Oral every 12 hours  lactulose Retention Enema 200 Gram(s) Rectal every 6 hours  lactulose Syrup 10 Gram(s) Oral three times a day  metoprolol tartrate 50 milliGRAM(s) Oral two times a day  pantoprazole    Tablet 40 milliGRAM(s) Oral before breakfast  sevelamer carbonate 800 milliGRAM(s) Oral three times a day with meals  tacrolimus 1.5 milliGRAM(s) Oral daily  tamsulosin 0.4 milliGRAM(s) Oral at bedtime  trimethoprim   80 mG/sulfamethoxazole 400 mG 1 Tablet(s) Oral <User Schedule>    MEDICATIONS  (PRN):      Vital Signs Last 24 Hrs  T(C): 36.4 (26 Dec 2022 10:32), Max: 36.7 (25 Dec 2022 16:00)  T(F): 97.6 (26 Dec 2022 10:32), Max: 98 (25 Dec 2022 16:00)  HR: 86 (26 Dec 2022 10:32) (74 - 100)  BP: 132/85 (26 Dec 2022 10:32) (128/65 - 145/87)  BP(mean): 105 (25 Dec 2022 16:00) (105 - 105)  RR: 18 (26 Dec 2022 10:35) (18 - 20)  SpO2: 94% (26 Dec 2022 10:35) (87% - 94%)    Parameters below as of 26 Dec 2022 10:35  Patient On (Oxygen Delivery Method): nasal cannula  O2 Flow (L/min): 3      PHYSICAL EXAM:    GENERAL: NAD, but somnolent Responds to painful stimuli but does not open eyes.   HEAD:  Atraumatic, Normocephalic  EYES: EOMI, PERRLA, conjunctiva and sclera clear  ENMT: No tonsillar erythema, exudates, or enlargement; Moist mucous membranes, Good dentition, No lesions  NECK: Supple, No JVD, Normal thyroid  NERVOUS SYSTEM:  Alert & Oriented X3, Good concentration; Motor Strength 5/5 B/L upper and lower extremities; DTRs 2+ intact and symmetric  CHEST/LUNG: Clear to percussion bilaterally; No rales, rhonchi, wheezing, or rubs  HEART: Regular rate and rhythm; No murmurs, rubs, or gallops  ABDOMEN: Soft, Nontender, Nondistended; Bowel sounds present  EXTREMITIES:  2+ Peripheral Pulses, No clubbing, cyanosis, or edema  LYMPH: No lymphadenopathy noted  SKIN: No rashes or lesions      LABS:                        10.0   6.51  )-----------( 203      ( 26 Dec 2022 10:45 )             30.8     12-26    143  |  98  |  36.3<H>  ----------------------------<  89  3.7   |  27.0  |  6.39<H>    Ca    8.8      26 Dec 2022 10:45  Phos  5.8     12-26  Mg     2.2     12-26              RADIOLOGY & ADDITIONAL STUDIES:  < from: MR MRCP No Cont (12.25.22 @ 09:48) >  IMPRESSION:  Examination limited secondary to motion artifact.    Common bile duct upper limits of normal for caliber for age measuring 7   mm; no common bile duct stone.    Iron deposition in the liver and spleen/hemosiderosis.    Bibasilar lung opacities and small bilateral pleural effusions, likely   pulmonary edema;also see CT chest/abdomen/pelvis report 12/23/2022.      < end of copied text >  < from: CT Abdomen and Pelvis w/ IV Cont (12.23.22 @ 16:24) >  IMPRESSION:  Mild interval improvement in pulmonary edema.  Third spacingof fluid. No lymphadenopathy.  VERTEBRAL BODY ANALYSIS: Vertebral compression fractures as described,   consider further workup for osteoporosis.      < end of copied text >  < from: CT Chest w/ IV Cont (12.23.22 @ 16:24) >    IMPRESSION:  Mild interval improvement in pulmonary edema.  Third spacingof fluid. No lymphadenopathy.  VERTEBRAL BODY ANALYSIS: Vertebral compression fractures as described,   consider further workup for osteoporosis.      < end of copied text >    PATHOLOGY:

## 2022-12-26 NOTE — PROGRESS NOTE ADULT - ASSESSMENT
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 CT head is negative. Hospital course is notable for empiric treatment for meningitis; EBV positive in CSF; also with dilated common bile duct; acutely decompensated on 12/18 with acute hypercapnic respiratory failure in setting of aspiration pneumonia, emergently intubated, later extubated; also shock (resolved). Nephrology is managing ESRD on HD.    -Access: L AV access   -Outpatient dialysis days are Tuesdays Thursdays Saturdays  -Off cycle while here, currently on a Monday Wednesday Friday dialysis schedule    -Labs reviewed - no acute indication for dialysis today; will dialyze tomorrow instead to get the patient back onto his outpatient dialysis schedule  -Blood pressures acceptable    -Anemia in setting of CKD - hemoglobin is at goal; no indication for MENDY at this time    -Mineral Bone Disease in setting of CKD - continue oral phos binder   -s/p Lung transplantation - immunosuppressive management as per ID  -EBV in CSF - CT chest/abdomen/pelvis with IV contrast is negative for lymphadenopathy   -Obtunded today - VBG not consistent with hypercapnea; currently on vEEG    Tressa Graham DO  Nephrology  [facility]  [address] [place] [address]  Office Number [phone]

## 2022-12-27 NOTE — PROGRESS NOTE ADULT - SUBJECTIVE AND OBJECTIVE BOX
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 Faecalis bacteremia,  presenting with AMS.  Patient admitted and treated with empiric treatment for meningitis. 12/18 with acute hypercapnic respiratory failure in setting of aspiration pneumonia, emergently intubated, now extubated; also was in shock.  Noted now to be in AF with RVR.  Frequently switching to Sinus with PVC's        Subjective:       Tele:        acetylcysteine IVPB 10 Gram(s) IV Intermittent once  acetylcysteine IVPB 3.2 Gram(s) IV Intermittent once  acetylcysteine IVPB 6 Gram(s) IV Intermittent once  acyclovir IVPB 320 milliGRAM(s) IV Intermittent every 24 hours  acyclovir IVPB      ampicillin  IVPB 2 Gram(s) IV Intermittent every 12 hours  atorvastatin 80 milliGRAM(s) Oral at bedtime  cefTRIAXone Injectable. 2000 milliGRAM(s) IV Push every 12 hours  chlorhexidine 2% Cloths 1 Application(s) Topical <User Schedule>  dextrose 5% + sodium chloride 0.45%. 1000 milliLiter(s) IV Continuous <Continuous>  dextrose 50% Injectable 25 Gram(s) IV Push once  dextrose 50% Injectable 12.5 Gram(s) IV Push once  dextrose 50% Injectable 25 Gram(s) IV Push once  dextrose Oral Gel 15 Gram(s) Oral once  fluconAZOLE   Tablet 200 milliGRAM(s) Oral <User Schedule>  heparin  Infusion.  Unit(s)/Hr IV Continuous <Continuous>  hydrocortisone 10 milliGRAM(s) Oral every 12 hours  lactulose Syrup 10 Gram(s) Oral every 6 hours  metoprolol tartrate 50 milliGRAM(s) Oral two times a day  pantoprazole  Injectable 40 milliGRAM(s) IV Push daily  sevelamer carbonate 800 milliGRAM(s) Oral three times a day with meals  sodium chloride 0.9% Bolus 250 milliLiter(s) IV Bolus once  tacrolimus 1.5 milliGRAM(s) Oral daily  tamsulosin 0.4 milliGRAM(s) Oral at bedtime  trimethoprim   80 mG/sulfamethoxazole 400 mG 1 Tablet(s) Oral <User Schedule>          Physical Exam:  T(C): 36.7 (12-27-22 @ 18:10), Max: 37 (12-27-22 @ 16:00)  HR: 118 (12-27-22 @ 18:10) (83 - 127)  BP: 93/57 (12-27-22 @ 18:10) (93/57 - 159/85)  RR: 19 (12-27-22 @ 18:10) (18 - 20)  SpO2: 100% (12-27-22 @ 18:10) (95% - 100%)  Wt(kg): --  General: Comfortable in NAD  Neck: No JVD  CVS: nl s1s2, no s3  Pulm: CTA b/l  Abd: soft, non-tender  Ext: No c/c/e  Neuro A&O x3  Psych: Normal affect      Labs:   27 Dec 2022 06:00    144    |  101    |  41.6   ----------------------------<  76     3.9     |  22.0   |  7.78     Ca    9.0        27 Dec 2022 06:00  Phos  6.1       27 Dec 2022 06:00  Mg     2.1       27 Dec 2022 06:00                            12.8   8.78  )-----------( 281      ( 27 Dec 2022 06:00 )             40.2     PTT - ( 27 Dec 2022 17:20 )  PTT:80.9 sec        I&O's Summary    27 Dec 2022 07:01  -  27 Dec 2022 18:26  --------------------------------------------------------  IN: 0 mL / OUT: 0 mL / NET: 0 mL          Echo (12/21):   1. Technically difficult study.   2. Normal left ventricular internal cavity size.   3. Severely decreased global left ventricular systolic function.   4. Left ventricular ejection fraction, by visual estimation, is 30 to 35%.   5. Mildly reduced RV systolic function.   6. Severely enlarged left atrium.   7. Moderately enlarged right atrium.   8. Sclerotic aortic valve with normal opening.   9. Moderate thickening and calcification of the anterior and posterior mitral valve leaflets.  10. Moderate mitral annular calcification.  11. Mild mitral valve regurgitation.  12. Mild tricuspid regurgitation.  13. Estimated pulmonary artery systolic pressure is 36.3 mmHg assuming a right atrial pressure of 15 mmHg, whichis consistent with borderline pulmonary hypertension.    Assessment:    1. PAF with inc VR and  PVC's less frequent today    2. HFr EF with new CM slight improved on most recent Echo 25% ---> 35%    3. S/P Lung transplant on Immunosup rx with recent fungal sepsis    4. ESRD     5. CAD s/p PCI    6. Intubation for resp failure with sepsis, Shock and HF - much improved    7. Presentation with AMS likely related to much of the above.    Plan:  1. Continue to increase Metoprolol Tartrate 25mg as BP tolerates   2. Continue tele  3. Monitor Neuro status  4. Continue Eliquis for CVA PPX.  5. No need to repeat echo for now.  No additional CV testing now.  6. Abx per med/ID.  7. 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 Faecalis bacteremia,  presenting with AMS.  Patient admitted and treated with empiric treatment for meningitis. 12/18 with acute hypercapnic respiratory failure in setting of aspiration pneumonia, emergently intubated, now extubated; also was in shock.  Noted now to be in AF with RVR.  Frequently switching to Sinus with PVC's        Subjective:   Obtunded    Tele:  Afib, RVR at times with ventricular rates around 130 bpm, occ PVC's      acetylcysteine IVPB 10 Gram(s) IV Intermittent once  acetylcysteine IVPB 3.2 Gram(s) IV Intermittent once  acetylcysteine IVPB 6 Gram(s) IV Intermittent once  acyclovir IVPB 320 milliGRAM(s) IV Intermittent every 24 hours  acyclovir IVPB      ampicillin  IVPB 2 Gram(s) IV Intermittent every 12 hours  atorvastatin 80 milliGRAM(s) Oral at bedtime  cefTRIAXone Injectable. 2000 milliGRAM(s) IV Push every 12 hours  chlorhexidine 2% Cloths 1 Application(s) Topical <User Schedule>  dextrose 5% + sodium chloride 0.45%. 1000 milliLiter(s) IV Continuous <Continuous>  dextrose 50% Injectable 25 Gram(s) IV Push once  dextrose 50% Injectable 12.5 Gram(s) IV Push once  dextrose 50% Injectable 25 Gram(s) IV Push once  dextrose Oral Gel 15 Gram(s) Oral once  fluconAZOLE   Tablet 200 milliGRAM(s) Oral <User Schedule>  heparin  Infusion.  Unit(s)/Hr IV Continuous <Continuous>  hydrocortisone 10 milliGRAM(s) Oral every 12 hours  lactulose Syrup 10 Gram(s) Oral every 6 hours  metoprolol tartrate 50 milliGRAM(s) Oral two times a day  pantoprazole  Injectable 40 milliGRAM(s) IV Push daily  sevelamer carbonate 800 milliGRAM(s) Oral three times a day with meals  sodium chloride 0.9% Bolus 250 milliLiter(s) IV Bolus once  tacrolimus 1.5 milliGRAM(s) Oral daily  tamsulosin 0.4 milliGRAM(s) Oral at bedtime  trimethoprim   80 mG/sulfamethoxazole 400 mG 1 Tablet(s) Oral <User Schedule>          Physical Exam:  T(C): 36.7 (12-27-22 @ 18:10), Max: 37 (12-27-22 @ 16:00)  HR: 118 (12-27-22 @ 18:10) (83 - 127)  BP: 93/57 (12-27-22 @ 18:10) (93/57 - 159/85)  RR: 19 (12-27-22 @ 18:10) (18 - 20)  SpO2: 100% (12-27-22 @ 18:10) (95% - 100%)  Wt(kg): --  General: Comfortable in NAD  Neck: No JVD  CVS: nl s1s2, no s3  Pulm: bilateral crackles at bases  Abd: soft, non-tender  Ext: No c/c/e  Neuro: sleeping, obtunded        Labs:   27 Dec 2022 06:00    144    |  101    |  41.6   ----------------------------<  76     3.9     |  22.0   |  7.78     Ca    9.0        27 Dec 2022 06:00  Phos  6.1       27 Dec 2022 06:00  Mg     2.1       27 Dec 2022 06:00                            12.8   8.78  )-----------( 281      ( 27 Dec 2022 06:00 )             40.2     PTT - ( 27 Dec 2022 17:20 )  PTT:80.9 sec        I&O's Summary    27 Dec 2022 07:01  -  27 Dec 2022 18:26  --------------------------------------------------------  IN: 0 mL / OUT: 0 mL / NET: 0 mL          Echo (12/21):   1. Technically difficult study.   2. Normal left ventricular internal cavity size.   3. Severely decreased global left ventricular systolic function.   4. Left ventricular ejection fraction, by visual estimation, is 30 to 35%.   5. Mildly reduced RV systolic function.   6. Severely enlarged left atrium.   7. Moderately enlarged right atrium.   8. Sclerotic aortic valve with normal opening.   9. Moderate thickening and calcification of the anterior and posterior mitral valve leaflets.  10. Moderate mitral annular calcification.  11. Mild mitral valve regurgitation.  12. Mild tricuspid regurgitation.  13. Estimated pulmonary artery systolic pressure is 36.3 mmHg assuming a right atrial pressure of 15 mmHg, which is consistent with borderline pulmonary hypertension.    Assessment:    1. PAF with increased VR and  PVC's, likely due to hypotension, IV fluid bolus ordered by primary team.   2. HFr EF with new CM slight improved on most recent Echo 25% ---> 35%  3. S/P Lung transplant on Immunosup rx with recent fungal sepsis  4. ESRD   5. CAD s/p PCI  6. S/P Intubation for resp failure with sepsis, Shock and HF.   7. Toxic Metabolic Encephalopathy, possibly toxic metabolic encephalopathy        Plan:  1. Continue to Metoprolol Tartrate 25mg as BP tolerates.   2. Monitor fluid status closely. Monitor for increased crackles, edema, hypoxia. Daily weight.   3. Continue telemetry  4. Continue Eliquis for CVA PPX.  5. No need to repeat echo for now.  No additional CV testing now.  6. Abx per med/ID.  7. 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 Faecalis bacteremia,  presenting with AMS.  Patient admitted and treated with empiric treatment for meningitis. 12/18 with acute hypercapnic respiratory failure in setting of aspiration pneumonia, emergently intubated, now extubated; also was in shock.  Noted now to be in AF with RVR and hypotension.         Subjective:   Obtunded    Tele:  Afib, RVR at times with ventricular rates around 130 bpm, occ PVC's      acetylcysteine IVPB 10 Gram(s) IV Intermittent once  acetylcysteine IVPB 3.2 Gram(s) IV Intermittent once  acetylcysteine IVPB 6 Gram(s) IV Intermittent once  acyclovir IVPB 320 milliGRAM(s) IV Intermittent every 24 hours  acyclovir IVPB      ampicillin  IVPB 2 Gram(s) IV Intermittent every 12 hours  atorvastatin 80 milliGRAM(s) Oral at bedtime  cefTRIAXone Injectable. 2000 milliGRAM(s) IV Push every 12 hours  chlorhexidine 2% Cloths 1 Application(s) Topical <User Schedule>  dextrose 5% + sodium chloride 0.45%. 1000 milliLiter(s) IV Continuous <Continuous>  dextrose 50% Injectable 25 Gram(s) IV Push once  dextrose 50% Injectable 12.5 Gram(s) IV Push once  dextrose 50% Injectable 25 Gram(s) IV Push once  dextrose Oral Gel 15 Gram(s) Oral once  fluconAZOLE   Tablet 200 milliGRAM(s) Oral <User Schedule>  heparin  Infusion.  Unit(s)/Hr IV Continuous <Continuous>  hydrocortisone 10 milliGRAM(s) Oral every 12 hours  lactulose Syrup 10 Gram(s) Oral every 6 hours  metoprolol tartrate 50 milliGRAM(s) Oral two times a day  pantoprazole  Injectable 40 milliGRAM(s) IV Push daily  sevelamer carbonate 800 milliGRAM(s) Oral three times a day with meals  sodium chloride 0.9% Bolus 250 milliLiter(s) IV Bolus once  tacrolimus 1.5 milliGRAM(s) Oral daily  tamsulosin 0.4 milliGRAM(s) Oral at bedtime  trimethoprim   80 mG/sulfamethoxazole 400 mG 1 Tablet(s) Oral <User Schedule>          Physical Exam:  T(C): 36.7 (12-27-22 @ 18:10), Max: 37 (12-27-22 @ 16:00)  HR: 118 (12-27-22 @ 18:10) (83 - 127)  BP: 93/57 (12-27-22 @ 18:10) (93/57 - 159/85)  RR: 19 (12-27-22 @ 18:10) (18 - 20)  SpO2: 100% (12-27-22 @ 18:10) (95% - 100%)  Wt(kg): --  General: Comfortable in NAD  Neck: No JVD  CVS: nl s1s2, no s3  Pulm: bilateral crackles at bases  Abd: soft, non-tender  Ext: No c/c/e  Neuro: sleeping, obtunded        Labs:   27 Dec 2022 06:00    144    |  101    |  41.6   ----------------------------<  76     3.9     |  22.0   |  7.78     Ca    9.0        27 Dec 2022 06:00  Phos  6.1       27 Dec 2022 06:00  Mg     2.1       27 Dec 2022 06:00                            12.8   8.78  )-----------( 281      ( 27 Dec 2022 06:00 )             40.2     PTT - ( 27 Dec 2022 17:20 )  PTT:80.9 sec        I&O's Summary    27 Dec 2022 07:01  -  27 Dec 2022 18:26  --------------------------------------------------------  IN: 0 mL / OUT: 0 mL / NET: 0 mL          Echo (12/21):   1. Technically difficult study.   2. Normal left ventricular internal cavity size.   3. Severely decreased global left ventricular systolic function.   4. Left ventricular ejection fraction, by visual estimation, is 30 to 35%.   5. Mildly reduced RV systolic function.   6. Severely enlarged left atrium.   7. Moderately enlarged right atrium.   8. Sclerotic aortic valve with normal opening.   9. Moderate thickening and calcification of the anterior and posterior mitral valve leaflets.  10. Moderate mitral annular calcification.  11. Mild mitral valve regurgitation.  12. Mild tricuspid regurgitation.  13. Estimated pulmonary artery systolic pressure is 36.3 mmHg assuming a right atrial pressure of 15 mmHg, which is consistent with borderline pulmonary hypertension.    Assessment:    1. Hypotension likely due to afib with RVR. IV fluid bolus ordered by primary team.   2. HFr EF with new CM slight improved on most recent Echo 25% ---> 35%  3. S/P Lung transplant on Immunosup rx with recent fungal sepsis  4. ESRD   5. CAD s/p PCI  6. S/P Intubation for resp failure with sepsis, Shock and HF.   7. Toxic Metabolic Encephalopathy, possibly toxic metabolic encephalopathy        Plan:  1. Change Metoprolol to 25 mg every 6 hrs, hold if SBP less than 95. Given rapid ventricular rates, will give Digoxin loading dose. Daily dosing to be evaluated tomorrow. Discussed with unit RN.   2. Monitor for fluid overload closely.   3. Continue telemetry.   4. Continue Heparin gtt.   5. No need to repeat echo for now.  No additional CV testing now.  6. Abx per med/ID.  7. 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 Faecalis bacteremia,  presenting with AMS.  Patient admitted and treated with empiric treatment for meningitis. 12/18 with acute hypercapnic respiratory failure in setting of aspiration pneumonia, emergently intubated, now extubated; also was in shock.   Worsening mental status over the last two days.  Noted now to be in AF with RVR and hypotension.       Subjective:   Obtunded    Tele:  Afib, RVR at times with ventricular rates around 130 bpm, occ PVC's      acetylcysteine IVPB 10 Gram(s) IV Intermittent once  acetylcysteine IVPB 3.2 Gram(s) IV Intermittent once  acetylcysteine IVPB 6 Gram(s) IV Intermittent once  acyclovir IVPB 320 milliGRAM(s) IV Intermittent every 24 hours  acyclovir IVPB      ampicillin  IVPB 2 Gram(s) IV Intermittent every 12 hours  atorvastatin 80 milliGRAM(s) Oral at bedtime  cefTRIAXone Injectable. 2000 milliGRAM(s) IV Push every 12 hours  chlorhexidine 2% Cloths 1 Application(s) Topical <User Schedule>  dextrose 5% + sodium chloride 0.45%. 1000 milliLiter(s) IV Continuous <Continuous>  dextrose 50% Injectable 25 Gram(s) IV Push once  dextrose 50% Injectable 12.5 Gram(s) IV Push once  dextrose 50% Injectable 25 Gram(s) IV Push once  dextrose Oral Gel 15 Gram(s) Oral once  fluconAZOLE   Tablet 200 milliGRAM(s) Oral <User Schedule>  heparin  Infusion.  Unit(s)/Hr IV Continuous <Continuous>  hydrocortisone 10 milliGRAM(s) Oral every 12 hours  lactulose Syrup 10 Gram(s) Oral every 6 hours  metoprolol tartrate 50 milliGRAM(s) Oral two times a day  pantoprazole  Injectable 40 milliGRAM(s) IV Push daily  sevelamer carbonate 800 milliGRAM(s) Oral three times a day with meals  sodium chloride 0.9% Bolus 250 milliLiter(s) IV Bolus once  tacrolimus 1.5 milliGRAM(s) Oral daily  tamsulosin 0.4 milliGRAM(s) Oral at bedtime  trimethoprim   80 mG/sulfamethoxazole 400 mG 1 Tablet(s) Oral <User Schedule>          Physical Exam:  T(C): 36.7 (12-27-22 @ 18:10), Max: 37 (12-27-22 @ 16:00)  HR: 118 (12-27-22 @ 18:10) (83 - 127)  BP: 93/57 (12-27-22 @ 18:10) (93/57 - 159/85)  RR: 19 (12-27-22 @ 18:10) (18 - 20)  SpO2: 100% (12-27-22 @ 18:10) (95% - 100%)  Wt(kg): --  General: Comfortable in NAD  Neck: No JVD  CVS: nl s1s2, no s3  Pulm: bilateral crackles at bases  Abd: soft, non-tender  Ext: No c/c/e  Neuro: sleeping, obtunded        Labs:   27 Dec 2022 06:00    144    |  101    |  41.6   ----------------------------<  76     3.9     |  22.0   |  7.78     Ca    9.0        27 Dec 2022 06:00  Phos  6.1       27 Dec 2022 06:00  Mg     2.1       27 Dec 2022 06:00                            12.8   8.78  )-----------( 281      ( 27 Dec 2022 06:00 )             40.2     PTT - ( 27 Dec 2022 17:20 )  PTT:80.9 sec        I&O's Summary    27 Dec 2022 07:01  -  27 Dec 2022 18:26  --------------------------------------------------------  IN: 0 mL / OUT: 0 mL / NET: 0 mL          Echo (12/21):   1. Technically difficult study.   2. Normal left ventricular internal cavity size.   3. Severely decreased global left ventricular systolic function.   4. Left ventricular ejection fraction, by visual estimation, is 30 to 35%.   5. Mildly reduced RV systolic function.   6. Severely enlarged left atrium.   7. Moderately enlarged right atrium.   8. Sclerotic aortic valve with normal opening.   9. Moderate thickening and calcification of the anterior and posterior mitral valve leaflets.  10. Moderate mitral annular calcification.  11. Mild mitral valve regurgitation.  12. Mild tricuspid regurgitation.  13. Estimated pulmonary artery systolic pressure is 36.3 mmHg assuming a right atrial pressure of 15 mmHg, which is consistent with borderline pulmonary hypertension.    Assessment:    1. Hypotension likely due to afib with RVR. IV fluid bolus ordered by primary team.   2. HFr EF with new CM slight improved on most recent Echo 25% ---> 35%  3. S/P Lung transplant on Immunosup rx with recent fungal sepsis  4. ESRD   5. CAD s/p PCI  6. S/P Intubation for resp failure with sepsis, Shock and HF.   7. Toxic Metabolic Encephalopathy, possibly toxic metabolic encephalopathy        Plan:  1. Change Metoprolol to 25 mg every 6 hrs, hold if SBP less than 95.        Given rapid ventricular rates, will give Digoxin loading dose 1 mg       Daily dosing to be evaluated tomorrow. Discussed with unit RN.   2. Monitor for fluid overload closely.   3. Continue telemetry.   4. Continue Heparin gtt.   5. No need to repeat echo for now.  No additional CV testing now.  6. Abx per med/ID.  7. No indication for further cardiac testing currently    Attending Note:  Pt evaluated by me today  Case reviewed and discussed in detail with Nita Lopez NP  Agree with all above

## 2022-12-27 NOTE — PROGRESS NOTE ADULT - ASSESSMENT
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 CT head is negative. Hospital course is notable for empiric treatment for meningitis; EBV positive in CSF; also with dilated common bile duct; acutely decompensated on 12/18 with acute hypercapnic respiratory failure in setting of aspiration pneumonia, emergently intubated, later extubated; also shock (resolved). Nephrology is managing ESRD on HD.    -Access: L AV access   -Outpatient dialysis days are Tuesdays Thursdays Saturdays  -Seen on HD.  Qd, Qb, UF rate reviewed.  Vitals stable.  Access working well.  Patient tolerating HD well.  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

## 2022-12-27 NOTE — PROCEDURE NOTE - NSAPPROACH_GEN_A_CORE
via natural/artificial opening
via natural/artificial opening endoscopic
via natural/artificial opening

## 2022-12-27 NOTE — PROGRESS NOTE ADULT - ASSESSMENT
72y Male with multiple medical issues now with encephalopathy.     Encephalopathy.   Mental status waxing and waning.   Likely toxic metabolic secondary to multiple medical issues.   ?Hepatic component.   EEG findings strongly suggestive of toxic metabolic etiology.  Will continue another day if patient tolerates.    Rule out meningitis.   CSF results as above  Antibiotic coverage for now per ID.   crypto Ag (+) but PCR (-)  EBV detected by PCR in CSF, low level, unclear significance.    Case discussed with Dr Ko.

## 2022-12-27 NOTE — PROGRESS NOTE ADULT - SUBJECTIVE AND OBJECTIVE BOX
[facility] Division of Hospital Medicine      SUBJECTIVE / OVERNIGHT EVENTS:  Patient remains encephalopathic on evaluation this morning   Does not open eyes spontaneously   EEG in process  Unable to perform ROS    MEDICATIONS  (STANDING):  acetylcysteine IVPB 10 Gram(s) IV Intermittent once  acetylcysteine IVPB 3.2 Gram(s) IV Intermittent once  acetylcysteine IVPB 6 Gram(s) IV Intermittent once  acyclovir IVPB      acyclovir IVPB 320 milliGRAM(s) IV Intermittent every 24 hours  ampicillin  IVPB 2 Gram(s) IV Intermittent every 12 hours  atorvastatin 80 milliGRAM(s) Oral at bedtime  cefTRIAXone Injectable. 2000 milliGRAM(s) IV Push every 12 hours  chlorhexidine 2% Cloths 1 Application(s) Topical <User Schedule>  dextrose 5% + sodium chloride 0.45%. 1000 milliLiter(s) (40 mL/Hr) IV Continuous <Continuous>  dextrose 50% Injectable 25 Gram(s) IV Push once  dextrose 50% Injectable 12.5 Gram(s) IV Push once  dextrose 50% Injectable 25 Gram(s) IV Push once  dextrose Oral Gel 15 Gram(s) Oral once  fluconAZOLE   Tablet 200 milliGRAM(s) Oral <User Schedule>  heparin  Infusion.  Unit(s)/Hr (11 mL/Hr) IV Continuous <Continuous>  hydrocortisone 10 milliGRAM(s) Oral every 12 hours  lactulose Retention Enema 200 Gram(s) Rectal every 6 hours  lactulose Syrup 10 Gram(s) Oral every 6 hours  metoprolol tartrate Injectable 5 milliGRAM(s) IV Push every 6 hours  pantoprazole  Injectable 40 milliGRAM(s) IV Push daily  sevelamer carbonate 800 milliGRAM(s) Oral three times a day with meals  tacrolimus 1.5 milliGRAM(s) Oral daily  tamsulosin 0.4 milliGRAM(s) Oral at bedtime  trimethoprim   80 mG/sulfamethoxazole 400 mG 1 Tablet(s) Oral <User Schedule>    MEDICATIONS  (PRN):        I&O's Summary      PHYSICAL EXAM:  Vital Signs Last 24 Hrs  T(C): 36.5 (27 Dec 2022 12:45), Max: 36.9 (27 Dec 2022 04:56)  T(F): 97.7 (27 Dec 2022 12:45), Max: 98.4 (27 Dec 2022 04:56)  HR: 83 (27 Dec 2022 12:45) (72 - 127)  BP: 150/68 (27 Dec 2022 12:45) (122/85 - 159/85)  BP(mean): --  RR: 18 (27 Dec 2022 04:56) (18 - 18)  SpO2: 96% (27 Dec 2022 04:56) (95% - 98%)    Parameters below as of 27 Dec 2022 04:56  Patient On (Oxygen Delivery Method): room air            CONSTITUTIONAL: NAD, chronically ill appearing, EEG leads on head.  ENMT: Moist oral mucosa, no pharyngeal injection or exudates; normal dentition  RESPIRATORY: Normal respiratory effort; lungs are clear to auscultation bilaterally  CARDIOVASCULAR: Regular rate and rhythm, normal S1 and S2, no murmur/rub/gallop; Peripheral pulses are 2+ bilaterally  ABDOMEN: Nontender to palpation, normoactive bowel sounds, no rebound/guarding;   MUSCLOSKELETAL:  No clubbing or cyanosis of digits; no joint swelling or tenderness to palpation  PSYCH: Not alert, not oriented.   NEUROLOGY: Does not open eyes spontaneously, Left shoulder and elbow flexion upon stimulation of left hand. Blinks to threat when eyes manually opened   SKIN: No rashes; no palpable lesions    LABS:                        12.8   8.78  )-----------( 281      ( 27 Dec 2022 06:00 )             40.2     12-27    144  |  101  |  41.6<H>  ----------------------------<  76  3.9   |  22.0  |  7.78<H>    Ca    9.0      27 Dec 2022 06:00  Phos  6.1     12-27  Mg     2.1     12-27      PTT - ( 27 Dec 2022 08:29 )  PTT:136.0 sec          CAPILLARY BLOOD GLUCOSE      POCT Blood Glucose.: 76 mg/dL (27 Dec 2022 05:33)        RADIOLOGY & ADDITIONAL TESTS:  Results Reviewed:   Imaging Personally Reviewed:  Electrocardiogram Personally Reviewed:

## 2022-12-27 NOTE — PROGRESS NOTE ADULT - ASSESSMENT
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 faecalis bacteremia, admitted on 12/13 with altered mental status of unclear etiology who subseqently developed acute hypoxic respiratory failure secondary to aspiration pneumonia and septic shock. Now downgraded to medicine for further management.    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 12/26 regressed to initial mental status from presentation   Patient is receiving CTZ/Amp through 12/26, Acyclovir through 12/28 per ID  On admission patient was started on lactulose empirically for possible component of hepatic encephalopathy (Normal Ammonia level does not rule out)   Chart review reveals that patient's last dose of lactulose was 12/19 evening.   Hepatic encephalopathy will be reconsidered as possible etiology given a correlation of improved mental status during hospital course and regression after cessation   Was restarted on lactulose 12/25 but only received 1 dose. Will restart lactulose for possible hepatic enceph component   Neuro to perform EEG to investigate for possible seizures  Per Patient's Wife - patient was taking daily high doses of tylenol for many months prior to presentation. This may have lead to potential GSH deficiency which may be an etiology of metabolic encephalopathy, liver cirrhosis, iron balance.   Check GSH level  Will empirically administer NAC after HD today. Potential benefits of NAC are high and with little risk.   Will also place NGT and start oral lactulose      #Previous Bacteremia  Patient w/ hx of e. faecalis bacteremia  Will complete ABX on 12/26  Will restart Cellcept on 12/29 after completion of Acyclovir    #Lung transplant  On immunosuppression therapy  Continue hydrocortisone and Tacrolimus  As per ID who spoke to transplant center.  Plan to hold Cellcept until treatment for bacteria resolves 12/26 tentative date.    #ESRD on dialysis.   L AV access.    HD per renal  [facility]   Abx adjusted for renal functioning.    #Chronic atrial fibrillation.   Intermittent RVR   eliquis to heparin as patient cannot tolerate PO  metop succ 25 daily to tartrate 25mg tid  With episodes of bigeminy on tele 12/25  Cardiology consulted    #Chronic systolic congestive heart failure.   Metoprolol  Needs to follow up with cardiology outpatient  Patient needs outpatient stress/ischemic evaluation     #Dilated CBD  No current abdominal pain  Patient will need eventual MRCP to r/o biliary pathology but can be outpatient    #Hepatic cirrhosis  Noted on imaging  Currently not altered and was taken off of lactulose  MRCP revealed irond deposition of liver and spleen. Possible HC?  Heme consult for possible Iron Overload Syndrome    DVT prophylaxis: Eliquis to Heparin  DIET NPO pending improvement of mental status   NGT placed pending confirmatory CXR    Transplant surgeon Dr. Hung Lewis [phone] 12/22    Dispo: Remains acute. 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 faecalis bacteremia, admitted on 12/13 with altered mental status of unclear etiology who subseqently developed acute hypoxic respiratory failure secondary to aspiration pneumonia and septic shock. Now downgraded to medicine for further management.    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 12/26 regressed to initial mental status from presentation   Patient is receiving CTZ/Amp through 12/26, Acyclovir through 12/28 per ID  On admission patient was started on lactulose empirically for possible component of hepatic encephalopathy (Normal Ammonia level does not rule out)   Chart review reveals that patient's last dose of lactulose was 12/19 evening.   Hepatic encephalopathy will be reconsidered as possible etiology given a correlation of improved mental status during hospital course and regression after cessation   Was restarted on lactulose 12/25 but only received 1 dose. Will restart lactulose for possible hepatic enceph component   Neuro to perform EEG to investigate for possible seizures  Per Patient's Wife - patient was taking daily high doses of tylenol for many months prior to presentation. This may have lead to potential GSH deficiency which may be an etiology of metabolic encephalopathy, liver cirrhosis, iron balance.   Check GSH level  Will empirically administer NAC after HD today. Potential benefits of NAC are high and with little risk.   Will also place NGT and start oral lactulose      #Previous Bacteremia  Patient w/ hx of e. faecalis bacteremia  Will complete ABX on 12/26  Will restart Cellcept on 12/29 after completion of Acyclovir    #Lung transplant  On immunosuppression therapy  Continue hydrocortisone and Tacrolimus  As per ID who spoke to transplant center.  Plan to hold Cellcept until treatment for bacteria resolves 12/26 tentative date.    #ESRD on dialysis.   L AV access.    HD per renal  [facility]   Abx adjusted for renal functioning.    #Chronic atrial fibrillation.   Intermittent RVR   eliquis to heparin as patient cannot tolerate PO  metop succ 25 daily to tartrate 25mg tid  With episodes of bigeminy on tele 12/25  Cardiology consulted    #Chronic systolic congestive heart failure.   Metoprolol  Needs to follow up with cardiology outpatient  Patient needs outpatient stress/ischemic evaluation     #Dilated CBD  No current abdominal pain  Patient will need eventual MRCP to r/o biliary pathology but can be outpatient    #Hepatic cirrhosis  Noted on imaging  Was taken off of lactulose while mentation was improved   MRCP revealed irond deposition of liver and spleen. Possible HC?  Heme consult for possible Iron Overload Syndrome    DVT prophylaxis: Eliquis to Heparin  DIET NPO pending improvement of mental status   NGT placed pending confirmatory CXR    Transplant surgeon Dr. Hung Lewis [phone] 12/22    Dispo: Remains acute. Completes Course of abx/anti-viral

## 2022-12-27 NOTE — EEG REPORT - NS EEG TEXT BOX
[facility]   COMPREHENSIVE EPILEPSY CENTER   REPORT OF LONG-TERM VIDEO EEG     [facility]: [address], [place] [address], Ph#: [phone]  [facility]: [address] [place] [place] [address], Ph#: [phone]  [facility]: [address] [place] [address], Ph#: [phone]    Patient Name: JU FERGUSON  Age and : 72y (46)  MRN #: [id number]  Location: [facility] 4211 01  Referring Physician: Gladis Ko    Start Time/Date: 1500 on 22  End Time/Date: 0800 on 22  Duration:  17 hr     _____________________________________________________________  STUDY INFORMATION    EEG Recording Technique:  The patient underwent continuous Video-EEG monitoring, using Telemetry System hardware on the XLTek Digital System. EEG and video data were stored on a computer hard drive with important events saved in digital archive files. The material was reviewed by a physician (electroencephalographer / epileptologist) on a daily basis. Brad and seizure detection algorithms were utilized and reviewed. An EEG Technician attended to the patient, and was available throughout daytime work hours.  The epilepsy center neurologist was available in person or on call 24-hours per day.    EEG Placement and Labeling of Electrodes:  The EEG was performed utilizing 20 channel referential EEG connections (coronal over temporal over parasagittal montage) using all standard 10-20 electrode placements with EKG, with additional electrodes placed in the inferior temporal region using the modified 10-10 montage electrode placements for elective admissions, or if deemed necessary. Recording was at a sampling rate of 256 samples per second per channel. Time synchronized digital video recording was done simultaneously with EEG recording. A low light infrared camera was used for low light recording.     _____________________________________________________________  HISTORY    Patient is a 72y old  Male who presents with a chief complaint of Encephalopathy (21 Dec 2022 11:30)      PERTINENT MEDICATION:  MEDICATIONS  (STANDING):  acyclovir IVPB 320 milliGRAM(s) IV Intermittent every 24 hours  acyclovir IVPB      ampicillin  IVPB 2 Gram(s) IV Intermittent every 12 hours  atorvastatin 80 milliGRAM(s) Oral at bedtime  fluconAZOLE   Tablet 200 milliGRAM(s) Oral <User Schedule>  heparin  Infusion.  Unit(s)/Hr (11 mL/Hr) IV Continuous <Continuous>  hydrocortisone 10 milliGRAM(s) Oral every 12 hours  lactulose Retention Enema 200 Gram(s) Rectal every 6 hours  metoprolol tartrate 50 milliGRAM(s) Oral two times a day  pantoprazole    Tablet 40 milliGRAM(s) Oral before breakfast  sevelamer carbonate 800 milliGRAM(s) Oral three times a day with meals  tacrolimus 1.5 milliGRAM(s) Oral daily  tamsulosin 0.4 milliGRAM(s) Oral at bedtime  trimethoprim   80 mG/sulfamethoxazole 400 mG 1 Tablet(s) Oral <User Schedule>    _____________________________________________________________  STUDY INTERPRETATION    Findings: The background was reactive and continuous there were periods of brief attenuation during clinically silent state (<%1). During wakefulness, no PDR seen.    Background Slowing:  Diffuse theta and polymorphic delta slowing.    Focal Slowing:   None were present.    Sleep Background:  Drowsiness was characterized by fragmentation, attenuation, and slowing of the background activity.    Stage II sleep transients were not recorded.    Other Non-Epileptiform Findings:    Abundant, long, generalized periodic discharges with triphasic morphology <0.5-1 Hz, wakefulness state.     Interictal Epileptiform Activity:   None were present.    Events:  Multiple push button events, for let & right arm myoclonus, head clonus, left shoulder clonus,  bilateral lower extremity clonus, behavioral events (trashing / screaming), were not associated with an ictal electrographic pattern. Typically seen with arousal of the patient during bedside manipulations     Activation Procedures:   Hyperventilation was not performed.    Photic stimulation was not performed.     Artifacts:  Intermittent myogenic and movement artifacts were noted.    ECG:  The heart rate on single channel ECG was irregularly irregular predominantly between  BPM.    _____________________________________________________________  EEG SUMMARY/CLASSIFICATION    Abnormal EEG in an altered patient.  - Abundant, long, generalized periodic discharges with triphasic morphology <0.5-1 Hz, wakefulness state.   - Moderate generalized slowing.  - Multiple push button events for myoclonic movements effecting different body parts are without associated electrographic abnormalities.      _____________________________________________________________  EEG IMPRESSION/CLINICAL CORRELATE    Abnormal EEG study.    1. GPDs with triphasic morphology commonly seen in toxic- metabolic encephalopathy, rarely epileptic   2. Multiple push button events, for arm myoclonus, head clonus, left shoulder clonus, bilateral lower extremity clonus, behavioral events (trashing / screaming), are non-epileptic in origin.   3. Moderate nonspecific diffuse or multifocal cerebral dysfunction.   4. No seizure seen.    **In absence of additional clinical concerns, recommend consideration for discontinuation of current EEG study with reconnection in future if warranted.    _____________________________________________________________      Shagufta May MD   Epilepsy Fellow, [facility]	      This is a preliminary read    ------------------------------------  EEG Reading Room: [phone]  On Call Service After Hours: [phone] [facility]   COMPREHENSIVE EPILEPSY CENTER   REPORT OF LONG-TERM VIDEO EEG     [facility]: [address], [place] [address], Ph#: [phone]  [facility]: [address] [place] [address], Ph#: [phone]  [facility]: [address] [place] [address], Ph#: [phone]    Patient Name: JU FERGUSON  Age and : 72y (46)  MRN #: [id number]  Location: [facility] 4211 01  Referring Physician: Gladis Ko    Start Time/Date: 1500 on 22  End Time/Date: 0800 on 22  Duration:  17 hr     _____________________________________________________________  STUDY INFORMATION    EEG Recording Technique:  The patient underwent continuous Video-EEG monitoring, using Telemetry System hardware on the XLTek Digital System. EEG and video data were stored on a computer hard drive with important events saved in digital archive files. The material was reviewed by a physician (electroencephalographer / epileptologist) on a daily basis. Brad and seizure detection algorithms were utilized and reviewed. An EEG Technician attended to the patient, and was available throughout daytime work hours.  The epilepsy center neurologist was available in person or on call 24-hours per day.    EEG Placement and Labeling of Electrodes:  The EEG was performed utilizing 20 channel referential EEG connections (coronal over temporal over parasagittal montage) using all standard 10-20 electrode placements with EKG, with additional electrodes placed in the inferior temporal region using the modified 10-10 montage electrode placements for elective admissions, or if deemed necessary. Recording was at a sampling rate of 256 samples per second per channel. Time synchronized digital video recording was done simultaneously with EEG recording. A low light infrared camera was used for low light recording.     _____________________________________________________________  HISTORY    Patient is a 72y old  Male who presents with a chief complaint of Encephalopathy (21 Dec 2022 11:30)      PERTINENT MEDICATION:  MEDICATIONS  (STANDING):  acyclovir IVPB 320 milliGRAM(s) IV Intermittent every 24 hours  acyclovir IVPB      ampicillin  IVPB 2 Gram(s) IV Intermittent every 12 hours  atorvastatin 80 milliGRAM(s) Oral at bedtime  fluconAZOLE   Tablet 200 milliGRAM(s) Oral <User Schedule>  heparin  Infusion.  Unit(s)/Hr (11 mL/Hr) IV Continuous <Continuous>  hydrocortisone 10 milliGRAM(s) Oral every 12 hours  lactulose Retention Enema 200 Gram(s) Rectal every 6 hours  metoprolol tartrate 50 milliGRAM(s) Oral two times a day  pantoprazole    Tablet 40 milliGRAM(s) Oral before breakfast  sevelamer carbonate 800 milliGRAM(s) Oral three times a day with meals  tacrolimus 1.5 milliGRAM(s) Oral daily  tamsulosin 0.4 milliGRAM(s) Oral at bedtime  trimethoprim   80 mG/sulfamethoxazole 400 mG 1 Tablet(s) Oral <User Schedule>    _____________________________________________________________  STUDY INTERPRETATION    Findings: The background was reactive and continuous there were periods of brief attenuation during clinically silent state (<%1). During wakefulness, no PDR seen.    Background Slowing:  Diffuse theta and polymorphic delta slowing.    Focal Slowing:   None were present.    Sleep Background:  Drowsiness was characterized by fragmentation, attenuation, and slowing of the background activity.    Stage II sleep transients were not recorded.    Other Non-Epileptiform Findings:    Abundant, long, generalized periodic discharges with triphasic morphology <0.5-1 Hz, wakefulness state.     Interictal Epileptiform Activity:   None were present.    Events:  Multiple push button events, for let & right arm myoclonus, head clonus, left shoulder clonus,  bilateral lower extremity clonus, behavioral events (trashing / screaming), were not associated with an ictal electrographic pattern. Typically seen with arousal of the patient during bedside manipulations     Activation Procedures:   Hyperventilation was not performed.    Photic stimulation was not performed.     Artifacts:  Intermittent myogenic and movement artifacts were noted.    ECG:  The heart rate on single channel ECG was irregularly irregular predominantly between  BPM.    _____________________________________________________________  EEG SUMMARY/CLASSIFICATION    Abnormal EEG in an altered patient.  - Abundant, long, generalized periodic discharges with triphasic morphology <0.5-1 Hz, wakefulness state.   - Moderate generalized slowing.  - Multiple push button events for myoclonic movements effecting different body parts are without associated electrographic abnormalities.      _____________________________________________________________  EEG IMPRESSION/CLINICAL CORRELATE    Abnormal EEG study.    1. GPDs with triphasic morphology commonly seen in toxic- metabolic encephalopathy, rarely epileptic   2. Multiple push button events, for arm myoclonus, head clonus, left shoulder clonus, bilateral lower extremity clonus, behavioral events (trashing / screaming), are non-epileptic in origin.   3. Moderate nonspecific diffuse or multifocal cerebral dysfunction.   4. No seizure seen.    **In absence of additional clinical concerns, recommend consideration for discontinuation of current EEG study with reconnection in future if warranted.    _____________________________________________________________      Shagufta May MD   Epilepsy Fellow, [facility]	    Johnny Carlin MD  EEG/Epilepsy Attending     ------------------------------------  EEG Reading Room: [phone]  On Call Service After Hours: [phone] [facility]   COMPREHENSIVE EPILEPSY CENTER   REPORT OF LONG-TERM VIDEO EEG     [facility]: [address], [place] [address], Ph#: [phone]  [facility]: [address] [place] [address], Ph#: [phone]  [facility]: [address] [place] [address], Ph#: [phone]    Patient Name: JU FERGUSON  Age and : 72y (46)  MRN #: [id number]  Location: [address] 01  Referring Physician: Gladis Ko    Start Time/Date: 1500 on 22  End Time/Date: 1230 on 22  Duration:  21 hr 30min    _____________________________________________________________  STUDY INFORMATION    EEG Recording Technique:  The patient underwent continuous Video-EEG monitoring, using Telemetry System hardware on the XLTek Digital System. EEG and video data were stored on a computer hard drive with important events saved in digital archive files. The material was reviewed by a physician (electroencephalographer / epileptologist) on a daily basis. Brad and seizure detection algorithms were utilized and reviewed. An EEG Technician attended to the patient, and was available throughout daytime work hours.  The epilepsy center neurologist was available in person or on call 24-hours per day.    EEG Placement and Labeling of Electrodes:  The EEG was performed utilizing 20 channel referential EEG connections (coronal over temporal over parasagittal montage) using all standard 10-20 electrode placements with EKG, with additional electrodes placed in the inferior temporal region using the modified 10-10 montage electrode placements for elective admissions, or if deemed necessary. Recording was at a sampling rate of 256 samples per second per channel. Time synchronized digital video recording was done simultaneously with EEG recording. A low light infrared camera was used for low light recording.     _____________________________________________________________  HISTORY    Patient is a 72y old  Male who presents with a chief complaint of Encephalopathy (21 Dec 2022 11:30)      PERTINENT MEDICATION:  MEDICATIONS  (STANDING):  acyclovir IVPB 320 milliGRAM(s) IV Intermittent every 24 hours  acyclovir IVPB      ampicillin  IVPB 2 Gram(s) IV Intermittent every 12 hours  atorvastatin 80 milliGRAM(s) Oral at bedtime  fluconAZOLE   Tablet 200 milliGRAM(s) Oral <User Schedule>  heparin  Infusion.  Unit(s)/Hr (11 mL/Hr) IV Continuous <Continuous>  hydrocortisone 10 milliGRAM(s) Oral every 12 hours  lactulose Retention Enema 200 Gram(s) Rectal every 6 hours  metoprolol tartrate 50 milliGRAM(s) Oral two times a day  pantoprazole    Tablet 40 milliGRAM(s) Oral before breakfast  sevelamer carbonate 800 milliGRAM(s) Oral three times a day with meals  tacrolimus 1.5 milliGRAM(s) Oral daily  tamsulosin 0.4 milliGRAM(s) Oral at bedtime  trimethoprim   80 mG/sulfamethoxazole 400 mG 1 Tablet(s) Oral <User Schedule>    _____________________________________________________________  STUDY INTERPRETATION    Findings: The background was reactive and continuous there were periods of brief attenuation during clinically silent state (<%1). During wakefulness, no PDR seen.    Background Slowing:  Diffuse theta and polymorphic delta slowing.    Focal Slowing:   None were present.    Sleep Background:  Drowsiness was characterized by fragmentation, attenuation, and slowing of the background activity.    Stage II sleep transients were not recorded.    Other Non-Epileptiform Findings:    Abundant, long, generalized periodic discharges with triphasic morphology <0.5-1 Hz, wakefulness state.     Interictal Epileptiform Activity:   None were present.    Events:  Multiple push button events, for let & right arm myoclonus, head clonus, left shoulder clonus,  bilateral lower extremity clonus, behavioral events (trashing / screaming), were not associated with an ictal electrographic pattern. Typically seen with arousal of the patient during bedside manipulations     Activation Procedures:   Hyperventilation was not performed.    Photic stimulation was not performed.     Artifacts:  Intermittent myogenic and movement artifacts were noted.    ECG:  The heart rate on single channel ECG was irregularly irregular predominantly between  BPM.    _____________________________________________________________  EEG SUMMARY/CLASSIFICATION    Abnormal EEG in an altered patient.  - Abundant, long, generalized periodic discharges with triphasic morphology <0.5-1 Hz, wakefulness state.   - Moderate generalized slowing.  - Multiple push button events for myoclonic movements effecting different body parts are without associated electrographic abnormalities.      _____________________________________________________________  EEG IMPRESSION/CLINICAL CORRELATE    Abnormal EEG study.    1. GPDs with triphasic morphology commonly seen in toxic- metabolic encephalopathy, rarely epileptic   2. Multiple push button events, for arm myoclonus, head clonus, left shoulder clonus, bilateral lower extremity clonus, behavioral events (trashing / screaming), are non-epileptic in origin.   3. Moderate nonspecific diffuse or multifocal cerebral dysfunction.   4. No seizure seen.    **In absence of additional clinical concerns, recommend consideration for discontinuation of current EEG study with reconnection in future if warranted.    _____________________________________________________________      Shagufta May MD   Epilepsy Fellow, [facility]	    Johnny Carlin MD  EEG/Epilepsy Attending     ------------------------------------  EEG Reading Room: [phone]  On Call Service After Hours: [phone]

## 2022-12-27 NOTE — PROGRESS NOTE ADULT - SUBJECTIVE AND OBJECTIVE BOX
** late note entry  Seen on HD.    Physical Exam  General: WDWN male lying in bed  Cardiac: S1S2 RRR  Respiratory: CTAB  Abdomen: Soft, NT  Extremities: No appreciable edema  Dressing around face  =======================================================  T(C): 36.7 (12-27-22 @ 18:10), Max: 37 (12-27-22 @ 16:00)  HR: 118 (12-27-22 @ 18:10) (83 - 127)  BP: 93/57 (12-27-22 @ 18:10) (93/57 - 159/85)  RR: 19 (12-27-22 @ 18:10) (18 - 20)  SpO2: 100% (12-27-22 @ 18:10) (95% - 100%)    =======================================================  Current Antibiotics:  acyclovir IVPB      acyclovir IVPB 320 milliGRAM(s) IV Intermittent every 24 hours  ampicillin  IVPB 2 Gram(s) IV Intermittent every 12 hours  cefTRIAXone Injectable. 2000 milliGRAM(s) IV Push every 12 hours  fluconAZOLE   Tablet 200 milliGRAM(s) Oral <User Schedule>  trimethoprim   80 mG/sulfamethoxazole 400 mG 1 Tablet(s) Oral <User Schedule>    Other medications:  apixaban 2.5 milliGRAM(s) Oral every 12 hours  chlorhexidine 2% Cloths 1 Application(s) Topical <User Schedule>  dextrose 5% + sodium chloride 0.45%. 1000 milliLiter(s) IV Continuous <Continuous>  dextrose 50% Injectable 25 Gram(s) IV Push once  dextrose 50% Injectable 12.5 Gram(s) IV Push once  dextrose 50% Injectable 25 Gram(s) IV Push once  dextrose Oral Gel 15 Gram(s) Oral once  hydrocortisone 10 milliGRAM(s) Oral every 12 hours  lactulose Syrup 10 Gram(s) Oral every 6 hours  metoprolol tartrate 25 milliGRAM(s) Enteral Tube four times a day  pantoprazole  Injectable 40 milliGRAM(s) IV Push daily  sevelamer carbonate 800 milliGRAM(s) Oral three times a day with meals  tacrolimus 1.5 milliGRAM(s) Oral daily  tamsulosin 0.4 milliGRAM(s) Oral at bedtime    =======================================================  12-27    144  |  101  |  41.6  ----------------------------<  76  3.9   |  22  |  7.78<H>    Ca    9  Phos  6.1  Mg     2.1    Creatinine, Serum: 7.78 mg/dL (12-27-22 @ 06:00)  Creatinine, Serum: 6.39 mg/dL (12-26-22 @ 10:45)  Creatinine, Serum: 5.35 mg/dL (12-25-22 @ 03:40)  Creatinine, Serum: 4.04 mg/dL (12-24-22 @ 05:00)      =======================================================

## 2022-12-27 NOTE — PROCEDURE NOTE - NSPOSTPRCRAD_GEN_A_CORE
The catheter was inserted into the  middle left anterior tibial artery. 
chest radiograph
post-procedure radiography performed
post-procedure radiography performed

## 2022-12-27 NOTE — PROGRESS NOTE ADULT - SUBJECTIVE AND OBJECTIVE BOX
[facility]                                        Neurology at [place]                                 Jose Antonio Brooks, & Riley                                  [address]                                        [place], [place], [address]                                             [phone]        CC: Encephalopathy    HPI:   The patient is a 72y Male with multiple medical issues who was recently hospitalized with hypoxic respiratory failure.  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   He was ultimately discharged 11/23/22.   He now presented with altered mental status and lethargy.   Neurology asked to evaluation for meningitis. LP attempted (neuro JW) and was unsuccessful while in ER.    The patient had been gradually improving with regard to mental status, but has deteriorated again over the past few days.     Interim history:  Remains on [address] [place].    ROS:   Unobtainable due to patient's condition.     MEDICATIONS  (STANDING):  acyclovir IVPB      acyclovir IVPB 320 milliGRAM(s) IV Intermittent every 24 hours  ampicillin  IVPB 2 Gram(s) IV Intermittent every 12 hours  atorvastatin 80 milliGRAM(s) Oral at bedtime  cefTRIAXone Injectable. 2000 milliGRAM(s) IV Push every 12 hours  chlorhexidine 2% Cloths 1 Application(s) Topical <User Schedule>  dextrose 5% + sodium chloride 0.45%. 1000 milliLiter(s) (40 mL/Hr) IV Continuous <Continuous>  dextrose Oral Gel 15 Gram(s) Oral once  fluconAZOLE   Tablet 200 milliGRAM(s) Oral <User Schedule>  heparin  Infusion.  Unit(s)/Hr (11 mL/Hr) IV Continuous <Continuous>  hydrocortisone 10 milliGRAM(s) Oral every 12 hours  lactulose Retention Enema 200 Gram(s) Rectal every 6 hours  metoprolol tartrate Injectable 5 milliGRAM(s) IV Push once  metoprolol tartrate Injectable 5 milliGRAM(s) IV Push every 6 hours  pantoprazole    Tablet 40 milliGRAM(s) Oral before breakfast  sevelamer carbonate 800 milliGRAM(s) Oral three times a day with meals  tacrolimus 1.5 milliGRAM(s) Oral daily  tamsulosin 0.4 milliGRAM(s) Oral at bedtime  trimethoprim   80 mG/sulfamethoxazole 400 mG 1 Tablet(s) Oral <User Schedule>    Vital Signs Last 24 Hrs  T(C): 36.9 (27 Dec 2022 04:56), Max: 36.9 (27 Dec 2022 04:56)  T(F): 98.4 (27 Dec 2022 04:56), Max: 98.4 (27 Dec 2022 04:56)  HR: 112 (27 Dec 2022 04:56) (72 - 112)  BP: 154/81 (27 Dec 2022 04:56) (122/85 - 154/81)  RR: 18 (27 Dec 2022 04:56) (18 - 18)  SpO2: 96% (27 Dec 2022 04:56) (94% - 98%)    Parameters below as of 27 Dec 2022 04:56  Patient On (Oxygen Delivery Method): room air    Detailed Neurologic Exam:    Mental status: Lethargic. Opens eyes briefly to noxious stimuli. Not following any instructions.    Cranial nerves: There is no papilledema. Pupils react symmetrically to light. He blinks to threat bilaterally.  Not tracking with gaze today. Face is grossly symmetric. Palate and tongue cannot be assessed.     Motor/Sensory: There is normal bulk and tone.  Symmetric (although minimal) limb movement to stimuli.     Reflexes: 1+ throughout and plantar responses are flexor.    Cerebellar: Cannot be tested.     Labs:     12-27    144  |  101  |  41.6<H>  ----------------------------<  76  3.9   |  22.0  |  7.78<H>    Ca    9.0      27 Dec 2022 06:00  Phos  6.1     12-27  Mg     2.1     12-27                              12.8   8.78  )-----------( 281      ( 27 Dec 2022 06:00 )             40.2       Rad:   Brain MRI: There is no acute pathology.   There is some chronic ischemic change.     EEG shows:  Abundant, long, generalized periodic discharges with triphasic morphology <0.5-1 Hz, wakefulness state.   Moderate generalized slowing.  Multiple push button events for myoclonic movements effecting different body parts are without associated electrographic abnormalities.

## 2022-12-28 NOTE — CHART NOTE - NSCHARTNOTEFT_GEN_A_CORE
Source: Patient [ ]  Family [ ]   other [x ]    Current Diet: Diet, NPO with Tube Feed:   Tube Feeding Modality: Nasogastric  Jevity 1.5 Guy (JEVITY1.5RTH)  Total Volume for 24 Hours (mL): 1200  Continuous  Starting Tube Feed Rate {mL per Hour}: 10  Increase Tube Feed Rate by (mL): 10     Every 2 hours  Until Goal Tube Feed Rate (mL per Hour): 50  Tube Feed Duration (in Hours): 24  Tube Feed Start Time: 05:00  Tube Feed Stop Time: 02:00  Bolus   Total Volume per Flush (mL): 50   Frequency: Every 4 Hours (12-27-22 @ 18:23)    Enteral Nutrition: Jevity 1.5 @ 50ml/hr x 20 hrs daily provides 1000ml, 1500 kcal, 64g protein     Current Weight:   (12/28) 107.1 lbs- appears inaccurate  (12/20) 138.8 lbs  (12/18) 137.7 lbs  (12/16) 138.2 lbs  (12/14) 138 lbs    % Weight Change - will continue to monitor weight trends for accuracy.  No edema noted.     Pertinent Medications: MEDICATIONS  (STANDING):  acyclovir IVPB      acyclovir IVPB 320 milliGRAM(s) IV Intermittent every 24 hours  ampicillin  IVPB 2 Gram(s) IV Intermittent every 12 hours  apixaban 2.5 milliGRAM(s) Oral every 12 hours  cefTRIAXone Injectable. 2000 milliGRAM(s) IV Push every 12 hours  chlorhexidine 2% Cloths 1 Application(s) Topical <User Schedule>  dextrose 5% + sodium chloride 0.45%. 1000 milliLiter(s) (40 mL/Hr) IV Continuous <Continuous>  dextrose 50% Injectable 25 Gram(s) IV Push once  dextrose 50% Injectable 12.5 Gram(s) IV Push once  dextrose 50% Injectable 25 Gram(s) IV Push once  dextrose Oral Gel 15 Gram(s) Oral once  fluconAZOLE   Tablet 200 milliGRAM(s) Oral <User Schedule>  hydrocortisone 10 milliGRAM(s) Oral every 12 hours  lactulose Syrup 10 Gram(s) Oral every 6 hours  metoprolol tartrate 25 milliGRAM(s) Enteral Tube four times a day  pantoprazole  Injectable 40 milliGRAM(s) IV Push daily  sevelamer carbonate 800 milliGRAM(s) Oral three times a day with meals  tacrolimus 1.5 milliGRAM(s) Oral daily  tamsulosin 0.4 milliGRAM(s) Oral at bedtime  trimethoprim   80 mG/sulfamethoxazole 400 mG 1 Tablet(s) Oral <User Schedule>    MEDICATIONS  (PRN):    Pertinent Labs: CBC Full  -  ( 28 Dec 2022 06:45 )  WBC Count : 8.84 K/uL  RBC Count : 3.37 M/uL  Hemoglobin : 10.4 g/dL  Hematocrit : 32.2 %  Platelet Count - Automated : 260 K/uL  Mean Cell Volume : 95.5 fl  Mean Cell Hemoglobin : 30.9 pg  Mean Cell Hemoglobin Concentration : 32.3 gm/dL  Auto Neutrophil # : 7.67 K/uL  Auto Lymphocyte # : 0.47 K/uL  Auto Monocyte # : 0.39 K/uL  Auto Eosinophil # : 0.08 K/uL  Auto Basophil # : 0.00 K/uL  Auto Neutrophil % : 86.8 %  Auto Lymphocyte % : 5.3 %  Auto Monocyte % : 4.4 %  Auto Eosinophil % : 0.9 %  Auto Basophil % : 0.0 %  12-28 Na141 mmol/L Glu 123 mg/dL<H> K+ 3.6 mmol/L Cr  4.86 mg/dL<H> BUN 18.4 mg/dL Phos 3.8 mg/dL Alb n/a   PAB n/a       Skin: no skin breakdown noted     Nutrition focused physical exam previously conducted - found signs of malnutrition [x ]absent [ ]present  -- unable to perform during current visit as pt receiving EEG  Subcutaneous fat loss: [ ] Orbital fat pads region, [ ]Buccal fat region, [ ]Triceps region,  [ ]Ribs region    Muscle wasting: [ ]Temples region, [ ]Clavicle region, [ ]Shoulder region, [ ]Scapula region, [ ]Interosseous region,  [ ]thigh region, [ ]Calf region    Current Nutrition Diagnosis: Pt remains at nutrition risk secondary to increased nutrient needs related to increased physiological demand for nutrients as evidenced by pt with ESRD on HD, recent lethargy and change of status.  Enteral feeds on hold at time of visit; EEG in progress.  Aware pt previously able to tolerate regular diet consistency per SLP recommendation, however became lethargic on (12/26), NPO ordered and enteral feeds initiated.  RD to remain available.     Recommendations:   1) Consider changing enteral feeds to Nepro @ 55 ml/hr (x20 hrs) to provide 1100 ml, 1980 kcal, 89g protein, 800 ml free water, and >100% of RDIs for vitamins/minerals. Additional free water per MD discretion.   2) RX: nephro-alok daily  3) Monitor wts and labs    Monitoring and Evaluation:   [ ] PO intake [x ] Tolerance to diet prescription [X] Weights  [X] Follow up per protocol [X] Labs:

## 2022-12-28 NOTE — PROGRESS NOTE ADULT - SUBJECTIVE AND OBJECTIVE BOX
[facility] Division of Hospital Medicine    Chief Complaint:    AMS    SUBJECTIVE / OVERNIGHT EVENTS:  Was started on NAC, Oral meds via NGT, Dig overnight  Clinical status predominantly unchanged  Still encephalopathic however now with intermittent verbal outbursts/ grunting   Unable to perform ROS    MEDICATIONS  (STANDING):  acyclovir IVPB      acyclovir IVPB 320 milliGRAM(s) IV Intermittent every 24 hours  ampicillin  IVPB 2 Gram(s) IV Intermittent every 12 hours  apixaban 2.5 milliGRAM(s) Oral every 12 hours  cefTRIAXone Injectable. 2000 milliGRAM(s) IV Push every 12 hours  chlorhexidine 2% Cloths 1 Application(s) Topical <User Schedule>  dextrose 5% + sodium chloride 0.45%. 1000 milliLiter(s) (40 mL/Hr) IV Continuous <Continuous>  dextrose 50% Injectable 25 Gram(s) IV Push once  dextrose 50% Injectable 12.5 Gram(s) IV Push once  dextrose 50% Injectable 25 Gram(s) IV Push once  dextrose Oral Gel 15 Gram(s) Oral once  fluconAZOLE   Tablet 200 milliGRAM(s) Oral <User Schedule>  hydrocortisone 10 milliGRAM(s) Oral every 12 hours  lactulose Syrup 10 Gram(s) Oral every 6 hours  metoprolol tartrate 25 milliGRAM(s) Enteral Tube four times a day  pantoprazole  Injectable 40 milliGRAM(s) IV Push daily  sevelamer carbonate 800 milliGRAM(s) Oral three times a day with meals  tacrolimus 1.5 milliGRAM(s) Oral daily  tamsulosin 0.4 milliGRAM(s) Oral at bedtime  trimethoprim   80 mG/sulfamethoxazole 400 mG 1 Tablet(s) Oral <User Schedule>    MEDICATIONS  (PRN):        I&O's Summary    27 Dec 2022 07:01  -  28 Dec 2022 07:00  --------------------------------------------------------  IN: 0 mL / OUT: 0 mL / NET: 0 mL        PHYSICAL EXAM:  Vital Signs Last 24 Hrs  T(C): 36.2 (28 Dec 2022 12:03), Max: 37 (27 Dec 2022 16:00)  T(F): 97.1 (28 Dec 2022 12:03), Max: 98.6 (27 Dec 2022 16:00)  HR: 100 (28 Dec 2022 12:03) (81 - 118)  BP: 134/100 (28 Dec 2022 12:03) (93/57 - 150/68)  BP(mean): --  RR: 18 (28 Dec 2022 12:03) (17 - 20)  SpO2: 98% (28 Dec 2022 12:03) (98% - 100%)    Parameters below as of 28 Dec 2022 12:03  Patient On (Oxygen Delivery Method): nasal cannula  O2 Flow (L/min): 3          CONSTITUTIONAL: NAD, elderly, ill appearing   ENMT: Moist oral mucosa, no pharyngeal injection or exudates; normal dentition, NGT in place  RESPIRATORY: Normal respiratory effort; lungs are clear to auscultation bilaterally  CARDIOVASCULAR: Regular rate and rhythm, normal S1 and S2, no murmur/rub/gallop; Peripheral pulses are 2+ bilaterally  ABDOMEN: Nontender to palpation, normoactive bowel sounds, no rebound/guarding;   MUSCLOSKELETAL:  No clubbing or cyanosis of digits; no joint swelling or tenderness to palpation  PSYCH: Not A or O  NEUROLOGY: Clonus of Left shoulder, Lower extremities, Cannot follow commands for full neuro exam   SKIN: No rashes; no palpable lesions    LABS:                        10.4   8.84  )-----------( 260      ( 28 Dec 2022 06:45 )             32.2     12-28    141  |  98  |  18.4  ----------------------------<  123<H>  3.6   |  25.0  |  4.86<H>    Ca    8.2<L>      28 Dec 2022 06:45  Phos  3.8     12-28  Mg     1.7     12-28      PTT - ( 28 Dec 2022 10:40 )  PTT:75.0 sec          CAPILLARY BLOOD GLUCOSE      POCT Blood Glucose.: 189 mg/dL (28 Dec 2022 12:12)  POCT Blood Glucose.: 113 mg/dL (28 Dec 2022 06:00)  POCT Blood Glucose.: 167 mg/dL (28 Dec 2022 00:32)  POCT Blood Glucose.: 71 mg/dL (27 Dec 2022 15:53)        RADIOLOGY & ADDITIONAL TESTS:  Results Reviewed:   Imaging Personally Reviewed:  Electrocardiogram Personally Reviewed:

## 2022-12-28 NOTE — PROGRESS NOTE ADULT - SUBJECTIVE AND OBJECTIVE BOX
JU HENSLEYJAROD  [id number]        Chief Complaint: follow up CMP      Subjective: obtunded      24 hour Tele:        acyclovir IVPB      acyclovir IVPB 320 milliGRAM(s) IV Intermittent every 24 hours  ampicillin  IVPB 2 Gram(s) IV Intermittent every 12 hours  apixaban 2.5 milliGRAM(s) Oral every 12 hours  atorvastatin 80 milliGRAM(s) Oral at bedtime  cefTRIAXone Injectable. 2000 milliGRAM(s) IV Push every 12 hours  chlorhexidine 2% Cloths 1 Application(s) Topical <User Schedule>  dextrose 5% + sodium chloride 0.45%. 1000 milliLiter(s) IV Continuous <Continuous>  dextrose 50% Injectable 25 Gram(s) IV Push once  dextrose 50% Injectable 12.5 Gram(s) IV Push once  dextrose 50% Injectable 25 Gram(s) IV Push once  dextrose Oral Gel 15 Gram(s) Oral once  digoxin  Injectable 250 MICROGram(s) IV Push every 8 hours  fluconAZOLE   Tablet 200 milliGRAM(s) Oral <User Schedule>  hydrocortisone 10 milliGRAM(s) Oral every 12 hours  lactulose Syrup 10 Gram(s) Oral every 6 hours  metoprolol tartrate 25 milliGRAM(s) Enteral Tube four times a day  pantoprazole  Injectable 40 milliGRAM(s) IV Push daily  sevelamer carbonate 800 milliGRAM(s) Oral three times a day with meals  tacrolimus 1.5 milliGRAM(s) Oral daily  tamsulosin 0.4 milliGRAM(s) Oral at bedtime  trimethoprim   80 mG/sulfamethoxazole 400 mG 1 Tablet(s) Oral <User Schedule>          Physical Exam:  T(C): 36.4 (12-28-22 @ 08:38), Max: 37 (12-27-22 @ 16:00)  HR: 99 (12-28-22 @ 08:38) (81 - 127)  BP: 120/60 (12-28-22 @ 08:38) (93/57 - 159/85)  RR: 18 (12-28-22 @ 08:38) (17 - 20)  SpO2: 98% (12-28-22 @ 08:38) (98% - 100%)  Wt(kg): --  General: Comfortable in NAD  HEENT: MMM, sclera anicteric  Resp: basilar crackles  CVS: nl s1s2, irregular, no s3/JVD  Abd: soft, NT, ND, BS+  Ext: No c/c/e  Neuro unresponsive    I&O's Summary    27 Dec 2022 07:01  -  28 Dec 2022 07:00  --------------------------------------------------------  IN: 0 mL / OUT: 0 mL / NET: 0 mL          Labs:   28 Dec 2022 06:45    141    |  98     |  18.4   ----------------------------<  123    3.6     |  25.0   |  4.86     Ca    8.2        28 Dec 2022 06:45  Phos  3.8       28 Dec 2022 06:45  Mg     1.7       28 Dec 2022 06:45                            10.4   8.84  )-----------( 260      ( 28 Dec 2022 06:45 )             32.2     PTT - ( 28 Dec 2022 02:15 )  PTT:152.6 sec            Echo (12/21/22):   1. Technically difficult study.   2. Normal left ventricular internal cavity size.   3. Severely decreased global left ventricular systolic function.   4. Left ventricular ejection fraction, by visual estimation, is 30 to 35%.   5. Mildly reduced RV systolic function.   6. Severely enlarged left atrium.   7. Moderately enlarged right atrium.   8. Sclerotic aortic valve with normal opening.   9. Moderate thickening and calcification of the anterior and posterior mitral valve leaflets.  10. Moderate mitral annular calcification.  11. Mild mitral valve regurgitation.  12. Mild tricuspid regurgitation.  13. Estimated pulmonary artery systolic pressure is 36.3 mmHg assuming a right atrial pressure of 15 mmHg, which is consistent with borderline pulmonary hypertension.    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 Faecalis bacteremia,  presenting with AMS.  Patient admitted and treated with empiric treatment for meningitis. 12/18 with acute hypercapnic respiratory failure in setting of aspiration pneumonia, emergently intubated, now extubated; also was in shock.   Worsening mental status over the last two days.  Noted now to be in AF with RVR and hypotension.     -Hypotension likely due to afib with RVR. IV fluid bolus ordered by primary team.   -Chronic HFr EF with new CM slight improved on most recent Echo 25% ---> 35%  -S/P Lung transplant on Immunosup rx with recent fungal sepsis  -ESRD   -CAD s/p PCI  -S/P Intubation for resp failure with sepsis, Shock and HF.   -Toxic Metabolic Encephalopathy, possibly toxic metabolic encephalopathy  , EEG done and no seizure activity      PLAN: (TO BE SEEN)   1. Rate control AF, a/c with heparin   2.NO further cardiac work up  3. Prognosis guarded, management per primary team    Aman Cali MD JU HENSLEYJAROD  [id number]        Chief Complaint: follow up CMP      Subjective: confused, not following commands or verbal      24 hour Tele: SR/ST, APCs        acyclovir IVPB      acyclovir IVPB 320 milliGRAM(s) IV Intermittent every 24 hours  ampicillin  IVPB 2 Gram(s) IV Intermittent every 12 hours  apixaban 2.5 milliGRAM(s) Oral every 12 hours  atorvastatin 80 milliGRAM(s) Oral at bedtime  cefTRIAXone Injectable. 2000 milliGRAM(s) IV Push every 12 hours  chlorhexidine 2% Cloths 1 Application(s) Topical <User Schedule>  dextrose 5% + sodium chloride 0.45%. 1000 milliLiter(s) IV Continuous <Continuous>  dextrose 50% Injectable 25 Gram(s) IV Push once  dextrose 50% Injectable 12.5 Gram(s) IV Push once  dextrose 50% Injectable 25 Gram(s) IV Push once  dextrose Oral Gel 15 Gram(s) Oral once  digoxin  Injectable 250 MICROGram(s) IV Push every 8 hours  fluconAZOLE   Tablet 200 milliGRAM(s) Oral <User Schedule>  hydrocortisone 10 milliGRAM(s) Oral every 12 hours  lactulose Syrup 10 Gram(s) Oral every 6 hours  metoprolol tartrate 25 milliGRAM(s) Enteral Tube four times a day  pantoprazole  Injectable 40 milliGRAM(s) IV Push daily  sevelamer carbonate 800 milliGRAM(s) Oral three times a day with meals  tacrolimus 1.5 milliGRAM(s) Oral daily  tamsulosin 0.4 milliGRAM(s) Oral at bedtime  trimethoprim   80 mG/sulfamethoxazole 400 mG 1 Tablet(s) Oral <User Schedule>          Physical Exam:  T(C): 36.4 (12-28-22 @ 08:38), Max: 37 (12-27-22 @ 16:00)  HR: 99 (12-28-22 @ 08:38) (81 - 127)  BP: 120/60 (12-28-22 @ 08:38) (93/57 - 159/85)  RR: 18 (12-28-22 @ 08:38) (17 - 20)  SpO2: 98% (12-28-22 @ 08:38) (98% - 100%)  Wt(kg): --  General: elderly M in NAD  HEENT: MMM, sclera anicteric  Resp: basilar crackles, poor effort  CVS: nl s1s2, tachy and regular, no s3/JVD  Abd: soft, NT, ND  Ext: No c/c/e  Neuro : does not follow commands, confused, grumbling    I&O's Summary    27 Dec 2022 07:01  -  28 Dec 2022 07:00  --------------------------------------------------------  IN: 0 mL / OUT: 0 mL / NET: 0 mL          Labs:   28 Dec 2022 06:45    141    |  98     |  18.4   ----------------------------<  123    3.6     |  25.0   |  4.86     Ca    8.2        28 Dec 2022 06:45  Phos  3.8       28 Dec 2022 06:45  Mg     1.7       28 Dec 2022 06:45                            10.4   8.84  )-----------( 260      ( 28 Dec 2022 06:45 )             32.2     PTT - ( 28 Dec 2022 02:15 )  PTT:152.6 sec            Echo (12/21/22):   1. Technically difficult study.   2. Normal left ventricular internal cavity size.   3. Severely decreased global left ventricular systolic function.   4. Left ventricular ejection fraction, by visual estimation, is 30 to 35%.   5. Mildly reduced RV systolic function.   6. Severely enlarged left atrium.   7. Moderately enlarged right atrium.   8. Sclerotic aortic valve with normal opening.   9. Moderate thickening and calcification of the anterior and posterior mitral valve leaflets.  10. Moderate mitral annular calcification.  11. Mild mitral valve regurgitation.  12. Mild tricuspid regurgitation.  13. Estimated pulmonary artery systolic pressure is 36.3 mmHg assuming a right atrial pressure of 15 mmHg, which is consistent with borderline pulmonary hypertension.    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 Faecalis bacteremia,  presenting with AMS.  Patient admitted and treated with empiric treatment for meningitis. 12/18 with acute hypercapnic respiratory failure in setting of aspiration pneumonia, emergently intubated, now extubated; also was in shock.   Worsening mental status over the last two days.  Noted now to be in AF with RVR and hypotension.     -Hypotension likely due to afib with RVR. IV fluid bolus ordered by primary team.   -Chronic HFr EF with new CM slight improved on most recent Echo 25% ---> 35%  -S/P Lung transplant on Immunosup rx with recent fungal sepsis  -ESRD   -CAD s/p PCI  -S/P Intubation for resp failure with sepsis, Shock and HF.   -Toxic Metabolic Encephalopathy, possibly toxic metabolic encephalopathy  , EEG done and no seizure activity      PLAN:   1. Currently back in SR, continue po metoprolol now that has NGT. DC digoxin, a/c with Eliquis  2.NO further cardiac work up  3. Prognosis guarded, management per primary team  4. ? etiology ALOC  5. Med management of CMP and CAD      Aman Cali MD

## 2022-12-28 NOTE — EEG REPORT - NS EEG TEXT BOX
[facility]   COMPREHENSIVE EPILEPSY CENTER   REPORT OF LONG-TERM VIDEO EEG     [facility]: [address] Counts include 234 beds at the [address], [place] [address], Ph#: [phone]  [facility]: [id number] [place] [address], Ph#: [phone]  [facility]: [address] [place] [address], Ph#: [phone]    Patient Name: UJ FERGUSON  Age and : 72y (46)  MRN #: [id number]  Location: [facility] [address] 01  Referring Physician: Gladis Ko    Start Time/Date: 0800 on 22  End Time/Date: 0800 on 22  Duration:  24 hr    _____________________________________________________________  STUDY INFORMATION    EEG Recording Technique:  The patient underwent continuous Video-EEG monitoring, using Telemetry System hardware on the XLTek Digital System. EEG and video data were stored on a computer hard drive with important events saved in digital archive files. The material was reviewed by a physician (electroencephalographer / epileptologist) on a daily basis. Brad and seizure detection algorithms were utilized and reviewed. An EEG Technician attended to the patient, and was available throughout daytime work hours.  The epilepsy center neurologist was available in person or on call 24-hours per day.    EEG Placement and Labeling of Electrodes:  The EEG was performed utilizing 20 channel referential EEG connections (coronal over temporal over parasagittal montage) using all standard 10-20 electrode placements with EKG, with additional electrodes placed in the inferior temporal region using the modified 10-10 montage electrode placements for elective admissions, or if deemed necessary. Recording was at a sampling rate of 256 samples per second per channel. Time synchronized digital video recording was done simultaneously with EEG recording. A low light infrared camera was used for low light recording.     _____________________________________________________________  HISTORY    Patient is a 72y old  Male who presents with a chief complaint of Encephalopathy (21 Dec 2022 11:30)      PERTINENT MEDICATION:  MEDICATIONS  (STANDING):  acyclovir IVPB 320 milliGRAM(s) IV Intermittent every 24 hours  acyclovir IVPB      ampicillin  IVPB 2 Gram(s) IV Intermittent every 12 hours  atorvastatin 80 milliGRAM(s) Oral at bedtime  fluconAZOLE   Tablet 200 milliGRAM(s) Oral <User Schedule>  heparin  Infusion.  Unit(s)/Hr (11 mL/Hr) IV Continuous <Continuous>  hydrocortisone 10 milliGRAM(s) Oral every 12 hours  lactulose Retention Enema 200 Gram(s) Rectal every 6 hours  metoprolol tartrate 50 milliGRAM(s) Oral two times a day  pantoprazole    Tablet 40 milliGRAM(s) Oral before breakfast  sevelamer carbonate 800 milliGRAM(s) Oral three times a day with meals  tacrolimus 1.5 milliGRAM(s) Oral daily  tamsulosin 0.4 milliGRAM(s) Oral at bedtime  trimethoprim   80 mG/sulfamethoxazole 400 mG 1 Tablet(s) Oral <User Schedule>    _____________________________________________________________  STUDY INTERPRETATION    Findings: The background was reactive and continuous there were periods of brief attenuation during clinically silent state (<%1). During wakefulness, no PDR seen.    Background Slowing:  Diffuse theta and polymorphic delta slowing.    Focal Slowing:   None were present.    Sleep Background:  Drowsiness was characterized by fragmentation, attenuation, and slowing of the background activity.    Stage II sleep transients were not recorded.    Other Non-Epileptiform Findings:    Abundant, long, generalized periodic discharges with triphasic morphology <0.5-1 Hz, wakefulness state.     Interictal Epileptiform Activity:   None were present.    Events:  Episodes of for let & right arm myoclonus, head clonus, left shoulder clonus,  bilateral lower extremity clonus, behavioral events (trashing / screaming), were not associated with an ictal electrographic pattern. Typically seen with arousal of the patient during bedside manipulations     Activation Procedures:   Hyperventilation was not performed.    Photic stimulation was not performed.     Artifacts:  Intermittent myogenic and movement artifacts were noted.    ECG:  The heart rate on single channel ECG was irregularly irregular predominantly between  BPM.    _____________________________________________________________  EEG SUMMARY/CLASSIFICATION    Abnormal EEG in an altered patient.  - Abundant, long, generalized periodic discharges with triphasic morphology <0.5-1 Hz, wakefulness state.   - Moderate generalized slowing.  - Myoclonic movements effecting different body parts are without associated electrographic abnormalities.      _____________________________________________________________  EEG IMPRESSION/CLINICAL CORRELATE    Abnormal EEG study.    1. GPDs with triphasic morphology commonly seen in toxic- metabolic encephalopathy, rarely epileptic   2. Arm myoclonus, head clonus, left shoulder clonus, bilateral lower extremity clonus, behavioral events (trashing / screaming), are non-epileptic in origin.   3. Moderate nonspecific diffuse or multifocal cerebral dysfunction.   4. No seizure seen.    **In absence of additional clinical concerns, recommend consideration for discontinuation of current EEG study with reconnection in future if warranted.    _____________________________________________________________      Shagufta May MD   Epilepsy Fellow, [facility]	    Johnny Carlin MD  EEG/Epilepsy Attending     ------------------------------------  EEG Reading Room: [phone]  On Call Service After Hours: [phone] [facility]   COMPREHENSIVE EPILEPSY CENTER   REPORT OF LONG-TERM VIDEO EEG     [facility]: [address], [place] [address], Ph#: [phone]  [facility]: [id number] [facility] [place] [address], Ph#: [phone]  [facility]: [address] [place] [address], Ph#: [phone]    Patient Name: JU FERGUSON  Age and : 72y (46)  MRN #: [id number]  Location: [facility] [address] 4211 01  Referring Physician: Gladis Ko    Start Time/Date: 0800 on 22  End Time/Date: 1100 on 22  Duration:  27 hr    _____________________________________________________________  STUDY INFORMATION    EEG Recording Technique:  The patient underwent continuous Video-EEG monitoring, using Telemetry System hardware on the XLTek Digital System. EEG and video data were stored on a computer hard drive with important events saved in digital archive files. The material was reviewed by a physician (electroencephalographer / epileptologist) on a daily basis. Brad and seizure detection algorithms were utilized and reviewed. An EEG Technician attended to the patient, and was available throughout daytime work hours.  The epilepsy center neurologist was available in person or on call 24-hours per day.    EEG Placement and Labeling of Electrodes:  The EEG was performed utilizing 20 channel referential EEG connections (coronal over temporal over parasagittal montage) using all standard 10-20 electrode placements with EKG, with additional electrodes placed in the inferior temporal region using the modified 10-10 montage electrode placements for elective admissions, or if deemed necessary. Recording was at a sampling rate of 256 samples per second per channel. Time synchronized digital video recording was done simultaneously with EEG recording. A low light infrared camera was used for low light recording.     _____________________________________________________________  HISTORY    Patient is a 72y old  Male who presents with a chief complaint of Encephalopathy (21 Dec 2022 11:30)      PERTINENT MEDICATION:  MEDICATIONS  (STANDING):  acyclovir IVPB 320 milliGRAM(s) IV Intermittent every 24 hours  acyclovir IVPB      ampicillin  IVPB 2 Gram(s) IV Intermittent every 12 hours  atorvastatin 80 milliGRAM(s) Oral at bedtime  fluconAZOLE   Tablet 200 milliGRAM(s) Oral <User Schedule>  heparin  Infusion.  Unit(s)/Hr (11 mL/Hr) IV Continuous <Continuous>  hydrocortisone 10 milliGRAM(s) Oral every 12 hours  lactulose Retention Enema 200 Gram(s) Rectal every 6 hours  metoprolol tartrate 50 milliGRAM(s) Oral two times a day  pantoprazole    Tablet 40 milliGRAM(s) Oral before breakfast  sevelamer carbonate 800 milliGRAM(s) Oral three times a day with meals  tacrolimus 1.5 milliGRAM(s) Oral daily  tamsulosin 0.4 milliGRAM(s) Oral at bedtime  trimethoprim   80 mG/sulfamethoxazole 400 mG 1 Tablet(s) Oral <User Schedule>    _____________________________________________________________  STUDY INTERPRETATION    Findings: The background was reactive and continuous there were periods of brief attenuation during clinically silent state (<%1). During wakefulness, no PDR seen.    Background Slowing:  Diffuse theta and polymorphic delta slowing.    Focal Slowing:   None were present.    Sleep Background:  Drowsiness was characterized by fragmentation, attenuation, and slowing of the background activity.    Stage II sleep transients were not recorded.    Other Non-Epileptiform Findings:    Abundant, long, generalized periodic discharges with triphasic morphology <0.5-1 Hz, wakefulness state.     Interictal Epileptiform Activity:   None were present.    Events:  Episodes of for let & right arm myoclonus, head clonus, left shoulder clonus,  bilateral lower extremity clonus, behavioral events (trashing / screaming), were not associated with an ictal electrographic pattern. Typically seen with arousal of the patient during bedside manipulations     Activation Procedures:   Hyperventilation was not performed.    Photic stimulation was not performed.     Artifacts:  Intermittent myogenic and movement artifacts were noted.    ECG:  The heart rate on single channel ECG was irregularly irregular predominantly between  BPM.    _____________________________________________________________  EEG SUMMARY/CLASSIFICATION    Abnormal EEG in an altered patient.  - Abundant, long, generalized periodic discharges with triphasic morphology <0.5-1 Hz, wakefulness state.   - Moderate generalized slowing.  - Myoclonic movements effecting different body parts are without associated electrographic abnormalities.      _____________________________________________________________  EEG IMPRESSION/CLINICAL CORRELATE    Abnormal EEG study.    1. GPDs with triphasic morphology commonly seen in toxic- metabolic encephalopathy, rarely epileptic   2. Arm myoclonus, head clonus, left shoulder clonus, bilateral lower extremity clonus, behavioral events (trashing / screaming), are non-epileptic in origin.   3. Moderate nonspecific diffuse or multifocal cerebral dysfunction.   4. No seizure seen.    **In absence of additional clinical concerns, recommend consideration for discontinuation of current EEG study with reconnection in future if warranted.    _____________________________________________________________      Shagufta May MD   Epilepsy Fellow, [facility]	    Johnny Carlin MD  EEG/Epilepsy Attending     ------------------------------------  EEG Reading Room: [phone]  On Call Service After Hours: [phone]

## 2022-12-28 NOTE — PROGRESS NOTE ADULT - ASSESSMENT
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 faecalis bacteremia, admitted on 12/13 with altered mental status of unclear etiology who subseqently developed acute hypoxic respiratory failure secondary to aspiration pneumonia and septic shock. Now downgraded to medicine for further management.    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 12/26 regressed to initial mental status from presentation   Patient is receiving CTZ/Amp through 12/26, Acyclovir through 12/28 per ID  On admission patient was started on lactulose empirically for possible component of hepatic encephalopathy (Normal Ammonia level does not rule out)   Chart review reveals that patient's last dose of lactulose was 12/19 evening.   Hepatic encephalopathy will be reconsidered as possible etiology given a correlation of improved mental status during hospital course and regression after cessation   Was restarted on lactulose 12/25 but only received 1 dose. Will restart lactulose for possible hepatic enceph component   Neuro to perform EEG to investigate for possible seizures - revealed Triphasic waves supporting TME   Per Patient's Wife - patient was taking daily high doses of tylenol for many months prior to presentation. This may have lead to potential GSH deficiency which may be an etiology of metabolic encephalopathy, liver cirrhosis, iron balance.   Check GSH level = Pending  Will empirically administer NAC. Potential benefits of NAC are high and with little risk.   Cont oral lactulose      #Previous Bacteremia  Patient w/ hx of e. faecalis bacteremia  Will complete ABX on 12/26  Will restart Cellcept on 12/29 after completion of Acyclovir    #Lung transplant  On immunosuppression therapy  Continue hydrocortisone and Tacrolimus  As per ID who spoke to transplant center.  Plan to hold Cellcept until treatment for bacteria resolves 12/26 tentative date.    #ESRD on dialysis.   L AV access.    HD per renal  [place]   Abx adjusted for renal functioning.    #Chronic atrial fibrillation.   Intermittent RVR   eliquis to heparin as patient cannot tolerate PO  With episodes of bigeminy on tele 12/25  Cardiology consulted  Dig loaded on 12/27  Cont metoprolol     #Chronic systolic congestive heart failure.   Metoprolol  Needs to follow up with cardiology outpatient  Patient needs outpatient stress/ischemic evaluation     #Dilated CBD  No current abdominal pain  Patient will need eventual MRCP to r/o biliary pathology but can be outpatient    #Hepatic cirrhosis  Noted on imaging from last admission  MRCP revealed iron deposition of liver and spleen. Possible HC?  Heme consult for possible Iron Overload Syndrome    DVT prophylaxis: Eliquis  DIET NPO pending improvement of mental status     Transplant surgeon Dr. Hung Lewis [phone] 12/22    Dispo: Remains acute. Completes Course of abx/anti-viral, Improvement in mental status.  If not improving despite all of the above interventions will address hospice candidacy with family.  Patient with known lung transplant, Multiorgan failure including liver, kidneys and severe encephalopathy. The above place the patient at an extremely high risk of mortality. Prognosis is poor.

## 2022-12-29 NOTE — PROGRESS NOTE ADULT - ASSESSMENT
72y Male with multiple medical issues now with encephalopathy.     Encephalopathy.   Mental status waxing and waning.   Likely toxic metabolic secondary to multiple medical issues.   ?Hepatic component.   EEG findings strongly suggestive of toxic metabolic etiology.    Meningitis.   Antibiotic coverage for now per ID.   crypto Ag (+) but PCR (-)  EBV detected by PCR in CSF, low level, unclear significance.  ID following.     Case discussed with Dr Ko.

## 2022-12-29 NOTE — PROGRESS NOTE ADULT - SUBJECTIVE AND OBJECTIVE BOX
MRN-[id number]  JU HESSU : AMS, HX :  enterococcus bacteremia     ===================================================  UNABLE TO OBTAIN     =======================================================  Allergies    budesonide (Unknown)  cefpodoxime (Unknown)  Pollen (Unknown)    Antibiotics:     acyclovir IVPB 320 milliGRAM(s) IV Intermittent every 24 hours  ampicillin  IVPB 2 Gram(s) IV Intermittent every 12 hours  cefTRIAXone Injectable. 2000 milliGRAM(s) IV Push every 12 hours  fluconAZOLE   Tablet 200 milliGRAM(s) Oral <User Schedule>  trimethoprim   80 mG/sulfamethoxazole 400 mG 1 Tablet(s) Oral <User Schedule>    Other medications:  apixaban 2.5 milliGRAM(s) Oral two times a day  atorvastatin 80 milliGRAM(s) Oral at bedtime    epoetin ben-epbx (RETACRIT) Injectable 4000 Unit(s) IV Push <User Schedule>  hydrocortisone 10 milliGRAM(s) Oral every 12 hours  metoprolol succinate ER 25 milliGRAM(s) Oral daily  pantoprazole    Tablet 40 milliGRAM(s) Oral before breakfast  sevelamer carbonate 800 milliGRAM(s) Oral three times a day with meals  tacrolimus 1.5 milliGRAM(s) Oral daily  tamsulosin 0.4 milliGRAM(s) Oral at bedtime    ======================================================  Physical Exam:    Vital Signs Last 24 Hrs  T(C): 36.4 (29 Dec 2022 07:40), Max: 36.9 (28 Dec 2022 16:43)  T(F): 97.6 (29 Dec 2022 07:40), Max: 98.5 (28 Dec 2022 16:43)  HR: 86 (29 Dec 2022 07:40) (82 - 105)  BP: 147/68 (29 Dec 2022 07:40) (105/64 - 147/68)  BP(mean): 75 (28 Dec 2022 16:43) (75 - 75)  RR: 18 (29 Dec 2022 07:40) (17 - 18)  SpO2: 100% (29 Dec 2022 07:40) (96% - 100%)    Parameters below as of 29 Dec 2022 07:40  Patient On (Oxygen Delivery Method): nasal cannula  O2 Flow (L/min): 3    General:  LETHARGIC , Responds only noxious Stimuli,   Eye: no conjunctival pallor, no scleral icterus  HENT: Normocephalic, No pharyngeal erythema, No sinus tenderness.  Neck: Supple, No lymphadenopathy.  Respiratory: Lungs are clear to auscultation, Respirations are non-labored.  Cardiovascular: Normal rate, Regular rhythm,  s1+s2  Gastrointestinal: Soft, Non-tender, Non-distended, Normal bowel sounds.  Genitourinary: No costovertebral angle tenderness.  Lymphatics: No lymphadenopathy neck  Musculoskeletal: Normal range of motion, Normal strength.  Integumentary: No rash.  Neurologic: LETHARGIC OBTUNDED  =======================================================  Labs:                                             10.3   7.52  )-----------( 224      ( 29 Dec 2022 06:06 )             32.1   12-29    141  |  97  |  20.6<H>  ----------------------------<  107<H>  3.9   |  24.0  |  5.94<H>    Ca    9.2      29 Dec 2022 06:06  Phos  4.4     12-29  Mg     1.8     12-29      Culture - Fungal, CSF (collected 12-19-22 @ 17:10)  Source: .CSF CSF    Culture - CSF with Gram Stain (collected 12-19-22 @ 17:10)  Source: .CSF CSF  Gram Stain (12-20-22 @ 15:20):    No polymorphonuclear leukocytes seen per low power field    No organisms seen per oil power field    by cytocentrifuge  Final Report (12-23-22 @ 08:48):    No growth at 3 days.    Culture - Acid Fast - CSF (collected 12-19-22 @ 17:10)  Source: .CSF CSF    Culture - Sputum (collected 12-19-22 @ 16:00)  Source: .Sputum Sputum  Gram Stain (12-20-22 @ 01:59):    Moderate polymorphonuclear leukocytes per low power field    Few Squamous epithelial cells per low power field    Few Gram positive cocci in pairs seen per oil power field    Few Yeast like cells seen per oil power field    Few Gram Negative Rods seen per oil power field  Final Report (12-21-22 @ 23:09):    Normal Respiratory Keerthi present    Culture - Acid Fast - Body Fluid w/Smear (collected 12-19-22 @ 13:50)  Source: Paracentesis Paracentesis Fluid    Culture - Fungal, Body Fluid (collected 12-19-22 @ 13:50)  Source: Peritoneal Peritoneal Fluid    Culture - Body Fluid with Gram Stain (collected 12-19-22 @ 13:50)  Source: Paracentesis Paracentesis Fluid  Gram Stain (12-19-22 @ 22:30):    No polymorphonuclear leukocytes seen per low power field    No organisms seen per oil power field    Culture - Blood (collected 12-13-22 @ 05:41)  Source: .Blood Blood-Peripheral  Final Report (12-18-22 @ 10:00):    No Growth Final    Culture - Blood (collected 12-13-22 @ 05:41)  Source: .Blood Blood-Peripheral  Final Report (12-18-22 @ 10:00):    No Growth Final    72M with PMH of  hypertension, hyperlipidemia, ESRD on HD, emphysema s/p lung transplant in [place] by Dr. 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 faecalis bacteremia, newly diagnosed HFrEF 25%, AF complicated by GIB was discharged on home IV Abx now presenting with AMS  Presented to the ED with daughter. CTH obtained and did not reveal any acute findings.  Patient was admitted to medicine for encephalopathy. LP attempted bedside but unsuccessful. Was on empiric treatment and mental status was improving and then ultimately intubated for aspiration pneumonia    #Acute hypoxic respiratory failure likely 2/2 aspiration- now extubated, mental status improved. sputum cx normal keerthi    #AMS-unclear etiology, no fever. ammonia level normal.  ct c/ap ?pna/edema/ascites.   ??EBV encephalitis  s/p LP normal cell counts glucose and protein 51. CSF pcr (-) and cultures pending.   [facility] micro lab CSF gram stain reported as "few organisms" . gram stain repeated at core lab and micro and is NEGATIVE. initial report has been corrected. CSF crytp titer 1:40, as per [facility] his initial CSF titer in 12/2020 was 1:2560. no serum titer available. 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 f/u CSf fungal CX. 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 CRISTIAN was deferred by cardio on recent admission.  resumed ceftriaxone /ampicillin as previously planned. f/u BCX ngtd, repeat TTE    # S/P  lung transplant- [facility]   on hold ( [facility] transplant team )currently on cortef, tacrolimus & bactrim     # HX :  fungal meningitis- resumed Flucytosine     Now, WITH DECREASED MENTAL STATUS    ? Recurrent Aspiration,     Recommending LP ( ID )

## 2022-12-29 NOTE — PROGRESS NOTE ADULT - SUBJECTIVE AND OBJECTIVE BOX
JU AYDENJAROD  [id number]    Chief Complaint: follow up CMP      Subjective: confused, not following commands or verbal      24 hour Tele: SR/ST, APCs    acyclovir IVPB      acyclovir IVPB 320 milliGRAM(s) IV Intermittent every 24 hours  chlorhexidine 2% Cloths 1 Application(s) Topical <User Schedule>  dextrose 5% + sodium chloride 0.45%. 1000 milliLiter(s) IV Continuous <Continuous>  dextrose 50% Injectable 25 Gram(s) IV Push once  dextrose 50% Injectable 12.5 Gram(s) IV Push once  dextrose 50% Injectable 25 Gram(s) IV Push once  dextrose Oral Gel 15 Gram(s) Oral once  fluconAZOLE   Tablet 200 milliGRAM(s) Oral <User Schedule>  hydrocortisone 10 milliGRAM(s) Oral every 12 hours  lactulose Syrup 10 Gram(s) Oral every 6 hours  meropenem Injectable      meropenem Injectable 500 milliGRAM(s) IV Push once  metoprolol tartrate 25 milliGRAM(s) Enteral Tube four times a day  pantoprazole  Injectable 40 milliGRAM(s) IV Push daily  sevelamer carbonate 800 milliGRAM(s) Oral three times a day with meals  tacrolimus 1.5 milliGRAM(s) Oral daily  tamsulosin 0.4 milliGRAM(s) Oral at bedtime  trimethoprim   80 mG/sulfamethoxazole 400 mG 1 Tablet(s) Oral <User Schedule>  vancomycin  IVPB 1000 milliGRAM(s) IV Intermittent once  vancomycin  IVPB              Physical Exam:  T(C): 36.3 (12-29-22 @ 14:53), Max: 36.9 (12-28-22 @ 16:43)  HR: 90 (12-29-22 @ 14:53) (77 - 105)  BP: 157/80 (12-29-22 @ 14:53) (105/64 - 157/80)  RR: 16 (12-29-22 @ 14:53) (16 - 18)  SpO2: 100% (12-29-22 @ 14:53) (96% - 100%)  Wt(kg): --  General: Comfortable in NAD, frail old gentleman   Neck: No JVD  CVS: nl s1s2, no s3  Pulm: CTA b/l  Abd: soft, non-tender  Ext: No c/c/e  Neuro A&O x0        Labs:   29 Dec 2022 06:06    141    |  97     |  20.6   ----------------------------<  107    3.9     |  24.0   |  5.94     Ca    9.2        29 Dec 2022 06:06  Phos  4.4       29 Dec 2022 06:06  Mg     1.8       29 Dec 2022 06:06                            10.3   7.52  )-----------( 224      ( 29 Dec 2022 06:06 )             32.1     PTT - ( 28 Dec 2022 10:40 )  PTT:75.0 sec          Echo (12/21/22):   1. Technically difficult study.   2. Normal left ventricular internal cavity size.   3. Severely decreased global left ventricular systolic function.   4. Left ventricular ejection fraction, by visual estimation, is 30 to 35%.   5. Mildly reduced RV systolic function.   6. Severely enlarged left atrium.   7. Moderately enlarged right atrium.   8. Sclerotic aortic valve with normal opening.   9. Moderate thickening and calcification of the anterior and posterior mitral valve leaflets.  10. Moderate mitral annular calcification.  11. Mild mitral valve regurgitation.  12. Mild tricuspid regurgitation.  13. Estimated pulmonary artery systolic pressure is 36.3 mmHg assuming a right atrial pressure of 15 mmHg, which is consistent with borderline pulmonary hypertension.    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 Faecalis bacteremia,  presenting with AMS.  Patient admitted and treated with empiric treatment for meningitis. 12/18 with acute hypercapnic respiratory failure in setting of aspiration pneumonia, emergently intubated, now extubated; also was in shock.   Worsening mental status over the last two days.  Noted now to be in AF with RVR and hypotension.     -Hypotension likely due to afib with RVR. IV fluid bolus ordered by primary team.   -Chronic HFr EF with new CM slight improved on most recent Echo 25% ---> 35%  -S/P Lung transplant on Immunosup rx with recent fungal sepsis  -ESRD   -CAD s/p PCI  -S/P Intubation for resp failure with sepsis, Shock and HF.   -Toxic Metabolic Encephalopathy, possibly toxic metabolic encephalopathy  , EEG done and no seizure activity  -taken back to the ICU for lack  of mentation  -Anticoagulation held for possible LP      PLAN:   1. Currently back in SR, continue po metoprolol, resume AC when appropriate.   2. NO further cardiac work up  3. Prognosis guarded, management per primary team  4. Stable from cardiac perspective, please call us back with questions or concerns,

## 2022-12-29 NOTE — PROGRESS NOTE ADULT - SUBJECTIVE AND OBJECTIVE BOX
[facility]                                        [facility]                                 [address] [address]                                  [address]                                        [place], [place], [address]                                             [phone]        CC: Encephalopathy    HPI:   The patient is a 72y Male with multiple medical issues who was recently hospitalized with hypoxic respiratory failure.  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   He was ultimately discharged 11/23/22.   He now presented with altered mental status and lethargy.   Neurology asked to evaluation for meningitis. LP attempted (neuro JW) and was unsuccessful while in ER.    The patient had been gradually improving with regard to mental status, but has deteriorated again over the past few days.     Interim history:  Remains on [address] [place].    ROS:   Unobtainable due to patient's condition.     MEDICATIONS  (STANDING):  acyclovir IVPB      acyclovir IVPB 320 milliGRAM(s) IV Intermittent every 24 hours  ampicillin  IVPB 2 Gram(s) IV Intermittent every 12 hours  apixaban 2.5 milliGRAM(s) Oral every 12 hours  cefTRIAXone Injectable. 2000 milliGRAM(s) IV Push every 12 hours  chlorhexidine 2% Cloths 1 Application(s) Topical <User Schedule>  dextrose 5% + sodium chloride 0.45%. 1000 milliLiter(s) (40 mL/Hr) IV Continuous <Continuous>  dextrose 50% Injectable 25 Gram(s) IV Push once  dextrose 50% Injectable 12.5 Gram(s) IV Push once  dextrose 50% Injectable 25 Gram(s) IV Push once  dextrose Oral Gel 15 Gram(s) Oral once  fluconAZOLE   Tablet 200 milliGRAM(s) Oral <User Schedule>  hydrocortisone 10 milliGRAM(s) Oral every 12 hours  lactulose Syrup 10 Gram(s) Oral every 6 hours  meropenem Injectable      meropenem Injectable 500 milliGRAM(s) IV Push once  metoprolol tartrate 25 milliGRAM(s) Enteral Tube four times a day  pantoprazole  Injectable 40 milliGRAM(s) IV Push daily  sevelamer carbonate 800 milliGRAM(s) Oral three times a day with meals  tacrolimus 1.5 milliGRAM(s) Oral daily  tamsulosin 0.4 milliGRAM(s) Oral at bedtime  trimethoprim   80 mG/sulfamethoxazole 400 mG 1 Tablet(s) Oral <User Schedule>    Vital Signs Last 24 Hrs  T(C): 36.4 (29 Dec 2022 07:40), Max: 36.9 (28 Dec 2022 16:43)  T(F): 97.6 (29 Dec 2022 07:40), Max: 98.5 (28 Dec 2022 16:43)  HR: 86 (29 Dec 2022 07:40) (82 - 105)  BP: 147/68 (29 Dec 2022 07:40) (105/64 - 147/68)  BP(mean): 75 (28 Dec 2022 16:43) (75 - 75)  RR: 18 (29 Dec 2022 07:40) (17 - 18)  SpO2: 100% (29 Dec 2022 07:40) (96% - 100%)    Parameters below as of 29 Dec 2022 07:40  Patient On (Oxygen Delivery Method): nasal cannula  O2 Flow (L/min): 3    Detailed Neurologic Exam:    Mental status: No response to noxious stimuli. Not following any instructions.    Cranial nerves: Pupils react symmetrically to light. No blink to threat from either side. Not tracking with gaze today. Face is grossly symmetric. Palate and tongue cannot be assessed.     Motor/Sensory: There is normal bulk and tone.  Symmetric (although minimal) limb movement to stimuli.     Reflexes: 1+ throughout and plantar responses are flexor.    Cerebellar: Cannot be tested.       Labs:     12-29    141  |  97  |  20.6<H>  ----------------------------<  107<H>  3.9   |  24.0  |  5.94<H>    Ca    9.2      29 Dec 2022 06:06  Phos  4.4     12-29  Mg     1.8     12-29                              10.3   7.52  )-----------( 224      ( 29 Dec 2022 06:06 )             32.1       Brain MRI: There is no acute pathology.   There is some chronic ischemic change.     EEG shows:  Abundant, long, generalized periodic discharges with triphasic morphology <0.5-1 Hz, wakefulness state.   Moderate generalized slowing.  Multiple push button events for myoclonic movements effecting different body parts are without associated electrographic abnormalities.

## 2022-12-29 NOTE — PROGRESS NOTE ADULT - ASSESSMENT
Patient was seen and evaluated on dialysis.   Patient is tolerating the procedure well.   T(C): 36.4 (12-29-22 @ 07:40), Max: 36.9 (12-28-22 @ 16:43)  HR: 86 (12-29-22 @ 07:40) (82 - 105)  BP: 147/68 (12-29-22 @ 07:40) (105/64 - 147/68)  Continue dialysis: T Th S  Dialyzer:  Revaclear 300        QB:  400 ml.,       QD: 500ml.,  Goal UF _As Suzan., _ over _3.5_ Hours     + NG Feeds, Not responsive,     Catheter Functional,     Pt is seen and examined on dialysis. No symptoms. Hemodynamics stable. Tolerating dialysis and ultrafiltration.  Pre Laboratory values personally reviewed by me.  Dialysis adjusted appropriately based on current values.  Will continue the current medical management.  Next hemodialysis as scheduled.  Discussed with nursing, primary care team.     [place]. Level in AM,

## 2022-12-29 NOTE — PROGRESS NOTE ADULT - ASSESSMENT
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 faecalis bacteremia, admitted on 12/13 with altered mental status of unclear etiology who subseqently developed acute hypoxic respiratory failure secondary to aspiration pneumonia and septic shock. Now downgraded to medicine for further management.    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 12/26 regressed to initial mental status from presentation   Patient is receiving CTZ/Amp through 12/26, Acyclovir through 12/28 per ID  On admission patient was started on lactulose empirically for possible component of hepatic encephalopathy (Normal Ammonia level does not rule out)   Chart review reveals that patient's last dose of lactulose was 12/19 evening.   Hepatic encephalopathy will be reconsidered as possible etiology given a correlation of improved mental status during hospital course and regression after cessation   Was restarted on lactulose 12/25 but only received 1 dose. Will restart lactulose for possible hepatic enceph component   Neuro to perform EEG to investigate for possible seizures - revealed Triphasic waves supporting TME   Per Patient's Wife - patient was taking daily high doses of tylenol for many months prior to presentation. This may have lead to potential GSH deficiency which may be an etiology of metabolic encephalopathy, liver cirrhosis, iron balance.   S/p Trial of NAC  Cont oral lactulose    ID Reconsulted for considerations of infectious etiology  Restart Michael. Check CT Chest for possible aspiration  Repeat Cryptococcal serum ag  Will consider repeat LP (Switch back to heparin gtt)  ICU consult given ongoing altered mental status    #Previous Bacteremia  Patient w/ hx of e. faecalis bacteremia  S/p Course of abx    #Lung transplant  On immunosuppression therapy  Continue hydrocortisone and Tacrolimus  As per ID who spoke to transplant center.  Plan to hold Cellcept until treatment for bacteria resolves 12/26 tentative date.  Will hold further given underlying suspicion for infectious process driving mental status     #ESRD on dialysis.   L AV access.    HD per renal  [place]       #Chronic atrial fibrillation.   Intermittent RVR   With episodes of bigeminy on tele 12/25  Cardiology consulted  Dig loaded on 12/27  Cont metoprolol   Heparin gtt    #Chronic systolic congestive heart failure.   Metoprolol  Needs to follow up with cardiology outpatient  Patient needs outpatient stress/ischemic evaluation     #Dilated CBD  No current abdominal pain  Patient will need eventual MRCP to r/o biliary pathology but can be outpatient    #Hepatic cirrhosis  Noted on imaging from last admission  MRCP revealed iron deposition of liver and spleen. Possible HC?  Heme consult for possible Iron Overload Syndrome    DVT prophylaxis: Eliquis  DIET NPO pending improvement of mental status     Transplant surgeon Dr. Hung Lewis [phone] 12/22    Dispo: Remains acute. Completes Course of abx/anti-viral, Improvement in mental status.  If not improving despite all of the above interventions will address hospice candidacy with family.  Patient with known lung transplant, Multiorgan failure including liver, kidneys and severe encephalopathy. The above place the patient at an extremely high risk of mortality. Prognosis is poor.

## 2022-12-29 NOTE — CONSULT NOTE ADULT - SUBJECTIVE AND OBJECTIVE BOX
Patient is a 72y old  Male who presents with a chief complaint of Encephalopathy (21 Dec 2022 11:30)      BRIEF HOSPITAL COURSE:   "admission HPI:   72M with PMH of  hypertension, hyperlipidemia, ESRD on HD, emphysema s/p lung transplant in [place] by Dr. 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 feacalis bacteremia, newly diagnosed HFrEF 25%, AF complicated by GIB was discharged on home IV Abx now presenting with AMS    Patient unable to contribute to history secondary to mental status. History obtained by EMR and Spouse.  Patient was discharged day prior to Thanksgiving with home iv abx. Per Spouse has been receiving IV abx administered by wife and daughter. Has not missed any HD sessions with exception to today.  Since discharge has been becoming increasingly tired. Wife attributes this to not sleeping well. Furthermore patient complained of severe nausea yesterday afternoon but did not have emesis. Approx 1 hour after that became more ''confused and out of it'' but still talking. Symptoms continued to progress and get worse throughout the night and in the morning was only moaning and extremely lethargic. "     Patient was intubated on 12/18 for hypoxic hypercapnic respiratory failure 2/2 to possible aspiration pneumonia.  Patient also has ongoing altered mental status which is not improving.  S/P LP on 12/19 which shows mild cryptococci antigen titer which could be chronic, and EBV+ which was unclear if it suggest EVBV encephalitis.  Per ID, patient was continued on meropenum for the pneumonia/meningits, and continue on fluconazole chronic suppression for cryptococcal infection.  Patient was extubated on 12/20, and able to transferred to the floor on 12/21.  Patient mental status imprvoed, able to have conversation. However patient mental status began to deteriorate again on 12/26.      Neuro consulted, recommended EEG, EEG suggetive of toxic metabolic etiology     Patient continues to be encephalopathic, MICU consulted.  Patient saturating well, however obtunded, not following commands.            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      2019 novel coronavirus disease (COVID-19)  Feb 2021 - hospitalized for 1 week at [facility]      Pneumonia  April 2021      [place]      HTN (hypertension)      Lumbar spondylosis      History of COPD      BPH without urinary obstruction      Hypercholesterolemia      Oliguria and anuria      H/O peripheral neuropathy      H/O lung transplant  bilateral - transplant - 1-2-2015 -  [facility]      H/O heart artery stent  [facility]      History of hip replacement  right      Status post open reduction and internal fixation (ORIF) of fracture  left femur        Allergies    budesonide (Unknown)  cefpodoxime (Unknown)  Pollen (Unknown)    Intolerances      FAMILY HISTORY:  Family history of emphysema  mother        Family history otherwise noncontributory.    Social History:     Review of Systems:  cannot obtain due to altered mental status     ALL OTHER REVIEW OF SYSTEMS EXCEPT PER HPI NEGATIVE.      Medications:  acyclovir IVPB      acyclovir IVPB 320 milliGRAM(s) IV Intermittent every 24 hours  fluconAZOLE   Tablet 200 milliGRAM(s) Oral <User Schedule>  meropenem Injectable      trimethoprim   80 mG/sulfamethoxazole 400 mG 1 Tablet(s) Oral <User Schedule>  vancomycin  IVPB        metoprolol tartrate 25 milliGRAM(s) Enteral Tube four times a day        lactulose Syrup 10 Gram(s) Oral every 6 hours  pantoprazole  Injectable 40 milliGRAM(s) IV Push daily    tamsulosin 0.4 milliGRAM(s) Oral at bedtime    dextrose 50% Injectable 25 Gram(s) IV Push once  dextrose 50% Injectable 12.5 Gram(s) IV Push once  dextrose 50% Injectable 25 Gram(s) IV Push once  dextrose Oral Gel 15 Gram(s) Oral once  hydrocortisone 10 milliGRAM(s) Oral every 12 hours    dextrose 5% + sodium chloride 0.45%. 1000 milliLiter(s) IV Continuous <Continuous>    tacrolimus 1.5 milliGRAM(s) Oral daily    chlorhexidine 2% Cloths 1 Application(s) Topical <User Schedule>    sevelamer carbonate 800 milliGRAM(s) Oral three times a day with meals        Vital Signs Last 24 Hrs  T(C): 36.1 (29 Dec 2022 18:00), Max: 36.7 (29 Dec 2022 00:00)  T(F): 97 (29 Dec 2022 18:00), Max: 98 (29 Dec 2022 00:00)  HR: 84 (29 Dec 2022 18:00) (71 - 104)  BP: 132/58 (29 Dec 2022 18:00) (94/62 - 159/78)  BP(mean): 78 (29 Dec 2022 18:00) (68 - 141)  RR: 14 (29 Dec 2022 18:00) (14 - 20)  SpO2: 100% (29 Dec 2022 18:00) (96% - 100%)    Parameters below as of 29 Dec 2022 14:53  Patient On (Oxygen Delivery Method): nasal cannula  O2 Flow (L/min): 3      ABG - ( 29 Dec 2022 13:55 )  pH, Arterial: 7.410 pH, Blood: x     /  pCO2: 53    /  pO2: 127   / HCO3: 34    / Base Excess: 9.0   /  SaO2: 99.8          I&O's Detail    29 Dec 2022 07:01  -  29 Dec 2022 18:56  --------------------------------------------------------  IN:  Total IN: 0 mL    OUT:    Other (mL): 1000 mL  Total OUT: 1000 mL    Total NET: -1000 mL            LABS:                        10.3   7.52  )-----------( 224      ( 29 Dec 2022 06:06 )             32.1     12-29    141  |  97  |  20.6<H>  ----------------------------<  107<H>  3.9   |  24.0  |  5.94<H>    Ca    9.2      29 Dec 2022 06:06  Phos  4.4     12-29  Mg     1.8     12-29            CAPILLARY BLOOD GLUCOSE      POCT Blood Glucose.: 115 mg/dL (29 Dec 2022 18:29)    PTT - ( 28 Dec 2022 10:40 )  PTT:75.0 sec    CULTURES:  Culture Results:   No growth to date. (12-28 @ 06:45)  Culture Results:   No growth to date. (12-28 @ 06:40)      Physical Examination:  GENERAL: obtunded, not following commands  HEENT: NC/AT  NECK: Supple, trachea midline  PULM: b/l rhonchi   CVS: +S1, S2, RRR  ABD: Soft, non-tender  EXTREMITIES: No pedal edema B/L  SKIN: No open wounds    DEVICES:     RADIOLOGY:

## 2022-12-29 NOTE — PROGRESS NOTE ADULT - SUBJECTIVE AND OBJECTIVE BOX
[facility] PALLIATIVE MEDICINE     CC:  Reconsulted for ongoing GOC given progressive clinical deterioration     INTERVAL HPI/OVERNIGHT EVENTS:  Source if other than patient:  []Family   [x]Team     D/W hospitalist, pt was doing well with mentation initially after downgrade out of ICU but since 12/25 with persistent encephalopathy with intermittent verbal outburst on enhanced supervision.  ICU consulted    PRESENT SYMPTOMS:     Dyspnea: No  Nausea/Vomiting:  No  Anxiety:   No  Depression: unable to obtain   Fatigue: Yes    Loss of appetite: NPO  Constipation:  No    Pain: No overt sign of distress            Character-            Duration-            Effect-            Factors-            Frequency-            Location-            Severity-    Pain AD Score:  [email or web address] (press ctrl + left click to view)    Review of Systems: Unable to obtain due to poor mentation/encephalopathy     MEDICATIONS  (STANDING):  acyclovir IVPB      acyclovir IVPB 320 milliGRAM(s) IV Intermittent every 24 hours  chlorhexidine 2% Cloths 1 Application(s) Topical <User Schedule>  dextrose 5% + sodium chloride 0.45%. 1000 milliLiter(s) (40 mL/Hr) IV Continuous <Continuous>  dextrose 50% Injectable 25 Gram(s) IV Push once  dextrose 50% Injectable 12.5 Gram(s) IV Push once  dextrose 50% Injectable 25 Gram(s) IV Push once  dextrose Oral Gel 15 Gram(s) Oral once  fluconAZOLE   Tablet 200 milliGRAM(s) Oral <User Schedule>  hydrocortisone 10 milliGRAM(s) Oral every 12 hours  lactulose Syrup 10 Gram(s) Oral every 6 hours  meropenem Injectable      meropenem Injectable 500 milliGRAM(s) IV Push once  metoprolol tartrate 25 milliGRAM(s) Enteral Tube four times a day  pantoprazole  Injectable 40 milliGRAM(s) IV Push daily  sevelamer carbonate 800 milliGRAM(s) Oral three times a day with meals  tacrolimus 1.5 milliGRAM(s) Oral daily  tamsulosin 0.4 milliGRAM(s) Oral at bedtime  trimethoprim   80 mG/sulfamethoxazole 400 mG 1 Tablet(s) Oral <User Schedule>  vancomycin  IVPB 1000 milliGRAM(s) IV Intermittent once  vancomycin  IVPB        MEDICATIONS  (PRN):      PHYSICAL EXAM:    Vital Signs Last 24 Hrs  T(C): 36.3 (29 Dec 2022 14:53), Max: 36.9 (28 Dec 2022 16:43)  T(F): 97.4 (29 Dec 2022 14:53), Max: 98.5 (28 Dec 2022 16:43)  HR: 90 (29 Dec 2022 14:53) (77 - 105)  BP: 157/80 (29 Dec 2022 14:53) (105/64 - 157/80)  BP(mean): 103 (29 Dec 2022 14:53) (75 - 103)  RR: 16 (29 Dec 2022 14:53) (16 - 18)  SpO2: 100% (29 Dec 2022 14:53) (96% - 100%)    Parameters below as of 29 Dec 2022 14:53  Patient On (Oxygen Delivery Method): nasal cannula  O2 Flow (L/min): 3    Karnofsky:  20%    GEN: ill appearing. resting comfortably and no acute distress    HEENT: mucous membrane moist    Lungs: comfortable, nonlabored     CV: +s1/s2 regular rate and rhythm      GI: +BS, abdomen soft, nontender, nondistended      :  incontinent     MSK:  no cyanosis or edema.      NEURO: nonfocal. lethargic.  nonverbal     Skin: warm and dry.     LABS:                          10.3   7.52  )-----------( 224      ( 29 Dec 2022 06:06 )             32.1     12-29    141  |  97  |  20.6<H>  ----------------------------<  107<H>  3.9   |  24.0  |  5.94<H>    Ca    9.2      29 Dec 2022 06:06  Phos  4.4     12-29  Mg     1.8     12-29      PTT - ( 28 Dec 2022 10:40 )  PTT:75.0 sec    I&O's Summary    29 Dec 2022 07:01  -  29 Dec 2022 14:58  --------------------------------------------------------  IN: 0 mL / OUT: 1000 mL / NET: -1000 mL    RADIOLOGY & ADDITIONAL STUDIES:     CXR    ACC: [id number] EXAM:  XR CHEST PORTABLE URGENT 1V                          PROCEDURE DATE:  12/27/2022          INTERPRETATION:  INDICATION: NG tube placement    COMPARISON: 12/26/22    Technique: AP radiograph of the chest    FINDINGS:  The study limited with partial collimation of left costophrenic angle.  Right arm PICC line with tip in right atrium.  Enteric tube in stomach -tip is off the film.  S/P sternotomy; mediastinal surgical clips  Heart/Vascular: Difficult to assess heart size on this projection.  Pulmonary: Persistent bilateral lung patchy opacities which may be due to   pulmonary edema or to infection. Persistent small bilateral pleural   effusions. No pneumothorax  Bones: No acute bony finding..    Impression:  Stable bilateral lung opacities and small bilateral pleural effusions.    --- End of Report ---    CARI GOLDBERG MD; Attending Radiologist  This document has been electronically signed. Dec 28 2022  4:02PM      ADVANCE DIRECTIVES/TREATMENT PREFERENCES: FULL CODE  DNR YES NO  Completed on:                     MOLST  YES NO   Completed on:  Living Will  YES NO   Completed on:    NEUROLOGICAL MEDICATIONS/OPIOIDS/BENZODIAZEPINE IN PAST 24 HOURS

## 2022-12-29 NOTE — PROGRESS NOTE ADULT - ASSESSMENT
72M with PMH of  hypertension, hyperlipidemia, ESRD on HD, emphysema s/p lung transplant in [place] by Dr. 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 faecalis bacteremia, newly diagnosed HFrEF 25%, AF complicated by GIB was discharged on home IV Abx now presenting with AMS  Presented to the ED with daughter. CTH obtained and did not reveal any acute findings.  Patient was admitted to medicine for encephalopathy. LP attempted bedside but unsuccessful. Was on empiric treatment and mental status was improving and then ultimately intubated for aspiration pneumonia    #Acute hypoxic respiratory failure likely 2/2 aspiration- now extubated, mental status improved. sputum cx normal keerthi    #AMS-unclear etiology, no fever. ammonia level normal.  ct c/ap ?pna/edema/ascites.   ??EBV encephalitis  s/p LP normal cell counts glucose and protein 51. CSF pcr (-) and cultures pending.   [facility] micro lab CSF gram stain reported as "few organisms" . gram stain repeated at core lab and micro and is NEGATIVE. initial report has been corrected. CSF crytp titer 1:40, as per [facility] his initial CSF titer in 12/2020 was 1:2560. no serum titer available. 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 f/u CSf fungal CX. 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 CRISTIAN was deferred by cardio on recent admission.  resumed ceftriaxone /ampicillin as previously planned. f/u BCX ngtd, repeat TTE    # s/p lung transplant- per transplant team, cellcept  on hold as per pt wife discussion with [facility] transplant team, currently on cortef, c/w tacrolimus and check levels. c/w bactrim per home dose for ppx, fluc resumed    # h/o fungal meningitis- resumed fluc    AS ABOVE PT WAS TREATED AND WAS CLINICALLY IMPROVED AND NOW WITH DECLINE  WITH DECREASED MENTAL STATUS  WILL D/W HOSPITALIST   BUT WOULD CONSIDER REPEAT IMAGING OF CHEST    WOULD CONSIDER REPEAT LP   PT ALREADY ON COVERAGE  INCLUDING ACYLOVIR  WOULD CONSIDER  REPEAT LP  CRYPT AG IN CSF CELL COUNT CHEMISTRIES    AND REPEAT SERUM CRYPT ANTIGEN  MAY NEED TO RESTART AMPHO B FLUCYTOCINE   WILL D/W HOSPITALIST         72M with PMH of  hypertension, hyperlipidemia, ESRD on HD, emphysema s/p lung transplant in [place] by Dr. 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 faecalis bacteremia, newly diagnosed HFrEF 25%, AF complicated by GIB was discharged on home IV Abx now presenting with AMS  Presented to the ED with daughter. CTH obtained and did not reveal any acute findings.  Patient was admitted to medicine for encephalopathy. LP attempted bedside but unsuccessful. Was on empiric treatment and mental status was improving and then ultimately intubated for aspiration pneumonia    #Acute hypoxic respiratory failure likely 2/2 aspiration- now extubated, mental status improved. sputum cx normal keerthi    #AMS-unclear etiology, no fever. ammonia level normal.  ct c/ap ?pna/edema/ascites.   ??EBV encephalitis  s/p LP normal cell counts glucose and protein 51. CSF pcr (-) and cultures pending.   [facility] micro lab CSF gram stain reported as "few organisms" . gram stain repeated at core lab and micro and is NEGATIVE. initial report has been corrected. CSF crytp titer 1:40, as per [facility] his initial CSF titer in 12/2020 was 1:2560. no serum titer available. 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 f/u CSf fungal CX. 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 CRISTIAN was deferred by cardio on recent admission.  resumed ceftriaxone /ampicillin as previously planned. f/u BCX ngtd, repeat TTE    # s/p lung transplant- per transplant team, cellcept  on hold as per pt wife discussion with [facility] transplant team, currently on cortef, c/w tacrolimus and check levels. c/w bactrim per home dose for ppx, fluc resumed    # h/o fungal meningitis- resumed fluc    AS ABOVE PT WAS TREATED AND WAS CLINICALLY IMPROVED AND NOW WITH DECLINE  WITH DECREASED MENTAL STATUS  WILL D/W HOSPITALIST   BUT WOULD CONSIDER REPEAT IMAGING OF CHEST  R/O RECURRENT ASPIRATION   CAN D/C ROCEPHIN AMP AS IT APPEARS HE HAS COMPLETED TREATMENT FOR ENTEROCOCCAL BACTEREMIA   CONSIDER ICU EVAL  WOULD CONSIDER REPEAT LP   PT ALREADY ON COVERAGE  INCLUDING ACYLOVIR  WOULD CONSIDER   CRYPT AG IN CSF CELL COUNT CHEMISTRIES    AND REPEAT SERUM CRYPT ANTIGEN  MAY NEED TO RESTART AMPHO B FLUCYTOCINE   WILL D/W HOSPITALIST

## 2022-12-29 NOTE — PROGRESS NOTE ADULT - ASSESSMENT
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 Faecalis bacteremia now readmitted on 12/13 for altered mental status, sepsis workup including ?acute meningitis, found also with dilated CBD on ultrasound complicated by acute respiratory failure with hypoxia  requiring intubation and transferred to MICU on 12/18, s/p LP and paracentesis, medically extubated on 12/20 and downgraded to medicine service, positive EBV CSF PCR and improving encephalopathy.     Recurrent Acute Encephalopathy  Prior Hx of Cryptococcal Meningitis   Aspiration PNA - completed antibiotic course  Hx of E Faecalis Bacteremia - s/p antibiotic course  - mentation had improved after extubation however notable regression on 12/25 that has persisted  - etiology unclear ?recurrent aspiration, EBV encephalitis  - s/p LP 12/19: + cryptococcal Ag but PCR neg   - per ID "EBV CSF PCR/serum + but very low, unclear if EBV encephalitis"  - s/p Amphotericin B and flucytosine as cryptococcal studies believed to be old infection --. given persistent encephalopathy recommended  to restart by ID    - ID input appreciate, recommend repeat LP and cryptococcal studies  - follow up repeat infectious workup     Dilated CBD   - eventual MRI/MRCP to evaluate biliary pathology, can be done outpatient per GI    ESRD on HD   - since 2020 after presumed contrast induced nephropathy for CAD with stenting   - mgnt per nephrology  - avoid nephrotoxic agents    Hx of Bilateral Lung Transplant for ES COPD  - in 2015, follows closely with [facility]   - on tacrolimus and hydrocortisone; home cellcept on hold pending resolution of acute infection     Palliative Care Encounter/Goals of care  - Surrogate/HCP/Guardian: wife Veronica Marshall [phone]  - Palliative reconsult to assist with goc pending ongoing workup. Per hospitalist last discussion with wife, she remains hopeful for further clinical improvement and wishes to continue active treatment.   - Per writer's last discussion with patient on 12/22 when he was of sound mind with appropriate medical capacity, he acknowledged his complex comorbidities and  expressed wishes to continue pursuit of ongoing medical mgnt including HD and remain full code at that time.     Palliative will support and follow up with wife for ongoing goc pending workup and clinical progress. Very guarded prognosis given multitude of complex medical conditions and high risk of reintubation if unable to protect airway due to poor mentation.  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 Faecalis bacteremia now readmitted on 12/13 for altered mental status, sepsis workup including ?acute meningitis, found also with dilated CBD on ultrasound complicated by acute respiratory failure with hypoxia  requiring intubation and transferred to MICU on 12/18, s/p LP and paracentesis, medically extubated on 12/20 and downgraded to medicine service, positive EBV CSF PCR and improving encephalopathy.     Recurrent Acute Encephalopathy  Prior Hx of Cryptococcal Meningitis   Aspiration PNA - completed antibiotic course  Hx of E Faecalis Bacteremia - s/p antibiotic course  - mentation had improved after extubation however notable regression on 12/25 that has persisted  - etiology unclear ?recurrent aspiration, EBV encephalitis  - s/p LP 12/19: + cryptococcal Ag but PCR neg   - per ID "EBV CSF PCR/serum + but very low, unclear if EBV encephalitis"  - s/p Amphotericin B and flucytosine as cryptococcal studies believed to be old infection --. given persistent encephalopathy recommended  to restart by ID    - ID input appreciate, recommend repeat LP and cryptococcal studies  - follow up repeat infectious workup     Dilated CBD   - s/p MRCP 12/25 normal caliber CBD    ESRD on HD   - since 2020 after presumed contrast induced nephropathy for CAD with stenting   - mgnt per nephrology  - avoid nephrotoxic agents    Hx of Bilateral Lung Transplant for ES COPD  - in 2015, follows closely with [facility]   - on tacrolimus and hydrocortisone; home cellcept on hold pending resolution of acute infection     Palliative Care Encounter/Goals of care  - Surrogate/HCP/Guardian: wife Veronica Marshall [phone]  - Palliative reconsult to assist with goc pending ongoing workup. Per hospitalist last discussion with wife, she remains hopeful for further clinical improvement and wishes to continue active treatment.   - Per writer's last discussion with patient on 12/22 when he was of sound mind with appropriate medical capacity, he acknowledged his complex comorbidities and  expressed wishes to continue pursuit of ongoing medical mgnt including HD and remain full code at that time.     Palliative will support and follow up with wife for ongoing goc pending workup and clinical progress. Very guarded prognosis given multitude of complex medical conditions and high risk of reintubation if unable to protect airway due to poor mentation.

## 2022-12-29 NOTE — PROGRESS NOTE ADULT - SUBJECTIVE AND OBJECTIVE BOX
[facility]                                                INFECTIOUS DISEASES  =======================================================                               J Carlos Galdamez MD#    Isatu Rojas MD*                           Perlita Solares MD*   Sumaya Morales MD*            Diplomates American Board of Internal Medicine & Infectious Diseases                  # [place] Office - Appt - Tel  [phone] Fax [phone]                * [place] Office - Appt - Tel [phone] Fax [phone]                                  Hospital Consult line:  [phone]  =======================================================      [facility]-[id number]  JU FERGUSON   follow up for: AMS, h/o enterococcus bacteremia  ON DIALYSIS AT PRESENT  LETHARGIC  RESPONSIVE TO NOXIOUS STIMULI   ASKED TO EVALUATE FROM ID STANDPOINT       I have personally reviewed the labs and data; pertinent labs and data are listed in this note; please see below.   ===================================================  UNABLE TO OBTAIN     =======================================================  Allergies    budesonide (Unknown)  cefpodoxime (Unknown)  Pollen (Unknown)    Intolerances    Antibiotics:  acyclovir IVPB      acyclovir IVPB 320 milliGRAM(s) IV Intermittent every 24 hours  ampicillin  IVPB 2 Gram(s) IV Intermittent every 12 hours  cefTRIAXone Injectable. 2000 milliGRAM(s) IV Push every 12 hours  fluconAZOLE   Tablet 200 milliGRAM(s) Oral <User Schedule>  trimethoprim   80 mG/sulfamethoxazole 400 mG 1 Tablet(s) Oral <User Schedule>    Other medications:  apixaban 2.5 milliGRAM(s) Oral two times a day  atorvastatin 80 milliGRAM(s) Oral at bedtime  chlorhexidine 2% Cloths 1 Application(s) Topical <User Schedule>  dextrose 50% Injectable 25 Gram(s) IV Push once  dextrose 50% Injectable 12.5 Gram(s) IV Push once  dextrose 50% Injectable 25 Gram(s) IV Push once  dextrose Oral Gel 15 Gram(s) Oral once  epoetin ben-epbx (RETACRIT) Injectable 4000 Unit(s) IV Push <User Schedule>  hydrocortisone 10 milliGRAM(s) Oral every 12 hours  metoprolol succinate ER 25 milliGRAM(s) Oral daily  pantoprazole    Tablet 40 milliGRAM(s) Oral before breakfast  sevelamer carbonate 800 milliGRAM(s) Oral three times a day with meals  tacrolimus 1.5 milliGRAM(s) Oral daily  tamsulosin 0.4 milliGRAM(s) Oral at bedtime    ======================================================  Physical Exam:  ====== Vital Signs Last 24 Hrs  T(C): 36.4 (29 Dec 2022 07:40), Max: 36.9 (28 Dec 2022 16:43)  T(F): 97.6 (29 Dec 2022 07:40), Max: 98.5 (28 Dec 2022 16:43)  HR: 86 (29 Dec 2022 07:40) (82 - 105)  BP: 147/68 (29 Dec 2022 07:40) (105/64 - 147/68)  BP(mean): 75 (28 Dec 2022 16:43) (75 - 75)  RR: 18 (29 Dec 2022 07:40) (17 - 18)  SpO2: 100% (29 Dec 2022 07:40) (96% - 100%)    Parameters below as of 29 Dec 2022 07:40  Patient On (Oxygen Delivery Method): nasal cannula  O2 Flow (L/min): 3      General:  LETHARGIC   Eye: no conjunctival pallor, no scleral icterus  HENT: Normocephalic, No pharyngeal erythema, No sinus tenderness.  Neck: Supple, No lymphadenopathy.  Respiratory: Lungs are clear to auscultation, Respirations are non-labored.  Cardiovascular: Normal rate, Regular rhythm,  s1+s2  Gastrointestinal: Soft, Non-tender, Non-distended, Normal bowel sounds.  Genitourinary: No costovertebral angle tenderness.  Lymphatics: No lymphadenopathy neck  Musculoskeletal: Normal range of motion, Normal strength.  Integumentary: No rash.  Neurologic: LETHARGIC OBTUNDED  =======================================================  Labs:                                             10.3   7.52  )-----------( 224      ( 29 Dec 2022 06:06 )             32.1   12-29    141  |  97  |  20.6<H>  ----------------------------<  107<H>  3.9   |  24.0  |  5.94<H>    Ca    9.2      29 Dec 2022 06:06  Phos  4.4     12-29  Mg     1.8     12-29      Culture - Fungal, CSF (collected 12-19-22 @ 17:10)  Source: .CSF CSF    Culture - CSF with Gram Stain (collected 12-19-22 @ 17:10)  Source: .CSF CSF  Gram Stain (12-20-22 @ 15:20):    No polymorphonuclear leukocytes seen per low power field    No organisms seen per oil power field    by cytocentrifuge  Final Report (12-23-22 @ 08:48):    No growth at 3 days.    Culture - Acid Fast - CSF (collected 12-19-22 @ 17:10)  Source: .CSF CSF    Culture - Sputum (collected 12-19-22 @ 16:00)  Source: .Sputum Sputum  Gram Stain (12-20-22 @ 01:59):    Moderate polymorphonuclear leukocytes per low power field    Few Squamous epithelial cells per low power field    Few Gram positive cocci in pairs seen per oil power field    Few Yeast like cells seen per oil power field    Few Gram Negative Rods seen per oil power field  Final Report (12-21-22 @ 23:09):    Normal Respiratory Charis present    Culture - Acid Fast - Body Fluid w/Smear (collected 12-19-22 @ 13:50)  Source: Paracentesis Paracentesis Fluid    Culture - Fungal, Body Fluid (collected 12-19-22 @ 13:50)  Source: Peritoneal Peritoneal Fluid    Culture - Body Fluid with Gram Stain (collected 12-19-22 @ 13:50)  Source: Paracentesis Paracentesis Fluid  Gram Stain (12-19-22 @ 22:30):    No polymorphonuclear leukocytes seen per low power field    No organisms seen per oil power field    Culture - Blood (collected 12-13-22 @ 05:41)  Source: .Blood Blood-Peripheral  Final Report (12-18-22 @ 10:00):    No Growth Final    Culture - Blood (collected 12-13-22 @ 05:41)  Source: .Blood Blood-Peripheral  Final Report (12-18-22 @ 10:00):    No Growth Final

## 2022-12-29 NOTE — PROGRESS NOTE ADULT - SUBJECTIVE AND OBJECTIVE BOX
[facility] Division of Hospital Medicine    Chief Complaint:    AMS    SUBJECTIVE / OVERNIGHT EVENTS:  Mental status unchanged    Unable to perform ROS    MEDICATIONS  (STANDING):  acyclovir IVPB      acyclovir IVPB 320 milliGRAM(s) IV Intermittent every 24 hours  apixaban 2.5 milliGRAM(s) Oral every 12 hours  chlorhexidine 2% Cloths 1 Application(s) Topical <User Schedule>  dextrose 5% + sodium chloride 0.45%. 1000 milliLiter(s) (40 mL/Hr) IV Continuous <Continuous>  dextrose 50% Injectable 25 Gram(s) IV Push once  dextrose 50% Injectable 12.5 Gram(s) IV Push once  dextrose 50% Injectable 25 Gram(s) IV Push once  dextrose Oral Gel 15 Gram(s) Oral once  fluconAZOLE   Tablet 200 milliGRAM(s) Oral <User Schedule>  hydrocortisone 10 milliGRAM(s) Oral every 12 hours  lactulose Syrup 10 Gram(s) Oral every 6 hours  meropenem Injectable      meropenem Injectable 500 milliGRAM(s) IV Push once  metoprolol tartrate 25 milliGRAM(s) Enteral Tube four times a day  pantoprazole  Injectable 40 milliGRAM(s) IV Push daily  sevelamer carbonate 800 milliGRAM(s) Oral three times a day with meals  tacrolimus 1.5 milliGRAM(s) Oral daily  tamsulosin 0.4 milliGRAM(s) Oral at bedtime  trimethoprim   80 mG/sulfamethoxazole 400 mG 1 Tablet(s) Oral <User Schedule>    MEDICATIONS  (PRN):        I&O's Summary    29 Dec 2022 07:01  -  29 Dec 2022 13:25  --------------------------------------------------------  IN: 0 mL / OUT: 1000 mL / NET: -1000 mL        PHYSICAL EXAM:  Vital Signs Last 24 Hrs  T(C): 36.4 (29 Dec 2022 12:58), Max: 36.9 (28 Dec 2022 16:43)  T(F): 97.5 (29 Dec 2022 12:58), Max: 98.5 (28 Dec 2022 16:43)  HR: 89 (29 Dec 2022 12:58) (77 - 105)  BP: 119/72 (29 Dec 2022 12:58) (105/64 - 147/68)  BP(mean): 75 (28 Dec 2022 16:43) (75 - 75)  RR: 18 (29 Dec 2022 10:45) (17 - 18)  SpO2: 98% (29 Dec 2022 12:58) (96% - 100%)    Parameters below as of 29 Dec 2022 10:45  Patient On (Oxygen Delivery Method): nasal cannula  O2 Flow (L/min): 3          CONSTITUTIONAL: supine, Altered  ENMT: Moist oral mucosa, no pharyngeal injection or exudates; normal dentition  RESPIRATORY: Normal respiratory effort; lungs are clear to auscultation bilaterally  CARDIOVASCULAR: Regular rate and rhythm, normal S1 and S2, no murmur/rub/gallop; Peripheral pulses are 2+ bilaterally  ABDOMEN: Nontender to palpation, normoactive bowel sounds, no rebound/guarding;   MUSCLOSKELETAL:  No clubbing or cyanosis of digits; no joint swelling or tenderness to palpation  PSYCH: A+O x 0;   NEUROLOGY: Not alert, not following commands   SKIN: No rashes; no palpable lesions    LABS:                        10.3   7.52  )-----------( 224      ( 29 Dec 2022 06:06 )             32.1     12-29    141  |  97  |  20.6<H>  ----------------------------<  107<H>  3.9   |  24.0  |  5.94<H>    Ca    9.2      29 Dec 2022 06:06  Phos  4.4     12-29  Mg     1.8     12-29      PTT - ( 28 Dec 2022 10:40 )  PTT:75.0 sec          Culture - Blood (collected 28 Dec 2022 06:45)  Source: .Blood Blood  Preliminary Report (29 Dec 2022 13:02):    No growth to date.    Culture - Blood (collected 28 Dec 2022 06:40)  Source: .Blood Blood  Preliminary Report (29 Dec 2022 13:02):    No growth to date.      CAPILLARY BLOOD GLUCOSE      POCT Blood Glucose.: 73 mg/dL (29 Dec 2022 12:11)  POCT Blood Glucose.: 125 mg/dL (29 Dec 2022 06:43)  POCT Blood Glucose.: 138 mg/dL (29 Dec 2022 00:13)  POCT Blood Glucose.: 140 mg/dL (28 Dec 2022 18:34)        RADIOLOGY & ADDITIONAL TESTS:  Results Reviewed:   Imaging Personally Reviewed:  Electrocardiogram Personally Reviewed:

## 2022-12-29 NOTE — CONSULT NOTE ADULT - ASSESSMENT
#metabolic encephalopathy  #persistent positive cryptococcal antigen in   #EBV in CSF   #sepsis   #aspiration pneumonia  #ESRD   #hx of bilateral lung transplant  #hx of cryptococcal meningitis   #HFrEF  #PAifb, with afib rvr, controlled with metoprolol   #cirrhosis  #dialted CBD s/p MRCP 12/25 - normal range     - will resume EEG  - will hold off eliquis, and transition to heparin drip  - possible LP   - add on vancomycin in addition to meropenum  - sent sputum culture  - continue lactulose   - MRI brain 12/25 - normal   - crpto ag+, pcr -   - NPO for now  - aspiration precaution  - chest PT   - appreciate neuro recs   - appreciate renal recs, will continue HD with fluid removal   - will obtain tac level  - admit to MICU, prognosis guarded, wife and daughter updated .

## 2022-12-30 NOTE — PROGRESS NOTE ADULT - SUBJECTIVE AND OBJECTIVE BOX
[facility]                                        Neurology at [place]                                 Jose Antonio Brooks, & [facility]                                  [address]                                        [place], [place], [address]                                             [phone]        CC: Encephalopathy    HPI:   The patient is a 72y Male with multiple medical issues who was recently hospitalized with hypoxic respiratory failure.  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   He was ultimately discharged 11/23/22.   He now presented with altered mental status and lethargy.   Neurology asked to evaluation for meningitis. LP attempted (neuro SHEELA) and was unsuccessful while in ER.    The patient had been gradually improving with regard to mental status, but has deteriorated again over the past few days.   Ultimately transferred back to ICU.    Interim history:  Now in ICU ([facility]).     ROS:   Unobtainable due to patient's condition.     MEDICATIONS  (STANDING):  acyclovir IVPB      acyclovir IVPB 320 milliGRAM(s) IV Intermittent every 24 hours  chlorhexidine 2% Cloths 1 Application(s) Topical <User Schedule>  dextrose Oral Gel 15 Gram(s) Oral once  fluconAZOLE   Tablet 200 milliGRAM(s) Oral <User Schedule>  heparin  Infusion.  Unit(s)/Hr (11 mL/Hr) IV Continuous <Continuous>  hydrocortisone 10 milliGRAM(s) Oral every 12 hours  lactulose Syrup 10 Gram(s) Oral every 6 hours  meropenem Injectable      meropenem Injectable 500 milliGRAM(s) IV Push every 24 hours  metoprolol tartrate 25 milliGRAM(s) Enteral Tube four times a day  pantoprazole  Injectable 40 milliGRAM(s) IV Push daily  sevelamer carbonate 800 milliGRAM(s) Oral three times a day with meals  tacrolimus 1.5 milliGRAM(s) Oral daily  tamsulosin 0.4 milliGRAM(s) Oral at bedtime  trimethoprim   80 mG/sulfamethoxazole 400 mG 1 Tablet(s) Oral <User Schedule>  vancomycin  IVPB      vancomycin  IVPB 1000 milliGRAM(s) IV Intermittent every 24 hours    Vital Signs Last 24 Hrs  T(C): 35.8 (30 Dec 2022 10:00), Max: 36.4 (29 Dec 2022 12:58)  T(F): 96.4 (30 Dec 2022 10:00), Max: 97.5 (29 Dec 2022 12:58)  HR: 69 (30 Dec 2022 10:00) (67 - 104)  BP: 121/60 (30 Dec 2022 10:00) (94/62 - 159/78)  BP(mean): 78 (30 Dec 2022 10:00) (68 - 141)  RR: 10 (30 Dec 2022 10:00) (10 - 24)  SpO2: 100% (30 Dec 2022 10:00) (98% - 100%)    Parameters below as of 30 Dec 2022 04:00  Patient On (Oxygen Delivery Method): nasal cannula    Detailed Neurologic Exam:    Mental status: No response to noxious stimuli. Not following any instructions.    Cranial nerves: Pupils react symmetrically to light. No blink to threat from either side. Not tracking with gaze today. Face is grossly symmetric. Palate and tongue cannot be assessed.     Motor/Sensory: There is normal bulk and tone.  Symmetric (although minimal) limb movement to stimuli.     Reflexes: 1+ throughout and plantar responses are flexor.    Cerebellar: Cannot be tested.     Labs:     12-30    140  |  99  |  11.4  ----------------------------<  95  4.0   |  28.0  |  3.83<H>    Ca    8.8      30 Dec 2022 04:30  Phos  4.1     12-30  Mg     1.8     12-30    TPro  5.0<L>  /  Alb  2.4<L>  /  TBili  0.3<L>  /  DBili  x   /  AST  31  /  ALT  7   /  AlkPhos  190<H>  12-30                            10.6   6.07  )-----------( 193      ( 30 Dec 2022 04:30 )             33.6       Brain MRI: There is no acute pathology.   There is some chronic ischemic change.     EEG shows:  Abundant, long, generalized periodic discharges with triphasic morphology <0.5-1 Hz, wakefulness state.   Moderate generalized slowing.  Multiple push button events for myoclonic movements effecting different body parts are without associated electrographic abnormalities.

## 2022-12-30 NOTE — EEG REPORT - NS EEG TEXT BOX
[facility]   COMPREHENSIVE EPILEPSY CENTER   REPORT OF LONG-TERM VIDEO EEG     [facility]: [address], [place] [address], Ph#: [phone]  [facility]: [address], [place] [address], Ph#: [phone]  [facility]: [address] [place] [address], Ph#: [phone]    Patient Name: JU FERGUSON  Age and : 72y (46)  MRN #: [id number]  Location: [facility] 4WR [id number] 01  Referring Physician: Gladis Ko    Start Time/Date: 22  End Time/Date: 800 on 22  Duration:  13 hr 38 min     _____________________________________________________________  STUDY INFORMATION    EEG Recording Technique:  The patient underwent continuous Video-EEG monitoring, using Telemetry System hardware on the XLTek Digital System. EEG and video data were stored on a computer hard drive with important events saved in digital archive files. The material was reviewed by a physician (electroencephalographer / epileptologist) on a daily basis. Brad and seizure detection algorithms were utilized and reviewed. An EEG Technician attended to the patient, and was available throughout daytime work hours.  The epilepsy center neurologist was available in person or on call 24-hours per day.    EEG Placement and Labeling of Electrodes:  The EEG was performed utilizing 20 channel referential EEG connections (coronal over temporal over parasagittal montage) using all standard 10-20 electrode placements with EKG, with additional electrodes placed in the inferior temporal region using the modified 10-10 montage electrode placements for elective admissions, or if deemed necessary. Recording was at a sampling rate of 256 samples per second per channel. Time synchronized digital video recording was done simultaneously with EEG recording. A low light infrared camera was used for low light recording.     _____________________________________________________________  HISTORY    Patient is a 72y old  Male who presents with a chief complaint of Encephalopathy (21 Dec 2022 11:30)      MEDICATIONS  (STANDING):  acyclovir IVPB      lactulose Syrup 10 Gram(s) Oral every 6 hours  meropenem Injectable 500 milliGRAM(s) IV Push every 24 hours  sevelamer carbonate 800 milliGRAM(s) Oral three times a day with meals  tacrolimus 1.5 milliGRAM(s) Oral daily  trimethoprim   80 mG/sulfamethoxazole 400 mG 1 Tablet(s) Oral <User Schedule>  vancomycin  IVPB 1000 milliGRAM(s) IV Intermittent every 24 hours      _____________________________________________________________  STUDY INTERPRETATION    Findings: The background was reactive and continuous there were periods of brief attenuation during clinically silent state (<%1). During wakefulness, no PDR seen.    Background Slowing:  Diffuse theta and polymorphic delta slowing.    Focal Slowing:   None were present.    Sleep Background:  Drowsiness was characterized by fragmentation, attenuation, and slowing of the background activity.    Stage II sleep transients were not recorded.    Other Non-Epileptiform Findings:  Occasional, intermediate duration, generalized periodic discharges with triphasic morphology <0.5-1 Hz, wakefulness state.     Interictal Epileptiform Activity:   None were present.    Seizures:  Myoclonic seizure, train of 1 Hz generalized periodic discharges (Cz/Fz max) time locked with left hemibody clonus occurred at 23:39 lasted 60 seconds, clinically left leg > arm myoclonus, asleep state.     Other events:   Isolated myoclonic jerks - shoulder, jaw, body stiffening are without a clear ictal correlate    Activation Procedures:   Hyperventilation was not performed.    Photic stimulation was not performed.     Artifacts:  Intermittent myogenic and movement artifacts were noted.    ECG:  The heart rate on single channel ECG was irregularly irregular predominantly between  BPM.    _____________________________________________________________  EEG SUMMARY/CLASSIFICATION    Abnormal EEG in an altered patient.    - Myoclonic seizure, generalized, train of generalized periodic discharges time locked with left hemibody clonus captured at  23:39   - Isolated myoclonic jerks, without a clear ictal correlate   - Generalized periodic discharges with triphasic morphology <0.5-1 Hz, wakefulness state.   - Moderate generalized slowing.    _____________________________________________________________  EEG IMPRESSION/CLINICAL CORRELATE    Abnormal EEG study.      1. Myoclonic seizure, exhibited as left hemibody clonus (leg > arm) captured at  23:39  2. Isolated myoclonic jerks throughout the study without a clear ictal correlate.   3. Generalized periodic discharges with triphasic morphology are commonly seen in toxic- metabolic encephalopathy, rarely epileptic   4. Moderate nonspecific diffuse or multifocal cerebral dysfunction.       _____________________________________________________________      Shagufta May MD   Epilepsy Fellow, [facility]	    Johnny Carlin MD  EEG/Epilepsy Attending     ------------------------------------  EEG Reading Room: [phone]  On Call Service After Hours: [phone] [facility]   COMPREHENSIVE EPILEPSY CENTER   REPORT OF LONG-TERM VIDEO EEG     [facility]: [address], [place] [address], Ph#: [phone]  [facility]: [address], [place] [address], Ph#: [phone]  [facility]: [address] [place] [address], Ph#: [phone]    Patient Name: JU FERGUSON  Age and : 72y (46)  MRN #: [id number]  Location: [facility] 4WR 4211 01  Referring Physician: Gladis Ko    Start Time/Date: 22  End Time/Date: 800 on 22  Duration:  13 hr 38 min     _____________________________________________________________  STUDY INFORMATION    EEG Recording Technique:  The patient underwent continuous Video-EEG monitoring, using Telemetry System hardware on the XLTek Digital System. EEG and video data were stored on a computer hard drive with important events saved in digital archive files. The material was reviewed by a physician (electroencephalographer / epileptologist) on a daily basis. Brad and seizure detection algorithms were utilized and reviewed. An EEG Technician attended to the patient, and was available throughout daytime work hours.  The epilepsy center neurologist was available in person or on call 24-hours per day.    EEG Placement and Labeling of Electrodes:  The EEG was performed utilizing 20 channel referential EEG connections (coronal over temporal over parasagittal montage) using all standard 10-20 electrode placements with EKG, with additional electrodes placed in the inferior temporal region using the modified 10-10 montage electrode placements for elective admissions, or if deemed necessary. Recording was at a sampling rate of 256 samples per second per channel. Time synchronized digital video recording was done simultaneously with EEG recording. A low light infrared camera was used for low light recording.     _____________________________________________________________  HISTORY    Patient is a 72y old  Male who presents with a chief complaint of Encephalopathy (21 Dec 2022 11:30)      MEDICATIONS  (STANDING):  acyclovir IVPB      lactulose Syrup 10 Gram(s) Oral every 6 hours  meropenem Injectable 500 milliGRAM(s) IV Push every 24 hours  sevelamer carbonate 800 milliGRAM(s) Oral three times a day with meals  tacrolimus 1.5 milliGRAM(s) Oral daily  trimethoprim   80 mG/sulfamethoxazole 400 mG 1 Tablet(s) Oral <User Schedule>  vancomycin  IVPB 1000 milliGRAM(s) IV Intermittent every 24 hours      _____________________________________________________________  STUDY INTERPRETATION    Findings: The background was reactive and continuous there were periods of brief attenuation during clinically silent state (<%1). During wakefulness, no PDR seen.    Background Slowing:  Diffuse theta and polymorphic delta slowing.    Focal Slowing:   None were present.    Sleep Background:  Drowsiness was characterized by fragmentation, attenuation, and slowing of the background activity.    Stage II sleep transients were not recorded.    Other Non-Epileptiform Findings:  Occasional, intermediate duration, generalized periodic discharges with triphasic morphology <0.5-1 Hz, wakefulness state.     Interictal Epileptiform Activity:   None were present.    Seizures:  Myoclonic seizure, train of 1 Hz generalized periodic discharges (Cz/Fz max) time locked with left hemibody clonus occurred at 23:39 lasted 60 seconds, clinically left leg > arm myoclonus, asleep state.     Other events:   Isolated myoclonic jerks - shoulder, jaw, body stiffening are without a clear ictal correlate    Activation Procedures:   Hyperventilation was not performed.    Photic stimulation was not performed.     Artifacts:  Intermittent myogenic and movement artifacts were noted.    ECG:  The heart rate on single channel ECG was irregularly irregular predominantly between  BPM.    _____________________________________________________________  EEG SUMMARY/CLASSIFICATION    Abnormal EEG in an altered patient.  1. Myoclonic seizure, generalized, train of generalized periodic discharges time locked with left hemibody clonus captured at  23:39   2. Isolated myoclonic jerks, without a clear ictal correlate   3. Generalized periodic discharges with triphasic morphology <0.5-1 Hz, wakefulness state.   4. Moderate generalized slowing.    _____________________________________________________________  EEG IMPRESSION/CLINICAL CORRELATE    Abnormal EEG study.  1. Myoclonic seizure, exhibited as left hemibody clonus (leg > arm) captured at  23:39  2. Isolated myoclonic jerks throughout the study without a clear ictal correlate.   3. Generalized periodic discharges with triphasic morphology are commonly seen in toxic- metabolic encephalopathy, rarely epileptic   4. Moderate nonspecific diffuse or multifocal cerebral dysfunction.       _____________________________________________________________      Shagufta May MD   Epilepsy Fellow, [facility]	    Johnny Carlin MD  EEG/Epilepsy Attending     ------------------------------------  EEG Reading Room: [phone]  On Call Service After Hours: [phone] [facility]   COMPREHENSIVE EPILEPSY CENTER   REPORT OF LONG-TERM VIDEO EEG     [facility]: [address], [place] [address], Ph#: [phone]  [facility]: [address], [place] [address], Ph#: [phone]  [facility]: [address] [place] [address], Ph#: [phone]    Patient Name: JU FERGUSON  Age and : 72y (46)  MRN #: [id number]  Location: [facility] 4WR 4211 01  Referring Physician: Gladis Ko    Start Time/Date: 22  End Time/Date: 800 on 22  Duration:  13 hr 38 min     _____________________________________________________________  STUDY INFORMATION    EEG Recording Technique:  The patient underwent continuous Video-EEG monitoring, using Telemetry System hardware on the XLTek Digital System. EEG and video data were stored on a computer hard drive with important events saved in digital archive files. The material was reviewed by a physician (electroencephalographer / epileptologist) on a daily basis. Brad and seizure detection algorithms were utilized and reviewed. An EEG Technician attended to the patient, and was available throughout daytime work hours.  The epilepsy center neurologist was available in person or on call 24-hours per day.    EEG Placement and Labeling of Electrodes:  The EEG was performed utilizing 20 channel referential EEG connections (coronal over temporal over parasagittal montage) using all standard 10-20 electrode placements with EKG, with additional electrodes placed in the inferior temporal region using the modified 10-10 montage electrode placements for elective admissions, or if deemed necessary. Recording was at a sampling rate of 256 samples per second per channel. Time synchronized digital video recording was done simultaneously with EEG recording. A low light infrared camera was used for low light recording.     _____________________________________________________________  HISTORY    Patient is a 72y old  Male who presents with a chief complaint of Encephalopathy (21 Dec 2022 11:30)      MEDICATIONS  (STANDING):  acyclovir IVPB      lactulose Syrup 10 Gram(s) Oral every 6 hours  meropenem Injectable 500 milliGRAM(s) IV Push every 24 hours  sevelamer carbonate 800 milliGRAM(s) Oral three times a day with meals  tacrolimus 1.5 milliGRAM(s) Oral daily  trimethoprim   80 mG/sulfamethoxazole 400 mG 1 Tablet(s) Oral <User Schedule>  vancomycin  IVPB 1000 milliGRAM(s) IV Intermittent every 24 hours      _____________________________________________________________  STUDY INTERPRETATION    Findings: The background was reactive and continuous there were periods of brief attenuation during clinically silent state (<%1). During wakefulness, no PDR seen.    Background Slowing:  Diffuse theta and polymorphic delta slowing.    Focal Slowing:   None were present.    Sleep Background:  Drowsiness was characterized by fragmentation, attenuation, and slowing of the background activity.    Stage II sleep transients were not recorded.    Other Non-Epileptiform Findings:  Occasional, intermediate duration, generalized periodic discharges with triphasic morphology <0.5-1 Hz, wakefulness state.     Interictal Epileptiform Activity:   None were present.    Seizures:  Myoclonic seizure, train of 1 Hz generalized periodic discharges (Cz/Fz max) time locked with left hemibody clonus occurred at 23:39 lasted 60 seconds, clinically left leg > arm myoclonus, asleep state.     Other events:   Isolated myoclonic jerks - shoulder, jaw, body stiffening are without a clear ictal correlate    Activation Procedures:   Hyperventilation was not performed.    Photic stimulation was not performed.     Artifacts:  Intermittent myogenic and movement artifacts were noted.    ECG:  The heart rate on single channel ECG was irregularly irregular predominantly between  BPM.    _____________________________________________________________  EEG SUMMARY/CLASSIFICATION    Abnormal EEG in an altered patient.  1. Myoclonic seizure, generalized, train of generalized periodic discharges time locked with left hemibody clonus captured at  23:39   2. Isolated myoclonic jerks, without a clear ictal correlate   3. Generalized periodic discharges with triphasic morphology <0.5-1 Hz, wakefulness state.   4. Moderate generalized slowing.    _____________________________________________________________  EEG IMPRESSION/CLINICAL CORRELATE    Abnormal EEG study.  1. Myoclonic seizure, exhibited as left hemibody clonus (leg > arm) captured at  23:39  2. Isolated myoclonic jerks throughout the study without a clear ictal correlate.   3. Generalized periodic discharges with triphasic morphology are commonly seen in toxic- metabolic encephalopathy, rarely epileptic   4. Moderate nonspecific diffuse or multifocal cerebral dysfunction.     This is a preliminary read  _____________________________________________________________      Shagufta May MD   Epilepsy Fellow, [facility]	    ------------------------------------  EEG Reading Room: [phone]  On Call Service After Hours: [phone] [facility]   COMPREHENSIVE EPILEPSY CENTER   REPORT OF LONG-TERM VIDEO EEG     [facility]: [address] [place] [address], Ph#: [phone]  [facility]: [address], [place] [address], Ph#: [phone]  [facility]: [address] [place] [address], Ph#: [phone]    Patient Name: JU FERGUSON  Age and : 72y (46)  MRN #: [id number]  Location: [facility] 4WR 4211 01  Referring Physician: Gladis Ko    Start Time/Date: 22  End Time/Date: 800 on 22  Duration:  13 hr 38 min     _____________________________________________________________  STUDY INFORMATION    EEG Recording Technique:  The patient underwent continuous Video-EEG monitoring, using Telemetry System hardware on the XLTek Digital System. EEG and video data were stored on a computer hard drive with important events saved in digital archive files. The material was reviewed by a physician (electroencephalographer / epileptologist) on a daily basis. Brad and seizure detection algorithms were utilized and reviewed. An EEG Technician attended to the patient, and was available throughout daytime work hours.  The epilepsy center neurologist was available in person or on call 24-hours per day.    EEG Placement and Labeling of Electrodes:  The EEG was performed utilizing 20 channel referential EEG connections (coronal over temporal over parasagittal montage) using all standard 10-20 electrode placements with EKG, with additional electrodes placed in the inferior temporal region using the modified 10-10 montage electrode placements for elective admissions, or if deemed necessary. Recording was at a sampling rate of 256 samples per second per channel. Time synchronized digital video recording was done simultaneously with EEG recording. A low light infrared camera was used for low light recording.     _____________________________________________________________  HISTORY    Patient is a 72y old  Male who presents with a chief complaint of Encephalopathy (21 Dec 2022 11:30)      MEDICATIONS  (STANDING):  acyclovir IVPB      lactulose Syrup 10 Gram(s) Oral every 6 hours  meropenem Injectable 500 milliGRAM(s) IV Push every 24 hours  sevelamer carbonate 800 milliGRAM(s) Oral three times a day with meals  tacrolimus 1.5 milliGRAM(s) Oral daily  trimethoprim   80 mG/sulfamethoxazole 400 mG 1 Tablet(s) Oral <User Schedule>  vancomycin  IVPB 1000 milliGRAM(s) IV Intermittent every 24 hours      _____________________________________________________________  STUDY INTERPRETATION    Findings: The background was reactive and continuous there were periods of brief attenuation during clinically silent state (<%1). During wakefulness, no PDR seen.    Background Slowing:  Diffuse theta and polymorphic delta slowing.    Focal Slowing:   None were present.    Sleep Background:  Drowsiness was characterized by fragmentation, attenuation, and slowing of the background activity.    Stage II sleep transients were not recorded.    Other Non-Epileptiform Findings:  Occasional, intermediate duration, generalized periodic discharges with triphasic morphology <0.5-1 Hz, wakefulness state.     Interictal Epileptiform Activity:   None were present.    Seizures:  Myoclonic seizure, train of 1 Hz generalized periodic discharges (Cz/Fz max) time locked with left hemibody clonus occurred at 23:39 lasted 60 seconds, clinically left leg > arm myoclonus, asleep state.     Other events:   Isolated myoclonic jerks - shoulder, jaw, body stiffening are without a clear ictal correlate    Activation Procedures:   Hyperventilation was not performed.    Photic stimulation was not performed.     Artifacts:  Intermittent myogenic and movement artifacts were noted.    ECG:  The heart rate on single channel ECG was irregularly irregular predominantly between  BPM.    _____________________________________________________________  EEG SUMMARY/CLASSIFICATION    Abnormal EEG in an altered patient.  1. Myoclonic seizure, generalized, train of generalized periodic discharges time locked with left hemibody clonus captured at  23:39   2. Isolated myoclonic jerks, without a clear ictal correlate   3. Generalized periodic discharges with triphasic morphology <0.5-1 Hz, wakefulness state.   4. Moderate generalized slowing.    _____________________________________________________________  EEG IMPRESSION/CLINICAL CORRELATE    Abnormal EEG study.  1. Myoclonic seizure, exhibited as left hemibody clonus (leg > arm) captured at  23:39  2. Isolated myoclonic jerks throughout the study without a clear EEG correlate.   3. Generalized periodic discharges with triphasic morphology are commonly seen in toxic- metabolic encephalopathy, rarely epileptic   4. Moderate nonspecific diffuse or multifocal cerebral dysfunction.     _____________________________________________________________      Shagufta May MD   Epilepsy Fellow, [facility]	    Derik Tate MD PhD  Director, Epilepsy Division, [facility]   ------------------------------------  EEG Reading Room: [phone]  On Call Service After Hours: [phone]

## 2022-12-30 NOTE — CHART NOTE - NSCHARTNOTEFT_GEN_A_CORE
[facility]                                        [facility]                                 [facility], [facility]                                  [address]                                        [place], [place], [address]                                             [phone]          Addendum:       EEG continues to show triphasic waveforms consistent with metabolic encephalopathy, however, now also with myoclonic activity and at least one myoclonic seizure.    Will begin IV Valproic acid. 1000 mg bolus with 250 mg q 8 h.   Follow level.

## 2022-12-30 NOTE — PROGRESS NOTE ADULT - SUBJECTIVE AND OBJECTIVE BOX
[facility]                                                INFECTIOUS DISEASES  =======================================================                               J Carlos Galdamez MD#    Isatu Rojas MD*                           Perlita Solares MD*   Sumaya Morales MD*            Diplomates American Board of Internal Medicine & Infectious Diseases                  # [place] Office - Appt - Tel  [phone] Fax [phone]                * [place] Office - Appt - Tel [phone] Fax [phone]                                  Hospital Consult line:  [phone]  =======================================================      [facility]-[id number]  JU FERGUSON   follow up for: AMS, h/o enterococcus bacteremia   T  LETHARGIC  RESPONSIVE TO NOXIOUS STIMULI    T       I have personally reviewed the labs and data; pertinent labs and data are listed in this note; please see below.   ===================================================  UNABLE TO OBTAIN     =======================================================  Allergies    budesonide (Unknown)  cefpodoxime (Unknown)  Pollen (Unknown)    Intolerances    Antibiotics:  acyclovir IVPB      acyclovir IVPB 320 milliGRAM(s) IV Intermittent every 24 hours  ampicillin  IVPB 2 Gram(s) IV Intermittent every 12 hours  cefTRIAXone Injectable. 2000 milliGRAM(s) IV Push every 12 hours  fluconAZOLE   Tablet 200 milliGRAM(s) Oral <User Schedule>  trimethoprim   80 mG/sulfamethoxazole 400 mG 1 Tablet(s) Oral <User Schedule>    Other medications:  apixaban 2.5 milliGRAM(s) Oral two times a day  atorvastatin 80 milliGRAM(s) Oral at bedtime  chlorhexidine 2% Cloths 1 Application(s) Topical <User Schedule>  dextrose 50% Injectable 25 Gram(s) IV Push once  dextrose 50% Injectable 12.5 Gram(s) IV Push once  dextrose 50% Injectable 25 Gram(s) IV Push once  dextrose Oral Gel 15 Gram(s) Oral once  epoetin ben-epbx (RETACRIT) Injectable 4000 Unit(s) IV Push <User Schedule>  hydrocortisone 10 milliGRAM(s) Oral every 12 hours  metoprolol succinate ER 25 milliGRAM(s) Oral daily  pantoprazole    Tablet 40 milliGRAM(s) Oral before breakfast  sevelamer carbonate 800 milliGRAM(s) Oral three times a day with meals  tacrolimus 1.5 milliGRAM(s) Oral daily  tamsulosin 0.4 milliGRAM(s) Oral at bedtime    ======================================================  Physical Exam:  == ICU Vital Signs Last 24 Hrs  T(C): 35.8 (30 Dec 2022 11:00), Max: 36.4 (29 Dec 2022 12:58)  T(F): 96.4 (30 Dec 2022 11:00), Max: 97.5 (29 Dec 2022 12:58)  HR: 70 (30 Dec 2022 11:00) (67 - 104)  BP: 129/61 (30 Dec 2022 11:00) (94/62 - 159/78)  BP(mean): 82 (30 Dec 2022 11:00) (68 - 141)  ABP: --  ABP(mean): --  RR: 21 (30 Dec 2022 11:00) (10 - 24)  SpO2: 100% (30 Dec 2022 11:00) (98% - 100%)    O2 Parameters below as of 30 Dec 2022 04:00  Patient On (Oxygen Delivery Method): nasal cannula              General:  LETHARGIC   Eye: no conjunctival pallor, no scleral icterus  HENT: Normocephalic, No pharyngeal erythema, No sinus tenderness.  Neck: Supple, No lymphadenopathy.  Respiratory: Lungs are clear to auscultation, Respirations are non-labored.  Cardiovascular: Normal rate, Regular rhythm,  s1+s2  Gastrointestinal: Soft, Non-tender, Non-distended, Normal bowel sounds.  Genitourinary: No costovertebral angle tenderness.  Lymphatics: No lymphadenopathy neck  Musculoskeletal: Normal range of motion, Normal strength.  Integumentary: No rash.  Neurologic: LETHARGIC OBTUNDED  =======================================================  Labs:                                  10.6   6.07  )-----------( 193      ( 30 Dec 2022 04:30 )             33.6   12-30    140  |  99  |  11.4  ----------------------------<  95  4.0   |  28.0  |  3.83<H>    Ca    8.8      30 Dec 2022 04:30  Phos  4.1     12-30  Mg     1.8     12-30    TPro  5.0<L>  /  Alb  2.4<L>  /  TBili  0.3<L>  /  DBili  x   /  AST  31  /  ALT  7   /  AlkPhos  190<H>  12-30      Culture - Fungal, CSF (collected 12-19-22 @ 17:10)  Source: .CSF CSF    Culture - CSF with Gram Stain (collected 12-19-22 @ 17:10)  Source: .CSF CSF  Gram Stain (12-20-22 @ 15:20):    No polymorphonuclear leukocytes seen per low power field    No organisms seen per oil power field    by cytocentrifuge  Final Report (12-23-22 @ 08:48):    No growth at 3 days.    Culture - Acid Fast - CSF (collected 12-19-22 @ 17:10)  Source: .CSF CSF    Culture - Sputum (collected 12-19-22 @ 16:00)  Source: .Sputum Sputum  Gram Stain (12-20-22 @ 01:59):    Moderate polymorphonuclear leukocytes per low power field    Few Squamous epithelial cells per low power field    Few Gram positive cocci in pairs seen per oil power field    Few Yeast like cells seen per oil power field    Few Gram Negative Rods seen per oil power field  Final Report (12-21-22 @ 23:09):    Normal Respiratory Charis present    Culture - Acid Fast - Body Fluid w/Smear (collected 12-19-22 @ 13:50)  Source: Paracentesis Paracentesis Fluid    Culture - Fungal, Body Fluid (collected 12-19-22 @ 13:50)  Source: Peritoneal Peritoneal Fluid    Culture - Body Fluid with Gram Stain (collected 12-19-22 @ 13:50)  Source: Paracentesis Paracentesis Fluid  Gram Stain (12-19-22 @ 22:30):    No polymorphonuclear leukocytes seen per low power field    No organisms seen per oil power field    Culture - Blood (collected 12-13-22 @ 05:41)  Source: .Blood Blood-Peripheral  Final Report (12-18-22 @ 10:00):    No Growth Final    Culture - Blood (collected 12-13-22 @ 05:41)  Source: .Blood Blood-Peripheral  Final Report (12-18-22 @ 10:00):    No Growth Final

## 2022-12-30 NOTE — PROGRESS NOTE ADULT - ASSESSMENT
72M with PMH of  hypertension, hyperlipidemia, ESRD on HD, emphysema s/p lung transplant in [place] by Dr. 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 faecalis bacteremia, newly diagnosed HFrEF 25%, AF complicated by GIB was discharged on home IV Abx now presenting with AMS  Presented to the ED with daughter. CTH obtained and did not reveal any acute findings.  Patient was admitted to medicine for encephalopathy. LP attempted bedside but unsuccessful. Was on empiric treatment and mental status was improving and then ultimately intubated for aspiration pneumonia    #Acute hypoxic respiratory failure likely 2/2 aspiration- now extubated, mental status improved. sputum cx normal keerthi    #AMS-unclear etiology, no fever. ammonia level normal.  ct c/ap ?pna/edema/ascites.   ??EBV encephalitis  s/p LP normal cell counts glucose and protein 51. CSF pcr (-) and cultures pending.   [facility] micro lab CSF gram stain reported as "few organisms" . gram stain repeated at core lab and micro and is NEGATIVE. initial report has been corrected. CSF crytp titer 1:40, as per [facility] his initial CSF titer in 12/2020 was 1:2560. no serum titer available. 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 f/u CSf fungal CX. 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 CRISTIAN was deferred by cardio on recent admission.  resumed ceftriaxone /ampicillin as previously planned. f/u BCX ngtd, repeat TTE    # s/p lung transplant- per transplant team, cellcept  on hold as per pt wife discussion with [facility] transplant team, currently on cortef, c/w tacrolimus and check levels. c/w bactrim per home dose for ppx, fluc resumed    # h/o fungal meningitis- resumed fluc    AS ABOVE PT WAS TREATED AND WAS CLINICALLY IMPROVED AND NOW WITH DECLINE  WITH DECREASED MENTAL STATUS  WILL D/W HOSPITALIST     REPEAT IMAGING OF CHEST  NO OBVIOUS ASPIRATION   S/P  ICU EVAL AND TRANSFER   WOULD CONSIDER REPEAT LP AS HX OF FUNGAL MENINGITIS IN PAST    WOULD CONSIDER  SEND   CRYPT AG IN CSF CELL COUNT CHEMISTRIES    AND REPEAT SERUM CRYPT ANTIGEN   WILL  RESTART AMPHO B FLUCYTOSINE  AS PT UNABLE TO GET LP TODAY DUE TO ELIQUIS    D/W  ICU TEAM

## 2022-12-30 NOTE — PROGRESS NOTE ADULT - ASSESSMENT
72y Male with multiple medical issues now with encephalopathy.     Encephalopathy.   Mental status waxing and waning.   Likely toxic metabolic secondary to multiple medical issues.   ?Hepatic component.   EEG findings strongly suggestive of toxic metabolic etiology.    Meningitis.   Antibiotic coverage for now per ID.   crypto Ag (+) but PCR (-)  EBV detected by PCR in CSF, low level, unclear significance.  ID following.   Possible repeat LP.

## 2022-12-30 NOTE — PROGRESS NOTE ADULT - SUBJECTIVE AND OBJECTIVE BOX
JU FERGUSON  MRN-[id number]  Patient is a 72y old  Male who presents with a chief complaint of Encephalopathy (21 Dec 2022 11:30)    HPI:  72M with PMH of  hypertension, hyperlipidemia, ESRD on HD, emphysema s/p lung transplant in [place] by Dr. 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 feacalis bacteremia, newly diagnosed HFrEF 25%, AF complicated by GIB was discharged on home IV Abx now presenting with AMS    Patient unable to contribute to history secondary to mental status. History obtained by EMR and Spouse.  Patient was discharged day prior to Thanksgiving with home iv abx. Per Spouse has been receiving IV abx administered by wife and daughter. Has not missed any HD sessions with exception to today.  Since discharge has been becoming increasingly tired. Wife attributes this to not sleeping well. Furthermore patient complained of severe nausea yesterday afternoon but did not have emesis. Approx 1 hour after that became more ''confused and out of it'' but still talking. Symptoms continued to progress and get worse throughout the night and in the morning was only moaning and extremely lethargic.     No recent cough, cp, sob. Denies recent fevers, chills.     Presented to the ED with daughter. CTH obtained and did not reveal any acute findings.  Patient was admitted to medicine for encephalopathy.   (13 Dec 2022 08:45)      Events in last 24 hours:     Today pt was on continuous EEG and was noted to have abnormal EEG- reading 1. Myoclonic seizure, generalized, train of generalized periodic discharges time locked with left hemibody clonus captured at 12/29 23:39   2. Isolated myoclonic jerks, without a clear ictal correlate, 3. Generalized periodic discharges with triphasic morphology <0.5-1 Hz, wakefulness state. 4. Moderate generalized slowing.    -Pt was started on Valproic Acid as per neuro and epileptology  -Pt needs LP, but was given dose of Eliquis last night, will plan for LP at some point tm     REVIEW OF SYSTEMS:  ROS not able to be obtained pt is intubated and sed    Physical Exam:  Vital Signs Last 24 Hrs  T(C): 35.9 (30 Dec 2022 15:00), Max: 36.4 (29 Dec 2022 23:00)  T(F): 96.6 (30 Dec 2022 15:00), Max: 97.5 (29 Dec 2022 23:00)  HR: 72 (30 Dec 2022 15:00) (63 - 95)  BP: 122/60 (30 Dec 2022 15:00) (94/62 - 147/64)  BP(mean): 86 (30 Dec 2022 14:00) (68 - 104)  RR: 15 (30 Dec 2022 15:00) (10 - 24)  SpO2: 100% (30 Dec 2022 15:00) (99% - 100%)    Parameters below as of 30 Dec 2022 04:00  Patient On (Oxygen Delivery Method): nasal cannula        Gen:  Awake, altered disoriented,   CNS: non focal 	  Neck: no JVD  RES : + Bl rhonchi                CVS: Regular  rhythm. Normal S1/S2  Abd: Soft, non-distended. Bowel sounds present.  Skin: No rash.  Ext:  no edema    ============================I/O===========================   I&O's Detail    29 Dec 2022 07:01  -  30 Dec 2022 07:00  --------------------------------------------------------  IN:    Heparin Infusion: 97 mL    IV PiggyBack: 10 mL    IV PiggyBack: 250 mL    IV PiggyBack: 100 mL  Total IN: 457 mL    OUT:    Blood Loss (mL): 0 mL    dextrose 5% + sodium chloride 0.45%: 0 mL    Other (mL): 1000 mL  Total OUT: 1000 mL    Total NET: -543 mL      30 Dec 2022 07:01  -  30 Dec 2022 15:50  --------------------------------------------------------  IN:    Heparin Infusion: 59 mL    IV PiggyBack: 50 mL  Total IN: 109 mL    OUT:  Total OUT: 0 mL    Total NET: 109 mL        ============================ LABS =========================                        10.6   6.07  )-----------( 193      ( 30 Dec 2022 04:30 )             33.6     12-30    140  |  99  |  11.4  ----------------------------<  95  4.0   |  28.0  |  3.83<H>    Ca    8.8      30 Dec 2022 04:30  Phos  4.1     12-30  Mg     1.8     12-30    TPro  5.0<L>  /  Alb  2.4<L>  /  TBili  0.3<L>  /  DBili  x   /  AST  31  /  ALT  7   /  AlkPhos  190<H>  12-30    LIVER FUNCTIONS - ( 30 Dec 2022 04:30 )  Alb: 2.4 g/dL / Pro: 5.0 g/dL / ALK PHOS: 190 U/L / ALT: 7 U/L / AST: 31 U/L / GGT: x           PTT - ( 30 Dec 2022 12:00 )  PTT:>200.0 sec  ABG - ( 29 Dec 2022 13:55 )  pH, Arterial: 7.410 pH, Blood: x     /  pCO2: 53    /  pO2: 127   / HCO3: 34    / Base Excess: 9.0   /  SaO2: 99.8                ======================Micro/Rad/Cardio=================  Culture: Culture - Blood in AM (12.28.22 @ 06:45)    Specimen Source: .Blood Blood    Culture Results:   No growth to date.    Cryptococcal Antigen - CSF (12.19.22 @ 17:10)    Cryptococcal Antigen - CSF: Positive: TYPE:(C=Critical, N=Notification, A=Abnormal) C  TESTS: CRYPTAG  DATE/TIME CALLED: 12/19/2022 19:14:40 EST  CALLED TO: REMEDIOS RIVERA  READ BACK (2 Patient Identifiers)(Y/N): Y  READ BACK VALUES (Y/N): Y  CALLED BY: SG  METHOD: Latex agglutination.  A negative test does not preclude diagnosis of cryptococcosis,  particularly  if only a single specimen has been tested and the  patient  shows  symptoms consistent with cryptococcosis.  A positive reaction in CSF or plasma of an untreated patient  attiters  of1:4 or less is highly suggestive of cryptococcal infection. Titers of  1:8 or  greater by latex agglutination usually indicates active cryptococcosis.  The antigen titer is usually proportional to the extent of the infection,  withincreasing titers reflecting progressive  infection and a poor  prognosis.  Declining titers indicate response to chemotherapy in a treated  patient.Occasionally, however, low titers may persist for an indefinite  period in the  presence of nonviable fungus.          CXR: < from: CT Chest No Cont (12.29.22 @ 16:03) >  IMPRESSION: Findings suggestive of pulmonary edema.    --- End of Report ---    < end of copied text >    Echo: < from: TTE Echo Complete w/o Contrast w/ Doppler (12.21.22 @ 22:38) >      Summary:   1. Technically difficult study.   2. Normal left ventricular internal cavity size.   3. Severely decreased global left ventricular systolic function.   4. Left ventricular ejection fraction, by visual estimation, is 30 to   35%.   5. Mildly reduced RV systolic function.   6. Severely enlarged left atrium.   7. Moderately enlarged right atrium.   8. Sclerotic aortic valve with normal opening.   9. Moderate thickening and calcification of the anterior and posterior   mitral valve leaflets.  10. Moderate mitral annular calcification.  11. Mild mitral valve regurgitation.  12. Mild tricuspid regurgitation.  13. Estimated pulmonary artery systolic pressure is 36.3 mmHg assuming a   right atrial pressure of 15 mmHg, whichis consistent with borderline   pulmonary hypertension.  14. No gross evidence of vegetation, moderately degenerative and   calcified aortic and mitral valves. Consider CRISTIAN if clinically indicated   and high suspicion.    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      History of hip replacement  right      Status post open reduction and internal fixation (ORIF) of fracture  left femur          ====================ASSESSMENT ==============  1. status epilepticus   2.  metabolic encephalopathy, AMS   3.  positive cryptococcal antigen in   4.  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  #aspiration pneumonia  6.  ESRD   7. HFrEF  8. paroxysmal afib       Plan:  ====================== NEUROLOGY=====================  valproate sodium  IVPB 250 milliGRAM(s) IV Intermittent every 8 hours    ==================== RESPIRATORY======================  Mechanical Ventilation:      ====================CARDIOVASCULAR==================  metoprolol tartrate 25 milliGRAM(s) Enteral Tube four times a day    ===================HEMATOLOGIC/ONC ===================  heparin   Injectable 5000 Unit(s) IV Push every 6 hours PRN For aPTT less than 40  heparin   Injectable 2500 Unit(s) IV Push every 6 hours PRN For aPTT between 40 - 57  heparin  Infusion.  Unit(s)/Hr (11 mL/Hr) IV Continuous <Continuous>    ===================== RENAL =========================  Continue monitoring urine output  tamsulosin 0.4 milliGRAM(s) Oral at bedtime    ==================== GASTROINTESTINAL===================  lactulose Syrup 10 Gram(s) Oral every 6 hours  pantoprazole  Injectable 40 milliGRAM(s) IV Push daily    =======================    ENDOCRINE  =====================  dextrose 50% Injectable 25 Gram(s) IV Push once  dextrose 50% Injectable 12.5 Gram(s) IV Push once  dextrose 50% Injectable 25 Gram(s) IV Push once  dextrose Oral Gel 15 Gram(s) Oral once  hydrocortisone 10 milliGRAM(s) Oral every 12 hours    ========================INFECTIOUS DISEASE================  acyclovir IVPB      acyclovir IVPB 320 milliGRAM(s) IV Intermittent every 24 hours  amphotericin B  liposome  IVPB 320 milliGRAM(s) IV Intermittent every 24 hours  meropenem Injectable      meropenem Injectable 500 milliGRAM(s) IV Push every 24 hours  trimethoprim   80 mG/sulfamethoxazole 400 mG 1 Tablet(s) Oral <User Schedule>      Patient requires continuous monitoring with bedside rhythm monitoring, pulse oximetry, ventilator/ bipap  monitoring and intermittent blood gas analysis.    Care plan discussed with ICU care team.    Patient remained critical; required more than usual care; I have spent 35 minutes providing non-routine care, revaluated multiple times during the day.     JU FERGUSON  MRN-[id number]  Patient is a 72y old  Male who presents with a chief complaint of Encephalopathy (21 Dec 2022 11:30)    HPI:  72M with PMH of  hypertension, hyperlipidemia, ESRD on HD, emphysema s/p lung transplant in [place] by Dr. 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 feacalis bacteremia, newly diagnosed HFrEF 25%, AF complicated by GIB was discharged on home IV Abx now presenting with AMS    Patient unable to contribute to history secondary to mental status. History obtained by EMR and Spouse.  Patient was discharged day prior to Thanksgiving with home iv abx. Per Spouse has been receiving IV abx administered by wife and daughter. Has not missed any HD sessions with exception to today.  Since discharge has been becoming increasingly tired. Wife attributes this to not sleeping well. Furthermore patient complained of severe nausea yesterday afternoon but did not have emesis. Approx 1 hour after that became more ''confused and out of it'' but still talking. Symptoms continued to progress and get worse throughout the night and in the morning was only moaning and extremely lethargic.     No recent cough, cp, sob. Denies recent fevers, chills.     Presented to the ED with daughter. CTH obtained and did not reveal any acute findings.  Patient was admitted to medicine for encephalopathy.   (13 Dec 2022 08:45)      Events in last 24 hours:     Today pt was on continuous EEG and was noted to have abnormal EEG- reading 1. Myoclonic seizure, generalized, train of generalized periodic discharges time locked with left hemibody clonus captured at 12/29 23:39   2. Isolated myoclonic jerks, without a clear ictal correlate, 3. Generalized periodic discharges with triphasic morphology <0.5-1 Hz, wakefulness state. 4. Moderate generalized slowing.    -Pt was started on Valproic Acid as per neuro and epileptology  -Pt needs LP, but was given dose of Eliquis last night, will plan for LP at some point tm     REVIEW OF SYSTEMS:  ROS not able to be obtained pt is intubated and sedated    Physical Exam:  Vital Signs Last 24 Hrs  T(C): 35.9 (30 Dec 2022 15:00), Max: 36.4 (29 Dec 2022 23:00)  T(F): 96.6 (30 Dec 2022 15:00), Max: 97.5 (29 Dec 2022 23:00)  HR: 72 (30 Dec 2022 15:00) (63 - 95)  BP: 122/60 (30 Dec 2022 15:00) (94/62 - 147/64)  BP(mean): 86 (30 Dec 2022 14:00) (68 - 104)  RR: 15 (30 Dec 2022 15:00) (10 - 24)  SpO2: 100% (30 Dec 2022 15:00) (99% - 100%)    Parameters below as of 30 Dec 2022 04:00  Patient On (Oxygen Delivery Method): nasal cannula        Gen:  Awake, altered disoriented,   CNS: non focal 	  Neck: no JVD  RES : + Bl rhonchi                CVS: Regular  rhythm. Normal S1/S2  Abd: Soft, non-distended. Bowel sounds present.  Skin: No rash.  Ext:  no edema    ============================I/O===========================   I&O's Detail    29 Dec 2022 07:01  -  30 Dec 2022 07:00  --------------------------------------------------------  IN:    Heparin Infusion: 97 mL    IV PiggyBack: 10 mL    IV PiggyBack: 250 mL    IV PiggyBack: 100 mL  Total IN: 457 mL    OUT:    Blood Loss (mL): 0 mL    dextrose 5% + sodium chloride 0.45%: 0 mL    Other (mL): 1000 mL  Total OUT: 1000 mL    Total NET: -543 mL      30 Dec 2022 07:01  -  30 Dec 2022 15:50  --------------------------------------------------------  IN:    Heparin Infusion: 59 mL    IV PiggyBack: 50 mL  Total IN: 109 mL    OUT:  Total OUT: 0 mL    Total NET: 109 mL        ============================ LABS =========================                        10.6   6.07  )-----------( 193      ( 30 Dec 2022 04:30 )             33.6     12-30    140  |  99  |  11.4  ----------------------------<  95  4.0   |  28.0  |  3.83<H>    Ca    8.8      30 Dec 2022 04:30  Phos  4.1     12-30  Mg     1.8     12-30    TPro  5.0<L>  /  Alb  2.4<L>  /  TBili  0.3<L>  /  DBili  x   /  AST  31  /  ALT  7   /  AlkPhos  190<H>  12-30    LIVER FUNCTIONS - ( 30 Dec 2022 04:30 )  Alb: 2.4 g/dL / Pro: 5.0 g/dL / ALK PHOS: 190 U/L / ALT: 7 U/L / AST: 31 U/L / GGT: x           PTT - ( 30 Dec 2022 12:00 )  PTT:>200.0 sec  ABG - ( 29 Dec 2022 13:55 )  pH, Arterial: 7.410 pH, Blood: x     /  pCO2: 53    /  pO2: 127   / HCO3: 34    / Base Excess: 9.0   /  SaO2: 99.8                ======================Micro/Rad/Cardio=================  Culture: Culture - Blood in AM (12.28.22 @ 06:45)    Specimen Source: .Blood Blood    Culture Results:   No growth to date.    Cryptococcal Antigen - CSF (12.19.22 @ 17:10)    Cryptococcal Antigen - CSF: Positive: TYPE:(C=Critical, N=Notification, A=Abnormal) C  TESTS: CRYPTAG  DATE/TIME CALLED: 12/19/2022 19:14:40 EST  CALLED TO: REMEDIOS RIVERA  READ BACK (2 Patient Identifiers)(Y/N): Y  READ BACK VALUES (Y/N): Y  CALLED BY: SG  METHOD: Latex agglutination.  A negative test does not preclude diagnosis of cryptococcosis,  particularly  if only a single specimen has been tested and the  patient  shows  symptoms consistent with cryptococcosis.  A positive reaction in CSF or plasma of an untreated patient  attiters  of1:4 or less is highly suggestive of cryptococcal infection. Titers of  1:8 or  greater by latex agglutination usually indicates active cryptococcosis.  The antigen titer is usually proportional to the extent of the infection,  withincreasing titers reflecting progressive  infection and a poor  prognosis.  Declining titers indicate response to chemotherapy in a treated  patient.Occasionally, however, low titers may persist for an indefinite  period in the  presence of nonviable fungus.          CXR: < from: CT Chest No Cont (12.29.22 @ 16:03) >  IMPRESSION: Findings suggestive of pulmonary edema.    --- End of Report ---    < end of copied text >    Echo: < from: TTE Echo Complete w/o Contrast w/ Doppler (12.21.22 @ 22:38) >      Summary:   1. Technically difficult study.   2. Normal left ventricular internal cavity size.   3. Severely decreased global left ventricular systolic function.   4. Left ventricular ejection fraction, by visual estimation, is 30 to   35%.   5. Mildly reduced RV systolic function.   6. Severely enlarged left atrium.   7. Moderately enlarged right atrium.   8. Sclerotic aortic valve with normal opening.   9. Moderate thickening and calcification of the anterior and posterior   mitral valve leaflets.  10. Moderate mitral annular calcification.  11. Mild mitral valve regurgitation.  12. Mild tricuspid regurgitation.  13. Estimated pulmonary artery systolic pressure is 36.3 mmHg assuming a   right atrial pressure of 15 mmHg, whichis consistent with borderline   pulmonary hypertension.  14. No gross evidence of vegetation, moderately degenerative and   calcified aortic and mitral valves. Consider CRISTIAN if clinically indicated   and high suspicion.    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 status epilepticus   2.  metabolic encephalopathy, AMS   3.  positive cryptococcal antigen in   4.  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  #aspiration pneumonia  6.  ESRD   7. HFrEF  8. paroxysmal afib       Plan:  ====================== NEUROLOGY=====================  valproate sodium  IVPB 250 milliGRAM(s) IV Intermittent every 8 hours    ==================== RESPIRATORY======================  Mechanical Ventilation:      ====================CARDIOVASCULAR==================  metoprolol tartrate 25 milliGRAM(s) Enteral Tube four times a day    ===================HEMATOLOGIC/ONC ===================  heparin   Injectable 5000 Unit(s) IV Push every 6 hours PRN For aPTT less than 40  heparin   Injectable 2500 Unit(s) IV Push every 6 hours PRN For aPTT between 40 - 57  heparin  Infusion.  Unit(s)/Hr (11 mL/Hr) IV Continuous <Continuous>    ===================== RENAL =========================  Continue monitoring urine output  tamsulosin 0.4 milliGRAM(s) Oral at bedtime    ==================== GASTROINTESTINAL===================  lactulose Syrup 10 Gram(s) Oral every 6 hours  pantoprazole  Injectable 40 milliGRAM(s) IV Push daily    =======================    ENDOCRINE  =====================  dextrose 50% Injectable 25 Gram(s) IV Push once  dextrose 50% Injectable 12.5 Gram(s) IV Push once  dextrose 50% Injectable 25 Gram(s) IV Push once  dextrose Oral Gel 15 Gram(s) Oral once  hydrocortisone 10 milliGRAM(s) Oral every 12 hours    ========================INFECTIOUS DISEASE================  acyclovir IVPB      acyclovir IVPB 320 milliGRAM(s) IV Intermittent every 24 hours  amphotericin B  liposome  IVPB 320 milliGRAM(s) IV Intermittent every 24 hours  meropenem Injectable      meropenem Injectable 500 milliGRAM(s) IV Push every 24 hours  trimethoprim   80 mG/sulfamethoxazole 400 mG 1 Tablet(s) Oral <User Schedule>      Patient requires continuous monitoring with bedside rhythm monitoring, pulse oximetry, ventilator/ bipap  monitoring and intermittent blood gas analysis.    Care plan discussed with ICU care team.    Patient remained critical; required more than usual care; I have spent 35 minutes providing non-routine care, revaluated multiple times during the day.     JU FERGUSON  MRN-[id number]  Patient is a 72y old  Male who presents with a chief complaint of Encephalopathy (21 Dec 2022 11:30)    HPI:  72M with PMH of  hypertension, hyperlipidemia, ESRD on HD, emphysema s/p lung transplant in [place] by Dr. 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 feacalis bacteremia, newly diagnosed HFrEF 25%, AF complicated by GIB was discharged on home IV Abx now presenting with AMS    Patient unable to contribute to history secondary to mental status. History obtained by EMR and Spouse.  Patient was discharged day prior to Thanksgiving with home iv abx. Per Spouse has been receiving IV abx administered by wife and daughter. Has not missed any HD sessions with exception to today.  Since discharge has been becoming increasingly tired. Wife attributes this to not sleeping well. Furthermore patient complained of severe nausea yesterday afternoon but did not have emesis. Approx 1 hour after that became more ''confused and out of it'' but still talking. Symptoms continued to progress and get worse throughout the night and in the morning was only moaning and extremely lethargic.     No recent cough, cp, sob. Denies recent fevers, chills.     Presented to the ED with daughter. CTH obtained and did not reveal any acute findings.  Patient was admitted to medicine for encephalopathy.   (13 Dec 2022 08:45)      Events in last 24 hours:     Today pt was on continuous EEG and was noted to have abnormal EEG- reading 1. Myoclonic seizure, generalized, train of generalized periodic discharges time locked with left hemibody clonus captured at 12/29 23:39   2. Isolated myoclonic jerks, without a clear ictal correlate, 3. Generalized periodic discharges with triphasic morphology <0.5-1 Hz, wakefulness state. 4. Moderate generalized slowing.    -Pt was started on Valproic Acid as per neuro and epileptology  -Pt needs LP, but was given dose of Eliquis last night, will plan for LP at some point tm int the afternoon       REVIEW OF SYSTEMS:  ROS not able to be obtained pt is intubated and sedated    Physical Exam:  Vital Signs Last 24 Hrs  T(C): 35.9 (30 Dec 2022 15:00), Max: 36.4 (29 Dec 2022 23:00)  T(F): 96.6 (30 Dec 2022 15:00), Max: 97.5 (29 Dec 2022 23:00)  HR: 72 (30 Dec 2022 15:00) (63 - 95)  BP: 122/60 (30 Dec 2022 15:00) (94/62 - 147/64)  BP(mean): 86 (30 Dec 2022 14:00) (68 - 104)  RR: 15 (30 Dec 2022 15:00) (10 - 24)  SpO2: 100% (30 Dec 2022 15:00) (99% - 100%)    Parameters below as of 30 Dec 2022 04:00  Patient On (Oxygen Delivery Method): nasal cannula        Gen:  Awake, altered disoriented,   CNS: non focal 	  Neck: no JVD  RES : + Bl rhonchi                CVS: Regular  rhythm. Normal S1/S2  Abd: Soft, non-distended. Bowel sounds present.  Skin: No rash.  Ext:  no edema    ============================I/O===========================   I&O's Detail    29 Dec 2022 07:01  -  30 Dec 2022 07:00  --------------------------------------------------------  IN:    Heparin Infusion: 97 mL    IV PiggyBack: 10 mL    IV PiggyBack: 250 mL    IV PiggyBack: 100 mL  Total IN: 457 mL    OUT:    Blood Loss (mL): 0 mL    dextrose 5% + sodium chloride 0.45%: 0 mL    Other (mL): 1000 mL  Total OUT: 1000 mL    Total NET: -543 mL      30 Dec 2022 07:01  -  30 Dec 2022 15:50  --------------------------------------------------------  IN:    Heparin Infusion: 59 mL    IV PiggyBack: 50 mL  Total IN: 109 mL    OUT:  Total OUT: 0 mL    Total NET: 109 mL        ============================ LABS =========================                        10.6   6.07  )-----------( 193      ( 30 Dec 2022 04:30 )             33.6     12-30    140  |  99  |  11.4  ----------------------------<  95  4.0   |  28.0  |  3.83<H>    Ca    8.8      30 Dec 2022 04:30  Phos  4.1     12-30  Mg     1.8     12-30    TPro  5.0<L>  /  Alb  2.4<L>  /  TBili  0.3<L>  /  DBili  x   /  AST  31  /  ALT  7   /  AlkPhos  190<H>  12-30    LIVER FUNCTIONS - ( 30 Dec 2022 04:30 )  Alb: 2.4 g/dL / Pro: 5.0 g/dL / ALK PHOS: 190 U/L / ALT: 7 U/L / AST: 31 U/L / GGT: x           PTT - ( 30 Dec 2022 12:00 )  PTT:>200.0 sec  ABG - ( 29 Dec 2022 13:55 )  pH, Arterial: 7.410 pH, Blood: x     /  pCO2: 53    /  pO2: 127   / HCO3: 34    / Base Excess: 9.0   /  SaO2: 99.8                ======================Micro/Rad/Cardio=================  Culture: Culture - Blood in AM (12.28.22 @ 06:45)    Specimen Source: .Blood Blood    Culture Results:   No growth to date.    Cryptococcal Antigen - CSF (12.19.22 @ 17:10)    Cryptococcal Antigen - CSF: Positive: TYPE:(C=Critical, N=Notification, A=Abnormal) C  TESTS: CRYPTAG  DATE/TIME CALLED: 12/19/2022 19:14:40 EST  CALLED TO: REMEDIOS RIVERA  READ BACK (2 Patient Identifiers)(Y/N): Y  READ BACK VALUES (Y/N): Y  CALLED BY: SG  METHOD: Latex agglutination.  A negative test does not preclude diagnosis of cryptococcosis,  particularly  if only a single specimen has been tested and the  patient  shows  symptoms consistent with cryptococcosis.  A positive reaction in CSF or plasma of an untreated patient  attiters  of1:4 or less is highly suggestive of cryptococcal infection. Titers of  1:8 or  greater by latex agglutination usually indicates active cryptococcosis.  The antigen titer is usually proportional to the extent of the infection,  withincreasing titers reflecting progressive  infection and a poor  prognosis.  Declining titers indicate response to chemotherapy in a treated  patient.Occasionally, however, low titers may persist for an indefinite  period in the  presence of nonviable fungus.          CXR: < from: CT Chest No Cont (12.29.22 @ 16:03) >  IMPRESSION: Findings suggestive of pulmonary edema.    --- End of Report ---    < end of copied text >    Echo: < from: TTE Echo Complete w/o Contrast w/ Doppler (12.21.22 @ 22:38) >      Summary:   1. Technically difficult study.   2. Normal left ventricular internal cavity size.   3. Severely decreased global left ventricular systolic function.   4. Left ventricular ejection fraction, by visual estimation, is 30 to   35%.   5. Mildly reduced RV systolic function.   6. Severely enlarged left atrium.   7. Moderately enlarged right atrium.   8. Sclerotic aortic valve with normal opening.   9. Moderate thickening and calcification of the anterior and posterior   mitral valve leaflets.  10. Moderate mitral annular calcification.  11. Mild mitral valve regurgitation.  12. Mild tricuspid regurgitation.  13. Estimated pulmonary artery systolic pressure is 36.3 mmHg assuming a   right atrial pressure of 15 mmHg, whichis consistent with borderline   pulmonary hypertension.  14. No gross evidence of vegetation, moderately degenerative and   calcified aortic and mitral valves. Consider CRISTIAN if clinically indicated   and high suspicion.    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      Pneumonia  April 2021      [place] (hard of hearing)      HTN (hypertension)      Lumbar spondylosis      History of COPD      BPH without urinary obstruction      Hypercholesterolemia      Oliguria and anuria      H/O peripheral neuropathy      H/O lung transplant  bilateral - transplant - 1-2-2015 -  [facility]      H/O heart artery stent  x3 @[facility]      History of hip replacement  right      Status post open reduction and internal fixation (ORIF) of fracture  left femur          ====================ASSESSMENT ==============  1. status epilepticus   2.  metabolic encephalopathy, AMS   3.  positive cryptococcal antigen in   4.  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  #aspiration pneumonia  6.  ESRD   7. HFrEF  8. paroxysmal afib       Plan:  ====================== NEUROLOGY=====================  valproate sodium  IVPB 250 milliGRAM(s) IV Intermittent every 8 hours    ==================== RESPIRATORY======================  Mechanical Ventilation:      ====================CARDIOVASCULAR==================  metoprolol tartrate 25 milliGRAM(s) Enteral Tube four times a day    ===================HEMATOLOGIC/ONC ===================  heparin   Injectable 5000 Unit(s) IV Push every 6 hours PRN For aPTT less than 40  heparin   Injectable 2500 Unit(s) IV Push every 6 hours PRN For aPTT between 40 - 57  heparin  Infusion.  Unit(s)/Hr (11 mL/Hr) IV Continuous <Continuous>    ===================== RENAL =========================  Continue monitoring urine output  tamsulosin 0.4 milliGRAM(s) Oral at bedtime    ==================== GASTROINTESTINAL===================  lactulose Syrup 10 Gram(s) Oral every 6 hours  pantoprazole  Injectable 40 milliGRAM(s) IV Push daily    =======================    ENDOCRINE  =====================  dextrose 50% Injectable 25 Gram(s) IV Push once  dextrose 50% Injectable 12.5 Gram(s) IV Push once  dextrose 50% Injectable 25 Gram(s) IV Push once  dextrose Oral Gel 15 Gram(s) Oral once  hydrocortisone 10 milliGRAM(s) Oral every 12 hours    ========================INFECTIOUS DISEASE================  acyclovir IVPB      acyclovir IVPB 320 milliGRAM(s) IV Intermittent every 24 hours  amphotericin B  liposome  IVPB 320 milliGRAM(s) IV Intermittent every 24 hours  meropenem Injectable      meropenem Injectable 500 milliGRAM(s) IV Push every 24 hours  trimethoprim   80 mG/sulfamethoxazole 400 mG 1 Tablet(s) Oral <User Schedule>      Patient requires continuous monitoring with bedside rhythm monitoring, pulse oximetry, ventilator/ bipap  monitoring and intermittent blood gas analysis.    Care plan discussed with ICU care team.    Patient remained critical; required more than usual care; I have spent 35 minutes providing non-routine care, revaluated multiple times during the day.     JU FERGUSON  MRN-[id number]  Patient is a 72y old  Male who presents with a chief complaint of Encephalopathy (21 Dec 2022 11:30)    HPI:  72M with PMH of  hypertension, hyperlipidemia, ESRD on HD, emphysema s/p lung transplant in [place] by Dr. 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 feacalis bacteremia, newly diagnosed HFrEF 25%, AF complicated by GIB was discharged on home IV Abx now presenting with AMS    Patient unable to contribute to history secondary to mental status. History obtained by EMR and Spouse.  Patient was discharged day prior to Thanksgiving with home iv abx. Per Spouse has been receiving IV abx administered by wife and daughter. Has not missed any HD sessions with exception to today.  Since discharge has been becoming increasingly tired. Wife attributes this to not sleeping well. Furthermore patient complained of severe nausea yesterday afternoon but did not have emesis. Approx 1 hour after that became more ''confused and out of it'' but still talking. Symptoms continued to progress and get worse throughout the night and in the morning was only moaning and extremely lethargic.     No recent cough, cp, sob. Denies recent fevers, chills.     Presented to the ED with daughter. CTH obtained and did not reveal any acute findings.  Patient was admitted to medicine for encephalopathy.   (13 Dec 2022 08:45)      Events in last 24 hours:     Today pt was on continuous EEG and was noted to have abnormal EEG- reading 1. Myoclonic seizure, generalized, train of generalized periodic discharges time locked with left hemibody clonus captured at 12/29 23:39   2. Isolated myoclonic jerks, without a clear ictal correlate, 3. Generalized periodic discharges with triphasic morphology <0.5-1 Hz, wakefulness state. 4. Moderate generalized slowing.    -Pt was started on Valproic Acid as per neuro and epileptology  -Pt needs LP, but was given dose of Eliquis last night, will plan for LP at some point tm int the afternoon         REVIEW OF SYSTEMS:  ROS not able to be obtained pt is intubated and sedated    Physical Exam:  Vital Signs Last 24 Hrs  T(C): 35.9 (30 Dec 2022 15:00), Max: 36.4 (29 Dec 2022 23:00)  T(F): 96.6 (30 Dec 2022 15:00), Max: 97.5 (29 Dec 2022 23:00)  HR: 72 (30 Dec 2022 15:00) (63 - 95)  BP: 122/60 (30 Dec 2022 15:00) (94/62 - 147/64)  BP(mean): 86 (30 Dec 2022 14:00) (68 - 104)  RR: 15 (30 Dec 2022 15:00) (10 - 24)  SpO2: 100% (30 Dec 2022 15:00) (99% - 100%)    Parameters below as of 30 Dec 2022 04:00  Patient On (Oxygen Delivery Method): nasal cannula        Gen:  Awake, altered disoriented,   CNS: non focal 	  Neck: no JVD  RES : + Bl rhonchi                CVS: Regular  rhythm. Normal S1/S2  Abd: Soft, non-distended. Bowel sounds present.  Skin: No rash.  Ext:  no edema    ============================I/O===========================   I&O's Detail    29 Dec 2022 07:01  -  30 Dec 2022 07:00  --------------------------------------------------------  IN:    Heparin Infusion: 97 mL    IV PiggyBack: 10 mL    IV PiggyBack: 250 mL    IV PiggyBack: 100 mL  Total IN: 457 mL    OUT:    Blood Loss (mL): 0 mL    dextrose 5% + sodium chloride 0.45%: 0 mL    Other (mL): 1000 mL  Total OUT: 1000 mL    Total NET: -543 mL      30 Dec 2022 07:01  -  30 Dec 2022 15:50  --------------------------------------------------------  IN:    Heparin Infusion: 59 mL    IV PiggyBack: 50 mL  Total IN: 109 mL    OUT:  Total OUT: 0 mL    Total NET: 109 mL        ============================ LABS =========================                        10.6   6.07  )-----------( 193      ( 30 Dec 2022 04:30 )             33.6     12-30    140  |  99  |  11.4  ----------------------------<  95  4.0   |  28.0  |  3.83<H>    Ca    8.8      30 Dec 2022 04:30  Phos  4.1     12-30  Mg     1.8     12-30    TPro  5.0<L>  /  Alb  2.4<L>  /  TBili  0.3<L>  /  DBili  x   /  AST  31  /  ALT  7   /  AlkPhos  190<H>  12-30    LIVER FUNCTIONS - ( 30 Dec 2022 04:30 )  Alb: 2.4 g/dL / Pro: 5.0 g/dL / ALK PHOS: 190 U/L / ALT: 7 U/L / AST: 31 U/L / GGT: x           PTT - ( 30 Dec 2022 12:00 )  PTT:>200.0 sec  ABG - ( 29 Dec 2022 13:55 )  pH, Arterial: 7.410 pH, Blood: x     /  pCO2: 53    /  pO2: 127   / HCO3: 34    / Base Excess: 9.0   /  SaO2: 99.8                ======================Micro/Rad/Cardio=================  Culture: Culture - Blood in AM (12.28.22 @ 06:45)    Specimen Source: .Blood Blood    Culture Results:   No growth to date.    Cryptococcal Antigen - CSF (12.19.22 @ 17:10)    Cryptococcal Antigen - CSF: Positive: TYPE:(C=Critical, N=Notification, A=Abnormal) C  TESTS: CRYPTAG  DATE/TIME CALLED: 12/19/2022 19:14:40 EST  CALLED TO: REMEDIOS RIVERA  READ BACK (2 Patient Identifiers)(Y/N): Y  READ BACK VALUES (Y/N): Y  CALLED BY: SG  METHOD: Latex agglutination.  A negative test does not preclude diagnosis of cryptococcosis,  particularly  if only a single specimen has been tested and the  patient  shows  symptoms consistent with cryptococcosis.  A positive reaction in CSF or plasma of an untreated patient  attiters  of1:4 or less is highly suggestive of cryptococcal infection. Titers of  1:8 or  greater by latex agglutination usually indicates active cryptococcosis.  The antigen titer is usually proportional to the extent of the infection,  withincreasing titers reflecting progressive  infection and a poor  prognosis.  Declining titers indicate response to chemotherapy in a treated  patient.Occasionally, however, low titers may persist for an indefinite  period in the  presence of nonviable fungus.          CXR: < from: CT Chest No Cont (12.29.22 @ 16:03) >  IMPRESSION: Findings suggestive of pulmonary edema.    --- End of Report ---    < end of copied text >  < from: MR Head No Cont (12.25.22 @ 09:29) >    IMPRESSION:    No acute intracranial hemorrhage, acute infarction, extra-axial fluid   collection or hydrocephalus.    If clinicians have any questions/need for clarification regarding this   report, Dr. Shahab Cloud can be best reached via the patient secure   hospital email system    --- End of Report ---    < end of copied text >    Echo: < from: TTE Echo Complete w/o Contrast w/ Doppler (12.21.22 @ 22:38) >      Summary:   1. Technically difficult study.   2. Normal left ventricular internal cavity size.   3. Severely decreased global left ventricular systolic function.   4. Left ventricular ejection fraction, by visual estimation, is 30 to   35%.   5. Mildly reduced RV systolic function.   6. Severely enlarged left atrium.   7. Moderately enlarged right atrium.   8. Sclerotic aortic valve with normal opening.   9. Moderate thickening and calcification of the anterior and posterior   mitral valve leaflets.  10. Moderate mitral annular calcification.  11. Mild mitral valve regurgitation.  12. Mild tricuspid regurgitation.  13. Estimated pulmonary artery systolic pressure is 36.3 mmHg assuming a   right atrial pressure of 15 mmHg, whichis consistent with borderline   pulmonary hypertension.  14. No gross evidence of vegetation, moderately degenerative and   calcified aortic and mitral valves. Consider CRISTIAN if clinically indicated   and high suspicion.    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      Pneumonia  April 2021      [place] (hard of hearing)      HTN (hypertension)      Lumbar spondylosis      History of COPD      BPH without urinary obstruction      Hypercholesterolemia      Oliguria and anuria      H/O peripheral neuropathy      H/O lung transplant  bilateral - transplant - 1-2-2015 -  [facility]      H/O heart artery stent  x3 @[facility]      History of hip replacement  right      Status post open reduction and internal fixation (ORIF) of fracture  left femur          ====================ASSESSMENT ==============  1. status epilepticus   2.  metabolic encephalopathy, AMS   3.  positive cryptococcal antigen in   4.  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 aspiration pneumonia  6.  ESRD on HD   7. HFrEF  8. paroxysmal afib         Plan:  ====================== NEUROLOGY=====================  -Plan for repeat LP tm, pt received Eliquis,    valproate sodium  IVPB 250 milliGRAM(s) IV Intermittent every 8 hours  s/p LP normal cell counts glucose and protein 51. CSF pcr (-) and cultures pending  -[facility] micro lab CSF gram stain reported as "few organisms"   - gram stain repeated at core lab and micro and is NEGATIVE.  - CSF crytp titer 1:40, as per [facility] his initial CSF titer in 12/2020 was 1:2560. no serum titer available. 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 f/u CSf fungal CX. 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      ==================== RESPIRATORY======================  Mechanical Ventilation:    full vent support with LTV 6-8cc/kg IDW, avoiding plts>30, has been on CPAP 10/6 for most of day  actively titrating FiO2 for sats>90%,  currently stable on 50%  utilization of PEEP for alveolar recruitment if desats  CXR and CT with BL infiltrate  MS poor not able to initiate vent liberation  Vent bundle in place and reviewed    ====================CARDIOVASCULAR==================  metoprolol tartrate 25 milliGRAM(s) Enteral Tube four times a day for rate control of Afib     ===================HEMATOLOGIC/ONC ===================  -Eliquis held for potential procedure and LP tm   -Pt started on heparin gtt for full ac in the setting of Afib   -Coags and CBC to be reviewed tm before procedure   -Will plan to hold heparin gtt starting tm for procedure   heparin   Injectable 5000 Unit(s) IV Push every 6 hours PRN For aPTT less than 40  heparin   Injectable 2500 Unit(s) IV Push every 6 hours PRN For aPTT between 40 - 57  heparin  Infusion.  Unit(s)/Hr (11 mL/Hr) IV Continuous <Continuous>    ===================== RENAL =========================  Continue monitoring urine output  tamsulosin 0.4 milliGRAM(s) Oral at bedtime  -Check repeat labs and monitor renal function trend  -Encourage PO intake as tolerated  -IVF hydration   -Get renal sonogram.   -Avoid nephrotoxic meds as possible. Avoid ACEI, ARB and NSAIDS.   -Monitor input and output.    ==================== GASTROINTESTINAL===================  lactulose Syrup 10 Gram(s) Oral every 6 hours  pantoprazole  Injectable 40 milliGRAM(s) IV Push daily    =======================    ENDOCRINE  =====================  Aggressive glycemic control to limit FS glucose to < 180mg/dl, BS stable.  hydrocortisone 10 milliGRAM(s) Oral every 12 hours    ========================INFECTIOUS DISEASE================  -[facility] micro lab CSF gram stain reported as "few organisms"   - gram stain repeated at core lab and micro and is NEGATIVE.  - CSF crytp titer 1:40, as per [facility] his initial CSF titer in 12/2020 was 1:2560. no serum titer available. 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 CRISTIAN was deferred by cardio on recent admission.  resumed ceftriaxone /ampicillin as previously planned. f/u BCX ngtd, repeat TTE    # s/p lung transplant- per transplant team, cellcept  on hold as per pt wife discussion with [facility] transplant team, currently on cortef, c/w tacrolimus and check levels. c/w bactrim per home dose for ppx, fluc resumed    -serum crypt Ag 1:40, old infection vs. new infection, will obtain LP tm   -EBv CSF PCR/serum + but  low, (?)EBV encephalitis  Pt being empirically treated with   acyclovir IVPB      acyclovir IVPB 320 milliGRAM(s) IV Intermittent every 24 hours  amphotericin B  liposome  IVPB 320 milliGRAM(s) IV Intermittent every 24 hours  meropenem Injectable      meropenem Injectable 500 milliGRAM(s) IV Push every 24 hours  trimethoprim   80 mG/sulfamethoxazole 400 mG 1 Tablet(s) Oral <User Schedule>      Patient requires continuous monitoring with bedside rhythm monitoring, pulse oximetry, ventilator/ bipap  monitoring and intermittent blood gas analysis.    Care plan discussed with ICU care team.    Patient remained critical; required more than usual care; I have spent 35 minutes providing non-routine care, revaluated multiple times during the day.     JU FERGUSON  MRN-[id number]  Patient is a 72y old  Male who presents with a chief complaint of Encephalopathy (21 Dec 2022 11:30)    HPI:  72M with PMH of  hypertension, hyperlipidemia, ESRD on HD, emphysema s/p lung transplant in [place] by Dr. 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 after complex admission with e. feacalis bacteremia, newly diagnosed HFrEF 25%, AF complicated by GIB was discharged on home IV Abx now presenting with AMS    Patient unable to contribute to history secondary to mental status. History obtained by EMR and Spouse.  Patient was discharged day prior to Thanksgiving with home iv abx. Per Spouse has been receiving IV abx administered by wife and daughter. Has not missed any HD sessions with exception to today.  Since discharge has been becoming increasingly tired. Wife attributes this to not sleeping well. Furthermore patient complained of severe nausea yesterday afternoon but did not have emesis. Approx 1 hour after that became more ''confused and out of it'' but still talking. Symptoms continued to progress and get worse throughout the night and in the morning was only moaning and extremely lethargic.     No recent cough, cp, sob. Denies recent fevers, chills.     Presented to the ED with daughter. CTH obtained and did not reveal any acute findings.  Patient was admitted to medicine for encephalopathy.   (13 Dec 2022 08:45)      Events in last 24 hours:     Today pt was on continuous EEG and was noted to have abnormal EEG- reading 1. Myoclonic seizure, generalized, train of generalized periodic discharges time locked with left hemibody clonus captured at 12/29 23:39   2. Isolated myoclonic jerks, without a clear ictal correlate, 3. Generalized periodic discharges with triphasic morphology <0.5-1 Hz, wakefulness state. 4. Moderate generalized slowing.    -Pt was started on Valproic Acid as per neuro and epileptology  -Pt needs LP, but was given dose of Eliquis last night, will plan for LP at some point tm int the afternoon         REVIEW OF SYSTEMS:  ROS not able to be obtained pt is intubated and sedated    Physical Exam:  Vital Signs Last 24 Hrs  T(C): 35.9 (30 Dec 2022 15:00), Max: 36.4 (29 Dec 2022 23:00)  T(F): 96.6 (30 Dec 2022 15:00), Max: 97.5 (29 Dec 2022 23:00)  HR: 72 (30 Dec 2022 15:00) (63 - 95)  BP: 122/60 (30 Dec 2022 15:00) (94/62 - 147/64)  BP(mean): 86 (30 Dec 2022 14:00) (68 - 104)  RR: 15 (30 Dec 2022 15:00) (10 - 24)  SpO2: 100% (30 Dec 2022 15:00) (99% - 100%)    Parameters below as of 30 Dec 2022 04:00  Patient On (Oxygen Delivery Method): nasal cannula        Gen:  Awake, altered disoriented,   CNS: non focal 	  Neck: no JVD  RES : + Bl rhonchi                CVS: Regular  rhythm. Normal S1/S2  Abd: Soft, non-distended. Bowel sounds present.  Skin: No rash.  Ext:  no edema    ============================I/O===========================   I&O's Detail    29 Dec 2022 07:01  -  30 Dec 2022 07:00  --------------------------------------------------------  IN:    Heparin Infusion: 97 mL    IV PiggyBack: 10 mL    IV PiggyBack: 250 mL    IV PiggyBack: 100 mL  Total IN: 457 mL    OUT:    Blood Loss (mL): 0 mL    dextrose 5% + sodium chloride 0.45%: 0 mL    Other (mL): 1000 mL  Total OUT: 1000 mL    Total NET: -543 mL      30 Dec 2022 07:01  -  30 Dec 2022 15:50  --------------------------------------------------------  IN:    Heparin Infusion: 59 mL    IV PiggyBack: 50 mL  Total IN: 109 mL    OUT:  Total OUT: 0 mL    Total NET: 109 mL        ============================ LABS =========================                        10.6   6.07  )-----------( 193      ( 30 Dec 2022 04:30 )             33.6     12-30    140  |  99  |  11.4  ----------------------------<  95  4.0   |  28.0  |  3.83<H>    Ca    8.8      30 Dec 2022 04:30  Phos  4.1     12-30  Mg     1.8     12-30    TPro  5.0<L>  /  Alb  2.4<L>  /  TBili  0.3<L>  /  DBili  x   /  AST  31  /  ALT  7   /  AlkPhos  190<H>  12-30    LIVER FUNCTIONS - ( 30 Dec 2022 04:30 )  Alb: 2.4 g/dL / Pro: 5.0 g/dL / ALK PHOS: 190 U/L / ALT: 7 U/L / AST: 31 U/L / GGT: x           PTT - ( 30 Dec 2022 12:00 )  PTT:>200.0 sec  ABG - ( 29 Dec 2022 13:55 )  pH, Arterial: 7.410 pH, Blood: x     /  pCO2: 53    /  pO2: 127   / HCO3: 34    / Base Excess: 9.0   /  SaO2: 99.8                ======================Micro/Rad/Cardio=================  Culture: Culture - Blood in AM (12.28.22 @ 06:45)    Specimen Source: .Blood Blood    Culture Results:   No growth to date.    Cryptococcal Antigen - CSF (12.19.22 @ 17:10)    Cryptococcal Antigen - CSF: Positive: TYPE:(C=Critical, N=Notification, A=Abnormal) C  TESTS: CRYPTAG  DATE/TIME CALLED: 12/19/2022 19:14:40 EST  CALLED TO: REMEDIOS RIVERA  READ BACK (2 Patient Identifiers)(Y/N): Y  READ BACK VALUES (Y/N): Y  CALLED BY: SG  METHOD: Latex agglutination.  A negative test does not preclude diagnosis of cryptococcosis,  particularly  if only a single specimen has been tested and the  patient  shows  symptoms consistent with cryptococcosis.  A positive reaction in CSF or plasma of an untreated patient  attiters  of1:4 or less is highly suggestive of cryptococcal infection. Titers of  1:8 or  greater by latex agglutination usually indicates active cryptococcosis.  The antigen titer is usually proportional to the extent of the infection,  withincreasing titers reflecting progressive  infection and a poor  prognosis.  Declining titers indicate response to chemotherapy in a treated  patient.Occasionally, however, low titers may persist for an indefinite  period in the  presence of nonviable fungus.          CXR: < from: CT Chest No Cont (12.29.22 @ 16:03) >  IMPRESSION: Findings suggestive of pulmonary edema.    --- End of Report ---    < end of copied text >  < from: MR Head No Cont (12.25.22 @ 09:29) >    IMPRESSION:    No acute intracranial hemorrhage, acute infarction, extra-axial fluid   collection or hydrocephalus.    If clinicians have any questions/need for clarification regarding this   report, Dr. Shahab Cloud can be best reached via the patient secure   hospital email system    --- End of Report ---    < end of copied text >    Echo: < from: TTE Echo Complete w/o Contrast w/ Doppler (12.21.22 @ 22:38) >      Summary:   1. Technically difficult study.   2. Normal left ventricular internal cavity size.   3. Severely decreased global left ventricular systolic function.   4. Left ventricular ejection fraction, by visual estimation, is 30 to   35%.   5. Mildly reduced RV systolic function.   6. Severely enlarged left atrium.   7. Moderately enlarged right atrium.   8. Sclerotic aortic valve with normal opening.   9. Moderate thickening and calcification of the anterior and posterior   mitral valve leaflets.  10. Moderate mitral annular calcification.  11. Mild mitral valve regurgitation.  12. Mild tricuspid regurgitation.  13. Estimated pulmonary artery systolic pressure is 36.3 mmHg assuming a   right atrial pressure of 15 mmHg, whichis consistent with borderline   pulmonary hypertension.  14. No gross evidence of vegetation, moderately degenerative and   calcified aortic and mitral valves. Consider CRISTIAN if clinically indicated   and high suspicion.    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      2019 novel coronavirus disease (COVID-19)  Feb 2021 - hospitalized for 1 week at [facility]      Pneumonia  April 2021      [place]      HTN (hypertension)      Lumbar spondylosis      History of COPD      BPH without urinary obstruction      Hypercholesterolemia      Oliguria and anuria      H/O peripheral neuropathy      H/O lung transplant  bilateral - transplant - 1-2-2015 -  [facility]      H/O heart artery stent  x3 @[facility]      History of hip replacement  right      Status post open reduction and internal fixation (ORIF) of fracture  left femur          ====================ASSESSMENT ==============  1. status epilepticus   2.  metabolic encephalopathy, AMS   3.  positive cryptococcal antigen in   4.  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 aspiration pneumonia  6.  ESRD on HD   7. HFrEF  8. paroxysmal afib       Plan:  ====================== NEUROLOGY=====================  -Today pt was on continuous EEG and was noted to have abnormal EEG- reading 1. Myoclonic seizure, generalized, train of generalized periodic discharges time locked with left hemibody clonus captured at 12/29 23:39   2. Isolated myoclonic jerks, without a clear ictal correlate, 3. Generalized periodic discharges with triphasic morphology <0.5-1 Hz, wakefulness state. 4. Moderate generalized slowing.    -Pt was started on Valproic Acid as per neuro and epileptology  -Pt needs LP with new change and mildly elevated Crypto AG, but was given dose of Eliquis last night, will plan for LP at some point tm int the afternoon   -obtain VA in AM    valproate sodium  IVPB 250 milliGRAM(s) IV Intermittent every 8 hours    ==================== RESPIRATORY======================  Mechanical Ventilation:    full vent support with LTV 6-8cc/kg IDW, avoiding plts>30, has been on CPAP 10/6 for most of day  actively titrating FiO2 for sats>90%,  currently stable on 50%  utilization of PEEP for alveolar recruitment if desats  CXR and CT with BL infiltrate  MS poor not able to initiate vent liberation  Vent bundle in place and reviewed    ====================CARDIOVASCULAR==================  metoprolol tartrate 25 milliGRAM(s) Enteral Tube four times a day for rate control of Afib     ===================HEMATOLOGIC/ONC ===================  -Eliquis held for potential procedure and LP tm   -Pt started on heparin gtt for full ac in the setting of Afib   -Coags and CBC to be reviewed tm before procedure   -Will plan to hold heparin gtt starting tm for procedure   heparin   Injectable 5000 Unit(s) IV Push every 6 hours PRN For aPTT less than 40  heparin   Injectable 2500 Unit(s) IV Push every 6 hours PRN For aPTT between 40 - 57  heparin  Infusion.  Unit(s)/Hr (11 mL/Hr) IV Continuous <Continuous>    ===================== RENAL =========================  Continue monitoring urine output  tamsulosin 0.4 milliGRAM(s) Oral at bedtime  -Check repeat labs and monitor renal function trend  -Pt received HD session yesterday, No Hd today  -Avoid nephrotoxic meds as possible. Avoid ACEI, ARB and NSAIDS.   -Monitor input and output.    ==================== GASTROINTESTINAL===================  lactulose Syrup 10 Gram(s) Oral every 6 hours  pantoprazole  Injectable 40 milliGRAM(s) IV Push daily    =======================    ENDOCRINE  =====================  Aggressive glycemic control to limit FS glucose to < 180mg/dl, BS stable.  hydrocortisone 10 milliGRAM(s) Oral every 12 hours    ========================INFECTIOUS DISEASE================  -[facility] micro lab CSF gram stain reported as "few organisms"   - gram stain repeated at core lab and micro and is NEGATIVE.  - CSF crytp titer 1:40, as per [facility] his initial CSF titer in 12/2020 was 1:2560. no serum titer available.  -clinical improvement even prior to starting antifungal, negative CSF pcr, low CSF titer. f/u CSf fungal CX, initially   -placed on acyclovir to complete total 14 days  -EBv CSF PCR/serum + but  low, (?)EBV encephalitis  resumed ceftriaxone /ampicillin as previously planned. f/u BCX ngtd, repeat TTE, as per ID   -serum crypt Ag 1:40, old infection vs. new infection, will obtain LP tm, with no improvement in mental status and now new seizures on EEG     acyclovir IVPB      acyclovir IVPB 320 milliGRAM(s) IV Intermittent every 24 hours  amphotericin B  liposome  IVPB 320 milliGRAM(s) IV Intermittent every 24 hours  meropenem Injectable      meropenem Injectable 500 milliGRAM(s) IV Push every 24 hours  trimethoprim   80 mG/sulfamethoxazole 400 mG 1 Tablet(s) Oral <User Schedule>        Critical Care time: 50 mins assessing presenting problems of acute illness that poses high probability of life threatening deterioration or end organ damage/dysfunction.  Medical decision making including Initiating plan of care, reviewing data, reviewing radiology, direct patient bedside evaluation and interpretation of vital signs, any necessary ventilator management , discussion with multidisciplinary team, discussing goals of care with patient/family, all non inclusive of procedures    JU FERGUSON  MRN-[id number]  Patient is a 72y old  Male who presents with a chief complaint of Encephalopathy (21 Dec 2022 11:30)    HPI:  72M with PMH of  hypertension, hyperlipidemia, ESRD on HD, emphysema s/p lung transplant in [place] by Dr. 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 feacalis bacteremia, newly diagnosed HFrEF 25%, AF complicated by GIB was discharged on home IV Abx now presenting with AMS    Patient unable to contribute to history secondary to mental status. History obtained by EMR and Spouse.  Patient was discharged day prior to Thanksgiving with home iv abx. Per Spouse has been receiving IV abx administered by wife and daughter. Has not missed any HD sessions with exception to today.  Since discharge has been becoming increasingly tired. Wife attributes this to not sleeping well. Furthermore patient complained of severe nausea yesterday afternoon but did not have emesis. Approx 1 hour after that became more ''confused and out of it'' but still talking. Symptoms continued to progress and get worse throughout the night and in the morning was only moaning and extremely lethargic.     No recent cough, cp, sob. Denies recent fevers, chills.     Presented to the ED with daughter. CTH obtained and did not reveal any acute findings.  Patient was admitted to medicine for encephalopathy.   (13 Dec 2022 08:45)      Events in last 24 hours:     Today pt was on continuous EEG and was noted to have abnormal EEG- reading 1. Myoclonic seizure, generalized, train of generalized periodic discharges time locked with left hemibody clonus captured at 12/29 23:39   2. Isolated myoclonic jerks, without a clear ictal correlate, 3. Generalized periodic discharges with triphasic morphology <0.5-1 Hz, wakefulness state. 4. Moderate generalized slowing.    -Pt was started on Valproic Acid as per neuro and epileptology  -Pt needs LP, but was given dose of Eliquis last night, will plan for LP at some point tm int the afternoon         REVIEW OF SYSTEMS:  ROS not able to be obtained pt is unresponsive     Physical Exam:  Vital Signs Last 24 Hrs  T(C): 35.9 (30 Dec 2022 15:00), Max: 36.4 (29 Dec 2022 23:00)  T(F): 96.6 (30 Dec 2022 15:00), Max: 97.5 (29 Dec 2022 23:00)  HR: 72 (30 Dec 2022 15:00) (63 - 95)  BP: 122/60 (30 Dec 2022 15:00) (94/62 - 147/64)  BP(mean): 86 (30 Dec 2022 14:00) (68 - 104)  RR: 15 (30 Dec 2022 15:00) (10 - 24)  SpO2: 100% (30 Dec 2022 15:00) (99% - 100%)    Parameters below as of 30 Dec 2022 04:00  Patient On (Oxygen Delivery Method): nasal cannula        Gen:  Awake, altered disoriented,   CNS: non focal 	  Neck: no JVD  RES : + Bl rhonchi                CVS: Regular  rhythm. Normal S1/S2  Abd: Soft, non-distended. Bowel sounds present.  Skin: No rash.  Ext:  no edema    ============================I/O===========================   I&O's Detail    29 Dec 2022 07:01  -  30 Dec 2022 07:00  --------------------------------------------------------  IN:    Heparin Infusion: 97 mL    IV PiggyBack: 10 mL    IV PiggyBack: 250 mL    IV PiggyBack: 100 mL  Total IN: 457 mL    OUT:    Blood Loss (mL): 0 mL    dextrose 5% + sodium chloride 0.45%: 0 mL    Other (mL): 1000 mL  Total OUT: 1000 mL    Total NET: -543 mL      30 Dec 2022 07:01  -  30 Dec 2022 15:50  --------------------------------------------------------  IN:    Heparin Infusion: 59 mL    IV PiggyBack: 50 mL  Total IN: 109 mL    OUT:  Total OUT: 0 mL    Total NET: 109 mL        ============================ LABS =========================                        10.6   6.07  )-----------( 193      ( 30 Dec 2022 04:30 )             33.6     12-30    140  |  99  |  11.4  ----------------------------<  95  4.0   |  28.0  |  3.83<H>    Ca    8.8      30 Dec 2022 04:30  Phos  4.1     12-30  Mg     1.8     12-30    TPro  5.0<L>  /  Alb  2.4<L>  /  TBili  0.3<L>  /  DBili  x   /  AST  31  /  ALT  7   /  AlkPhos  190<H>  12-30    LIVER FUNCTIONS - ( 30 Dec 2022 04:30 )  Alb: 2.4 g/dL / Pro: 5.0 g/dL / ALK PHOS: 190 U/L / ALT: 7 U/L / AST: 31 U/L / GGT: x           PTT - ( 30 Dec 2022 12:00 )  PTT:>200.0 sec  ABG - ( 29 Dec 2022 13:55 )  pH, Arterial: 7.410 pH, Blood: x     /  pCO2: 53    /  pO2: 127   / HCO3: 34    / Base Excess: 9.0   /  SaO2: 99.8                ======================Micro/Rad/Cardio=================  Culture: Culture - Blood in AM (12.28.22 @ 06:45)    Specimen Source: .Blood Blood    Culture Results:   No growth to date.    Cryptococcal Antigen - CSF (12.19.22 @ 17:10)    Cryptococcal Antigen - CSF: Positive: TYPE:(C=Critical, N=Notification, A=Abnormal) C  TESTS: CRYPTAG  DATE/TIME CALLED: 12/19/2022 19:14:40 EST  CALLED TO: REMEDIOS RIVERA  READ BACK (2 Patient Identifiers)(Y/N): Y  READ BACK VALUES (Y/N): Y  CALLED BY: SG  METHOD: Latex agglutination.  A negative test does not preclude diagnosis of cryptococcosis,  particularly  if only a single specimen has been tested and the  patient  shows  symptoms consistent with cryptococcosis.  A positive reaction in CSF or plasma of an untreated patient  attiters  of1:4 or less is highly suggestive of cryptococcal infection. Titers of  1:8 or  greater by latex agglutination usually indicates active cryptococcosis.  The antigen titer is usually proportional to the extent of the infection,  withincreasing titers reflecting progressive  infection and a poor  prognosis.  Declining titers indicate response to chemotherapy in a treated  patient.Occasionally, however, low titers may persist for an indefinite  period in the  presence of nonviable fungus.          CXR: < from: CT Chest No Cont (12.29.22 @ 16:03) >  IMPRESSION: Findings suggestive of pulmonary edema.    --- End of Report ---    < end of copied text >  < from: MR Head No Cont (12.25.22 @ 09:29) >    IMPRESSION:    No acute intracranial hemorrhage, acute infarction, extra-axial fluid   collection or hydrocephalus.    If clinicians have any questions/need for clarification regarding this   report, Dr. Shahab Cloud can be best reached via the patient [employer]   hospital email system    --- End of Report ---    < end of copied text >    Echo: < from: TTE Echo Complete w/o Contrast w/ Doppler (12.21.22 @ 22:38) >      Summary:   1. Technically difficult study.   2. Normal left ventricular internal cavity size.   3. Severely decreased global left ventricular systolic function.   4. Left ventricular ejection fraction, by visual estimation, is 30 to   35%.   5. Mildly reduced RV systolic function.   6. Severely enlarged left atrium.   7. Moderately enlarged right atrium.   8. Sclerotic aortic valve with normal opening.   9. Moderate thickening and calcification of the anterior and posterior   mitral valve leaflets.  10. Moderate mitral annular calcification.  11. Mild mitral valve regurgitation.  12. Mild tricuspid regurgitation.  13. Estimated pulmonary artery systolic pressure is 36.3 mmHg assuming a   right atrial pressure of 15 mmHg, whichis consistent with borderline   pulmonary hypertension.  14. No gross evidence of vegetation, moderately degenerative and   calcified aortic and mitral valves. Consider CRISTIAN if clinically indicated   and high suspicion.    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      2019 novel coronavirus disease (COVID-19)  Feb 2021 - hospitalized for 1 week at [facility]      Pneumonia  April 2021      [place] (hard of hearing)      HTN (hypertension)      Lumbar spondylosis      History of COPD      BPH without urinary obstruction      Hypercholesterolemia      Oliguria and anuria      H/O peripheral neuropathy      H/O lung transplant  bilateral - transplant - 1-2-2015 -  [facility]      H/O heart artery stent  x3 @[facility]      History of hip replacement  right      Status post open reduction and internal fixation (ORIF) of fracture  left femur          ====================ASSESSMENT ==============  1. status epilepticus   2.  metabolic encephalopathy, AMS   3.  positive cryptococcal antigen in   4.  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 aspiration pneumonia  6.  ESRD on HD   7. HFrEF  8. paroxysmal afib       Plan:  ====================== NEUROLOGY=====================  -Today pt was on continuous EEG and was noted to have abnormal EEG- reading 1. Myoclonic seizure, generalized, train of generalized periodic discharges time locked with left hemibody clonus captured at 12/29 23:39   2. Isolated myoclonic jerks, without a clear ictal correlate, 3. Generalized periodic discharges with triphasic morphology <0.5-1 Hz, wakefulness state. 4. Moderate generalized slowing.    -Pt was started on Valproic Acid as per neuro and epileptology  -Pt needs LP with new change and mildly elevated Crypto AG, but was given dose of Eliquis last night, will plan for LP at some point tm int the afternoon   -obtain VA in AM    valproate sodium  IVPB 250 milliGRAM(s) IV Intermittent every 8 hours    ==================== RESPIRATORY======================  -pt breathing on own, protecting airway, but unresponsive, and encephalopathic with screeching out intermittently   -SpO2 on 3L NC, low threshold to intubate if  cannot protect airway     ====================CARDIOVASCULAR==================  metoprolol tartrate 25 milliGRAM(s) Enteral Tube four times a day for rate control of Afib     ===================HEMATOLOGIC/ONC ===================  -Eliquis held for potential procedure and LP tm   -Pt started on heparin gtt for full ac in the setting of Afib   -Coags and CBC to be reviewed tm before procedure   -Will plan to hold heparin gtt starting tm for procedure   heparin   Injectable 5000 Unit(s) IV Push every 6 hours PRN For aPTT less than 40  heparin   Injectable 2500 Unit(s) IV Push every 6 hours PRN For aPTT between 40 - 57  heparin  Infusion.  Unit(s)/Hr (11 mL/Hr) IV Continuous <Continuous>    ===================== RENAL =========================  Continue monitoring urine output  tamsulosin 0.4 milliGRAM(s) Oral at bedtime  -Check repeat labs and monitor renal function trend  -Pt received HD session yesterday, No Hd today  -Avoid nephrotoxic meds as possible. Avoid ACEI, ARB and NSAIDS.   -Monitor input and output.    ==================== GASTROINTESTINAL===================  lactulose Syrup 10 Gram(s) Oral every 6 hours  pantoprazole  Injectable 40 milliGRAM(s) IV Push daily    =======================    ENDOCRINE  =====================  Aggressive glycemic control to limit FS glucose to < 180mg/dl, BS stable.  hydrocortisone 10 milliGRAM(s) Oral every 12 hours    ========================INFECTIOUS DISEASE================  -[facility] micro lab CSF gram stain reported as "few organisms"   - gram stain repeated at core lab and micro and is NEGATIVE.  - CSF crytp titer 1:40, as per [facility] his initial CSF titer in 12/2020 was 1:2560. no serum titer available.  -clinical improvement even prior to starting antifungal, negative CSF pcr, low CSF titer. f/u CSf fungal CX, initially   -placed on acyclovir to complete total 14 days  -EBv CSF PCR/serum + but  low, (?)EBV encephalitis  resumed ceftriaxone /ampicillin as previously planned. f/u BCX ngtd, repeat TTE, as per ID   -serum crypt Ag 1:40, old infection vs. new infection, will obtain LP tm, with no improvement in mental status and now new seizures on EEG     acyclovir IVPB      acyclovir IVPB 320 milliGRAM(s) IV Intermittent every 24 hours  amphotericin B  liposome  IVPB 320 milliGRAM(s) IV Intermittent every 24 hours  meropenem Injectable      meropenem Injectable 500 milliGRAM(s) IV Push every 24 hours  trimethoprim   80 mG/sulfamethoxazole 400 mG 1 Tablet(s) Oral <User Schedule>        Critical Care time: 50 mins assessing presenting problems of acute illness that poses high probability of life threatening deterioration or end organ damage/dysfunction.  Medical decision making including Initiating plan of care, reviewing data, reviewing radiology, direct patient bedside evaluation and interpretation of vital signs, any necessary ventilator management , discussion with multidisciplinary team, discussing goals of care with patient/family, all non inclusive of procedures

## 2022-12-31 NOTE — PROGRESS NOTE ADULT - SUBJECTIVE AND OBJECTIVE BOX
[facility]  INFECTIOUS DISEASES at [place] and [place]  ===============================================================                               J Carlos Galdamez MD*     Perlita Solares MD*                         Isatu Rojas MD*       Sumaya Morales MD*            Diplomates American Board of Internal Medicine & Infectious Diseases                * [place] Office - Appt - Tel  [phone] Fax [phone]                * [place] Office - Appt - Tel [phone] Fax [phone]                                  Hospital Consult line:  [phone]  ==============================================================    JU FERGUSON [id number]    Follow up: ALtered MS     Seen in the ICU - remains critically ill   Having light brown watery bowel movements  Undergoing EEG - seizure activity noted; started on valproate   Under warming blanket         I have personally reviewed the labs and data; pertinent labs and data are listed in this note; please see below.     _______________________________________________________________  REVIEW OF SYSTEMS  Unable to obtain due to medical condition - altered mental status  ________________________________________________________________  Allergies:  budesonide (Unknown)  cefpodoxime (Unknown)  Pollen (Unknown)        ________________________________________________________________  PHYSICAL EXAM  GEN: appears ill but in NAD, not interacting    HEENT: Anicteric sclerae  LUNGS: eupneic. CTA B/L.  HEART: RRR, no m/r/g  ABDOMEN: Soft, ND ,no masses.  +BS.  watery diarrhea   :  Ro catheter  NEUROLOGIC: unresponsive    ________________________________________________________________  Vitals:  T(F): 97 (31 Dec 2022 09:00), Max: 97.5 (31 Dec 2022 02:00)  HR: 66 (31 Dec 2022 09:00)  BP: 105/49 (31 Dec 2022 09:00)  RR: 15 (31 Dec 2022 09:00)  SpO2: 98% (31 Dec 2022 09:00) (98% - 100%)  temp max in last 48H T(F): , Max: 97.7 (12-29-22 @ 10:45)    Current Antibiotics:  acyclovir IVPB 320 milliGRAM(s) IV Intermittent every 24 hours  acyclovir IVPB      amphotericin B  liposome  IVPB 320 milliGRAM(s) IV Intermittent every 24 hours  flucytosine 1500 milliGRAM(s) Oral <User Schedule>  meropenem Injectable      meropenem Injectable 500 milliGRAM(s) IV Push every 24 hours  trimethoprim   80 mG/sulfamethoxazole 400 mG 1 Tablet(s) Oral <User Schedule>    Other medications:  chlorhexidine 2% Cloths 1 Application(s) Topical <User Schedule>  dextrose 50% Injectable 25 Gram(s) IV Push once  dextrose 50% Injectable 12.5 Gram(s) IV Push once  dextrose 50% Injectable 25 Gram(s) IV Push once  dextrose Oral Gel 15 Gram(s) Oral once  heparin  Infusion. 600 Unit(s)/Hr IV Continuous <Continuous>  hydrocortisone 10 milliGRAM(s) Oral every 12 hours  metoprolol tartrate 25 milliGRAM(s) Enteral Tube four times a day  pantoprazole  Injectable 40 milliGRAM(s) IV Push daily  sevelamer carbonate 800 milliGRAM(s) Oral three times a day with meals  tacrolimus 1.5 milliGRAM(s) Oral daily  tamsulosin 0.4 milliGRAM(s) Oral at bedtime  valproate sodium  IVPB 250 milliGRAM(s) IV Intermittent every 8 hours                            10.1   5.87  )-----------( 189      ( 31 Dec 2022 05:30 )             31.0     12-31    138  |  95<L>  |  16.4  ----------------------------<  76  3.8   |  25.0  |  4.75<H>    Ca    8.9      31 Dec 2022 05:30  Phos  5.2     12-31  Mg     1.8     12-31    TPro  4.8<L>  /  Alb  2.4<L>  /  TBili  0.3<L>  /  DBili  x   /  AST  26  /  ALT  5   /  AlkPhos  169<H>  12-31    RECENT CULTURES:  12-28 @ 06:45 .Blood Blood     No growth to date.      12-28 @ 06:40 .Blood Blood     No growth to date.      12-19 @ 17:10 .CSF CSF     No acid-fast bacilli isolated at 1 week. ****Acid-fast cultures are held  for 6 weeks.****    No polymorphonuclear leukocytes seen per low power field  No organisms seen per oil power field  by cytocentrifuge      12-19 @ 16:00 .Sputum Sputum     Normal Respiratory Charis present    Moderate polymorphonuclear leukocytes per low power field  Few Squamous epithelial cells per low power field  Few Gram positive cocci in pairs seen per oil power field  Few Yeast like cells seen per oil power field  Few Gram Negative Rods seen per oil power field      12-19 @ 13:50 Paracentesis Paracentesis Fluid     No growth at 5 days    No polymorphonuclear leukocytes seen per low power field  No organisms seen per oil power field      WBC Count: 5.87 K/uL (12-31-22 @ 05:30)  WBC Count: 6.07 K/uL (12-30-22 @ 04:30)  WBC Count: 7.52 K/uL (12-29-22 @ 06:06)  WBC Count: 8.84 K/uL (12-28-22 @ 06:45)  WBC Count: 8.78 K/uL (12-27-22 @ 06:00)  WBC Count: 8.03 K/uL (12-27-22 @ 00:03)  WBC Count: 6.51 K/uL (12-26-22 @ 10:45)    Creatinine, Serum: 4.75 mg/dL (12-31-22 @ 05:30)  Creatinine, Serum: 3.83 mg/dL (12-30-22 @ 04:30)  Creatinine, Serum: 5.94 mg/dL (12-29-22 @ 06:06)  Creatinine, Serum: 4.86 mg/dL (12-28-22 @ 06:45)  Creatinine, Serum: 7.78 mg/dL (12-27-22 @ 06:00)  Creatinine, Serum: 6.39 mg/dL (12-26-22 @ 10:45)    Procalcitonin, Serum: 0.58 ng/mL (12-30-22 @ 04:30)     SARS-CoV-2 Result: NotDete (12-13-22 @ 02:17)    ________________________________________________________________  RADIOLOGY  < from: CT Head No Cont (12.29.22 @ 15:48) >  FINDINGS:    Motion degraded exam.    There is no acute intracranial hemorrhage or mass effect. The ventricles   and sulci are normal in size for patient's age.    There is no extraaxial fluid collection.    There is no displaced calvarial fracture. Status post bilateral   intraocular lens implants. Nasoenteric tube. Left ethmoid sinus mucosal   thickening. Air-fluid level in the left maxillary sinus. Polyp or   retention cyst in the right sphenoid sinus. Opacified right middle ear   cavity and mastoid air cells. Small soft tissue in the left mastoid.      IMPRESSION: No acute intracranial hemorrhage or mass effect.    < end of copied text >    < from: CT Chest No Cont (12.29.22 @ 16:03) >  INTERPRETATION:  Clinical information: Evaluate for aspiration pneumonia.   Exam is compared to previous study of 12/23/2022.    CT scan of the chest was obtained without administration of intravenous   contrast.    No hilar and or mediastinal adenopathy is noted.    Heart is enlarged in size. Patient is status post median sternotomy.   Small pericardial effusion is noted. Right-sided PICC line catheter is   noted in place.    No endobronchial lesions are noted. Interlobular septal thickening and   thickening of the peribronchovascular interstitium are noted within both   lungs. Minimal patchy groundglass opacities are also noted in both upper   lobes. Minimal atelectasis is also noted involving both lower lobes.   Small bilateral pleural effusions are noted.    Below the diaphragm, visualized portions of the abdomen demonstrate   nodular contour to the surface of liver. Cholelithiasis is noted. Both   kidneys are small in size. Low-attenuation lesion in the right kidney is   too small to be adequately characterized on this exam. Tip of the   nasogastric tube is noted in the stomach. Ascites is noted.    Degenerative changes of the spine as well as loss of height of multiple   vertebral bodies is noted. Subcutaneous edema is noted.    IMPRESSION: Findings suggestive of pulmonary edema.    < end of copied text >

## 2022-12-31 NOTE — PROGRESS NOTE ADULT - SUBJECTIVE AND OBJECTIVE BOX
[facility]                                        Neurology at [place]                                 [facility], [facility]                                  [address]                                        [place], [place], [address]                                             [phone]        CC: Encephalopathy    HPI:   The patient is a 72y Male with multiple medical issues who was recently hospitalized with hypoxic respiratory failure.  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   He was ultimately discharged 11/23/22.   He now presented with altered mental status and lethargy.   Neurology asked to evaluation for meningitis. LP attempted (neuro SHEELA) and was unsuccessful while in ER.    The patient had been gradually improving with regard to mental status, but has deteriorated again over the past few days.   Ultimately transferred back to ICU. (SHEELA)    Interim history: Unresponsive in ICU, s/p dialysis    ROS:   Unobtainable due to patient's condition.     MEDICATIONS  (STANDING):  acyclovir IVPB      acyclovir IVPB 320 milliGRAM(s) IV Intermittent every 24 hours  amphotericin B  liposome  IVPB 320 milliGRAM(s) IV Intermittent every 24 hours  chlorhexidine 2% Cloths 1 Application(s) Topical <User Schedule>  dextrose 50% Injectable 25 Gram(s) IV Push once  dextrose 50% Injectable 12.5 Gram(s) IV Push once  dextrose 50% Injectable 25 Gram(s) IV Push once  dextrose Oral Gel 15 Gram(s) Oral once  flucytosine 1500 milliGRAM(s) Oral <User Schedule>  heparin  Infusion. 600 Unit(s)/Hr (6 mL/Hr) IV Continuous <Continuous>  hydrocortisone 10 milliGRAM(s) Oral every 12 hours  meropenem Injectable      meropenem Injectable 500 milliGRAM(s) IV Push every 24 hours  metoprolol tartrate 25 milliGRAM(s) Enteral Tube four times a day  pantoprazole  Injectable 40 milliGRAM(s) IV Push daily  sevelamer carbonate 800 milliGRAM(s) Oral three times a day with meals  tacrolimus 1.5 milliGRAM(s) Oral daily  tamsulosin 0.4 milliGRAM(s) Oral at bedtime  trimethoprim   80 mG/sulfamethoxazole 400 mG 1 Tablet(s) Oral <User Schedule>  valproate sodium  IVPB 250 milliGRAM(s) IV Intermittent every 8 hours  vancomycin  IVPB 500 milliGRAM(s) IV Intermittent once    MEDICATIONS  (PRN):  heparin   Injectable 5000 Unit(s) IV Push every 6 hours PRN For aPTT less than 40  heparin   Injectable 2500 Unit(s) IV Push every 6 hours PRN For aPTT between 40 - 57      Vital Signs Last 24 Hrs  T(C): 36.2 (31 Dec 2022 12:05), Max: 36.4 (31 Dec 2022 02:00)  T(F): 97.2 (31 Dec 2022 12:05), Max: 97.5 (31 Dec 2022 02:00)  HR: 71 (31 Dec 2022 12:05) (59 - 89)  BP: 104/58 (31 Dec 2022 12:05) (97/56 - 135/72)  BP(mean): 63 (31 Dec 2022 12:00) (63 - 91)  RR: 14 (31 Dec 2022 12:05) (11 - 24)  SpO2: 100% (31 Dec 2022 12:05) (97% - 100%)    Parameters below as of 31 Dec 2022 12:05  Patient On (Oxygen Delivery Method): nasal cannula  O2 Flow (L/min): 3      Detailed Neurologic Exam:    Mental status: No vocal response to noxious stimuli. Not following any instructions.    Cranial nerves: Pupils pinpoint. No blink to threat from either side. Not tracking with gaze today. Face is grossly symmetric. Palate and tongue cannot be assessed.     Motor/Sensory: There is normal bulk and tone.  Symmetric grimace, minimal movement to nailbed pressure stimuli.     Reflexes: trace throughout and plantar responses are equivocal.    Cerebellar: Cannot be tested.     Labs:                           10.1   5.87  )-----------( 189      ( 31 Dec 2022 05:30 )             31.0     12-31    138  |  95<L>  |  16.4  ----------------------------<  76  3.8   |  25.0  |  4.75<H>    Ca    8.9      31 Dec 2022 05:30  Phos  5.2     12-31  Mg     1.8     12-31    TPro  4.8<L>  /  Alb  2.4<L>  /  TBili  0.3<L>  /  DBili  x   /  AST  26  /  ALT  5   /  AlkPhos  169<H>  12-31    LIVER FUNCTIONS - ( 31 Dec 2022 05:30 )  Alb: 2.4 g/dL / Pro: 4.8 g/dL / ALK PHOS: 169 U/L / ALT: 5 U/L / AST: 26 U/L / GGT: x           PT/INR - ( 31 Dec 2022 05:30 )   PT: 14.1 sec;   INR: 1.21 ratio         PTT - ( 31 Dec 2022 05:30 )  PTT:78.3 sec      Brain MRI: There is no acute pathology.   There is chronic ischemic change.     EEG SUMMARY/CLASSIFICATION 12/30-12/31/22    Abnormal EEG in an altered patient.  1. Sharp waves, generalized, rare   2. Isolated myoclonic jerks, without a clear ictal correlate   3. Generalized periodic discharges with triphasic morphology <0.5-1 Hz  4. Background slowing, Moderate, generalized.    _____________________________________________________________  EEG IMPRESSION/CLINICAL CORRELATE    Abnormal EEG study.  1. Risk of generalized onset seizures, with evidence of diffuse cerebral dysfunction.   2. Isolated myoclonic jerks, head movements side to side, head flexion, throughout the study without a clear EEG correlate.   3. Generalized periodic discharges with triphasic morphology are commonly seen in toxic- metabolic encephalopathy, rarely epileptic   4. Moderate nonspecific diffuse or multifocal cerebral dysfunction.   5. No seizures recorded   _____________________________________________________________

## 2022-12-31 NOTE — PROGRESS NOTE ADULT - ASSESSMENT
72M with PMH of  hypertension, hyperlipidemia, ESRD on HD, emphysema s/p lung transplant in [place] by Dr. 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 faecalis bacteremia, newly diagnosed HFrEF 25%, AF complicated by GIB was discharged on home IV Abx now presenting with AMS  Presented to the ED with daughter. CTH obtained and did not reveal any acute findings.  Patient was admitted to medicine for encephalopathy. LP attempted bedside but unsuccessful. Was on empiric treatment and mental status was improving and then ultimately intubated for aspiration pneumonia    #Acute hypoxic respiratory failure likely 2/2 aspiration- now extubated, mental status improved. sputum cx normal keerthi    #AMS-unclear etiology, no fever. ammonia level normal.  ct c/ap ?pna/edema/ascites.   ??EBV encephalitis  s/p LP normal cell counts glucose and protein 51. CSF pcr (-) and cultures pending.   [facility] micro lab CSF gram stain reported as "few organisms" . gram stain repeated at core lab and micro and is NEGATIVE. initial report has been corrected. CSF crytp titer 1:40, as per [facility] his initial CSF titer in 12/2020 was 1:2560. no serum titer available. 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 f/u CSf fungal CX. 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 CRISTIAN was deferred by cardio on recent admission.  resumed ceftriaxone /ampicillin as previously planned. f/u BCX ngtd, repeat TTE    # s/p lung transplant- per transplant team, cellcept  on hold as per pt wife discussion with [facility] transplant team, currently on cortef, c/w tacrolimus and check levels. c/w bactrim per home dose for ppx, fluc resumed    # h/o fungal meningitis- resumed fluc    AS ABOVE PT WAS TREATED AND WAS CLINICALLY IMPROVED AND NOW WITH DECLINE  WITH DECREASED MENTAL STATUS  EEG reported seizure activity     REPEAT IMAGING OF CHEST  NO OBVIOUS ASPIRATION   WOULD CONSIDER  SEND   CRYPT AG IN CSF CELL COUNT CHEMISTRIES    Repeat Crypto serum AG neg   On AMPHO B FLUCYTOSINE    Started empirically on ACV - however neg CSF HSV 1/2 recently   Possible LP (delayed due to eliquis)

## 2022-12-31 NOTE — PHARMACOTHERAPY INTERVENTION NOTE - COMMENTS
HD patient- Vancomycin level ordered for tomorrow AM
level 23.5 this am will check a level tomorrow and re dose if needed
vanco level 18.2 will give vanco post hd today
Vanco level ordered for Wednedsay morning
Vancomycin level 19.3, redosed 750 mg x 1 post HD.

## 2022-12-31 NOTE — EEG REPORT - NS EEG TEXT BOX
[facility]   COMPREHENSIVE EPILEPSY CENTER   REPORT OF LONG-TERM VIDEO EEG     [facility]: [address], [place] [address], Ph#: [phone]  [facility]: [id number] [place] [address], Ph#: [phone]  [facility]: [address] [place] [address], Ph#: [phone]    Patient Name: JU FERGUSON  Age and : 72y (46)  MRN #: [id number]  Location: [facility] 42[address] 01  Referring Physician: Gladis Ko    Start Time/Date: 0800  on 22  End Time/Date: 08 on 22  Duration:  24 h     _____________________________________________________________  STUDY INFORMATION    EEG Recording Technique:  The patient underwent continuous Video-EEG monitoring, using Telemetry System hardware on the XLTek Digital System. EEG and video data were stored on a computer hard drive with important events saved in digital archive files. The material was reviewed by a physician (electroencephalographer / epileptologist) on a daily basis. Brad and seizure detection algorithms were utilized and reviewed. An EEG Technician attended to the patient, and was available throughout daytime work hours.  The epilepsy center neurologist was available in person or on call 24-hours per day.    EEG Placement and Labeling of Electrodes:  The EEG was performed utilizing 20 channel referential EEG connections (coronal over temporal over parasagittal montage) using all standard 10-20 electrode placements with EKG, with additional electrodes placed in the inferior temporal region using the modified 10-10 montage electrode placements for elective admissions, or if deemed necessary. Recording was at a sampling rate of 256 samples per second per channel. Time synchronized digital video recording was done simultaneously with EEG recording. A low light infrared camera was used for low light recording.     _____________________________________________________________  HISTORY    Patient is a 72y old  Male who presents with a chief complaint of Encephalopathy (21 Dec 2022 11:30)    MEDICATIONS  (STANDING):  acyclovir IVPB 320 milliGRAM(s) IV Intermittent every 24 hours  acyclovir IVPB      amphotericin B  liposome  IVPB 320 milliGRAM(s) IV Intermittent every 24 hours  flucytosine 1500 milliGRAM(s) Oral <User Schedule>  heparin  Infusion. 600 Unit(s)/Hr (6 mL/Hr) IV Continuous <Continuous>  hydrocortisone 10 milliGRAM(s) Oral every 12 hours  meropenem Injectable      meropenem Injectable 500 milliGRAM(s) IV Push every 24 hours  metoprolol tartrate 25 milliGRAM(s) Enteral Tube four times a day  pantoprazole  Injectable 40 milliGRAM(s) IV Push daily  sevelamer carbonate 800 milliGRAM(s) Oral three times a day with meals  tacrolimus 1.5 milliGRAM(s) Oral daily  tamsulosin 0.4 milliGRAM(s) Oral at bedtime  trimethoprim   80 mG/sulfamethoxazole 400 mG 1 Tablet(s) Oral <User Schedule>  valproate sodium  IVPB 250 milliGRAM(s) IV Intermittent every 8 hours  _____________________________________________________________  STUDY INTERPRETATION    Findings: The background was reactive and continuous there were periods of brief attenuation during clinically silent state (<%1). During wakefulness, no PDR seen.    Background Slowing:  Diffuse theta and polymorphic delta slowing.    Focal Slowing:   None were present.    Sleep Background:  Drowsiness was characterized by fragmentation, attenuation, and slowing of the background activity.    Stage II sleep transients were not recorded.    Other Non-Epileptiform Findings:  Frequent generalized periodic discharges with triphasic morphology <0.5-1 Hz     Interictal Epileptiform Activity:   Rare generalized sharp wave discharges, frontally predominant     Seizures:  none    Other events:   Isolated myoclonic jerks - head movements side to side, head flexion without a clear ictal correlate    Activation Procedures:   Hyperventilation was not performed.    Photic stimulation was not performed.     Artifacts:  Intermittent myogenic and movement artifacts were noted.    ECG:  The heart rate on single channel ECG was irregularly irregular predominantly between 70-90 BPM.    _____________________________________________________________  EEG SUMMARY/CLASSIFICATION    Abnormal EEG in an altered patient.  1. Sharp waves, generalized, rare   2. Isolated myoclonic jerks, without a clear ictal correlate   3. Generalized periodic discharges with triphasic morphology <0.5-1 Hz  4. Background slowing, Moderate, generalized.    _____________________________________________________________  EEG IMPRESSION/CLINICAL CORRELATE    Abnormal EEG study.  1. Risk of generalized onset seizures, with evidence of diffuse cerebral dysfunction.   2. Isolated myoclonic jerks, head movements side to side, head flexion, throughout the study without a clear EEG correlate.   3. Generalized periodic discharges with triphasic morphology are commonly seen in toxic- metabolic encephalopathy, rarely epileptic   4. Moderate nonspecific diffuse or multifocal cerebral dysfunction.   5. No seizures recorded   _____________________________________________________________    Preliminary report     Aurelio Portillo MD  Epilepsy Fellow , Health system  Department of Neurology, [employer]    Health system EEG Reading Room Ph#: [phone]  Epilepsy Answering Service after 5PM and before 8:30AM: Ph#: [phone]   [facility]   COMPREHENSIVE EPILEPSY CENTER   REPORT OF LONG-TERM VIDEO EEG     [facility]: [address], [place] [address], Ph#: [phone]  [facility]: [phone] [place] [address], Ph#: [phone]  [facility]: [address] [place] [address], Ph#: [phone]    Patient Name: JU FERGUSON  Age and : 72y (46)  MRN #: [id number]  Location: [facility] 42[address] 01  Referring Physician: Gladis Ko    Start Time/Date: 0800  on 22  End Time/Date: 08 on 22  Duration:  24 h     _____________________________________________________________  STUDY INFORMATION    EEG Recording Technique:  The patient underwent continuous Video-EEG monitoring, using Telemetry System hardware on the XLTek Digital System. EEG and video data were stored on a computer hard drive with important events saved in digital archive files. The material was reviewed by a physician (electroencephalographer / epileptologist) on a daily basis. Brad and seizure detection algorithms were utilized and reviewed. An EEG Technician attended to the patient, and was available throughout daytime work hours.  The epilepsy center neurologist was available in person or on call 24-hours per day.    EEG Placement and Labeling of Electrodes:  The EEG was performed utilizing 20 channel referential EEG connections (coronal over temporal over parasagittal montage) using all standard 10-20 electrode placements with EKG, with additional electrodes placed in the inferior temporal region using the modified 10-10 montage electrode placements for elective admissions, or if deemed necessary. Recording was at a sampling rate of 256 samples per second per channel. Time synchronized digital video recording was done simultaneously with EEG recording. A low light infrared camera was used for low light recording.     _____________________________________________________________  HISTORY    Patient is a 72y old  Male who presents with a chief complaint of Encephalopathy (21 Dec 2022 11:30)    MEDICATIONS  (STANDING):  acyclovir IVPB 320 milliGRAM(s) IV Intermittent every 24 hours  acyclovir IVPB      amphotericin B  liposome  IVPB 320 milliGRAM(s) IV Intermittent every 24 hours  flucytosine 1500 milliGRAM(s) Oral <User Schedule>  heparin  Infusion. 600 Unit(s)/Hr (6 mL/Hr) IV Continuous <Continuous>  hydrocortisone 10 milliGRAM(s) Oral every 12 hours  meropenem Injectable      meropenem Injectable 500 milliGRAM(s) IV Push every 24 hours  metoprolol tartrate 25 milliGRAM(s) Enteral Tube four times a day  pantoprazole  Injectable 40 milliGRAM(s) IV Push daily  sevelamer carbonate 800 milliGRAM(s) Oral three times a day with meals  tacrolimus 1.5 milliGRAM(s) Oral daily  tamsulosin 0.4 milliGRAM(s) Oral at bedtime  trimethoprim   80 mG/sulfamethoxazole 400 mG 1 Tablet(s) Oral <User Schedule>  valproate sodium  IVPB 250 milliGRAM(s) IV Intermittent every 8 hours  _____________________________________________________________  STUDY INTERPRETATION    Findings: The background was reactive and continuous there were periods of brief attenuation during clinically silent state (<%1). During wakefulness, no PDR seen.    Background Slowing:  Diffuse theta and polymorphic delta slowing.    Focal Slowing:   None were present.    Sleep Background:  Drowsiness was characterized by fragmentation, attenuation, and slowing of the background activity.    Stage II sleep transients were not recorded.    Other Non-Epileptiform Findings:  Frequent generalized periodic discharges with triphasic morphology <0.5-1 Hz     Interictal Epileptiform Activity:   Rare generalized sharp wave discharges, frontally predominant     Seizures:  none    Other events:   Isolated myoclonic jerks - head movements side to side, head flexion without a clear ictal correlate    Activation Procedures:   Hyperventilation was not performed.    Photic stimulation was not performed.     Artifacts:  Intermittent myogenic and movement artifacts were noted.    ECG:  The heart rate on single channel ECG was irregularly irregular predominantly between 70-90 BPM.    _____________________________________________________________  EEG SUMMARY/CLASSIFICATION    Abnormal EEG in an altered patient.  1. Sharp waves, generalized, rare   2. Isolated myoclonic jerks, without a clear ictal correlate   3. Generalized periodic discharges with triphasic morphology <0.5-1 Hz  4. Background slowing, Moderate, generalized.    _____________________________________________________________  EEG IMPRESSION/CLINICAL CORRELATE    Abnormal EEG study.  1. Risk of generalized onset seizures, with evidence of diffuse cerebral dysfunction.   2. Isolated myoclonic jerks, head movements side to side, head flexion, throughout the study without a clear EEG correlate.   3. Generalized periodic discharges with triphasic morphology are commonly seen in toxic- metabolic encephalopathy, rarely epileptic   4. Moderate nonspecific diffuse or multifocal cerebral dysfunction.   5. No seizures recorded   _____________________________________________________________    Aurelio Portillo MD  Epilepsy Fellow , [facility]  Department of Neurology, [facility] [employer]    Derik Tate MD PhD  Director, Epilepsy Division, [facility] EEG Reading Room Ph#: [phone]  Epilepsy Answering Service after 5PM and before 8:30AM: Ph#: [phone]

## 2022-12-31 NOTE — PROGRESS NOTE ADULT - SUBJECTIVE AND OBJECTIVE BOX
Seen on HD.    Physical Exam  General: WDWN male lying in bed  Cardiac: S1S2 RRR  Respiratory: CTAB  Abdomen: Soft, NT  Extremities: No appreciable edema  Dressing around face    =======================================================  Vital Signs Last 24 Hrs  T(C): 36.1 (31 Dec 2022 11:22), Max: 36.4 (31 Dec 2022 02:00)  T(F): 97 (31 Dec 2022 11:22), Max: 97.5 (31 Dec 2022 02:00)  HR: 66 (31 Dec 2022 10:00) (59 - 89)  BP: 112/58 (31 Dec 2022 10:00) (97/56 - 135/72)  BP(mean): 74 (31 Dec 2022 10:00) (65 - 91)  RR: 16 (31 Dec 2022 10:00) (11 - 24)  SpO2: 97% (31 Dec 2022 10:00) (97% - 100%)    Parameters below as of 31 Dec 2022 08:55  Patient On (Oxygen Delivery Method): nasal cannula    O2 Concentration (%): 3  I&O's Summary    30 Dec 2022 07:01  -  31 Dec 2022 07:00  --------------------------------------------------------  IN: 179 mL / OUT: 0 mL / NET: 179 mL    31 Dec 2022 07:01  -  31 Dec 2022 11:54  --------------------------------------------------------  IN: 0 mL / OUT: 0 mL / NET: 0 mL      =======================================================  Current Antibiotics:  acyclovir IVPB 320 milliGRAM(s) IV Intermittent every 24 hours  acyclovir IVPB      amphotericin B  liposome  IVPB 320 milliGRAM(s) IV Intermittent every 24 hours  flucytosine 1500 milliGRAM(s) Oral <User Schedule>  meropenem Injectable      meropenem Injectable 500 milliGRAM(s) IV Push every 24 hours  trimethoprim   80 mG/sulfamethoxazole 400 mG 1 Tablet(s) Oral <User Schedule>    Other medications:  chlorhexidine 2% Cloths 1 Application(s) Topical <User Schedule>  dextrose 50% Injectable 25 Gram(s) IV Push once  dextrose 50% Injectable 12.5 Gram(s) IV Push once  dextrose 50% Injectable 25 Gram(s) IV Push once  dextrose Oral Gel 15 Gram(s) Oral once  heparin  Infusion. 600 Unit(s)/Hr IV Continuous <Continuous>  hydrocortisone 10 milliGRAM(s) Oral every 12 hours  metoprolol tartrate 25 milliGRAM(s) Enteral Tube four times a day  pantoprazole  Injectable 40 milliGRAM(s) IV Push daily  sevelamer carbonate 800 milliGRAM(s) Oral three times a day with meals  tacrolimus 1.5 milliGRAM(s) Oral daily  tamsulosin 0.4 milliGRAM(s) Oral at bedtime  valproate sodium  IVPB 250 milliGRAM(s) IV Intermittent every 8 hours    =======================================================  12-31    138  |  95<L>  |  16.4  ----------------------------<  76  3.8   |  25.0  |  4.75<H>    Ca    8.9      31 Dec 2022 05:30  Phos  5.2     12-31  Mg     1.8     12-31    TPro  4.8<L>  /  Alb  2.4<L>  /  TBili  0.3<L>  /  DBili  x   /  AST  26  /  ALT  5   /  AlkPhos  169<H>  12-31    Creatinine, Serum: 4.75 mg/dL (12-31-22 @ 05:30)  Creatinine, Serum: 3.83 mg/dL (12-30-22 @ 04:30)  Creatinine, Serum: 5.94 mg/dL (12-29-22 @ 06:06)  Creatinine, Serum: 4.86 mg/dL (12-28-22 @ 06:45)  Creatinine, Serum: 7.78 mg/dL (12-27-22 @ 06:00)      ======================================================= Seen on HD.    ROS: Unable to obtain secondary to patient current clinical condition.     Physical Exam  General: WDWN male lying in bed, ill appearing  Cardiac: S1S2 RRR  Respiratory: CTAB  Abdomen: Soft, NT  Extremities: ++edema  On EEG    =======================================================  Vital Signs Last 24 Hrs  T(C): 36.1 (31 Dec 2022 11:22), Max: 36.4 (31 Dec 2022 02:00)  T(F): 97 (31 Dec 2022 11:22), Max: 97.5 (31 Dec 2022 02:00)  HR: 66 (31 Dec 2022 10:00) (59 - 89)  BP: 112/58 (31 Dec 2022 10:00) (97/56 - 135/72)  BP(mean): 74 (31 Dec 2022 10:00) (65 - 91)  RR: 16 (31 Dec 2022 10:00) (11 - 24)  SpO2: 97% (31 Dec 2022 10:00) (97% - 100%)    Parameters below as of 31 Dec 2022 08:55  Patient On (Oxygen Delivery Method): nasal cannula    O2 Concentration (%): 3  I&O's Summary    30 Dec 2022 07:01  -  31 Dec 2022 07:00  --------------------------------------------------------  IN: 179 mL / OUT: 0 mL / NET: 179 mL    31 Dec 2022 07:01  -  31 Dec 2022 11:54  --------------------------------------------------------  IN: 0 mL / OUT: 0 mL / NET: 0 mL      =======================================================  Current Antibiotics:  acyclovir IVPB 320 milliGRAM(s) IV Intermittent every 24 hours  acyclovir IVPB      amphotericin B  liposome  IVPB 320 milliGRAM(s) IV Intermittent every 24 hours  flucytosine 1500 milliGRAM(s) Oral <User Schedule>  meropenem Injectable      meropenem Injectable 500 milliGRAM(s) IV Push every 24 hours  trimethoprim   80 mG/sulfamethoxazole 400 mG 1 Tablet(s) Oral <User Schedule>    Other medications:  chlorhexidine 2% Cloths 1 Application(s) Topical <User Schedule>  dextrose 50% Injectable 25 Gram(s) IV Push once  dextrose 50% Injectable 12.5 Gram(s) IV Push once  dextrose 50% Injectable 25 Gram(s) IV Push once  dextrose Oral Gel 15 Gram(s) Oral once  heparin  Infusion. 600 Unit(s)/Hr IV Continuous <Continuous>  hydrocortisone 10 milliGRAM(s) Oral every 12 hours  metoprolol tartrate 25 milliGRAM(s) Enteral Tube four times a day  pantoprazole  Injectable 40 milliGRAM(s) IV Push daily  sevelamer carbonate 800 milliGRAM(s) Oral three times a day with meals  tacrolimus 1.5 milliGRAM(s) Oral daily  tamsulosin 0.4 milliGRAM(s) Oral at bedtime  valproate sodium  IVPB 250 milliGRAM(s) IV Intermittent every 8 hours    =======================================================  12-31    138  |  95<L>  |  16.4  ----------------------------<  76  3.8   |  25.0  |  4.75<H>    Ca    8.9      31 Dec 2022 05:30  Phos  5.2     12-31  Mg     1.8     12-31    TPro  4.8<L>  /  Alb  2.4<L>  /  TBili  0.3<L>  /  DBili  x   /  AST  26  /  ALT  5   /  AlkPhos  169<H>  12-31    Creatinine, Serum: 4.75 mg/dL (12-31-22 @ 05:30)  Creatinine, Serum: 3.83 mg/dL (12-30-22 @ 04:30)  Creatinine, Serum: 5.94 mg/dL (12-29-22 @ 06:06)  Creatinine, Serum: 4.86 mg/dL (12-28-22 @ 06:45)  Creatinine, Serum: 7.78 mg/dL (12-27-22 @ 06:00)      =======================================================

## 2022-12-31 NOTE — PROCEDURE NOTE - NSFINDINGS_GEN_A_CORE
borborygmi with insufflation air
borborygmi with insufflation air
clear cerebral spinal fluid
clear cerebral spinal fluid
bloody fluid

## 2022-12-31 NOTE — PROGRESS NOTE ADULT - ASSESSMENT
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 CT head is negative. Hospital course is notable for empiric treatment for meningitis; EBV positive in CSF; also with dilated common bile duct; acutely decompensated on 12/18 with acute hypercapnic respiratory failure in setting of aspiration pneumonia, emergently intubated, later extubated; also shock (resolved). Nephrology is managing ESRD on HD.    -Access: L AV access   -Outpatient dialysis days are Tuesdays Thursdays Saturdays  -Seen on HD.  Qd, Qb, UF rate reviewed.  Vitals stable.  Access working well.  Patient tolerating HD well.  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 CT head is negative. Hospital course is notable for empiric treatment for meningitis; EBV positive in CSF; also with dilated common bile duct; acutely decompensated on 12/18 with acute hypercapnic respiratory failure in setting of aspiration pneumonia, emergently intubated, later extubated; also shock (resolved). Nephrology is managing ESRD on HD.    -Access: L AV access   -Outpatient dialysis days are Tuesdays Thursdays Saturdays  -Seen on HD.  Qd, Qb, UF rate reviewed.  Vitals stable.  Access working well.  Patient tolerating HD well. Will try more UF next time.   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 Follow phos levels.   -s/p Lung transplantation - immunosuppressive management as per ID  -EBV in CSF, meingitis - ID on board

## 2022-12-31 NOTE — PROGRESS NOTE ADULT - ASSESSMENT
72y Male with multiple medical issues now with encephalopathy.     Encephalopathy.   Mental status waxing and waning.   Likely toxic metabolic secondary to multiple medical issues.   ?Hepatic component.   EEG findings strongly suggestive of toxic metabolic etiology.    Meningitis.   Antibiotic coverage for now per ID.   crypto Ag (+) but PCR (-)  EBV detected by PCR in CSF, low level, unclear significance.  ID following.   discussed with Dr Dunn in MICU today- likely LP today, suggested to measure opening pressure in addtion to sending labs and cultures    will follow with you    Luis Brady MD PhD   [id number]

## 2022-12-31 NOTE — PHARMACOTHERAPY INTERVENTION NOTE - NSPHARMCOMMASP
ASP - Lab/ test recommended
ASP - Dose optimization/Non-Renal dose adjustment
ASP - Lab/ test recommended

## 2022-12-31 NOTE — PROGRESS NOTE ADULT - SUBJECTIVE AND OBJECTIVE BOX
JU FERGUSON  MRN-[id number]  Patient is a 72y old  Male who presents with a chief complaint of Encephalopathy (21 Dec 2022 11:30)    HPI:  72M with PMH of  hypertension, hyperlipidemia, ESRD on HD, emphysema s/p lung transplant in [place] by Dr. 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 feacalis bacteremia, newly diagnosed HFrEF 25%, AF complicated by GIB was discharged on home IV Abx now presenting with AMS    Patient unable to contribute to history secondary to mental status. History obtained by EMR and Spouse.  Patient was discharged day prior to Thanksgiving with home iv abx. Per Spouse has been receiving IV abx administered by wife and daughter. Has not missed any HD sessions with exception to today.  Since discharge has been becoming increasingly tired. Wife attributes this to not sleeping well. Furthermore patient complained of severe nausea yesterday afternoon but did not have emesis. Approx 1 hour after that became more ''confused and out of it'' but still talking. Symptoms continued to progress and get worse throughout the night and in the morning was only moaning and extremely lethargic.     No recent cough, cp, sob. Denies recent fevers, chills.     Presented to the ED with daughter. CTH obtained and did not reveal any acute findings.  Patient was admitted to medicine for encephalopathy.         (13 Dec 2022 08:45)      Events in last 24 hours:     REVIEW OF SYSTEMS:    Gen: No fever  EYES/ENT: No visual changes;  No vertigo or throat pain   NECK: No pain   RES:  No shortness of breath or Cough  CARD: No chest pain   GI: No abdominal pain  : No dysuria  NEURO: No weakness  SKIN: No itching, rashes     Physical Exam:  Vital Signs Last 24 Hrs  T(C): 36.2 (31 Dec 2022 12:05), Max: 36.4 (31 Dec 2022 02:00)  T(F): 97.2 (31 Dec 2022 12:05), Max: 97.5 (31 Dec 2022 02:00)  HR: 70 (31 Dec 2022 14:00) (59 - 89)  BP: 129/63 (31 Dec 2022 14:00) (97/56 - 135/72)  BP(mean): 83 (31 Dec 2022 14:00) (63 - 91)  RR: 12 (31 Dec 2022 14:00) (11 - 24)  SpO2: 100% (31 Dec 2022 14:00) (97% - 100%)    Parameters below as of 31 Dec 2022 12:05  Patient On (Oxygen Delivery Method): nasal cannula  O2 Flow (L/min): 3      Gen:  Awake, alert   CNS: non focal 	  Neck: no JVD  RES : clear , no wheezing                      CVS: Regular  rhythm. Normal S1/S2  Abd: Soft, non-distended. Bowel sounds present.  Skin: No rash.  Ext:  no edema    ============================I/O===========================   I&O's Detail    30 Dec 2022 07:01  -  31 Dec 2022 07:00  --------------------------------------------------------  IN:    Heparin Infusion: 87 mL    Heparin Infusion: 42 mL    IV PiggyBack: 50 mL  Total IN: 179 mL    OUT:  Total OUT: 0 mL    Total NET: 179 mL      31 Dec 2022 07:01  -  31 Dec 2022 14:37  --------------------------------------------------------  IN:    IV PiggyBack: 50 mL  Total IN: 50 mL    OUT:    Heparin Infusion: 0 mL    Other (mL): 500 mL    Stool (mL): 500 mL  Total OUT: 1000 mL    Total NET: -950 mL        ============================ LABS =========================                        10.1   5.87  )-----------( 189      ( 31 Dec 2022 05:30 )             31.0     12-31    138  |  95<L>  |  16.4  ----------------------------<  76  3.8   |  25.0  |  4.75<H>    Ca    8.9      31 Dec 2022 05:30  Phos  5.2     12-31  Mg     1.8     12-31    TPro  4.8<L>  /  Alb  2.4<L>  /  TBili  0.3<L>  /  DBili  x   /  AST  26  /  ALT  5   /  AlkPhos  169<H>  12-31    LIVER FUNCTIONS - ( 31 Dec 2022 05:30 )  Alb: 2.4 g/dL / Pro: 4.8 g/dL / ALK PHOS: 169 U/L / ALT: 5 U/L / AST: 26 U/L / GGT: x           PT/INR - ( 31 Dec 2022 05:30 )   PT: 14.1 sec;   INR: 1.21 ratio         PTT - ( 31 Dec 2022 05:30 )  PTT:78.3 sec      ======================Micro/Rad/Cardio=================  Culture: Reviewed   CXR: Reviewed  Echo:Reviewed  ======================================================  PAST MEDICAL & SURGICAL HISTORY:  Emphysema of lung  s/p lung transplant      ESRD (end stage renal disease) on dialysis  on HD via LUE T/Th/Sat      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      H/O heart artery stent  x3 @[facility]      History of hip replacement  right      Status post open reduction and internal fixation (ORIF) of fracture  left femur          ====================ASSESSMENT ==============  1.   2.   3.   4.     Plan:  ====================== NEUROLOGY=====================  valproate sodium  IVPB 250 milliGRAM(s) IV Intermittent every 8 hours    ==================== RESPIRATORY======================  Mechanical Ventilation:      ====================CARDIOVASCULAR==================  metoprolol tartrate 25 milliGRAM(s) Enteral Tube four times a day    ===================HEMATOLOGIC/ONC ===================  heparin   Injectable 5000 Unit(s) IV Push every 6 hours PRN For aPTT less than 40  heparin   Injectable 2500 Unit(s) IV Push every 6 hours PRN For aPTT between 40 - 57  heparin  Infusion. 600 Unit(s)/Hr (6 mL/Hr) IV Continuous <Continuous>    ===================== RENAL =========================  Continue monitoring urine output  tamsulosin 0.4 milliGRAM(s) Oral at bedtime    ==================== GASTROINTESTINAL===================  pantoprazole  Injectable 40 milliGRAM(s) IV Push daily    =======================    ENDOCRINE  =====================  dextrose 50% Injectable 25 Gram(s) IV Push once  dextrose 50% Injectable 12.5 Gram(s) IV Push once  dextrose 50% Injectable 25 Gram(s) IV Push once  dextrose Oral Gel 15 Gram(s) Oral once  hydrocortisone 10 milliGRAM(s) Oral every 12 hours    ========================INFECTIOUS DISEASE================  acyclovir IVPB 320 milliGRAM(s) IV Intermittent every 24 hours  acyclovir IVPB      amphotericin B  liposome  IVPB 320 milliGRAM(s) IV Intermittent every 24 hours  flucytosine 1500 milliGRAM(s) Oral <User Schedule>  meropenem Injectable      meropenem Injectable 500 milliGRAM(s) IV Push every 24 hours  trimethoprim   80 mG/sulfamethoxazole 400 mG 1 Tablet(s) Oral <User Schedule>  vancomycin  IVPB 500 milliGRAM(s) IV Intermittent once      Patient requires continuous monitoring with bedside rhythm monitoring, pulse oximetry, ventilator/ bipap  monitoring and intermittent blood gas analysis.    Care plan discussed with ICU care team.    Patient remained critical; required more than usual care; I have spent 35 minutes providing non-routine care, revaluated multiple times during the day.     JU FERGUSON  MRN-[id number]  Patient is a 72y old  Male who presents with a chief complaint of Encephalopathy (21 Dec 2022 11:30)    HPI:  72M with PMH of  hypertension, hyperlipidemia, ESRD on HD, emphysema s/p lung transplant in [place] by Dr. 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 feacalis bacteremia, newly diagnosed HFrEF 25%, AF complicated by GIB was discharged on home IV Abx now presenting with AMS    Patient unable to contribute to history secondary to mental status. History obtained by EMR and Spouse.  Patient was discharged day prior to Thanksgiving with home iv abx. Per Spouse has been receiving IV abx administered by wife and daughter. Has not missed any HD sessions with exception to today.  Since discharge has been becoming increasingly tired. Wife attributes this to not sleeping well. Furthermore patient complained of severe nausea yesterday afternoon but did not have emesis. Approx 1 hour after that became more ''confused and out of it'' but still talking. Symptoms continued to progress and get worse throughout the night and in the morning was only moaning and extremely lethargic. "     Patient was intubated on 12/18 for hypoxic hypercapnic respiratory failure 2/2 to possible aspiration pneumonia.  Patient also has ongoing altered mental status which is not improving.  S/P LP on 12/19 which shows mild cryptococci antigen titer which could be chronic, and EBV+ which was unclear if it suggest EVBV encephalitis.  Per ID, patient was continued on meropenum for the pneumonia/meningits, and continue on fluconazole chronic suppression for cryptococcal infection.  Patient was extubated on 12/20, and able to transferred to the floor on 12/21.  Patient mental status imprvoed, able to have conversation. However patient mental status began to deteriorate again on 12/26.      Neuro consulted, recommended EEG, EEG suggetive of toxic metabolic etiology     Patient continues to be encephalopathic, MICU consulted.  Patient saturating well, however obtunded, not following commands.       (13 Dec 2022 08:45)      Events in last 24 hours:   Pt received repeat LP, today. Pending repeat cultures and results   Pt remains altered and encephalopathic   Pt received HD session today     REVIEW OF SYSTEMS:    ROS not able to be obtained 2/2 to metabolic encephalopathy     Physical Exam:  Vital Signs Last 24 Hrs  T(C): 36.2 (31 Dec 2022 12:05), Max: 36.4 (31 Dec 2022 02:00)  T(F): 97.2 (31 Dec 2022 12:05), Max: 97.5 (31 Dec 2022 02:00)  HR: 70 (31 Dec 2022 14:00) (59 - 89)  BP: 129/63 (31 Dec 2022 14:00) (97/56 - 135/72)  BP(mean): 83 (31 Dec 2022 14:00) (63 - 91)  RR: 12 (31 Dec 2022 14:00) (11 - 24)  SpO2: 100% (31 Dec 2022 14:00) (97% - 100%)    Parameters below as of 31 Dec 2022 12:05  Patient On (Oxygen Delivery Method): nasal cannula  O2 Flow (L/min): 3        Gen:  Awake, altered disoriented,   CNS: non focal 	  Neck: no JVD  RES : + Bl rhonchi                CVS: Regular  rhythm. Normal S1/S2  Abd: Soft, non-distended. Bowel sounds present.  Skin: No rash.  Ext:  no edema  ============================I/O===========================   I&O's Detail    30 Dec 2022 07:01  -  31 Dec 2022 07:00  --------------------------------------------------------  IN:    Heparin Infusion: 87 mL    Heparin Infusion: 42 mL    IV PiggyBack: 50 mL  Total IN: 179 mL    OUT:  Total OUT: 0 mL    Total NET: 179 mL      31 Dec 2022 07:01  -  31 Dec 2022 14:37  --------------------------------------------------------  IN:    IV PiggyBack: 50 mL  Total IN: 50 mL    OUT:    Heparin Infusion: 0 mL    Other (mL): 500 mL    Stool (mL): 500 mL  Total OUT: 1000 mL    Total NET: -950 mL        ============================ LABS =========================                        10.1   5.87  )-----------( 189      ( 31 Dec 2022 05:30 )             31.0     12-31    138  |  95<L>  |  16.4  ----------------------------<  76  3.8   |  25.0  |  4.75<H>    Ca    8.9      31 Dec 2022 05:30  Phos  5.2     12-31  Mg     1.8     12-31    TPro  4.8<L>  /  Alb  2.4<L>  /  TBili  0.3<L>  /  DBili  x   /  AST  26  /  ALT  5   /  AlkPhos  169<H>  12-31    LIVER FUNCTIONS - ( 31 Dec 2022 05:30 )  Alb: 2.4 g/dL / Pro: 4.8 g/dL / ALK PHOS: 169 U/L / ALT: 5 U/L / AST: 26 U/L / GGT: x           PT/INR - ( 31 Dec 2022 05:30 )   PT: 14.1 sec;   INR: 1.21 ratio         PTT - ( 31 Dec 2022 05:30 )  PTT:78.3 sec      ======================Micro/Rad/Cardio=================  Culture: Reviewed   CXR: Reviewed  Echo:Reviewed  ======================================================  PAST MEDICAL & SURGICAL HISTORY:  Emphysema of lung  s/p lung transplant      ESRD (end stage renal disease) on dialysis  on HD via LUE T/Th/Sat      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      2019 novel coronavirus disease (COVID-19)  Feb 2021 - hospitalized for 1 week at [facility]      Pneumonia  April 2021      [place]      HTN (hypertension)      Lumbar spondylosis      History of COPD      BPH without urinary obstruction      Hypercholesterolemia      Oliguria and anuria      H/O peripheral neuropathy      H/O lung transplant  bilateral - transplant - 1-2-2015 -  [facility]      H/O heart artery stent  x3 @[facility]      History of hip replacement  right      Status post open reduction and internal fixation (ORIF) of fracture  left femur          ====================ASSESSMENT ==============  1. status epilepticus   2.  metabolic encephalopathy, AMS   3.  positive cryptococcal antigen in   4.  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 aspiration pneumonia  6.  ESRD on HD   7. HFrEF  8. paroxysmal afib       Plan:  ====================== NEUROLOGY=====================  -No seizures recorded on EEG today  -Pt was started on Valproic Acid as per neuro and epileptology  -Pt needs LP with new change and mildly elevated Crypto AG,   -LP performed again today, results pending   -obtain VA level low, will speak with pharm about redosing     ==================== RESPIRATORY======================  -pt breathing on own, protecting airway, but unresponsive, and encephalopathic with screeching out intermittently   -SpO2 on 3L NC, low threshold to intubate if  cannot protect airway     ====================CARDIOVASCULAR==================  metoprolol tartrate 25 milliGRAM(s) Enteral Tube four times a day for rate control of Afib     ===================HEMATOLOGIC/ONC ===================  Heparin gtt on hold for procedure, will restart in 4 hours post procedure   PTT to be drawn at 4 pm  ===================== RENAL =========================  Continue monitoring urine output  tamsulosin 0.4 milliGRAM(s) Oral at bedtime  -Check repeat labs and monitor renal function trend  -Pt received HD today - 500 cc off today  -Avoid nephrotoxic meds as possible. Avoid ACEI, ARB and NSAIDS.   -Monitor input and output.    ==================== GASTROINTESTINAL===================    pantoprazole  Injectable 40 milliGRAM(s) IV Push daily    =======================    ENDOCRINE  =====================  Aggressive glycemic control to limit FS glucose to < 180mg/dl, BS stable.  hydrocortisone 10 milliGRAM(s) Oral every 12 hours    ========================INFECTIOUS DISEASE================  -[facility] micro lab CSF gram stain reported as "few organisms"   - gram stain repeated at core lab and micro and is NEGATIVE.  - CSF crytp titer 1:40, as per [facility] his initial CSF titer in 12/2020 was 1:2560. no serum titer available.  -clinical improvement even prior to starting antifungal, negative CSF pcr, low CSF titer. f/u CSf fungal CX, initially   -placed on acyclovir to complete total 14 days  -EBv CSF PCR/serum + but  low, (?)EBV encephalitis  resumed ceftriaxone /ampicillin as previously planned. f/u BCX ngtd,   -serum crypt Ag 1:40, old infection vs. new infection, new CSF and LP obtained today, pending results of cultures and PCR, opening pressure 15  -Given dose of vanco today  acyclovir IVPB      acyclovir IVPB 320 milliGRAM(s) IV Intermittent every 24 hours  amphotericin B  liposome  IVPB 320 milliGRAM(s) IV Intermittent every 24 hours  meropenem Injectable      meropenem Injectable 500 milliGRAM(s) IV Push every 24 hours  trimethoprim   80 mG/sulfamethoxazole 400 mG 1 Tablet(s) Oral <User Schedule>        Critical Care time: 50 mins assessing presenting problems of acute illness that poses high probability of life threatening deterioration or end organ damage/dysfunction.  Medical decision making including Initiating plan of care, reviewing data, reviewing radiology, direct patient bedside evaluation and interpretation of vital signs, any necessary ventilator management , discussion with multidisciplinary team, discussing goals of care with patient/family, all non inclusive of procedures

## 2023-01-01 VITALS — RESPIRATION RATE: 15 BRPM | HEART RATE: 123 BPM | TEMPERATURE: 100 F

## 2023-01-01 LAB
ALBUMIN SERPL ELPH-MCNC: 2.1 G/DL — LOW (ref 3.3–5.2)
ALBUMIN SERPL ELPH-MCNC: 2.4 G/DL — LOW (ref 3.3–5.2)
ALBUMIN SERPL ELPH-MCNC: 2.5 G/DL — LOW (ref 3.3–5.2)
ALBUMIN SERPL ELPH-MCNC: 2.7 G/DL — LOW (ref 3.3–5.2)
ALBUMIN SERPL ELPH-MCNC: 2.8 G/DL — LOW (ref 3.3–5.2)
ALP SERPL-CCNC: 173 U/L — HIGH (ref 40–120)
ALP SERPL-CCNC: 185 U/L — HIGH (ref 40–120)
ALP SERPL-CCNC: 190 U/L — HIGH (ref 40–120)
ALP SERPL-CCNC: 194 U/L — HIGH (ref 40–120)
ALP SERPL-CCNC: 248 U/L — HIGH (ref 40–120)
ALP SERPL-CCNC: 259 U/L — HIGH (ref 40–120)
ALT FLD-CCNC: 5 U/L — SIGNIFICANT CHANGE UP
ALT FLD-CCNC: 5 U/L — SIGNIFICANT CHANGE UP
ALT FLD-CCNC: 6 U/L — SIGNIFICANT CHANGE UP
ALT FLD-CCNC: 6 U/L — SIGNIFICANT CHANGE UP
ALT FLD-CCNC: 7 U/L — SIGNIFICANT CHANGE UP
ALT FLD-CCNC: 8 U/L — SIGNIFICANT CHANGE UP
AMMONIA BLD-MCNC: 23 UMOL/L — SIGNIFICANT CHANGE UP (ref 11–55)
ANION GAP SERPL CALC-SCNC: 10 MMOL/L — SIGNIFICANT CHANGE UP (ref 5–17)
ANION GAP SERPL CALC-SCNC: 10 MMOL/L — SIGNIFICANT CHANGE UP (ref 5–17)
ANION GAP SERPL CALC-SCNC: 12 MMOL/L — SIGNIFICANT CHANGE UP (ref 5–17)
ANION GAP SERPL CALC-SCNC: 13 MMOL/L — SIGNIFICANT CHANGE UP (ref 5–17)
ANION GAP SERPL CALC-SCNC: 8 MMOL/L — SIGNIFICANT CHANGE UP (ref 5–17)
ANION GAP SERPL CALC-SCNC: 8 MMOL/L — SIGNIFICANT CHANGE UP (ref 5–17)
ANION GAP SERPL CALC-SCNC: 9 MMOL/L — SIGNIFICANT CHANGE UP (ref 5–17)
ANION GAP SERPL CALC-SCNC: 9 MMOL/L — SIGNIFICANT CHANGE UP (ref 5–17)
ANISOCYTOSIS BLD QL: SIGNIFICANT CHANGE UP
ANISOCYTOSIS BLD QL: SIGNIFICANT CHANGE UP
ANISOCYTOSIS BLD QL: SLIGHT — SIGNIFICANT CHANGE UP
APTT BLD: 140.2 SEC — CRITICAL HIGH (ref 27.5–35.5)
APTT BLD: 157.8 SEC — CRITICAL HIGH (ref 27.5–35.5)
APTT BLD: 175.2 SEC — CRITICAL HIGH (ref 27.5–35.5)
APTT BLD: 182.1 SEC — CRITICAL HIGH (ref 27.5–35.5)
APTT BLD: 38.9 SEC — HIGH (ref 27.5–35.5)
APTT BLD: 60.3 SEC — HIGH (ref 27.5–35.5)
APTT BLD: 62.1 SEC — HIGH (ref 27.5–35.5)
APTT BLD: 68 SEC — HIGH (ref 27.5–35.5)
APTT BLD: 73.2 SEC — HIGH (ref 27.5–35.5)
APTT BLD: 91.7 SEC — HIGH (ref 27.5–35.5)
AST SERPL-CCNC: 22 U/L — SIGNIFICANT CHANGE UP
AST SERPL-CCNC: 24 U/L — SIGNIFICANT CHANGE UP
AST SERPL-CCNC: 25 U/L — SIGNIFICANT CHANGE UP
AST SERPL-CCNC: 25 U/L — SIGNIFICANT CHANGE UP
AST SERPL-CCNC: 28 U/L — SIGNIFICANT CHANGE UP
AST SERPL-CCNC: 34 U/L — SIGNIFICANT CHANGE UP
B BURGDOR AB CSF-ACNC: SIGNIFICANT CHANGE UP
B BURGDOR DNA SPEC QL NAA+PROBE: NEGATIVE — SIGNIFICANT CHANGE UP
BASOPHILS # BLD AUTO: 0 K/UL — SIGNIFICANT CHANGE UP (ref 0–0.2)
BASOPHILS # BLD AUTO: 0 K/UL — SIGNIFICANT CHANGE UP (ref 0–0.2)
BASOPHILS # BLD AUTO: 0.04 K/UL — SIGNIFICANT CHANGE UP (ref 0–0.2)
BASOPHILS # BLD AUTO: 0.12 K/UL — SIGNIFICANT CHANGE UP (ref 0–0.2)
BASOPHILS NFR BLD AUTO: 0 % — SIGNIFICANT CHANGE UP (ref 0–2)
BASOPHILS NFR BLD AUTO: 0 % — SIGNIFICANT CHANGE UP (ref 0–2)
BASOPHILS NFR BLD AUTO: 0.5 % — SIGNIFICANT CHANGE UP (ref 0–2)
BASOPHILS NFR BLD AUTO: 1.8 % — SIGNIFICANT CHANGE UP (ref 0–2)
BILIRUB SERPL-MCNC: 0.3 MG/DL — LOW (ref 0.4–2)
BILIRUB SERPL-MCNC: 0.4 MG/DL — SIGNIFICANT CHANGE UP (ref 0.4–2)
BILIRUB SERPL-MCNC: 0.4 MG/DL — SIGNIFICANT CHANGE UP (ref 0.4–2)
BUN SERPL-MCNC: 10.5 MG/DL — SIGNIFICANT CHANGE UP (ref 8–20)
BUN SERPL-MCNC: 10.9 MG/DL — SIGNIFICANT CHANGE UP (ref 8–20)
BUN SERPL-MCNC: 11.3 MG/DL — SIGNIFICANT CHANGE UP (ref 8–20)
BUN SERPL-MCNC: 11.3 MG/DL — SIGNIFICANT CHANGE UP (ref 8–20)
BUN SERPL-MCNC: 13.6 MG/DL — SIGNIFICANT CHANGE UP (ref 8–20)
BUN SERPL-MCNC: 15.9 MG/DL — SIGNIFICANT CHANGE UP (ref 8–20)
BUN SERPL-MCNC: 17.5 MG/DL — SIGNIFICANT CHANGE UP (ref 8–20)
BUN SERPL-MCNC: 17.8 MG/DL — SIGNIFICANT CHANGE UP (ref 8–20)
BUN SERPL-MCNC: 19.4 MG/DL — SIGNIFICANT CHANGE UP (ref 8–20)
BUN SERPL-MCNC: 24 MG/DL — HIGH (ref 8–20)
BUN SERPL-MCNC: 27 MG/DL — HIGH (ref 8–20)
CALCIUM SERPL-MCNC: 7.9 MG/DL — LOW (ref 8.4–10.5)
CALCIUM SERPL-MCNC: 8 MG/DL — LOW (ref 8.4–10.5)
CALCIUM SERPL-MCNC: 8.1 MG/DL — LOW (ref 8.4–10.5)
CALCIUM SERPL-MCNC: 8.1 MG/DL — LOW (ref 8.4–10.5)
CALCIUM SERPL-MCNC: 8.3 MG/DL — LOW (ref 8.4–10.5)
CALCIUM SERPL-MCNC: 8.4 MG/DL — SIGNIFICANT CHANGE UP (ref 8.4–10.5)
CALCIUM SERPL-MCNC: 8.5 MG/DL — SIGNIFICANT CHANGE UP (ref 8.4–10.5)
CHLORIDE SERPL-SCNC: 100 MMOL/L — SIGNIFICANT CHANGE UP (ref 96–108)
CHLORIDE SERPL-SCNC: 100 MMOL/L — SIGNIFICANT CHANGE UP (ref 96–108)
CHLORIDE SERPL-SCNC: 102 MMOL/L — SIGNIFICANT CHANGE UP (ref 96–108)
CHLORIDE SERPL-SCNC: 95 MMOL/L — LOW (ref 96–108)
CHLORIDE SERPL-SCNC: 95 MMOL/L — LOW (ref 96–108)
CHLORIDE SERPL-SCNC: 97 MMOL/L — SIGNIFICANT CHANGE UP (ref 96–108)
CHLORIDE SERPL-SCNC: 99 MMOL/L — SIGNIFICANT CHANGE UP (ref 96–108)
CO2 SERPL-SCNC: 27 MMOL/L — SIGNIFICANT CHANGE UP (ref 22–29)
CO2 SERPL-SCNC: 28 MMOL/L — SIGNIFICANT CHANGE UP (ref 22–29)
CO2 SERPL-SCNC: 29 MMOL/L — SIGNIFICANT CHANGE UP (ref 22–29)
CO2 SERPL-SCNC: 29 MMOL/L — SIGNIFICANT CHANGE UP (ref 22–29)
CO2 SERPL-SCNC: 30 MMOL/L — HIGH (ref 22–29)
CO2 SERPL-SCNC: 30 MMOL/L — HIGH (ref 22–29)
CREAT SERPL-MCNC: 2.68 MG/DL — HIGH (ref 0.5–1.3)
CREAT SERPL-MCNC: 3.04 MG/DL — HIGH (ref 0.5–1.3)
CREAT SERPL-MCNC: 3.21 MG/DL — HIGH (ref 0.5–1.3)
CREAT SERPL-MCNC: 3.54 MG/DL — HIGH (ref 0.5–1.3)
CREAT SERPL-MCNC: 3.75 MG/DL — HIGH (ref 0.5–1.3)
CREAT SERPL-MCNC: 3.79 MG/DL — HIGH (ref 0.5–1.3)
CREAT SERPL-MCNC: 4.05 MG/DL — HIGH (ref 0.5–1.3)
CREAT SERPL-MCNC: 4.46 MG/DL — HIGH (ref 0.5–1.3)
CREAT SERPL-MCNC: 4.59 MG/DL — HIGH (ref 0.5–1.3)
CREAT SERPL-MCNC: 5.22 MG/DL — HIGH (ref 0.5–1.3)
CSF PCR RESULT: SIGNIFICANT CHANGE UP
CULTURE RESULTS: NO GROWTH — SIGNIFICANT CHANGE UP
CULTURE RESULTS: SIGNIFICANT CHANGE UP
EBV PCR: 89 IU/ML — HIGH
EGFR: 11 ML/MIN/1.73M2 — LOW
EGFR: 13 ML/MIN/1.73M2 — LOW
EGFR: 13 ML/MIN/1.73M2 — LOW
EGFR: 15 ML/MIN/1.73M2 — LOW
EGFR: 16 ML/MIN/1.73M2 — LOW
EGFR: 16 ML/MIN/1.73M2 — LOW
EGFR: 17 ML/MIN/1.73M2 — LOW
EGFR: 19 ML/MIN/1.73M2 — LOW
EGFR: 21 ML/MIN/1.73M2 — LOW
EGFR: 24 ML/MIN/1.73M2 — LOW
EOSINOPHIL # BLD AUTO: 0 K/UL — SIGNIFICANT CHANGE UP (ref 0–0.5)
EOSINOPHIL # BLD AUTO: 0 K/UL — SIGNIFICANT CHANGE UP (ref 0–0.5)
EOSINOPHIL # BLD AUTO: 0.04 K/UL — SIGNIFICANT CHANGE UP (ref 0–0.5)
EOSINOPHIL # BLD AUTO: 0.04 K/UL — SIGNIFICANT CHANGE UP (ref 0–0.5)
EOSINOPHIL NFR BLD AUTO: 0 % — SIGNIFICANT CHANGE UP (ref 0–6)
EOSINOPHIL NFR BLD AUTO: 0 % — SIGNIFICANT CHANGE UP (ref 0–6)
EOSINOPHIL NFR BLD AUTO: 0.5 % — SIGNIFICANT CHANGE UP (ref 0–6)
EOSINOPHIL NFR BLD AUTO: 0.9 % — SIGNIFICANT CHANGE UP (ref 0–6)
GAS PNL BLDA: SIGNIFICANT CHANGE UP
GAS PNL BLDA: SIGNIFICANT CHANGE UP
GIANT PLATELETS BLD QL SMEAR: PRESENT — SIGNIFICANT CHANGE UP
GIANT PLATELETS BLD QL SMEAR: PRESENT — SIGNIFICANT CHANGE UP
GLUCOSE BLDC GLUCOMTR-MCNC: 100 MG/DL — HIGH (ref 70–99)
GLUCOSE BLDC GLUCOMTR-MCNC: 102 MG/DL — HIGH (ref 70–99)
GLUCOSE BLDC GLUCOMTR-MCNC: 103 MG/DL — HIGH (ref 70–99)
GLUCOSE BLDC GLUCOMTR-MCNC: 107 MG/DL — HIGH (ref 70–99)
GLUCOSE BLDC GLUCOMTR-MCNC: 108 MG/DL — HIGH (ref 70–99)
GLUCOSE BLDC GLUCOMTR-MCNC: 109 MG/DL — HIGH (ref 70–99)
GLUCOSE BLDC GLUCOMTR-MCNC: 110 MG/DL — HIGH (ref 70–99)
GLUCOSE BLDC GLUCOMTR-MCNC: 112 MG/DL — HIGH (ref 70–99)
GLUCOSE BLDC GLUCOMTR-MCNC: 120 MG/DL — HIGH (ref 70–99)
GLUCOSE BLDC GLUCOMTR-MCNC: 134 MG/DL — HIGH (ref 70–99)
GLUCOSE BLDC GLUCOMTR-MCNC: 138 MG/DL — HIGH (ref 70–99)
GLUCOSE BLDC GLUCOMTR-MCNC: 155 MG/DL — HIGH (ref 70–99)
GLUCOSE BLDC GLUCOMTR-MCNC: 67 MG/DL — LOW (ref 70–99)
GLUCOSE BLDC GLUCOMTR-MCNC: 70 MG/DL — SIGNIFICANT CHANGE UP (ref 70–99)
GLUCOSE BLDC GLUCOMTR-MCNC: 76 MG/DL — SIGNIFICANT CHANGE UP (ref 70–99)
GLUCOSE BLDC GLUCOMTR-MCNC: 77 MG/DL — SIGNIFICANT CHANGE UP (ref 70–99)
GLUCOSE BLDC GLUCOMTR-MCNC: 81 MG/DL — SIGNIFICANT CHANGE UP (ref 70–99)
GLUCOSE BLDC GLUCOMTR-MCNC: 82 MG/DL — SIGNIFICANT CHANGE UP (ref 70–99)
GLUCOSE BLDC GLUCOMTR-MCNC: 84 MG/DL — SIGNIFICANT CHANGE UP (ref 70–99)
GLUCOSE BLDC GLUCOMTR-MCNC: 84 MG/DL — SIGNIFICANT CHANGE UP (ref 70–99)
GLUCOSE BLDC GLUCOMTR-MCNC: 85 MG/DL — SIGNIFICANT CHANGE UP (ref 70–99)
GLUCOSE BLDC GLUCOMTR-MCNC: 87 MG/DL — SIGNIFICANT CHANGE UP (ref 70–99)
GLUCOSE BLDC GLUCOMTR-MCNC: 87 MG/DL — SIGNIFICANT CHANGE UP (ref 70–99)
GLUCOSE BLDC GLUCOMTR-MCNC: 88 MG/DL — SIGNIFICANT CHANGE UP (ref 70–99)
GLUCOSE BLDC GLUCOMTR-MCNC: 94 MG/DL — SIGNIFICANT CHANGE UP (ref 70–99)
GLUCOSE BLDC GLUCOMTR-MCNC: 96 MG/DL — SIGNIFICANT CHANGE UP (ref 70–99)
GLUCOSE BLDC GLUCOMTR-MCNC: 97 MG/DL — SIGNIFICANT CHANGE UP (ref 70–99)
GLUCOSE BLDC GLUCOMTR-MCNC: 97 MG/DL — SIGNIFICANT CHANGE UP (ref 70–99)
GLUCOSE BLDC GLUCOMTR-MCNC: 98 MG/DL — SIGNIFICANT CHANGE UP (ref 70–99)
GLUCOSE SERPL-MCNC: 100 MG/DL — HIGH (ref 70–99)
GLUCOSE SERPL-MCNC: 103 MG/DL — HIGH (ref 70–99)
GLUCOSE SERPL-MCNC: 112 MG/DL — HIGH (ref 70–99)
GLUCOSE SERPL-MCNC: 113 MG/DL — HIGH (ref 70–99)
GLUCOSE SERPL-MCNC: 122 MG/DL — HIGH (ref 70–99)
GLUCOSE SERPL-MCNC: 128 MG/DL — HIGH (ref 70–99)
GLUCOSE SERPL-MCNC: 128 MG/DL — HIGH (ref 70–99)
GLUCOSE SERPL-MCNC: 80 MG/DL — SIGNIFICANT CHANGE UP (ref 70–99)
GLUCOSE SERPL-MCNC: 92 MG/DL — SIGNIFICANT CHANGE UP (ref 70–99)
GLUCOSE SERPL-MCNC: 92 MG/DL — SIGNIFICANT CHANGE UP (ref 70–99)
GRAM STN FLD: SIGNIFICANT CHANGE UP
GRAM STN FLD: SIGNIFICANT CHANGE UP
H CAPSUL AG CSF IA-MCNC: SIGNIFICANT CHANGE UP
HCT VFR BLD CALC: 25.8 % — LOW (ref 39–50)
HCT VFR BLD CALC: 26.1 % — LOW (ref 39–50)
HCT VFR BLD CALC: 27.9 % — LOW (ref 39–50)
HCT VFR BLD CALC: 28.4 % — LOW (ref 39–50)
HCT VFR BLD CALC: 28.8 % — LOW (ref 39–50)
HCT VFR BLD CALC: 29.4 % — LOW (ref 39–50)
HCT VFR BLD CALC: 31.4 % — LOW (ref 39–50)
HCT VFR BLD CALC: 31.4 % — LOW (ref 39–50)
HCT VFR BLD CALC: 31.5 % — LOW (ref 39–50)
HCT VFR BLD CALC: 34.2 % — LOW (ref 39–50)
HGB BLD-MCNC: 10.1 G/DL — LOW (ref 13–17)
HGB BLD-MCNC: 10.4 G/DL — LOW (ref 13–17)
HGB BLD-MCNC: 11 G/DL — LOW (ref 13–17)
HGB BLD-MCNC: 8.9 G/DL — LOW (ref 13–17)
HGB BLD-MCNC: 8.9 G/DL — LOW (ref 13–17)
HGB BLD-MCNC: 9 G/DL — LOW (ref 13–17)
HGB BLD-MCNC: 9.4 G/DL — LOW (ref 13–17)
HGB BLD-MCNC: 9.4 G/DL — LOW (ref 13–17)
HGB BLD-MCNC: 9.8 G/DL — LOW (ref 13–17)
HGB BLD-MCNC: 9.9 G/DL — LOW (ref 13–17)
HYPOCHROMIA BLD QL: SLIGHT — SIGNIFICANT CHANGE UP
IMM GRANULOCYTES NFR BLD AUTO: 2.9 % — HIGH (ref 0–0.9)
INR BLD: 1.49 RATIO — HIGH (ref 0.88–1.16)
JCPYV DNA # CSF NAA+PROBE: SIGNIFICANT CHANGE UP COPIES/ML
LABORATORY COMMENT REPORT: SIGNIFICANT CHANGE UP
LACTATE SERPL-SCNC: 0.9 MMOL/L — SIGNIFICANT CHANGE UP (ref 0.5–2)
LDH CSF L TO P-CCNC: 25 U/L — SIGNIFICANT CHANGE UP
LDH FLD-CCNC: 25 U/L — SIGNIFICANT CHANGE UP
LYMPHOCYTES # BLD AUTO: 0.06 K/UL — LOW (ref 1–3.3)
LYMPHOCYTES # BLD AUTO: 0.11 K/UL — LOW (ref 1–3.3)
LYMPHOCYTES # BLD AUTO: 0.32 K/UL — LOW (ref 1–3.3)
LYMPHOCYTES # BLD AUTO: 0.51 K/UL — LOW (ref 1–3.3)
LYMPHOCYTES # BLD AUTO: 0.9 % — LOW (ref 13–44)
LYMPHOCYTES # BLD AUTO: 10.8 % — LOW (ref 13–44)
LYMPHOCYTES # BLD AUTO: 2.7 % — LOW (ref 13–44)
LYMPHOCYTES # BLD AUTO: 4.2 % — LOW (ref 13–44)
MACROCYTES BLD QL: SIGNIFICANT CHANGE UP
MACROCYTES BLD QL: SIGNIFICANT CHANGE UP
MAGNESIUM SERPL-MCNC: 1.8 MG/DL — SIGNIFICANT CHANGE UP (ref 1.6–2.6)
MAGNESIUM SERPL-MCNC: 1.8 MG/DL — SIGNIFICANT CHANGE UP (ref 1.6–2.6)
MAGNESIUM SERPL-MCNC: 1.9 MG/DL — SIGNIFICANT CHANGE UP (ref 1.6–2.6)
MAGNESIUM SERPL-MCNC: 2 MG/DL — SIGNIFICANT CHANGE UP (ref 1.6–2.6)
MAGNESIUM SERPL-MCNC: 2 MG/DL — SIGNIFICANT CHANGE UP (ref 1.6–2.6)
MAGNESIUM SERPL-MCNC: 2.1 MG/DL — SIGNIFICANT CHANGE UP (ref 1.6–2.6)
MAGNESIUM SERPL-MCNC: 2.1 MG/DL — SIGNIFICANT CHANGE UP (ref 1.6–2.6)
MAGNESIUM SERPL-MCNC: 2.3 MG/DL — SIGNIFICANT CHANGE UP (ref 1.6–2.6)
MANUAL SMEAR VERIFICATION: SIGNIFICANT CHANGE UP
MCHC RBC-ENTMCNC: 30.6 PG — SIGNIFICANT CHANGE UP (ref 27–34)
MCHC RBC-ENTMCNC: 30.7 PG — SIGNIFICANT CHANGE UP (ref 27–34)
MCHC RBC-ENTMCNC: 30.7 PG — SIGNIFICANT CHANGE UP (ref 27–34)
MCHC RBC-ENTMCNC: 31 PG — SIGNIFICANT CHANGE UP (ref 27–34)
MCHC RBC-ENTMCNC: 31 PG — SIGNIFICANT CHANGE UP (ref 27–34)
MCHC RBC-ENTMCNC: 31.1 GM/DL — LOW (ref 32–36)
MCHC RBC-ENTMCNC: 31.1 PG — SIGNIFICANT CHANGE UP (ref 27–34)
MCHC RBC-ENTMCNC: 31.1 PG — SIGNIFICANT CHANGE UP (ref 27–34)
MCHC RBC-ENTMCNC: 31.4 PG — SIGNIFICANT CHANGE UP (ref 27–34)
MCHC RBC-ENTMCNC: 31.5 GM/DL — LOW (ref 32–36)
MCHC RBC-ENTMCNC: 31.7 PG — SIGNIFICANT CHANGE UP (ref 27–34)
MCHC RBC-ENTMCNC: 32.1 PG — SIGNIFICANT CHANGE UP (ref 27–34)
MCHC RBC-ENTMCNC: 32.2 GM/DL — SIGNIFICANT CHANGE UP (ref 32–36)
MCHC RBC-ENTMCNC: 32.3 GM/DL — SIGNIFICANT CHANGE UP (ref 32–36)
MCHC RBC-ENTMCNC: 32.6 GM/DL — SIGNIFICANT CHANGE UP (ref 32–36)
MCHC RBC-ENTMCNC: 33.1 GM/DL — SIGNIFICANT CHANGE UP (ref 32–36)
MCHC RBC-ENTMCNC: 33.1 GM/DL — SIGNIFICANT CHANGE UP (ref 32–36)
MCHC RBC-ENTMCNC: 34.1 GM/DL — SIGNIFICANT CHANGE UP (ref 32–36)
MCHC RBC-ENTMCNC: 34.4 GM/DL — SIGNIFICANT CHANGE UP (ref 32–36)
MCHC RBC-ENTMCNC: 34.5 GM/DL — SIGNIFICANT CHANGE UP (ref 32–36)
MCV RBC AUTO: 101.9 FL — HIGH (ref 80–100)
MCV RBC AUTO: 89.1 FL — SIGNIFICANT CHANGE UP (ref 80–100)
MCV RBC AUTO: 91.2 FL — SIGNIFICANT CHANGE UP (ref 80–100)
MCV RBC AUTO: 91.3 FL — SIGNIFICANT CHANGE UP (ref 80–100)
MCV RBC AUTO: 92.6 FL — SIGNIFICANT CHANGE UP (ref 80–100)
MCV RBC AUTO: 95 FL — SIGNIFICANT CHANGE UP (ref 80–100)
MCV RBC AUTO: 95.5 FL — SIGNIFICANT CHANGE UP (ref 80–100)
MCV RBC AUTO: 96.2 FL — SIGNIFICANT CHANGE UP (ref 80–100)
MCV RBC AUTO: 96.9 FL — SIGNIFICANT CHANGE UP (ref 80–100)
MCV RBC AUTO: 98.7 FL — SIGNIFICANT CHANGE UP (ref 80–100)
MONOCYTES # BLD AUTO: 0.41 K/UL — SIGNIFICANT CHANGE UP (ref 0–0.9)
MONOCYTES # BLD AUTO: 0.59 K/UL — SIGNIFICANT CHANGE UP (ref 0–0.9)
MONOCYTES # BLD AUTO: 0.67 K/UL — SIGNIFICANT CHANGE UP (ref 0–0.9)
MONOCYTES # BLD AUTO: 0.91 K/UL — HIGH (ref 0–0.9)
MONOCYTES NFR BLD AUTO: 12.1 % — SIGNIFICANT CHANGE UP (ref 2–14)
MONOCYTES NFR BLD AUTO: 12.6 % — SIGNIFICANT CHANGE UP (ref 2–14)
MONOCYTES NFR BLD AUTO: 9.6 % — SIGNIFICANT CHANGE UP (ref 2–14)
MONOCYTES NFR BLD AUTO: 9.9 % — SIGNIFICANT CHANGE UP (ref 2–14)
MRSA PCR RESULT.: SIGNIFICANT CHANGE UP
MYELOCYTES NFR BLD: 0.9 % — HIGH (ref 0–0)
NEUTROPHILS # BLD AUTO: 3.45 K/UL — SIGNIFICANT CHANGE UP (ref 1.8–7.4)
NEUTROPHILS # BLD AUTO: 3.52 K/UL — SIGNIFICANT CHANGE UP (ref 1.8–7.4)
NEUTROPHILS # BLD AUTO: 6 K/UL — SIGNIFICANT CHANGE UP (ref 1.8–7.4)
NEUTROPHILS # BLD AUTO: 6.02 K/UL — SIGNIFICANT CHANGE UP (ref 1.8–7.4)
NEUTROPHILS NFR BLD AUTO: 74.8 % — SIGNIFICANT CHANGE UP (ref 43–77)
NEUTROPHILS NFR BLD AUTO: 79.8 % — HIGH (ref 43–77)
NEUTROPHILS NFR BLD AUTO: 82.9 % — HIGH (ref 43–77)
NEUTROPHILS NFR BLD AUTO: 86.8 % — HIGH (ref 43–77)
NIGHT BLUE STAIN TISS: SIGNIFICANT CHANGE UP
NRBC # BLD: 1 /100 — HIGH (ref 0–0)
NRBC # BLD: 2 /100 — HIGH (ref 0–0)
OVALOCYTES BLD QL SMEAR: SLIGHT — SIGNIFICANT CHANGE UP
PHOSPHATE SERPL-MCNC: 1.7 MG/DL — LOW (ref 2.4–4.7)
PHOSPHATE SERPL-MCNC: 1.8 MG/DL — LOW (ref 2.4–4.7)
PHOSPHATE SERPL-MCNC: 2 MG/DL — LOW (ref 2.4–4.7)
PHOSPHATE SERPL-MCNC: 2.7 MG/DL — SIGNIFICANT CHANGE UP (ref 2.4–4.7)
PHOSPHATE SERPL-MCNC: 3.3 MG/DL — SIGNIFICANT CHANGE UP (ref 2.4–4.7)
PHOSPHATE SERPL-MCNC: 3.6 MG/DL — SIGNIFICANT CHANGE UP (ref 2.4–4.7)
PHOSPHATE SERPL-MCNC: 3.9 MG/DL — SIGNIFICANT CHANGE UP (ref 2.4–4.7)
PHOSPHATE SERPL-MCNC: 4.3 MG/DL — SIGNIFICANT CHANGE UP (ref 2.4–4.7)
PHOSPHATE SERPL-MCNC: 4.6 MG/DL — SIGNIFICANT CHANGE UP (ref 2.4–4.7)
PLAT MORPH BLD: NORMAL — SIGNIFICANT CHANGE UP
PLATELET # BLD AUTO: 101 K/UL — LOW (ref 150–400)
PLATELET # BLD AUTO: 111 K/UL — LOW (ref 150–400)
PLATELET # BLD AUTO: 141 K/UL — LOW (ref 150–400)
PLATELET # BLD AUTO: 147 K/UL — LOW (ref 150–400)
PLATELET # BLD AUTO: 148 K/UL — LOW (ref 150–400)
PLATELET # BLD AUTO: 148 K/UL — LOW (ref 150–400)
PLATELET # BLD AUTO: 155 K/UL — SIGNIFICANT CHANGE UP (ref 150–400)
PLATELET # BLD AUTO: 156 K/UL — SIGNIFICANT CHANGE UP (ref 150–400)
PLATELET # BLD AUTO: 89 K/UL — LOW (ref 150–400)
PLATELET # BLD AUTO: SIGNIFICANT CHANGE UP K/UL (ref 150–400)
POIKILOCYTOSIS BLD QL AUTO: SIGNIFICANT CHANGE UP
POIKILOCYTOSIS BLD QL AUTO: SLIGHT — SIGNIFICANT CHANGE UP
POLYCHROMASIA BLD QL SMEAR: SLIGHT — SIGNIFICANT CHANGE UP
POLYCHROMASIA BLD QL SMEAR: SLIGHT — SIGNIFICANT CHANGE UP
POTASSIUM SERPL-MCNC: 3.2 MMOL/L — LOW (ref 3.5–5.3)
POTASSIUM SERPL-MCNC: 3.7 MMOL/L — SIGNIFICANT CHANGE UP (ref 3.5–5.3)
POTASSIUM SERPL-MCNC: 3.7 MMOL/L — SIGNIFICANT CHANGE UP (ref 3.5–5.3)
POTASSIUM SERPL-MCNC: 3.8 MMOL/L — SIGNIFICANT CHANGE UP (ref 3.5–5.3)
POTASSIUM SERPL-MCNC: 3.8 MMOL/L — SIGNIFICANT CHANGE UP (ref 3.5–5.3)
POTASSIUM SERPL-MCNC: 3.9 MMOL/L — SIGNIFICANT CHANGE UP (ref 3.5–5.3)
POTASSIUM SERPL-MCNC: 4 MMOL/L — SIGNIFICANT CHANGE UP (ref 3.5–5.3)
POTASSIUM SERPL-MCNC: 4.5 MMOL/L — SIGNIFICANT CHANGE UP (ref 3.5–5.3)
POTASSIUM SERPL-MCNC: 4.6 MMOL/L — SIGNIFICANT CHANGE UP (ref 3.5–5.3)
POTASSIUM SERPL-MCNC: 4.9 MMOL/L — SIGNIFICANT CHANGE UP (ref 3.5–5.3)
POTASSIUM SERPL-SCNC: 3.2 MMOL/L — LOW (ref 3.5–5.3)
POTASSIUM SERPL-SCNC: 3.7 MMOL/L — SIGNIFICANT CHANGE UP (ref 3.5–5.3)
POTASSIUM SERPL-SCNC: 3.7 MMOL/L — SIGNIFICANT CHANGE UP (ref 3.5–5.3)
POTASSIUM SERPL-SCNC: 3.8 MMOL/L — SIGNIFICANT CHANGE UP (ref 3.5–5.3)
POTASSIUM SERPL-SCNC: 3.8 MMOL/L — SIGNIFICANT CHANGE UP (ref 3.5–5.3)
POTASSIUM SERPL-SCNC: 3.9 MMOL/L — SIGNIFICANT CHANGE UP (ref 3.5–5.3)
POTASSIUM SERPL-SCNC: 4 MMOL/L — SIGNIFICANT CHANGE UP (ref 3.5–5.3)
POTASSIUM SERPL-SCNC: 4.5 MMOL/L — SIGNIFICANT CHANGE UP (ref 3.5–5.3)
POTASSIUM SERPL-SCNC: 4.6 MMOL/L — SIGNIFICANT CHANGE UP (ref 3.5–5.3)
POTASSIUM SERPL-SCNC: 4.9 MMOL/L — SIGNIFICANT CHANGE UP (ref 3.5–5.3)
PROMYELOCYTES # FLD: 0.9 % — HIGH (ref 0–0)
PROT SERPL-MCNC: 4.5 G/DL — LOW (ref 6.6–8.7)
PROT SERPL-MCNC: 4.7 G/DL — LOW (ref 6.6–8.7)
PROT SERPL-MCNC: 4.8 G/DL — LOW (ref 6.6–8.7)
PROT SERPL-MCNC: 4.9 G/DL — LOW (ref 6.6–8.7)
PROT SERPL-MCNC: 5.2 G/DL — LOW (ref 6.6–8.7)
PROT SERPL-MCNC: 5.5 G/DL — LOW (ref 6.6–8.7)
PROTHROM AB SERPL-ACNC: 17.3 SEC — HIGH (ref 10.5–13.4)
RBC # BLD: 2.83 M/UL — LOW (ref 4.2–5.8)
RBC # BLD: 2.86 M/UL — LOW (ref 4.2–5.8)
RBC # BLD: 2.9 M/UL — LOW (ref 4.2–5.8)
RBC # BLD: 2.93 M/UL — LOW (ref 4.2–5.8)
RBC # BLD: 3.03 M/UL — LOW (ref 4.2–5.8)
RBC # BLD: 3.09 M/UL — LOW (ref 4.2–5.8)
RBC # BLD: 3.18 M/UL — LOW (ref 4.2–5.8)
RBC # BLD: 3.3 M/UL — LOW (ref 4.2–5.8)
RBC # BLD: 3.39 M/UL — LOW (ref 4.2–5.8)
RBC # BLD: 3.58 M/UL — LOW (ref 4.2–5.8)
RBC # FLD: 18.6 % — HIGH (ref 10.3–14.5)
RBC # FLD: 18.8 % — HIGH (ref 10.3–14.5)
RBC # FLD: 18.8 % — HIGH (ref 10.3–14.5)
RBC # FLD: 19.1 % — HIGH (ref 10.3–14.5)
RBC # FLD: 19.2 % — HIGH (ref 10.3–14.5)
RBC # FLD: 19.7 % — HIGH (ref 10.3–14.5)
RBC # FLD: 20.4 % — HIGH (ref 10.3–14.5)
RBC # FLD: 21.1 % — HIGH (ref 10.3–14.5)
RBC # FLD: 21.1 % — HIGH (ref 10.3–14.5)
RBC # FLD: 21.5 % — HIGH (ref 10.3–14.5)
RBC BLD AUTO: ABNORMAL
RBC BLD AUTO: ABNORMAL
RBC BLD AUTO: NORMAL — SIGNIFICANT CHANGE UP
S AUREUS DNA NOSE QL NAA+PROBE: SIGNIFICANT CHANGE UP
SCHISTOCYTES BLD QL AUTO: SLIGHT — SIGNIFICANT CHANGE UP
SMUDGE CELLS # BLD: PRESENT — SIGNIFICANT CHANGE UP
SMUDGE CELLS # BLD: PRESENT — SIGNIFICANT CHANGE UP
SODIUM SERPL-SCNC: 134 MMOL/L — LOW (ref 135–145)
SODIUM SERPL-SCNC: 135 MMOL/L — SIGNIFICANT CHANGE UP (ref 135–145)
SODIUM SERPL-SCNC: 136 MMOL/L — SIGNIFICANT CHANGE UP (ref 135–145)
SODIUM SERPL-SCNC: 137 MMOL/L — SIGNIFICANT CHANGE UP (ref 135–145)
SODIUM SERPL-SCNC: 138 MMOL/L — SIGNIFICANT CHANGE UP (ref 135–145)
SODIUM SERPL-SCNC: 138 MMOL/L — SIGNIFICANT CHANGE UP (ref 135–145)
SODIUM SERPL-SCNC: 139 MMOL/L — SIGNIFICANT CHANGE UP (ref 135–145)
SODIUM SERPL-SCNC: 140 MMOL/L — SIGNIFICANT CHANGE UP (ref 135–145)
SODIUM SERPL-SCNC: 140 MMOL/L — SIGNIFICANT CHANGE UP (ref 135–145)
SODIUM SERPL-SCNC: 141 MMOL/L — SIGNIFICANT CHANGE UP (ref 135–145)
SOURCE HSV 1/2: SIGNIFICANT CHANGE UP
SPECIMEN SOURCE: SIGNIFICANT CHANGE UP
TACROLIMUS SERPL-MCNC: 4.3 NG/ML — SIGNIFICANT CHANGE UP
TACROLIMUS SERPL-MCNC: 5.7 NG/ML — SIGNIFICANT CHANGE UP
TARGETS BLD QL SMEAR: SIGNIFICANT CHANGE UP
TARGETS BLD QL SMEAR: SLIGHT — SIGNIFICANT CHANGE UP
VANCOMYCIN TROUGH SERPL-MCNC: 13.9 UG/ML — SIGNIFICANT CHANGE UP (ref 10–20)
VARIANT LYMPHS # BLD: 1.8 % — SIGNIFICANT CHANGE UP (ref 0–6)
VARIANT LYMPHS # BLD: 2.7 % — SIGNIFICANT CHANGE UP (ref 0–6)
VDRL CSF-TITR: SIGNIFICANT CHANGE UP
WBC # BLD: 4.16 K/UL — SIGNIFICANT CHANGE UP (ref 3.8–10.5)
WBC # BLD: 4.29 K/UL — SIGNIFICANT CHANGE UP (ref 3.8–10.5)
WBC # BLD: 4.61 K/UL — SIGNIFICANT CHANGE UP (ref 3.8–10.5)
WBC # BLD: 4.7 K/UL — SIGNIFICANT CHANGE UP (ref 3.8–10.5)
WBC # BLD: 5.96 K/UL — SIGNIFICANT CHANGE UP (ref 3.8–10.5)
WBC # BLD: 6.75 K/UL — SIGNIFICANT CHANGE UP (ref 3.8–10.5)
WBC # BLD: 6.78 K/UL — SIGNIFICANT CHANGE UP (ref 3.8–10.5)
WBC # BLD: 6.94 K/UL — SIGNIFICANT CHANGE UP (ref 3.8–10.5)
WBC # BLD: 7.02 K/UL — SIGNIFICANT CHANGE UP (ref 3.8–10.5)
WBC # BLD: 7.53 K/UL — SIGNIFICANT CHANGE UP (ref 3.8–10.5)
WBC # FLD AUTO: 4.16 K/UL — SIGNIFICANT CHANGE UP (ref 3.8–10.5)
WBC # FLD AUTO: 4.29 K/UL — SIGNIFICANT CHANGE UP (ref 3.8–10.5)
WBC # FLD AUTO: 4.61 K/UL — SIGNIFICANT CHANGE UP (ref 3.8–10.5)
WBC # FLD AUTO: 4.7 K/UL — SIGNIFICANT CHANGE UP (ref 3.8–10.5)
WBC # FLD AUTO: 5.96 K/UL — SIGNIFICANT CHANGE UP (ref 3.8–10.5)
WBC # FLD AUTO: 6.75 K/UL — SIGNIFICANT CHANGE UP (ref 3.8–10.5)
WBC # FLD AUTO: 6.78 K/UL — SIGNIFICANT CHANGE UP (ref 3.8–10.5)
WBC # FLD AUTO: 6.94 K/UL — SIGNIFICANT CHANGE UP (ref 3.8–10.5)
WBC # FLD AUTO: 7.02 K/UL — SIGNIFICANT CHANGE UP (ref 3.8–10.5)
WBC # FLD AUTO: 7.53 K/UL — SIGNIFICANT CHANGE UP (ref 3.8–10.5)
WNV IGG CSF IA-ACNC: NEGATIVE — SIGNIFICANT CHANGE UP
WNV IGM CSF IA-ACNC: NEGATIVE — SIGNIFICANT CHANGE UP
WNV RNA SPEC QL NAA+PROBE: SIGNIFICANT CHANGE UP
WNV RNA SPEC QL NAA+PROBE: SIGNIFICANT CHANGE UP

## 2023-01-01 PROCEDURE — 90937 HEMODIALYSIS REPEATED EVAL: CPT

## 2023-01-01 PROCEDURE — 99222 1ST HOSP IP/OBS MODERATE 55: CPT

## 2023-01-01 PROCEDURE — 87075 CULTR BACTERIA EXCEPT BLOOD: CPT

## 2023-01-01 PROCEDURE — 99233 SBSQ HOSP IP/OBS HIGH 50: CPT

## 2023-01-01 PROCEDURE — 87641 MR-STAPH DNA AMP PROBE: CPT

## 2023-01-01 PROCEDURE — 99497 ADVNCD CARE PLAN 30 MIN: CPT | Mod: 25

## 2023-01-01 PROCEDURE — 84080 ASSAY ALKALINE PHOSPHATASES: CPT

## 2023-01-01 PROCEDURE — 95711 VEEG 2-12 HR UNMONITORED: CPT

## 2023-01-01 PROCEDURE — 86789 WEST NILE VIRUS ANTIBODY: CPT

## 2023-01-01 PROCEDURE — 82803 BLOOD GASES ANY COMBINATION: CPT

## 2023-01-01 PROCEDURE — 89051 BODY FLUID CELL COUNT: CPT

## 2023-01-01 PROCEDURE — 99232 SBSQ HOSP IP/OBS MODERATE 35: CPT

## 2023-01-01 PROCEDURE — 82140 ASSAY OF AMMONIA: CPT

## 2023-01-01 PROCEDURE — 84132 ASSAY OF SERUM POTASSIUM: CPT

## 2023-01-01 PROCEDURE — 87205 SMEAR GRAM STAIN: CPT

## 2023-01-01 PROCEDURE — 86780 TREPONEMA PALLIDUM: CPT

## 2023-01-01 PROCEDURE — 94003 VENT MGMT INPAT SUBQ DAY: CPT

## 2023-01-01 PROCEDURE — 87116 MYCOBACTERIA CULTURE: CPT

## 2023-01-01 PROCEDURE — 96375 TX/PRO/DX INJ NEW DRUG ADDON: CPT

## 2023-01-01 PROCEDURE — 36415 COLL VENOUS BLD VENIPUNCTURE: CPT

## 2023-01-01 PROCEDURE — 83550 IRON BINDING TEST: CPT

## 2023-01-01 PROCEDURE — 82607 VITAMIN B-12: CPT

## 2023-01-01 PROCEDURE — 85014 HEMATOCRIT: CPT

## 2023-01-01 PROCEDURE — 70450 CT HEAD/BRAIN W/O DYE: CPT | Mod: MA

## 2023-01-01 PROCEDURE — 84484 ASSAY OF TROPONIN QUANT: CPT

## 2023-01-01 PROCEDURE — 86664 EPSTEIN-BARR NUCLEAR ANTIGEN: CPT

## 2023-01-01 PROCEDURE — 95700 EEG CONT REC W/VID EEG TECH: CPT

## 2023-01-01 PROCEDURE — 95714 VEEG EA 12-26 HR UNMNTR: CPT

## 2023-01-01 PROCEDURE — 85018 HEMOGLOBIN: CPT

## 2023-01-01 PROCEDURE — 74176 CT ABD & PELVIS W/O CONTRAST: CPT

## 2023-01-01 PROCEDURE — 87102 FUNGUS ISOLATION CULTURE: CPT

## 2023-01-01 PROCEDURE — 12345: CPT | Mod: NC

## 2023-01-01 PROCEDURE — 86665 EPSTEIN-BARR CAPSID VCA: CPT

## 2023-01-01 PROCEDURE — 87529 HSV DNA AMP PROBE: CPT

## 2023-01-01 PROCEDURE — 83540 ASSAY OF IRON: CPT

## 2023-01-01 PROCEDURE — 99497 ADVNCD CARE PLAN 30 MIN: CPT

## 2023-01-01 PROCEDURE — 83605 ASSAY OF LACTIC ACID: CPT

## 2023-01-01 PROCEDURE — 71045 X-RAY EXAM CHEST 1 VIEW: CPT | Mod: 26

## 2023-01-01 PROCEDURE — 80162 ASSAY OF DIGOXIN TOTAL: CPT

## 2023-01-01 PROCEDURE — 94002 VENT MGMT INPAT INIT DAY: CPT

## 2023-01-01 PROCEDURE — 86706 HEP B SURFACE ANTIBODY: CPT

## 2023-01-01 PROCEDURE — 86403 PARTICLE AGGLUT ANTBDY SCRN: CPT

## 2023-01-01 PROCEDURE — 97163 PT EVAL HIGH COMPLEX 45 MIN: CPT

## 2023-01-01 PROCEDURE — 86901 BLOOD TYPING SEROLOGIC RH(D): CPT

## 2023-01-01 PROCEDURE — 74177 CT ABD & PELVIS W/CONTRAST: CPT

## 2023-01-01 PROCEDURE — 99498 ADVNCD CARE PLAN ADDL 30 MIN: CPT | Mod: 25

## 2023-01-01 PROCEDURE — 80048 BASIC METABOLIC PNL TOTAL CA: CPT

## 2023-01-01 PROCEDURE — 92610 EVALUATE SWALLOWING FUNCTION: CPT

## 2023-01-01 PROCEDURE — 87040 BLOOD CULTURE FOR BACTERIA: CPT

## 2023-01-01 PROCEDURE — 36600 WITHDRAWAL OF ARTERIAL BLOOD: CPT

## 2023-01-01 PROCEDURE — 86592 SYPHILIS TEST NON-TREP QUAL: CPT

## 2023-01-01 PROCEDURE — 71250 CT THORAX DX C-: CPT

## 2023-01-01 PROCEDURE — 80197 ASSAY OF TACROLIMUS: CPT

## 2023-01-01 PROCEDURE — 82947 ASSAY GLUCOSE BLOOD QUANT: CPT

## 2023-01-01 PROCEDURE — 94760 N-INVAS EAR/PLS OXIMETRY 1: CPT

## 2023-01-01 PROCEDURE — 87015 SPECIMEN INFECT AGNT CONCNTJ: CPT

## 2023-01-01 PROCEDURE — 87476 LYME DIS DNA AMP PROBE: CPT

## 2023-01-01 PROCEDURE — 95720 EEG PHY/QHP EA INCR W/VEEG: CPT

## 2023-01-01 PROCEDURE — 84466 ASSAY OF TRANSFERRIN: CPT

## 2023-01-01 PROCEDURE — 71045 X-RAY EXAM CHEST 1 VIEW: CPT | Mod: 26,77

## 2023-01-01 PROCEDURE — 87070 CULTURE OTHR SPECIMN AEROBIC: CPT

## 2023-01-01 PROCEDURE — 84145 PROCALCITONIN (PCT): CPT

## 2023-01-01 PROCEDURE — 99358 PROLONG SERVICE W/O CONTACT: CPT | Mod: NC

## 2023-01-01 PROCEDURE — 80202 ASSAY OF VANCOMYCIN: CPT

## 2023-01-01 PROCEDURE — 84295 ASSAY OF SERUM SODIUM: CPT

## 2023-01-01 PROCEDURE — 87340 HEPATITIS B SURFACE AG IA: CPT

## 2023-01-01 PROCEDURE — 87798 DETECT AGENT NOS DNA AMP: CPT

## 2023-01-01 PROCEDURE — 95718 EEG PHYS/QHP 2-12 HR W/VEEG: CPT

## 2023-01-01 PROCEDURE — 87637 SARSCOV2&INF A&B&RSV AMP PRB: CPT

## 2023-01-01 PROCEDURE — 95813 EEG EXTND MNTR 61-119 MIN: CPT

## 2023-01-01 PROCEDURE — 74181 MRI ABDOMEN W/O CONTRAST: CPT

## 2023-01-01 PROCEDURE — 82978 ASSAY OF GLUTATHIONE: CPT

## 2023-01-01 PROCEDURE — 99233 SBSQ HOSP IP/OBS HIGH 50: CPT | Mod: 25

## 2023-01-01 PROCEDURE — 71045 X-RAY EXAM CHEST 1 VIEW: CPT

## 2023-01-01 PROCEDURE — 83735 ASSAY OF MAGNESIUM: CPT

## 2023-01-01 PROCEDURE — 94640 AIRWAY INHALATION TREATMENT: CPT

## 2023-01-01 PROCEDURE — 92526 ORAL FUNCTION THERAPY: CPT

## 2023-01-01 PROCEDURE — 74019 RADEX ABDOMEN 2 VIEWS: CPT

## 2023-01-01 PROCEDURE — 99261: CPT

## 2023-01-01 PROCEDURE — 85610 PROTHROMBIN TIME: CPT

## 2023-01-01 PROCEDURE — 80164 ASSAY DIPROPYLACETIC ACD TOT: CPT

## 2023-01-01 PROCEDURE — 87799 DETECT AGENT NOS DNA QUANT: CPT

## 2023-01-01 PROCEDURE — 80069 RENAL FUNCTION PANEL: CPT

## 2023-01-01 PROCEDURE — 86788 WEST NILE VIRUS AB IGM: CPT

## 2023-01-01 PROCEDURE — 70450 CT HEAD/BRAIN W/O DYE: CPT | Mod: 26

## 2023-01-01 PROCEDURE — 86704 HEP B CORE ANTIBODY TOTAL: CPT

## 2023-01-01 PROCEDURE — 86617 LYME DISEASE ANTIBODY: CPT

## 2023-01-01 PROCEDURE — 87385 HISTOPLASMA CAPSUL AG IA: CPT

## 2023-01-01 PROCEDURE — 86900 BLOOD TYPING SEROLOGIC ABO: CPT

## 2023-01-01 PROCEDURE — 82945 GLUCOSE OTHER FLUID: CPT

## 2023-01-01 PROCEDURE — 83690 ASSAY OF LIPASE: CPT

## 2023-01-01 PROCEDURE — 87483 CNS DNA AMP PROBE TYPE 12-25: CPT

## 2023-01-01 PROCEDURE — 80053 COMPREHEN METABOLIC PANEL: CPT

## 2023-01-01 PROCEDURE — 82962 GLUCOSE BLOOD TEST: CPT

## 2023-01-01 PROCEDURE — 85730 THROMBOPLASTIN TIME PARTIAL: CPT

## 2023-01-01 PROCEDURE — 84157 ASSAY OF PROTEIN OTHER: CPT

## 2023-01-01 PROCEDURE — 86803 HEPATITIS C AB TEST: CPT

## 2023-01-01 PROCEDURE — 71260 CT THORAX DX C+: CPT

## 2023-01-01 PROCEDURE — 93005 ELECTROCARDIOGRAM TRACING: CPT

## 2023-01-01 PROCEDURE — 84443 ASSAY THYROID STIM HORMONE: CPT

## 2023-01-01 PROCEDURE — 85025 COMPLETE CBC W/AUTO DIFF WBC: CPT

## 2023-01-01 PROCEDURE — 86663 EPSTEIN-BARR ANTIBODY: CPT

## 2023-01-01 PROCEDURE — 76700 US EXAM ABDOM COMPLETE: CPT

## 2023-01-01 PROCEDURE — 93306 TTE W/DOPPLER COMPLETE: CPT

## 2023-01-01 PROCEDURE — 99285 EMERGENCY DEPT VISIT HI MDM: CPT

## 2023-01-01 PROCEDURE — 31500 INSERT EMERGENCY AIRWAY: CPT

## 2023-01-01 PROCEDURE — 70551 MRI BRAIN STEM W/O DYE: CPT

## 2023-01-01 PROCEDURE — 87640 STAPH A DNA AMP PROBE: CPT

## 2023-01-01 PROCEDURE — 84520 ASSAY OF UREA NITROGEN: CPT

## 2023-01-01 PROCEDURE — 82728 ASSAY OF FERRITIN: CPT

## 2023-01-01 PROCEDURE — 80076 HEPATIC FUNCTION PANEL: CPT

## 2023-01-01 PROCEDURE — 84100 ASSAY OF PHOSPHORUS: CPT

## 2023-01-01 PROCEDURE — 82330 ASSAY OF CALCIUM: CPT

## 2023-01-01 PROCEDURE — 87206 SMEAR FLUORESCENT/ACID STAI: CPT

## 2023-01-01 PROCEDURE — 83615 LACTATE (LD) (LDH) ENZYME: CPT

## 2023-01-01 PROCEDURE — 96374 THER/PROPH/DIAG INJ IV PUSH: CPT

## 2023-01-01 PROCEDURE — 86850 RBC ANTIBODY SCREEN: CPT

## 2023-01-01 PROCEDURE — 82042 OTHER SOURCE ALBUMIN QUAN EA: CPT

## 2023-01-01 PROCEDURE — 85027 COMPLETE CBC AUTOMATED: CPT

## 2023-01-01 PROCEDURE — 82435 ASSAY OF BLOOD CHLORIDE: CPT

## 2023-01-01 RX ORDER — TAMSULOSIN HYDROCHLORIDE 0.4 MG/1
0.4 CAPSULE ORAL AT BEDTIME
Refills: 0 | Status: DISCONTINUED | OUTPATIENT
Start: 2023-01-01 | End: 2023-01-01

## 2023-01-01 RX ORDER — HYDROMORPHONE HYDROCHLORIDE 2 MG/ML
2 INJECTION INTRAMUSCULAR; INTRAVENOUS; SUBCUTANEOUS
Qty: 100 | Refills: 0 | Status: DISCONTINUED | OUTPATIENT
Start: 2023-01-01 | End: 2023-01-01

## 2023-01-01 RX ORDER — DEXTROSE 50 % IN WATER 50 %
25 SYRINGE (ML) INTRAVENOUS ONCE
Refills: 0 | Status: COMPLETED | OUTPATIENT
Start: 2023-01-01 | End: 2023-01-01

## 2023-01-01 RX ORDER — ACETYLCYSTEINE 200 MG/ML
4 VIAL (ML) MISCELLANEOUS EVERY 6 HOURS
Refills: 0 | Status: DISCONTINUED | OUTPATIENT
Start: 2023-01-01 | End: 2023-01-01

## 2023-01-01 RX ORDER — NOREPINEPHRINE BITARTRATE/D5W 8 MG/250ML
0.05 PLASTIC BAG, INJECTION (ML) INTRAVENOUS
Qty: 8 | Refills: 0 | Status: DISCONTINUED | OUTPATIENT
Start: 2023-01-01 | End: 2023-01-01

## 2023-01-01 RX ORDER — SEVELAMER CARBONATE 2400 MG/1
800 POWDER, FOR SUSPENSION ORAL
Refills: 0 | Status: DISCONTINUED | OUTPATIENT
Start: 2023-01-01 | End: 2023-01-01

## 2023-01-01 RX ORDER — FENTANYL CITRATE 50 UG/ML
50 INJECTION INTRAVENOUS ONCE
Refills: 0 | Status: DISCONTINUED | OUTPATIENT
Start: 2023-01-01 | End: 2023-01-01

## 2023-01-01 RX ORDER — FLUCONAZOLE 150 MG/1
200 TABLET ORAL
Refills: 0 | Status: DISCONTINUED | OUTPATIENT
Start: 2023-01-01 | End: 2023-01-01

## 2023-01-01 RX ORDER — FENTANYL CITRATE 50 UG/ML
50 INJECTION INTRAVENOUS
Refills: 0 | Status: DISCONTINUED | OUTPATIENT
Start: 2023-01-01 | End: 2023-01-01

## 2023-01-01 RX ORDER — HYDROMORPHONE HYDROCHLORIDE 2 MG/ML
4 INJECTION INTRAMUSCULAR; INTRAVENOUS; SUBCUTANEOUS
Refills: 0 | Status: DISCONTINUED | OUTPATIENT
Start: 2023-01-01 | End: 2023-01-01

## 2023-01-01 RX ORDER — MIDODRINE HYDROCHLORIDE 2.5 MG/1
10 TABLET ORAL EVERY 8 HOURS
Refills: 0 | Status: DISCONTINUED | OUTPATIENT
Start: 2023-01-01 | End: 2023-01-01

## 2023-01-01 RX ORDER — PROPOFOL 10 MG/ML
20 INJECTION, EMULSION INTRAVENOUS
Qty: 1000 | Refills: 0 | Status: DISCONTINUED | OUTPATIENT
Start: 2023-01-01 | End: 2023-01-01

## 2023-01-01 RX ORDER — FENTANYL CITRATE 50 UG/ML
50 INJECTION INTRAVENOUS EVERY 4 HOURS
Refills: 0 | Status: DISCONTINUED | OUTPATIENT
Start: 2023-01-01 | End: 2023-01-01

## 2023-01-01 RX ORDER — PROPOFOL 10 MG/ML
20 INJECTION, EMULSION INTRAVENOUS
Qty: 500 | Refills: 0 | Status: DISCONTINUED | OUTPATIENT
Start: 2023-01-01 | End: 2023-01-01

## 2023-01-01 RX ORDER — SODIUM CHLORIDE 9 MG/ML
250 INJECTION INTRAMUSCULAR; INTRAVENOUS; SUBCUTANEOUS ONCE
Refills: 0 | Status: COMPLETED | OUTPATIENT
Start: 2023-01-01 | End: 2023-01-01

## 2023-01-01 RX ORDER — ROBINUL 0.2 MG/ML
0.4 INJECTION INTRAMUSCULAR; INTRAVENOUS EVERY 4 HOURS
Refills: 0 | Status: DISCONTINUED | OUTPATIENT
Start: 2023-01-01 | End: 2023-01-01

## 2023-01-01 RX ORDER — SEVELAMER CARBONATE 2400 MG/1
800 POWDER, FOR SUSPENSION ORAL THREE TIMES A DAY
Refills: 0 | Status: DISCONTINUED | OUTPATIENT
Start: 2023-01-01 | End: 2023-01-01

## 2023-01-01 RX ORDER — POTASSIUM CHLORIDE 20 MEQ
20 PACKET (EA) ORAL
Refills: 0 | Status: COMPLETED | OUTPATIENT
Start: 2023-01-01 | End: 2023-01-01

## 2023-01-01 RX ORDER — ALBUTEROL 90 UG/1
2.5 AEROSOL, METERED ORAL ONCE
Refills: 0 | Status: COMPLETED | OUTPATIENT
Start: 2023-01-01 | End: 2023-01-01

## 2023-01-01 RX ORDER — TACROLIMUS 5 MG/1
1.5 CAPSULE ORAL DAILY
Refills: 0 | Status: DISCONTINUED | OUTPATIENT
Start: 2023-01-01 | End: 2023-01-01

## 2023-01-01 RX ORDER — APIXABAN 2.5 MG/1
2.5 TABLET, FILM COATED ORAL
Refills: 0 | Status: DISCONTINUED | OUTPATIENT
Start: 2023-01-01 | End: 2023-01-01

## 2023-01-01 RX ORDER — DOXAZOSIN MESYLATE 4 MG
2 TABLET ORAL AT BEDTIME
Refills: 0 | Status: DISCONTINUED | OUTPATIENT
Start: 2023-01-01 | End: 2023-01-01

## 2023-01-01 RX ORDER — POTASSIUM PHOSPHATE, MONOBASIC POTASSIUM PHOSPHATE, DIBASIC 236; 224 MG/ML; MG/ML
15 INJECTION, SOLUTION INTRAVENOUS ONCE
Refills: 0 | Status: COMPLETED | OUTPATIENT
Start: 2023-01-01 | End: 2023-01-01

## 2023-01-01 RX ORDER — NOREPINEPHRINE BITARTRATE/D5W 8 MG/250ML
0.05 PLASTIC BAG, INJECTION (ML) INTRAVENOUS
Qty: 16 | Refills: 0 | Status: DISCONTINUED | OUTPATIENT
Start: 2023-01-01 | End: 2023-01-01

## 2023-01-01 RX ORDER — MIDAZOLAM HYDROCHLORIDE 1 MG/ML
2 INJECTION, SOLUTION INTRAMUSCULAR; INTRAVENOUS
Refills: 0 | Status: DISCONTINUED | OUTPATIENT
Start: 2023-01-01 | End: 2023-01-01

## 2023-01-01 RX ORDER — HYDROCORTISONE 20 MG
10 TABLET ORAL EVERY 12 HOURS
Refills: 0 | Status: DISCONTINUED | OUTPATIENT
Start: 2023-01-01 | End: 2023-01-01

## 2023-01-01 RX ORDER — PANTOPRAZOLE SODIUM 20 MG/1
40 TABLET, DELAYED RELEASE ORAL DAILY
Refills: 0 | Status: DISCONTINUED | OUTPATIENT
Start: 2023-01-01 | End: 2023-01-01

## 2023-01-01 RX ORDER — ACYCLOVIR SODIUM 500 MG
320 VIAL (EA) INTRAVENOUS EVERY 24 HOURS
Refills: 0 | Status: DISCONTINUED | OUTPATIENT
Start: 2023-01-01 | End: 2023-01-01

## 2023-01-01 RX ORDER — MEROPENEM 1 G/30ML
1000 INJECTION INTRAVENOUS EVERY 24 HOURS
Refills: 0 | Status: DISCONTINUED | OUTPATIENT
Start: 2023-01-01 | End: 2023-01-01

## 2023-01-01 RX ORDER — CHLORHEXIDINE GLUCONATE 213 G/1000ML
15 SOLUTION TOPICAL EVERY 12 HOURS
Refills: 0 | Status: DISCONTINUED | OUTPATIENT
Start: 2023-01-01 | End: 2023-01-01

## 2023-01-01 RX ORDER — SODIUM CHLORIDE 9 MG/ML
1000 INJECTION, SOLUTION INTRAVENOUS
Refills: 0 | Status: DISCONTINUED | OUTPATIENT
Start: 2023-01-01 | End: 2023-01-01

## 2023-01-01 RX ORDER — HYDROMORPHONE HYDROCHLORIDE 2 MG/ML
4 INJECTION INTRAMUSCULAR; INTRAVENOUS; SUBCUTANEOUS
Qty: 100 | Refills: 0 | Status: DISCONTINUED | OUTPATIENT
Start: 2023-01-01 | End: 2023-01-01

## 2023-01-01 RX ORDER — FENTANYL CITRATE 50 UG/ML
0.5 INJECTION INTRAVENOUS
Qty: 2500 | Refills: 0 | Status: DISCONTINUED | OUTPATIENT
Start: 2023-01-01 | End: 2023-01-01

## 2023-01-01 RX ORDER — HYDROMORPHONE HYDROCHLORIDE 2 MG/ML
4 INJECTION INTRAMUSCULAR; INTRAVENOUS; SUBCUTANEOUS ONCE
Refills: 0 | Status: DISCONTINUED | OUTPATIENT
Start: 2023-01-01 | End: 2023-01-01

## 2023-01-01 RX ORDER — DEXMEDETOMIDINE HYDROCHLORIDE IN 0.9% SODIUM CHLORIDE 4 UG/ML
0.5 INJECTION INTRAVENOUS
Qty: 200 | Refills: 0 | Status: DISCONTINUED | OUTPATIENT
Start: 2023-01-01 | End: 2023-01-01

## 2023-01-01 RX ORDER — ACETYLCYSTEINE 200 MG/ML
4 VIAL (ML) MISCELLANEOUS ONCE
Refills: 0 | Status: COMPLETED | OUTPATIENT
Start: 2023-01-01 | End: 2023-01-01

## 2023-01-01 RX ORDER — POTASSIUM CHLORIDE 20 MEQ
10 PACKET (EA) ORAL ONCE
Refills: 0 | Status: COMPLETED | OUTPATIENT
Start: 2023-01-01 | End: 2023-01-01

## 2023-01-01 RX ORDER — ERYTHROPOIETIN 10000 [IU]/ML
10000 INJECTION, SOLUTION INTRAVENOUS; SUBCUTANEOUS
Refills: 0 | Status: DISCONTINUED | OUTPATIENT
Start: 2023-01-01 | End: 2023-01-01

## 2023-01-01 RX ORDER — MAGNESIUM SULFATE 500 MG/ML
2 VIAL (ML) INJECTION ONCE
Refills: 0 | Status: COMPLETED | OUTPATIENT
Start: 2023-01-01 | End: 2023-01-01

## 2023-01-01 RX ORDER — POTASSIUM CHLORIDE 20 MEQ
10 PACKET (EA) ORAL
Refills: 0 | Status: DISCONTINUED | OUTPATIENT
Start: 2023-01-01 | End: 2023-01-01

## 2023-01-01 RX ORDER — OLANZAPINE 15 MG/1
5 TABLET, FILM COATED ORAL ONCE
Refills: 0 | Status: COMPLETED | OUTPATIENT
Start: 2023-01-01 | End: 2023-01-01

## 2023-01-01 RX ORDER — CHLORHEXIDINE GLUCONATE 213 G/1000ML
1 SOLUTION TOPICAL
Refills: 0 | Status: DISCONTINUED | OUTPATIENT
Start: 2023-01-01 | End: 2023-01-01

## 2023-01-01 RX ORDER — IPRATROPIUM/ALBUTEROL SULFATE 18-103MCG
3 AEROSOL WITH ADAPTER (GRAM) INHALATION EVERY 6 HOURS
Refills: 0 | Status: DISCONTINUED | OUTPATIENT
Start: 2023-01-01 | End: 2023-01-01

## 2023-01-01 RX ADMIN — ROBINUL 0.4 MILLIGRAM(S): 0.2 INJECTION INTRAMUSCULAR; INTRAVENOUS at 14:30

## 2023-01-01 RX ADMIN — Medication 25 MILLIGRAM(S): at 23:31

## 2023-01-01 RX ADMIN — Medication 52.5 MILLIGRAM(S): at 21:23

## 2023-01-01 RX ADMIN — Medication 3 MILLILITER(S): at 16:30

## 2023-01-01 RX ADMIN — ROBINUL 0.4 MILLIGRAM(S): 0.2 INJECTION INTRAMUSCULAR; INTRAVENOUS at 14:03

## 2023-01-01 RX ADMIN — FENTANYL CITRATE 50 MICROGRAM(S): 50 INJECTION INTRAVENOUS at 02:45

## 2023-01-01 RX ADMIN — TACROLIMUS 1.5 MILLIGRAM(S): 5 CAPSULE ORAL at 13:59

## 2023-01-01 RX ADMIN — APIXABAN 2.5 MILLIGRAM(S): 2.5 TABLET, FILM COATED ORAL at 05:41

## 2023-01-01 RX ADMIN — Medication 2.97 MICROGRAM(S)/KG/MIN: at 05:01

## 2023-01-01 RX ADMIN — PROPOFOL 7.6 MICROGRAM(S)/KG/MIN: 10 INJECTION, EMULSION INTRAVENOUS at 21:01

## 2023-01-01 RX ADMIN — Medication 10 MILLIGRAM(S): at 17:00

## 2023-01-01 RX ADMIN — Medication 10 MILLIGRAM(S): at 17:17

## 2023-01-01 RX ADMIN — Medication 25 MILLIGRAM(S): at 04:24

## 2023-01-01 RX ADMIN — Medication 25 MILLIGRAM(S): at 12:04

## 2023-01-01 RX ADMIN — Medication 10 MILLIGRAM(S): at 04:25

## 2023-01-01 RX ADMIN — Medication 3 MILLILITER(S): at 16:31

## 2023-01-01 RX ADMIN — Medication 106.4 MILLIGRAM(S): at 17:22

## 2023-01-01 RX ADMIN — Medication 52.5 MILLIGRAM(S): at 12:47

## 2023-01-01 RX ADMIN — Medication 4 MILLILITER(S): at 01:08

## 2023-01-01 RX ADMIN — Medication 10 MILLIGRAM(S): at 17:08

## 2023-01-01 RX ADMIN — Medication 25 MILLIGRAM(S): at 02:11

## 2023-01-01 RX ADMIN — Medication 52.5 MILLIGRAM(S): at 03:13

## 2023-01-01 RX ADMIN — Medication 3 MILLILITER(S): at 15:16

## 2023-01-01 RX ADMIN — MIDODRINE HYDROCHLORIDE 10 MILLIGRAM(S): 2.5 TABLET ORAL at 14:44

## 2023-01-01 RX ADMIN — Medication 50 MILLIEQUIVALENT(S): at 05:50

## 2023-01-01 RX ADMIN — APIXABAN 2.5 MILLIGRAM(S): 2.5 TABLET, FILM COATED ORAL at 22:36

## 2023-01-01 RX ADMIN — HEPARIN SODIUM 500 UNIT(S)/HR: 5000 INJECTION INTRAVENOUS; SUBCUTANEOUS at 00:49

## 2023-01-01 RX ADMIN — MIDAZOLAM HYDROCHLORIDE 2 MILLIGRAM(S): 1 INJECTION, SOLUTION INTRAMUSCULAR; INTRAVENOUS at 19:40

## 2023-01-01 RX ADMIN — Medication 52.5 MILLIGRAM(S): at 21:02

## 2023-01-01 RX ADMIN — Medication 3 MILLILITER(S): at 21:25

## 2023-01-01 RX ADMIN — MIDODRINE HYDROCHLORIDE 10 MILLIGRAM(S): 2.5 TABLET ORAL at 06:20

## 2023-01-01 RX ADMIN — Medication 3 MILLILITER(S): at 15:43

## 2023-01-01 RX ADMIN — SODIUM CHLORIDE 50 MILLILITER(S): 9 INJECTION, SOLUTION INTRAVENOUS at 13:19

## 2023-01-01 RX ADMIN — Medication 3 MILLILITER(S): at 20:28

## 2023-01-01 RX ADMIN — MEROPENEM 500 MILLIGRAM(S): 1 INJECTION INTRAVENOUS at 12:04

## 2023-01-01 RX ADMIN — MIDAZOLAM HYDROCHLORIDE 2 MILLIGRAM(S): 1 INJECTION, SOLUTION INTRAMUSCULAR; INTRAVENOUS at 00:47

## 2023-01-01 RX ADMIN — Medication 25 MILLIGRAM(S): at 18:13

## 2023-01-01 RX ADMIN — HYDROMORPHONE HYDROCHLORIDE 4 MILLIGRAM(S): 2 INJECTION INTRAMUSCULAR; INTRAVENOUS; SUBCUTANEOUS at 18:43

## 2023-01-01 RX ADMIN — CHLORHEXIDINE GLUCONATE 1 APPLICATION(S): 213 SOLUTION TOPICAL at 05:25

## 2023-01-01 RX ADMIN — Medication 52.5 MILLIGRAM(S): at 11:00

## 2023-01-01 RX ADMIN — Medication 3 MILLILITER(S): at 04:23

## 2023-01-01 RX ADMIN — Medication 106.4 MILLIGRAM(S): at 16:03

## 2023-01-01 RX ADMIN — FENTANYL CITRATE 50 MICROGRAM(S): 50 INJECTION INTRAVENOUS at 21:02

## 2023-01-01 RX ADMIN — Medication 25 MILLIGRAM(S): at 12:49

## 2023-01-01 RX ADMIN — Medication 5.93 MICROGRAM(S)/KG/MIN: at 21:01

## 2023-01-01 RX ADMIN — Medication 52.5 MILLIGRAM(S): at 04:31

## 2023-01-01 RX ADMIN — Medication 52.5 MILLIGRAM(S): at 13:19

## 2023-01-01 RX ADMIN — FENTANYL CITRATE 50 MICROGRAM(S): 50 INJECTION INTRAVENOUS at 16:33

## 2023-01-01 RX ADMIN — FENTANYL CITRATE 50 MICROGRAM(S): 50 INJECTION INTRAVENOUS at 01:44

## 2023-01-01 RX ADMIN — Medication 25 MILLIGRAM(S): at 13:18

## 2023-01-01 RX ADMIN — Medication 2 MILLIGRAM(S): at 21:10

## 2023-01-01 RX ADMIN — Medication 4 MILLILITER(S): at 21:24

## 2023-01-01 RX ADMIN — PANTOPRAZOLE SODIUM 40 MILLIGRAM(S): 20 TABLET, DELAYED RELEASE ORAL at 12:04

## 2023-01-01 RX ADMIN — Medication 3 MILLILITER(S): at 20:34

## 2023-01-01 RX ADMIN — Medication 4 MILLIGRAM(S): at 10:57

## 2023-01-01 RX ADMIN — POTASSIUM PHOSPHATE, MONOBASIC POTASSIUM PHOSPHATE, DIBASIC 62.5 MILLIMOLE(S): 236; 224 INJECTION, SOLUTION INTRAVENOUS at 12:36

## 2023-01-01 RX ADMIN — TACROLIMUS 1.5 MILLIGRAM(S): 5 CAPSULE ORAL at 12:31

## 2023-01-01 RX ADMIN — MEROPENEM 1000 MILLIGRAM(S): 1 INJECTION INTRAVENOUS at 17:09

## 2023-01-01 RX ADMIN — HEPARIN SODIUM 800 UNIT(S)/HR: 5000 INJECTION INTRAVENOUS; SUBCUTANEOUS at 14:19

## 2023-01-01 RX ADMIN — ALBUTEROL 2.5 MILLIGRAM(S): 90 AEROSOL, METERED ORAL at 01:08

## 2023-01-01 RX ADMIN — CHLORHEXIDINE GLUCONATE 1 APPLICATION(S): 213 SOLUTION TOPICAL at 09:33

## 2023-01-01 RX ADMIN — Medication 25 MILLIGRAM(S): at 12:34

## 2023-01-01 RX ADMIN — PANTOPRAZOLE SODIUM 40 MILLIGRAM(S): 20 TABLET, DELAYED RELEASE ORAL at 11:00

## 2023-01-01 RX ADMIN — Medication 4 MILLILITER(S): at 08:36

## 2023-01-01 RX ADMIN — Medication 52.5 MILLIGRAM(S): at 11:39

## 2023-01-01 RX ADMIN — APIXABAN 2.5 MILLIGRAM(S): 2.5 TABLET, FILM COATED ORAL at 21:23

## 2023-01-01 RX ADMIN — APIXABAN 2.5 MILLIGRAM(S): 2.5 TABLET, FILM COATED ORAL at 10:35

## 2023-01-01 RX ADMIN — Medication 4 MILLILITER(S): at 16:02

## 2023-01-01 RX ADMIN — CHLORHEXIDINE GLUCONATE 15 MILLILITER(S): 213 SOLUTION TOPICAL at 17:09

## 2023-01-01 RX ADMIN — Medication 10 MILLIGRAM(S): at 05:54

## 2023-01-01 RX ADMIN — MIDODRINE HYDROCHLORIDE 10 MILLIGRAM(S): 2.5 TABLET ORAL at 05:20

## 2023-01-01 RX ADMIN — CHLORHEXIDINE GLUCONATE 15 MILLILITER(S): 213 SOLUTION TOPICAL at 17:34

## 2023-01-01 RX ADMIN — Medication 3 MILLILITER(S): at 20:38

## 2023-01-01 RX ADMIN — Medication 106.4 MILLIGRAM(S): at 16:08

## 2023-01-01 RX ADMIN — FENTANYL CITRATE 50 MICROGRAM(S): 50 INJECTION INTRAVENOUS at 02:39

## 2023-01-01 RX ADMIN — Medication 52.5 MILLIGRAM(S): at 06:53

## 2023-01-01 RX ADMIN — APIXABAN 2.5 MILLIGRAM(S): 2.5 TABLET, FILM COATED ORAL at 05:17

## 2023-01-01 RX ADMIN — FENTANYL CITRATE 50 MICROGRAM(S): 50 INJECTION INTRAVENOUS at 22:13

## 2023-01-01 RX ADMIN — MEROPENEM 500 MILLIGRAM(S): 1 INJECTION INTRAVENOUS at 13:38

## 2023-01-01 RX ADMIN — Medication 25 MILLIGRAM(S): at 22:41

## 2023-01-01 RX ADMIN — ROBINUL 0.4 MILLIGRAM(S): 0.2 INJECTION INTRAMUSCULAR; INTRAVENOUS at 01:02

## 2023-01-01 RX ADMIN — CHLORHEXIDINE GLUCONATE 15 MILLILITER(S): 213 SOLUTION TOPICAL at 05:41

## 2023-01-01 RX ADMIN — TACROLIMUS 1.5 MILLIGRAM(S): 5 CAPSULE ORAL at 12:48

## 2023-01-01 RX ADMIN — Medication 3 MILLILITER(S): at 04:10

## 2023-01-01 RX ADMIN — Medication 3 MILLILITER(S): at 21:27

## 2023-01-01 RX ADMIN — SEVELAMER CARBONATE 800 MILLIGRAM(S): 2400 POWDER, FOR SUSPENSION ORAL at 08:59

## 2023-01-01 RX ADMIN — Medication 3 MILLILITER(S): at 21:08

## 2023-01-01 RX ADMIN — PANTOPRAZOLE SODIUM 40 MILLIGRAM(S): 20 TABLET, DELAYED RELEASE ORAL at 12:39

## 2023-01-01 RX ADMIN — Medication 3 MILLILITER(S): at 04:27

## 2023-01-01 RX ADMIN — PANTOPRAZOLE SODIUM 40 MILLIGRAM(S): 20 TABLET, DELAYED RELEASE ORAL at 12:35

## 2023-01-01 RX ADMIN — Medication 3 MILLILITER(S): at 21:01

## 2023-01-01 RX ADMIN — OLANZAPINE 5 MILLIGRAM(S): 15 TABLET, FILM COATED ORAL at 23:11

## 2023-01-01 RX ADMIN — MEROPENEM 500 MILLIGRAM(S): 1 INJECTION INTRAVENOUS at 12:35

## 2023-01-01 RX ADMIN — Medication 3 MILLILITER(S): at 04:18

## 2023-01-01 RX ADMIN — Medication 10 MILLIGRAM(S): at 05:17

## 2023-01-01 RX ADMIN — CHLORHEXIDINE GLUCONATE 15 MILLILITER(S): 213 SOLUTION TOPICAL at 17:42

## 2023-01-01 RX ADMIN — POTASSIUM PHOSPHATE, MONOBASIC POTASSIUM PHOSPHATE, DIBASIC 62.5 MILLIMOLE(S): 236; 224 INJECTION, SOLUTION INTRAVENOUS at 08:17

## 2023-01-01 RX ADMIN — Medication 10 MILLIGRAM(S): at 22:36

## 2023-01-01 RX ADMIN — Medication 3 MILLILITER(S): at 15:47

## 2023-01-01 RX ADMIN — PANTOPRAZOLE SODIUM 40 MILLIGRAM(S): 20 TABLET, DELAYED RELEASE ORAL at 11:39

## 2023-01-01 RX ADMIN — ROBINUL 0.4 MILLIGRAM(S): 0.2 INJECTION INTRAMUSCULAR; INTRAVENOUS at 06:20

## 2023-01-01 RX ADMIN — SEVELAMER CARBONATE 800 MILLIGRAM(S): 2400 POWDER, FOR SUSPENSION ORAL at 17:18

## 2023-01-01 RX ADMIN — Medication 52.5 MILLIGRAM(S): at 21:34

## 2023-01-01 RX ADMIN — Medication 80 MILLIGRAM(S): at 17:10

## 2023-01-01 RX ADMIN — Medication 10 MILLIGRAM(S): at 18:13

## 2023-01-01 RX ADMIN — Medication 25 MILLIGRAM(S): at 17:12

## 2023-01-01 RX ADMIN — Medication 10 MILLIGRAM(S): at 17:12

## 2023-01-01 RX ADMIN — PROPOFOL 7.6 MICROGRAM(S)/KG/MIN: 10 INJECTION, EMULSION INTRAVENOUS at 17:11

## 2023-01-01 RX ADMIN — Medication 106.4 MILLIGRAM(S): at 17:12

## 2023-01-01 RX ADMIN — Medication 25 MILLIGRAM(S): at 23:45

## 2023-01-01 RX ADMIN — MEROPENEM 500 MILLIGRAM(S): 1 INJECTION INTRAVENOUS at 12:51

## 2023-01-01 RX ADMIN — ROBINUL 0.2 MILLIGRAM(S): 0.2 INJECTION INTRAMUSCULAR; INTRAVENOUS at 21:36

## 2023-01-01 RX ADMIN — Medication 10 MILLIGRAM(S): at 18:11

## 2023-01-01 RX ADMIN — Medication 3 MILLILITER(S): at 04:04

## 2023-01-01 RX ADMIN — Medication 50 GRAM(S): at 09:23

## 2023-01-01 RX ADMIN — Medication 3 MILLILITER(S): at 13:44

## 2023-01-01 RX ADMIN — HEPARIN SODIUM 5000 UNIT(S): 5000 INJECTION INTRAVENOUS; SUBCUTANEOUS at 14:25

## 2023-01-01 RX ADMIN — Medication 3 MILLILITER(S): at 10:00

## 2023-01-01 RX ADMIN — ROBINUL 0.4 MILLIGRAM(S): 0.2 INJECTION INTRAMUSCULAR; INTRAVENOUS at 17:05

## 2023-01-01 RX ADMIN — HEPARIN SODIUM 700 UNIT(S)/HR: 5000 INJECTION INTRAVENOUS; SUBCUTANEOUS at 17:14

## 2023-01-01 RX ADMIN — SEVELAMER CARBONATE 800 MILLIGRAM(S): 2400 POWDER, FOR SUSPENSION ORAL at 12:34

## 2023-01-01 RX ADMIN — Medication 106.4 MILLIGRAM(S): at 18:29

## 2023-01-01 RX ADMIN — PANTOPRAZOLE SODIUM 40 MILLIGRAM(S): 20 TABLET, DELAYED RELEASE ORAL at 12:30

## 2023-01-01 RX ADMIN — Medication 100 MILLIEQUIVALENT(S): at 09:23

## 2023-01-01 RX ADMIN — MIDODRINE HYDROCHLORIDE 10 MILLIGRAM(S): 2.5 TABLET ORAL at 05:00

## 2023-01-01 RX ADMIN — TACROLIMUS 1.5 MILLIGRAM(S): 5 CAPSULE ORAL at 12:39

## 2023-01-01 RX ADMIN — PANTOPRAZOLE SODIUM 40 MILLIGRAM(S): 20 TABLET, DELAYED RELEASE ORAL at 13:39

## 2023-01-01 RX ADMIN — SEVELAMER CARBONATE 800 MILLIGRAM(S): 2400 POWDER, FOR SUSPENSION ORAL at 07:51

## 2023-01-01 RX ADMIN — CHLORHEXIDINE GLUCONATE 1 APPLICATION(S): 213 SOLUTION TOPICAL at 05:01

## 2023-01-01 RX ADMIN — Medication 3 MILLILITER(S): at 08:29

## 2023-01-01 RX ADMIN — FENTANYL CITRATE 50 MICROGRAM(S): 50 INJECTION INTRAVENOUS at 02:11

## 2023-01-01 RX ADMIN — TACROLIMUS 1.5 MILLIGRAM(S): 5 CAPSULE ORAL at 11:00

## 2023-01-01 RX ADMIN — MIDAZOLAM HYDROCHLORIDE 2 MILLIGRAM(S): 1 INJECTION, SOLUTION INTRAMUSCULAR; INTRAVENOUS at 19:38

## 2023-01-01 RX ADMIN — Medication 10 MILLIGRAM(S): at 05:00

## 2023-01-01 RX ADMIN — Medication 25 MILLIGRAM(S): at 05:17

## 2023-01-01 RX ADMIN — Medication 52.5 MILLIGRAM(S): at 03:38

## 2023-01-01 RX ADMIN — APIXABAN 2.5 MILLIGRAM(S): 2.5 TABLET, FILM COATED ORAL at 06:20

## 2023-01-01 RX ADMIN — Medication 25 MILLIGRAM(S): at 17:29

## 2023-01-01 RX ADMIN — Medication 52.5 MILLIGRAM(S): at 22:12

## 2023-01-01 RX ADMIN — Medication 3 MILLILITER(S): at 15:28

## 2023-01-01 RX ADMIN — ERYTHROPOIETIN 10000 UNIT(S): 10000 INJECTION, SOLUTION INTRAVENOUS; SUBCUTANEOUS at 11:06

## 2023-01-01 RX ADMIN — MIDODRINE HYDROCHLORIDE 10 MILLIGRAM(S): 2.5 TABLET ORAL at 05:41

## 2023-01-01 RX ADMIN — ROBINUL 0.4 MILLIGRAM(S): 0.2 INJECTION INTRAMUSCULAR; INTRAVENOUS at 12:39

## 2023-01-01 RX ADMIN — CHLORHEXIDINE GLUCONATE 1 APPLICATION(S): 213 SOLUTION TOPICAL at 05:52

## 2023-01-01 RX ADMIN — Medication 106.4 MILLIGRAM(S): at 17:34

## 2023-01-01 RX ADMIN — MEROPENEM 1000 MILLIGRAM(S): 1 INJECTION INTRAVENOUS at 09:45

## 2023-01-01 RX ADMIN — Medication 52.5 MILLIGRAM(S): at 12:05

## 2023-01-01 RX ADMIN — Medication 52.5 MILLIGRAM(S): at 04:25

## 2023-01-01 RX ADMIN — Medication 2 MILLIGRAM(S): at 21:22

## 2023-01-01 RX ADMIN — Medication 10 MILLIGRAM(S): at 05:24

## 2023-01-01 RX ADMIN — TACROLIMUS 1.5 MILLIGRAM(S): 5 CAPSULE ORAL at 12:37

## 2023-01-01 RX ADMIN — FLUCONAZOLE 200 MILLIGRAM(S): 150 TABLET ORAL at 17:00

## 2023-01-01 RX ADMIN — Medication 10 MILLIGRAM(S): at 17:29

## 2023-01-01 RX ADMIN — TACROLIMUS 1.5 MILLIGRAM(S): 5 CAPSULE ORAL at 10:39

## 2023-01-01 RX ADMIN — Medication 52.5 MILLIGRAM(S): at 13:58

## 2023-01-01 RX ADMIN — Medication 52.5 MILLIGRAM(S): at 04:35

## 2023-01-01 RX ADMIN — Medication 52.5 MILLIGRAM(S): at 05:17

## 2023-01-01 RX ADMIN — SODIUM CHLORIDE 250 MILLILITER(S): 9 INJECTION INTRAMUSCULAR; INTRAVENOUS; SUBCUTANEOUS at 00:47

## 2023-01-01 RX ADMIN — Medication 52.5 MILLIGRAM(S): at 21:31

## 2023-01-01 RX ADMIN — ROBINUL 0.4 MILLIGRAM(S): 0.2 INJECTION INTRAMUSCULAR; INTRAVENOUS at 21:02

## 2023-01-01 RX ADMIN — DEXMEDETOMIDINE HYDROCHLORIDE IN 0.9% SODIUM CHLORIDE 7.91 MICROGRAM(S)/KG/HR: 4 INJECTION INTRAVENOUS at 05:49

## 2023-01-01 RX ADMIN — TACROLIMUS 1.5 MILLIGRAM(S): 5 CAPSULE ORAL at 12:04

## 2023-01-01 RX ADMIN — DEXMEDETOMIDINE HYDROCHLORIDE IN 0.9% SODIUM CHLORIDE 7.91 MICROGRAM(S)/KG/HR: 4 INJECTION INTRAVENOUS at 09:05

## 2023-01-01 RX ADMIN — ERYTHROPOIETIN 10000 UNIT(S): 10000 INJECTION, SOLUTION INTRAVENOUS; SUBCUTANEOUS at 16:10

## 2023-01-01 RX ADMIN — PANTOPRAZOLE SODIUM 40 MILLIGRAM(S): 20 TABLET, DELAYED RELEASE ORAL at 12:45

## 2023-01-01 RX ADMIN — FENTANYL CITRATE 50 MICROGRAM(S): 50 INJECTION INTRAVENOUS at 16:18

## 2023-01-01 RX ADMIN — Medication 3 MILLILITER(S): at 15:27

## 2023-01-01 RX ADMIN — MIDAZOLAM HYDROCHLORIDE 2 MILLIGRAM(S): 1 INJECTION, SOLUTION INTRAMUSCULAR; INTRAVENOUS at 15:07

## 2023-01-01 RX ADMIN — Medication 106.4 MILLIGRAM(S): at 17:01

## 2023-01-01 RX ADMIN — ROBINUL 0.4 MILLIGRAM(S): 0.2 INJECTION INTRAMUSCULAR; INTRAVENOUS at 17:08

## 2023-01-01 RX ADMIN — FENTANYL CITRATE 50 MICROGRAM(S): 50 INJECTION INTRAVENOUS at 13:11

## 2023-01-01 RX ADMIN — FLUCONAZOLE 200 MILLIGRAM(S): 150 TABLET ORAL at 17:09

## 2023-01-01 RX ADMIN — FENTANYL CITRATE 50 MICROGRAM(S): 50 INJECTION INTRAVENOUS at 01:33

## 2023-01-01 RX ADMIN — HYDROMORPHONE HYDROCHLORIDE 2 MG/HR: 2 INJECTION INTRAMUSCULAR; INTRAVENOUS; SUBCUTANEOUS at 21:01

## 2023-01-01 RX ADMIN — Medication 3 MILLILITER(S): at 04:56

## 2023-01-01 RX ADMIN — FENTANYL CITRATE 50 MICROGRAM(S): 50 INJECTION INTRAVENOUS at 07:39

## 2023-01-01 RX ADMIN — CHLORHEXIDINE GLUCONATE 15 MILLILITER(S): 213 SOLUTION TOPICAL at 05:01

## 2023-01-01 RX ADMIN — APIXABAN 2.5 MILLIGRAM(S): 2.5 TABLET, FILM COATED ORAL at 09:46

## 2023-01-01 RX ADMIN — Medication 4 MILLIGRAM(S): at 18:43

## 2023-01-01 RX ADMIN — Medication 10 MILLIGRAM(S): at 05:41

## 2023-01-01 RX ADMIN — Medication 25 GRAM(S): at 00:50

## 2023-01-01 RX ADMIN — CHLORHEXIDINE GLUCONATE 1 APPLICATION(S): 213 SOLUTION TOPICAL at 05:59

## 2023-01-01 RX ADMIN — CHLORHEXIDINE GLUCONATE 1 APPLICATION(S): 213 SOLUTION TOPICAL at 05:32

## 2023-01-01 RX ADMIN — TACROLIMUS 1.5 MILLIGRAM(S): 5 CAPSULE ORAL at 11:40

## 2023-01-01 RX ADMIN — MEROPENEM 1000 MILLIGRAM(S): 1 INJECTION INTRAVENOUS at 12:39

## 2023-01-01 RX ADMIN — APIXABAN 2.5 MILLIGRAM(S): 2.5 TABLET, FILM COATED ORAL at 17:34

## 2023-01-01 RX ADMIN — Medication 2 MILLIGRAM(S): at 21:13

## 2023-01-01 RX ADMIN — Medication 3 MILLILITER(S): at 09:57

## 2023-01-01 RX ADMIN — Medication 3 MILLILITER(S): at 08:32

## 2023-01-01 RX ADMIN — Medication 4 MILLILITER(S): at 03:48

## 2023-01-01 RX ADMIN — ERYTHROPOIETIN 10000 UNIT(S): 10000 INJECTION, SOLUTION INTRAVENOUS; SUBCUTANEOUS at 11:46

## 2023-01-01 RX ADMIN — PROPOFOL 7.6 MICROGRAM(S)/KG/MIN: 10 INJECTION, EMULSION INTRAVENOUS at 06:21

## 2023-01-01 RX ADMIN — Medication 2 MILLIGRAM(S): at 21:25

## 2023-01-01 RX ADMIN — Medication 2 MILLIGRAM(S): at 21:02

## 2023-01-01 RX ADMIN — Medication 3 MILLILITER(S): at 08:30

## 2023-01-01 RX ADMIN — FENTANYL CITRATE 50 MICROGRAM(S): 50 INJECTION INTRAVENOUS at 12:55

## 2023-01-01 RX ADMIN — Medication 106.4 MILLIGRAM(S): at 16:59

## 2023-01-01 RX ADMIN — Medication 2 MILLIGRAM(S): at 22:36

## 2023-01-01 RX ADMIN — Medication 10 MILLIGRAM(S): at 17:09

## 2023-01-01 RX ADMIN — Medication 2 MILLIGRAM(S): at 22:12

## 2023-01-01 RX ADMIN — CHLORHEXIDINE GLUCONATE 1 APPLICATION(S): 213 SOLUTION TOPICAL at 05:16

## 2023-01-01 RX ADMIN — ERYTHROPOIETIN 10000 UNIT(S): 10000 INJECTION, SOLUTION INTRAVENOUS; SUBCUTANEOUS at 14:03

## 2023-01-01 RX ADMIN — Medication 52.5 MILLIGRAM(S): at 19:34

## 2023-01-01 RX ADMIN — Medication 3 MILLILITER(S): at 02:01

## 2023-01-01 RX ADMIN — Medication 52.5 MILLIGRAM(S): at 11:07

## 2023-01-01 RX ADMIN — Medication 4 MILLILITER(S): at 13:44

## 2023-01-01 RX ADMIN — FENTANYL CITRATE 50 MICROGRAM(S): 50 INJECTION INTRAVENOUS at 21:36

## 2023-01-01 RX ADMIN — Medication 80 MILLIGRAM(S): at 18:11

## 2023-01-01 RX ADMIN — Medication 106.4 MILLIGRAM(S): at 16:16

## 2023-01-01 RX ADMIN — Medication 4 MILLILITER(S): at 09:03

## 2023-01-01 RX ADMIN — APIXABAN 2.5 MILLIGRAM(S): 2.5 TABLET, FILM COATED ORAL at 17:09

## 2023-01-01 RX ADMIN — Medication 3 MILLILITER(S): at 04:14

## 2023-01-01 RX ADMIN — HEPARIN SODIUM 500 UNIT(S)/HR: 5000 INJECTION INTRAVENOUS; SUBCUTANEOUS at 07:25

## 2023-01-01 RX ADMIN — FLUCONAZOLE 200 MILLIGRAM(S): 150 TABLET ORAL at 17:34

## 2023-01-01 RX ADMIN — Medication 52.5 MILLIGRAM(S): at 05:51

## 2023-01-01 RX ADMIN — MIDAZOLAM HYDROCHLORIDE 2 MILLIGRAM(S): 1 INJECTION, SOLUTION INTRAMUSCULAR; INTRAVENOUS at 22:37

## 2023-01-01 RX ADMIN — Medication 4 MILLILITER(S): at 15:44

## 2023-01-01 RX ADMIN — HEPARIN SODIUM 0 UNIT(S)/HR: 5000 INJECTION INTRAVENOUS; SUBCUTANEOUS at 23:30

## 2023-01-01 RX ADMIN — FENTANYL CITRATE 50 MICROGRAM(S): 50 INJECTION INTRAVENOUS at 12:37

## 2023-01-01 RX ADMIN — HEPARIN SODIUM 600 UNIT(S)/HR: 5000 INJECTION INTRAVENOUS; SUBCUTANEOUS at 06:39

## 2023-01-01 RX ADMIN — Medication 25 MILLIGRAM(S): at 05:51

## 2023-01-01 RX ADMIN — ROBINUL 0.4 MILLIGRAM(S): 0.2 INJECTION INTRAMUSCULAR; INTRAVENOUS at 05:00

## 2023-01-01 RX ADMIN — Medication 10 MILLIGRAM(S): at 05:04

## 2023-01-01 RX ADMIN — Medication 52.5 MILLIGRAM(S): at 20:46

## 2023-01-01 RX ADMIN — Medication 4 MILLILITER(S): at 20:18

## 2023-01-01 RX ADMIN — HEPARIN SODIUM 700 UNIT(S)/HR: 5000 INJECTION INTRAVENOUS; SUBCUTANEOUS at 10:34

## 2023-01-01 RX ADMIN — MIDODRINE HYDROCHLORIDE 10 MILLIGRAM(S): 2.5 TABLET ORAL at 21:02

## 2023-01-01 RX ADMIN — HEPARIN SODIUM 600 UNIT(S)/HR: 5000 INJECTION INTRAVENOUS; SUBCUTANEOUS at 00:31

## 2023-01-01 RX ADMIN — Medication 10 MILLIGRAM(S): at 06:20

## 2023-01-01 RX ADMIN — HEPARIN SODIUM 0 UNIT(S)/HR: 5000 INJECTION INTRAVENOUS; SUBCUTANEOUS at 01:37

## 2023-01-01 RX ADMIN — Medication 106.4 MILLIGRAM(S): at 17:14

## 2023-01-01 RX ADMIN — Medication 52.5 MILLIGRAM(S): at 22:35

## 2023-01-01 RX ADMIN — APIXABAN 2.5 MILLIGRAM(S): 2.5 TABLET, FILM COATED ORAL at 11:06

## 2023-01-01 RX ADMIN — MIDODRINE HYDROCHLORIDE 10 MILLIGRAM(S): 2.5 TABLET ORAL at 14:03

## 2023-01-01 RX ADMIN — ROBINUL 0.4 MILLIGRAM(S): 0.2 INJECTION INTRAMUSCULAR; INTRAVENOUS at 22:12

## 2023-01-01 RX ADMIN — APIXABAN 2.5 MILLIGRAM(S): 2.5 TABLET, FILM COATED ORAL at 18:13

## 2023-01-01 RX ADMIN — FENTANYL CITRATE 50 MICROGRAM(S): 50 INJECTION INTRAVENOUS at 10:48

## 2023-01-01 RX ADMIN — Medication 10 MILLIGRAM(S): at 05:51

## 2023-01-01 RX ADMIN — Medication 25 MILLIGRAM(S): at 23:39

## 2023-01-01 RX ADMIN — CHLORHEXIDINE GLUCONATE 1 APPLICATION(S): 213 SOLUTION TOPICAL at 05:42

## 2023-01-01 RX ADMIN — MIDODRINE HYDROCHLORIDE 10 MILLIGRAM(S): 2.5 TABLET ORAL at 21:12

## 2023-01-01 RX ADMIN — Medication 52.5 MILLIGRAM(S): at 05:10

## 2023-01-01 RX ADMIN — CHLORHEXIDINE GLUCONATE 1 APPLICATION(S): 213 SOLUTION TOPICAL at 05:18

## 2023-01-01 RX ADMIN — ROBINUL 0.4 MILLIGRAM(S): 0.2 INJECTION INTRAMUSCULAR; INTRAVENOUS at 02:22

## 2023-01-01 RX ADMIN — Medication 80 MILLIGRAM(S): at 17:00

## 2023-01-01 RX ADMIN — Medication 52.5 MILLIGRAM(S): at 20:50

## 2023-01-01 RX ADMIN — Medication 25 MILLIGRAM(S): at 00:54

## 2023-01-01 RX ADMIN — MIDODRINE HYDROCHLORIDE 10 MILLIGRAM(S): 2.5 TABLET ORAL at 13:38

## 2023-01-01 RX ADMIN — Medication 3 MILLILITER(S): at 08:36

## 2023-01-01 RX ADMIN — CHLORHEXIDINE GLUCONATE 15 MILLILITER(S): 213 SOLUTION TOPICAL at 05:20

## 2023-01-01 RX ADMIN — Medication 50 MILLIEQUIVALENT(S): at 07:37

## 2023-01-01 RX ADMIN — HEPARIN SODIUM 0 UNIT(S)/HR: 5000 INJECTION INTRAVENOUS; SUBCUTANEOUS at 23:44

## 2023-01-01 RX ADMIN — DEXMEDETOMIDINE HYDROCHLORIDE IN 0.9% SODIUM CHLORIDE 7.91 MICROGRAM(S)/KG/HR: 4 INJECTION INTRAVENOUS at 02:10

## 2023-01-01 RX ADMIN — Medication 52.5 MILLIGRAM(S): at 12:41

## 2023-01-01 RX ADMIN — MEROPENEM 500 MILLIGRAM(S): 1 INJECTION INTRAVENOUS at 11:10

## 2023-01-01 RX ADMIN — Medication 25 MILLIGRAM(S): at 17:01

## 2023-01-01 RX ADMIN — Medication 5.93 MICROGRAM(S)/KG/MIN: at 17:11

## 2023-01-01 RX ADMIN — FENTANYL CITRATE 50 MICROGRAM(S): 50 INJECTION INTRAVENOUS at 02:09

## 2023-01-01 RX ADMIN — Medication 3 MILLILITER(S): at 03:48

## 2023-01-01 RX ADMIN — Medication 52.5 MILLIGRAM(S): at 12:40

## 2023-01-01 RX ADMIN — Medication 2 MILLIGRAM(S): at 21:36

## 2023-01-01 RX ADMIN — Medication 4 MILLILITER(S): at 08:29

## 2023-01-01 RX ADMIN — PROPOFOL 7.6 MICROGRAM(S)/KG/MIN: 10 INJECTION, EMULSION INTRAVENOUS at 18:00

## 2023-01-01 RX ADMIN — Medication 52.5 MILLIGRAM(S): at 21:22

## 2023-01-01 RX ADMIN — HEPARIN SODIUM 900 UNIT(S)/HR: 5000 INJECTION INTRAVENOUS; SUBCUTANEOUS at 02:48

## 2023-01-01 RX ADMIN — Medication 52.5 MILLIGRAM(S): at 12:37

## 2023-01-01 RX ADMIN — APIXABAN 2.5 MILLIGRAM(S): 2.5 TABLET, FILM COATED ORAL at 05:00

## 2023-01-01 RX ADMIN — Medication 3 MILLILITER(S): at 08:12

## 2023-01-01 RX ADMIN — CHLORHEXIDINE GLUCONATE 1 APPLICATION(S): 213 SOLUTION TOPICAL at 06:21

## 2023-01-01 RX ADMIN — APIXABAN 2.5 MILLIGRAM(S): 2.5 TABLET, FILM COATED ORAL at 17:43

## 2023-01-01 RX ADMIN — PANTOPRAZOLE SODIUM 40 MILLIGRAM(S): 20 TABLET, DELAYED RELEASE ORAL at 12:48

## 2023-01-01 RX ADMIN — Medication 3 MILLILITER(S): at 08:05

## 2023-01-01 RX ADMIN — HEPARIN SODIUM 0 UNIT(S)/HR: 5000 INJECTION INTRAVENOUS; SUBCUTANEOUS at 09:32

## 2023-01-01 RX ADMIN — Medication 4 MILLILITER(S): at 04:10

## 2023-01-01 RX ADMIN — FENTANYL CITRATE 50 MICROGRAM(S): 50 INJECTION INTRAVENOUS at 21:25

## 2023-01-01 RX ADMIN — FENTANYL CITRATE 50 MICROGRAM(S): 50 INJECTION INTRAVENOUS at 11:03

## 2023-01-01 RX ADMIN — FENTANYL CITRATE 50 MICROGRAM(S): 50 INJECTION INTRAVENOUS at 23:11

## 2023-01-01 RX ADMIN — Medication 3 MILLILITER(S): at 05:29

## 2023-01-01 RX ADMIN — Medication 3 MILLILITER(S): at 15:44

## 2023-01-01 RX ADMIN — Medication 3 MILLILITER(S): at 09:03

## 2023-01-01 RX ADMIN — PANTOPRAZOLE SODIUM 40 MILLIGRAM(S): 20 TABLET, DELAYED RELEASE ORAL at 11:06

## 2023-01-01 RX ADMIN — ROBINUL 0.4 MILLIGRAM(S): 0.2 INJECTION INTRAMUSCULAR; INTRAVENOUS at 10:36

## 2023-01-01 RX ADMIN — FENTANYL CITRATE 3.17 MICROGRAM(S)/KG/HR: 50 INJECTION INTRAVENOUS at 09:09

## 2023-01-01 RX ADMIN — Medication 80 MILLIGRAM(S): at 22:35

## 2023-01-01 RX ADMIN — HYDROMORPHONE HYDROCHLORIDE 2 MG/HR: 2 INJECTION INTRAMUSCULAR; INTRAVENOUS; SUBCUTANEOUS at 15:58

## 2023-01-01 RX ADMIN — Medication 52.5 MILLIGRAM(S): at 04:40

## 2023-01-01 RX ADMIN — HYDROMORPHONE HYDROCHLORIDE 4 MG/HR: 2 INJECTION INTRAMUSCULAR; INTRAVENOUS; SUBCUTANEOUS at 12:40

## 2023-01-01 RX ADMIN — PROPOFOL 7.6 MICROGRAM(S)/KG/MIN: 10 INJECTION, EMULSION INTRAVENOUS at 13:26

## 2023-01-01 RX ADMIN — Medication 3 MILLILITER(S): at 08:39

## 2023-01-01 RX ADMIN — Medication 10 MILLIGRAM(S): at 09:32

## 2023-01-01 RX ADMIN — CHLORHEXIDINE GLUCONATE 1 APPLICATION(S): 213 SOLUTION TOPICAL at 05:04

## 2023-01-01 RX ADMIN — Medication 25 MILLIGRAM(S): at 05:53

## 2023-01-01 RX ADMIN — FENTANYL CITRATE 50 MICROGRAM(S): 50 INJECTION INTRAVENOUS at 17:29

## 2023-01-01 RX ADMIN — HEPARIN SODIUM 600 UNIT(S)/HR: 5000 INJECTION INTRAVENOUS; SUBCUTANEOUS at 14:23

## 2023-01-01 RX ADMIN — Medication 3 MILLILITER(S): at 09:15

## 2023-01-01 RX ADMIN — TACROLIMUS 1.5 MILLIGRAM(S): 5 CAPSULE ORAL at 13:40

## 2023-01-01 RX ADMIN — FLUCONAZOLE 200 MILLIGRAM(S): 150 TABLET ORAL at 22:37

## 2023-01-01 RX ADMIN — Medication 3 MILLILITER(S): at 15:35

## 2023-01-01 RX ADMIN — Medication 3 MILLILITER(S): at 20:21

## 2023-01-01 RX ADMIN — PANTOPRAZOLE SODIUM 40 MILLIGRAM(S): 20 TABLET, DELAYED RELEASE ORAL at 10:35

## 2023-01-01 RX ADMIN — TACROLIMUS 1.5 MILLIGRAM(S): 5 CAPSULE ORAL at 11:16

## 2023-01-01 RX ADMIN — MIDODRINE HYDROCHLORIDE 10 MILLIGRAM(S): 2.5 TABLET ORAL at 22:12

## 2023-01-01 RX ADMIN — Medication 52.5 MILLIGRAM(S): at 04:15

## 2023-01-01 RX ADMIN — APIXABAN 2.5 MILLIGRAM(S): 2.5 TABLET, FILM COATED ORAL at 09:32

## 2023-01-01 RX ADMIN — Medication 52.5 MILLIGRAM(S): at 13:40

## 2023-01-01 RX ADMIN — Medication 10 MILLIGRAM(S): at 17:56

## 2023-01-01 RX ADMIN — Medication 25 MILLIGRAM(S): at 13:40

## 2023-01-01 RX ADMIN — CHLORHEXIDINE GLUCONATE 15 MILLILITER(S): 213 SOLUTION TOPICAL at 06:20

## 2023-01-01 RX ADMIN — PROPOFOL 7.6 MICROGRAM(S)/KG/MIN: 10 INJECTION, EMULSION INTRAVENOUS at 21:37

## 2023-01-01 RX ADMIN — Medication 3 MILLILITER(S): at 22:26

## 2023-01-01 RX ADMIN — Medication 3 MILLILITER(S): at 21:20

## 2023-01-01 RX ADMIN — Medication 25 MILLIGRAM(S): at 05:25

## 2023-01-01 RX ADMIN — Medication 25 GRAM(S): at 07:47

## 2023-01-01 NOTE — PROVIDER CONTACT NOTE (CRITICAL VALUE NOTIFICATION) - PERSON GIVING RESULT:
Maik Hopper, lab
[place]/ Lab
Zeinab Titus-laboratory
paulina ledesma
Deepa LESTER, Lab
[place]
Bal Love/ [facility]
Juliet Hirsch, lab
Lab/Chemistry

## 2023-01-01 NOTE — PROGRESS NOTE ADULT - SUBJECTIVE AND OBJECTIVE BOX
[facility]  INFECTIOUS DISEASES at [place] and [place]  ===============================================================                               J Carlos Galdamez MD*     Perlita Solares MD*                         Isatu Rojas MD*       Sumaya Morales MD*            Diplomates American Board of Internal Medicine & Infectious Diseases                * [place] Office - Appt - Tel  [phone] Fax [phone]                * [place] Office - Appt - Tel [phone] Fax [phone]                                  Hospital Consult line:  [phone]  ==============================================================    JU FERGUSON [id number]    Follow up:    Seen in the ICU   Remains critically ill w/o improvement in MS  S/p LP   Intubated today     I have personally reviewed the labs and data; pertinent labs and data are listed in this note; please see below.     _______________________________________________________________  REVIEW OF SYSTEMS  Unable to obtain due to medical condition - altered mental status/intubated  ________________________________________________________________  Allergies:  budesonide (Unknown)  cefpodoxime (Unknown)  Pollen (Unknown)        ________________________________________________________________  PHYSICAL EXAM  GEN: critically ill, intubated  HEENT: ETT  LUNGS: mechanically ventilated CTA anteriorly   HEART: RRR, no m/r/g  ABDOMEN: ND, + BS  EXTREMITIES: UEs edematous   NEUROLOGIC: unresponsive   SKIN: ecchymoses  LINES: LUE AVG  ________________________________________________________________  Vitals:  T(F): 97.5 (01 Jan 2023 11:00), Max: 99.5 (01 Jan 2023 04:00)  HR: 67 (01 Jan 2023 11:42)  BP: 127/63 (01 Jan 2023 09:00)  RR: 5 (01 Jan 2023 09:00)  SpO2: 100% (01 Jan 2023 11:42) (91% - 100%)  temp max in last 48H T(F): , Max: 99.5 (01-01-23 @ 04:00)    Current Antibiotics:  fluconAZOLE   Tablet 200 milliGRAM(s) Oral <User Schedule>  meropenem Injectable      meropenem Injectable 500 milliGRAM(s) IV Push every 24 hours  trimethoprim   80 mG/sulfamethoxazole 400 mG 1 Tablet(s) Oral <User Schedule>    Other medications:  acetylcysteine 20%  Inhalation 4 milliLiter(s) Inhalation every 6 hours  albuterol/ipratropium for Nebulization 3 milliLiter(s) Nebulizer every 6 hours  chlorhexidine 2% Cloths 1 Application(s) Topical <User Schedule>  dextrose 50% Injectable 25 Gram(s) IV Push once  dextrose 50% Injectable 12.5 Gram(s) IV Push once  dextrose 50% Injectable 25 Gram(s) IV Push once  dextrose Oral Gel 15 Gram(s) Oral once  fentaNYL   Infusion. 0.5 MICROgram(s)/kG/Hr IV Continuous <Continuous>  heparin  Infusion.  Unit(s)/Hr IV Continuous <Continuous>  hydrocortisone 10 milliGRAM(s) Oral every 12 hours  metoprolol tartrate 25 milliGRAM(s) Enteral Tube four times a day  norepinephrine Infusion 0.05 MICROgram(s)/kG/Min IV Continuous <Continuous>  pantoprazole  Injectable 40 milliGRAM(s) IV Push daily  sevelamer carbonate 800 milliGRAM(s) Oral three times a day with meals  tacrolimus 1.5 milliGRAM(s) Oral daily  tamsulosin 0.4 milliGRAM(s) Oral at bedtime  valproate sodium  IVPB 250 milliGRAM(s) IV Intermittent every 8 hours                            11.0   4.61  )-----------( 147      ( 01 Jan 2023 03:20 )             34.2     01-01    135  |  95<L>  |  10.5  ----------------------------<  80  3.7   |  27.0  |  3.54<H>    Ca    8.3<L>      01 Jan 2023 03:20  Phos  4.3     01-01  Mg     1.8     01-01    TPro  4.9<L>  /  Alb  2.4<L>  /  TBili  0.3<L>  /  DBili  x   /  AST  22  /  ALT  5   /  AlkPhos  185<H>  01-01    RECENT CULTURES:  12-31 @ 14:00 .CSF CSF       No polymorphonuclear leukocytes seen  No organisms seen  by cytocentrifuge    Herpes Simplex Virus 1/2 PCR, CSF (12.31.22 @ 14:00)    Source HSV 1/2: CSF    HSV 1/2 PCR: NotDetec: HSV1/2 PCR assay is a real-time PCR test that detects and differentiates  HSV-1 and/or HSV-2 DNA in CSF (Vitreous Fluid). The results of this test  should be interpreted with consideration of all clinical and laboratory  findings.    Cryptococcal Antigen - CSF (12.31.22 @ 14:00)    Cryptococcal Antigen - CSF: Negative    CSF PCR Panel (12.31.22 @ 14:00)    CSF PCR Result: NotDetec    Protein, CSF (12.31.22 @ 14:00)    Protein, CSF: 60 mg/dL    Glucose, CSF (12.31.22 @ 14:00)    Glucose, CSF: 43 mg/dLG/24h    Cerebrospinal Fluid Cell Count-1 (12.31.22 @ 14:00)    RBC Count - Spinal Fluid: 1 /cmm    Tube Type: Tube 1    CSF Appearance: Clear    CSF Neutrophils: N/A %    Appearance Spun: Colorless    CSF Color: No Color    Total Nucleated Cell Count, CSF: 2 /uL      12-28 @ 06:45 .Blood Blood     No growth to date.    12-28 @ 06:40 .Blood Blood     No growth to date.    12-19 @ 17:10 .CSF CSF     No acid-fast bacilli isolated at 1 week. ****Acid-fast cultures are held  for 6 weeks.****    No polymorphonuclear leukocytes seen per low power field  No organisms seen per oil power field  by cytocentrifuge      12-19 @ 16:00 .Sputum Sputum     Normal Respiratory Charis present    Moderate polymorphonuclear leukocytes per low power field  Few Squamous epithelial cells per low power field  Few Gram positive cocci in pairs seen per oil power field  Few Yeast like cells seen per oil power field  Few Gram Negative Rods seen per oil power field      12-19 @ 13:50 Paracentesis Paracentesis Fluid     No growth at 5 days    No polymorphonuclear leukocytes seen per low power field  No organisms seen per oil power field    WBC Count: 4.61 K/uL (01-01-23 @ 03:20)  WBC Count: 5.40 K/uL (12-31-22 @ 16:03)  WBC Count: 5.87 K/uL (12-31-22 @ 05:30)  WBC Count: 6.07 K/uL (12-30-22 @ 04:30)  WBC Count: 7.52 K/uL (12-29-22 @ 06:06)  WBC Count: 8.84 K/uL (12-28-22 @ 06:45)    Creatinine, Serum: 3.54 mg/dL (01-01-23 @ 03:20)  Creatinine, Serum: 4.75 mg/dL (12-31-22 @ 05:30)  Creatinine, Serum: 3.83 mg/dL (12-30-22 @ 04:30)  Creatinine, Serum: 5.94 mg/dL (12-29-22 @ 06:06)  Creatinine, Serum: 4.86 mg/dL (12-28-22 @ 06:45)        Procalcitonin, Serum: 0.58 ng/mL (12-30-22 @ 04:30)     S[facility]-2 [facility] (12-13-22 @ 02:17)    ________________________________________________________________  RADIOLOGY  < from: CT Chest No Cont (12.29.22 @ 16:03) >  INTERPRETATION:  Clinical information: Evaluate for aspiration pneumonia.   Exam is compared to previous study of 12/23/2022.    CT scan of the chest was obtained without administration of intravenous   contrast.    No hilar and or mediastinal adenopathy is noted.    Heart is enlarged in size. Patient is status post median sternotomy.   Small pericardial effusion is noted. Right-sided PICC line catheter is   noted in place.    No endobronchial lesions are noted. Interlobular septal thickening and   thickening of the peribronchovascular interstitium are noted within both   lungs. Minimal patchy groundglass opacities are also noted in both upper   lobes. Minimal atelectasis is also noted involving both lower lobes.   Small bilateral pleural effusions are noted.    Below the diaphragm, visualized portions of the abdomen demonstrate   nodular contour to the surface of liver. Cholelithiasis is noted. Both   kidneys are small in size. Low-attenuation lesion in the right kidney is   too small to be adequately characterized on this exam. Tip of the   nasogastric tube is noted in the stomach. Ascites is noted.    Degenerative changes of the spine as well as loss of height of multiple   vertebral bodies is noted. Subcutaneous edema is noted.    IMPRESSION: Findings suggestive of pulmonary edema.    < end of copied text >

## 2023-01-01 NOTE — EEG REPORT - NS EEG TEXT BOX
[facility]   COMPREHENSIVE EPILEPSY CENTER   REPORT OF LONG-TERM VIDEO EEG     [facility]: [address], [place] [address], Ph#: [phone]  [facility]: [id number] [place] [address], Ph#: [phone]  [facility]: [address] [place] [address], Ph#: [phone]    Patient Name: JU FERGUSON  Age and : 72y (46)  MRN #: [id number]  Location: [facility] 4211 01  Referring Physician: Gladis Ko    Start Time/Date: 0800  on 22  End Time/Date: 0800 on 23  Duration:  24 h     _____________________________________________________________  STUDY INFORMATION    EEG Recording Technique:  The patient underwent continuous Video-EEG monitoring, using Telemetry System hardware on the XLTek Digital System. EEG and video data were stored on a computer hard drive with important events saved in digital archive files. The material was reviewed by a physician (electroencephalographer / epileptologist) on a daily basis. Brad and seizure detection algorithms were utilized and reviewed. An EEG Technician attended to the patient, and was available throughout daytime work hours.  The epilepsy center neurologist was available in person or on call 24-hours per day.    EEG Placement and Labeling of Electrodes:  The EEG was performed utilizing 20 channel referential EEG connections (coronal over temporal over parasagittal montage) using all standard 10-20 electrode placements with EKG, with additional electrodes placed in the inferior temporal region using the modified 10-10 montage electrode placements for elective admissions, or if deemed necessary. Recording was at a sampling rate of 256 samples per second per channel. Time synchronized digital video recording was done simultaneously with EEG recording. A low light infrared camera was used for low light recording.     _____________________________________________________________  HISTORY    Patient is a 72y old  Male who presents with a chief complaint of Encephalopathy (21 Dec 2022 11:30)    MEDICATIONS  (STANDING):  acetylcysteine 20%  Inhalation 4 milliLiter(s) Inhalation every 6 hours  acyclovir IVPB      acyclovir IVPB 320 milliGRAM(s) IV Intermittent every 24 hours  albuterol/ipratropium for Nebulization 3 milliLiter(s) Nebulizer every 6 hours  amphotericin B  liposome  IVPB 320 milliGRAM(s) IV Intermittent every 24 hours  fentaNYL   Infusion. 0.5 MICROgram(s)/kG/Hr (3.17 mL/Hr) IV Continuous <Continuous>  flucytosine 1500 milliGRAM(s) Oral <User Schedule>  heparin  Infusion.  Unit(s)/Hr (11 mL/Hr) IV Continuous <Continuous>  hydrocortisone 10 milliGRAM(s) Oral every 12 hours  meropenem Injectable      meropenem Injectable 500 milliGRAM(s) IV Push every 24 hours  metoprolol tartrate 25 milliGRAM(s) Enteral Tube four times a day  norepinephrine Infusion 0.05 MICROgram(s)/kG/Min (2.97 mL/Hr) IV Continuous <Continuous>  pantoprazole  Injectable 40 milliGRAM(s) IV Push daily  sevelamer carbonate 800 milliGRAM(s) Oral three times a day with meals  tacrolimus 1.5 milliGRAM(s) Oral daily  tamsulosin 0.4 milliGRAM(s) Oral at bedtime  trimethoprim   80 mG/sulfamethoxazole 400 mG 1 Tablet(s) Oral <User Schedule>  valproate sodium  IVPB 250 milliGRAM(s) IV Intermittent every 8 hours  _____________________________________________________________  STUDY INTERPRETATION    Findings: The background was reactive and continuous there were periods of brief attenuation during clinically silent state (<%1). During wakefulness, no PDR seen.    Background Slowing:  Diffuse theta and polymorphic delta slowing.    Focal Slowing:   None were present.    Sleep Background:  Drowsiness was characterized by fragmentation, attenuation, and slowing of the background activity.    Stage II sleep transients were not recorded.    Other Non-Epileptiform Findings:  Frequent generalized periodic discharges with triphasic morphology up to <0.5-1 Hz, improved in latter portions of the recording.    Interictal Epileptiform Activity:   none     Seizures:  none    Other events:   Isolated myoclonic jerks - head movements side to side, head flexion without a clear ictal correlate    Activation Procedures:   Hyperventilation was not performed.    Photic stimulation was not performed.     Artifacts:  Intermittent myogenic and movement artifacts were noted.    ECG:  The heart rate on single channel ECG was irregularly irregular predominantly between 70-90 BPM.    _____________________________________________________________  EEG SUMMARY/CLASSIFICATION    Abnormal EEG in an altered patient.  1. Isolated myoclonic jerks, without a clear ictal correlate   2. Generalized periodic discharges with triphasic morphology <0.5-1 Hz  3. Background slowing, Moderate, generalized.    _____________________________________________________________  EEG IMPRESSION/CLINICAL CORRELATE    Abnormal EEG study.  1. Isolated myoclonic jerks, head movements side to side, head flexion, throughout the study without a clear EEG correlate.   2. Generalized periodic discharges with triphasic morphology are commonly seen in toxic- metabolic encephalopathy, rarely epileptic, improved in latter portions of the recording   3. Moderate nonspecific diffuse or multifocal cerebral dysfunction.   4. No seizures recorded   _____________________________________________________________    Aurelio Portillo MD  Epilepsy Fellow , [facility]  Department of Neurology, [employer]    [facility] EEG Reading Room Ph#: [phone]  Epilepsy Answering Service after 5PM and before 8:30AM: Ph#: [phone]   [facility]   COMPREHENSIVE EPILEPSY CENTER   REPORT OF LONG-TERM VIDEO EEG     [facility]: [address], [place] [address], Ph#: [phone]  [facility]: [phone] [place] [address], Ph#: [phone]  [facility]: [address] [place] [address], Ph#: [phone]    Patient Name: JU FERGUSON  Age and : 72y (46)  MRN #: [id number]  Location: [facility] 42[address] 01  Referring Physician: Glaids Ko    Start Time/Date: 0800  on 22  End Time/Date: 0800 on 23  Duration:  24 h     _____________________________________________________________  STUDY INFORMATION    EEG Recording Technique:  The patient underwent continuous Video-EEG monitoring, using Telemetry System hardware on the XLTek Digital System. EEG and video data were stored on a computer hard drive with important events saved in digital archive files. The material was reviewed by a physician (electroencephalographer / epileptologist) on a daily basis. Brad and seizure detection algorithms were utilized and reviewed. An EEG Technician attended to the patient, and was available throughout daytime work hours.  The epilepsy center neurologist was available in person or on call 24-hours per day.    EEG Placement and Labeling of Electrodes:  The EEG was performed utilizing 20 channel referential EEG connections (coronal over temporal over parasagittal montage) using all standard 10-20 electrode placements with EKG, with additional electrodes placed in the inferior temporal region using the modified 10-10 montage electrode placements for elective admissions, or if deemed necessary. Recording was at a sampling rate of 256 samples per second per channel. Time synchronized digital video recording was done simultaneously with EEG recording. A low light infrared camera was used for low light recording.     _____________________________________________________________  HISTORY    Patient is a 72y old  Male who presents with a chief complaint of Encephalopathy (21 Dec 2022 11:30)  _____________________________________________________________  STUDY INTERPRETATION    Findings: The background was reactive and continuous there were periods of brief attenuation during clinically silent state (<%1). During wakefulness, no PDR seen.    Background Slowing:  Diffuse theta and polymorphic delta slowing.    Focal Slowing:   None were present.    Sleep Background:  Drowsiness was characterized by fragmentation, attenuation, and slowing of the background activity.    Stage II sleep transients were not recorded.    Other Non-Epileptiform Findings:  Frequent generalized periodic discharges with triphasic morphology up to <0.5-1 Hz, improved in latter portions of the recording.    Interictal Epileptiform Activity:   none     Seizures:  none    Other events:   Isolated myoclonic jerks - head movements side to side, head flexion without a clear ictal correlate    Activation Procedures:   Hyperventilation was not performed.    Photic stimulation was not performed.     Artifacts:  Intermittent myogenic and movement artifacts were noted.    ECG:  The heart rate on single channel ECG was irregularly irregular predominantly between 70-90 BPM.    _____________________________________________________________  EEG SUMMARY/CLASSIFICATION    Abnormal EEG in an altered patient.  1. Isolated myoclonic jerks, without a clear ictal correlate   2. Generalized periodic discharges with triphasic morphology <0.5-1 Hz  3. Background slowing, Moderate, generalized.    _____________________________________________________________  EEG IMPRESSION/CLINICAL CORRELATE    Abnormal EEG study.  1. Isolated myoclonic jerks, head movements side to side, head flexion, throughout the study without a clear EEG correlate.   2. Generalized periodic discharges with triphasic morphology are commonly seen in toxic- metabolic encephalopathy, rarely epileptic, improved in latter portions of the recording   3. Moderate nonspecific diffuse or multifocal cerebral dysfunction.   4. No seizures recorded   _____________________________________________________________    Aurelio Portillo MD  Epilepsy Fellow , [facility]  Department of Neurology, [facility] [employer]    Derik Tate MD PhD  Director, Epilepsy Division, [facility] EEG Reading Room Ph#: [phone]  Epilepsy Answering Service after 5PM and before 8:30AM: Ph#: [phone]

## 2023-01-01 NOTE — PROGRESS NOTE ADULT - SUBJECTIVE AND OBJECTIVE BOX
[facility]                                        Neurology at [place]                                 [facility]                                  [address]                                        [place], [address]                                             [phone]        CC: Encephalopathy    HPI:   The patient is a 72y Male with multiple medical issues who was recently hospitalized with hypoxic respiratory failure.  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   He was ultimately discharged 11/23/22.   He now presented with altered mental status and lethargy.   Neurology asked to evaluation for meningitis. LP attempted (neuro SHEELA) and was unsuccessful while in ER.    The patient had been gradually improving with regard to mental status, but has deteriorated again over the past few days.   Ultimately transferred back to ICU. (SHEELA)    Interim history: Unresponsive in ICU, intubated, s/p LP    ROS:   Unobtainable due to patient's condition.       MEDICATIONS  (STANDING):  acetylcysteine 20%  Inhalation 4 milliLiter(s) Inhalation every 6 hours  acyclovir IVPB 320 milliGRAM(s) IV Intermittent every 24 hours  albuterol/ipratropium for Nebulization 3 milliLiter(s) Nebulizer every 6 hours  chlorhexidine 2% Cloths 1 Application(s) Topical <User Schedule>  dextrose 50% Injectable 25 Gram(s) IV Push once  dextrose 50% Injectable 12.5 Gram(s) IV Push once  dextrose 50% Injectable 25 Gram(s) IV Push once  dextrose Oral Gel 15 Gram(s) Oral once  fentaNYL   Infusion. 0.5 MICROgram(s)/kG/Hr (3.17 mL/Hr) IV Continuous <Continuous>  fluconAZOLE   Tablet 200 milliGRAM(s) Oral <User Schedule>  heparin  Infusion.  Unit(s)/Hr (11 mL/Hr) IV Continuous <Continuous>  hydrocortisone 10 milliGRAM(s) Oral every 12 hours  meropenem Injectable      meropenem Injectable 500 milliGRAM(s) IV Push every 24 hours  metoprolol tartrate 25 milliGRAM(s) Enteral Tube four times a day  norepinephrine Infusion 0.05 MICROgram(s)/kG/Min (2.97 mL/Hr) IV Continuous <Continuous>  pantoprazole  Injectable 40 milliGRAM(s) IV Push daily  sevelamer carbonate 800 milliGRAM(s) Oral three times a day with meals  tacrolimus 1.5 milliGRAM(s) Oral daily  tamsulosin 0.4 milliGRAM(s) Oral at bedtime  trimethoprim   80 mG/sulfamethoxazole 400 mG 1 Tablet(s) Oral <User Schedule>  valproate sodium  IVPB 250 milliGRAM(s) IV Intermittent every 8 hours    MEDICATIONS  (PRN):  heparin   Injectable 5000 Unit(s) IV Push every 6 hours PRN For aPTT less than 40  heparin   Injectable 2500 Unit(s) IV Push every 6 hours PRN For aPTT between 40 - 57      Vital Signs Last 24 Hrs  T(C): 36.4 (01 Jan 2023 11:00), Max: 37.5 (01 Jan 2023 04:00)  T(F): 97.5 (01 Jan 2023 11:00), Max: 99.5 (01 Jan 2023 04:00)  HR: 62 (01 Jan 2023 14:30) (59 - 78)  BP: 97/52 (01 Jan 2023 14:30) (85/52 - 162/62)  BP(mean): 65 (01 Jan 2023 14:30) (61 - 91)  RR: 9 (01 Jan 2023 14:30) (0 - 26)  SpO2: 100% (01 Jan 2023 14:30) (91% - 100%)    Parameters below as of 31 Dec 2022 22:00  Patient On (Oxygen Delivery Method): mask, Venturi      Detailed Neurologic Exam:    Mental status: Intubated.  Opens eyes to voice.  Not tracking Not following any instructions.    Cranial nerves: Pupils pinpoint. No blink to threat from either side. Not tracking with gaze today. Face is grossly symmetric. Palate and tongue cannot be assessed.     Motor/Sensory: There is normal bulk and tone.  Symmetric grimace, minimal movement to nailbed pressure stimuli.     Reflexes: trace throughout and plantar responses are equivocal.    Cerebellar: Cannot be tested.     Labs:                           11.0   4.61  )-----------( 147      ( 01 Jan 2023 03:20 )             34.2     01-01    135  |  95<L>  |  10.5  ----------------------------<  80  3.7   |  27.0  |  3.54<H>    Ca    8.3<L>      01 Jan 2023 03:20  Phos  4.3     01-01  Mg     1.8     01-01    TPro  4.9<L>  /  Alb  2.4<L>  /  TBili  0.3<L>  /  DBili  x   /  AST  22  /  ALT  5   /  AlkPhos  185<H>  01-01    LIVER FUNCTIONS - ( 01 Jan 2023 03:20 )  Alb: 2.4 g/dL / Pro: 4.9 g/dL / ALK PHOS: 185 U/L / ALT: 5 U/L / AST: 22 U/L / GGT: x           PT/INR - ( 31 Dec 2022 05:30 )   PT: 14.1 sec;   INR: 1.21 ratio         PTT - ( 01 Jan 2023 08:20 )  PTT:175.2 sec    Cerebrospinal Fluid Cell Count-1 (12.31.22 @ 14:00)   CSF Color: No Color   Tube Type: Tube 1   CSF Appearance: Clear   CSF Neutrophils: N/A %   Appearance Spun: Colorless   RBC Count - Spinal Fluid: 1 /cmm   Total Nucleated Cell Count, CSF: 2 /uL   Protein, CSF (12.31.22 @ 14:00)   Protein, CSF: 60 mg/dL   Glucose, CSF (12.31.22 @ 14:00)   Glucose, CSF: 43 mg/dLG/24h   CSF PCR Panel (12.31.22 @ 14:00)   CSF PCR Result: NotDetec: The meningitis/encephalitis (ME) panel (CSFPCR) is a PCR based assay that   screens for: Escherichia coli; Haemophilus influenzae; Listeria   monocytogenes; Neisseria meningitidis; Streptococcus agalactiae;   Streptococcus pneumoniae; CMV; Enterovirus; HSV-1; HSV-2; Human   herpesvirus 6; Parechovirus; VZV and Cryptococcus. Result should be   interpreted in context of clinical presentation, imaging and other lab   tests. Positive predictive value may be lower in patients with normal CSF   chemistry and cell count.  Radiology:  Brain MRI: There is no acute pathology.   There is chronic ischemic change.     EEG SUMMARY/CLASSIFICATION 12/31/22-1/1/23    Abnormal EEG in an altered patient.  1. Isolated myoclonic jerks, without a clear ictal correlate   2. Generalized periodic discharges with triphasic morphology <0.5-1 Hz  3. Background slowing, Moderate, generalized.    _____________________________________________________________  EEG IMPRESSION/CLINICAL CORRELATE    Abnormal EEG study.  1. Isolated myoclonic jerks, head movements side to side, head flexion, throughout the study without a clear EEG correlate.   2. Generalized periodic discharges with triphasic morphology are commonly seen in toxic- metabolic encephalopathy, rarely epileptic, improved in latter portions of the recording   3. Moderate nonspecific diffuse or multifocal cerebral dysfunction.   4. No seizures recorded   _____________________________________________________________    EEG SUMMARY/CLASSIFICATION 12/30-12/31/22    Abnormal EEG in an altered patient.  1. Sharp waves, generalized, rare   2. Isolated myoclonic jerks, without a clear ictal correlate   3. Generalized periodic discharges with triphasic morphology <0.5-1 Hz  4. Background slowing, Moderate, generalized.    _____________________________________________________________  EEG IMPRESSION/CLINICAL CORRELATE    Abnormal EEG study.  1. Risk of generalized onset seizures, with evidence of diffuse cerebral dysfunction.   2. Isolated myoclonic jerks, head movements side to side, head flexion, throughout the study without a clear EEG correlate.   3. Generalized periodic discharges with triphasic morphology are commonly seen in toxic- metabolic encephalopathy, rarely epileptic   4. Moderate nonspecific diffuse or multifocal cerebral dysfunction.   5. No seizures recorded   _____________________________________________________________

## 2023-01-01 NOTE — PROGRESS NOTE ADULT - ASSESSMENT
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 faecalis bacteremia, with:    1. Acute toxic-metabolic encephalopathy  2. Acute mixed hypercapnic/hypoxemic respiratory failure  3. Aspiration pneumonitis/pneumonia  4. 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 Distributive shock  6. ESRD on HD  7. COPD s/p lung transplant, immunocompromised status  8. Atrial fibrillation  9. Right IJ thrombosis  10. Cirrhosis with ascites       Neuro: Encephalopathy likely toxic-metabolic in the setting of critical illness, aspiration pneumonia, and underlying comorbidities. Now worsening, unable to protect airway and actively aspirating necessitating urgent reintubation. Undergoing cvEEG monitoring, AM report with myoclonus without corresponding seizure activity. Fentanyl gtt for sedation.  Pulm: Recurrent acute hypoxic respiratory failure in the setting of aspiration, mucous plugging and complete atelectasis of Left lung. S/P emergent intubation, full vent support. CXR pending may require bronchoscopy. Mucomyst and duonebs. Tacro for antirejection.  CV: AFib currently in NSR, hemodynamics stable.   GI: NPO has been failing SLP eval, aspirating in the setting of encephalopathy. Will resume TF when condition stabilizes.  Renal: ESRD on HD, plan per nephro  ID: On treatment for EBV meningoencephalitis for 2 week course with acyclovir, Antifungal treatment of cryptococcal meningoencephalitis with amphotericin and flucytosine, empiric Meropenem and PJP ppx with Bactrim given immunocompromised status.   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 faecalis bacteremia, with:    1. Acute toxic-metabolic encephalopathy  2. Acute mixed hypercapnic/hypoxemic respiratory failure  3. Aspiration pneumonitis/pneumonia  4. 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 Distributive shock  6. ESRD on HD  7. COPD s/p lung transplant, immunocompromised status  8. Atrial fibrillation  9. Right IJ thrombosis  10. Cirrhosis with ascites       Neuro: Encephalopathy likely toxic-metabolic in the setting of critical illness, aspiration pneumonia, and underlying comorbidities. Now worsening, unable to protect airway and actively aspirating necessitating urgent reintubation. Undergoing cvEEG monitoring, last seizure 12/30 today's AM report with myoclonus without corresponding seizure activity. Continue VAP. Fentanyl gtt for sedation.  Pulm: Recurrent acute hypoxic respiratory failure in the setting of aspiration, mucous plugging and complete atelectasis of Left lung. S/P emergent intubation, full vent support. CXR pending may require bronchoscopy. Mucomyst and duonebs. Tacro for antirejection.  CV: AFib currently in NSR, hemodynamics stable.   GI: NPO has been failing SLP eval, aspirating in the setting of encephalopathy. Will resume TF   Renal: ESRD on HD, plan per nephro  ID: On treatment for EBV meningoencephalitis for 2 week course with acyclovir, Antifungal treatment of cryptococcal meningoencephalitis with amphotericin and flucytosine - discussed with ID will d/c and change to chronic fluconazole, empiric Meropenem and PJP ppx with Bactrim given immunocompromised status.   Heme: Heparin gtt for chronic R IJ thrombosis and AFib

## 2023-01-01 NOTE — PROGRESS NOTE ADULT - SUBJECTIVE AND OBJECTIVE BOX
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 No shortness of breath  CARDIOVASCULAR: No chest pain, palpitations, or leg swelling  GASTROINTESTINAL: No abdominal pain. No nausea, vomiting, diarrhea, or constipation. No hematemesis, melena or hematochezia  GENITOURINARY: No dysuria, frequency, hematuria, or incontinence  NEUROLOGICAL: No headaches, loss of strength, numbness, or tremors  SKIN: No rashes  MUSCULOSKELETAL: No back or extremity pain. No calf pain  PSYCHIATRIC: No depression, anxiety, or difficulty sleeping      [  ] Due to altered mental status/intubation, subjective information was not able to be obtained from the patient. History was obtained, to the extent possible, from review of the chart and collateral sources of information.        Medications:  acyclovir IVPB      acyclovir IVPB 320 milliGRAM(s) IV Intermittent every 24 hours  amphotericin B  liposome  IVPB 320 milliGRAM(s) IV Intermittent every 24 hours  flucytosine 1500 milliGRAM(s) Oral <User Schedule>  meropenem Injectable      meropenem Injectable 500 milliGRAM(s) IV Push every 24 hours  trimethoprim   80 mG/sulfamethoxazole 400 mG 1 Tablet(s) Oral <User Schedule>    metoprolol tartrate 25 milliGRAM(s) Enteral Tube four times a day    acetylcysteine 20%  Inhalation 4 milliLiter(s) Inhalation every 6 hours  albuterol/ipratropium for Nebulization 3 milliLiter(s) Nebulizer every 6 hours    valproate sodium  IVPB 250 milliGRAM(s) IV Intermittent every 8 hours      heparin   Injectable 5000 Unit(s) IV Push every 6 hours PRN  heparin   Injectable 2500 Unit(s) IV Push every 6 hours PRN  heparin  Infusion.  Unit(s)/Hr IV Continuous <Continuous>    pantoprazole  Injectable 40 milliGRAM(s) IV Push daily    tamsulosin 0.4 milliGRAM(s) Oral at bedtime    dextrose 50% Injectable 25 Gram(s) IV Push once  dextrose 50% Injectable 12.5 Gram(s) IV Push once  dextrose 50% Injectable 25 Gram(s) IV Push once  dextrose Oral Gel 15 Gram(s) Oral once  hydrocortisone 10 milliGRAM(s) Oral every 12 hours    magnesium sulfate  IVPB 2 Gram(s) IV Intermittent once  potassium chloride  10 mEq/100 mL IVPB 10 milliEquivalent(s) IV Intermittent once    tacrolimus 1.5 milliGRAM(s) Oral daily    chlorhexidine 2% Cloths 1 Application(s) Topical <User Schedule>    sevelamer carbonate 800 milliGRAM(s) Oral three times a day with meals          ICU Vital Signs Last 24 Hrs  T(C): 36.4 (01 Jan 2023 07:19), Max: 37.5 (01 Jan 2023 04:00)  T(F): 97.5 (01 Jan 2023 07:19), Max: 99.5 (01 Jan 2023 04:00)  HR: 63 (01 Jan 2023 07:00) (63 - 84)  BP: 132/60 (01 Jan 2023 07:00) (90/48 - 162/62)  BP(mean): 81 (01 Jan 2023 07:00) (61 - 100)  ABP: --  ABP(mean): --  RR: 11 (01 Jan 2023 06:00) (0 - 26)  SpO2: 100% (01 Jan 2023 07:00) (91% - 100%)    O2 Parameters below as of 31 Dec 2022 22:00  Patient On (Oxygen Delivery Method): mask, Venturi            ABG - ( 31 Dec 2022 22:50 )  pH, Arterial: 7.380 pH, Blood: x     /  pCO2: 52    /  pO2: 101   / HCO3: 31    / Base Excess: 5.7   /  SaO2: 99.5                I&O's Detail    31 Dec 2022 07:01  -  01 Jan 2023 07:00  --------------------------------------------------------  IN:    Heparin Infusion: 131 mL    IV PiggyBack: 50 mL    IV PiggyBack: 250 mL    IV PiggyBack: 100 mL  Total IN: 531 mL    OUT:    Heparin Infusion: 0 mL    Other (mL): 500 mL    Stool (mL): 800 mL  Total OUT: 1300 mL    Total NET: -769 mL            LABS:                        11.0   4.61  )-----------( 147      ( 01 Jan 2023 03:20 )             34.2     01-01    135  |  95<L>  |  10.5  ----------------------------<  80  3.7   |  27.0  |  3.54<H>    Ca    8.3<L>      01 Jan 2023 03:20  Phos  4.3     01-01  Mg     1.8     01-01    TPro  4.9<L>  /  Alb  2.4<L>  /  TBili  0.3<L>  /  DBili  x   /  AST  22  /  ALT  5   /  AlkPhos  185<H>  01-01          CAPILLARY BLOOD GLUCOSE      POCT Blood Glucose.: 81 mg/dL (01 Jan 2023 05:04)    PT/INR - ( 31 Dec 2022 05:30 )   PT: 14.1 sec;   INR: 1.21 ratio         PTT - ( 01 Jan 2023 00:40 )  PTT:182.1 sec    CULTURES:  Culture Results:   No growth to date. (12-28 @ 06:45)  Culture Results:   No growth to date. (12-28 @ 06:40)            Physical Examination:    General: No acute distress.      HEENT: Pupils equal, reactive to light.  Symmetric.    PULM: Clear to auscultation bilaterally, no significant sputum production    CVS: Regular rate and rhythm, no murmurs, rubs, or gallops    ABD: Soft, nondistended, nontender, normoactive bowel sounds, no masses    EXT: No edema, nontender    SKIN: Warm and well perfused, no rashes noted.    NEURO: Alert, oriented, interactive, nonfocal        RADIOLOGY: ***        INVASIVE LINES:  INDWELLING WATKINS:  VTE PROPHYLAXIS:  CAM ICU:  CODE STATUS:        CRITICAL CARE TIME SPENT: *** minutes spent performing frequent bedside reassessments and augmenting plan of care to address problems of acute critical illness that pose high probability of life threatening deterioration and/or end organ damage/dysfunction and discussing goals of care, non-inclusive of time spent on procedures performed. 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 faecalis bacteremia, admitted on 12/13 with altered mental status of unclear etiology. Had been being treated empirically for meningitis on the medicine service. Diagnostic LP had been deferred due to anticoagulation.  Incidentally found to have a dilated CBD with eventual plan for MRCP. Transferred to MICU on 12/18 after developing respiratory distress, hypoxemia, and worsened mental status, necessitating emergent intubation/mechanical ventilation. Noted to have dislodged NGT with what appeared to be tube feedings in his airways. ABG revealed severe hypercapnea with acute respiratory acidosis. Developed distributive shock state requiring IV vasopressor. LP performed 12/19 positive Cryptococcal Ag, negative PCR and Gram stain. 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 Paracentesis negative to date. Extubated 12/20 weaned to nasal cannula, shock has resolved and weaned off IV vasopressors. His encephalopathy improved and he was downgraded from MICU on 12/21 to medical floors.       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      2019 novel coronavirus disease (COVID-19)  Feb 2021 - hospitalized for 1 week at [facility]      Pneumonia  April 2021      [place] (hard of hearing)      HTN (hypertension)      Lumbar spondylosis      History of COPD      BPH without urinary obstruction      Hypercholesterolemia      Oliguria and anuria      H/O peripheral neuropathy      H/O lung transplant  bilateral - transplant - 1-2-2015 -  [facility]      H/O heart artery stent  x3 @[facility]      History of hip replacement  right      Status post open reduction and internal fixation (ORIF) of fracture  left femur              SOCIAL HISTORY:        Review of Systems:  CONSTITUTIONAL: No fever, chills, or fatigue  EYES: No visual disturbances  ENMT:  No difficulty hearing  NECK: No pain  RESPIRATORY: No cough. No shortness of breath  CARDIOVASCULAR: No chest pain, palpitations, or leg swelling  GASTROINTESTINAL: No abdominal pain. No nausea, vomiting, diarrhea, or constipation. No hematemesis, melena or hematochezia  GENITOURINARY: No dysuria, frequency, hematuria, or incontinence  NEUROLOGICAL: No headaches, loss of strength, numbness, or tremors  SKIN: No rashes  MUSCULOSKELETAL: No back or extremity pain. No calf pain  PSYCHIATRIC: No depression, anxiety, or difficulty sleeping      [  ] Due to altered mental status/intubation, subjective information was not able to be obtained from the patient. History was obtained, to the extent possible, from review of the chart and collateral sources of information.        Medications:  acyclovir IVPB      acyclovir IVPB 320 milliGRAM(s) IV Intermittent every 24 hours  amphotericin B  liposome  IVPB 320 milliGRAM(s) IV Intermittent every 24 hours  flucytosine 1500 milliGRAM(s) Oral <User Schedule>  meropenem Injectable      meropenem Injectable 500 milliGRAM(s) IV Push every 24 hours  trimethoprim   80 mG/sulfamethoxazole 400 mG 1 Tablet(s) Oral <User Schedule>    metoprolol tartrate 25 milliGRAM(s) Enteral Tube four times a day    acetylcysteine 20%  Inhalation 4 milliLiter(s) Inhalation every 6 hours  albuterol/ipratropium for Nebulization 3 milliLiter(s) Nebulizer every 6 hours    valproate sodium  IVPB 250 milliGRAM(s) IV Intermittent every 8 hours      heparin   Injectable 5000 Unit(s) IV Push every 6 hours PRN  heparin   Injectable 2500 Unit(s) IV Push every 6 hours PRN  heparin  Infusion.  Unit(s)/Hr IV Continuous <Continuous>    pantoprazole  Injectable 40 milliGRAM(s) IV Push daily    tamsulosin 0.4 milliGRAM(s) Oral at bedtime    dextrose 50% Injectable 25 Gram(s) IV Push once  dextrose 50% Injectable 12.5 Gram(s) IV Push once  dextrose 50% Injectable 25 Gram(s) IV Push once  dextrose Oral Gel 15 Gram(s) Oral once  hydrocortisone 10 milliGRAM(s) Oral every 12 hours    magnesium sulfate  IVPB 2 Gram(s) IV Intermittent once  potassium chloride  10 mEq/100 mL IVPB 10 milliEquivalent(s) IV Intermittent once    tacrolimus 1.5 milliGRAM(s) Oral daily    chlorhexidine 2% Cloths 1 Application(s) Topical <User Schedule>    sevelamer carbonate 800 milliGRAM(s) Oral three times a day with meals          ICU Vital Signs Last 24 Hrs  T(C): 36.4 (01 Jan 2023 07:19), Max: 37.5 (01 Jan 2023 04:00)  T(F): 97.5 (01 Jan 2023 07:19), Max: 99.5 (01 Jan 2023 04:00)  HR: 63 (01 Jan 2023 07:00) (63 - 84)  BP: 132/60 (01 Jan 2023 07:00) (90/48 - 162/62)  BP(mean): 81 (01 Jan 2023 07:00) (61 - 100)  ABP: --  ABP(mean): --  RR: 11 (01 Jan 2023 06:00) (0 - 26)  SpO2: 100% (01 Jan 2023 07:00) (91% - 100%)    O2 Parameters below as of 31 Dec 2022 22:00  Patient On (Oxygen Delivery Method): mask, Venturi            ABG - ( 31 Dec 2022 22:50 )  pH, Arterial: 7.380 pH, Blood: x     /  pCO2: 52    /  pO2: 101   / HCO3: 31    / Base Excess: 5.7   /  SaO2: 99.5                I&O's Detail    31 Dec 2022 07:01  -  01 Jan 2023 07:00  --------------------------------------------------------  IN:    Heparin Infusion: 131 mL    IV PiggyBack: 50 mL    IV PiggyBack: 250 mL    IV PiggyBack: 100 mL  Total IN: 531 mL    OUT:    Heparin Infusion: 0 mL    Other (mL): 500 mL    Stool (mL): 800 mL  Total OUT: 1300 mL    Total NET: -769 mL            LABS:                        11.0   4.61  )-----------( 147      ( 01 Jan 2023 03:20 )             34.2     01-01    135  |  95<L>  |  10.5  ----------------------------<  80  3.7   |  27.0  |  3.54<H>    Ca    8.3<L>      01 Jan 2023 03:20  Phos  4.3     01-01  Mg     1.8     01-01    TPro  4.9<L>  /  Alb  2.4<L>  /  TBili  0.3<L>  /  DBili  x   /  AST  22  /  ALT  5   /  AlkPhos  185<H>  01-01          CAPILLARY BLOOD GLUCOSE      POCT Blood Glucose.: 81 mg/dL (01 Jan 2023 05:04)    PT/INR - ( 31 Dec 2022 05:30 )   PT: 14.1 sec;   INR: 1.21 ratio         PTT - ( 01 Jan 2023 00:40 )  PTT:182.1 sec    CULTURES:  Culture Results:   No growth to date. (12-28 @ 06:45)  Culture Results:   No growth to date. (12-28 @ 06:40)            Physical Examination:    General: No acute distress.      HEENT: Pupils equal, reactive to light.  Symmetric.    PULM: Clear to auscultation bilaterally, no significant sputum production    CVS: Regular rate and rhythm, no murmurs, rubs, or gallops    ABD: Soft, nondistended, nontender, normoactive bowel sounds, no masses    EXT: No edema, nontender    SKIN: Warm and well perfused, no rashes noted.    NEURO: Alert, oriented, interactive, nonfocal        RADIOLOGY: ***        INVASIVE LINES:  INDWELLING WATKINS:  VTE PROPHYLAXIS:  CAM ICU:  CODE STATUS:        CRITICAL CARE TIME SPENT: *** minutes spent performing frequent bedside reassessments and augmenting plan of care to address problems of acute critical illness that pose high probability of life threatening deterioration and/or end organ damage/dysfunction and discussing goals of care, non-inclusive of time spent on procedures performed. 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 faecalis bacteremia, admitted on 12/13 with altered mental status of unclear etiology. Had been being treated empirically for meningitis on the medicine service. Diagnostic LP had been deferred due to anticoagulation.  Incidentally found to have a dilated CBD with eventual plan for MRCP. Transferred to MICU on 12/18 after developing respiratory distress, hypoxemia, and worsened mental status, necessitating emergent intubation/mechanical ventilation. Noted to have dislodged NGT with what appeared to be tube feedings in his airways. ABG revealed severe hypercapnea with acute respiratory acidosis. Developed distributive shock state requiring IV vasopressor. LP performed 12/19 positive Cryptococcal Ag, negative PCR and Gram stain. 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 Paracentesis negative to date. Extubated 12/20 weaned to nasal cannula, shock has resolved and weaned off IV vasopressors. His encephalopathy improved and he was downgraded from MICU on 12/21 to medical floors.       Events last 24 hours: Acutely desaturated this morning to 82% on 40% VM. Aggressive chest PT and NT suctioning performed without improvement. Mental status very poor, unable to protect airway. GOC discussed with patient's wife by Dr. Dunn - tomorrow is 8 year anniversary of his lung transplant, to remain full code. Patient emergently intubated. Post-intubation transiently hypotensive requiring Phenylephrine pushes, BP recovered without need for continuous vasopressor support thus far.      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      2019 novel coronavirus disease (COVID-19)  Feb 2021 - hospitalized for 1 week at [facility]      Pneumonia  April 2021      [place] (hard of hearing)      HTN (hypertension)      Lumbar spondylosis      History of COPD      BPH without urinary obstruction      Hypercholesterolemia      Oliguria and anuria      H/O peripheral neuropathy      H/O lung transplant  bilateral - transplant - 1-2-2015 -  [facility]      H/[facility] heart [facility]  [facility]      History of hip replacement  right      Status post open reduction and internal fixation (ORIF) of fracture  left femur        Review of Systems: Due to altered mental status, subjective information was not able to be obtained from the patient. History was obtained, to the extent possible, from review of the chart and collateral sources of information.        Medications:  acyclovir IVPB      acyclovir IVPB 320 milliGRAM(s) IV Intermittent every 24 hours  amphotericin B  liposome  IVPB 320 milliGRAM(s) IV Intermittent every 24 hours  flucytosine 1500 milliGRAM(s) Oral <User Schedule>  meropenem Injectable      meropenem Injectable 500 milliGRAM(s) IV Push every 24 hours  trimethoprim   80 mG/sulfamethoxazole 400 mG 1 Tablet(s) Oral <User Schedule>    metoprolol tartrate 25 milliGRAM(s) Enteral Tube four times a day    acetylcysteine 20%  Inhalation 4 milliLiter(s) Inhalation every 6 hours  albuterol/ipratropium for Nebulization 3 milliLiter(s) Nebulizer every 6 hours    valproate sodium  IVPB 250 milliGRAM(s) IV Intermittent every 8 hours      heparin   Injectable 5000 Unit(s) IV Push every 6 hours PRN  heparin   Injectable 2500 Unit(s) IV Push every 6 hours PRN  heparin  Infusion.  Unit(s)/Hr IV Continuous <Continuous>    pantoprazole  Injectable 40 milliGRAM(s) IV Push daily    tamsulosin 0.4 milliGRAM(s) Oral at bedtime    dextrose 50% Injectable 25 Gram(s) IV Push once  dextrose 50% Injectable 12.5 Gram(s) IV Push once  dextrose 50% Injectable 25 Gram(s) IV Push once  dextrose Oral Gel 15 Gram(s) Oral once  hydrocortisone 10 milliGRAM(s) Oral every 12 hours    magnesium sulfate  IVPB 2 Gram(s) IV Intermittent once  potassium chloride  10 mEq/100 mL IVPB 10 milliEquivalent(s) IV Intermittent once    tacrolimus 1.5 milliGRAM(s) Oral daily    chlorhexidine 2% Cloths 1 Application(s) Topical <User Schedule>    sevelamer carbonate 800 milliGRAM(s) Oral three times a day with meals          ICU Vital Signs Last 24 Hrs  T(C): 36.4 (01 Jan 2023 07:19), Max: 37.5 (01 Jan 2023 04:00)  T(F): 97.5 (01 Jan 2023 07:19), Max: 99.5 (01 Jan 2023 04:00)  HR: 63 (01 Jan 2023 07:00) (63 - 84)  BP: 132/60 (01 Jan 2023 07:00) (90/48 - 162/62)  BP(mean): 81 (01 Jan 2023 07:00) (61 - 100)  ABP: --  ABP(mean): --  RR: 11 (01 Jan 2023 06:00) (0 - 26)  SpO2: 100% (01 Jan 2023 07:00) (91% - 100%)    O2 Parameters below as of 31 Dec 2022 22:00  Patient On (Oxygen Delivery Method): mask, Venturi            ABG - ( 31 Dec 2022 22:50 )  pH, Arterial: 7.380 pH, Blood: x     /  pCO2: 52    /  pO2: 101   / HCO3: 31    / Base Excess: 5.7   /  SaO2: 99.5                I&O's Detail    31 Dec 2022 07:01  -  01 Jan 2023 07:00  --------------------------------------------------------  IN:    Heparin Infusion: 131 mL    IV PiggyBack: 50 mL    IV PiggyBack: 250 mL    IV PiggyBack: 100 mL  Total IN: 531 mL    OUT:    Heparin Infusion: 0 mL    Other (mL): 500 mL    Stool (mL): 800 mL  Total OUT: 1300 mL    Total NET: -769 mL            LABS:                        11.0   4.61  )-----------( 147      ( 01 Jan 2023 03:20 )             34.2     01-01    135  |  95<L>  |  10.5  ----------------------------<  80  3.7   |  27.0  |  3.54<H>    Ca    8.3<L>      01 Jan 2023 03:20  Phos  4.3     01-01  Mg     1.8     01-01    TPro  4.9<L>  /  Alb  2.4<L>  /  TBili  0.3<L>  /  DBili  x   /  AST  22  /  ALT  5   /  AlkPhos  185<H>  01-01          CAPILLARY BLOOD GLUCOSE      POCT Blood Glucose.: 81 mg/dL (01 Jan 2023 05:04)    PT/INR - ( 31 Dec 2022 05:30 )   PT: 14.1 sec;   INR: 1.21 ratio         PTT - ( 01 Jan 2023 00:40 )  PTT:182.1 sec    CULTURES:  Culture Results:   No growth to date. (12-28 @ 06:45)  Culture Results:   No growth to date. (12-28 @ 06:40)            Physical Examination:    General: Frail, toxic appearing. Encephalopathic slumped in bed, unable to hold his head up.     HEENT: Pupils equal, reactive to light.  Symmetric. LGT Left nare    PULM: Absent breath sounds on left    CVS: Regular rate and rhythm    ABD: Soft, nondistended, nontender    EXT: Generalized anasarca    SKIN: Warm and well perfused    NEURO: Minimally arousable to noxious stimuli          VTE PROPHYLAXIS: Heparin gtt  CAM ICU: +   CODE STATUS: FULL         CRITICAL CARE TIME SPENT: 35 minutes spent performing frequent bedside reassessments and augmenting plan of care to address problems of acute critical illness that pose high probability of life threatening deterioration and/or end organ damage/dysfunction and discussing goals of care, non-inclusive of time spent on procedures performed.

## 2023-01-01 NOTE — PROVIDER CONTACT NOTE (CRITICAL VALUE NOTIFICATION) - NAME OF MD/NP/PA/DO NOTIFIED:
GAETANO CORRALES
EMILY Lerma
PA Choco Clayton made aware
Patricia Villa, NP
[facility]
ELISA Lilly made aware
[facility] Liat Gongora
SANDEEP Villa

## 2023-01-01 NOTE — PROGRESS NOTE ADULT - ASSESSMENT
72y Male with multiple medical issues now with encephalopathy.     Encephalopathy.   Mental status waxing and waning.   Likely toxic metabolic secondary to multiple medical issues.   ?Hepatic component.   EEG findings strongly suggestive of toxic metabolic etiology.  May need repeat CT head to further evaluate encephalopathy    Meningitis.   Antibiotic coverage for now per ID.   CSF from 12/31 showed no cryptococcus but (+) mild elevation of protein  EBV detected by PCR in CSF, low level, unclear significance.  ID following.    will follow with you    Luis Brady MD PhD   [id number]

## 2023-01-01 NOTE — PROVIDER CONTACT NOTE (CRITICAL VALUE NOTIFICATION) - ACTION/TREATMENT ORDERED:
Per heparin nomogram, hold for 60 minutes and decrease to 2. [facility] made aware
Communicate result to HD, patient not on floor at this time. Recheck, give Dextrose 50% amp x1. recheck in 15 min. Mansi in HD understands orders
No new order at this time
No new order at this time.
FOLLOW HEPARIN PROTOCOL .HOLD HEPARIN DRIP FOR 1HR,THEN DECREASE CURRENT RATE BY 200UNITS/HR.
awaiting action/treatment orders

## 2023-01-01 NOTE — PROVIDER CONTACT NOTE (CRITICAL VALUE NOTIFICATION) - TEST AND RESULT REPORTED:
.1
Health Maintenance Summary     Topic Due On Due Status Completed On    PAP SMEAR - CERVICAL CANCER SCREENING Apr 14, 2020 Not Due Apr 14, 2017    Immunization - TDAP Pregnancy May 13, 2018 Hidden Mar 25, 2011    IMMUNIZATION - DTaP/Tdap/Td Mar 25, 2021 Not Due Mar 25, 2011    Immunization-Influenza  Completed Nov 6, 2017          Patient is up to date, no discussion needed .            
aPTT 144
cryptococcal serum 1:40
Ptt 158.2
Blood Glucose 39
aPTT 152.6
lyme DNA PCR cancelled due to QNS
CSF positive for cryptococcal antigen CFS  1 to 40 ratio
Critical Digoxin 3.0

## 2023-01-01 NOTE — PROGRESS NOTE ADULT - ASSESSMENT
72M with PMH of  hypertension, hyperlipidemia, ESRD on HD, emphysema s/p lung transplant in [place] by Dr. 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 faecalis bacteremia, newly diagnosed HFrEF 25%, AF complicated by GIB was discharged on home IV Abx now presenting with AMS  Presented to the ED with daughter. CTH obtained and did not reveal any acute findings.  Patient was admitted to medicine for encephalopathy. LP attempted bedside but unsuccessful. Was on empiric treatment and mental status was improving and then ultimately intubated for aspiration pneumonia    #Acute hypoxic respiratory failure likely 2/2 aspiration- now extubated, mental status improved. sputum cx normal keerthi    #AMS-unclear etiology, no fever. ammonia level normal.  ct c/ap ?pna/edema/ascites.   ??EBV encephalitis  s/p LP normal cell counts glucose and protein 51. CSF pcr (-) and cultures pending.   [facility] micro lab CSF gram stain reported as "few organisms" . gram stain repeated at core lab and micro and is NEGATIVE. initial report has been corrected. CSF crytp titer 1:40, as per [facility] his initial CSF titer in 12/2020 was 1:2560. no serum titer available. 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 f/u CSf fungal CX. stopped amphotericin/flucytosine and resumed fluconazole suppression post HD  serum crypt Ag 1:40, most likely old infection  EBv CSF PCR/serum + but very low; repeat neg. Unclear if EBV encephalitis , although possible in this immunocompromised pt who appeared to have clinically responded to acyclovir    Unlikely to derive any benefit with ACV in the setting of EBV reactivation/possible encephalitis, but he is on acyclovir to complete total 14 days  MRI brain unremarkable     Repeat LP on 12/31 - CSF cell count/prot/glu unremarkable. CSF PCR neg     ..#h/o e.faecalis bacteremia - . CRISTIAN was deferred by cardio on recent admission.  Currently on meropenem.     # s/p lung transplant-     # h/o fungal meningitis- stop flucytosine/ampho and resumed fluconazole. No evidence of CNS crypto    D/w Dr. Dunn and ICU team

## 2023-01-02 NOTE — PROGRESS NOTE ADULT - SUBJECTIVE AND OBJECTIVE BOX
[facility]                                        Neurology at [place]                                 [facility]                                  [address]                                        [place], [place], [address]                                             [phone]        CC: Encephalopathy    HPI:   The patient is a 72y Male with multiple medical issues who was recently hospitalized with hypoxic respiratory failure.  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   He was ultimately discharged 11/23/22.   He now presented with altered mental status and lethargy.   Neurology asked to evaluation for meningitis. LP attempted (neuro SHEELA) and was unsuccessful while in ER.    The patient had been gradually improving with regard to mental status, but has deteriorated again over the past few days.   Ultimately transferred back to ICU. (SHEELA)    Interim history: Unresponsive in ICU, intubated, moves to stimuli    ROS:   Unobtainable due to patient's condition.     MEDICATIONS  (STANDING):  acetylcysteine 20%  Inhalation 4 milliLiter(s) Inhalation every 6 hours  acyclovir IVPB 320 milliGRAM(s) IV Intermittent every 24 hours  albuterol/ipratropium for Nebulization 3 milliLiter(s) Nebulizer every 6 hours  chlorhexidine 2% Cloths 1 Application(s) Topical <User Schedule>  dextrose 50% Injectable 25 Gram(s) IV Push once  dextrose 50% Injectable 12.5 Gram(s) IV Push once  dextrose 50% Injectable 25 Gram(s) IV Push once  dextrose Oral Gel 15 Gram(s) Oral once  fentaNYL   Infusion. 0.5 MICROgram(s)/kG/Hr (3.17 mL/Hr) IV Continuous <Continuous>  fluconAZOLE   Tablet 200 milliGRAM(s) Oral <User Schedule>  heparin  Infusion.  Unit(s)/Hr (11 mL/Hr) IV Continuous <Continuous>  hydrocortisone 10 milliGRAM(s) Oral every 12 hours  meropenem Injectable      meropenem Injectable 500 milliGRAM(s) IV Push every 24 hours  metoprolol tartrate 25 milliGRAM(s) Enteral Tube four times a day  norepinephrine Infusion 0.05 MICROgram(s)/kG/Min (2.97 mL/Hr) IV Continuous <Continuous>  pantoprazole  Injectable 40 milliGRAM(s) IV Push daily  sevelamer carbonate 800 milliGRAM(s) Oral three times a day with meals  tacrolimus 1.5 milliGRAM(s) Oral daily  tamsulosin 0.4 milliGRAM(s) Oral at bedtime  trimethoprim   80 mG/sulfamethoxazole 400 mG 1 Tablet(s) Oral <User Schedule>  valproate sodium  IVPB 250 milliGRAM(s) IV Intermittent every 8 hours    MEDICATIONS  (PRN):  heparin   Injectable 5000 Unit(s) IV Push every 6 hours PRN For aPTT less than 40  heparin   Injectable 2500 Unit(s) IV Push every 6 hours PRN For aPTT between 40 - 57      Vital Signs Last 24 Hrs  T(C): 37.1 (01 Jan 2023 23:00), Max: 37.1 (01 Jan 2023 23:00)  T(F): 98.7 (01 Jan 2023 23:00), Max: 98.7 (01 Jan 2023 23:00)  HR: 74 (02 Jan 2023 10:45) (59 - 104)  BP: 128/66 (02 Jan 2023 10:45) (85/52 - 164/109)  BP(mean): 80 (02 Jan 2023 10:45) (58 - 126)  RR: 20 (02 Jan 2023 10:45) (0 - 20)  SpO2: 100% (02 Jan 2023 10:45) (96% - 100%)    Parameters below as of 02 Jan 2023 07:15  Patient On (Oxygen Delivery Method): ventilator    O2 Concentration (%): 30    Detailed Neurologic Exam:    Mental status: Intubated.  Opens eyes to voice.  Not tracking Not following any instructions.    Cranial nerves: Pupils pinpoint. No blink to threat from either side. Not tracking with gaze today. Face is grossly symmetric. Palate and tongue cannot be assessed.     Motor/Sensory: There is normal bulk and tone.  Symmetric grimace, minimal movement to nailbed pressure stimuli. (+) spontaneous movements b/l    Reflexes: trace throughout and plantar responses are equivocal.    Cerebellar: Cannot be tested.     Labs:                           10.4   4.29  )-----------( 148      ( 02 Jan 2023 03:15 )             31.4     01-02    134<L>  |  95<L>  |  17.8  ----------------------------<  100<H>  3.8   |  27.0  |  4.46<H>    Ca    8.1<L>      02 Jan 2023 03:15  Phos  3.9     01-02  Mg     2.1     01-02    TPro  4.9<L>  /  Alb  2.4<L>  /  TBili  0.3<L>  /  DBili  x   /  AST  22  /  ALT  5   /  AlkPhos  185<H>  01-01    LIVER FUNCTIONS - ( 01 Jan 2023 03:20 )  Alb: 2.4 g/dL / Pro: 4.9 g/dL / ALK PHOS: 185 U/L / ALT: 5 U/L / AST: 22 U/L / GGT: x           PTT - ( 02 Jan 2023 06:30 )  PTT:60.3 sec    Cerebrospinal Fluid Cell Count-1 (12.31.22 @ 14:00)   CSF Color: No Color   Tube Type: Tube 1   CSF Appearance: Clear   CSF Neutrophils: N/A %   Appearance Spun: Colorless   RBC Count - Spinal Fluid: 1 /cmm   Total Nucleated Cell Count, CSF: 2 /uL   Protein, CSF (12.31.22 @ 14:00)   Protein, CSF: 60 mg/dL   Glucose, CSF (12.31.22 @ 14:00)   Glucose, CSF: 43 mg/dLG/24h   CSF PCR Panel (12.31.22 @ 14:00)   CSF PCR Result: NotDetec: The meningitis/encephalitis (ME) panel (CSFPCR) is a PCR based assay that   screens for: Escherichia coli; Haemophilus influenzae; Listeria   monocytogenes; Neisseria meningitidis; Streptococcus agalactiae;   Streptococcus pneumoniae; CMV; Enterovirus; HSV-1; HSV-2; Human   herpesvirus 6; Parechovirus; VZV and Cryptococcus. Result should be   interpreted in context of clinical presentation, imaging and other lab   tests. Positive predictive value may be lower in patients with normal CSF   chemistry and cell count.  Radiology:  Brain MRI: There is no acute pathology.   There is chronic ischemic change.     EEG SUMMARY/CLASSIFICATION 12/31/22-1/1/23    Abnormal EEG in an altered patient.  1. Isolated myoclonic jerks, without a clear ictal correlate   2. Generalized periodic discharges with triphasic morphology <0.5-1 Hz  3. Background slowing, Moderate, generalized.    _____________________________________________________________  EEG IMPRESSION/CLINICAL CORRELATE    Abnormal EEG study.  1. Isolated myoclonic jerks, head movements side to side, head flexion, throughout the study without a clear EEG correlate.   2. Generalized periodic discharges with triphasic morphology are commonly seen in toxic- metabolic encephalopathy, rarely epileptic, improved in latter portions of the recording   3. Moderate nonspecific diffuse or multifocal cerebral dysfunction.   4. No seizures recorded   _____________________________________________________________    EEG SUMMARY/CLASSIFICATION 12/30-12/31/22    Abnormal EEG in an altered patient.  1. Sharp waves, generalized, rare   2. Isolated myoclonic jerks, without a clear ictal correlate   3. Generalized periodic discharges with triphasic morphology <0.5-1 Hz  4. Background slowing, Moderate, generalized.    _____________________________________________________________  EEG IMPRESSION/CLINICAL CORRELATE    Abnormal EEG study.  1. Risk of generalized onset seizures, with evidence of diffuse cerebral dysfunction.   2. Isolated myoclonic jerks, head movements side to side, head flexion, throughout the study without a clear EEG correlate.   3. Generalized periodic discharges with triphasic morphology are commonly seen in toxic- metabolic encephalopathy, rarely epileptic   4. Moderate nonspecific diffuse or multifocal cerebral dysfunction.   5. No seizures recorded   _____________________________________________________________

## 2023-01-02 NOTE — EEG REPORT - NS EEG TEXT BOX
[facility]   COMPREHENSIVE EPILEPSY CENTER   REPORT OF LONG-TERM VIDEO EEG     [facility]: [address], [place] [address], Ph#: [phone]  [facility]: [address] [place] [address], Ph#: [phone]  [facility]: [address] [place] [address], Ph#: [phone]    Patient Name: JU FERGUSON  Age and : 72y (46)  MRN #: [id number]  Location: [facility] [address] [address] 01  Referring Physician: Gladis Ko    Start Time/Date: 0800  on 23  End Time/Date: 0800 on 23  Duration:  24 h     _____________________________________________________________  STUDY INFORMATION    EEG Recording Technique:  The patient underwent continuous Video-EEG monitoring, using Telemetry System hardware on the XLTek Digital System. EEG and video data were stored on a computer hard drive with important events saved in digital archive files. The material was reviewed by a physician (electroencephalographer / epileptologist) on a daily basis. Brad and seizure detection algorithms were utilized and reviewed. An EEG Technician attended to the patient, and was available throughout daytime work hours.  The epilepsy center neurologist was available in person or on call 24-hours per day.    EEG Placement and Labeling of Electrodes:  The EEG was performed utilizing 20 channel referential EEG connections (coronal over temporal over parasagittal montage) using all standard 10-20 electrode placements with EKG, with additional electrodes placed in the inferior temporal region using the modified 10-10 montage electrode placements for elective admissions, or if deemed necessary. Recording was at a sampling rate of 256 samples per second per channel. Time synchronized digital video recording was done simultaneously with EEG recording. A low light infrared camera was used for low light recording.     _____________________________________________________________  HISTORY    Patient is a 72y old  Male who presents with a chief complaint of Encephalopathy (21 Dec 2022 11:30)  _____________________________________________________________  STUDY INTERPRETATION    Findings: The background was reactive and continuous, from approximately 8:52Am to 9:36AM the background was discontinuous x 3-6 seconds with alternating theta and delta activity.   During wakefulness, no PDR seen.    Background Slowing:  Diffuse theta and polymorphic delta slowing.    Focal Slowing:   None were present.    Sleep Background:  Drowsiness was characterized by fragmentation, attenuation, and slowing of the background activity.    Stage II sleep transients were not recorded.    Other Non-Epileptiform Findings:  Occasional generalized periodic discharges with triphasic morphology    Interictal Epileptiform Activity:   none     Seizures:  none    Other events:   Isolated myoclonic jerks - head movements side to side, head flexion without a clear ictal correlate    Activation Procedures:   Hyperventilation was not performed.    Photic stimulation was not performed.     Artifacts:  Intermittent myogenic and movement artifacts were noted.    ECG:  The heart rate on single channel ECG was irregularly irregular predominantly between 70-90 BPM.    _____________________________________________________________  EEG SUMMARY/CLASSIFICATION    Abnormal EEG in an altered patient.  1. Isolated myoclonic jerks, without a clear ictal correlate   2. Occasional generalized periodic discharges with triphasic morphology   3. Background slowing, Moderate, generalized.    _____________________________________________________________  EEG IMPRESSION/CLINICAL CORRELATE    Abnormal EEG study.  1. Isolated myoclonic jerks, head movements side to side, head flexion, throughout the study without a clear EEG correlate, however myoclonic seizures cannot be excluded.   2. Generalized periodic discharges with triphasic morphology are commonly seen in toxic- metabolic encephalopathy.  3. Moderate nonspecific diffuse or multifocal cerebral dysfunction.   4. No seizures recorded   _____________________________________________________________    Aurelio Portillo MD  Epilepsy Fellow , [facility]  Department of Neurology, [facility] [employer] [facility]    [facility] EEG Reading Room Ph#: [phone]  Epilepsy Answering Service after 5PM and before 8:30AM: Ph#: [phone]   [facility]   COMPREHENSIVE EPILEPSY CENTER   REPORT OF LONG-TERM VIDEO EEG     [facility]: [address], [place] [address], Ph#: [phone]  [facility]: [address] [place] [address], Ph#: [phone]  [facility]: [address] [place] [address], Ph#: [phone]    Patient Name: JU FERGUSON  Age and : 72y (46)  MRN #: [id number]  Location: [facility] 4[facility] [address] 01  Referring Physician: Gladis Ko    Start Time/Date: 0800  on 23  End Time/Date: 0800 on 23  Duration:  24 h     _____________________________________________________________  STUDY INFORMATION    EEG Recording Technique:  The patient underwent continuous Video-EEG monitoring, using Telemetry System hardware on the XLTek Digital System. EEG and video data were stored on a computer hard drive with important events saved in digital archive files. The material was reviewed by a physician (electroencephalographer / epileptologist) on a daily basis. Brad and seizure detection algorithms were utilized and reviewed. An EEG Technician attended to the patient, and was available throughout daytime work hours.  The epilepsy center neurologist was available in person or on call 24-hours per day.    EEG Placement and Labeling of Electrodes:  The EEG was performed utilizing 20 channel referential EEG connections (coronal over temporal over parasagittal montage) using all standard 10-20 electrode placements with EKG, with additional electrodes placed in the inferior temporal region using the modified 10-10 montage electrode placements for elective admissions, or if deemed necessary. Recording was at a sampling rate of 256 samples per second per channel. Time synchronized digital video recording was done simultaneously with EEG recording. A low light infrared camera was used for low light recording.     _____________________________________________________________  HISTORY    Patient is a 72y old  Male who presents with a chief complaint of Encephalopathy (21 Dec 2022 11:30)  _____________________________________________________________  STUDY INTERPRETATION    Findings: The background was reactive and continuous, from approximately 8:52Am to 9:36AM the background was discontinuous x 3-6 seconds with alternating theta and delta activity.   During wakefulness, no PDR seen.    Background Slowing:  Diffuse theta and polymorphic delta slowing.    Focal Slowing:   None were present.    Sleep Background:  Drowsiness was characterized by fragmentation, attenuation, and slowing of the background activity.    Stage II sleep transients were not recorded.    Other Non-Epileptiform Findings:  Occasional generalized periodic discharges sometimes with triphasic morphology.      Interictal Epileptiform Activity:   none     Seizures:  none    Other events:   Isolated myoclonic jerks - head movements side to side, head flexion without a clear ictal correlate    Activation Procedures:   Hyperventilation was not performed.    Photic stimulation was not performed.     Artifacts:  Intermittent myogenic and movement artifacts were noted.    ECG:  The heart rate on single channel ECG was irregularly irregular predominantly between 70-90 BPM.    _____________________________________________________________  EEG SUMMARY/CLASSIFICATION    Abnormal EEG in an altered patient.  1. Isolated myoclonic jerks, without a clear ictal correlate   2. Occasional generalized periodic discharges sometimes with triphasic morphology   3. Background slowing, Moderate, generalized.    _____________________________________________________________  EEG IMPRESSION/CLINICAL CORRELATE    Abnormal EEG study.  1. Isolated myoclonic jerks, head movements side to side, head flexion, throughout the study without a clear EEG correlate, however myoclonic seizures cannot be excluded.   2. Generalized periodic discharges with triphasic morphology are commonly seen in toxic- metabolic encephalopathy.  3. Moderate nonspecific diffuse or multifocal cerebral dysfunction.   4. No seizures recorded   _____________________________________________________________    Aurelio Portillo MD  Epilepsy Fellow , [facility]  Department of Neurology, [facility] [employer]    Derik Tate MD PhD  Director, Epilepsy Division, [facility] EEG Reading Room Ph#: [phone]  Epilepsy Answering Service after 5PM and before 8:30AM: Ph#: [phone]

## 2023-01-02 NOTE — PROGRESS NOTE ADULT - SUBJECTIVE AND OBJECTIVE BOX
No acute events overnight. opening eyes intermittently, confused,     ROS: Unable to obtain secondary to patient current clinical condition.     Physical Exam  General: WDWN male lying in bed, ill appearing  Cardiac: S1S2 RRR  Respiratory: CTAB  Abdomen: Soft, NT  Extremities: ++edema  On EEG    =======================================================  Vital Signs Last 24 Hrs  T(C): 37.1 (01 Jan 2023 23:00), Max: 37.1 (01 Jan 2023 23:00)  T(F): 98.7 (01 Jan 2023 23:00), Max: 98.7 (01 Jan 2023 23:00)  HR: 74 (02 Jan 2023 10:45) (59 - 104)  BP: 128/66 (02 Jan 2023 10:45) (85/52 - 164/109)  BP(mean): 80 (02 Jan 2023 10:45) (58 - 126)  RR: 20 (02 Jan 2023 10:45) (0 - 20)  SpO2: 100% (02 Jan 2023 10:45) (96% - 100%)    Parameters below as of 02 Jan 2023 07:15  Patient On (Oxygen Delivery Method): ventilator    O2 Concentration (%): 30  I&O's Summary    01 Jan 2023 07:01  -  02 Jan 2023 07:00  --------------------------------------------------------  IN: 1061.7 mL / OUT: 0 mL / NET: 1061.7 mL    02 Jan 2023 07:01  -  02 Jan 2023 11:28  --------------------------------------------------------  IN: 144.5 mL / OUT: 0 mL / NET: 144.5 mL      =======================================================  Current Antibiotics:  acyclovir IVPB 320 milliGRAM(s) IV Intermittent every 24 hours  fluconAZOLE   Tablet 200 milliGRAM(s) Oral <User Schedule>  meropenem Injectable      meropenem Injectable 500 milliGRAM(s) IV Push every 24 hours  trimethoprim   80 mG/sulfamethoxazole 400 mG 1 Tablet(s) Oral <User Schedule>    Other medications:  acetylcysteine 20%  Inhalation 4 milliLiter(s) Inhalation every 6 hours  albuterol/ipratropium for Nebulization 3 milliLiter(s) Nebulizer every 6 hours  chlorhexidine 2% Cloths 1 Application(s) Topical <User Schedule>  dextrose 50% Injectable 25 Gram(s) IV Push once  dextrose 50% Injectable 12.5 Gram(s) IV Push once  dextrose 50% Injectable 25 Gram(s) IV Push once  dextrose Oral Gel 15 Gram(s) Oral once  fentaNYL   Infusion. 0.5 MICROgram(s)/kG/Hr IV Continuous <Continuous>  heparin  Infusion.  Unit(s)/Hr IV Continuous <Continuous>  hydrocortisone 10 milliGRAM(s) Oral every 12 hours  metoprolol tartrate 25 milliGRAM(s) Enteral Tube four times a day  norepinephrine Infusion 0.05 MICROgram(s)/kG/Min IV Continuous <Continuous>  pantoprazole  Injectable 40 milliGRAM(s) IV Push daily  sevelamer carbonate 800 milliGRAM(s) Oral three times a day with meals  tacrolimus 1.5 milliGRAM(s) Oral daily  tamsulosin 0.4 milliGRAM(s) Oral at bedtime  valproate sodium  IVPB 250 milliGRAM(s) IV Intermittent every 8 hours    =======================================================  01-02    134<L>  |  95<L>  |  17.8  ----------------------------<  100<H>  3.8   |  27.0  |  4.46<H>    Ca    8.1<L>      02 Jan 2023 03:15  Phos  3.9     01-02  Mg     2.1     01-02    TPro  4.9<L>  /  Alb  2.4<L>  /  TBili  0.3<L>  /  DBili  x   /  AST  22  /  ALT  5   /  AlkPhos  185<H>  01-01    Creatinine, Serum: 4.46 mg/dL (01-02-23 @ 03:15)  Creatinine, Serum: 3.54 mg/dL (01-01-23 @ 03:20)  Creatinine, Serum: 4.75 mg/dL (12-31-22 @ 05:30)  Creatinine, Serum: 3.83 mg/dL (12-30-22 @ 04:30)  Creatinine, Serum: 5.94 mg/dL (12-29-22 @ 06:06)      =======================================================

## 2023-01-02 NOTE — PROGRESS NOTE ADULT - SUBJECTIVE AND OBJECTIVE BOX
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 faecalis bacteremia, admitted on 12/13 with altered mental status of unclear etiology. Had been being treated empirically for meningitis on the medicine service. Diagnostic LP had been deferred due to anticoagulation.  Incidentally found to have a dilated CBD with eventual plan for MRCP. Transferred to MICU on 12/18 after developing respiratory distress, hypoxemia, and worsened mental status, necessitating emergent intubation/mechanical ventilation. Noted to have dislodged NGT with what appeared to be tube feedings in his airways. ABG revealed severe hypercapnea with acute respiratory acidosis. Developed distributive shock state requiring IV vasopressor. LP performed 12/19 positive Cryptococcal Ag, negative PCR and Gram stain. 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 Paracentesis negative to date. Extubated 12/20 weaned to nasal cannula, shock has resolved and weaned off IV vasopressors. His encephalopathy improved and he was downgraded from MICU on 12/21 to medical floors.     MICU course:   12/31 - LP done  1/1 - intubation due to mucus plug  1/2 - LP negative, ID: stop ampho, start fluconazole, to complete 14 days of ACV, continue merrum.  Nueuro following .  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      2019 novel coronavirus disease (COVID-19)  Feb 2021 - hospitalized for 1 week at [facility]      Pneumonia  April 2021      [place]      HTN (hypertension)      Lumbar spondylosis      History of COPD      BPH without urinary obstruction      Hypercholesterolemia      Oliguria and anuria      H/O peripheral neuropathy      H/O lung transplant  bilateral - transplant - 1-2-2015 -  [facility]      H/O heart artery stent  x3 [facility]      History of hip replacement  right      Status post open reduction and internal fixation (ORIF) of fracture  left femur        Review of Systems: Due to altered mental status, subjective information was not able to be obtained from the patient. History was obtained, to the extent possible, from review of the chart and collateral sources of information.        Medications:  acyclovir IVPB      acyclovir IVPB 320 milliGRAM(s) IV Intermittent every 24 hours  amphotericin B  liposome  IVPB 320 milliGRAM(s) IV Intermittent every 24 hours  flucytosine 1500 milliGRAM(s) Oral <User Schedule>  meropenem Injectable      meropenem Injectable 500 milliGRAM(s) IV Push every 24 hours  trimethoprim   80 mG/sulfamethoxazole 400 mG 1 Tablet(s) Oral <User Schedule>    metoprolol tartrate 25 milliGRAM(s) Enteral Tube four times a day    acetylcysteine 20%  Inhalation 4 milliLiter(s) Inhalation every 6 hours  albuterol/ipratropium for Nebulization 3 milliLiter(s) Nebulizer every 6 hours    valproate sodium  IVPB 250 milliGRAM(s) IV Intermittent every 8 hours      heparin   Injectable 5000 Unit(s) IV Push every 6 hours PRN  heparin   Injectable 2500 Unit(s) IV Push every 6 hours PRN  heparin  Infusion.  Unit(s)/Hr IV Continuous <Continuous>    pantoprazole  Injectable 40 milliGRAM(s) IV Push daily    tamsulosin 0.4 milliGRAM(s) Oral at bedtime    dextrose 50% Injectable 25 Gram(s) IV Push once  dextrose 50% Injectable 12.5 Gram(s) IV Push once  dextrose 50% Injectable 25 Gram(s) IV Push once  dextrose Oral Gel 15 Gram(s) Oral once  hydrocortisone 10 milliGRAM(s) Oral every 12 hours    magnesium sulfate  IVPB 2 Gram(s) IV Intermittent once  potassium chloride  10 mEq/100 mL IVPB 10 milliEquivalent(s) IV Intermittent once    tacrolimus 1.5 milliGRAM(s) Oral daily    chlorhexidine 2% Cloths 1 Application(s) Topical <User Schedule>    sevelamer carbonate 800 milliGRAM(s) Oral three times a day with meals          ICU Vital Signs Last 24 Hrs  T(C): 36.4 (01 Jan 2023 07:19), Max: 37.5 (01 Jan 2023 04:00)  T(F): 97.5 (01 Jan 2023 07:19), Max: 99.5 (01 Jan 2023 04:00)  HR: 63 (01 Jan 2023 07:00) (63 - 84)  BP: 132/60 (01 Jan 2023 07:00) (90/48 - 162/62)  BP(mean): 81 (01 Jan 2023 07:00) (61 - 100)  ABP: --  ABP(mean): --  RR: 11 (01 Jan 2023 06:00) (0 - 26)  SpO2: 100% (01 Jan 2023 07:00) (91% - 100%)    O2 Parameters below as of 31 Dec 2022 22:00  Patient On (Oxygen Delivery Method): mask, Venturi            ABG - ( 31 Dec 2022 22:50 )  pH, Arterial: 7.380 pH, Blood: x     /  pCO2: 52    /  pO2: 101   / HCO3: 31    / Base Excess: 5.7   /  SaO2: 99.5                I&O's Detail    31 Dec 2022 07:01  -  01 Jan 2023 07:00  --------------------------------------------------------  IN:    Heparin Infusion: 131 mL    IV PiggyBack: 50 mL    IV PiggyBack: 250 mL    IV PiggyBack: 100 mL  Total IN: 531 mL    OUT:    Heparin Infusion: 0 mL    Other (mL): 500 mL    Stool (mL): 800 mL  Total OUT: 1300 mL    Total NET: -769 mL            LABS:                        11.0   4.61  )-----------( 147      ( 01 Jan 2023 03:20 )             34.2     01-01    135  |  95<L>  |  10.5  ----------------------------<  80  3.7   |  27.0  |  3.54<H>    Ca    8.3<L>      01 Jan 2023 03:20  Phos  4.3     01-01  Mg     1.8     01-01    TPro  4.9<L>  /  Alb  2.4<L>  /  TBili  0.3<L>  /  DBili  x   /  AST  22  /  ALT  5   /  AlkPhos  185<H>  01-01          CAPILLARY BLOOD GLUCOSE      POCT Blood Glucose.: 81 mg/dL (01 Jan 2023 05:04)    PT/INR - ( 31 Dec 2022 05:30 )   PT: 14.1 sec;   INR: 1.21 ratio         PTT - ( 01 Jan 2023 00:40 )  PTT:182.1 sec    CULTURES:  Culture Results:   No growth to date. (12-28 @ 06:45)  Culture Results:   No growth to date. (12-28 @ 06:40)            Physical Examination:    General: intubatd, sedated     HEENT: Pupils equal, reactive to light.  Symmetric. LGT Left nare    PULM: improve breath sound on the left     CVS: Regular rate and rhythm    ABD: Soft, nondistended, nontender    EXT: Generalized anasarca    SKIN: Warm and well perfused    NEURO: AAox0, but has spontaneous arm and legs movement.           VTE PROPHYLAXIS: Heparin gtt  CAM ICU: +   CODE STATUS: FULL         CRITICAL CARE TIME SPENT: 35 minutes spent performing frequent bedside reassessments and augmenting plan of care to address problems of acute critical illness that pose high probability of life threatening deterioration and/or end organ damage/dysfunction and discussing goals of care, non-inclusive of time spent on procedures performed.

## 2023-01-02 NOTE — PROGRESS NOTE ADULT - ASSESSMENT
72M with PMH of  hypertension, hyperlipidemia, ESRD on HD, emphysema s/p lung transplant in [place] by Dr. 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 faecalis bacteremia, newly diagnosed HFrEF 25%, AF complicated by GIB was discharged on home IV Abx now presenting with AMS  Presented to the ED with daughter. CTH obtained and did not reveal any acute findings.  Patient was admitted to medicine for encephalopathy. LP attempted bedside but unsuccessful. Was on empiric treatment and mental status was improving and then ultimately intubated for aspiration pneumonia    #Acute hypoxic respiratory failure likely 2/2 aspiration- now extubated, mental status improved. sputum cx normal keerthi    #AMS-unclear etiology, no fever. ammonia level normal.  ct c/ap ?pna/edema/ascites.   ??EBV encephalitis  s/p LP normal cell counts glucose and protein 51. CSF pcr (-) and cultures pending.   [facility] micro lab CSF gram stain reported as "few organisms" . gram stain repeated at core lab and micro and is NEGATIVE. initial report has been corrected. CSF crytp titer 1:40, as per [facility] his initial CSF titer in 12/2020 was 1:2560. no serum titer available. 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 f/u CSf fungal CX. stopped amphotericin/flucytosine and resumed fluconazole suppression post HD  serum crypt Ag 1:40, most likely old infection  EBv CSF PCR/serum + but very low; repeat neg. Unclear if EBV encephalitis , although possible in this immunocompromised pt who appeared to have clinically responded to acyclovir    Unlikely to derive any benefit with ACV in the setting of EBV reactivation/possible encephalitis, but he is on acyclovir to complete total 14 days  MRI brain unremarkable     Repeat LP on 12/31 - CSF cell count/prot/glu unremarkable. CSF PCR neg     ..#h/o e.faecalis bacteremia - . COMPLETED IV ABX  CRISTIAN was deferred by cardio on recent admission.     PT ON MERREM     # s/p lung transplant-     # h/o fungal meningitis-  flucytosine/ampho  DISCONTINUED  and resumed fluconazole. No evidence of CNS crypto  WILL FOLLOW UP WITH FURTHER RECOMMENDATIONS

## 2023-01-02 NOTE — PROGRESS NOTE ADULT - ASSESSMENT
72y Male with multiple medical issues now with encephalopathy.     Encephalopathy.   Mental status waxing and waning.   Likely toxic metabolic secondary to multiple medical issues.   ?Hepatic component.   EEG findings strongly suggestive of toxic metabolic etiology.  May need repeat CT head to further evaluate encephalopathy if he does not continue to improve    Meningitis.   Antibiotic coverage for now per ID.   CSF from 12/31 showed no cryptococcus but (+) mild elevation of protein  EBV detected by PCR in CSF, low level, unclear significance.  ID following.    will follow with you    Luis Brady MD PhD   [id number]

## 2023-01-02 NOTE — PROGRESS NOTE ADULT - SUBJECTIVE AND OBJECTIVE BOX
[facility]  INFECTIOUS DISEASES at [place] and [place]  ===============================================================                               J Carlos Galdamez MD*     Perlita Solares MD*                         Isatu Rojas MD*       Sumaya Morales MD*            Diplomates American Board of Internal Medicine & Infectious Diseases                * [place] Office - Appt - Tel  [phone] Fax [phone]                * [place] Office - Appt - Tel [phone] Fax [phone]                                  Hospital Consult line:  [phone]  ==============================================================    JU FERGUSON [id number]    Follow up:    Seen in the ICU  PN VENT    MORE AWAKE   S/p LP         I have personally reviewed the labs and data; pertinent labs and data are listed in this note; please see below.     _______________________________________________________________  REVIEW OF SYSTEMS  Unable to obtain due to medical condition - altered mental status/intubated  ________________________________________________________________  Allergies:  budesonide (Unknown)  cefpodoxime (Unknown)  Pollen (Unknown)    Vital Signs Last 24 Hrs  T(C): 37.1 (01 Jan 2023 23:00), Max: 37.1 (01 Jan 2023 23:00)  T(F): 98.7 (01 Jan 2023 23:00), Max: 98.7 (01 Jan 2023 23:00)  HR: 70 (02 Jan 2023 10:00) (59 - 104)  BP: 154/95 (02 Jan 2023 10:00) (85/52 - 164/109)  BP(mean): 109 (02 Jan 2023 10:00) (58 - 126)  RR: 20 (02 Jan 2023 10:00) (0 - 20)  SpO2: 100% (02 Jan 2023 10:00) (96% - 100%)    Parameters below as of 02 Jan 2023 07:15  Patient On (Oxygen Delivery Method): ventilator    O2 Concentration (%): 30    ________________________________________________________________  PHYSICAL EXAM  GEN: AWAKE ON VENT   HEENT: ETT  LUNGS: mechanically ventilated CTA anteriorly   HEART: RRR, no m/r/g  ABDOMEN: ND, + BS  EXTREMITIES: UEs edematous   NEUROLOGIC: unresponsive   SKIN: ecchymoses  LINES: LUE AVG  ________________________________________________________________  Vitals:  T(  ICU Vital Signs Last 24 Hrs  T(C): 37.1 (01 Jan 2023 23:00), Max: 37.1 (01 Jan 2023 23:00)  T(F): 98.7 (01 Jan 2023 23:00), Max: 98.7 (01 Jan 2023 23:00)  HR: 70 (02 Jan 2023 10:00) (59 - 104)  BP: 154/95 (02 Jan 2023 10:00) (85/52 - 164/109)  BP(mean): 109 (02 Jan 2023 10:00) (58 - 126)  ABP: --  ABP(mean): --  RR: 20 (02 Jan 2023 10:00) (0 - 20)  SpO2: 100% (02 Jan 2023 10:00) (96% - 100%)    O2 Parameters below as of 02 Jan 2023 07:15  Patient On (Oxygen Delivery Method): ventilator    O2 Concentration (%): 30        Current Antibiotics:  fluconAZOLE   Tablet 200 milliGRAM(s) Oral <User Schedule>  meropenem Injectable      meropenem Injectable 500 milliGRAM(s) IV Push every 24 hours  trimethoprim   80 mG/sulfamethoxazole 400 mG 1 Tablet(s) Oral <User Schedule>    Other medications:  acetylcysteine 20%  Inhalation 4 milliLiter(s) Inhalation every 6 hours  albuterol/ipratropium for Nebulization 3 milliLiter(s) Nebulizer every 6 hours  chlorhexidine 2% Cloths 1 Application(s) Topical <User Schedule>  dextrose 50% Injectable 25 Gram(s) IV Push once  dextrose 50% Injectable 12.5 Gram(s) IV Push once  dextrose 50% Injectable 25 Gram(s) IV Push once  dextrose Oral Gel 15 Gram(s) Oral once  fentaNYL   Infusion. 0.5 MICROgram(s)/kG/Hr IV Continuous <Continuous>  heparin  Infusion.  Unit(s)/Hr IV Continuous <Continuous>  hydrocortisone 10 milliGRAM(s) Oral every 12 hours  metoprolol tartrate 25 milliGRAM(s) Enteral Tube four times a day  norepinephrine Infusion 0.05 MICROgram(s)/kG/Min IV Continuous <Continuous>  pantoprazole  Injectable 40 milliGRAM(s) IV Push daily  sevelamer carbonate 800 milliGRAM(s) Oral three times a day with meals  tacrolimus 1.5 milliGRAM(s) Oral daily  tamsulosin 0.4 milliGRAM(s) Oral at bedtime  valproate sodium  IVPB 250 milliGRAM(s) IV Intermittent every 8 hours                                      10.4   4.29  )-----------( 148      ( 02 Jan 2023 03:15 )             31.4   01-02    134<L>  |  95<L>  |  17.8  ----------------------------<  100<H>  3.8   |  27.0  |  4.46<H>    Ca    8.1<L>      02 Jan 2023 03:15  Phos  3.9     01-02  Mg     2.1     01-02    TPro  4.9<L>  /  Alb  2.4<L>  /  TBili  0.3<L>  /  DBili  x   /  AST  22  /  ALT  5   /  AlkPhos  185<H>  01-01      RECENT CULTURES:  12-31 @ 14:00 .CSF CSF       No polymorphonuclear leukocytes seen  No organisms seen  by cytocentrifuge    Herpes Simplex Virus 1/2 PCR, CSF (12.31.22 @ 14:00)    Source HSV 1/2: CSF    HSV 1/2 PCR: NotDetec: HSV1/2 PCR assay is a real-time PCR test that detects and differentiates  HSV-1 and/or HSV-2 DNA in CSF (Vitreous Fluid). The results of this test  should be interpreted with consideration of all clinical and laboratory  findings.    Cryptococcal Antigen - CSF (12.31.22 @ 14:00)    Cryptococcal Antigen - CSF: Negative    CSF PCR Panel (12.31.22 @ 14:00)    CSF PCR Result: NotDetec    Protein, CSF (12.31.22 @ 14:00)    Protein, CSF: 60 mg/dL    Glucose, CSF (12.31.22 @ 14:00)    Glucose, CSF: 43 mg/dLG/24h    Cerebrospinal Fluid Cell Count-1 (12.31.22 @ 14:00)    RBC Count - Spinal Fluid: 1 /cmm    Tube Type: Tube 1    CSF Appearance: Clear    CSF Neutrophils: N/A %    Appearance Spun: Colorless    CSF Color: No Color    Total Nucleated Cell Count, CSF: 2 /uL      12-28 @ 06:45 .Blood Blood     No growth to date.    12-28 @ 06:40 .Blood Blood     No growth to date.    12-19 @ 17:10 .CSF CSF     No acid-fast bacilli isolated at 1 week. ****Acid-fast cultures are held  for 6 weeks.****    No polymorphonuclear leukocytes seen per low power field  No organisms seen per oil power field  by cytocentrifuge      12-19 @ 16:00 .Sputum Sputum     Normal Respiratory Charis present    Moderate polymorphonuclear leukocytes per low power field  Few Squamous epithelial cells per low power field  Few Gram positive cocci in pairs seen per oil power field  Few Yeast like cells seen per oil power field  Few Gram Negative Rods seen per oil power field      12-19 @ 13:50 Paracentesis Paracentesis Fluid     No growth at 5 days    No polymorphonuclear leukocytes seen per low power field  No organisms seen per oil power field    WBC Count: 4.61 K/uL (01-01-23 @ 03:20)  WBC Count: 5.40 K/uL (12-31-22 @ 16:03)  WBC Count: 5.87 K/uL (12-31-22 @ 05:30)  WBC Count: 6.07 K/uL (12-30-22 @ 04:30)  WBC Count: 7.52 K/uL (12-29-22 @ 06:06)  WBC Count: 8.84 K/uL (12-28-22 @ 06:45)    Creatinine, Serum: 3.54 mg/dL (01-01-23 @ 03:20)  Creatinine, Serum: 4.75 mg/dL (12-31-22 @ 05:30)  Creatinine, Serum: 3.83 mg/dL (12-30-22 @ 04:30)  Creatinine, Serum: 5.94 mg/dL (12-29-22 @ 06:06)  Creatinine, Serum: 4.86 mg/dL (12-28-22 @ 06:45)        Procalcitonin, Serum: 0.58 ng/mL (12-30-22 @ 04:30)     SARS-CoV-2 Result: NotDetec (12-13-22 @ 02:17)    ________________________________________________________________  RADIOLOGY  < from: CT Chest No Cont (12.29.22 @ 16:03) >  INTERPRETATION:  Clinical information: Evaluate for aspiration pneumonia.   Exam is compared to previous study of 12/23/2022.    CT scan of the chest was obtained without administration of intravenous   contrast.    No hilar and or mediastinal adenopathy is noted.    Heart is enlarged in size. Patient is status post median sternotomy.   Small pericardial effusion is noted. Right-sided PICC line catheter is   noted in place.    No endobronchial lesions are noted. Interlobular septal thickening and   thickening of the peribronchovascular interstitium are noted within both   lungs. Minimal patchy groundglass opacities are also noted in both upper   lobes. Minimal atelectasis is also noted involving both lower lobes.   Small bilateral pleural effusions are noted.    Below the diaphragm, visualized portions of the abdomen demonstrate   nodular contour to the surface of liver. Cholelithiasis is noted. Both   kidneys are small in size. Low-attenuation lesion in the right kidney is   too small to be adequately characterized on this exam. Tip of the   nasogastric tube is noted in the stomach. Ascites is noted.    Degenerative changes of the spine as well as loss of height of multiple   vertebral bodies is noted. Subcutaneous edema is noted.    IMPRESSION: Findings suggestive of pulmonary edema.    < end of copied text >

## 2023-01-02 NOTE — PROGRESS NOTE ADULT - ASSESSMENT
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 CT head is negative. Hospital course is notable for empiric treatment for meningitis; EBV positive in CSF; also with dilated common bile duct; acutely decompensated on 12/18 with acute hypercapnic respiratory failure in setting of aspiration pneumonia, emergently intubated, later extubated; also shock (resolved). Nephrology is managing ESRD on HD.    -Access: L AV access   -Outpatient dialysis days are Tuesdays Thursdays Saturdays  -Last HD Saturday, next planned for tomorrow. Orders placed.   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 Follow phos levels.   -s/p Lung transplantation - immunosuppressive management as per ID  -EBV in CSF, meingitis - ID on board

## 2023-01-02 NOTE — PROGRESS NOTE ADULT - ASSESSMENT
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 faecalis bacteremia, with:    1. Acute toxic-metabolic encephalopathy  2. Acute mixed hypercapnic/hypoxemic respiratory failure  3. Aspiration pneumonitis/pneumonia  4. 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 Distributive shock - improved, off pressors   6. ESRD on HD  7. COPD s/p lung transplant, immunocompromised status  8. Atrial fibrillation  9. Right IJ thrombosis  10. Cirrhosis with ascites       Neuro: repeat CT head today, dEncephalopathy likely toxic-metabolic in the setting of critical illness, aspiration pneumonia, and underlying comorbidities. Now worsening, unable to protect airway and actively aspirating necessitating urgent reintubation. Undergoing cvEEG monitoring, last seizure 12/30 today's AM report with myoclonus without corresponding seizure activity. Continue VAP. Fentanyl gtt for sedation.  Pulm: Recurrent acute hypoxic respiratory failure in the setting of aspiration, mucous plugging and complete atelectasis of Left lung. S/P emergent intubation, full vent support. CXR repeated, much improvement, will Mucomyst and duonebs. Tacro for antirejection..  thoracic  CV: AFib currently in NSR, hemodynamics stable.   GI: NPO has been failing SLP eval, aspirating in the setting of encephalopathy. Will resume TF   Renal: ESRD on HD, plan per nephro  ID: On treatment for EBV meningoencephalitis for 2 week course with acyclovir, Antifungal treatment of cryptococcal meningoencephalitis with amphotericin and flucytosine - discussed with ID will d/c and change to chronic fluconazole, empiric Meropenem and PJP ppx with Bactrim given immunocompromised status.   Heme: Heparin gtt for chronic R IJ thrombosis and AFib

## 2023-01-03 NOTE — PROGRESS NOTE ADULT - SUBJECTIVE AND OBJECTIVE BOX
[facility]                                        Neurology at [place]                                 [facility]                                  [address]                                        [place], [place], [address]                                             [phone]        CC: Encephalopathy    HPI:   The patient is a 72y Male with multiple medical issues who was recently hospitalized with hypoxic respiratory failure.  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   He was ultimately discharged 11/23/22.   He now presented with altered mental status and lethargy.   Neurology asked to evaluation for meningitis. LP attempted (neuro SHEELA) and was unsuccessful while in ER.    The patient had been gradually improving with regard to mental status, but has deteriorated again over the past few days.   Ultimately transferred back to ICU. (SHEELA)    Interim history: Unresponsive in ICU, intubated, moves to voice/stimuli    ROS:   Unobtainable due to patient's condition.     MEDICATIONS  (STANDING):  acyclovir IVPB 320 milliGRAM(s) IV Intermittent every 24 hours  albuterol/ipratropium for Nebulization 3 milliLiter(s) Nebulizer every 6 hours  chlorhexidine 2% Cloths 1 Application(s) Topical <User Schedule>  dexMEDEtomidine Infusion 0.5 MICROgram(s)/kG/Hr (7.91 mL/Hr) IV Continuous <Continuous>  dextrose 50% Injectable 25 Gram(s) IV Push once  dextrose 50% Injectable 12.5 Gram(s) IV Push once  dextrose 50% Injectable 25 Gram(s) IV Push once  dextrose Oral Gel 15 Gram(s) Oral once  doxazosin 2 milliGRAM(s) Oral at bedtime  epoetin ben-epbx (RETACRIT) Injectable 11516 Unit(s) IV Push <User Schedule>  fluconAZOLE   Tablet 200 milliGRAM(s) Oral <User Schedule>  heparin  Infusion.  Unit(s)/Hr (11 mL/Hr) IV Continuous <Continuous>  hydrocortisone 10 milliGRAM(s) Oral every 12 hours  meropenem Injectable      meropenem Injectable 500 milliGRAM(s) IV Push every 24 hours  metoprolol tartrate 25 milliGRAM(s) Enteral Tube four times a day  pantoprazole  Injectable 40 milliGRAM(s) IV Push daily  tacrolimus 1.5 milliGRAM(s) Oral daily  trimethoprim  40 mG/sulfamethoxazole 200 mG Suspension 80 milliGRAM(s) Oral <User Schedule>  valproate sodium  IVPB 250 milliGRAM(s) IV Intermittent every 8 hours    MEDICATIONS  (PRN):  fentaNYL    Injectable 50 MICROGram(s) IV Push every 4 hours PRN agitation  heparin   Injectable 5000 Unit(s) IV Push every 6 hours PRN For aPTT less than 40  heparin   Injectable 2500 Unit(s) IV Push every 6 hours PRN For aPTT between 40 - 57  midazolam Injectable 2 milliGRAM(s) IV Push every 2 hours PRN agitation      Vital Signs Last 24 Hrs  T(C): 36.4 (03 Jan 2023 12:01), Max: 36.9 (02 Jan 2023 23:15)  T(F): 97.6 (03 Jan 2023 12:01), Max: 98.4 (02 Jan 2023 23:15)  HR: 70 (03 Jan 2023 13:00) (54 - 96)  BP: 113/59 (03 Jan 2023 13:00) (92/41 - 145/78)  BP(mean): 74 (03 Jan 2023 13:00) (55 - 119)  RR: 14 (03 Jan 2023 13:00) (0 - 26)  SpO2: 100% (03 Jan 2023 13:00) (99% - 100%)    Parameters below as of 03 Jan 2023 12:00  Patient On (Oxygen Delivery Method): ventilator      Detailed Neurologic Exam:    Mental status: Intubated.  Opens eyes to voice.  Not tracking Not following any instructions.    Cranial nerves: Pupils pinpoint. No blink to threat from either side. Not tracking with gaze today. Face is grossly symmetric. Palate and tongue cannot be assessed.     Motor/Sensory: There is normal bulk and tone.  Symmetric grimace,  movement to nailbed pressure stimuli in 4 ext. (+) spontaneous movements b/l    Reflexes: trace throughout and plantar responses are equivocal.    Cerebellar: Cannot be tested.     Labs:                8.9    4.16  )-----------( 141      ( 03 Jan 2023 04:40 )             25.8     01-03    136  |  97  |  27.0<H>  ----------------------------<  112<H>  3.2<L>   |  27.0  |  5.22<H>    Ca    8.0<L>      03 Jan 2023 04:40  Phos  3.3     01-03  Mg     2.3     01-03        PTT - ( 03 Jan 2023 12:30 )  PTT:73.2 sec    Cerebrospinal Fluid Cell Count-1 (12.31.22 @ 14:00)   CSF Color: No Color   Tube Type: Tube 1   CSF Appearance: Clear   CSF Neutrophils: N/A %   Appearance Spun: Colorless   RBC Count - Spinal Fluid: 1 /cmm   Total Nucleated Cell Count, CSF: 2 /uL   Protein, CSF (12.31.22 @ 14:00)   Protein, CSF: 60 mg/dL   Glucose, CSF (12.31.22 @ 14:00)   Glucose, CSF: 43 mg/dLG/24h   CSF PCR Panel (12.31.22 @ 14:00)   CSF PCR Result: NotDetec: The meningitis/encephalitis (ME) panel (CSFPCR) is a PCR based assay that   screens for: Escherichia coli; Haemophilus influenzae; Listeria   monocytogenes; Neisseria meningitidis; Streptococcus agalactiae;   Streptococcus pneumoniae; CMV; Enterovirus; HSV-1; HSV-2; Human   herpesvirus 6; Parechovirus; VZV and Cryptococcus. Result should be   interpreted in context of clinical presentation, imaging and other lab   tests. Positive predictive value may be lower in patients with normal CSF   chemistry and cell count.  Radiology:  Brain MRI: There is no acute pathology.   There is chronic ischemic change.     EEG SUMMARY/CLASSIFICATION 12/31/22-1/1/23    Abnormal EEG in an altered patient.  1. Isolated myoclonic jerks, without a clear ictal correlate   2. Generalized periodic discharges with triphasic morphology <0.5-1 Hz  3. Background slowing, Moderate, generalized.    _____________________________________________________________  EEG IMPRESSION/CLINICAL CORRELATE    Abnormal EEG study.  1. Isolated myoclonic jerks, head movements side to side, head flexion, throughout the study without a clear EEG correlate.   2. Generalized periodic discharges with triphasic morphology are commonly seen in toxic- metabolic encephalopathy, rarely epileptic, improved in latter portions of the recording   3. Moderate nonspecific diffuse or multifocal cerebral dysfunction.   4. No seizures recorded   _____________________________________________________________    EEG SUMMARY/CLASSIFICATION 12/30-12/31/22    Abnormal EEG in an altered patient.  1. Sharp waves, generalized, rare   2. Isolated myoclonic jerks, without a clear ictal correlate   3. Generalized periodic discharges with triphasic morphology <0.5-1 Hz  4. Background slowing, Moderate, generalized.    _____________________________________________________________  EEG IMPRESSION/CLINICAL CORRELATE    Abnormal EEG study.  1. Risk of generalized onset seizures, with evidence of diffuse cerebral dysfunction.   2. Isolated myoclonic jerks, head movements side to side, head flexion, throughout the study without a clear EEG correlate.   3. Generalized periodic discharges with triphasic morphology are commonly seen in toxic- metabolic encephalopathy, rarely epileptic   4. Moderate nonspecific diffuse or multifocal cerebral dysfunction.   5. No seizures recorded   _____________________________________________________________      Head CT 1/2/23- no acute CVA, mass or blood

## 2023-01-03 NOTE — PROGRESS NOTE ADULT - ASSESSMENT
72y Male with multiple medical issues now with encephalopathy.     Encephalopathy.   Mental status waxing and waning.   Likely toxic metabolic secondary to multiple medical issues.   EEG findings strongly suggestive of toxic metabolic etiology.  Head CT as above    Meningitis.   Antibiotic coverage for now per ID.   CSF from 12/31 showed no cryptococcus but (+) mild elevation of protein  EBV detected by PCR in CSF, low level, unclear significance.  ID following.    will follow with you    Luis Brady MD PhD   [id number]

## 2023-01-03 NOTE — PROGRESS NOTE ADULT - SUBJECTIVE AND OBJECTIVE BOX
No acute events overnight.    Seen in the ICU  : PN VENT   S/p LP ,    ROS: Unable to obtain secondary to patient current clinical condition.     Physical Exam,    General: WDWN male lying in bed, ill appearing  Cardiac: S1S2 RRR  Respiratory: CTAB  Abdomen: Soft, NT  Extremities: ++edema    On EEG  =======================================================  Vital Signs Last 48 Hrs,    T(C): 36.4 (2023 07:46), Max: 36.9 (2023 23:15)  T(F): 97.5 (2023 07:46), Max: 98.4 (2023 23:15)  HR: 58 (2023 09:00) (54 - 102)  BP: 92/48 (2023 09:00) (85/61 - 154/95)  BP(mean): 61 (2023 09:00) (55 - 119)  RR: 11 (2023 09:00) (0 - 27)  SpO2: 100% (2023 09:00) (98% - 100%)    O2 Parameters below as of 2023 08:00  Patient On (Oxygen Delivery Method): ventilator    T(C): 37.1 (2023 23:00), Max: 37.1 (2023 23:00)  T(F): 98.7 (2023 23:00), Max: 98.7 (2023 23:00)  HR: 74 (2023 10:45) (59 - 104)  BP: 128/66 (2023 10:45) (85/52 - 164/109)  BP(mean): 80 (2023 10:45) (58 - 126)  RR: 20 (2023 10:45) (0 - 20)  SpO2: 100% (2023 10:45) (96% - 100%)    Parameters below as of 2023 07:15  Patient On (Oxygen Delivery Method): ventilator    O2 Concentration (%): 30  I&O's Summary    2023 07:01  -  2023 07:00  --------------------------------------------------------  IN: 1061.7 mL / OUT: 0 mL / NET: 1061.7 mL    2023 07:  -  2023 11:28  --------------------------------------------------------  IN: 144.5 mL / OUT: 0 mL / NET: 144.5 mL    =======================================================  Current Antibiotics:  acyclovir IVPB 320 milliGRAM(s) IV Intermittent every 24 hours  fluconAZOLE   Tablet 200 milliGRAM(s) Oral <User Schedule>  meropenem Injectable      meropenem Injectable 500 milliGRAM(s) IV Push every 24 hours  trimethoprim   80 mG/sulfamethoxazole 400 mG 1 Tablet(s) Oral <User Schedule>    Other medications:  acetylcysteine 20%  Inhalation 4 milliLiter(s) Inhalation every 6 hours  albuterol/ipratropium for Nebulization 3 milliLiter(s) Nebulizer every 6 hours  chlorhexidine 2% Cloths 1 Application(s) Topical <User Schedule>  dextrose 50% Injectable 25 Gram(s) IV Push once  dextrose 50% Injectable 12.5 Gram(s) IV Push once  dextrose 50% Injectable 25 Gram(s) IV Push once  dextrose Oral Gel 15 Gram(s) Oral once  fentaNYL   Infusion. 0.5 MICROgram(s)/kG/Hr IV Continuous <Continuous>  heparin  Infusion.  Unit(s)/Hr IV Continuous <Continuous>  hydrocortisone 10 milliGRAM(s) Oral every 12 hours  metoprolol tartrate 25 milliGRAM(s) Enteral Tube four times a day  norepinephrine Infusion 0.05 MICROgram(s)/kG/Min IV Continuous <Continuous>  pantoprazole  Injectable 40 milliGRAM(s) IV Push daily  sevelamer carbonate 800 milliGRAM(s) Oral three times a day with meals  tacrolimus 1.5 milliGRAM(s) Oral daily  tamsulosin 0.4 milliGRAM(s) Oral at bedtime  valproate sodium  IVPB 250 milliGRAM(s) IV Intermittent every 8 hours    =  CT Head No Cont (23 @ 16:26)     ACC: [id number] EXAM:  CT BRAIN                          PROCEDURE DATE:  2023      INTERPRETATION:  EXAM: CT head without contrast    INDICATION: Altered mental status    TECHNIQUE: CT examination of the head performed without contrast.   Multiple axial images obtained from skull base to vertex.   Sagittal/coronal reformatted images obtained from axial data set. Images   reviewed in soft tissue and bone windows.    COMPARISON: 2022 CT head.    FINDINGS:    Ventricles andsulci are mildly proportionately prominent likely related   to mild parenchymal volume loss. No hydrocephalus. No extra-axial fluid   collection identified.    No acute intracranial hemorrhage identified. There is mild subcortical   and periventricular white matter attenuation nonspecific but favored to   reflect sequela of mild chronic microvascular ischemia. Gray-white   differentiation is preserved without CT evidence for acute transcortical   infarction. There is no significant midline shift,mass effect or   herniation.    Bilateral lens replacement.    Sellar and suprasellar region are unremarkable in appearance.    There is moderate polypoid mucosal thickening of ethmoid, sphenoid and   maxillary sinuses. Redemonstration mild to moderate of bilateral mastoid   air cells, right greater than left as well as right middle ear cavity. No   significant cerebellar tonsillar herniation.    No displaced calvarial fractures are identified. No suspicious expansile   or destructive calvarial lesion identified. No significant scalp soft   tissue swelling identified.    IMPRESSION:    No acute intracranial hemorrhage, hydrocephalus or extra-axial fluid   collection.  Mild parenchymal volume loss and mild chronic microvascular ischemic   changes.  No CT evidence for acute transcortical infarction. Please note that MR   imaging is a more sensitive imaging modality for detection of acute   infarction and may be obtained as clinically warranted.    If clinicians have any questions/needfor clarification regarding this   report, Dr. Tommy Nichole can be best reached via the patient secure   hospital email system    TOMMY NICHOLE MD; Attending Radiologist  This document has been electronically signed. 2023  5:24PM    136    |  97     |  27.0<H>  ----------------------------<  112<H>  Ca:8.0<L> (2023 04:40)  3.2<L>   |  27.0   |  5.22<H  TPro  4.9<L>  /  Alb  2.4<L>  /  TBili  0.3<L>  /  DBili  x      /  AST  22     /  ALT  5      /  AlkPhos  185<H>  2023 03:20                        8.9<L>  4.16  )-----------( 141<L>    ( 2023 04:40 )             25.8<L>    Phos:3.3 mg/dL M.3 mg/dL PTH:-- Uric acid:-- Serum Osm:--  Ferritin:-- Iron:-- TIBC:-- Tsat:--  B12:-- TSH:-- ( @ 04:40)  ======================================================      134<L>  |  95<L>  |  17.8  ----------------------------<  100<H>  3.8   |  27.0  |  4.46<H>    Ca    8.1<L>      2023 03:15  Phos  3.9       Mg     2.1         TPro  4.9<L>  /  Alb  2.4<L>  /  TBili  0.3<L>  /  DBili  x   /  AST  22  /  ALT  5   /  AlkPhos  185<H>      Creatinine, Serum: 4.46 mg/dL (23 @ 03:15)  Creatinine, Serum: 3.54 mg/dL (23 @ 03:20)  Creatinine, Serum: 4.75 mg/dL (22 @ 05:30)  Creatinine, Serum: 3.83 mg/dL (22 @ 04:30)  Creatinine, Serum: 5.94 mg/dL (22 @ 06:06)  Ammonia, Serum: 23: Ammonia levels will be falsely elevated if specimen is NOT collected,   processed and maintained at 4-8 C umol/L (23 @ 06:05) Culture Results:   Testing in progress (22 @ 14:00)   Historical Values  Culture Results:   Testing in progress (22 @ 14:00)   Culture Results:   No growth (22 @ 14:00)   Culture Results:   No Growth Final (22 @ 06:45)   Culture Results:   No Growth Final (22 @ 06:40)   Culture Results:   No fungus isolated at 1 week. (22 @ 17:10)   Culture Results:   No growth at 3 days. (22 @ 17:10)   Culture Results:   No acid-fast bacilli isolated at 1 week. ****Acid-fast cultures are held   for 6 weeks.**** (22 @ 17:10)   Culture Results:   Normal Respiratory Keerthi present (22 @ 16:00)   Culture Results:   No acid-fast bacilli isolated at 1 week. ****Acid-fast cultures are held   for 6 weeks.**** (22 @ 13:50)   Culture Results:   No fungus isolated at 1 week. (22 @ 13:50)   Culture Results:   No growth at 5 days (22 @ 13:50)   Culture Results:   No Growth Final (22 @ 05:41)   Culture Results:   No Growth Final (. @ 05:41)   Culture Results:   No fungus isolated after 4 weeks. (11.15. @ 09:00)   Culture Results:   No growth at 5 days (11.15.22 @ 09:00)   Culture Results:   No Growth Final (. @ 21:20)   Culture Results:   No Growth Final (. @ 21:15)   Culture Results:   Greater than or equal to 15 Colonies Enterococcus faecalis (22 @ 08:00)   Culture Results:   Growth in aerobic and anaerobic bottles: Enterococcus faecalis   See previous culture [id number] (22 @ 09:38)   Culture Results:   Growth in aerobic and anaerobic bottles: Enterococcus faecalis   See previous culture [id number] (22 @ 09:30)   Culture Results:   Growth in aerobic and anaerobic bottles: Enterococcus faecalis   See previous culture [id number] (22 @ 10:15)   Culture Results:   Growth in aerobic and anaerobic bottles: Enterococcus faecalis   ***Blood Panel PCR results on this specimen are available   approximately 3 hours after the Gram stain result.***   Gram stain, PCR, and/or culture results may not always   correspond dueto difference in methodologies.   ************************************************************   This PCR assay was performed by multiplex PCR. This   Assay tests for 66 bacterial and resistance gene targets.   Please refer to the [facility] test directory   at [email or web address] for details. (22 @ 10:11)   Culture Results:   Moderate Yeast (22 @ 21:59)   Culture Results:   No acid fast bacilli isolated after 6 weeks. (22 @ 21:59)   Culture Results:   Normal Respiratory Keerthi present (22 @ 21:59)   Culture Results:   Normal Respiratory Keerthi present (22 @ 21:59)   Culture Results:   No growth at 5 days. (22 @ 14:29)   Culture Results:   No growth at 5 days. (22 @ 14:28)   Culture Results:   No fungus isolated after 4 weeks. (21 @ 06:56)   Culture Results:   No growth (21 @ 06:56)   Culture Results:   Normal Respiratory Keerthi present (21 @ 01:04)   Culture Results:   No growth at 5 days. (21 @ 11:29)   Culture Results:   No growth at 5 days. (21 @ 11:29)   Culture Results:   No growth at 5 days. (21 @ 11:59)   Culture Results:   No growth at 5 days. (21 @ 06:16)   Culture Results:   No growth at 5 days. (21 @ 06:15)   Culture Results:   No growth (16 @ 03:05)   =======================================================    71 YO - M with PMH of  hypertension, hyperlipidemia, ESRD on HD, emphysema s/p lung transplant in [place] by Dr. 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 faecalis bacteremia, newly diagnosed HFrEF 25%, AF complicated by GIB was discharged on home IV Abx now presenting with AMS,    Presented to the ED with daughter.     CTH obtained and did not reveal any acute findings.    Patient was admitted to medicine for encephalopathy. LP attempted bedside but unsuccessful. Was on empiric treatment and mental status was improving and then ultimately intubated for aspiration pneumonia    #Acute hypoxic respiratory failure likely 2/2 aspiration- now extubated, mental status improved. Sputum cx normal keerthi    #AMS-unclear etiology, no fever. Ammonia level normal.  ?EBV encephalitis  LP normal cell count,  glucose and protein 51.     CSF pcr (-) and cultures pending.   [facility] micro lab CSF gram stain reported as "few organisms" . gram stain repeated at core lab and micro and is NEGATIVE. initial report has been corrected. CSF crytp titer 1:40, as per [facility] his initial CSF titer in 2020 was 1:2560. no serum titer available. 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 f/u CSf fungal CX. stopped amphotericin/flucytosine and resumed fluconazole suppression post HD  serum crypt Ag 1:40, most likely old infection  EBv CSF PCR/serum + but very low; repeat neg. Unclear if EBV encephalitis , although possible in this immunocompromised pt who appeared to have clinically responded to acyclovir    Unlikely to derive any benefit with ACV in the setting of EBV reactivation/possible encephalitis, but he is on acyclovir to complete total 14 days ( Per ID )     MRI brain unremarkable     Repeat LP on  - CSF cell count/prot/glu unremarkable. CSF PCR neg     E. Fecalis - Bacteremia - . COMPLETED IV ABX  [facility] - P     PT ON [facility]     # S/P  lung transplant-     # HX :  fungal meningitis-  flucytosine/ampho  DISCONTINUED  and resumed fluconazole.     No evidence of CNS crypto

## 2023-01-03 NOTE — PROGRESS NOTE ADULT - ASSESSMENT
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 for E. Faecalis bacteremia now readmitted on 12/13 for altered mental status, sepsis workup including ?acute meningitis, found also with dilated CBD on ultrasound complicated by acute respiratory failure with hypoxia  requiring intubation and transferred to MICU on 12/18, s/p LP and paracentesis, medically extubated on 12/20 and downgraded to medicine service, positive EBV CSF PCR, recurrent encephalopathy, acute respiratory failure s/p reintubation on 1/1/23 and overall guarded prognosis.     Recurrent Acute Encephalopathy  Prior Hx of Cryptococcal Meningitis   Aspiration PNA - completed antibiotic course  Hx of E Faecalis Bacteremia - s/p antibiotic course  - etiology unclear ?recurrent aspiration, EBV encephalitis  - s/p LP 12/19: + cryptococcal Ag but PCR neg   - per ID "EBV CSF PCR/serum + but very low, unclear if EBV encephalitis"  - s/p Amphotericin B and flucytosine as cryptococcal studies believed to be old infection --. given persistent encephalopathy recommended  to restart by ID    - ID input appreciate, recommend repeat LP and cryptococcal studies  - follow up repeat infectious workup     Dilated CBD   - s/p MRCP 12/25 normal caliber CBD    ESRD on HD   - since 2020 after presumed contrast induced nephropathy for CAD with stenting   - mgnt per nephrology  - avoid nephrotoxic agents    Hx of Bilateral Lung Transplant for ES COPD  - in 2015, follows closely with [facility]   - on tacrolimus and hydrocortisone; home cellcept on hold pending resolution of acute infection     Palliative Care Encounter/Goals of care  - Surrogate/HCP/Guardian: wife Veronica Marshall [phone]  - Palliative reconsult to assist with goc pending ongoing workup. Per hospitalist last discussion with wife, she remains hopeful for further clinical improvement and wishes to continue active treatment.   - Per writer's last discussion with patient on 12/22 when he was of sound mind with appropriate medical capacity, he acknowledged his complex comorbidities and  expressed wishes to continue pursuit of ongoing medical mgnt including HD and remain full code at that time.     Palliative will support and follow up with wife for ongoing goc pending workup and clinical progress. Very guarded prognosis given multitude of complex medical conditions and high risk of reintubation if unable to protect airway due to poor mentation.  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 Faecalis bacteremia now readmitted on 12/13 for altered mental status, sepsis workup including ?acute meningitis, found also with dilated CBD on ultrasound complicated by acute respiratory failure with hypoxia  requiring intubation and transferred to MICU on 12/18, s/p LP and paracentesis, medically extubated on 12/20 and downgraded to medicine service, positive EBV CSF PCR, recurrent encephalopathy, acute respiratory failure s/p reintubation on 1/1/23 and overall guarded prognosis.     Recurrent Acute Encephalopathy  Prior Hx of Cryptococcal Meningitis   Aspiration PNA - completed antibiotic course  Hx of E Faecalis Bacteremia - s/p antibiotic course  - etiology unclear  ?seizure/infection/encephalitis, mengingitis  - s/p LP 12/19: + cryptococcal Ag but PCR neg, EBV CSF PCR/serum + but very low, ?EBV encephalitis  - s/p repeat LP 12/31 with CSF studies unremarkable thus far  - off ampho/flucytosine as no evidence of CNS crypto, c/w fluconazole  - empiric acyclovir to complete 14 days in the setting of EBV reactivation/encephalitis per ID  - cEEG ongoing, on valproic for seizure  - meropenem for suspicion for pna  - monitor neuro status closely  - supportive care, reorientation, sleep hygiene    Acute Respiratory Failure  - reintubated on 1/1/23, wean as tolerated    ESRD on HD   - since 2020 after presumed contrast induced nephropathy for CAD with stenting   - mgnt per nephrology  - avoid nephrotoxic agents    Hx of Bilateral Lung Transplant for ES COPD  - in 2015, follows closely with [facility]   - on tacrolimus and hydrocortisone; home cellcept on hold pending resolution of acute infection     Palliative Care Encounter/Goals of care  - Surrogate/HCP/Guardian: wife Veronica Marshall [phone]   - Pt remains critically ill and continues to be medically optimized  - Met with wife Veronica to offer psychosocial support. She remains hopeful that Shukri will pull through again, taking it a day at a time and has support of daughter. She shared that it was 8 year anniversary since lung transplant this past weekend. C remains to continue all active medical interventions and remain full code. Emotional support provided and all questions answered.

## 2023-01-03 NOTE — PROGRESS NOTE ADULT - SUBJECTIVE AND OBJECTIVE BOX
[facility] PALLIATIVE MEDICINE     CC:  Reconsulted for ongoing GOC given progressive clinical deterioration     INTERVAL HPI/OVERNIGHT EVENTS:  Source if other than patient:  []Family   [x]Team     Upgraded to MICU on 12/29 due to persistent encephalopathy.  Placed back on cEEG with notable captured myoclonic seizure and started on valproic per neuro/epilepsy team. S/P repeat LP 12/31 with CSF studies unrevealing. Subsequently reintubated on 1/1/23 due to worsening mentation and inability to protect airway.  Remains critically ill, was receiving HD during visit and wife at bedside.     PRESENT SYMPTOMS:     Dyspnea: intubated  Nausea/Vomiting:  No  Anxiety:   No  Depression: unable to obtain   Fatigue: Yes    Loss of appetite: NPO  Constipation:  No    Pain: No overt sign of distress            Character-            Duration-            Effect-            Factors-            Frequency-            Location-            Severity-    Pain AD Score:  [email or web address] (press ctrl + left click to view)    Review of Systems: Unable to obtain due to poor mentation/encephalopathy     MEDICATIONS  (STANDING):  acyclovir IVPB 320 milliGRAM(s) IV Intermittent every 24 hours  albuterol/ipratropium for Nebulization 3 milliLiter(s) Nebulizer every 6 hours  chlorhexidine 2% Cloths 1 Application(s) Topical <User Schedule>  dexMEDEtomidine Infusion 0.5 MICROgram(s)/kG/Hr (7.91 mL/Hr) IV Continuous <Continuous>  dextrose 50% Injectable 25 Gram(s) IV Push once  dextrose 50% Injectable 12.5 Gram(s) IV Push once  dextrose 50% Injectable 25 Gram(s) IV Push once  dextrose Oral Gel 15 Gram(s) Oral once  doxazosin 2 milliGRAM(s) Oral at bedtime  epoetin ben-epbx (RETACRIT) Injectable 35136 Unit(s) IV Push <User Schedule>  fluconAZOLE   Tablet 200 milliGRAM(s) Oral <User Schedule>  heparin  Infusion.  Unit(s)/Hr (11 mL/Hr) IV Continuous <Continuous>  hydrocortisone 10 milliGRAM(s) Oral every 12 hours  meropenem Injectable      meropenem Injectable 500 milliGRAM(s) IV Push every 24 hours  metoprolol tartrate 25 milliGRAM(s) Enteral Tube four times a day  pantoprazole  Injectable 40 milliGRAM(s) IV Push daily  tacrolimus 1.5 milliGRAM(s) Oral daily  trimethoprim  40 mG/sulfamethoxazole 200 mG Suspension 80 milliGRAM(s) Oral <User Schedule>  valproate sodium  IVPB 250 milliGRAM(s) IV Intermittent every 8 hours    MEDICATIONS  (PRN):  fentaNYL    Injectable 50 MICROGram(s) IV Push every 4 hours PRN agitation  heparin   Injectable 5000 Unit(s) IV Push every 6 hours PRN For aPTT less than 40  heparin   Injectable 2500 Unit(s) IV Push every 6 hours PRN For aPTT between 40 - 57  midazolam Injectable 2 milliGRAM(s) IV Push every 2 hours PRN agitation    PHYSICAL EXAM:    Vital Signs Last 24 Hrs  T(C): 36.4 (03 Jan 2023 12:01), Max: 36.9 (02 Jan 2023 23:15)  T(F): 97.6 (03 Jan 2023 12:01), Max: 98.4 (02 Jan 2023 23:15)  HR: 72 (03 Jan 2023 15:17) (54 - 94)  BP: 133/68 (03 Jan 2023 15:00) (92/41 - 145/78)  BP(mean): 84 (03 Jan 2023 15:00) (55 - 119)  RR: 15 (03 Jan 2023 15:00) (0 - 22)  SpO2: 99% (03 Jan 2023 15:17) (99% - 100%)    Parameters below as of 03 Jan 2023 15:17  Patient On (Oxygen Delivery Method): ventilator    Karnofsky:  20%    GEN: ill appearing. resting comfortably and no acute distress    HEENT: mucous membrane moist +ETT    Lungs: comfortable, nonlabored     CV: +s1/s2 regular rate and rhythm      GI: +BS, abdomen soft, nontender, nondistended      :  condom cath, anuric    MSK:  no cyanosis or edema.      NEURO: nonfocal. lethargic.  intermittently responsive to tactile stimuli    Skin: warm and dry.     LABS:                           10.1   5.96  )-----------( 101      ( 03 Jan 2023 14:03 )             29.4     01-03    138  |  100  |  11.3  ----------------------------<  128<H>  4.0   |  29.0  |  2.68<H>    Ca    8.1<L>      03 Jan 2023 14:03  Phos  1.7     01-03  Mg     2.3     01-03    TPro  x   /  Alb  2.5<L>  /  TBili  x   /  DBili  x   /  AST  x   /  ALT  x   /  AlkPhos  x   01-03      RADIOLOGY & ADDITIONAL STUDIES:     CXR    ACC: [id number] EXAM:  XR CHEST PORTABLE URGENT 1V                          PROCEDURE DATE:  01/01/2023          INTERPRETATION:  CLINICAL STATEMENT: Hypoxia    TECHNIQUE: AP view of the chest.      COMPARISON: 12/27/2022    Limited by positioning with patient's chin/oxygen mask overlying the   right upper lung/apex.    Right upper extremity PICC line catheter and enteric catheter again noted.    Sternotomy sutures and CABG clips. Coronary artery stent. New complete   opacification left lung. Left heart border is silhouetted limiting   evaluation of heart size. Mild atelectatic change at the right base.    No significant osseous change.    IMPRESSION:    New complete opacification left lung, presumed atelectasis/collapse   related to mucous plug.    --- End of Report ---      NONA KELLY MD; Attending Radiologist  This document has been electronically signed. Jan 2 2023 10:26AM    [facility]     ACC: [id number] EXAM:  CT BRAIN                          PROCEDURE DATE:  01/02/2023          INTERPRETATION:  EXAM: CT head without contrast    INDICATION: Altered mental status    TECHNIQUE: CT examination of the head performed without contrast.   Multiple axial images obtained from skull base to vertex.   Sagittal/coronal reformatted images obtained from axial data set. Images   reviewed in soft tissue and bone windows.      COMPARISON: December 29, 2022 CT head.    FINDINGS:    Ventricles and sulci are mildly proportionately prominent likely related   to mild parenchymal volume loss. No hydrocephalus. No extra-axial fluid   collection identified.    No acute intracranial hemorrhage identified. There is mild subcortical   and periventricular white matter attenuation nonspecific but favored to   reflect sequela of mild chronic microvascular ischemia. Gray-white   differentiation is preserved without CT evidence for acute transcortical   infarction. There is no significant midline shift, mass effect or   herniation.    Bilateral lens replacement.    Sellar and suprasellar region are unremarkable in appearance.    There is moderate polypoid mucosal thickening of ethmoid, sphenoid and   maxillary sinuses. Redemonstration mild to moderate of bilateral mastoid   air cells, right greater than left as well as right middle ear cavity. No   significant cerebellar tonsillar herniation.    No displaced calvarial fractures are identified. No suspicious expansile   or destructive calvarial lesion identified. No significant scalp soft   tissue swelling identified.          IMPRESSION:    No acute intracranial hemorrhage, hydrocephalus or extra-axial fluid   collection.  Mild parenchymal volume loss and mild chronic microvascular ischemic   changes.  No CT evidence for acute transcortical infarction. Please note that MR   imaging is a more sensitive imaging modality for detection of acute   infarction and may be obtained as clinically warranted.    If clinicians have any questions/need for clarification regarding this   report, Dr. Tommy Nichole can be best reached via the patient secure   hospital email system    --- End of Report ---    TOMMY NICHOLE MD; Attending Radiologist  This document has been electronically signed. Jan 2 2023  5:24PM    ADVANCE DIRECTIVES/TREATMENT PREFERENCES: FULL CODE     NEUROLOGICAL MEDICATIONS/OPIOIDS/BENZODIAZEPINE IN PAST 24 HOURS:  dexMEDEtomidine Infusion   7.91 mL/Hr IV Continuous (01-03-23 @ 02:11)   7.91 mL/Hr IV Continuous (01-03-23 @ 02:04)    fentaNYL    Injectable   50 MICROGram(s) IV Push (01-03-23 @ 02:09)    fentaNYL    Injectable   50 MICROGram(s) IV Push (01-03-23 @ 12:37)   50 MICROGram(s) IV Push (01-03-23 @ 01:33)   50 MICROGram(s) IV Push (01-02-23 @ 21:02)   50 MICROGram(s) IV Push (01-02-23 @ 16:18)    midazolam Injectable   2 milliGRAM(s) IV Push (01-03-23 @ 15:07)   2 milliGRAM(s) IV Push (01-03-23 @ 00:47)   2 milliGRAM(s) IV Push (01-02-23 @ 22:37)   2 milliGRAM(s) IV Push (01-02-23 @ 19:38)    valproate sodium  IVPB   52.5 mL/Hr IV Intermittent (01-03-23 @ 12:47)   52.5 mL/Hr IV Intermittent (01-03-23 @ 04:35)   52.5 mL/Hr IV Intermittent (01-02-23 @ 20:50)

## 2023-01-03 NOTE — CHART NOTE - NSCHARTNOTEFT_GEN_A_CORE
Source: Patient [ ]  Family [ ]   other [ x]    Current Diet: Diet, NPO with Tube Feed:   Tube Feeding Modality: Nasogastric  Nepro (NEPRORTH)  Total Volume for 24 Hours (mL): 1000  Continuous  Starting Tube Feed Rate {mL per Hour}: 20  Increase Tube Feed Rate by (mL): 10     Every 6 hours  Until Goal Tube Feed Rate (mL per Hour): 50  Tube Feed Duration (in Hours): 20  Tube Feed Start Time: 12:00 (01-01-23 @ 10:57)    Enteral /Parenteral Nutrition: Tube feeds at goal rate of 50 ml/hr (x20 hrs) provides 1000 ml, 1800 kcal, 81g protein, 727 ml free water, and 100% of RDIs for vitamins/minerals.     Current Weight:   (1/3) 141 lbs  (12/31) 151 lbs  (12/29) 146.1 lbs  (12/28) 107.1 lbs  (12/27) 133.3 lbs  (12/25) 123.2 lbs  (12/24) 123 lbs  (12/23) 110 lbs  (12/22) 102.9 lbs  (12/21) 129.4 lbs  (12/20) 138.8 lbs  (12/18) 137.7 lbs  (12/16) 138.2 lbs  (12/14) 138 lbs  ? accuracy of weights, noted with 3+ b/l arm edema, continue to trend and maintain strict Is&Os     Pertinent Medications: MEDICATIONS  (STANDING):  acetylcysteine 20%  Inhalation 4 milliLiter(s) Inhalation every 6 hours  acyclovir IVPB 320 milliGRAM(s) IV Intermittent every 24 hours  albuterol/ipratropium for Nebulization 3 milliLiter(s) Nebulizer every 6 hours  chlorhexidine 2% Cloths 1 Application(s) Topical <User Schedule>  dexMEDEtomidine Infusion 0.5 MICROgram(s)/kG/Hr (7.91 mL/Hr) IV Continuous <Continuous>  dextrose 50% Injectable 25 Gram(s) IV Push once  dextrose 50% Injectable 12.5 Gram(s) IV Push once  dextrose 50% Injectable 25 Gram(s) IV Push once  dextrose Oral Gel 15 Gram(s) Oral once  fluconAZOLE   Tablet 200 milliGRAM(s) Oral <User Schedule>  heparin  Infusion.  Unit(s)/Hr (11 mL/Hr) IV Continuous <Continuous>  hydrocortisone 10 milliGRAM(s) Oral every 12 hours  meropenem Injectable      meropenem Injectable 500 milliGRAM(s) IV Push every 24 hours  metoprolol tartrate 25 milliGRAM(s) Enteral Tube four times a day  pantoprazole  Injectable 40 milliGRAM(s) IV Push daily  potassium chloride  10 mEq/100 mL IVPB 10 milliEquivalent(s) IV Intermittent every 1 hour  sevelamer carbonate Powder 800 milliGRAM(s) Oral three times a day with meals  tacrolimus 1.5 milliGRAM(s) Oral daily  tamsulosin 0.4 milliGRAM(s) Oral at bedtime  trimethoprim  40 mG/sulfamethoxazole 200 mG Suspension 80 milliGRAM(s) Oral <User Schedule>  valproate sodium  IVPB 250 milliGRAM(s) IV Intermittent every 8 hours    MEDICATIONS  (PRN):  fentaNYL    Injectable 50 MICROGram(s) IV Push every 4 hours PRN agitation  heparin   Injectable 5000 Unit(s) IV Push every 6 hours PRN For aPTT less than 40  heparin   Injectable 2500 Unit(s) IV Push every 6 hours PRN For aPTT between 40 - 57  midazolam Injectable 2 milliGRAM(s) IV Push every 2 hours PRN agitation    Pertinent Labs: CBC Full  -  ( 03 Jan 2023 04:40 )  WBC Count : 4.16 K/uL  RBC Count : 2.83 M/uL  Hemoglobin : 8.9 g/dL  Hematocrit : 25.8 %  Platelet Count - Automated : 141 K/uL  Mean Cell Volume : 91.2 fl  Mean Cell Hemoglobin : 31.4 pg  Mean Cell Hemoglobin Concentration : 34.5 gm/dL  Auto Neutrophil # : 3.45 K/uL  Auto Lymphocyte # : 0.11 K/uL  Auto Monocyte # : 0.41 K/uL  Auto Eosinophil # : 0.04 K/uL  Auto Basophil # : 0.00 K/uL  Auto Neutrophil % : 82.9 %  Auto Lymphocyte % : 2.7 %  Auto Monocyte % : 9.9 %  Auto Eosinophil % : 0.9 %  Auto Basophil % : 0.0 %    01-03 Na136 mmol/L Glu 112 mg/dL<H> K+ 3.2 mmol/L<L> Cr  5.22 mg/dL<H> BUN 27.0 mg/dL<H> Phos 3.3 mg/dL Alb n/a   PAB n/a       Skin: IAD per documentation    Nutrition focused physical exam not conducted at this time- found signs of malnutrition [ ]absent [ ]present    Subcutaneous fat loss: [ ] Orbital fat pads region, [ ]Buccal fat region, [ ]Triceps region,  [ ]Ribs region    Muscle wasting: [ ]Temples region, [ ]Clavicle region, [ ]Shoulder region, [ ]Scapula region, [ ]Interosseous region,  [ ]thigh region, [ ]Calf region    Current Nutrition Diagnosis: Pt remains at nutrition risk secondary to increased nutrient needs related to increased physiological demand for nutrients as evidenced by ESRD on HD, acute toxic-metabolic encephalopathy, acute mixed hypercapnic/hypoxemic respiratory failure, aspiration pneumonitis/pneumonia, COPD s/p lung transplant, atrial fibrillation, cirrhosis with ascites. Pt now intubated, EEG in place. Tube feeds infusing @ goal rate of 50 ml/hr. Last BM 1/2. RD to follow up.    Recommendations:   1) Consider increase tube feeds to goal rate of 55 ml/hr (x20 hrs) to provide 1100 ml, 1980 kcal, 89g protein, 800 ml free water, and >100% of RDIs for vitamins/minerals. Additional free water per MD discretion.   2) Rx: Nephro-Nishant daily.  3) Monitor tube feed tolerance.  4) Obtain daily weights to monitor trends.     Monitoring and Evaluation:   [ ] PO intake [x ] Tolerance to diet prescription [X] Weights  [X] Follow up per protocol [X] Labs: chem 8, mg, phos, H/H

## 2023-01-03 NOTE — PROGRESS NOTE ADULT - ASSESSMENT
72M with PMH of  hypertension, hyperlipidemia, ESRD on HD, emphysema s/p lung transplant in [place] by Dr. 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 faecalis bacteremia, newly diagnosed HFrEF 25%, AF complicated by GIB was discharged on home IV Abx now presenting with AMS  Presented to the ED with daughter. CTH obtained and did not reveal any acute findings.  Patient was admitted to medicine for encephalopathy. LP attempted bedside but unsuccessful. Was on empiric treatment and mental status was improving and then ultimately intubated for aspiration pneumonia    #Acute hypoxic respiratory failure likely 2/2 aspiration- now extubated, mental status improved. sputum cx normal keerthi    #AMS-unclear etiology, no fever. ammonia level normal.  ct c/ap ?pna/edema/ascites.   ??EBV encephalitis  s/p LP normal cell counts glucose and protein 51. CSF pcr (-) and cultures pending.   [facility] micro lab CSF gram stain reported as "few organisms" . gram stain repeated at core lab and micro and is NEGATIVE. initial report has been corrected. CSF crytp titer 1:40, as per [facility] his initial CSF titer in 12/2020 was 1:2560. no serum titer available. 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 f/u CSf fungal CX. stopped amphotericin/flucytosine and resumed fluconazole suppression post HD  serum crypt Ag 1:40, most likely old infection  EBv CSF PCR/serum + but very low; repeat neg. Unclear if EBV encephalitis , although possible in this immunocompromised pt who appeared to have clinically responded to acyclovir    Unlikely to derive any benefit with ACV in the setting of EBV reactivation/possible encephalitis, but he is on acyclovir to complete total 14 days  MRI brain unremarkable     Repeat LP on 12/31 - CSF cell count/prot/glu unremarkable. CSF PCR neg     ..#h/o e.faecalis bacteremia - . COMPLETED IV ABX  CRISTIAN was deferred by cardio on recent admission.     PT ON [facility]     # s/p lung transplant-     # h/o fungal meningitis-  flucytosine/ampho  DISCONTINUED  and resumed fluconazole. No evidence of CNS crypto  WILL FOLLOW UP

## 2023-01-03 NOTE — PROGRESS NOTE ADULT - SUBJECTIVE AND OBJECTIVE BOX
[facility]  INFECTIOUS DISEASES at [facility]  ===============================================================                               J Carlos Galdamez MD*     Perlita Solares MD*                         Isatu Rojas MD*       Sumaya Morales MD*            Diplomates American Board of Internal Medicine & Infectious Diseases                * [place] Office - Appt - Tel  [phone] Fax [phone]                * [place] Office - Appt - Tel [phone] Fax [phone]                                  Hospital Consult line:  [phone]  ==============================================================    JU FERGUSON [id number]    Follow up:    Seen in the ICU  PN VENT     AWAKE   S/p LP         I have personally reviewed the labs and data; pertinent labs and data are listed in this note; please see below.     _______________________________________________________________  REVIEW OF SYSTEMS  Unable to obtain due to medical condition - altered mental status/intubated  ________________________________________________________________  Allergies:  budesonide (Unknown)  cefpodoxime (Unknown)  Pollen (Unknown)    V Vital Signs Last 24 Hrs  T(C): 36.4 (03 Jan 2023 07:46), Max: 36.9 (02 Jan 2023 23:15)  T(F): 97.5 (03 Jan 2023 07:46), Max: 98.4 (02 Jan 2023 23:15)  HR: 59 (03 Jan 2023 08:00) (54 - 102)  BP: 107/51 (03 Jan 2023 08:00) (85/61 - 164/109)  BP(mean): 65 (03 Jan 2023 08:00) (55 - 126)  RR: 12 (03 Jan 2023 08:00) (0 - 27)  SpO2: 100% (03 Jan 2023 08:00) (96% - 100%)    Parameters below as of 03 Jan 2023 08:00  Patient On (Oxygen Delivery Method): ventilator        Parameters below as of 02 Jan 2023 07:15  Patient On (Oxygen Delivery Method): ventilator    O2 Concentration (%): 30    ________________________________________________________________  PHYSICAL EXAM  GEN: AWAKE ON VENT   HEENT: ETT  LUNGS: mechanically ventilated CTA anteriorly   HEART: RRR, no m/r/g  ABDOMEN: ND, + BS  EXTREMITIES: UEs edematous   NEUROLOGIC: unresponsive   SKIN: ecchymoses  LINES: LUE AVG  ________________________________________________________________            Current Antibiotics:  fluconAZOLE   Tablet 200 milliGRAM(s) Oral <User Schedule>  meropenem Injectable      meropenem Injectable 500 milliGRAM(s) IV Push every 24 hours  trimethoprim   80 mG/sulfamethoxazole 400 mG 1 Tablet(s) Oral <User Schedule>    Other medications:  acetylcysteine 20%  Inhalation 4 milliLiter(s) Inhalation every 6 hours  albuterol/ipratropium for Nebulization 3 milliLiter(s) Nebulizer every 6 hours  chlorhexidine 2% Cloths 1 Application(s) Topical <User Schedule>  dextrose 50% Injectable 25 Gram(s) IV Push once  dextrose 50% Injectable 12.5 Gram(s) IV Push once  dextrose 50% Injectable 25 Gram(s) IV Push once  dextrose Oral Gel 15 Gram(s) Oral once  fentaNYL   Infusion. 0.5 MICROgram(s)/kG/Hr IV Continuous <Continuous>  heparin  Infusion.  Unit(s)/Hr IV Continuous <Continuous>  hydrocortisone 10 milliGRAM(s) Oral every 12 hours  metoprolol tartrate 25 milliGRAM(s) Enteral Tube four times a day  norepinephrine Infusion 0.05 MICROgram(s)/kG/Min IV Continuous <Continuous>  pantoprazole  Injectable 40 milliGRAM(s) IV Push daily  sevelamer carbonate 800 milliGRAM(s) Oral three times a day with meals  tacrolimus 1.5 milliGRAM(s) Oral daily  tamsulosin 0.4 milliGRAM(s) Oral at bedtime  valproate sodium  IVPB 250 milliGRAM(s) IV Intermittent every 8 hours                                        8.9    4.16  )-----------( 141      ( 03 Jan 2023 04:40 )             25.8   01-03    136  |  97  |  27.0<H>  ----------------------------<  112<H>  3.2<L>   |  27.0  |  5.22<H>    Ca    8.0<L>      03 Jan 2023 04:40  Phos  3.3     01-03  Mg     2.3     01-03      RECENT CULTURES:  12-31 @ 14:00 .CSF CSF       No polymorphonuclear leukocytes seen  No organisms seen  by cytocentrifuge    Herpes Simplex Virus 1/2 PCR, CSF (12.31.22 @ 14:00)    Source HSV 1/2: CSF    HSV 1/2 PCR: [facility]: HSV1/2 PCR assay is a real-time PCR test that detects and differentiates  HSV-1 and/or HSV-2 DNA in CSF (Vitreous Fluid). The results of this test  should be interpreted with consideration of all clinical and laboratory  findings.    Cryptococcal Antigen - CSF (12.31.22 @ 14:00)    Cryptococcal Antigen - CSF: Negative    CSF PCR Panel (12.31.22 @ 14:00)    CSF PCR Result: NotDete    Protein, CSF (12.31.22 @ 14:00)    Protein, CSF: 60 mg/dL    Glucose, CSF (12.31.22 @ 14:00)    Glucose, CSF: 43 mg/dLG/24h    Cerebrospinal Fluid Cell Count-1 (12.31.22 @ 14:00)    RBC Count - Spinal Fluid: 1 /cmm    Tube Type: Tube 1    CSF Appearance: Clear    CSF Neutrophils: N/A %    Appearance Spun: Colorless    CSF Color: No Color    Total Nucleated Cell Count, CSF: 2 /uL      12-28 @ 06:45 .Blood Blood     No growth to date.    12-28 @ 06:40 .Blood Blood     No growth to date.    12-19 @ 17:10 .CSF CSF     No acid-fast bacilli isolated at 1 week. ****Acid-fast cultures are held  for 6 weeks.****    No polymorphonuclear leukocytes seen per low power field  No organisms seen per oil power field  by cytocentrifuge      12-19 @ 16:00 .Sputum Sputum     Normal Respiratory Charis present    Moderate polymorphonuclear leukocytes per low power field  Few Squamous epithelial cells per low power field  Few Gram positive cocci in pairs seen per oil power field  Few Yeast like cells seen per oil power field  Few Gram Negative Rods seen per oil power field      12-19 @ 13:50 Paracentesis Paracentesis Fluid     No growth at 5 days    No polymorphonuclear leukocytes seen per low power field  No organisms seen per oil power field    WBC Count: 4.61 K/uL (01-01-23 @ 03:20)  WBC Count: 5.40 K/uL (12-31-22 @ 16:03)  WBC Count: 5.87 K/uL (12-31-22 @ 05:30)  WBC Count: 6.07 K/uL (12-30-22 @ 04:30)  WBC Count: 7.52 K/uL (12-29-22 @ 06:06)  WBC Count: 8.84 K/uL (12-28-22 @ 06:45)    Creatinine, Serum: 3.54 mg/dL (01-01-23 @ 03:20)  Creatinine, Serum: 4.75 mg/dL (12-31-22 @ 05:30)  Creatinine, Serum: 3.83 mg/dL (12-30-22 @ 04:30)  Creatinine, Serum: 5.94 mg/dL (12-29-22 @ 06:06)  Creatinine, Serum: 4.86 mg/dL (12-28-22 @ 06:45)        Procalcitonin, Serum: 0.58 ng/mL (12-30-22 @ 04:30)     SARS-CoV-2 Result: NotDete (12-13-22 @ 02:17)    ________________________________________________________________  RADIOLOGY  < from: CT Chest No Cont (12.29.22 @ 16:03) >  INTERPRETATION:  Clinical information: Evaluate for aspiration pneumonia.   Exam is compared to previous study of 12/23/2022.    CT scan of the chest was obtained without administration of intravenous   contrast.    No hilar and or mediastinal adenopathy is noted.    Heart is enlarged in size. Patient is status post median sternotomy.   Small pericardial effusion is noted. Right-sided PICC line catheter is   noted in place.    No endobronchial lesions are noted. Interlobular septal thickening and   thickening of the peribronchovascular interstitium are noted within both   lungs. Minimal patchy groundglass opacities are also noted in both upper   lobes. Minimal atelectasis is also noted involving both lower lobes.   Small bilateral pleural effusions are noted.    Below the diaphragm, visualized portions of the abdomen demonstrate   nodular contour to the surface of liver. Cholelithiasis is noted. Both   kidneys are small in size. Low-attenuation lesion in the right kidney is   too small to be adequately characterized on this exam. Tip of the   nasogastric tube is noted in the stomach. Ascites is noted.    Degenerative changes of the spine as well as loss of height of multiple   vertebral bodies is noted. Subcutaneous edema is noted.    IMPRESSION: Findings suggestive of pulmonary edema.    < end of copied text >

## 2023-01-03 NOTE — PROGRESS NOTE ADULT - ASSESSMENT
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 CT head is negative. Hospital course is notable for empiric treatment for meningitis; EBV positive in CSF; also with dilated common bile duct; acutely decompensated on 12/18 with acute hypercapnic respiratory failure in setting of aspiration pneumonia, emergently intubated, later extubated; also shock (resolved). Nephrology is managing ESRD on HD.    -Access: L AVF  -Outpatient dialysis days are Tuesdays Thursdays Saturdays    -Blood pressures Soft,    -Anemia in setting of CKD - hemoglobin is at goal; no indication for MENDY at this time    -Mineral Bone Disease in setting of CKD -   -s/p Lung transplantation - immunosuppressive management as per ID  -EBV in CSF, meningitis ,    Patient was seen and evaluated on dialysis.   Patient is tolerating the procedure well.   T(C): 36.4 (01-03-23 @ 07:46), Max: 36.9 (01-02-23 @ 23:15)  HR: 58 (01-03-23 @ 09:00) (54 - 102)  BP: 92/48 (01-03-23 @ 09:00) (85/61 - 164/109)  Continue dialysis: TIW  Dialyzer:   Revaclear 300       QB:  350 ml.,       QD: 500ml.,  Goal UF _0 ml., _ over _3_ Hours     Pt is seen and examined on dialysis. No symptoms. Hemodynamics stable. Tolerating dialysis and ultrafiltration.  Pre Laboratory values personally reviewed by me.  Dialysis adjusted appropriately based on current values.  Will continue the current medical management.  Next hemodialysis as scheduled.  Discussed with nursing, primary care team.

## 2023-01-03 NOTE — EEG REPORT - NS EEG TEXT BOX
[facility] PROGRESS NOTE    2/2/2022 8:20 AM        Name: David Brunner . Admitted: 2/1/2022  Primary Care Provider: Jazmin Ayala MD (Tel: [phone])      Subjective:  . Seen this am with RN at bedside   Some wheezing noted to ausculation  Reports breathing is a little better.   He is not on oxygen at home    Recent hospitalization required intubation      Smoke free for 1 month      Reviewed interval ancillary notes    Current Medications  albuterol sulfate  (90 Base) MCG/ACT inhaler 2 puff, Q4H PRN  atorvastatin (LIPITOR) tablet 80 mg, Daily  carvedilol (COREG) tablet 25 mg, BID WC  fluticasone (FLONASE) 50 MCG/ACT nasal spray 1 spray, Daily  NIFEdipine (PROCARDIA XL) extended release tablet 60 mg, Daily  0.9 % sodium chloride infusion, Continuous  sodium chloride flush 0.9 % injection 5-40 mL, 2 times per day  sodium chloride flush 0.9 % injection 5-40 mL, PRN  0.9 % sodium chloride infusion, PRN  polyethylene glycol (GLYCOLAX) packet 17 g, Daily PRN  acetaminophen (TYLENOL) tablet 650 mg, Q6H PRN   Or  acetaminophen (TYLENOL) suppository 650 mg, Q6H PRN  albuterol (PROVENTIL) nebulizer solution 2.5 mg, Q4H PRN  insulin lispro (1 Unit Dial) 0-6 Units, TID WC  insulin lispro (1 Unit Dial) 0-3 Units, Nightly  glucose (GLUTOSE) 40 % oral gel 15 g, PRN  dextrose 50 % IV solution, PRN  glucagon (rDNA) injection 1 mg, PRN  dextrose 5 % solution, PRN  warfarin placeholder: dosing by pharmacy, RX Placeholder  ipratropium-albuterol (DUONEB) nebulizer solution 1 ampule, Q4H        Objective:  /72   Pulse 117   Temp 97.9 °F (36.6 °C) (Temporal)   Resp 28   Ht 5' 8\" (1.727 m)   Wt 160 lb 1.6 oz (72.6 kg)   SpO2 95%   BMI 24.34 kg/m²     Intake/Output Summary (Last 24 hours) at 2/2/2022 0820  Last data filed at 2/2/2022 0437  Gross per 24 hour   Intake --   Output 1100 ml   Net -1100 ml      [facility]   COMPREHENSIVE EPILEPSY CENTER   REPORT OF LONG-TERM VIDEO EEG     [facility]: [address], [place] [address], Ph#: [phone]  [facility]: [address] [place] [address], Ph#: [phone]  [facility]: [address] [place] [address], Ph#: [phone]    Patient Name: JU FERGUSON  Age and : 72y (46)  MRN #: [id number]  Location: [address]  Referring Physician: Gladis Ko    Start Time/Date: 0:800  on 23  End Time/Date: 08:00 on 1-3-23  Duration:  24h     _____________________________________________________________  STUDY INFORMATION    EEG Recording Technique:  The patient underwent continuous Video-EEG monitoring, using Telemetry System hardware on the XLTek Digital System. EEG and video data were stored on a computer hard drive with important events saved in digital archive files. The material was reviewed by a physician (electroencephalographer / epileptologist) on a daily basis. Brad and seizure detection algorithms were utilized and reviewed. An EEG Technician attended to the patient, and was available throughout daytime work hours.  The epilepsy center neurologist was available in person or on call 24-hours per day.    EEG Placement and Labeling of Electrodes:  The EEG was performed utilizing 20 channel referential EEG connections (coronal over temporal over parasagittal montage) using all standard 10-20 electrode placements with EKG, with additional electrodes placed in the inferior temporal region using the modified 10-10 montage electrode placements for elective admissions, or if deemed necessary. Recording was at a sampling rate of 256 samples per second per channel. Time synchronized digital video recording was done simultaneously with EEG recording. A low light infrared camera was used for low light recording.     _____________________________________________________________  HISTORY    Patient is a 72y old  Male who presents with a chief complaint of Encephalopathy (21 Dec 2022 11:30)    PERTINENT MEDICATION:  valproate sodium  IVPB 250 milliGRAM(s) IV Intermittent every 8 hours    _____________________________________________________________  STUDY INTERPRETATION    Findings: The background was reactive and continuous. During wakefulness, no posterior dominant rhythm seen.    Background Slowing:  Diffuse theta and polymorphic delta slowing.    Focal Slowing:   None were present.    Sleep Background:  Drowsiness was characterized by fragmentation, attenuation, and slowing of the background activity.    Stage II sleep transients were not recorded.    Other Non-Epileptiform Findings:  none     Interictal Epileptiform Activity:   No definite epileptiform discharges.    Events:   Intermit myoclonic jerks of head and upper extremities without a clear ictal correlate on EEG.    Activation Procedures:   Hyperventilation was not performed.    Photic stimulation was not performed.     Artifacts:  Intermittent myogenic and movement artifacts were noted.    ECG:  The heart rate on single channel ECG was irregularly irregular predominantly between 70-90 BPM.    _____________________________________________________________  EEG SUMMARY/CLASSIFICATION    Abnormal EEG in an altered patient.  1. Intermittent myoclonic jerks, without a clear ictal correlate on EEG.  2. Background slowing, Moderate to severe, generalized.    _____________________________________________________________  EEG IMPRESSION/CLINICAL CORRELATE    Abnormal EEG study.  1. Intermittent  myoclonic jerks without a clear EEG correlate, however myoclonic seizures cannot be excluded.   2. Moderate to severe nonspecific diffuse or multifocal cerebral dysfunction.     _____________________________________________________________  Domitila Garcia MD  Director, Epilepsy/EMU [facility]     [facility]   COMPREHENSIVE EPILEPSY CENTER   REPORT OF LONG-TERM VIDEO EEG     [facility]: [address], [place] [address], Ph#: [phone]  [facility]: [address] [place] [address], Ph#: [phone]  [facility]: [address] [place] [address], Ph#: [phone]    Patient Name: JU FERGUSON  Age and : 72y (46)  MRN #: [id number]  [facility]: [facility] [address] [address]  Referring Physician: Gladis oK    Start Time/Date: 0:800  on 23  End Time/Date:15:39 on 1-3-23  Duration: 31h 37m     _____________________________________________________________  STUDY INFORMATION    EEG Recording Technique:  The patient underwent continuous Video-EEG monitoring, using Telemetry System hardware on the XLTek Digital System. EEG and video data were stored on a computer hard drive with important events saved in digital archive files. The material was reviewed by a physician (electroencephalographer / epileptologist) on a daily basis. Brad and seizure detection algorithms were utilized and reviewed. An EEG Technician attended to the patient, and was available throughout daytime work hours.  The epilepsy center neurologist was available in person or on call 24-hours per day.    EEG Placement and Labeling of Electrodes:  The EEG was performed utilizing 20 channel referential EEG connections (coronal over temporal over parasagittal montage) using all standard 10-20 electrode placements with EKG, with additional electrodes placed in the inferior temporal region using the modified 10-10 montage electrode placements for elective admissions, or if deemed necessary. Recording was at a sampling rate of 256 samples per second per channel. Time synchronized digital video recording was done simultaneously with EEG recording. A low light infrared camera was used for low light recording.     _____________________________________________________________  HISTORY    Patient is a 72y old  Male who presents with a chief complaint of Encephalopathy (21 Dec 2022 11:30)    PERTINENT MEDICATION:  valproate sodium  IVPB 250 milliGRAM(s) IV Intermittent every 8 hours    _____________________________________________________________  STUDY INTERPRETATION    Findings: The background was reactive and continuous. During wakefulness, no posterior dominant rhythm seen.    Background Slowing:  Diffuse theta and polymorphic delta slowing.    Focal Slowing:   None were present.    Sleep Background:  Drowsiness was characterized by fragmentation, attenuation, and slowing of the background activity.    Stage II sleep transients were not recorded.    Other Non-Epileptiform Findings:  none     Interictal Epileptiform Activity:   No definite epileptiform discharges.    Events:   Intermit myoclonic jerks of head and upper extremities without a clear ictal correlate on EEG.    Activation Procedures:   Hyperventilation was not performed.    Photic stimulation was not performed.     Artifacts:  Intermittent myogenic and movement artifacts were noted.    ECG:  The heart rate on single channel ECG was irregularly irregular predominantly between 70-90 BPM.    _____________________________________________________________  EEG SUMMARY/CLASSIFICATION    Abnormal EEG in an altered patient.  1. Intermittent myoclonic jerks, without a clear ictal correlate on EEG.  2. Background slowing, Moderate to severe, generalized.    _____________________________________________________________  EEG IMPRESSION/CLINICAL CORRELATE    Abnormal EEG study.  1. Intermittent  myoclonic jerks without a clear EEG correlate, however myoclonic seizures cannot be excluded.   2. Moderate to severe nonspecific diffuse or multifocal cerebral dysfunction.     _____________________________________________________________  Domitila Garcia MD  Director, Epilepsy/EMU - [facility]     Readings from Last 3 Encounters:   02/02/22 160 lb 1.6 oz (72.6 kg)   01/10/22 171 lb (77.6 kg)   01/04/22 163 lb 3.2 oz (74 kg)       General appearance:  Appears comfortable in the bed. Raspy voice noted   Eyes: Sclera clear. Pupils equal.  ENT: Moist oral mucosa. Trachea midline, no adenopathy. Cardiovascular: Regular rhythm, normal S1, S2. No murmur. No edema in lower extremities  Respiratory: Not using accessory muscles. Lungs with scattered wheezes    GI: Abdomen soft, no tenderness, not distended, normal bowel sounds  Musculoskeletal: No cyanosis in digits, neck supple  Neurology: CN 2-12 grossly intact. No speech or motor deficits  Psych: Normal affect. Alert and oriented in time, place and person  Skin: Warm, dry, normal turgor    Labs and Tests:  CBC:   Recent Labs     02/01/22 2259 02/02/22  0436   WBC 5.4 4.2   HGB 12.6* 13.1*    258     BMP:    Recent Labs     02/01/22 2259 02/02/22 0436 02/02/22  1100    138 137   K 4.8 6.2* 6.2*   CL 96* 102 100   CO2 22 21 29   BUN 47* 38* 28*   CREATININE 1.8* 1.0 0.8   GLUCOSE 109* 147* 182*     Hepatic:   Recent Labs     02/01/22 2259 02/02/22  0436   AST 16 23   ALT 14 17   BILITOT <0.2 <0.2   ALKPHOS 113 136*     INR 4.1    Recent AIC 6%    Results for Laisha Luis (MRN [id number]) as of 2/2/2022 10:49   Ref. Range 2/26/2021 06:22 2/26/2021 08:28 1/1/2022 07:54   POC Glucose Latest Ref Range: 70 - 99 mg/dl 94 99 152 (H)     Chronic pulmonary change without acute cardiopulmonary process. Problem List  Active Problems:    COPD (chronic obstructive pulmonary disease) ([address]  Resolved Problems:    * No resolved hospital problems. *       Assessment & Plan:   1. Acute hypoxic with hypercapnia due to COPD exacerbation:  I added IV solu medrol. Continue currently inhaled therapy and nebs. Will attempt to wean off oxygen to keep sats 92% or > .  Pulmonary to follow   2. DIEGO:  Resolved with IV hydration  3.  Hyperkalemia:  Lab was redrawn and K remains 6.2  ( ace has been held) he is not on any supplements , will give lokelma  4. Chronic AC with coumadin due to DVT:  INR at 4 , coumadin on hold   5. Steroid induced hyperglycemia:  Recent AIC 6%,  Add humalog correction and follow closely       Diet: ADULT DIET;  Regular; 4 carb choices (60 gm/meal)  Code:Full Code  DVT PPX      HOLGER Gottlieb - CNP   2/2/2022 8:20 AM

## 2023-01-03 NOTE — PROGRESS NOTE ADULT - SUBJECTIVE AND OBJECTIVE BOX
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 faecalis bacteremia, admitted on 12/13 with altered mental status of unclear etiology. Had been being treated empirically for meningitis on the medicine service. Diagnostic LP had been deferred due to anticoagulation.  Incidentally found to have a dilated CBD with eventual plan for MRCP. Transferred to MICU on 12/18 after developing respiratory distress, hypoxemia, and worsened mental status, necessitating emergent intubation/mechanical ventilation. Noted to have dislodged NGT with what appeared to be tube feedings in his airways. ABG revealed severe hypercapnea with acute respiratory acidosis. Developed distributive shock state requiring IV vasopressor. LP performed 12/19 positive Cryptococcal Ag, negative PCR and Gram stain. 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 Paracentesis negative to date. Extubated 12/20 weaned to nasal cannula, shock has resolved and weaned off IV vasopressors. His encephalopathy improved and he was downgraded from MICU on 12/21 to medical floors.       12/31 - LP done  1/1 - intubation due to mucus plug  1/2 - LP negative, ID: stop ampho, start fluconazole, to complete 14 days of ACV, continue merrum.  Nueuro following .  patient has eye opening and has spontaneous arm movement      Last 24 hours:    remains on continuous EEG  Still  encephalopathic    no overnight issues    REVIEW OF SYSTEMS:  Could not obtain ROS due to underlying mentation    Physical Exam:  Vital Signs Last 24 Hrs  T(C): 36.6 (03 Jan 2023 03:05), Max: 36.9 (02 Jan 2023 23:15)  T(F): 97.8 (03 Jan 2023 03:05), Max: 98.4 (02 Jan 2023 23:15)  HR: 66 (03 Jan 2023 04:00) (62 - 102)  BP: 94/51 (03 Jan 2023 04:00) (85/61 - 164/109)  BP(mean): 61 (03 Jan 2023 04:00) (61 - 126)  RR: 18 (03 Jan 2023 04:00) (0 - 27)  SpO2: 100% (03 Jan 2023 04:00) (96% - 100%)    Parameters below as of 03 Jan 2023 03:51  Patient On (Oxygen Delivery Method): ventilator,.30        Gen:   intubated and sedated, remains on EEG monitoring  CNS:  encephalopathic, response to verbal and painful stimuli, TITO   Neck: no JVD  RES : Air entry equal b/l, no Adventitious breath sounds                     CVS: Normal S1/S2, regular rhythm and normal rate   Abd: Soft, non-distended. Bowel sounds present.  Skin: No acute rash.  Ext:   anasarca    ============================I/O===========================   I&O's Detail    01 Jan 2023 07:01  -  02 Jan 2023 07:00  --------------------------------------------------------  IN:    FentaNYL: 131.7 mL    Heparin Infusion: 140 mL    IV PiggyBack: 100 mL    IV PiggyBack: 100 mL    IV PiggyBack: 150 mL    Nepro: 440 mL  Total IN: 1061.7 mL    OUT:    Norepinephrine: 0 mL  Total OUT: 0 mL    Total NET: 1061.7 mL      02 Jan 2023 07:01  -  03 Jan 2023 05:39  --------------------------------------------------------  IN:    Dexmedetomidine: 23.7 mL    FentaNYL: 9.5 mL    Heparin Infusion: 137 mL    IV PiggyBack: 100 mL    IV PiggyBack: 150 mL    Nepro: 1010 mL  Total IN: 1430.2 mL    OUT:    Stool (mL): 100 mL  Total OUT: 100 mL    Total NET: 1330.2 mL        ============================ LABS =========================                        8.9    4.16  )-----------( 141      ( 03 Jan 2023 04:40 )             25.8     01-03    136  |  97  |  27.0<H>  ----------------------------<  112<H>  3.2<L>   |  27.0  |  5.22<H>    Ca    8.0<L>      03 Jan 2023 04:40  Phos  3.3     01-03  Mg     2.3     01-03        PTT - ( 02 Jan 2023 22:25 )  PTT:157.8 sec      ======================Micro/Rad/Cardio=================  Culture: Reviewed   CXR: Reviewed  Echo:Reviewed  ======================================================  PAST MEDICAL & SURGICAL HISTORY:  Emphysema of lung  s/p lung transplant      ESRD (end stage renal disease) on dialysis  on HD via LUE T/Th/Sat      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      2019 novel coronavirus disease (COVID-19)  Feb 2021 - hospitalized for 1 week at [facility]      Pneumonia  April 2021      [place] (hard of hearing)      HTN (hypertension)      Lumbar spondylosis      History of COPD      BPH without urinary obstruction      Hypercholesterolemia      Oliguria and anuria      H/O peripheral neuropathy      H/O lung transplant  bilateral - transplant - 1-2-2015 -  [facility]      H/O heart artery stent  x3 @[facility]      History of hip replacement  right      Status post open reduction and internal fixation (ORIF) of fracture  left femur        ====================ASSESSMENT ==============  72-year-old  male with past medical history of essential hypertension dyslipidemia end-stage renal disease on hemodialysis emphysema s/p lung transplant on immunosuppressant with cryptococcal meningitis carotid stenosis s/p CEA , CAD s/p PCI with heart failure with reduced EF paroxysmal A. 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 cirrhosis/ascites with recent enterococcus faecalis bacteremia  with waxing and waning metabolic encephalopathy with current worsening acute metabolic encephalopathy, acute hypoxic hypercapnic respiratory failure, aspiration pneumonitis/pneumonia with possible (although less likely) reactivation EBV Menninger encephalitis, ruled out current clinical active cryptococcal meningitis, cirrhosis with ascites  ====================== NEUROLOGY=====================  dexMEDEtomidine Infusion 0.5 MICROgram(s)/kG/Hr (7.91 mL/Hr) IV Continuous <Continuous>  fentaNYL    Injectable 50 MICROGram(s) IV Push every 4 hours PRN agitation  midazolam Injectable 2 milliGRAM(s) IV Push every 2 hours PRN agitation  valproate sodium  IVPB 250 milliGRAM(s) IV Intermittent every 8 hours   Daily SAT   currently remains on continuous EEG with no obvious seizure activity  Neurology input appreciated   last checked ammonia on December 29 was 12, will repeat again;  might need maintenance lactulose especially if elevated level    ==================== RESPIRATORY======================  Mechanical Ventilation:  Mode: AC/ CMV (Assist Control/ Continuous Mandatory Ventilation)  RR (machine): 20  TV (machine): 400  FiO2: 30  PEEP: 6  MAP: 10  PIP: 21    acetylcysteine 20%  Inhalation 4 milliLiter(s) Inhalation every 6 hours  albuterol/ipratropium for Nebulization 3 milliLiter(s) Nebulizer every 6 hours   ventilator associated pneumonia prevention bundle in place including but not limited to head of the bed elevation to 30 degrees and more, daily oral care with 2% chlorhexidine, subglottic endotracheal suctioning of secretion, spontaneous awakening and breathing trial for daily assessment of mechanical ventilator liberation    ====================CARDIOVASCULAR==================  metoprolol tartrate 25 milliGRAM(s) Enteral Tube four times a day    ===================HEMATOLOGIC/ONC ===================  heparin   Injectable 5000 Unit(s) IV Push every 6 hours PRN For aPTT less than 40  heparin   Injectable 2500 Unit(s) IV Push every 6 hours PRN For aPTT between 40 - 57  heparin  Infusion.  Unit(s)/Hr (11 mL/Hr) IV Continuous <Continuous>    ===================== RENAL =========================  Continue monitoring urine output  tamsulosin 0.4 milliGRAM(s) Oral at bedtime   left arm AV fistula  December 31: Last hemodialysis session, further hemodialysis as per nephrology  Avoid Nephrotoxic agents   Adjust medications for renal  function   Close urinary output monitoring   UA  reviewed  Renal imaging  reviewed  Replacing electrolytes as needed with Goal K> 4, PO> 3, Mg> 2  ==================== GASTROINTESTINAL===================  pantoprazole  Injectable 40 milliGRAM(s) IV Push daily  potassium chloride  20 mEq/100 mL IVPB 20 milliEquivalent(s) IV Intermittent every 2 hours     diet: continue with tube feeds  =======================    ENDOCRINE  =====================  dextrose 50% Injectable 25 Gram(s) IV Push once  dextrose 50% Injectable 12.5 Gram(s) IV Push once  dextrose 50% Injectable 25 Gram(s) IV Push once  dextrose Oral Gel 15 Gram(s) Oral once  hydrocortisone 10 milliGRAM(s) Oral every 12 hours    ========================INFECTIOUS DISEASE================  acyclovir IVPB 320 milliGRAM(s) IV Intermittent every 24 hours  fluconAZOLE   Tablet 200 milliGRAM(s) Oral <User Schedule>  meropenem Injectable      meropenem Injectable 500 milliGRAM(s) IV Push every 24 hours  trimethoprim  40 mG/sulfamethoxazole 200 mG Suspension 80 milliGRAM(s) Oral <User Schedule>   remains on tacrolimus   infectious disease following  CSF: December 31: No organism on gram stain, HSV 1, PCR negative to date including EBV PCR  CSF: December 19: EBV PCR positive of unclear significance  Blood culture: December 28: 2 sets: Negative to date  Serum crypto antigen from December 2019 negative after low titer 1: 40 positive on December 21      Patient requires continuous monitoring with bedside rhythm monitoring, pulse oximetry, ventilator monitoring and intermittent blood gas analysis.    Care plan discussed with ICU care team, family and consultants    Patient remained critical; I have spent 35 minutes providing non-routine care, revaluated multiple times during the day.

## 2023-01-04 NOTE — PROGRESS NOTE ADULT - SUBJECTIVE AND OBJECTIVE BOX
[facility]  INFECTIOUS DISEASES at [place] and [place]  ===============================================================                               J Carlos Galdamez MD*     Perlita Solares MD*                         Isatu Rojas MD*       Sumaya Morales MD*            Diplomates American Board of Internal Medicine & Infectious Diseases                * [place] Office - Appt - Tel  [phone] Fax [phone]                * [place] Office - Appt - Tel [phone] Fax [phone]                                  Hospital Consult line:  [phone]  ==============================================================    JU FERGUSON [id number]    Follow up:    Seen in the ICU  ON VENT     AWAKE   S/p LP         I have personally reviewed the labs and data; pertinent labs and data are listed in this note; please see below.     _______________________________________________________________  REVIEW OF SYSTEMS  Unable to obtain due to medical condition - altered mental status/intubated  ________________________________________________________________  Allergies:  budesonide (Unknown)  cefpodoxime (Unknown)  Pollen (Unknown)    V Vital Signs Last 24 Hrs  T(C): 36.4 (03 Jan 2023 07:46), Max: 36.9 (02 Jan 2023 23:15)  T(F): 97.5 (03 Jan 2023 07:46), Max: 98.4 (02 Jan 2023 23:15)  HR: 59 (03 Jan 2023 08:00) (54 - 102)  BP: 107/51 (03 Jan 2023 08:00) (85/61 - 164/109)  BP(mean): 65 (03 Jan 2023 08:00) (55 - 126)  RR: 12 (03 Jan 2023 08:00) (0 - 27)  SpO2: 100% (03 Jan 2023 08:00) (96% - 100%)    Parameters below as of 03 Jan 2023 08:00  Patient On (Oxygen Delivery Method): ventilator        Parameters below as of 02 Jan 2023 07:15  Patient On (Oxygen Delivery Method): ventilator    O2 Concentration (%): 30    ________________________________________________________________  PHYSICAL EXAM  GEN: AWAKE ON VENT   HEENT: ETT  LUNGS: mechanically ventilated CTA anteriorly   HEART: RRR, no m/r/g  ABDOMEN: ND, + BS  EXTREMITIES: UEs edematous   NEUROLOGIC: unresponsive   SKIN: ecchymoses  LINES: LUE AVG  ________________________________________________________________            Current Antibiotics:  fluconAZOLE   Tablet 200 milliGRAM(s) Oral <User Schedule>  meropenem Injectable      meropenem Injectable 500 milliGRAM(s) IV Push every 24 hours  trimethoprim   80 mG/sulfamethoxazole 400 mG 1 Tablet(s) Oral <User Schedule>    Other medications:  acetylcysteine 20%  Inhalation 4 milliLiter(s) Inhalation every 6 hours  albuterol/ipratropium for Nebulization 3 milliLiter(s) Nebulizer every 6 hours  chlorhexidine 2% Cloths 1 Application(s) Topical <User Schedule>  dextrose 50% Injectable 25 Gram(s) IV Push once  dextrose 50% Injectable 12.5 Gram(s) IV Push once  dextrose 50% Injectable 25 Gram(s) IV Push once  dextrose Oral Gel 15 Gram(s) Oral once  fentaNYL   Infusion. 0.5 MICROgram(s)/kG/Hr IV Continuous <Continuous>  heparin  Infusion.  Unit(s)/Hr IV Continuous <Continuous>  hydrocortisone 10 milliGRAM(s) Oral every 12 hours  metoprolol tartrate 25 milliGRAM(s) Enteral Tube four times a day  norepinephrine Infusion 0.05 MICROgram(s)/kG/Min IV Continuous <Continuous>  pantoprazole  Injectable 40 milliGRAM(s) IV Push daily  sevelamer carbonate 800 milliGRAM(s) Oral three times a day with meals  tacrolimus 1.5 milliGRAM(s) Oral daily  tamsulosin 0.4 milliGRAM(s) Oral at bedtime  valproate sodium  IVPB 250 milliGRAM(s) IV Intermittent every 8 hours                                                               8.9    4.70  )-----------( 111      ( 04 Jan 2023 05:40 )             26.1   01-04    139  |  102  |  17.5  ----------------------------<  103<H>  3.9   |  30.0<H>  |  3.79<H>    Ca    8.3<L>      04 Jan 2023 05:40  Phos  1.8     01-04  Mg     2.0     01-04    TPro  4.5<L>  /  Alb  2.1<L>  /  TBili  0.3<L>  /  DBili  x   /  AST  24  /  ALT  5   /  AlkPhos  194<H>  01-04      Parameters below as of 04 Jan 2023 08:00  Patient On (Oxygen Delivery Method): ventilator          No polymorphonuclear leukocytes seen  No organisms seen  by cytocentrifuge    Herpes Simplex Virus 1/2 PCR, CSF (12.31.22 @ 14:00)    Source HSV 1/2: CSF    HSV 1/2 PCR: [facility]: HSV1/2 PCR assay is a real-time PCR test that detects and differentiates  HSV-1 and/or HSV-2 DNA in CSF (Vitreous Fluid). The results of this test  should be interpreted with consideration of all clinical and laboratory  findings.    Cryptococcal Antigen - CSF (12.31.22 @ 14:00)    Cryptococcal Antigen - CSF: Negative    CSF PCR Panel (12.31.22 @ 14:00)    CSF PCR Result: NotDetec    Protein, CSF (12.31.22 @ 14:00)    Protein, CSF: 60 mg/dL    Glucose, CSF (12.31.22 @ 14:00)    Glucose, CSF: 43 mg/dLG/24h    Cerebrospinal Fluid Cell Count-1 (12.31.22 @ 14:00)    RBC Count - Spinal Fluid: 1 /cmm    Tube Type: Tube 1    CSF Appearance: Clear    CSF Neutrophils: N/A %    Appearance Spun: Colorless    CSF Color: No Color    Total Nucleated Cell Count, CSF: 2 /uL      12-28 @ 06:45 .Blood Blood     No growth to date.    12-28 @ 06:40 .Blood Blood     No growth to date.    12-19 @ 17:10 .CSF CSF     No acid-fast bacilli isolated at 1 week. ****Acid-fast cultures are held  for 6 weeks.****    No polymorphonuclear leukocytes seen per low power field  No organisms seen per oil power field  by cytocentrifuge      12-19 @ 16:00 .Sputum Sputum     Normal Respiratory Charis present    Moderate polymorphonuclear leukocytes per low power field  Few Squamous epithelial cells per low power field  Few Gram positive cocci in pairs seen per oil power field  Few Yeast like cells seen per oil power field  Few Gram Negative Rods seen per oil power field      12-19 @ 13:50 Paracentesis Paracentesis Fluid     No growth at 5 days    No polymorphonuclear leukocytes seen per low power field  No organisms seen per oil power field    WBC Count: 4.61 K/uL (01-01-23 @ 03:20)  WBC Count: 5.40 K/uL (12-31-22 @ 16:03)  WBC Count: 5.87 K/uL (12-31-22 @ 05:30)  WBC Count: 6.07 K/uL (12-30-22 @ 04:30)  WBC Count: 7.52 K/uL (12-29-22 @ 06:06)  WBC Count: 8.84 K/uL (12-28-22 @ 06:45)    Creatinine, Serum: 3.54 mg/dL (01-01-23 @ 03:20)  Creatinine, Serum: 4.75 mg/dL (12-31-22 @ 05:30)  Creatinine, Serum: 3.83 mg/dL (12-30-22 @ 04:30)  Creatinine, Serum: 5.94 mg/dL (12-29-22 @ 06:06)  Creatinine, Serum: 4.86 mg/dL (12-28-22 @ 06:45)        Procalcitonin, Serum: 0.58 ng/mL (12-30-22 @ 04:30)     SARS-CoV-2 Result: NotDetec (12-13-22 @ 02:17)    ________________________________________________________________  RADIOLOGY  < from: CT Chest No Cont (12.29.22 @ 16:03) >  INTERPRETATION:  Clinical information: Evaluate for aspiration pneumonia.   Exam is compared to previous study of 12/23/2022.    CT scan of the chest was obtained without administration of intravenous   contrast.    No hilar and or mediastinal adenopathy is noted.    Heart is enlarged in size. Patient is status post median sternotomy.   Small pericardial effusion is noted. Right-sided PICC line catheter is   noted in place.    No endobronchial lesions are noted. Interlobular septal thickening and   thickening of the peribronchovascular interstitium are noted within both   lungs. Minimal patchy groundglass opacities are also noted in both upper   lobes. Minimal atelectasis is also noted involving both lower lobes.   Small bilateral pleural effusions are noted.    Below the diaphragm, visualized portions of the abdomen demonstrate   nodular contour to the surface of liver. Cholelithiasis is noted. Both   kidneys are small in size. Low-attenuation lesion in the right kidney is   too small to be adequately characterized on this exam. Tip of the   nasogastric tube is noted in the stomach. Ascites is noted.    Degenerative changes of the spine as well as loss of height of multiple   vertebral bodies is noted. Subcutaneous edema is noted.    IMPRESSION: Findings suggestive of pulmonary edema.    < end of copied text >

## 2023-01-04 NOTE — PROGRESS NOTE ADULT - SUBJECTIVE AND OBJECTIVE BOX
[facility] PALLIATIVE MEDICINE     CC:  Reconsulted for ongoing GOC given progressive clinical deterioration     INTERVAL HPI/OVERNIGHT EVENTS:  Source if other than patient:  []Family   [x]Team     S/P extubation this morning.   Pt awake and alert, interactive. Spouse at bedside  He is comfortable and offered no acute complaints.     PRESENT SYMPTOMS:     Dyspnea: No   Nausea/Vomiting:  No  Anxiety:   No  Depression: No  Fatigue: Yes    Loss of appetite: NPO pending SLP  Constipation:  No     Pain: No             Character-            Duration-            Effect-            Factors-            Frequency-            Location-            Severity-    Pain AD Score:  [email or web address] (press ctrl + left click to view)    Review of Systems: Denies any chest pain or dyspnea at rest. See above for rest of ROS    MEDICATIONS  (STANDING):  acyclovir IVPB 320 milliGRAM(s) IV Intermittent every 24 hours  albuterol/ipratropium for Nebulization 3 milliLiter(s) Nebulizer every 6 hours  apixaban 2.5 milliGRAM(s) Oral two times a day  chlorhexidine 2% Cloths 1 Application(s) Topical <User Schedule>  dexMEDEtomidine Infusion 0.5 MICROgram(s)/kG/Hr (7.91 mL/Hr) IV Continuous <Continuous>  dextrose 50% Injectable 25 Gram(s) IV Push once  dextrose 50% Injectable 12.5 Gram(s) IV Push once  dextrose 50% Injectable 25 Gram(s) IV Push once  dextrose Oral Gel 15 Gram(s) Oral once  doxazosin 2 milliGRAM(s) Oral at bedtime  epoetin ben-epbx (RETACRIT) Injectable 68319 Unit(s) IV Push <User Schedule>  fluconAZOLE   Tablet 200 milliGRAM(s) Oral <User Schedule>  hydrocortisone 10 milliGRAM(s) Oral every 12 hours  meropenem Injectable      meropenem Injectable 500 milliGRAM(s) IV Push every 24 hours  metoprolol tartrate 25 milliGRAM(s) Enteral Tube four times a day  pantoprazole  Injectable 40 milliGRAM(s) IV Push daily  tacrolimus 1.5 milliGRAM(s) Oral daily  trimethoprim  40 mG/sulfamethoxazole 200 mG Suspension 80 milliGRAM(s) Oral <User Schedule>  valproate sodium  IVPB 250 milliGRAM(s) IV Intermittent every 8 hours    MEDICATIONS  (PRN):  fentaNYL    Injectable 50 MICROGram(s) IV Push every 4 hours PRN agitation  midazolam Injectable 2 milliGRAM(s) IV Push every 2 hours PRN agitation    PHYSICAL EXAM:    Vital Signs Last 24 Hrs  T(C): 36.8 (04 Jan 2023 11:43), Max: 37.2 (04 Jan 2023 07:30)  T(F): 98.2 (04 Jan 2023 11:43), Max: 99 (04 Jan 2023 07:30)  HR: 64 (04 Jan 2023 11:00) (58 - 91)  BP: 125/73 (04 Jan 2023 11:00) (80/68 - 133/68)  BP(mean): 83 (04 Jan 2023 11:00) (53 - 103)  RR: 14 (04 Jan 2023 11:00) (7 - 27)  SpO2: 100% (04 Jan 2023 11:00) (99% - 100%)    Parameters below as of 04 Jan 2023 11:21    O2 Flow (L/min): 5    Karnofsky:  30%    GEN: chronically ill. resting comfortably and no acute distress    HEENT: mucous membrane moist      Lungs: comfortable, nonlabored     CV: +s1/s2 regular rate and rhythm      GI: +BS, abdomen soft, nontender, nondistended      MSK:  no cyanosis or edema.      NEURO: nonfocal. awake and alert, oriented x 3, interactive, responds appropriately    Skin: warm and dry.     LABS:                          8.9    4.70  )-----------( 111      ( 04 Jan 2023 05:40 )             26.1     01-04    139  |  102  |  17.5  ----------------------------<  103<H>  3.9   |  30.0<H>  |  3.79<H>    Ca    8.3<L>      04 Jan 2023 05:40  Phos  1.8     01-04  Mg     2.0     01-04    TPro  4.5<L>  /  Alb  2.1<L>  /  TBili  0.3<L>  /  DBili  x   /  AST  24  /  ALT  5   /  AlkPhos  194<H>  01-04    RADIOLOGY & ADDITIONAL STUDIES: reviewed       ADVANCE DIRECTIVES/TREATMENT PREFERENCES: FULL CODE    NEUROLOGICAL MEDICATIONS/OPIOIDS/BENZODIAZEPINE IN PAST 24 HOURS:    fentaNYL    Injectable   50 MICROGram(s) IV Push (01-04-23 @ 07:39)   50 MICROGram(s) IV Push (01-04-23 @ 02:11)   50 MICROGram(s) IV Push (01-03-23 @ 22:13)   50 MICROGram(s) IV Push (01-03-23 @ 17:29)   50 MICROGram(s) IV Push (01-03-23 @ 12:37)    midazolam Injectable   2 milliGRAM(s) IV Push (01-03-23 @ 19:40)   2 milliGRAM(s) IV Push (01-03-23 @ 15:07)    valproate sodium  IVPB   52.5 mL/Hr IV Intermittent (01-04-23 @ 11:07)   52.5 mL/Hr IV Intermittent (01-04-23 @ 06:53)   52.5 mL/Hr IV Intermittent (01-03-23 @ 19:34)   52.5 mL/Hr IV Intermittent (01-03-23 @ 12:47)

## 2023-01-04 NOTE — SWALLOW BEDSIDE ASSESSMENT ADULT - SWALLOW EVAL: DIAGNOSIS
Oral phase of swallow generally functional for single most conservative consistency provided puree. Pharyngeal dysphagia cannot be excluded, intermittent throat clearing noted in 50% of trials provided, with episodes of desat 86-90% SpO2, self-resolving to >96% following ~10 seconds. No further trials offered, oral diet initiation remains contraindicated at this time.
Oral phase grossly WFL for trials presented. Suspect pharyngeal dysphagia w/ all trials presented marked by wet vocal quality and wet cough.

## 2023-01-04 NOTE — PROGRESS NOTE ADULT - SUBJECTIVE AND OBJECTIVE BOX
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 faecalis bacteremia, admitted on 12/13 with altered mental status of unclear etiology. Had been being treated empirically for meningitis on the medicine service. Diagnostic LP had been deferred due to anticoagulation.  Incidentally found to have a dilated CBD with eventual plan for MRCP. Transferred to MICU on 12/18 after developing respiratory distress, hypoxemia, and worsened mental status, necessitating emergent intubation/mechanical ventilation. Noted to have dislodged NGT with what appeared to be tube feedings in his airways. ABG revealed severe hypercapnea with acute respiratory acidosis. Developed distributive shock state requiring IV vasopressor. LP performed 12/19 positive Cryptococcal Ag, negative PCR and Gram stain. 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 Paracentesis negative to date. Extubated 12/20 weaned to nasal cannula, shock has resolved and weaned off IV vasopressors. His encephalopathy improved and he was downgraded from MICU on 12/21 to medical floors.       12/31 - LP done  1/1 - intubation due to mucus plug  1/2 - LP negative, ID: stop ampho, start fluconazole, to complete 14 days of ACV, continue merrum.  Nueuro following .  patient has eye opening and has spontaneous arm movement   1/3–hemodialysis, failed SAT SBT      Last 24 hours:    s/p hemodialysis  Waxing waning mental status  Agitated requiring intermittent sedation  Failed SAT SBT currently on full volume assist control  Restarted tube feeds   goals of care addressed by palliative care with wife: Full code   no other active issues    REVIEW OF SYSTEMS:  Could not obtain ROS due to underlying mentation    Physical Exam:  Vital Signs Last 24 Hrs  T(C): 37 (03 Jan 2023 23:16), Max: 37.1 (03 Jan 2023 21:00)  T(F): 98.6 (03 Jan 2023 23:16), Max: 98.7 (03 Jan 2023 21:00)  HR: 63 (04 Jan 2023 00:29) (54 - 91)  BP: 124/53 (04 Jan 2023 00:00) (92/41 - 133/68)  BP(mean): 71 (04 Jan 2023 00:00) (55 - 119)  RR: 13 (04 Jan 2023 00:00) (11 - 24)  SpO2: 100% (04 Jan 2023 00:29) (99% - 100%)    Parameters below as of 04 Jan 2023 00:00  Patient On (Oxygen Delivery Method): ventilator    O2 Concentration (%): 30      Gen:   intubated and sedated, remains on EEG monitoring  CNS:  encephalopathic, response to verbal and painful stimuli, TITO   Neck: no JVD  RES : Air entry equal b/l, no Adventitious breath sounds                     CVS: Normal S1/S2, regular rhythm and normal rate   Abd: Soft, non-distended. Bowel sounds present.  Skin: No acute rash.  Ext:   anasarca    ============================I/O===========================   I&O's Detail    02 Jan 2023 07:01  -  03 Jan 2023 07:00  --------------------------------------------------------  IN:    Dexmedetomidine: 31.6 mL    FentaNYL: 9.5 mL    Heparin Infusion: 155 mL    IV PiggyBack: 100 mL    IV PiggyBack: 150 mL    IV PiggyBack: 100 mL    Nepro: 1160 mL  Total IN: 1706.1 mL    OUT:    Stool (mL): 100 mL  Total OUT: 100 mL    Total NET: 1606.1 mL      03 Jan 2023 07:01  -  04 Jan 2023 01:55  --------------------------------------------------------  IN:    Heparin Infusion: 78 mL    IV PiggyBack: 100 mL    IV PiggyBack: 100 mL    IV PiggyBack: 150 mL    Nepro: 500 mL    Other (mL): 900 mL  Total IN: 1828 mL    OUT:    Dexmedetomidine: 0 mL    Other (mL): 1400 mL    Stool (mL): 50 mL  Total OUT: 1450 mL    Total NET: 378 mL        ============================ LABS =========================                        10.1   5.96  )-----------( 101      ( 03 Jan 2023 14:03 )             29.4     01-03    138  |  100  |  11.3  ----------------------------<  128<H>  4.0   |  29.0  |  2.68<H>    Ca    8.1<L>      03 Jan 2023 14:03  Phos  1.7     01-03  Mg     2.3     01-03    TPro  x   /  Alb  2.5<L>  /  TBili  x   /  DBili  x   /  AST  x   /  ALT  x   /  AlkPhos  x   01-03    LIVER FUNCTIONS - ( 03 Jan 2023 14:03 )  Alb: 2.5 g/dL / Pro: x     / ALK PHOS: x     / ALT: x     / AST: x     / GGT: x           PTT - ( 03 Jan 2023 12:30 )  PTT:73.2 sec      ======================Micro/Rad/Cardio=================  Culture: Reviewed   CXR: Reviewed  Echo:Reviewed  ======================================================  PAST MEDICAL & SURGICAL HISTORY:  Emphysema of lung  s/p lung transplant  ESRD (end stage renal disease) on dialysis  on HD via LUE T/Th/Sat  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  2019 novel coronavirus disease (COVID-19)  Feb 2021 - hospitalized for 1 week at [facility]  Pneumonia  April 2021  [place] (hard of hearing)  HTN (hypertension)  Lumbar spondylosis  History of COPD  BPH without urinary obstruction  Hypercholesterolemia  Oliguria and anuria  H/O peripheral neuropathy  H/O lung transplant  bilateral - transplant - 1-2-2015 -  [facility]  H/O heart artery stent  x3 @[facility]  History of hip replacement  right  Status post open reduction and internal fixation (ORIF) of fracture  left femur      ====================ASSESSMENT ==============  72-year-old  male with past medical history of essential hypertension dyslipidemia end-stage renal disease on hemodialysis emphysema s/p lung transplant on immunosuppressant with cryptococcal meningitis carotid stenosis s/p CEA , CAD s/p PCI with heart failure with reduced EF paroxysmal A. 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 cirrhosis/ascites with recent enterococcus faecalis bacteremia  with waxing and waning metabolic encephalopathy with current worsening acute metabolic encephalopathy, acute hypoxic hypercapnic respiratory failure, aspiration pneumonitis/pneumonia with possible (although less likely) reactivation EBV Meningo-encephalitis cirrhosis with ascites, ruled out current clinical active cryptococcal meningitis  ====================== NEUROLOGY=====================  dexMEDEtomidine Infusion 0.5 MICROgram(s)/kG/Hr (7.91 mL/Hr) IV Continuous <Continuous>  fentaNYL    Injectable 50 MICROGram(s) IV Push every 4 hours PRN agitation  midazolam Injectable 2 milliGRAM(s) IV Push every 2 hours PRN agitation  valproate sodium  IVPB 250 milliGRAM(s) IV Intermittent every 8 hours  Waxing and waning mental status  Neurology input noted  Normal ammonia  No seizure activity on EEG mostly suggestive of metabolic encephalopathy as per neurology   daily spontaneous awakening trial    ==================== RESPIRATORY======================  Mechanical Ventilation:  Mode: AC/ CMV (Assist Control/ Continuous Mandatory Ventilation)  RR (machine): 20  TV (machine): 400  FiO2: 30  PEEP: 6  MAP: 10  PIP: 22    albuterol/ipratropium for Nebulization 3 milliLiter(s) Nebulizer every 6 hours   ventilator associated pneumonia prevention bundle in place including but not limited to head of the bed elevation to 30 degrees and more, daily oral care with 2% chlorhexidine, subglottic endotracheal suctioning of secretion, spontaneous awakening and breathing trial for daily assessment of mechanical ventilator liberation   goals of care addressed by palliative care with patient's wife: Full code for now   might need tracheostomy and PEG tube if failed extubation again    ====================CARDIOVASCULAR==================  doxazosin 2 milliGRAM(s) Oral at bedtime  metoprolol tartrate 25 milliGRAM(s) Enteral Tube four times a day    ===================HEMATOLOGIC/ONC ===================  heparin   Injectable 5000 Unit(s) IV Push every 6 hours PRN For aPTT less than 40  heparin   Injectable 2500 Unit(s) IV Push every 6 hours PRN For aPTT between 40 - 57  heparin  Infusion.  Unit(s)/Hr (11 mL/Hr) IV Continuous <Continuous>    ===================== RENAL =========================     hemodialysis on January 3 removing 500 cc of fluid in total  Nephrology following along, next  Assist session as scheduled  Avoid Nephrotoxic agents   Adjust medications for renal  function   Close urinary output monitoring   Replacing electrolytes as needed with Goal K> 4, PO> 3, Mg> 2  ==================== GASTROINTESTINAL===================  pantoprazole  Injectable 40 milliGRAM(s) IV Push daily     diet: tube feeds continued Through this evening up until SAT SBT in the morning at goal  =======================    ENDOCRINE  =====================  dextrose 50% Injectable 25 Gram(s) IV Push once  dextrose 50% Injectable 12.5 Gram(s) IV Push once  dextrose 50% Injectable 25 Gram(s) IV Push once  dextrose Oral Gel 15 Gram(s) Oral once  hydrocortisone 10 milliGRAM(s) Oral every 12 hours    ========================INFECTIOUS DISEASE================  acyclovir IVPB 320 milliGRAM(s) IV Intermittent every 24 hours  fluconAZOLE   Tablet 200 milliGRAM(s) Oral <User Schedule>  meropenem Injectable      meropenem Injectable 500 milliGRAM(s) IV Push every 24 hours  trimethoprim  40 mG/sulfamethoxazole 200 mG Suspension 80 milliGRAM(s) Oral <User Schedule>   remains on tacrolimus   infectious disease following  CSF: December 31: No organism on gram stain, HSV 1, PCR negative to date including EBV PCR  CSF: December 19: EBV PCR positive of unclear significance  Blood culture: December 28: 2 sets: Negative to date  Serum crypto antigen from December 2019 negative after low titer 1: 40 positive on December 21      Patient requires continuous monitoring with bedside rhythm monitoring, pulse oximetry, ventilator monitoring and intermittent blood gas analysis.    Care plan discussed with ICU care team, family and consultants    Patient remained critical; I have spent 35 minutes providing non-routine care, revaluated multiple times during the day.

## 2023-01-04 NOTE — SWALLOW BEDSIDE ASSESSMENT ADULT - SLP GENERAL OBSERVATIONS
Pt recd a&a, reduced cognition, NC in place, wet nonproductive cough @ baseline w/ wet hoarse vocal quality, NAD, 0/10 pain scale pre/post.
Pt recd awake/upright in bed, A&A Ox2, reduced cognition, intermittent confusion, 5L NC SpO2 94-96% baseline, +Kiana, HA's not present, wife at bedside for encounter

## 2023-01-04 NOTE — SWALLOW BEDSIDE ASSESSMENT ADULT - SWALLOW EVAL: RECOMMENDED DIET
NPO w/consideration for short-term non-oral alternative means of nutrition/hydration as per Pt/family wishes
NPO w consideration for alternative means of nutrition/hydration as per pt/family wishes

## 2023-01-04 NOTE — PROGRESS NOTE ADULT - ASSESSMENT
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 Faecalis bacteremia now readmitted on 12/13 for altered mental status, sepsis workup including ?acute meningitis, found also with dilated CBD on ultrasound complicated by acute respiratory failure with hypoxia  requiring intubation and transferred to MICU on 12/18, s/p LP and paracentesis, medically extubated on 12/20 and downgraded to medicine service, positive EBV CSF PCR, recurrent encephalopathy, acute respiratory failure s/p reintubation on 1/1/23 and overall guarded prognosis.     Recurrent Acute Encephalopathy  Prior Hx of Cryptococcal Meningitis   Aspiration PNA - completed antibiotic course  Hx of E Faecalis Bacteremia - s/p antibiotic course  - mentation improving, more awake and conversant  - etiology unclear, ?multifactorial  - s/p LP 12/19: + cryptococcal Ag but PCR neg, EBV CSF PCR/serum + but very low, ?EBV encephalitis  - s/p repeat LP 12/31 with CSF studies unremarkable thus far  - off ampho/flucytosine as no evidence of CNS crypto, c/w fluconazole  - empiric acyclovir to complete 14 days in the setting of EBV reactivation/encephalitis per ID  - on valproic for seizure  - meropenem for possible pna  - monitor neuro status closely  - supportive care, reorientation, sleep hygiene    Acute Respiratory Failure  - s/p intubation x 2, extubated today to NC  - appears comfortable on NC but with notable congestion with difficulty expectorating  - consider guaifenesin/dextro PRN when able to take PO    Dysphagia  - pending SLP eval     ESRD on HD   - since 2020 after presumed contrast induced nephropathy for CAD with stenting   - mgnt per nephrology  - avoid nephrotoxic agents    Hx of Bilateral Lung Transplant for ES COPD  - in 2015, follows closely with [facility]   - on tacrolimus and hydrocortisone; home cellcept on hold pending resolution of acute infection     Palliative Care Encounter/Goals of care  - Surrogate/HCP/Guardian: wife Veronica Marshall [phone]    - Ongoing GOC pending clinical progress. Pt/wife remain hopeful and taking it day by day. FULL CODE  - overall guarded prognosis at best given multitude of acute and chronic medical conditions  - Will continue to follow for ongoing GOC discussion / titration of palliative regimen. Emotional support provided, questions answered.

## 2023-01-04 NOTE — SWALLOW BEDSIDE ASSESSMENT ADULT - PHARYNGEAL PHASE
Delayed pharyngeal swallow/Wet vocal quality post oral intake/Delayed cough post oral intake/Multiple swallows Wet vocal quality post oral intake/Delayed cough post oral intake/Multiple swallows

## 2023-01-04 NOTE — PROGRESS NOTE ADULT - ASSESSMENT
72M with PMH of  hypertension, hyperlipidemia, ESRD on HD, emphysema s/p lung transplant in [place] by Dr. 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 faecalis bacteremia, newly diagnosed HFrEF 25%, AF complicated by GIB was discharged on home IV Abx now presenting with AMS  Presented to the ED with daughter. CTH obtained and did not reveal any acute findings.  Patient was admitted to medicine for encephalopathy. LP attempted bedside but unsuccessful. Was on empiric treatment and mental status was improving and then ultimately intubated for aspiration pneumonia    #Acute hypoxic respiratory failure likely 2/2 aspiration- now extubated, mental status improved. sputum cx normal keerthi    #AMS-unclear etiology, no fever. ammonia level normal.  ct c/ap ?pna/edema/ascites.   ??EBV encephalitis  s/p LP normal cell counts glucose and protein 51. CSF pcr (-) and cultures pending.   [facility] micro lab CSF gram stain reported as "few organisms" . gram stain repeated at core lab and micro and is NEGATIVE. initial report has been corrected. CSF crytp titer 1:40, as per [facility] his initial CSF titer in 12/2020 was 1:2560. no serum titer available. 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 f/u CSf fungal CX. stopped amphotericin/flucytosine and resumed fluconazole suppression post HD  serum crypt Ag 1:40, most likely old infection  EBv CSF PCR/serum + but very low; repeat neg. Unclear if EBV encephalitis , although possible in this immunocompromised pt who appeared to have clinically responded to acyclovir    Unlikely to derive any benefit with ACV in the setting of EBV reactivation/possible encephalitis, but he is on acyclovir to complete total 14 days  MRI brain unremarkable     Repeat LP on 12/31 - CSF cell count/prot/glu unremarkable. CSF PCR neg     ..#h/o e.faecalis bacteremia - . COMPLETED IV ABX  CRISTIAN was deferred by cardio on recent admission.     PT ON MERREM  WOULD COMPLETE 7 DAYS    # s/p lung transplant-     # h/o fungal meningitis-  flucytosine/ampho  DISCONTINUED  and resumed fluconazole. No evidence of CNS crypto  WILL FOLLOW UP

## 2023-01-04 NOTE — SWALLOW BEDSIDE ASSESSMENT ADULT - SLP PERTINENT HISTORY OF CURRENT PROBLEM
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 Faecalis bacteremia now readmitted on 12/13 for altered mental status, sepsis workup including ?acute meningitis, found also with dilated CBD on ultrasound complicated by acute respiratory failure with hypoxia  requiring intubation and transferred to MICU on 12/18".
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 Faecalis bacteremia now readmitted on 12/13 for altered mental status, sepsis workup including ?acute meningitis, found also with dilated CBD on ultrasound complicated by acute respiratory failure with hypoxia  requiring intubation and transferred to MICU on 12/18, s/p LP and paracentesis, medically extubated on 12/20 and downgraded to medicine service, positive EBV CSF PCR, recurrent encephalopathy, acute respiratory failure s/p reintubation on 1/1/23 and overall guarded prognosis. "

## 2023-01-04 NOTE — PROGRESS NOTE ADULT - SUBJECTIVE AND OBJECTIVE BOX
ICU Vital Signs Last 24 Hrs,    T(C): 36.8 (2023 11:43), Max: 37.2 (2023 07:30)  T(F): 98.2 (2023 11:43), Max: 99 (2023 07:30)  HR: 64 (2023 11:00) (58 - 91)  BP: 125/73 (2023 11:00) (80/68 - 133/68)  BP(mean): 83 (2023 11:00) (53 - 103)    RR: 14 (2023 11:00) (7 - 27)  SpO2: 100% (2023 11:00) (99% - 100%)    O2 Parameters below as of 2023 11:21    O2 Flow (L/min): 5    139    |  102    |  17.5   ----------------------------<  103<H>  Ca:8.3<L> (2023 05:40)  3.9     |  30.0<H>  |  3.79<H>    TPro  4.5<L>  /  Alb  2.1<L>  /  TBili  0.3<L>  /  DBili  x      /  AST  24     /  ALT  5      /  AlkPhos  194<H>  2023 05:40                        8.9<L>  4.70  )-----------( 111<L>    ( 2023 05:40 )             26.1<L>    Phos:1.8 mg/dL<L> M.0 mg/dL  ( @ 05:40)    No acute events overnight.    Seen in the ICU  : PN VENT ; Follows Commands,     ROS: Unable to obtain secondary to patient's current clinical condition.     Physical Exam,    General: WDWN male lying in bed, ill appearing  Cardiac: S1S2 RRR  Respiratory: CTAB  Abdomen: Soft, NT  Extremities: Less edema    On EEG  =======================================================  Vital Signs Last 48 Hrs,    T(C): 36.4 (2023 07:46), Max: 36.9 (2023 23:15)  T(F): 97.5 (2023 07:46), Max: 98.4 (2023 23:15)  HR: 58 (2023 09:00) (54 - 102)  BP: 92/48 (2023 09:00) (85/61 - 154/95)  BP(mean): 61 (2023 09:00) (55 - 119)  RR: 11 (2023 09:00) (0 - 27)  SpO2: 100% (2023 09:00) (98% - 100%)    O2 Parameters below as of 2023 08:00  Patient On (Oxygen Delivery Method): ventilator    T(C): 37.1 (2023 23:00), Max: 37.1 (2023 23:00)  T(F): 98.7 (2023 23:00), Max: 98.7 (2023 23:00)  HR: 74 (2023 10:45) (59 - 104)  BP: 128/66 (2023 10:45) (85/52 - 164/109)  BP(mean): 80 (2023 10:45) (58 - 126)  RR: 20 (2023 10:45) (0 - 20)  SpO2: 100% (2023 10:45) (96% - 100%)    Parameters below as of 2023 07:15  Patient On (Oxygen Delivery Method): ventilator    O2 Concentration (%): 30  I&O's Summary    2023 07:01  -  2023 07:00  --------------------------------------------------------  IN: 1061.7 mL / OUT: 0 mL / NET: 1061.7 mL    2023 07:01  -  2023 11:28  --------------------------------------------------------  IN: 144.5 mL / OUT: 0 mL / NET: 144.5 mL    =======================================================  Current Antibiotics:  acyclovir IVPB 320 milliGRAM(s) IV Intermittent every 24 hours  fluconAZOLE   Tablet 200 milliGRAM(s) Oral <User Schedule>  meropenem Injectable      meropenem Injectable 500 milliGRAM(s) IV Push every 24 hours  trimethoprim   80 mG/sulfamethoxazole 400 mG 1 Tablet(s) Oral <User Schedule>    Other medications:  acetylcysteine 20%  Inhalation 4 milliLiter(s) Inhalation every 6 hours  albuterol/ipratropium for Nebulization 3 milliLiter(s) Nebulizer every 6 hours  chlorhexidine 2% Cloths 1 Application(s) Topical <User Schedule>  dextrose 50% Injectable 25 Gram(s) IV Push once  dextrose 50% Injectable 12.5 Gram(s) IV Push once  dextrose 50% Injectable 25 Gram(s) IV Push once  dextrose Oral Gel 15 Gram(s) Oral once  fentaNYL   Infusion. 0.5 MICROgram(s)/kG/Hr IV Continuous <Continuous>  heparin  Infusion.  Unit(s)/Hr IV Continuous <Continuous>  hydrocortisone 10 milliGRAM(s) Oral every 12 hours  metoprolol tartrate 25 milliGRAM(s) Enteral Tube four times a day  norepinephrine Infusion 0.05 MICROgram(s)/kG/Min IV Continuous <Continuous>  pantoprazole  Injectable 40 milliGRAM(s) IV Push daily  sevelamer carbonate 800 milliGRAM(s) Oral three times a day with meals  tacrolimus 1.5 milliGRAM(s) Oral daily  tamsulosin 0.4 milliGRAM(s) Oral at bedtime  valproate sodium  IVPB 250 milliGRAM(s) IV Intermittent every 8 hours    CT Head No Cont (23 @ 16:26)     ACC: [id number] EXAM:  CT BRAIN                          PROCEDURE DATE:  2023      INTERPRETATION:  EXAM: CT head without contrast    INDICATION: Altered mental status    TECHNIQUE: CT examination of the head performed without contrast.   Multiple axial images obtained from skull base to vertex.   Sagittal/coronal reformatted images obtained from axial data set. Images   reviewed in soft tissue and bone windows.    COMPARISON: 2022 CT head.    FINDINGS:    Ventricles andsulci are mildly proportionately prominent likely related   to mild parenchymal volume loss. No hydrocephalus. No extra-axial fluid   collection identified.    No acute intracranial hemorrhage identified. There is mild subcortical   and periventricular white matter attenuation nonspecific but favored to   reflect sequela of mild chronic microvascular ischemia. Gray-white   differentiation is preserved without CT evidence for acute transcortical   infarction. There is no significant midline shift,mass effect or   herniation.    Bilateral lens replacement.    Sellar and suprasellar region are unremarkable in appearance.    There is moderate polypoid mucosal thickening of ethmoid, sphenoid and   maxillary sinuses. Redemonstration mild to moderate of bilateral mastoid   air cells, right greater than left as well as right middle ear cavity. No   significant cerebellar tonsillar herniation.    No displaced calvarial fractures are identified. No suspicious expansile   or destructive calvarial lesion identified. No significant scalp soft   tissue swelling identified.    IMPRESSION:    No acute intracranial hemorrhage, hydrocephalus or extra-axial fluid   collection.  Mild parenchymal volume loss and mild chronic microvascular ischemic   changes.  No CT evidence for acute transcortical infarction. Please note that MR   imaging is a more sensitive imaging modality for detection of acute   infarction and may be obtained as clinically warranted.    If clinicians have any questions/ need for clarification regarding this   report, Dr. Tommy Nichole can be best reached via the patient secure   hospital email system    TOMMY NICHOLE MD; Attending Radiologist  This document has been electronically signed. 2023  5:24PM    136    |  97     |  27.0<H>  ----------------------------<  112<H>  Ca:8.0<L> (2023 04:40)  3.2<L>   |  27.0   |  5.22<H  TPro  4.9<L>  /  Alb  2.4<L>  /  TBili  0.3<L>  /  DBili  x      /  AST  22     /  ALT  5      /  AlkPhos  185<H>  2023 03:20                        8.9<L>  4.16  )-----------( 141<L>    ( 2023 04:40 )             25.8<L>    Phos:3.3 mg/dL M.3 mg/dL PTH:-- Uric acid:-- Serum Osm:--  Ferritin:-- Iron:-- TIBC:-- Tsat:--  B12:-- TSH:-- ( @ 04:40)  ======================================================      134<L>  |  95<L>  |  17.8  ----------------------------<  100<H>  3.8   |  27.0  |  4.46<H>    Ca    8.1<L>      2023 03:15  Phos  3.9       Mg     2.1         TPro  4.9<L>  /  Alb  2.4<L>  /  TBili  0.3<L>  /  DBili  x   /  AST  22  /  ALT  5   /  AlkPhos  185<H>      Creatinine, Serum: 4.46 mg/dL (23 @ 03:15)  Creatinine, Serum: 3.54 mg/dL (23 @ 03:20)  Creatinine, Serum: 4.75 mg/dL (22 @ 05:30)  Creatinine, Serum: 3.83 mg/dL (22 @ 04:30)  Creatinine, Serum: 5.94 mg/dL (22 @ 06:06)  Ammonia, Serum: 23: Ammonia levels will be falsely elevated if specimen is NOT collected,   processed and maintained at 4-8 C umol/L (23 @ 06:05) Culture Results:   Testing in progress (22 @ 14:00)   Historical Values  Culture Results:   Testing in progress (22 @ 14:00)   Culture Results:   No growth (22 @ 14:00)   Culture Results:   No Growth Final (22 @ 06:45)   Culture Results:   No Growth Final (22 @ 06:40)   Culture Results:   No fungus isolated at 1 week. (.. @ 17:10)   Culture Results:   No growth at 3 days. (.. @ 17:10)   Culture Results:   No acid-fast bacilli isolated at 1 week. ****Acid-fast cultures are held   for 6 weeks.**** (..22 @ 17:10)   Culture Results:   Normal Respiratory Keerthi present (22 @ 16:00)   Culture Results:   No acid-fast bacilli isolated at 1 week. ****Acid-fast cultures are held   for 6 weeks.**** (.. @ 13:50)   Culture Results:   No fungus isolated at 1 week. (.. @ 13:50)   Culture Results:   No growth at 5 days (.. @ 13:50)   Culture Results:   No Growth Final (. @ 05:41)   Culture Results:   No Growth Final (.. @ 05:41)   Culture Results:   No fungus isolated after 4 weeks. (15. @ 09:00)   Culture Results:   No growth at 5 days (15. @ 09:00)   Culture Results:   No Growth Final (. @ 21:20)   Culture Results:   No Growth Final (. @ 21:15)   Culture Results:   Greater than or equal to 15 Colonies Enterococcus faecalis (. @ 08:00)   Culture Results:   Growth in aerobic and anaerobic bottles: Enterococcus faecalis   See previous culture [id number] (22 @ 09:38)   Culture Results:   Growth in aerobic and anaerobic bottles: Enterococcus faecalis   See previous culture [id number] (22 @ 09:30)   Culture Results:   Growth in aerobic and anaerobic bottles: Enterococcus faecalis   See previous culture [id number] (22 @ 10:15)   Culture Results:   Growth in aerobic and anaerobic bottles: Enterococcus faecalis   ***Blood Panel PCR results on this specimen are available   approximately 3 hours after the Gram stain result.***   Gram stain, PCR, and/or culture results may not always   correspond dueto difference in methodologies.   ************************************************************   This PCR assay was performed by multiplex PCR. This   Assay tests for 66 bacterial and resistance gene targets.   Please refer to the [facility] test directory   at [email or web address] for details. (22 @ 10:11)   Culture Results:   Moderate Yeast (22 @ 21:59)   Culture Results:   No acid fast bacilli isolated after 6 weeks. (22 @ 21:59)   Culture Results:   Normal Respiratory Keerthi present (22 @ 21:59)   Culture Results:   Normal Respiratory Keerthi present (22 @ 21:59)   Culture Results:   No growth at 5 days. (22 @ 14:29)   Culture Results:   No growth at 5 days. (22 @ 14:28)   Culture Results:   No fungus isolated after 4 weeks. (21 @ 06:56)   Culture Results:   No growth (21 @ 06:56)   Culture Results:   Normal Respiratory Keerthi present (21 @ 01:04)   Culture Results:   No growth at 5 days. (21 @ 11:29)   Culture Results:   No growth at 5 days. (21 @ 11:29)   Culture Results:   No growth at 5 days. (21 @ 11:59)   Culture Results:   No growth at 5 days. (21 @ 06:16)   Culture Results:   No growth at 5 days. (21 @ 06:15)   Culture Results:   No growth (16 @ 03:05)   =======================================================    73 YO - M with PMH of  hypertension, hyperlipidemia, ESRD on HD, emphysema s/p lung transplant in [place] by Dr. 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 after complex admission with e. faecalis bacteremia, newly diagnosed HFrEF 25%, AF complicated by GIB was discharged on home IV Abx now presenting with AMS,    Presented to the ED with daughter.     CTH obtained and did not reveal any acute findings.    Patient was admitted to medicine for encephalopathy. LP attempted bedside but unsuccessful. Was on empiric treatment and mental status was improving and then ultimately intubated for aspiration pneumonia    #Acute hypoxic respiratory failure likely 2/2 aspiration- now extubated, mental status improved. Sputum cx normal keerthi    #AMS-unclear etiology, no fever. Ammonia level normal.  ?EBV encephalitis  LP normal cell count,  glucose and protein 51.     CSF pcr (-) and cultures pending.   [facility] micro lab CSF gram stain reported as "few organisms" . gram stain repeated at core lab and micro and is NEGATIVE. initial report has been corrected. CSF crytp titer 1:40, as per [facility] his initial CSF titer in 2020 was 1:2560. no serum titer available. 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 f/u CSf fungal CX. stopped amphotericin/flucytosine and resumed fluconazole suppression post HD  serum crypt Ag 1:40, most likely old infection  EBv CSF PCR/serum + but very low; repeat neg. Unclear if EBV encephalitis , although possible in this immunocompromised pt who appeared to have clinically responded to acyclovir    Unlikely to derive any benefit with ACV in the setting of EBV reactivation/possible encephalitis, but he is on acyclovir to complete total 14 days ( Per ID )     MRI brain unremarkable     Repeat LP on  - CSF cell count/prot/glu unremarkable. CSF PCR neg     E. Fecalis - Bacteremia - . COMPLETED IV ABX  [facility]ISTIAN - P     PT ON MERREM     # S/P  lung transplant-     # HX :  fungal meningitis-  flucytosine/ampho  DISCONTINUED  and resumed fluconazole.     No evidence of CNS crypto

## 2023-01-04 NOTE — CHART NOTE - NSCHARTNOTEFT_GEN_A_CORE
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    Recently discharged from [facility] (11/11/22-11/23/22) after complex admission with E. faecalis bacteremia/Endocarditis treated for 6 weeks antibiotics via PICC line.   Now admitted on 12/13 with altered mental status of unclear etiology.     Had been being treated empirically for meningitis on the medicine service. Diagnostic LP had been deferred due to anticoagulation.  Incidentally found to have a dilated CBD with eventual plan for MRCP. Transferred to MICU on 12/18 after developing respiratory distress, hypoxemia, and worsened mental status, necessitating emergent intubation/mechanical ventilation. Noted to have dislodged NGT with what appeared to be tube feedings in his airways. ABG revealed severe hypercapnea with acute respiratory acidosis. Developed distributive shock state requiring IV vasopressor. LP performed 12/19 positive Cryptococcal Ag, negative PCR and Gram stain. 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 Paracentesis negative to date. Extubated 12/20 weaned to nasal cannula, shock has resolved and weaned off IV vasopressors. His encephalopathy improved and he was downgraded from MICU on 12/21 to medical floors.     12/31 - LP done  1/1 - intubation due to mucus plug  1/2 - LP negative, ID: stop ampho, start fluconazole, to complete 14 days of ACV, continue merrum.  Nueuro following .  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    Recently discharged from [facility] (11/11/22-11/23/22) after complex admission with E. faecalis bacteremia/Endocarditis treated for 6 weeks antibiotics via PICC line.   Now admitted on 12/13 with altered mental status    Initially treated empirically for meningitis. Diagnostic LP had been deferred due to anticoagulation. Incidentally found to have a dilated CBD with eventual plan for MRCP. Transferred to MICU on 12/18 after developing respiratory distress, hypoxemia, and worsened mental status, necessitating emergent intubation/mechanical ventilation. Noted to have dislodged NGT with what appeared to be tube feedings in his airways. ABG revealed severe hypercapnea with acute respiratory acidosis. Developed distributive shock state requiring IV vasopressor. LP performed 12/19 positive Cryptococcal Ag, negative PCR and Gram stain. 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 Paracentesis negative to date. Extubated 12/20 weaned to nasal cannula, shock has resolved and weaned off IV vasopressors. His encephalopathy improved and he was downgraded from MICU on 12/21 to medical floors.   Had successful LP done 12/31 and then 1/1 rapid response called for respiratory distress, secondary to mucus plug requiring intubation  1/2 - LP results negative, ID: stop ampho, start fluconazole, to complete 14 days of ACV, continued meropenem.    Neuro following with improved responsiveness, patient has eye opening and has spontaneous arm movement  1/3–hemodialysis completed  1/4 patient successfully extubated, speech evaluation--- failed swallow evaluation, to re evaluate in AM. Mental status waxes and wanes, although improved later in the day.      Patient is hemodynamically stable post intubation and is stable for downgrade to stepdown mosley.    Case discussed with Dr. Iggy Moreira, ICU Attending

## 2023-01-04 NOTE — SWALLOW BEDSIDE ASSESSMENT ADULT - COMMENTS
Pt known to this service w/ last recommendation made for regular/thin on 12/23/22, pt w/ change in status requiring re-consult from this service.

## 2023-01-05 NOTE — PHYSICAL THERAPY INITIAL EVALUATION ADULT - IMPAIRMENTS CONTRIBUTING IMPAIRED BED MOBILITY, REHAB EVAL
decreased endurance/impaired balance/decreased strength
impaired balance/cognition/decreased flexibility/impaired postural control/decreased ROM/decreased strength

## 2023-01-05 NOTE — PROGRESS NOTE ADULT - SUBJECTIVE AND OBJECTIVE BOX
CC: "PNA/ r/o meningitis  HPI: 73 y/o male with extensive pmhx including but not limited too HTN, HLD, ESRD on HD via left UE AFV, COPD/Emphysema s/p B/L lung transplant 8 years ago at [facility], CAD s/p PCI, Chronic systolic CHF w/ EF of 25% from ICM s/p PCI, PAF on eliquis, cirrhosis with recurrent ascites paracentesis, GERD, fungal meningitis, Carotid stenosis s/p CEA. Admitted to [facility] from 11/11 to 11/23 for enterococcus endocarditis s/p 6 weeks of IV abx via right UE picc. Readmitted on 12/13 for encephalopathy with concern for recurrent meningitis. Developed respiratory failure due to aspiration and needed intubation and transfer to [facility] on 12/18 followed by septic shock. Had LP on 12/19 w/ negative PCR, but pos. Cryptococcal antigen. Also had paracentesis which was negative for infection. Seen by ID and currently on diflucan/ACV. Downgraded on 12/31 then developed mucous plug, resp. distress and was re-intubated. Extubated to NC on 1/4 and currently with NGT in due to failure of swallow eval. Palliative, Neuro, Nephrology, MICU notes reviewed. Patient seen on floor in NAD. Responds to verbal cues, following commands.     T(C): 36.9 (05 Jan 2023 04:02), Max: 37.2 (04 Jan 2023 07:30)  T(F): 98.4 (05 Jan 2023 04:02), Max: 99 (04 Jan 2023 07:30)  HR: 62 (05 Jan 2023 04:00) (60 - 88)  BP: 123/44 (05 Jan 2023 04:00) (80/68 - 141/68)  BP(mean): 68 (05 Jan 2023 02:00) (53 - 98)  ABP: --  ABP(mean): --  RR: 17 (05 Jan 2023 04:00) (14 - 24)  SpO2: 100% (05 Jan 2023 04:00) (100% - 100%)

## 2023-01-05 NOTE — PROGRESS NOTE ADULT - ASSESSMENT
73 y/o male with resolving acute resp. 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 failure:  -Due to aspiration      Dysphagia:    hx of Cryptococcal meningitis/Enterococcus endocarditis     ESRD:    Lung Transplant    PAF:    Cirrhosis:    GERD:                73 y/o male with resolving acute resp. 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 failure:  -Due to aspiration  -Cont. aspiration precautions  -Chest PT/Suction prn  -Speech re-eval   -nebs prn/spirometer  -trend WBC/Temp  -DVT-P on Eliquis  -Fall risk protocol     Dysphagia:  -Tube feeds held with patient intermittent confusion and concern for causing aspiration  -Await repeat speech eval today  -Aspiration precautions, HOB > 30 degrees    Metabolic Encephalopathy:  -Multi-factorial   -LP noted, ID notes noted  -Prolonged hospitalization  -EEG w/o seizure  -Neurology/Epilepsy notes reviewed    hx of Cryptococcal meningitis/Enterococcus endocarditis:  -Cont. Anti-virals/Anti-fungal/Abx per ID    ESRD:  -Cont. HD per Nephrology    Lung Transplant:  -Cont. Prograf  -Resume Cellcept?     PAF:  -Cont. BB/renal dose eliquis  -Trend Hbg    Cirrhosis:  -S/p paracentesis  -No evidence of SBP    GERD:  -PPI    Palliative care following for ongoing GOC.  Prognosis guarded   Patient remains acute based on above conditions

## 2023-01-05 NOTE — CONSULT NOTE ADULT - ASSESSMENT
72M w/ hx of COPD s/p bilateral lung transplant 1/2015 on prograf / Cellcept, ESRD on HD, CAD s/p PCI, carotid stenosis s/p CEA, RIJ occlusion on Eliquis, HFrEF EF 25%, hx of cryptococcal meningitis on chronic suppressive fluconazole, hx of covid PNA, cirrhosis, Afib, recent admission for enterococcus fecalis bacteremia s/p empiric tx for endocarditis due to inability to obtain CRISTIAN presented to the ED 12/13 with AMS with course complicated by recurrent respiratory failure secondary to aspiration PNA requiring intubation x2     Hypoxic respiratory failure in the setting of recurrent aspiration/volume overload   completed course of meropenem   maintain aspiration precautions   c/w DuoNeb q6h   chest PT   NT suctioning as tolerated   wean FiO2 as tolerated for goal >90%  monitor for high residuals if tube feeds resumed   volume optimization with HD   cardiac regimen as tolerated by BP  OOB as tolerated   remains on prograf; follow levels; mycophenolate remains on hold  ongoing goals of care discussion/palliative follow-up   poor prognosis and high risk of requiring reintubation in setting of ongoing encephalopathy

## 2023-01-05 NOTE — PROGRESS NOTE ADULT - ASSESSMENT
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 Faecalis bacteremia now readmitted on 12/13 for altered mental status, sepsis workup including ?acute meningitis, found also with dilated CBD on ultrasound complicated by acute respiratory failure with hypoxia  requiring intubation and transferred to MICU on 12/18, s/p LP and paracentesis, medically extubated on 12/20 and downgraded to medicine service, positive EBV CSF PCR, recurrent encephalopathy, acute respiratory failure s/p reintubation on 1/1/23 and overall guarded prognosis.     Recurrent Acute Encephalopathy  Prior Hx of Cryptococcal Meningitis   Aspiration PNA - completed antibiotic course  Hx of E Faecalis Bacteremia - s/p antibiotic course  - mentation improving  - s/p LP 12/19: + cryptococcal Ag but PCR neg, EBV CSF PCR/serum + but very low, ?EBV encephalitis  - s/p repeat LP 12/31 with CSF studies unremarkable thus far  - off ampho/flucytosine as no evidence of CNS crypto, c/w fluconazole  - empiric acyclovir to complete 14 days per ID  - supportive care, reorientation, sleep hygiene    Seizure  - on valproic q8H ATC    Acute Respiratory Failure  - s/p intubation x 2 this admission  - maintaining adequate sat on O2 NC  - chest PT as tolerated for congestion   - rec guaifenesin/dextro PRN when able to take PO    Dysphagia  - remains NPO, failed SLP eval on 1/4 and pending follow up eval    ESRD on HD   - since 2020 after presumed contrast induced nephropathy for CAD with stenting   - mgnt per nephrology  - avoid nephrotoxic agents    Hx of Bilateral Lung Transplant for ES COPD  - in 2015, follows closely with [facility]   - on tacrolimus and hydrocortisone; home cellcept on hold pending resolution of acute infection     Palliative Care Encounter/Goals of care  - Surrogate/HCP/Guardian: wife Veronica Marshall [phone]     - [facility] this admission with pt and spouse, remains to continue pursuit of ongoing medical treatment including HD and follow up transplant team at [facility]  - Pt continues to be medically optimized and making slow clinical improvement, however overall guarded prognosis at best given multitude of complex conditions.     Palliative team will continue to support and follow clinical progress peripherally for any further goc

## 2023-01-05 NOTE — CHART NOTE - NSCHARTNOTEFT_GEN_A_CORE
71 y/o male with resolving acute resp. 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 S/p extubation yesterday. A&Ox3. Currently on NC. On NGT feeds, SLP following. Currently on antibiotics. 71 y/o male with resolving acute resp. 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 S/p extubation yesterday. A&Ox3. Currently on NC. On NGT feeds, SLP following. Currently on antibiotics. Patient with rhonchi, will order chest PT and suctioning. Pulm f/u requested.

## 2023-01-05 NOTE — CONSULT NOTE ADULT - SUBJECTIVE AND OBJECTIVE BOX
Patient is a 72y old  Male who presents with a chief complaint of r/o meningitis   Resp. 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 Course complicated by hypoxic/hypercapnic respiratory failure presumed secondary to aspiration s/p intubation 12/18 and subsequently extubated 12/20. He underwent LP 12/21 with negative cultures and elevated EBV PCR. Pt underwent paracentesis 12/19 negative for SBP. He had been on empiric flucytosine/amphotericin but was transitioned back to fluconazole maintenance as repeat LP 12/31 again negative but was started on empiric acyclovir for planned 14 days. He was noted to have dilated CBD prior but was within limits on MRCP with no evidence of stone noted. MRI brain 12/25 showed no acute findings. Pt had improving encephalopathy and was downgraded to SDU 12/21 but required reupgrade 12/29 in the setting of worsening mental status. EEG 12/30 demonstrated myoclonic seizure and pt was started on valproic acid. Due to mucus plugging and hypoxia, pt was again intubated 1/1 and extubated 1/4.      Interval hx:  Downgraded from MICU to 4TWR. Awake but confused when seen. Completed HD and had removal of 500cc. Remains on 4L NC.                 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      2019 novel coronavirus disease (COVID-19)  Feb 2021 - hospitalized for 1 week at [facility]      Pneumonia  April 2021      [place] ([place] hearing)      HTN (hypertension)      Lumbar spondylosis      History of COPD      BPH without urinary obstruction      Hypercholesterolemia      Oliguria and anuria      H/O peripheral neuropathy      H/O lung transplant  bilateral - transplant - 1-2-2015 -  [facility]      H/O heart artery stent  x3 @[facility]      History of hip replacement  right      Status post open reduction and internal fixation (ORIF) of fracture  left femur      Medications:  acyclovir IVPB 320 milliGRAM(s) IV Intermittent every 24 hours  fluconAZOLE   Tablet 200 milliGRAM(s) Oral <User Schedule>  trimethoprim  40 mG/sulfamethoxazole 200 mG Suspension 80 milliGRAM(s) Oral <User Schedule>    doxazosin 2 milliGRAM(s) Oral at bedtime  metoprolol tartrate 25 milliGRAM(s) Enteral Tube four times a day    albuterol/ipratropium for Nebulization 3 milliLiter(s) Nebulizer every 6 hours    fentaNYL    Injectable 50 MICROGram(s) IV Push every 4 hours PRN  midazolam Injectable 2 milliGRAM(s) IV Push every 2 hours PRN  valproate sodium  IVPB 250 milliGRAM(s) IV Intermittent every 8 hours      apixaban 2.5 milliGRAM(s) Oral two times a day    pantoprazole  Injectable 40 milliGRAM(s) IV Push daily      dextrose 50% Injectable 25 Gram(s) IV Push once  dextrose 50% Injectable 12.5 Gram(s) IV Push once  dextrose 50% Injectable 25 Gram(s) IV Push once  dextrose Oral Gel 15 Gram(s) Oral once  hydrocortisone 10 milliGRAM(s) Oral every 12 hours      epoetin ben-epbx (RETACRIT) Injectable 55647 Unit(s) IV Push <User Schedule>  tacrolimus 1.5 milliGRAM(s) Oral daily    chlorhexidine 2% Cloths 1 Application(s) Topical <User Schedule>    Allergies:     budesonide (Unknown)  cefpodoxime (Unknown)  Pollen (Unknown)    Intolerances    Social History:  former smoker (13 Dec 2022 08:45)      FAMILY HISTORY:  Family history of emphysema  mother    ICU Vital Signs Last 24 Hrs  T(C): 36.4 (05 Jan 2023 19:30), Max: 36.9 (05 Jan 2023 04:02)  T(F): 97.5 (05 Jan 2023 19:30), Max: 98.4 (05 Jan 2023 04:02)  HR: 76 (05 Jan 2023 19:30) (62 - 88)  BP: 118/40 (05 Jan 2023 19:30) (85/57 - 141/68)  BP(mean): 68 (05 Jan 2023 02:00) (65 - 95)  ABP: --  ABP(mean): --  RR: 18 (05 Jan 2023 19:30) (15 - 20)  SpO2: 99% (05 Jan 2023 19:30) (99% - 100%)    O2 Parameters below as of 05 Jan 2023 19:30  Patient On (Oxygen Delivery Method): nasal cannula  O2 Flow (L/min): 4        I&O's Detail    04 Jan 2023 07:01  -  05 Jan 2023 07:00  --------------------------------------------------------  IN:    Dexmedetomidine: 4.7 mL    Heparin Infusion: 12 mL    IV PiggyBack: 375 mL    IV PiggyBack: 50 mL    IV PiggyBack: 100 mL  Total IN: 541.7 mL    OUT:    Nepro: 0 mL    Stool (mL): 20 mL  Total OUT: 20 mL    Total NET: 521.7 mL      05 Jan 2023 07:01  -  05 Jan 2023 20:37  --------------------------------------------------------  IN:  Total IN: 0 mL    OUT:    Other (mL): 500 mL  Total OUT: 500 mL    Total NET: -500 mL      LABS:                        9.0    6.75  )-----------( Clumped    ( 05 Jan 2023 04:45 )             27.9     01-05    137  |  99  |  24.0<H>  ----------------------------<  92  4.6   |  28.0  |  4.59<H>    Ca    8.5      05 Jan 2023 04:45  Phos  4.6     01-05  Mg     2.1     01-05    TPro  4.5<L>  /  Alb  2.1<L>  /  TBili  0.3<L>  /  DBili  x   /  AST  24  /  ALT  5   /  AlkPhos  194<H>  01-04      CAPILLARY BLOOD GLUCOSE      POCT Blood Glucose.: 87 mg/dL (05 Jan 2023 18:19)    PTT - ( 04 Jan 2023 05:40 )  PTT:62.1 sec    CULTURES:  Culture Results:   Culture is being performed. (12-31 @ 14:00)  Culture Results:   No growth (12-31 @ 14:00)  Culture Results:   Culture is being performed. Fungal cultures are held for 4 weeks. (12-31 @ 14:00)      Physical Examination:    General: awake, confused, in NAD      HEENT: NC/AT, anicteric, MMM. NG tube in place     PULM: bilateral rhonchi, no increased work of breathing    NECK: Supple,  trachea midline    CVS: S1S2, RRR    ABD: Soft, nondistended, nontender, normoactive bowel sounds    EXT: 1+ b/l edema, nontender    SKIN: Warm and well perfused, no rashes noted    NEURO: Awake, no facial asymmetry, moves all extremities        RADIOLOGY:   CT head 1/2:  IMPRESSION:    No acute intracranial hemorrhage, hydrocephalus or extra-axial fluid collection.  Mild parenchymal volume loss and mild chronic microvascular ischemic changes.  No CT evidence for acute transcortical infarction. Please note that MR imaging is a more sensitive imaging modality for detection of acute infarction and may be obtained as clinically warranted.  CT chest 12/29:  CT scan of the chest was obtained without administration of intravenous contrast.    No hilar and or mediastinal adenopathy is noted.    Heart is enlarged in size. Patient is status post median sternotomy. Small pericardial effusion is noted. Right-sided PICC line catheter is noted in place.    No endobronchial lesions are noted. Interlobular septal thickening and thickening of the peribronchovascular interstitium are noted within both lungs. Minimal patchy groundglass opacities are also noted in both upper lobes. Minimal atelectasis is also noted involving both lower lobes. Small bilateral pleural effusions are noted.    Below the diaphragm, visualized portions of the abdomen demonstrate nodular contour to the surface of liver. Cholelithiasis is noted. Both kidneys are small in size. Low-attenuation lesion in the right kidney is too small to be adequately characterized on this exam. Tip of the nasogastric tube is noted in the stomach. Ascites is noted.    Degenerative changes of the spine as well as loss of height of multiple vertebral bodies is noted. Subcutaneous edema is noted.         Patient is a 72y old  Male who presents with a chief complaint of r/o meningitis   Resp. 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 Course complicated by hypoxic/hypercapnic respiratory failure presumed secondary to aspiration s/p intubation 12/18 and subsequently extubated 12/20. He underwent LP 12/21 with negative cultures and elevated EBV PCR. Pt underwent paracentesis 12/19 negative for SBP. He had been on empiric flucytosine/amphotericin but was transitioned back to fluconazole maintenance as repeat LP 12/31 again negative but was started on empiric acyclovir for planned 14 days. He was noted to have dilated CBD prior but was within limits on MRCP with no evidence of stone noted. MRI brain 12/25 showed no acute findings. Pt had improving encephalopathy and was downgraded to SDU 12/21 but required reupgrade 12/29 in the setting of worsening mental status. EEG 12/30 demonstrated myoclonic seizure and pt was started on valproic acid. Due to mucus plugging and hypoxia, pt was again intubated 1/1 and extubated 1/4.      Interval hx:  Downgraded from MICU to 4TWR. Awake but confused when seen. Completed HD and had removal of 500cc. Remains on 4L NC.                 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      2019 novel coronavirus disease (COVID-19)  Feb 2021 - hospitalized for 1 week at [facility]      Pneumonia  April 2021      [place] hearing)      HTN (hypertension)      Lumbar spondylosis      History of COPD      BPH without urinary obstruction      Hypercholesterolemia      Oliguria and anuria      H/O peripheral neuropathy      H/O lung transplant  bilateral - transplant - 1-2-2015 -  [facility]      H/O heart artery stent  x3 @[facility]      History of hip replacement  right      Status post open reduction and internal fixation (ORIF) of fracture  left femur      Medications:  acyclovir IVPB 320 milliGRAM(s) IV Intermittent every 24 hours  fluconAZOLE   Tablet 200 milliGRAM(s) Oral <User Schedule>  trimethoprim  40 mG/sulfamethoxazole 200 mG Suspension 80 milliGRAM(s) Oral <User Schedule>    doxazosin 2 milliGRAM(s) Oral at bedtime  metoprolol tartrate 25 milliGRAM(s) Enteral Tube four times a day    albuterol/ipratropium for Nebulization 3 milliLiter(s) Nebulizer every 6 hours    fentaNYL    Injectable 50 MICROGram(s) IV Push every 4 hours PRN  midazolam Injectable 2 milliGRAM(s) IV Push every 2 hours PRN  valproate sodium  IVPB 250 milliGRAM(s) IV Intermittent every 8 hours      apixaban 2.5 milliGRAM(s) Oral two times a day    pantoprazole  Injectable 40 milliGRAM(s) IV Push daily      dextrose 50% Injectable 25 Gram(s) IV Push once  dextrose 50% Injectable 12.5 Gram(s) IV Push once  dextrose 50% Injectable 25 Gram(s) IV Push once  dextrose Oral Gel 15 Gram(s) Oral once  hydrocortisone 10 milliGRAM(s) Oral every 12 hours      epoetin ben-epbx (RETACRIT) Injectable 36506 Unit(s) IV Push <User Schedule>  tacrolimus 1.5 milliGRAM(s) Oral daily    chlorhexidine 2% Cloths 1 Application(s) Topical <User Schedule>    Allergies:     budesonide (Unknown)  cefpodoxime (Unknown)  Pollen (Unknown)    Intolerances    Social History:  former smoker (13 Dec 2022 08:45)      FAMILY HISTORY:  Family history of emphysema  mother    ICU Vital Signs Last 24 Hrs  T(C): 36.4 (05 Jan 2023 19:30), Max: 36.9 (05 Jan 2023 04:02)  T(F): 97.5 (05 Jan 2023 19:30), Max: 98.4 (05 Jan 2023 04:02)  HR: 76 (05 Jan 2023 19:30) (62 - 88)  BP: 118/40 (05 Jan 2023 19:30) (85/57 - 141/68)  BP(mean): 68 (05 Jan 2023 02:00) (65 - 95)  ABP: --  ABP(mean): --  RR: 18 (05 Jan 2023 19:30) (15 - 20)  SpO2: 99% (05 Jan 2023 19:30) (99% - 100%)    O2 Parameters below as of 05 Jan 2023 19:30  Patient On (Oxygen Delivery Method): nasal cannula  O2 Flow (L/min): 4        I&O's Detail    04 Jan 2023 07:01  -  05 Jan 2023 07:00  --------------------------------------------------------  IN:    Dexmedetomidine: 4.7 mL    Heparin Infusion: 12 mL    IV PiggyBack: 375 mL    IV PiggyBack: 50 mL    IV PiggyBack: 100 mL  Total IN: 541.7 mL    OUT:    Nepro: 0 mL    Stool (mL): 20 mL  Total OUT: 20 mL    Total NET: 521.7 mL      05 Jan 2023 07:01  -  05 Jan 2023 20:37  --------------------------------------------------------  IN:  Total IN: 0 mL    OUT:    Other (mL): 500 mL  Total OUT: 500 mL    Total NET: -500 mL      LABS:                        9.0    6.75  )-----------( Clumped    ( 05 Jan 2023 04:45 )             27.9     01-05    137  |  99  |  24.0<H>  ----------------------------<  92  4.6   |  28.0  |  4.59<H>    Ca    8.5      05 Jan 2023 04:45  Phos  4.6     01-05  Mg     2.1     01-05    TPro  4.5<L>  /  Alb  2.1<L>  /  TBili  0.3<L>  /  DBili  x   /  AST  24  /  ALT  5   /  AlkPhos  194<H>  01-04      CAPILLARY BLOOD GLUCOSE      POCT Blood Glucose.: 87 mg/dL (05 Jan 2023 18:19)    PTT - ( 04 Jan 2023 05:40 )  PTT:62.1 sec    CULTURES:  Culture Results:   Culture is being performed. (12-31 @ 14:00)  Culture Results:   No growth (12-31 @ 14:00)  Culture Results:   Culture is being performed. Fungal cultures are held for 4 weeks. (12-31 @ 14:00)      Physical Examination:    General: awake, confused, in NAD      HEENT: NC/AT, anicteric, MMM. NG tube in place     PULM: bilateral rhonchi, no increased work of breathing    NECK: Supple,  trachea midline    CVS: S1S2, RRR    ABD: Soft, nondistended, nontender, normoactive bowel sounds    EXT: 1+ b/l edema, nontender    SKIN: Warm and well perfused, no rashes noted    NEURO: Awake, no facial asymmetry, moves all extremities      ROS: unable to obtain due to clinical status      RADIOLOGY:   CT head 1/2:  IMPRESSION:    No acute intracranial hemorrhage, hydrocephalus or extra-axial fluid collection.  Mild parenchymal volume loss and mild chronic microvascular ischemic changes.  No CT evidence for acute transcortical infarction. Please note that MR imaging is a more sensitive imaging modality for detection of acute infarction and may be obtained as clinically warranted.  CT chest 12/29:  CT scan of the chest was obtained without administration of intravenous contrast.    No hilar and or mediastinal adenopathy is noted.    Heart is enlarged in size. Patient is status post median sternotomy. Small pericardial effusion is noted. Right-sided PICC line catheter is noted in place.    No endobronchial lesions are noted. Interlobular septal thickening and thickening of the peribronchovascular interstitium are noted within both lungs. Minimal patchy groundglass opacities are also noted in both upper lobes. Minimal atelectasis is also noted involving both lower lobes. Small bilateral pleural effusions are noted.    Below the diaphragm, visualized portions of the abdomen demonstrate nodular contour to the surface of liver. Cholelithiasis is noted. Both kidneys are small in size. Low-attenuation lesion in the right kidney is too small to be adequately characterized on this exam. Tip of the nasogastric tube is noted in the stomach. Ascites is noted.    Degenerative changes of the spine as well as loss of height of multiple vertebral bodies is noted. Subcutaneous edema is noted.

## 2023-01-05 NOTE — PHYSICAL THERAPY INITIAL EVALUATION ADULT - PLANNED THERAPY INTERVENTIONS, PT EVAL
W/C mobility/bed mobility training/gait training/transfer training
balance training/bed mobility training/gait training/strengthening/transfer training

## 2023-01-05 NOTE — PHYSICAL THERAPY INITIAL EVALUATION ADULT - GENERAL OBSERVATIONS, REHAB EVAL
Pt received lying in bed on 4 tower, (+) IV lock, (+) tele, NAD. Agreeable to PT Pt received in the semi-[place] position, NAD.

## 2023-01-05 NOTE — PHYSICAL THERAPY INITIAL EVALUATION ADULT - DIAGNOSIS, PT EVAL
Impaired functional mobility due to decreased strength and endurance
Impaired Functional Mobility due to generalized weakness, (+) resolving ARF, Metabolic Encephalopathy, Dysphagia.

## 2023-01-05 NOTE — PHYSICAL THERAPY INITIAL EVALUATION ADULT - BALANCE DISTURBANCE, IDENTIFIED IMPAIRMENT CONTRIBUTE, REHAB EVAL
impaired motor control/impaired postural control/decreased ROM/decreased strength
decreased strength

## 2023-01-05 NOTE — PHYSICAL THERAPY INITIAL EVALUATION ADULT - CRITERIA FOR SKILLED THERAPEUTIC INTERVENTIONS
impairments found/functional limitations in following categories/risk reduction/prevention/rehab potential/therapy frequency/predicted duration of therapy intervention/anticipated equipment needs at discharge/anticipated discharge recommendation
impairments found/functional limitations in following categories/risk reduction/prevention

## 2023-01-05 NOTE — PHYSICAL THERAPY INITIAL EVALUATION ADULT - ACTIVE RANGE OF MOTION EXAMINATION, REHAB EVAL
bilateral upper extremity Active ROM was WFL (within functional limits)/bilateral  lower extremity Active ROM was WFL (within functional limits)
Yunior UE 1+/5, LE 2-/5 Yunior calf muscles atrophy noted

## 2023-01-05 NOTE — PHYSICAL THERAPY INITIAL EVALUATION ADULT - PRECAUTIONS/LIMITATIONS, REHAB EVAL
very [place], speak to left ear/fall precautions/hearing precautions
5 l O2 via NC, NG tube, rectal tube, Yunior protective boots/aspiration precautions/cardiac precautions/fall precautions/oxygen therapy device and L/min

## 2023-01-05 NOTE — PROGRESS NOTE ADULT - SUBJECTIVE AND OBJECTIVE BOX
[facility] PALLIATIVE MEDICINE     CC:  Reconsulted for ongoing GOC given progressive clinical deterioration     INTERVAL HPI/OVERNIGHT EVENTS:  Source if other than patient:  []Family   [x]Team     Downgraded out of MICU to SDU  No acute events overnight.  Pt very tired at time of visit earlier today, but wakes easily to name  He was comfortable and offered no acute complaints.    PRESENT SYMPTOMS:     Dyspnea: No   Nausea/Vomiting:  No  Anxiety:   No  Depression: No  Fatigue: Yes    Loss of appetite: NPO pending SLP  Constipation:  No     Pain: No             Character-            Duration-            Effect-            Factors-            Frequency-            Location-            Severity-    Pain AD Score:  [email or web address] (press ctrl + left click to view)    Review of Systems: Denies any chest pain or dyspnea at rest. See above for rest of ROS    MEDICATIONS  (STANDING):  acyclovir IVPB 320 milliGRAM(s) IV Intermittent every 24 hours  albuterol/ipratropium for Nebulization 3 milliLiter(s) Nebulizer every 6 hours  apixaban 2.5 milliGRAM(s) Oral two times a day  chlorhexidine 2% Cloths 1 Application(s) Topical <User Schedule>  dextrose 50% Injectable 25 Gram(s) IV Push once  dextrose 50% Injectable 12.5 Gram(s) IV Push once  dextrose 50% Injectable 25 Gram(s) IV Push once  dextrose Oral Gel 15 Gram(s) Oral once  doxazosin 2 milliGRAM(s) Oral at bedtime  epoetin ben-epbx (RETACRIT) Injectable 06470 Unit(s) IV Push <User Schedule>  fluconAZOLE   Tablet 200 milliGRAM(s) Oral <User Schedule>  hydrocortisone 10 milliGRAM(s) Oral every 12 hours  metoprolol tartrate 25 milliGRAM(s) Enteral Tube four times a day  pantoprazole  Injectable 40 milliGRAM(s) IV Push daily  tacrolimus 1.5 milliGRAM(s) Oral daily  trimethoprim  40 mG/sulfamethoxazole 200 mG Suspension 80 milliGRAM(s) Oral <User Schedule>  valproate sodium  IVPB 250 milliGRAM(s) IV Intermittent every 8 hours    MEDICATIONS  (PRN):  fentaNYL    Injectable 50 MICROGram(s) IV Push every 4 hours PRN agitation  midazolam Injectable 2 milliGRAM(s) IV Push every 2 hours PRN agitation    PHYSICAL EXAM:    Vital Signs Last 24 Hrs  T(C): 36.4 (05 Jan 2023 11:45), Max: 36.9 (05 Jan 2023 04:02)  T(F): 97.6 (05 Jan 2023 11:45), Max: 98.4 (05 Jan 2023 04:02)  HR: 85 (05 Jan 2023 11:45) (62 - 88)  BP: 128/76 (05 Jan 2023 11:45) (85/57 - 141/68)  BP(mean): 68 (05 Jan 2023 02:00) (65 - 98)  RR: 18 (05 Jan 2023 11:45) (14 - 19)  SpO2: 100% (05 Jan 2023 11:45) (99% - 100%)    Parameters below as of 05 Jan 2023 11:45  Patient On (Oxygen Delivery Method): nasal cannula    Karnofsky:  30%    GEN: chronically ill. resting comfortably and no acute distress    HEENT: mucous membrane moist      Lungs: comfortable, nonlabored +congestion    CV: +s1/s2  RRR    GI: +BS, abdomen soft, nontender, nondistended      MSK:  no cyanosis or edema.      NEURO: nonfocal. fatigued but wakes to verbal stimuli, follow simple commands,     Skin: warm and dry.     LABS:                          9.0    6.75  )-----------( Clumped    ( 05 Jan 2023 04:45 )             27.9     01-05    137  |  99  |  24.0<H>  ----------------------------<  92  4.6   |  28.0  |  4.59<H>    Ca    8.5      05 Jan 2023 04:45  Phos  4.6     01-05  Mg     2.1     01-05    TPro  4.5<L>  /  Alb  2.1<L>  /  TBili  0.3<L>  /  DBili  x   /  AST  24  /  ALT  5   /  AlkPhos  194<H>  01-04      RADIOLOGY & ADDITIONAL STUDIES: reviewed       ADVANCE DIRECTIVES/TREATMENT PREFERENCES: FULL CODE    NEUROLOGICAL MEDICATIONS/OPIOIDS/BENZODIAZEPINE IN PAST 24 HOURS:    valproate sodium  IVPB   52.5 mL/Hr IV Intermittent (01-05-23 @ 13:40)   52.5 mL/Hr IV Intermittent (01-05-23 @ 04:25)   52.5 mL/Hr IV Intermittent (01-04-23 @ 21:22)

## 2023-01-05 NOTE — PHYSICAL THERAPY INITIAL EVALUATION ADULT - PERTINENT HX OF CURRENT PROBLEM, REHAB EVAL
Downgraded from MICU on 12/31 then developed mucous plug, resp. distress and was re-intubated. Extubated to NC on 1/4 and currently with NGT in due to failure of swallow eval.
73 y/o male with encephalopathy, Acute respiratory failure 2/2 septic shock, likely aspiration PNA.

## 2023-01-05 NOTE — PHYSICAL THERAPY INITIAL EVALUATION ADULT - IMPAIRMENTS FOUND, PT EVAL
gait, locomotion, and balance/muscle strength
aerobic capacity/endurance/gait, locomotion, and balance/muscle strength/neuromotor development and sensory integration/ROM

## 2023-01-05 NOTE — PHYSICAL THERAPY INITIAL EVALUATION ADULT - ADDITIONAL COMMENTS
as per medical chart: "Pt reports living with spouse in a private house, 2 steps to enter with rail, None indoors. Independent with ambulation with RW, household distances"
Pt reports living with spouse in a private house, 2 steps to enter with rail, None indoors. Independent with ambulation with RW, household distances.

## 2023-01-06 NOTE — PROGRESS NOTE ADULT - SUBJECTIVE AND OBJECTIVE BOX
[facility]  INFECTIOUS DISEASES at [place] and [place]  ===============================================================                               J Carlos Galdamez MD*     Perlita Solares MD*                         Isatu Rojas MD*       Sumaya Morales MD*            Diplomates American Board of Internal Medicine & Infectious Diseases                * [place] Office - Appt - Tel  [phone] Fax [phone]                * [place] Office - Appt - Tel [phone] Fax [phone]                                  Hospital Consult line:  [phone]  ==============================================================    JU FERGUSON [id number]    Follow up: Pneumonia        I have personally reviewed the labs and data; pertinent labs and data are listed in this note; please see below.     _______________________________________________________________  REVIEW OF SYSTEMS  Unable to obtain due to medical condition - altered mental status/intubated  ________________________________________________________________  Allergies:  budesonide (Unknown)  cefpodoxime (Unknown)  Pollen (Unknown)   Vital Signs Last 24 Hrs  T(C): 37 (06 Jan 2023 12:25), Max: 37 (06 Jan 2023 12:25)  T(F): 98.6 (06 Jan 2023 12:25), Max: 98.6 (06 Jan 2023 12:25)  HR: 75 (06 Jan 2023 12:25) (63 - 93)  BP: 126/65 (06 Jan 2023 12:25) (90/62 - 129/35)  BP(mean): --  RR: 18 (06 Jan 2023 12:25) (16 - 20)  SpO2: 100% (06 Jan 2023 12:25) (97% - 100%)    Parameters below as of 06 Jan 2023 12:25  Patient On (Oxygen Delivery Method): nasal cannula  O2 Flow (L/min): 4        Parameters below as of 02 Jan 2023 07:15  Patient On (Oxygen Delivery Method): ventilator    O2 Concentration (%): 30    ________________________________________________________________  PHYSICAL EXAM  GEN: AWAKE  VERBAL CONFUSED   HEENT:  ANICTERIC  LUNGS: COARSE BREATH SOUNDS   HEART: RRR, no m/r/g  ABDOMEN: ND, + BS  EXTREMITIES: UEs edematous   NEUROLOGIC:   AWAKE AND VERBAL CONFUSED    SKIN: ecchymoses  LINES: LUE AVG  ________________________________________________________________            Current Antibiotics:  fluconAZOLE   Tablet 200 milliGRAM(s) Oral <User Schedule>  meropenem Injectable      meropenem Injectable 500 milliGRAM(s) IV Push every 24 hours  trimethoprim   80 mG/sulfamethoxazole 400 mG 1 Tablet(s) Oral <User Schedule>    Other medications:  acetylcysteine 20%  Inhalation 4 milliLiter(s) Inhalation every 6 hours  albuterol/ipratropium for Nebulization 3 milliLiter(s) Nebulizer every 6 hours  chlorhexidine 2% Cloths 1 Application(s) Topical <User Schedule>  dextrose 50% Injectable 25 Gram(s) IV Push once  dextrose 50% Injectable 12.5 Gram(s) IV Push once  dextrose 50% Injectable 25 Gram(s) IV Push once  dextrose Oral Gel 15 Gram(s) Oral once  fentaNYL   Infusion. 0.5 MICROgram(s)/kG/Hr IV Continuous <Continuous>  heparin  Infusion.  Unit(s)/Hr IV Continuous <Continuous>  hydrocortisone 10 milliGRAM(s) Oral every 12 hours  metoprolol tartrate 25 milliGRAM(s) Enteral Tube four times a day  norepinephrine Infusion 0.05 MICROgram(s)/kG/Min IV Continuous <Continuous>  pantoprazole  Injectable 40 milliGRAM(s) IV Push daily  sevelamer carbonate 800 milliGRAM(s) Oral three times a day with meals  tacrolimus 1.5 milliGRAM(s) Oral daily  tamsulosin 0.4 milliGRAM(s) Oral at bedtime  valproate sodium  IVPB 250 milliGRAM(s) IV Intermittent every 8 hours                                  9.9    7.02  )-----------( 89       ( 06 Jan 2023 06:15 )             31.4   01-06    141  |  102  |  13.6  ----------------------------<  92  4.9   |  28.0  |  3.21<H>    Ca    8.3<L>      06 Jan 2023 06:15  Phos  3.6     01-06  Mg     1.8     01-06    TPro  5.2<L>  /  Alb  2.5<L>  /  TBili  0.4  /  DBili  x   /  AST  34  /  ALT  8   /  AlkPhos  190<H>  01-06    Parameters below as of 04 Jan 2023 08:00  Patient On (Oxygen Delivery Method): ventilator          No polymorphonuclear leukocytes seen  No organisms seen  by cytocentrifuge    Herpes Simplex Virus 1/2 PCR, CSF (12.31.22 @ 14:00)    Source HSV 1/2: CSF    HSV 1/2 PCR: NotDetec: HSV1/2 PCR assay is a real-time PCR test that detects and differentiates  HSV-1 and/or HSV-2 DNA in CSF (Vitreous Fluid). The results of this test  should be interpreted with consideration of all clinical and laboratory  findings.    Cryptococcal Antigen - CSF (12.31.22 @ 14:00)    Cryptococcal Antigen - CSF: Negative    CSF PCR Panel (12.31.22 @ 14:00)    CSF PCR Result: NotDetec    Protein, CSF (12.31.22 @ 14:00)    Protein, CSF: 60 mg/dL    Glucose, CSF (12.31.22 @ 14:00)    Glucose, CSF: 43 mg/dLG/24h    Cerebrospinal Fluid Cell Count-1 (12.31.22 @ 14:00)    RBC Count - Spinal Fluid: 1 /cmm    Tube Type: Tube 1    CSF Appearance: Clear    CSF Neutrophils: N/A %    Appearance Spun: Colorless    CSF Color: No Color    Total Nucleated Cell Count, CSF: 2 /uL      12-28 @ 06:45 .Blood Blood     No growth to date.    12-28 @ 06:40 .Blood Blood     No growth to date.    12-19 @ 17:10 .CSF CSF     No acid-fast bacilli isolated at 1 week. ****Acid-fast cultures are held  for 6 weeks.****    No polymorphonuclear leukocytes seen per low power field  No organisms seen per oil power field  by cytocentrifuge      12-19 @ 16:00 .Sputum Sputum     Normal Respiratory Charis present    Moderate polymorphonuclear leukocytes per low power field  Few Squamous epithelial cells per low power field  Few Gram positive cocci in pairs seen per oil power field  Few Yeast like cells seen per oil power field  Few Gram Negative Rods seen per oil power field      12-19 @ 13:50 Paracentesis Paracentesis Fluid     No growth at 5 days    No polymorphonuclear leukocytes seen per low power field  No organisms seen per oil power field    WBC Count: 4.61 K/uL (01-01-23 @ 03:20)  WBC Count: 5.40 K/uL (12-31-22 @ 16:03)  WBC Count: 5.87 K/uL (12-31-22 @ 05:30)  WBC Count: 6.07 K/uL (12-30-22 @ 04:30)  WBC Count: 7.52 K/uL (12-29-22 @ 06:06)  WBC Count: 8.84 K/uL (12-28-22 @ 06:45)    Creatinine, Serum: 3.54 mg/dL (01-01-23 @ 03:20)  Creatinine, Serum: 4.75 mg/dL (12-31-22 @ 05:30)  Creatinine, Serum: 3.83 mg/dL (12-30-22 @ 04:30)  Creatinine, Serum: 5.94 mg/dL (12-29-22 @ 06:06)  Creatinine, Serum: 4.86 mg/dL (12-28-22 @ 06:45)        Procalcitonin, Serum: 0.58 ng/mL (12-30-22 @ 04:30)     SARS-CoV-2 Result: NotDetec (12-13-22 @ 02:17)    ________________________________________________________________  RADIOLOGY  < from: CT Chest No Cont (12.29.22 @ 16:03) >  INTERPRETATION:  Clinical information: Evaluate for aspiration pneumonia.   Exam is compared to previous study of 12/23/2022.    CT scan of the chest was obtained without administration of intravenous   contrast.    No hilar and or mediastinal adenopathy is noted.    Heart is enlarged in size. Patient is status post median sternotomy.   Small pericardial effusion is noted. Right-sided PICC line catheter is   noted in place.    No endobronchial lesions are noted. Interlobular septal thickening and   thickening of the peribronchovascular interstitium are noted within both   lungs. Minimal patchy groundglass opacities are also noted in both upper   lobes. Minimal atelectasis is also noted involving both lower lobes.   Small bilateral pleural effusions are noted.    Below the diaphragm, visualized portions of the abdomen demonstrate   nodular contour to the surface of liver. Cholelithiasis is noted. Both   kidneys are small in size. Low-attenuation lesion in the right kidney is   too small to be adequately characterized on this exam. Tip of the   nasogastric tube is noted in the stomach. Ascites is noted.    Degenerative changes of the spine as well as loss of height of multiple   vertebral bodies is noted. Subcutaneous edema is noted.    IMPRESSION: Findings suggestive of pulmonary edema.    < end of copied text >

## 2023-01-06 NOTE — PROGRESS NOTE ADULT - SUBJECTIVE AND OBJECTIVE BOX
[facility] PALLIATIVE MEDICINE     CC:  pain/ symptom mgnt and ongoing goc     INTERVAL HPI/OVERNIGHT EVENTS:  Source if other than patient:  []Family   [x]Team     No acute events overnight.   Still with congestion and secretions, refuses suctioning by staff  Seen earlier today, no family present. Aide at bedside. Pt was very drowsy but open eyes to verbal stimuli. Waxing and waning mentation    PRESENT SYMPTOMS:     Dyspnea: No   Nausea/Vomiting:  No  Anxiety:   +intermittent restlessness, on 1:1  Depression: No  Fatigue: Yes    Loss of appetite: NPO failed SLP yesterday  Constipation:  No     Pain: No             Character-            Duration-            Effect-            Factors-            Frequency-            Location-            Severity-    Pain AD Score:  [email or web address] (press ctrl + left click to view)    Review of Systems: Denies any chest pain or dyspnea at rest. See above for rest of ROS    MEDICATIONS  (STANDING):  acetylcysteine 10%  Inhalation 4 milliLiter(s) Inhalation once  acyclovir IVPB 320 milliGRAM(s) IV Intermittent every 24 hours  albuterol/ipratropium for Nebulization 3 milliLiter(s) Nebulizer every 6 hours  apixaban 2.5 milliGRAM(s) Oral two times a day  chlorhexidine 2% Cloths 1 Application(s) Topical <User Schedule>  dextrose 5% + sodium chloride 0.9%. 1000 milliLiter(s) (50 mL/Hr) IV Continuous <Continuous>  dextrose 50% Injectable 25 Gram(s) IV Push once  dextrose 50% Injectable 12.5 Gram(s) IV Push once  dextrose 50% Injectable 25 Gram(s) IV Push once  dextrose Oral Gel 15 Gram(s) Oral once  doxazosin 2 milliGRAM(s) Oral at bedtime  epoetin ben-epbx (RETACRIT) Injectable 79702 Unit(s) IV Push <User Schedule>  fluconAZOLE   Tablet 200 milliGRAM(s) Oral <User Schedule>  hydrocortisone 10 milliGRAM(s) Oral every 12 hours  metoprolol tartrate 25 milliGRAM(s) Enteral Tube four times a day  pantoprazole  Injectable 40 milliGRAM(s) IV Push daily  tacrolimus 1.5 milliGRAM(s) Oral daily  trimethoprim  40 mG/sulfamethoxazole 200 mG Suspension 80 milliGRAM(s) Oral <User Schedule>  valproate sodium  IVPB 250 milliGRAM(s) IV Intermittent every 8 hours    MEDICATIONS  (PRN):      PHYSICAL EXAM:    Vital Signs Last 24 Hrs  T(C): 37 (06 Jan 2023 12:25), Max: 37 (06 Jan 2023 12:25)  T(F): 98.6 (06 Jan 2023 12:25), Max: 98.6 (06 Jan 2023 12:25)  HR: 75 (06 Jan 2023 12:25) (63 - 93)  BP: 126/65 (06 Jan 2023 12:25) (90/62 - 129/35)  BP(mean): --  RR: 18 (06 Jan 2023 12:25) (16 - 20)  SpO2: 100% (06 Jan 2023 12:25) (97% - 100%)    Parameters below as of 06 Jan 2023 12:25  Patient On (Oxygen Delivery Method): nasal cannula  O2 Flow (L/min): 4    Karnofsky:  30%    GEN: chronically ill. resting comfortably and no acute distress    HEENT: mucous membrane moist      Lungs: comfortable, nonlabored +congestion    CV: +s1/s2  RRR    GI: +BS, abdomen soft, nontender, nondistended      MSK:  no cyanosis or edema.      NEURO: nonfocal. drowsy but arousable to verbal stimuli. intermittent confusion    : +[facility]     Skin: warm and dry.     LABS:                          9.9    7.02  )-----------( 89       ( 06 Jan 2023 06:15 )             31.4     01-06    141  |  102  |  13.6  ----------------------------<  92  4.9   |  28.0  |  3.21<H>    Ca    8.3<L>      06 Jan 2023 06:15  Phos  3.6     01-06  Mg     1.8     01-06    TPro  5.2<L>  /  Alb  2.5<L>  /  TBili  0.4  /  DBili  x   /  AST  34  /  ALT  8   /  AlkPhos  190<H>  01-06    RADIOLOGY & ADDITIONAL STUDIES: reviewed       ADVANCE DIRECTIVES/TREATMENT PREFERENCES: FULL CODE    NEUROLOGICAL MEDICATIONS/OPIOIDS/BENZODIAZEPINE IN PAST 24 HOURS:    OLANZapine Injectable   5 milliGRAM(s) IntraMuscular (01-05-23 @ 23:11)    valproate sodium  IVPB   52.5 mL/Hr IV Intermittent (01-06-23 @ 13:19)   52.5 mL/Hr IV Intermittent (01-06-23 @ 05:51)   52.5 mL/Hr IV Intermittent (01-05-23 @ 22:35)

## 2023-01-06 NOTE — PROGRESS NOTE ADULT - ASSESSMENT
Encephalopathic with retained secretions, atelectatic changes on CT.  Todays cxr appears slt improved.  Await official report.  Plan--Nebs, mucomyst.  abx per ID

## 2023-01-06 NOTE — PROGRESS NOTE ADULT - SUBJECTIVE AND OBJECTIVE BOX
[facility] Division of Hospital Medicine  Benedicto Awan MD    Chief Complaint:  Patient is a 72y old  Male who presents with a chief complaint of AMS (05 Jan 2023 18:35)      SUBJECTIVE / OVERNIGHT EVENTS:  Patient seen and examined at bedside. No acute events reported overnight. No new complaints, still with abundant secretions.     MEDICATIONS  (STANDING):  acetylcysteine 10%  Inhalation 4 milliLiter(s) Inhalation once  acyclovir IVPB 320 milliGRAM(s) IV Intermittent every 24 hours  albuterol/ipratropium for Nebulization 3 milliLiter(s) Nebulizer every 6 hours  apixaban 2.5 milliGRAM(s) Oral two times a day  chlorhexidine 2% Cloths 1 Application(s) Topical <User Schedule>  dextrose 50% Injectable 25 Gram(s) IV Push once  dextrose 50% Injectable 12.5 Gram(s) IV Push once  dextrose 50% Injectable 25 Gram(s) IV Push once  dextrose Oral Gel 15 Gram(s) Oral once  doxazosin 2 milliGRAM(s) Oral at bedtime  epoetin ben-epbx (RETACRIT) Injectable 53555 Unit(s) IV Push <User Schedule>  fluconAZOLE   Tablet 200 milliGRAM(s) Oral <User Schedule>  hydrocortisone 10 milliGRAM(s) Oral every 12 hours  metoprolol tartrate 25 milliGRAM(s) Enteral Tube four times a day  pantoprazole  Injectable 40 milliGRAM(s) IV Push daily  tacrolimus 1.5 milliGRAM(s) Oral daily  trimethoprim  40 mG/sulfamethoxazole 200 mG Suspension 80 milliGRAM(s) Oral <User Schedule>  valproate sodium  IVPB 250 milliGRAM(s) IV Intermittent every 8 hours    MEDICATIONS  (PRN):        I&O's Summary    05 Jan 2023 07:01  -  06 Jan 2023 07:00  --------------------------------------------------------  IN: 0 mL / OUT: 500 mL / NET: -500 mL        PHYSICAL EXAM:  Vital Signs Last 24 Hrs  T(C): 36.4 (06 Jan 2023 10:00), Max: 36.9 (06 Jan 2023 00:20)  T(F): 97.6 (06 Jan 2023 10:00), Max: 98.5 (06 Jan 2023 00:20)  HR: 70 (06 Jan 2023 10:00) (63 - 93)  BP: 106/66 (06 Jan 2023 10:00) (90/62 - 129/35)  BP(mean): --  RR: 16 (06 Jan 2023 10:00) (16 - 20)  SpO2: 99% (06 Jan 2023 10:00) (97% - 100%)    Parameters below as of 06 Jan 2023 10:00  Patient On (Oxygen Delivery Method): nasal cannula            CONSTITUTIONAL: Elderly male, NAD  HEENT: NC/AT, PERRL, no JVD  RESPIRATORY: Bilateral rales  CARDIOVASCULAR: RRR, S1/S2+, no m/g/r  ABDOMEN: Nontender to palpation, normoactive bowel sounds, no rebound/guarding  MUSCULOSKELETAL: No edema, cyanosis or deformities.  PSYCH: Calm, affect appropriate.  NEUROLOGY: Mentation waxing and waning, A&Ox3 at times, no focal neurological deficits.   SKIN: No rashes; no palpable lesions  VASC: Distal pulses palpable    LABS:                        9.9    7.02  )-----------( 89       ( 06 Jan 2023 06:15 )             31.4     01-06    141  |  102  |  13.6  ----------------------------<  92  4.9   |  28.0  |  3.21<H>    Ca    8.3<L>      06 Jan 2023 06:15  Phos  3.6     01-06  Mg     1.8     01-06    TPro  5.2<L>  /  Alb  2.5<L>  /  TBili  0.4  /  DBili  x   /  AST  34  /  ALT  8   /  AlkPhos  190<H>  01-06              CAPILLARY BLOOD GLUCOSE      POCT Blood Glucose.: 96 mg/dL (06 Jan 2023 12:03)  POCT Blood Glucose.: 155 mg/dL (06 Jan 2023 08:09)  POCT Blood Glucose.: 94 mg/dL (06 Jan 2023 07:36)  POCT Blood Glucose.: 82 mg/dL (06 Jan 2023 07:35)  POCT Blood Glucose.: 100 mg/dL (06 Jan 2023 07:34)  POCT Blood Glucose.: 138 mg/dL (06 Jan 2023 01:13)  POCT Blood Glucose.: 77 mg/dL (06 Jan 2023 00:36)  POCT Blood Glucose.: 67 mg/dL (06 Jan 2023 00:34)  POCT Blood Glucose.: 87 mg/dL (05 Jan 2023 18:19)  POCT Blood Glucose.: 84 mg/dL (05 Jan 2023 12:07)        RADIOLOGY & ADDITIONAL TESTS:  Results Reviewed:   Imaging Personally Reviewed:  Electrocardiogram Personally Reviewed:

## 2023-01-06 NOTE — PROGRESS NOTE ADULT - SUBJECTIVE AND OBJECTIVE BOX
[facility]                                        Neurology at [place]                                 [facility]                                  [address]                                        [place], [place], [address]                                             [phone]        CC: Encephalopathy    HPI:   The patient is a 72y Male with multiple medical issues who was recently hospitalized with hypoxic respiratory failure.  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   He was ultimately discharged 11/23/22.   He now presented with altered mental status and lethargy.   Neurology asked to evaluation for meningitis. LP attempted (neuro SHEELA) and was unsuccessful while in ER.    The patient had been gradually improving with regard to mental status, but has deteriorated again over the past few days.   Ultimately transferred back to ICU. (SHEELA)    Interim history: Unresponsive, , moves to voice, vocalizes to noxious stimuli    ROS:   Unobtainable due to patient's condition.     MEDICATIONS  (STANDING):  acetylcysteine 10%  Inhalation 4 milliLiter(s) Inhalation once  acyclovir IVPB 320 milliGRAM(s) IV Intermittent every 24 hours  albuterol/ipratropium for Nebulization 3 milliLiter(s) Nebulizer every 6 hours  apixaban 2.5 milliGRAM(s) Oral two times a day  chlorhexidine 2% Cloths 1 Application(s) Topical <User Schedule>  dextrose 5% + sodium chloride 0.9%. 1000 milliLiter(s) (50 mL/Hr) IV Continuous <Continuous>  dextrose 50% Injectable 25 Gram(s) IV Push once  dextrose 50% Injectable 12.5 Gram(s) IV Push once  dextrose 50% Injectable 25 Gram(s) IV Push once  dextrose Oral Gel 15 Gram(s) Oral once  doxazosin 2 milliGRAM(s) Oral at bedtime  epoetin ben-epbx (RETACRIT) Injectable 00854 Unit(s) IV Push <User Schedule>  fluconAZOLE   Tablet 200 milliGRAM(s) Oral <User Schedule>  hydrocortisone 10 milliGRAM(s) Oral every 12 hours  metoprolol tartrate 25 milliGRAM(s) Enteral Tube four times a day  pantoprazole  Injectable 40 milliGRAM(s) IV Push daily  tacrolimus 1.5 milliGRAM(s) Oral daily  trimethoprim  40 mG/sulfamethoxazole 200 mG Suspension 80 milliGRAM(s) Oral <User Schedule>  valproate sodium  IVPB 250 milliGRAM(s) IV Intermittent every 8 hours    MEDICATIONS  (PRN):  glycopyrrolate Injectable 0.2 milliGRAM(s) IV Push every 6 hours PRN oral secretions      Vital Signs Last 24 Hrs  T(C): 37 (06 Jan 2023 12:25), Max: 37 (06 Jan 2023 12:25)  T(F): 98.6 (06 Jan 2023 12:25), Max: 98.6 (06 Jan 2023 12:25)  HR: 75 (06 Jan 2023 12:25) (63 - 93)  BP: 126/65 (06 Jan 2023 12:25) (90/62 - 129/35)  BP(mean): --  RR: 18 (06 Jan 2023 12:25) (16 - 20)  SpO2: 100% (06 Jan 2023 12:25) (97% - 100%)    Parameters below as of 06 Jan 2023 12:25  Patient On (Oxygen Delivery Method): nasal cannula  O2 Flow (L/min): 4      Detailed Neurologic Exam:    Mental status: .  Opens eyes and vocalizes to stimuli.  Not tracking Not following any instructions.    Cranial nerves: Pupils pinpoint. No blink to threat from either side. Not tracking with gaze . Face is grossly symmetric. Palate and tongue cannot be assessed.     Motor/Sensory: There is normal bulk and tone.  Symmetric grimace,  movement to nailbed pressure stimuli in 4 ext.     Reflexes: trace throughout and plantar responses are equivocal.    Cerebellar: Cannot be tested.     Labs:                           9.9    7.02  )-----------( 89       ( 06 Jan 2023 06:15 )             31.4     01-06    141  |  102  |  13.6  ----------------------------<  92  4.9   |  28.0  |  3.21<H>    Ca    8.3<L>      06 Jan 2023 06:15  Phos  3.6     01-06  Mg     1.8     01-06    TPro  5.2<L>  /  Alb  2.5<L>  /  TBili  0.4  /  DBili  x   /  AST  34  /  ALT  8   /  AlkPhos  190<H>  01-06    LIVER FUNCTIONS - ( 06 Jan 2023 06:15 )  Alb: 2.5 g/dL / Pro: 5.2 g/dL / ALK PHOS: 190 U/L / ALT: 8 U/L / AST: 34 U/L / GGT: x           Cerebrospinal Fluid Cell Count-1 (12.31.22 @ 14:00)   CSF Color: No Color   Tube Type: Tube 1   CSF Appearance: Clear   CSF Neutrophils: N/A %   Appearance Spun: Colorless   RBC Count - Spinal Fluid: 1 /cmm   Total Nucleated Cell Count, CSF: 2 /uL   Protein, CSF (12.31.22 @ 14:00)   Protein, CSF: 60 mg/dL   Glucose, CSF (12.31.22 @ 14:00)   Glucose, CSF: 43 mg/dLG/24h   CSF PCR Panel (12.31.22 @ 14:00)   CSF PCR Result: NotDetec: The meningitis/encephalitis (ME) panel (CSFPCR) is a PCR based assay that   screens for: Escherichia coli; Haemophilus influenzae; Listeria   monocytogenes; Neisseria meningitidis; Streptococcus agalactiae;   Streptococcus pneumoniae; CMV; Enterovirus; HSV-1; HSV-2; Human   herpesvirus 6; Parechovirus; VZV and Cryptococcus. Result should be   interpreted in context of clinical presentation, imaging and other lab   tests. Positive predictive value may be lower in patients with normal CSF   chemistry and cell count.  Radiology:  Brain MRI: There is no acute pathology.   There is chronic ischemic change.     EEG SUMMARY/CLASSIFICATION 12/31/22-1/1/23    Abnormal EEG in an altered patient.  1. Isolated myoclonic jerks, without a clear ictal correlate   2. Generalized periodic discharges with triphasic morphology <0.5-1 Hz  3. Background slowing, Moderate, generalized.    _____________________________________________________________  EEG IMPRESSION/CLINICAL CORRELATE    Abnormal EEG study.  1. Isolated myoclonic jerks, head movements side to side, head flexion, throughout the study without a clear EEG correlate.   2. Generalized periodic discharges with triphasic morphology are commonly seen in toxic- metabolic encephalopathy, rarely epileptic, improved in latter portions of the recording   3. Moderate nonspecific diffuse or multifocal cerebral dysfunction.   4. No seizures recorded   _____________________________________________________________    EEG SUMMARY/CLASSIFICATION 12/30-12/31/22    Abnormal EEG in an altered patient.  1. Sharp waves, generalized, rare   2. Isolated myoclonic jerks, without a clear ictal correlate   3. Generalized periodic discharges with triphasic morphology <0.5-1 Hz  4. Background slowing, Moderate, generalized.    _____________________________________________________________  EEG IMPRESSION/CLINICAL CORRELATE    Abnormal EEG study.  1. Risk of generalized onset seizures, with evidence of diffuse cerebral dysfunction.   2. Isolated myoclonic jerks, head movements side to side, head flexion, throughout the study without a clear EEG correlate.   3. Generalized periodic discharges with triphasic morphology are commonly seen in toxic- metabolic encephalopathy, rarely epileptic   4. Moderate nonspecific diffuse or multifocal cerebral dysfunction.   5. No seizures recorded   _____________________________________________________________      Head CT 1/2/23- no acute CVA, mass or blood

## 2023-01-06 NOTE — PROGRESS NOTE ADULT - ASSESSMENT
72M with PMH of  hypertension, hyperlipidemia, ESRD on HD, emphysema s/p lung transplant in [place] by Dr. 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 faecalis bacteremia, newly diagnosed HFrEF 25%, AF complicated by GIB was discharged on home IV Abx now presenting with AMS  Presented to the ED with daughter. CTH obtained and did not reveal any acute findings.  Patient was admitted to medicine for encephalopathy. LP attempted bedside but unsuccessful. Was on empiric treatment and mental status was improving and then ultimately intubated for aspiration pneumonia    #Acute hypoxic respiratory failure likely 2/2 aspiration- now extubated, mental status improved. sputum cx normal keerthi    #AMS-unclear etiology, no fever. ammonia level normal.  ct c/ap ?pna/edema/ascites.   ??EBV encephalitis  s/p LP normal cell counts glucose and protein 51. CSF pcr (-) and cultures pending.   [facility] micro lab CSF gram stain reported as "few organisms" . gram stain repeated at core lab and micro and is NEGATIVE. initial report has been corrected. CSF crytp titer 1:40, as per [facility] his initial CSF titer in 12/2020 was 1:2560. no serum titer available. 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 f/u CSf fungal CX. stopped amphotericin/flucytosine and resumed fluconazole suppression post HD  serum crypt Ag 1:40, most likely old infection  EBv CSF PCR/serum + but very low; repeat neg. Unclear if EBV encephalitis , although possible in this immunocompromised pt who appeared to have clinically responded to acyclovir    Unlikely to derive any benefit with ACV in the setting of EBV reactivation/possible encephalitis  , but he is on acyclovir to complete total 14 days  MRI brain unremarkable     Repeat LP on 12/31 - CSF cell count/prot/glu unremarkable. CSF PCR neg     ..#h/o e.faecalis bacteremia - . COMPLETED IV ABX  CRISTIAN was deferred by cardio on recent admission.      YS    # s/p lung transplant-     # h/o fungal meningitis-  flucytosine/ampho  DISCONTINUED  and resumed fluconazole. No evidence of CNS crypto   PT HAS COMPLETED A COURSE OF MERREM  WILL OBSERVE OFF ABX WILL FOLLOW UP

## 2023-01-06 NOTE — PROGRESS NOTE ADULT - ASSESSMENT
72y Male with multiple medical issues now with encephalopathy.     Encephalopathy.   Mental status waxing and waning.   He appears to be declining in past week or so  Possible toxic metabolic secondary to multiple medical issues.   Treated crypto meningitis  EEG findings strongly suggestive of toxic metabolic etiology.  Head CT as above    Meningitis.   Antibiotic coverage for now per ID.   repeat CSF from 12/31 showed no cryptococcus Ag but (+) mild elevation of protein  EBV detected by PCR in CSF, low level, unclear significance.  ID following.    will follow with you    Luis Brady MD PhD   [id number]

## 2023-01-06 NOTE — PROGRESS NOTE ADULT - TIME BILLING
Review of hospital charts, including PACS and labs, and office and outside records if applicable.  Discussion of plans with referring service, coordinating respiratory therapy, including, but not limited to, CPAP, NIPPV, O2, Nebs, home devices.
D/W wife HARLAN Martin, MICU Dr Cardona    Chart review, meds, labs, imaging, coordination of care with other providers and disciplines re pain/symptom mgnt, acute respiratory failure, acute encephalopathy, infection, goals of care/advance care planning    ~16 min spent in above goc/acp with wife
D/W RN, hospitalist Dr Ko, NP S Magro    Chart review, meds, labs, imaging, coordination of care with other providers and disciplines re pain/dyspnea, persistent encephalopathy with unclear etiology, esrd on hd, bilateral lung transplant candidate
D/W pt, RN, aide, Hospitalist Dr GIANNA Briggs    Chart review, meds, labs, imaging, coordination of care with other providers and disciplines re pain/symptom mgnt, acute respiratory failure, bacteremia, ?recurrent meningitis, EBV infection, debility, goc/acp    ~16 mins spent in above discussion with patient
I reviewed the labs, notes, CT images
greater than 50% of time spent reviewing labs, notes, orders and radiographs, coordinating care  discussed with nursing
D/W MICU Dr Cardona    Chart review, meds, labs, imaging, coordination of care with other providers and disciplines re pain/symptom mgnt, respiratory failure, meningitis, pna, debility, esrd/hd
D/W RN, NP GIANNA Abraham    Chart review, meds, labs, imaging, coordination of care with other providers and disciplines re pain/symptom mgnt, encephalopathy, oral secretions, dysphagia, debility
D/W pt, RN, NP GIANNA Abraham    Chart review, meds, labs, imaging, coordination of care with other providers and disciplines re pain/symptom mgnt, encephalopathy, siezure, dysphagia, esrd/hd, copd, goc
D/W pt, spouse, MICU Dr Moreira    Chart review, meds, labs, imaging, coordination of care with other providers and disciplines re pain/symptom, acute respiratory failure, encephalopathy, debility, dysphagia, goc/acp
D/W wife, RN, ICU Dr Dunn    Chart review, meds, labs, imaging, coordination of care with other providers and disciplines re pain/symptom, acute encephalopathy, seizure, respiratory failure s/p reintubation, infection, goals of care, acp
I reviewed the labs, notes, meds

## 2023-01-06 NOTE — PROGRESS NOTE ADULT - ASSESSMENT
71 y/o male with resolving acute resp. 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 failure  - Due to aspiration  - Cont. aspiration precautions  - Chest PT/Suction prn  - SLP following  - nebs prn/spirometer  - trend WBC/Temp  - DVT-P on Eliquis  - Fall risk protocol     Dysphagia  - Tube feeds held with patient intermittent confusion and concern for causing aspiration  - Aspiration precautions, HOB > 30 degrees  - Will start gentle hydration for now given NPO, once secretions improved will consider restarting tube feeds.     Metabolic Encephalopathy  - Multi-factorial   - LP noted, ID notes noted  - Prolonged hospitalization  - EEG w/o seizure  - Neurology/Epilepsy notes reviewed  - Constant observation    hx of Cryptococcal meningitis/Enterococcus endocarditis  - Cont. Anti-virals/Anti-fungal/Abx per ID    ESRD  - Cont. HD per Nephrology    Lung Transplant  - Cont. Prograf  - Hold Cellcept given active infection    PAF  - Cont. BB/renal dose eliquis  - Trend Hbg    Cirrhosis  - S/p paracentesis  - No evidence of SBP    GERD  - PPI    Palliative care following for ongoing GOC  Prognosis guarded, high risk for deterioration   Patient remains acute based on above conditions     POC discussed with daughter and wife at length. Requesting daily updates.

## 2023-01-06 NOTE — PROGRESS NOTE ADULT - ASSESSMENT
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 CT head is negative. Hospital course is notable for empiric treatment for meningitis; EBV positive in CSF; also with dilated common bile duct; acutely decompensated on 12/18 with acute hypercapnic respiratory failure in setting of aspiration pneumonia, emergently intubated, later extubated; also shock (resolved). Nephrology is managing ESRD on HD.    -Access: L AV access   -Outpatient dialysis days are Tuesdays Thursdays Saturdays  -Last HD yesterday, next planned for tomorrow. Orders placed.   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   -s/p Lung transplantation - immunosuppressive management as per ID  -EBV in CSF, meningitis - ID on board

## 2023-01-06 NOTE — CHART NOTE - NSCHARTNOTEFT_GEN_A_CORE
Pt seen and examined at bedside, RN concern for worsening rales    ROS unable to be obtained    Vital Signs:  T(C): 36.9 (01-06-23 @ 00:20), Max: 36.9 (01-05-23 @ 04:02)  T(F): 98.5 (01-06-23 @ 00:20), Max: 98.5 (01-06-23 @ 00:20)  HR: 67 (01-06-23 @ 00:20) (62 - 89)  BP: 90/62 (01-06-23 @ 00:20) (90/62 - 137/66)  RR: 18 (01-06-23 @ 00:20) (17 - 20)  SpO2: 100% (01-06-23 @ 00:20) (99% - 100%)    PHYSICAL EXAM:  GENERAL: NAD, lying in bed comfortably  HEAD:  Atraumatic, Normocephalic  EYES: EOMI, PERRL, conjunctiva and sclera clear  ENT: Dry oral mucosa  CHEST/LUNG: +Rales, b/l. Unlabored respirations  HEART: Regular rate and rhythm; No murmurs, rubs, or gallops   NERVOUS SYSTEM:  No focal neurological deficits   MSK: Moves extremities spontaneously   SKIN: Warm, dry    CAPILLARY BLOOD GLUCOSE      POCT Blood Glucose.: 67 mg/dL (06 Jan 2023 00:36)      Assessment/Plan:  73 y/o male with resolving acute resp. 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 failure  -Mucomyst/Albuterol inhaler  -Suction PRN   -Chest PT    Hypotension, 90/62  -250cc NS bolus over 1hr    Hypoglycemic   -1amp D50  -Continue accucheck q6h    RN to notify of any acute change in pt status

## 2023-01-06 NOTE — PROGRESS NOTE ADULT - SUBJECTIVE AND OBJECTIVE BOX
[facility] DIVISION OF KIDNEY DISEASES AND HYPERTENSION -- HEMODIALYSIS NOTE  --------------------------------------------------------------------------------  Chief Complaint: ESRD/Ongoing hemodialysis requirement    24 hour events/subjective:        PAST HISTORY  --------------------------------------------------------------------------------  No significant changes to PMH, PSH, FHx, SHx, unless otherwise noted    ALLERGIES & MEDICATIONS  --------------------------------------------------------------------------------  Allergies    budesonide (Unknown)  cefpodoxime (Unknown)  Pollen (Unknown)    Intolerances      Standing Inpatient Medications  acetylcysteine 10%  Inhalation 4 milliLiter(s) Inhalation once  acyclovir IVPB 320 milliGRAM(s) IV Intermittent every 24 hours  albuterol/ipratropium for Nebulization 3 milliLiter(s) Nebulizer every 6 hours  apixaban 2.5 milliGRAM(s) Oral two times a day  chlorhexidine 2% Cloths 1 Application(s) Topical <User Schedule>  dextrose 5% + sodium chloride 0.9%. 1000 milliLiter(s) IV Continuous <Continuous>  dextrose 50% Injectable 25 Gram(s) IV Push once  dextrose 50% Injectable 12.5 Gram(s) IV Push once  dextrose 50% Injectable 25 Gram(s) IV Push once  dextrose Oral Gel 15 Gram(s) Oral once  doxazosin 2 milliGRAM(s) Oral at bedtime  epoetin ben-epbx (RETACRIT) Injectable 26858 Unit(s) IV Push <User Schedule>  fluconAZOLE   Tablet 200 milliGRAM(s) Oral <User Schedule>  hydrocortisone 10 milliGRAM(s) Oral every 12 hours  metoprolol tartrate 25 milliGRAM(s) Enteral Tube four times a day  pantoprazole  Injectable 40 milliGRAM(s) IV Push daily  tacrolimus 1.5 milliGRAM(s) Oral daily  trimethoprim  40 mG/sulfamethoxazole 200 mG Suspension 80 milliGRAM(s) Oral <User Schedule>  valproate sodium  IVPB 250 milliGRAM(s) IV Intermittent every 8 hours    PRN Inpatient Medications      REVIEW OF SYSTEMS  --------------------------------------------------------------------------------  unable to obtain- pt is confused    VITALS/PHYSICAL EXAM  --------------------------------------------------------------------------------  T(C): 37 (01-06-23 @ 12:25), Max: 37 (01-06-23 @ 12:25)  HR: 75 (01-06-23 @ 12:25) (63 - 93)  BP: 126/65 (01-06-23 @ 12:25) (90/62 - 129/35)  RR: 18 (01-06-23 @ 12:25) (16 - 20)  SpO2: 100% (01-06-23 @ 12:25) (97% - 100%)  Wt(kg): --        01-05-23 @ 07:01  -  01-06-23 @ 07:00  --------------------------------------------------------  IN: 0 mL / OUT: 500 mL / NET: -500 mL      Physical Exam:  	Gen: ederly man, confused/ restless  	HEENT: on NC  	Pulm: CTA B/L  	CV: RRR, S1S2; no rub  	Abd: +BS, soft, nontender  	UE: Warm  	Skin: Warm  	Vascular access: lue avf    LABS/STUDIES  --------------------------------------------------------------------------------              9.9    7.02  >-----------<  89       [01-06-23 @ 06:15]              31.4     141  |  102  |  13.6  ----------------------------<  92      [01-06-23 @ 06:15]  4.9   |  28.0  |  3.21        Ca     8.3     [01-06-23 @ 06:15]      Mg     1.8     [01-06-23 @ 06:15]      Phos  3.6     [01-06-23 @ 06:15]    TPro  5.2  /  Alb  2.5  /  TBili  0.4  /  DBili  x   /  AST  34  /  ALT  8   /  AlkPhos  190  [01-06-23 @ 06:15]          Iron 58, TIBC 163, %sat 36      [12-26-22 @ 10:45]  Ferritin 2720      [12-26-22 @ 10:45]  Vitamin D (25OH) 29.1      [11-17-22 @ 05:45]  TSH 4.01      [12-14-22 @ 07:50]  Lipid: chol 84, , HDL 26, LDL --      [11-12-22 @ 05:44]    HBsAb <3.0      [12-16-22 @ 08:59]  HBsAb Nonreact      [12-16-22 @ 08:59]  HBsAg Nonreact      [12-16-22 @ 08:59]  HBcAb Nonreact      [12-16-22 @ 08:59]  HCV 0.09, Nonreact      [12-16-22 @ 08:59]

## 2023-01-06 NOTE — PROGRESS NOTE ADULT - ASSESSMENT
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 Faecalis bacteremia now readmitted on 12/13 for altered mental status, sepsis workup including ?acute meningitis, found also with dilated CBD on ultrasound complicated by acute respiratory failure with hypoxia  requiring intubation and transferred to MICU on 12/18, s/p LP and paracentesis, medically extubated on 12/20 and downgraded to medicine service, positive EBV CSF PCR, recurrent encephalopathy, acute respiratory failure s/p reintubation on 1/1/23 and overall guarded prognosis.     Recurrent Acute Encephalopathy  Prior Hx of Cryptococcal Meningitis   Aspiration PNA - completed antibiotic course  Hx of E Faecalis Bacteremia - s/p antibiotic course  - s/p LP 12/19: + cryptococcal Ag but PCR neg, EBV CSF PCR/serum + but very low, ?EBV encephalitis  - s/p repeat LP 12/31 with CSF studies unremarkable thus far  - off ampho/flucytosine as no evidence of CNS crypto, c/w fluconazole  - empiric acyclovir to complete 14 days per ID  - still with waxing and waning mentation, s/p IM olanzapine overnight for restlessness and currently on 1:1 --> recommend low dose IM Olanzapine 2.5mg PRN for agitation   - supportive care, reorientation, sleep hygiene    Seizure  - on valproic 250mg q8H ATC    Acute Respiratory Failure  - s/p intubation x 2 this admission  - maintaining adequate sat on O2 NC    Oral Secretions  - added IV glycopyrrolate 0.2 mg PRN   - suction PRN if pt will allow  - aggressive chest PT as tolerated for congestion     Dysphagia  - remains NPO, continues to fail repeat SLP likely also impacted by waxing mentation    ESRD on HD   - since 2020 after presumed contrast induced nephropathy for CAD with stenting   - mgnt per nephrology  - avoid nephrotoxic agents    Hx of Bilateral Lung Transplant for ES COPD  - in 2015, follows closely with [facility]   - on tacrolimus and hydrocortisone; home cellcept on hold pending resolution of acute infection     Palliative Care Encounter/Goals of care  - Surrogate/HCP/Guardian: wife Veronica Marshall [phone]     - [facility] this admission with pt (when he had appropriate medical decision making capcity) and spouse, wishes to continue pursuit of ongoing medical treatment including HD and eventual follow up transplant team at [facility]   - Overall very guarded prognosis with high risk of recurrent decompensation given comorbidities, multiple acute medical conditions, progressive debility, waxing mentation    Palliative team will continue to support and follow clinical course peripherally for any further goc needs

## 2023-01-06 NOTE — PROGRESS NOTE ADULT - SUBJECTIVE AND OBJECTIVE BOX
PULMONARY PROGRESS NOTE      JU FERGUSONCULLEN-[id number]    Patient is a 72y old  Male who presents with a chief complaint of AMS (05 Jan 2023 18:35)      INTERVAL HPI/OVERNIGHT EVENTS:encephalopathic, resistant to suctioning. REmains on 4l/min    MEDICATIONS  (STANDING):  acetylcysteine 10%  Inhalation 4 milliLiter(s) Inhalation once  acyclovir IVPB 320 milliGRAM(s) IV Intermittent every 24 hours  albuterol/ipratropium for Nebulization 3 milliLiter(s) Nebulizer every 6 hours  apixaban 2.5 milliGRAM(s) Oral two times a day  chlorhexidine 2% Cloths 1 Application(s) Topical <User Schedule>  dextrose 5% + sodium chloride 0.9%. 1000 milliLiter(s) (50 mL/Hr) IV Continuous <Continuous>  dextrose 50% Injectable 25 Gram(s) IV Push once  dextrose 50% Injectable 12.5 Gram(s) IV Push once  dextrose 50% Injectable 25 Gram(s) IV Push once  dextrose Oral Gel 15 Gram(s) Oral once  doxazosin 2 milliGRAM(s) Oral at bedtime  epoetin ben-epbx (RETACRIT) Injectable 80024 Unit(s) IV Push <User Schedule>  fluconAZOLE   Tablet 200 milliGRAM(s) Oral <User Schedule>  hydrocortisone 10 milliGRAM(s) Oral every 12 hours  metoprolol tartrate 25 milliGRAM(s) Enteral Tube four times a day  pantoprazole  Injectable 40 milliGRAM(s) IV Push daily  tacrolimus 1.5 milliGRAM(s) Oral daily  trimethoprim  40 mG/sulfamethoxazole 200 mG Suspension 80 milliGRAM(s) Oral <User Schedule>  valproate sodium  IVPB 250 milliGRAM(s) IV Intermittent every 8 hours      MEDICATIONS  (PRN):      Allergies    budesonide (Unknown)  cefpodoxime (Unknown)  Pollen (Unknown)    Intolerances        PAST MEDICAL & SURGICAL HISTORY:  Emphysema of lung  s/p lung transplant      ESRD (end stage renal disease) on dialysis  on HD via LUE T/Th/Sat      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      2019 novel coronavirus disease (COVID-19)  Feb 2021 - hospitalized for 1 week at [facility]      Pneumonia  April 2021      [place] (hard of hearing)      HTN (hypertension)      Lumbar spondylosis      History of COPD      BPH without urinary obstruction      Hypercholesterolemia      Oliguria and anuria      H/O peripheral neuropathy      H/O lung transplant  bilateral - transplant - 1-2-2015 -  [facility]      H/O heart artery stent  x3 [facility]      History of hip replacement  right      Status post open reduction and internal fixation (ORIF) of fracture  left femur          SOCIAL HISTORY  Smoking History:       REVIEW OF SYSTEMS:  Unable  Vital Signs Last 24 Hrs  T(C): 37 (06 Jan 2023 12:25), Max: 37 (06 Jan 2023 12:25)  T(F): 98.6 (06 Jan 2023 12:25), Max: 98.6 (06 Jan 2023 12:25)  HR: 75 (06 Jan 2023 12:25) (63 - 93)  BP: 126/65 (06 Jan 2023 12:25) (90/62 - 129/35)  BP(mean): --  RR: 18 (06 Jan 2023 12:25) (16 - 20)  SpO2: 100% (06 Jan 2023 12:25) (97% - 100%)    Parameters below as of 06 Jan 2023 12:25  Patient On (Oxygen Delivery Method): nasal cannula  O2 Flow (L/min): 4      PHYSICAL EXAMINATION:    GENERAL: poorly responsive.    HEENT: Head is normocephalic and atraumatic. Extraocular muscles are intact. Mucous membranes are moist.    NECK: Supple.    LUNGS:bilat rhinchi    HEART: Regular rate and rhythm without murmur.    ABDOMEN: Soft, nontender, and nondistended.      EXTREMITIES: Without any cyanosis, clubbing, rash, lesions or edema.    NEUROLOGIC: poorly responsive.    SKIN: No ulceration or induration present.      LABS:                        9.9    7.02  )-----------( 89       ( 06 Jan 2023 06:15 )             31.4     01-06    141  |  102  |  13.6Encephalopathic  ----------------------------<  92  4.9   |  28.0  |  3.21<H>    Ca    8.3<L>      06 Jan 2023 06:15  Phos  3.6     01-06  Mg     1.8     01-06    TPro  5.2<L>  /  Alb  2.5<L>  /  TBili  0.4  /  DBili  x   /  AST  34  /  ALT  8   /  AlkPhos  190<H>  01-06                        MICROBIOLOGY:    RADIOLOGY & ADDITIONAL STUDIES:

## 2023-01-07 NOTE — PROCEDURE NOTE - NSTRACHPOSTINTU_RESP_A_CORE
Appropriate capnography/Breath sounds bilateral/Breath sounds equal/Chest excursion noted
Appropriate capnography/Breath sounds bilateral/Breath sounds equal/Chest excursion noted/Chest X-Ray/Positive end tidal Co2 noted
Appropriate capnography/Chest excursion noted/Positive end tidal Co2 noted

## 2023-01-07 NOTE — PROCEDURE NOTE - NSTOLERANCE_GEN_A_CORE
Patient tolerated procedure well.
patient vss/Patient tolerated procedure well.
Patient tolerated procedure well.
Patient tolerated procedure well.

## 2023-01-07 NOTE — PROGRESS NOTE ADULT - ASSESSMENT
72y Male with multiple medical issues now with encephalopathy.     Encephalopathy.   Mental status waxing and waning.   Respiratory distrees/failure now intubated again and in MICU  Possible toxic metabolic secondary to multiple medical issues.   Treated crypto meningitis  EEG findings strongly suggestive of toxic metabolic etiology.  Head CT as above    Meningitis.   Antibiotic coverage for now per ID.   repeat CSF from 12/31 showed no cryptococcus Ag but (+) mild elevation of protein  EBV detected by PCR in CSF, low level, unclear significance.  ID following.    will follow with you    Luis Brady MD PhD   [id number]

## 2023-01-07 NOTE — PROGRESS NOTE ADULT - SUBJECTIVE AND OBJECTIVE BOX
[facility]                                        Neurology at [place]                                 [facility]                                  [address]                                        [place], [place], [address]                                             [phone]        CC: Encephalopathy    HPI:   The patient is a 72y Male with multiple medical issues who was recently hospitalized with hypoxic respiratory failure.  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   He was ultimately discharged 11/23/22.   He now presented with altered mental status and lethargy.   Neurology asked to evaluation for meningitis. LP attempted (neuro SHEELA) and was unsuccessful while in ER.    The patient had been gradually improving with regard to mental status, but has deteriorated again over the past few days.   Ultimately transferred back to ICU. (SHEELA)    Interim history: events noted, respiratory distress leading to intubation and return to MICU    ROS:   Unobtainable due to patient's condition.     MEDICATIONS  (STANDING):  acetylcysteine 10%  Inhalation 4 milliLiter(s) Inhalation once  acyclovir IVPB 320 milliGRAM(s) IV Intermittent every 24 hours  albuterol/ipratropium for Nebulization 3 milliLiter(s) Nebulizer every 6 hours  apixaban 2.5 milliGRAM(s) Oral two times a day  chlorhexidine 2% Cloths 1 Application(s) Topical <User Schedule>  dextrose 5% + sodium chloride 0.9%. 1000 milliLiter(s) (50 mL/Hr) IV Continuous <Continuous>  dextrose 50% Injectable 25 Gram(s) IV Push once  dextrose 50% Injectable 12.5 Gram(s) IV Push once  dextrose 50% Injectable 25 Gram(s) IV Push once  dextrose Oral Gel 15 Gram(s) Oral once  doxazosin 2 milliGRAM(s) Oral at bedtime  epoetin ben-epbx (RETACRIT) Injectable 25686 Unit(s) IV Push <User Schedule>  fluconAZOLE   Tablet 200 milliGRAM(s) Oral <User Schedule>  hydrocortisone 10 milliGRAM(s) Oral every 12 hours  metoprolol tartrate 25 milliGRAM(s) Enteral Tube four times a day  pantoprazole  Injectable 40 milliGRAM(s) IV Push daily  tacrolimus 1.5 milliGRAM(s) Oral daily  trimethoprim  40 mG/sulfamethoxazole 200 mG Suspension 80 milliGRAM(s) Oral <User Schedule>  valproate sodium  IVPB 250 milliGRAM(s) IV Intermittent every 8 hours    MEDICATIONS  (PRN):  glycopyrrolate Injectable 0.2 milliGRAM(s) IV Push every 6 hours PRN oral secretions      Vital Signs Last 24 Hrs  T(C): 37 (06 Jan 2023 12:25), Max: 37 (06 Jan 2023 12:25)  T(F): 98.6 (06 Jan 2023 12:25), Max: 98.6 (06 Jan 2023 12:25)  HR: 75 (06 Jan 2023 12:25) (63 - 93)  BP: 126/65 (06 Jan 2023 12:25) (90/62 - 129/35)  BP(mean): --  RR: 18 (06 Jan 2023 12:25) (16 - 20)  SpO2: 100% (06 Jan 2023 12:25) (97% - 100%)    Parameters below as of 06 Jan 2023 12:25  Patient On (Oxygen Delivery Method): nasal cannula  O2 Flow (L/min): 4      Detailed Neurologic Exam:    Mental status: .  Intubated and sedated  Not able ot assess language or orientation   Not following any instructions.    Cranial nerves: Pupils pinpoint. No blink to threat from either side. Not tracking with gaze . Face is grossly symmetric. Palate and tongue cannot be assessed.     Motor/Sensory: There is normal bulk and tone.  No grimace or movement to nailbed pressure,    Reflexes: trace throughout and plantar responses are equivocal.    Cerebellar: Cannot be tested.     Labs:                                      9.8    6.78  )-----------( 156      ( 07 Jan 2023 06:30 )             31.5     01-07    140  |  102  |  15.9  ----------------------------<  128<H>  4.5   |  29.0  |  4.05<H>    Ca    8.4      07 Jan 2023 06:30  Phos  3.6     01-06  Mg     1.8     01-06    TPro  5.5<L>  /  Alb  2.8<L>  /  TBili  0.3<L>  /  DBili  x   /  AST  25  /  ALT  6   /  AlkPhos  173<H>  01-07    LIVER FUNCTIONS - ( 07 Jan 2023 06:30 )  Alb: 2.8 g/dL / Pro: 5.5 g/dL / ALK PHOS: 173 U/L / ALT: 6 U/L / AST: 25 U/L / GGT: x           PT/INR - ( 07 Jan 2023 06:30 )   PT: 17.3 sec;   INR: 1.49 ratio        Cerebrospinal Fluid Cell Count-1 (12.31.22 @ 14:00)   CSF Color: No Color   Tube Type: Tube 1   CSF Appearance: Clear   CSF Neutrophils: N/A %   Appearance Spun: Colorless   RBC Count - Spinal Fluid: 1 /cmm   Total Nucleated Cell Count, CSF: 2 /uL   Protein, CSF (12.31.22 @ 14:00)   Protein, CSF: 60 mg/dL   Glucose, CSF (12.31.22 @ 14:00)   Glucose, CSF: 43 mg/dLG/24h   CSF PCR Panel (12.31.22 @ 14:00)   CSF PCR Result: NotDetec: The meningitis/encephalitis (ME) panel (CSFPCR) is a PCR based assay that   screens for: Escherichia coli; Haemophilus influenzae; Listeria   monocytogenes; Neisseria meningitidis; Streptococcus agalactiae;   Streptococcus pneumoniae; CMV; Enterovirus; HSV-1; HSV-2; Human   herpesvirus 6; Parechovirus; VZV and Cryptococcus. Result should be   interpreted in context of clinical presentation, imaging and other lab   tests. Positive predictive value may be lower in patients with normal CSF   chemistry and cell count.  Radiology:  Brain MRI: There is no acute pathology.   There is chronic ischemic change.     EEG SUMMARY/CLASSIFICATION 12/31/22-1/1/23    Abnormal EEG in an altered patient.  1. Isolated myoclonic jerks, without a clear ictal correlate   2. Generalized periodic discharges with triphasic morphology <0.5-1 Hz  3. Background slowing, Moderate, generalized.    _____________________________________________________________  EEG IMPRESSION/CLINICAL CORRELATE    Abnormal EEG study.  1. Isolated myoclonic jerks, head movements side to side, head flexion, throughout the study without a clear EEG correlate.   2. Generalized periodic discharges with triphasic morphology are commonly seen in toxic- metabolic encephalopathy, rarely epileptic, improved in latter portions of the recording   3. Moderate nonspecific diffuse or multifocal cerebral dysfunction.   4. No seizures recorded   _____________________________________________________________    EEG SUMMARY/CLASSIFICATION 12/30-12/31/22    Abnormal EEG in an altered patient.  1. Sharp waves, generalized, rare   2. Isolated myoclonic jerks, without a clear ictal correlate   3. Generalized periodic discharges with triphasic morphology <0.5-1 Hz  4. Background slowing, Moderate, generalized.    _____________________________________________________________  EEG IMPRESSION/CLINICAL CORRELATE    Abnormal EEG study.  1. Risk of generalized onset seizures, with evidence of diffuse cerebral dysfunction.   2. Isolated myoclonic jerks, head movements side to side, head flexion, throughout the study without a clear EEG correlate.   3. Generalized periodic discharges with triphasic morphology are commonly seen in toxic- metabolic encephalopathy, rarely epileptic   4. Moderate nonspecific diffuse or multifocal cerebral dysfunction.   5. No seizures recorded   _____________________________________________________________      Head CT 1/2/23- no acute CVA, mass or blood

## 2023-01-07 NOTE — PROCEDURE NOTE - NSINDICATIONS_GEN_A_CORE
failure to thrive/feeding tube replacement/feeding difficulties
CSF sampling
medication administration/feeds
CSF sampling
airway protection/critical patient/mental status change/respiratory failure
airway protection/critical patient/mental status change/respiratory failure
ascites
Aspiration risk/feeding difficulties
respiratory distress

## 2023-01-07 NOTE — CONSULT NOTE ADULT - ASSESSMENT
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 after admission for E.faecalis bacteremia, admitted 12/13 with AMS treated empirically for meningitis (dx LP deferred 2/2 a/c), incidentially found to have dilated CBD (tentative plan for MRCP), transferred to MICU 12/18 with respiratory distress/hypoxia/hypercapnia (2/2 dislodged ngt causing aspiration) requiring intubation complicated by distributive shock.  LP performed 12/19 revealing cryptococcal Ag, negative PCR and gram stain.  Started on empiric coverage for meningoenc ephalitis and antifungal coverage extubated 12/20 downgraded 12/21 to medicine.  12/31 second LP performed- negative and coverage stopped.  1/2 reintubated 2/2 mucus plugging.  extubated 1/4 and ultimately downgraded 1/5 now with recurrent hypoxic/hypercapnic respiratory failure.      NEURO: sedated on propofol.  CV: Distributive shock requiring vasopressor therapy, actively titrating levophed for MAP >65  RESP: Acute hypoxic/hypercapnic respiratory failure ac/vc 4-6cc/kg tv lung protective strategies, actively titrating fio2 and peep for spo2 >92%. post intubation abg.    RENAL: ESRD, last HD on 1/5 -800cc.  HD dose meds, renal function, lytes.     GI: NPO  ENDO: POCT q6hr while NPO   ID: Fluconazole, bactrim, acyclovir   HEME: Eliquis for ac  DISPO: Full code.  Family updated by medicine team.      Critical Care time: 50 mins assessing presenting problems of acute illness that poses high probability of life threatening deterioration or end organ damage/dysfunction.  Medical decision making including Initiating plan of care, reviewing data, reviewing radiology, discussing with multidisciplinary team, non inclusive of procedures, discussing goals of care with patient/family

## 2023-01-07 NOTE — CONSULT NOTE ADULT - CONSULT REASON
Acute hypoxemic respiratory failure
ESRD on HD
Tachy, AF, ?ACS
iron deposition
GOC
Hypoxic Respiratory Failure
altered mental status
encephalopathy
hypoxic respiratory failure
AMS
dilated CBD

## 2023-01-07 NOTE — PROCEDURE NOTE - ADDITIONAL PROCEDURE DETAILS
indications: acute toxic-metabolic encephalopathy, acute hypoxic respiratory failure
diagnostic paracentesis performed  10 mL of cloudy bloody fluid drained  no apparent complications
ETT placed in setting of hypoxic/hypercapnic respiratory failure, AMS  not included in cc time      LATE ENTRY
clear fluid obtained, sent to lab
NGT replaced 2/2 clogged NGT.

## 2023-01-07 NOTE — PROCEDURE NOTE - NSPATIENTPOSTION_GEN_A_CORE
supine
knee to chest/lateral recumbent
supine
(semi) fowlers
supine
upright
(semi) fowlers
supine
sitting

## 2023-01-07 NOTE — PROGRESS NOTE ADULT - ASSESSMENT
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 CT head is negative. Hospital course is notable for empiric treatment for meningitis; EBV positive in CSF; also with dilated common bile duct; acutely decompensated on 12/18 with acute hypercapnic respiratory failure in setting of aspiration pneumonia, emergently intubated, later extubated; also shock (resolved). Nephrology is managing ESRD on HD.    -Access: L AVF  -Outpatient dialysis days are Tuesdays Thursdays Saturdays  Patient was seen and evaluated on dialysis.   Patient is tolerating procedure well.   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   -s/p Lung transplantation - immunosuppressive management as per ID  -EBV in CSF, meningitis - ID on board

## 2023-01-07 NOTE — PROCEDURE NOTE - NSPROCNAME_GEN_A_CORE
Tracheal Intubation
Paracentesis
Lumbar Puncture
Lumbar Puncture
Feeding Tube Replacement
Tracheal Intubation
Gastric Intubation/Gastric Lavage
Feeding Tube Placement
Tracheal Intubation

## 2023-01-07 NOTE — RAPID RESPONSE TEAM SUMMARY - NSSITUATIONBACKGROUNDRRT_GEN_ALL_CORE
73 y/o male with acute resp. 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 MICU consulted. Intubated and transferred to MICU. Family updated. 
72M with a history of emphysema and lung transplant, fungal meningitis, coronary artery disease, heart failure, atrial fibrillation, carotid stenosis with carotid endarterectomy, and recent hospitalization for Enterococcus faecalis bacteremia who presented with altered mental status and lethargy. Pt was on 4LNC and then developed increased WOB and became more lethargic. Pt was last dialyzed 12/16, given lasix earlier today with minimal urine output.

## 2023-01-07 NOTE — PROCEDURE NOTE - NSTRACHINTUBMED_RESP_A_CORE
etomidate injectable/rocuronium injectable
ketamine injectable/propofol injectable/propofol infusion
propofol 100 mg , neosynephrine 200 mcg/propofol injectable

## 2023-01-07 NOTE — CONSULT NOTE ADULT - CONSULT REQUESTED DATE/TIME
18-Dec-2022 18:00
07-Jan-2023 06:55
13-Dec-2022 15:27
14-Dec-2022 14:56
29-Dec-2022 18:56
05-Jan-2023
24-Dec-2022 10:51
26-Dec-2022 14:13
13-Dec-2022 09:38
16-Dec-2022
13-Dec-2022 10:32

## 2023-01-07 NOTE — CONSULT NOTE ADULT - NS PANP COMMENT GEN_ALL_CORE FT
PCCM Attending Attestation:    Patient was seen and examined at the bedside. I was physically present for the key portions of the evaluation and management (E/M) service provided. Agree with history, physical exam, assessment, and plan as outlined by BHAVIN note above, with the following additions and/or comments:    73 y/o M with hx of b/l lung transplant in 2015 from endstage COPD, complicated with CAD in 2020 s/p CAITLIN stent, complicated with likely contrast induced nephropathy needing HD, thus develop ESRD since 2020.  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 for E. Faecalis bacteremia, now present on 12/13/2022 to [facility] for altered mental status.   Initial concern for meningitis, started empiric antibiotic, unable to perform LP due to AC and unsuccesful attempt by ED and neuro, pending IR-guided.  Patient also found to have dilated CBD, pending GI workup, and also with unsuccesful para attempt.  on 12/18, patient develops worsening acute hypoxemic/hypercapnic respiratory failure,which require tracheal intubation, and admitted to MICU afterward.     #Acute hypoxemic and hypercapnic respiratory failure   #aspiration pneumonia  #shock, likely distributive from sepsis/sedative/HD  #Metabolic encephalopathy  #presumed meningitis   #Dilated CBD  #ESRD  #HFrEF  #Afib  #b/l lung transplant    - post intubation ABG pending  - continue low dose sedation for vent synchrony, RASS 0 to -2  - Low tidal volume 6-8 cc/kg, titrate to SpO2 > 92%   - If no improvement consider bronch  - titrate levo to map > 65, started stress dose steroid   - continue empiric antibiotic, ID following  - pend IR-guided LP   - possible need for diagnostic para when able  - pending fractionated alk phos, possible MRCP, f/u with GI   - eliquis on hold for Lp,  currently on heparin subq dvt ppx  - continue HD as tolerate  - MRI brain when tolerate  - Noted [facility] on 12/14 - ROLANDO - Veronica Marshall - Wife  [phone] .  Per [facility], Wife wants Full code for now, unless his condition is worsening and prognosis looks poor, will reconsider changes in code status.   - prognosis guarded    Prashant Dunn M.D.  Attending Physician  [facility]   Pulmonary & Critical Care Medicine
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    Recently discharged from [facility] (11/11/22-11/23/22) after complex admission with E. faecalis bacteremia/Endocarditis treated for 6 weeks antibiotics via PICC line.   Now admitted on 12/13 with altered mental status    Initially treated empirically for meningitis. Diagnostic LP had been deferred due to anticoagulation. Incidentally found to have a dilated CBD with eventual plan for MRCP. Transferred to MICU on 12/18 after developing respiratory distress, hypoxemia, and worsened mental status, necessitating emergent intubation/mechanical ventilation. Noted to have dislodged NGT with what appeared to be tube feedings in his airways. ABG revealed severe hypercapnea with acute respiratory acidosis. Developed distributive shock state requiring IV vasopressor. LP performed 12/19 positive Cryptococcal Ag, negative PCR and Gram stain. 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 Paracentesis negative to date. Extubated 12/20 weaned to nasal cannula, shock has resolved and weaned off IV vasopressors. His encephalopathy improved and he was downgraded from MICU on 12/21 to medical floors.   Had successful LP done 12/31 and then 1/1 rapid response called for respiratory distress, secondary to mucus plug requiring intubation  1/2 - LP results negative, ID: stop ampho, start fluconazole, to complete 14 days of ACV, continued meropenem.    Neuro following with improved responsiveness, patient has eye opening and has spontaneous arm movement  1/3–hemodialysis completed  1/4 patient successfully extubated, speech evaluation--- failed swallow evaluation, to re evaluate in AM. Mental status waxes and wanes, although improved later in the day.  Transferred out of micu on ¼  Waxing and waning mental status and episodes of hypotension and hypoglycemia on floor with this morning has worsening mental status with severe hypoxia and hypercapnia with evidence of aspiration while intubating. S/p intubation and placed on sedation with distributive shock requiring levophed infusion. Has finished course of antibiotics for aspiration pneumonia. Family informed by medicine team prior to intubation and Wife could not decide on GOC. Since this is third intubation in this admission with debilitated state would need to decide on trach/peg or comfort extubation.

## 2023-01-07 NOTE — CONSULT NOTE ADULT - SUBJECTIVE AND OBJECTIVE BOX
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  LP performed 12/19 revealing cryptococcal Ag, negative PCR and gram stain.  Started on empiric coverage for meningoenc ephalitis and antifungal coverage extubated 12/20 downgraded 12/21 to medicine.  12/31 second LP performed- negative and coverage stopped.  1/2 reintubated 2/2 mucus plugging.  extubated 1/4 and ultimately downgraded 1/5.      Events last 24 hours:   This am RRT called for acute hypoxic respiratory failure.  Patient's RN said he had been find all night then suddenly desaturated into the 60s, walked into patient's room and he was drooling.  hypoxia improved s/p NRB however remained obtunded.  Patient noted to be bradycardic and hypotensive, started on levophed, given IVP jung and then urgently intubated.        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      2019 novel coronavirus disease (COVID-19)  Feb 2021 - hospitalized for 1 week at [facility]      Pneumonia  April 2021      [place] (hard of hearing)      HTN (hypertension)      Lumbar spondylosis      History of COPD      BPH without urinary obstruction      Hypercholesterolemia      Oliguria and anuria      H/O peripheral neuropathy      H/O lung transplant  bilateral - transplant - 1-2-2015 -  [facility]      H/O heart artery stent  x3 @[facility]      History of hip replacement  right      Status post open reduction and internal fixation (ORIF) of fracture  left femur        Allergies    budesonide (Unknown)  cefpodoxime (Unknown)  Pollen (Unknown)    Intolerances      FAMILY HISTORY:  Family history of emphysema  mother        Social History:     Review of Systems:  CONSTITUTIONAL: No fever, chills, or fatigue  EYES: No eye pain, visual disturbances, or discharge  ENMT:  No difficulty hearing, tinnitus, vertigo; No sinus or throat pain  NECK: No pain or stiffness  RESPIRATORY: No cough, wheezing, chills or hemoptysis; No shortness of breath  CARDIOVASCULAR: No chest pain, palpitations, dizziness, or leg swelling  GASTROINTESTINAL: No abdominal or epigastric pain. No nausea, vomiting, or hematemesis; No diarrhea or constipation. No melena or hematochezia.  GENITOURINARY: No dysuria, frequency, hematuria, or incontinence  NEUROLOGICAL: No headaches, memory loss, loss of strength, numbness, or tremors  SKIN: No itching, burning, rashes, or lesions   MUSCULOSKELETAL: No joint pain or swelling; No muscle, back, or extremity pain  PSYCHIATRIC: No depression, anxiety, mood swings, or difficulty sleeping      Vitals During Exam:   HR:   BP:   RR:  sPO2:     Physical Examination:    General: No acute distress.      HEENT: Pupils equal, reactive to light.  Symmetric.    PULM: Clear to auscultation bilaterally, no significant sputum production    CVS: Regular rate and rhythm, no murmurs, rubs, or gallops    ABD: Soft, nondistended, nontender, normoactive bowel sounds, no masses    EXT: No edema, nontender    SKIN: Warm and well perfused, no rashes noted.    NEURO: Alert, oriented, interactive, nonfocal      Medications:  acyclovir IVPB 320 milliGRAM(s) IV Intermittent every 24 hours  fluconAZOLE   Tablet 200 milliGRAM(s) Oral <User Schedule>  trimethoprim  40 mG/sulfamethoxazole 200 mG Suspension 80 milliGRAM(s) Oral <User Schedule>    doxazosin 2 milliGRAM(s) Oral at bedtime  norepinephrine Infusion 0.05 MICROgram(s)/kG/Min IV Continuous <Continuous>    acetylcysteine 10%  Inhalation 4 milliLiter(s) Inhalation once  albuterol/ipratropium for Nebulization 3 milliLiter(s) Nebulizer every 6 hours    propofol Infusion 20 MICROgram(s)/kG/Min IV Continuous <Continuous>  valproate sodium  IVPB 250 milliGRAM(s) IV Intermittent every 8 hours      apixaban 2.5 milliGRAM(s) Oral two times a day    glycopyrrolate Injectable 0.2 milliGRAM(s) IV Push every 6 hours PRN  pantoprazole  Injectable 40 milliGRAM(s) IV Push daily      dextrose 50% Injectable 25 Gram(s) IV Push once  dextrose 50% Injectable 12.5 Gram(s) IV Push once  dextrose 50% Injectable 25 Gram(s) IV Push once  dextrose Oral Gel 15 Gram(s) Oral once  hydrocortisone 10 milliGRAM(s) Oral every 12 hours    dextrose 5% + sodium chloride 0.9%. 1000 milliLiter(s) IV Continuous <Continuous>    epoetin ben-epbx (RETACRIT) Injectable 39591 Unit(s) IV Push <User Schedule>  tacrolimus 1.5 milliGRAM(s) Oral daily    chlorhexidine 0.12% Liquid 15 milliLiter(s) Oral Mucosa every 12 hours  chlorhexidine 2% Cloths 1 Application(s) Topical <User Schedule>  chlorhexidine 2% Cloths 1 Application(s) Topical <User Schedule>            ICU Vital Signs Last 24 Hrs  T(C): 37 (07 Jan 2023 05:30), Max: 37 (06 Jan 2023 12:25)  T(F): 98.6 (07 Jan 2023 05:30), Max: 98.6 (06 Jan 2023 12:25)  HR: 64 (07 Jan 2023 05:30) (64 - 77)  BP: 104/66 (07 Jan 2023 05:30) (88/61 - 127/68)  BP(mean): --  ABP: --  ABP(mean): --  RR: 20 (07 Jan 2023 05:30) (16 - 20)  SpO2: 94% (07 Jan 2023 05:30) (94% - 100%)    O2 Parameters below as of 07 Jan 2023 05:30  Patient On (Oxygen Delivery Method): nasal cannula  O2 Flow (L/min): 4        Vital Signs Last 24 Hrs  T(C): 37 (07 Jan 2023 05:30), Max: 37 (06 Jan 2023 12:25)  T(F): 98.6 (07 Jan 2023 05:30), Max: 98.6 (06 Jan 2023 12:25)  HR: 64 (07 Jan 2023 05:30) (64 - 77)  BP: 104/66 (07 Jan 2023 05:30) (88/61 - 127/68)  BP(mean): --  RR: 20 (07 Jan 2023 05:30) (16 - 20)  SpO2: 94% (07 Jan 2023 05:30) (94% - 100%)    Parameters below as of 07 Jan 2023 05:30  Patient On (Oxygen Delivery Method): nasal cannula  O2 Flow (L/min): 4      ABG - ( 07 Jan 2023 06:28 )  pH, Arterial: 7.150 pH, Blood: x     /  pCO2: 90    /  pO2: 97    / HCO3: 31    / Base Excess: 2.6   /  SaO2: 98.9                I&O's Detail    05 Jan 2023 07:01  -  06 Jan 2023 07:00  --------------------------------------------------------  IN:  Total IN: 0 mL    OUT:    Other (mL): 500 mL  Total OUT: 500 mL    Total NET: -500 mL      06 Jan 2023 07:01  -  07 Jan 2023 06:56  --------------------------------------------------------  IN:    dextrose 5% + sodium chloride 0.9%: 450 mL    IV PiggyBack: 50 mL  Total IN: 500 mL    OUT:  Total OUT: 0 mL    Total NET: 500 mL            LABS:                        9.8    6.78  )-----------( 156      ( 07 Jan 2023 06:30 )             31.5     01-06    141  |  102  |  13.6  ----------------------------<  92  4.9   |  28.0  |  3.21<H>    Ca    8.3<L>      06 Jan 2023 06:15  Phos  3.6     01-06  Mg     1.8     01-06    TPro  5.2<L>  /  Alb  2.5<L>  /  TBili  0.4  /  DBili  x   /  AST  34  /  ALT  8   /  AlkPhos  190<H>  01-06      CAPILLARY BLOOD GLUCOSE  POCT Blood Glucose.: 134 mg/dL (07 Jan 2023 06:17)      PT/INR - ( 07 Jan 2023 06:30 )   PT: 17.3 sec;   INR: 1.49 ratio         CULTURES:  Culture Results:   Culture is being performed. (12-31 @ 14:00)  Culture Results:   No growth (12-31 @ 14:00)  Culture Results:   Culture is being performed. Fungal cultures are held for 4 weeks. (12-31 @ 14:00)    RADIOLOGY:   < from: CT Head No Cont (01.02.23 @ 16:26) >    ACC: [id number] EXAM:  CT BRAIN                          PROCEDURE DATE:  01/02/2023      INTERPRETATION:  EXAM: CT head without contrast    INDICATION: Altered mental status    TECHNIQUE: CT examination of the head performed without contrast.   Multiple axial images obtained from skull base to vertex.   Sagittal/coronal reformatted images obtained from axial data set. Images   reviewed in soft tissue and bone windows.      COMPARISON: December 29, 2022 CT head.    FINDINGS:    Ventricles andsulci are mildly proportionately prominent likely related   to mild parenchymal volume loss. No hydrocephalus. No extra-axial fluid   collection identified.    No acute intracranial hemorrhage identified. There is mild subcortical   and periventricular white matter attenuation nonspecific but favored to   reflect sequela of mild chronic microvascular ischemia. Gray-white   differentiation is preserved without CT evidence for acute transcortical   infarction. There is no significant midline shift,mass effect or   herniation.    Bilateral lens replacement.    Sellar and suprasellar region are unremarkable in appearance.    There is moderate polypoid mucosal thickening of ethmoid, sphenoid and   maxillary sinuses. Redemonstration mild to moderate of bilateral mastoid   air cells, right greater than left as well as right middle ear cavity. No   significant cerebellar tonsillar herniation.    No displaced calvarial fractures are identified. No suspicious expansile   or destructive calvarial lesion identified. No significant scalp soft   tissue swelling identified.      IMPRESSION:    No acute intracranial hemorrhage, hydrocephalus or extra-axial fluid   collection.  Mild parenchymal volume loss and mild chronic microvascular ischemic   changes.  No CT evidence for acute transcortical infarction. Please note that MR   imaging is a more sensitive imaging modality for detection of acute   infarction and may be obtained as clinically warranted.    If clinicians have any questions/needfor clarification regarding this   report, Dr. Tommy Nichole can be best reached via the patient [facility]   hospital email system    --- End of Report ---      TOMMY NICHOLE MD; Attending Radiologist  This document has been electronically signed. Jan 2 2023  5:24PM    < end of copied text >    SUPPLEMENTAL O2:   LINES:  YUNIOR:  JUNE:   PPx:   CONTACT:

## 2023-01-07 NOTE — CONSULT NOTE ADULT - PROVIDER SPECIALTY LIST ADULT
Cardiology
Nephrology
Gastroenterology
Infectious Disease
Neurology
Critical Care
Pulmonology
Heme/Onc
Palliative Care

## 2023-01-07 NOTE — PROCEDURE NOTE - PROCEDURE DATE TIME, MLM
19-Dec-2022 13:55
31-Dec-2022 14:23
19-Dec-2022 17:00
27-Dec-2022 10:59
07-Jan-2023 06:50
01-Jan-2023 08:00
13-Dec-2022 16:00
14-Dec-2022 23:28
18-Dec-2022 18:06

## 2023-01-07 NOTE — PROGRESS NOTE ADULT - SUBJECTIVE AND OBJECTIVE BOX
[facility] DIVISION OF KIDNEY DISEASES AND HYPERTENSION -- HEMODIALYSIS NOTE  --------------------------------------------------------------------------------  Chief Complaint: ESRD/Ongoing hemodialysis requirement    24 hour events/subjective:  Pt reintubated and transferred to MICU  pt seen and examined in ICU  getting HD        PAST HISTORY  --------------------------------------------------------------------------------  No significant changes to PMH, PSH, FHx, SHx, unless otherwise noted    ALLERGIES & MEDICATIONS  --------------------------------------------------------------------------------  Allergies    budesonide (Unknown)  cefpodoxime (Unknown)  Pollen (Unknown)    Intolerances      Standing Inpatient Medications  acetylcysteine 10%  Inhalation 4 milliLiter(s) Inhalation once  acyclovir IVPB 320 milliGRAM(s) IV Intermittent every 24 hours  albuterol/ipratropium for Nebulization 3 milliLiter(s) Nebulizer every 6 hours  apixaban 2.5 milliGRAM(s) Oral two times a day  chlorhexidine 0.12% Liquid 15 milliLiter(s) Oral Mucosa every 12 hours  chlorhexidine 2% Cloths 1 Application(s) Topical <User Schedule>  chlorhexidine 2% Cloths 1 Application(s) Topical <User Schedule>  dextrose 50% Injectable 25 Gram(s) IV Push once  dextrose 50% Injectable 12.5 Gram(s) IV Push once  dextrose 50% Injectable 25 Gram(s) IV Push once  dextrose Oral Gel 15 Gram(s) Oral once  doxazosin 2 milliGRAM(s) Oral at bedtime  epoetin ben-epbx (RETACRIT) Injectable 71014 Unit(s) IV Push <User Schedule>  fluconAZOLE   Tablet 200 milliGRAM(s) Oral <User Schedule>  hydrocortisone 10 milliGRAM(s) Oral every 12 hours  norepinephrine Infusion 0.05 MICROgram(s)/kG/Min IV Continuous <Continuous>  pantoprazole  Injectable 40 milliGRAM(s) IV Push daily  propofol Infusion 20 MICROgram(s)/kG/Min IV Continuous <Continuous>  tacrolimus 1.5 milliGRAM(s) Oral daily  trimethoprim  40 mG/sulfamethoxazole 200 mG Suspension 80 milliGRAM(s) Oral <User Schedule>  valproate sodium  IVPB 250 milliGRAM(s) IV Intermittent every 8 hours    PRN Inpatient Medications  fentaNYL    Injectable 50 MICROGram(s) IV Push every 2 hours PRN  glycopyrrolate Injectable 0.2 milliGRAM(s) IV Push every 6 hours PRN      REVIEW OF SYSTEMS  unable to obtain  VITALS/PHYSICAL EXAM  --------------------------------------------------------------------------------  T(C): 36.4 (01-07-23 @ 10:40), Max: 37 (01-06-23 @ 12:25)  HR: 65 (01-07-23 @ 11:15) (64 - 102)  BP: 101/76 (01-07-23 @ 11:15) (74/58 - 157/119)  RR: 16 (01-07-23 @ 11:15) (16 - 22)  SpO2: 100% (01-07-23 @ 11:15) (94% - 100%)  Wt(kg): --        01-06-23 @ 07:01  -  01-07-23 @ 07:00  --------------------------------------------------------  IN: 550 mL / OUT: 0 mL / NET: 550 mL    01-07-23 @ 07:01  -  01-07-23 @ 11:38  --------------------------------------------------------  IN: 88.5 mL / OUT: 50 mL / NET: 38.5 mL      Physical Exam:  	    Gen: intubated/ sedated  	HEENT: ett to vent   	Pulm: mechanical bs  	CV: RRR, S1S2; no rub  	Abd: +BS, soft, nontender/nondistended  	: bill  	UE: Warm, no edema  	LE: Warm, ; no edema  	Neuro: sedated  	Psych: unable to assess  	Skin: Warm  	Vascular access: AVF    LABS/STUDIES  --------------------------------------------------------------------------------              9.8    6.78  >-----------<  156      [01-07-23 @ 06:30]              31.5     140  |  102  |  15.9  ----------------------------<  128      [01-07-23 @ 06:30]  4.5   |  29.0  |  4.05        Ca     8.4     [01-07-23 @ 06:30]      Mg     1.8     [01-06-23 @ 06:15]      Phos  3.6     [01-06-23 @ 06:15]    TPro  5.5  /  Alb  2.8  /  TBili  0.3  /  DBili  x   /  AST  25  /  ALT  6   /  AlkPhos  173  [01-07-23 @ 06:30]    PT/INR: PT 17.3 , INR 1.49       [01-07-23 @ 06:30]      Iron 58, TIBC 163, %sat 36      [12-26-22 @ 10:45]  Ferritin 2720      [12-26-22 @ 10:45]  Vitamin D (25OH) 29.1      [11-17-22 @ 05:45]  TSH 4.01      [12-14-22 @ 07:50]  Lipid: chol 84, , HDL 26, LDL --      [11-12-22 @ 05:44]    HBsAb <3.0      [12-16-22 @ 08:59]  HBsAb Nonreact      [12-16-22 @ 08:59]  HBsAg Nonreact      [12-16-22 @ 08:59]  HBcAb Nonreact      [12-16-22 @ 08:59]  HCV 0.09, Nonreact      [12-16-22 @ 08:59]

## 2023-01-08 NOTE — PROGRESS NOTE ADULT - ASSESSMENT
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 CT head is negative. Hospital course is notable for empiric treatment for meningitis; EBV positive in CSF; also with dilated common bile duct; acutely decompensated on 12/18 with acute hypercapnic respiratory failure in setting of aspiration pneumonia, emergently intubated, later extubated; also shock (resolved). Nephrology is managing ESRD on HD.    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     6) s/p Lung transplantation - immunosuppressive management as per ID  -EBV in CSF, meningitis - ID on board

## 2023-01-08 NOTE — PROGRESS NOTE ADULT - ASSESSMENT
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, cirrhosis/ascites, recent d/c from [facility] after admission for E.faecalis bacteremia, with:    # Acute hypoxemic respiratory failure  # Septic shock  # Aspiration pneumonia  # Metabolic encephalopathy    - actively titrating ventilator settings to maintain SpO2 > 92% and adequate minute ventilation  - FiO2 reduced 50 --> 40%, maintain PEEP of 6  - sedate with propofol to promote vent synchrony and patient comfort  - this is his third emergent intubation during this admission, will need to begin tracheostomy vs Do Not Re-intubate advanced directives  - actively titrating norepinephrine infusion to maintain a MAP > 65  - add midodrine 10mg TID to help offload infusion requirement  - sputum culture is pending  - start empiric course of meropenem given witnessed aspiration during intubation  - neurology input appreciated, metabolic encephalopathy likely related to recurrent aspiration/sepsis 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, cirrhosis/ascites, recent d/c from [facility] after admission for E.faecalis bacteremia, with:    # Acute hypoxemic respiratory failure  # Septic shock  # Aspiration pneumonia  # Metabolic encephalopathy    - actively titrating ventilator settings to maintain SpO2 > 92% and adequate minute ventilation  - FiO2 reduced 50 --> 40%, maintain PEEP of 6  - sedate with propofol to promote vent synchrony and patient comfort  - this is his third emergent intubation during this admission, will need to have more aggressive discussion regarding tracheostomy vs Do Not Re-intubate advanced directive  - actively titrating norepinephrine infusion to maintain a MAP > 65  - add midodrine 10mg TID to help offload infusion requirement  - sputum culture is pending  - start empiric course of meropenem given witnessed aspiration during intubation  - neurology input appreciated, metabolic encephalopathy likely related to recurrent aspiration/sepsis

## 2023-01-08 NOTE — PROGRESS NOTE ADULT - SUBJECTIVE AND OBJECTIVE BOX
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      2019 novel coronavirus disease (COVID-19)  Feb 2021 - hospitalized for 1 week at [facility]      Pneumonia  April 2021      [place] (hard of hearing)      HTN (hypertension)      Lumbar spondylosis      History of COPD      BPH without urinary obstruction      Hypercholesterolemia      Oliguria and anuria      H/O peripheral neuropathy      H/O lung transplant  bilateral - transplant - 1-2-2015 -  [facility]      H/O heart artery stent  x3 @[facility]      History of hip replacement  right      Status post open reduction and internal fixation (ORIF) of fracture  left femur          Review of Systems:  CONSTITUTIONAL: No fever, chills, or fatigue  EYES: No eye pain, visual disturbances, or discharge  ENMT:  No difficulty hearing, tinnitus, vertigo; No sinus or throat pain  NECK: No pain or stiffness  RESPIRATORY: No cough, wheezing, chills or hemoptysis; No shortness of breath  CARDIOVASCULAR: No chest pain, palpitations, dizziness, or leg swelling  GASTROINTESTINAL: No abdominal or epigastric pain. No nausea, vomiting, or hematemesis; No diarrhea or constipation. No melena or hematochezia.  GENITOURINARY: No dysuria, frequency, hematuria, or incontinence  NEUROLOGICAL: No headaches, memory loss, loss of strength, numbness, or tremors  SKIN: No itching, burning, rashes, or lesions   MUSCULOSKELETAL: No joint pain or swelling; No muscle, back, or extremity pain  PSYCHIATRIC: No depression, anxiety, mood swings, or difficulty sleeping      Medications:  acyclovir IVPB 320 milliGRAM(s) IV Intermittent every 24 hours  fluconAZOLE   Tablet 200 milliGRAM(s) Oral <User Schedule>  trimethoprim  40 mG/sulfamethoxazole 200 mG Suspension 80 milliGRAM(s) Oral <User Schedule>    doxazosin 2 milliGRAM(s) Oral at bedtime  norepinephrine Infusion 0.05 MICROgram(s)/kG/Min IV Continuous <Continuous>    albuterol/ipratropium for Nebulization 3 milliLiter(s) Nebulizer every 6 hours    fentaNYL    Injectable 50 MICROGram(s) IV Push every 2 hours PRN  propofol Infusion 20 MICROgram(s)/kG/Min IV Continuous <Continuous>  valproate sodium  IVPB 250 milliGRAM(s) IV Intermittent every 8 hours      apixaban 2.5 milliGRAM(s) Oral two times a day    glycopyrrolate Injectable 0.2 milliGRAM(s) IV Push every 6 hours PRN  pantoprazole  Injectable 40 milliGRAM(s) IV Push daily      dextrose 50% Injectable 25 Gram(s) IV Push once  dextrose 50% Injectable 12.5 Gram(s) IV Push once  dextrose 50% Injectable 25 Gram(s) IV Push once  dextrose Oral Gel 15 Gram(s) Oral once  hydrocortisone 10 milliGRAM(s) Oral every 12 hours      epoetin ben-epbx (RETACRIT) Injectable 39196 Unit(s) IV Push <User Schedule>  tacrolimus 1.5 milliGRAM(s) Oral daily    chlorhexidine 0.12% Liquid 15 milliLiter(s) Oral Mucosa every 12 hours  chlorhexidine 2% Cloths 1 Application(s) Topical <User Schedule>  chlorhexidine 2% Cloths 1 Application(s) Topical <User Schedule>        Mode: AC/ CMV (Assist Control/ Continuous Mandatory Ventilation)  RR (machine): 16  TV (machine): 400  FiO2: 50  PEEP: 6  ITime: 0.8  MAP: 11  PIP: 31      ICU Vital Signs Last 24 Hrs  T(C): 36.8 (07 Jan 2023 23:00), Max: 37 (07 Jan 2023 05:30)  T(F): 98.2 (07 Jan 2023 23:00), Max: 98.6 (07 Jan 2023 05:30)  HR: 98 (08 Jan 2023 00:10) (64 - 105)  BP: 95/51 (07 Jan 2023 23:00) (74/58 - 157/119)  BP(mean): 65 (07 Jan 2023 23:00) (54 - 132)  ABP: --  ABP(mean): --  RR: 16 (07 Jan 2023 23:00) (8 - 33)  SpO2: 100% (08 Jan 2023 00:10) (94% - 100%)    O2 Parameters below as of 07 Jan 2023 21:10  Patient On (Oxygen Delivery Method): ventilator            ABG - ( 07 Jan 2023 08:49 )  pH, Arterial: 7.520 pH, Blood: x     /  pCO2: 32    /  pO2: 211   / HCO3: 26    / Base Excess: 3.2   /  SaO2: 100.0               I&O's Detail    06 Jan 2023 07:01  -  07 Jan 2023 07:00  --------------------------------------------------------  IN:    dextrose 5% + sodium chloride 0.9%: 500 mL    IV PiggyBack: 50 mL  Total IN: 550 mL    OUT:  Total OUT: 0 mL    Total NET: 550 mL      07 Jan 2023 07:01  -  08 Jan 2023 01:16  --------------------------------------------------------  IN:    IV PiggyBack: 100 mL    IV PiggyBack: 100 mL    Nepro with Carb Steady: 310 mL    Norepinephrine: 115.1 mL    Propofol: 177.8 mL  Total IN: 802.9 mL    OUT:    Indwelling Catheter - Urethral (mL): 15 mL    Other (mL): 500 mL    Stool (mL): 50 mL  Total OUT: 565 mL    Total NET: 237.9 mL            LABS:                        9.8    6.78  )-----------( 156      ( 07 Jan 2023 06:30 )             31.5     01-07    140  |  102  |  15.9  ----------------------------<  128<H>  4.5   |  29.0  |  4.05<H>    Ca    8.4      07 Jan 2023 06:30  Phos  3.6     01-06  Mg     1.8     01-06    TPro  5.5<L>  /  Alb  2.8<L>  /  TBili  0.3<L>  /  DBili  x   /  AST  25  /  ALT  6   /  AlkPhos  173<H>  01-07          CAPILLARY BLOOD GLUCOSE      POCT Blood Glucose.: 98 mg/dL (08 Jan 2023 00:49)    PT/INR - ( 07 Jan 2023 06:30 )   PT: 17.3 sec;   INR: 1.49 ratio             CULTURES:        Physical Examination:    General: No acute distress.  Alert, oriented, interactive, nonfocal    HEENT: Pupils equal, reactive to light.  Symmetric.    PULM: Clear to auscultation bilaterally, no significant sputum production    CVS: Regular rate and rhythm, no murmurs, rubs, or gallops    ABD: Soft, nondistended, nontender, normoactive bowel sounds, no masses    EXT: No edema, nontender    SKIN: Warm and well perfused, no rashes noted.    NEURO: A&Ox3, strength 5/5 all extremities, cranial nerves grossly intact, no focal deficits        RADIOLOGY: ***        CRITICAL CARE TIME SPENT: ***  Time spent evaluating/treating patient with medical issues that pose a high risk for life threatening deterioration and/or end-organ damage, reviewing data/labs/imaging, discussing case with multidisciplinary team, discussing plan/goals of care with patient/family. Non-inclusive of procedure time.   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 LP performed on 12/19 with CSF positive for cryptococcal Ag, however negative PCR and gram stain. Extubated 12/20. Re-intubated on 1/2 secondary to mucus plugging. Extubated on 1/4. Transferred back to MICU on 1/7 after developing acute hypoxemic respiratory failure secondary to recurrent aspiration. He again required emergent intubation. Recurrent shock state requiring IV vasopressor therapy.    Events last 24 hours: Patient remains dependent on full vent support and IV vasopressor. Sedated on propofol.        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      2019 novel coronavirus disease (COVID-19)  Feb 2021 - hospitalized for 1 week at [facility]      Pneumonia  April 2021      [place] (hard of hearing)      HTN (hypertension)      Lumbar spondylosis      History of COPD      BPH without urinary obstruction      Hypercholesterolemia      Oliguria and anuria      H/O peripheral neuropathy      H/O lung transplant  bilateral - transplant - 1-2-2015 -  [facility]      H/O heart artery stent  x3 @[facility]      History of hip replacement  right      Status post open reduction and internal fixation (ORIF) of fracture  left femur          Review of Systems:  Unable to obtain secondary to sedation/intubation.    Medications:  acyclovir IVPB 320 milliGRAM(s) IV Intermittent every 24 hours  fluconAZOLE   Tablet 200 milliGRAM(s) Oral <User Schedule>  trimethoprim  40 mG/sulfamethoxazole 200 mG Suspension 80 milliGRAM(s) Oral <User Schedule>  doxazosin 2 milliGRAM(s) Oral at bedtime  norepinephrine Infusion 0.05 MICROgram(s)/kG/Min IV Continuous <Continuous>  albuterol/ipratropium for Nebulization 3 milliLiter(s) Nebulizer every 6 hours  fentaNYL    Injectable 50 MICROGram(s) IV Push every 2 hours PRN  propofol Infusion 20 MICROgram(s)/kG/Min IV Continuous <Continuous>  valproate sodium  IVPB 250 milliGRAM(s) IV Intermittent every 8 hours  apixaban 2.5 milliGRAM(s) Oral two times a day  glycopyrrolate Injectable 0.2 milliGRAM(s) IV Push every 6 hours PRN  pantoprazole  Injectable 40 milliGRAM(s) IV Push daily  dextrose 50% Injectable 25 Gram(s) IV Push once  dextrose 50% Injectable 12.5 Gram(s) IV Push once  dextrose 50% Injectable 25 Gram(s) IV Push once  dextrose Oral Gel 15 Gram(s) Oral once  hydrocortisone 10 milliGRAM(s) Oral every 12 hours  epoetin ben-epbx (RETACRIT) Injectable 63595 Unit(s) IV Push <User Schedule>  tacrolimus 1.5 milliGRAM(s) Oral daily  chlorhexidine 0.12% Liquid 15 milliLiter(s) Oral Mucosa every 12 hours  chlorhexidine 2% Cloths 1 Application(s) Topical <User Schedule>  chlorhexidine 2% Cloths 1 Application(s) Topical <User Schedule>        Mode: AC/ CMV (Assist Control/ Continuous Mandatory Ventilation)  RR (machine): 16  TV (machine): 400  FiO2: 50  PEEP: 6  ITime: 0.8  MAP: 11  PIP: 31      ICU Vital Signs Last 24 Hrs  T(C): 36.8 (07 Jan 2023 23:00), Max: 37 (07 Jan 2023 05:30)  T(F): 98.2 (07 Jan 2023 23:00), Max: 98.6 (07 Jan 2023 05:30)  HR: 98 (08 Jan 2023 00:10) (64 - 105)  BP: 95/51 (07 Jan 2023 23:00) (74/58 - 157/119)  BP(mean): 65 (07 Jan 2023 23:00) (54 - 132)  ABP: --  ABP(mean): --  RR: 16 (07 Jan 2023 23:00) (8 - 33)  SpO2: 100% (08 Jan 2023 00:10) (94% - 100%)    O2 Parameters below as of 07 Jan 2023 21:10  Patient On (Oxygen Delivery Method): ventilator            ABG - ( 07 Jan 2023 08:49 )  pH, Arterial: 7.520 pH, Blood: x     /  pCO2: 32    /  pO2: 211   / HCO3: 26    / Base Excess: 3.2   /  SaO2: 100.0               I&O's Detail    06 Jan 2023 07:01  -  07 Jan 2023 07:00  --------------------------------------------------------  IN:    dextrose 5% + sodium chloride 0.9%: 500 mL    IV PiggyBack: 50 mL  Total IN: 550 mL    OUT:  Total OUT: 0 mL    Total NET: 550 mL      07 Jan 2023 07:01  -  08 Jan 2023 01:16  --------------------------------------------------------  IN:    IV PiggyBack: 100 mL    IV PiggyBack: 100 mL    Nepro with Carb Steady: 310 mL    Norepinephrine: 115.1 mL    Propofol: 177.8 mL  Total IN: 802.9 mL    OUT:    Indwelling Catheter - Urethral (mL): 15 mL    Other (mL): 500 mL    Stool (mL): 50 mL  Total OUT: 565 mL    Total NET: 237.9 mL            LABS:                        9.8    6.78  )-----------( 156      ( 07 Jan 2023 06:30 )             31.5     01-07    140  |  102  |  15.9  ----------------------------<  128<H>  4.5   |  29.0  |  4.05<H>    Ca    8.4      07 Jan 2023 06:30  Phos  3.6     01-06  Mg     1.8     01-06    TPro  5.5<L>  /  Alb  2.8<L>  /  TBili  0.3<L>  /  DBili  x   /  AST  25  /  ALT  6   /  AlkPhos  173<H>  01-07          CAPILLARY BLOOD GLUCOSE      POCT Blood Glucose.: 98 mg/dL (08 Jan 2023 00:49)    PT/INR - ( 07 Jan 2023 06:30 )   PT: 17.3 sec;   INR: 1.49 ratio             CULTURES:        Physical Examination:    General: No acute distress. sedated, intubated    HEENT: Pupils equal, reactive to light.  Symmetric.    PULM: Clear to auscultation bilaterally, no significant sputum production    CVS: Regular rate and rhythm, no murmurs, rubs, or gallops    ABD: Soft, nondistended, nontender, normoactive bowel sounds, no masses    EXT: No edema, nontender    SKIN: Warm and well perfused, no rashes noted.    NEURO: sedated, moves all extremities        RADIOLOGY:     < from: Xray Chest 1 View- PORTABLE-Urgent (Xray Chest 1 View- PORTABLE-Urgent .) (01.07.23 @ 09:41) >  FINDINGS/  IMPRESSION:    First study is from 1/5/2023 3:30 PM and shows an enlarged heart.   Bilateral infiltrates are seen with relative sparing of the upper lobes.   PICC line is in the SVC region. No pneumothorax. Probable effusions..   Sternotomy wires.    The follow-up is from 1/6/2023 9:57 AM and shows placement of a   nasogastric tube, courses below the left hemidiaphragm. Improved aeration   at the right base with residual effusion in the horizontal fissure.   Stable left effusion/infiltrate. Mild congestive changes. PICC line   unchanged..    The follow-up is from today at 8:56AM, is compared to 1/7/2023 at 8:42   AM, and shows endotracheal tube is just above the konrad (withdrawn).   Otherwise pulmonary findings are unchanged..          CRITICAL CARE TIME SPENT: 33 mins  Time spent evaluating/treating patient with medical issues that pose a high risk for life threatening deterioration and/or end-organ damage, reviewing data/labs/imaging, discussing case with multidisciplinary team, discussing plan/goals of care with patient/family. Non-inclusive of procedure time.

## 2023-01-08 NOTE — PROGRESS NOTE ADULT - SUBJECTIVE AND OBJECTIVE BOX
[facility] DIVISION OF KIDNEY DISEASES AND HYPERTENSION -- FOLLOW UP NOTE  --------------------------------------------------------------------------------  Chief Complaint:   ESRD on HD    24 hour events/subjective:  Pt seen and examined in ICU  s/p HD yesterday  remains on vent      PAST HISTORY  --------------------------------------------------------------------------------  No significant changes to PMH, PSH, FHx, SHx, unless otherwise noted    ALLERGIES & MEDICATIONS  --------------------------------------------------------------------------------  Allergies    budesonide (Unknown)  cefpodoxime (Unknown)  Pollen (Unknown)    Standing Inpatient Medications  acyclovir IVPB 320 milliGRAM(s) IV Intermittent every 24 hours  albuterol/ipratropium for Nebulization 3 milliLiter(s) Nebulizer every 6 hours  apixaban 2.5 milliGRAM(s) Oral two times a day  chlorhexidine 0.12% Liquid 15 milliLiter(s) Oral Mucosa every 12 hours  chlorhexidine 2% Cloths 1 Application(s) Topical <User Schedule>  chlorhexidine 2% Cloths 1 Application(s) Topical <User Schedule>  dextrose 50% Injectable 25 Gram(s) IV Push once  dextrose 50% Injectable 12.5 Gram(s) IV Push once  dextrose 50% Injectable 25 Gram(s) IV Push once  dextrose Oral Gel 15 Gram(s) Oral once  doxazosin 2 milliGRAM(s) Oral at bedtime  epoetin ben-epbx (RETACRIT) Injectable 97998 Unit(s) IV Push <User Schedule>  fluconAZOLE   Tablet 200 milliGRAM(s) Oral <User Schedule>  hydrocortisone 10 milliGRAM(s) Oral every 12 hours  meropenem Injectable 1000 milliGRAM(s) IV Push every 24 hours  midodrine 10 milliGRAM(s) Oral every 8 hours  norepinephrine Infusion 0.05 MICROgram(s)/kG/Min IV Continuous <Continuous>  pantoprazole  Injectable 40 milliGRAM(s) IV Push daily  propofol Infusion 20 MICROgram(s)/kG/Min IV Continuous <Continuous>  tacrolimus 1.5 milliGRAM(s) Oral daily  trimethoprim  40 mG/sulfamethoxazole 200 mG Suspension 80 milliGRAM(s) Oral <User Schedule>  valproate sodium  IVPB 250 milliGRAM(s) IV Intermittent every 8 hours    PRN Inpatient Medications  fentaNYL    Injectable 50 MICROGram(s) IV Push every 2 hours PRN  glycopyrrolate Injectable 0.2 milliGRAM(s) IV Push every 6 hours PRN      REVIEW OF SYSTEMS  --------------------------------------------------------------------------------  unable to obtain    VITALS/PHYSICAL EXAM  --------------------------------------------------------------------------------  T(C): 36.2 (01-08-23 @ 15:25), Max: 37 (01-07-23 @ 19:22)  HR: 59 (01-08-23 @ 16:16) (59 - 108)  BP: 90/65 (01-08-23 @ 13:30) (62/32 - 140/55)  RR: 20 (01-08-23 @ 13:45) (0 - 33)  SpO2: 100% (01-08-23 @ 16:16) (100% - 100%)  Wt(kg): --        01-07-23 @ 07:01  -  01-08-23 @ 07:00  --------------------------------------------------------  IN: 1002.6 mL / OUT: 715 mL / NET: 287.6 mL    01-08-23 @ 07:01  -  01-08-23 @ 17:30  --------------------------------------------------------  IN: 375.7 mL / OUT: 5 mL / NET: 370.7 mL      Physical Exam:  	  Gen: intubated/ sedated  	HEENT: ett to vent   	Pulm: mechanical bs  	CV: RRR, S1S2; no rub  	Abd: +BS, soft, nontender/nondistended  	: fly  	UE: Warm, no edema  	LE: Warm, no edema  	Neuro: sedated  	Psych: unable to assess  	Skin: Warm  	Vascular access: AVF    LABS/STUDIES  --------------------------------------------------------------------------------              9.4    6.94  >-----------<  155      [01-08-23 @ 03:35]              28.4     140  |  102  |  10.9  ----------------------------<  113      [01-08-23 @ 03:35]  3.7   |  30.0  |  3.04        Ca     7.9     [01-08-23 @ 03:35]      Mg     1.9     [01-08-23 @ 03:35]      Phos  2.0     [01-08-23 @ 03:35]    TPro  4.7  /  Alb  2.5  /  TBili  0.3  /  DBili  x   /  AST  28  /  ALT  7   /  AlkPhos  248  [01-08-23 @ 03:35]    PT/INR: PT 17.3 , INR 1.49       [01-07-23 @ 06:30]      Creatinine Trend:  SCr 3.04 [01-08 @ 03:35]  SCr 4.05 [01-07 @ 06:30]  SCr 3.21 [01-06 @ 06:15]  SCr 4.59 [01-05 @ 04:45]  SCr 3.79 [01-04 @ 05:40]        Iron 58, TIBC 163, %sat 36      [12-26-22 @ 10:45]  Ferritin 2720      [12-26-22 @ 10:45]  Vitamin D (25OH) 29.1      [11-17-22 @ 05:45]  TSH 4.01      [12-14-22 @ 07:50]  Lipid: chol 84, , HDL 26, LDL --      [11-12-22 @ 05:44]    HBsAb <3.0      [12-16-22 @ 08:59]  HBsAb Nonreact      [12-16-22 @ 08:59]  HBsAg Nonreact      [12-16-22 @ 08:59]  HBcAb Nonreact      [12-16-22 @ 08:59]  HCV 0.09, Nonreact      [12-16-22 @ 08:59]    Syphilis Screen (Treponema Pallidum Ab) Negative      [12-14-22 @ 07:50]

## 2023-01-09 NOTE — PROGRESS NOTE ADULT - SUBJECTIVE AND OBJECTIVE BOX
[facility]                                        Neurology at [place]                                 [facility] Brooks, & [address]                                  [address]                                        [place], [place], [address]                                             [phone]        CC: altered mental status     HPI:   The patient is a 72y Male with multiple medical issues who was recently hospitalized with hypoxic respiratory failure.  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   He was ultimately discharged 11/23/22.   He now presented with altered mental status and lethargy.   Neurology asked to evaluation for meningitis. LP attempted (neuro JW) and was unsuccessful while in ER.    The patient had initially been gradually improving with regard to mental status, but has deteriorated and was transferred to ICU.  He was ultimately transferred out but required reintubation and is back in ICU.    Interim history:  In ICU on mechanical ventilator.     ROS:   Unobtainable due to patient's condition.     MEDICATIONS  (STANDING):  acyclovir IVPB 320 milliGRAM(s) IV Intermittent every 24 hours  albuterol/ipratropium for Nebulization 3 milliLiter(s) Nebulizer every 6 hours  apixaban 2.5 milliGRAM(s) Oral two times a day  chlorhexidine 0.12% Liquid 15 milliLiter(s) Oral Mucosa every 12 hours  chlorhexidine 2% Cloths 1 Application(s) Topical <User Schedule>  chlorhexidine 2% Cloths 1 Application(s) Topical <User Schedule>  dextrose Oral Gel 15 Gram(s) Oral once  epoetin ben-epbx (RETACRIT) Injectable 60276 Unit(s) IV Push <User Schedule>  fluconAZOLE   Tablet 200 milliGRAM(s) Oral <User Schedule>  hydrocortisone 10 milliGRAM(s) Oral every 12 hours  meropenem Injectable 1000 milliGRAM(s) IV Push every 24 hours  midodrine 10 milliGRAM(s) Oral every 8 hours  norepinephrine Infusion 0.05 MICROgram(s)/kG/Min (5.93 mL/Hr) IV Continuous <Continuous>  pantoprazole  Injectable 40 milliGRAM(s) IV Push daily  propofol Infusion 20 MICROgram(s)/kG/Min (7.6 mL/Hr) IV Continuous <Continuous>  tacrolimus 1.5 milliGRAM(s) Oral daily  tamsulosin 0.4 milliGRAM(s) Oral at bedtime  trimethoprim  40 mG/sulfamethoxazole 200 mG Suspension 80 milliGRAM(s) Oral <User Schedule>  valproate sodium  IVPB 250 milliGRAM(s) IV Intermittent every 8 hours    Vital Signs Last 24 Hrs  T(C): 36.6 (09 Jan 2023 10:00), Max: 36.6 (09 Jan 2023 07:45)  T(F): 97.9 (09 Jan 2023 10:00), Max: 97.9 (09 Jan 2023 07:45)  HR: 85 (09 Jan 2023 10:45) (57 - 102)  BP: 115/54 (09 Jan 2023 10:45) (61/51 - 147/81)  BP(mean): 74 (09 Jan 2023 10:45) (55 - 100)  RR: 16 (09 Jan 2023 10:45) (0 - 27)  SpO2: 100% (09 Jan 2023 10:45) (97% - 100%)    Parameters below as of 09 Jan 2023 09:57  Patient On (Oxygen Delivery Method): ventilator    Detailed Neurologic Exam:    Mental status: .  Intubated and sedated  Not able ot assess language or orientation   Not following any instructions.    Cranial nerves: Pupils pinpoint. No blink to threat from either side. Not tracking with gaze . Face is grossly symmetric. Palate and tongue cannot be assessed.     Motor/Sensory: There is normal bulk and tone.  No grimace or movement to nailbed pressure,    Reflexes: trace throughout and plantar responses are equivocal.    Cerebellar: Cannot be tested.     Labs:     01-09    138  |  100  |  19.4  ----------------------------<  122<H>  3.8   |  28.0  |  3.75<H>    Ca    8.3<L>      09 Jan 2023 04:50  Phos  2.7     01-09  Mg     2.0     01-09    TPro  4.8<L>  /  Alb  2.7<L>  /  TBili  0.4  /  DBili  x   /  AST  25  /  ALT  6   /  AlkPhos  259<H>  01-09                            9.4    7.53  )-----------( 148      ( 09 Jan 2023 04:50 )             28.8

## 2023-01-09 NOTE — PROGRESS NOTE ADULT - SUBJECTIVE AND OBJECTIVE BOX
Remains critically ill - on mechanical ventilation + low dose levophed.     Vital Signs Last 24 Hrs  T(C): 36.6 (09 Jan 2023 10:00), Max: 36.6 (09 Jan 2023 07:45)  T(F): 97.9 (09 Jan 2023 10:00), Max: 97.9 (09 Jan 2023 07:45)  HR: 67 (09 Jan 2023 10:30) (57 - 102)  BP: 108/50 (09 Jan 2023 10:30) (61/51 - 147/81)  BP(mean): 67 (09 Jan 2023 10:30) (55 - 100)  RR: 16 (09 Jan 2023 10:30) (0 - 27)  SpO2: 100% (09 Jan 2023 10:30) (97% - 100%)    Parameters below as of 09 Jan 2023 09:57  Patient On (Oxygen Delivery Method): ventilator    I&O's Summary    08 Jan 2023 07:01  -  09 Jan 2023 07:00  --------------------------------------------------------  IN: 1791 mL / OUT: 185 mL / NET: 1606 mL    09 Jan 2023 07:01  -  09 Jan 2023 10:47  --------------------------------------------------------  IN: 186.1 mL / OUT: 0 mL / NET: 186.1 mL    Physical Exam  General: WDWN male in NAD  HEENT: Intubated  Cardiac: S1S2 RRR  Respiratory: CTAB  Abdomen: Soft, NT  Extremities: 2+ pitting edema of b/l upper extremities; no appreciable edema of b/l lower extremities  Neuro: RASS -4    01-09    138  |  100  |  19.4  ----------------------------<  122<H>  3.8   |  28.0  |  3.75<H>    Ca    8.3<L>      09 Jan 2023 04:50  Phos  2.7     01-09  Mg     2.0     01-09    TPro  4.8<L>  /  Alb  2.7<L>  /  TBili  0.4  /  DBili  x   /  AST  25  /  ALT  6   /  AlkPhos  259<H>  01-09                   9.4    7.53  )-----------( 148      ( 09 Jan 2023 04:50 )             28.8     MEDICATIONS  (STANDING):  acyclovir IVPB 320 milliGRAM(s) IV Intermittent every 24 hours  albuterol/ipratropium for Nebulization 3 milliLiter(s) Nebulizer every 6 hours  apixaban 2.5 milliGRAM(s) Oral two times a day  chlorhexidine 0.12% Liquid 15 milliLiter(s) Oral Mucosa every 12 hours  chlorhexidine 2% Cloths 1 Application(s) Topical <User Schedule>  chlorhexidine 2% Cloths 1 Application(s) Topical <User Schedule>  dextrose 50% Injectable 25 Gram(s) IV Push once  dextrose 50% Injectable 12.5 Gram(s) IV Push once  dextrose 50% Injectable 25 Gram(s) IV Push once  dextrose Oral Gel 15 Gram(s) Oral once  doxazosin 2 milliGRAM(s) Oral at bedtime  epoetin ben-epbx (RETACRIT) Injectable 49590 Unit(s) IV Push <User Schedule>  fluconAZOLE   Tablet 200 milliGRAM(s) Oral <User Schedule>  hydrocortisone 10 milliGRAM(s) Oral every 12 hours  meropenem Injectable 1000 milliGRAM(s) IV Push every 24 hours  midodrine 10 milliGRAM(s) Oral every 8 hours  norepinephrine Infusion 0.05 MICROgram(s)/kG/Min (5.93 mL/Hr) IV Continuous <Continuous>  pantoprazole  Injectable 40 milliGRAM(s) IV Push daily  propofol Infusion 20 MICROgram(s)/kG/Min (7.6 mL/Hr) IV Continuous <Continuous>  tacrolimus 1.5 milliGRAM(s) Oral daily  trimethoprim  40 mG/sulfamethoxazole 200 mG Suspension 80 milliGRAM(s) Oral <User Schedule>  valproate sodium  IVPB 250 milliGRAM(s) IV Intermittent every 8 hours    MEDICATIONS  (PRN):  fentaNYL    Injectable 50 MICROGram(s) IV Push every 2 hours PRN agitation /  vent synchrony  glycopyrrolate Injectable 0.2 milliGRAM(s) IV Push every 6 hours PRN oral secretions

## 2023-01-09 NOTE — PROGRESS NOTE ADULT - ASSESSMENT
72y Male with multiple medical issues now with encephalopathy.     Encephalopathy.   Mental status waxing and waning.   Respiratory distress/failure now intubated again and in MICU.  Likely toxic metabolic secondary to multiple medical issues.   Treated crypto meningitis  EEG findings strongly suggestive of toxic metabolic etiology.  Head CT 1/2/23- no acute CVA, mass or blood.    Meningitis.   Antibiotic coverage for now per ID.   repeat CSF from 12/31 showed no cryptococcus Ag but (+) mild elevation of protein  EBV detected by PCR in CSF, low level, unclear significance.  ID following.

## 2023-01-09 NOTE — PROGRESS NOTE ADULT - ASSESSMENT
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     #1 Acute on chronic hypoxic respiratory failure, ventilator dependence   - reintubated x 3 during this admission  - per notes, seems patient would not want trach  - attempts currently being made by Intensive care unit to optimize as much as possible to extubate, with no reintubation if he fails again    - empiric acyclovir to complete 14 days per infectious diseasesPrior Hx of Cryptococcal Meningitis   Aspiration PNA - completed antibiotic course  Hx of E Faecalis Bacteremia - s/p antibiotic course  - s/p LP 12/19: + cryptococcal Ag but PCR neg, EBV CSF PCR/serum + but very low, ?EBV encephalitis  - s/p repeat LP 12/31 with CSF studies unremarkable thus far  - off ampho/flucytosine as no evidence of CNS crypto, c/w fluconazole  - empiric acyclovir to complete 14 days per ID    #3 seizures  - on valproate     #4 Encounter for palliative care  - available for discussions with Intensive care unit team

## 2023-01-09 NOTE — PROGRESS NOTE ADULT - CONVERSATION DETAILS
I met with patient's wife Veronica, her sister-in-law as well as her  at bedside.  Updated them about patient's medical condition.  She told me that she had talked to my partner over the weekend and then she talked to her daughter Liat last night and they are very clear that patient would never want tracheostomy and feeding tube and considering his failed and being extubated 2 times in this admission with his recurrent aspiration pneumonia/pneumonitis and subsequent encephalopathy with hypoxic and hypercapnic respiratory failure unfortunately the vicious cycle will continue and they would not want him to be under distress anymore and the goals of care  hence has been changed to comfort measures only while continuing all other medications up until tomorrow when tentative palliative extubation is planned for the morning hours.    DNR, DO NOT INTUBATE, comfort measures with Dilaudid infusion, continuing with propofol infusion, around-the-clock Robinul and IV Ativan while continuing with current dose of Levophed infusion, antifungal and antibiotics amongst other medication   emotional support provided and all questions answered   MOLST updated Bilateral breast implants, tissue expanders

## 2023-01-09 NOTE — PROGRESS NOTE ADULT - ASSESSMENT
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 CT head is negative. Hospital course is notable for empiric treatment for meningitis; EBV positive in CSF; also with dilated common bile duct; acute hypercapnic respiratory failure in setting of aspiration pneumonia, emergently intubated, later extubated; later decompensated with acute hypoxic respiratory failure s/p re-intubation on 1/07. Nephrology is managing ESRD on HD.    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 - continue oral phos binder   -s/p Lung transplantation - immunosuppressive management as per MINISTERIO Graham DO  Nephrology  [facility]  [address] [place] [address]  Office Number [phone]       72 year old male with PMH of HTN, HLD, CAD s/p PCI, carotid stenosis s/p CEA, ESRD on HD, 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 CT head is negative. Hospital course is notable for empiric treatment for meningitis; EBV positive in CSF; also with dilated common bile duct; acute hypercapnic respiratory failure in setting of aspiration pneumonia, emergently intubated, then extubated; later developed acute hypoxic respiratory failure s/p re-intubation on 1/07. Nephrology is managing ESRD on HD.    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

## 2023-01-09 NOTE — PROGRESS NOTE ADULT - SUBJECTIVE AND OBJECTIVE BOX
====================ASSESSMENT/PLAN==============  72-year-old  male with past medical history of essential hypertension dyslipidemia end-stage renal disease on hemodialysis COPD s/p bilateral lung transplant on immunosuppressant with history of cryptococcal meningitis carotid stenosis s/p CEA carotid artery disease s/p PCI heart failure with reduced EF paroxysmal A. 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 cirrhosis/ascites recent Enterococcus faecalis bacteremia with recurrent acute hypoxic/hypercapnic respiratory failure from aspiration pneumonia (third intubation and placement on mechanical ventilator through this admission), distributive shock, complicated by metabolic encephalopathy     DNR  DNI   comfort measures   palliative extubation tomorrow   refer to goals of care      ====================== NEUROLOGY=====================  fentaNYL    Injectable 50 MICROGram(s) IV Push every 2 hours PRN agitation /  vent synchrony  HYDROmorphone Infusion 2 mG/Hr (2 mL/Hr) IV Continuous <Continuous>  propofol Infusion 20 MICROgram(s)/kG/Min (7.6 mL/Hr) IV Continuous <Continuous>  valproate sodium  IVPB 250 milliGRAM(s) IV Intermittent every 8 hours   we will add scheduled Ativan    ==================== RESPIRATORY======================  Mechanical Ventilation:  Mode: AC/ CMV (Assist Control/ Continuous Mandatory Ventilation)  RR (machine): 16  TV (machine): 400  FiO2: 35  PEEP: 6  ITime: 0.8  MAP: 10  PIP: 25    albuterol/ipratropium for Nebulization 3 milliLiter(s) Nebulizer every 6 hours   no SAT/SBT    ====================CARDIOVASCULAR==================  doxazosin 2 milliGRAM(s) Oral at bedtime  midodrine 10 milliGRAM(s) Oral every 8 hours  norepinephrine Infusion 0.05 MICROgram(s)/kG/Min (5.93 mL/Hr) IV Continuous <Continuous>  MAP goal of 55-60 for tonight for planned extubation tomorrow might get little bit hypotensive on Dilaudid infusion    ===================HEMATOLOGIC/ONC ===================  apixaban 2.5 milliGRAM(s) Oral two times a day    ===================== RENAL =========================   no further hemodialysis for now with planned palliative extubation tomorrow  ==================== GASTROINTESTINAL===================  glycopyrrolate Injectable 0.4 milliGRAM(s) IV Push every 4 hours  pantoprazole  Injectable 40 milliGRAM(s) IV Push daily     diet: we will continue with current tube feeds  =======================    ENDOCRINE  =====================  dextrose 50% Injectable 25 Gram(s) IV Push once  dextrose 50% Injectable 12.5 Gram(s) IV Push once  dextrose 50% Injectable 25 Gram(s) IV Push once  dextrose Oral Gel 15 Gram(s) Oral once  hydrocortisone 10 milliGRAM(s) Oral every 12 hours    ========================INFECTIOUS DISEASE================  acyclovir IVPB 320 milliGRAM(s) IV Intermittent every 24 hours  fluconAZOLE   Tablet 200 milliGRAM(s) Oral <User Schedule>  meropenem Injectable 1000 milliGRAM(s) IV Push every 24 hours  trimethoprim  40 mG/sulfamethoxazole 200 mG Suspension 80 milliGRAM(s) Oral <User Schedule>      ____________________________________________      JU FERGUSON  MRN-[id number]  Patient is a 72y old  Male who presents with a chief complaint of AMS (05 Jan 2023 18:35)     Hospital course:  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 LP performed on 12/19 with CSF positive for cryptococcal Ag, however negative PCR and gram stain. Extubated 12/20. Re-intubated on 1/2 secondary to mucus plugging. Extubated on 1/4. Transferred back to MICU on 1/7 after developing acute hypoxemic respiratory failure secondary to recurrent aspiration. He again required emergent intubation. Recurrent shock state requiring IV vasopressor therapy.    Last 24 hours:    Improved mentation   improved pressor requirement   remains on mechanical ventilator with stable oxygen requirement    REVIEW OF SYSTEMS:  Could not obtain ROS due to underlying mentation    Physical Exam:  Vital Signs Last 24 Hrs  T(C): 36.4 (09 Jan 2023 12:00), Max: 36.6 (09 Jan 2023 07:45)  T(F): 97.5 (09 Jan 2023 12:00), Max: 97.9 (09 Jan 2023 07:45)  HR: 98 (09 Jan 2023 14:15) (57 - 100)  BP: 129/50 (09 Jan 2023 14:15) (61/51 - 147/81)  BP(mean): 74 (09 Jan 2023 14:15) (55 - 100)  RR: 20 (09 Jan 2023 14:15) (0 - 33)  SpO2: 100% (09 Jan 2023 14:15) (97% - 100%)    Parameters below as of 09 Jan 2023 12:00  Patient On (Oxygen Delivery Method): ventilator    O2 Concentration (%): 35    Gen:   intubated, sedated in mild to moderate respiratory distress on mechanical ventilator  CNS: no acute neurological findings, TITO , mostly moving left upper extremity and bilateral lower extremity but overall nonfocal  Neck: no JVD  RES : Air entry equal b/l  except decreased at the bases                 CVS: Normal S1/S2, regular rhythm and normal rate   Abd: Soft, non-distended. Bowel sounds present.  Skin: No acute rash.  Ext:  no edema b/l LE, PP+    ============================I/O===========================   I&O's Detail    08 Jan 2023 07:01  -  09 Jan 2023 07:00  --------------------------------------------------------  IN:    IV PiggyBack: 150 mL    IV PiggyBack: 250 mL    IV PiggyBack: 100 mL    Nepro with Carb Steady: 920 mL    Norepinephrine: 144.1 mL    Propofol: 226.9 mL  Total IN: 1791 mL    OUT:    Indwelling Catheter - Urethral (mL): 10 mL    Stool (mL): 175 mL  Total OUT: 185 mL    Total NET: 1606 mL      09 Jan 2023 07:01  -  09 Jan 2023 15:32  --------------------------------------------------------  IN:    IV PiggyBack: 50 mL    Nepro with Carb Steady: 350 mL    Norepinephrine: 44.8 mL    Propofol: 39.9 mL  Total IN: 484.7 mL    OUT:    Indwelling Catheter - Urethral (mL): 0 mL  Total OUT: 0 mL    Total NET: 484.7 mL        ============================ LABS =========================                        9.4    7.53  )-----------( 148      ( 09 Jan 2023 04:50 )             28.8     01-09    138  |  100  |  19.4  ----------------------------<  122<H>  3.8   |  28.0  |  3.75<H>    Ca    8.3<L>      09 Jan 2023 04:50  Phos  2.7     01-09  Mg     2.0     01-09    TPro  4.8<L>  /  Alb  2.7<L>  /  TBili  0.4  /  DBili  x   /  AST  25  /  ALT  6   /  AlkPhos  259<H>  01-09    LIVER FUNCTIONS - ( 09 Jan 2023 04:50 )  Alb: 2.7 g/dL / Pro: 4.8 g/dL / ALK PHOS: 259 U/L / ALT: 6 U/L / AST: 25 U/L / GGT: x                 ======================Micro/Rad/Cardio=================  Culture: Reviewed   CXR: Reviewed  Echo:Reviewed        Patient requires continuous monitoring with bedside rhythm monitoring, pulse oximetry, ventilator  monitoring and intermittent blood gas analysis.  Care plan discussed with ICU care team, family and consultants  Patient remained critical; I have spent 35 minutes providing non-routine care, revaluated multiple times during the day.

## 2023-01-09 NOTE — PROGRESS NOTE ADULT - SUBJECTIVE AND OBJECTIVE BOX
OVERNIGHT EVENTS: events notes, rapid response on 1/7, transferred to Intensive care unit, intubated. goals of care held with Intensive care unit team, patient now shifting to comfort measures with possible plans for palliative extubation tomorrow. do not resuscitate in place     Present Symptoms:     Dyspnea: none on vent   Nausea/Vomiting: No  Anxiety:  No  Depression: unable   Fatigue: unable   Loss of appetite:  unable   Constipation: none     Pain: none             Character-            Duration-            Effect-            Factors-            Frequency-            Location-            Severity-    Pain AD Score:  [email or web address] (press ctrl + left click to view)    Review of Systems: Reviewed                    Unable to obtain due to poor mentation   All others negative    MEDICATIONS  (STANDING):  acyclovir IVPB 320 milliGRAM(s) IV Intermittent every 24 hours  albuterol/ipratropium for Nebulization 3 milliLiter(s) Nebulizer every 6 hours  apixaban 2.5 milliGRAM(s) Oral two times a day  chlorhexidine 0.12% Liquid 15 milliLiter(s) Oral Mucosa every 12 hours  chlorhexidine 2% Cloths 1 Application(s) Topical <User Schedule>  chlorhexidine 2% Cloths 1 Application(s) Topical <User Schedule>  dextrose 50% Injectable 25 Gram(s) IV Push once  dextrose 50% Injectable 12.5 Gram(s) IV Push once  dextrose 50% Injectable 25 Gram(s) IV Push once  dextrose Oral Gel 15 Gram(s) Oral once  doxazosin 2 milliGRAM(s) Oral at bedtime  epoetin ben-epbx (RETACRIT) Injectable 22807 Unit(s) IV Push <User Schedule>  fluconAZOLE   Tablet 200 milliGRAM(s) Oral <User Schedule>  glycopyrrolate Injectable 0.4 milliGRAM(s) IV Push every 4 hours  hydrocortisone 10 milliGRAM(s) Oral every 12 hours  HYDROmorphone Infusion 2 mG/Hr (2 mL/Hr) IV Continuous <Continuous>  meropenem Injectable 1000 milliGRAM(s) IV Push every 24 hours  midodrine 10 milliGRAM(s) Oral every 8 hours  norepinephrine Infusion 0.05 MICROgram(s)/kG/Min (5.93 mL/Hr) IV Continuous <Continuous>  pantoprazole  Injectable 40 milliGRAM(s) IV Push daily  propofol Infusion 20 MICROgram(s)/kG/Min (7.6 mL/Hr) IV Continuous <Continuous>  tacrolimus 1.5 milliGRAM(s) Oral daily  trimethoprim  40 mG/sulfamethoxazole 200 mG Suspension 80 milliGRAM(s) Oral <User Schedule>  valproate sodium  IVPB 250 milliGRAM(s) IV Intermittent every 8 hours    MEDICATIONS  (PRN):  fentaNYL    Injectable 50 MICROGram(s) IV Push every 2 hours PRN agitation /  vent synchrony    PHYSICAL EXAM:    Vital Signs Last 24 Hrs  T(C): 36.4 (09 Jan 2023 12:00), Max: 36.6 (09 Jan 2023 07:45)  T(F): 97.5 (09 Jan 2023 12:00), Max: 97.9 (09 Jan 2023 07:45)  HR: 98 (09 Jan 2023 14:15) (57 - 100)  BP: 129/50 (09 Jan 2023 14:15) (61/51 - 147/81)  BP(mean): 74 (09 Jan 2023 14:15) (55 - 100)  RR: 20 (09 Jan 2023 14:15) (0 - 33)  SpO2: 100% (09 Jan 2023 14:15) (97% - 100%)    Parameters below as of 09 Jan 2023 12:00  Patient On (Oxygen Delivery Method): ventilator    O2 Concentration (%): 35    General: intubated     HEENT: ET tube    Lungs: comfortable     CV: normal      GI: NG tube     : normal      MSK: weakness      Skin: no rash    LABS:                     9.4    7.53  )-----------( 148      ( 09 Jan 2023 04:50 )             28.8     01-09    138  |  100  |  19.4  ----------------------------<  122<H>  3.8   |  28.0  |  3.75<H>    Ca    8.3<L>      09 Jan 2023 04:50  Phos  2.7     01-09  Mg     2.0     01-09    TPro  4.8<L>  /  Alb  2.7<L>  /  TBili  0.4  /  DBili  x   /  AST  25  /  ALT  6   /  AlkPhos  259<H>  01-09    I&O's Summary    08 Jan 2023 07:01  -  09 Jan 2023 07:00  --------------------------------------------------------  IN: 1791 mL / OUT: 185 mL / NET: 1606 mL    09 Jan 2023 07:01  -  09 Jan 2023 15:13  --------------------------------------------------------  IN: 484.7 mL / OUT: 0 mL / NET: 484.7 mL    RADIOLOGY & ADDITIONAL STUDIES:    < from: Xray Chest 1 View- PORTABLE-Urgent (Xray Chest 1 View- PORTABLE-Urgent .) (01.07.23 @ 09:41) >  INTERPRETATION:  HISTORY: Admitting Dxs: R41.82 AMS; ; chest congerstion;  TECHNIQUE: Serial portable chest x-rays, 3 studies.  COMPARISON: 1/1/2023.  FINDINGS/  IMPRESSION:    First study is from 1/5/2023 3:30 PM and shows an enlarged heart.   Bilateral infiltrates are seen with relative sparing of the upper lobes.   PICC line is in the SVC region. No pneumothorax. Probable effusions..   Sternotomy wires.    The follow-up is from 1/6/2023 9:57 AM and shows placement of a   nasogastric tube, courses below the left hemidiaphragm. Improved aeration   at the right base with residual effusion in the horizontal fissure.   Stable left effusion/infiltrate. Mild congestive changes. PICC line   unchanged..    The follow-up is from today at 8:56AM, is compared to 1/7/2023 at 8:42   AM, and shows endotracheal tube is just above the konrad (withdrawn).   Otherwise pulmonary findings are unchanged..    --- End of Report ---    < end of copied text >    ADVANCE DIRECTIVES/TREATMENT PREFERENCES:  do not resuscitate

## 2023-01-10 NOTE — PROGRESS NOTE ADULT - SUBJECTIVE AND OBJECTIVE BOX
Remains critically ill - on mechanical ventilation + low dose levophed.     Vital Signs Last 48 Hrs,    ICU Vital Signs Last 24 Hrs  T(C): 36.9 (10 Joaquin 2023 14:15), Max: 37 (10 Joaquin 2023 13:30)  T(F): 98.4 (10 Joaquin 2023 14:15), Max: 98.6 (10 Joaquin 2023 13:30)  HR: 101 (10 Joaquin 2023 14:15) (50 - 102)  BP: 106/69 (10 Joaquin 2023 14:15) (77/61 - 147/66)  BP(mean): 81 (10 Joaquin 2023 14:15) (58 - 90)  RR: 16 (10 Joaquin 2023 14:15) (0 - 24)  SpO2: 100% (10 Joaquin 2023 14:15) (95% - 100%)    O2 Parameters below as of 10 Joaquin 2023 12:00  Patient On (Oxygen Delivery Method): ventilator    O2 Concentration (%): 30    T(C): 36.6 (2023 10:00), Max: 36.6 (2023 07:45)  T(F): 97.9 (2023 10:00), Max: 97.9 (2023 07:45)  HR: 67 (2023 10:30) (57 - 102)  BP: 108/50 (2023 10:30) (61/51 - 147/81)  BP(mean): 67 (2023 10:30) (55 - 100)  RR: 16 (2023 10:30) (0 - 27)  SpO2: 100% (2023 10:30) (97% - 100%)    Parameters below as of 2023 09:57  Patient On (Oxygen Delivery Method): ventilator    I&O's Summary    2023 07:01  -  2023 07:00  --------------------------------------------------------  IN: 1791 mL / OUT: 185 mL / NET: 1606 mL    2023 07:01  -  2023 10:47  --------------------------------------------------------  IN: 186.1 mL / OUT: 0 mL / NET: 186.1 mL    Physical Exam  General: WDWN male in NAD  HEENT: Intubated  Cardiac: S1S2 RRR  Respiratory: CTAB  Abdomen: Soft, NT  Extremities: 2+ pitting edema of b/l upper extremities; no appreciable edema of b/l lower extremities  Neuro: RASS -4    138    |  100    |  19.4   ----------------------------<  122<H>  Ca:8.3<L> (2023 04:50)  3.8     |  28.0   |  3.75<H>    TPro  4.8<L>  /  Alb  2.7<L>  /  TBili  0.4    /  DBili  x      /  AST  25     /  ALT  6      /  AlkPhos  259<H>  2023 04:50                        9.4<L>  7.53  )-----------( 148<L>    ( 2023 04:50 )             28.8<L>    Phos:2.7 mg/dL M.0 mg/dL PTH:-- Uric acid:-- Serum Osm:--  Ferritin:-- Iron:-- TIBC:-- Tsat:--  B12:-- TSH:-- ( @ 04:50)        138  |  100  |  19.4  ----------------------------<  122<H>  3.8   |  28.0  |  3.75<H>    Ca    8.3<L>      2023 04:50  Phos  2.7       Mg     2.0         TPro  4.8<L>  /  Alb  2.7<L>  /  TBili  0.4  /  DBili  x   /  AST  25  /  ALT  6   /  AlkPhos  259<H>                     9.4    7.53  )-----------( 148      ( 2023 04:50 )             28.8     MEDICATIONS  (STANDING):  acyclovir IVPB 320 milliGRAM(s) IV Intermittent every 24 hours  albuterol/ipratropium for Nebulization 3 milliLiter(s) Nebulizer every 6 hours  apixaban 2.5 milliGRAM(s) Oral two times a day  chlorhexidine 0.12% Liquid 15 milliLiter(s) Oral Mucosa every 12 hours  chlorhexidine 2% Cloths 1 Application(s) Topical <User Schedule>  chlorhexidine 2% Cloths 1 Application(s) Topical <User Schedule>  dextrose 50% Injectable 25 Gram(s) IV Push once  dextrose 50% Injectable 12.5 Gram(s) IV Push once  dextrose 50% Injectable 25 Gram(s) IV Push once  dextrose Oral Gel 15 Gram(s) Oral once  doxazosin 2 milliGRAM(s) Oral at bedtime  epoetin ben-epbx (RETACRIT) Injectable 73608 Unit(s) IV Push <User Schedule>  fluconAZOLE   Tablet 200 milliGRAM(s) Oral <User Schedule>  hydrocortisone 10 milliGRAM(s) Oral every 12 hours  meropenem Injectable 1000 milliGRAM(s) IV Push every 24 hours  midodrine 10 milliGRAM(s) Oral every 8 hours  norepinephrine Infusion 0.05 MICROgram(s)/kG/Min (5.93 mL/Hr) IV Continuous <Continuous>  pantoprazole  Injectable 40 milliGRAM(s) IV Push daily  propofol Infusion 20 MICROgram(s)/kG/Min (7.6 mL/Hr) IV Continuous <Continuous>  tacrolimus 1.5 milliGRAM(s) Oral daily  trimethoprim  40 mG/sulfamethoxazole 200 mG Suspension 80 milliGRAM(s) Oral <User Schedule>  valproate sodium  IVPB 250 milliGRAM(s) IV Intermittent every 8 hours    MEDICATIONS  (PRN):  fentaNYL    Injectable 50 MICROGram(s) IV Push every 2 hours PRN agitation /  vent synchrony  glycopyrrolate Injectable 0.2 milliGRAM(s) IV Push every 6 hours PRN oral secretions      Xray Chest 1 View- PORTABLE-Urgent (Xray Chest 1 View- PORTABLE-Urgent .) (23 @ 09:41)     ACC: [id number] EXAM:  XR CHEST PORTABLE ROUTINE 1V                        ACC: [id number] EXAM:  XR CHEST PORTABLE URGENT 1V                        ACC: [id number] EXAM:  XR CHEST PORTABLE URGENT 1V                          PROCEDURE DATE:  2023      INTERPRETATION:  HISTORY: Admitting Dxs: R41.82 AMS; ; chest congerstion;  TECHNIQUE: Serial portable chest x-rays, 3 studies.  COMPARISON: 2023.    IMPRESSION:    First study is from 2023 3:30 PM and shows an enlarged heart.   Bilateral infiltrates are seen with relative sparing of the upper lobes.   PICC line is in the SVC region. No pneumothorax. Probable effusions..   Sternotomy wires.    The follow-up is from 2023 9:57 AM and shows placement of a   nasogastric tube, courses below the left hemidiaphragm. Improved aeration   at the right base with residual effusion in the horizontal fissure.   Stable left effusion/infiltrate. Mild congestive changes. PICC line   unchanged..    The follow-up is from today at 8:56AM, is compared to 2023 at 8:42   AM, and shows endotracheal tube is just above the konrad (withdrawn).   Otherwise pulmonary findings are unchanged.    GUTIERREZ GUPTA MD; Attending Interventional Radiologist  This document has been electronically signed. 2023 11:37AM    72y Male with ESRD, Menigitis , Recurrent Respiratory Failure  now with encephalopathy.     ( Per Neurology )    Mental status waxing and waning.   Respiratory distress/failure now intubated again and in MICU.  Toxic metabolic secondary to multiple medical issues.   Treated cryptococcal  meningitis  EEG findings strongly suggestive of toxic metabolic etiology.  Head CT 23- no acute CVA, mass or blood.    Meningitis. On Antibiotics,     CSF from  showed no cryptococcus Ag but (+) mild elevation of protein  EBV detected by PCR in CSF, low level, unclear significance.    Eventual plan for palliative extubation and comfort measures.

## 2023-01-10 NOTE — PROGRESS NOTE ADULT - ASSESSMENT
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 CT head is negative. Hospital course is notable for empiric treatment for meningitis; EBV positive in CSF; also with dilated common bile duct; acute hypercapnic respiratory failure in setting of aspiration pneumonia, emergently intubated, then extubated; later developed acute hypoxic respiratory failure s/p re-intubation on 1/07. Nephrology is managing ESRD on HD.    -Access: L AV access   -Outpatient dialysis days are Tuesdays Thursdays Saturdays    -Blood pressure - hypotensive - currently on low dose levophed + midodrine 10mg q8h - will d/c doxazosin 2mg qHS and change to tamsulosin 0.4mg qHS  -Anemia in setting of CKD - hemoglobin is below goal - MENDY with HD   -Mineral Bone Disease in setting of CKD - continue oral phos binder   -s/p Lung transplantation - immunosuppressive management as per ID    Patient was seen and evaluated on dialysis.   Patient is tolerating the procedure well.   T(C): 36.9 (01-10-23 @ 14:15), Max: 37 (01-10-23 @ 13:30)  HR: 101 (01-10-23 @ 14:15) (50 - 102)  BP: 106/69 (01-10-23 @ 14:15) (77/61 - 147/66)  Continue dialysis: ?  Dialyzer: Revaclear 300         QB: 300 ml.,        QD: 500ml.,  Goal UF _0.5 L _ over _3_ Hours     Palliative HD,  D/W Dr. Briggs,

## 2023-01-10 NOTE — DIETITIAN NUTRITION RISK NOTIFICATION - TREATMENT: THE FOLLOWING DIET HAS BEEN RECOMMENDED
Diet, NPO with Tube Feed:   Tube Feeding Modality: Nasogastric  Nepro (NEPRORTH)  Total Volume for 24 Hours (mL): 1200  Continuous  Starting Tube Feed Rate {mL per Hour}: 20  Increase Tube Feed Rate by (mL): 10     Every 2 hours  Until Goal Tube Feed Rate (mL per Hour): 50  Tube Feed Duration (in Hours): 24  Tube Feed Start Time: 09:30 (01-07-23 @ 09:26) [Active]

## 2023-01-10 NOTE — CHART NOTE - NSCHARTNOTEFT_GEN_A_CORE
Source: Patient [ ]  Family [ ]   other [x ] EMR and discussed in [facility]     Current Diet: Diet, NPO with Tube Feed:   Tube Feeding Modality: Nasogastric  Nepro (NEPRORTH)  Total Volume for 24 Hours (mL): 1200  Continuous  Starting Tube Feed Rate {mL per Hour}: 20  Increase Tube Feed Rate by (mL): 10     Every 2 hours  Until Goal Tube Feed Rate (mL per Hour): 50  Tube Feed Duration (in Hours): 24  Tube Feed Start Time: 09:30 (01-07-23 @ 09:26)    Enteral /Parenteral Nutrition: Tube feeds at goal rate of 50 ml/hr (x20 hrs) provides 1000 ml, 1800 kcal, 81g protein, 727 ml free water, and 100% of RDIs for vitamins/minerals.       Current Weight:   (1/7) 133.98 lbs   (1/3) 141 lbs  (12/31) 151 lbs  (12/29) 146.1 lbs  (12/28) 107.1 lbs  (12/27) 133.3 lbs  (12/25) 123.2 lbs  (12/24) 123 lbs  (12/23) 110 lbs  (12/22) 102.9 lbs  (12/21) 129.4 lbs  (12/20) 138.8 lbs  (12/18) 137.7 lbs  (12/16) 138.2 lbs  (12/14) 138 lbs  ? accuracy of weights, (however trending down) noted with 3+ b/l arm edema, 2+ Generalized edema; continue to trend and maintain strict Is&Os       Pertinent Medications: MEDICATIONS  (STANDING):  acyclovir IVPB 320 milliGRAM(s) IV Intermittent every 24 hours  albuterol/ipratropium for Nebulization 3 milliLiter(s) Nebulizer every 6 hours  apixaban 2.5 milliGRAM(s) Oral two times a day  chlorhexidine 0.12% Liquid 15 milliLiter(s) Oral Mucosa every 12 hours  chlorhexidine 2% Cloths 1 Application(s) Topical <User Schedule>  chlorhexidine 2% Cloths 1 Application(s) Topical <User Schedule>  dextrose 50% Injectable 25 Gram(s) IV Push once  dextrose 50% Injectable 12.5 Gram(s) IV Push once  dextrose 50% Injectable 25 Gram(s) IV Push once  dextrose Oral Gel 15 Gram(s) Oral once  doxazosin 2 milliGRAM(s) Oral at bedtime  epoetin ben-epbx (RETACRIT) Injectable 71965 Unit(s) IV Push <User Schedule>  fluconAZOLE   Tablet 200 milliGRAM(s) Oral <User Schedule>  glycopyrrolate Injectable 0.4 milliGRAM(s) IV Push every 4 hours  hydrocortisone 10 milliGRAM(s) Oral every 12 hours  HYDROmorphone Infusion 2 mG/Hr (2 mL/Hr) IV Continuous <Continuous>  meropenem Injectable 1000 milliGRAM(s) IV Push every 24 hours  midodrine 10 milliGRAM(s) Oral every 8 hours  norepinephrine Infusion 0.05 MICROgram(s)/kG/Min (5.93 mL/Hr) IV Continuous <Continuous>  pantoprazole  Injectable 40 milliGRAM(s) IV Push daily  propofol Infusion 20 MICROgram(s)/kG/Min (7.6 mL/Hr) IV Continuous <Continuous>  tacrolimus 1.5 milliGRAM(s) Oral daily  trimethoprim  40 mG/sulfamethoxazole 200 mG Suspension 80 milliGRAM(s) Oral <User Schedule>  valproate sodium  IVPB 250 milliGRAM(s) IV Intermittent every 8 hours    MEDICATIONS  (PRN):  fentaNYL    Injectable 50 MICROGram(s) IV Push every 2 hours PRN agitation /  vent synchrony  LORazepam   Injectable 1 milliGRAM(s) IV Push every 4 hours PRN pain / vent dyssynchrony    Pertinent Labs: CBC Full  -  ( 09 Jan 2023 04:50 )  WBC Count : 7.53 K/uL  RBC Count : 3.03 M/uL  Hemoglobin : 9.4 g/dL  Hematocrit : 28.8 %  Platelet Count - Automated : 148 K/uL  Mean Cell Volume : 95.0 fl  Mean Cell Hemoglobin : 31.0 pg  Mean Cell Hemoglobin Concentration : 32.6 gm/dL  Auto Neutrophil # : 6.00 K/uL  Auto Lymphocyte # : 0.32 K/uL  Auto Monocyte # : 0.91 K/uL  Auto Eosinophil # : 0.04 K/uL  Auto Basophil # : 0.04 K/uL  Auto Neutrophil % : 79.8 %  Auto Lymphocyte % : 4.2 %  Auto Monocyte % : 12.1 %  Auto Eosinophil % : 0.5 %  Auto Basophil % : 0.5 %        Skin: IAD per documentation, Lumbar puncture site     Nutrition focused physical exam conducted - found signs of malnutrition [ ]absent [x ]present    Subcutaneous fat loss: mod  [x ] Orbital fat pads region, [ ]Buccal fat region, [ ]Triceps region,  [ ]Ribs region    Muscle wasting: mod  [x ]Temples region, [x ]Clavicle region, [x ]Shoulder region, [ ]Scapula region, [ ]Interosseous region,  [ ]thigh region, [ ]Calf region    Estimated Needs:   [x ] no change since previous assessment  [ ] recalculated:     Hospital Course:   Pt is a 72-year-old  male with past medical history of essential hypertension dyslipidemia end-stage renal disease on hemodialysis COPD s/p bilateral lung transplant on immunosuppressant with history of cryptococcal meningitis carotid stenosis s/p CEA carotid artery disease s/p PCI heart failure with reduced EF paroxysmal A. 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       Current Nutrition Diagnosis:  Pt remains at high nutrition risk secondary to moderate (chronic) protein calorie malnutrition related to inability to meet sufficient protein energy requirements in setting of end-stage renal disease on hemodialysis COPD s/p bilateral lung transplant, recurrent acute hypoxic/hypercapnic respiratory failure, cirrhosis, remains on vent support as evidenced by weight loss since admission, physical signs of mod muscle/fat loss and + edema. Aware pt is now DNR/DNI. GOC discussion w/ family today w/ goal of comfort measures only; aware of Tentative plan for comfort measures and palliative extubation tomorrow. RD to remain available for further recommendations if plans change.      Monitoring and Evaluation:   [ ] PO intake [x ] Tolerance to diet prescription [X] Weights  [X] Follow up per protocol [X] Labs:

## 2023-01-10 NOTE — PROGRESS NOTE ADULT - ASSESSMENT
72y Male with multiple medical issues now with encephalopathy.     Encephalopathy.   Mental status waxing and waning.   Respiratory distress/failure now intubated again and in MICU.  Likely toxic metabolic secondary to multiple medical issues.   Treated crypto meningitis  EEG findings strongly suggestive of toxic metabolic etiology.  Head CT 1/2/23- no acute CVA, mass or blood.    Meningitis.   Antibiotic coverage for now per ID.   repeat CSF from 12/31 showed no cryptococcus Ag but (+) mild elevation of protein  EBV detected by PCR in CSF, low level, unclear significance.  ID following.    Palliative care following.   Eventual plan for palliative extubation and comfort measures.

## 2023-01-10 NOTE — PROGRESS NOTE ADULT - SUBJECTIVE AND OBJECTIVE BOX
[facility]                                        [facility] [place]                                 [facility] [address]                                  [address]                                        [place], [place], [address]                                             [phone]        CC: altered mental status     HPI:   The patient is a 72y Male with multiple medical issues who was recently hospitalized with hypoxic respiratory failure.  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   He was ultimately discharged 11/23/22.   He now presented with altered mental status and lethargy.   Neurology asked to evaluation for meningitis. LP attempted (neuro JW) and was unsuccessful while in ER.    The patient had initially been gradually improving with regard to mental status, but has deteriorated and was transferred to ICU.  He was ultimately transferred out but required reintubation and is back in ICU.    Interim history:  In ICU on mechanical ventilator.     ROS:   Unobtainable due to patient's condition.     MEDICATIONS  (STANDING):  acyclovir IVPB 320 milliGRAM(s) IV Intermittent every 24 hours  albuterol/ipratropium for Nebulization 3 milliLiter(s) Nebulizer every 6 hours  apixaban 2.5 milliGRAM(s) Oral two times a day  chlorhexidine 0.12% Liquid 15 milliLiter(s) Oral Mucosa every 12 hours  chlorhexidine 2% Cloths 1 Application(s) Topical <User Schedule>  chlorhexidine 2% Cloths 1 Application(s) Topical <User Schedule>  dextrose Oral Gel 15 Gram(s) Oral once  doxazosin 2 milliGRAM(s) Oral at bedtime  epoetin ben-epbx (RETACRIT) Injectable 78922 Unit(s) IV Push <User Schedule>  fluconAZOLE   Tablet 200 milliGRAM(s) Oral <User Schedule>  glycopyrrolate Injectable 0.4 milliGRAM(s) IV Push every 4 hours  hydrocortisone 10 milliGRAM(s) Oral every 12 hours  HYDROmorphone Infusion 2 mG/Hr (2 mL/Hr) IV Continuous <Continuous>  meropenem Injectable 1000 milliGRAM(s) IV Push every 24 hours  midodrine 10 milliGRAM(s) Oral every 8 hours  norepinephrine Infusion 0.05 MICROgram(s)/kG/Min (5.93 mL/Hr) IV Continuous <Continuous>  pantoprazole  Injectable 40 milliGRAM(s) IV Push daily  propofol Infusion 20 MICROgram(s)/kG/Min (7.6 mL/Hr) IV Continuous <Continuous>  tacrolimus 1.5 milliGRAM(s) Oral daily  trimethoprim  40 mG/sulfamethoxazole 200 mG Suspension 80 milliGRAM(s) Oral <User Schedule>  valproate sodium  IVPB 250 milliGRAM(s) IV Intermittent every 8 hours    Vital Signs Last 24 Hrs  T(C): 34 (10 Joaquin 2023 08:00), Max: 36.4 (09 Jan 2023 12:00)  T(F): 93.2 (10 Joaquin 2023 08:00), Max: 97.5 (09 Jan 2023 12:00)  HR: 57 (10 Joaquin 2023 09:15) (50 - 102)  BP: 79/54 (10 Joaquin 2023 09:15) (79/54 - 147/66)  BP(mean): 63 (10 Joaquin 2023 09:15) (57 - 95)  RR: 16 (10 Joaquin 2023 09:15) (0 - 33)  SpO2: 100% (10 Joaquin 2023 09:15) (95% - 100%)    Parameters below as of 10 Joaquin 2023 08:34  Patient On (Oxygen Delivery Method): ventilator    Detailed Neurologic Exam:    Mental status: .  Intubated and sedated  Not able ot assess language or orientation   Not following any instructions.    Cranial nerves: Pupils pinpoint. No blink to threat from either side. Not tracking with gaze . Face is grossly symmetric. Palate and tongue cannot be assessed.     Motor/Sensory: There is normal bulk and tone.  No grimace or movement to nailbed pressure,    Reflexes: trace throughout and plantar responses are equivocal.    Cerebellar: Cannot be tested.     Labs:     01-09    138  |  100  |  19.4  ----------------------------<  122<H>  3.8   |  28.0  |  3.75<H>    Ca    8.3<L>      09 Jan 2023 04:50  Phos  2.7     01-09  Mg     2.0     01-09    TPro  4.8<L>  /  Alb  2.7<L>  /  TBili  0.4  /  DBili  x   /  AST  25  /  ALT  6   /  AlkPhos  259<H>  01-09                            9.4    7.53  )-----------( 148      ( 09 Jan 2023 04:50 )             28.8

## 2023-01-10 NOTE — PROGRESS NOTE ADULT - SUBJECTIVE AND OBJECTIVE BOX
Patient is a 72y old  Male who presents with a chief complaint of AMS (05 Jan 2023 18:35)    BRIEF HOSPITAL COURSE:   72-year-old  male with past medical history of essential hypertension dyslipidemia end-stage renal disease on hemodialysis COPD s/p bilateral lung transplant on immunosuppressant with history of cryptococcal meningitis carotid stenosis s/p CEA carotid artery disease s/p PCI heart failure with reduced EF paroxysmal A. 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  -Sedated w/ Propofol / Dilaudid infusions.   -On Amiodarone infusion for Afib.   -On full mechanical ventilatory support.     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  2019 novel coronavirus disease (COVID-19)  Feb 2021 - hospitalized for 1 week at [facility]  Pneumonia  April 2021  [place]  HTN (hypertension)  Lumbar spondylosis  History of COPD  BPH without urinary obstruction  Hypercholesterolemia  Oliguria and anuria  H/O peripheral neuropathy  H/O lung transplant  bilateral - transplant - 1-2-2015 -  [facility]  H/O heart artery stent  x3 [facility]  History of hip replacement  right  Status post open reduction and internal fixation (ORIF) of fracture  left femur    Review of Systems:  Due to pt being intubated/sedated, subjective information was not able to be obtained from the patient. History was obtained, to the extent possible, from review of the chart and collateral sources of information.     Medications:  acyclovir IVPB 320 milliGRAM(s) IV Intermittent every 24 hours  fluconAZOLE   Tablet 200 milliGRAM(s) Oral <User Schedule>  meropenem Injectable 1000 milliGRAM(s) IV Push every 24 hours  trimethoprim  40 mG/sulfamethoxazole 200 mG Suspension 80 milliGRAM(s) Oral <User Schedule>  doxazosin 2 milliGRAM(s) Oral at bedtime  midodrine 10 milliGRAM(s) Oral every 8 hours  norepinephrine Infusion 0.05 MICROgram(s)/kG/Min IV Continuous <Continuous>  albuterol/ipratropium for Nebulization 3 milliLiter(s) Nebulizer every 6 hours  fentaNYL    Injectable 50 MICROGram(s) IV Push every 2 hours PRN  HYDROmorphone Infusion 2 mG/Hr IV Continuous <Continuous>  LORazepam   Injectable 1 milliGRAM(s) IV Push every 4 hours PRN  propofol Infusion 20 MICROgram(s)/kG/Min IV Continuous <Continuous>  valproate sodium  IVPB 250 milliGRAM(s) IV Intermittent every 8 hours  apixaban 2.5 milliGRAM(s) Oral two times a day  glycopyrrolate Injectable 0.4 milliGRAM(s) IV Push every 4 hours  pantoprazole  Injectable 40 milliGRAM(s) IV Push daily  dextrose 50% Injectable 25 Gram(s) IV Push once  dextrose 50% Injectable 12.5 Gram(s) IV Push once  dextrose 50% Injectable 25 Gram(s) IV Push once  dextrose Oral Gel 15 Gram(s) Oral once  hydrocortisone 10 milliGRAM(s) Oral every 12 hours  epoetin ben-epbx (RETACRIT) Injectable 69698 Unit(s) IV Push <User Schedule>  tacrolimus 1.5 milliGRAM(s) Oral daily  chlorhexidine 0.12% Liquid 15 milliLiter(s) Oral Mucosa every 12 hours  chlorhexidine 2% Cloths 1 Application(s) Topical <User Schedule>  chlorhexidine 2% Cloths 1 Application(s) Topical <User Schedule>    Mode: AC/ CMV (Assist Control/ Continuous Mandatory Ventilation)  RR (machine): 16  TV (machine): 400  FiO2: 30  PEEP: 6  ITime: 0.8  MAP: 10  PIP: 23    ICU Vital Signs Last 24 Hrs  T(C): 36.4 (09 Jan 2023 12:00), Max: 36.6 (09 Jan 2023 07:45)  T(F): 97.5 (09 Jan 2023 12:00), Max: 97.9 (09 Jan 2023 07:45)  HR: 56 (10 Joaquin 2023 00:30) (50 - 102)  BP: 119/61 (10 Joaquin 2023 00:30) (83/51 - 140/55)  BP(mean): 79 (10 Joaquin 2023 00:30) (55 - 100)  ABP: --  ABP(mean): --  RR: 16 (10 Joaquin 2023 00:30) (12 - 33)  SpO2: 100% (10 Joaquin 2023 00:30) (97% - 100%)  O2 Parameters below as of 10 Joaquin 2023 00:00  Patient On (Oxygen Delivery Method): ventilator,ac 16/400/6/30%  O2 Concentration (%): 30    I&O's Detail  08 Jan 2023 07:01  -  09 Jan 2023 07:00  --------------------------------------------------------  IN:    IV PiggyBack: 150 mL    IV PiggyBack: 250 mL    IV PiggyBack: 100 mL    Nepro with Carb Steady: 920 mL    Norepinephrine: 144.1 mL    Propofol: 226.9 mL  Total IN: 1791 mL  OUT:    Indwelling Catheter - Urethral (mL): 10 mL    Stool (mL): 175 mL  Total OUT: 185 mL  Total NET: 1606 mL    09 Jan 2023 07:01  -  10 Joaquin 2023 00:53  --------------------------------------------------------  IN:    HYRDOmorphone: 18 mL    IV PiggyBack: 100 mL    Nepro with Carb Steady: 850 mL    Norepinephrine: 87.7 mL    Propofol: 121.6 mL  Total IN: 1177.3 mL  OUT:    Indwelling Catheter - Urethral (mL): 0 mL  Total OUT: 0 mL  Total NET: 1177.3 mL    LABS:                      9.4    7.53  )-----------( 148      ( 09 Jan 2023 04:50 )             28.8     01-09  138  |  100  |  19.4  ----------------------------<  122<H>  3.8   |  28.0  |  3.75<H>  Ca    8.3<L>      09 Jan 2023 04:50  Phos  2.7     01-09  Mg     2.0     01-09  TPro  4.8<L>  /  Alb  2.7<L>  /  TBili  0.4  /  DBili  x   /  AST  25  /  ALT  6   /  AlkPhos  259<H>  01-09    CAPILLARY BLOOD GLUCOSE    CULTURES:  Culture Results:   Normal Respiratory Charis present (01-07-23 @ 10:00)      Physical Examination:  General: +Intubated.  HEENT: PERRL.  PULM: Decreased BS at bases b/l.   CVS: s1/s2.  ABD: Soft, nondistended, nontender, normoactive bowel sounds.  EXT: No edema, nontender.  SKIN: Warm.  NEURO: +Sedated.    RADIOLOGY:   < from: Xray Chest 1 View-PORTABLE IMMEDIATE (Xray Chest 1 View-PORTABLE IMMEDIATE .) (01.07.23 @ 09:35) >  ACC: [id number] EXAM:  XR CHEST PORTABLE IMMED 1V                        PROCEDURE DATE:  01/07/2023    Impression:  Endotracheal tube terminating above the konrad consider retraction 2 cm.   Right-sided PICC line terminating at the cavoatrial junction good   position. Enteric tube coursing beyond the field-of-view.  Left lower lobe consolidation with scattered right lung opacities and   small bilateral pleural effusions overall similar to prior. Clamshell   sternotomy.  Borderline enlarged cardiac silhouette.  Osseous structures are unremarkable for age.    72-year-old  male with past medical history of essential hypertension dyslipidemia end-stage renal disease on hemodialysis COPD s/p bilateral lung transplant on immunosuppressant with history of cryptococcal meningitis carotid stenosis s/p CEA carotid artery disease s/p PCI heart failure with reduced EF paroxysmal A. 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 Acute Hypoxic / Hypercapnic Respiratory Failure  2. Aspiration PNA  3. Septic Shock  4. Metabolic Encephelopathy   5. Hx of Cryptococcal Meningitis     Plan:  NEURO:  -Sedated w/ Propofol / Dilaudid infusions - goal RASS 0 to -1. Fentanyl  / Ativan IVP PRN.  -Valproate IVPB q8h.   -Neuro following, recs appreciated.     CV:  -Remains in vasopressor-dependent shock state w/ Levophed infusion - actively titrating to maintain goal MAP >65 monitoring end points of organ perfusion. Midodrine 10mg q8h to offload vasopressor requirements and facilitate weaning off.   -Amiodarone infusion for Afib.   -Keep K~4 and Mg>2 for optimal arrhythmia suppression.    RESP:  -Patient currently on Full vent support  -titrate settings to maintain SaO2 >90%, or pH >7.25  -consider low tidal volume ventilation strategy w/ goal Tv 4-6 cc/kg of ideal body weight  -plateu pressure goal <30  -Peridex oral care  -aggressive pulmonary toilet    RENAL:  -ESRD on HD.  -trend lytes/Scr daily with BMP  -I's and O's, goal UOP 0.5 cc/kg/hr  -renal dose meds and avoid nephrotoxins   -Nephrology following, no further HD being offered.     GI:  -TF.    ENDO:  Aggressive glycemic control to limit FS glucose to <180mg/dl. BS WNL.    ID:  -Diflucan / Acyclovir for Cryptococcus. Bactrim / Hydrocortisone for ppx. Empiric Meropenem course.     HEME:  -Eliquis BID.    DISPO: Critically Ill, prognosis poor. DNR/DNI. GOC discussion w/ family today w/ goal of comfort measures only, however other family members still having difficulties coming to terms w/ the decision for comfort care. Tentative plan for comfort measures and palliative extubation tomorrow. Will d/w day team in AM. Case d/w Intensivist Dr. Cardona.     CRITICAL CARE TIME SPENT:  40 minutes of critical care time spent providing medical care for patient's acute illness/conditions that impairs at least one vital organ system and/or poses a high risk of imminent or life threatening deterioration in the patient's condition. It includes time spent evaluating and treating the patient's acute illness as well as time spent reviewing labs, radiology, discussing goals of care with patient and/or patient's family, and discussing the case with a multidisciplinary team, including the eICU, in an effort to prevent further life threatening deterioration or end organ damage. This time is independent of any procedures performed.

## 2023-01-10 NOTE — PROGRESS NOTE ADULT - ASSESSMENT
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     #1 Acute on chronic hypoxic respiratory failure, ventilator dependence   - reintubated x 3 during this admission  - per notes, seems patient would not want trach  - do not intubate when extubated     #2 Recurrent Acute Encephalopathy, Prior Hx of Cryptococcal Meningitis,   - Aspiration PNA - completed antibiotic course  - Hx of E Faecalis Bacteremia - s/p antibiotic course  - s/p LP 12/19: + cryptococcal Ag but PCR neg, EBV CSF PCR/serum + but very low, ?EBV encephalitis  - s/p repeat LP 12/31 with CSF studies unremarkable thus far  - off ampho/flucytosine as no evidence of CNS crypto, c/w fluconazole  - empiric acyclovir to complete 14 days per infectious diseases    #3 seizures  - on valproate     #4 Encounter for palliative care  - Surrogates: wife - Veronica - [phone], [phone] and daughter Jyoti - [phone]    72M with end stage Chronic Obstructive Pulmonary Disease, bilateral lung transplant in 2015, CAD/stent with resultant neprhopathy, ESRD on HD, prior cryptoccal meningitis on chronic diflucan, CHF, afib on eliquis, cirrhosis with ascites, recentluy admitted here with E faecalis bacteremia, admitted with change in mental status, found also with dilated CBD on ultrasound complicated by acute respiratory failure with hypoxia  requiring intubation and transferred to MICU on 12/18, s/p LP and paracentesis, medically extubated on 12/20 and downgraded to medicine service, positive EBV CSF PCR, recurrent encephalopathy, acute respiratory failure s/p reintubation on 1/1/23, extuabted 1/4, now reintubated again 1/7.     #1 Acute on chronic hypoxic respiratory failure, ventilator dependence   - reintubated x 3 during this admission  - per notes, seems patient would not want trach  - do not intubate when extubated     #2 Recurrent Acute Encephalopathy, Prior Hx of Cryptococcal Meningitis,   - Aspiration PNA - completed antibiotic course  - Hx of E Faecalis Bacteremia - s/p antibiotic course  - s/p LP 12/19: + cryptococcal Ag but PCR neg, EBV CSF PCR/serum + but very low, ?EBV encephalitis  - s/p repeat LP 12/31 with CSF studies unremarkable thus far  - off ampho/flucytosine as no evidence of CNS crypto, c/w fluconazole  - empiric acyclovir to complete 14 days per infectious diseases    #3 seizures  - on valproate     #4 Encounter for palliative care  - Surrogates: wife - Veronica - [phone], [phone] and daughter Jyoti

## 2023-01-10 NOTE — PROGRESS NOTE ADULT - SUBJECTIVE AND OBJECTIVE BOX
OVERNIGHT EVENTS:    Present Symptoms:     Dyspnea: Mild Moderate Severe  Nausea/Vomiting: Yes No  Anxiety:  Yes No  Depression: Yes No  Fatigue: Yes No  Loss of appetite: Yes No  Constipation:     Pain:             Character-            Duration-            Effect-            Factors-            Frequency-            Location-            Severity-    Pain AD Score:  [email or web address]/cog/painad.pdf (press ctrl + left click to view)    Review of Systems: Reviewed                     Negative:                     Positive:  Unable to obtain due to poor mentation   All others negative    MEDICATIONS  (STANDING):  acyclovir IVPB 320 milliGRAM(s) IV Intermittent every 24 hours  albuterol/ipratropium for Nebulization 3 milliLiter(s) Nebulizer every 6 hours  apixaban 2.5 milliGRAM(s) Oral two times a day  chlorhexidine 0.12% Liquid 15 milliLiter(s) Oral Mucosa every 12 hours  chlorhexidine 2% Cloths 1 Application(s) Topical <User Schedule>  chlorhexidine 2% Cloths 1 Application(s) Topical <User Schedule>  dextrose 50% Injectable 25 Gram(s) IV Push once  dextrose 50% Injectable 12.5 Gram(s) IV Push once  dextrose 50% Injectable 25 Gram(s) IV Push once  dextrose Oral Gel 15 Gram(s) Oral once  doxazosin 2 milliGRAM(s) Oral at bedtime  epoetin ben-epbx (RETACRIT) Injectable 11996 Unit(s) IV Push <User Schedule>  fluconAZOLE   Tablet 200 milliGRAM(s) Oral <User Schedule>  glycopyrrolate Injectable 0.4 milliGRAM(s) IV Push every 4 hours  hydrocortisone 10 milliGRAM(s) Oral every 12 hours  HYDROmorphone Infusion 2 mG/Hr (2 mL/Hr) IV Continuous <Continuous>  meropenem Injectable 1000 milliGRAM(s) IV Push every 24 hours  midodrine 10 milliGRAM(s) Oral every 8 hours  norepinephrine Infusion 0.05 MICROgram(s)/kG/Min (5.93 mL/Hr) IV Continuous <Continuous>  pantoprazole  Injectable 40 milliGRAM(s) IV Push daily  propofol Infusion 20 MICROgram(s)/kG/Min (7.6 mL/Hr) IV Continuous <Continuous>  tacrolimus 1.5 milliGRAM(s) Oral daily  trimethoprim  40 mG/sulfamethoxazole 200 mG Suspension 80 milliGRAM(s) Oral <User Schedule>  valproate sodium  IVPB 250 milliGRAM(s) IV Intermittent every 8 hours    MEDICATIONS  (PRN):  fentaNYL    Injectable 50 MICROGram(s) IV Push every 2 hours PRN agitation /  vent synchrony  LORazepam   Injectable 1 milliGRAM(s) IV Push every 4 hours PRN pain / vent dyssynchrony      PHYSICAL EXAM:    Vital Signs Last 24 Hrs  T(C): 34 (10 Joqauin 2023 08:00), Max: 36.6 (09 Jan 2023 09:45)  T(F): 93.2 (10 Joaquin 2023 08:00), Max: 97.9 (09 Jan 2023 09:45)  HR: 57 (10 Joaquin 2023 09:15) (50 - 102)  BP: 79/54 (10 Joaquin 2023 09:15) (79/54 - 147/66)  BP(mean): 63 (10 Joaquin 2023 09:15) (57 - 95)  RR: 16 (10 Joaquin 2023 09:15) (0 - 33)  SpO2: 100% (10 Joaquin 2023 09:15) (95% - 100%)    Parameters below as of 10 Joaquin 2023 08:34  Patient On (Oxygen Delivery Method): ventilator        General: alert  oriented x ____ lethargic agitated                  cachexia  nonverbal  coma    Karnofsky:  %    HEENT: normal  dry mouth  ET tube/trach    Lungs: comfortable tachypnea/labored breathing  excessive secretions    CV: normal  tachycardia    GI: normal  distended  tender  no BS               PEG/NG/OG tube  constipation  last BM:     : normal  incontinent  oliguria/anuria  bill    MSK: normal  weakness  edema             ambulatory  bedbound/wheelchair bound    Skin: normal  pressure ulcers- Stage_____  no rash    LABS:                          9.4    7.53  )-----------( 148      ( 09 Jan 2023 04:50 )             28.8     01-09    138  |  100  |  19.4  ----------------------------<  122<H>  3.8   |  28.0  |  3.75<H>    Ca    8.3<L>      09 Jan 2023 04:50  Phos  2.7     01-09  Mg     2.0     01-09    TPro  4.8<L>  /  Alb  2.7<L>  /  TBili  0.4  /  DBili  x   /  AST  25  /  ALT  6   /  AlkPhos  259<H>  01-09        I&O's Summary    09 Jan 2023 07:01  -  10 Joaquin 2023 07:00  --------------------------------------------------------  IN: 1817.2 mL / OUT: 500 mL / NET: 1317.2 mL    10 Joaquin 2023 07:01  -  10 Joaquin 2023 09:39  --------------------------------------------------------  IN: 60.5 mL / OUT: 0 mL / NET: 60.5 mL    RADIOLOGY & ADDITIONAL STUDIES:    ADVANCE DIRECTIVES/TREATMENT PREFERENCES:   OVERNIGHT EVENTS: remains on ventilator     Present Symptoms:     Dyspnea: none on ventilator   Nausea/Vomiting: no   Anxiety:  No  Depression: unable   Fatigue: unable   Loss of appetite: unable   Constipation: none     Pain: none             Character-            Duration-            Effect-            Factors-            Frequency-            Location-            Severity-    Pain AD Score:  http://geriatrictoolkit.[facility]/cog/painad.pdf (press ctrl + left click to view)    Review of Systems: Reviewed               Unable to obtain due to poor mentation   All others negative    MEDICATIONS  (STANDING):  acyclovir IVPB 320 milliGRAM(s) IV Intermittent every 24 hours  albuterol/ipratropium for Nebulization 3 milliLiter(s) Nebulizer every 6 hours  apixaban 2.5 milliGRAM(s) Oral two times a day  chlorhexidine 0.12% Liquid 15 milliLiter(s) Oral Mucosa every 12 hours  chlorhexidine 2% Cloths 1 Application(s) Topical <User Schedule>  chlorhexidine 2% Cloths 1 Application(s) Topical <User Schedule>  dextrose 50% Injectable 25 Gram(s) IV Push once  dextrose 50% Injectable 12.5 Gram(s) IV Push once  dextrose 50% Injectable 25 Gram(s) IV Push once  dextrose Oral Gel 15 Gram(s) Oral once  doxazosin 2 milliGRAM(s) Oral at bedtime  epoetin ben-epbx (RETACRIT) Injectable 58342 Unit(s) IV Push <User Schedule>  fluconAZOLE   Tablet 200 milliGRAM(s) Oral <User Schedule>  glycopyrrolate Injectable 0.4 milliGRAM(s) IV Push every 4 hours  hydrocortisone 10 milliGRAM(s) Oral every 12 hours  HYDROmorphone Infusion 2 mG/Hr (2 mL/Hr) IV Continuous <Continuous>  meropenem Injectable 1000 milliGRAM(s) IV Push every 24 hours  midodrine 10 milliGRAM(s) Oral every 8 hours  norepinephrine Infusion 0.05 MICROgram(s)/kG/Min (5.93 mL/Hr) IV Continuous <Continuous>  pantoprazole  Injectable 40 milliGRAM(s) IV Push daily  propofol Infusion 20 MICROgram(s)/kG/Min (7.6 mL/Hr) IV Continuous <Continuous>  tacrolimus 1.5 milliGRAM(s) Oral daily  trimethoprim  40 mG/sulfamethoxazole 200 mG Suspension 80 milliGRAM(s) Oral <User Schedule>  valproate sodium  IVPB 250 milliGRAM(s) IV Intermittent every 8 hours    MEDICATIONS  (PRN):  fentaNYL    Injectable 50 MICROGram(s) IV Push every 2 hours PRN agitation /  vent synchrony  LORazepam   Injectable 1 milliGRAM(s) IV Push every 4 hours PRN pain / vent dyssynchrony    PHYSICAL EXAM:    Vital Signs Last 24 Hrs  T(C): 34 (10 Joaquin 2023 08:00), Max: 36.6 (09 Jan 2023 09:45)  T(F): 93.2 (10 Joaquin 2023 08:00), Max: 97.9 (09 Jan 2023 09:45)  HR: 57 (10 Joaquin 2023 09:15) (50 - 102)  BP: 79/54 (10 Joaquin 2023 09:15) (79/54 - 147/66)  BP(mean): 63 (10 Joaquin 2023 09:15) (57 - 95)  RR: 16 (10 Joaquin 2023 09:15) (0 - 33)  SpO2: 100% (10 Joaquin 2023 09:15) (95% - 100%)    Parameters below as of 10 Joaquin 2023 08:34  Patient On (Oxygen Delivery Method): ventilator    General: intubated     HEENT: ET tube    Lungs: comfortable     CV: normal      GI: NG tube     : normal      MSK: weakness      Skin: no rash    LABS:                     9.4    7.53  )-----------( 148      ( 09 Jan 2023 04:50 )             28.8     01-09    138  |  100  |  19.4  ----------------------------<  122<H>  3.8   |  28.0  |  3.75<H>    Ca    8.3<L>      09 Jan 2023 04:50  Phos  2.7     01-09  Mg     2.0     01-09    TPro  4.8<L>  /  Alb  2.7<L>  /  TBili  0.4  /  DBili  x   /  AST  25  /  ALT  6   /  AlkPhos  259<H>  01-09    I&O's Summary    09 Jan 2023 07:01  -  10 Joaquin 2023 07:00  --------------------------------------------------------  IN: 1817.2 mL / OUT: 500 mL / NET: 1317.2 mL    10 Joaquin 2023 07:01  -  10 Joaquin 2023 09:39  --------------------------------------------------------  IN: 60.5 mL / OUT: 0 mL / NET: 60.5 mL    RADIOLOGY & ADDITIONAL STUDIES:    < from: Xray Chest 1 View- PORTABLE-Urgent (Xray Chest 1 View- PORTABLE-Urgent .) (01.07.23 @ 09:41) >    INTERPRETATION:  HISTORY: Admitting Dxs: R41.82 AMS; ; chest congerstion;  TECHNIQUE: Serial portable chest x-rays, 3 studies.  COMPARISON: 1/1/2023.  FINDINGS/  IMPRESSION:    First study is from 1/5/2023 3:30 PM and shows an enlarged heart.   Bilateral infiltrates are seen with relative sparing of the upper lobes.   PICC line is in the SVC region. No pneumothorax. Probable effusions..   Sternotomy wires.    The follow-up is from 1/6/2023 9:57 AM and shows placement of a   nasogastric tube, courses below the left hemidiaphragm. Improved aeration   at the right base with residual effusion in the horizontal fissure.   Stable left effusion/infiltrate. Mild congestive changes. PICC line   unchanged..    The follow-up is from today at 8:56AM, is compared to 1/7/2023 at 8:42   AM, and shows endotracheal tube is just above the konrad (withdrawn).   Otherwise pulmonary findings are unchanged..    --- End of Report ---      GUTIERREZ GUPTA MD; Attending Interventional Radiologist  This document has been electronically signed. Jan 7 2023 11:37AM    < end of copied text >    ADVANCE DIRECTIVES/TREATMENT PREFERENCES:  do not resuscitate

## 2023-01-10 NOTE — PROGRESS NOTE ADULT - CONVERSATION DETAILS
PALLIATIVE MEDICINE COORDINATION OF CARE NOTE FOR JU FERGUSON    30 Minutes; Start: 9:30 End: 10:00AM, of non-face-to-face prolonged service provided that relates to (face-to-face) care that has or will occur and ongoing patient management, including one or more of the following:   - Reviewed records from other physicians or other health care professional services, including one or more of the following: other medical records and diagnostic / radiology study results     Discussed with primary team     discussion held with wife Veronica via telephone and then separate conversation with daughter Jyoti via telephone. Addressed overall poor prognosis and failing weaning trials, multiple organ failure, shock requiring pressors. They understand their poor prognosis and know he would not want to be maintained artificially on life support, and would not want a trach/PEG. They are not ready today for palliative extubation, want to optimize him as much as possible one more day and proceed tomorrow with extubation. Their top priority is making sure he does not struggle with extubation process and that he does not suffocate or suffer. I explained medication management and alleviating comfort and symptoms. They would still like him optimized with HD today for best symptom relief possible for extubation tomorrow. A lot of emotional support provided. PALLIATIVE MEDICINE COORDINATION OF CARE NOTE FOR JU FERGUSON    30 Minutes; Start: 9:30 End: 10:00AM, of non-face-to-face prolonged service provided that relates to (face-to-face) care that has or will occur and ongoing patient management, including one or more of the following:   - Reviewed records from other physicians or other health care professional services, including one or more of the following: other medical records and diagnostic / radiology study results     Discussed with primary team     discussion held with wife Veronica via telephone and then separate conversation with daughter Jyoti via telephone. Addressed overall poor prognosis and failing weaning trials, multiple organ failure, shock requiring pressors. They understand their poor prognosis and know he would not want to be maintained artificially on life support, and would not want a trach/PEG. They are not ready today for palliative extubation, want to optimize him as much as possible one more day and proceed tomorrow with extubation. Their top priority is making sure he does not struggle with extubation process and that he does not suffocate or suffer. I explained medication management and alleviating comfort and symptoms. They would still like him optimized with HD today for best symptom relief possible for extubation tomorrow. A lot of emotional support provided.    Further discussion held in person with wife Veronica, confirmed do not resuscitate and do not intubate status after extubation, tentatively planned for tomorrow. A lot of emotional support provided. She also brought in his paperwork of " 5 wishes" which are clear that he would not want prolonged artifical machinery in this state.

## 2023-01-11 NOTE — AIRWAY REMOVAL NOTE  ADULT & PEDS - REASON ARTIFICAL AIRWAY REMOVED:
elective ventilator withdrawal
reason for artificial airway has resolved
reason for artificial airway has resolved

## 2023-01-11 NOTE — PROGRESS NOTE ADULT - ASSESSMENT
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     #1 Acute on chronic hypoxic respiratory failure, ventilator dependence   - reintubated x 3 during this admission  - per family, patient would not want trach  - do not intubate when extubated   - increase dilaudid drip in Preparation for palliative extubation     #2agtiation, discordance with ventilator   - ativan around the clock     #3 seizures  - on valproate     #4 Encounter for palliative care  - Surrogates: wife - Veronica - [phone], [phone] and daughter Jyoti    - will meet with them today, likely palliative extubation when they arrive

## 2023-01-11 NOTE — GOALS OF CARE CONVERSATION - ADVANCED CARE PLANNING - CONVERSATION DETAILS
Spoke with daughter Liat and wife Veronica at bedside at length.  Discussed overall condition and prognosis.  Explained that given that this Is the patient's third intubation this hospitalization, tracheostomy would be appropriate if we are to continue aggressive life-sustaining measures. the wife and daughter states that he would never want that, and they report that dialysis has been burdensome enough for him.    The plan going forward will be to optimize him as best as we can and when he is ready for extubation we extubate with plans for no reintubation.  They would like to be present when that time comes.    We also discussed CODE STATUS and they agree that if his heart stops they would like him to pass peacefully without resuscitation.
Call placed to patient's wife, Veronica. Veronica explained patient lives with her at home - his mentation has significantly declined from baseline (normally AO x 4) now lethargic and not making sense to her.   Discussed code status at length. Explained the difference between Do Not Resuscitate/Do Not Intubate versus CPR and Intubation. At the end of our conversation decision was made for CPR and Intubation (FULL CODE)  Veronica explained if his condition worsens and prognosis looks poor she will reconsider code status and will likely opt for DNR/DNI  Emotional support provided  Plan to continue to medically optimize    Surrogate/HCP/Guardian: Phone#:Veronica [phone]  Code status: Full Code
Discussed overall medical condition, prognosis, and options for plan of care with wife Veronica and daughter Jyoti with Dr. Briggs. Family has discussed and they are opting to shift focus to comfort measures and palliative extubation. Explained process of medications for symptoms and process. We also explained prognosis of hours to days and ensuring he doesn't suffer through that process. Confirmed do not resuscitate and do not intubate status and stopping vasopressors. They are requesting 
Extensive conversation held with patients wife Veronica and daughter Jyoti with Dr. Briggs. Dr. Briggs explained summary of course and prognosis given patients continued deterioration and course. They re-discussed patients known prior wishes " five wishes" that were filled out years ago, that he would not want to b e maintained artificially if he had a poor neurological and functional outcome. At this time they know the next step would be either trach/PEG versus comfort measures and palliative extubation. They know he would not want trach or PEG but are not ready for extubation yet. I also addressed possibility of stopping the blood pressure medications and dialysis without removal of ventilator first and that he may go on his own. They want time to discuss and get back to us. FOr now they are good with focusing on comfort and symptoms without stopping the medications or ventilator until they are ready. Emotional support provided.

## 2023-01-11 NOTE — PROGRESS NOTE ADULT - SUBJECTIVE AND OBJECTIVE BOX
OVERNIGHT EVENTS:    Present Symptoms:     Dyspnea: Mild Moderate Severe  Nausea/Vomiting: Yes No  Anxiety:  Yes No  Depression: Yes No  Fatigue: Yes No  Loss of appetite: Yes No  Constipation:     Pain:             Character-            Duration-            Effect-            Factors-            Frequency-            Location-            Severity-    Pain AD Score:  [email or web address] (press ctrl + left click to view)    Review of Systems: Reviewed                     Negative:                     Positive:  Unable to obtain due to poor mentation   All others negative    MEDICATIONS  (STANDING):  acyclovir IVPB 320 milliGRAM(s) IV Intermittent every 24 hours  albuterol/ipratropium for Nebulization 3 milliLiter(s) Nebulizer every 6 hours  apixaban 2.5 milliGRAM(s) Oral two times a day  chlorhexidine 0.12% Liquid 15 milliLiter(s) Oral Mucosa every 12 hours  chlorhexidine 2% Cloths 1 Application(s) Topical <User Schedule>  chlorhexidine 2% Cloths 1 Application(s) Topical <User Schedule>  dextrose 50% Injectable 25 Gram(s) IV Push once  dextrose 50% Injectable 12.5 Gram(s) IV Push once  dextrose 50% Injectable 25 Gram(s) IV Push once  dextrose Oral Gel 15 Gram(s) Oral once  doxazosin 2 milliGRAM(s) Oral at bedtime  epoetin ben-epbx (RETACRIT) Injectable 99295 Unit(s) IV Push <User Schedule>  fluconAZOLE   Tablet 200 milliGRAM(s) Oral <User Schedule>  glycopyrrolate Injectable 0.4 milliGRAM(s) IV Push every 4 hours  hydrocortisone 10 milliGRAM(s) Oral every 12 hours  HYDROmorphone Infusion 2 mG/Hr (2 mL/Hr) IV Continuous <Continuous>  meropenem Injectable 1000 milliGRAM(s) IV Push every 24 hours  midodrine 10 milliGRAM(s) Oral every 8 hours  norepinephrine Infusion 0.05 MICROgram(s)/kG/Min (2.97 mL/Hr) IV Continuous <Continuous>  pantoprazole  Injectable 40 milliGRAM(s) IV Push daily  propofol Infusion 20 MICROgram(s)/kG/Min (7.6 mL/Hr) IV Continuous <Continuous>  tacrolimus 1.5 milliGRAM(s) Oral daily  trimethoprim  40 mG/sulfamethoxazole 200 mG Suspension 80 milliGRAM(s) Oral <User Schedule>  valproate sodium  IVPB 250 milliGRAM(s) IV Intermittent every 8 hours    MEDICATIONS  (PRN):  fentaNYL    Injectable 50 MICROGram(s) IV Push every 2 hours PRN agitation /  vent synchrony  LORazepam   Injectable 1 milliGRAM(s) IV Push every 4 hours PRN pain / vent dyssynchrony      PHYSICAL EXAM:    Vital Signs Last 24 Hrs  T(C): 37.6 (11 Jan 2023 07:00), Max: 37.9 (10 Joaquin 2023 21:45)  T(F): 99.7 (11 Jan 2023 07:00), Max: 100.2 (10 Joaquin 2023 21:45)  HR: 122 (11 Jan 2023 07:00) (54 - 153)  BP: 123/80 (11 Jan 2023 07:00) (61/48 - 146/91)  BP(mean): 88 (11 Jan 2023 07:00) (43 - 107)  RR: 18 (11 Jan 2023 07:00) (6 - 27)  SpO2: 100% (11 Jan 2023 07:00) (99% - 100%)    Parameters below as of 11 Jan 2023 04:56  Patient On (Oxygen Delivery Method): ventilator,30%        General: alert  oriented x ____ lethargic agitated                  cachexia  nonverbal  coma    Karnofsky:  %    HEENT: normal  dry mouth  ET tube/trach    Lungs: comfortable tachypnea/labored breathing  excessive secretions    CV: normal  tachycardia    GI: normal  distended  tender  no BS               PEG/NG/OG tube  constipation  last BM:     : normal  incontinent  oliguria/anuria  bill    MSK: normal  weakness  edema             ambulatory  bedbound/wheelchair bound    Skin: normal  pressure ulcers- Stage_____  no rash    LABS:                I&O's Summary    10 Joaquin 2023 07:01  -  11 Jan 2023 07:00  --------------------------------------------------------  IN: 1897.3 mL / OUT: 600 mL / NET: 1297.3 mL        RADIOLOGY & ADDITIONAL STUDIES:    ADVANCE DIRECTIVES/TREATMENT PREFERENCES:  DNR YES NO  Completed on:                     MOLST  YES NO   Completed on:  Living Will  YES NO   Completed on: OVERNIGHT EVENTS: remains on pressors, not able to wean from ventilator     Present Symptoms:     Dyspnea: none   Nausea/Vomiting: No  Anxiety:  No  Depression: unable   Fatigue: Yes   Loss of appetite: unable   Constipation: none     Pain: none             Character-            Duration-            Effect-            Factors-            Frequency-            Location-            Severity-    Pain AD Score:  [email or web address] (press ctrl + left click to view)    Review of Systems: Reviewed                     Unable to obtain due to poor mentation   All others negative    MEDICATIONS  (STANDING):  acyclovir IVPB 320 milliGRAM(s) IV Intermittent every 24 hours  albuterol/ipratropium for Nebulization 3 milliLiter(s) Nebulizer every 6 hours  apixaban 2.5 milliGRAM(s) Oral two times a day  chlorhexidine 0.12% Liquid 15 milliLiter(s) Oral Mucosa every 12 hours  chlorhexidine 2% Cloths 1 Application(s) Topical <User Schedule>  chlorhexidine 2% Cloths 1 Application(s) Topical <User Schedule>  dextrose 50% Injectable 25 Gram(s) IV Push once  dextrose 50% Injectable 12.5 Gram(s) IV Push once  dextrose 50% Injectable 25 Gram(s) IV Push once  dextrose Oral Gel 15 Gram(s) Oral once  doxazosin 2 milliGRAM(s) Oral at bedtime  epoetin ben-epbx (RETACRIT) Injectable 70828 Unit(s) IV Push <User Schedule>  fluconAZOLE   Tablet 200 milliGRAM(s) Oral <User Schedule>  glycopyrrolate Injectable 0.4 milliGRAM(s) IV Push every 4 hours  hydrocortisone 10 milliGRAM(s) Oral every 12 hours  HYDROmorphone Infusion 2 mG/Hr (2 mL/Hr) IV Continuous <Continuous>  meropenem Injectable 1000 milliGRAM(s) IV Push every 24 hours  midodrine 10 milliGRAM(s) Oral every 8 hours  norepinephrine Infusion 0.05 MICROgram(s)/kG/Min (2.97 mL/Hr) IV Continuous <Continuous>  pantoprazole  Injectable 40 milliGRAM(s) IV Push daily  propofol Infusion 20 MICROgram(s)/kG/Min (7.6 mL/Hr) IV Continuous <Continuous>  tacrolimus 1.5 milliGRAM(s) Oral daily  trimethoprim  40 mG/sulfamethoxazole 200 mG Suspension 80 milliGRAM(s) Oral <User Schedule>  valproate sodium  IVPB 250 milliGRAM(s) IV Intermittent every 8 hours    MEDICATIONS  (PRN):  fentaNYL    Injectable 50 MICROGram(s) IV Push every 2 hours PRN agitation /  vent synchrony  LORazepam   Injectable 1 milliGRAM(s) IV Push every 4 hours PRN pain / vent dyssynchrony      PHYSICAL EXAM:    Vital Signs Last 24 Hrs  T(C): 37.6 (11 Jan 2023 07:00), Max: 37.9 (10 Joaquin 2023 21:45)  T(F): 99.7 (11 Jan 2023 07:00), Max: 100.2 (10 Joaquin 2023 21:45)  HR: 122 (11 Jan 2023 07:00) (54 - 153)  BP: 123/80 (11 Jan 2023 07:00) (61/48 - 146/91)  BP(mean): 88 (11 Jan 2023 07:00) (43 - 107)  RR: 18 (11 Jan 2023 07:00) (6 - 27)  SpO2: 100% (11 Jan 2023 07:00) (99% - 100%)    Parameters below as of 11 Jan 2023 04:56  Patient On (Oxygen Delivery Method): ventilator,30%    General: intubated     HEENT: ET tube    Lungs: comfortable     CV: normal      GI: NG tube     : normal      MSK: weakness      Skin: no rash    LABS:    I&O's Summary    10 Joaquin 2023 07:01  -  11 Jan 2023 07:00  --------------------------------------------------------  IN: 1897.3 mL / OUT: 600 mL / NET: 1297.3 mL    RADIOLOGY & ADDITIONAL STUDIES:    ADVANCE DIRECTIVES/TREATMENT PREFERENCES:  do not resuscitate and do not intubate once extubated

## 2023-01-11 NOTE — DISCHARGE NOTE FOR THE EXPIRED PATIENT - HOSPITAL COURSE
72-year-old  male with past medical history of essential hypertension dyslipidemia end-stage renal disease on hemodialysis COPD s/p bilateral lung transplant on immunosuppressant with history of cryptococcal meningitis carotid stenosis s/p CEA carotid artery disease s/p PCI heart failure with reduced EF paroxysmal A. 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

## 2023-01-11 NOTE — PROGRESS NOTE ADULT - SUBJECTIVE AND OBJECTIVE BOX
[facility]                                        Ne[facility]                                 [facility]                                  [address]                                        [place], [place], [address]                                             [phone]        CC: altered mental status     HPI:   The patient is a 72y Male with multiple medical issues who was recently hospitalized with hypoxic respiratory failure.  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   He was ultimately discharged 11/23/22.   He now presented with altered mental status and lethargy.   Neurology asked to evaluation for meningitis. LP attempted (neuro JW) and was unsuccessful while in ER.    The patient had initially been gradually improving with regard to mental status, but has deteriorated and was transferred to ICU.  He was ultimately transferred out but required reintubation and is back in ICU.    Interim history:  In ICU on mechanical ventilator.     ROS:   Unobtainable due to patient's condition.     MEDICATIONS  (STANDING):  acyclovir IVPB 320 milliGRAM(s) IV Intermittent every 24 hours  albuterol/ipratropium for Nebulization 3 milliLiter(s) Nebulizer every 6 hours  apixaban 2.5 milliGRAM(s) Oral two times a day  chlorhexidine 0.12% Liquid 15 milliLiter(s) Oral Mucosa every 12 hours  chlorhexidine 2% Cloths 1 Application(s) Topical <User Schedule>  chlorhexidine 2% Cloths 1 Application(s) Topical <User Schedule>  dextrose Oral Gel 15 Gram(s) Oral once  doxazosin 2 milliGRAM(s) Oral at bedtime  epoetin ben-epbx (RETACRIT) Injectable 10018 Unit(s) IV Push <User Schedule>  fluconAZOLE   Tablet 200 milliGRAM(s) Oral <User Schedule>  glycopyrrolate Injectable 0.4 milliGRAM(s) IV Push every 4 hours  hydrocortisone 10 milliGRAM(s) Oral every 12 hours  HYDROmorphone Infusion 4 mG/Hr (4 mL/Hr) IV Continuous <Continuous>  LORazepam   Injectable 2 milliGRAM(s) IV Push every 4 hours  LORazepam   Injectable 4 milliGRAM(s) IV Push once  meropenem Injectable 1000 milliGRAM(s) IV Push every 24 hours  midodrine 10 milliGRAM(s) Oral every 8 hours  norepinephrine Infusion 0.05 MICROgram(s)/kG/Min (2.97 mL/Hr) IV Continuous <Continuous>  pantoprazole  Injectable 40 milliGRAM(s) IV Push daily  tacrolimus 1.5 milliGRAM(s) Oral daily  trimethoprim  40 mG/sulfamethoxazole 200 mG Suspension 80 milliGRAM(s) Oral <User Schedule>  valproate sodium  IVPB 250 milliGRAM(s) IV Intermittent every 8 hours    Vital Signs Last 24 Hrs  T(C): 37.6 (11 Jan 2023 08:45), Max: 37.9 (10 Joaquin 2023 21:45)  T(F): 99.7 (11 Jan 2023 08:45), Max: 100.2 (10 Joaquin 2023 21:45)  HR: 126 (11 Jan 2023 08:45) (57 - 153)  BP: 106/84 (11 Jan 2023 08:45) (61/48 - 146/91)  BP(mean): 93 (11 Jan 2023 08:45) (43 - 107)  RR: 23 (11 Jan 2023 08:45) (16 - 27)  SpO2: 100% (11 Jan 2023 08:45) (99% - 100%)    Parameters below as of 11 Jan 2023 08:41  Patient On (Oxygen Delivery Method): ventilator    Detailed Neurologic Exam:    Mental status: .  Intubated and sedated  Not able ot assess language or orientation  Grimaces to stimuli. Not following any instructions.    Cranial nerves: Pupils pinpoint. No blink to threat from either side. Not tracking with gaze . Face is grossly symmetric. Palate and tongue cannot be assessed.     Motor/Sensory: There is normal bulk and tone.  Grimaces to stimuli. No purposeful movement.     Reflexes: trace throughout and plantar responses are equivocal.    Cerebellar: Cannot be tested.                     Rad:   ***

## 2023-01-19 NOTE — CHART NOTE - NSCHARTNOTEFT_GEN_A_CORE
Palliative care social work note.    Bereavement call made to patients spouse Veronica. Support and resources provided.

## 2023-01-19 NOTE — CHART NOTE - NSCHARTNOTESELECT_GEN_ALL_CORE
Event Note
IR PA/Event Note
Nutrition Services
Event Note
Nutrition Services
Transfer Note

## 2023-02-01 LAB
CULTURE RESULTS: SIGNIFICANT CHANGE UP
SPECIMEN SOURCE: SIGNIFICANT CHANGE UP

## 2023-02-01 NOTE — ED ADULT NURSE NOTE - CADM POA CENTRAL LINE
Here for modern bivalent booster.    Health Maintenance Due   Topic Date Due   • COVID-19 Vaccine (3 - Booster for Pediatric Moderna series) 2022       Patient is due for the topics as listed above and wishes to proceed with them. Orders placed for Immunization(s) COVID-19.    Vaccine Information Statement(s) or the Emergency Use Authorization was given today. This has been reviewed, questions answered, and verbal consent given by Parent for injection(s) and administration of COVID-19 Immunization Moderna COVID-19.        Patient tolerated without incident. See immunization grid for documentation.      
No

## 2023-02-15 LAB
CULTURE RESULTS: SIGNIFICANT CHANGE UP
SPECIMEN SOURCE: SIGNIFICANT CHANGE UP

## 2023-05-09 NOTE — ASU PREOP CHECKLIST - INTERNAL PROSTHESES
Patient of Dr. Aleshia Hill    Refill request received    Date of last visit 4/20/23  Date of next visit 10/5 talita left leg and right hip/yes(specify)

## 2023-05-20 NOTE — PHYSICAL THERAPY INITIAL EVALUATION ADULT - NSPTDISCHREC_GEN_A_CORE
Yes
Sub-acute Rehab
rehab disposition recommendation may be change base on patient  functional progress/Sub-acute Rehab

## 2023-06-08 NOTE — PHYSICAL THERAPY INITIAL EVALUATION ADULT - PATIENT PROFILE REVIEW, REHAB EVAL
Infant ready for surgery   
Reviewed and completed all discharge orders. Printed AVS and educated parents of its entirety, including physician's orders, follow-up appt, medications, when to call, and when to report to the emergency room. Reviewed prescriptions, pharmacy information, and made sure there were no conflicts preventing the patient from obtaining the newly prescribed medications. I encouraged questions, answered them thoroughly, and evaluated my instructions via teach-back method. Patient has met all hospital discharge criteria at this point.   
yes

## 2023-07-27 NOTE — PROGRESS NOTE ADULT - SUBJECTIVE AND OBJECTIVE BOX
Leticia Grimes  07/27/2023  [id number]    Joseph Sargent MD  Patient Care Team:  Joseph Sargent MD as PCP - General (Internal Medicine)  Nader Salguero MD as Consulting Physician (Radiology)  Richard Delong Jr., MD as Consulting Physician (Neurology)  Belinda Chapa as Digital Medicine Health   Bethanie RivasD as Hyperlipidemia Digital Medicine Clinician  Ben Marie MD as Hyperlipidemia Digital Medicine Responsible Provider (Family Medicine)  Nessa Gallardo PharmD as Hypertension Digital Medicine Clinician  Ben Marie MD as Hypertension Digital Medicine Responsible Provider (Family Medicine)  Yuri Can Managed Medicare as Hypertension Digital Medicine Contract          Visit Type:an urgent visit for a new problem    Chief Complaint:  Chief Complaint   Patient presents with    Leg Pain       History of Present Illness:  75 year old  PCP is Dr. Sargent.  This is my first encounter with kizzy  Scheduled an urgent visit for leg pain/severe back pain  She called her PCP and got appt for 8/1    From chart history she has lumbar radiculopathy and have seen Pain management.  She had CT of lumbar spine, as she has ICD and cannot have MRI  Pain management told her to go to ER    She was switched from Gabapentin to Lyrica  Has baclofen    (She is on Benzo from psych)      Incidential renal mass seen on CT.  On right  Referred to Urology- she will him U[place] [facility] [place]    She has shoulder sx in past.  She was referred to pain management  She reports ortho wanted her to see pain management.    She is having pain in with sciatica.  CT done in April    Patient reports that she didn't want surgery. She was offered a OMAR, she said she will think of it.                History:  Past Medical History:   Diagnosis Date    Adjustment insomnia 9/1/2020    Anxiety 9/1/2020    GERD (gastroesophageal reflux disease)     History of sudden cardiac arrest successfully resuscitated 8/31/2020    Hypertension      ICD (implantable cardioverter-defibrillator) in place 2020    Left arm swelling 9/3/2020    Mild vitamin D deficiency 2013    Osteopenia 2020    Vitamin D deficiency disease      Past Surgical History:   Procedure Laterality Date     SECTION, CLASSIC      HYSTERECTOMY      INJECTION OF JOINT Left 2021    Procedure: Left shoulder Joint injection;  Surgeon: Main Mcgraw MD;  Location: [facility];  Service: Pain Management;  Laterality: Left;    INSERTION OF BIVENTRICULAR IMPLANTABLE CARDIOVERTER-DEFIBRILLATOR (ICD)      REVERSE TOTAL SHOULDER ARTHROPLASTY Left 2022    Procedure: ARTHROPLASTY, SHOULDER, TOTAL, REVERSE;  Surgeon: Tomas Taylor MD;  Location: [facility];  Service: Orthopedics;  Laterality: Left;    TUBAL LIGATION       Family History   Problem Relation Age of Onset    Hypertension Mother     Arthritis Mother     Diabetes Father     Heart disease Father     Cancer Sister      Social History     Socioeconomic History    Marital status:    Occupational History    Occupation: retired school counselor   Tobacco Use    Smoking status: Never    Smokeless tobacco: Never   Substance and Sexual Activity    Alcohol use: No    Drug use: Never    Sexual activity: Not Currently     Partners: Male     Birth control/protection: Abstinence, None     Social Determinants of Health     Financial Resource Strain: Low Risk     Difficulty of Paying Living Expenses: Not very hard   Food Insecurity: No Food Insecurity    Worried About Running Out of Food in the Last Year: Never true    Ran Out of Food in the Last Year: Never true   Transportation Needs: No Transportation Needs    Lack of Transportation (Medical): No    Lack of Transportation (Non-Medical): No   Physical Activity: Inactive    Days of Exercise per Week: 0 days    Minutes of Exercise per Session: 0 min   Stress: No Stress Concern Present    Feeling of Stress : Only a little   Social Connections:  Moderately Integrated    Frequency of Communication with Friends and Family: More than three times a week    Frequency of Social Gatherings with Friends and Family: Twice a week    Attends Religion Services: More than 4 times per year    Active Member of Clubs or Organizations: Yes    Attends Club or Organization Meetings: More than 4 times per year    Marital Status:    Housing Stability: Low Risk     Unable to Pay for Housing in the Last Year: No    Number of Places Lived in the Last Year: 1    Unstable Housing in the Last Year: No     Patient Active Problem List   Diagnosis    Gastroesophageal reflux disease without esophagitis    Essential hypertension    Mild vitamin D deficiency    ICD (implantable cardioverter-defibrillator) in place    History of sudden cardiac arrest successfully resuscitated    Osteopenia    Adjustment insomnia    Anxiety    Mixed hyperlipidemia    Statin intolerance    Decreased strength, endurance, and mobility    Dissociative amnesia    Presence of artificial shoulder joint, left    Chronic bilateral low back pain with left-sided sciatica    Gait abnormality    Left hip pain    Lumbosacral radiculopathy at S1    Paresthesia of both hands    Bilateral carpal tunnel syndrome    Multiple neurological symptoms    Stage 3a chronic kidney disease    Hypertriglyceridemia    Thyroid nodule    Hearing loss    Cataract     Review of patient's allergies indicates:   Allergen Reactions    Codeine     Morphine Other (See Comments)       The following were reviewed at this visit: active problem list, medication list, allergies, family history, social history, and health maintenance.    Medications:  Current Outpatient Medications on File Prior to Visit   Medication Sig Dispense Refill    acetaminophen (TYLENOL) 500 MG tablet Take 2 tablets (1,000 mg total) by mouth every 8 (eight) hours as needed for Pain. 60 tablet 0    amLODIPine (NORVASC) 5 MG tablet Take 1 tablet (5 mg total) by mouth  once daily. 90 tablet 3    baclofen (LIORESAL) 10 MG tablet TAKE 1 TABLET BY MOUTH NIGHTLY AS NEEDED 90 tablet 3    carvediloL (COREG) 12.5 MG tablet Take 1 tablet (12.5 mg total) by mouth 2 (two) times daily with meals. 180 tablet 3    cloNIDine (CATAPRES) 0.1 MG tablet Take 0.1 mg by mouth as needed. If SBP > 200 mmHg      lisinopriL (PRINIVIL,ZESTRIL) 40 MG tablet Take 1 tablet by mouth once daily 90 tablet 1    mv-mn/folic/lutein/herbal 293 (ALIVE WOMEN'S 50 PLUS ORAL) Take 1 tablet by mouth once daily.      pregabalin (LYRICA) 75 MG capsule Take 1 capsule (75 mg total) by mouth every evening. 30 capsule 2    rosuvastatin (CRESTOR) 10 MG tablet Take 1 tablet (10 mg total) by mouth once daily. 90 tablet 1    traZODone (DESYREL) 50 MG tablet 1 to 2 tabs po qhs prn sleep 30 tablet 0    LORazepam (ATIVAN) 0.5 MG tablet Take 1 tablet (0.5 mg total) by mouth nightly as needed for Anxiety (or Insomnia). 30 tablet 0    omega 3-dha-epa-fish oil (FISH OIL) 1,000 mg (120 mg-180 mg) Cap Take 1 capsule by mouth 2 (two) times daily with meals. 180 capsule 3     No current facility-administered medications on file prior to visit.       Medications have been reviewed and reconciled with patient at this visit.  Barriers to medications reviewed with patient.    Adverse reactions to current medications reviewed with patient..    Over the counter medications reviewed and reconciled with patient.    Exam:  Wt Readings from Last 3 Encounters:   07/27/23 72.8 kg (160 lb 7.9 oz)   06/09/23 73.3 kg (161 lb 9.6 oz)   06/09/23 73 kg (161 lb)     Temp Readings from Last 3 Encounters:   07/27/23 97.2 °F (36.2 °C)   03/27/23 97.1 °F (36.2 °C) (Tympanic)   01/31/23 97.7 °F (36.5 °C)     BP Readings from Last 3 Encounters:   07/27/23 120/64   06/09/23 126/80   03/27/23 136/84     Pulse Readings from Last 3 Encounters:   07/27/23 77   06/09/23 (!) 59   03/27/23 60     Body mass index is 30.33 kg/m².      Review of Systems   Musculoskeletal:   CHIEF COMPLAINT/INTERVAL HISTORY:    Patient is a 72y old  Male who presents with a chief complaint of acute hypoxic respiratory failure. (04 May 2022 11:33)    SUBJECTIVE & OBJECTIVE: Pt seen and examined at bedside. No overnight events. Reports feeling tired today because he did not sleep well. HD proceeding uneventfully. Discharge back to [place] on 5/5 pending bed availability.     ROS: No chest pain, palpitations, SOB, light headedness, dizziness, headache, nausea/vomiting, fevers/chills, abdominal pain, dysuria.    ICU Vital Signs Last 24 Hrs  T(C): 37 (04 May 2022 15:10), Max: 37.1 (04 May 2022 05:26)  T(F): 98.6 (04 May 2022 15:10), Max: 98.7 (04 May 2022 05:26)  HR: 90 (04 May 2022 15:10) (76 - 102)  BP: 126/65 (04 May 2022 15:10) (113/68 - 135/67)  BP(mean): 88 (04 May 2022 00:00) (88 - 90)  RR: 18 (04 May 2022 15:10) (18 - 20)  SpO2: 94% (04 May 2022 15:10) (91% - 96%)    MEDICATIONS  (STANDING):  apixaban 2.5 milliGRAM(s) Oral every 12 hours  atorvastatin 40 milliGRAM(s) Oral at bedtime  calcium acetate 667 milliGRAM(s) Oral three times a day with meals  chlorhexidine 2% Cloths 1 Application(s) Topical <User Schedule>  clopidogrel Tablet 75 milliGRAM(s) Oral daily  epoetin ben-epbx (RETACRIT) Injectable 67312 Unit(s) IV Push <User Schedule>  fluconAZOLE   Tablet 200 milliGRAM(s) Oral <User Schedule>  hydrocortisone 10 milliGRAM(s) Oral <User Schedule>  hydrocortisone 20 milliGRAM(s) Oral <User Schedule>  multivitamin 1 Tablet(s) Oral daily  mycophenolate mofetil 500 milliGRAM(s) Oral two times a day  pantoprazole    Tablet 40 milliGRAM(s) Oral before breakfast  sevelamer carbonate 1600 milliGRAM(s) Oral three times a day with meals  tacrolimus 1.5 milliGRAM(s) Oral <User Schedule>  tacrolimus 1 milliGRAM(s) Oral <User Schedule>  tamsulosin 0.4 milliGRAM(s) Oral at bedtime  tiotropium 18 MICROgram(s) Capsule 1 Capsule(s) Inhalation daily  trimethoprim   80 mG/sulfamethoxazole 400 mG 1 Tablet(s) Oral <User Schedule>    MEDICATIONS  (PRN):  ALBUTerol    0.083% 2.5 milliGRAM(s) Nebulizer every 4 hours PRN Shortness of Breath and/or Wheezing  HYDROmorphone  Injectable 0.5 milliGRAM(s) IV Push every 6 hours PRN Moderate Pain (4 - 6)  HYDROmorphone  Injectable 1 milliGRAM(s) IV Push every 6 hours PRN Severe Pain (7 - 10)    LABS:                        8.5    9.79  )-----------( 280      ( 04 May 2022 10:45 )             26.2     05-04    140  |  97<L>  |  81.0<H>  ----------------------------<  107<H>  4.1   |  23.0  |  6.47<H>    Ca    7.8<L>      04 May 2022 10:45  Phos  6.2     05-04  Mg     2.4     05-04    PHYSICAL EXAM:    GENERAL: elderly male, laying in bed, NAD  HEAD:  Atraumatic, Normocephalic  EYES: EOMI, PERRLA, conjunctiva and sclera clear  ENMT: Moist mucous membranes  NECK: Supple  NERVOUS SYSTEM:  Alert & Oriented X3, [place]  CHEST/LUNG: bilateral air entry  HEART: Regular rate and rhythm; + S1/S2  ABDOMEN: Soft, Nontender, Nondistended; Bowel sounds present, + hernia  EXTREMITIES:  no pedal edema       Positive for joint pain.   Physical Exam  Nursing note reviewed.   Pulmonary:      Effort: Pulmonary effort is normal. No respiratory distress.   Musculoskeletal:         General: Tenderness present.        Legs:       Comments: Lower back pain, radiates down the leg to foot  Normal ROM     Neurological:      Mental Status: She is alert and oriented to person, place, and time.   Psychiatric:         Mood and Affect: Mood normal.         Behavior: Behavior normal.         Thought Content: Thought content normal.         Judgment: Judgment normal.       Laboratory Reviewed ({Yes)  Lab Results   Component Value Date    WBC 5.54 05/05/2022    HGB 13.1 05/05/2022    HCT 38.7 05/05/2022     05/05/2022    CHOL 256 (A) 07/07/2023    TRIG 123 07/07/2023    HDL 75 07/07/2023    ALT 24 05/11/2022    AST 22 05/11/2022     07/07/2023    K 3.7 07/07/2023     (A) 07/07/2023    CREATININE 0.9 07/07/2023    BUN 12 07/07/2023    CO2 28 (A) 07/07/2023    TSH 1.170 10/20/2022    HGBA1C 5.3 10/05/2021       Leticia was seen today for leg pain.    Diagnoses and all orders for this visit:    Lumbosacral radiculopathy at S1  -     methylPREDNISolone (MEDROL DOSEPACK) 4 mg tablet; use as directed                Care Plan/Goals: Reviewed    Goals          Patient Stated      Blood Pressure < 130/80 (pt-stated)        Other      Exercise at least 150 minutes per week.       Take at least one BP reading per week at various times of the day             Follow up: No follow-ups on file.    After visit summary was printed and given to patient upon discharge today.  Patient goals and care plan are included in After Visit Summary.

## 2023-09-07 NOTE — PROGRESS NOTE ADULT - SUBJECTIVE AND OBJECTIVE BOX
[facility] Division of Hospital Medicine    Chief Complaint: acute hypoxic respiratory failure    SUBJECTIVE / OVERNIGHT EVENTS: Patient continues to endorse LT-sided hip pain. He was transitioned from Bipap to NC 6L.     Patient denies chest pain, SOB, abd pain, N/V, fever, chills, dysuria or any other complaints. All remainder ROS negative.     MEDICATIONS  (STANDING):  apixaban 2.5 milliGRAM(s) Oral every 12 hours  atorvastatin 40 milliGRAM(s) Oral at bedtime  azithromycin  IVPB 500 milliGRAM(s) IV Intermittent every 24 hours  azithromycin  IVPB      calcium gluconate IVPB 2 Gram(s) IV Intermittent once  chlorhexidine 2% Cloths 1 Application(s) Topical <User Schedule>  clopidogrel Tablet 75 milliGRAM(s) Oral daily  fluconAZOLE   Tablet 200 milliGRAM(s) Oral <User Schedule>  meropenem  IVPB 500 milliGRAM(s) IV Intermittent every 24 hours  methylPREDNISolone sodium succinate Injectable 40 milliGRAM(s) IV Push every 8 hours  multivitamin 1 Tablet(s) Oral daily  mycophenolate mofetil 500 milliGRAM(s) Oral two times a day  pantoprazole    Tablet 40 milliGRAM(s) Oral before breakfast  sevelamer carbonate 1600 milliGRAM(s) Oral three times a day with meals  tacrolimus 1.5 milliGRAM(s) Oral <User Schedule>  tacrolimus 1 milliGRAM(s) Oral <User Schedule>  tamsulosin 0.4 milliGRAM(s) Oral at bedtime  trimethoprim   80 mG/sulfamethoxazole 400 mG 1 Tablet(s) Oral <User Schedule>    MEDICATIONS  (PRN):  ALBUTerol    0.083% 2.5 milliGRAM(s) Nebulizer every 4 hours PRN Shortness of Breath and/or Wheezing        I&O's Summary    26 Apr 2022 07:01  -  27 Apr 2022 07:00  --------------------------------------------------------  IN: 100 mL / OUT: 0 mL / NET: 100 mL        PHYSICAL EXAM:  Vital Signs Last 24 Hrs  T(C): 37 (27 Apr 2022 08:00), Max: 37.3 (26 Apr 2022 20:32)  T(F): 98.6 (27 Apr 2022 08:00), Max: 99.2 (26 Apr 2022 20:32)  HR: 100 (27 Apr 2022 10:07) (88 - 108)  BP: 109/65 (27 Apr 2022 10:07) (94/54 - 114/64)  BP(mean): 75 (27 Apr 2022 10:07) (70 - 75)  RR: 16 (27 Apr 2022 10:07) (16 - 26)  SpO2: 98% (27 Apr 2022 10:07) (95% - 100%)      General: pt. in bed not in distress, resting comformtably  Chest: Coarse rhonchi bilaterally, air entry fair.   Heart: S1,S2. RRR. no heart murmur. no edema.   Abdomen: soft. non-tender. non-distended. + BS.   Ext: no calf tenderness.  LUE av graft functional, moves all ext. independently.   lymphatic system : no lymphadenopathy.  Neuro: AAO x3. no focal weakness. no speech disorder, follows commands appropriately.   psych : mood ok, no si/hi.   Skin: no rash noted, warm and dry.    LABS:                        9.2    5.85  )-----------( 147      ( 27 Apr 2022 05:42 )             28.6     04-27    130<L>  |  91<L>  |  58.6<H>  ----------------------------<  143<H>  6.1<HH>   |  21.0<L>  |  6.57<H>    Ca    8.1<L>      27 Apr 2022 05:42    TPro  5.8<L>  /  Alb  2.9<L>  /  TBili  1.2  /  DBili  x   /  AST  103<H>  /  ALT  22  /  AlkPhos  514<H>  04-27    PT/INR - ( 26 Apr 2022 14:30 )   PT: 16.6 sec;   INR: 1.43 ratio         PTT - ( 26 Apr 2022 14:30 )  PTT:41.2 sec          CAPILLARY BLOOD GLUCOSE      POCT Blood Glucose.: 104 mg/dL (26 Apr 2022 15:55)  POCT Blood Glucose.: 89 mg/dL (26 Apr 2022 15:15)        RADIOLOGY & ADDITIONAL TESTS:  Results Reviewed:   Imaging Personally Reviewed:  Electrocardiogram Personally Reviewed:                                             right renal biopsy

## 2023-11-18 NOTE — PROGRESS NOTE ADULT - PROBLEM SELECTOR PLAN 2
- PPI QD  - Avoid NSAIDs   - Trend CBC, transfuse as needed. Monitor for signs of bleeding.
no
Extubated 12/20 now improving   Currently on 2LNC

## 2024-02-16 NOTE — DISCHARGE NOTE PROVIDER - NSDCQMAMI_CARD_ALL_CORE
[Alert] : alert [Well Nourished] : well nourished [Obese] : obese [No Acute Distress] : no acute distress [Well Developed] : well developed [Normal Sclera/Conjunctiva] : normal sclera/conjunctiva [EOMI] : extra ocular movement intact [No Proptosis] : no proptosis [Normal Oropharynx] : the oropharynx was normal [Thyroid Not Enlarged] : the thyroid was not enlarged [No Respiratory Distress] : no respiratory distress [No Accessory Muscle Use] : no accessory muscle use [Clear to Auscultation] : lungs were clear to auscultation bilaterally [Normal S1, S2] : normal S1 and S2 [Normal Rate] : heart rate was normal No [Regular Rhythm] : with a regular rhythm [No Edema] : no peripheral edema [Pedal Pulses Normal] : the pedal pulses are present [Normal Bowel Sounds] : normal bowel sounds [Not Tender] : non-tender [Not Distended] : not distended [Soft] : abdomen soft [Normal Anterior Cervical Nodes] : no anterior cervical lymphadenopathy [No Spinal Tenderness] : no spinal tenderness [Spine Straight] : spine straight [No Stigmata of Cushings Syndrome] : no stigmata of Cushings Syndrome [Normal Gait] : normal gait [Normal Strength/Tone] : muscle strength and tone were normal [No Rash] : no rash [Acanthosis Nigricans] : no acanthosis nigricans [Normal Reflexes] : deep tendon reflexes were 2+ and symmetric [No Tremors] : no tremors [Oriented x3] : oriented to person, place, and time

## 2024-02-20 NOTE — GOALS OF CARE CONVERSATION - ADVANCED CARE PLANNING - NSPROPTRIGHTCAREGIVER_GEN_A_NUR
Addended by: LALO ASENCIO on: 2/20/2024 09:26 AM     Modules accepted: Orders    
information could not be obtained

## 2024-03-08 NOTE — DISCHARGE NOTE PROVIDER - NSDCHC_MEDRECSTATUS_GEN_ALL_CORE
Addended by: KIRK NG on: 3/8/2024 10:52 AM     Modules accepted: Orders     Admission Reconciliation is Not Complete  Discharge Reconciliation is Not Complete Admission Reconciliation is Not Complete  Discharge Reconciliation is Completed Admission Reconciliation is Completed  Discharge Reconciliation is Completed

## 2024-07-16 NOTE — PROCEDURE NOTE - NSPROCDETAILS_GEN_ALL_CORE
location identified, draped/prepped, sterile technique used, needle inserted/introduced
location identified, draped/prepped, sterile technique used, needle inserted/introduced
patient pre-oxygenated, tube inserted, placement confirmed
nasogastric/audible air bolus/placement confirmed by auscultation/bowel sounds present to 4 quadrants
patient pre-oxygenated, tube inserted, placement confirmed
patient pre-oxygenated, tube inserted, placement confirmed
no

## 2024-08-07 NOTE — CONSULT NOTE ADULT - PROBLEM SELECTOR RECOMMENDATION 9
[Mother] : mother patient is scheduled for a low risk diagnostic procedure, Patient is at class II risk, 0.9% risk of major cardiac event   patient can proceed with intervention planned   no active cardiac conditions needed at this time   no additional cardiac testing indicated at this time   please call us back with questions or concerns. patient is scheduled for a low risk diagnostic procedure, Patient is at class II risk, 0.9% risk of major cardiac event   patient can proceed with intervention planned   no active cardiac conditions at this time   no additional cardiac testing indicated at this time   please call us back with questions or concerns.

## 2024-11-12 NOTE — ASU PATIENT PROFILE, ADULT - DOMESTIC TRAVEL HIGH RISK QUESTION
Per Dr. Yanes, patient needs Echo due to abnormal EKG prior to surgery.    Called Insurance- [facility] @ [phone], spoke to Angelina. Gave her info on needing Echo. A prior authorization was not need but a pre notification was done. A final determination of if it will be covered or even how much will be determined at the time of billing. Need to fax info to [facility] at fax# [phone], Call reference # [id number]. Member ID [id number] needs to be on fax.    If location of service changes we need to call [facility] back to let them know.   No

## 2025-01-14 NOTE — OCCUPATIONAL THERAPY INITIAL EVALUATION ADULT - MD ORDER
[facility]  [address] [address] [facility] [address] [facility] [id number] [phone]  June 20, 2017    Alison Barfield  [address] [address]    Dear Alison,    I care about your health and have reviewed your health plan. I have reviewed your medical conditions, medication list, and lab results and am making recommendations based on this review, to better manage your health.    You are in particular need of attention regarding:  -Repeat CT scan    I am recommending that you:  -Call [phone] to schedule a repeat CT scan at [facility] See attached order      Please call us at [phone] (or use [facility]) to address the above recommendations.     Thank you for trusting [facility] and we appreciate the opportunity to serve you.  We look forward to supporting your healthcare needs in the future.    Healthy Regards,    Susan Haase CNP         OT eval and treat For information on Fall & Injury Prevention, visit: [email or web address] OR  [email or web address] OR  https://www.cdc.gov/steadi/patient.html

## 2025-02-21 NOTE — CDI QUERY NOTE - NSCDI_DOCCLARIFY2_GEN_ALL_CORE_FT
February 21, 2025        Suraj Patrick  [address]  [place] [address]        Dear Suraj Patrick:      We made an attempt to contact you in regards to scheduling a Fibroscan ordered by Ajay Amos MD. To this date we have not yet received a call back.      Please contact the clinic at [phone] at your earliest convenience. Failure to do so will limit our ability to give you the best comprehensive care. We look forward to hearing from you.        Sincerely,      Carmen  Clinical   Ab[facility]                  [facility]    
Documentation clarification is required for accuracy in coding and billing, claim validation and reporting severity of illness, quality data and risk of mortality.

## 2025-04-02 NOTE — ED ADULT NURSE NOTE - PRO INTERPRETER NEED 2
Subjective: Patient presents today to see me prior to his planned surgery on April 24, 2025.  He has a largely deformed varus knee and we have postpone his surgery in the past because his hemoglobin A1c was elevated.  It is now 7.1 and he is able to have surgery.    Objective: Physical examination reveals a varus deformity of approximately 20 degrees.  It is correctable.  Neurovascularly intact.    Radiographic evaluation: I repeated the x-rays today and unfortunately the varus deformity is advanced tremendously with gross medial tibial plateau bone loss.    Impression: Severe advanced osteoarthritis of the right knee with varus deformity    Plan: Patient will have to come back and see me again so that I can do some stress x-rays of the knee as well as schedule CT scan.  I have called and spoken to patient and his wife and they will return back on Friday that is in 2 days for the stress x-rays.  I will try to see if I can organize a CT scan on the same day.  Based on that we would have to consider all surgical options.  
English

## 2025-05-02 NOTE — CHART NOTE - NSCHARTNOTEFT_GEN_A_CORE
Call placed to patient questions with which creams were prescribed by Dr Acuna, Bactroban 2%, Mometasone 0.1% patient verbalized understanding.   Pt currently has IV access and based on chart review and discussion with patient's team the patient is not a discharge for today. Will DC PICC request for now. Please re-order when discharge plan has been established.

## 2025-05-05 NOTE — ED ADULT NURSE NOTE - NS ED NURSE TRANSPORT WITH
Review of Systems   Constitutional:  Negative for appetite change, fatigue and fever.   HENT: Negative.  Negative for hearing loss, tinnitus, trouble swallowing and voice change.    Eyes: Negative.  Negative for photophobia, pain and visual disturbance.   Respiratory: Negative.  Negative for shortness of breath.    Cardiovascular: Negative.  Negative for palpitations.   Gastrointestinal: Negative.  Negative for nausea and vomiting.   Endocrine: Negative.  Negative for cold intolerance.   Genitourinary: Negative.  Negative for dysuria, frequency and urgency.   Musculoskeletal:  Negative for back pain, gait problem, myalgias, neck pain and neck stiffness.   Skin: Negative.  Negative for rash.   Allergic/Immunologic: Negative.    Neurological:  Negative for dizziness, tremors, seizures, syncope, facial asymmetry, speech difficulty, weakness, light-headedness, numbness and headaches.   Hematological: Negative.  Does not bruise/bleed easily.   Psychiatric/Behavioral: Negative.  Negative for confusion, hallucinations and sleep disturbance.    All other systems reviewed and are negative.       Cardiac Monitor/Defib/ACLS/Rescue Kit/O2/BVM

## 2025-06-20 NOTE — PATIENT PROFILE ADULT - OVER THE PAST TWO WEEKS HAVE YOU FELT DOWN, DEPRESSED OR HOPELESS?
Pharmacy calling  for prior authorization on:    Medication: Wegovy   Dosage: 1MG/0.5ML  Quantity requested:    Pharmacy for prescription has been selected.  Add Key or PA number if applicable   Initiation of prior authorization needed.    
no

## (undated) DEVICE — PRIORITY PK 20/30 COPILOT

## (undated) DEVICE — SYR LUER LOK 20CC

## (undated) DEVICE — SOL INJ NS 0.9% 500ML 1-PORT

## (undated) DEVICE — SENSOR O2 FINGER ADULT

## (undated) DEVICE — DRSG CURITY GAUZE SPONGE 4 X 4" 12-PLY NON-STERILE

## (undated) DEVICE — SUT MONOCRYL 4-0 27" PS-2 UNDYED

## (undated) DEVICE — DENTURE CUP PINK

## (undated) DEVICE — DRSG MASTISOL

## (undated) DEVICE — GOWN IMPERV XL

## (undated) DEVICE — SOL IRR BAG NS 0.9% 1000ML

## (undated) DEVICE — WRAP COMPRESSION CALF MED

## (undated) DEVICE — SSH-BRONCHOSCOPE 2043333: Type: DURABLE MEDICAL EQUIPMENT

## (undated) DEVICE — MASK PROC EAR LOOP

## (undated) DEVICE — WARMING BLANKET FULL ADULT

## (undated) DEVICE — GLV 7.5 PROTEXIS

## (undated) DEVICE — SYR LUER SLIP TIP 50CC

## (undated) DEVICE — CATH IV SAFE BC 22G X 1" (BLUE)

## (undated) DEVICE — SYR IV FLUSH SALINE 10ML 30/TY

## (undated) DEVICE — UNDERPAD LINEN SAVER 23 X 36"

## (undated) DEVICE — SUT VICRYL 3-0 27" SH

## (undated) DEVICE — SPONGE PEANUT AUTO COUNT

## (undated) DEVICE — DRSG 2X2

## (undated) DEVICE — DRAPE C ARM UNIVERSAL

## (undated) DEVICE — EVERGRIP FOGARTY CLAMP INSERT 86MM DISP

## (undated) DEVICE — DRSG STERISTRIPS 0.5X4"

## (undated) DEVICE — SUT PROLENE 6-0 30" BV-1

## (undated) DEVICE — SYR SLIP 10CC

## (undated) DEVICE — SUT VICRYL 2-0 27" CT-2 UNDYED

## (undated) DEVICE — TUBING IV EXTENSION MACRO W CLAVE 7"

## (undated) DEVICE — SUT SILK 4-0 30" TIES

## (undated) DEVICE — SUT SILK 3-0 30" TIES

## (undated) DEVICE — STAPLER SKIN PROXIMATE

## (undated) DEVICE — DRSG TEGADERM 4X4.75"

## (undated) DEVICE — FLOW SWITCH HP

## (undated) DEVICE — TUBING ALARIS PUMP MODULE NON-DEHP

## (undated) DEVICE — SUT SILK 2-0 30" TIES

## (undated) DEVICE — SOL IRR BAG H2O 1000ML

## (undated) DEVICE — TAPE UMBILICAL 1/8 X 30" STRANDS

## (undated) DEVICE — SOL BAG NS 0.9% 1000ML

## (undated) DEVICE — FORCEP RADIAL JAW 4 W NDL 2.4MM 2.8MM 240CM ORANGE DISP

## (undated) DEVICE — SUT NYLON 3-0 18" PS-2

## (undated) DEVICE — SUT PROLENE 6-0 30" C-1

## (undated) DEVICE — BITE BLOCK ADULT 20 X 27MM (GREEN)

## (undated) DEVICE — VESSEL LOOP MINI-BLUE 0.075" X 16"

## (undated) DEVICE — PACK IV START WITH CHG

## (undated) DEVICE — COVER PROBE T TIP INTRAOP SM

## (undated) DEVICE — BLANKET WARMER LOWER ADULT

## (undated) DEVICE — VENODYNE/SCD SLEEVE CALF MEDIUM

## (undated) DEVICE — DRAPE SPLIT SHEETS 77X108"

## (undated) DEVICE — Device

## (undated) DEVICE — EVERGRIP FOGARTY CLAMP INSERT 61MM DISP

## (undated) DEVICE — TORQUE DEVICE FOR GUIDEWIRE 0.0100.038"